# Patient Record
Sex: FEMALE | Race: WHITE | NOT HISPANIC OR LATINO | Employment: UNEMPLOYED | URBAN - NONMETROPOLITAN AREA
[De-identification: names, ages, dates, MRNs, and addresses within clinical notes are randomized per-mention and may not be internally consistent; named-entity substitution may affect disease eponyms.]

---

## 2017-01-04 ENCOUNTER — HOSPITAL ENCOUNTER (EMERGENCY)
Facility: HOSPITAL | Age: 52
Discharge: HOME | End: 2017-01-04
Attending: EMERGENCY MEDICINE | Admitting: EMERGENCY MEDICINE

## 2017-01-04 ENCOUNTER — APPOINTMENT (OUTPATIENT)
Dept: CT IMAGING | Facility: HOSPITAL | Age: 52
End: 2017-01-04

## 2017-01-04 ENCOUNTER — APPOINTMENT (OUTPATIENT)
Dept: GENERAL RADIOLOGY | Facility: HOSPITAL | Age: 52
End: 2017-01-04

## 2017-01-04 VITALS
HEIGHT: 63 IN | DIASTOLIC BLOOD PRESSURE: 67 MMHG | OXYGEN SATURATION: 94 % | WEIGHT: 112 LBS | RESPIRATION RATE: 18 BRPM | HEART RATE: 81 BPM | BODY MASS INDEX: 19.84 KG/M2 | SYSTOLIC BLOOD PRESSURE: 131 MMHG | TEMPERATURE: 97 F

## 2017-01-04 DIAGNOSIS — S32.030A COMPRESSION FRACTURE OF L3 LUMBAR VERTEBRA, CLOSED, INITIAL ENCOUNTER (HCC): Primary | ICD-10-CM

## 2017-01-04 PROCEDURE — 72110 X-RAY EXAM L-2 SPINE 4/>VWS: CPT | Performed by: RADIOLOGY

## 2017-01-04 PROCEDURE — 72110 X-RAY EXAM L-2 SPINE 4/>VWS: CPT

## 2017-01-04 PROCEDURE — 99284 EMERGENCY DEPT VISIT MOD MDM: CPT

## 2017-01-04 PROCEDURE — 25010000002 ORPHENADRINE CITRATE PER 60 MG: Performed by: PHYSICIAN ASSISTANT

## 2017-01-04 PROCEDURE — 72131 CT LUMBAR SPINE W/O DYE: CPT

## 2017-01-04 PROCEDURE — 96372 THER/PROPH/DIAG INJ SC/IM: CPT

## 2017-01-04 PROCEDURE — 72131 CT LUMBAR SPINE W/O DYE: CPT | Performed by: RADIOLOGY

## 2017-01-04 RX ORDER — HYDROCODONE BITARTRATE AND ACETAMINOPHEN 5; 325 MG/1; MG/1
1 TABLET ORAL EVERY 6 HOURS PRN
Qty: 10 TABLET | Refills: 0 | Status: ON HOLD | OUTPATIENT
Start: 2017-01-04 | End: 2017-03-01

## 2017-01-04 RX ORDER — ORPHENADRINE CITRATE 30 MG/ML
60 INJECTION INTRAMUSCULAR; INTRAVENOUS ONCE
Status: COMPLETED | OUTPATIENT
Start: 2017-01-04 | End: 2017-01-04

## 2017-01-04 RX ORDER — HYDROCODONE BITARTRATE AND ACETAMINOPHEN 5; 325 MG/1; MG/1
1 TABLET ORAL ONCE
Status: COMPLETED | OUTPATIENT
Start: 2017-01-04 | End: 2017-01-04

## 2017-01-04 RX ADMIN — IBUPROFEN 600 MG: 400 TABLET ORAL at 21:41

## 2017-01-04 RX ADMIN — HYDROCODONE BITARTRATE AND ACETAMINOPHEN 1 TABLET: 5; 325 TABLET ORAL at 18:29

## 2017-01-04 RX ADMIN — ORPHENADRINE CITRATE 60 MG: 30 INJECTION INTRAMUSCULAR; INTRAVENOUS at 18:29

## 2017-01-05 NOTE — ED NOTES
Medication given per orders, patient tolerated well. Will continue to monitor.     Angela Vergara RN  01/04/17 5824

## 2017-01-05 NOTE — ED PROVIDER NOTES
Subjective   HPI Comments: 51-year-old female who states she slipped while getting out of the shower about 2 hours ago and is now having lower back pain.  She denies any other injuries.  She denies any loss of consciousness.  She reports that she has injured her back in the past.  She states she has not taken any medications for her pain prior to arrival.    Patient is a 51 y.o. female presenting with back pain.   History provided by:  Patient  Back Pain   Location:  Lumbar spine  Quality:  Aching  Radiates to:  L thigh and R thigh  Pain severity:  Moderate  Pain is:  Same all the time  Onset quality:  Sudden  Duration:  2 hours  Timing:  Constant  Progression:  Worsening  Chronicity:  New  Context: falling and recent injury    Relieved by:  Nothing  Worsened by:  Movement  Ineffective treatments:  None tried  Associated symptoms: leg pain    Associated symptoms: no abdominal pain, no abdominal swelling, no bladder incontinence, no bowel incontinence, no chest pain, no dysuria, no fever, no headaches, no numbness, no paresthesias, no pelvic pain, no perianal numbness, no tingling, no weakness and no weight loss        Review of Systems   Constitutional: Negative for fever and weight loss.   HENT: Negative.    Eyes: Negative.    Respiratory: Negative.    Cardiovascular: Negative for chest pain.   Gastrointestinal: Negative for abdominal pain and bowel incontinence.   Genitourinary: Negative for bladder incontinence, dysuria and pelvic pain.   Musculoskeletal: Positive for back pain.   Skin: Negative.    Neurological: Negative for tingling, weakness, numbness, headaches and paresthesias.   Psychiatric/Behavioral: Negative.    All other systems reviewed and are negative.      Past Medical History   Diagnosis Date   • Anxiety    • 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   • Chronic respiratory failure with hypoxia      at nighttime   • Compression fracture of lumbar vertebra    • Compression fracture of  thoracic vertebra    • COPD (chronic obstructive pulmonary disease)    • Coronary artery disease    • Diabetes mellitus type 2 in nonobese    • Essential hypertension    • Gastroparesis    • Hepatitis-C    • MRSA infection      back of neck around    • NSTEMI (non-ST elevated myocardial infarction) 2016   • PE (pulmonary embolism)    • RA (rheumatoid arthritis)      with chronic steroid use       Allergies   Allergen Reactions   • Penicillins    • Toradol [Ketorolac Tromethamine]    • Tramadol        Past Surgical History   Procedure Laterality Date   • Cholecystectomy     • Foreign body removal       insulin needle removal x 3 from the abdomen   •  section       x 2   • Hysterectomy     • Arm debridement Left 2016     Procedure: HAND IRRIGATION DEBRIDEMENT AND REPAIR ;  Surgeon: Shane Fuller MD;  Location: [facility] OR;  Service:    • Endoscopy N/A 2016     Procedure: ESOPHAGOGASTRODUODENOSCOPY;  Surgeon: Jeremy Clement III, MD;  Location: [facility] OR;  Service:    • Amputation digit Right 2016     Third digit to the PIP. Surgeon: Dr. Bains   • Amputation digit Left 2016     Procedure: PARTIAL AMPUTATION LEFT THIRD FINGER;  Surgeon: Shane Fuller MD;  Location: [facility] OR;  Service:    • Amputation revision Left 2016     Procedure: AMPUTATION REVISION DIGIT STUMP;  Surgeon: Shane Fuller MD;  Location: [facility] OR;  Service:        Family History   Problem Relation Age of Onset   • Colon cancer Mother    • Cancer Father    • Diabetes Maternal Grandmother    • Heart disease Neg Hx    • Stroke Neg Hx        Social History     Social History   • Marital status:      Spouse name: N/A   • Number of children: N/A   • Years of education: N/A     Social History Main Topics   • Smoking status: Current Every Day Smoker     Packs/day: 4.50     Years: 35.00     Types: Cigarettes   • Smokeless tobacco: None      Comment: She cut down to  PPD  for 2.5 months ago   • Alcohol use No   • Drug use: No   • Sexual activity: Defer     Other Topics Concern   • None     Social History Narrative           Objective   Physical Exam   Constitutional: She is oriented to person, place, and time. She appears well-developed and well-nourished. No distress.   HENT:   Head: Normocephalic and atraumatic.   Right Ear: External ear normal.   Left Ear: External ear normal.   Nose: Nose normal.   Mouth/Throat: Oropharynx is clear and moist.   Eyes: Conjunctivae and EOM are normal. Pupils are equal, round, and reactive to light.   Neck: Normal range of motion. Neck supple.   Cardiovascular: Regular rhythm, normal heart sounds and intact distal pulses.  Tachycardia present.    Pulmonary/Chest: Effort normal and breath sounds normal. No respiratory distress. She has no wheezes. She exhibits no tenderness.   Abdominal: Soft. Bowel sounds are normal. There is no tenderness.   Musculoskeletal: She exhibits tenderness. She exhibits no deformity.   Pain with palpation over the midline lumbar spine.  Is able to ambulate.  Pain with flexion of the lower back.  No deformities noted.   Neurological: She is alert and oriented to person, place, and time. She displays normal reflexes. She exhibits normal muscle tone. Coordination normal.   Skin: Skin is warm and dry.   Psychiatric: She has a normal mood and affect. Her behavior is normal. Judgment and thought content normal.   Nursing note and vitals reviewed.      Procedures         ED Course  ED Course   Comment By Time   X-ray shows changes to L2 and L4 compared to CT of lumbar spine from March 2016.  Will get CT scan to further evaluate. COREEN Akers 01/04 1958   New L3 superior endplate fracture, will place in lumbar back brace and have pt follow up with Dr. Souza in the office. COREEN Akers 01/04 2207                  MDM  Number of Diagnoses or Management Options  Compression fracture of L3 lumbar vertebra, closed, initial  encounter:      Amount and/or Complexity of Data Reviewed  Tests in the radiology section of CPT®: ordered and reviewed  Independent visualization of images, tracings, or specimens: yes    Patient Progress  Patient progress: stable      Final diagnoses:   Compression fracture of L3 lumbar vertebra, closed, initial encounter            COREEN Akers  01/04/17 5739

## 2017-01-15 ENCOUNTER — HOSPITAL ENCOUNTER (EMERGENCY)
Facility: HOSPITAL | Age: 52
Discharge: HOME | End: 2017-01-15
Attending: EMERGENCY MEDICINE | Admitting: EMERGENCY MEDICINE

## 2017-01-15 VITALS
TEMPERATURE: 97.5 F | HEART RATE: 105 BPM | RESPIRATION RATE: 18 BRPM | OXYGEN SATURATION: 94 % | HEIGHT: 63 IN | WEIGHT: 115 LBS | DIASTOLIC BLOOD PRESSURE: 80 MMHG | SYSTOLIC BLOOD PRESSURE: 132 MMHG | BODY MASS INDEX: 20.38 KG/M2

## 2017-01-15 DIAGNOSIS — M54.50 ACUTE LEFT-SIDED LOW BACK PAIN WITHOUT SCIATICA: ICD-10-CM

## 2017-01-15 DIAGNOSIS — R10.84 GENERALIZED ABDOMINAL PAIN: Primary | ICD-10-CM

## 2017-01-15 LAB
ALBUMIN SERPL-MCNC: 3.8 G/DL (ref 3.5–5)
ALBUMIN/GLOB SERPL: 1.1 G/DL (ref 1.5–2.5)
ALP SERPL-CCNC: 93 U/L (ref 46–116)
ALT SERPL W P-5'-P-CCNC: 13 U/L (ref 10–36)
ANION GAP SERPL CALCULATED.3IONS-SCNC: 7.6 MMOL/L (ref 3.6–11.2)
ANISOCYTOSIS BLD QL: NORMAL
AST SERPL-CCNC: 18 U/L (ref 10–30)
BASOPHILS # BLD AUTO: 0.02 10*3/MM3 (ref 0–0.3)
BASOPHILS NFR BLD AUTO: 0.3 % (ref 0–2)
BILIRUB SERPL-MCNC: 0.3 MG/DL (ref 0.2–1.8)
BUN BLD-MCNC: 18 MG/DL (ref 7–21)
BUN/CREAT SERPL: 23.4 (ref 7–25)
CALCIUM SPEC-SCNC: 9.5 MG/DL (ref 7.7–10)
CHLORIDE SERPL-SCNC: 111 MMOL/L (ref 99–112)
CO2 SERPL-SCNC: 25.4 MMOL/L (ref 24.3–31.9)
CREAT BLD-MCNC: 0.77 MG/DL (ref 0.43–1.29)
DEPRECATED RDW RBC AUTO: 51.4 FL (ref 37–54)
EOSINOPHIL # BLD AUTO: 0.02 10*3/MM3 (ref 0–0.7)
EOSINOPHIL NFR BLD AUTO: 0.3 % (ref 0–5)
ERYTHROCYTE [DISTWIDTH] IN BLOOD BY AUTOMATED COUNT: 18.9 % (ref 11.5–14.5)
GFR SERPL CREATININE-BSD FRML MDRD: 79 ML/MIN/1.73
GLOBULIN UR ELPH-MCNC: 3.4 GM/DL
GLUCOSE BLD-MCNC: 196 MG/DL (ref 70–110)
HCT VFR BLD AUTO: 33.9 % (ref 37–47)
HGB BLD-MCNC: 9.8 G/DL (ref 12–16)
HYPOCHROMIA BLD QL: NORMAL
IMM GRANULOCYTES # BLD: 0.08 10*3/MM3 (ref 0–0.03)
IMM GRANULOCYTES NFR BLD: 1.3 % (ref 0–0.5)
LIPASE SERPL-CCNC: 45 U/L (ref 13–60)
LYMPHOCYTES # BLD AUTO: 1.09 10*3/MM3 (ref 1–3)
LYMPHOCYTES NFR BLD AUTO: 17.6 % (ref 21–51)
MCH RBC QN AUTO: 22.4 PG (ref 27–33)
MCHC RBC AUTO-ENTMCNC: 28.9 G/DL (ref 33–37)
MCV RBC AUTO: 77.4 FL (ref 80–94)
MONOCYTES # BLD AUTO: 0.5 10*3/MM3 (ref 0.1–0.9)
MONOCYTES NFR BLD AUTO: 8.1 % (ref 0–10)
NEUTROPHILS # BLD AUTO: 4.48 10*3/MM3 (ref 1.4–6.5)
NEUTROPHILS NFR BLD AUTO: 72.4 % (ref 30–70)
OSMOLALITY SERPL CALC.SUM OF ELEC: 294.2 MOSM/KG (ref 273–305)
PLATELET # BLD AUTO: 438 10*3/MM3 (ref 130–400)
PMV BLD AUTO: 9.1 FL (ref 6–10)
POIKILOCYTOSIS BLD QL SMEAR: NORMAL
POTASSIUM BLD-SCNC: 3.6 MMOL/L (ref 3.5–5.3)
PROT SERPL-MCNC: 7.2 G/DL (ref 6–8)
RBC # BLD AUTO: 4.38 10*6/MM3 (ref 4.2–5.4)
SMALL PLATELETS BLD QL SMEAR: NORMAL
SODIUM BLD-SCNC: 144 MMOL/L (ref 135–153)
WBC NRBC COR # BLD: 6.19 10*3/MM3 (ref 4.5–12.5)

## 2017-01-15 PROCEDURE — 85025 COMPLETE CBC W/AUTO DIFF WBC: CPT | Performed by: EMERGENCY MEDICINE

## 2017-01-15 PROCEDURE — 25010000002 HYDROMORPHONE PER 4 MG: Performed by: EMERGENCY MEDICINE

## 2017-01-15 PROCEDURE — 93005 ELECTROCARDIOGRAM TRACING: CPT | Performed by: EMERGENCY MEDICINE

## 2017-01-15 PROCEDURE — 93010 ELECTROCARDIOGRAM REPORT: CPT | Performed by: INTERNAL MEDICINE

## 2017-01-15 PROCEDURE — 80053 COMPREHEN METABOLIC PANEL: CPT | Performed by: EMERGENCY MEDICINE

## 2017-01-15 PROCEDURE — 83690 ASSAY OF LIPASE: CPT | Performed by: EMERGENCY MEDICINE

## 2017-01-15 PROCEDURE — 96374 THER/PROPH/DIAG INJ IV PUSH: CPT

## 2017-01-15 PROCEDURE — 85007 BL SMEAR W/DIFF WBC COUNT: CPT | Performed by: EMERGENCY MEDICINE

## 2017-01-15 PROCEDURE — 25010000002 ONDANSETRON PER 1 MG: Performed by: EMERGENCY MEDICINE

## 2017-01-15 PROCEDURE — 96375 TX/PRO/DX INJ NEW DRUG ADDON: CPT

## 2017-01-15 PROCEDURE — 36415 COLL VENOUS BLD VENIPUNCTURE: CPT

## 2017-01-15 PROCEDURE — 96361 HYDRATE IV INFUSION ADD-ON: CPT

## 2017-01-15 PROCEDURE — 99284 EMERGENCY DEPT VISIT MOD MDM: CPT

## 2017-01-15 RX ORDER — ONDANSETRON 2 MG/ML
4 INJECTION INTRAMUSCULAR; INTRAVENOUS ONCE
Status: COMPLETED | OUTPATIENT
Start: 2017-01-15 | End: 2017-01-15

## 2017-01-15 RX ORDER — HYDROMORPHONE HYDROCHLORIDE 1 MG/ML
0.5 INJECTION, SOLUTION INTRAMUSCULAR; INTRAVENOUS; SUBCUTANEOUS ONCE
Status: COMPLETED | OUTPATIENT
Start: 2017-01-15 | End: 2017-01-15

## 2017-01-15 RX ORDER — CYCLOBENZAPRINE HCL 10 MG
10 TABLET ORAL 3 TIMES DAILY PRN
Qty: 21 TABLET | Refills: 0 | Status: SHIPPED | OUTPATIENT
Start: 2017-01-15 | End: 2017-01-15

## 2017-01-15 RX ADMIN — ONDANSETRON 4 MG: 2 INJECTION, SOLUTION INTRAMUSCULAR; INTRAVENOUS at 19:17

## 2017-01-15 RX ADMIN — HYDROMORPHONE HYDROCHLORIDE 0.5 MG: 1 INJECTION, SOLUTION INTRAMUSCULAR; INTRAVENOUS; SUBCUTANEOUS at 19:18

## 2017-01-15 RX ADMIN — SODIUM CHLORIDE 1000 ML: 9 INJECTION, SOLUTION INTRAVENOUS at 19:17

## 2017-01-16 NOTE — ED PROVIDER NOTES
Subjective   Patient is a 51 y.o. female presenting with abdominal pain and back pain.   History provided by:  Patient  Abdominal Pain   Pain location:  Epigastric  Pain quality: cramping    Pain radiates to:  Does not radiate  Pain severity:  Moderate  Onset quality:  Gradual  Duration:  4 days  Timing:  Intermittent  Progression:  Waxing and waning  Context: eating    Relieved by:  Nothing  Worsened by:  Nothing  Associated symptoms: anorexia, nausea and vomiting    Associated symptoms: no chest pain, no dysuria and no fever    Back Pain   Associated symptoms: abdominal pain    Associated symptoms: no chest pain, no dysuria and no fever        Review of Systems   Constitutional: Negative.  Negative for fever.   HENT: Negative.    Respiratory: Negative.    Cardiovascular: Negative.  Negative for chest pain.   Gastrointestinal: Positive for abdominal pain, anorexia, nausea and vomiting.   Endocrine: Negative.    Genitourinary: Negative.  Negative for dysuria.   Musculoskeletal: Positive for back pain.   Skin: Negative.    Neurological: Negative.    Psychiatric/Behavioral: Negative.    All other systems reviewed and are negative.      Past Medical History   Diagnosis Date   • Anxiety    • 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   • Chronic respiratory failure with hypoxia      at nighttime   • Compression fracture of lumbar vertebra    • Compression fracture of thoracic vertebra    • COPD (chronic obstructive pulmonary disease)    • Coronary artery disease    • Diabetes mellitus type 2 in nonobese    • Essential hypertension    • Gastroparesis    • Hepatitis-C    • MRSA infection      back of neck around 2012   • NSTEMI (non-ST elevated myocardial infarction) 07/2016   • PE (pulmonary embolism)    • RA (rheumatoid arthritis)      with chronic steroid use       Allergies   Allergen Reactions   • Penicillins    • Toradol [Ketorolac Tromethamine]    • Tramadol        Past Surgical History   Procedure  Laterality Date   • Cholecystectomy     • Foreign body removal       insulin needle removal x 3 from the abdomen   •  section       x 2   • Hysterectomy     • Arm debridement Left 2016     Procedure: HAND IRRIGATION DEBRIDEMENT AND REPAIR ;  Surgeon: Shane Fuller MD;  Location: [facility] OR;  Service:    • Endoscopy N/A 2016     Procedure: ESOPHAGOGASTRODUODENOSCOPY;  Surgeon: Jeremy Clement III, MD;  Location: [facility] OR;  Service:    • Amputation digit Right 2016     Third digit to the PIP. Surgeon: Dr. Bains   • Amputation digit Left 2016     Procedure: PARTIAL AMPUTATION LEFT THIRD FINGER;  Surgeon: Shane Fuller MD;  Location: [facility] OR;  Service:    • Amputation revision Left 2016     Procedure: AMPUTATION REVISION DIGIT STUMP;  Surgeon: Shane Fuller MD;  Location: [facility] OR;  Service:        Family History   Problem Relation Age of Onset   • Colon cancer Mother    • Cancer Father    • Diabetes Maternal Grandmother    • Heart disease Neg Hx    • Stroke Neg Hx        Social History     Social History   • Marital status:      Spouse name: N/A   • Number of children: N/A   • Years of education: N/A     Social History Main Topics   • Smoking status: Current Every Day Smoker     Packs/day: 4.50     Years: 35.00     Types: Cigarettes   • Smokeless tobacco: None      Comment: She cut down to 1/4 PPD for 2.5 months ago   • Alcohol use No   • Drug use: No   • Sexual activity: Defer     Other Topics Concern   • None     Social History Narrative           Objective   Physical Exam   Constitutional: She is oriented to person, place, and time. She appears well-developed and well-nourished. No distress.   HENT:   Head: Normocephalic and atraumatic.   Right Ear: External ear normal.   Left Ear: External ear normal.   Nose: Nose normal.   Eyes: Conjunctivae and EOM are normal. Pupils are equal, round, and reactive to light.   Neck: Normal range of  motion. Neck supple. No JVD present. No tracheal deviation present.   Cardiovascular: Normal rate, regular rhythm and normal heart sounds.    No murmur heard.  Pulmonary/Chest: Effort normal and breath sounds normal. No respiratory distress. She has no wheezes.   Abdominal: Soft. Bowel sounds are normal. There is tenderness.   Diffusely mildly tender, no guarding or rebound   Musculoskeletal: Normal range of motion. She exhibits no edema or deformity.   Neurological: She is alert and oriented to person, place, and time. No cranial nerve deficit.   Skin: Skin is warm and dry. No rash noted. She is not diaphoretic. No erythema. No pallor.   Psychiatric: She has a normal mood and affect. Her behavior is normal. Thought content normal.   Nursing note and vitals reviewed.      Procedures         ED Course  ED Course                  MDM  Number of Diagnoses or Management Options  Acute left-sided low back pain without sciatica:   Generalized abdominal pain:      Amount and/or Complexity of Data Reviewed  Clinical lab tests: ordered and reviewed  Tests in the radiology section of CPT®: ordered and reviewed    Risk of Complications, Morbidity, and/or Mortality  Presenting problems: moderate  Diagnostic procedures: moderate  Management options: moderate        Final diagnoses:   Generalized abdominal pain   Acute left-sided low back pain without sciatica            Zachariah Ingram MD  01/17/17 2460

## 2017-01-21 ENCOUNTER — HOSPITAL ENCOUNTER (EMERGENCY)
Facility: HOSPITAL | Age: 52
Discharge: HOME | End: 2017-01-21
Attending: EMERGENCY MEDICINE | Admitting: EMERGENCY MEDICINE

## 2017-01-21 VITALS
HEART RATE: 88 BPM | WEIGHT: 114 LBS | SYSTOLIC BLOOD PRESSURE: 135 MMHG | BODY MASS INDEX: 20.2 KG/M2 | TEMPERATURE: 98 F | RESPIRATION RATE: 20 BRPM | HEIGHT: 63 IN | OXYGEN SATURATION: 99 % | DIASTOLIC BLOOD PRESSURE: 56 MMHG

## 2017-01-21 DIAGNOSIS — N39.0 ACUTE UTI: Primary | ICD-10-CM

## 2017-01-21 LAB
ALBUMIN SERPL-MCNC: 3.9 G/DL (ref 3.5–5)
ALBUMIN/GLOB SERPL: 1.1 G/DL (ref 1.5–2.5)
ALP SERPL-CCNC: 98 U/L (ref 46–116)
ALT SERPL W P-5'-P-CCNC: 27 U/L (ref 10–36)
AMPHET+METHAMPHET UR QL: NEGATIVE
AMYLASE SERPL-CCNC: 39 U/L (ref 28–100)
ANION GAP SERPL CALCULATED.3IONS-SCNC: 8.7 MMOL/L (ref 3.6–11.2)
AST SERPL-CCNC: 34 U/L (ref 10–30)
BACTERIA UR QL AUTO: ABNORMAL /HPF
BARBITURATES UR QL SCN: NEGATIVE
BASOPHILS # BLD AUTO: 0.01 10*3/MM3 (ref 0–0.3)
BASOPHILS NFR BLD AUTO: 0.1 % (ref 0–2)
BENZODIAZ UR QL SCN: NEGATIVE
BILIRUB SERPL-MCNC: 0.4 MG/DL (ref 0.2–1.8)
BILIRUB UR QL STRIP: ABNORMAL
BUN BLD-MCNC: 13 MG/DL (ref 7–21)
BUN/CREAT SERPL: 22 (ref 7–25)
CALCIUM SPEC-SCNC: 9.2 MG/DL (ref 7.7–10)
CANNABINOIDS SERPL QL: NEGATIVE
CHLORIDE SERPL-SCNC: 112 MMOL/L (ref 99–112)
CLARITY UR: ABNORMAL
CO2 SERPL-SCNC: 24.3 MMOL/L (ref 24.3–31.9)
COCAINE UR QL: NEGATIVE
COLOR UR: ABNORMAL
CREAT BLD-MCNC: 0.59 MG/DL (ref 0.43–1.29)
DEPRECATED RDW RBC AUTO: 52.7 FL (ref 37–54)
EOSINOPHIL # BLD AUTO: 0.04 10*3/MM3 (ref 0–0.7)
EOSINOPHIL NFR BLD AUTO: 0.4 % (ref 0–5)
ERYTHROCYTE [DISTWIDTH] IN BLOOD BY AUTOMATED COUNT: 18.8 % (ref 11.5–14.5)
GFR SERPL CREATININE-BSD FRML MDRD: 107 ML/MIN/1.73
GLOBULIN UR ELPH-MCNC: 3.5 GM/DL
GLUCOSE BLD-MCNC: 298 MG/DL (ref 70–110)
GLUCOSE UR STRIP-MCNC: ABNORMAL MG/DL
HCT VFR BLD AUTO: 39.3 % (ref 37–47)
HGB BLD-MCNC: 11.5 G/DL (ref 12–16)
HGB UR QL STRIP.AUTO: ABNORMAL
HYALINE CASTS UR QL AUTO: ABNORMAL /LPF
IMM GRANULOCYTES # BLD: 0.07 10*3/MM3 (ref 0–0.03)
IMM GRANULOCYTES NFR BLD: 0.8 % (ref 0–0.5)
KETONES UR QL STRIP: ABNORMAL
LEUKOCYTE ESTERASE UR QL STRIP.AUTO: ABNORMAL
LIPASE SERPL-CCNC: 56 U/L (ref 13–60)
LYMPHOCYTES # BLD AUTO: 0.83 10*3/MM3 (ref 1–3)
LYMPHOCYTES NFR BLD AUTO: 9 % (ref 21–51)
MCH RBC QN AUTO: 22.5 PG (ref 27–33)
MCHC RBC AUTO-ENTMCNC: 29.3 G/DL (ref 33–37)
MCV RBC AUTO: 76.9 FL (ref 80–94)
METHADONE UR QL SCN: NEGATIVE
MONOCYTES # BLD AUTO: 0.38 10*3/MM3 (ref 0.1–0.9)
MONOCYTES NFR BLD AUTO: 4.1 % (ref 0–10)
NEUTROPHILS # BLD AUTO: 7.92 10*3/MM3 (ref 1.4–6.5)
NEUTROPHILS NFR BLD AUTO: 85.6 % (ref 30–70)
NITRITE UR QL STRIP: POSITIVE
OPIATES UR QL: NEGATIVE
OSMOLALITY SERPL CALC.SUM OF ELEC: 299.9 MOSM/KG (ref 273–305)
OXYCODONE UR QL SCN: NEGATIVE
PCP UR QL SCN: NEGATIVE
PH UR STRIP.AUTO: 6 [PH] (ref 5–8)
PLATELET # BLD AUTO: 389 10*3/MM3 (ref 130–400)
PMV BLD AUTO: 9.6 FL (ref 6–10)
POTASSIUM BLD-SCNC: 3.8 MMOL/L (ref 3.5–5.3)
PROPOXYPH UR QL: NEGATIVE
PROT SERPL-MCNC: 7.4 G/DL (ref 6–8)
PROT UR QL STRIP: ABNORMAL
RBC # BLD AUTO: 5.11 10*6/MM3 (ref 4.2–5.4)
RBC # UR: ABNORMAL /HPF
REF LAB TEST METHOD: ABNORMAL
SODIUM BLD-SCNC: 145 MMOL/L (ref 135–153)
SP GR UR STRIP: >1.03 (ref 1–1.03)
SQUAMOUS #/AREA URNS HPF: ABNORMAL /HPF
UROBILINOGEN UR QL STRIP: ABNORMAL
WBC NRBC COR # BLD: 9.25 10*3/MM3 (ref 4.5–12.5)
WBC UR QL AUTO: ABNORMAL /HPF

## 2017-01-21 PROCEDURE — 96374 THER/PROPH/DIAG INJ IV PUSH: CPT

## 2017-01-21 PROCEDURE — 87186 SC STD MICRODIL/AGAR DIL: CPT | Performed by: EMERGENCY MEDICINE

## 2017-01-21 PROCEDURE — 85025 COMPLETE CBC W/AUTO DIFF WBC: CPT | Performed by: EMERGENCY MEDICINE

## 2017-01-21 PROCEDURE — 80307 DRUG TEST PRSMV CHEM ANLYZR: CPT | Performed by: EMERGENCY MEDICINE

## 2017-01-21 PROCEDURE — 36415 COLL VENOUS BLD VENIPUNCTURE: CPT

## 2017-01-21 PROCEDURE — 96361 HYDRATE IV INFUSION ADD-ON: CPT

## 2017-01-21 PROCEDURE — 87086 URINE CULTURE/COLONY COUNT: CPT | Performed by: EMERGENCY MEDICINE

## 2017-01-21 PROCEDURE — 81001 URINALYSIS AUTO W/SCOPE: CPT | Performed by: EMERGENCY MEDICINE

## 2017-01-21 PROCEDURE — 87088 URINE BACTERIA CULTURE: CPT | Performed by: EMERGENCY MEDICINE

## 2017-01-21 PROCEDURE — 99284 EMERGENCY DEPT VISIT MOD MDM: CPT

## 2017-01-21 PROCEDURE — 82150 ASSAY OF AMYLASE: CPT | Performed by: EMERGENCY MEDICINE

## 2017-01-21 PROCEDURE — 83690 ASSAY OF LIPASE: CPT | Performed by: EMERGENCY MEDICINE

## 2017-01-21 PROCEDURE — 80053 COMPREHEN METABOLIC PANEL: CPT | Performed by: EMERGENCY MEDICINE

## 2017-01-21 PROCEDURE — 25010000002 ONDANSETRON PER 1 MG: Performed by: EMERGENCY MEDICINE

## 2017-01-21 PROCEDURE — 87077 CULTURE AEROBIC IDENTIFY: CPT | Performed by: EMERGENCY MEDICINE

## 2017-01-21 RX ORDER — NITROFURANTOIN 25; 75 MG/1; MG/1
100 CAPSULE ORAL 2 TIMES DAILY
Qty: 14 CAPSULE | Refills: 0 | Status: SHIPPED | OUTPATIENT
Start: 2017-01-21 | End: 2017-01-28

## 2017-01-21 RX ORDER — ONDANSETRON 4 MG/1
4 TABLET, FILM COATED ORAL EVERY 6 HOURS PRN
Qty: 12 TABLET | Refills: 0 | Status: ON HOLD | OUTPATIENT
Start: 2017-01-21 | End: 2017-03-01

## 2017-01-21 RX ORDER — MAGNESIUM HYDROXIDE/ALUMINUM HYDROXICE/SIMETHICONE 120; 1200; 1200 MG/30ML; MG/30ML; MG/30ML
10 SUSPENSION ORAL ONCE
Status: DISCONTINUED | OUTPATIENT
Start: 2017-01-21 | End: 2017-01-21

## 2017-01-21 RX ORDER — ACETAMINOPHEN 325 MG/1
1000 TABLET ORAL ONCE
Status: COMPLETED | OUTPATIENT
Start: 2017-01-21 | End: 2017-01-21

## 2017-01-21 RX ORDER — ONDANSETRON 2 MG/ML
4 INJECTION INTRAMUSCULAR; INTRAVENOUS ONCE
Status: COMPLETED | OUTPATIENT
Start: 2017-01-21 | End: 2017-01-21

## 2017-01-21 RX ADMIN — SODIUM CHLORIDE 1000 ML: 9 INJECTION, SOLUTION INTRAVENOUS at 12:32

## 2017-01-21 RX ADMIN — ONDANSETRON 4 MG: 2 INJECTION, SOLUTION INTRAMUSCULAR; INTRAVENOUS at 12:49

## 2017-01-21 RX ADMIN — ACETAMINOPHEN 975 MG: 325 TABLET ORAL at 13:24

## 2017-01-21 NOTE — ED PROVIDER NOTES
Subjective   Patient is a 51 y.o. female presenting with abdominal pain.   History provided by:  Patient  Abdominal Pain   Pain location:  Epigastric  Pain quality: aching, cramping and sharp    Pain radiates to:  Does not radiate  Timing:  Constant  Progression:  Worsening  Context: diet changes, medication withdrawal and retching    Relieved by:  Nothing  Worsened by:  Nothing  Ineffective treatments:  None tried  Associated symptoms: dysuria, nausea and vomiting    Associated symptoms: no chest pain and no fever        Review of Systems   Constitutional: Negative.  Negative for fever.   HENT: Negative.    Respiratory: Negative.    Cardiovascular: Negative.  Negative for chest pain.   Gastrointestinal: Positive for abdominal pain, nausea and vomiting.   Endocrine: Negative.    Genitourinary: Positive for dysuria.   Skin: Negative.    Neurological: Negative.    Psychiatric/Behavioral: Negative.    All other systems reviewed and are negative.      Past Medical History   Diagnosis Date   • Anxiety    • 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   • Chronic respiratory failure with hypoxia      at nighttime   • Compression fracture of lumbar vertebra    • Compression fracture of thoracic vertebra    • COPD (chronic obstructive pulmonary disease)    • Coronary artery disease    • Diabetes mellitus type 2 in nonobese    • Essential hypertension    • Gastroparesis    • Hepatitis-C    • MRSA infection      back of neck around    • NSTEMI (non-ST elevated myocardial infarction) 2016   • PE (pulmonary embolism)    • RA (rheumatoid arthritis)      with chronic steroid use       Allergies   Allergen Reactions   • Penicillins    • Toradol [Ketorolac Tromethamine]    • Tramadol        Past Surgical History   Procedure Laterality Date   • Cholecystectomy     • Foreign body removal       insulin needle removal x 3 from the abdomen   •  section       x 2   • Hysterectomy     • Arm debridement Left  12/20/2016     Procedure: HAND IRRIGATION DEBRIDEMENT AND REPAIR ;  Surgeon: Shane Fuller MD;  Location:  [facility] OR;  Service:    • Endoscopy N/A 12/20/2016     Procedure: ESOPHAGOGASTRODUODENOSCOPY;  Surgeon: Jeremy Clement III, MD;  Location:  [facility] OR;  Service:    • Amputation digit Right 12/22/2016     Third digit to the PIP. Surgeon: Dr. Bains   • Amputation digit Left 12/22/2016     Procedure: PARTIAL AMPUTATION LEFT THIRD FINGER;  Surgeon: Shane Fulelr MD;  Location:  [facility] OR;  Service:    • Amputation revision Left 12/22/2016     Procedure: AMPUTATION REVISION DIGIT STUMP;  Surgeon: Shane Fuller MD;  Location:  [facility] OR;  Service:        Family History   Problem Relation Age of Onset   • Colon cancer Mother    • Cancer Father    • Diabetes Maternal Grandmother    • Heart disease Neg Hx    • Stroke Neg Hx        Social History     Social History   • Marital status:      Spouse name: N/A   • Number of children: N/A   • Years of education: N/A     Social History Main Topics   • Smoking status: Current Every Day Smoker     Packs/day: 4.50     Years: 35.00     Types: Cigarettes   • Smokeless tobacco: Not on file      Comment: She cut down to 1/4 PPD for 2.5 months ago   • Alcohol use No   • Drug use: No   • Sexual activity: Defer     Other Topics Concern   • Not on file     Social History Narrative           Objective   Physical Exam   Constitutional: She is oriented to person, place, and time. She appears well-developed and well-nourished. No distress.   HENT:   Head: Normocephalic and atraumatic.   Right Ear: External ear normal.   Left Ear: External ear normal.   Nose: Nose normal.   Eyes: Conjunctivae and EOM are normal. Pupils are equal, round, and reactive to light.   Neck: Normal range of motion. Neck supple. No JVD present. No tracheal deviation present.   Cardiovascular: Normal rate, regular rhythm and normal heart sounds.    No murmur  heard.  Pulmonary/Chest: Effort normal and breath sounds normal. No respiratory distress. She has no wheezes.   Abdominal: Soft. Bowel sounds are normal. There is tenderness.   Musculoskeletal: Normal range of motion. She exhibits no edema or deformity.   Neurological: She is alert and oriented to person, place, and time. No cranial nerve deficit.   Skin: Skin is warm and dry. No rash noted. She is not diaphoretic. No erythema. No pallor.   Psychiatric: She has a normal mood and affect. Her behavior is normal. Thought content normal.   Nursing note and vitals reviewed.      Procedures         ED Course  ED Course                  MDM    Final diagnoses:   Acute UTI            Zachariah Ingram MD  01/21/17 3121

## 2017-01-23 LAB — BACTERIA SPEC AEROBE CULT: ABNORMAL

## 2017-02-06 ENCOUNTER — OFFICE VISIT (OUTPATIENT)
Dept: ORTHOPEDIC SURGERY | Facility: CLINIC | Age: 52
End: 2017-02-06

## 2017-02-06 VITALS
HEART RATE: 128 BPM | DIASTOLIC BLOOD PRESSURE: 88 MMHG | HEIGHT: 63 IN | BODY MASS INDEX: 20.2 KG/M2 | WEIGHT: 114 LBS | SYSTOLIC BLOOD PRESSURE: 155 MMHG

## 2017-02-06 DIAGNOSIS — I73.1 BUERGER'S DISEASE (HCC): Primary | ICD-10-CM

## 2017-02-06 PROBLEM — E11.9 DIABETES MELLITUS TYPE 2 IN NONOBESE (HCC): Status: ACTIVE | Noted: 2017-02-06

## 2017-02-06 PROBLEM — J44.9 COPD (CHRONIC OBSTRUCTIVE PULMONARY DISEASE) (HCC): Status: ACTIVE | Noted: 2017-02-06

## 2017-02-06 PROBLEM — R11.0 CHRONIC NAUSEA: Status: ACTIVE | Noted: 2017-02-06

## 2017-02-06 PROBLEM — J43.9 CHRONIC OBSTRUCTIVE PULMONARY EMPHYSEMA (HCC): Status: ACTIVE | Noted: 2017-02-06

## 2017-02-06 PROBLEM — M06.9 RA (RHEUMATOID ARTHRITIS) (HCC): Status: ACTIVE | Noted: 2017-02-06

## 2017-02-06 PROBLEM — F41.9 ANXIETY: Status: ACTIVE | Noted: 2017-02-06

## 2017-02-06 PROBLEM — I25.10 CORONARY ARTERY DISEASE: Status: ACTIVE | Noted: 2017-02-06

## 2017-02-06 PROBLEM — E11.9 DIABETES (HCC): Status: ACTIVE | Noted: 2017-02-06

## 2017-02-06 PROBLEM — B19.20 HEPATITIS-C: Status: ACTIVE | Noted: 2017-02-06

## 2017-02-06 PROBLEM — I10 ESSENTIAL HYPERTENSION: Status: ACTIVE | Noted: 2017-02-06

## 2017-02-06 PROBLEM — K85.90 PANCREATITIS: Status: ACTIVE | Noted: 2017-02-06

## 2017-02-06 PROBLEM — K74.60 CIRRHOSIS (HCC): Status: ACTIVE | Noted: 2017-02-06

## 2017-02-06 PROCEDURE — 99024 POSTOP FOLLOW-UP VISIT: CPT | Performed by: ORTHOPAEDIC SURGERY

## 2017-02-28 ENCOUNTER — APPOINTMENT (OUTPATIENT)
Dept: GENERAL RADIOLOGY | Facility: HOSPITAL | Age: 52
End: 2017-02-28

## 2017-02-28 ENCOUNTER — APPOINTMENT (OUTPATIENT)
Dept: CT IMAGING | Facility: HOSPITAL | Age: 52
End: 2017-02-28

## 2017-02-28 ENCOUNTER — HOSPITAL ENCOUNTER (INPATIENT)
Facility: HOSPITAL | Age: 52
LOS: 20 days | Discharge: NURSING, REHAB OR LONG-TERM CARE | End: 2017-03-20
Attending: EMERGENCY MEDICINE | Admitting: INTERNAL MEDICINE

## 2017-02-28 DIAGNOSIS — J18.9 PNEUMONIA OF BOTH LOWER LOBES DUE TO INFECTIOUS ORGANISM: Primary | ICD-10-CM

## 2017-02-28 LAB
ALBUMIN SERPL-MCNC: 3.6 G/DL (ref 3.5–5)
ALBUMIN/GLOB SERPL: 1.2 G/DL (ref 1.5–2.5)
ALP SERPL-CCNC: 59 U/L (ref 35–104)
ALT SERPL W P-5'-P-CCNC: 41 U/L (ref 10–36)
AMPHET+METHAMPHET UR QL: NEGATIVE
ANION GAP SERPL CALCULATED.3IONS-SCNC: 5.7 MMOL/L (ref 3.6–11.2)
ANISOCYTOSIS BLD QL: ABNORMAL
AST SERPL-CCNC: 29 U/L (ref 10–30)
BACTERIA UR QL AUTO: ABNORMAL /HPF
BARBITURATES UR QL SCN: NEGATIVE
BENZODIAZ UR QL SCN: NEGATIVE
BILIRUB SERPL-MCNC: 0.2 MG/DL (ref 0.2–1.8)
BILIRUB UR QL STRIP: NEGATIVE
BNP SERPL-MCNC: 193 PG/ML (ref 0–100)
BUN BLD-MCNC: 18 MG/DL (ref 7–21)
BUN/CREAT SERPL: 31.6 (ref 7–25)
CALCIUM SPEC-SCNC: 8.8 MG/DL (ref 7.7–10)
CANNABINOIDS SERPL QL: NEGATIVE
CHLORIDE SERPL-SCNC: 109 MMOL/L (ref 99–112)
CK MB SERPL-CCNC: 0.31 NG/ML (ref 0–5)
CLARITY UR: ABNORMAL
CO2 SERPL-SCNC: 24.3 MMOL/L (ref 24.3–31.9)
COCAINE UR QL: NEGATIVE
COLOR UR: ABNORMAL
CREAT BLD-MCNC: 0.57 MG/DL (ref 0.43–1.29)
CRP SERPL-MCNC: 25.5 MG/DL (ref 0–0.99)
D DIMER PPP FEU-MCNC: 2.05 MG/L (FEU) (ref 0–0.5)
D-LACTATE SERPL-SCNC: 1.8 MMOL/L (ref 0.5–2)
DEPRECATED RDW RBC AUTO: 49.2 FL (ref 37–54)
ERYTHROCYTE [DISTWIDTH] IN BLOOD BY AUTOMATED COUNT: 19.4 % (ref 11.5–14.5)
ERYTHROCYTE [SEDIMENTATION RATE] IN BLOOD: 85 MM/HR (ref 0–30)
GFR SERPL CREATININE-BSD FRML MDRD: 112 ML/MIN/1.73
GLOBULIN UR ELPH-MCNC: 3 GM/DL
GLUCOSE BLD-MCNC: 117 MG/DL (ref 70–110)
GLUCOSE UR STRIP-MCNC: NEGATIVE MG/DL
HCT VFR BLD AUTO: 33.6 % (ref 37–47)
HGB BLD-MCNC: 9.9 G/DL (ref 12–16)
HGB UR QL STRIP.AUTO: ABNORMAL
HYALINE CASTS UR QL AUTO: ABNORMAL /LPF
HYPOCHROMIA BLD QL: ABNORMAL
KETONES UR QL STRIP: NEGATIVE
LEUKOCYTE ESTERASE UR QL STRIP.AUTO: ABNORMAL
LYMPHOCYTES # BLD MANUAL: 1.08 10*3/MM3 (ref 1–3)
LYMPHOCYTES NFR BLD MANUAL: 13 % (ref 21–51)
LYMPHOCYTES NFR BLD MANUAL: 4 % (ref 0–10)
MCH RBC QN AUTO: 21.3 PG (ref 27–33)
MCHC RBC AUTO-ENTMCNC: 29.5 G/DL (ref 33–37)
MCV RBC AUTO: 72.4 FL (ref 80–94)
METHADONE UR QL SCN: NEGATIVE
MICROCYTES BLD QL: ABNORMAL
MONOCYTES # BLD AUTO: 0.33 10*3/MM3 (ref 0.1–0.9)
MYOGLOBIN SERPL-MCNC: 107 NG/ML (ref 0–109)
NEUTROPHILS # BLD AUTO: 6.88 10*3/MM3 (ref 1.4–6.5)
NEUTROPHILS NFR BLD MANUAL: 83 % (ref 30–70)
NITRITE UR QL STRIP: NEGATIVE
OPIATES UR QL: POSITIVE
OSMOLALITY SERPL CALC.SUM OF ELEC: 280.5 MOSM/KG (ref 273–305)
OXYCODONE UR QL SCN: NEGATIVE
PCP UR QL SCN: NEGATIVE
PH UR STRIP.AUTO: 6 [PH] (ref 5–8)
PLAT MORPH BLD: NORMAL
PLATELET # BLD AUTO: 222 10*3/MM3 (ref 130–400)
PMV BLD AUTO: 9.8 FL (ref 6–10)
POTASSIUM BLD-SCNC: 3.8 MMOL/L (ref 3.5–5.3)
PROPOXYPH UR QL: NEGATIVE
PROT SERPL-MCNC: 6.6 G/DL (ref 6–8)
PROT UR QL STRIP: ABNORMAL
RBC # BLD AUTO: 4.64 10*6/MM3 (ref 4.2–5.4)
RBC # UR: ABNORMAL /HPF
REF LAB TEST METHOD: ABNORMAL
SODIUM BLD-SCNC: 139 MMOL/L (ref 135–153)
SP GR UR STRIP: 1.03 (ref 1–1.03)
SQUAMOUS #/AREA URNS HPF: ABNORMAL /HPF
TROPONIN I SERPL-MCNC: 0.08 NG/ML
TROPONIN I SERPL-MCNC: 0.09 NG/ML
UROBILINOGEN UR QL STRIP: ABNORMAL
WBC NRBC COR # BLD: 8.29 10*3/MM3 (ref 4.5–12.5)
WBC UR QL AUTO: ABNORMAL /HPF

## 2017-02-28 PROCEDURE — 0 IOPAMIDOL PER 1 ML: Performed by: EMERGENCY MEDICINE

## 2017-02-28 PROCEDURE — 25010000002 MORPHINE PER 10 MG: Performed by: EMERGENCY MEDICINE

## 2017-02-28 PROCEDURE — 80307 DRUG TEST PRSMV CHEM ANLYZR: CPT | Performed by: EMERGENCY MEDICINE

## 2017-02-28 PROCEDURE — 86140 C-REACTIVE PROTEIN: CPT | Performed by: EMERGENCY MEDICINE

## 2017-02-28 PROCEDURE — 94799 UNLISTED PULMONARY SVC/PX: CPT

## 2017-02-28 PROCEDURE — 71010 XR CHEST 1 VW: CPT | Performed by: RADIOLOGY

## 2017-02-28 PROCEDURE — 81001 URINALYSIS AUTO W/SCOPE: CPT | Performed by: EMERGENCY MEDICINE

## 2017-02-28 PROCEDURE — 85652 RBC SED RATE AUTOMATED: CPT | Performed by: EMERGENCY MEDICINE

## 2017-02-28 PROCEDURE — 87088 URINE BACTERIA CULTURE: CPT | Performed by: EMERGENCY MEDICINE

## 2017-02-28 PROCEDURE — 99285 EMERGENCY DEPT VISIT HI MDM: CPT

## 2017-02-28 PROCEDURE — 83605 ASSAY OF LACTIC ACID: CPT | Performed by: EMERGENCY MEDICINE

## 2017-02-28 PROCEDURE — 83880 ASSAY OF NATRIURETIC PEPTIDE: CPT | Performed by: EMERGENCY MEDICINE

## 2017-02-28 PROCEDURE — 25010000002 VANCOMYCIN PER 500 MG: Performed by: EMERGENCY MEDICINE

## 2017-02-28 PROCEDURE — 93010 ELECTROCARDIOGRAM REPORT: CPT | Performed by: INTERNAL MEDICINE

## 2017-02-28 PROCEDURE — 87086 URINE CULTURE/COLONY COUNT: CPT | Performed by: EMERGENCY MEDICINE

## 2017-02-28 PROCEDURE — 85379 FIBRIN DEGRADATION QUANT: CPT | Performed by: EMERGENCY MEDICINE

## 2017-02-28 PROCEDURE — 93005 ELECTROCARDIOGRAM TRACING: CPT | Performed by: EMERGENCY MEDICINE

## 2017-02-28 PROCEDURE — 71010 HC CHEST PA OR AP: CPT

## 2017-02-28 PROCEDURE — 85025 COMPLETE CBC W/AUTO DIFF WBC: CPT | Performed by: EMERGENCY MEDICINE

## 2017-02-28 PROCEDURE — 36415 COLL VENOUS BLD VENIPUNCTURE: CPT

## 2017-02-28 PROCEDURE — 25010000002 ONDANSETRON PER 1 MG: Performed by: EMERGENCY MEDICINE

## 2017-02-28 PROCEDURE — 87040 BLOOD CULTURE FOR BACTERIA: CPT | Performed by: EMERGENCY MEDICINE

## 2017-02-28 PROCEDURE — 94640 AIRWAY INHALATION TREATMENT: CPT

## 2017-02-28 PROCEDURE — 71275 CT ANGIOGRAPHY CHEST: CPT

## 2017-02-28 PROCEDURE — 87449 NOS EACH ORGANISM AG IA: CPT | Performed by: INTERNAL MEDICINE

## 2017-02-28 PROCEDURE — 25010000002 CEFTRIAXONE: Performed by: EMERGENCY MEDICINE

## 2017-02-28 PROCEDURE — 80053 COMPREHEN METABOLIC PANEL: CPT | Performed by: EMERGENCY MEDICINE

## 2017-02-28 PROCEDURE — 84484 ASSAY OF TROPONIN QUANT: CPT | Performed by: EMERGENCY MEDICINE

## 2017-02-28 PROCEDURE — 71275 CT ANGIOGRAPHY CHEST: CPT | Performed by: RADIOLOGY

## 2017-02-28 PROCEDURE — 87077 CULTURE AEROBIC IDENTIFY: CPT | Performed by: EMERGENCY MEDICINE

## 2017-02-28 PROCEDURE — 85007 BL SMEAR W/DIFF WBC COUNT: CPT | Performed by: EMERGENCY MEDICINE

## 2017-02-28 PROCEDURE — 83874 ASSAY OF MYOGLOBIN: CPT | Performed by: EMERGENCY MEDICINE

## 2017-02-28 PROCEDURE — 83036 HEMOGLOBIN GLYCOSYLATED A1C: CPT | Performed by: PHYSICIAN ASSISTANT

## 2017-02-28 PROCEDURE — 25010000002 METHYLPREDNISOLONE PER 125 MG: Performed by: EMERGENCY MEDICINE

## 2017-02-28 PROCEDURE — 82553 CREATINE MB FRACTION: CPT | Performed by: EMERGENCY MEDICINE

## 2017-02-28 PROCEDURE — 87186 SC STD MICRODIL/AGAR DIL: CPT | Performed by: EMERGENCY MEDICINE

## 2017-02-28 RX ORDER — ONDANSETRON 2 MG/ML
4 INJECTION INTRAMUSCULAR; INTRAVENOUS ONCE
Status: COMPLETED | OUTPATIENT
Start: 2017-02-28 | End: 2017-02-28

## 2017-02-28 RX ORDER — METHYLPREDNISOLONE SODIUM SUCCINATE 125 MG/2ML
125 INJECTION, POWDER, LYOPHILIZED, FOR SOLUTION INTRAMUSCULAR; INTRAVENOUS ONCE
Status: COMPLETED | OUTPATIENT
Start: 2017-02-28 | End: 2017-02-28

## 2017-02-28 RX ORDER — OXYCODONE HYDROCHLORIDE AND ACETAMINOPHEN 5; 325 MG/1; MG/1
1 TABLET ORAL ONCE
Status: DISCONTINUED | OUTPATIENT
Start: 2017-02-28 | End: 2017-02-28

## 2017-02-28 RX ORDER — SODIUM CHLORIDE 0.9 % (FLUSH) 0.9 %
10 SYRINGE (ML) INJECTION AS NEEDED
Status: DISCONTINUED | OUTPATIENT
Start: 2017-02-28 | End: 2017-03-20 | Stop reason: HOSPADM

## 2017-02-28 RX ORDER — IPRATROPIUM BROMIDE AND ALBUTEROL SULFATE 2.5; .5 MG/3ML; MG/3ML
3 SOLUTION RESPIRATORY (INHALATION) ONCE
Status: COMPLETED | OUTPATIENT
Start: 2017-02-28 | End: 2017-02-28

## 2017-02-28 RX ADMIN — CEFTRIAXONE 1 G: 1 INJECTION, POWDER, FOR SOLUTION INTRAMUSCULAR; INTRAVENOUS at 22:54

## 2017-02-28 RX ADMIN — NITROGLYCERIN 1 INCH: 20 OINTMENT TOPICAL at 20:40

## 2017-02-28 RX ADMIN — VANCOMYCIN HYDROCHLORIDE 1250 MG: 5 INJECTION, POWDER, LYOPHILIZED, FOR SOLUTION INTRAVENOUS at 23:30

## 2017-02-28 RX ADMIN — METHYLPREDNISOLONE SODIUM SUCCINATE 125 MG: 125 INJECTION, POWDER, FOR SOLUTION INTRAMUSCULAR; INTRAVENOUS at 19:36

## 2017-02-28 RX ADMIN — IOPAMIDOL 100 ML: 755 INJECTION, SOLUTION INTRAVENOUS at 21:34

## 2017-02-28 RX ADMIN — ONDANSETRON 4 MG: 2 INJECTION INTRAMUSCULAR; INTRAVENOUS at 20:44

## 2017-02-28 RX ADMIN — IPRATROPIUM BROMIDE AND ALBUTEROL SULFATE 3 ML: .5; 3 SOLUTION RESPIRATORY (INHALATION) at 23:15

## 2017-02-28 RX ADMIN — SODIUM CHLORIDE 1000 ML: 9 INJECTION, SOLUTION INTRAVENOUS at 19:35

## 2017-02-28 RX ADMIN — IPRATROPIUM BROMIDE AND ALBUTEROL SULFATE 3 ML: .5; 3 SOLUTION RESPIRATORY (INHALATION) at 19:50

## 2017-02-28 RX ADMIN — IPRATROPIUM BROMIDE AND ALBUTEROL SULFATE 3 ML: .5; 3 SOLUTION RESPIRATORY (INHALATION) at 21:39

## 2017-02-28 RX ADMIN — MORPHINE SULFATE 4 MG: 4 INJECTION, SOLUTION INTRAMUSCULAR; INTRAVENOUS at 20:41

## 2017-03-01 LAB
CK MB SERPL-CCNC: 0.43 NG/ML (ref 0–5)
CK MB SERPL-RTO: 0.1 % (ref 0–3)
CK SERPL-CCNC: 302 U/L (ref 24–173)
CRP SERPL-MCNC: 23.36 MG/DL (ref 0–0.99)
FLUAV AG NPH QL: NEGATIVE
FLUBV AG NPH QL IA: NEGATIVE
GLUCOSE BLDC GLUCOMTR-MCNC: 340 MG/DL (ref 70–130)
GLUCOSE BLDC GLUCOMTR-MCNC: 402 MG/DL (ref 70–130)
GLUCOSE BLDC GLUCOMTR-MCNC: 491 MG/DL (ref 70–130)
GLUCOSE BLDC GLUCOMTR-MCNC: 515 MG/DL (ref 70–130)
HBA1C MFR BLD: 9.1 % (ref 4.5–5.7)
L PNEUMO1 AG UR QL IA: NEGATIVE
M PNEUMO IGM SER QL: NEGATIVE
MYOGLOBIN SERPL-MCNC: 71 NG/ML (ref 0–109)
TROPONIN I SERPL-MCNC: 0.03 NG/ML

## 2017-03-01 PROCEDURE — 82550 ASSAY OF CK (CPK): CPT | Performed by: INTERNAL MEDICINE

## 2017-03-01 PROCEDURE — G8979 MOBILITY GOAL STATUS: HCPCS

## 2017-03-01 PROCEDURE — G8998 SWALLOW D/C STATUS: HCPCS

## 2017-03-01 PROCEDURE — 94799 UNLISTED PULMONARY SVC/PX: CPT

## 2017-03-01 PROCEDURE — 97161 PT EVAL LOW COMPLEX 20 MIN: CPT

## 2017-03-01 PROCEDURE — 63710000001 INSULIN ASPART PER 5 UNITS: Performed by: INTERNAL MEDICINE

## 2017-03-01 PROCEDURE — G8988 SELF CARE GOAL STATUS: HCPCS | Performed by: OCCUPATIONAL THERAPIST

## 2017-03-01 PROCEDURE — 63710000001 INSULIN DETEMIR PER 5 UNITS: Performed by: PHYSICIAN ASSISTANT

## 2017-03-01 PROCEDURE — 97167 OT EVAL HIGH COMPLEX 60 MIN: CPT | Performed by: OCCUPATIONAL THERAPIST

## 2017-03-01 PROCEDURE — 63710000001 INSULIN ASPART PER 5 UNITS: Performed by: PHYSICIAN ASSISTANT

## 2017-03-01 PROCEDURE — 86140 C-REACTIVE PROTEIN: CPT | Performed by: INTERNAL MEDICINE

## 2017-03-01 PROCEDURE — G8987 SELF CARE CURRENT STATUS: HCPCS | Performed by: OCCUPATIONAL THERAPIST

## 2017-03-01 PROCEDURE — 82553 CREATINE MB FRACTION: CPT | Performed by: INTERNAL MEDICINE

## 2017-03-01 PROCEDURE — 92610 EVALUATE SWALLOWING FUNCTION: CPT

## 2017-03-01 PROCEDURE — 25010000002 HEPARIN (PORCINE) PER 1000 UNITS: Performed by: INTERNAL MEDICINE

## 2017-03-01 PROCEDURE — 82962 GLUCOSE BLOOD TEST: CPT

## 2017-03-01 PROCEDURE — 84484 ASSAY OF TROPONIN QUANT: CPT | Performed by: INTERNAL MEDICINE

## 2017-03-01 PROCEDURE — 83874 ASSAY OF MYOGLOBIN: CPT | Performed by: INTERNAL MEDICINE

## 2017-03-01 PROCEDURE — G8996 SWALLOW CURRENT STATUS: HCPCS

## 2017-03-01 PROCEDURE — G8978 MOBILITY CURRENT STATUS: HCPCS

## 2017-03-01 PROCEDURE — 87804 INFLUENZA ASSAY W/OPTIC: CPT | Performed by: INTERNAL MEDICINE

## 2017-03-01 PROCEDURE — 25010000002 METHYLPREDNISOLONE PER 40 MG: Performed by: INTERNAL MEDICINE

## 2017-03-01 PROCEDURE — 25010000002 CEFTRIAXONE: Performed by: INTERNAL MEDICINE

## 2017-03-01 PROCEDURE — 99223 1ST HOSP IP/OBS HIGH 75: CPT | Performed by: INTERNAL MEDICINE

## 2017-03-01 PROCEDURE — G8997 SWALLOW GOAL STATUS: HCPCS

## 2017-03-01 RX ORDER — HYDROXYZINE PAMOATE 25 MG/1
25 CAPSULE ORAL 2 TIMES DAILY PRN
COMMUNITY
End: 2017-03-20 | Stop reason: HOSPADM

## 2017-03-01 RX ORDER — TIZANIDINE 4 MG/1
4 TABLET ORAL EVERY 8 HOURS PRN
COMMUNITY
End: 2017-03-20 | Stop reason: HOSPADM

## 2017-03-01 RX ORDER — ASPIRIN 81 MG/1
81 TABLET ORAL DAILY
Status: DISCONTINUED | OUTPATIENT
Start: 2017-03-01 | End: 2017-03-07

## 2017-03-01 RX ORDER — SODIUM CHLORIDE 0.9 % (FLUSH) 0.9 %
1-10 SYRINGE (ML) INJECTION AS NEEDED
Status: DISCONTINUED | OUTPATIENT
Start: 2017-03-01 | End: 2017-03-20 | Stop reason: HOSPADM

## 2017-03-01 RX ORDER — TIZANIDINE 4 MG/1
4 TABLET ORAL EVERY 8 HOURS PRN
Status: CANCELLED | OUTPATIENT
Start: 2017-03-01

## 2017-03-01 RX ORDER — HEPARIN SODIUM 5000 [USP'U]/ML
5000 INJECTION, SOLUTION INTRAVENOUS; SUBCUTANEOUS EVERY 12 HOURS SCHEDULED
Status: DISCONTINUED | OUTPATIENT
Start: 2017-03-01 | End: 2017-03-20 | Stop reason: HOSPADM

## 2017-03-01 RX ORDER — PROMETHAZINE HYDROCHLORIDE 12.5 MG/1
12.5 TABLET ORAL EVERY 6 HOURS PRN
Status: DISCONTINUED | OUTPATIENT
Start: 2017-03-01 | End: 2017-03-20

## 2017-03-01 RX ORDER — IPRATROPIUM BROMIDE AND ALBUTEROL SULFATE 2.5; .5 MG/3ML; MG/3ML
3 SOLUTION RESPIRATORY (INHALATION)
Status: DISCONTINUED | OUTPATIENT
Start: 2017-03-01 | End: 2017-03-03

## 2017-03-01 RX ORDER — LISINOPRIL 5 MG/1
5 TABLET ORAL DAILY
COMMUNITY
End: 2017-03-20 | Stop reason: HOSPADM

## 2017-03-01 RX ORDER — NICOTINE POLACRILEX 4 MG
15 LOZENGE BUCCAL
Status: DISCONTINUED | OUTPATIENT
Start: 2017-03-01 | End: 2017-03-20 | Stop reason: HOSPADM

## 2017-03-01 RX ORDER — PREDNISONE 20 MG/1
20 TABLET ORAL DAILY
Status: CANCELLED | OUTPATIENT
Start: 2017-03-01

## 2017-03-01 RX ORDER — GABAPENTIN 300 MG/1
600 CAPSULE ORAL 3 TIMES DAILY
Status: CANCELLED | OUTPATIENT
Start: 2017-03-01

## 2017-03-01 RX ORDER — HYDROXYZINE HYDROCHLORIDE 25 MG/1
25 TABLET, FILM COATED ORAL 2 TIMES DAILY PRN
Status: DISCONTINUED | OUTPATIENT
Start: 2017-03-01 | End: 2017-03-05

## 2017-03-01 RX ORDER — ALBUTEROL SULFATE 2.5 MG/3ML
2.5 SOLUTION RESPIRATORY (INHALATION)
Status: CANCELLED | OUTPATIENT
Start: 2017-03-01

## 2017-03-01 RX ORDER — CLOPIDOGREL BISULFATE 75 MG/1
75 TABLET ORAL DAILY
Status: DISCONTINUED | OUTPATIENT
Start: 2017-03-01 | End: 2017-03-20 | Stop reason: HOSPADM

## 2017-03-01 RX ORDER — FUROSEMIDE 20 MG/1
20 TABLET ORAL DAILY
Status: CANCELLED | OUTPATIENT
Start: 2017-03-01

## 2017-03-01 RX ORDER — ALBUTEROL SULFATE 90 UG/1
1 AEROSOL, METERED RESPIRATORY (INHALATION)
COMMUNITY
End: 2017-03-20 | Stop reason: HOSPADM

## 2017-03-01 RX ORDER — DEXTROSE MONOHYDRATE 25 G/50ML
25 INJECTION, SOLUTION INTRAVENOUS
Status: DISCONTINUED | OUTPATIENT
Start: 2017-03-01 | End: 2017-03-20 | Stop reason: HOSPADM

## 2017-03-01 RX ORDER — GABAPENTIN 600 MG/1
600 TABLET ORAL 3 TIMES DAILY
COMMUNITY
End: 2017-03-20 | Stop reason: HOSPADM

## 2017-03-01 RX ORDER — PANTOPRAZOLE SODIUM 40 MG/10ML
40 INJECTION, POWDER, LYOPHILIZED, FOR SOLUTION INTRAVENOUS
Status: DISCONTINUED | OUTPATIENT
Start: 2017-03-02 | End: 2017-03-20 | Stop reason: HOSPADM

## 2017-03-01 RX ORDER — LISINOPRIL 2.5 MG/1
5 TABLET ORAL DAILY
Status: CANCELLED | OUTPATIENT
Start: 2017-03-01

## 2017-03-01 RX ORDER — GABAPENTIN 400 MG/1
800 CAPSULE ORAL EVERY 8 HOURS SCHEDULED
Status: DISCONTINUED | OUTPATIENT
Start: 2017-03-01 | End: 2017-03-03

## 2017-03-01 RX ORDER — METHYLPREDNISOLONE SODIUM SUCCINATE 40 MG/ML
40 INJECTION, POWDER, LYOPHILIZED, FOR SOLUTION INTRAMUSCULAR; INTRAVENOUS EVERY 12 HOURS
Status: DISCONTINUED | OUTPATIENT
Start: 2017-03-01 | End: 2017-03-02

## 2017-03-01 RX ORDER — SUCRALFATE 1 G/1
1 TABLET ORAL 4 TIMES DAILY
COMMUNITY
End: 2017-03-20 | Stop reason: HOSPADM

## 2017-03-01 RX ORDER — SUCRALFATE 1 G/1
1 TABLET ORAL
Status: DISCONTINUED | OUTPATIENT
Start: 2017-03-01 | End: 2017-03-14

## 2017-03-01 RX ORDER — HYDROCODONE BITARTRATE AND ACETAMINOPHEN 5; 325 MG/1; MG/1
1 TABLET ORAL EVERY 8 HOURS PRN
Status: DISCONTINUED | OUTPATIENT
Start: 2017-03-01 | End: 2017-03-05

## 2017-03-01 RX ORDER — PROMETHAZINE HYDROCHLORIDE 25 MG/1
25 TABLET ORAL EVERY 6 HOURS PRN
COMMUNITY
End: 2017-03-20 | Stop reason: HOSPADM

## 2017-03-01 RX ADMIN — GABAPENTIN 800 MG: 400 CAPSULE ORAL at 13:16

## 2017-03-01 RX ADMIN — HEPARIN SODIUM 5000 UNITS: 5000 INJECTION, SOLUTION INTRAVENOUS; SUBCUTANEOUS at 20:48

## 2017-03-01 RX ADMIN — SUCRALFATE 1 G: 1 TABLET ORAL at 16:34

## 2017-03-01 RX ADMIN — METHYLPREDNISOLONE SODIUM SUCCINATE 40 MG: 40 INJECTION, POWDER, FOR SOLUTION INTRAMUSCULAR; INTRAVENOUS at 07:49

## 2017-03-01 RX ADMIN — HYDROXYZINE 25 MG: 25 TABLET, FILM COATED ORAL at 22:29

## 2017-03-01 RX ADMIN — HYDROCODONE BITARTRATE AND ACETAMINOPHEN 1 TABLET: 5; 325 TABLET ORAL at 16:33

## 2017-03-01 RX ADMIN — DOXYCYCLINE 100 MG: 100 INJECTION, POWDER, LYOPHILIZED, FOR SOLUTION INTRAVENOUS at 07:49

## 2017-03-01 RX ADMIN — INSULIN ASPART 9 UNITS: 100 INJECTION, SOLUTION INTRAVENOUS; SUBCUTANEOUS at 11:54

## 2017-03-01 RX ADMIN — CEFTRIAXONE 1 G: 1 INJECTION, POWDER, FOR SOLUTION INTRAMUSCULAR; INTRAVENOUS at 22:08

## 2017-03-01 RX ADMIN — HEPARIN SODIUM 5000 UNITS: 5000 INJECTION, SOLUTION INTRAVENOUS; SUBCUTANEOUS at 01:20

## 2017-03-01 RX ADMIN — CLOPIDOGREL 75 MG: 75 TABLET, FILM COATED ORAL at 07:49

## 2017-03-01 RX ADMIN — ASPIRIN 81 MG: 81 TABLET ORAL at 16:34

## 2017-03-01 RX ADMIN — INSULIN ASPART 9 UNITS: 100 INJECTION, SOLUTION INTRAVENOUS; SUBCUTANEOUS at 16:34

## 2017-03-01 RX ADMIN — HYDROCODONE BITARTRATE AND ACETAMINOPHEN 1 TABLET: 5; 325 TABLET ORAL at 10:31

## 2017-03-01 RX ADMIN — HEPARIN SODIUM 5000 UNITS: 5000 INJECTION, SOLUTION INTRAVENOUS; SUBCUTANEOUS at 07:49

## 2017-03-01 RX ADMIN — INSULIN ASPART 10 UNITS: 100 INJECTION, SOLUTION INTRAVENOUS; SUBCUTANEOUS at 22:09

## 2017-03-01 RX ADMIN — GABAPENTIN 800 MG: 400 CAPSULE ORAL at 04:07

## 2017-03-01 RX ADMIN — IPRATROPIUM BROMIDE AND ALBUTEROL SULFATE 3 ML: .5; 3 SOLUTION RESPIRATORY (INHALATION) at 18:46

## 2017-03-01 RX ADMIN — INSULIN ASPART 7 UNITS: 100 INJECTION, SOLUTION INTRAVENOUS; SUBCUTANEOUS at 07:49

## 2017-03-01 RX ADMIN — IPRATROPIUM BROMIDE AND ALBUTEROL SULFATE 3 ML: .5; 3 SOLUTION RESPIRATORY (INHALATION) at 13:22

## 2017-03-01 RX ADMIN — INSULIN DETEMIR 45 UNITS: 100 INJECTION, SOLUTION SUBCUTANEOUS at 22:09

## 2017-03-01 RX ADMIN — GABAPENTIN 800 MG: 400 CAPSULE ORAL at 20:48

## 2017-03-01 RX ADMIN — HYDROCODONE BITARTRATE AND ACETAMINOPHEN 1 TABLET: 5; 325 TABLET ORAL at 02:55

## 2017-03-01 RX ADMIN — HYDROCODONE BITARTRATE AND ACETAMINOPHEN 1 TABLET: 5; 325 TABLET ORAL at 22:08

## 2017-03-01 RX ADMIN — IPRATROPIUM BROMIDE AND ALBUTEROL SULFATE 3 ML: .5; 3 SOLUTION RESPIRATORY (INHALATION) at 07:13

## 2017-03-01 RX ADMIN — METHYLPREDNISOLONE SODIUM SUCCINATE 40 MG: 40 INJECTION, POWDER, FOR SOLUTION INTRAMUSCULAR; INTRAVENOUS at 20:48

## 2017-03-01 RX ADMIN — SUCRALFATE 1 G: 1 TABLET ORAL at 20:48

## 2017-03-01 NOTE — PLAN OF CARE
Problem: Inpatient Occupational Therapy  Goal: Strength Goal LTG- OT    03/01/17 1304   Strength OT LTG   Strength Goal OT LTG, Date Established 03/01/17   Strength Goal OT LTG, Time to Achieve by discharge   Strength Goal OT LTG, Measure to Achieve Increase BUE MMT score to 4/5       Goal: Endurance Goal LTG- OT    03/01/17 1304   Endurance OT LTG   Endurance Goal OT LTG, Date Established 03/01/17   Endurance Goal OT LTG, Time to Achieve by discharge   Endurance Goal OT LTG, Activity Level to increase;endurance 2 fair+

## 2017-03-01 NOTE — PAYOR COMM NOTE
"  Kirstie Stroud  PHONE: [phone]  FAX: [phone]  REFERENCE # [id number]      Yessica Yan (51 y.o. Female)     Date of Birth Social Security Number Address Home Phone MRN    1965  [address] [address] [phone] [id number]    Adventism Marital Status          Nondenominational        Admission Date Admission Type Admitting Provider Attending Provider Department, Room/Bed    2/28/17 Emergency Svaanah Gutierrez MD Perkins, Jimmye S, MD [address], [address]    Discharge Date Discharge Disposition Discharge Destination                      Attending Provider: Savanah Gutierrez MD     Allergies:  Penicillins, Toradol [Ketorolac Tromethamine], Tramadol    Isolation:  None   Infection:  None   Code Status:  FULL    Ht:  63\" (160 cm)   Wt:  120 lb 6.4 oz (54.6 kg)    Admission Cmt:  None   Principal Problem:  None                Active Insurance as of 2/28/2017     Primary Coverage     Payor Plan Insurance Group Employer/Plan Group    MEDICARE MEDICARE A & B      Payor Plan Address Payor Plan Phone Number Effective From Effective To    [address] [phone] 2/1/2005     [place] [address]       Subscriber Name Subscriber Birth Date Member ID       YESSICA YAN 1965 [id number]           Secondary Coverage     Payor Plan Insurance Group Employer/Plan Group    [employer] [place] [employer]LL[employer] [place]ICAID      Payor Plan Address Payor Plan Phone Number Effective From Effective To    [address] [address] [phone] 2/28/2017     [place] [address]       Subscriber Name Subscriber Birth Date Member ID       YESSICA YAN 1965 [id number]                 Emergency Contacts      (Rel.) Home Phone Work Phone Mobile Phone    Scot Vaughn (Son) [phone] -- --            "

## 2017-03-01 NOTE — CONSULTS
"Diabetes Education  Assessment/Teaching    Patient Name:  Pepito Yan  YOB: 1965  MRN: [id number]  Admit Date:  2/28/2017      Assessment Date:  3/1/2017       Most Recent Value    General Information      Height  5' 3\" (1.6 m)    Height Method  Stated    Weight  120 lb 6.4 oz (54.6 kg)    Weight Method  Bed scale    Pregnancy Assessment     Diabetes History     What type of diabetes do you have?  Type 2    Length of Diabetes Diagnosis  10 + years    Education Preferences     Nutrition Information     Assessment Topics     Healthy Eating - Assessment  Competent    Being Active - Assessment  Competent    Taking Medication - Assessment  Competent    Problem Solving - Assessment  Competent    Reducing Risk - Assessment  Competent    Healthy Coping - Assessment  Competent    DM Goals                Most Recent Value    DM Education Needs     Meter  Has own    Frequency of Testing  3 times a day    Medication  Oral, Insulin, Actions, Side effects    Problem Solving  Hypoglycemia, Hyperglycemia, Sick days, Signs, Symptoms, Other (comment), Treatment    Physical Activity  Other (comment)    Healthy Coping  Appropriate    Discharge Plan  Home    Motivation  Moderate    Teaching Method  Explanation, Discussion, Handouts, Teach back    Patient Response  Verbalized understanding            Other Comments:          Electronically signed by:  Twyla Santos RN  03/01/17 1:56 PM  "

## 2017-03-01 NOTE — PROGRESS NOTES
Acute Care - Speech Language Pathology   Swallow Initial Evaluation  Daniel   CLINICAL DYSPHAGIA EVALUATION     Patient Name: Pepito Yan  : 1965  MRN: [id number]  Today's Date: 3/1/2017             Admit Date: 2017     Pepito Yan  is seen at bedside this am on 3S  to evaluate safety/efficacy of swallowing fnx, determine safest/least restrictive diet tolerance. She is admitted w/ current d/o bilateral PNA.    Social History     Social History   • Marital status:      Spouse name: N/A   • Number of children: N/A   • Years of education: N/A     Occupational History   • Not on file.     Social History Main Topics   • Smoking status: Current Every Day Smoker     Packs/day: 4.50     Years: 35.00     Types: Cigarettes   • Smokeless tobacco: Not on file      Comment: She cut down to 1/4 PPD for 2.5 months ago   • Alcohol use No   • Drug use: No   • Sexual activity: Defer     Other Topics Concern   • Not on file     Social History Narrative     Diet Orders (active)     Start     Ordered    17 0050  Diet Regular  Diet Effective Now      17 0049        Observed on O2 via NC.    Ms. Yan is positioned upright and centered in bed to accept multiple po presentations of ice chips solid cracker, puree and thin liquids via spoon, cup and straw.  She is able to self feed.     Facial/oral structures are symmetrical upon observation. Lingual protrusion reveals no deviation. Oral mucosa are moist, pink, and clean. Secretions are clear, thin, and well controlled. OROM/SALO is wfl to imitate oral postures. Gag is not assessed. Volitional cough is intact w/ adequate  intensity, clear in quality, non-productive. Voice is adequate in intensity, clear in quality w/ intelligible speech.    Upon po presentations, adequate bolus anticipation and acceptance w/ good labial seal for bolus clearance via spoon bowl, cup rim stability and suction via straw. Bolus formation, manipulation and control  are wfl w/ rotary mastication pattern. A-p transit is timely w/o oral residue. No overt s/s aspiration before the swallow.     Pharyngeal swallow is timely w/ adequate hyolaryngeal elevation per palpation. No overt s/s aspiration evidenced across this evaluation. No silent aspiration suspected as pt is w/o changes in vocal quality, respirations or secretions post po presentations. Pt denies odynophagia.    Visit Dx:     ICD-10-CM ICD-9-CM   1. Pneumonia of both lower lobes due to infectious organism J18.9 483.8     Patient Active Problem List   Diagnosis   • Chest pain   • Chest pain of uncertain etiology   • Intractable nausea and vomiting   • Intractable cyclical vomiting with nausea   • Chronic obstructive pulmonary emphysema   • Hepatitis-C   • Diabetes mellitus type 2 in nonobese   • Arthritis or polyarthritis, rheumatoid   • Anxiety   • Essential hypertension   • Coronary artery disease   • Buerger's disease   • Cirrhosis   • Diabetes   • Chronic nausea   • Pancreatitis   • Bilateral pneumonia     Past Medical History   Diagnosis Date   • Anxiety    • 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   • Chronic respiratory failure with hypoxia      at nighttime   • Compression fracture of lumbar vertebra    • Compression fracture of thoracic vertebra    • COPD (chronic obstructive pulmonary disease)    • Coronary artery disease    • Diabetes mellitus type 2 in nonobese    • Erosive esophagitis 12/2016   • Essential hypertension    • Gastroparesis    • GERD (gastroesophageal reflux disease)    • Hepatitis-C    • Medically noncompliant    • MRSA infection      back of neck around 2012   • NSTEMI (non-ST elevated myocardial infarction) 07/2016   • PE (pulmonary embolism)    • Protein calorie malnutrition    • RA (rheumatoid arthritis)      with chronic steroid use     Past Surgical History   Procedure Laterality Date   • Cholecystectomy     • Foreign body removal       insulin needle removal x 3 from the  abdomen   •  section       x 2   • Hysterectomy     • Arm debridement Left 2016     Procedure: HAND IRRIGATION DEBRIDEMENT AND REPAIR ;  Surgeon: Shane Fuller MD;  Location:  [facility];  Service:    • Endoscopy N/A 2016     Procedure: ESOPHAGOGASTRODUODENOSCOPY;  Surgeon: Jeremy Clement III, MD;  Location:  [facility];  Service:    • Amputation digit Right 2016     Third digit to the PIP. Surgeon: Dr. Bains   • Amputation digit Left 2016     Procedure: PARTIAL AMPUTATION LEFT THIRD FINGER;  Surgeon: Shane Fuller MD;  Location:  [facility];  Service:    • Amputation revision Left 2016     Procedure: AMPUTATION REVISION DIGIT STUMP;  Surgeon: Shane Fuller MD;  Location:  [facility];  Service:      EDUCATION  The patient has been educated in the following areas:   Dysphagia (Swallowing Impairment).    Impression: Ms. Yan presents w/ wfl oropharyngeal swallow w/o overt s/s aspiration across this evaluation. She denies odynophagia. Denies any h/o dysphagia. She appears safe to continue regular consistency diet, thin liquids.     SLP Recommendation and Plan    1. Continue regular consistency diet, thin liquids.    2. Meds whole in puree/thins.   3. Upright and centered for all po intake  4. AALIYAH precautions.  5. Oral care protocol.  No further formal SLP f/u warranted at this time.    D/w pt results and recommendations w/ verbal agreement.    Thank you for allowing me to participate in the care of your patient-  Melina Hodges M.S. ,CCC/SLP                                           Time Calculation:         Time Calculation- SLP       17          Time Calculation- SLP    SLP Start Time 905  -ASIF      SLP Stop Time 932  -ASIF      SLP Time Calculation (min) 27 min  -ASIF      SLP Non-Billable Time (min) 15 min  -        User Key  (r) = Recorded By, (t) = Taken By, (c) = Cosigned By    Initials Name Provider Type    ASIF Hodges MS  CCC-SLP Speech Therapist          Therapy Charges for Today     Code Description Service Date Service Provider Modifiers Qty    77456325757 HC ST SWALLOWING CURRENT STATUS 3/1/2017 Melina Hodges MS CCC-SLP GN, CH 1    21050091139 HC ST SWALLOWING PROJECTED 3/1/2017 Melina Hodges MS CCC-SLP GN, CH 1    96449600547 HC ST SWALLOWING DISCHARGE 3/1/2017 Melina Hodges MS CCC-SLP TERA,  1    88492789760 HC ST CARE PLAN 15 MIN 3/1/2017 Melina Hodges MS CHERYL-SLP GN 1    10302775925 HC ST EVAL ORAL PHARYNG SWALLOW 2 3/1/2017 Melina Hodges MS CCC-SLP GN 1        SLP G-Codes  SLP NOMS Used?: Yes  Functional Limitations: Swallowing  Swallow Current Status (): 0 percent impaired, limited or restricted  Swallow Goal Status (): 0 percent impaired, limited or restricted  Swallow Discharge Status (): 0 percent impaired, limited or restricted    Melina Hodges MS CCC-SLP  3/1/2017

## 2017-03-01 NOTE — CONSULTS
"Diabetes Education  Assessment/Teaching    Patient Name:  Pepito Yan  YOB: 1965  MRN: [id number]  Admit Date:  2/28/2017      Assessment Date:  3/1/2017       Most Recent Value    General Information      Height  5' 3\" (1.6 m)    Height Method  Stated    Weight  120 lb 6.4 oz (54.6 kg)    Weight Method  Bed scale    Pregnancy Assessment     Diabetes History     What type of diabetes do you have?  Type 2    Length of Diabetes Diagnosis  10 + years    Education Preferences     Nutrition Information     Assessment Topics     Healthy Eating - Assessment  Competent    Being Active - Assessment  Competent    Taking Medication - Assessment  Competent    Problem Solving - Assessment  Competent    Reducing Risk - Assessment  Competent    Healthy Coping - Assessment  Competent    DM Goals                Most Recent Value    DM Education Needs     Meter  Has own    Frequency of Testing  3 times a day    Medication  Oral, Insulin, Actions, Side effects    Problem Solving  Hypoglycemia, Hyperglycemia, Sick days, Signs, Symptoms, Other (comment), Treatment    Physical Activity  Other (comment)    Healthy Coping  Appropriate    Discharge Plan  Home    Motivation  Moderate    Teaching Method  Explanation, Discussion, Handouts, Teach back    Patient Response  Verbalized understanding            Other Comments:  Menus provided at clients request        Electronically signed by:  Twyla Santos RN  03/01/17 2:46 PM  "

## 2017-03-01 NOTE — PROGRESS NOTES
Discharge Planning Assessment   Daniel     Patient Name: Pepito Yan  MRN: [id number]  Today's Date: 3/1/2017    Admit Date: 2/28/2017          Discharge Needs Assessment       03/01/17 1055    Living Environment    Quality Of Family Relationships supportive      03/01/17 1043    Living Environment    Lives With friend(s)   Pt lives with her friend Radhika.    Quality Of Family Relationships supportive   Pt reports strong support from her friend Radhika    Able to Return to Prior Living Arrangements yes    Discharge Needs Assessment    Concerns To Be Addressed other (see comments)   Pt requesting rolling walker at dc. Denies any other needs or concerns    Readmission Within The Last 30 Days no previous admission in last 30 days    Community Agency Name(S) Does not utilize home health services    Anticipated Changes Related to Illness none    Equipment Currently Used at Home oxygen   Pt has home o2 provided by unknown provider    Equipment Needed After Discharge walker, rolling    Discharge Disposition home or self-care    Discharge Planning Comments Pt lives with her friend, Radhika, and plans to return back home with Radhika at dc.            Discharge Plan       03/01/17 1048    Case Management/Social Work Plan    Plan Pt admitted with dx: Don Pna. She is receiving IV Abx/Steroids/O2/Nebs. She lives at home with her friend, Radhika, and plans to return back home with Radhika at dc. She feels she would benefit from Rolling Walker upon dc, SS can arrange if MD orders CM will cont. to follow and assist as needed.      Patient/Family In Agreement With Plan yes        Discharge Placement     No information found                Demographic Summary       03/01/17 1024    Referral Information    Admission Type inpatient    Arrived From home or self-care   Pt admitted on 2/28/17 with dx: Don Pna. Pt receiving IV ABX, O2 & nebs.    Referral Source admission list    Reason For Consult discharge planning    Record Reviewed  medical record    Primary Care Physician Information    Name Archana Hare-PCP            Functional Status       03/01/17 1026    Functional Status Current    Current Functional Level Comment Pt reports she hasnt been oob yet.    Functional Status Prior    Prior Functional Level Comment Pt reports she is indep with act. at home. She feels she would benefit from rolling walker at dc.    IADL    Medications independent    Meal Preparation other (see comments)   friend Radhika, assist pt with preparing meals    Housekeeping other (see comments)   friend, Radhika, assist pt with  needs.    Laundry other (see comments)   friend Radhika assist as needed.    Oral Care independent    IADL Comments Pt is indep with ADL's at home.     Activity Tolerance    Usual Activity Tolerance fair    Current Activity Tolerance other (see comments)   Pt reports she has not been up oob since admission    Cognitive/Perceptual/Developmental    Current Mental Status/Cognitive Functioning no deficits noted    Recent Changes in Mental Status/Cognitive Functioning no changes    Employment/Financial    Financial Concerns none   Pt has Medicare and [place] Medicaid. She denies any issues with med cost. Pt uses [facility] for Rx fills.            Psychosocial     None            Abuse/Neglect     None            Legal       03/01/17 1055    Legal    Legal Comments NO POA or AD. Pt is requesting info for AD. Consult placed for pastoral services to address pt questons and concern.            Substance Abuse     None            Patient Forms     None          Ronda Zimmerman RN

## 2017-03-01 NOTE — ED PROVIDER NOTES
Subjective   Patient is a 51 y.o. female presenting with shortness of breath.   Shortness of Breath   Severity:  Moderate  Onset quality:  Gradual  Timing:  Constant  Progression:  Worsening  Chronicity:  Chronic  Context: activity    Relieved by:  Nothing  Worsened by:  Activity, coughing, deep breathing, exertion, movement and smoke exposure  Ineffective treatments:  Rest, sitting up and oxygen  Associated symptoms: cough and wheezing    Associated symptoms: no abdominal pain, no chest pain, no diaphoresis, no ear pain, no fever, no headaches, no hemoptysis, no neck pain, no rash, no sore throat, no sputum production, no syncope and no vomiting    Cough:     Cough characteristics:  Non-productive    Sputum characteristics:  Nondescript    Severity:  Moderate    Onset quality:  Gradual    Timing:  Constant    Progression:  Worsening    Chronicity:  Chronic  Wheezing:     Severity:  Severe    Onset quality:  Gradual    Timing:  Constant    Progression:  Worsening    Chronicity:  Chronic      Review of Systems   Constitutional: Negative for activity change, appetite change, chills, diaphoresis, fatigue and fever.   HENT: Negative for congestion, ear pain and sore throat.    Eyes: Negative for redness.   Respiratory: Positive for cough, chest tightness, shortness of breath and wheezing. Negative for hemoptysis and sputum production.    Cardiovascular: Negative for chest pain, palpitations, leg swelling and syncope.   Gastrointestinal: Negative for abdominal pain, diarrhea, nausea and vomiting.   Genitourinary: Negative for dysuria and urgency.   Musculoskeletal: Negative for arthralgias, back pain, myalgias and neck pain.   Skin: Negative for pallor, rash and wound.   Neurological: Negative for dizziness, speech difficulty, weakness and headaches.   Psychiatric/Behavioral: Negative for agitation, behavioral problems, confusion and decreased concentration.       Past Medical History   Diagnosis Date   • Anxiety    •  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   • Chronic respiratory failure with hypoxia      at nighttime   • Compression fracture of lumbar vertebra    • Compression fracture of thoracic vertebra    • COPD (chronic obstructive pulmonary disease)    • Coronary artery disease    • Diabetes mellitus type 2 in nonobese    • Erosive esophagitis 2016   • Essential hypertension    • Gastroparesis    • GERD (gastroesophageal reflux disease)    • Hepatitis-C    • Medically noncompliant    • MRSA infection      back of neck around    • NSTEMI (non-ST elevated myocardial infarction) 2016   • PE (pulmonary embolism)    • Protein calorie malnutrition    • RA (rheumatoid arthritis)      with chronic steroid use       Allergies   Allergen Reactions   • Penicillins    • Toradol [Ketorolac Tromethamine]    • Tramadol        Past Surgical History   Procedure Laterality Date   • Cholecystectomy     • Foreign body removal       insulin needle removal x 3 from the abdomen   •  section       x 2   • Hysterectomy     • Arm debridement Left 2016     Procedure: HAND IRRIGATION DEBRIDEMENT AND REPAIR ;  Surgeon: Shane Fuller MD;  Location: [facility];  Service:    • Endoscopy N/A 2016     Procedure: ESOPHAGOGASTRODUODENOSCOPY;  Surgeon: Jeremy Clement III, MD;  Location: [facility];  Service:    • Amputation digit Right 2016     Third digit to the PIP. Surgeon: Dr. Bains   • Amputation digit Left 2016     Procedure: PARTIAL AMPUTATION LEFT THIRD FINGER;  Surgeon: Shane Fuller MD;  Location: [facility];  Service:    • Amputation revision Left 2016     Procedure: AMPUTATION REVISION DIGIT STUMP;  Surgeon: Shane Fuller MD;  Location: [facility];  Service:        Family History   Problem Relation Age of Onset   • Colon cancer Mother    • Cancer Father    • Diabetes Maternal Grandmother    • Heart disease Neg Hx    • Stroke Neg Hx        Social History      Social History   • Marital status:      Spouse name: N/A   • Number of children: N/A   • Years of education: N/A     Social History Main Topics   • Smoking status: Current Every Day Smoker     Packs/day: 4.50     Years: 35.00     Types: Cigarettes   • Smokeless tobacco: None      Comment: She cut down to 1/4 PPD for 2.5 months ago   • Alcohol use No   • Drug use: No   • Sexual activity: Defer     Other Topics Concern   • None     Social History Narrative           Objective   Physical Exam   Constitutional: She is oriented to person, place, and time. She appears well-developed and well-nourished.  Non-toxic appearance. No distress.   HENT:   Head: Normocephalic and atraumatic.   Right Ear: External ear normal.   Left Ear: External ear normal.   Nose: Nose normal.   Mouth/Throat: Oropharynx is clear and moist and mucous membranes are normal. No oropharyngeal exudate. No tonsillar exudate.   Eyes: Conjunctivae, EOM and lids are normal. Pupils are equal, round, and reactive to light.   Neck: Normal range of motion and full passive range of motion without pain. Neck supple. No thyromegaly present.   Cardiovascular: Normal rate, regular rhythm, S1 normal, S2 normal, normal heart sounds, intact distal pulses and normal pulses.    Pulmonary/Chest: No tachypnea. She is in respiratory distress. She has decreased breath sounds. She has wheezes. She has rales. She exhibits tenderness.   Abdominal: Soft. Normal appearance and bowel sounds are normal. She exhibits no distension. There is no tenderness. There is no rebound and no guarding.   Musculoskeletal: Normal range of motion. She exhibits no edema, tenderness or deformity.   Lymphadenopathy:     She has no cervical adenopathy.   Neurological: She is alert and oriented to person, place, and time. She has normal strength. No cranial nerve deficit or sensory deficit. GCS eye subscore is 4. GCS verbal subscore is 5. GCS motor subscore is 6.   Skin: Skin is warm, dry  and intact. No rash noted. She is not diaphoretic. No erythema. No pallor.   Psychiatric: She has a normal mood and affect. Her speech is normal and behavior is normal. Judgment and thought content normal. Cognition and memory are normal.   Nursing note and vitals reviewed.      Procedures         ED Course  ED Course   Value Comment By Time   XR Chest 1 View Bilateral Lower Lobe Pneumonia.  Davide Perez MD 03/01 0137   ECG 12 Lead Sinus Tachycardia.  No Acute ST Elevation. Davide Perez MD 03/01 0137    Admitted for further evaluation and treatment.  Davide Perez MD 03/01 0138                  MDM  Number of Diagnoses or Management Options  Pneumonia of both lower lobes due to infectious organism: new and requires workup     Amount and/or Complexity of Data Reviewed  Clinical lab tests: ordered and reviewed  Tests in the radiology section of CPT®: ordered and reviewed  Tests in the medicine section of CPT®: ordered and reviewed  Decide to obtain previous medical records or to obtain history from someone other than the patient: yes  Discuss the patient with other providers: yes  Independent visualization of images, tracings, or specimens: yes    Risk of Complications, Morbidity, and/or Mortality  Presenting problems: moderate  Diagnostic procedures: moderate  Management options: moderate    Patient Progress  Patient progress: stable      Final diagnoses:   Pneumonia of both lower lobes due to infectious organism            Davide Perez MD  03/01/17 0138

## 2017-03-01 NOTE — ED NOTES
Patient states that she has been sick for two days. States that she has been running a fever for two days. States that she has COPD and smoke two to three packs a day. States that she has been coughing so much, that her shoulder blades and chest hurt. Call light within reach. Safety precautions in place.      Bella Willard RN  02/28/17 1926

## 2017-03-01 NOTE — PROGRESS NOTES
Brief Note:    The patient was seen by Dr. Gutierrez earlier this morning for trouble breathing and cough. The patient is seen this evening with her nurse GUERRERO Tobias. She complains of diffuse chest pain with cough. She reports generalized weakness but was able to ambulate with PT. No vomiting today.     On exam, the patient has severe bilateral expiratory wheezing in all lung fields. No accessory muscles of respiration. Her left 3rd digit is partially amputated and with a dressing noted. Her legs are warm with palpable pulses.    I have resumed her home dose of insulin and increased her SSI. Discontinue doxycycline as atypical organisms were negative. Continue rocephin for now - consider escalating treatment based on her clinical and laboratory studies in the next 24 hours. Continue steroids and scheduled inhalents. Add kpad. Continue with low dose Norco for now.

## 2017-03-01 NOTE — PLAN OF CARE
Problem: Patient Care Overview (Adult)  Goal: Plan of Care Review  Outcome: Ongoing (interventions implemented as appropriate)    03/01/17 0052   Coping/Psychosocial Response Interventions   Plan Of Care Reviewed With patient   Patient Care Overview   Progress no change       Goal: Adult Individualization and Mutuality  Outcome: Ongoing (interventions implemented as appropriate)    Problem: Pneumonia (Adult)  Goal: Signs and Symptoms of Listed Potential Problems Will be Absent or Manageable (Pneumonia)  Outcome: Ongoing (interventions implemented as appropriate)

## 2017-03-01 NOTE — ED NOTES
"Pt reports that she has been congested, SOB and coughing unable to cough up any sputum.  Pt reports \"I've got pneumonia\".  Pt reports that she has not been diagnosed with pneumonia but that she thinks that she has pneumonia.       Janette Cowart RN  02/28/17 1903    "

## 2017-03-01 NOTE — ED NOTES
Patient lying in bed resting. Skin warm and try to touch. Denies any needs at this time. Continues to be Sinus Tach at this time. Continues to have nonproductive cough. Resps even and unlabored. Updated on POC. Denies any needs at this time.      Bella Willard RN  02/28/17 [id number]

## 2017-03-01 NOTE — PROGRESS NOTES
Acute Care - Occupational Therapy Initial Evaluation   Daniel     Patient Name: Pepito Yan  : 1965  MRN: [id number]  Today's Date: 3/1/2017  Onset of Illness/Injury or Date of Surgery Date: 17  Date of Referral to OT: 17  Referring Physician: Dr. Gutierrez    Admit Date: 2017       ICD-10-CM ICD-9-CM   1. Pneumonia of both lower lobes due to infectious organism J18.9 483.8     Patient Active Problem List   Diagnosis   • Chest pain   • Chest pain of uncertain etiology   • Intractable nausea and vomiting   • Intractable cyclical vomiting with nausea   • Chronic obstructive pulmonary emphysema   • Hepatitis-C   • Diabetes mellitus type 2 in nonobese   • Arthritis or polyarthritis, rheumatoid   • Anxiety   • Essential hypertension   • Coronary artery disease   • Buerger's disease   • Cirrhosis   • Diabetes   • Chronic nausea   • Pancreatitis   • Bilateral pneumonia     Past Medical History   Diagnosis Date   • Anxiety    • 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   • Chronic respiratory failure with hypoxia      at nighttime   • Compression fracture of lumbar vertebra    • Compression fracture of thoracic vertebra    • COPD (chronic obstructive pulmonary disease)    • Coronary artery disease    • Diabetes mellitus type 2 in nonobese    • Erosive esophagitis 2016   • Essential hypertension    • Gastroparesis    • GERD (gastroesophageal reflux disease)    • Hepatitis-C    • Medically noncompliant    • MRSA infection      back of neck around    • NSTEMI (non-ST elevated myocardial infarction) 2016   • PE (pulmonary embolism)    • Protein calorie malnutrition    • RA (rheumatoid arthritis)      with chronic steroid use     Past Surgical History   Procedure Laterality Date   • Cholecystectomy     • Foreign body removal       insulin needle removal x 3 from the abdomen   •  section       x 2   • Hysterectomy     • Arm debridement Left 2016     Procedure:  HAND IRRIGATION DEBRIDEMENT AND REPAIR ;  Surgeon: Shnae Fuller MD;  Location:  [facility];  Service:    • Endoscopy N/A 12/20/2016     Procedure: ESOPHAGOGASTRODUODENOSCOPY;  Surgeon: Jeremy Clement III, MD;  Location:  [facility] OR;  Service:    • Amputation digit Right 12/22/2016     Third digit to the PIP. Surgeon: Dr. Bains   • Amputation digit Left 12/22/2016     Procedure: PARTIAL AMPUTATION LEFT THIRD FINGER;  Surgeon: Shane Fuller MD;  Location:  [facility] OR;  Service:    • Amputation revision Left 12/22/2016     Procedure: AMPUTATION REVISION DIGIT STUMP;  Surgeon: Shane Fuller MD;  Location:  [facility];  Service:           OT ASSESSMENT FLOWSHEET (last 72 hours)      OT Evaluation       03/01/17 1252 03/01/17 1043 03/01/17 1026 03/01/17 0031 03/01/17 0023    Rehab Evaluation    Document Type evaluation  -BF        Subjective Information agree to therapy;no complaints  -BF        Patient Effort, Rehab Treatment good  -BF        General Information    Patient Profile Review yes  -BF        Onset of Illness/Injury or Date of Surgery Date 02/28/17  -BF        Referring Physician Dr. Gutierrez  -BF        General Observations Pt seen while supine in bed, under no apparent distress; O2 intact  -BF        Pertinent History Of Current Problem Pneumonia  -BF        Precautions/Limitations fall precautions;oxygen therapy device and L/min  -BF        Prior Level of Function independent:;ADL's  -BF        Equipment Currently Used at Home oxygen  -BF oxygen   Pt has home o2 provided by unknown provider  -MW   none  -RJ    Plans/Goals Discussed With patient;agreed upon  -BF        Barriers to Rehab physical barrier  -BF        Living Environment    Lives With friend(s)  -BF friend(s)   Pt lives with her friend Radhika.  -MW  sibling(s)  -RJ     Living Arrangements house  -BF   house  -RJ     Home Accessibility    no concerns  -RJ     Stair Railings at Home    outside, present at both  sides;inside, present at both sides  -RJ     Type of Financial/Environmental Concern    none  -RJ     Transportation Available    none  -RJ     Clinical Impression    Date of Referral to OT 02/28/17  -BF        OT Diagnosis Debility  -BF        Impairments Found (describe specific impairments) aerobic capacity/endurance;motor function;muscle performance;other (see comments)   Impaired ADLs, decreased strength  -BF        Patient/Family Goals Statement To return to OF  -BF        Criteria for Skilled Therapeutic Interventions Met yes  -BF        Rehab Potential good, to achieve stated therapy goals  -BF        Therapy Frequency 3-5 times/wk  -BF        Predicted Duration of Therapy Intervention (days/wks) 5-7 days  -BF        Anticipated Equipment Needs At Discharge bedside commode  -BF        Anticipated Discharge Disposition home with assist  -BF        Functional Level Prior    Ambulation     0-->independent  -RJ    Transferring     0-->independent  -RJ    Toileting     0-->independent  -RJ    Bathing     0-->independent  -RJ    Dressing     0-->independent  -RJ    Eating     0-->independent  -RJ    Communication     2-->difficulty speaking (not related to language barrier)  -RJ    Swallowing     0-->swallows foods/liquids without difficulty  -RJ    Prior Functional Level Comment   Pt reports she is indep with act. at home. She feels she would benefit from rolling walker at dc.  -MW      Vision Assessment/Intervention    Visual Impairment WFL  -BF        Cognitive Assessment/Intervention    Current Cognitive/Communication Assessment functional  -BF        Orientation Status oriented x 4  -BF        Follows Commands/Answers Questions 100% of the time;able to follow single-step instructions  -BF        ROM (Range of Motion)    General ROM no range of motion deficits identified   BUE  -BF        General ROM Detail BUE AROM WFL  -BF        MMT (Manual Muscle Testing)    General MMT Assessment upper extremity  strength deficits identified  -BF        General MMT Assessment Detail BUE MMT score grossly 3/5  -BF        ADL Assessment/Intervention    Additional Documentation Self-Feeding Assessment/Training (Group)  -BF        Lower Body Bathing Assessment/Training    LB Bathing Assess/Train, Comment TB bathing: Min Assist  -BF        Upper Body Dressing Assessment/Training    UB Dressing Assess/Train, Comment Set-up/supervision  -BF        Lower Body Dressing Assessment/Training    LB Dressing Assess/Train, Comment Min Assist  -BF        Toileting Assessment/Training    Toileting Assess/Train, Comment Min Assist  -BF        Grooming Assessment/Training    Grooming Assess/Train, Comment Set-up/supervision  -BF        Self-Feeding Assessment/Training    Self-Feeding Assess/Train, Comment Set-up  -BF        Motor Skills/Interventions    Additional Documentation Fine Motor Coordination Training (Group);Functional Endurance (Group)  -BF        Fine Motor Coordination Training    Detail (Fine Motor Coordination Training) BUE FMC decreased  -BF        Functional Endurance    Detail (Functional Endurance) Poor+  -BF        General Therapy Interventions    Planned Therapy Interventions activity intolerance;ADL retraining;fine motor coordination training;home exercise program;motor coordination training;strengthening;transfer training  -BF        Positioning and Restraints    Pre-Treatment Position in bed  -BF        Post Treatment Position bed  -BF        In Bed supine;call light within reach;encouraged to call for assist  -BF          User Key  (r) = Recorded By, (t) = Taken By, (c) = Cosigned By    Initials Name Effective Dates    MW Ronda Zimmerman RN 06/16/16 -     RJ Yue Cowart RN 06/16/16 -     BF Karoline Dominguez, OT 03/14/16 -                  OT Recommendation and Plan  Anticipated Equipment Needs At Discharge: bedside commode  Anticipated Discharge Disposition: home with assist  Planned Therapy  Interventions: activity intolerance, ADL retraining, fine motor coordination training, home exercise program, motor coordination training, strengthening, transfer training  Therapy Frequency: 3-5 times/wk             OT Goals       03/01/17 1304          Strength OT LTG    Strength Goal OT LTG, Date Established 03/01/17  -BF      Strength Goal OT LTG, Time to Achieve by discharge  -BF      Strength Goal OT LTG, Measure to Achieve Increase BUE MMT score to 4/5  -BF      Endurance OT LTG    Endurance Goal OT LTG, Date Established 03/01/17  -BF      Endurance Goal OT LTG, Time to Achieve by discharge  -BF      Endurance Goal OT LTG, Activity Level to increase;endurance 2 fair+  -BF        User Key  (r) = Recorded By, (t) = Taken By, (c) = Cosigned By    Initials Name Provider Type    BF Karoline Dominguez OT Occupational Therapist                Outcome Measures       03/01/17 1306          How much help from another is currently needed...    Putting on and taking off regular lower body clothing? 3  -BF      Bathing (including washing, rinsing, and drying) 3  -BF      Toileting (which includes using toilet bed pan or urinal) 3  -BF      Putting on and taking off regular upper body clothing 4  -BF      Taking care of personal grooming (such as brushing teeth) 4  -BF      Eating meals 4  -BF      Score 21  -BF      Functional Assessment    Outcome Measure Options AM-PAC 6 Clicks Daily Activity (OT)  -BF        User Key  (r) = Recorded By, (t) = Taken By, (c) = Cosigned By    Initials Name Provider Type    BF Karoline Dominguez OT Occupational Therapist          Time Calculation:   OT Start Time:  (30 min eval)  OT Non-Billable Time (min): 30 min    Therapy Charges for Today     Code Description Service Date Service Provider Modifiers Qty    86838822565 HC OT SELFCARE CURRENT 3/1/2017 Karoline Dominguez OT GO, CJ 1    66930144308 HC OT SELFCARE PROJECTED 3/1/2017 Karoline Dominguez OT GO, CI 1     [id number]  OT EVAL HIGH COMPLEXITY 2 3/1/2017 Karoline Dominguez OT GO 1          OT G-codes  OT Professional Judgement Used?: Yes  OT Functional Scales Options: AM-PAC 6 Clicks Daily Activity (OT)  Functional Assessment Tool Used: FIM  Functional Limitation: Self care  Self Care Current Status (): At least 20 percent but less than 40 percent impaired, limited or restricted  Self Care Goal Status (): At least 1 percent but less than 20 percent impaired, limited or restricted    Karoline Dominguez OT  3/1/2017

## 2017-03-01 NOTE — H&P
[facility] HOSPITALIST HISTORY AND PHYSICAL    Patient Identification:  Name:  Pepito Yan  Age:  51 y.o.  Sex:  female  :  1965  MRN:  [id number]   Visit Number:  [id number]  Primary Care Physician:  ROSA Meza     Chief complaint:  Trouble breathing    History of presenting illness:  51 y.o. female who presented to [facility] with trouble breathing and a productive cough of a few days duration.  She tried albuterol but this did not help.  She does not have a baseline cough.  She has a subjective fever with chills and fatigue.  She has substernal chest pain that is stabbing, is intermittent, and the same intensity all the time (9/10).  The headache occurred prior to the cough and is bilateral and frontal with no neck pain.  She also has a clear, runny nose and a sore throat.  ---------------------------------------------------------------------------------------------------------------------   Review of Systems   Constitutional: Positive for chills, fatigue and fever.   HENT: Positive for postnasal drip and sore throat. Negative for ear discharge, rhinorrhea and sneezing.    Eyes: Negative for discharge and redness.   Respiratory: Positive for cough, shortness of breath and wheezing.    Cardiovascular: Positive for chest pain. Negative for palpitations and leg swelling.   Gastrointestinal: Negative for diarrhea, nausea and vomiting.   Genitourinary: Negative for decreased urine volume, dysuria and hematuria.   Musculoskeletal: Negative for arthralgias and joint swelling.   Skin: Negative for pallor, rash and wound.   Neurological: Positive for headaches. Negative for seizures and speech difficulty.   Hematological: Does not bruise/bleed easily.   Psychiatric/Behavioral: Negative for confusion, self-injury and suicidal ideas. The patient is not nervous/anxious.      ---------------------------------------------------------------------------------------------------------------------   Past Medical History   Diagnosis Date   • Anxiety    • 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   • Chronic respiratory failure with hypoxia      at nighttime   • Compression fracture of lumbar vertebra    • Compression fracture of thoracic vertebra    • COPD (chronic obstructive pulmonary disease)    • Coronary artery disease    • Diabetes mellitus type 2 in nonobese    • Erosive esophagitis 2016   • Essential hypertension    • Gastroparesis    • GERD (gastroesophageal reflux disease)    • Hepatitis-C    • Medically noncompliant    • MRSA infection      back of neck around    • NSTEMI (non-ST elevated myocardial infarction) 2016   • PE (pulmonary embolism)    • Protein calorie malnutrition    • RA (rheumatoid arthritis)      with chronic steroid use     Past Surgical History   Procedure Laterality Date   • Cholecystectomy     • Foreign body removal       insulin needle removal x 3 from the abdomen   •  section       x 2   • Hysterectomy     • Arm debridement Left 2016     Procedure: HAND IRRIGATION DEBRIDEMENT AND REPAIR ;  Surgeon: Shane Fuller MD;  Location: [facility];  Service:    • Endoscopy N/A 2016     Procedure: ESOPHAGOGASTRODUODENOSCOPY;  Surgeon: Jeremy Clement III, MD;  Location: [facility] OR;  Service:    • Amputation digit Right 2016     Third digit to the PIP. Surgeon: Dr. Bains   • Amputation digit Left 2016     Procedure: PARTIAL AMPUTATION LEFT THIRD FINGER;  Surgeon: Shane Fuller MD;  Location: [facility] OR;  Service:    • Amputation revision Left 2016     Procedure: AMPUTATION REVISION DIGIT STUMP;  Surgeon: Shane Fuller MD;  Location: [facility] OR;  Service:      Family History   Problem Relation Age of Onset   • Colon cancer Mother    • Cancer Father    • Diabetes Maternal Grandmother     • Heart disease Neg Hx    • Stroke Neg Hx      Social History   • Marital status:      Social History Main Topics   • Smoking status: Current Every Day Smoker     Packs/day: 4.50     Years: 35.00     Types: Cigarettes   • Smokeless tobacco: None      Comment: She cut down to 1/4 PPD for 2.5 months ago   • Alcohol use No   • Drug use: No   • Sexual activity: Defer   ---------------------------------------------------------------------------------------------------------------------   Allergies:  Penicillins; Toradol [ketorolac tromethamine]; and Tramadol  ---------------------------------------------------------------------------------------------------------------------   Prior to Admission Medications     Prescriptions Last Dose Informant Patient Reported? Taking?    albuterol (PROVENTIL HFA;VENTOLIN HFA) 108 (90 BASE) MCG/ACT inhaler  Self No No    Inhale 2 puffs every 6 (six) hours as needed for wheezing.    aspirin 81 MG EC tablet   No No    Take 1 tablet by mouth Daily.    atorvastatin (LIPITOR) 40 MG tablet  Pharmacy Yes No    Take 40 mg by mouth daily.    carvedilol (COREG) 12.5 MG tablet   No No    Take 0.5 tablets by mouth Every 12 (Twelve) Hours. Do not take if BP <110/60 and/or HR <55    clopidogrel (PLAVIX) 75 MG tablet  Pharmacy Yes No    Take 75 mg by mouth daily.    furosemide (LASIX) 20 MG tablet  Self No No    Take 1 tablet by mouth daily. Do not take if BP <110/60    gabapentin (NEURONTIN) 800 MG tablet  Pharmacy Yes No    Take 800 mg by mouth 3 (three) times a day.    HYDROcodone-acetaminophen (NORCO) 5-325 MG per tablet   No No    Take 1 tablet by mouth Every 6 (Six) Hours As Needed for moderate pain (4-6).    insulin aspart (novoLOG) 100 UNIT/ML injection   No No    Inject 0-7 Units under the skin 4 (Four) Times a Day Before Meals & at Bedtime.    insulin glargine (LANTUS) 100 UNIT/ML injection  Pharmacy Yes No    Inject 45 Units under the skin every night.    lisinopril  (PRINIVIL,ZESTRIL) 5 MG tablet  Self No No    Take 2 tablets by mouth daily. Do not take if BP <110/60    metFORMIN (GLUCOPHAGE) 1000 MG tablet  Pharmacy Yes No    Take 1,000 mg by mouth 2 (two) times a day with meals.    nitroglycerin (NITROSTAT) 0.4 MG SL tablet  Pharmacy Yes No    Place 0.4 mg under the tongue every 5 (five) minutes as needed for chest pain. Take no more than 3 doses in 15 minutes.    ondansetron (ZOFRAN) 4 MG tablet   No No    Take 1 tablet by mouth Every 6 (Six) Hours As Needed for nausea or vomiting.    pantoprazole (PROTONIX) 40 MG EC tablet  Pharmacy Yes No    Take 40 mg by mouth 2 (two) times a day.    predniSONE (DELTASONE) 20 MG tablet  Self No No    Take 1 tablet by mouth Daily.    ranolazine (RANEXA) 500 MG 12 hr tablet  Self No No    Take 1 tablet by mouth every 12 (twelve) hours.        Hospital Scheduled Meds:  gabapentin 800 mg Oral Q8H   heparin (porcine) 5,000 Units Subcutaneous Q12H   ---------------------------------------------------------------------------------------------------------------------   Vital Signs:  Temp:  [98.2 °F (36.8 °C)-99.8 °F (37.7 °C)] 98.4 °F (36.9 °C)  Heart Rate:  [] 87  Resp:  [20-24] 24  BP: ()/(59-94) 95/67  Last 3 weights    02/28/17  1847 03/01/17  0027 03/01/17  0303   Weight: 118 lb (53.5 kg) 120 lb 14.4 oz (54.8 kg) 120 lb 6.4 oz (54.6 kg)     Body mass index is 21.33 kg/(m^2).  ---------------------------------------------------------------------------------------------------------------------   Physical Exam:  Constitutional:  Well-developed and well-nourished.  Mild to moderate respiratory distress.  Thin in appearance.     HENT:  Head: Normocephalic and atraumatic.  Mouth:  Moist mucous membranes.    Eyes:  Conjunctivae and EOM are normal.  Pupils are equal, round, and reactive to light.  No scleral icterus.  Neck:  Neck supple.  No JVD present.    Cardiovascular:  Normal rate, regular rhythm and normal heart sounds with no  murmur.  Pulmonary/Chest:  Mild to moderate respiratory distress, minimal expiratory wheezes, bilateral crackles, with increased breath sounds on the left and fair to good air movement.  Abdominal:  Soft.  Bowel sounds are normal.  No distension and no tenderness.   Musculoskeletal:  No edema, no tenderness, and no deformity.  No red or swollen joints anywhere.  The left third finger is partially amputated by surgical methods and the left second, right second, right fourth, right fifth are well healed from autoamputation.    Neurological:  Alert and oriented to person, place, and time.  No cranial nerve deficit.  No tongue deviation.  No facial droop.  No slurred speech.   Skin:  Skin is warm and dry.  No rash noted.  No pallor.   Psychiatric:  Normal mood and affect.  Behavior is normal.  Judgment and thought content normal.   Peripheral vascular:  No edema and strong pulses on all 4 extremities.  Genitourinary:  No aguilera catheter in place.  ---------------------------------------------------------------------------------------------------------------------  EKG:  Sinus tachycardia, RBBB.    Telemetry:  NS 70-80's.  I have personally looked at both the EKG and the telemetry strips.  ---------------------------------------------------------------------------------------------------------------------   Results from last 7 days  Lab Units 02/28/17  1939   CRP mg/dL 25.50*   LACTATE mmol/L 1.8   WBC 10*3/mm3 8.29   HEMOGLOBIN g/dL 9.9*   HEMATOCRIT % 33.6*   MCV fL 72.4*   MCHC g/dL 29.5*   PLATELETS 10*3/mm3 222     Results from last 7 days  Lab Units 02/28/17  1939   SODIUM mmol/L 139   POTASSIUM mmol/L 3.8   CHLORIDE mmol/L 109   TOTAL CO2 mmol/L 24.3   BUN mg/dL 18   CREATININE mg/dL 0.57   EGFR IF NONAFRICN AM mL/min/1.73 112   CALCIUM mg/dL 8.8   GLUCOSE mg/dL 117*   ALBUMIN g/dL 3.60   BILIRUBIN mg/dL 0.2   ALK PHOS U/L 59   AST (SGOT) U/L 29   ALT (SGPT) U/L 41*   Estimated Creatinine Clearance: 96.6 mL/min (by  C-G formula based on Cr of 0.57).    Results from last 7 days  Lab Units 02/28/17 2114 02/28/17 1939   TROPONIN I ng/mL 0.076* 0.085*   MYOGLOBIN ng/mL  --  107.0       Lab Results   Component Value Date    HGBA1C 7.90 (H) 12/18/2016     Lab Results   Component Value Date    TSH 5.899 (H) 09/12/2016    FREET4 1.17 09/12/2016       ---------------------------------------------------------------------------------------------------------------------  Imaging Results (last 7 days)     Procedure Component Value Units Date/Time    XR Chest 1 View [[id number]]:  Bilateral infiltrates seen on my exam of the CXR.      Updated:  02/28/17 1945      CT Chest Pulmonary Embolism With Contrast [[id number]] Resulted:  03/01/17 0137       Updated:  03/01/17 0137      I have personally reviewed the radiology images and read the final radiology report.  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  -Insulin-dependent diabetes not as type II with hyperglycemia.  -Mild transaminitis likely secondary to her chronic hepatitis C.  -Generalized anxiety disorder.  -Ischemic cardiomyopathy with ejection fraction of approximately 45%.  -Tobacco smoking addiction  -Medical noncompliance  -Malnutrition  -Hiatal hernia with erosive esophagitis  -6 x 8 mm medial lung base, stable for years based on CT scan of the chest    Will order a sputum culture and will start her on rocephin and doxycycline and will order legionella, mycoplasma, and influenza testing.  Will give solumedrol and breathing treatments as well.  Will monitor on the med-surg floor with  telemetry.  Will repeat labs in the morning.  Await pharmacy to do the admission med rec by calling [facility]'s pharmacy in [place] when it opens.    Savanah Gutierrez MD  03/01/17  5:19 AM

## 2017-03-02 ENCOUNTER — APPOINTMENT (OUTPATIENT)
Dept: GENERAL RADIOLOGY | Facility: HOSPITAL | Age: 52
End: 2017-03-02

## 2017-03-02 LAB
A-A DO2: 137.8 MMHG (ref 0–300)
ALBUMIN SERPL-MCNC: 3 G/DL (ref 3.5–5)
ALBUMIN/GLOB SERPL: 1.1 G/DL (ref 1.5–2.5)
ALP SERPL-CCNC: 51 U/L (ref 35–104)
ALT SERPL W P-5'-P-CCNC: 31 U/L (ref 10–36)
ANION GAP SERPL CALCULATED.3IONS-SCNC: 7.1 MMOL/L (ref 3.6–11.2)
ANISOCYTOSIS BLD QL: NORMAL
ARTERIAL PATENCY WRIST A: ABNORMAL
AST SERPL-CCNC: 28 U/L (ref 10–30)
ATMOSPHERIC PRESS: 731 MMHG
BASE EXCESS BLDA CALC-SCNC: -3.3 MMOL/L
BASOPHILS # BLD AUTO: 0 10*3/MM3 (ref 0–0.3)
BASOPHILS NFR BLD AUTO: 0 % (ref 0–2)
BDY SITE: ABNORMAL
BILIRUB SERPL-MCNC: 0.1 MG/DL (ref 0.2–1.8)
BODY TEMPERATURE: 98.6 C
BUN BLD-MCNC: 20 MG/DL (ref 7–21)
BUN/CREAT SERPL: 30.8 (ref 7–25)
CALCIUM SPEC-SCNC: 8.4 MG/DL (ref 7.7–10)
CHLORIDE SERPL-SCNC: 109 MMOL/L (ref 99–112)
CK MB SERPL-CCNC: 0.25 NG/ML (ref 0–5)
CK MB SERPL-RTO: 0.2 % (ref 0–3)
CK SERPL-CCNC: 132 U/L (ref 24–173)
CO2 SERPL-SCNC: 17.9 MMOL/L (ref 24.3–31.9)
COHGB MFR BLD: 1.2 % (ref 0–5)
CREAT BLD-MCNC: 0.65 MG/DL (ref 0.43–1.29)
CRP SERPL-MCNC: 12.58 MG/DL (ref 0–0.99)
D-LACTATE SERPL-SCNC: 1.6 MMOL/L (ref 0.5–2)
D-LACTATE SERPL-SCNC: 2.5 MMOL/L (ref 0.5–2)
DEPRECATED RDW RBC AUTO: 51.4 FL (ref 37–54)
EOSINOPHIL # BLD AUTO: 0 10*3/MM3 (ref 0–0.7)
EOSINOPHIL NFR BLD AUTO: 0 % (ref 0–5)
ERYTHROCYTE [DISTWIDTH] IN BLOOD BY AUTOMATED COUNT: 19.5 % (ref 11.5–14.5)
GFR SERPL CREATININE-BSD FRML MDRD: 96 ML/MIN/1.73
GLOBULIN UR ELPH-MCNC: 2.8 GM/DL
GLUCOSE BLD-MCNC: 330 MG/DL (ref 70–110)
GLUCOSE BLDC GLUCOMTR-MCNC: 118 MG/DL (ref 70–130)
GLUCOSE BLDC GLUCOMTR-MCNC: 146 MG/DL (ref 70–130)
GLUCOSE BLDC GLUCOMTR-MCNC: 157 MG/DL (ref 70–130)
GLUCOSE BLDC GLUCOMTR-MCNC: 298 MG/DL (ref 70–130)
GLUCOSE BLDC GLUCOMTR-MCNC: 95 MG/DL (ref 70–130)
HCO3 BLDA-SCNC: 19.8 MMOL/L (ref 22–26)
HCT VFR BLD AUTO: 29.7 % (ref 37–47)
HCT VFR BLD AUTO: 33.3 % (ref 37–47)
HCT VFR BLD CALC: 33 % (ref 37–47)
HGB BLD-MCNC: 8.3 G/DL (ref 12–16)
HGB BLD-MCNC: 9.5 G/DL (ref 12–16)
HGB BLDA-MCNC: 11.1 G/DL (ref 12–16)
HOROWITZ INDEX BLD+IHG-RTO: 32 %
HYPOCHROMIA BLD QL: NORMAL
IMM GRANULOCYTES # BLD: 0.02 10*3/MM3 (ref 0–0.03)
IMM GRANULOCYTES NFR BLD: 0.3 % (ref 0–0.5)
LYMPHOCYTES # BLD AUTO: 0.17 10*3/MM3 (ref 1–3)
LYMPHOCYTES NFR BLD AUTO: 2.9 % (ref 21–51)
MAGNESIUM SERPL-MCNC: 2.1 MG/DL (ref 1.7–2.6)
MCH RBC QN AUTO: 21.1 PG (ref 27–33)
MCHC RBC AUTO-ENTMCNC: 27.9 G/DL (ref 33–37)
MCV RBC AUTO: 75.6 FL (ref 80–94)
METHGB BLD QL: 0.3 % (ref 0–3)
MICROCYTES BLD QL: NORMAL
MODALITY: ABNORMAL
MONOCYTES # BLD AUTO: 0.26 10*3/MM3 (ref 0.1–0.9)
MONOCYTES NFR BLD AUTO: 4.4 % (ref 0–10)
MYOGLOBIN SERPL-MCNC: 38 NG/ML (ref 0–109)
NEUTROPHILS # BLD AUTO: 5.45 10*3/MM3 (ref 1.4–6.5)
NEUTROPHILS NFR BLD AUTO: 92.4 % (ref 30–70)
OSMOLALITY SERPL CALC.SUM OF ELEC: 283.7 MOSM/KG (ref 273–305)
OXYHGB MFR BLDV: 83.6 % (ref 85–100)
PCO2 BLDA: 29.1 MM HG (ref 35–45)
PH BLDA: 7.45 PH UNITS (ref 7.35–7.45)
PLAT MORPH BLD: NORMAL
PLATELET # BLD AUTO: 168 10*3/MM3 (ref 130–400)
PMV BLD AUTO: 10.6 FL (ref 6–10)
PO2 BLDA: 47 MM HG (ref 80–100)
POTASSIUM BLD-SCNC: 4.2 MMOL/L (ref 3.5–5.3)
PROT SERPL-MCNC: 5.8 G/DL (ref 6–8)
RBC # BLD AUTO: 3.93 10*6/MM3 (ref 4.2–5.4)
SAO2 % BLDCOA: 84.9 % (ref 90–100)
SODIUM BLD-SCNC: 134 MMOL/L (ref 135–153)
TROPONIN I SERPL-MCNC: 0.02 NG/ML
WBC NRBC COR # BLD: 5.9 10*3/MM3 (ref 4.5–12.5)

## 2017-03-02 PROCEDURE — 25010000002 FUROSEMIDE PER 20 MG: Performed by: INTERNAL MEDICINE

## 2017-03-02 PROCEDURE — 83874 ASSAY OF MYOGLOBIN: CPT | Performed by: PHYSICIAN ASSISTANT

## 2017-03-02 PROCEDURE — 25010000002 LEVOFLOXACIN PER 250 MG: Performed by: INTERNAL MEDICINE

## 2017-03-02 PROCEDURE — 83050 HGB METHEMOGLOBIN QUAN: CPT | Performed by: INTERNAL MEDICINE

## 2017-03-02 PROCEDURE — 94799 UNLISTED PULMONARY SVC/PX: CPT

## 2017-03-02 PROCEDURE — 99223 1ST HOSP IP/OBS HIGH 75: CPT | Performed by: INTERNAL MEDICINE

## 2017-03-02 PROCEDURE — 71010 HC CHEST AP: CPT

## 2017-03-02 PROCEDURE — 85014 HEMATOCRIT: CPT | Performed by: INTERNAL MEDICINE

## 2017-03-02 PROCEDURE — 25010000002 FUROSEMIDE PER 20 MG

## 2017-03-02 PROCEDURE — 25010000002 METHYLPREDNISOLONE PER 40 MG: Performed by: INTERNAL MEDICINE

## 2017-03-02 PROCEDURE — 94660 CPAP INITIATION&MGMT: CPT

## 2017-03-02 PROCEDURE — 71010 XR CHEST AP: CPT | Performed by: RADIOLOGY

## 2017-03-02 PROCEDURE — 85018 HEMOGLOBIN: CPT | Performed by: INTERNAL MEDICINE

## 2017-03-02 PROCEDURE — 25010000002 VANCOMYCIN PER 500 MG

## 2017-03-02 PROCEDURE — 82805 BLOOD GASES W/O2 SATURATION: CPT | Performed by: INTERNAL MEDICINE

## 2017-03-02 PROCEDURE — 82550 ASSAY OF CK (CPK): CPT | Performed by: PHYSICIAN ASSISTANT

## 2017-03-02 PROCEDURE — 82962 GLUCOSE BLOOD TEST: CPT

## 2017-03-02 PROCEDURE — 85007 BL SMEAR W/DIFF WBC COUNT: CPT | Performed by: PHYSICIAN ASSISTANT

## 2017-03-02 PROCEDURE — 99233 SBSQ HOSP IP/OBS HIGH 50: CPT | Performed by: INTERNAL MEDICINE

## 2017-03-02 PROCEDURE — 82553 CREATINE MB FRACTION: CPT | Performed by: PHYSICIAN ASSISTANT

## 2017-03-02 PROCEDURE — 80053 COMPREHEN METABOLIC PANEL: CPT | Performed by: INTERNAL MEDICINE

## 2017-03-02 PROCEDURE — 82375 ASSAY CARBOXYHB QUANT: CPT | Performed by: INTERNAL MEDICINE

## 2017-03-02 PROCEDURE — 83605 ASSAY OF LACTIC ACID: CPT | Performed by: INTERNAL MEDICINE

## 2017-03-02 PROCEDURE — 63710000001 INSULIN DETEMIR PER 5 UNITS: Performed by: PHYSICIAN ASSISTANT

## 2017-03-02 PROCEDURE — 36600 WITHDRAWAL OF ARTERIAL BLOOD: CPT | Performed by: INTERNAL MEDICINE

## 2017-03-02 PROCEDURE — 86140 C-REACTIVE PROTEIN: CPT | Performed by: INTERNAL MEDICINE

## 2017-03-02 PROCEDURE — 25010000002 HEPARIN (PORCINE) PER 1000 UNITS: Performed by: INTERNAL MEDICINE

## 2017-03-02 PROCEDURE — 93005 ELECTROCARDIOGRAM TRACING: CPT | Performed by: PHYSICIAN ASSISTANT

## 2017-03-02 PROCEDURE — 85025 COMPLETE CBC W/AUTO DIFF WBC: CPT | Performed by: PHYSICIAN ASSISTANT

## 2017-03-02 PROCEDURE — 84484 ASSAY OF TROPONIN QUANT: CPT | Performed by: PHYSICIAN ASSISTANT

## 2017-03-02 PROCEDURE — 63710000001 INSULIN ASPART PER 5 UNITS: Performed by: INTERNAL MEDICINE

## 2017-03-02 PROCEDURE — 83735 ASSAY OF MAGNESIUM: CPT | Performed by: INTERNAL MEDICINE

## 2017-03-02 PROCEDURE — 93010 ELECTROCARDIOGRAM REPORT: CPT | Performed by: INTERNAL MEDICINE

## 2017-03-02 RX ORDER — FUROSEMIDE 10 MG/ML
40 INJECTION INTRAMUSCULAR; INTRAVENOUS ONCE
Status: COMPLETED | OUTPATIENT
Start: 2017-03-02 | End: 2017-03-02

## 2017-03-02 RX ORDER — FUROSEMIDE 10 MG/ML
20 INJECTION INTRAMUSCULAR; INTRAVENOUS ONCE
Status: COMPLETED | OUTPATIENT
Start: 2017-03-02 | End: 2017-03-02

## 2017-03-02 RX ORDER — METHYLPREDNISOLONE SODIUM SUCCINATE 40 MG/ML
40 INJECTION, POWDER, LYOPHILIZED, FOR SOLUTION INTRAMUSCULAR; INTRAVENOUS EVERY 12 HOURS
Status: DISCONTINUED | OUTPATIENT
Start: 2017-03-02 | End: 2017-03-05

## 2017-03-02 RX ORDER — ACETAMINOPHEN 325 MG/1
650 TABLET ORAL EVERY 6 HOURS PRN
Status: DISCONTINUED | OUTPATIENT
Start: 2017-03-02 | End: 2017-03-11 | Stop reason: CLARIF

## 2017-03-02 RX ORDER — POTASSIUM CHLORIDE 1.5 G/1.77G
40 POWDER, FOR SOLUTION ORAL ONCE
Status: DISCONTINUED | OUTPATIENT
Start: 2017-03-02 | End: 2017-03-02 | Stop reason: DRUGHIGH

## 2017-03-02 RX ORDER — FUROSEMIDE 10 MG/ML
INJECTION INTRAMUSCULAR; INTRAVENOUS
Status: COMPLETED
Start: 2017-03-02 | End: 2017-03-02

## 2017-03-02 RX ORDER — GUAIFENESIN 600 MG/1
600 TABLET, EXTENDED RELEASE ORAL 2 TIMES DAILY
Status: DISCONTINUED | OUTPATIENT
Start: 2017-03-02 | End: 2017-03-15 | Stop reason: ALTCHOICE

## 2017-03-02 RX ORDER — LEVOFLOXACIN 5 MG/ML
750 INJECTION, SOLUTION INTRAVENOUS EVERY 24 HOURS
Status: DISCONTINUED | OUTPATIENT
Start: 2017-03-02 | End: 2017-03-03

## 2017-03-02 RX ORDER — POTASSIUM CHLORIDE 20 MEQ/1
40 TABLET, EXTENDED RELEASE ORAL ONCE
Status: DISCONTINUED | OUTPATIENT
Start: 2017-03-02 | End: 2017-03-20

## 2017-03-02 RX ORDER — POTASSIUM CHLORIDE 1.5 G/1.77G
40 POWDER, FOR SOLUTION ORAL ONCE
Status: COMPLETED | OUTPATIENT
Start: 2017-03-02 | End: 2017-03-02

## 2017-03-02 RX ORDER — FUROSEMIDE 10 MG/ML
20 INJECTION INTRAMUSCULAR; INTRAVENOUS 2 TIMES DAILY
Status: DISCONTINUED | OUTPATIENT
Start: 2017-03-02 | End: 2017-03-02

## 2017-03-02 RX ORDER — METHYLPREDNISOLONE SODIUM SUCCINATE 125 MG/2ML
60 INJECTION, POWDER, LYOPHILIZED, FOR SOLUTION INTRAMUSCULAR; INTRAVENOUS EVERY 12 HOURS
Status: DISCONTINUED | OUTPATIENT
Start: 2017-03-02 | End: 2017-03-02

## 2017-03-02 RX ADMIN — POTASSIUM CHLORIDE 40 MEQ: 1.5 POWDER, FOR SOLUTION ORAL at 17:51

## 2017-03-02 RX ADMIN — INSULIN DETEMIR 45 UNITS: 100 INJECTION, SOLUTION SUBCUTANEOUS at 22:13

## 2017-03-02 RX ADMIN — HYDROXYZINE 25 MG: 25 TABLET, FILM COATED ORAL at 09:38

## 2017-03-02 RX ADMIN — FUROSEMIDE 20 MG: 10 INJECTION INTRAMUSCULAR; INTRAVENOUS at 09:38

## 2017-03-02 RX ADMIN — GABAPENTIN 800 MG: 400 CAPSULE ORAL at 05:10

## 2017-03-02 RX ADMIN — LEVOFLOXACIN 750 MG: 5 INJECTION, SOLUTION INTRAVENOUS at 14:29

## 2017-03-02 RX ADMIN — GUAIFENESIN 600 MG: 600 TABLET, EXTENDED RELEASE ORAL at 17:50

## 2017-03-02 RX ADMIN — ASPIRIN 81 MG: 81 TABLET ORAL at 09:06

## 2017-03-02 RX ADMIN — METHYLPREDNISOLONE SODIUM SUCCINATE 40 MG: 40 INJECTION, POWDER, FOR SOLUTION INTRAMUSCULAR; INTRAVENOUS at 20:39

## 2017-03-02 RX ADMIN — HYDROCODONE BITARTRATE AND ACETAMINOPHEN 1 TABLET: 5; 325 TABLET ORAL at 05:08

## 2017-03-02 RX ADMIN — FUROSEMIDE 20 MG: 10 INJECTION, SOLUTION INTRAMUSCULAR; INTRAVENOUS at 09:38

## 2017-03-02 RX ADMIN — SUCRALFATE 1 G: 1 TABLET ORAL at 21:39

## 2017-03-02 RX ADMIN — IPRATROPIUM BROMIDE AND ALBUTEROL SULFATE 3 ML: .5; 3 SOLUTION RESPIRATORY (INHALATION) at 00:08

## 2017-03-02 RX ADMIN — HEPARIN SODIUM 5000 UNITS: 5000 INJECTION, SOLUTION INTRAVENOUS; SUBCUTANEOUS at 20:39

## 2017-03-02 RX ADMIN — HEPARIN SODIUM 5000 UNITS: 5000 INJECTION, SOLUTION INTRAVENOUS; SUBCUTANEOUS at 09:06

## 2017-03-02 RX ADMIN — FUROSEMIDE 40 MG: 10 INJECTION, SOLUTION INTRAMUSCULAR; INTRAVENOUS at 20:39

## 2017-03-02 RX ADMIN — AZTREONAM 2 G: 2 INJECTION, POWDER, FOR SOLUTION INTRAMUSCULAR; INTRAVENOUS at 21:40

## 2017-03-02 RX ADMIN — SUCRALFATE 1 G: 1 TABLET ORAL at 05:08

## 2017-03-02 RX ADMIN — ACETAMINOPHEN 650 MG: 325 TABLET ORAL at 20:38

## 2017-03-02 RX ADMIN — PANTOPRAZOLE SODIUM 40 MG: 40 INJECTION, POWDER, FOR SOLUTION INTRAVENOUS at 05:08

## 2017-03-02 RX ADMIN — IPRATROPIUM BROMIDE AND ALBUTEROL SULFATE 3 ML: .5; 3 SOLUTION RESPIRATORY (INHALATION) at 18:43

## 2017-03-02 RX ADMIN — HYDROCODONE BITARTRATE AND ACETAMINOPHEN 1 TABLET: 5; 325 TABLET ORAL at 21:39

## 2017-03-02 RX ADMIN — FUROSEMIDE 40 MG: 10 INJECTION, SOLUTION INTRAMUSCULAR; INTRAVENOUS at 12:11

## 2017-03-02 RX ADMIN — CLOPIDOGREL 75 MG: 75 TABLET, FILM COATED ORAL at 09:06

## 2017-03-02 RX ADMIN — GABAPENTIN 800 MG: 400 CAPSULE ORAL at 14:29

## 2017-03-02 RX ADMIN — HYDROCODONE BITARTRATE AND ACETAMINOPHEN 1 TABLET: 5; 325 TABLET ORAL at 13:11

## 2017-03-02 RX ADMIN — AZTREONAM 2 G: 2 INJECTION, POWDER, FOR SOLUTION INTRAMUSCULAR; INTRAVENOUS at 11:59

## 2017-03-02 RX ADMIN — METOPROLOL TARTRATE 25 MG: 25 TABLET, FILM COATED ORAL at 09:38

## 2017-03-02 RX ADMIN — ACETAMINOPHEN 650 MG: 325 TABLET ORAL at 10:27

## 2017-03-02 RX ADMIN — SUCRALFATE 1 G: 1 TABLET ORAL at 17:51

## 2017-03-02 RX ADMIN — IPRATROPIUM BROMIDE AND ALBUTEROL SULFATE 3 ML: .5; 3 SOLUTION RESPIRATORY (INHALATION) at 11:57

## 2017-03-02 RX ADMIN — IPRATROPIUM BROMIDE AND ALBUTEROL SULFATE 3 ML: .5; 3 SOLUTION RESPIRATORY (INHALATION) at 07:14

## 2017-03-02 RX ADMIN — METOPROLOL TARTRATE 25 MG: 25 TABLET, FILM COATED ORAL at 21:39

## 2017-03-02 RX ADMIN — GUAIFENESIN 600 MG: 600 TABLET, EXTENDED RELEASE ORAL at 11:59

## 2017-03-02 RX ADMIN — VANCOMYCIN HYDROCHLORIDE 1000 MG: 5 INJECTION, POWDER, LYOPHILIZED, FOR SOLUTION INTRAVENOUS at 11:59

## 2017-03-02 RX ADMIN — INSULIN ASPART 3 UNITS: 100 INJECTION, SOLUTION INTRAVENOUS; SUBCUTANEOUS at 09:06

## 2017-03-02 RX ADMIN — SUCRALFATE 1 G: 1 TABLET ORAL at 11:59

## 2017-03-02 RX ADMIN — METHYLPREDNISOLONE SODIUM SUCCINATE 40 MG: 40 INJECTION, POWDER, FOR SOLUTION INTRAMUSCULAR; INTRAVENOUS at 09:06

## 2017-03-02 NOTE — PLAN OF CARE
Problem: Inpatient Physical Therapy  Goal: Transfer Training Goal 1 LTG- PT  Outcome: Ongoing (interventions implemented as appropriate)    03/01/17 1422   Transfer Training PT LTG   Transfer Training PT LTG, Date Established 03/01/17   Transfer Training PT LTG, Time to Achieve by discharge   Transfer Training PT LTG, Activity Type all transfers   Transfer Training PT LTG, [place] Level conditional independence       Goal: Gait Training Goal LTG- PT  Outcome: Ongoing (interventions implemented as appropriate)    03/01/17 1422   Gait Training PT LTG   Gait Training Goal PT LTG, Date Established 03/01/17   Gait Training Goal PT LTG, Time to Achieve by discharge   Gait Training Goal PT LTG, [place] Level conditional independence   Gait Training Goal PT LTG, Distance to Achieve 300

## 2017-03-02 NOTE — PROGRESS NOTES
Acute Care - Physical Therapy Initial Evaluation   Daniel     Patient Name: Pepito Yan  : 1965  MRN: [id number]  Today's Date: 3/2/2017   Onset of Illness/Injury or Date of Surgery Date: 17  Date of Referral to PT: 17  Referring Physician: Brenda      Admit Date: 2017     Visit Dx:    ICD-10-CM ICD-9-CM   1. Pneumonia of both lower lobes due to infectious organism J18.9 483.8     Patient Active Problem List   Diagnosis   • Chest pain   • Chest pain of uncertain etiology   • Intractable nausea and vomiting   • Intractable cyclical vomiting with nausea   • Chronic obstructive pulmonary emphysema   • Hepatitis-C   • Diabetes mellitus type 2 in nonobese   • Arthritis or polyarthritis, rheumatoid   • Anxiety   • Essential hypertension   • Coronary artery disease   • Buerger's disease   • Cirrhosis   • Diabetes   • Chronic nausea   • Pancreatitis   • Bilateral pneumonia     Past Medical History   Diagnosis Date   • Anxiety    • 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   • Chronic respiratory failure with hypoxia      at nighttime   • Compression fracture of lumbar vertebra    • Compression fracture of thoracic vertebra    • COPD (chronic obstructive pulmonary disease)    • Coronary artery disease    • Diabetes mellitus type 2 in nonobese    • Erosive esophagitis 2016   • Essential hypertension    • Gastroparesis    • GERD (gastroesophageal reflux disease)    • Hepatitis-C    • Medically noncompliant    • MRSA infection      back of neck around    • NSTEMI (non-ST elevated myocardial infarction) 2016   • PE (pulmonary embolism)    • Protein calorie malnutrition    • RA (rheumatoid arthritis)      with chronic steroid use     Past Surgical History   Procedure Laterality Date   • Cholecystectomy     • Foreign body removal       insulin needle removal x 3 from the abdomen   •  section       x 2   • Hysterectomy     • Arm debridement Left 2016      Procedure: HAND IRRIGATION DEBRIDEMENT AND REPAIR ;  Surgeon: Shane Fuller MD;  Location:  [facility] OR;  Service:    • Endoscopy N/A 12/20/2016     Procedure: ESOPHAGOGASTRODUODENOSCOPY;  Surgeon: Jeremy Clement III, MD;  Location:  [facility] OR;  Service:    • Amputation digit Right 12/22/2016     Third digit to the PIP. Surgeon: Dr. Bains   • Amputation digit Left 12/22/2016     Procedure: PARTIAL AMPUTATION LEFT THIRD FINGER;  Surgeon: Shane Fuller MD;  Location:  [facility] OR;  Service:    • Amputation revision Left 12/22/2016     Procedure: AMPUTATION REVISION DIGIT STUMP;  Surgeon: Shane Fuller MD;  Location: [facility] OR;  Service:           PT ASSESSMENT (last 72 hours)      PT Evaluation       03/01/17 2000 03/01/17 1422    Rehab Evaluation    Document Type  therapy note (daily note)  -TB    Subjective Information  agree to therapy;no complaints  -TB    Patient Effort, Rehab Treatment  good  -TB    Symptoms Noted During/After Treatment  shortness of breath;fatigue  -TB    General Information    Patient Profile Review  yes  -TB    Onset of Illness/Injury or Date of Surgery Date  02/28/17  -TB    Referring Physician  Gutierrez  -TB    Precautions/Limitations  fall precautions;oxygen therapy device and L/min  -TB    Prior Level of Function  independent:  -TB    Equipment Currently Used at Home  oxygen  -TB    Plans/Goals Discussed With  patient;agreed upon  -TB    Risks Reviewed  patient:;dizziness;increased discomfort;change in vital signs  -TB    Benefits Reviewed  patient:;improve function;increase independence;increase strength;increase balance  -TB    Barriers to Rehab  medically complex;physical barrier  -TB    Living Environment    Lives With  friend(s)  -TB    Living Arrangements  house  -TB    Home Accessibility  no concerns  -TB    Clinical Impression    Date of Referral to PT  03/01/17  -TB    Functional Level At Time Of Evaluation  CGA  -TB    Patient/Family Goals Statement   return to PLOF  -TB    Criteria for Skilled Therapeutic Interventions Met  yes;treatment indicated  -TB    Rehab Potential  good, to achieve stated therapy goals  -TB    Predicted Duration of Therapy Intervention (days/wks)  3-5 days  -TB    Pain Assessment    Pain Assessment  --   heartburn  -TB    Cognitive Assessment/Intervention    Current Cognitive/Communication Assessment  functional  -TB    Orientation Status  oriented x 4  -TB    Follows Commands/Answers Questions  100% of the time;able to follow single-step instructions  -TB    Personal Safety  decreased awareness, need for safety  -TB    ROM (Range of Motion)    General ROM Detail  Bilateral LE AROM WFL  -TB    MMT (Manual Muscle Testing)    General MMT Assessment Detail  Bilateral LE strength grossly 4-/5  -TB    Muscle Tone Assessment    Muscle Tone Assessment Bilateral Upper Extremities;Bilateral Lower Extremities  -ALYCE     Bilateral Upper Extremities Muscle Tone Assessment mildly decreased tone  -ALYCE     Bilateral Lower Extremities Muscle Tone Assessment mildly decreased tone  -ALYCE     Bed Mobility, Assessment/Treatment    Bed Mob, Supine to Sit, [place]  conditional independence  -TB    Transfer Assessment/Treatment    Transfers, Sit-Stand [place]  contact guard assist;supervision required  -TB    Transfers, Stand-Sit [place]  contact guard assist;supervision required  -TB    Gait Assessment/Treatment    Gait, [place] Level  contact guard assist  -TB    Gait, Assistive Device  --   none  -TB    Gait, Distance (Feet)  250  -TB    Balance Skills Training    Sitting-Level of Assistance  Independent  -TB    Standing-Level of Assistance  Close supervision  -TB    Gait Balance-Level of Assistance  Contact guard  -TB    Positioning and Restraints    Pre-Treatment Position  in bed  -TB    Post Treatment Position  bed  -TB    In Bed  sitting EOB;call light within reach;encouraged to call for assist   GUERRERO Tobias aware  -TB      03/01/17 4495  03/01/17 1043    Rehab Evaluation    Document Type evaluation  -BF     Subjective Information agree to therapy;no complaints  -BF     Patient Effort, Rehab Treatment good  -BF     General Information    Patient Profile Review yes  -BF     Onset of Illness/Injury or Date of Surgery Date 02/28/17  -BF     Referring Physician Dr. Gutierrez  -BF     General Observations Pt seen while supine in bed, under no apparent distress; O2 intact  -BF     Pertinent History Of Current Problem Pneumonia  -BF     Precautions/Limitations fall precautions;oxygen therapy device and L/min  -BF     Prior Level of Function independent:;ADL's  -BF     Equipment Currently Used at Home oxygen  -BF oxygen   Pt has home o2 provided by unknown provider  -MW    Plans/Goals Discussed With patient;agreed upon  -BF     Barriers to Rehab physical barrier  -BF     Living Environment    Lives With friend(s)  -BF friend(s)   Pt lives with her friend Radhika.  -MW    Living Arrangements house  -BF     Vision Assessment/Intervention    Visual Impairment WFL  -BF     Cognitive Assessment/Intervention    Current Cognitive/Communication Assessment functional  -BF     Orientation Status oriented x 4  -BF     Follows Commands/Answers Questions 100% of the time;able to follow single-step instructions  -BF     ROM (Range of Motion)    General ROM no range of motion deficits identified   BUE  -BF     General ROM Detail BUE AROM WFL  -BF     MMT (Manual Muscle Testing)    General MMT Assessment upper extremity strength deficits identified  -BF     General MMT Assessment Detail BUE MMT score grossly 3/5  -BF     Motor Skills/Interventions    Additional Documentation Fine Motor Coordination Training (Group);Functional Endurance (Group)  -BF     Fine Motor Coordination Training    Detail (Fine Motor Coordination Training) BUE FMC decreased  -BF     Functional Endurance    Detail (Functional Endurance) Poor+  -BF     Positioning and Restraints    Pre-Treatment Position  in bed  -BF     Post Treatment Position bed  -BF     In Bed supine;call light within reach;encouraged to call for assist  -BF       03/01/17 0031 03/01/17 0023    General Information    Equipment Currently Used at Home  none  -RJ    Living Environment    Lives With sibling(s)  -RJ     Living Arrangements house  -RJ     Home Accessibility no concerns  -RJ     Stair Railings at Home outside, present at both sides;inside, present at both sides  -RJ     Type of Financial/Environmental Concern none  -RJ     Transportation Available none  -RJ       User Key  (r) = Recorded By, (t) = Taken By, (c) = Cosigned By    Initials Name Provider Type     Ronda Zimmerman, RN Registered Nurse    ALYCE Mario, RN Registered Nurse    RJ Yue Cowart, RN Registered Nurse    BF Karloine Dominguez, OT Occupational Therapist    TB Amanda Nelson, PT Therapist          Physical Therapy Education     Title: PT OT SLP Therapies (Done)     Topic: Physical Therapy (Done)     Point: Mobility training (Done)    Learning Progress Summary    Learner Readiness Method Response Comment Documented by Status   Patient Acceptance ESEGUNDO,NR   03/01/17 1422 Done               Point: Home exercise program (Done)    Learning Progress Summary    Learner Readiness Method Response Comment Documented by Status   Patient Acceptance ED BRENNAN,NR   03/01/17 1422 Done               Point: Body mechanics (Done)    Learning Progress Summary    Learner Readiness Method Response Comment Documented by Status   Patient Acceptance ED BRENNAN,NR   03/01/17 1422 Done               Point: Precautions (Done)    Learning Progress Summary    Learner Readiness Method Response Comment Documented by Status   Patient Acceptance ESEGUNDO,NR   03/01/17 1422 Done                      User Key     Initials Effective Dates Name Provider Type Discipline     12/30/16 -  Amanda Nelson, PT Therapist PT                PT Recommendation and Plan  Anticipated Discharge  Disposition: home with assist  Planned Therapy Interventions: balance training, gait training, strengthening, transfer training  PT Frequency: 3-5 times/wk, per priority policy (Mon-Fri)             IP PT Goals       03/01/17 1422          Transfer Training PT LTG    Transfer Training PT LTG, Date Established 03/01/17  -TB      Transfer Training PT LTG, Time to Achieve by discharge  -TB      Transfer Training PT LTG, Activity Type all transfers  -TB      Transfer Training PT LTG, [place] Level conditional independence  -TB      Gait Training PT LTG    Gait Training Goal PT LTG, Date Established 03/01/17  -TB      Gait Training Goal PT LTG, Time to Achieve by discharge  -TB      Gait Training Goal PT LTG, [place] Level conditional independence  -TB      Gait Training Goal PT LTG, Distance to Achieve 300  -TB        User Key  (r) = Recorded By, (t) = Taken By, (c) = Cosigned By    Initials Name Provider Type    MARNIE Nelson, PT Therapist                Outcome Measures       03/01/17 1422 03/01/17 1306       How much help from another person do you currently need...    Turning from your back to your side while in flat bed without using bedrails? 4  -TB      Moving from lying on back to sitting on the side of a flat bed without bedrails? 4  -TB      Moving to and from a bed to a chair (including a wheelchair)? 3  -TB      Standing up from a chair using your arms (e.g., wheelchair, bedside chair)? 3  -TB      Climbing 3-5 steps with a railing? 3  -TB      To walk in hospital room? 3  -TB      AM-PAC 6 Clicks Score 20  -TB      How much help from another is currently needed...    Putting on and taking off regular lower body clothing?  3  -BF     Bathing (including washing, rinsing, and drying)  3  -BF     Toileting (which includes using toilet bed pan or urinal)  3  -BF     Putting on and taking off regular upper body clothing  4  -BF     Taking care of personal grooming (such as brushing teeth)  4  -BF      Eating meals  4  -BF     Score  21  -BF     Functional Assessment    Outcome Measure Options AM-PAC 6 Clicks Basic Mobility (PT)  -TB AM-PAC 6 Clicks Daily Activity (OT)  -BF       User Key  (r) = Recorded By, (t) = Taken By, (c) = Cosigned By    Initials Name Provider Type    BF Karoline Dominguez, OT Occupational Therapist    TB Amanda Nelson, PT Therapist           Time Calculation:       Therapy Charges for Today     Code Description Service Date Service Provider Modifiers Qty    38754522187 HC PT MOBILITY CURRENT 3/1/2017 Amanda Nelson, PT GP, CJ 1    55162462550 HC PT MOBILITY PROJECTED 3/1/2017 Amanda Nelson, PT GP, CI 1    71130158933 HC PT EVAL LOW COMPLEXITY 1 3/1/2017 Amanda Nelson, PT GP 1    63203083413 HC PT THER SUPP EA 15 MIN 3/1/2017 Amanda Nelson, PT GP 1          PT G-Codes  Outcome Measure Options: AM-PAC 6 Clicks Basic Mobility (PT)  Score: 20  Functional Limitation: Mobility: Walking and moving around  Mobility: Walking and Moving Around Current Status (): At least 20 percent but less than 40 percent impaired, limited or restricted  Mobility: Walking and Moving Around Goal Status (): At least 1 percent but less than 20 percent impaired, limited or restricted      Amanda Nelson, PT  3/2/2017

## 2017-03-02 NOTE — PROGRESS NOTES
HOSPITALIST PROGRESS NOTE    Patient Identification:  Name:  Pepito Yan  Age:  51 y.o.  Sex:  female  :  1965  MRN:  [id number]  Visit Number:  [id number]  Primary Care Provider:  ROSA Meza    Length of stay:  2     HPI: The patient is a 51 year old female admitted with shortness of air     Subjective:  Today the patient experiences significant dyspnea, tachypnea and tachycardia.  Nursing staff reported that the patient's oxygen saturation was dropping into the upper 70s while the patient was asleep.  A stat ABG revealed that the patient is hypoxic.  Her oxygen has been increased from 3 L to 4 L per nasal cannula.  The patient's heart rate has been 120-140 since approximately 7 AM.  Upon my evaluation, the patient complains of significant dyspnea.  She denies chest pain.  She reports nonproductive cough.  She also reports extreme anxiety.    Present during exam: April, RN    Current Hospital Meds:    aspirin 81 mg Oral Daily   ceftriaxone 1 g Intravenous Q24H   clopidogrel 75 mg Oral Daily   gabapentin 800 mg Oral Q8H   guaiFENesin 600 mg Oral BID   heparin (porcine) 5,000 Units Subcutaneous Q12H   insulin aspart 0-14 Units Subcutaneous 4x Daily AC & at Bedtime   insulin detemir 45 Units Subcutaneous Nightly   ipratropium-albuterol 3 mL Nebulization Q6H - RT   methylPREDNISolone sodium succinate 60 mg Intravenous Q12H   metoprolol tartrate 25 mg Oral Q12H   pantoprazole 40 mg Intravenous Q AM   sucralfate 1 g Oral 4x Daily AC & at Bedtime     Vital Signs  Temp:  [97.4 °F (36.3 °C)-98.5 °F (36.9 °C)] 98.3 °F (36.8 °C)  Heart Rate:  [] 111  Resp:  [20-28] 22  BP: (100-148)/(57-80) 148/80  Last 3 weights    17  0027 17  0303 17  0316   Weight: 120 lb 14.4 oz (54.8 kg) 120 lb 6.4 oz (54.6 kg) 126 lb 6.4 oz (57.3 kg)       Intake/Output Summary (Last 24 hours) at 17 1035  Last data filed at 17 0511   Gross per 24 hour   Intake   1640 ml   Output   2300 ml    Net   -660 ml     Physical exam:  Physical Exam   Constitutional: She is oriented to person, place, and time. She appears well-developed and well-nourished. She appears ill. She appears distressed. Nasal cannula in place.   HENT:   Head: Normocephalic and atraumatic.   Nose: Nose normal.   Mouth/Throat: Oropharynx is clear and moist and mucous membranes are normal.   Eyes: Conjunctivae and EOM are normal. Pupils are equal, round, and reactive to light. No scleral icterus.   Neck: Trachea normal. Neck supple. No JVD present. Carotid bruit is not present. No thyromegaly present.   Cardiovascular: Regular rhythm, normal heart sounds and normal pulses.  Tachycardia present.  Exam reveals no gallop and no friction rub.    No murmur heard.  No lower extremity edema   Pulmonary/Chest: Accessory muscle usage present. Tachypnea noted. She is in respiratory distress. She has wheezes. She has rales.   Diffuse rhonchi/rales   Abdominal: Soft. Bowel sounds are normal. She exhibits no distension. There is no tenderness. There is no guarding.   Musculoskeletal: Normal range of motion.   Neurological: She is alert and oriented to person, place, and time. She has normal strength. No cranial nerve deficit.   Skin: Skin is warm, dry and intact. No rash noted. No erythema.   Psychiatric: Her speech is normal. Her mood appears anxious.     Telemetry: Sinus tachycardia 121-141 bpm    Results Review:    Results from last 7 days  Lab Units 03/02/17  0120 02/28/17 1939   WBC 10*3/mm3 5.90 8.29   HEMOGLOBIN g/dL 8.3* 9.9*   HEMATOCRIT % 29.7* 33.6*   PLATELETS 10*3/mm3 168 222       Results from last 7 days  Lab Units 03/02/17  0120 02/28/17  1939   SODIUM mmol/L 134* 139   POTASSIUM mmol/L 4.2 3.8   CHLORIDE mmol/L 109 109   TOTAL CO2 mmol/L 17.9* 24.3   BUN mg/dL 20 18   CREATININE mg/dL 0.65 0.57   CALCIUM mg/dL 8.4 8.8   GLUCOSE mg/dL 330* 117*       Results from last 7 days  Lab Units 03/02/17  0120 02/28/17 1939   BILIRUBIN mg/dL  0.1* 0.2   ALK PHOS U/L 51 59   AST (SGOT) U/L 28 29   ALT (SGPT) U/L 31 41*       Results from last 7 days  Lab Units 03/02/17  0120   MAGNESIUM mg/dL 2.1       Results from last 7 days  Lab Units 03/02/17  0911 03/01/17  0544 02/28/17  2114 02/28/17  1939   CK TOTAL U/L 132 302*  --   --    TROPONIN I ng/mL 0.018 0.029 0.076* 0.085*   CK MB INDEX % 0.2 0.1  --   --    MYOGLOBIN ng/mL 38.0 71.0  --  107.0       Results from last 7 days  Lab Units 02/28/17  1939   BNP pg/mL 193.0*     ABG:    Results from last 7 days  Lab Units 03/02/17  0902   PH, ARTERIAL pH units 7.450   PO2 ART mm Hg 47.0*   PCO2, ARTERIAL mm Hg 29.1*   HCO3 ART mmol/L 19.8*           EKG:          Assessment/Plan     -Severe Sepsis secondary to a bilateral community acquired pneumonia with worsening respiratory distress; concern for ARDS  -Acute hypoxic respiratory failure  -Sinus tachycardia  -Possible GNR UTI  -Decreased CO2 level due to hyperventilation  -Insulin dependent diabetes mellitus type II with hyperglycemia but improving  -Pre-renal azotemia  -Mild protein malnutrition  -Chronic iron deficiency anemia    The patient has received an IV dose of Lasix.  She has also received metoprolol tartrate and a dose of as needed Atarax.  She will be transferred to the critical care unit when a bed is available.  I have escalated her IV antibiotics to vancomycin and Azactam and plan to add levofloxacin for other atypical coverage.  I will order a BNP and a stat H/H. I have increased her dose of solumedrol. Will add Mucinex. Continue scheduled inhalents. Pulmonary has been consulted and their input is appreciated. Will continue with current insulin regimen. A lactic acid level has been requested. Will repeat her CBC, CRP and CMP level in the am.    The patient is high risk due to the following diagnoses/reasons:  Sepsis, pneumonia, respiratory failure, history of non-compliance    I discussed the patients findings and my recommendations with  patient, nursing staff and consulting provider (Reyna Arellano, Pulmonary NP)    Disposition  Transfer to CCU today.      Sherrill Weiner DO  03/02/17  10:06 AM

## 2017-03-02 NOTE — SIGNIFICANT NOTE
Physical therapy deferred today secondary to medical issues per April, GUERRERO. Will follow-up w/ pt tomorrow 3/3/17. Thank you.

## 2017-03-02 NOTE — PLAN OF CARE
Problem: Patient Care Overview (Adult)  Goal: Plan of Care Review  Outcome: Ongoing (interventions implemented as appropriate)  Goal: Adult Individualization and Mutuality  Outcome: Ongoing (interventions implemented as appropriate)    Problem: Pneumonia (Adult)  Goal: Signs and Symptoms of Listed Potential Problems Will be Absent or Manageable (Pneumonia)  Outcome: Ongoing (interventions implemented as appropriate)    Problem: Diabetes, Type 2 (Adult)  Goal: Signs and Symptoms of Listed Potential Problems Will be Absent or Manageable (Diabetes, Type 2)  Outcome: Ongoing (interventions implemented as appropriate)

## 2017-03-02 NOTE — CONSULTS
Referring Provider: Dr. Weiner  Reason for Consultation: Bilateral pneumonia      Chief complaint Shortness of breath      History of present illness:  Ms. Yan is a 51 year old female with a history buerger's disease, chronic hypoxic respiratory failure, COPD, hepatitis C, hypertension and Diabetes.  She presented to the [facility] Emergency department with a complaint of shortness of breath and cough that started a couple of days prior.  She states that she also has has a fever and chills.  After work up in the ED, she was admitted to the telemetry floor with a diagnosis of bilateral pneumonia.  She was placed on ceftriaxone, doxycycline, IV steroids, nebulizer treatments and supplemental oxygen via nasal cannula.    This morning the patient has episodes of hypoxia and increased shortness of breath.  She was given a neb treatment and an ABG was obtained.  Pulmonology was consulted at this time due to deterioration of condition.    A chest xray that was done this morning is much worse that the original x ray done on admission.  Upon assessment the patient is in severe respiratory distress and struggling to breath.  Patient was placed on BIPAP and will be moved to the critical care unit as soon as there is a bed available.    Review Of Systems:  Review of Systems   Review of Systems - History obtained from chart review and the patient  General ROS: negative for - chills, fatigue or fever  Psychological ROS: negative for - anxiety or depression  ENT ROS: negative for - headaches, visual changes or vocal changes  Respiratory ROS: positive for - cough, shortness of breath and wheezing  Cardiovascular ROS: no chest pain or dyspnea on exertion  Gastrointestinal ROS: no abdominal pain, change in bowel habits, or black or bloody stools  Musculoskeletal ROS: negative for - joint pain, joint stiffness or joint swelling  Neurological ROS: no TIA or stroke symptoms      History  Past Medical History   Diagnosis Date    • Anxiety    • 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   • Chronic respiratory failure with hypoxia      at nighttime   • Compression fracture of lumbar vertebra    • Compression fracture of thoracic vertebra    • COPD (chronic obstructive pulmonary disease)    • Coronary artery disease    • Diabetes mellitus type 2 in nonobese    • Erosive esophagitis 2016   • Essential hypertension    • Gastroparesis    • GERD (gastroesophageal reflux disease)    • Hepatitis-C    • Medically noncompliant    • MRSA infection      back of neck around    • NSTEMI (non-ST elevated myocardial infarction) 2016   • PE (pulmonary embolism)    • Protein calorie malnutrition    • RA (rheumatoid arthritis)      with chronic steroid use   , Past Surgical History   Procedure Laterality Date   • Cholecystectomy     • Foreign body removal       insulin needle removal x 3 from the abdomen   •  section       x 2   • Hysterectomy     • Arm debridement Left 2016     Procedure: HAND IRRIGATION DEBRIDEMENT AND REPAIR ;  Surgeon: Shane Fuller MD;  Location: [facility] OR;  Service:    • Endoscopy N/A 2016     Procedure: ESOPHAGOGASTRODUODENOSCOPY;  Surgeon: Jeremy Clement III, MD;  Location: [facility] OR;  Service:    • Amputation digit Right 2016     Third digit to the PIP. Surgeon: Dr. Bains   • Amputation digit Left 2016     Procedure: PARTIAL AMPUTATION LEFT THIRD FINGER;  Surgeon: Shane Fuller MD;  Location: [facility] OR;  Service:    • Amputation revision Left 2016     Procedure: AMPUTATION REVISION DIGIT STUMP;  Surgeon: Shane Fuller MD;  Location: [facility] OR;  Service:    , Family History   Problem Relation Age of Onset   • Colon cancer Mother    • Cancer Father    • Diabetes Maternal Grandmother    • Heart disease Neg Hx    • Stroke Neg Hx    , Social History   Substance Use Topics   • Smoking status: Current Every Day Smoker     Packs/day: 4.50      Years: 35.00     Types: Cigarettes   • Smokeless tobacco: None      Comment: She cut down to 1/4 PPD for 2.5 months ago   • Alcohol use No   , Prescriptions Prior to Admission   Medication Sig Dispense Refill Last Dose   • albuterol (PROVENTIL HFA;VENTOLIN HFA) 108 (90 BASE) MCG/ACT inhaler Inhale 1 puff Every 4 (Four) Hours.   2/28/2017 at Unknown time   • aspirin 81 MG EC tablet Take 1 tablet by mouth Daily. 30 tablet 0 2/28/2017 at Unknown time   • furosemide (LASIX) 20 MG tablet Take 1 tablet by mouth daily. Do not take if BP <110/60 (Patient taking differently: Take 20 mg by mouth Daily.) 30 tablet 0 Past Week at Unknown time   • gabapentin (NEURONTIN) 600 MG tablet Take 600 mg by mouth 3 (Three) Times a Day.   Past Week at Unknown time   • hydrOXYzine (VISTARIL) 25 MG capsule Take 25 mg by mouth 2 (Two) Times a Day As Needed for anxiety. 2 to 3 times daily as needed    Past Week at Unknown time   • insulin aspart (novoLOG) 100 UNIT/ML injection Inject 0-7 Units under the skin 4 (Four) Times a Day Before Meals & at Bedtime. 10 mL 0 Past Week at Unknown time   • insulin glargine (LANTUS) 100 UNIT/ML injection Inject 45 Units under the skin every night.   Past Week at Unknown time   • lisinopril (PRINIVIL,ZESTRIL) 5 MG tablet Take 5 mg by mouth Daily.   Past Week at Unknown time   • metFORMIN (GLUCOPHAGE) 500 MG tablet Take 500 mg by mouth 3 (Three) Times a Day.   Past Week at Unknown time   • predniSONE (DELTASONE) 20 MG tablet Take 1 tablet by mouth Daily. 14 tablet 0 Past Week at Unknown time   • promethazine (PHENERGAN) 25 MG tablet Take 25 mg by mouth Every 6 (Six) Hours As Needed for nausea or vomiting. Every 4 to 6 hours as needed   Past Week at Unknown time   • sucralfate (CARAFATE) 1 G tablet Take 1 g by mouth 4 (Four) Times a Day.   Past Week at Unknown time   • tiZANidine (ZANAFLEX) 4 MG tablet Take 4 mg by mouth Every 8 (Eight) Hours As Needed for muscle spasms. Every 6 to 8 hours as needed   Past  Week at Unknown time   , Scheduled Meds:    aspirin 81 mg Oral Daily   aztreonam 2 g Intravenous Q8H   clopidogrel 75 mg Oral Daily   furosemide 40 mg Intravenous Once   gabapentin 800 mg Oral Q8H   guaiFENesin 600 mg Oral BID   heparin (porcine) 5,000 Units Subcutaneous Q12H   insulin aspart 0-14 Units Subcutaneous 4x Daily AC & at Bedtime   insulin detemir 45 Units Subcutaneous Nightly   ipratropium-albuterol 3 mL Nebulization Q6H - RT   levoFLOXacin 750 mg Intravenous Q24H   methylPREDNISolone sodium succinate 60 mg Intravenous Q12H   metoprolol tartrate 25 mg Oral Q12H   pantoprazole 40 mg Intravenous Q AM   potassium chloride 40 mEq Oral Once   sucralfate 1 g Oral 4x Daily AC & at Bedtime   vancomycin 1,000 mg Intravenous Q12H   , Continuous Infusions:    Pharmacy to dose vancomycin     and Allergies:  Penicillins; Toradol [ketorolac tromethamine]; and Tramadol    Objective     Vital Signs   Temp:  [97.4 °F (36.3 °C)-102 °F (38.9 °C)] 101.4 °F (38.6 °C)  Heart Rate:  [] 111  Resp:  [20-28] 22  BP: (100-148)/(57-80) 148/80    Physical Exam:               GENERAL APPEARANCE: Well developed, well nourished, alert and cooperative, and appears to be in severe respiratory distress.    HEAD: normocephalic.    EYES: PERRL, EOMI. Fundi normal, vision is grossly intact.    THROAT: Oral cavity and pharynx normal. No inflammation, swelling, exudate, or lesions.     NECK: Neck supple.     CARDIAC: Normal S1 and S2. No S3, S4 or murmurs. Rhythm is regular.   LUNGS: Bilateral wheezing.    ABDOMEN: Positive bowel sounds. Soft, nondistended, nontender.     EXTREMITIES: No significant deformity or joint abnormality. No edema. Peripheral pulses intact. No varicosities.    NEUROLOGICAL: Strength and sensation symmetric and intact throughout.     PSYCHIATRIC: The mental examination revealed the patient was oriented to person, place, and time.                Results Review:    LABS:    Lab Results   Component Value Date     GLUCOSE 330 (H) 03/02/2017    BUN 20 03/02/2017    CREATININE 0.65 03/02/2017    EGFRIFNONA 96 03/02/2017    EGFRIFAFRI  09/15/2016      Comment:      <15 Indicative of kidney failure.    BCR 30.8 (H) 03/02/2017    CO2 17.9 (L) 03/02/2017    CALCIUM 8.4 03/02/2017    ALBUMIN 3.00 (L) 03/02/2017    LABIL2 1.1 (L) 03/02/2017    AST 28 03/02/2017    ALT 31 03/02/2017    WBC 5.90 03/02/2017    HGB 9.5 (L) 03/02/2017    HCT 33.3 (L) 03/02/2017    MCV 75.6 (L) 03/02/2017     03/02/2017     (L) 03/02/2017    K 4.2 03/02/2017     03/02/2017    ANIONGAP 7.1 03/02/2017       Lab Results   Component Value Date    INR 1.01 12/17/2016    INR 0.94 11/20/2016    INR <0.90 02/06/2016    PROTIME 11.4 12/17/2016    PROTIME 10.6 11/20/2016    PROTIME 10.0 02/06/2016                           I reviewed the patient's new clinical results.  I reviewed the patient's new imaging results and agree with the interpretation.      Assessment/Plan     Bilateral pneumonia:  Patient's chest xray is much worse than 2 days ago when she was admitted.  Likely could be ARDS.  Ordered lasix 40 mg IV.  Antibiotics were changed per Dr. Weiner.  Will continue IV antibiotics, IV steroids, diuretics, scheduled inhalants and PRN neb treatments.    ABG was done on 4 liters nasal cannula.  Results for YESSICA TERRY (MRN [id number]) as of 3/2/2017 11:40   Ref. Range 3/2/2017 09:02   pH, Arterial Latest Ref Range: 7.350 - 7.450 pH units 7.450   pCO2, Arterial Latest Ref Range: 35.0 - 45.0 mm Hg 29.1 (C)   pO2, Arterial Latest Ref Range: 80.0 - 100.0 mm Hg 47.0 (C)   HCO3, Arterial Latest Ref Range: 22.0 - 26.0 mmol/L 19.8 (C)   Base Excess, Arterial Latest Units: mmol/L -3.3   O2 Saturation, Arterial Latest Ref Range: 90.0 - 100.0 % 84.9 (C)   Hemoglobin, Blood Gas Latest Ref Range: 12 - 16 g/dL 11.1 (L)   Hematocrit, Blood Gas Latest Ref Range: 37.0 - 47.0 % 33.0 (L)   Barometric Pressure for Blood Gas Latest Units: mmHg 731   A-a  Gradiant Latest Ref Range: 0.0 - 300.0 mmHg 137.8     Patient is in severe respiratory distress.  Will start her on BIPAP with settings of 16/6, rate of 20 and FIO2 35%.      We will give 40 mg of IV Lasix one-time dose.  We will give potassium chloride.        Discussed the possibility of having to go on a ventilator if the BIPAP therapy fails.  Patient states the she wants to do whatever needs to be done so that she won't die.    Patient is full code.    Patient will be moving to the critical care unit as soon as a bed is available.    If patient's respiratory status worsens consider getting an ABG and consider intubation earlier than later.    Continue DVT prophylaxis.    Will get labs in the morning.    BLOOD CULTURE   Date Value Ref Range Status   02/28/2017 No growth at 24 hours  Preliminary                    URINE CULTURE   Date Value Ref Range Status   02/28/2017 >100,000 CFU/mL Gram Negative Bacilli (A)  Preliminary        Microbiology Results (last 10 days)     Procedure Component Value - Date/Time    Influenza Antigen [[id number]]  (Normal) Collected:  03/01/17 1316    Lab Status:  Final result Specimen:  Swab from Nasopharynx Updated:  03/01/17 1408     Influenza A Ag, EIA Negative      Influenza B Ag, EIA Negative     Urine Culture [[id number]]  (Abnormal) Collected:  02/28/17 2124    Lab Status:  Preliminary result Specimen:  Urine from Urine, Clean Catch Updated:  03/02/17 0803     Urine Culture >100,000 CFU/mL Gram Negative Bacilli (A)     Blood Culture [[id number]]  (Normal) Collected:  02/28/17 2026    Lab Status:  Preliminary result Specimen:  Blood from Arm, Left Updated:  03/01/17 2101     Blood Culture No growth at 24 hours     Blood Culture [[id number]]  (Normal) Collected:  02/28/17 1939    Lab Status:  Preliminary result Specimen:  Blood from Arm, Left Updated:  03/01/17 2001     Blood Culture No growth at 24 hours           Active Problems:    Bilateral pneumonia          I discussed the patients  findings and my recommendations with patient, nursing staff and consulting provider    ROSA Seymour  03/02/17  11:37 AM      Attestation: Scribed for Dr. Bradley by ROSA Hernandez        I, Gerard Bradley M.D. attest that the above note accurately reflects the work and decisions made  by me.  Patient was seen and evaluated by Dr. Bradley, including history of present illness, physical exam, assessment, and treatment plan.  The above note was reviewed and edited by Dr. Bradley. The patient was  discussed with Miss Arellano  and I agree with her history physical assessment and plan.

## 2017-03-02 NOTE — PLAN OF CARE
Problem: Patient Care Overview (Adult)  Goal: Plan of Care Review  Outcome: Ongoing (interventions implemented as appropriate)    03/01/17 0841 03/02/17 0900   Coping/Psychosocial Response Interventions   Plan Of Care Reviewed With --  patient   Patient Care Overview   Progress no change --        Goal: Discharge Needs Assessment  Outcome: Ongoing (interventions implemented as appropriate)    03/01/17 0031 03/01/17 1043 03/01/17 1422   Discharge Needs Assessment   Concerns To Be Addressed --  other (see comments)  (Pt requesting rolling walker at [place]. Denies any other needs or concerns) --    Readmission Within The Last 30 Days --  no previous admission in last 30 days --    Community Agency Name(S) --  Does not utilize home health services --    Equipment Needed After Discharge --  walker, rolling --    Discharge Disposition --  home or self-care --    Discharge Planning Comments --  Pt lives with her friend, Radhika, and plans to return back home with Radhika at [place]. --    Current Health   Anticipated Changes Related to Illness --  none --    Living Environment   Transportation Available none --  --    Self-Care   Equipment Currently Used at Home --  --  oxygen         Problem: Pneumonia (Adult)  Goal: Signs and Symptoms of Listed Potential Problems Will be Absent or Manageable (Pneumonia)  Outcome: Ongoing (interventions implemented as appropriate)    03/01/17 0841   Pneumonia   Problems Assessed (Pneumonia) all   Problems Present (Pneumonia) none         Problem: Diabetes, Type 2 (Adult)  Goal: Signs and Symptoms of Listed Potential Problems Will be Absent or Manageable (Diabetes, Type 2)  Outcome: Ongoing (interventions implemented as appropriate)

## 2017-03-02 NOTE — PAYOR COMM NOTE
"CONTACT:  EMMANUELLE BABB RN, BSN  UTILIZATION MANAGEMENT DEPT.  [facility]  [address] [facility], [address]  PHONE:  [phone]  FAX: [phone]    REQUEST FOR INPATIENT AUTHORIZATION. MEDICARE AB PRIMARY    Yessica Yan (51 y.o. Female)     Date of Birth Social Security Number Address Home Phone MRN    1965  [id number] [id number] [phone] [id number]    Jainism Marital Status          Rastafari        Admission Date Admission Type Admitting Provider Attending Provider Department, Room/Bed    2/28/17 Emergency Savanah Gutierrez MD Perkins, Jimmye S, MD [facility] [place], 3315/2S    Discharge Date Discharge Disposition Discharge Destination                      Attending Provider: Savanah Gutierrez MD     Allergies:  Penicillins, Toradol [Ketorolac Tromethamine], Tramadol    Isolation:  None   Infection:  None   Code Status:  FULL    Ht:  63\" (160 cm)   Wt:  126 lb 6.4 oz (57.3 kg)    Admission Cmt:  None   Principal Problem:  None                Active Insurance as of 2/28/2017     Primary Coverage     Payor Plan Insurance Group Employer/Plan Group    MEDICARE MEDICARE A & B      Payor Plan Address Payor Plan Phone Number Effective From Effective To    [address] [phone] 2/1/2005     [place] [address]       Subscriber Name Subscriber Birth Date Member ID       YESSICA YAN 1965 [id number]           Secondary Coverage     Payor Plan Insurance Group Employer/Plan Group    [employer] [place] WELL[place]ICAID      Payor Plan Address Payor Plan Phone Number Effective From Effective To    [address] [address] [phone] 2/28/2017     [place] [address]       Subscriber Name Subscriber Birth Date Member ID       YESSICA YAN 1965 [id number]                 Emergency Contacts      (Rel.) Home Phone Work Phone [place] Phone    Scot Vaughn (Son) [phone] -- --               History & Physical      Savanah PARRISH" MD Brenda at 3/1/2017  5:19 AM              [facility] HOSPITALIST HISTORY AND PHYSICAL    Patient Identification:  Name:  Pepito Yan  Age:  51 y.o.  Sex:  female  :  1965  MRN:  [id number]   Visit Number:  [id number]  Primary Care Physician:  ROSA Meza     Chief complaint:  Trouble breathing    History of presenting illness:  51 y.o. female who presented to [facility] with trouble breathing and a productive cough of a few days duration.  She tried albuterol but this did not help.  She does not have a baseline cough.  She has a subjective fever with chills and fatigue.  She has substernal chest pain that is stabbing, is intermittent, and the same intensity all the time (9/10).  The headache occurred prior to the cough and is bilateral and frontal with no neck pain.  She also has a clear, runny nose and a sore throat.  ---------------------------------------------------------------------------------------------------------------------   Review of Systems   Constitutional: Positive for chills, fatigue and fever.   HENT: Positive for postnasal drip and sore throat. Negative for ear discharge, rhinorrhea and sneezing.    Eyes: Negative for discharge and redness.   Respiratory: Positive for cough, shortness of breath and wheezing.    Cardiovascular: Positive for chest pain. Negative for palpitations and leg swelling.   Gastrointestinal: Negative for diarrhea, nausea and vomiting.   Genitourinary: Negative for decreased urine volume, dysuria and hematuria.   Musculoskeletal: Negative for arthralgias and joint swelling.   Skin: Negative for pallor, rash and wound.   Neurological: Positive for headaches. Negative for seizures and speech difficulty.   Hematological: Does not bruise/bleed easily.   Psychiatric/Behavioral: Negative for confusion, self-injury and suicidal ideas. The patient is not nervous/anxious.      ---------------------------------------------------------------------------------------------------------------------   Past Medical History   Diagnosis Date   • Anxiety    • 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   • Chronic respiratory failure with hypoxia      at nighttime   • Compression fracture of lumbar vertebra    • Compression fracture of thoracic vertebra    • COPD (chronic obstructive pulmonary disease)    • Coronary artery disease    • Diabetes mellitus type 2 in nonobese    • Erosive esophagitis 2016   • Essential hypertension    • Gastroparesis    • GERD (gastroesophageal reflux disease)    • Hepatitis-C    • Medically noncompliant    • MRSA infection      back of neck around    • NSTEMI (non-ST elevated myocardial infarction) 2016   • PE (pulmonary embolism)    • Protein calorie malnutrition    • RA (rheumatoid arthritis)      with chronic steroid use     Past Surgical History   Procedure Laterality Date   • Cholecystectomy     • Foreign body removal       insulin needle removal x 3 from the abdomen   •  section       x 2   • Hysterectomy     • Arm debridement Left 2016     Procedure: HAND IRRIGATION DEBRIDEMENT AND REPAIR ;  Surgeon: Shane Fuller MD;  Location: [facility];  Service:    • Endoscopy N/A 2016     Procedure: ESOPHAGOGASTRODUODENOSCOPY;  Surgeon: Jeremy Clement III, MD;  Location: [facility] OR;  Service:    • Amputation digit Right 2016     Third digit to the PIP. Surgeon: Dr. Bains   • Amputation digit Left 2016     Procedure: PARTIAL AMPUTATION LEFT THIRD FINGER;  Surgeon: Shane Fuller MD;  Location: [facility] OR;  Service:    • Amputation revision Left 2016     Procedure: AMPUTATION REVISION DIGIT STUMP;  Surgeon: Shane Fuller MD;  Location: [facility] OR;  Service:      Family History   Problem Relation Age of Onset   • Colon cancer Mother    • Cancer Father    • Diabetes Maternal Grandmother     • Heart disease Neg Hx    • Stroke Neg Hx      Social History   • Marital status:      Social History Main Topics   • Smoking status: Current Every Day Smoker     Packs/day: 4.50     Years: 35.00     Types: Cigarettes   • Smokeless tobacco: None      Comment: She cut down to 1/4 PPD for 2.5 months ago   • Alcohol use No   • Drug use: No   • Sexual activity: Defer   ---------------------------------------------------------------------------------------------------------------------   Allergies:  Penicillins; Toradol [ketorolac tromethamine]; and Tramadol  ---------------------------------------------------------------------------------------------------------------------   Prior to Admission Medications     Prescriptions Last Dose Informant Patient Reported? Taking?    albuterol (PROVENTIL HFA;VENTOLIN HFA) 108 (90 BASE) MCG/ACT inhaler  Self No No    Inhale 2 puffs every 6 (six) hours as needed for wheezing.    aspirin 81 MG EC tablet   No No    Take 1 tablet by mouth Daily.    atorvastatin (LIPITOR) 40 MG tablet  Pharmacy Yes No    Take 40 mg by mouth daily.    carvedilol (COREG) 12.5 MG tablet   No No    Take 0.5 tablets by mouth Every 12 (Twelve) Hours. Do not take if BP <110/60 and/or HR <55    clopidogrel (PLAVIX) 75 MG tablet  Pharmacy Yes No    Take 75 mg by mouth daily.    furosemide (LASIX) 20 MG tablet  Self No No    Take 1 tablet by mouth daily. Do not take if BP <110/60    gabapentin (NEURONTIN) 800 MG tablet  Pharmacy Yes No    Take 800 mg by mouth 3 (three) times a day.    HYDROcodone-acetaminophen (NORCO) 5-325 MG per tablet   No No    Take 1 tablet by mouth Every 6 (Six) Hours As Needed for moderate pain (4-6).    insulin aspart (novoLOG) 100 UNIT/ML injection   No No    Inject 0-7 Units under the skin 4 (Four) Times a Day Before Meals & at Bedtime.    insulin glargine (LANTUS) 100 UNIT/ML injection  Pharmacy Yes No    Inject 45 Units under the skin every night.    lisinopril  (PRINIVIL,ZESTRIL) 5 MG tablet  Self No No    Take 2 tablets by mouth daily. Do not take if BP <110/60    metFORMIN (GLUCOPHAGE) 1000 MG tablet  Pharmacy Yes No    Take 1,000 mg by mouth 2 (two) times a day with meals.    nitroglycerin (NITROSTAT) 0.4 MG SL tablet  Pharmacy Yes No    Place 0.4 mg under the tongue every 5 (five) minutes as needed for chest pain. Take no more than 3 doses in 15 minutes.    ondansetron (ZOFRAN) 4 MG tablet   No No    Take 1 tablet by mouth Every 6 (Six) Hours As Needed for nausea or vomiting.    pantoprazole (PROTONIX) 40 MG EC tablet  Pharmacy Yes No    Take 40 mg by mouth 2 (two) times a day.    predniSONE (DELTASONE) 20 MG tablet  Self No No    Take 1 tablet by mouth Daily.    ranolazine (RANEXA) 500 MG 12 hr tablet  Self No No    Take 1 tablet by mouth every 12 (twelve) hours.        Hospital Scheduled Meds:  gabapentin 800 mg Oral Q8H   heparin (porcine) 5,000 Units Subcutaneous Q12H   ---------------------------------------------------------------------------------------------------------------------   Vital Signs:  Temp:  [98.2 °F (36.8 °C)-99.8 °F (37.7 °C)] 98.4 °F (36.9 °C)  Heart Rate:  [] 87  Resp:  [20-24] 24  BP: ()/(59-94) 95/67  Last 3 weights    02/28/17  1847 03/01/17  0027 03/01/17  0303   Weight: 118 lb (53.5 kg) 120 lb 14.4 oz (54.8 kg) 120 lb 6.4 oz (54.6 kg)     Body mass index is 21.33 kg/(m^2).  ---------------------------------------------------------------------------------------------------------------------   Physical Exam:  Constitutional:  Well-developed and well-nourished.  Mild to moderate respiratory distress.  Thin in appearance.     HENT:  Head: Normocephalic and atraumatic.  Mouth:  Moist mucous membranes.    Eyes:  Conjunctivae and EOM are normal.  Pupils are equal, round, and reactive to light.  No scleral icterus.  Neck:  Neck supple.  No JVD present.    Cardiovascular:  Normal rate, regular rhythm and normal heart sounds with no  murmur.  Pulmonary/Chest:  Mild to moderate respiratory distress, minimal expiratory wheezes, bilateral crackles, with increased breath sounds on the left and fair to good air movement.  Abdominal:  Soft.  Bowel sounds are normal.  No distension and no tenderness.   Musculoskeletal:  No edema, no tenderness, and no deformity.  No red or swollen joints anywhere.  The left third finger is partially amputated by surgical methods and the left second, right second, right fourth, right fifth are well healed from autoamputation.    Neurological:  Alert and oriented to person, place, and time.  No cranial nerve deficit.  No tongue deviation.  No facial droop.  No slurred speech.   Skin:  Skin is warm and dry.  No rash noted.  No pallor.   Psychiatric:  Normal mood and affect.  Behavior is normal.  Judgment and thought content normal.   Peripheral vascular:  No edema and strong pulses on all 4 extremities.  Genitourinary:  No aguilera catheter in place.  ---------------------------------------------------------------------------------------------------------------------  EKG:  Sinus tachycardia, RBBB.    Telemetry:  NS 70-80's.  I have personally looked at both the EKG and the telemetry strips.  ---------------------------------------------------------------------------------------------------------------------   Results from last 7 days  Lab Units 02/28/17  1939   CRP mg/dL 25.50*   LACTATE mmol/L 1.8   WBC 10*3/mm3 8.29   HEMOGLOBIN g/dL 9.9*   HEMATOCRIT % 33.6*   MCV fL 72.4*   MCHC g/dL 29.5*   PLATELETS 10*3/mm3 222     Results from last 7 days  Lab Units 02/28/17  1939   SODIUM mmol/L 139   POTASSIUM mmol/L 3.8   CHLORIDE mmol/L 109   TOTAL CO2 mmol/L 24.3   BUN mg/dL 18   CREATININE mg/dL 0.57   EGFR IF NONAFRICN AM mL/min/1.73 112   CALCIUM mg/dL 8.8   GLUCOSE mg/dL 117*   ALBUMIN g/dL 3.60   BILIRUBIN mg/dL 0.2   ALK PHOS U/L 59   AST (SGOT) U/L 29   ALT (SGPT) U/L 41*   Estimated Creatinine Clearance: 96.6 mL/min (by  C-G formula based on Cr of 0.57).    Results from last 7 days  Lab Units 02/28/17 2114 02/28/17 1939   TROPONIN I ng/mL 0.076* 0.085*   MYOGLOBIN ng/mL  --  107.0       Lab Results   Component Value Date    HGBA1C 7.90 (H) 12/18/2016     Lab Results   Component Value Date    TSH 5.899 (H) 09/12/2016    FREET4 1.17 09/12/2016       ---------------------------------------------------------------------------------------------------------------------  Imaging Results (last 7 days)     Procedure Component Value Units Date/Time    XR Chest 1 View [[id number]]:  Bilateral infiltrates seen on my exam of the CXR.      Updated:  02/28/17 1945      CT Chest Pulmonary Embolism With Contrast [[id number]] Resulted:  03/01/17 0137       Updated:  03/01/17 0137      I have personally reviewed the radiology images and read the final radiology report.  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  -Insulin-dependent diabetes not as type II with hyperglycemia.  -Mild transaminitis likely secondary to her chronic hepatitis C.  -Generalized anxiety disorder.  -Ischemic cardiomyopathy with ejection fraction of approximately 45%.  -Tobacco smoking addiction  -Medical noncompliance  -Malnutrition  -Hiatal hernia with erosive esophagitis  -6 x 8 mm medial lung base, stable for years based on CT scan of the chest    Will order a sputum culture and will start her on rocephin and doxycycline and will order legionella, mycoplasma, and influenza testing.  Will give solumedrol and breathing treatments as well.  Will monitor on the med-surg floor with  telemetry.  Will repeat labs in the morning.  Await pharmacy to do the admission med rec by calling [facility] pharmacy in [place] when it opens.    Savanah Gutierrez MD  03/01/17  5:19 AM       Electronically signed by Savanah Gutierrez MD at 3/1/2017  7:46 AM

## 2017-03-02 NOTE — CONSULTS
Pharmacy was consulted for vancomycin dosing for BL PNA.  Based on pt history and pt parameters will initiate vancomycin at 1g iv q12hrs to target trough levels of 15-20 mg/L.  Pharmacy will continue to follow and obtain trough level once steady state is achieved.  Thank you.

## 2017-03-03 ENCOUNTER — APPOINTMENT (OUTPATIENT)
Dept: GENERAL RADIOLOGY | Facility: HOSPITAL | Age: 52
End: 2017-03-03

## 2017-03-03 ENCOUNTER — APPOINTMENT (OUTPATIENT)
Dept: CARDIOLOGY | Facility: HOSPITAL | Age: 52
End: 2017-03-03
Attending: INTERNAL MEDICINE

## 2017-03-03 LAB
A-A DO2: >300 MMHG (ref 0–300)
A-A DO2: >300 MMHG (ref 0–300)
ALBUMIN SERPL-MCNC: 3 G/DL (ref 3.5–5)
ALBUMIN/GLOB SERPL: 1.2 G/DL (ref 1.5–2.5)
ALP SERPL-CCNC: 52 U/L (ref 35–104)
ALT SERPL W P-5'-P-CCNC: 36 U/L (ref 10–36)
ANION GAP SERPL CALCULATED.3IONS-SCNC: 7.2 MMOL/L (ref 3.6–11.2)
ANISOCYTOSIS BLD QL: NORMAL
ARTERIAL PATENCY WRIST A: ABNORMAL
ARTERIAL PATENCY WRIST A: ABNORMAL
AST SERPL-CCNC: 56 U/L (ref 10–30)
ATMOSPHERIC PRESS: 738 MMHG
ATMOSPHERIC PRESS: 738 MMHG
BACTERIA SPEC AEROBE CULT: ABNORMAL
BASE EXCESS BLDA CALC-SCNC: -4.1 MMOL/L
BASE EXCESS BLDA CALC-SCNC: -7.9 MMOL/L
BASOPHILS # BLD AUTO: 0.01 10*3/MM3 (ref 0–0.3)
BASOPHILS NFR BLD AUTO: 0.2 % (ref 0–2)
BDY SITE: ABNORMAL
BDY SITE: ABNORMAL
BILIRUB SERPL-MCNC: 0.2 MG/DL (ref 0.2–1.8)
BODY TEMPERATURE: 98.6 C
BODY TEMPERATURE: 98.6 C
BUN BLD-MCNC: 27 MG/DL (ref 7–21)
BUN/CREAT SERPL: 38 (ref 7–25)
CALCIUM SPEC-SCNC: 8.1 MG/DL (ref 7.7–10)
CHLORIDE SERPL-SCNC: 109 MMOL/L (ref 99–112)
CO2 SERPL-SCNC: 20.8 MMOL/L (ref 24.3–31.9)
COHGB MFR BLD: 1 % (ref 0–5)
COHGB MFR BLD: 1.3 % (ref 0–5)
CREAT BLD-MCNC: 0.71 MG/DL (ref 0.43–1.29)
CRP SERPL-MCNC: 16.73 MG/DL (ref 0–0.99)
DEPRECATED RDW RBC AUTO: 50 FL (ref 37–54)
EOSINOPHIL # BLD AUTO: 0 10*3/MM3 (ref 0–0.7)
EOSINOPHIL NFR BLD AUTO: 0 % (ref 0–5)
ERYTHROCYTE [DISTWIDTH] IN BLOOD BY AUTOMATED COUNT: 19.5 % (ref 11.5–14.5)
GFR SERPL CREATININE-BSD FRML MDRD: 87 ML/MIN/1.73
GLOBULIN UR ELPH-MCNC: 2.6 GM/DL
GLUCOSE BLD-MCNC: 114 MG/DL (ref 70–110)
GLUCOSE BLDC GLUCOMTR-MCNC: 101 MG/DL (ref 70–130)
GLUCOSE BLDC GLUCOMTR-MCNC: 143 MG/DL (ref 70–130)
GLUCOSE BLDC GLUCOMTR-MCNC: 51 MG/DL (ref 70–130)
GLUCOSE BLDC GLUCOMTR-MCNC: 79 MG/DL (ref 70–130)
GLUCOSE BLDC GLUCOMTR-MCNC: 95 MG/DL (ref 70–130)
HCO3 BLDA-SCNC: 20.9 MMOL/L (ref 22–26)
HCO3 BLDA-SCNC: 21.5 MMOL/L (ref 22–26)
HCT VFR BLD AUTO: 31.5 % (ref 37–47)
HCT VFR BLD CALC: 31 % (ref 37–47)
HCT VFR BLD CALC: 36 % (ref 37–47)
HGB BLD-MCNC: 8.8 G/DL (ref 12–16)
HGB BLDA-MCNC: 10.5 G/DL (ref 12–16)
HGB BLDA-MCNC: 12.1 G/DL (ref 12–16)
HOROWITZ INDEX BLD+IHG-RTO: 100 %
HOROWITZ INDEX BLD+IHG-RTO: 100 %
HYPOCHROMIA BLD QL: NORMAL
IMM GRANULOCYTES # BLD: 0.04 10*3/MM3 (ref 0–0.03)
IMM GRANULOCYTES NFR BLD: 0.9 % (ref 0–0.5)
LYMPHOCYTES # BLD AUTO: 0.33 10*3/MM3 (ref 1–3)
LYMPHOCYTES NFR BLD AUTO: 7.4 % (ref 21–51)
MCH RBC QN AUTO: 20.6 PG (ref 27–33)
MCHC RBC AUTO-ENTMCNC: 27.9 G/DL (ref 33–37)
MCV RBC AUTO: 73.6 FL (ref 80–94)
METHGB BLD QL: 0.4 % (ref 0–3)
METHGB BLD QL: 0.4 % (ref 0–3)
MICROCYTES BLD QL: NORMAL
MODALITY: ABNORMAL
MODALITY: ABNORMAL
MONOCYTES # BLD AUTO: 0.12 10*3/MM3 (ref 0.1–0.9)
MONOCYTES NFR BLD AUTO: 2.7 % (ref 0–10)
NEUTROPHILS # BLD AUTO: 3.96 10*3/MM3 (ref 1.4–6.5)
NEUTROPHILS NFR BLD AUTO: 88.8 % (ref 30–70)
OSMOLALITY SERPL CALC.SUM OF ELEC: 279.8 MOSM/KG (ref 273–305)
OXYHGB MFR BLDV: 91.2 % (ref 85–100)
OXYHGB MFR BLDV: 97 % (ref 85–100)
PCO2 BLDA: 41.7 MM HG (ref 35–45)
PCO2 BLDA: 57.7 MM HG (ref 35–45)
PEEP RESPIRATORY: 18 CM[H2O]
PEEP RESPIRATORY: 20 CM[H2O]
PH BLDA: 7.18 PH UNITS (ref 7.35–7.45)
PH BLDA: 7.33 PH UNITS (ref 7.35–7.45)
PLAT MORPH BLD: NORMAL
PLATELET # BLD AUTO: 168 10*3/MM3 (ref 130–400)
PMV BLD AUTO: 9.7 FL (ref 6–10)
PO2 BLDA: 115.2 MM HG (ref 80–100)
PO2 BLDA: 81.5 MM HG (ref 80–100)
POTASSIUM BLD-SCNC: 4.5 MMOL/L (ref 3.5–5.3)
PROT SERPL-MCNC: 5.6 G/DL (ref 6–8)
RBC # BLD AUTO: 4.28 10*6/MM3 (ref 4.2–5.4)
SAO2 % BLDCOA: 92.8 % (ref 90–100)
SAO2 % BLDCOA: 98.4 % (ref 90–100)
SET MECH RESP RATE: 25
SET MECH RESP RATE: 28
SODIUM BLD-SCNC: 137 MMOL/L (ref 135–153)
VANCOMYCIN TROUGH SERPL-MCNC: 23.6 MCG/ML (ref 5–15)
VENTILATOR MODE: AC
VENTILATOR MODE: AC
VT ON VENT VENT: 400 ML
VT ON VENT VENT: 400 ML
WBC NRBC COR # BLD: 4.46 10*3/MM3 (ref 4.5–12.5)

## 2017-03-03 PROCEDURE — 25010000002 HEPARIN (PORCINE) PER 1000 UNITS: Performed by: INTERNAL MEDICINE

## 2017-03-03 PROCEDURE — 94799 UNLISTED PULMONARY SVC/PX: CPT

## 2017-03-03 PROCEDURE — 31500 INSERT EMERGENCY AIRWAY: CPT | Performed by: INTERNAL MEDICINE

## 2017-03-03 PROCEDURE — 85025 COMPLETE CBC W/AUTO DIFF WBC: CPT | Performed by: INTERNAL MEDICINE

## 2017-03-03 PROCEDURE — 71010 XR CHEST 1 VW: CPT | Performed by: RADIOLOGY

## 2017-03-03 PROCEDURE — 02HV33Z INSERTION OF INFUSION DEVICE INTO SUPERIOR VENA CAVA, PERCUTANEOUS APPROACH: ICD-10-PCS | Performed by: INTERNAL MEDICINE

## 2017-03-03 PROCEDURE — 80053 COMPREHEN METABOLIC PANEL: CPT | Performed by: INTERNAL MEDICINE

## 2017-03-03 PROCEDURE — 25010000002 FUROSEMIDE PER 20 MG: Performed by: INTERNAL MEDICINE

## 2017-03-03 PROCEDURE — 0BH17EZ INSERTION OF ENDOTRACHEAL AIRWAY INTO TRACHEA, VIA NATURAL OR ARTIFICIAL OPENING: ICD-10-PCS | Performed by: INTERNAL MEDICINE

## 2017-03-03 PROCEDURE — 82375 ASSAY CARBOXYHB QUANT: CPT | Performed by: INTERNAL MEDICINE

## 2017-03-03 PROCEDURE — 85007 BL SMEAR W/DIFF WBC COUNT: CPT | Performed by: INTERNAL MEDICINE

## 2017-03-03 PROCEDURE — 25010000002 PROPOFOL 10 MG/ML EMULSION

## 2017-03-03 PROCEDURE — 36556 INSERT NON-TUNNEL CV CATH: CPT | Performed by: INTERNAL MEDICINE

## 2017-03-03 PROCEDURE — 86140 C-REACTIVE PROTEIN: CPT | Performed by: INTERNAL MEDICINE

## 2017-03-03 PROCEDURE — 25010000002 PROPOFOL 1000 MG/ML EMULSION: Performed by: INTERNAL MEDICINE

## 2017-03-03 PROCEDURE — 25010000002 MIDAZOLAM PER 1 MG: Performed by: INTERNAL MEDICINE

## 2017-03-03 PROCEDURE — 99292 CRITICAL CARE ADDL 30 MIN: CPT | Performed by: INTERNAL MEDICINE

## 2017-03-03 PROCEDURE — 82962 GLUCOSE BLOOD TEST: CPT

## 2017-03-03 PROCEDURE — 82805 BLOOD GASES W/O2 SATURATION: CPT | Performed by: INTERNAL MEDICINE

## 2017-03-03 PROCEDURE — 83050 HGB METHEMOGLOBIN QUAN: CPT | Performed by: INTERNAL MEDICINE

## 2017-03-03 PROCEDURE — P9046 ALBUMIN (HUMAN), 25%, 20 ML: HCPCS | Performed by: INTERNAL MEDICINE

## 2017-03-03 PROCEDURE — 76937 US GUIDE VASCULAR ACCESS: CPT | Performed by: INTERNAL MEDICINE

## 2017-03-03 PROCEDURE — 71010 HC CHEST PA OR AP: CPT

## 2017-03-03 PROCEDURE — 87040 BLOOD CULTURE FOR BACTERIA: CPT | Performed by: INTERNAL MEDICINE

## 2017-03-03 PROCEDURE — 99233 SBSQ HOSP IP/OBS HIGH 50: CPT | Performed by: INTERNAL MEDICINE

## 2017-03-03 PROCEDURE — 80202 ASSAY OF VANCOMYCIN: CPT | Performed by: INTERNAL MEDICINE

## 2017-03-03 PROCEDURE — 36600 WITHDRAWAL OF ARTERIAL BLOOD: CPT | Performed by: INTERNAL MEDICINE

## 2017-03-03 PROCEDURE — 25010000002 ALBUMIN HUMAN 25% PER 50 ML: Performed by: INTERNAL MEDICINE

## 2017-03-03 PROCEDURE — B548ZZA ULTRASONOGRAPHY OF SUPERIOR VENA CAVA, GUIDANCE: ICD-10-PCS | Performed by: INTERNAL MEDICINE

## 2017-03-03 PROCEDURE — 93306 TTE W/DOPPLER COMPLETE: CPT

## 2017-03-03 PROCEDURE — 5A1955Z RESPIRATORY VENTILATION, GREATER THAN 96 CONSECUTIVE HOURS: ICD-10-PCS | Performed by: INTERNAL MEDICINE

## 2017-03-03 PROCEDURE — 94002 VENT MGMT INPAT INIT DAY: CPT

## 2017-03-03 PROCEDURE — 25010000002 AZITHROMYCIN: Performed by: INTERNAL MEDICINE

## 2017-03-03 PROCEDURE — 99291 CRITICAL CARE FIRST HOUR: CPT | Performed by: INTERNAL MEDICINE

## 2017-03-03 PROCEDURE — 25010000002 MIDAZOLAM PER 1 MG

## 2017-03-03 PROCEDURE — 25010000002 METHYLPREDNISOLONE PER 40 MG: Performed by: INTERNAL MEDICINE

## 2017-03-03 PROCEDURE — 25010000002 VANCOMYCIN PER 500 MG

## 2017-03-03 RX ORDER — PROPOFOL 10 MG/ML
VIAL (ML) INTRAVENOUS
Status: COMPLETED
Start: 2017-03-03 | End: 2017-03-03

## 2017-03-03 RX ORDER — POTASSIUM CHLORIDE 1.5 G/1.77G
40 POWDER, FOR SOLUTION ORAL ONCE
Status: COMPLETED | OUTPATIENT
Start: 2017-03-03 | End: 2017-03-03

## 2017-03-03 RX ORDER — FUROSEMIDE 10 MG/ML
60 INJECTION INTRAMUSCULAR; INTRAVENOUS ONCE
Status: COMPLETED | OUTPATIENT
Start: 2017-03-03 | End: 2017-03-03

## 2017-03-03 RX ORDER — SODIUM CHLORIDE 9 MG/ML
INJECTION, SOLUTION INTRAVENOUS
Status: DISPENSED
Start: 2017-03-03 | End: 2017-03-03

## 2017-03-03 RX ORDER — MIDAZOLAM HYDROCHLORIDE 1 MG/ML
INJECTION INTRAMUSCULAR; INTRAVENOUS
Status: COMPLETED
Start: 2017-03-03 | End: 2017-03-03

## 2017-03-03 RX ORDER — ALBUMIN (HUMAN) 12.5 G/50ML
25 SOLUTION INTRAVENOUS
Status: COMPLETED | OUTPATIENT
Start: 2017-03-03 | End: 2017-03-03

## 2017-03-03 RX ORDER — FUROSEMIDE 10 MG/ML
40 INJECTION INTRAMUSCULAR; INTRAVENOUS ONCE
Status: COMPLETED | OUTPATIENT
Start: 2017-03-03 | End: 2017-03-03

## 2017-03-03 RX ORDER — MIDAZOLAM HYDROCHLORIDE 1 MG/ML
2 INJECTION INTRAMUSCULAR; INTRAVENOUS ONCE
Status: COMPLETED | OUTPATIENT
Start: 2017-03-03 | End: 2017-03-03

## 2017-03-03 RX ORDER — FUROSEMIDE 10 MG/ML
INJECTION INTRAMUSCULAR; INTRAVENOUS
Status: DISPENSED
Start: 2017-03-03 | End: 2017-03-03

## 2017-03-03 RX ADMIN — ACETAMINOPHEN 650 MG: 325 TABLET ORAL at 11:02

## 2017-03-03 RX ADMIN — VANCOMYCIN HYDROCHLORIDE 1000 MG: 5 INJECTION, POWDER, LYOPHILIZED, FOR SOLUTION INTRAVENOUS at 00:56

## 2017-03-03 RX ADMIN — MIDAZOLAM HYDROCHLORIDE 2 MG: 1 INJECTION, SOLUTION INTRAMUSCULAR; INTRAVENOUS at 07:41

## 2017-03-03 RX ADMIN — GABAPENTIN 800 MG: 400 CAPSULE ORAL at 00:57

## 2017-03-03 RX ADMIN — IPRATROPIUM BROMIDE AND ALBUTEROL SULFATE 3 ML: .5; 3 SOLUTION RESPIRATORY (INHALATION) at 00:19

## 2017-03-03 RX ADMIN — CLOPIDOGREL 75 MG: 75 TABLET, FILM COATED ORAL at 09:34

## 2017-03-03 RX ADMIN — GUAIFENESIN 600 MG: 600 TABLET, EXTENDED RELEASE ORAL at 09:34

## 2017-03-03 RX ADMIN — IPRATROPIUM BROMIDE AND ALBUTEROL SULFATE 3 ML: .5; 3 SOLUTION RESPIRATORY (INHALATION) at 06:12

## 2017-03-03 RX ADMIN — PROPOFOL 40 MCG/KG/MIN: 10 INJECTION, EMULSION INTRAVENOUS at 06:02

## 2017-03-03 RX ADMIN — NOREPINEPHRINE BITARTRATE 0.1 MCG/KG/MIN: 1 INJECTION INTRAVENOUS at 20:25

## 2017-03-03 RX ADMIN — METRONIDAZOLE 500 MG: 500 INJECTION, SOLUTION INTRAVENOUS at 18:29

## 2017-03-03 RX ADMIN — IPRATROPIUM BROMIDE 0.5 MG: 0.5 SOLUTION RESPIRATORY (INHALATION) at 18:33

## 2017-03-03 RX ADMIN — METHYLPREDNISOLONE SODIUM SUCCINATE 40 MG: 40 INJECTION, POWDER, FOR SOLUTION INTRAMUSCULAR; INTRAVENOUS at 09:36

## 2017-03-03 RX ADMIN — AZITHROMYCIN 500 MG: 500 INJECTION, POWDER, LYOPHILIZED, FOR SOLUTION INTRAVENOUS at 10:41

## 2017-03-03 RX ADMIN — FUROSEMIDE 40 MG: 10 INJECTION, SOLUTION INTRAMUSCULAR; INTRAVENOUS at 14:59

## 2017-03-03 RX ADMIN — Medication: at 20:24

## 2017-03-03 RX ADMIN — MIDAZOLAM HYDROCHLORIDE 2 MG: 1 INJECTION, SOLUTION INTRAMUSCULAR; INTRAVENOUS at 06:40

## 2017-03-03 RX ADMIN — SUCRALFATE 1 G: 1 TABLET ORAL at 21:06

## 2017-03-03 RX ADMIN — MIDAZOLAM HYDROCHLORIDE 2 MG: 1 INJECTION, SOLUTION INTRAMUSCULAR; INTRAVENOUS at 07:25

## 2017-03-03 RX ADMIN — HEPARIN SODIUM 5000 UNITS: 5000 INJECTION, SOLUTION INTRAVENOUS; SUBCUTANEOUS at 21:07

## 2017-03-03 RX ADMIN — METHYLPREDNISOLONE SODIUM SUCCINATE 40 MG: 40 INJECTION, POWDER, FOR SOLUTION INTRAMUSCULAR; INTRAVENOUS at 21:06

## 2017-03-03 RX ADMIN — GUAIFENESIN 600 MG: 600 TABLET, EXTENDED RELEASE ORAL at 18:29

## 2017-03-03 RX ADMIN — FUROSEMIDE 60 MG: 10 INJECTION, SOLUTION INTRAMUSCULAR; INTRAVENOUS at 09:45

## 2017-03-03 RX ADMIN — AZTREONAM 2 G: 2 INJECTION, POWDER, FOR SOLUTION INTRAMUSCULAR; INTRAVENOUS at 10:41

## 2017-03-03 RX ADMIN — DEXTROSE MONOHYDRATE 25 G: 25 INJECTION, SOLUTION INTRAVENOUS at 16:36

## 2017-03-03 RX ADMIN — PANTOPRAZOLE SODIUM 40 MG: 40 INJECTION, POWDER, FOR SOLUTION INTRAVENOUS at 06:31

## 2017-03-03 RX ADMIN — ALBUMIN HUMAN 25 G: 0.25 SOLUTION INTRAVENOUS at 10:33

## 2017-03-03 RX ADMIN — Medication: at 06:04

## 2017-03-03 RX ADMIN — IPRATROPIUM BROMIDE 0.5 MG: 0.5 SOLUTION RESPIRATORY (INHALATION) at 12:06

## 2017-03-03 RX ADMIN — AZTREONAM 2 G: 2 INJECTION, POWDER, FOR SOLUTION INTRAMUSCULAR; INTRAVENOUS at 18:29

## 2017-03-03 RX ADMIN — PROPOFOL 50 MCG/KG/MIN: 10 INJECTION, EMULSION INTRAVENOUS at 18:38

## 2017-03-03 RX ADMIN — NOREPINEPHRINE BITARTRATE 0.2 MCG/KG/MIN: 1 INJECTION INTRAVENOUS at 09:37

## 2017-03-03 RX ADMIN — VANCOMYCIN HYDROCHLORIDE 1000 MG: 5 INJECTION, POWDER, LYOPHILIZED, FOR SOLUTION INTRAVENOUS at 11:41

## 2017-03-03 RX ADMIN — METRONIDAZOLE 500 MG: 500 INJECTION, SOLUTION INTRAVENOUS at 10:19

## 2017-03-03 RX ADMIN — POTASSIUM CHLORIDE 40 MEQ: 1.5 POWDER, FOR SOLUTION ORAL at 15:00

## 2017-03-03 RX ADMIN — PROPOFOL 25 MCG/KG/MIN: 10 INJECTION, EMULSION INTRAVENOUS at 09:37

## 2017-03-03 RX ADMIN — POTASSIUM CHLORIDE 40 MEQ: 1.5 POWDER, FOR SOLUTION ORAL at 09:34

## 2017-03-03 RX ADMIN — SUCRALFATE 1 G: 1 TABLET ORAL at 16:40

## 2017-03-03 RX ADMIN — ALBUMIN HUMAN 25 G: 0.25 SOLUTION INTRAVENOUS at 09:33

## 2017-03-03 RX ADMIN — Medication: at 11:07

## 2017-03-03 RX ADMIN — ASPIRIN 81 MG: 81 TABLET ORAL at 09:34

## 2017-03-03 RX ADMIN — HEPARIN SODIUM 5000 UNITS: 5000 INJECTION, SOLUTION INTRAVENOUS; SUBCUTANEOUS at 09:34

## 2017-03-03 NOTE — PROGRESS NOTES
"Called \"emergency contact\" on pt's phone.  Radhika is listed as pt's emergency contact.  Radhika is her friend and roommate. Radhika has been made aware that she cannot make medical decisions for her friend.  Pt's son, Scot, is in long term in [place].  To get a hold of him, call [phone].  Ask for the .  (All medical emergencies go through the .)    "

## 2017-03-03 NOTE — PLAN OF CARE
Problem: Patient Care Overview (Adult)  Goal: Plan of Care Review  Outcome: Ongoing (interventions implemented as appropriate)    03/01/17 0841 03/02/17 1518   Coping/Psychosocial Response Interventions   Plan Of Care Reviewed With --  patient   Patient Care Overview   Progress no change --        Goal: Adult Individualization and Mutuality  Outcome: Ongoing (interventions implemented as appropriate)  Goal: Discharge Needs Assessment  Outcome: Ongoing (interventions implemented as appropriate)    03/01/17 0031 03/01/17 1043 03/01/17 1422   Discharge Needs Assessment   Concerns To Be Addressed --  other (see comments)  (Pt requesting rolling walker at [facility]. Denies any other needs or concerns) --    Readmission Within The Last 30 Days --  no previous admission in last 30 days --    Community Agency Name(S) --  Does not utilize home health services --    Equipment Needed After Discharge --  walker, rolling --    Discharge Disposition --  home or self-care --    Discharge Planning Comments --  Pt lives with her friend, Radhika, and plans to return back home with Radhika at [place]. --    Current Health   Anticipated Changes Related to Illness --  none --    Living Environment   Transportation Available none --  --    Self-Care   Equipment Currently Used at Home --  --  oxygen         Problem: Pneumonia (Adult)  Goal: Signs and Symptoms of Listed Potential Problems Will be Absent or Manageable (Pneumonia)  Outcome: Ongoing (interventions implemented as appropriate)    03/01/17 0841   Pneumonia   Problems Assessed (Pneumonia) all   Problems Present (Pneumonia) none         03/01/17 0841   Pneumonia   Problems Assessed (Pneumonia) all   Problems Present (Pneumonia) none         Problem: Diabetes, Type 2 (Adult)  Goal: Signs and Symptoms of Listed Potential Problems Will be Absent or Manageable (Diabetes, Type 2)  Outcome: Ongoing (interventions implemented as appropriate)    03/02/17 1109   Diabetes, Type 2   Problems Assessed  (Type 2 Diabetes) all   Problems Present (Type 2 Diabetes) none

## 2017-03-03 NOTE — NURSING NOTE
Dr Bradley at bedside, pt on vent no IV access and no sedation at this time, 0730 Vecuronium 10ml, Propofol 15ml given per Dr Bradley, prior to placement of right femoral line emergency at this time.  Plan to remove when central line is placed later today.

## 2017-03-03 NOTE — PLAN OF CARE
Problem: Patient Care Overview (Adult)  Goal: Plan of Care Review  Outcome: Ongoing (interventions implemented as appropriate)  Goal: Adult Individualization and Mutuality  Outcome: Ongoing (interventions implemented as appropriate)  Goal: Discharge Needs Assessment  Outcome: Ongoing (interventions implemented as appropriate)    Problem: Pneumonia (Adult)  Goal: Signs and Symptoms of Listed Potential Problems Will be Absent or Manageable (Pneumonia)  Outcome: Ongoing (interventions implemented as appropriate)    Problem: Diabetes, Type 2 (Adult)  Goal: Signs and Symptoms of Listed Potential Problems Will be Absent or Manageable (Diabetes, Type 2)  Outcome: Ongoing (interventions implemented as appropriate)

## 2017-03-03 NOTE — PROGRESS NOTES
Central Venous Catheter Insertion Procedure Note    Central venous cathter insertion using ultrasound     Indications:  Poor iv access, critically ill patient    Procedure Details   Informed consent was not obtained for the procedure it was done emergent as she didn't have any iv access, I was aware of risks including sedation.  Risks of lung perforation, hemorrhage, arrhythmia, and adverse drug reaction were discussed.     Maximum sterile technique was used including antiseptics, cap, gloves, gown, hand hygiene, mask and sheet.    Under sterile conditions the skin above the right femoral location, ultrasound was used through out  the procedure , the right femoral area was prepped with chlorhexidine and covered with a sterile drape. Local anesthesia was applied to the skin and subcutaneous tissues. With the help of USG the vein was identified and needle was guided in that - blood was aspirated. A guide wire was then passed easily through the catheter. There were no arrhythmias. Then central venous cathter was introduced over guide wire and the guide wire was taken out through the brown port. The line was sutured in place.  Findings:  There were no changes to vital signs. Catheter was flushed withNS. Patient did tolerate procedure well.    Recommendations:  CXR ordered to verify placement.

## 2017-03-03 NOTE — SIGNIFICANT NOTE
03/03/17 1606   PT Discharge Summary   Anticipated Discharge Disposition other (see comments)  (Pt was transferred to CCU on 3/2/16 & is now intubated and unable to participate w/ therapy. Please re-consult PT when appropriate for pt to participate w/ mobility training.)   Reason for Discharge Change in medical status   Outcomes Achieved Unable to tolerate or actively participate in therapy   Discharge Destination other (comment)  (Discharged from PT services. Ms. Yan continues to be a patient in CCU. )

## 2017-03-03 NOTE — PROGRESS NOTES
LOS: 3 days     Chief Complaint:  Pulmonology is following for bilateral pneumonia    Subjective     Interval History: Came in this morning around 5 am and the patient was in severe respiratory distress.  The patient has desaturations on and off all night with oxygen saturations as low as 60.  The patient was emergently intubated.  10 minutes later while on sedation the patient became awake and started pulling out tubes and lines.  IV access was pulled out and the patient almost self extubated.  An emergent femoral central line was done and the patient was given propofol and vecuronium IVP for sedation.    Emergently femoral and right IJ lines were done. Please go through procedure notes for details      History taken from: chart family Patient unable to give history due to emergent nature of case.    Review of Systems:   Review of Systems   Unable to perform ROS: Severe respiratory distress   and emergent nature of case.                  Objective     Vital Signs  Temp:  [98.3 °F (36.8 °C)-102 °F (38.9 °C)] 98.7 °F (37.1 °C)  Heart Rate:  [] 143  Resp:  [20-42] 28  BP: ()/(58-94) 169/94  FiO2 (%):  [100 %] 100 %  Body mass index is 22.39 kg/(m^2).    Intake/Output Summary (Last 24 hours) at 03/03/17 0926  Last data filed at 03/03/17 0604   Gross per 24 hour   Intake    350 ml   Output   1575 ml   Net  -1225 ml          Physical Exam:  GENERAL APPEARANCE: Patient was restless and in severe respiratory distress.    HEAD: normocephalic.    EYES: PERRL    NECK: Neck supple.     CARDIAC: Normal S1 and S2. No S3, S4 or murmurs. Rhythm is regular. There is no peripheral edema, cyanosis or pallor. Extremities are warm and well perfused. Capillary refill is less than 2 seconds. No carotid bruits.    LUNGS: Clear to auscultation and percussion without rales, rhonchi, wheezing or diminished breath sounds.    ABDOMEN: Positive bowel sounds. Soft, nondistended, nontender.     EXTREMITIES: No significant deformity  or joint abnormality. No edema. Peripheral pulses intact.     NEUROLOGICAL: unable to assess due to mental status and emergent intubation.    PSYCHIATRIC:  Unable to assess due to emergent nature of the case.                Results Review:                I reviewed the patient's new clinical results.  I reviewed the patient's new imaging results and agree with the interpretation.    Results from last 7 days  Lab Units 03/03/17  0040 03/02/17  1023 03/02/17  0120 02/28/17 1939   WBC 10*3/mm3 4.46*  --  5.90 8.29   HEMOGLOBIN g/dL 8.8* 9.5* 8.3* 9.9*   PLATELETS 10*3/mm3 168  --  168 222       Results from last 7 days  Lab Units 03/03/17  0039 03/02/17 0120 02/28/17 1939   SODIUM mmol/L 137 134* 139   POTASSIUM mmol/L 4.5 4.2 3.8   CHLORIDE mmol/L 109 109 109   TOTAL CO2 mmol/L 20.8* 17.9* 24.3   BUN mg/dL 27* 20 18   CREATININE mg/dL 0.71 0.65 0.57   CALCIUM mg/dL 8.1 8.4 8.8   GLUCOSE mg/dL 114* 330* 117*   MAGNESIUM mg/dL  --  2.1  --      Lab Results   Component Value Date    INR 1.01 12/17/2016    INR 0.94 11/20/2016    INR <0.90 02/06/2016    PROTIME 11.4 12/17/2016    PROTIME 10.6 11/20/2016    PROTIME 10.0 02/06/2016       Results from last 7 days  Lab Units 03/03/17  0039 03/02/17  0120 02/28/17 1939   ALK PHOS U/L 52 51 59   BILIRUBIN mg/dL 0.2 0.1* 0.2   ALT (SGPT) U/L 36 31 41*   AST (SGOT) U/L 56* 28 29       Results from last 7 days  Lab Units 03/03/17  0739   PH, ARTERIAL pH units 7.177*   PO2 ART mm Hg 81.5   PCO2, ARTERIAL mm Hg 57.7*   HCO3 ART mmol/L 20.9*     Imaging Results (last 24 hours)     Procedure Component Value Units Date/Time    XR Chest AP [[id number]] Collected:  03/02/17 0936     Updated:  03/02/17 0940    Narrative:       EXAMINATION: XR CHEST AP-      CLINICAL INDICATION:     SOB; J18.9-Pneumonia, unspecified organism     TECHNIQUE:  XR CHEST AP-      COMPARISON: 02/28/2017      FINDINGS:   Progression of bilateral airspace disease right greater than left lung.  Opacities are more  pronounced in the right upper lobe and at the  periphery of both lungs. Favor bacterial pneumonia, but peripheral  predominant opacities can also be seen with other atypical etiologies  including eosinophilic pneumonia.   Heart and mediastinal contours are unremarkable.   No pneumothorax.   No pleural effusion.   No acute osseous findings.            Impression:       1. Marked interval worsening of right lung pneumonia and now developing  left lung airspace disease probably representing diffuse pneumonia.  Considerations as described above.  2. Otherwise stable chest.     This report was finalized on 3/2/2017 9:38 AM by Dr. Wali Tracy MD.       XR Chest 1 View [[id number]] Resulted:  03/03/17 0924     Updated:  03/03/17 0707             Medication Review:   Scheduled Medications:    albumin human 25 g Intravenous Q1H   aspirin 81 mg Oral Daily   azithromycin 500 mg Intravenous Q24H   aztreonam 2 g Intravenous Q8H   clopidogrel 75 mg Oral Daily   furosemide 40 mg Intravenous Once   furosemide 60 mg Intravenous Once   guaiFENesin 600 mg Oral BID   heparin (porcine) 5,000 Units Subcutaneous Q12H   insulin aspart 0-14 Units Subcutaneous 4x Daily AC & at Bedtime   insulin detemir 45 Units Subcutaneous Nightly   ipratropium-albuterol 3 mL Nebulization Q6H - RT   methylPREDNISolone sodium succinate 40 mg Intravenous Q12H   metoprolol tartrate 25 mg Oral Q12H   metroNIDAZOLE 500 mg Intravenous Q8H   pantoprazole 40 mg Intravenous Q AM   potassium chloride 40 mEq Oral Once   potassium chloride 40 mEq Oral Once   potassium chloride 40 mEq Oral Once   sodium chloride      sucralfate 1 g Oral 4x Daily AC & at Bedtime   vancomycin 1,000 mg Intravenous Q12H     Continuous infusions:    fentaNYL (SUBLIMAZE) PCA 1500 mcg/30 mL syringe     norepinephrine 0.02-0.3 mcg/kg/min    Pharmacy to dose vancomycin     propofol 5-50 mcg/kg/min Last Rate: 40 mcg/kg/min (03/03/17 0602)       Assessment/Plan     CXR:      Neuro:  Patient was  started on propofol and fentanyl IV per protocol for sedation.  Was also given vecuronium for paralysis- as patient was destaurating and had lots of patient ventilator dysnchrony.      Respiratory:  Patient was emergently intubated for respiratory distress around 5.30 AM in morning - please go through the procedure note for detail.  Overnight patient had several desaturations into the 60s.  After intubation ABG was obtained.    Vent settings:   RR 25 FIO2 100% PEEP 20  Results for YESSICA TERRY (MRN [id number]) as of 3/3/2017 08:51   Ref. Range 3/3/2017 07:39   pH, Arterial Latest Ref Range: 7.350 - 7.450 pH units 7.177 (C)   pCO2, Arterial Latest Ref Range: 35.0 - 45.0 mm Hg 57.7 (C)   pO2, Arterial Latest Ref Range: 80.0 - 100.0 mm Hg 81.5   HCO3, Arterial Latest Ref Range: 22.0 - 26.0 mmol/L 20.9 (L)   Base Excess, Arterial Latest Units: mmol/L -7.9   O2 Saturation, Arterial Latest Ref Range: 90.0 - 100.0 % 92.8   Hemoglobin, Blood Gas Latest Ref Range: 12 - 16 g/dL 12.1   Hematocrit, Blood Gas Latest Ref Range: 37.0 - 47.0 % 36.0 (L)   Barometric Pressure for Blood Gas Latest Units: mmHg 738   A-a Gradiant Latest Ref Range: 0.0 - 300.0 mmHg >300.0 (H)   Carboxyhemoglobin Latest Ref Range: 0 - 5 % 1.3   Methemoglobin Latest Ref Range: 0 - 3 % 0.4   Oxyhemoglobin Latest Ref Range: 85 - 100 % 91.2   Site Unknown Arterial: right b...   Micheal's Test Unknown N/A   Modality Unknown Ventilator   FIO2 Latest Units: % 100   Ventilator Mode Unknown AC   Set Tidal Volume Unknown 400   Set Mech Resp Rate Unknown 25   PEEP Unknown 20     Will repeat ABG at 10 am.  CXR was done.  Will pull ET tube out 1.5 cm.      Pink frothy sputum has been suctioned from ET tube.     Will continue IV steroids, scheduled inhalants and PRN neb treatments.    ID:  Emergent femoral central line was placed.  See procedure note.  Will continue current IV antibiotics.    Cardiac:  Hemodynamically unstable.  Blood pressure dropped.   Turned sedation off and placed in trendelenburg position.  The patient may need to be started on vasopressors for BP.  Will continue to monitor.    Renal: BUN/Creatinine WNL.  Strict I&Os. Ordered albumin 50 gm IV, lasix 60 mg IV and potassium 40 mEq PO for now and lasix 40 mg IV and potassium 40 mEq PO for around 1 pm.    Endocrine: monitor glucose targeting 140-180.    Electrolytes:  Monitor levels, manage and replete per protocols.  Monitor for refeeding syndrome- patients nutritional status is poor.    GI:  Nutrition consult placed for tube feedings.    Hematology:   Monitor blood counts, transfuse at or below 7 g/dL.  Unless patient is actively bleeding then began transfusing at 8 g/dL.     Case was discussed with dr veliz, patients RT- overnight and day and patients overnight ccu nurse and day time nurse    Checked on patient several times during the day. Patient got hypotensive later in the day. Put her in trendelenberg position nd started vasopressors. Stopped patients sedation- called patients nurse and notified her.    Patients overall prognosis is extremley poor- she is in severe ARDS. If patient comes down to a peep of 10 consider transffering to tertiary care center. This was discussed with dr veliz   Tried to contact family many a times during the day- with no luck.      Patient Active Problem List   Diagnosis Code   • Chest pain R07.9   • Chest pain of uncertain etiology R07.89   • Intractable nausea and vomiting R11.2   • Intractable cyclical vomiting with nausea G43.A1   • Chronic obstructive pulmonary emphysema J43.9   • Hepatitis-C B19.20   • Diabetes mellitus type 2 in nonobese E11.9   • Arthritis or polyarthritis, rheumatoid M06.9   • Anxiety F41.9   • Essential hypertension I10   • Coronary artery disease I25.10   • Buerger's disease I73.1   • Cirrhosis K74.60   • Diabetes E11.9   • Chronic nausea R11.0   • Pancreatitis K85.90   • Bilateral pneumonia J18.9         Bedside rounds were completed  with RN and RRT, discussed case and plan in great detail.         ROSA Seymour  03/03/17  9:26 AM        Attestation: Scribed for Dr. Bradley, by ROSA Hernandez      I, Gerard Bradley M.D. attest that the above note accurately reflects the work and decisions made  by me.  Patient was seen and evaluated by Dr. Bradley, including history of present illness, physical exam, assessment, and treatment plan.  The above note was reviewed and edited by Dr. Bradley. The patient was  discussed with Miss Arellano  and I agree with her history physical assessment and plan. Critical care time 110 minutes

## 2017-03-03 NOTE — PROGRESS NOTES
Central Venous Catheter Insertion Procedure Note    Central venous cathter insertion using ultrasound     Indications:  Poor iv access, critically ill patient    Procedure Details   Informed consent was not obtained for the procedure as none of the family members were treated with I was aware of risks including- sedation.  Risks of lung perforation, hemorrhage, arrhythmia, and adverse drug reaction were discussed.     Maximum sterile technique was used including antiseptics, cap, gloves, gown, hand hygiene, mask and sheet.    Under sterile conditions the skin above the right IJ location, ultrasound was used through out  the procedure , the right  IJ area was prepped with chlor hexidine and covered with a sterile drape. Local anesthesia was applied to the skin and subcutaneous tissues. With the help of USG the vein was identified and needle was guided in that - blood was aspirated. A guide wire was then passed easily through the catheter. There were no arrhythmias. Then central venous cathter was introduced over guide wire and the guide wire was taken out through the brown port. The line was sutured in place.  Findings:  There were no changes to vital signs. Catheter was flushed withNS. Patient did tolerate procedure well.    Recommendations:  CXR ordered to verify placement.

## 2017-03-03 NOTE — PROGRESS NOTES
Continued Stay Note  JOYCE Sanchez     Patient Name: Pepito Yan  MRN: [id number]  Today's Date: 3/3/2017    Admit Date: 2/28/2017          Discharge Plan       03/03/17 0632    Case Management/Social Work Plan    Plan She lives at home with her friend Radhika and plans to return home at d/c. She has oxygen via unknown provider and  would like a rolling walker at d/c.  SS will arrange with MD order.     Patient/Family In Agreement With Plan yes    Additional Comments Pt was transferred to CCU with dyspnea, tachycardia, tachypnea, and fever.  Dr Bradley consulted and she was intubated and on the vent this am.  She continues on IV antibx, S/M and nebs. CM/SS will follow.               Discharge Codes     None            Kristi Buckley RN

## 2017-03-03 NOTE — PROGRESS NOTES
[facility] CCU Note    Patient Identification:  Name:  Pepito Yan  Age:  51 y.o.  Sex:  female  :  1965  MRN:  [id number]  Visit number:  [id number]  Primary Care Provider:  ROSA Meza    3    Ventilator Day:  Ventilator Settings:  Ventilator/Non-Invasive Ventilation Settings     Start     Ordered    17 1753  . BIPAP; IPAP: 10; EPAP: 16; Titrate for spO2: equal to or greater than, 90%  At Bedtime & As Needed-RT     Question Answer Comment   . BIPAP    IPAP 10    EPAP 16    Titrate for spO2 equal to or greater than    Titrate for spO2 90%        17 1755    17 175  . BIPAP  At Bedtime & As Needed-RT,   Status:  Canceled     Question:  .  Answer:  BIPAP    17 17517 112  NIPPV CPAP OR BIPAP  Until Discontinued,   Status:  Canceled     Comments:  16/6 35 % oxygen rate 20    17 1127          Drips: Propofol, Fentanyl  Antibiotics: Vancomycin, Azactam and   Lines: Right femoral line  King Catheter:Yes    Procedures:  Emergent Intubation 3/3/17  Right femoral line 3/3/17    HPI:  The patient is a 51 year old female admitted with shortness of air    Subjective     The patient decompensated this morning around 0500. She became more tachypnic and began to exhibit altered mental status. She was emergently intubated by Dr. Bradley.     History taken from: chart RN   Patient unable to give history due to: Intubated  Present during visit: GUERRERO Mcmahon; GUERRERO Escalante    Objective     Vital Signs  Temp:  [98.3 °F (36.8 °C)-102 °F (38.9 °C)] 98.7 °F (37.1 °C)  Heart Rate:  [] 138  Resp:  [20-42] 25  BP: ()/(58-94) 169/94  FiO2 (%):  [100 %] 100 %  Body mass index is 22.39 kg/(m^2).    Intake/Output Summary (Last 24 hours) at 17 0802  Last data filed at 17 0604   Gross per 24 hour   Intake    350 ml   Output   2250 ml   Net  -1900 ml       Physical Exam:    Physical Exam   Constitutional: She appears well-developed and  well-nourished. She appears ill. No distress. She is sedated and intubated.   HENT:   Head: Normocephalic and atraumatic.   Nose: Nose normal.   Mouth/Throat: Oropharynx is clear and moist and mucous membranes are normal.   ETT and OG tube in place   Eyes: Conjunctivae and EOM are normal. Pupils are equal, round, and reactive to light. No scleral icterus.   Neck: Trachea normal. Neck supple. No JVD present. Carotid bruit is not present. No thyromegaly present.   Cardiovascular: Regular rhythm, normal heart sounds and normal pulses.  Tachycardia present.  Exam reveals no gallop and no friction rub.    No murmur heard.  Pulmonary/Chest: Effort normal. She is intubated. No respiratory distress. She has no wheezes. She has no rales.   Decreased expiratory breath sounds bilaterally   Abdominal: Soft. Bowel sounds are normal. She exhibits no distension. There is no tenderness. There is no guarding.   Genitourinary:   Genitourinary Comments: King catheter in place draining a yellow urine   Musculoskeletal: Normal range of motion.   Neurological:   Sedated on mechanical ventilation   Skin: Skin is warm and dry. No rash noted. No erythema.     Results Review:     I reviewed the patient's new imaging results and agree with the interpretation.  I reviewed the patient's other test results and agree with the interpretation.    Telemetry: Sinus tachycardia, 141      Results from last 7 days  Lab Units 03/03/17  0040 03/02/17  1023 03/02/17  0120 02/28/17  1939   WBC 10*3/mm3 4.46*  --  5.90 8.29   HEMOGLOBIN g/dL 8.8* 9.5* 8.3* 9.9*   PLATELETS 10*3/mm3 168  --  168 222       Results from last 7 days  Lab Units 03/03/17  0039 03/02/17  0120 02/28/17  1939   SODIUM mmol/L 137 134* 139   POTASSIUM mmol/L 4.5 4.2 3.8   CHLORIDE mmol/L 109 109 109   TOTAL CO2 mmol/L 20.8* 17.9* 24.3   BUN mg/dL 27* 20 18   CREATININE mg/dL 0.71 0.65 0.57   CALCIUM mg/dL 8.1 8.4 8.8   GLUCOSE mg/dL 114* 330* 117*       Results from last 7 days  Lab  Units 03/03/17  0039 03/02/17  0120 02/28/17  1939   BILIRUBIN mg/dL 0.2 0.1* 0.2   ALK PHOS U/L 52 51 59   AST (SGOT) U/L 56* 28 29   ALT (SGPT) U/L 36 31 41*       Results from last 7 days  Lab Units 03/02/17  0120   MAGNESIUM mg/dL 2.1           Results from last 7 days  Lab Units 03/03/17  0039 03/02/17  0120 03/01/17  0544   CRP mg/dL 16.73* 12.58* 23.36*     ABG:      Cultures:  BLOOD CULTURE   Date Value Ref Range Status   02/28/2017 No growth at 2 days  Preliminary   02/28/2017 No growth at 2 days  Preliminary     URINE CULTURE   Date Value Ref Range Status   02/28/2017 >100,000 CFU/mL Gram Negative Bacilli (A)  Preliminary        Portable CXR:        Current Hospital Medications:    aspirin 81 mg Oral Daily   aztreonam 2 g Intravenous Q8H   clopidogrel 75 mg Oral Daily   furosemide 60 mg Intravenous Once   gabapentin 800 mg Oral Q8H   guaiFENesin 600 mg Oral BID   heparin (porcine) 5,000 Units Subcutaneous Q12H   insulin aspart 0-14 Units Subcutaneous 4x Daily AC & at Bedtime   insulin detemir 45 Units Subcutaneous Nightly   ipratropium-albuterol 3 mL Nebulization Q6H - RT   levoFLOXacin 750 mg Intravenous Q24H   methylPREDNISolone sodium succinate 40 mg Intravenous Q12H   metoprolol tartrate 25 mg Oral Q12H   pantoprazole 40 mg Intravenous Q AM   potassium chloride 40 mEq Oral Once   potassium chloride 40 mEq Oral Once   sodium chloride      sucralfate 1 g Oral 4x Daily AC & at Bedtime   vancomycin 1,000 mg Intravenous Q12H       fentaNYL (SUBLIMAZE) PCA 1500 mcg/30 mL syringe     Pharmacy to dose vancomycin     propofol 5-50 mcg/kg/min Last Rate: 40 mcg/kg/min (03/03/17 0602)       Assessment/Plan     -Severe sepsis secondary to a bilateral community acquired pneumonia causing severe organ failure  -Sepsis induced acute respiratory distress syndrome (ARDS)  -Severe acute respiratory distress syndrome (PaO2/FiO2 <100mmHg)  -Gram negative priyanka UTI, final cultures pending  -Pre-renal azotemia due to  aggressive diuresis  -Insulin dependent diabetes mellitus type II, currently controlled  -Tobacco abuse  -Chronic obstructive pulmonary disease  -Buerger's disease  -Acute on chronic microcytic anemia with history of iron deficiency    Change antibiotics from azactam, levofloxacin and vancomycin to azactam, vancomycin, flagyl (anaerobic coverage) and azithromycin (for its immunomodulating effects). Continue with aggressive IV diuresis with plans to maintain a negative fluid balance moving forward. Monitor electrolytes closely. Will begin levophed should hypotension persist. Continue with mechanical ventilation with low tidal volumes (to prevent barotrauma) and high PEEP for alveolar recruitment. Change inhalents to ipratropium only for now given tachycardia. Repeat CBC, CRP, CMP, ABG and chest xray in am. Currently awaiting repeat ABG. Plan to transfuse if hemoglobin drops below 7.0 or if she remains hemodynamically stable. An echocardiogram has been ordered to rule out cardiogenic causes of her pulmonary edema.     DVT/GI ppx: subcutaneous heparin/pantoprazole    Overall prognosis is very guarded at this point.    Patient is considered high risk due to: severe ARDS, severe sepsis, bilateral pneumonia    I discussed the patients findings and my recommendations with nursing staff and consulting provider (Dr. Bradley)    Sherrill Weiner DO  03/03/17  8:02 AM

## 2017-03-03 NOTE — PROGRESS NOTES
Discharge Planning Assessment  JOYCE Sanchez     Patient Name: Pepito Yan  MRN: [id number]  Today's Date: 3/3/2017    Admit Date: 2/28/2017       Discharge Plan       03/03/17 1126    Case Management/Social Work Plan    Plan SS received a call from the pt's daughter, Ivis Randhawa [phone] who states her brother, Scot is in group home and is unable to make decisons at this time.  Pt's daughter, April is agreeable to make the decisions and is considered the next of kin at this time.  SS provided the contact information to the pt's primary nurse.  SS will follow due to the pt being on the vent.     Patient/Family In Agreement With Plan yes     Merlene Parisi

## 2017-03-03 NOTE — PROCEDURES
Endotracheal Intubation Procedure Note    Indication for endotracheal intubation: Critically ill on vasopressors change in mental status    Sedation: Propofol -10 mL     Number of attempts: 1      Patient was emergently intubated as patient had altered mental status.  Patient was given propofol for sedation.  Mac 4 blade was used to look at the vocal cords and grade 2 view of  Vocal cords was achieved.  Size 8 tube was inserted through the vocal cords.  Tube condensation was present.  End tidal CO2 color change was noticed for 10 breaths.  Bilateral breath sounds were present.    Patient's vitals remained normal.  Patient's pulse ox remained from % patient was put on assist control mode of ventilation.

## 2017-03-03 NOTE — PROGRESS NOTES
Endotracheal Intubation Procedure Note    Indication for endotracheal intubation: Critically ill on vasopressors change in mental status    Sedation: Propofol -10 mL     Number of attempts: 1      Patient was emergently intubated as patient had altered mental status.  Patient was given propofol and vecuronium for sedation and paralysis.  Mac 4 blade was used to look at the vocal cords and grade 2 view of  Vocal cords was achieved.  Size 7.5  tube was inserted through the vocal cords.  Tube condensation was present.  End tidal CO2 color change was noticed for 10 breaths.  Bilateral breath sounds were present.    Patient's vitals remained normal.  Patient's pulse ox remained from % patient was put on assist control mode of ventilation.

## 2017-03-03 NOTE — PLAN OF CARE
Problem: Mechanical Ventilation, Invasive (Adult)  Goal: Signs and Symptoms of Listed Potential Problems Will be Absent or Manageable (Mechanical Ventilation, Invasive)  Outcome: Ongoing (interventions implemented as appropriate)

## 2017-03-04 ENCOUNTER — APPOINTMENT (OUTPATIENT)
Dept: GENERAL RADIOLOGY | Facility: HOSPITAL | Age: 52
End: 2017-03-04

## 2017-03-04 LAB
A-A DO2: 241.8 MMHG (ref 0–300)
A-A DO2: 247.7 MMHG (ref 0–300)
A-A DO2: 281.2 MMHG (ref 0–300)
A-A DO2: >300 MMHG (ref 0–300)
ALBUMIN SERPL-MCNC: 3.8 G/DL (ref 3.5–5)
ALBUMIN/GLOB SERPL: 1.3 G/DL (ref 1.5–2.5)
ALP SERPL-CCNC: 54 U/L (ref 35–104)
ALT SERPL W P-5'-P-CCNC: 34 U/L (ref 10–36)
ANION GAP SERPL CALCULATED.3IONS-SCNC: 4.4 MMOL/L (ref 3.6–11.2)
ANISOCYTOSIS BLD QL: NORMAL
ARTERIAL PATENCY WRIST A: ABNORMAL
ARTERIAL PATENCY WRIST A: POSITIVE
AST SERPL-CCNC: 64 U/L (ref 10–30)
ATMOSPHERIC PRESS: 738 MMHG
ATMOSPHERIC PRESS: 741 MMHG
BASE EXCESS BLDA CALC-SCNC: -3.8 MMOL/L
BASE EXCESS BLDA CALC-SCNC: -5 MMOL/L
BASE EXCESS BLDA CALC-SCNC: -5.1 MMOL/L
BASE EXCESS BLDA CALC-SCNC: -5.5 MMOL/L
BASE EXCESS BLDA CALC-SCNC: -6.9 MMOL/L
BASE EXCESS BLDA CALC-SCNC: -9 MMOL/L
BASOPHILS # BLD AUTO: 0.03 10*3/MM3 (ref 0–0.3)
BASOPHILS NFR BLD AUTO: 0.4 % (ref 0–2)
BDY SITE: ABNORMAL
BILIRUB SERPL-MCNC: 0.2 MG/DL (ref 0.2–1.8)
BODY TEMPERATURE: 98.6 C
BUN BLD-MCNC: 31 MG/DL (ref 7–21)
BUN/CREAT SERPL: 32.3 (ref 7–25)
CALCIUM SPEC-SCNC: 8.5 MG/DL (ref 7.7–10)
CHLORIDE SERPL-SCNC: 116 MMOL/L (ref 99–112)
CO2 SERPL-SCNC: 23.6 MMOL/L (ref 24.3–31.9)
COHGB MFR BLD: 0.5 % (ref 0–5)
COHGB MFR BLD: 0.5 % (ref 0–5)
COHGB MFR BLD: 0.7 % (ref 0–5)
COHGB MFR BLD: 0.8 % (ref 0–5)
COHGB MFR BLD: 0.8 % (ref 0–5)
COHGB MFR BLD: 1.2 % (ref 0–5)
CREAT BLD-MCNC: 0.96 MG/DL (ref 0.43–1.29)
CRP SERPL-MCNC: 24.96 MG/DL (ref 0–0.99)
DEPRECATED RDW RBC AUTO: 52.6 FL (ref 37–54)
EOSINOPHIL # BLD AUTO: 0 10*3/MM3 (ref 0–0.7)
EOSINOPHIL NFR BLD AUTO: 0 % (ref 0–5)
ERYTHROCYTE [DISTWIDTH] IN BLOOD BY AUTOMATED COUNT: 19.3 % (ref 11.5–14.5)
GAS FLOW AIRWAY: >50 LPM
GFR SERPL CREATININE-BSD FRML MDRD: 61 ML/MIN/1.73
GLOBULIN UR ELPH-MCNC: 3 GM/DL
GLUCOSE BLD-MCNC: 87 MG/DL (ref 70–110)
GLUCOSE BLDC GLUCOMTR-MCNC: 133 MG/DL (ref 70–130)
GLUCOSE BLDC GLUCOMTR-MCNC: 137 MG/DL (ref 70–130)
GLUCOSE BLDC GLUCOMTR-MCNC: 141 MG/DL (ref 70–130)
GLUCOSE BLDC GLUCOMTR-MCNC: 228 MG/DL (ref 70–130)
HCO3 BLDA-SCNC: 19.5 MMOL/L (ref 22–26)
HCO3 BLDA-SCNC: 19.7 MMOL/L (ref 22–26)
HCO3 BLDA-SCNC: 21.5 MMOL/L (ref 22–26)
HCO3 BLDA-SCNC: 21.7 MMOL/L (ref 22–26)
HCO3 BLDA-SCNC: 22.1 MMOL/L (ref 22–26)
HCO3 BLDA-SCNC: 22.2 MMOL/L (ref 22–26)
HCT VFR BLD AUTO: 32.2 % (ref 37–47)
HCT VFR BLD CALC: 24 % (ref 37–47)
HCT VFR BLD CALC: 26 % (ref 37–47)
HCT VFR BLD CALC: 26 % (ref 37–47)
HCT VFR BLD CALC: 28 % (ref 37–47)
HCT VFR BLD CALC: 28 % (ref 37–47)
HCT VFR BLD CALC: 30 % (ref 37–47)
HGB BLD-MCNC: 9 G/DL (ref 12–16)
HGB BLDA-MCNC: 10.3 G/DL (ref 12–16)
HGB BLDA-MCNC: 8.2 G/DL (ref 12–16)
HGB BLDA-MCNC: 8.7 G/DL (ref 12–16)
HGB BLDA-MCNC: 9 G/DL (ref 12–16)
HGB BLDA-MCNC: 9.5 G/DL (ref 12–16)
HGB BLDA-MCNC: 9.5 G/DL (ref 12–16)
HOROWITZ INDEX BLD+IHG-RTO: 100 %
HOROWITZ INDEX BLD+IHG-RTO: 100 %
HOROWITZ INDEX BLD+IHG-RTO: 60 %
HOROWITZ INDEX BLD+IHG-RTO: 60 %
HOROWITZ INDEX BLD+IHG-RTO: 80 %
HOROWITZ INDEX BLD+IHG-RTO: 80 %
HYPOCHROMIA BLD QL: NORMAL
IMM GRANULOCYTES # BLD: 0.04 10*3/MM3 (ref 0–0.03)
IMM GRANULOCYTES NFR BLD: 0.6 % (ref 0–0.5)
LYMPHOCYTES # BLD AUTO: 0.49 10*3/MM3 (ref 1–3)
LYMPHOCYTES NFR BLD AUTO: 7 % (ref 21–51)
MAGNESIUM SERPL-MCNC: 2.3 MG/DL (ref 1.7–2.6)
MCH RBC QN AUTO: 20.9 PG (ref 27–33)
MCHC RBC AUTO-ENTMCNC: 28 G/DL (ref 33–37)
MCV RBC AUTO: 74.7 FL (ref 80–94)
METHGB BLD QL: 0.2 % (ref 0–3)
METHGB BLD QL: 0.3 % (ref 0–3)
METHGB BLD QL: 0.4 % (ref 0–3)
METHGB BLD QL: 0.4 % (ref 0–3)
METHGB BLD QL: 0.5 % (ref 0–3)
METHGB BLD QL: 0.6 % (ref 0–3)
MICROCYTES BLD QL: NORMAL
MODALITY: ABNORMAL
MONOCYTES # BLD AUTO: 0.18 10*3/MM3 (ref 0.1–0.9)
MONOCYTES NFR BLD AUTO: 2.6 % (ref 0–10)
NEUTROPHILS # BLD AUTO: 6.25 10*3/MM3 (ref 1.4–6.5)
NEUTROPHILS NFR BLD AUTO: 89.4 % (ref 30–70)
NRBC BLD MANUAL-RTO: 0 /100 WBC (ref 0–0)
OSMOLALITY SERPL CALC.SUM OF ELEC: 292.7 MOSM/KG (ref 273–305)
OVALOCYTES BLD QL SMEAR: NORMAL
OXYHGB MFR BLDV: 93.3 % (ref 85–100)
OXYHGB MFR BLDV: 97.5 % (ref 85–100)
OXYHGB MFR BLDV: 97.8 % (ref 85–100)
OXYHGB MFR BLDV: 97.9 % (ref 85–100)
OXYHGB MFR BLDV: 98 % (ref 85–100)
OXYHGB MFR BLDV: 98.8 % (ref 85–100)
PCO2 BLDA: 43.6 MM HG (ref 35–45)
PCO2 BLDA: 44.4 MM HG (ref 35–45)
PCO2 BLDA: 49.5 MM HG (ref 35–45)
PCO2 BLDA: 49.9 MM HG (ref 35–45)
PCO2 BLDA: 51.6 MM HG (ref 35–45)
PCO2 BLDA: 55.2 MM HG (ref 35–45)
PEEP RESPIRATORY: 15 CM[H2O]
PEEP RESPIRATORY: 18 CM[H2O]
PH BLDA: 7.17 PH UNITS (ref 7.35–7.45)
PH BLDA: 7.25 PH UNITS (ref 7.35–7.45)
PH BLDA: 7.25 PH UNITS (ref 7.35–7.45)
PH BLDA: 7.26 PH UNITS (ref 7.35–7.45)
PH BLDA: 7.26 PH UNITS (ref 7.35–7.45)
PH BLDA: 7.32 PH UNITS (ref 7.35–7.45)
PHOSPHATE SERPL-MCNC: 4.5 MG/DL (ref 2.7–4.5)
PLAT MORPH BLD: NORMAL
PLATELET # BLD AUTO: 196 10*3/MM3 (ref 130–400)
PMV BLD AUTO: 9.7 FL (ref 6–10)
PO2 BLDA: 126.6 MM HG (ref 80–100)
PO2 BLDA: 133.6 MM HG (ref 80–100)
PO2 BLDA: 151.4 MM HG (ref 80–100)
PO2 BLDA: 255.5 MM HG (ref 80–100)
PO2 BLDA: 275 MM HG (ref 80–100)
PO2 BLDA: 78.4 MM HG (ref 80–100)
POTASSIUM BLD-SCNC: 5.1 MMOL/L (ref 3.5–5.3)
PROT SERPL-MCNC: 6.8 G/DL (ref 6–8)
RBC # BLD AUTO: 4.31 10*6/MM3 (ref 4.2–5.4)
SAO2 % BLDCOA: 94.3 % (ref 90–100)
SAO2 % BLDCOA: 98.5 % (ref 90–100)
SAO2 % BLDCOA: 98.6 % (ref 90–100)
SAO2 % BLDCOA: 99.3 % (ref 90–100)
SAO2 % BLDCOA: 99.7 % (ref 90–100)
SAO2 % BLDCOA: 99.7 % (ref 90–100)
SET MECH RESP RATE: 28
SET MECH RESP RATE: 30
SODIUM BLD-SCNC: 144 MMOL/L (ref 135–153)
TOTAL RATE: 28 BREATHS/MINUTE
VENTILATOR MODE: ABNORMAL
VENTILATOR MODE: AC
VT ON VENT VENT: 400 ML
VT ON VENT VENT: 450 ML
WBC NRBC COR # BLD: 6.99 10*3/MM3 (ref 4.5–12.5)

## 2017-03-04 PROCEDURE — 82375 ASSAY CARBOXYHB QUANT: CPT | Performed by: INTERNAL MEDICINE

## 2017-03-04 PROCEDURE — 86140 C-REACTIVE PROTEIN: CPT | Performed by: INTERNAL MEDICINE

## 2017-03-04 PROCEDURE — 36600 WITHDRAWAL OF ARTERIAL BLOOD: CPT | Performed by: INTERNAL MEDICINE

## 2017-03-04 PROCEDURE — 25010000002 VANCOMYCIN PER 500 MG: Performed by: INTERNAL MEDICINE

## 2017-03-04 PROCEDURE — 94799 UNLISTED PULMONARY SVC/PX: CPT

## 2017-03-04 PROCEDURE — 80053 COMPREHEN METABOLIC PANEL: CPT | Performed by: INTERNAL MEDICINE

## 2017-03-04 PROCEDURE — 85025 COMPLETE CBC W/AUTO DIFF WBC: CPT | Performed by: INTERNAL MEDICINE

## 2017-03-04 PROCEDURE — 82375 ASSAY CARBOXYHB QUANT: CPT | Performed by: NURSE PRACTITIONER

## 2017-03-04 PROCEDURE — 83050 HGB METHEMOGLOBIN QUAN: CPT | Performed by: NURSE PRACTITIONER

## 2017-03-04 PROCEDURE — 25010000002 FUROSEMIDE PER 20 MG: Performed by: INTERNAL MEDICINE

## 2017-03-04 PROCEDURE — 25010000002 METHYLPREDNISOLONE PER 40 MG: Performed by: INTERNAL MEDICINE

## 2017-03-04 PROCEDURE — 99233 SBSQ HOSP IP/OBS HIGH 50: CPT | Performed by: INTERNAL MEDICINE

## 2017-03-04 PROCEDURE — 25010000002 PROPOFOL 1000 MG/ML EMULSION: Performed by: INTERNAL MEDICINE

## 2017-03-04 PROCEDURE — 36600 WITHDRAWAL OF ARTERIAL BLOOD: CPT | Performed by: NURSE PRACTITIONER

## 2017-03-04 PROCEDURE — 82805 BLOOD GASES W/O2 SATURATION: CPT | Performed by: INTERNAL MEDICINE

## 2017-03-04 PROCEDURE — 71010 HC CHEST PA OR AP: CPT

## 2017-03-04 PROCEDURE — 83735 ASSAY OF MAGNESIUM: CPT | Performed by: NURSE PRACTITIONER

## 2017-03-04 PROCEDURE — 85007 BL SMEAR W/DIFF WBC COUNT: CPT | Performed by: INTERNAL MEDICINE

## 2017-03-04 PROCEDURE — 63710000001 INSULIN DETEMIR PER 5 UNITS: Performed by: INTERNAL MEDICINE

## 2017-03-04 PROCEDURE — 63710000001 INSULIN ASPART PER 5 UNITS: Performed by: INTERNAL MEDICINE

## 2017-03-04 PROCEDURE — 87040 BLOOD CULTURE FOR BACTERIA: CPT | Performed by: INTERNAL MEDICINE

## 2017-03-04 PROCEDURE — 83050 HGB METHEMOGLOBIN QUAN: CPT | Performed by: INTERNAL MEDICINE

## 2017-03-04 PROCEDURE — 25010000002 HEPARIN (PORCINE) PER 1000 UNITS: Performed by: INTERNAL MEDICINE

## 2017-03-04 PROCEDURE — 82805 BLOOD GASES W/O2 SATURATION: CPT | Performed by: NURSE PRACTITIONER

## 2017-03-04 PROCEDURE — 25010000002 AZITHROMYCIN: Performed by: INTERNAL MEDICINE

## 2017-03-04 PROCEDURE — 84100 ASSAY OF PHOSPHORUS: CPT | Performed by: NURSE PRACTITIONER

## 2017-03-04 PROCEDURE — 82962 GLUCOSE BLOOD TEST: CPT

## 2017-03-04 PROCEDURE — 71010 XR CHEST 1 VW: CPT | Performed by: RADIOLOGY

## 2017-03-04 RX ORDER — VECURONIUM BROMIDE 1 MG/ML
5 INJECTION, POWDER, LYOPHILIZED, FOR SOLUTION INTRAVENOUS ONCE
Status: DISCONTINUED | OUTPATIENT
Start: 2017-03-04 | End: 2017-03-04

## 2017-03-04 RX ORDER — FUROSEMIDE 10 MG/ML
40 INJECTION INTRAMUSCULAR; INTRAVENOUS DAILY
Status: DISCONTINUED | OUTPATIENT
Start: 2017-03-04 | End: 2017-03-05

## 2017-03-04 RX ORDER — VECURONIUM BROMIDE 1 MG/ML
5 INJECTION, POWDER, LYOPHILIZED, FOR SOLUTION INTRAVENOUS ONCE
Status: COMPLETED | OUTPATIENT
Start: 2017-03-04 | End: 2017-03-04

## 2017-03-04 RX ADMIN — VECURONIUM BROMIDE 5 MG: 1 INJECTION, POWDER, LYOPHILIZED, FOR SOLUTION INTRAVENOUS at 21:16

## 2017-03-04 RX ADMIN — IPRATROPIUM BROMIDE 0.5 MG: 0.5 SOLUTION RESPIRATORY (INHALATION) at 18:43

## 2017-03-04 RX ADMIN — VANCOMYCIN HYDROCHLORIDE 750 MG: 5 INJECTION, POWDER, LYOPHILIZED, FOR SOLUTION INTRAVENOUS at 09:42

## 2017-03-04 RX ADMIN — SUCRALFATE 1 G: 1 TABLET ORAL at 17:20

## 2017-03-04 RX ADMIN — SUCRALFATE 1 G: 1 TABLET ORAL at 21:15

## 2017-03-04 RX ADMIN — GUAIFENESIN 600 MG: 600 TABLET, EXTENDED RELEASE ORAL at 17:20

## 2017-03-04 RX ADMIN — PROPOFOL 50 MCG/KG/MIN: 10 INJECTION, EMULSION INTRAVENOUS at 12:49

## 2017-03-04 RX ADMIN — INSULIN ASPART 5 UNITS: 100 INJECTION, SOLUTION INTRAVENOUS; SUBCUTANEOUS at 11:02

## 2017-03-04 RX ADMIN — SUCRALFATE 1 G: 1 TABLET ORAL at 11:20

## 2017-03-04 RX ADMIN — Medication: at 03:53

## 2017-03-04 RX ADMIN — METHYLPREDNISOLONE SODIUM SUCCINATE 40 MG: 40 INJECTION, POWDER, FOR SOLUTION INTRAMUSCULAR; INTRAVENOUS at 21:16

## 2017-03-04 RX ADMIN — IPRATROPIUM BROMIDE 0.5 MG: 0.5 SOLUTION RESPIRATORY (INHALATION) at 00:18

## 2017-03-04 RX ADMIN — GUAIFENESIN 600 MG: 600 TABLET, EXTENDED RELEASE ORAL at 08:45

## 2017-03-04 RX ADMIN — PROPOFOL 50 MCG/KG/MIN: 10 INJECTION, EMULSION INTRAVENOUS at 05:26

## 2017-03-04 RX ADMIN — HEPARIN SODIUM 5000 UNITS: 5000 INJECTION, SOLUTION INTRAVENOUS; SUBCUTANEOUS at 08:45

## 2017-03-04 RX ADMIN — METRONIDAZOLE 500 MG: 500 INJECTION, SOLUTION INTRAVENOUS at 02:26

## 2017-03-04 RX ADMIN — IPRATROPIUM BROMIDE 0.5 MG: 0.5 SOLUTION RESPIRATORY (INHALATION) at 06:20

## 2017-03-04 RX ADMIN — PANTOPRAZOLE SODIUM 40 MG: 40 INJECTION, POWDER, FOR SOLUTION INTRAVENOUS at 05:05

## 2017-03-04 RX ADMIN — Medication: at 18:30

## 2017-03-04 RX ADMIN — AZITHROMYCIN 500 MG: 500 INJECTION, POWDER, LYOPHILIZED, FOR SOLUTION INTRAVENOUS at 09:42

## 2017-03-04 RX ADMIN — SUCRALFATE 1 G: 1 TABLET ORAL at 08:45

## 2017-03-04 RX ADMIN — AZTREONAM 2 G: 2 INJECTION, POWDER, FOR SOLUTION INTRAMUSCULAR; INTRAVENOUS at 18:33

## 2017-03-04 RX ADMIN — PROPOFOL 50 MCG/KG/MIN: 10 INJECTION, EMULSION INTRAVENOUS at 01:20

## 2017-03-04 RX ADMIN — AZTREONAM 2 G: 2 INJECTION, POWDER, FOR SOLUTION INTRAMUSCULAR; INTRAVENOUS at 02:25

## 2017-03-04 RX ADMIN — FUROSEMIDE 40 MG: 10 INJECTION, SOLUTION INTRAMUSCULAR; INTRAVENOUS at 09:42

## 2017-03-04 RX ADMIN — VECURONIUM BROMIDE 5 MG: 1 INJECTION, POWDER, LYOPHILIZED, FOR SOLUTION INTRAVENOUS at 14:48

## 2017-03-04 RX ADMIN — METRONIDAZOLE 500 MG: 500 INJECTION, SOLUTION INTRAVENOUS at 10:53

## 2017-03-04 RX ADMIN — ASPIRIN 81 MG: 81 TABLET ORAL at 08:45

## 2017-03-04 RX ADMIN — PROPOFOL 50 MCG/KG/MIN: 10 INJECTION, EMULSION INTRAVENOUS at 22:31

## 2017-03-04 RX ADMIN — VANCOMYCIN HYDROCHLORIDE 750 MG: 5 INJECTION, POWDER, LYOPHILIZED, FOR SOLUTION INTRAVENOUS at 21:50

## 2017-03-04 RX ADMIN — METRONIDAZOLE 500 MG: 500 INJECTION, SOLUTION INTRAVENOUS at 18:34

## 2017-03-04 RX ADMIN — ACETAMINOPHEN 650 MG: 325 TABLET ORAL at 05:07

## 2017-03-04 RX ADMIN — PROPOFOL 50 MCG/KG/MIN: 10 INJECTION, EMULSION INTRAVENOUS at 18:15

## 2017-03-04 RX ADMIN — METHYLPREDNISOLONE SODIUM SUCCINATE 40 MG: 40 INJECTION, POWDER, FOR SOLUTION INTRAMUSCULAR; INTRAVENOUS at 08:45

## 2017-03-04 RX ADMIN — HEPARIN SODIUM 5000 UNITS: 5000 INJECTION, SOLUTION INTRAVENOUS; SUBCUTANEOUS at 21:16

## 2017-03-04 RX ADMIN — IPRATROPIUM BROMIDE 0.5 MG: 0.5 SOLUTION RESPIRATORY (INHALATION) at 12:05

## 2017-03-04 RX ADMIN — AZTREONAM 2 G: 2 INJECTION, POWDER, FOR SOLUTION INTRAMUSCULAR; INTRAVENOUS at 11:00

## 2017-03-04 RX ADMIN — INSULIN DETEMIR 20 UNITS: 100 INJECTION, SOLUTION SUBCUTANEOUS at 21:17

## 2017-03-04 RX ADMIN — CLOPIDOGREL 75 MG: 75 TABLET, FILM COATED ORAL at 08:45

## 2017-03-04 RX ADMIN — Medication: at 11:10

## 2017-03-04 NOTE — PLAN OF CARE
Problem: Patient Care Overview (Adult)  Goal: Plan of Care Review  Outcome: Ongoing (interventions implemented as appropriate)    03/04/17 0442   Coping/Psychosocial Response Interventions   Plan Of Care Reviewed With patient;daughter   Patient Care Overview   Progress improving   Outcome Evaluation   Outcome Summary/Follow up Plan remains intubated and sedated on propofol and fentanyl, off levophed gtt at this time, vss urine output at least 40ml/hr       Goal: Adult Individualization and Mutuality  Outcome: Ongoing (interventions implemented as appropriate)  Goal: Discharge Needs Assessment  Outcome: Ongoing (interventions implemented as appropriate)    Problem: Pneumonia (Adult)  Goal: Signs and Symptoms of Listed Potential Problems Will be Absent or Manageable (Pneumonia)  Outcome: Ongoing (interventions implemented as appropriate)    Problem: Diabetes, Type 2 (Adult)  Goal: Signs and Symptoms of Listed Potential Problems Will be Absent or Manageable (Diabetes, Type 2)  Outcome: Ongoing (interventions implemented as appropriate)    Problem: Mechanical Ventilation, Invasive (Adult)  Goal: Signs and Symptoms of Listed Potential Problems Will be Absent or Manageable (Mechanical Ventilation, Invasive)  Outcome: Ongoing (interventions implemented as appropriate)

## 2017-03-04 NOTE — PROGRESS NOTES
Kinetics :  Vancomycin  Day 3    The pre-dose vancomycin level was reported as 23.6 mcg/ml which is above the goal range for this therapy.  Will reduce the dose to minimize any further accumulation of the drug.  Will watch uptrending scr with you.

## 2017-03-04 NOTE — PROGRESS NOTES
[facility]ist CCU Note    Patient Identification:  Name:  Pepito Yan  Age:  51 y.o.  Sex:  female  :  1965  MRN:  [id number]  Visit number:  [id number]  Primary Care Provider:  ROSA Meza    4    Ventilator Day:  Ventilator Settings:  Ventilator/Non-Invasive Ventilation Settings     Start     Ordered    17 1753  . BIPAP; IPAP: 10; EPAP: 16; Titrate for spO2: equal to or greater than, 90%  At Bedtime & As Needed-RT     Question Answer Comment   . BIPAP    IPAP 10    EPAP 16    Titrate for spO2 equal to or greater than    Titrate for spO2 90%        17 1755    17 175  . BIPAP  At Bedtime & As Needed-RT,   Status:  Canceled     Question:  .  Answer:  BIPAP    17 17517 112  NIPPV CPAP OR BIPAP  Until Discontinued,   Status:  Canceled     Comments:  16/ 35 % oxygen rate 20    17 1127          Drips: Propofol, Fentanyl  Antibiotics: Vancomycin, Azactam and Azithromycin  Lines: Right IJ line  King Catheter:Yes    Procedures:  Emergent Intubation 3/3/17  Right femoral line 3/3/17  Right internal jugular line 3/3/17    HPI:  The patient is a 51 year old female admitted with shortness of air    Subjective     No acute events overnight other than an isolated fever that resolved with tylenol. This morning, she is resting comfortably. Tube feedings were started yesterday evening.     History taken from: Salome fernandez RN   Patient unable to give history due to: intubated  Present during visit: RRT    Objective     Vital Signs  Temp:  [98.7 °F (37.1 °C)-103 °F (39.4 °C)] 98.7 °F (37.1 °C)  Heart Rate:  [] 101  Resp:  [25-40] 28  BP: (102-133)/(58-75) 102/62  FiO2 (%):  [100 %] 100 %  Body mass index is 21.79 kg/(m^2).    Intake/Output Summary (Last 24 hours) at 17 0828  Last data filed at 17 0500   Gross per 24 hour   Intake 1168.81 ml   Output   2700 ml   Net -1531.19 ml     Physical Exam:    Physical Exam    Constitutional: She appears well-developed and well-nourished. She appears ill. No distress. She is sedated and intubated.   HENT:   Head: Normocephalic and atraumatic.   Nose: Nose normal.   Mouth/Throat: Oropharynx is clear and moist and mucous membranes are normal.   ETT and OG tube in place   Eyes: Conjunctivae and EOM are normal. Pupils are equal, round, and reactive to light. No scleral icterus.   Neck: Trachea normal. Neck supple. No JVD present. Carotid bruit is not present. No thyromegaly present.   Cardiovascular: Regular rhythm, normal heart sounds and normal pulses.  Tachycardia present.  Exam reveals no gallop and no friction rub.    No murmur heard.  Pulmonary/Chest: Effort normal. She is intubated. No respiratory distress. She has no wheezes. She has no rales.   Decreased expiratory breath sounds bilaterally   Abdominal: Soft. Bowel sounds are normal. She exhibits no distension. There is no tenderness. There is no guarding.   Genitourinary:   Genitourinary Comments: King catheter in place draining a yellow urine   Musculoskeletal: Normal range of motion.   Neurological:   Sedated on mechanical ventilation   Skin: Skin is warm and dry. No rash noted. No erythema.   A small scab is noted on her 3rd left digit and her right forearm     Results Review:     I reviewed the patient's new imaging results and agree with the interpretation.  I reviewed the patient's other test results and agree with the interpretation.    Telemetry: Sinus tachycardia, 100      Results from last 7 days  Lab Units 03/04/17  0122 03/03/17  0040 03/02/17  1023 03/02/17  0120 02/28/17 1939   WBC 10*3/mm3 6.99 4.46*  --  5.90 8.29   HEMOGLOBIN g/dL 9.0* 8.8* 9.5* 8.3* 9.9*   PLATELETS 10*3/mm3 196 168  --  168 222       Results from last 7 days  Lab Units 03/04/17  0123 03/03/17  0039 03/02/17  0120 02/28/17 1939   SODIUM mmol/L 144 137 134* 139   POTASSIUM mmol/L 5.1 4.5 4.2 3.8   CHLORIDE mmol/L 116* 109 109 109   TOTAL CO2  mmol/L 23.6* 20.8* 17.9* 24.3   BUN mg/dL 31* 27* 20 18   CREATININE mg/dL 0.96 0.71 0.65 0.57   CALCIUM mg/dL 8.5 8.1 8.4 8.8   GLUCOSE mg/dL 87 114* 330* 117*       Results from last 7 days  Lab Units 03/04/17  0123 03/03/17  0039 03/02/17  0120 02/28/17  1939   BILIRUBIN mg/dL 0.2 0.2 0.1* 0.2   ALK PHOS U/L 54 52 51 59   AST (SGOT) U/L 64* 56* 28 29   ALT (SGPT) U/L 34 36 31 41*       Results from last 7 days  Lab Units 03/04/17  0123 03/02/17  0120   MAGNESIUM mg/dL 2.3 2.1       Results from last 7 days  Lab Units 03/04/17  0123 03/03/17  0039 03/02/17  0120   CRP mg/dL 24.96* 16.73* 12.58*     ABG:      Cultures:  BLOOD CULTURE   Date Value Ref Range Status   02/28/2017 No growth at 2 days  Preliminary   02/28/2017 No growth at 2 days  Preliminary     BLOOD CULTURE   Date Value Ref Range Status   03/03/2017 No growth at less than 24 hours  Preliminary   03/03/2017 No growth at less than 24 hours  Preliminary   02/28/2017 No growth at 3 days  Preliminary   02/28/2017 No growth at 3 days  Preliminary     URINE CULTURE   Date Value Ref Range Status   02/28/2017 >100,000 CFU/mL Escherichia coli (A)  Final         Portable CXR:        Current Hospital Medications:    aspirin 81 mg Oral Daily   azithromycin 500 mg Intravenous Q24H   aztreonam 2 g Intravenous Q8H   clopidogrel 75 mg Oral Daily   furosemide 40 mg Intravenous Daily   guaiFENesin 600 mg Oral BID   heparin (porcine) 5,000 Units Subcutaneous Q12H   insulin aspart 0-14 Units Subcutaneous 4x Daily AC & at Bedtime   insulin detemir 25 Units Subcutaneous Nightly   ipratropium 0.5 mg Nebulization Q6H - RT   methylPREDNISolone sodium succinate 40 mg Intravenous Q12H   metoprolol tartrate 25 mg Oral Q12H   metroNIDAZOLE 500 mg Intravenous Q8H   pantoprazole 40 mg Intravenous Q AM   potassium chloride 40 mEq Oral Once   sucralfate 1 g Oral 4x Daily AC & at Bedtime   vancomycin 750 mg Intravenous Q12H       fentaNYL (SUBLIMAZE) PCA 1500 mcg/30 mL syringe      norepinephrine 0.02-0.3 mcg/kg/min Last Rate: Stopped (03/04/17 0355)   Pharmacy to dose vancomycin     propofol 5-50 mcg/kg/min Last Rate: 50 mcg/kg/min (03/04/17 0894)       Assessment/Plan     -Severe sepsis secondary to a bilateral community acquired pneumonia causing severe organ failure  -Sepsis induced acute respiratory distress syndrome (ARDS)  -Severe acute respiratory distress syndrome (PaO2/FiO2 <100mmHg)  -E.coli UTI  -Pre-renal azotemia due to aggressive diuresis  -Insulin dependent diabetes mellitus type II, currently controlled with an isolated episode of hypoglycemia  -Tobacco abuse  -Chronic obstructive pulmonary disease  -Buerger's disease  -Acute on chronic microcytic anemia with history of iron deficiency    A repeat ABG has been ordered and is pending. Continue with current antibiotic regimen. Plan for another dose of IV lasix today. She remains with a negative fluid balance at this time. Monitor electrolytes closely. Will continue with Levophed as needed. Continue with mechanical ventilation with low tidal volumes (to prevent barotrauma) and high PEEP for alveolar recruitment. Repeat CBC, CRP, CMP, ABG and chest xray in am. Will check an am Mg and Phosphorus in the am as the patient is high risk for re-feeding syndrome. Will adjust her insulin regimen to prevent further episodes of hypoglycemia. Plan to transfuse if hemoglobin drops below 7.0 or if she becomes hemodynamically unstable. An echocardiogram has been performed and is pending.    DVT/GI ppx: subcutaneous heparin/pantoprazole    Overall prognosis is very guarded at this point.    Patient is considered high risk due to: severe ARDS, severe sepsis, bilateral pneumonia    I discussed the patients findings and my recommendations with nursing staff.    Sherrill Weiner DO  03/04/17  8:28 AM

## 2017-03-04 NOTE — PROGRESS NOTES
Nutrition Services    Patient Name:  Pepito Yan  YOB: 1965  MRN: [id number]  Admit Date:  2/28/2017        Will change TF to Vial HP @ 20 ml/hr at this time.  Patient having high residual volume. Noted per Dr. Weiner note at risk for refeeding.  Will follow labs and monitor tolerance to TF.      Electronically signed by:  Mirian Ramirez RD  03/04/17 11:33 AM

## 2017-03-04 NOTE — PLAN OF CARE
Problem: Pneumonia (Adult)  Goal: Signs and Symptoms of Listed Potential Problems Will be Absent or Manageable (Pneumonia)  Outcome: Ongoing (interventions implemented as appropriate)    Problem: Diabetes, Type 2 (Adult)  Goal: Signs and Symptoms of Listed Potential Problems Will be Absent or Manageable (Diabetes, Type 2)  Outcome: Ongoing (interventions implemented as appropriate)    Problem: Mechanical Ventilation, Invasive (Adult)  Goal: Signs and Symptoms of Listed Potential Problems Will be Absent or Manageable (Mechanical Ventilation, Invasive)  Outcome: Ongoing (interventions implemented as appropriate)

## 2017-03-05 ENCOUNTER — APPOINTMENT (OUTPATIENT)
Dept: GENERAL RADIOLOGY | Facility: HOSPITAL | Age: 52
End: 2017-03-05

## 2017-03-05 LAB
A-A DO2: 196.7 MMHG (ref 0–300)
A-A DO2: 261.2 MMHG (ref 0–300)
A-A DO2: >300 MMHG (ref 0–300)
ALBUMIN SERPL-MCNC: 3.1 G/DL (ref 3.5–5)
ALBUMIN/GLOB SERPL: 1.2 G/DL (ref 1.5–2.5)
ALP SERPL-CCNC: 49 U/L (ref 35–104)
ALT SERPL W P-5'-P-CCNC: 27 U/L (ref 10–36)
ANION GAP SERPL CALCULATED.3IONS-SCNC: 3 MMOL/L (ref 3.6–11.2)
ANISOCYTOSIS BLD QL: NORMAL
ARTERIAL PATENCY WRIST A: ABNORMAL
AST SERPL-CCNC: 45 U/L (ref 10–30)
ATMOSPHERIC PRESS: 738 MMHG
ATMOSPHERIC PRESS: 738 MMHG
ATMOSPHERIC PRESS: 741 MMHG
BACTERIA SPEC AEROBE CULT: NORMAL
BACTERIA SPEC AEROBE CULT: NORMAL
BASE EXCESS BLDA CALC-SCNC: -2.5 MMOL/L
BASE EXCESS BLDA CALC-SCNC: -3 MMOL/L
BASE EXCESS BLDA CALC-SCNC: -3.4 MMOL/L
BASOPHILS # BLD AUTO: 0.01 10*3/MM3 (ref 0–0.3)
BASOPHILS NFR BLD AUTO: 0.3 % (ref 0–2)
BDY SITE: ABNORMAL
BH CV ECHO MEAS - BSA(HAYCOCK): 1.6 M^2
BH CV ECHO MEAS - BSA: 1.6 M^2
BH CV ECHO MEAS - BZI_BMI: 22.1 KILOGRAMS/M^2
BH CV ECHO MEAS - BZI_METRIC_HEIGHT: 160 CM
BH CV ECHO MEAS - BZI_METRIC_WEIGHT: 56.7 KG
BH CV ECHO MEAS - EDV(CUBED): 78.7 ML
BH CV ECHO MEAS - EDV(TEICH): 82.4 ML
BH CV ECHO MEAS - EF(CUBED): 36.7 %
BH CV ECHO MEAS - EF(TEICH): 30.4 %
BH CV ECHO MEAS - ESV(CUBED): 49.8 ML
BH CV ECHO MEAS - ESV(TEICH): 57.4 ML
BH CV ECHO MEAS - FS: 14.1 %
BH CV ECHO MEAS - IVS/LVPW: 0.94
BH CV ECHO MEAS - IVSD: 1.5 CM
BH CV ECHO MEAS - LV MASS(C)D: 260.1 GRAMS
BH CV ECHO MEAS - LV MASS(C)DI: 164.3 GRAMS/M^2
BH CV ECHO MEAS - LVIDD: 4.3 CM
BH CV ECHO MEAS - LVIDS: 3.7 CM
BH CV ECHO MEAS - LVPWD: 1.6 CM
BH CV ECHO MEAS - MV A MAX VEL: 100.2 CM/SEC
BH CV ECHO MEAS - MV E MAX VEL: 93.3 CM/SEC
BH CV ECHO MEAS - MV E/A: 0.93
BH CV ECHO MEAS - SI(CUBED): 18.2 ML/M^2
BH CV ECHO MEAS - SI(TEICH): 15.8 ML/M^2
BH CV ECHO MEAS - SV(CUBED): 28.8 ML
BH CV ECHO MEAS - SV(TEICH): 25 ML
BILIRUB SERPL-MCNC: 0.2 MG/DL (ref 0.2–1.8)
BODY TEMPERATURE: 98.6 C
BUN BLD-MCNC: 47 MG/DL (ref 7–21)
BUN/CREAT SERPL: 59.5 (ref 7–25)
CALCIUM SPEC-SCNC: 7.9 MG/DL (ref 7.7–10)
CHLORIDE SERPL-SCNC: 119 MMOL/L (ref 99–112)
CO2 SERPL-SCNC: 24 MMOL/L (ref 24.3–31.9)
COHGB MFR BLD: 0.2 % (ref 0–5)
COHGB MFR BLD: 1 % (ref 0–5)
COHGB MFR BLD: 1.2 % (ref 0–5)
CREAT BLD-MCNC: 0.79 MG/DL (ref 0.43–1.29)
CRP SERPL-MCNC: 12.48 MG/DL (ref 0–0.99)
DEPRECATED RDW RBC AUTO: 51.1 FL (ref 37–54)
EOSINOPHIL # BLD AUTO: 0 10*3/MM3 (ref 0–0.7)
EOSINOPHIL NFR BLD AUTO: 0 % (ref 0–5)
ERYTHROCYTE [DISTWIDTH] IN BLOOD BY AUTOMATED COUNT: 19.4 % (ref 11.5–14.5)
GFR SERPL CREATININE-BSD FRML MDRD: 77 ML/MIN/1.73
GLOBULIN UR ELPH-MCNC: 2.5 GM/DL
GLUCOSE BLD-MCNC: 134 MG/DL (ref 70–110)
GLUCOSE BLDC GLUCOMTR-MCNC: 112 MG/DL (ref 70–130)
GLUCOSE BLDC GLUCOMTR-MCNC: 121 MG/DL (ref 70–130)
GLUCOSE BLDC GLUCOMTR-MCNC: 89 MG/DL (ref 70–130)
GLUCOSE BLDC GLUCOMTR-MCNC: 95 MG/DL (ref 70–130)
HCO3 BLDA-SCNC: 22.1 MMOL/L (ref 22–26)
HCO3 BLDA-SCNC: 22.7 MMOL/L (ref 22–26)
HCO3 BLDA-SCNC: 22.9 MMOL/L (ref 22–26)
HCT VFR BLD AUTO: 25.2 % (ref 37–47)
HCT VFR BLD AUTO: 25.6 % (ref 37–47)
HCT VFR BLD CALC: 24 % (ref 37–47)
HCT VFR BLD CALC: 26 % (ref 37–47)
HCT VFR BLD CALC: 28 % (ref 37–47)
HGB BLD-MCNC: 7.1 G/DL (ref 12–16)
HGB BLD-MCNC: 7.2 G/DL (ref 12–16)
HGB BLDA-MCNC: 8.3 G/DL (ref 12–16)
HGB BLDA-MCNC: 8.8 G/DL (ref 12–16)
HGB BLDA-MCNC: 9.4 G/DL (ref 12–16)
HOROWITZ INDEX BLD+IHG-RTO: 60 %
HYPOCHROMIA BLD QL: NORMAL
IMM GRANULOCYTES # BLD: 0.04 10*3/MM3 (ref 0–0.03)
IMM GRANULOCYTES NFR BLD: 1.1 % (ref 0–0.5)
LYMPHOCYTES # BLD AUTO: 0.36 10*3/MM3 (ref 1–3)
LYMPHOCYTES NFR BLD AUTO: 9.9 % (ref 21–51)
MAGNESIUM SERPL-MCNC: 2.6 MG/DL (ref 1.7–2.6)
MCH RBC QN AUTO: 21.1 PG (ref 27–33)
MCHC RBC AUTO-ENTMCNC: 27.7 G/DL (ref 33–37)
MCV RBC AUTO: 76 FL (ref 80–94)
METHGB BLD QL: 0.3 % (ref 0–3)
METHGB BLD QL: 0.4 % (ref 0–3)
METHGB BLD QL: 0.4 % (ref 0–3)
MICROCYTES BLD QL: NORMAL
MODALITY: ABNORMAL
MONOCYTES # BLD AUTO: 0.13 10*3/MM3 (ref 0.1–0.9)
MONOCYTES NFR BLD AUTO: 3.6 % (ref 0–10)
NEUTROPHILS # BLD AUTO: 3.11 10*3/MM3 (ref 1.4–6.5)
NEUTROPHILS NFR BLD AUTO: 85.1 % (ref 30–70)
OSMOLALITY SERPL CALC.SUM OF ELEC: 304.8 MOSM/KG (ref 273–305)
OXYHGB MFR BLDV: 90.6 % (ref 85–100)
OXYHGB MFR BLDV: 96.6 % (ref 85–100)
OXYHGB MFR BLDV: 98.6 % (ref 85–100)
PCO2 BLDA: 37 MM HG (ref 35–45)
PCO2 BLDA: 44.9 MM HG (ref 35–45)
PCO2 BLDA: 45.4 MM HG (ref 35–45)
PEEP RESPIRATORY: 12 CM[H2O]
PEEP RESPIRATORY: 12 CM[H2O]
PEEP RESPIRATORY: 15 CM[H2O]
PH BLDA: 7.32 PH UNITS (ref 7.35–7.45)
PH BLDA: 7.33 PH UNITS (ref 7.35–7.45)
PH BLDA: 7.39 PH UNITS (ref 7.35–7.45)
PHOSPHATE SERPL-MCNC: 3 MG/DL (ref 2.7–4.5)
PLAT MORPH BLD: NORMAL
PLATELET # BLD AUTO: 164 10*3/MM3 (ref 130–400)
PMV BLD AUTO: 11.3 FL (ref 6–10)
PO2 BLDA: 103.5 MM HG (ref 80–100)
PO2 BLDA: 170.3 MM HG (ref 80–100)
PO2 BLDA: 64.3 MM HG (ref 80–100)
POTASSIUM BLD-SCNC: 4.2 MMOL/L (ref 3.5–5.3)
PROT SERPL-MCNC: 5.6 G/DL (ref 6–8)
RBC # BLD AUTO: 3.37 10*6/MM3 (ref 4.2–5.4)
SAO2 % BLDCOA: 92 % (ref 90–100)
SAO2 % BLDCOA: 98 % (ref 90–100)
SAO2 % BLDCOA: 99.2 % (ref 90–100)
SET MECH RESP RATE: 30
SODIUM BLD-SCNC: 146 MMOL/L (ref 135–153)
TSH SERPL DL<=0.05 MIU/L-ACNC: 0.21 MIU/ML (ref 0.55–4.78)
VENTILATOR MODE: AC
VT ON VENT VENT: 450 ML
WBC NRBC COR # BLD: 3.65 10*3/MM3 (ref 4.5–12.5)

## 2017-03-05 PROCEDURE — 25010000002 HEPARIN (PORCINE) PER 1000 UNITS: Performed by: INTERNAL MEDICINE

## 2017-03-05 PROCEDURE — 25010000002 PROPOFOL 1000 MG/ML EMULSION: Performed by: INTERNAL MEDICINE

## 2017-03-05 PROCEDURE — 94799 UNLISTED PULMONARY SVC/PX: CPT

## 2017-03-05 PROCEDURE — 25010000002 FUROSEMIDE PER 20 MG: Performed by: INTERNAL MEDICINE

## 2017-03-05 PROCEDURE — 74000 XR ABDOMEN KUB: CPT | Performed by: RADIOLOGY

## 2017-03-05 PROCEDURE — 82375 ASSAY CARBOXYHB QUANT: CPT | Performed by: INTERNAL MEDICINE

## 2017-03-05 PROCEDURE — 83735 ASSAY OF MAGNESIUM: CPT | Performed by: INTERNAL MEDICINE

## 2017-03-05 PROCEDURE — 94003 VENT MGMT INPAT SUBQ DAY: CPT

## 2017-03-05 PROCEDURE — 99291 CRITICAL CARE FIRST HOUR: CPT | Performed by: INTERNAL MEDICINE

## 2017-03-05 PROCEDURE — 86140 C-REACTIVE PROTEIN: CPT | Performed by: INTERNAL MEDICINE

## 2017-03-05 PROCEDURE — 71010 HC CHEST PA OR AP: CPT

## 2017-03-05 PROCEDURE — 85018 HEMOGLOBIN: CPT | Performed by: INTERNAL MEDICINE

## 2017-03-05 PROCEDURE — 25010000002 VANCOMYCIN PER 500 MG: Performed by: INTERNAL MEDICINE

## 2017-03-05 PROCEDURE — 85025 COMPLETE CBC W/AUTO DIFF WBC: CPT | Performed by: INTERNAL MEDICINE

## 2017-03-05 PROCEDURE — 25010000002 AZITHROMYCIN: Performed by: INTERNAL MEDICINE

## 2017-03-05 PROCEDURE — 25010000002 METHYLPREDNISOLONE PER 40 MG: Performed by: INTERNAL MEDICINE

## 2017-03-05 PROCEDURE — 84443 ASSAY THYROID STIM HORMONE: CPT | Performed by: INTERNAL MEDICINE

## 2017-03-05 PROCEDURE — 82805 BLOOD GASES W/O2 SATURATION: CPT | Performed by: INTERNAL MEDICINE

## 2017-03-05 PROCEDURE — 85014 HEMATOCRIT: CPT | Performed by: INTERNAL MEDICINE

## 2017-03-05 PROCEDURE — 82962 GLUCOSE BLOOD TEST: CPT

## 2017-03-05 PROCEDURE — 71010 XR CHEST 1 VW: CPT | Performed by: RADIOLOGY

## 2017-03-05 PROCEDURE — 36600 WITHDRAWAL OF ARTERIAL BLOOD: CPT | Performed by: INTERNAL MEDICINE

## 2017-03-05 PROCEDURE — 83050 HGB METHEMOGLOBIN QUAN: CPT | Performed by: INTERNAL MEDICINE

## 2017-03-05 PROCEDURE — 84100 ASSAY OF PHOSPHORUS: CPT | Performed by: INTERNAL MEDICINE

## 2017-03-05 PROCEDURE — 80053 COMPREHEN METABOLIC PANEL: CPT | Performed by: INTERNAL MEDICINE

## 2017-03-05 PROCEDURE — 74000 HC ABDOMEN KUB: CPT

## 2017-03-05 PROCEDURE — 25010000002 MIDAZOLAM PER 1 MG: Performed by: INTERNAL MEDICINE

## 2017-03-05 PROCEDURE — 85007 BL SMEAR W/DIFF WBC COUNT: CPT | Performed by: INTERNAL MEDICINE

## 2017-03-05 RX ORDER — MIDAZOLAM HYDROCHLORIDE 1 MG/ML
2 INJECTION INTRAMUSCULAR; INTRAVENOUS EVERY 4 HOURS PRN
Status: DISCONTINUED | OUTPATIENT
Start: 2017-03-05 | End: 2017-03-20

## 2017-03-05 RX ORDER — METHYLPREDNISOLONE SODIUM SUCCINATE 40 MG/ML
20 INJECTION, POWDER, LYOPHILIZED, FOR SOLUTION INTRAMUSCULAR; INTRAVENOUS EVERY 12 HOURS
Status: DISCONTINUED | OUTPATIENT
Start: 2017-03-05 | End: 2017-03-09

## 2017-03-05 RX ORDER — FUROSEMIDE 10 MG/ML
40 INJECTION INTRAMUSCULAR; INTRAVENOUS DAILY
Status: DISCONTINUED | OUTPATIENT
Start: 2017-03-05 | End: 2017-03-07

## 2017-03-05 RX ORDER — VECURONIUM BROMIDE 1 MG/ML
5 INJECTION, POWDER, LYOPHILIZED, FOR SOLUTION INTRAVENOUS EVERY 6 HOURS
Status: COMPLETED | OUTPATIENT
Start: 2017-03-05 | End: 2017-03-06

## 2017-03-05 RX ORDER — FUROSEMIDE 10 MG/ML
60 INJECTION INTRAMUSCULAR; INTRAVENOUS DAILY
Status: DISCONTINUED | OUTPATIENT
Start: 2017-03-05 | End: 2017-03-05

## 2017-03-05 RX ADMIN — PROPOFOL 50 MCG/KG/MIN: 10 INJECTION, EMULSION INTRAVENOUS at 10:39

## 2017-03-05 RX ADMIN — SUCRALFATE 1 G: 1 TABLET ORAL at 17:01

## 2017-03-05 RX ADMIN — Medication: at 05:34

## 2017-03-05 RX ADMIN — CLOPIDOGREL 75 MG: 75 TABLET, FILM COATED ORAL at 08:27

## 2017-03-05 RX ADMIN — VANCOMYCIN HYDROCHLORIDE 750 MG: 5 INJECTION, POWDER, LYOPHILIZED, FOR SOLUTION INTRAVENOUS at 08:27

## 2017-03-05 RX ADMIN — METRONIDAZOLE 500 MG: 500 INJECTION, SOLUTION INTRAVENOUS at 10:40

## 2017-03-05 RX ADMIN — VECURONIUM BROMIDE 5 MG: 1 INJECTION, POWDER, LYOPHILIZED, FOR SOLUTION INTRAVENOUS at 14:37

## 2017-03-05 RX ADMIN — FUROSEMIDE 40 MG: 10 INJECTION, SOLUTION INTRAMUSCULAR; INTRAVENOUS at 08:28

## 2017-03-05 RX ADMIN — PROPOFOL 50 MCG/KG/MIN: 10 INJECTION, EMULSION INTRAVENOUS at 17:02

## 2017-03-05 RX ADMIN — GUAIFENESIN 600 MG: 600 TABLET, EXTENDED RELEASE ORAL at 08:27

## 2017-03-05 RX ADMIN — METOPROLOL TARTRATE 25 MG: 25 TABLET, FILM COATED ORAL at 20:12

## 2017-03-05 RX ADMIN — ASPIRIN 81 MG: 81 TABLET ORAL at 08:27

## 2017-03-05 RX ADMIN — METRONIDAZOLE 500 MG: 500 INJECTION, SOLUTION INTRAVENOUS at 18:04

## 2017-03-05 RX ADMIN — METHYLPREDNISOLONE SODIUM SUCCINATE 20 MG: 40 INJECTION, POWDER, FOR SOLUTION INTRAMUSCULAR; INTRAVENOUS at 21:54

## 2017-03-05 RX ADMIN — Medication: at 10:38

## 2017-03-05 RX ADMIN — PROPOFOL 50 MCG/KG/MIN: 10 INJECTION, EMULSION INTRAVENOUS at 05:18

## 2017-03-05 RX ADMIN — SUCRALFATE 1 G: 1 TABLET ORAL at 08:27

## 2017-03-05 RX ADMIN — AZTREONAM 2 G: 2 INJECTION, POWDER, FOR SOLUTION INTRAMUSCULAR; INTRAVENOUS at 10:39

## 2017-03-05 RX ADMIN — IPRATROPIUM BROMIDE 0.5 MG: 0.5 SOLUTION RESPIRATORY (INHALATION) at 00:43

## 2017-03-05 RX ADMIN — SUCRALFATE 1 G: 1 TABLET ORAL at 20:12

## 2017-03-05 RX ADMIN — METHYLPREDNISOLONE SODIUM SUCCINATE 40 MG: 40 INJECTION, POWDER, FOR SOLUTION INTRAMUSCULAR; INTRAVENOUS at 08:28

## 2017-03-05 RX ADMIN — MIDAZOLAM HYDROCHLORIDE 2 MG: 1 INJECTION, SOLUTION INTRAMUSCULAR; INTRAVENOUS at 10:40

## 2017-03-05 RX ADMIN — VECURONIUM BROMIDE 5 MG: 1 INJECTION, POWDER, LYOPHILIZED, FOR SOLUTION INTRAVENOUS at 08:00

## 2017-03-05 RX ADMIN — VECURONIUM BROMIDE 5 MG: 1 INJECTION, POWDER, LYOPHILIZED, FOR SOLUTION INTRAVENOUS at 04:02

## 2017-03-05 RX ADMIN — AZITHROMYCIN 500 MG: 500 INJECTION, POWDER, LYOPHILIZED, FOR SOLUTION INTRAVENOUS at 10:39

## 2017-03-05 RX ADMIN — Medication: at 20:52

## 2017-03-05 RX ADMIN — AZTREONAM 2 G: 2 INJECTION, POWDER, FOR SOLUTION INTRAMUSCULAR; INTRAVENOUS at 04:05

## 2017-03-05 RX ADMIN — IPRATROPIUM BROMIDE 0.5 MG: 0.5 SOLUTION RESPIRATORY (INHALATION) at 12:09

## 2017-03-05 RX ADMIN — HEPARIN SODIUM 5000 UNITS: 5000 INJECTION, SOLUTION INTRAVENOUS; SUBCUTANEOUS at 20:12

## 2017-03-05 RX ADMIN — SUCRALFATE 1 G: 1 TABLET ORAL at 10:39

## 2017-03-05 RX ADMIN — IPRATROPIUM BROMIDE 0.5 MG: 0.5 SOLUTION RESPIRATORY (INHALATION) at 18:30

## 2017-03-05 RX ADMIN — GUAIFENESIN 600 MG: 600 TABLET, EXTENDED RELEASE ORAL at 17:01

## 2017-03-05 RX ADMIN — PANTOPRAZOLE SODIUM 40 MG: 40 INJECTION, POWDER, FOR SOLUTION INTRAVENOUS at 06:20

## 2017-03-05 RX ADMIN — METOPROLOL TARTRATE 25 MG: 25 TABLET, FILM COATED ORAL at 08:27

## 2017-03-05 RX ADMIN — HEPARIN SODIUM 5000 UNITS: 5000 INJECTION, SOLUTION INTRAVENOUS; SUBCUTANEOUS at 08:27

## 2017-03-05 RX ADMIN — IPRATROPIUM BROMIDE 0.5 MG: 0.5 SOLUTION RESPIRATORY (INHALATION) at 06:19

## 2017-03-05 RX ADMIN — VANCOMYCIN HYDROCHLORIDE 750 MG: 5 INJECTION, POWDER, LYOPHILIZED, FOR SOLUTION INTRAVENOUS at 20:13

## 2017-03-05 RX ADMIN — METRONIDAZOLE 500 MG: 500 INJECTION, SOLUTION INTRAVENOUS at 04:05

## 2017-03-05 RX ADMIN — AZTREONAM 2 G: 2 INJECTION, POWDER, FOR SOLUTION INTRAMUSCULAR; INTRAVENOUS at 18:04

## 2017-03-05 RX ADMIN — Medication: at 00:16

## 2017-03-05 RX ADMIN — MIDAZOLAM HYDROCHLORIDE 2 MG: 1 INJECTION, SOLUTION INTRAMUSCULAR; INTRAVENOUS at 16:59

## 2017-03-05 RX ADMIN — VECURONIUM BROMIDE 5 MG: 1 INJECTION, POWDER, LYOPHILIZED, FOR SOLUTION INTRAVENOUS at 20:12

## 2017-03-05 RX ADMIN — Medication: at 15:36

## 2017-03-05 RX ADMIN — PROPOFOL 50 MCG/KG/MIN: 10 INJECTION, EMULSION INTRAVENOUS at 23:02

## 2017-03-05 NOTE — PLAN OF CARE
Problem: Patient Care Overview (Adult)  Goal: Plan of Care Review  Outcome: Ongoing (interventions implemented as appropriate)  Goal: Adult Individualization and Mutuality  Outcome: Ongoing (interventions implemented as appropriate)  Goal: Discharge Needs Assessment  Outcome: Ongoing (interventions implemented as appropriate)    Problem: Pneumonia (Adult)  Goal: Signs and Symptoms of Listed Potential Problems Will be Absent or Manageable (Pneumonia)  Outcome: Ongoing (interventions implemented as appropriate)    Problem: Diabetes, Type 2 (Adult)  Goal: Signs and Symptoms of Listed Potential Problems Will be Absent or Manageable (Diabetes, Type 2)  Outcome: Ongoing (interventions implemented as appropriate)    Problem: SAFETY - NON-VIOLENT RESTRAINT  Goal: Remains free of injury from restraints (Non-Violent Restraint)  Outcome: Ongoing (interventions implemented as appropriate)  Goal: Free from restraint(s) (Non-Violent Restraint)  Outcome: Ongoing (interventions implemented as appropriate)

## 2017-03-05 NOTE — PLAN OF CARE
Problem: Patient Care Overview (Adult)  Goal: Plan of Care Review  Outcome: Ongoing (interventions implemented as appropriate)  Goal: Adult Individualization and Mutuality  Outcome: Ongoing (interventions implemented as appropriate)  Goal: Discharge Needs Assessment  Outcome: Ongoing (interventions implemented as appropriate)    Problem: Pneumonia (Adult)  Goal: Signs and Symptoms of Listed Potential Problems Will be Absent or Manageable (Pneumonia)  Outcome: Ongoing (interventions implemented as appropriate)    Problem: Diabetes, Type 2 (Adult)  Goal: Signs and Symptoms of Listed Potential Problems Will be Absent or Manageable (Diabetes, Type 2)  Outcome: Ongoing (interventions implemented as appropriate)    Problem: Mechanical Ventilation, Invasive (Adult)  Goal: Signs and Symptoms of Listed Potential Problems Will be Absent or Manageable (Mechanical Ventilation, Invasive)  Outcome: Ongoing (interventions implemented as appropriate)    Problem: SAFETY - NON-VIOLENT RESTRAINT  Goal: Remains free of injury from restraints (Non-Violent Restraint)  Outcome: Ongoing (interventions implemented as appropriate)  Goal: Free from restraint(s) (Non-Violent Restraint)  Outcome: Ongoing (interventions implemented as appropriate)

## 2017-03-05 NOTE — PROGRESS NOTES
[facility] CCU Note    Patient Identification:  Name:  Pepito Yan  Age:  51 y.o.  Sex:  female  :  1965  MRN:  [id number]  Visit number:  [id number]  Primary Care Provider:  ROSA Meza    5    Ventilator Day:  Ventilator Settings:  Ventilator/Non-Invasive Ventilation Settings     Start     Ordered    17 1753  . BIPAP; IPAP: 10; EPAP: 16; Titrate for spO2: equal to or greater than, 90%  At Bedtime & As Needed-RT     Question Answer Comment   . BIPAP    IPAP 10    EPAP 16    Titrate for spO2 equal to or greater than    Titrate for spO2 90%        17 1755    17 175  . BIPAP  At Bedtime & As Needed-RT,   Status:  Canceled     Question:  .  Answer:  BIPAP    17 17517 112  NIPPV CPAP OR BIPAP  Until Discontinued,   Status:  Canceled     Comments:  16/ 35 % oxygen rate 20    17 1127          Drips: Propofol, Fentanyl  Antibiotics: Vancomycin, Azactam and Azithromycin  Lines: Right IJ line  King Catheter:Yes    Procedures:  Emergent Intubation 3/3/17  Right femoral line 3/3/17  Right internal jugular line 3/3/17    HPI:  The patient is a 51 year old female admitted with shortness of air    Subjective     No acute events overnight per nursing staff. No bowel movements noted. The patient became agitated early this morning despite being sedated on the maximum dose of propofol and fentanyl. She is currently calm and resting comfortably.    History taken from: Dinah fernandez RN   Patient unable to give history due to: intubated  Present during visit: GUERRERO Nix    Objective     Vital Signs  Temp:  [99.3 °F (37.4 °C)-100.3 °F (37.9 °C)] 100.2 °F (37.9 °C)  Heart Rate:  [] 90  Resp:  [27-34] 30  BP: ()/(58-68) 110/66  FiO2 (%):  [60 %-100 %] 60 %  Body mass index is 21.79 kg/(m^2).    Intake/Output Summary (Last 24 hours) at 17 0840  Last data filed at 17 0827   Gross per 24 hour   Intake 2077.56 ml   Output   1850  ml   Net 227.56 ml       Physical Exam:    Physical Exam   Constitutional: She appears well-developed and well-nourished. She appears ill. No distress. She is sedated and intubated.   HENT:   Head: Normocephalic and atraumatic.   Nose: Nose normal.   Mouth/Throat: Oropharynx is clear and moist and mucous membranes are normal.   ETT and OG tube in place   Eyes: Conjunctivae and EOM are normal. Pupils are equal, round, and reactive to light. No scleral icterus.   Neck: Trachea normal. Neck supple. No JVD present. Carotid bruit is not present. No thyromegaly present.   Cardiovascular: Regular rhythm, normal heart sounds and normal pulses.  Tachycardia present.  Exam reveals no gallop and no friction rub.    No murmur heard.  No significant lower extremity edema   Pulmonary/Chest: Effort normal. She is intubated. No respiratory distress. She has decreased breath sounds in the right lower field and the left lower field. She has no wheezes. She has no rales.   Abdominal: Soft. She exhibits distension. Bowel sounds are decreased. There is no tenderness. There is no guarding.   Genitourinary:   Genitourinary Comments: King catheter in place draining a yellow urine   Musculoskeletal: Normal range of motion.   Neurological:   Sedated on mechanical ventilation   Skin: Skin is warm and dry. No rash noted. No erythema.   A small scab is noted on her 3rd left digit and her right forearm     Results Review:     I reviewed the patient's new imaging results and agree with the interpretation.  I reviewed the patient's other test results and agree with the interpretation.    Telemetry: Sinus tachycardia, 113      Results from last 7 days  Lab Units 03/05/17  0111 03/04/17  0122 03/03/17  0040 03/02/17  1023 03/02/17  0120 02/28/17  1939   WBC 10*3/mm3 3.65* 6.99 4.46*  --  5.90 8.29   HEMOGLOBIN g/dL 7.1* 9.0* 8.8* 9.5* 8.3* 9.9*   PLATELETS 10*3/mm3 164 196 168  --  168 222       Results from last 7 days  Lab Units 03/05/17  0111  03/04/17  0123 03/03/17  0039 03/02/17  0120 02/28/17  1939   SODIUM mmol/L 146 144 137 134* 139   POTASSIUM mmol/L 4.2 5.1 4.5 4.2 3.8   CHLORIDE mmol/L 119* 116* 109 109 109   TOTAL CO2 mmol/L 24.0* 23.6* 20.8* 17.9* 24.3   BUN mg/dL 47* 31* 27* 20 18   CREATININE mg/dL 0.79 0.96 0.71 0.65 0.57   CALCIUM mg/dL 7.9 8.5 8.1 8.4 8.8   GLUCOSE mg/dL 134* 87 114* 330* 117*       Results from last 7 days  Lab Units 03/05/17  0111 03/04/17  0123 03/03/17  0039 03/02/17  0120 02/28/17 1939   BILIRUBIN mg/dL 0.2 0.2 0.2 0.1* 0.2   ALK PHOS U/L 49 54 52 51 59   AST (SGOT) U/L 45* 64* 56* 28 29   ALT (SGPT) U/L 27 34 36 31 41*       Results from last 7 days  Lab Units 03/05/17  0111 03/04/17  0123 03/02/17  0120   MAGNESIUM mg/dL 2.6 2.3 2.1       Results from last 7 days  Lab Units 03/05/17  0111 03/04/17  0123 03/03/17  0039   CRP mg/dL 12.48* 24.96* 16.73*     ABG:      Cultures:  BLOOD CULTURE   Date Value Ref Range Status   02/28/2017 No growth at 2 days  Preliminary   02/28/2017 No growth at 2 days  Preliminary     BLOOD CULTURE   Date Value Ref Range Status   03/03/2017 No growth at less than 24 hours  Preliminary   03/03/2017 No growth at less than 24 hours  Preliminary   02/28/2017 No growth at 3 days  Preliminary   02/28/2017 No growth at 3 days  Preliminary     URINE CULTURE   Date Value Ref Range Status   02/28/2017 >100,000 CFU/mL Escherichia coli (A)  Final         Portable CXR:        Current Hospital Medications:    aspirin 81 mg Oral Daily   azithromycin 500 mg Intravenous Q24H   aztreonam 2 g Intravenous Q8H   clopidogrel 75 mg Oral Daily   furosemide 40 mg Intravenous Daily   guaiFENesin 600 mg Oral BID   heparin (porcine) 5,000 Units Subcutaneous Q12H   insulin aspart 0-14 Units Subcutaneous 4x Daily AC & at Bedtime   insulin detemir 20 Units Subcutaneous Nightly   ipratropium 0.5 mg Nebulization Q6H - RT   methylPREDNISolone sodium succinate 20 mg Intravenous Q12H   metoprolol tartrate 25 mg Oral Q12H    metroNIDAZOLE 500 mg Intravenous Q8H   pantoprazole 40 mg Intravenous Q AM   potassium chloride 40 mEq Oral Once   sucralfate 1 g Oral 4x Daily AC & at Bedtime   vancomycin 750 mg Intravenous Q12H   vecuronium 5 mg Intravenous Q6H       fentaNYL (SUBLIMAZE) PCA 1500 mcg/30 mL syringe     norepinephrine 0.02-0.3 mcg/kg/min Last Rate: Stopped (03/04/17 0355)   Pharmacy to dose vancomycin     propofol 5-50 mcg/kg/min Last Rate: 50 mcg/kg/min (03/05/17 0518)       Assessment/Plan     -Severe sepsis secondary to a bilateral community acquired pneumonia causing severe organ failure  -Sepsis induced acute respiratory distress syndrome (ARDS)  -Acute hypercapnic respiratory failure  -Severe acute respiratory distress syndrome; improving  -E.coli UTI resistant to fluoroquinolones  -Drop in hemoglobin/hematocrit; no signs/symptoms of active bleeding - may be due to acute illness  -Mild leukopenia  -Pre-renal azotemia due to aggressive diuresis  -Mild hyperchloridemia, probably due to diuresis  -Constipation  -Insulin dependent diabetes mellitus type II, currently controlled with an isolated episode of hypoglycemia - improved  -Tobacco abuse  -Chronic obstructive pulmonary disease  -Buerger's disease  -Acute on chronic microcytic anemia with history of iron deficiency    Overall, chest x-ray and ABG are showing improvement. I have decreased her PEEP with a repeat ABG pending at this time. Her FiO2 has been decreased and she remains with excellent oxygen saturation. Goal pH is 7.30. The patient was having auto-PEEP yesterday and was briefly disconnected from the ventilator. Her Peak pressures showed improvement thereafter. CXR has been ordered. Her CRP is improved. Continue with Vancomycin, Azactam and Azithromycin for now. She will receive a dose of Lasix today to keep a negative fluid balance. I will repeat her chest xray in the morning. Plan to continue her paralytic for today (based on discussion with pulmonary/critical  care yesterday). Pulmonary/Critical care coverage should resume tomorrow.     A repeat hemoglobin/hematocrit has been ordered for 1pm. Will plan to transfuse if hemoglobin </= 7.0. Will continue with Levophed as needed. Continue tube feedings. Her magnesium and phosphorus are normal. Continue with current insulin regimen. An echocardiogram has been performed on 3/3; awaiting results.    DVT/GI ppx: subcutaneous heparin/intravenous pantoprazole    Overall prognosis remains guarded    Patient is considered high risk due to: ARDS, severe sepsis, respiratory failure, bilateral pneumonia    I discussed the patients findings and my recommendations with nursing staff.    Total critical care time spent: 35 minutes.    Sherrill Weiner DO  03/05/17  8:40 AM

## 2017-03-05 NOTE — PLAN OF CARE
Problem: Patient Care Overview (Adult)  Goal: Plan of Care Review  Outcome: Ongoing (interventions implemented as appropriate)    03/05/17 0600   Coping/Psychosocial Response Interventions   Plan Of Care Reviewed With patient;daughter   Patient Care Overview   Progress no change   Outcome Evaluation   Outcome Summary/Follow up Plan remains intubated and sedated on both fentanyl and propofol gtt along with vec ivp every 6 hours, vss urine output adequate, continues to have soft bilat wrist restraints on, see chart for lab results       Goal: Adult Individualization and Mutuality  Outcome: Ongoing (interventions implemented as appropriate)  Goal: Discharge Needs Assessment  Outcome: Ongoing (interventions implemented as appropriate)    Problem: Pneumonia (Adult)  Goal: Signs and Symptoms of Listed Potential Problems Will be Absent or Manageable (Pneumonia)  Outcome: Ongoing (interventions implemented as appropriate)    Problem: Diabetes, Type 2 (Adult)  Goal: Signs and Symptoms of Listed Potential Problems Will be Absent or Manageable (Diabetes, Type 2)  Outcome: Ongoing (interventions implemented as appropriate)    Problem: Mechanical Ventilation, Invasive (Adult)  Goal: Signs and Symptoms of Listed Potential Problems Will be Absent or Manageable (Mechanical Ventilation, Invasive)  Outcome: Ongoing (interventions implemented as appropriate)    Problem: SAFETY - NON-VIOLENT RESTRAINT  Goal: Remains free of injury from restraints (Non-Violent Restraint)  Outcome: Ongoing (interventions implemented as appropriate)  Goal: Free from restraint(s) (Non-Violent Restraint)  Outcome: Ongoing (interventions implemented as appropriate)

## 2017-03-06 ENCOUNTER — APPOINTMENT (OUTPATIENT)
Dept: GENERAL RADIOLOGY | Facility: HOSPITAL | Age: 52
End: 2017-03-06

## 2017-03-06 LAB
A-A DO2: 298.5 MMHG (ref 0–300)
ABO GROUP BLD: NORMAL
ALBUMIN SERPL-MCNC: 2.8 G/DL (ref 3.5–5)
ALBUMIN/GLOB SERPL: 1 G/DL (ref 1.5–2.5)
ALP SERPL-CCNC: 70 U/L (ref 35–104)
ALT SERPL W P-5'-P-CCNC: 30 U/L (ref 10–36)
ANION GAP SERPL CALCULATED.3IONS-SCNC: 1.7 MMOL/L (ref 3.6–11.2)
ANISOCYTOSIS BLD QL: NORMAL
ARTERIAL PATENCY WRIST A: ABNORMAL
AST SERPL-CCNC: 67 U/L (ref 10–30)
ATMOSPHERIC PRESS: 738 MMHG
BASE EXCESS BLDA CALC-SCNC: -3.5 MMOL/L
BASOPHILS # BLD AUTO: 0 10*3/MM3 (ref 0–0.3)
BASOPHILS NFR BLD AUTO: 0 % (ref 0–2)
BDY SITE: ABNORMAL
BILIRUB SERPL-MCNC: 0.3 MG/DL (ref 0.2–1.8)
BLD GP AB SCN SERPL QL: NEGATIVE
BODY TEMPERATURE: 98.6 C
BUN BLD-MCNC: 46 MG/DL (ref 7–21)
BUN/CREAT SERPL: 74.2 (ref 7–25)
CALCIUM SPEC-SCNC: 8 MG/DL (ref 7.7–10)
CHLORIDE SERPL-SCNC: 122 MMOL/L (ref 99–112)
CO2 SERPL-SCNC: 24.3 MMOL/L (ref 24.3–31.9)
COHGB MFR BLD: 1.4 % (ref 0–5)
CREAT BLD-MCNC: 0.62 MG/DL (ref 0.43–1.29)
CRP SERPL-MCNC: 6.48 MG/DL (ref 0–0.99)
DACRYOCYTES BLD QL SMEAR: NORMAL
DEPRECATED RDW RBC AUTO: 50.8 FL (ref 37–54)
EOSINOPHIL # BLD AUTO: 0 10*3/MM3 (ref 0–0.7)
EOSINOPHIL NFR BLD AUTO: 0 % (ref 0–5)
ERYTHROCYTE [DISTWIDTH] IN BLOOD BY AUTOMATED COUNT: 19 % (ref 11.5–14.5)
GFR SERPL CREATININE-BSD FRML MDRD: 101 ML/MIN/1.73
GLOBULIN UR ELPH-MCNC: 2.8 GM/DL
GLUCOSE BLD-MCNC: 99 MG/DL (ref 70–110)
GLUCOSE BLDC GLUCOMTR-MCNC: 103 MG/DL (ref 70–130)
GLUCOSE BLDC GLUCOMTR-MCNC: 113 MG/DL (ref 70–130)
GLUCOSE BLDC GLUCOMTR-MCNC: 119 MG/DL (ref 70–130)
GLUCOSE BLDC GLUCOMTR-MCNC: 139 MG/DL (ref 70–130)
HAV IGM SERPL QL IA: ABNORMAL
HBV CORE IGM SERPL QL IA: ABNORMAL
HBV SURFACE AG SERPL QL IA: ABNORMAL
HCO3 BLDA-SCNC: 21.7 MMOL/L (ref 22–26)
HCT VFR BLD AUTO: 24.5 % (ref 37–47)
HCT VFR BLD AUTO: 29.3 % (ref 37–47)
HCT VFR BLD AUTO: 29.7 % (ref 37–47)
HCT VFR BLD CALC: 45 % (ref 37–47)
HCV AB SER DONR QL: REACTIVE
HGB BLD-MCNC: 7 G/DL (ref 12–16)
HGB BLD-MCNC: 8.4 G/DL (ref 12–16)
HGB BLD-MCNC: 8.4 G/DL (ref 12–16)
HGB BLDA-MCNC: 15.4 G/DL (ref 12–16)
HIV1+2 AB SER QL: NORMAL
HOROWITZ INDEX BLD+IHG-RTO: 60 %
HYPOCHROMIA BLD QL: NORMAL
IMM GRANULOCYTES # BLD: 0.04 10*3/MM3 (ref 0–0.03)
IMM GRANULOCYTES NFR BLD: 1.1 % (ref 0–0.5)
LYMPHOCYTES # BLD AUTO: 0.34 10*3/MM3 (ref 1–3)
LYMPHOCYTES NFR BLD AUTO: 9.7 % (ref 21–51)
MCH RBC QN AUTO: 20.9 PG (ref 27–33)
MCHC RBC AUTO-ENTMCNC: 28.6 G/DL (ref 33–37)
MCV RBC AUTO: 73.1 FL (ref 80–94)
METHGB BLD QL: 0.2 % (ref 0–3)
MICROCYTES BLD QL: NORMAL
MODALITY: ABNORMAL
MONOCYTES # BLD AUTO: 0.11 10*3/MM3 (ref 0.1–0.9)
MONOCYTES NFR BLD AUTO: 3.1 % (ref 0–10)
NEUTROPHILS # BLD AUTO: 3.02 10*3/MM3 (ref 1.4–6.5)
NEUTROPHILS NFR BLD AUTO: 86.1 % (ref 30–70)
OSMOLALITY SERPL CALC.SUM OF ELEC: 306.2 MOSM/KG (ref 273–305)
OXYHGB MFR BLDV: 92.4 % (ref 85–100)
PCO2 BLDA: 39.8 MM HG (ref 35–45)
PEEP RESPIRATORY: 12 CM[H2O]
PH BLDA: 7.36 PH UNITS (ref 7.35–7.45)
PLAT MORPH BLD: NORMAL
PLATELET # BLD AUTO: 146 10*3/MM3 (ref 130–400)
PMV BLD AUTO: 10.5 FL (ref 6–10)
PO2 BLDA: 72.3 MM HG (ref 80–100)
POTASSIUM BLD-SCNC: 3.6 MMOL/L (ref 3.5–5.3)
PROT SERPL-MCNC: 5.6 G/DL (ref 6–8)
RBC # BLD AUTO: 3.35 10*6/MM3 (ref 4.2–5.4)
RH BLD: POSITIVE
SAO2 % BLDCOA: 93.9 % (ref 90–100)
SET MECH RESP RATE: 30
SODIUM BLD-SCNC: 148 MMOL/L (ref 135–153)
STOMATOCYTES BLD QL SMEAR: NORMAL
T4 FREE SERPL-MCNC: 0.62 NG/DL (ref 0.89–1.76)
TARGETS BLD QL SMEAR: NORMAL
VENTILATOR MODE: AC
VT ON VENT VENT: 450 ML
WBC NRBC COR # BLD: 3.51 10*3/MM3 (ref 4.5–12.5)

## 2017-03-06 PROCEDURE — 82962 GLUCOSE BLOOD TEST: CPT

## 2017-03-06 PROCEDURE — 86920 COMPATIBILITY TEST SPIN: CPT

## 2017-03-06 PROCEDURE — 25010000002 AZITHROMYCIN: Performed by: INTERNAL MEDICINE

## 2017-03-06 PROCEDURE — 85007 BL SMEAR W/DIFF WBC COUNT: CPT | Performed by: INTERNAL MEDICINE

## 2017-03-06 PROCEDURE — 85014 HEMATOCRIT: CPT | Performed by: INTERNAL MEDICINE

## 2017-03-06 PROCEDURE — 86140 C-REACTIVE PROTEIN: CPT | Performed by: INTERNAL MEDICINE

## 2017-03-06 PROCEDURE — 25010000002 MIDAZOLAM PER 1 MG: Performed by: INTERNAL MEDICINE

## 2017-03-06 PROCEDURE — 87040 BLOOD CULTURE FOR BACTERIA: CPT | Performed by: INTERNAL MEDICINE

## 2017-03-06 PROCEDURE — 99233 SBSQ HOSP IP/OBS HIGH 50: CPT | Performed by: INTERNAL MEDICINE

## 2017-03-06 PROCEDURE — 71010 HC CHEST PA OR AP: CPT

## 2017-03-06 PROCEDURE — 86900 BLOOD TYPING SEROLOGIC ABO: CPT | Performed by: INTERNAL MEDICINE

## 2017-03-06 PROCEDURE — 25010000002 METHYLPREDNISOLONE PER 40 MG: Performed by: INTERNAL MEDICINE

## 2017-03-06 PROCEDURE — 80053 COMPREHEN METABOLIC PANEL: CPT | Performed by: INTERNAL MEDICINE

## 2017-03-06 PROCEDURE — 80074 ACUTE HEPATITIS PANEL: CPT | Performed by: INTERNAL MEDICINE

## 2017-03-06 PROCEDURE — 94799 UNLISTED PULMONARY SVC/PX: CPT

## 2017-03-06 PROCEDURE — 94002 VENT MGMT INPAT INIT DAY: CPT

## 2017-03-06 PROCEDURE — 25010000002 PROPOFOL 1000 MG/ML EMULSION: Performed by: INTERNAL MEDICINE

## 2017-03-06 PROCEDURE — 82375 ASSAY CARBOXYHB QUANT: CPT | Performed by: INTERNAL MEDICINE

## 2017-03-06 PROCEDURE — 82805 BLOOD GASES W/O2 SATURATION: CPT | Performed by: INTERNAL MEDICINE

## 2017-03-06 PROCEDURE — 83050 HGB METHEMOGLOBIN QUAN: CPT | Performed by: INTERNAL MEDICINE

## 2017-03-06 PROCEDURE — 25010000002 HEPARIN (PORCINE) PER 1000 UNITS: Performed by: INTERNAL MEDICINE

## 2017-03-06 PROCEDURE — 25010000002 FUROSEMIDE PER 20 MG: Performed by: INTERNAL MEDICINE

## 2017-03-06 PROCEDURE — 84439 ASSAY OF FREE THYROXINE: CPT | Performed by: INTERNAL MEDICINE

## 2017-03-06 PROCEDURE — 85018 HEMOGLOBIN: CPT | Performed by: INTERNAL MEDICINE

## 2017-03-06 PROCEDURE — G0432 EIA HIV-1/HIV-2 SCREEN: HCPCS | Performed by: INTERNAL MEDICINE

## 2017-03-06 PROCEDURE — 63710000001 INSULIN ASPART PER 5 UNITS: Performed by: INTERNAL MEDICINE

## 2017-03-06 PROCEDURE — 71010 XR CHEST 1 VW: CPT | Performed by: RADIOLOGY

## 2017-03-06 PROCEDURE — 25010000002 VANCOMYCIN PER 500 MG: Performed by: INTERNAL MEDICINE

## 2017-03-06 PROCEDURE — 86901 BLOOD TYPING SEROLOGIC RH(D): CPT | Performed by: INTERNAL MEDICINE

## 2017-03-06 PROCEDURE — 36600 WITHDRAWAL OF ARTERIAL BLOOD: CPT | Performed by: INTERNAL MEDICINE

## 2017-03-06 PROCEDURE — 86850 RBC ANTIBODY SCREEN: CPT | Performed by: INTERNAL MEDICINE

## 2017-03-06 PROCEDURE — 85025 COMPLETE CBC W/AUTO DIFF WBC: CPT | Performed by: INTERNAL MEDICINE

## 2017-03-06 RX ORDER — MIDAZOLAM HYDROCHLORIDE 1 MG/ML
INJECTION INTRAMUSCULAR; INTRAVENOUS
Status: DISPENSED
Start: 2017-03-06 | End: 2017-03-06

## 2017-03-06 RX ORDER — POTASSIUM CHLORIDE 750 MG/1
40 CAPSULE, EXTENDED RELEASE ORAL AS NEEDED
Status: DISCONTINUED | OUTPATIENT
Start: 2017-03-06 | End: 2017-03-20 | Stop reason: HOSPADM

## 2017-03-06 RX ORDER — POTASSIUM CHLORIDE 7.45 MG/ML
10 INJECTION INTRAVENOUS
Status: DISCONTINUED | OUTPATIENT
Start: 2017-03-06 | End: 2017-03-20 | Stop reason: HOSPADM

## 2017-03-06 RX ORDER — POTASSIUM CHLORIDE 20 MEQ/1
40 TABLET, EXTENDED RELEASE ORAL EVERY 4 HOURS
Status: COMPLETED | OUTPATIENT
Start: 2017-03-06 | End: 2017-03-06

## 2017-03-06 RX ORDER — ACETAMINOPHEN 325 MG/1
650 TABLET ORAL ONCE
Status: DISCONTINUED | OUTPATIENT
Start: 2017-03-06 | End: 2017-03-20 | Stop reason: HOSPADM

## 2017-03-06 RX ORDER — POTASSIUM CHLORIDE 1.5 G/1.77G
40 POWDER, FOR SOLUTION ORAL AS NEEDED
Status: DISCONTINUED | OUTPATIENT
Start: 2017-03-06 | End: 2017-03-20 | Stop reason: HOSPADM

## 2017-03-06 RX ORDER — MIDAZOLAM HYDROCHLORIDE 1 MG/ML
2 INJECTION INTRAMUSCULAR; INTRAVENOUS ONCE
Status: COMPLETED | OUTPATIENT
Start: 2017-03-06 | End: 2017-03-06

## 2017-03-06 RX ADMIN — AZTREONAM 2 G: 2 INJECTION, POWDER, FOR SOLUTION INTRAMUSCULAR; INTRAVENOUS at 10:45

## 2017-03-06 RX ADMIN — IPRATROPIUM BROMIDE 0.5 MG: 0.5 SOLUTION RESPIRATORY (INHALATION) at 06:20

## 2017-03-06 RX ADMIN — GUAIFENESIN 600 MG: 600 TABLET, EXTENDED RELEASE ORAL at 18:14

## 2017-03-06 RX ADMIN — MIDAZOLAM HYDROCHLORIDE 2 MG: 1 INJECTION, SOLUTION INTRAMUSCULAR; INTRAVENOUS at 00:42

## 2017-03-06 RX ADMIN — AZITHROMYCIN 500 MG: 500 INJECTION, POWDER, LYOPHILIZED, FOR SOLUTION INTRAVENOUS at 09:16

## 2017-03-06 RX ADMIN — PROPOFOL 60 MCG/KG/MIN: 10 INJECTION, EMULSION INTRAVENOUS at 14:05

## 2017-03-06 RX ADMIN — METOPROLOL TARTRATE 25 MG: 25 TABLET, FILM COATED ORAL at 08:30

## 2017-03-06 RX ADMIN — MIDAZOLAM HYDROCHLORIDE 2 MG: 1 INJECTION, SOLUTION INTRAMUSCULAR; INTRAVENOUS at 10:27

## 2017-03-06 RX ADMIN — POTASSIUM CHLORIDE 40 MEQ: 1500 TABLET, EXTENDED RELEASE ORAL at 14:04

## 2017-03-06 RX ADMIN — MIDAZOLAM HYDROCHLORIDE 2 MG: 1 INJECTION, SOLUTION INTRAMUSCULAR; INTRAVENOUS at 03:51

## 2017-03-06 RX ADMIN — SUCRALFATE 1 G: 1 TABLET ORAL at 20:38

## 2017-03-06 RX ADMIN — Medication: at 06:57

## 2017-03-06 RX ADMIN — INSULIN ASPART 5 UNITS: 100 INJECTION, SOLUTION INTRAVENOUS; SUBCUTANEOUS at 10:56

## 2017-03-06 RX ADMIN — METRONIDAZOLE 500 MG: 500 INJECTION, SOLUTION INTRAVENOUS at 10:45

## 2017-03-06 RX ADMIN — VANCOMYCIN HYDROCHLORIDE 750 MG: 5 INJECTION, POWDER, LYOPHILIZED, FOR SOLUTION INTRAVENOUS at 20:40

## 2017-03-06 RX ADMIN — METHYLPREDNISOLONE SODIUM SUCCINATE 20 MG: 40 INJECTION, POWDER, FOR SOLUTION INTRAMUSCULAR; INTRAVENOUS at 20:40

## 2017-03-06 RX ADMIN — VANCOMYCIN HYDROCHLORIDE 750 MG: 5 INJECTION, POWDER, LYOPHILIZED, FOR SOLUTION INTRAVENOUS at 08:30

## 2017-03-06 RX ADMIN — Medication: at 02:01

## 2017-03-06 RX ADMIN — Medication: at 17:27

## 2017-03-06 RX ADMIN — PROPOFOL 60 MCG/KG/MIN: 10 INJECTION, EMULSION INTRAVENOUS at 09:59

## 2017-03-06 RX ADMIN — PANTOPRAZOLE SODIUM 40 MG: 40 INJECTION, POWDER, FOR SOLUTION INTRAVENOUS at 05:33

## 2017-03-06 RX ADMIN — METRONIDAZOLE 500 MG: 500 INJECTION, SOLUTION INTRAVENOUS at 18:12

## 2017-03-06 RX ADMIN — VECURONIUM BROMIDE 5 MG: 1 INJECTION, POWDER, LYOPHILIZED, FOR SOLUTION INTRAVENOUS at 02:42

## 2017-03-06 RX ADMIN — GUAIFENESIN 600 MG: 600 TABLET, EXTENDED RELEASE ORAL at 08:30

## 2017-03-06 RX ADMIN — DEXMEDETOMIDINE HYDROCHLORIDE 0.2 MCG/KG/HR: 100 INJECTION, SOLUTION INTRAVENOUS at 09:26

## 2017-03-06 RX ADMIN — IPRATROPIUM BROMIDE 0.5 MG: 0.5 SOLUTION RESPIRATORY (INHALATION) at 18:08

## 2017-03-06 RX ADMIN — AZTREONAM 2 G: 2 INJECTION, POWDER, FOR SOLUTION INTRAMUSCULAR; INTRAVENOUS at 02:41

## 2017-03-06 RX ADMIN — AZTREONAM 2 G: 2 INJECTION, POWDER, FOR SOLUTION INTRAMUSCULAR; INTRAVENOUS at 18:12

## 2017-03-06 RX ADMIN — PROPOFOL 50 MCG/KG/MIN: 10 INJECTION, EMULSION INTRAVENOUS at 21:24

## 2017-03-06 RX ADMIN — SUCRALFATE 1 G: 1 TABLET ORAL at 10:46

## 2017-03-06 RX ADMIN — SUCRALFATE 1 G: 1 TABLET ORAL at 18:14

## 2017-03-06 RX ADMIN — IPRATROPIUM BROMIDE 0.5 MG: 0.5 SOLUTION RESPIRATORY (INHALATION) at 12:47

## 2017-03-06 RX ADMIN — Medication: at 22:53

## 2017-03-06 RX ADMIN — FUROSEMIDE 40 MG: 10 INJECTION, SOLUTION INTRAMUSCULAR; INTRAVENOUS at 08:31

## 2017-03-06 RX ADMIN — SUCRALFATE 1 G: 1 TABLET ORAL at 08:30

## 2017-03-06 RX ADMIN — ACETAMINOPHEN 650 MG: 325 TABLET ORAL at 18:21

## 2017-03-06 RX ADMIN — IPRATROPIUM BROMIDE 0.5 MG: 0.5 SOLUTION RESPIRATORY (INHALATION) at 00:33

## 2017-03-06 RX ADMIN — MIDAZOLAM HYDROCHLORIDE 2 MG: 1 INJECTION, SOLUTION INTRAMUSCULAR; INTRAVENOUS at 08:47

## 2017-03-06 RX ADMIN — POTASSIUM CHLORIDE 40 MEQ: 1500 TABLET, EXTENDED RELEASE ORAL at 10:45

## 2017-03-06 RX ADMIN — DEXMEDETOMIDINE HYDROCHLORIDE 0.6 MCG/KG/HR: 100 INJECTION, SOLUTION INTRAVENOUS at 20:39

## 2017-03-06 RX ADMIN — HEPARIN SODIUM 5000 UNITS: 5000 INJECTION, SOLUTION INTRAVENOUS; SUBCUTANEOUS at 08:30

## 2017-03-06 RX ADMIN — METHYLPREDNISOLONE SODIUM SUCCINATE 20 MG: 40 INJECTION, POWDER, FOR SOLUTION INTRAMUSCULAR; INTRAVENOUS at 08:31

## 2017-03-06 RX ADMIN — PROPOFOL 50 MCG/KG/MIN: 10 INJECTION, EMULSION INTRAVENOUS at 03:44

## 2017-03-06 RX ADMIN — HEPARIN SODIUM 5000 UNITS: 5000 INJECTION, SOLUTION INTRAVENOUS; SUBCUTANEOUS at 20:38

## 2017-03-06 RX ADMIN — Medication: at 12:30

## 2017-03-06 RX ADMIN — ASPIRIN 81 MG: 81 TABLET ORAL at 08:30

## 2017-03-06 RX ADMIN — METRONIDAZOLE 500 MG: 500 INJECTION, SOLUTION INTRAVENOUS at 02:41

## 2017-03-06 RX ADMIN — MIDAZOLAM HYDROCHLORIDE 2 MG: 1 INJECTION, SOLUTION INTRAMUSCULAR; INTRAVENOUS at 21:39

## 2017-03-06 RX ADMIN — CLOPIDOGREL 75 MG: 75 TABLET, FILM COATED ORAL at 08:30

## 2017-03-06 NOTE — PROGRESS NOTES
Discharge Planning Assessment   Daniel     Patient Name: Pepito Yan  MRN: [id number]  Today's Date: 3/6/2017    Admit Date: 2/28/2017          Discharge Plan       03/06/17 1459    Case Management/Social Work Plan    Plan SS received a call from Jacquie Rizzo with [facility], who states the pt is too acute at this time and would not be able to be accepted at this time. Jacquie Rizzo states she will follow the pt and will possibly accept the pt at a later time.  SS will continue to follow.     Patient/Family In Agreement With Plan yes        Expected Discharge Date and Time     Expected Discharge Date Expected Discharge Time    Mar 5, 2017             Merlene Parisi

## 2017-03-06 NOTE — PROGRESS NOTES
LOS: 6 days     Chief Complaint:  Pulmonology is following for bilateral pneumonia    Subjective     Interval History:   The patient was emergently intubated 3/3/17.  10 minutes later while on sedation the patient became awake and started pulling out tubes and lines.  IV access was pulled out and the patient almost self extubated.  An emergent femoral central line was done and the patient was given propofol and vecuronium IVP for sedation.    Emergently femoral and right IJ lines were done. Please go through procedure notes for details  No events overnight still agitated     History taken from: chart family Patient unable to give history due to emergent nature of case.    Review of Systems:   Review of Systems   Unable to perform ROS: Severe respiratory distress   and emergent nature of case.                  Objective     Vital Signs  Temp:  [99.3 °F (37.4 °C)-99.8 °F (37.7 °C)] 99.6 °F (37.6 °C)  Heart Rate:  [] 109  Resp:  [30] 30  BP: ()/(56-85) 146/78  FiO2 (%):  [60 %] 60 %  Body mass index is 21.79 kg/(m^2).    Intake/Output Summary (Last 24 hours) at 03/06/17 0836  Last data filed at 03/06/17 0830   Gross per 24 hour   Intake 1899.56 ml   Output   2475 ml   Net -575.44 ml     I/O this shift:  In: 250 [IV Piggyback:250]  Out: -     Physical Exam:  GENERAL APPEARANCE: Patient was restless and in severe respiratory distress.    HEAD: normocephalic.    EYES: PERRL    NECK: Neck supple.     CARDIAC: Normal S1 and S2. No S3, S4 or murmurs. Rhythm is regular. There is no peripheral edema, cyanosis or pallor. Extremities are warm and well perfused. Capillary refill is less than 2 seconds. No carotid bruits.    LUNGS: Clear to auscultation and percussion without rales, rhonchi, wheezing or diminished breath sounds.    ABDOMEN: Positive bowel sounds. Soft, nondistended, nontender.     EXTREMITIES: No significant deformity or joint abnormality. No edema. Peripheral pulses intact.     NEUROLOGICAL: unable  to assess due to mental status and emergent intubation.    PSYCHIATRIC:  Unable to assess due to emergent nature of the case.                Results Review:                I reviewed the patient's new clinical results.  I reviewed the patient's new imaging results and agree with the interpretation.    Results from last 7 days  Lab Units 03/06/17  0049 03/05/17  1235 03/05/17  0111 03/04/17  0122   WBC 10*3/mm3 3.51*  --  3.65* 6.99   HEMOGLOBIN g/dL 7.0* 7.2* 7.1* 9.0*   PLATELETS 10*3/mm3 146  --  164 196       Results from last 7 days  Lab Units 03/06/17  0049 03/05/17  0111 03/04/17  0123  03/02/17  0120   SODIUM mmol/L 148 146 144  < > 134*   POTASSIUM mmol/L 3.6 4.2 5.1  < > 4.2   CHLORIDE mmol/L 122* 119* 116*  < > 109   TOTAL CO2 mmol/L 24.3 24.0* 23.6*  < > 17.9*   BUN mg/dL 46* 47* 31*  < > 20   CREATININE mg/dL 0.62 0.79 0.96  < > 0.65   CALCIUM mg/dL 8.0 7.9 8.5  < > 8.4   GLUCOSE mg/dL 99 134* 87  < > 330*   MAGNESIUM mg/dL  --  2.6 2.3  --  2.1   < > = values in this interval not displayed.  Lab Results   Component Value Date    INR 1.01 12/17/2016    INR 0.94 11/20/2016    INR <0.90 02/06/2016    PROTIME 11.4 12/17/2016    PROTIME 10.6 11/20/2016    PROTIME 10.0 02/06/2016       Results from last 7 days  Lab Units 03/06/17  0049 03/05/17  0111 03/04/17  0123   ALK PHOS U/L 70 49 54   BILIRUBIN mg/dL 0.3 0.2 0.2   ALT (SGPT) U/L 30 27 34   AST (SGOT) U/L 67* 45* 64*       Results from last 7 days  Lab Units 03/06/17  0405   PH, ARTERIAL pH units 7.355   PO2 ART mm Hg 72.3*   PCO2, ARTERIAL mm Hg 39.8   HCO3 ART mmol/L 21.7*     Imaging Results (last 24 hours)     Procedure Component Value Units Date/Time    XR Chest AP [[id number]] Collected:  03/02/17 0936     Updated:  03/02/17 0940    Narrative:       EXAMINATION: XR CHEST AP-      CLINICAL INDICATION:     SOB; J18.9-Pneumonia, unspecified organism     TECHNIQUE:  XR CHEST AP-      COMPARISON: 02/28/2017      FINDINGS:   Progression of bilateral  airspace disease right greater than left lung.  Opacities are more pronounced in the right upper lobe and at the  periphery of both lungs. Favor bacterial pneumonia, but peripheral  predominant opacities can also be seen with other atypical etiologies  including eosinophilic pneumonia.   Heart and mediastinal contours are unremarkable.   No pneumothorax.   No pleural effusion.   No acute osseous findings.            Impression:       1. Marked interval worsening of right lung pneumonia and now developing  left lung airspace disease probably representing diffuse pneumonia.  Considerations as described above.  2. Otherwise stable chest.     This report was finalized on 3/2/2017 9:38 AM by Dr. Wali Tracy MD.       XR Chest 1 View [[id number]] Resulted:  03/03/17 0924     Updated:  03/03/17 0707             Medication Review:   Scheduled Medications:    acetaminophen 650 mg Oral Once   aspirin 81 mg Oral Daily   azithromycin 500 mg Intravenous Q24H   aztreonam 2 g Intravenous Q8H   clopidogrel 75 mg Oral Daily   furosemide 40 mg Intravenous Daily   guaiFENesin 600 mg Oral BID   heparin (porcine) 5,000 Units Subcutaneous Q12H   insulin aspart 0-14 Units Subcutaneous 4x Daily AC & at Bedtime   insulin detemir 20 Units Subcutaneous Nightly   ipratropium 0.5 mg Nebulization Q6H - RT   methylPREDNISolone sodium succinate 20 mg Intravenous Q12H   metoprolol tartrate 25 mg Oral Q12H   metroNIDAZOLE 500 mg Intravenous Q8H   pantoprazole 40 mg Intravenous Q AM   potassium chloride 40 mEq Oral Once   potassium chloride 40 mEq Oral Q4H   sucralfate 1 g Oral 4x Daily AC & at Bedtime   vancomycin 750 mg Intravenous Q12H     Continuous infusions:    dexmedetomidine 0.2-1.5 mcg/kg/hr    fentaNYL (SUBLIMAZE) PCA 1500 mcg/30 mL syringe     norepinephrine 0.02-0.3 mcg/kg/min Last Rate: Stopped (03/04/17 6675)   Pharmacy to dose vancomycin     propofol 5-50 mcg/kg/min Last Rate: 50 mcg/kg/min (03/06/17 7543)       Assessment/Plan      CXR:              Neuro:  Patient was started on propofol and fentanyl IV per protocol for sedation add precedex due to limited effectiveness of former.      Respiratory:      Vent settings:   RR16  FIO2 60% PEEP 12  Results for YESSICA TERRY (MRN [id number]) as of 3/6/2017 08:38   Ref. Range 3/6/2017 04:05   pH, Arterial Latest Ref Range: 7.350 - 7.450 pH units 7.355   pCO2, Arterial Latest Ref Range: 35.0 - 45.0 mm Hg 39.8   pO2, Arterial Latest Ref Range: 80.0 - 100.0 mm Hg 72.3 (L)   HCO3, Arterial Latest Ref Range: 22.0 - 26.0 mmol/L 21.7 (L)   Base Excess, Arterial Latest Units: mmol/L -3.5   O2 Saturation, Arterial Latest Ref Range: 90.0 - 100.0 % 93.9   Hemoglobin, Blood Gas Latest Ref Range: 12 - 16 g/dL 15.4   Hematocrit, Blood Gas Latest Ref Range: 37.0 - 47.0 % 45.0   Barometric Pressure for Blood Gas Latest Units: mmHg 738   A-a Gradiant Latest Ref Range: 0.0 - 300.0 mmHg 298.5       Check for HIV and assess LDH level     Pink frothy sputum has been suctioned from ET tube.     Will continue IV steroids, scheduled inhalants and PRN neb treatments.    Add precedex for sedation    ID:  Emergent femoral central line was placed.  See procedure note.  Will continue current IV antibiotics. Vanc/Azithro/metro    Cardiac:  Stable.    Renal: BUN/Creatinine WNL.  Strict I&Os. Ordered albumin 50 gm IV, lasix 60 mg IV and potassium 40 mEq PO for now and lasix 40 mg IV and potassium 40 mEq PO for around 1 pm.    Endocrine: monitor glucose targeting 140-180.    Electrolytes:  Monitor levels, manage and replete per protocols.  Monitor for refeeding syndrome- patients nutritional status is poor.    GI:  Nutrition consult placed for tube feedings.    Hematology:   Monitor blood counts, transfuse at or below 7 g/dL.  Unless patient is actively bleeding then began transfusing at 8 g/dL.     Case was discussed with PMD and  ccu nurse     Patients overall prognosis is extremley poor- she is in severe ARDS. If  patient comes down to a peep of 10 consider transffering to tertiary care center. This was discussed with dr veliz   Tried to contact family many a times during the day- with no luck.      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

## 2017-03-06 NOTE — PROGRESS NOTES
Acute Care - Occupational Therapy Response to referral   Daniel     Patient Name: Pepito Yan  : 1965  MRN: [id number]  Today's Date: 3/6/2017  Onset of Illness/Injury or Date of Surgery Date: 17  Date of Referral to OT: 17  Referring Physician: Brenda    Admit Date: 2017       ICD-10-CM ICD-9-CM   1. Pneumonia of both lower lobes due to infectious organism J18.9 483.8     Patient Active Problem List   Diagnosis   • Chest pain   • Chest pain of uncertain etiology   • Intractable nausea and vomiting   • Intractable cyclical vomiting with nausea   • Chronic obstructive pulmonary emphysema   • Hepatitis-C   • Diabetes mellitus type 2 in nonobese   • Arthritis or polyarthritis, rheumatoid   • Anxiety   • Essential hypertension   • Coronary artery disease   • Buerger's disease   • Cirrhosis   • Diabetes   • Chronic nausea   • Pancreatitis   • Bilateral pneumonia     Past Medical History   Diagnosis Date   • Anxiety    • 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   • Chronic respiratory failure with hypoxia      at nighttime   • Compression fracture of lumbar vertebra    • Compression fracture of thoracic vertebra    • COPD (chronic obstructive pulmonary disease)    • Coronary artery disease    • Diabetes mellitus type 2 in nonobese    • Erosive esophagitis 2016   • Essential hypertension    • Gastroparesis    • GERD (gastroesophageal reflux disease)    • Hepatitis-C    • Medically noncompliant    • MRSA infection      back of neck around    • NSTEMI (non-ST elevated myocardial infarction) 2016   • PE (pulmonary embolism)    • Protein calorie malnutrition    • RA (rheumatoid arthritis)      with chronic steroid use     Past Surgical History   Procedure Laterality Date   • Cholecystectomy     • Foreign body removal       insulin needle removal x 3 from the abdomen   •  section       x 2   • Hysterectomy     • Arm debridement Left 2016     Procedure: HAND  IRRIGATION DEBRIDEMENT AND REPAIR ;  Surgeon: Shane Fuller MD;  Location:  [facility];  Service:    • Endoscopy N/A 12/20/2016     Procedure: ESOPHAGOGASTRODUODENOSCOPY;  Surgeon: Jeremy Clement III, MD;  Location:  [facility];  Service:    • Amputation digit Right 12/22/2016     Third digit to the PIP. Surgeon: Dr. Bains   • Amputation digit Left 12/22/2016     Procedure: PARTIAL AMPUTATION LEFT THIRD FINGER;  Surgeon: Shane Fuller MD;  Location:  [facility];  Service:    • Amputation revision Left 12/22/2016     Procedure: AMPUTATION REVISION DIGIT STUMP;  Surgeon: Shane Fuller MD;  Location:  [facility];  Service:           OT ASSESSMENT FLOWSHEET (last 72 hours)      OT Evaluation       03/06/17 0200 03/05/17 2000 03/04/17 1400 03/04/17 0800 03/04/17 0200    Muscle Tone Assessment    Muscle Tone Assessment Bilateral Upper Extremities;Bilateral Lower Extremities  -KT Bilateral Upper Extremities;Bilateral Lower Extremities  -KT Bilateral Upper Extremities;Bilateral Lower Extremities  -VM Bilateral Upper Extremities;Bilateral Lower Extremities  -VM Bilateral Upper Extremities;Bilateral Lower Extremities  -KT    Bilateral Upper Extremities Muscle Tone Assessment mildly decreased tone  -KT mildly decreased tone  -KT moderately decreased tone  -VM moderately decreased tone  -VM moderately decreased tone  -KT    Bilateral Lower Extremities Muscle Tone Assessment mildly decreased tone  -KT mildly decreased tone  -KT moderately decreased tone  -VM moderately decreased tone  -VM moderately decreased tone  -KT      03/03/17 2000 03/03/17 1404 03/03/17 1400          Rehab Evaluation    Evaluation Not Performed  unable to evaluate, medical status change  -KR       Symptoms Noted Comment  Pt. has been transferred to ICU due to change in medical status. Pt. unable to participate at this time. Please re-consult as pt. may be able to participate.   -KR       Muscle Tone Assessment    Muscle Tone  Assessment Bilateral Upper Extremities;Bilateral Lower Extremities  -KT  Bilateral Upper Extremities;Bilateral Lower Extremities  -VM      Bilateral Upper Extremities Muscle Tone Assessment moderately decreased tone  -KT  moderately decreased tone  -VM      Bilateral Lower Extremities Muscle Tone Assessment moderately decreased tone  -KT  moderately decreased tone  -VM        User Key  (r) = Recorded By, (t) = Taken By, (c) = Cosigned By    Initials Name Effective Dates    KT Elizabeth Prince, RN 06/16/16 -     VM Salome Geller, GUERRERO 06/16/16 -     KR Wali López, OT 03/14/16 -                  OT Recommendation and Plan  Anticipated Equipment Needs At Discharge: bedside commode  Anticipated Discharge Disposition: home with assist  Planned Therapy Interventions: activity intolerance, ADL retraining, fine motor coordination training, home exercise program, motor coordination training, strengthening, transfer training  Therapy Frequency: 3-5 times/wk             OT Goals       03/01/17 1304          Strength OT LTG    Strength Goal OT LTG, Date Established 03/01/17  -BF      Strength Goal OT LTG, Time to Achieve by discharge  -BF      Strength Goal OT LTG, Measure to Achieve Increase BUE MMT score to 4/5  -BF      Endurance OT LTG    Endurance Goal OT LTG, Date Established 03/01/17  -BF      Endurance Goal OT LTG, Time to Achieve by discharge  -BF      Endurance Goal OT LTG, Activity Level to increase;endurance 2 fair+  -BF        User Key  (r) = Recorded By, (t) = Taken By, (c) = Cosigned By    Initials Name Provider Type    BF Karoline Dominguez, OT Occupational Therapist              Time Calculation:   OT Start Time:  (30 min eval)  OT Non-Billable Time (min): 30 min        OT G-codes  OT Professional Judgement Used?: Yes  OT Functional Scales Options: AM-PAC 6 Clicks Daily Activity (OT)  Functional Assessment Tool Used: FIM  Functional Limitation: Self care  Self Care Current Status (): At least 20  percent but less than 40 percent impaired, limited or restricted  Self Care Goal Status (): At least 1 percent but less than 20 percent impaired, limited or restricted    Wali López OT  3/6/2017

## 2017-03-06 NOTE — DISCHARGE PLACEMENT REQUEST
"Yessica Yan (51 y.o. Female)     Date of Birth Social Security Number Address Home Phone MRN    1965  [address] [address] [phone] [id number]    Nondenominational Marital Status          Lutheran        Admission Date Admission Type Admitting Provider Attending Provider Department, Room/Bed    2/28/17 Emergency Savanah Gutierrez MD Grace, Aimee Russell, DO [facility] CRITICAL CARE, CC01/1C    Discharge Date Discharge Disposition Discharge Destination                      Attending Provider: Sherrill Weiner DO     Allergies:  Penicillins, Toradol [Ketorolac Tromethamine], Tramadol    Isolation:  None   Infection:  None   Code Status:  FULL    Ht:  63\" (160 cm)   Wt:  123 lb (55.8 kg)    Admission Cmt:  None   Principal Problem:  Bilateral pneumonia [J18.9]                 Active Insurance as of 2/28/2017     Primary Coverage     Payor Plan Insurance Group Employer/Plan Group    MEDICARE MEDICARE A & B      Payor Plan Address Payor Plan Phone Number Effective From Effective To    [address] [phone] 2/1/2005     [place] [address]       Subscriber Name Subscriber Birth Date Member ID       YESSICA YAN 1965 [id number]           Secondary Coverage     Payor Plan Insurance Group Employer/Plan Group    [place] WELL[place]ARE [place]ICAID      Payor Plan Address Payor Plan Phone Number Effective From Effective To    [address] [address] [phone] 2/28/2017     [place] [address]       Subscriber Name Subscriber Birth Date Member ID       YESSICA YAN 1965 [id number]                 Emergency Contacts      (Rel.) Home Phone Work Phone Mobile Phone    cSot Vaughn (Son) [phone] -- --    [employer]ol[employer] parks (Friend) -- -- [phone]            Emergency Contact Information     Name Relation Home Work Mobile    Scot Vaughn Son [phone]      NoshauntkaseyNeeta Friend   [phone]          Insurance Information       "          MEDICARE/MEDICARE A & B Phone: [phone]    Subscriber: Pepito Yan Subscriber#: [id number]    Group#:  Precert#:         [facility]/WELLCARE MEDICAID Phone: [phone]    Subscriber: Pepito Yan Subscriber#: [id number]    Group#:  Precert#:              History & Physical      Savanah Gutierrez MD at 3/1/2017  5:19 AM              [facility] HOSPITALIST HISTORY AND PHYSICAL    Patient Identification:  Name:  Pepito Yan  Age:  51 y.o.  Sex:  female  :  1965  MRN:  [id number]   Visit Number:  [id number]  Primary Care Physician:  ROSA Meza     Chief complaint:  Trouble breathing    History of presenting illness:  51 y.o. female who presented to [facility] with trouble breathing and a productive cough of a few days duration.  She tried albuterol but this did not help.  She does not have a baseline cough.  She has a subjective fever with chills and fatigue.  She has substernal chest pain that is stabbing, is intermittent, and the same intensity all the time (9/10).  The headache occurred prior to the cough and is bilateral and frontal with no neck pain.  She also has a clear, runny nose and a sore throat.  ---------------------------------------------------------------------------------------------------------------------   Review of Systems   Constitutional: Positive for chills, fatigue and fever.   HENT: Positive for postnasal drip and sore throat. Negative for ear discharge, rhinorrhea and sneezing.    Eyes: Negative for discharge and redness.   Respiratory: Positive for cough, shortness of breath and wheezing.    Cardiovascular: Positive for chest pain. Negative for palpitations and leg swelling.   Gastrointestinal: Negative for diarrhea, nausea and vomiting.   Genitourinary: Negative for decreased urine volume, dysuria and hematuria.   Musculoskeletal: Negative for arthralgias and joint swelling.   Skin: Negative for pallor, rash  and wound.   Neurological: Positive for headaches. Negative for seizures and speech difficulty.   Hematological: Does not bruise/bleed easily.   Psychiatric/Behavioral: Negative for confusion, self-injury and suicidal ideas. The patient is not nervous/anxious.     ---------------------------------------------------------------------------------------------------------------------   Past Medical History   Diagnosis Date   • Anxiety    • 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   • Chronic respiratory failure with hypoxia      at nighttime   • Compression fracture of lumbar vertebra    • Compression fracture of thoracic vertebra    • COPD (chronic obstructive pulmonary disease)    • Coronary artery disease    • Diabetes mellitus type 2 in nonobese    • Erosive esophagitis 2016   • Essential hypertension    • Gastroparesis    • GERD (gastroesophageal reflux disease)    • Hepatitis-C    • Medically noncompliant    • MRSA infection      back of neck around    • NSTEMI (non-ST elevated myocardial infarction) 2016   • PE (pulmonary embolism)    • Protein calorie malnutrition    • RA (rheumatoid arthritis)      with chronic steroid use     Past Surgical History   Procedure Laterality Date   • Cholecystectomy     • Foreign body removal       insulin needle removal x 3 from the abdomen   •  section       x 2   • Hysterectomy     • Arm debridement Left 2016     Procedure: HAND IRRIGATION DEBRIDEMENT AND REPAIR ;  Surgeon: Shane Fuller MD;  Location: [facility] OR;  Service:    • Endoscopy N/A 2016     Procedure: ESOPHAGOGASTRODUODENOSCOPY;  Surgeon: Jeremy Clement III, MD;  Location: [facility] OR;  Service:    • Amputation digit Right 2016     Third digit to the PIP. Surgeon: Dr. Bains   • Amputation digit Left 2016     Procedure: PARTIAL AMPUTATION LEFT THIRD FINGER;  Surgeon: Shane Fuller MD;  Location: [facility] OR;  Service:    • Amputation revision  Left 12/22/2016     Procedure: AMPUTATION REVISION DIGIT STUMP;  Surgeon: Shane Fuller MD;  Location: [facility];  Service:      Family History   Problem Relation Age of Onset   • Colon cancer Mother    • Cancer Father    • Diabetes Maternal Grandmother    • Heart disease Neg Hx    • Stroke Neg Hx      Social History   • Marital status:      Social History Main Topics   • Smoking status: Current Every Day Smoker     Packs/day: 4.50     Years: 35.00     Types: Cigarettes   • Smokeless tobacco: None      Comment: She cut down to 1/4 PPD for 2.5 months ago   • Alcohol use No   • Drug use: No   • Sexual activity: Defer   ---------------------------------------------------------------------------------------------------------------------   Allergies:  Penicillins; Toradol [ketorolac tromethamine]; and Tramadol  ---------------------------------------------------------------------------------------------------------------------   Prior to Admission Medications     Prescriptions Last Dose Informant Patient Reported? Taking?    albuterol (PROVENTIL HFA;VENTOLIN HFA) 108 (90 BASE) MCG/ACT inhaler  Self No No    Inhale 2 puffs every 6 (six) hours as needed for wheezing.    aspirin 81 MG EC tablet   No No    Take 1 tablet by mouth Daily.    atorvastatin (LIPITOR) 40 MG tablet  Pharmacy Yes No    Take 40 mg by mouth daily.    carvedilol (COREG) 12.5 MG tablet   No No    Take 0.5 tablets by mouth Every 12 (Twelve) Hours. Do not take if BP <110/60 and/or HR <55    clopidogrel (PLAVIX) 75 MG tablet  Pharmacy Yes No    Take 75 mg by mouth daily.    furosemide (LASIX) 20 MG tablet  Self No No    Take 1 tablet by mouth daily. Do not take if BP <110/60    gabapentin (NEURONTIN) 800 MG tablet  Pharmacy Yes No    Take 800 mg by mouth 3 (three) times a day.    HYDROcodone-acetaminophen (NORCO) 5-325 MG per tablet   No No    Take 1 tablet by mouth Every 6 (Six) Hours As Needed for moderate pain (4-6).    insulin aspart  (novoLOG) 100 UNIT/ML injection   No No    Inject 0-7 Units under the skin 4 (Four) Times a Day Before Meals & at Bedtime.    insulin glargine (LANTUS) 100 UNIT/ML injection  Pharmacy Yes No    Inject 45 Units under the skin every night.    lisinopril (PRINIVIL,ZESTRIL) 5 MG tablet  Self No No    Take 2 tablets by mouth daily. Do not take if BP <110/60    metFORMIN (GLUCOPHAGE) 1000 MG tablet  Pharmacy Yes No    Take 1,000 mg by mouth 2 (two) times a day with meals.    nitroglycerin (NITROSTAT) 0.4 MG SL tablet  Pharmacy Yes No    Place 0.4 mg under the tongue every 5 (five) minutes as needed for chest pain. Take no more than 3 doses in 15 minutes.    ondansetron (ZOFRAN) 4 MG tablet   No No    Take 1 tablet by mouth Every 6 (Six) Hours As Needed for nausea or vomiting.    pantoprazole (PROTONIX) 40 MG EC tablet  Pharmacy Yes No    Take 40 mg by mouth 2 (two) times a day.    predniSONE (DELTASONE) 20 MG tablet  Self No No    Take 1 tablet by mouth Daily.    ranolazine (RANEXA) 500 MG 12 hr tablet  Self No No    Take 1 tablet by mouth every 12 (twelve) hours.        Hospital Scheduled Meds:  gabapentin 800 mg Oral Q8H   heparin (porcine) 5,000 Units Subcutaneous Q12H   ---------------------------------------------------------------------------------------------------------------------   Vital Signs:  Temp:  [98.2 °F (36.8 °C)-99.8 °F (37.7 °C)] 98.4 °F (36.9 °C)  Heart Rate:  [] 87  Resp:  [20-24] 24  BP: ()/(59-94) 95/67  Last 3 weights    02/28/17  1847 03/01/17  0027 03/01/17  0303   Weight: 118 lb (53.5 kg) 120 lb 14.4 oz (54.8 kg) 120 lb 6.4 oz (54.6 kg)     Body mass index is 21.33 kg/(m^2).  ---------------------------------------------------------------------------------------------------------------------   Physical Exam:  Constitutional:  Well-developed and well-nourished.  Mild to moderate respiratory distress.  Thin in appearance.     HENT:  Head: Normocephalic and atraumatic.  Mouth:  Moist  mucous membranes.    Eyes:  Conjunctivae and EOM are normal.  Pupils are equal, round, and reactive to light.  No scleral icterus.  Neck:  Neck supple.  No JVD present.    Cardiovascular:  Normal rate, regular rhythm and normal heart sounds with no murmur.  Pulmonary/Chest:  Mild to moderate respiratory distress, minimal expiratory wheezes, bilateral crackles, with increased breath sounds on the left and fair to good air movement.  Abdominal:  Soft.  Bowel sounds are normal.  No distension and no tenderness.   Musculoskeletal:  No edema, no tenderness, and no deformity.  No red or swollen joints anywhere.  The left third finger is partially amputated by surgical methods and the left second, right second, right fourth, right fifth are well healed from autoamputation.    Neurological:  Alert and oriented to person, place, and time.  No cranial nerve deficit.  No tongue deviation.  No facial droop.  No slurred speech.   Skin:  Skin is warm and dry.  No rash noted.  No pallor.   Psychiatric:  Normal mood and affect.  Behavior is normal.  Judgment and thought content normal.   Peripheral vascular:  No edema and strong pulses on all 4 extremities.  Genitourinary:  No aguilera catheter in place.  ---------------------------------------------------------------------------------------------------------------------  EKG:  Sinus tachycardia, RBBB.    Telemetry:  NS 70-80's.  I have personally looked at both the EKG and the telemetry strips.  ---------------------------------------------------------------------------------------------------------------------   Results from last 7 days  Lab Units 02/28/17 1939   CRP mg/dL 25.50*   LACTATE mmol/L 1.8   WBC 10*3/mm3 8.29   HEMOGLOBIN g/dL 9.9*   HEMATOCRIT % 33.6*   MCV fL 72.4*   MCHC g/dL 29.5*   PLATELETS 10*3/mm3 222     Results from last 7 days  Lab Units 02/28/17 1939   SODIUM mmol/L 139   POTASSIUM mmol/L 3.8   CHLORIDE mmol/L 109   TOTAL CO2 mmol/L 24.3   BUN mg/dL 18    CREATININE mg/dL 0.57   EGFR IF NONAFRICN AM mL/min/1.73 112   CALCIUM mg/dL 8.8   GLUCOSE mg/dL 117*   ALBUMIN g/dL 3.60   BILIRUBIN mg/dL 0.2   ALK PHOS U/L 59   AST (SGOT) U/L 29   ALT (SGPT) U/L 41*   Estimated Creatinine Clearance: 96.6 mL/min (by C-G formula based on Cr of 0.57).    Results from last 7 days  Lab Units 02/28/17 2114 02/28/17 1939   TROPONIN I ng/mL 0.076* 0.085*   MYOGLOBIN ng/mL  --  107.0       Lab Results   Component Value Date    HGBA1C 7.90 (H) 12/18/2016     Lab Results   Component Value Date    TSH 5.899 (H) 09/12/2016    FREET4 1.17 09/12/2016       ---------------------------------------------------------------------------------------------------------------------  Imaging Results (last 7 days)     Procedure Component Value Units Date/Time    XR Chest 1 View [[id number]]:  Bilateral infiltrates seen on my exam of the CXR.      Updated:  02/28/17 1945      CT Chest Pulmonary Embolism With Contrast [[id number]] Resulted:  03/01/17 0137       Updated:  03/01/17 0137      I have personally reviewed the radiology images and read the final radiology report.  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  -Insulin-dependent diabetes not as type II with hyperglycemia.  -Mild transaminitis likely secondary to her chronic hepatitis C.  -Generalized anxiety disorder.  -Ischemic cardiomyopathy with ejection fraction of approximately 45%.  -Tobacco smoking addiction  -Medical noncompliance  -Malnutrition  -Hiatal hernia with erosive esophagitis  -6 x 8 mm medial lung base, stable for years based on CT scan of  the chest    Will order a sputum culture and will start her on rocephin and doxycycline and will order legionella, mycoplasma, and influenza testing.  Will give solumedrol and breathing treatments as well.  Will monitor on the med-surg floor with telemetry.  Will repeat labs in the morning.  Await pharmacy to do the admission med rec by calling [facility] pharmacy in [place] when it opens.    Savanah Gutierrez MD  03/01/17  5:19 AM       Electronically signed by Savanah Gutierrez MD at 3/1/2017  7:46 AM        Vital Signs (last 24 hours)       03/05 0700  -  03/06 0659 03/06 0700  -  03/06 1108   Most Recent    Temp (°F) 99.3 -  100       99.6 (37.6)    Heart Rate 73 -  (!)132      112     112    Resp (!)30 -  (!)32       (!) 30    BP 95/56 -  161/85       146/78    SpO2 (%) 91 -  100      99     99          Intake & Output (last day)       03/05 0701 - 03/06 0700 03/06 0701 - 03/07 0700    I.V. (mL/kg) 412.6 (7.4)     NG/     IV Piggyback 900 350    Total Intake(mL/kg) 1899.6 (34) 350 (6.3)    Urine (mL/kg/hr) 2475 (1.8)     Total Output 2475      Net -575.4 +350              Hospital Medications (active)       Dose Frequency Start End    acetaminophen (TYLENOL) tablet 650 mg 650 mg Every 6 Hours PRN 3/2/2017     Sig - Route: Take 2 tablets by mouth Every 6 (Six) Hours As Needed for fever. - Oral    acetaminophen (TYLENOL) tablet 650 mg 650 mg Once 3/6/2017     Sig - Route: Take 2 tablets by mouth 1 (One) Time. - Oral    aspirin EC tablet 81 mg 81 mg Daily 3/1/2017     Sig - Route: Take 1 tablet by mouth Daily. - Oral    Cosign for Ordering: Accepted by Sherrill Weiner DO on 3/1/2017  3:38 PM    AZITHROMYCIN 500 MG/250 ML 0.9% NS IVPB (MBP) 500 mg Every 24 Hours 3/3/2017     Sig - Route: Infuse 500 mg into a venous catheter Daily. - Intravenous    aztreonam (AZACTAM) 2 g/100 mL 0.9% NS (mbp) 2 g Every 8 Hours 3/2/2017     Sig - Route: Infuse 100 mL into a venous catheter Every 8 (Eight) Hours. -  Intravenous    clopidogrel (PLAVIX) tablet 75 mg 75 mg Daily 3/1/2017     Sig - Route: Take 1 tablet by mouth Daily. - Oral    dexmedetomidine HCl (PRECEDEX) 4 mcg/mL in sodium chloride 0.9 % 100 mL infusion 0.2-1.5 mcg/kg/hr × 55.8 kg Continuous 3/6/2017     Sig - Route: Infuse 11.16-83.7 mcg/hr into a venous catheter Continuous. - Intravenous    dextrose (D50W) solution 25 g 25 g Every 15 Minutes PRN 3/1/2017     Sig - Route: Infuse 50 mL into a venous catheter Every 15 (Fifteen) Minutes As Needed for low blood sugar (Blood Sugar Less Than 70, Patient Has IV Access - Unresponsive, NPO or Unable To Safely Swallow). - Intravenous    Cosign for Ordering: Accepted by Savanah Gutierrez MD on 3/1/2017  7:01 PM    dextrose (GLUTOSE) oral gel 15 g 15 g Every 15 Minutes PRN 3/1/2017     Sig - Route: Take 15 g by mouth Every 15 (Fifteen) Minutes As Needed for low blood sugar (Blood Sugar Less Than 70, Patient Alert, Is Not NPO & Can Safely Swallow). - Oral    Cosign for Ordering: Accepted by Savanah Gutierrez MD on 3/1/2017  7:01 PM    FENTANYL PCA 1500 MCG/30 ML (BHCOR) PCA  Continuous 3/3/2017 3/13/2017    Sig - Route: Infuse  into a venous catheter Continuous. - Intravenous    furosemide (LASIX) injection 40 mg 40 mg Daily 3/5/2017     Sig - Route: Infuse 4 mL into a venous catheter Daily. - Intravenous    glucagon (GLUCAGEN) injection 1 mg 1 mg Every 15 Minutes PRN 3/1/2017     Sig - Route: Inject 1 mg under the skin Every 15 (Fifteen) Minutes As Needed (Blood Glucose Less Than 70 - Patient Without IV Access - Unresponsive, NPO or Unable To Safely Swallow). - Subcutaneous    Cosign for Ordering: Accepted by Savanah Gutierrez MD on 3/1/2017  7:01 PM    guaiFENesin (MUCINEX) 12 hr tablet 600 mg 600 mg 2 Times Daily 3/2/2017     Sig - Route: Take 1 tablet by mouth 2 (Two) Times a Day. - Oral    heparin (porcine) 5000 UNIT/ML injection 5,000 Units 5,000 Units Every 12 Hours Scheduled 3/1/2017     Sig - Route: Inject 1 mL  "under the skin Every 12 (Twelve) Hours. - Subcutaneous    insulin aspart (novoLOG) injection 0-14 Units 0-14 Units 4 Times Daily Before Meals & Nightly 3/1/2017     Sig - Route: Inject 0-14 Units under the skin 4 (Four) Times a Day Before Meals & at Bedtime. - Subcutaneous    insulin detemir (LEVEMIR) injection 20 Units 20 Units Nightly 3/4/2017     Sig - Route: Inject 20 Units under the skin Every Night. - Subcutaneous    ipratropium (ATROVENT) nebulizer solution 0.5 mg 0.5 mg Every 6 Hours PRN 3/2/2017     Sig - Route: Take 2.5 mL by nebulization Every 6 (Six) Hours As Needed for shortness of air. - Nebulization    Cosign for Ordering: Accepted by Sherrill Weiner DO on 3/2/2017  1:40 PM    ipratropium (ATROVENT) nebulizer solution 0.5 mg 0.5 mg Every 6 Hours - RT 3/3/2017     Sig - Route: Take 2.5 mL by nebulization Every 6 (Six) Hours. - Nebulization    magnesium sulfate 6 g in dextrose (D5W) 5 % 250 mL infusion 6 g As Needed 3/6/2017     Sig - Route: Infuse 6 g into a venous catheter As Needed (Mg 1.1-1.5 mg/dL). - Intravenous    Linked Group 1:  \"Or\" Linked Group Details        magnesium sulfate 8 g in dextrose (D5W) 5 % 250 mL infusion 8 g As Needed 3/6/2017     Sig - Route: Infuse 8 g into a venous catheter As Needed (Mg less than or equal to 1 mg/dL). - Intravenous    Linked Group 1:  \"Or\" Linked Group Details        magnesium sulfate in D5W 1g/100mL (PREMIX) IVPB 1 g 1 g As Needed 3/6/2017     Sig - Route: Infuse 100 mL into a venous catheter As Needed (Mg less than or equal 1 mg/dL). - Intravenous    Linked Group 1:  \"Or\" Linked Group Details        magnesium sulfate in D5W 1g/100mL (PREMIX) IVPB 1 g 1 g As Needed 3/6/2017     Sig - Route: Infuse 100 mL into a venous catheter As Needed (magnesium sulfate 1 g in D5W 100 mL IVPB - Mg 1.6 - 1.9 mg/dL). - Intravenous    Linked Group 1:  \"Or\" Linked Group Details        methylPREDNISolone sodium succinate (SOLU-Medrol) injection 20 mg 20 mg Every 12 Hours " 3/5/2017     Sig - Route: Infuse 0.5 mL into a venous catheter Every 12 (Twelve) Hours. - Intravenous    metoprolol tartrate (LOPRESSOR) tablet 25 mg 25 mg Every 12 Hours Scheduled 3/2/2017     Sig - Route: Take 1 tablet by mouth Every 12 (Twelve) Hours. - Oral    metroNIDAZOLE (FLAGYL) IVPB 500 mg 500 mg Every 8 Hours 3/3/2017     Sig - Route: Infuse 100 mL into a venous catheter Every 8 (Eight) Hours. - Intravenous    midazolam (VERSED) 2 MG/2ML injection  - ADS Override Pull   3/6/2017 3/6/2017    Notes to Pharmacy: Created by cabinet override    midazolam (VERSED) injection 2 mg 2 mg Every 4 Hours PRN 3/5/2017     Sig - Route: Infuse 2 mL into a venous catheter Every 4 (Four) Hours As Needed for sedation. - Intravenous    midazolam (VERSED) injection 2 mg 2 mg Once 3/6/2017 3/6/2017    Sig - Route: Infuse 2 mL into a venous catheter 1 (One) Time. - Intravenous    norepinephrine (LEVOPHED) 32 mcg/mL (8 mg/250 mL) infusion 0.02-0.3 mcg/kg/min × 57.3 kg Titrated 3/3/2017     Sig - Route: Infuse 1.146-17.19 mcg/min into a venous catheter Dose Adjusted By Provider As Needed. - Intravenous    pantoprazole (PROTONIX) injection 40 mg 40 mg Every Early Morning 3/2/2017     Sig - Route: Infuse 10 mL into a venous catheter Every Morning. - Intravenous    Cosign for Ordering: Accepted by Sherrill Weiner DO on 3/1/2017  3:38 PM    Pharmacy to dose vancomycin  Continuous PRN 3/2/2017     Sig - Route: Continuous As Needed for consult. - Does not apply    potassium chloride (K-DUR,KLOR-CON) CR tablet 40 mEq 40 mEq Once 3/2/2017     Sig - Route: Take 2 tablets by mouth 1 (One) Time. - Oral    potassium chloride (K-DUR,KLOR-CON) CR tablet 40 mEq 40 mEq Every 4 Hours 3/6/2017 3/6/2017    Sig - Route: Take 2 tablets by mouth Every 4 (Four) Hours. - Oral    potassium chloride (KLOR-CON) packet 40 mEq 40 mEq As Needed 3/6/2017     Sig - Route: Take 40 mEq by mouth As Needed (potassium replacement, see admin instructions). - Oral  "   Linked Group 2:  \"Or\" Linked Group Details        potassium chloride (MICRO-K) CR capsule 40 mEq 40 mEq As Needed 3/6/2017     Sig - Route: Take 4 capsules by mouth As Needed (potassium replacement.  see admin instructions). - Oral    Linked Group 2:  \"Or\" Linked Group Details        potassium chloride 10 mEq in 100 mL IVPB 10 mEq Every 1 Hour PRN 3/6/2017     Sig - Route: Infuse 100 mL into a venous catheter Every 1 (One) Hour As Needed (potassium protocol PERIPHERAL - see admin instructions). - Intravenous    Linked Group 2:  \"Or\" Linked Group Details        promethazine (PHENERGAN) tablet 12.5 mg 12.5 mg Every 6 Hours PRN 3/1/2017     Sig - Route: Take 1 tablet by mouth Every 6 (Six) Hours As Needed for nausea or vomiting. - Oral    Cosign for Ordering: Accepted by Sherrill Weiner DO on 3/1/2017  3:38 PM    propofol (DIPRIVAN) infusion 10 mg/mL 100 mL 5-50 mcg/kg/min × 57.3 kg Titrated 3/3/2017     Sig - Route: Infuse 286.5-2,865 mcg/min into a venous catheter Dose Adjusted By Provider As Needed. - Intravenous    sodium chloride 0.9 % flush 1-10 mL 1-10 mL As Needed 3/1/2017     Sig - Route: Infuse 1-10 mL into a venous catheter As Needed for line care. - Intravenous    sodium chloride 0.9 % flush 10 mL 10 mL As Needed 2/28/2017     Sig - Route: Infuse 10 mL into a venous catheter As Needed for line care. - Intravenous    Linked Group 3:  \"And\" Linked Group Details        sucralfate (CARAFATE) tablet 1 g 1 g 4 Times Daily Before Meals & Nightly 3/1/2017     Sig - Route: Take 1 tablet by mouth 4 (Four) Times a Day Before Meals & at Bedtime. - Oral    Cosign for Ordering: Accepted by Sherrill Weiner DO on 3/1/2017  3:38 PM    vancomycin (VANCOCIN) 750 mg in sodium chloride 0.9 % 250 mL IVPB 750 mg Every 12 Hours 3/4/2017     Sig - Route: Infuse 750 mg into a venous catheter Every 12 (Twelve) Hours. - Intravenous    vecuronium (NORCURON) injection 5 mg 5 mg Every 6 Hours 3/5/2017 3/6/2017    Sig - " Route: Infuse 5 mL into a venous catheter Every 6 (Six) Hours. - Intravenous            Lab Results (last 24 hours)     Procedure Component Value Units Date/Time    Hemoglobin & Hematocrit, Blood [[id number]]  (Abnormal) Collected:  03/05/17 1235    Specimen:  Blood Updated:  03/05/17 1243     Hemoglobin 7.2 (L) g/dL      Hematocrit 25.2 (L) %     Blood Culture [[id number]]  (Normal) Collected:  03/03/17 1139    Specimen:  Blood from Arm, Left Updated:  03/05/17 1301     Blood Culture No growth at 2 days     TSH [[id number]]  (Abnormal) Collected:  03/05/17 1234    Specimen:  Blood Updated:  03/05/17 1310     TSH 0.209 (L) mIU/mL     Blood Culture [[id number]]  (Normal) Collected:  03/03/17 1300    Specimen:  Blood from Hand, Left Updated:  03/05/17 1401     Blood Culture No growth at 2 days     Blood Gas, Arterial [[id number]]  (Abnormal) Collected:  03/05/17 1738    Specimen:  Arterial Blood Updated:  03/05/17 1741     Site Arterial: right brachial      Micheal's Test N/A      pH, Arterial 7.394 pH units      pCO2, Arterial 37.0 mm Hg      pO2, Arterial 64.3 (L) mm Hg      HCO3, Arterial 22.1 mmol/L      Base Excess, Arterial -2.5 mmol/L      O2 Saturation, Arterial 92.0 %      Hemoglobin, Blood Gas 8.3 (L) g/dL      Hematocrit, Blood Gas 24.0 (L) %      Oxyhemoglobin 90.6 %      Methemoglobin 0.3 %      Carboxyhemoglobin 1.2 %      A-a Gradiant >300.0 (H) mmHg      Temperature 98.6 C      Barometric Pressure for Blood Gas 738 mmHg      Modality Adult Vent      FIO2 60 %      Ventilator Mode AC       Set Tidal Volume 450      Set Mech Resp Rate 30      PEEP 12     POC Glucose Fingerstick [[id number]]  (Normal) Collected:  03/05/17 1800    Specimen:  Blood Updated:  03/05/17 1803     Glucose 95 mg/dL     Narrative:       Meter: [id number] : [id number] JOSHUA OSUNA    Blood Culture [[id number]]  (Normal) Collected:  02/28/17 1939    Specimen:  Blood from Arm, Left Updated:  03/05/17 2001     Blood Culture No growth at 5 days      POC Glucose Fingerstick [[id number]]  (Normal) Collected:  03/05/17 2024    Specimen:  Blood Updated:  03/05/17 2027     Glucose 89 mg/dL     Narrative:       Meter: [id number] : [id number] CASEY VEGAS    Blood Culture [[id number]]  (Normal) Collected:  02/28/17 2026    Specimen:  Blood from Arm, Left Updated:  03/05/17 2101     Blood Culture No growth at 5 days     POC Glucose Fingerstick [[id number]]  (Normal) Collected:  03/06/17 0050    Specimen:  Blood Updated:  03/06/17 0053     Glucose 103 mg/dL     Narrative:       Meter: [id number] : [id number] CASEY VEGAS    Blood Culture [[id number]]  (Normal) Collected:  03/04/17 0123    Specimen:  Blood from Blood, Venous Line Updated:  03/06/17 0205     Blood Culture No growth at 2 days     Blood Culture [[id number]]  (Normal) Collected:  03/04/17 0047    Specimen:  Blood from Blood, Venous Line Updated:  03/06/17 0205     Blood Culture No growth at 2 days     Comprehensive Metabolic Panel [[id number]]  (Abnormal) Collected:  03/06/17 0049    Specimen:  Blood Updated:  03/06/17 0212     Glucose 99 mg/dL      BUN 46 (H) mg/dL      Creatinine 0.62 mg/dL      Sodium 148 mmol/L      Potassium 3.6 mmol/L      Chloride 122 (H) mmol/L      CO2 24.3 mmol/L      Calcium 8.0 mg/dL      Total Protein 5.6 (L) g/dL      Albumin 2.80 (L) g/dL      ALT (SGPT) 30 U/L      AST (SGOT) 67 (H) U/L      Alkaline Phosphatase 70 U/L       Note New Reference Ranges        Total Bilirubin 0.3 mg/dL      eGFR Non African Amer 101 mL/min/1.73      Globulin 2.8 gm/dL      A/G Ratio 1.0 (L) g/dL      BUN/Creatinine Ratio 74.2 (H)      Anion Gap 1.7 (L) mmol/L     Osmolality, Calculated [[id number]]  (Abnormal) Collected:  03/06/17 0049    Specimen:  Blood Updated:  03/06/17 0212     Osmolality Calc 306.2 (H) mOsm/kg     C-reactive Protein [[id number]]  (Abnormal) Collected:  03/06/17 0049    Specimen:  Blood Updated:  03/06/17 0214     C-Reactive Protein 6.48 (H) mg/dL     T4, Free [[id number]]   (Abnormal) Collected:  03/06/17 0049    Specimen:  Blood Updated:  03/06/17 0219     Free T4 0.62 (L) ng/dL     CBC & Differential [[id number]] Collected:  03/06/17 0049    Specimen:  Blood Updated:  03/06/17 0229    Narrative:       The following orders were created for panel order CBC & Differential.  Procedure                               Abnormality         Status                     ---------                               -----------         ------                     Scan Slide[[id number]]                                        Final result               CBC Auto Differential[[id number]]         Abnormal            Final result                 Please view results for these tests on the individual orders.    CBC Auto Differential [[id number]]  (Abnormal) Collected:  03/06/17 0049    Specimen:  Blood Updated:  03/06/17 0229     WBC 3.51 (L) 10*3/mm3      RBC 3.35 (L) 10*6/mm3      Hemoglobin 7.0 (L) g/dL      Hematocrit 24.5 (L) %      MCV 73.1 (L) fL      MCH 20.9 (L) pg      MCHC 28.6 (L) g/dL      RDW 19.0 (H) %      RDW-SD 50.8 fl      MPV 10.5 (H) fL      Platelets 146 10*3/mm3      Neutrophil % 86.1 (H) %      Lymphocyte % 9.7 (L) %      Monocyte % 3.1 %      Eosinophil % 0.0 %      Basophil % 0.0 %      Immature Grans % 1.1 (H) %      Neutrophils, Absolute 3.02 10*3/mm3      Lymphocytes, Absolute 0.34 (L) 10*3/mm3      Monocytes, Absolute 0.11 10*3/mm3      Eosinophils, Absolute 0.00 10*3/mm3      Basophils, Absolute 0.00 10*3/mm3      Immature Grans, Absolute 0.04 (H) 10*3/mm3     Scan Slide [[id number]] Collected:  03/06/17 0049    Specimen:  Blood Updated:  03/06/17 0229     Anisocytosis Mod/2+      Dacrocytes Slight/1+      Hypochromia Mod/2+      Microcytes Mod/2+      Stomatocytes Slight/1+      Target Cells Slight/1+      Platelet Morphology Normal     Blood Gas, Arterial [[id number]]  (Abnormal) Collected:  03/06/17 0405    Specimen:  Arterial Blood Updated:  03/06/17 0425     Site Arterial: left brachial       Micheal's Test N/A      pH, Arterial 7.355 pH units      pCO2, Arterial 39.8 mm Hg      pO2, Arterial 72.3 (L) mm Hg      HCO3, Arterial 21.7 (L) mmol/L      Base Excess, Arterial -3.5 mmol/L      O2 Saturation, Arterial 93.9 %      Hemoglobin, Blood Gas 15.4 g/dL      Hematocrit, Blood Gas 45.0 %      Oxyhemoglobin 92.4 %      Methemoglobin 0.2 %      Carboxyhemoglobin 1.4 %      A-a Gradiant 298.5 mmHg      Temperature 98.6 C      Barometric Pressure for Blood Gas 738 mmHg      Modality Ventilator      FIO2 60 %      Ventilator Mode AC      Set Tidal Volume 450      Set Mech Resp Rate 30      PEEP 12     POC Glucose Fingerstick [[id number]]  (Abnormal) Collected:  03/06/17 0846    Specimen:  Blood Updated:  03/06/17 0857     Glucose 139 (H) mg/dL     Narrative:       Meter: [id number] : [id number] mckeon melania    Hemoglobin & Hematocrit, Blood [[id number]]  (Abnormal) Collected:  03/06/17 0835    Specimen:  Blood Updated:  03/06/17 0947     Hemoglobin 8.4 (L) g/dL       Verified by Repeat Analysis         Hematocrit 29.3 (L) %     Hepatitis Panel, Acute [[id number]] Collected:  03/06/17 0949    Specimen:  Blood Updated:  03/06/17 0949    HIV-1 & HIV-2 Antibodies [[id number]] Collected:  03/06/17 0949    Specimen:  Blood Updated:  03/06/17 0949    Narrative:       The following orders were created for panel order HIV-1 & HIV-2 Antibodies.  Procedure                               Abnormality         Status                     ---------                               -----------         ------                     HIV-1 / O / 2 Ag / Antibo...[[id number]]                      In process                   Please view results for these tests on the individual orders.    HIV-1 / O / 2 Ag / Antibody 4th Generation [[id number]] Collected:  03/06/17 0949    Specimen:  Blood Updated:  03/06/17 0949    Hemoglobin & Hematocrit, Blood [[id number]]  (Abnormal) Collected:  03/06/17 1018    Specimen:  Blood Updated:  03/06/17 1031      Hemoglobin 8.4 (L) g/dL      Hematocrit 29.7 (L) %         Imaging Results (last 24 hours)     Procedure Component Value Units Date/Time    XR Chest 1 View [[id number]] Collected:  03/05/17 1052     Updated:  03/05/17 1153    Narrative:       XR CHEST 1 VW-     REASON FOR EXAM:  Follow up pneumonia/ARDS; J18.9-Pneumonia, unspecified  organism.     COMPARISON: The chest is compared with an earlier chest done 03/04/2017.     FINDINGS:  The endotracheal tube tip overlies the tracheal air column at  the level of the aortic knob. The central line and nasogastric tubes are  in appropriate position. The cardiac silhouette is stable. The lungs  show persistent but improving interstitial and alveolar infiltrates in  both lungs. No pleural effusions have developed.       Impression:       Continued improvement in the aeration of the lungs.        This report was finalized on 3/5/2017 11:51 AM by Dr. Scott Britton II, MD.       XR Chest 1 View [[id number]] Collected:  03/06/17 0801     Updated:  03/06/17 0803    Narrative:       EXAMINATION: XR CHEST 1 VW-      CLINICAL INDICATION:     follow up pneumonia/ards; J18.9-Pneumonia,  unspecified organism     TECHNIQUE: Single AP view of chest.      COMPARISON: 3/5/2017      FINDINGS:   There is bibasilar atelectasis.   Stable bilateral airspace disease.  Support tube and lines are in unchanged position.   Heart size is stable.  No pneumothorax.   Tiny bilateral pleural effusions persist.        Impression:       Stable appearance of the chest.     This report was finalized on 3/6/2017 8:01 AM by Dr. Bret Morris MD.             Operative/Procedure Notes (last 24 hours) (Notes from 3/5/2017 11:07 AM through 3/6/2017 11:07 AM)     No notes of this type exist for this encounter.           Physician Progress Notes (last 24 hours) (Notes from 3/5/2017 11:07 AM through 3/6/2017 11:07 AM)      Manuel Elizalde DO at 3/6/2017  8:24 AM  Version 4 of 4         Subjective     History:    Pepito Yan is a 51 y.o. female admitted on 2/28/2017 secondary to Bilateral pneumonia     Procedures:   /3/17: Emergent intubation  3/3/17: Right femoral central line placement  3/3/17: Right IJ central line placement    Patient seen and examined with GUERRERO Khan. Sedated on vent support but opens her eyes. Does not follow commands. RN reports pt has been hemodynamically stable overnight. RN also notes that if sedation is off pt becomes agitated and attempts to pull at her lines. Currently tolerating TF's a a lower rate. No acute events overnight per RN.     History taken from: chart, and RN.      Objective     Vital Signs  Temp:  [99.3 °F (37.4 °C)-99.8 °F (37.7 °C)] 99.6 °F (37.6 °C)  Heart Rate:  [] 109  Resp:  [30] 30  BP: ()/(56-85) 146/78  FiO2 (%):  [60 %] 60 %    Intake/Output Summary (Last 24 hours) at 03/06/17 0824  Last data filed at 03/06/17 0500   Gross per 24 hour   Intake 1899.56 ml   Output   2475 ml   Net -575.44 ml         Physical Exam:  General:    Sedated on vent support, opens her eyes but does not follow commands    Heart:      Normal S1 and S2. Tachycardic. No significant murmur, rubs or gallops appreciated.   Lungs:     Respirations regular, even and unlabored. Decreased bibasilar breath sounds.    Abdomen:   Soft and nontender. No guarding, rebound tenderness or  organomegaly noted. Bowel sounds present x 4.   Extremities:  No clubbing, cyanosis or edema noted.      Results Review:      Results from last 7 days  Lab Units 03/06/17  0049 03/05/17  1235 03/05/17  0111 03/04/17  0122 03/03/17  0040 03/02/17  1023 03/02/17  0120 02/28/17  1939   WBC 10*3/mm3 3.51*  --  3.65* 6.99 4.46*  --  5.90 8.29   HEMOGLOBIN g/dL 7.0* 7.2* 7.1* 9.0* 8.8* 9.5* 8.3* 9.9*   PLATELETS 10*3/mm3 146  --  164 196 168  --  168 222       Results from last 7 days  Lab Units 03/06/17  0049 03/05/17  0111 03/04/17  0123 03/03/17  0039 03/02/17  0120 02/28/17  1939   SODIUM mmol/L 148 146 144 137 134*  139   POTASSIUM mmol/L 3.6 4.2 5.1 4.5 4.2 3.8   CHLORIDE mmol/L 122* 119* 116* 109 109 109   TOTAL CO2 mmol/L 24.3 24.0* 23.6* 20.8* 17.9* 24.3   BUN mg/dL 46* 47* 31* 27* 20 18   CREATININE mg/dL 0.62 0.79 0.96 0.71 0.65 0.57   CALCIUM mg/dL 8.0 7.9 8.5 8.1 8.4 8.8   GLUCOSE mg/dL 99 134* 87 114* 330* 117*       Results from last 7 days  Lab Units 03/06/17  0049 03/05/17  0111 03/04/17  0123 03/03/17  0039 03/02/17  0120 02/28/17  1939   BILIRUBIN mg/dL 0.3 0.2 0.2 0.2 0.1* 0.2   ALK PHOS U/L 70 49 54 52 51 59   AST (SGOT) U/L 67* 45* 64* 56* 28 29   ALT (SGPT) U/L 30 27 34 36 31 41*       Results from last 7 days  Lab Units 03/05/17  0111 03/04/17  0123 03/02/17  0120   MAGNESIUM mg/dL 2.6 2.3 2.1           Results from last 7 days  Lab Units 03/02/17  0911 03/01/17  0544 02/28/17  2114 02/28/17  1939   CK TOTAL U/L 132 302*  --   --    TROPONIN I ng/mL 0.018 0.029 0.076* 0.085*   CK MB INDEX % 0.2 0.1  --   --    MYOGLOBIN ng/mL 38.0 71.0  --  107.0       Imaging Results (last 24 hours)     Procedure Component Value Units Date/Time    XR Abdomen KUB [[id number]] Collected:  03/05/17 1051     Updated:  03/05/17 1054    Narrative:       XR ABDOMEN KUB-     REASON FOR EXAM:  Abdominal distention; J18.9-Pneumonia, unspecified  organism.     FINDINGS:  A nasogastric tube is demonstrated in the upper abdomen. The  tip of the tube overlies the distal stomach. There is evidence of a  catheter in the urinary bladder. The bowel gas pattern shows no evidence  of obstruction. There are fairly heavy atherosclerotic calcifications in  the aorta and its branches.       Impression:       NG tube tip overlies the antrum of the stomach. There is no  evidence of bowel obstruction.        This report was finalized on 3/5/2017 10:52 AM by Dr. Scott Britton II, MD.       XR Chest 1 View [[id number]] Collected:  03/05/17 1052     Updated:  03/05/17 1153    Narrative:       XR CHEST 1 VW-     REASON FOR EXAM:  Follow up pneumonia/ARDS;  J18.9-Pneumonia, unspecified  organism.     COMPARISON: The chest is compared with an earlier chest done 03/04/2017.     FINDINGS:  The endotracheal tube tip overlies the tracheal air column at  the level of the aortic knob. The central line and nasogastric tubes are  in appropriate position. The cardiac silhouette is stable. The lungs  show persistent but improving interstitial and alveolar infiltrates in  both lungs. No pleural effusions have developed.       Impression:       Continued improvement in the aeration of the lungs.        This report was finalized on 3/5/2017 11:51 AM by Dr. Scott Britton II, MD.       XR Chest 1 View [[id number]] Collected:  03/06/17 0801     Updated:  03/06/17 0803    Narrative:       EXAMINATION: XR CHEST 1 VW-      CLINICAL INDICATION:     follow up pneumonia/ards; J18.9-Pneumonia,  unspecified organism     TECHNIQUE: Single AP view of chest.      COMPARISON: 3/5/2017      FINDINGS:   There is bibasilar atelectasis.   Stable bilateral airspace disease.  Support tube and lines are in unchanged position.   Heart size is stable.  No pneumothorax.   Tiny bilateral pleural effusions persist.        Impression:       Stable appearance of the chest.     This report was finalized on 3/6/2017 8:01 AM by Dr. Bret Morris MD.               Medications:    acetaminophen 650 mg Oral Once   aspirin 81 mg Oral Daily   azithromycin 500 mg Intravenous Q24H   aztreonam 2 g Intravenous Q8H   clopidogrel 75 mg Oral Daily   furosemide 40 mg Intravenous Daily   guaiFENesin 600 mg Oral BID   heparin (porcine) 5,000 Units Subcutaneous Q12H   insulin aspart 0-14 Units Subcutaneous 4x Daily AC & at Bedtime   insulin detemir 20 Units Subcutaneous Nightly   ipratropium 0.5 mg Nebulization Q6H - RT   methylPREDNISolone sodium succinate 20 mg Intravenous Q12H   metoprolol tartrate 25 mg Oral Q12H   metroNIDAZOLE 500 mg Intravenous Q8H   pantoprazole 40 mg Intravenous Q AM   potassium chloride 40 mEq Oral Once    sucralfate 1 g Oral 4x Daily AC & at Bedtime   vancomycin 750 mg Intravenous Q12H       fentaNYL (SUBLIMAZE) PCA 1500 mcg/30 mL syringe     norepinephrine 0.02-0.3 mcg/kg/min Last Rate: Stopped (03/04/17 7371)   Pharmacy to dose vancomycin     propofol 5-50 mcg/kg/min Last Rate: 50 mcg/kg/min (03/06/17 1236)           Assessment&#47;Plan   Severe sepsis: Likely 2/2 B/L community acquired pneumonia and UTI. Currently on Azithromax, Aztreonam, Vanc and Flagyl. Hypotension improved. Repeat CXR appears improved. WBC is low with improving CRP. Cont broad spectrum IV abx and repeat labs in the AM.     ARDS: Likely 2/2 sepsis and pneumonia. Repeat CXR appears improved. Currently on IV Lasix and broad spectrum IV abx. PEEP decreased yesterday. Discussed with pulmonology and will defer vent management to pulm. Repeat ABG and CXR in the AM. Pulm input appreciated.     Acute exacerbation of COPD: Cont steroids, bronchodilators and vent support. Pulm input appreciated.     Acute on chronic microcytic anemia with hx of iron deficiency: Hgb 7.0 this AM. Will order to transfuse 1 unit of PRBC's. Cont to monitor closely.     DM II, insulin-dependent: No further reported episodes of hypoglycemia. Cont current regimen and cont TF's.   liver enzymes: Check hepatitis panel and HIV. Repeat CMP in the AM.     PPX  GI: PPI  DVT: SQ heparin    Discussed with pulm/CC.     Pt is at high risk 2/2 severe sepsis, bilateral pneumonia, ARDS, UTI, COPD, anemia and hx of noncompliance.           Manuel Elizalde DO  03/06/17  8:24 AM     Electronically signed by Manuel Elizalde DO at 3/6/2017  9:45 AM      Manuel Elizalde DO at 3/6/2017  8:24 AM  Version 3 of 4         Subjective     History:   Pepito Yan is a 51 y.o. female admitted on 2/28/2017 secondary to Bilateral pneumonia     Procedures:   /3/17: Emergent intubation  3/3/17: Right femoral central line placement  3/3/17: Right IJ central line placement    Patient  seen and examined with GUERRERO Khan. Sedated on vent support but opens her eyes. Does not follow commands. RN reports pt has been hemodynamically stable overnight. RN also notes that if sedation is off pt becomes agitated and attempts to pull at her lines. Currently tolerating TF's a a lower rate. No acute events overnight per RN.     History taken from: chart, and RN.      Objective     Vital Signs  Temp:  [99.3 °F (37.4 °C)-99.8 °F (37.7 °C)] 99.6 °F (37.6 °C)  Heart Rate:  [] 109  Resp:  [30] 30  BP: ()/(56-85) 146/78  FiO2 (%):  [60 %] 60 %    Intake/Output Summary (Last 24 hours) at 03/06/17 0824  Last data filed at 03/06/17 0500   Gross per 24 hour   Intake 1899.56 ml   Output   2475 ml   Net -575.44 ml         Physical Exam:  General:    Sedated on vent support, opens her eyes but does not follow commands    Heart:      Normal S1 and S2. Tachycardic. No significant murmur, rubs or gallops appreciated.   Lungs:     Respirations regular, even and unlabored. Decreased bibasilar breath sounds.    Abdomen:   Soft and nontender. No guarding, rebound tenderness or  organomegaly noted. Bowel sounds present x 4.   Extremities:  No clubbing, cyanosis or edema noted. Moves UE and LE equally B/L.     Results Review:      Results from last 7 days  Lab Units 03/06/17  0049 03/05/17  1235 03/05/17  0111 03/04/17  0122 03/03/17  0040 03/02/17  1023 03/02/17  0120 02/28/17  1939   WBC 10*3/mm3 3.51*  --  3.65* 6.99 4.46*  --  5.90 8.29   HEMOGLOBIN g/dL 7.0* 7.2* 7.1* 9.0* 8.8* 9.5* 8.3* 9.9*   PLATELETS 10*3/mm3 146  --  164 196 168  --  168 222       Results from last 7 days  Lab Units 03/06/17  0049 03/05/17  0111 03/04/17  0123 03/03/17  0039 03/02/17  0120 02/28/17  1939   SODIUM mmol/L 148 146 144 137 134* 139   POTASSIUM mmol/L 3.6 4.2 5.1 4.5 4.2 3.8   CHLORIDE mmol/L 122* 119* 116* 109 109 109   TOTAL CO2 mmol/L 24.3 24.0* 23.6* 20.8* 17.9* 24.3   BUN mg/dL 46* 47* 31* 27* 20 18   CREATININE mg/dL 0.62 0.79  0.96 0.71 0.65 0.57   CALCIUM mg/dL 8.0 7.9 8.5 8.1 8.4 8.8   GLUCOSE mg/dL 99 134* 87 114* 330* 117*       Results from last 7 days  Lab Units 03/06/17  0049 03/05/17  0111 03/04/17  0123 03/03/17  0039 03/02/17  0120 02/28/17  1939   BILIRUBIN mg/dL 0.3 0.2 0.2 0.2 0.1* 0.2   ALK PHOS U/L 70 49 54 52 51 59   AST (SGOT) U/L 67* 45* 64* 56* 28 29   ALT (SGPT) U/L 30 27 34 36 31 41*       Results from last 7 days  Lab Units 03/05/17  0111 03/04/17  0123 03/02/17  0120   MAGNESIUM mg/dL 2.6 2.3 2.1           Results from last 7 days  Lab Units 03/02/17  0911 03/01/17  0544 02/28/17  2114 02/28/17  1939   CK TOTAL U/L 132 302*  --   --    TROPONIN I ng/mL 0.018 0.029 0.076* 0.085*   CK MB INDEX % 0.2 0.1  --   --    MYOGLOBIN ng/mL 38.0 71.0  --  107.0       Imaging Results (last 24 hours)     Procedure Component Value Units Date/Time    XR Abdomen KUB [[id number]] Collected:  03/05/17 1051     Updated:  03/05/17 1054    Narrative:       XR ABDOMEN KUB-     REASON FOR EXAM:  Abdominal distention; J18.9-Pneumonia, unspecified  organism.     FINDINGS:  A nasogastric tube is demonstrated in the upper abdomen. The  tip of the tube overlies the distal stomach. There is evidence of a  catheter in the urinary bladder. The bowel gas pattern shows no evidence  of obstruction. There are fairly heavy atherosclerotic calcifications in  the aorta and its branches.       Impression:       NG tube tip overlies the antrum of the stomach. There is no  evidence of bowel obstruction.        This report was finalized on 3/5/2017 10:52 AM by Dr. Scott Britton II, MD.       XR Chest 1 View [[id number]] Collected:  03/05/17 1052     Updated:  03/05/17 1153    Narrative:       XR CHEST 1 VW-     REASON FOR EXAM:  Follow up pneumonia/ARDS; J18.9-Pneumonia, unspecified  organism.     COMPARISON: The chest is compared with an earlier chest done 03/04/2017.     FINDINGS:  The endotracheal tube tip overlies the tracheal air column at  the level of  the aortic knob. The central line and nasogastric tubes are  in appropriate position. The cardiac silhouette is stable. The lungs  show persistent but improving interstitial and alveolar infiltrates in  both lungs. No pleural effusions have developed.       Impression:       Continued improvement in the aeration of the lungs.        This report was finalized on 3/5/2017 11:51 AM by Dr. Scott Britton II, MD.       XR Chest 1 View [[id number]] Collected:  03/06/17 0801     Updated:  03/06/17 0803    Narrative:       EXAMINATION: XR CHEST 1 VW-      CLINICAL INDICATION:     follow up pneumonia/ards; J18.9-Pneumonia,  unspecified organism     TECHNIQUE: Single AP view of chest.      COMPARISON: 3/5/2017      FINDINGS:   There is bibasilar atelectasis.   Stable bilateral airspace disease.  Support tube and lines are in unchanged position.   Heart size is stable.  No pneumothorax.   Tiny bilateral pleural effusions persist.        Impression:       Stable appearance of the chest.     This report was finalized on 3/6/2017 8:01 AM by Dr. Bret Morris MD.               Medications:    acetaminophen 650 mg Oral Once   aspirin 81 mg Oral Daily   azithromycin 500 mg Intravenous Q24H   aztreonam 2 g Intravenous Q8H   clopidogrel 75 mg Oral Daily   furosemide 40 mg Intravenous Daily   guaiFENesin 600 mg Oral BID   heparin (porcine) 5,000 Units Subcutaneous Q12H   insulin aspart 0-14 Units Subcutaneous 4x Daily AC & at Bedtime   insulin detemir 20 Units Subcutaneous Nightly   ipratropium 0.5 mg Nebulization Q6H - RT   methylPREDNISolone sodium succinate 20 mg Intravenous Q12H   metoprolol tartrate 25 mg Oral Q12H   metroNIDAZOLE 500 mg Intravenous Q8H   pantoprazole 40 mg Intravenous Q AM   potassium chloride 40 mEq Oral Once   sucralfate 1 g Oral 4x Daily AC & at Bedtime   vancomycin 750 mg Intravenous Q12H       fentaNYL (SUBLIMAZE) PCA 1500 mcg/30 mL syringe     norepinephrine 0.02-0.3 mcg/kg/min Last Rate: Stopped (03/04/17  2390)   Pharmacy to dose vancomycin     propofol 5-50 mcg/kg/min Last Rate: 50 mcg/kg/min (03/06/17 2128)           Assessment&#47;Plan   Severe sepsis: Likely 2/2 B/L community acquired pneumonia and UTI. Currently on Azithromax, Aztreonam, Vanc and Flagyl. Hypotension improved. Repeat CXR appears improved. WBC is low with improving CRP. Cont broad spectrum IV abx and repeat labs in the AM.     ARDS: Likely 2/2 sepsis and pneumonia. Repeat CXR appears improved. Currently on IV Lasix and broad spectrum IV abx. PEEP decreased yesterday. Discussed with pulmonology and will defer vent management to pulm. Repeat ABG and CXR in the AM. Pulm input appreciated.     Acute exacerbation of COPD: Cont steroids, bronchodilators and vent support. Pulm input appreciated.     Acute on chronic microcytic anemia with hx of iron deficiency: Hgb 7.0 this AM. Will order to transfuse 1 unit of PRBC's. Cont to monitor closely.     DM II, insulin-dependent: No further reported episodes of hypoglycemia. Cont current regimen and cont TF's.   liver enzymes: Check hepatitis panel and HIV. Repeat CMP in the AM.     PPX  GI: PPI  DVT: SQ heparin    Discussed with pulm/CC.     Pt is at high risk 2/2 severe sepsis, bilateral pneumonia, ARDS, UTI, COPD, anemia and hx of noncompliance.           Manuel Elizalde DO  03/06/17  8:24 AM     Electronically signed by Manuel Elizalde DO at 3/6/2017  9:25 AM      Manuel Elizalde DO at 3/6/2017  8:24 AM  Version 2 of 4         Subjective     History:   Pepito Yan is a 51 y.o. female admitted on 2/28/2017 secondary to Bilateral pneumonia     Procedures:   /3/17: Emergent intubation  3/3/17: Right femoral central line placement  3/3/17: Right IJ central line placement    Patient seen and examined with GUERRERO Khan. Sedated on vent support but opens her eyes. Does not follow commands. RN reports pt has been hemodynamically stable overnight. RN also notes that if sedation is off pt  becomes agitated and attempts to pull at her lines. Currently tolerating TF's a a lower rate. No acute events overnight per RN.     History taken from: patient, chart, and RN.      Objective     Vital Signs  Temp:  [99.3 °F (37.4 °C)-99.8 °F (37.7 °C)] 99.6 °F (37.6 °C)  Heart Rate:  [] 109  Resp:  [30] 30  BP: ()/(56-85) 146/78  FiO2 (%):  [60 %] 60 %    Intake/Output Summary (Last 24 hours) at 03/06/17 0824  Last data filed at 03/06/17 0500   Gross per 24 hour   Intake 1899.56 ml   Output   2475 ml   Net -575.44 ml         Physical Exam:  General:    Sedated on vent support, opens her eyes but does not follow commands    Heart:      Normal S1 and S2. Tachycardic. No significant murmur, rubs or gallops appreciated.   Lungs:     Respirations regular, even and unlabored. Decreased bibasilar breath sounds.    Abdomen:   Soft and nontender. No guarding, rebound tenderness or  organomegaly noted. Bowel sounds present x 4.   Extremities:  No clubbing, cyanosis or edema noted. Moves UE and LE equally B/L.     Results Review:      Results from last 7 days  Lab Units 03/06/17  0049 03/05/17  1235 03/05/17  0111 03/04/17  0122 03/03/17  0040 03/02/17  1023 03/02/17  0120 02/28/17 1939   WBC 10*3/mm3 3.51*  --  3.65* 6.99 4.46*  --  5.90 8.29   HEMOGLOBIN g/dL 7.0* 7.2* 7.1* 9.0* 8.8* 9.5* 8.3* 9.9*   PLATELETS 10*3/mm3 146  --  164 196 168  --  168 222       Results from last 7 days  Lab Units 03/06/17  0049 03/05/17  0111 03/04/17  0123 03/03/17  0039 03/02/17  0120 02/28/17  1939   SODIUM mmol/L 148 146 144 137 134* 139   POTASSIUM mmol/L 3.6 4.2 5.1 4.5 4.2 3.8   CHLORIDE mmol/L 122* 119* 116* 109 109 109   TOTAL CO2 mmol/L 24.3 24.0* 23.6* 20.8* 17.9* 24.3   BUN mg/dL 46* 47* 31* 27* 20 18   CREATININE mg/dL 0.62 0.79 0.96 0.71 0.65 0.57   CALCIUM mg/dL 8.0 7.9 8.5 8.1 8.4 8.8   GLUCOSE mg/dL 99 134* 87 114* 330* 117*       Results from last 7 days  Lab Units 03/06/17  0049 03/05/17  0111 03/04/17  0123  03/03/17  0039 03/02/17  0120 02/28/17  1939   BILIRUBIN mg/dL 0.3 0.2 0.2 0.2 0.1* 0.2   ALK PHOS U/L 70 49 54 52 51 59   AST (SGOT) U/L 67* 45* 64* 56* 28 29   ALT (SGPT) U/L 30 27 34 36 31 41*       Results from last 7 days  Lab Units 03/05/17  0111 03/04/17  0123 03/02/17  0120   MAGNESIUM mg/dL 2.6 2.3 2.1           Results from last 7 days  Lab Units 03/02/17  0911 03/01/17  0544 02/28/17  2114 02/28/17  1939   CK TOTAL U/L 132 302*  --   --    TROPONIN I ng/mL 0.018 0.029 0.076* 0.085*   CK MB INDEX % 0.2 0.1  --   --    MYOGLOBIN ng/mL 38.0 71.0  --  107.0       Imaging Results (last 24 hours)     Procedure Component Value Units Date/Time    XR Abdomen KUB [[id number]] Collected:  03/05/17 1051     Updated:  03/05/17 1054    Narrative:       XR ABDOMEN KUB-     REASON FOR EXAM:  Abdominal distention; J18.9-Pneumonia, unspecified  organism.     FINDINGS:  A nasogastric tube is demonstrated in the upper abdomen. The  tip of the tube overlies the distal stomach. There is evidence of a  catheter in the urinary bladder. The bowel gas pattern shows no evidence  of obstruction. There are fairly heavy atherosclerotic calcifications in  the aorta and its branches.       Impression:       NG tube tip overlies the antrum of the stomach. There is no  evidence of bowel obstruction.        This report was finalized on 3/5/2017 10:52 AM by Dr. Scott Britton II, MD.       XR Chest 1 View [[id number]] Collected:  03/05/17 1052     Updated:  03/05/17 1153    Narrative:       XR CHEST 1 VW-     REASON FOR EXAM:  Follow up pneumonia/ARDS; J18.9-Pneumonia, unspecified  organism.     COMPARISON: The chest is compared with an earlier chest done 03/04/2017.     FINDINGS:  The endotracheal tube tip overlies the tracheal air column at  the level of the aortic knob. The central line and nasogastric tubes are  in appropriate position. The cardiac silhouette is stable. The lungs  show persistent but improving interstitial and alveolar  infiltrates in  both lungs. No pleural effusions have developed.       Impression:       Continued improvement in the aeration of the lungs.        This report was finalized on 3/5/2017 11:51 AM by Dr. Scott Britton II, MD.       XR Chest 1 View [[id number]] Collected:  03/06/17 0801     Updated:  03/06/17 0803    Narrative:       EXAMINATION: XR CHEST 1 VW-      CLINICAL INDICATION:     follow up pneumonia/ards; J18.9-Pneumonia,  unspecified organism     TECHNIQUE: Single AP view of chest.      COMPARISON: 3/5/2017      FINDINGS:   There is bibasilar atelectasis.   Stable bilateral airspace disease.  Support tube and lines are in unchanged position.   Heart size is stable.  No pneumothorax.   Tiny bilateral pleural effusions persist.        Impression:       Stable appearance of the chest.     This report was finalized on 3/6/2017 8:01 AM by Dr. Bret Morris MD.               Medications:    acetaminophen 650 mg Oral Once   aspirin 81 mg Oral Daily   azithromycin 500 mg Intravenous Q24H   aztreonam 2 g Intravenous Q8H   clopidogrel 75 mg Oral Daily   furosemide 40 mg Intravenous Daily   guaiFENesin 600 mg Oral BID   heparin (porcine) 5,000 Units Subcutaneous Q12H   insulin aspart 0-14 Units Subcutaneous 4x Daily AC & at Bedtime   insulin detemir 20 Units Subcutaneous Nightly   ipratropium 0.5 mg Nebulization Q6H - RT   methylPREDNISolone sodium succinate 20 mg Intravenous Q12H   metoprolol tartrate 25 mg Oral Q12H   metroNIDAZOLE 500 mg Intravenous Q8H   pantoprazole 40 mg Intravenous Q AM   potassium chloride 40 mEq Oral Once   sucralfate 1 g Oral 4x Daily AC & at Bedtime   vancomycin 750 mg Intravenous Q12H       fentaNYL (SUBLIMAZE) PCA 1500 mcg/30 mL syringe     norepinephrine 0.02-0.3 mcg/kg/min Last Rate: Stopped (03/04/17 1012)   Pharmacy to dose vancomycin     propofol 5-50 mcg/kg/min Last Rate: 50 mcg/kg/min (03/06/17 9074)           Assessment&#47;Plan   Severe sepsis: Likely 2/2 B/L community acquired  pneumonia and UTI. Currently on Azithromax, Aztreonam, Vanc and Flagyl. Hypotension improved. Repeat CXR appears improved. WBC is low with improving CRP. Cont broad spectrum IV abx and repeat labs in the AM.     ARDS: Likely 2/2 sepsis and pneumonia. Repeat CXR appears improved. Currently on IV Lasix and broad spectrum IV abx. PEEP decreased yesterday. Discussed with pulmonology and will defer vent management to pulm. Repeat ABG and CXR in the AM. Pulm input appreciated.     Acute exacerbation of COPD: Cont steroids, bronchodilators and vent support. Pulm input appreciated.     Acute on chronic microcytic anemia with hx of iron deficiency: Hgb 7.0 this AM. Will order to transfuse 1 unit of PRBC's. Cont to monitor closely.     DM II, insulin-dependent: No further reported episodes of hypoglycemia. Cont current regimen and cont TF's.   liver enzymes: Check hepatitis panel and HIV. Repeat CMP in the AM.     PPX  GI: PPI  DVT: SQ heparin    Discussed with pulm/CC.     Pt is at high risk 2/2 severe sepsis, bilateral pneumonia, ARDS, UTI, COPD, anemia and hx of noncompliance.           Manuel Elizalde DO  03/06/17  8:24 AM     Electronically signed by Manuel Elizalde DO at 3/6/2017  8:52 AM      Manuel Elizalde DO at 3/6/2017  8:24 AM  Version 1 of 4         Subjective     History:   Pepito Yan is a 51 y.o. female admitted on 2/28/2017 secondary to Bilateral pneumonia     Procedures:   /3/17: Emergent intubation  3/3/17: Right femoral central line placement  3/3/17: Right IJ central line placement    Patient seen and examined with GUERRERO Khan. Sedated on vent support but opens her eyes. Does not follow commands. RN reports pt has been hemodynamically stable overnight. RN also notes that if sedation is off pt becomes agitated and attempts to pull at her lines. Currently tolerating TF's a a lower rate. No acute events overnight per RN.     History taken from: patient, chart, and RN.      Objective      Vital Signs  Temp:  [99.3 °F (37.4 °C)-99.8 °F (37.7 °C)] 99.6 °F (37.6 °C)  Heart Rate:  [] 109  Resp:  [30] 30  BP: ()/(56-85) 146/78  FiO2 (%):  [60 %] 60 %    Intake/Output Summary (Last 24 hours) at 03/06/17 0824  Last data filed at 03/06/17 0500   Gross per 24 hour   Intake 1899.56 ml   Output   2475 ml   Net -575.44 ml         Physical Exam:  General:    Sedated on vent support, opens her eyes but does not follow commands    Heart:      Normal S1 and S2. Tachycardic. No significant murmur, rubs or gallops appreciated.   Lungs:     Respirations regular, even and unlabored. Decreased bibasilar breath sounds.    Abdomen:   Soft and nontender. No guarding, rebound tenderness or  organomegaly noted. Bowel sounds present x 4.   Extremities:  No clubbing, cyanosis or edema noted. Moves UE and LE equally B/L.     Results Review:      Results from last 7 days  Lab Units 03/06/17  0049 03/05/17  1235 03/05/17  0111 03/04/17  0122 03/03/17  0040 03/02/17  1023 03/02/17  0120 02/28/17 1939   WBC 10*3/mm3 3.51*  --  3.65* 6.99 4.46*  --  5.90 8.29   HEMOGLOBIN g/dL 7.0* 7.2* 7.1* 9.0* 8.8* 9.5* 8.3* 9.9*   PLATELETS 10*3/mm3 146  --  164 196 168  --  168 222       Results from last 7 days  Lab Units 03/06/17  0049 03/05/17  0111 03/04/17  0123 03/03/17  0039 03/02/17  0120 02/28/17 1939   SODIUM mmol/L 148 146 144 137 134* 139   POTASSIUM mmol/L 3.6 4.2 5.1 4.5 4.2 3.8   CHLORIDE mmol/L 122* 119* 116* 109 109 109   TOTAL CO2 mmol/L 24.3 24.0* 23.6* 20.8* 17.9* 24.3   BUN mg/dL 46* 47* 31* 27* 20 18   CREATININE mg/dL 0.62 0.79 0.96 0.71 0.65 0.57   CALCIUM mg/dL 8.0 7.9 8.5 8.1 8.4 8.8   GLUCOSE mg/dL 99 134* 87 114* 330* 117*       Results from last 7 days  Lab Units 03/06/17  0049 03/05/17  0111 03/04/17  0123 03/03/17  0039 03/02/17  0120 02/28/17  1939   BILIRUBIN mg/dL 0.3 0.2 0.2 0.2 0.1* 0.2   ALK PHOS U/L 70 49 54 52 51 59   AST (SGOT) U/L 67* 45* 64* 56* 28 29   ALT (SGPT) U/L 30 27 34 36 31 41*        Results from last 7 days  Lab Units 03/05/17  0111 03/04/17  0123 03/02/17  0120   MAGNESIUM mg/dL 2.6 2.3 2.1           Results from last 7 days  Lab Units 03/02/17  0911 03/01/17  0544 02/28/17  2114 02/28/17  1939   CK TOTAL U/L 132 302*  --   --    TROPONIN I ng/mL 0.018 0.029 0.076* 0.085*   CK MB INDEX % 0.2 0.1  --   --    MYOGLOBIN ng/mL 38.0 71.0  --  107.0       Imaging Results (last 24 hours)     Procedure Component Value Units Date/Time    XR Abdomen KUB [[id number]] Collected:  03/05/17 1051     Updated:  03/05/17 1054    Narrative:       XR ABDOMEN KUB-     REASON FOR EXAM:  Abdominal distention; J18.9-Pneumonia, unspecified  organism.     FINDINGS:  A nasogastric tube is demonstrated in the upper abdomen. The  tip of the tube overlies the distal stomach. There is evidence of a  catheter in the urinary bladder. The bowel gas pattern shows no evidence  of obstruction. There are fairly heavy atherosclerotic calcifications in  the aorta and its branches.       Impression:       NG tube tip overlies the antrum of the stomach. There is no  evidence of bowel obstruction.        This report was finalized on 3/5/2017 10:52 AM by Dr. Scott Britton II, MD.       XR Chest 1 View [[id number]] Collected:  03/05/17 1052     Updated:  03/05/17 1153    Narrative:       XR CHEST 1 VW-     REASON FOR EXAM:  Follow up pneumonia/ARDS; J18.9-Pneumonia, unspecified  organism.     COMPARISON: The chest is compared with an earlier chest done 03/04/2017.     FINDINGS:  The endotracheal tube tip overlies the tracheal air column at  the level of the aortic knob. The central line and nasogastric tubes are  in appropriate position. The cardiac silhouette is stable. The lungs  show persistent but improving interstitial and alveolar infiltrates in  both lungs. No pleural effusions have developed.       Impression:       Continued improvement in the aeration of the lungs.        This report was finalized on 3/5/2017 11:51 AM by  Dr. Scott Britton II, MD.       XR Chest 1 View [[id number]] Collected:  03/06/17 0801     Updated:  03/06/17 0803    Narrative:       EXAMINATION: XR CHEST 1 VW-      CLINICAL INDICATION:     follow up pneumonia/ards; J18.9-Pneumonia,  unspecified organism     TECHNIQUE: Single AP view of chest.      COMPARISON: 3/5/2017      FINDINGS:   There is bibasilar atelectasis.   Stable bilateral airspace disease.  Support tube and lines are in unchanged position.   Heart size is stable.  No pneumothorax.   Tiny bilateral pleural effusions persist.        Impression:       Stable appearance of the chest.     This report was finalized on 3/6/2017 8:01 AM by Dr. Bret Morris MD.               Medications:    acetaminophen 650 mg Oral Once   aspirin 81 mg Oral Daily   azithromycin 500 mg Intravenous Q24H   aztreonam 2 g Intravenous Q8H   clopidogrel 75 mg Oral Daily   furosemide 40 mg Intravenous Daily   guaiFENesin 600 mg Oral BID   heparin (porcine) 5,000 Units Subcutaneous Q12H   insulin aspart 0-14 Units Subcutaneous 4x Daily AC & at Bedtime   insulin detemir 20 Units Subcutaneous Nightly   ipratropium 0.5 mg Nebulization Q6H - RT   methylPREDNISolone sodium succinate 20 mg Intravenous Q12H   metoprolol tartrate 25 mg Oral Q12H   metroNIDAZOLE 500 mg Intravenous Q8H   pantoprazole 40 mg Intravenous Q AM   potassium chloride 40 mEq Oral Once   sucralfate 1 g Oral 4x Daily AC & at Bedtime   vancomycin 750 mg Intravenous Q12H       fentaNYL (SUBLIMAZE) PCA 1500 mcg/30 mL syringe     norepinephrine 0.02-0.3 mcg/kg/min Last Rate: Stopped (03/04/17 8468)   Pharmacy to dose vancomycin     propofol 5-50 mcg/kg/min Last Rate: 50 mcg/kg/min (03/06/17 5910)           Assessment&#47;Plan   Severe sepsis: Likely 2/2 B/L community acquired pneumonia and UTI. Currently on Azithromax, Aztreonam, Vanc and Flagyl. Hypotension improved. Repeat CXR appears improved. WBC is low with improving CRP. Cont broad spectrum IV abx and repeat labs in  the AM.     ARDS: Likely 2/2 sepsis and pneumonia. Repeat CXR appears improved. Currently on IV Lasix and broad spectrum IV abx. PEEP decreased yesterday. Discussed with pulmonology and will defer vent management to pulm. Repeat ABG and CXR in the AM. Pulm input appreciated.     Acute exacerbation of COPD: Cont steroids, bronchodilators and vent support. Pulm input appreciated.     Acute on chronic microcytic anemia with hx of iron deficiency: Hgb 7.0 this AM. Will order to transfuse 1 unit of PRBC's. Cont to monitor closely.     DM II, insulin-dependent: No further reported episodes of hypoglycemia. Cont current regimen and cont TF's.     PPX  GI: PPI  DVT: SQ heparin    Discussed with pulm/CC.     Pt is at high risk 2/2 severe sepsis, bilateral pneumonia, ARDS, UTI, COPD, anemia and hx of noncompliance.           Manuel Elizalde DO  03/06/17  8:24 AM     Electronically signed by Manuel Elizalde DO at 3/6/2017  8:50 AM      Benigno Cabral MD at 3/6/2017  8:36 AM  Version 1 of 1          LOS: 6 days     Chief Complaint:  Pulmonology is following for bilateral pneumonia    Subjective     Interval History:   The patient was emergently intubated 3/3/17.  10 minutes later while on sedation the patient became awake and started pulling out tubes and lines.  IV access was pulled out and the patient almost self extubated.  An emergent femoral central line was done and the patient was given propofol and vecuronium IVP for sedation.    Emergently femoral and right IJ lines were done. Please go through procedure notes for details  No events overnight still agitated     History taken from: chart family Patient unable to give history due to emergent nature of case.    Review of Systems:   Review of Systems   Unable to perform ROS: Severe respiratory distress   and emergent nature of case.                  Objective     Vital Signs  Temp:  [99.3 °F (37.4 °C)-99.8 °F (37.7 °C)] 99.6 °F (37.6 °C)  Heart Rate:   [] 109  Resp:  [30] 30  BP: ()/(56-85) 146/78  FiO2 (%):  [60 %] 60 %  Body mass index is 21.79 kg/(m^2).    Intake/Output Summary (Last 24 hours) at 03/06/17 0836  Last data filed at 03/06/17 0830   Gross per 24 hour   Intake 1899.56 ml   Output   2475 ml   Net -575.44 ml     I/O this shift:  In: 250 [IV Piggyback:250]  Out: -     Physical Exam:  GENERAL APPEARANCE: Patient was restless and in severe respiratory distress.    HEAD: normocephalic.    EYES: PERRL    NECK: Neck supple.     CARDIAC: Normal S1 and S2. No S3, S4 or murmurs. Rhythm is regular. There is no peripheral edema, cyanosis or pallor. Extremities are warm and well perfused. Capillary refill is less than 2 seconds. No carotid bruits.    LUNGS: Clear to auscultation and percussion without rales, rhonchi, wheezing or diminished breath sounds.    ABDOMEN: Positive bowel sounds. Soft, nondistended, nontender.     EXTREMITIES: No significant deformity or joint abnormality. No edema. Peripheral pulses intact.     NEUROLOGICAL: unable to assess due to mental status and emergent intubation.    PSYCHIATRIC:  Unable to assess due to emergent nature of the case.                Results Review:                I reviewed the patient's new clinical results.  I reviewed the patient's new imaging results and agree with the interpretation.    Results from last 7 days  Lab Units 03/06/17  0049 03/05/17  1235 03/05/17  0111 03/04/17  0122   WBC 10*3/mm3 3.51*  --  3.65* 6.99   HEMOGLOBIN g/dL 7.0* 7.2* 7.1* 9.0*   PLATELETS 10*3/mm3 146  --  164 196       Results from last 7 days  Lab Units 03/06/17  0049 03/05/17  0111 03/04/17  0123  03/02/17  0120   SODIUM mmol/L 148 146 144  < > 134*   POTASSIUM mmol/L 3.6 4.2 5.1  < > 4.2   CHLORIDE mmol/L 122* 119* 116*  < > 109   TOTAL CO2 mmol/L 24.3 24.0* 23.6*  < > 17.9*   BUN mg/dL 46* 47* 31*  < > 20   CREATININE mg/dL 0.62 0.79 0.96  < > 0.65   CALCIUM mg/dL 8.0 7.9 8.5  < > 8.4   GLUCOSE mg/dL 99 134* 87  < >  330*   MAGNESIUM mg/dL  --  2.6 2.3  --  2.1   < > = values in this interval not displayed.  Lab Results   Component Value Date    INR 1.01 12/17/2016    INR 0.94 11/20/2016    INR <0.90 02/06/2016    PROTIME 11.4 12/17/2016    PROTIME 10.6 11/20/2016    PROTIME 10.0 02/06/2016       Results from last 7 days  Lab Units 03/06/17  0049 03/05/17  0111 03/04/17  0123   ALK PHOS U/L 70 49 54   BILIRUBIN mg/dL 0.3 0.2 0.2   ALT (SGPT) U/L 30 27 34   AST (SGOT) U/L 67* 45* 64*       Results from last 7 days  Lab Units 03/06/17  0405   PH, ARTERIAL pH units 7.355   PO2 ART mm Hg 72.3*   PCO2, ARTERIAL mm Hg 39.8   HCO3 ART mmol/L 21.7*     Imaging Results (last 24 hours)     Procedure Component Value Units Date/Time    XR Chest AP [[id number]] Collected:  03/02/17 0936     Updated:  03/02/17 0940    Narrative:       EXAMINATION: XR CHEST AP-      CLINICAL INDICATION:     SOB; J18.9-Pneumonia, unspecified organism     TECHNIQUE:  XR CHEST AP-      COMPARISON: 02/28/2017      FINDINGS:   Progression of bilateral airspace disease right greater than left lung.  Opacities are more pronounced in the right upper lobe and at the  periphery of both lungs. Favor bacterial pneumonia, but peripheral  predominant opacities can also be seen with other atypical etiologies  including eosinophilic pneumonia.   Heart and mediastinal contours are unremarkable.   No pneumothorax.   No pleural effusion.   No acute osseous findings.            Impression:       1. Marked interval worsening of right lung pneumonia and now developing  left lung airspace disease probably representing diffuse pneumonia.  Considerations as described above.  2. Otherwise stable chest.     This report was finalized on 3/2/2017 9:38 AM by Dr. Wali Tracy MD.       XR Chest 1 View [[id number]] Resulted:  03/03/17 0924     Updated:  03/03/17 0707             Medication Review:   Scheduled Medications:    acetaminophen 650 mg Oral Once   aspirin 81 mg Oral Daily    azithromycin 500 mg Intravenous Q24H   aztreonam 2 g Intravenous Q8H   clopidogrel 75 mg Oral Daily   furosemide 40 mg Intravenous Daily   guaiFENesin 600 mg Oral BID   heparin (porcine) 5,000 Units Subcutaneous Q12H   insulin aspart 0-14 Units Subcutaneous 4x Daily AC & at Bedtime   insulin detemir 20 Units Subcutaneous Nightly   ipratropium 0.5 mg Nebulization Q6H - RT   methylPREDNISolone sodium succinate 20 mg Intravenous Q12H   metoprolol tartrate 25 mg Oral Q12H   metroNIDAZOLE 500 mg Intravenous Q8H   pantoprazole 40 mg Intravenous Q AM   potassium chloride 40 mEq Oral Once   potassium chloride 40 mEq Oral Q4H   sucralfate 1 g Oral 4x Daily AC & at Bedtime   vancomycin 750 mg Intravenous Q12H     Continuous infusions:    dexmedetomidine 0.2-1.5 mcg/kg/hr    fentaNYL (SUBLIMAZE) PCA 1500 mcg/30 mL syringe     norepinephrine 0.02-0.3 mcg/kg/min Last Rate: Stopped (03/04/17 3595)   Pharmacy to dose vancomycin     propofol 5-50 mcg/kg/min Last Rate: 50 mcg/kg/min (03/06/17 8235)       Assessment&#47;Plan     CXR:              Neuro:  Patient was started on propofol and fentanyl IV per protocol for sedation add precedex due to limited effectiveness of former.      Respiratory:      Vent settings:   RR16  FIO2 60% PEEP 12  Results for YESSICA TERRY (MRN [id number]) as of 3/6/2017 08:38   Ref. Range 3/6/2017 04:05   pH, Arterial Latest Ref Range: 7.350 - 7.450 pH units 7.355   pCO2, Arterial Latest Ref Range: 35.0 - 45.0 mm Hg 39.8   pO2, Arterial Latest Ref Range: 80.0 - 100.0 mm Hg 72.3 (L)   HCO3, Arterial Latest Ref Range: 22.0 - 26.0 mmol/L 21.7 (L)   Base Excess, Arterial Latest Units: mmol/L -3.5   O2 Saturation, Arterial Latest Ref Range: 90.0 - 100.0 % 93.9   Hemoglobin, Blood Gas Latest Ref Range: 12 - 16 g/dL 15.4   Hematocrit, Blood Gas Latest Ref Range: 37.0 - 47.0 % 45.0   Barometric Pressure for Blood Gas Latest Units: mmHg 738   A-a Gradiant Latest Ref Range: 0.0 - 300.0 mmHg 298.5      for HIV and assess LDH level     Pink frothy sputum has been suctioned from ET tube.     Will continue IV steroids, scheduled inhalants and PRN neb treatments.  precedex for sedation    ID:  Emergent femoral central line was placed.  See procedure note.  Will continue current IV antibiotics. Vanc/Azithro/metro    Cardiac:  Stable.    Renal: BUN/Creatinine WNL.  Strict I&Os. Ordered albumin 50 gm IV, lasix 60 mg IV and potassium 40 mEq PO for now and lasix 40 mg IV and potassium 40 mEq PO for around 1 pm.    Endocrine: monitor glucose targeting 140-180.    Electrolytes:  Monitor levels, manage and replete per protocols.  Monitor for refeeding syndrome- patients nutritional status is poor.    GI:  Nutrition consult placed for tube feedings.    Hematology:   Monitor blood counts, transfuse at or below 7 g/dL.  Unless patient is actively bleeding then began transfusing at 8 g/dL.     Case was discussed with PMD and  ccu nurse     Patients overall prognosis is extremley poor- she is in severe ARDS. If patient comes down to a peep of 10 consider transffering to tertiary care center. This was discussed with dr veliz   Tried to contact family many a times during the day- with no luck.      Patient Active Problem List   Diagnosis Code   • Chest pain R07.9   • Chest pain of uncertain etiology R07.89   • Intractable nausea and vomiting R11.2   • Intractable cyclical vomiting with nausea G43.A1   • Chronic obstructive pulmonary emphysema J43.9   • Hepatitis-C B19.20   • Diabetes mellitus type 2 in nonobese E11.9   • Arthritis or polyarthritis, rheumatoid M06.9   • Anxiety F41.9   • Essential hypertension I10   • Coronary artery disease I25.10   • Buerger's disease I73.1   • Cirrhosis K74.60   • Diabetes E11.9   • Chronic nausea R11.0   • Pancreatitis K85.90   • Bilateral pneumonia J18.9                  Benigno Cabral MD  03/06/17  8:36 AM         Electronically signed by Benigno Cabral MD at 3/6/2017  9:00 AM         Consult Notes (last 24 hours) (Notes from 3/5/2017 11:08 AM through 3/6/2017 11:08 AM)     No notes of this type exist for this encounter.

## 2017-03-06 NOTE — PROGRESS NOTES
Discharge Planning Assessment   Daniel     Patient Name: Pepito Yan  MRN: [id number]  Today's Date: 3/6/2017    Admit Date: 2/28/2017       Discharge Plan       03/06/17 1109    Case Management/Social Work Plan    Plan SS received a consult for [facility].  SS contacted [facility] and spoke with Jacquie Rizzo and she states she will evaluate the pt for possible transfer.  SS faxed a referral to [phone].  SS will continue to follow.     Patient/Family In Agreement With Plan yes          Expected Discharge Date and Time     Expected Discharge Date Expected Discharge Time    Mar 5, 2017                Merlene Parisi

## 2017-03-06 NOTE — PLAN OF CARE
Problem: Patient Care Overview (Adult)  Goal: Plan of Care Review  Outcome: Ongoing (interventions implemented as appropriate)    03/06/17 0014   Coping/Psychosocial Response Interventions   Plan Of Care Reviewed With patient;daughter   Patient Care Overview   Progress no change   Outcome Evaluation   Outcome Summary/Follow up Plan remains intubated and sedated on both fentanyl and propofol gtts along with vec ivp every 6 hrs, vss , continues to have soft bilat wrist restraints in place, labs pending, will continue to monitor       Goal: Adult Individualization and Mutuality  Outcome: Ongoing (interventions implemented as appropriate)  Goal: Discharge Needs Assessment  Outcome: Ongoing (interventions implemented as appropriate)    Problem: Pneumonia (Adult)  Goal: Signs and Symptoms of Listed Potential Problems Will be Absent or Manageable (Pneumonia)  Outcome: Ongoing (interventions implemented as appropriate)    Problem: Diabetes, Type 2 (Adult)  Goal: Signs and Symptoms of Listed Potential Problems Will be Absent or Manageable (Diabetes, Type 2)  Outcome: Ongoing (interventions implemented as appropriate)    Problem: Mechanical Ventilation, Invasive (Adult)  Goal: Signs and Symptoms of Listed Potential Problems Will be Absent or Manageable (Mechanical Ventilation, Invasive)  Outcome: Ongoing (interventions implemented as appropriate)    Problem: SAFETY - NON-VIOLENT RESTRAINT  Goal: Remains free of injury from restraints (Non-Violent Restraint)  Outcome: Ongoing (interventions implemented as appropriate)  Goal: Free from restraint(s) (Non-Violent Restraint)  Outcome: Ongoing (interventions implemented as appropriate)

## 2017-03-06 NOTE — PROGRESS NOTES
Subjective     History:   Pepito Yan is a 51 y.o. female admitted on 2/28/2017 secondary to Bilateral pneumonia     Procedures:   3/3/17: Emergent intubation  3/3/17: Right femoral central line placement  3/3/17: Right IJ central line placement    Patient seen and examined with GUERRERO Khan. Sedated on vent support but opens her eyes. Does not follow commands. RN reports pt has been hemodynamically stable overnight. RN also notes that if sedation is off pt becomes agitated and attempts to pull at her lines. Currently tolerating TF's a a lower rate. No acute events overnight per RN.     History taken from: chart, and RN.      Objective     Vital Signs  Temp:  [99.3 °F (37.4 °C)-99.8 °F (37.7 °C)] 99.6 °F (37.6 °C)  Heart Rate:  [] 109  Resp:  [30] 30  BP: ()/(56-85) 146/78  FiO2 (%):  [60 %] 60 %    Intake/Output Summary (Last 24 hours) at 03/06/17 0824  Last data filed at 03/06/17 0500   Gross per 24 hour   Intake 1899.56 ml   Output   2475 ml   Net -575.44 ml         Physical Exam:  General:    Sedated on vent support, opens her eyes but does not follow commands    Heart:      Normal S1 and S2. Tachycardic. No significant murmur, rubs or gallops appreciated.   Lungs:     Respirations regular, even and unlabored. Decreased bibasilar breath sounds.    Abdomen:   Soft and nontender. No guarding, rebound tenderness or  organomegaly noted. Bowel sounds present x 4.   Extremities:  No clubbing, cyanosis or edema noted.      Results Review:      Results from last 7 days  Lab Units 03/06/17  0049 03/05/17  1235 03/05/17  0111 03/04/17  0122 03/03/17  0040 03/02/17  1023 03/02/17  0120 02/28/17  1939   WBC 10*3/mm3 3.51*  --  3.65* 6.99 4.46*  --  5.90 8.29   HEMOGLOBIN g/dL 7.0* 7.2* 7.1* 9.0* 8.8* 9.5* 8.3* 9.9*   PLATELETS 10*3/mm3 146  --  164 196 168  --  168 222       Results from last 7 days  Lab Units 03/06/17  0049 03/05/17  0111 03/04/17  0123 03/03/17  0039 03/02/17  0120 02/28/17  1939   SODIUM  mmol/L 148 146 144 137 134* 139   POTASSIUM mmol/L 3.6 4.2 5.1 4.5 4.2 3.8   CHLORIDE mmol/L 122* 119* 116* 109 109 109   TOTAL CO2 mmol/L 24.3 24.0* 23.6* 20.8* 17.9* 24.3   BUN mg/dL 46* 47* 31* 27* 20 18   CREATININE mg/dL 0.62 0.79 0.96 0.71 0.65 0.57   CALCIUM mg/dL 8.0 7.9 8.5 8.1 8.4 8.8   GLUCOSE mg/dL 99 134* 87 114* 330* 117*       Results from last 7 days  Lab Units 03/06/17  0049 03/05/17  0111 03/04/17  0123 03/03/17  0039 03/02/17  0120 02/28/17  1939   BILIRUBIN mg/dL 0.3 0.2 0.2 0.2 0.1* 0.2   ALK PHOS U/L 70 49 54 52 51 59   AST (SGOT) U/L 67* 45* 64* 56* 28 29   ALT (SGPT) U/L 30 27 34 36 31 41*       Results from last 7 days  Lab Units 03/05/17  0111 03/04/17  0123 03/02/17  0120   MAGNESIUM mg/dL 2.6 2.3 2.1           Results from last 7 days  Lab Units 03/02/17  0911 03/01/17  0544 02/28/17  2114 02/28/17  1939   CK TOTAL U/L 132 302*  --   --    TROPONIN I ng/mL 0.018 0.029 0.076* 0.085*   CK MB INDEX % 0.2 0.1  --   --    MYOGLOBIN ng/mL 38.0 71.0  --  107.0       Imaging Results (last 24 hours)     Procedure Component Value Units Date/Time    XR Abdomen KUB [[id number]] Collected:  03/05/17 1051     Updated:  03/05/17 1054    Narrative:       XR ABDOMEN KUB-     REASON FOR EXAM:  Abdominal distention; J18.9-Pneumonia, unspecified  organism.     FINDINGS:  A nasogastric tube is demonstrated in the upper abdomen. The  tip of the tube overlies the distal stomach. There is evidence of a  catheter in the urinary bladder. The bowel gas pattern shows no evidence  of obstruction. There are fairly heavy atherosclerotic calcifications in  the aorta and its branches.       Impression:       NG tube tip overlies the antrum of the stomach. There is no  evidence of bowel obstruction.        This report was finalized on 3/5/2017 10:52 AM by Dr. Scott Britton II, MD.       XR Chest 1 View [[id number]] Collected:  03/05/17 1052     Updated:  03/05/17 1153    Narrative:       XR CHEST 1 VW-     REASON FOR EXAM:   Follow up pneumonia/ARDS; J18.9-Pneumonia, unspecified  organism.     COMPARISON: The chest is compared with an earlier chest done 03/04/2017.     FINDINGS:  The endotracheal tube tip overlies the tracheal air column at  the level of the aortic knob. The central line and nasogastric tubes are  in appropriate position. The cardiac silhouette is stable. The lungs  show persistent but improving interstitial and alveolar infiltrates in  both lungs. No pleural effusions have developed.       Impression:       Continued improvement in the aeration of the lungs.        This report was finalized on 3/5/2017 11:51 AM by Dr. Scott Britton II, MD.       XR Chest 1 View [[id number]] Collected:  03/06/17 0801     Updated:  03/06/17 0803    Narrative:       EXAMINATION: XR CHEST 1 VW-      CLINICAL INDICATION:     follow up pneumonia/ards; J18.9-Pneumonia,  unspecified organism     TECHNIQUE: Single AP view of chest.      COMPARISON: 3/5/2017      FINDINGS:   There is bibasilar atelectasis.   Stable bilateral airspace disease.  Support tube and lines are in unchanged position.   Heart size is stable.  No pneumothorax.   Tiny bilateral pleural effusions persist.        Impression:       Stable appearance of the chest.     This report was finalized on 3/6/2017 8:01 AM by Dr. Bret Morris MD.               Medications:    acetaminophen 650 mg Oral Once   aspirin 81 mg Oral Daily   azithromycin 500 mg Intravenous Q24H   aztreonam 2 g Intravenous Q8H   clopidogrel 75 mg Oral Daily   furosemide 40 mg Intravenous Daily   guaiFENesin 600 mg Oral BID   heparin (porcine) 5,000 Units Subcutaneous Q12H   insulin aspart 0-14 Units Subcutaneous 4x Daily AC & at Bedtime   insulin detemir 20 Units Subcutaneous Nightly   ipratropium 0.5 mg Nebulization Q6H - RT   methylPREDNISolone sodium succinate 20 mg Intravenous Q12H   metoprolol tartrate 25 mg Oral Q12H   metroNIDAZOLE 500 mg Intravenous Q8H   pantoprazole 40 mg Intravenous Q AM   potassium  chloride 40 mEq Oral Once   sucralfate 1 g Oral 4x Daily AC & at Bedtime   vancomycin 750 mg Intravenous Q12H       fentaNYL (SUBLIMAZE) PCA 1500 mcg/30 mL syringe     norepinephrine 0.02-0.3 mcg/kg/min Last Rate: Stopped (03/04/17 6458)   Pharmacy to dose vancomycin     propofol 5-50 mcg/kg/min Last Rate: 50 mcg/kg/min (03/06/17 3814)           Assessment/Plan   Severe sepsis: Likely 2/2 B/L community acquired pneumonia and UTI. Currently on Azithromax, Aztreonam, Vanc and Flagyl. Hypotension improved. Repeat CXR appears improved. WBC is low with improving CRP. Cont broad spectrum IV abx and repeat labs in the AM.     ARDS: Likely 2/2 sepsis and pneumonia. Repeat CXR appears improved. Currently on IV Lasix and broad spectrum IV abx. PEEP decreased yesterday. Discussed with pulmonology and will defer vent management to pulm. Repeat ABG and CXR in the AM. Pulm input appreciated.     Acute exacerbation of COPD: Cont steroids, bronchodilators and vent support. Pulm input appreciated.     Acute on chronic microcytic anemia with hx of iron deficiency: Hgb 7.0 this AM. Will order to transfuse 1 unit of PRBC's. Cont to monitor closely.     DM II, insulin-dependent: No further reported episodes of hypoglycemia. Cont current regimen and cont TF's.     Elevated liver enzymes: Check hepatitis panel and HIV. Repeat CMP in the AM.     PPX  GI: PPI  DVT: SQ heparin    Discussed with pulm/CC.     Pt is at high risk 2/2 severe sepsis, bilateral pneumonia, ARDS, UTI, COPD, anemia and hx of noncompliance.           Manuel Elizalde DO  03/06/17  8:24 AM

## 2017-03-07 ENCOUNTER — APPOINTMENT (OUTPATIENT)
Dept: GENERAL RADIOLOGY | Facility: HOSPITAL | Age: 52
End: 2017-03-07

## 2017-03-07 PROBLEM — R09.02 HYPOXIA: Status: ACTIVE | Noted: 2017-03-07

## 2017-03-07 PROBLEM — J80 ARDS (ADULT RESPIRATORY DISTRESS SYNDROME) (HCC): Status: ACTIVE | Noted: 2017-03-07

## 2017-03-07 LAB
A-A DO2: 250.3 MMHG (ref 0–300)
ALBUMIN SERPL-MCNC: 2.9 G/DL (ref 3.5–5)
ALBUMIN/GLOB SERPL: 0.9 G/DL (ref 1.5–2.5)
ALP SERPL-CCNC: 84 U/L (ref 35–104)
ALT SERPL W P-5'-P-CCNC: 30 U/L (ref 10–36)
ANION GAP SERPL CALCULATED.3IONS-SCNC: 0.1 MMOL/L (ref 3.6–11.2)
ANION GAP SERPL CALCULATED.3IONS-SCNC: 4 MMOL/L (ref 3.6–11.2)
ANISOCYTOSIS BLD QL: ABNORMAL
ANISOCYTOSIS BLD QL: NORMAL
ARTERIAL PATENCY WRIST A: POSITIVE
AST SERPL-CCNC: 35 U/L (ref 10–30)
ATMOSPHERIC PRESS: 735 MMHG
BASE EXCESS BLDA CALC-SCNC: -6.6 MMOL/L
BASOPHILS # BLD AUTO: 0.01 10*3/MM3 (ref 0–0.3)
BASOPHILS # BLD MANUAL: 0.1 10*3/MM3 (ref 0–0.3)
BASOPHILS NFR BLD AUTO: 0.2 % (ref 0–2)
BASOPHILS NFR BLD AUTO: 2 % (ref 0–2)
BDY SITE: ABNORMAL
BILIRUB SERPL-MCNC: 0.3 MG/DL (ref 0.2–1.8)
BODY TEMPERATURE: 98.6 C
BUN BLD-MCNC: 33 MG/DL (ref 7–21)
BUN BLD-MCNC: 34 MG/DL (ref 7–21)
BUN/CREAT SERPL: 58.6 (ref 7–25)
BUN/CREAT SERPL: 67.3 (ref 7–25)
CALCIUM SPEC-SCNC: 8.1 MG/DL (ref 7.7–10)
CALCIUM SPEC-SCNC: 8.2 MG/DL (ref 7.7–10)
CHLORIDE SERPL-SCNC: 123 MMOL/L (ref 99–112)
CHLORIDE SERPL-SCNC: 127 MMOL/L (ref 99–112)
CHLORIDE UR-SCNC: 77 MMOL/L
CO2 SERPL-SCNC: 23.9 MMOL/L (ref 24.3–31.9)
CO2 SERPL-SCNC: 24 MMOL/L (ref 24.3–31.9)
COHGB MFR BLD: 1.3 % (ref 0–5)
CREAT BLD-MCNC: 0.49 MG/DL (ref 0.43–1.29)
CREAT BLD-MCNC: 0.58 MG/DL (ref 0.43–1.29)
CRP SERPL-MCNC: 11.6 MG/DL (ref 0–0.99)
DEPRECATED RDW RBC AUTO: 50.7 FL (ref 37–54)
DEPRECATED RDW RBC AUTO: 51.3 FL (ref 37–54)
EOSINOPHIL # BLD AUTO: 0.01 10*3/MM3 (ref 0–0.7)
EOSINOPHIL # BLD MANUAL: 0.05 10*3/MM3 (ref 0–0.7)
EOSINOPHIL NFR BLD AUTO: 0.2 % (ref 0–5)
EOSINOPHIL NFR BLD MANUAL: 1 % (ref 0–5)
ERYTHROCYTE [DISTWIDTH] IN BLOOD BY AUTOMATED COUNT: 19 % (ref 11.5–14.5)
ERYTHROCYTE [DISTWIDTH] IN BLOOD BY AUTOMATED COUNT: 19 % (ref 11.5–14.5)
GFR SERPL CREATININE-BSD FRML MDRD: 110 ML/MIN/1.73
GFR SERPL CREATININE-BSD FRML MDRD: 133 ML/MIN/1.73
GLOBULIN UR ELPH-MCNC: 3.1 GM/DL
GLUCOSE BLD-MCNC: 180 MG/DL (ref 70–110)
GLUCOSE BLD-MCNC: 222 MG/DL (ref 70–110)
GLUCOSE BLDC GLUCOMTR-MCNC: 145 MG/DL (ref 70–130)
GLUCOSE BLDC GLUCOMTR-MCNC: 182 MG/DL (ref 70–130)
GLUCOSE BLDC GLUCOMTR-MCNC: 219 MG/DL (ref 70–130)
GLUCOSE BLDC GLUCOMTR-MCNC: 241 MG/DL (ref 70–130)
GLUCOSE BLDC GLUCOMTR-MCNC: 299 MG/DL (ref 70–130)
HCO3 BLDA-SCNC: 19 MMOL/L (ref 22–26)
HCT VFR BLD AUTO: 23.9 % (ref 37–47)
HCT VFR BLD AUTO: 33.9 % (ref 37–47)
HCT VFR BLD CALC: 28 % (ref 37–47)
HGB BLD-MCNC: 6.7 G/DL (ref 12–16)
HGB BLD-MCNC: 9.4 G/DL (ref 12–16)
HGB BLDA-MCNC: 9.4 G/DL (ref 12–16)
HOROWITZ INDEX BLD+IHG-RTO: 60 %
HYPOCHROMIA BLD QL: ABNORMAL
HYPOCHROMIA BLD QL: NORMAL
IMM GRANULOCYTES # BLD: 0.06 10*3/MM3 (ref 0–0.03)
IMM GRANULOCYTES NFR BLD: 1.2 % (ref 0–0.5)
LYMPHOCYTES # BLD AUTO: 0.27 10*3/MM3 (ref 1–3)
LYMPHOCYTES # BLD MANUAL: 0.77 10*3/MM3 (ref 1–3)
LYMPHOCYTES NFR BLD AUTO: 5.6 % (ref 21–51)
LYMPHOCYTES NFR BLD MANUAL: 15 % (ref 21–51)
LYMPHOCYTES NFR BLD MANUAL: 7 % (ref 0–10)
MAGNESIUM SERPL-MCNC: 2.2 MG/DL (ref 1.7–2.6)
MCH RBC QN AUTO: 20.6 PG (ref 27–33)
MCH RBC QN AUTO: 20.9 PG (ref 27–33)
MCHC RBC AUTO-ENTMCNC: 27.7 G/DL (ref 33–37)
MCHC RBC AUTO-ENTMCNC: 28 G/DL (ref 33–37)
MCV RBC AUTO: 74.3 FL (ref 80–94)
MCV RBC AUTO: 74.5 FL (ref 80–94)
METHGB BLD QL: 0.3 % (ref 0–3)
MICROCYTES BLD QL: ABNORMAL
MICROCYTES BLD QL: NORMAL
MODALITY: ABNORMAL
MONOCYTES # BLD AUTO: 0.24 10*3/MM3 (ref 0.1–0.9)
MONOCYTES # BLD AUTO: 0.36 10*3/MM3 (ref 0.1–0.9)
MONOCYTES NFR BLD AUTO: 4.9 % (ref 0–10)
NEUTROPHILS # BLD AUTO: 3.85 10*3/MM3 (ref 1.4–6.5)
NEUTROPHILS # BLD AUTO: 4.27 10*3/MM3 (ref 1.4–6.5)
NEUTROPHILS NFR BLD AUTO: 87.9 % (ref 30–70)
NEUTROPHILS NFR BLD MANUAL: 70 % (ref 30–70)
NEUTS BAND NFR BLD MANUAL: 5 % (ref 4–12)
NRBC BLD MANUAL-RTO: 0 /100 WBC (ref 0–0)
OSMOLALITY SERPL CALC.SUM OF ELEC: 312 MOSM/KG (ref 273–305)
OSMOLALITY SERPL CALC.SUM OF ELEC: 314 MOSM/KG (ref 273–305)
OXYHGB MFR BLDV: 96.9 % (ref 85–100)
PCO2 BLDA: 38 MM HG (ref 35–45)
PEEP RESPIRATORY: 12 CM[H2O]
PH BLDA: 7.32 PH UNITS (ref 7.35–7.45)
PHOSPHATE SERPL-MCNC: 2.4 MG/DL (ref 2.7–4.5)
PLATELET # BLD AUTO: 100 10*3/MM3 (ref 130–400)
PLATELET # BLD AUTO: 126 10*3/MM3 (ref 130–400)
PMV BLD AUTO: 10.4 FL (ref 6–10)
PMV BLD AUTO: ABNORMAL FL (ref 6–10)
PO2 BLDA: 120.7 MM HG (ref 80–100)
POTASSIUM BLD-SCNC: 3.5 MMOL/L (ref 3.5–5.3)
POTASSIUM BLD-SCNC: 4.3 MMOL/L (ref 3.5–5.3)
POTASSIUM UR-SCNC: 73.1 MMOL/L
PROT SERPL-MCNC: 6 G/DL (ref 6–8)
RBC # BLD AUTO: 3.21 10*6/MM3 (ref 4.2–5.4)
RBC # BLD AUTO: 4.56 10*6/MM3 (ref 4.2–5.4)
SAO2 % BLDCOA: 98.5 % (ref 90–100)
SCAN SLIDE: NORMAL
SET MECH RESP RATE: 16
SMALL PLATELETS BLD QL SMEAR: ABNORMAL
SMALL PLATELETS BLD QL SMEAR: NORMAL
SODIUM BLD-SCNC: 151 MMOL/L (ref 135–153)
SODIUM BLD-SCNC: 151 MMOL/L (ref 135–153)
SODIUM UR-SCNC: 50 MMOL/L
VANCOMYCIN TROUGH SERPL-MCNC: 14.7 MCG/ML (ref 5–15)
VENTILATOR MODE: ABNORMAL
VT ON VENT VENT: 450 ML
WBC NRBC COR # BLD: 4.86 10*3/MM3 (ref 4.5–12.5)
WBC NRBC COR # BLD: 5.13 10*3/MM3 (ref 4.5–12.5)

## 2017-03-07 PROCEDURE — 94003 VENT MGMT INPAT SUBQ DAY: CPT

## 2017-03-07 PROCEDURE — 36600 WITHDRAWAL OF ARTERIAL BLOOD: CPT | Performed by: INTERNAL MEDICINE

## 2017-03-07 PROCEDURE — 94799 UNLISTED PULMONARY SVC/PX: CPT

## 2017-03-07 PROCEDURE — 25010000002 AZITHROMYCIN: Performed by: INTERNAL MEDICINE

## 2017-03-07 PROCEDURE — 82805 BLOOD GASES W/O2 SATURATION: CPT | Performed by: INTERNAL MEDICINE

## 2017-03-07 PROCEDURE — 85025 COMPLETE CBC W/AUTO DIFF WBC: CPT | Performed by: INTERNAL MEDICINE

## 2017-03-07 PROCEDURE — 25010000002 HEPARIN (PORCINE) PER 1000 UNITS: Performed by: INTERNAL MEDICINE

## 2017-03-07 PROCEDURE — 84300 ASSAY OF URINE SODIUM: CPT | Performed by: INTERNAL MEDICINE

## 2017-03-07 PROCEDURE — 83735 ASSAY OF MAGNESIUM: CPT | Performed by: INTERNAL MEDICINE

## 2017-03-07 PROCEDURE — 25010000002 MEROPENEM: Performed by: INTERNAL MEDICINE

## 2017-03-07 PROCEDURE — 25010000002 PROPOFOL 1000 MG/ML EMULSION: Performed by: INTERNAL MEDICINE

## 2017-03-07 PROCEDURE — 85007 BL SMEAR W/DIFF WBC COUNT: CPT | Performed by: INTERNAL MEDICINE

## 2017-03-07 PROCEDURE — 84133 ASSAY OF URINE POTASSIUM: CPT | Performed by: INTERNAL MEDICINE

## 2017-03-07 PROCEDURE — 63710000001 INSULIN ASPART PER 5 UNITS: Performed by: INTERNAL MEDICINE

## 2017-03-07 PROCEDURE — 71010 HC CHEST AP: CPT

## 2017-03-07 PROCEDURE — 82375 ASSAY CARBOXYHB QUANT: CPT | Performed by: INTERNAL MEDICINE

## 2017-03-07 PROCEDURE — 82962 GLUCOSE BLOOD TEST: CPT

## 2017-03-07 PROCEDURE — 99233 SBSQ HOSP IP/OBS HIGH 50: CPT | Performed by: INTERNAL MEDICINE

## 2017-03-07 PROCEDURE — 86140 C-REACTIVE PROTEIN: CPT | Performed by: INTERNAL MEDICINE

## 2017-03-07 PROCEDURE — 83050 HGB METHEMOGLOBIN QUAN: CPT | Performed by: INTERNAL MEDICINE

## 2017-03-07 PROCEDURE — 25010000002 MIDAZOLAM PER 1 MG: Performed by: INTERNAL MEDICINE

## 2017-03-07 PROCEDURE — 80202 ASSAY OF VANCOMYCIN: CPT | Performed by: INTERNAL MEDICINE

## 2017-03-07 PROCEDURE — 82436 ASSAY OF URINE CHLORIDE: CPT | Performed by: INTERNAL MEDICINE

## 2017-03-07 PROCEDURE — 84100 ASSAY OF PHOSPHORUS: CPT | Performed by: INTERNAL MEDICINE

## 2017-03-07 PROCEDURE — 25010000002 METHYLPREDNISOLONE PER 40 MG: Performed by: INTERNAL MEDICINE

## 2017-03-07 PROCEDURE — 25010000002 VANCOMYCIN PER 500 MG: Performed by: INTERNAL MEDICINE

## 2017-03-07 PROCEDURE — 80053 COMPREHEN METABOLIC PANEL: CPT | Performed by: INTERNAL MEDICINE

## 2017-03-07 PROCEDURE — 71010 XR CHEST AP: CPT | Performed by: RADIOLOGY

## 2017-03-07 RX ORDER — POTASSIUM CHLORIDE 1.5 G/1.77G
40 POWDER, FOR SOLUTION ORAL EVERY 4 HOURS
Status: COMPLETED | OUTPATIENT
Start: 2017-03-07 | End: 2017-03-07

## 2017-03-07 RX ORDER — ASPIRIN 81 MG/1
81 TABLET, CHEWABLE ORAL DAILY
Status: DISCONTINUED | OUTPATIENT
Start: 2017-03-07 | End: 2017-03-20 | Stop reason: HOSPADM

## 2017-03-07 RX ORDER — ASPIRIN 81 MG/1
TABLET, CHEWABLE ORAL
Status: COMPLETED
Start: 2017-03-07 | End: 2017-03-07

## 2017-03-07 RX ADMIN — DEXMEDETOMIDINE HYDROCHLORIDE 1.07 MCG/KG/HR: 100 INJECTION, SOLUTION INTRAVENOUS at 05:39

## 2017-03-07 RX ADMIN — MEROPENEM 1 G: 1 INJECTION, POWDER, FOR SOLUTION INTRAVENOUS at 11:43

## 2017-03-07 RX ADMIN — IPRATROPIUM BROMIDE 0.5 MG: 0.5 SOLUTION RESPIRATORY (INHALATION) at 18:24

## 2017-03-07 RX ADMIN — DEXMEDETOMIDINE HYDROCHLORIDE 1.07 MCG/KG/HR: 100 INJECTION, SOLUTION INTRAVENOUS at 22:39

## 2017-03-07 RX ADMIN — ASPIRIN 81 MG: 81 TABLET, CHEWABLE ORAL at 08:45

## 2017-03-07 RX ADMIN — INSULIN ASPART 5 UNITS: 100 INJECTION, SOLUTION INTRAVENOUS; SUBCUTANEOUS at 06:27

## 2017-03-07 RX ADMIN — Medication: at 03:56

## 2017-03-07 RX ADMIN — GUAIFENESIN 600 MG: 600 TABLET, EXTENDED RELEASE ORAL at 17:10

## 2017-03-07 RX ADMIN — METHYLPREDNISOLONE SODIUM SUCCINATE 20 MG: 40 INJECTION, POWDER, FOR SOLUTION INTRAMUSCULAR; INTRAVENOUS at 20:03

## 2017-03-07 RX ADMIN — METRONIDAZOLE 500 MG: 500 INJECTION, SOLUTION INTRAVENOUS at 10:31

## 2017-03-07 RX ADMIN — SUCRALFATE 1 G: 1 TABLET ORAL at 10:42

## 2017-03-07 RX ADMIN — HEPARIN SODIUM 5000 UNITS: 5000 INJECTION, SOLUTION INTRAVENOUS; SUBCUTANEOUS at 08:50

## 2017-03-07 RX ADMIN — Medication: at 19:08

## 2017-03-07 RX ADMIN — POTASSIUM CHLORIDE 40 MEQ: 1.5 POWDER, FOR SOLUTION ORAL at 08:49

## 2017-03-07 RX ADMIN — HEPARIN SODIUM 5000 UNITS: 5000 INJECTION, SOLUTION INTRAVENOUS; SUBCUTANEOUS at 20:04

## 2017-03-07 RX ADMIN — DEXMEDETOMIDINE HYDROCHLORIDE 1.07 MCG/KG/HR: 100 INJECTION, SOLUTION INTRAVENOUS at 12:42

## 2017-03-07 RX ADMIN — CLOPIDOGREL 75 MG: 75 TABLET, FILM COATED ORAL at 08:50

## 2017-03-07 RX ADMIN — Medication: at 08:47

## 2017-03-07 RX ADMIN — METOPROLOL TARTRATE 25 MG: 25 TABLET, FILM COATED ORAL at 08:50

## 2017-03-07 RX ADMIN — INSULIN ASPART 3 UNITS: 100 INJECTION, SOLUTION INTRAVENOUS; SUBCUTANEOUS at 17:45

## 2017-03-07 RX ADMIN — METHYLPREDNISOLONE SODIUM SUCCINATE 20 MG: 40 INJECTION, POWDER, FOR SOLUTION INTRAMUSCULAR; INTRAVENOUS at 08:49

## 2017-03-07 RX ADMIN — VANCOMYCIN HYDROCHLORIDE 750 MG: 5 INJECTION, POWDER, LYOPHILIZED, FOR SOLUTION INTRAVENOUS at 10:24

## 2017-03-07 RX ADMIN — Medication: at 13:39

## 2017-03-07 RX ADMIN — AZTREONAM 2 G: 2 INJECTION, POWDER, FOR SOLUTION INTRAMUSCULAR; INTRAVENOUS at 02:37

## 2017-03-07 RX ADMIN — PANTOPRAZOLE SODIUM 40 MG: 40 INJECTION, POWDER, FOR SOLUTION INTRAVENOUS at 05:14

## 2017-03-07 RX ADMIN — VANCOMYCIN HYDROCHLORIDE 750 MG: 5 INJECTION, POWDER, LYOPHILIZED, FOR SOLUTION INTRAVENOUS at 20:05

## 2017-03-07 RX ADMIN — IPRATROPIUM BROMIDE 0.5 MG: 0.5 SOLUTION RESPIRATORY (INHALATION) at 06:03

## 2017-03-07 RX ADMIN — PROPOFOL 50 MCG/KG/MIN: 10 INJECTION, EMULSION INTRAVENOUS at 17:09

## 2017-03-07 RX ADMIN — METOPROLOL TARTRATE 25 MG: 25 TABLET, FILM COATED ORAL at 20:04

## 2017-03-07 RX ADMIN — SUCRALFATE 1 G: 1 TABLET ORAL at 06:25

## 2017-03-07 RX ADMIN — INSULIN ASPART 8 UNITS: 100 INJECTION, SOLUTION INTRAVENOUS; SUBCUTANEOUS at 10:42

## 2017-03-07 RX ADMIN — PROPOFOL 50 MCG/KG/MIN: 10 INJECTION, EMULSION INTRAVENOUS at 12:41

## 2017-03-07 RX ADMIN — POTASSIUM CHLORIDE 40 MEQ: 1.5 POWDER, FOR SOLUTION ORAL at 05:12

## 2017-03-07 RX ADMIN — MEROPENEM 1 G: 1 INJECTION, POWDER, FOR SOLUTION INTRAVENOUS at 20:04

## 2017-03-07 RX ADMIN — DEXMEDETOMIDINE HYDROCHLORIDE 1.07 MCG/KG/HR: 100 INJECTION, SOLUTION INTRAVENOUS at 20:05

## 2017-03-07 RX ADMIN — METRONIDAZOLE 500 MG: 500 INJECTION, SOLUTION INTRAVENOUS at 20:05

## 2017-03-07 RX ADMIN — PROPOFOL 50 MCG/KG/MIN: 10 INJECTION, EMULSION INTRAVENOUS at 00:41

## 2017-03-07 RX ADMIN — PROPOFOL 50 MCG/KG/MIN: 10 INJECTION, EMULSION INTRAVENOUS at 22:39

## 2017-03-07 RX ADMIN — IPRATROPIUM BROMIDE 0.5 MG: 0.5 SOLUTION RESPIRATORY (INHALATION) at 12:54

## 2017-03-07 RX ADMIN — IPRATROPIUM BROMIDE 0.5 MG: 0.5 SOLUTION RESPIRATORY (INHALATION) at 00:04

## 2017-03-07 RX ADMIN — MIDAZOLAM HYDROCHLORIDE 2 MG: 1 INJECTION, SOLUTION INTRAMUSCULAR; INTRAVENOUS at 04:34

## 2017-03-07 RX ADMIN — SUCRALFATE 1 G: 1 TABLET ORAL at 17:09

## 2017-03-07 RX ADMIN — PROPOFOL 50 MCG/KG/MIN: 10 INJECTION, EMULSION INTRAVENOUS at 03:56

## 2017-03-07 RX ADMIN — GUAIFENESIN 600 MG: 600 TABLET, EXTENDED RELEASE ORAL at 08:50

## 2017-03-07 RX ADMIN — METRONIDAZOLE 500 MG: 500 INJECTION, SOLUTION INTRAVENOUS at 02:38

## 2017-03-07 RX ADMIN — SUCRALFATE 1 G: 1 TABLET ORAL at 20:06

## 2017-03-07 RX ADMIN — AZITHROMYCIN 500 MG: 500 INJECTION, POWDER, LYOPHILIZED, FOR SOLUTION INTRAVENOUS at 09:02

## 2017-03-07 NOTE — PROGRESS NOTES
LOS: 7 days     Chief Complaint:  Pulmonology is following for bilateral pneumonia    Subjective     Interval History:   The patient was emergently intubated 3/3/17.    No events overnight still agitated and continues to require hi doses of sedatives- precedex was marginally effective    History taken from: chart family Patient unable to give history due to emergent nature of case.    Review of Systems:   Review of Systems   Unable to perform ROS: Severe respiratory distress   and emergent nature of case.                  Objective     Vital Signs  Temp:  [99.1 °F (37.3 °C)-101.1 °F (38.4 °C)] 99.1 °F (37.3 °C)  Heart Rate:  [] 78  Resp:  [15-36] 20  BP: ()/(60-73) 97/63  FiO2 (%):  [60 %] 60 %  Body mass index is 21.79 kg/(m^2).    Intake/Output Summary (Last 24 hours) at 03/07/17 0857  Last data filed at 03/07/17 0749   Gross per 24 hour   Intake 1915.84 ml   Output   3000 ml   Net -1084.16 ml     I/O this shift:  In: -   Out: 100 [Urine:100]    Physical Exam:  GENERAL APPEARANCE: Patient was restless and in severe respiratory distress.    HEAD: normocephalic.    EYES: PERRL    NECK: Neck supple.     CARDIAC: Normal S1 and S2. No S3, S4 or murmurs. Rhythm is regular. There is no peripheral edema, cyanosis or pallor. Extremities are warm and well perfused. Capillary refill is less than 2 seconds. No carotid bruits.    LUNGS: Clear to auscultation and percussion without rales, rhonchi, wheezing or diminished breath sounds.    ABDOMEN: Positive bowel sounds. Soft, nondistended, nontender.     EXTREMITIES: No significant deformity or joint abnormality. No edema. Peripheral pulses intact.     NEUROLOGICAL: unable to assess due to mental status and emergent intubation.    PSYCHIATRIC:  Unable to assess due to emergent nature of the case.                Results Review:                I reviewed the patient's new clinical results.  I reviewed the patient's new imaging results and agree with the  interpretation.    Results from last 7 days  Lab Units 03/07/17  0230 03/07/17  0036 03/06/17  1018  03/06/17  0049   WBC 10*3/mm3 5.13 4.86  --   --  3.51*   HEMOGLOBIN g/dL 9.4* 6.7* 8.4*  < > 7.0*   PLATELETS 10*3/mm3 100* 126*  --   --  146   < > = values in this interval not displayed.    Results from last 7 days  Lab Units 03/07/17  0036 03/06/17  0049 03/05/17  0111 03/04/17  0123   SODIUM mmol/L 151 148 146 144   POTASSIUM mmol/L 3.5 3.6 4.2 5.1   CHLORIDE mmol/L 123* 122* 119* 116*   TOTAL CO2 mmol/L 24.0* 24.3 24.0* 23.6*   BUN mg/dL 34* 46* 47* 31*   CREATININE mg/dL 0.58 0.62 0.79 0.96   CALCIUM mg/dL 8.1 8.0 7.9 8.5   GLUCOSE mg/dL 180* 99 134* 87   MAGNESIUM mg/dL 2.2  --  2.6 2.3     Lab Results   Component Value Date    INR 1.01 12/17/2016    INR 0.94 11/20/2016    INR <0.90 02/06/2016    PROTIME 11.4 12/17/2016    PROTIME 10.6 11/20/2016    PROTIME 10.0 02/06/2016       Results from last 7 days  Lab Units 03/07/17  0036 03/06/17  0049 03/05/17  0111   ALK PHOS U/L 84 70 49   BILIRUBIN mg/dL 0.3 0.3 0.2   ALT (SGPT) U/L 30 30 27   AST (SGOT) U/L 35* 67* 45*       Results from last 7 days  Lab Units 03/07/17  0513   PH, ARTERIAL pH units 7.316*   PO2 ART mm Hg 120.7*   PCO2, ARTERIAL mm Hg 38.0   HCO3 ART mmol/L 19.0*   HIV-1 / O / 2 Ag / Antibody 4th Generation   Order: [id number] - Part of Panel Order [id number]   Status:  Final result   Visible to patient:  No (Not Released)      Ref Range & Units 1d ago     HIV-1/ HIV-2 Non-Reactive Non-Reactive   Resulting Agency   [facility]   Narrative     A non-reactive test result does not preclude the possibility of exposure to HIV or infection with HIV. An antibody response to recent exposure may take several months to reach detectable levels.                      Imaging Results (last 24 hours)     Procedure Component Value Units Date/Time    XR Chest AP [[id number]] Collected:  03/02/17 0936     Updated:  03/02/17 0940    Narrative:       EXAMINATION: XR CHEST  AP-      CLINICAL INDICATION:     SOB; J18.9-Pneumonia, unspecified organism     TECHNIQUE:  XR CHEST AP-      COMPARISON: 02/28/2017      FINDINGS:   Progression of bilateral airspace disease right greater than left lung.  Opacities are more pronounced in the right upper lobe and at the  periphery of both lungs. Favor bacterial pneumonia, but peripheral  predominant opacities can also be seen with other atypical etiologies  including eosinophilic pneumonia.   Heart and mediastinal contours are unremarkable.   No pneumothorax.   No pleural effusion.   No acute osseous findings.            Impression:       1. Marked interval worsening of right lung pneumonia and now developing  left lung airspace disease probably representing diffuse pneumonia.  Considerations as described above.  2. Otherwise stable chest.     This report was finalized on 3/2/2017 9:38 AM by Dr. Wali Tracy MD.       XR Chest 1 View [[id number]] Resulted:  03/03/17 0924     Updated:  03/03/17 0707             Medication Review:   Scheduled Medications:    acetaminophen 650 mg Oral Once   aspirin 81 mg Oral Daily   azithromycin 500 mg Intravenous Q24H   aztreonam 2 g Intravenous Q8H   clopidogrel 75 mg Oral Daily   guaiFENesin 600 mg Oral BID   heparin (porcine) 5,000 Units Subcutaneous Q12H   insulin aspart 0-14 Units Subcutaneous 4x Daily AC & at Bedtime   insulin detemir 20 Units Subcutaneous Nightly   ipratropium 0.5 mg Nebulization Q6H - RT   methylPREDNISolone sodium succinate 20 mg Intravenous Q12H   metoprolol tartrate 25 mg Oral Q12H   metroNIDAZOLE 500 mg Intravenous Q8H   pantoprazole 40 mg Intravenous Q AM   potassium chloride 40 mEq Oral Once   sucralfate 1 g Oral 4x Daily AC & at Bedtime   vancomycin 750 mg Intravenous Q12H     Continuous infusions:    dexmedetomidine 0.2-1.5 mcg/kg/hr Last Rate: 1.075 mcg/kg/hr (03/07/17 0539)   fentaNYL (SUBLIMAZE) PCA 1500 mcg/30 mL syringe     norepinephrine 0.02-0.3 mcg/kg/min Last Rate: Stopped  (03/04/17 5368)   Pharmacy to dose vancomycin     propofol 5-50 mcg/kg/min Last Rate: 50 mcg/kg/min (03/07/17 4658)       Assessment/Plan     CXR:              Neuro:  Patient was started on propofol and fentanyl IV per protocol for sedation add precedex due to limited effectiveness of former.      Respiratory:      Vent settings:   RR16  FIO2 60% PEEP 12  Results for YESSICA TERRY (MRN [id number]) as of 3/6/2017 08:38   Ref. Range 3/6/2017 04:05   pH, Arterial Latest Ref Range: 7.350 - 7.450 pH units 7.355   pCO2, Arterial Latest Ref Range: 35.0 - 45.0 mm Hg 39.8   pO2, Arterial Latest Ref Range: 80.0 - 100.0 mm Hg 72.3 (L)   HCO3, Arterial Latest Ref Range: 22.0 - 26.0 mmol/L 21.7 (L)   Base Excess, Arterial Latest Units: mmol/L -3.5   O2 Saturation, Arterial Latest Ref Range: 90.0 - 100.0 % 93.9   Hemoglobin, Blood Gas Latest Ref Range: 12 - 16 g/dL 15.4   Hematocrit, Blood Gas Latest Ref Range: 37.0 - 47.0 % 45.0   Barometric Pressure for Blood Gas Latest Units: mmHg 738   A-a Gradiant Latest Ref Range: 0.0 - 300.0 mmHg 298.5       HIV negative    Pink frothy sputum has been suctioned from ET tube.     Will continue IV steroids, scheduled inhalants and PRN neb treatments.    Added precedex for sedation    ID: Continue current IV antibiotics. Vanc/Azithro/metro    Cardiac:  Stable.    Renal: BUN/Creatinine WNL.- may need diuresis for now she is hypernatremic- FWF added by nephrology who have been consulted.    Endocrine: monitor glucose targeting 140-180.    Electrolytes:  Monitor levels, manage and replete per protocols.  Monitor for refeeding syndrome- patients nutritional status is poor.    GI:  Nutrition consult placed for tube feedings.    Hematology:   Monitor blood counts, transfuse at or below 7 g/dL.  Unless patient is actively bleeding then began transfusing at 8 g/dL.     Case was discussed with PMD and  ccu nurse     Patients  is in ARDS with P/F 200  Increased from 120 yesterday ie O2  exchange is improving- continue current supportive care.    If she continues to require intermittent versed dosing then I will stop precedex and add versed drip to propofol.      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

## 2017-03-07 NOTE — CONSULTS
Nephrology Consult Note    Referring Provider: dr mcmanus  Reason for Consultation: hypernatremia    Subjective       History of present illness:  Pepito Yan is a 51 y.o. female who presented to [facility] with trouble breathing and a productive cough of a few days duration. She tried albuterol but this did not help. Pt is intubated and i got most of the history from the chart and and dr mcmanus, pt is on TF and is not on free water and now sodium is going up,  According to records She does not have a baseline cough. She has a subjective fever with chills and fatigue. She has substernal chest pain that is stabbing, is intermittent, and the same intensity all the time (9/10). The headache occurred prior to the cough and is bilateral and frontal with no neck pain. She also has a clear, runny nose and a sore throat  no Chronic NSAIDS use. Patient denies hematuria, dysuria, difficulty passing urine. No prior history of renal stones . No family history of renal disease    Acute Kidney Injury Risk Factors :    Medications : reviwed  Contrast : no  NSAIDS : no  Volume depletion : yes  Hemodynamic Instability : yes    History  Past Medical History   Diagnosis Date   • Anxiety    • 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   • Chronic respiratory failure with hypoxia      at nighttime   • Compression fracture of lumbar vertebra    • Compression fracture of thoracic vertebra    • COPD (chronic obstructive pulmonary disease)    • Coronary artery disease    • Diabetes mellitus type 2 in nonobese    • Erosive esophagitis 12/2016   • Essential hypertension    • Gastroparesis    • GERD (gastroesophageal reflux disease)    • Hepatitis-C    • Medically noncompliant    • MRSA infection      back of neck around 2012   • NSTEMI (non-ST elevated myocardial infarction) 07/2016   • PE (pulmonary embolism)    • Protein calorie malnutrition    • RA (rheumatoid arthritis)      with chronic steroid use   , Past  Surgical History   Procedure Laterality Date   • Cholecystectomy     • Foreign body removal       insulin needle removal x 3 from the abdomen   •  section       x 2   • Hysterectomy     • Arm debridement Left 2016     Procedure: HAND IRRIGATION DEBRIDEMENT AND REPAIR ;  Surgeon: Shane Fuller MD;  Location:  [facility] OR;  Service:    • Endoscopy N/A 2016     Procedure: ESOPHAGOGASTRODUODENOSCOPY;  Surgeon: Jeremy Clement III, MD;  Location:  [facility] OR;  Service:    • Amputation digit Right 2016     Third digit to the PIP. Surgeon: Dr. Bains   • Amputation digit Left 2016     Procedure: PARTIAL AMPUTATION LEFT THIRD FINGER;  Surgeon: Shane Fuller MD;  Location:  [facility] OR;  Service:    • Amputation revision Left 2016     Procedure: AMPUTATION REVISION DIGIT STUMP;  Surgeon: Shane Fuller MD;  Location: [facility];  Service:    , Family History   Problem Relation Age of Onset   • Colon cancer Mother    • Cancer Father    • Diabetes Maternal Grandmother    • Heart disease Neg Hx    • Stroke Neg Hx    , Social History   Substance Use Topics   • Smoking status: Current Every Day Smoker     Packs/day: 4.50     Years: 35.00     Types: Cigarettes   • Smokeless tobacco: None      Comment: She cut down to  PPD for 2.5 months ago   • Alcohol use No   , Prescriptions Prior to Admission   Medication Sig Dispense Refill Last Dose   • albuterol (PROVENTIL HFA;VENTOLIN HFA) 108 (90 BASE) MCG/ACT inhaler Inhale 1 puff Every 4 (Four) Hours.   2017 at Unknown time   • aspirin 81 MG EC tablet Take 1 tablet by mouth Daily. 30 tablet 0 2017 at Unknown time   • furosemide (LASIX) 20 MG tablet Take 1 tablet by mouth daily. Do not take if BP <110/60 (Patient taking differently: Take 20 mg by mouth Daily.) 30 tablet 0 Past Week at Unknown time   • gabapentin (NEURONTIN) 600 MG tablet Take 600 mg by mouth 3 (Three) Times a Day.   Past Week at Unknown time   •  hydrOXYzine (VISTARIL) 25 MG capsule Take 25 mg by mouth 2 (Two) Times a Day As Needed for anxiety. 2 to 3 times daily as needed    Past Week at Unknown time   • insulin aspart (novoLOG) 100 UNIT/ML injection Inject 0-7 Units under the skin 4 (Four) Times a Day Before Meals & at Bedtime. 10 mL 0 Past Week at Unknown time   • insulin glargine (LANTUS) 100 UNIT/ML injection Inject 45 Units under the skin every night.   Past Week at Unknown time   • lisinopril (PRINIVIL,ZESTRIL) 5 MG tablet Take 5 mg by mouth Daily.   Past Week at Unknown time   • metFORMIN (GLUCOPHAGE) 500 MG tablet Take 500 mg by mouth 3 (Three) Times a Day.   Past Week at Unknown time   • predniSONE (DELTASONE) 20 MG tablet Take 1 tablet by mouth Daily. 14 tablet 0 Past Week at Unknown time   • promethazine (PHENERGAN) 25 MG tablet Take 25 mg by mouth Every 6 (Six) Hours As Needed for nausea or vomiting. Every 4 to 6 hours as needed   Past Week at Unknown time   • sucralfate (CARAFATE) 1 G tablet Take 1 g by mouth 4 (Four) Times a Day.   Past Week at Unknown time   • tiZANidine (ZANAFLEX) 4 MG tablet Take 4 mg by mouth Every 8 (Eight) Hours As Needed for muscle spasms. Every 6 to 8 hours as needed   Past Week at Unknown time   , Scheduled Meds:    acetaminophen 650 mg Oral Once   aspirin 81 mg Oral Daily   clopidogrel 75 mg Oral Daily   guaiFENesin 600 mg Oral BID   heparin (porcine) 5,000 Units Subcutaneous Q12H   insulin aspart 0-14 Units Subcutaneous 4x Daily AC & at Bedtime   insulin detemir 20 Units Subcutaneous Nightly   ipratropium 0.5 mg Nebulization Q6H - RT   meropenem 1 g Intravenous Q8H   methylPREDNISolone sodium succinate 20 mg Intravenous Q12H   metoprolol tartrate 25 mg Oral Q12H   metroNIDAZOLE 500 mg Intravenous Q8H   pantoprazole 40 mg Intravenous Q AM   potassium chloride 40 mEq Oral Once   sucralfate 1 g Oral 4x Daily AC & at Bedtime   vancomycin 750 mg Intravenous Q12H   , Continuous Infusions:    dexmedetomidine 0.2-1.5  mcg/kg/hr Last Rate: 1.075 mcg/kg/hr (03/07/17 1242)   fentaNYL (SUBLIMAZE) PCA 1500 mcg/30 mL syringe     norepinephrine 0.02-0.3 mcg/kg/min Last Rate: Stopped (03/04/17 0355)   Pharmacy to dose vancomycin     propofol 5-50 mcg/kg/min Last Rate: 50 mcg/kg/min (03/07/17 1241)   , PRN Meds:  •  acetaminophen  •  dextrose  •  dextrose  •  glucagon (human recombinant)  •  ipratropium  •  magnesium sulfate in D5W 1g/100mL (PREMIX) **OR** magnesium sulfate bolus in 250 mL **OR** magnesium sulfate bolus in 250 mL **OR** magnesium sulfate in D5W 1g/100mL (PREMIX)  •  midazolam  •  Pharmacy to dose vancomycin  •  potassium chloride **OR** potassium chloride **OR** potassium chloride  •  promethazine  •  sodium chloride  •  Insert peripheral IV **AND** sodium chloride and Allergies:  Penicillins; Toradol [ketorolac tromethamine]; and Tramadol    Review of Systems  More than 10 point review if systems was done. Pertinent items are noted in HPI, all other systems reviewed and negative    Objective     Vital Signs  Temp:  [98.6 °F (37 °C)-101.1 °F (38.4 °C)] 98.6 °F (37 °C)  Heart Rate:  [] 78  Resp:  [15-36] 26  BP: ()/(59-73) 93/59  FiO2 (%):  [55 %-60 %] 55 %    I/O this shift:  In: 700 [I.V.:250; IV Piggyback:450]  Out: 400 [Urine:400]  I/O last 3 completed shifts:  In: 3068.8 [I.V.:623.8; Other:260; NG/GT:835; IV Piggyback:1350]  Out: 3875 [Urine:3875]    Physical Examination:    General Appearance : sedated  Head :NAD  Eyes : conjunctivae and sclerae normal, no icterus,   Throat : oral mucosa moist intubated   Neck: no adenopathy, suppple, no carotid bruit and no JVD  Lungs : clear to auscultation, respirations regular and unlabored  Heart : regular rhythm & normal rate, normal S1, S2, no murmur, no yessy, no rub Abdomen :  normal bowel sounds, no masses and soft   Rectal : Deferred  Extremities : sedated  2 plus  edema  Pulses :  palpable and equal bilaterally  Skin : no bleeding, bruising or  rash  Neurologic : sedated    Laboratory Data :      WBC WBC   Date Value Ref Range Status   03/07/2017 5.13 4.50 - 12.50 10*3/mm3 Final   03/07/2017 4.86 4.50 - 12.50 10*3/mm3 Final   03/06/2017 3.51 (L) 4.50 - 12.50 10*3/mm3 Final   03/05/2017 3.65 (L) 4.50 - 12.50 10*3/mm3 Final      HGB HEMOGLOBIN   Date Value Ref Range Status   03/07/2017 9.4 (L) 12.0 - 16.0 g/dL Final   03/07/2017 6.7 (C) 12.0 - 16.0 g/dL Final   03/06/2017 8.4 (L) 12.0 - 16.0 g/dL Final   03/06/2017 8.4 (L) 12.0 - 16.0 g/dL Final     Comment:     Verified by Repeat Analysis    03/06/2017 7.0 (L) 12.0 - 16.0 g/dL Final   03/05/2017 7.2 (L) 12.0 - 16.0 g/dL Final   03/05/2017 7.1 (L) 12.0 - 16.0 g/dL Final      HCT HEMATOCRIT   Date Value Ref Range Status   03/07/2017 33.9 (L) 37.0 - 47.0 % Final   03/07/2017 23.9 (L) 37.0 - 47.0 % Final   03/06/2017 29.7 (L) 37.0 - 47.0 % Final   03/06/2017 29.3 (L) 37.0 - 47.0 % Final   03/06/2017 24.5 (L) 37.0 - 47.0 % Final   03/05/2017 25.2 (L) 37.0 - 47.0 % Final   03/05/2017 25.6 (L) 37.0 - 47.0 % Final      Platlets No results found for: LABPLAT   MCV MCV   Date Value Ref Range Status   03/07/2017 74.3 (L) 80.0 - 94.0 fL Final   03/07/2017 74.5 (L) 80.0 - 94.0 fL Final   03/06/2017 73.1 (L) 80.0 - 94.0 fL Final   03/05/2017 76.0 (L) 80.0 - 94.0 fL Final          Sodium SODIUM   Date Value Ref Range Status   03/07/2017 151 135 - 153 mmol/L Final   03/06/2017 148 135 - 153 mmol/L Final   03/05/2017 146 135 - 153 mmol/L Final      Potassium POTASSIUM   Date Value Ref Range Status   03/07/2017 3.5 3.5 - 5.3 mmol/L Final   03/06/2017 3.6 3.5 - 5.3 mmol/L Final   03/05/2017 4.2 3.5 - 5.3 mmol/L Final      Chloride CHLORIDE   Date Value Ref Range Status   03/07/2017 123 (H) 99 - 112 mmol/L Final   03/06/2017 122 (H) 99 - 112 mmol/L Final   03/05/2017 119 (H) 99 - 112 mmol/L Final      CO2 CO2   Date Value Ref Range Status   03/07/2017 24.0 (L) 24.3 - 31.9 mmol/L Final   03/06/2017 24.3 24.3 - 31.9 mmol/L Final    03/05/2017 24.0 (L) 24.3 - 31.9 mmol/L Final      BUN BUN   Date Value Ref Range Status   03/07/2017 34 (H) 7 - 21 mg/dL Final   03/06/2017 46 (H) 7 - 21 mg/dL Final   03/05/2017 47 (H) 7 - 21 mg/dL Final      Creatinine CREATININE   Date Value Ref Range Status   03/07/2017 0.58 0.43 - 1.29 mg/dL Final   03/06/2017 0.62 0.43 - 1.29 mg/dL Final   03/05/2017 0.79 0.43 - 1.29 mg/dL Final      Calcium CALCIUM   Date Value Ref Range Status   03/07/2017 8.1 7.7 - 10.0 mg/dL Final   03/06/2017 8.0 7.7 - 10.0 mg/dL Final   03/05/2017 7.9 7.7 - 10.0 mg/dL Final      PO4 No results found for: CAPO4   Albumin ALBUMIN   Date Value Ref Range Status   03/07/2017 2.90 (L) 3.50 - 5.00 g/dL Final   03/06/2017 2.80 (L) 3.50 - 5.00 g/dL Final   03/05/2017 3.10 (L) 3.50 - 5.00 g/dL Final      Magnesium MAGNESIUM   Date Value Ref Range Status   03/07/2017 2.2 1.7 - 2.6 mg/dL Final   03/05/2017 2.6 1.7 - 2.6 mg/dL Final      Uric Acid No results found for: URICACID     Radiology results :     Imaging Results (last 72 hours)     Procedure Component Value Units Date/Time    XR Abdomen KUB [[id number]] Collected:  03/05/17 1051     Updated:  03/05/17 1054    Narrative:       XR ABDOMEN KUB-     REASON FOR EXAM:  Abdominal distention; J18.9-Pneumonia, unspecified  organism.     FINDINGS:  A nasogastric tube is demonstrated in the upper abdomen. The  tip of the tube overlies the distal stomach. There is evidence of a  catheter in the urinary bladder. The bowel gas pattern shows no evidence  of obstruction. There are fairly heavy atherosclerotic calcifications in  the aorta and its branches.       Impression:       NG tube tip overlies the antrum of the stomach. There is no  evidence of bowel obstruction.        This report was finalized on 3/5/2017 10:52 AM by Dr. Scott Britton II, MD.       XR Chest 1 View [[id number]] Collected:  03/05/17 1052     Updated:  03/05/17 1153    Narrative:       XR CHEST 1 VW-     REASON FOR EXAM:  Follow up  pneumonia/ARDS; J18.9-Pneumonia, unspecified  organism.     COMPARISON: The chest is compared with an earlier chest done 03/04/2017.     FINDINGS:  The endotracheal tube tip overlies the tracheal air column at  the level of the aortic knob. The central line and nasogastric tubes are  in appropriate position. The cardiac silhouette is stable. The lungs  show persistent but improving interstitial and alveolar infiltrates in  both lungs. No pleural effusions have developed.       Impression:       Continued improvement in the aeration of the lungs.        This report was finalized on 3/5/2017 11:51 AM by Dr. Scott Britton II, MD.       XR Chest 1 View [[id number]] Collected:  03/06/17 0801     Updated:  03/06/17 0803    Narrative:       EXAMINATION: XR CHEST 1 VW-      CLINICAL INDICATION:     follow up pneumonia/ards; J18.9-Pneumonia,  unspecified organism     TECHNIQUE: Single AP view of chest.      COMPARISON: 3/5/2017      FINDINGS:   There is bibasilar atelectasis.   Stable bilateral airspace disease.  Support tube and lines are in unchanged position.   Heart size is stable.  No pneumothorax.   Tiny bilateral pleural effusions persist.        Impression:       Stable appearance of the chest.     This report was finalized on 3/6/2017 8:01 AM by Dr. Bret Morris MD.       XR Chest 1 View [[id number]] Collected:  03/06/17 1451     Updated:  03/06/17 1512    Narrative:       EXAMINATION: XR CHEST 1 VW-      CLINICAL INDICATION:     OG placement; J18.9-Pneumonia, unspecified  organism     TECHNIQUE: Single AP view of chest.      COMPARISON: 2/6/2017      FINDINGS:   There is bibasilar atelectasis.   Lungs are otherwise aerated.  Support tube and lines are in unchanged position.   Heart size is stable.  No pneumothorax.   Tiny bilateral pleural effusions persist.        Impression:       Stable appearance of the chest.     This report was finalized on 3/6/2017 2:51 PM by Dr. Bret Morris MD.       XR Chest AP [[id number]]  Collected:  03/07/17 0746     Updated:  03/07/17 0748    Narrative:       EXAMINATION: XR CHEST AP-      CLINICAL INDICATION:     ARDS; J18.9-Pneumonia, unspecified organism     TECHNIQUE: Single AP view of chest.      COMPARISON: 3/6/2017      FINDINGS:   There is bibasilar atelectasis.   Bilateral lung airspace disease appears slightly worse.  Support tube and lines are in unchanged position.   Heart size is stable.  No pneumothorax.   Tiny bilateral pleural effusions persist.        Impression:       Slightly worsened bilateral airspace disease.     This report was finalized on 3/7/2017 7:46 AM by Dr. Bret Morris MD.               Medications:        acetaminophen 650 mg Oral Once   aspirin 81 mg Oral Daily   clopidogrel 75 mg Oral Daily   guaiFENesin 600 mg Oral BID   heparin (porcine) 5,000 Units Subcutaneous Q12H   insulin aspart 0-14 Units Subcutaneous 4x Daily AC & at Bedtime   insulin detemir 20 Units Subcutaneous Nightly   ipratropium 0.5 mg Nebulization Q6H - RT   meropenem 1 g Intravenous Q8H   methylPREDNISolone sodium succinate 20 mg Intravenous Q12H   metoprolol tartrate 25 mg Oral Q12H   metroNIDAZOLE 500 mg Intravenous Q8H   pantoprazole 40 mg Intravenous Q AM   potassium chloride 40 mEq Oral Once   sucralfate 1 g Oral 4x Daily AC & at Bedtime   vancomycin 750 mg Intravenous Q12H       dexmedetomidine 0.2-1.5 mcg/kg/hr Last Rate: 1.075 mcg/kg/hr (03/07/17 1242)   fentaNYL (SUBLIMAZE) PCA 1500 mcg/30 mL syringe     norepinephrine 0.02-0.3 mcg/kg/min Last Rate: Stopped (03/04/17 0355)   Pharmacy to dose vancomycin     propofol 5-50 mcg/kg/min Last Rate: 50 mcg/kg/min (03/07/17 1241)       Assessment/Plan     Principal Problem:    Bilateral pneumonia  Active Problems:    Chronic obstructive pulmonary emphysema    ARDS (adult respiratory distress syndrome)    Hypoxia      1. Acute hypernatremia: pt is on TF but pt is not getting free water will add free water 200 cc every 6 hours as EF if 45 , if  sodium not better then plan to increase free water , check urine lytes, will repeat sodium in am     2. ARDS: VDRF     3. B/l pneumonia: on Antibiotics    4. Anemia: better     Prognosis is guarded    Thanks Dr mcmanus for the consult. We will follow with you.  I discussed the patients findings and my recommendations with family, nursing staff and primary care team    Philippe Ramírez MD  03/07/17  1:15 PM

## 2017-03-07 NOTE — CONSULTS
INFECTIOUS DISEASE CONSULTATION REPORT      Referring Provider: Dr. Elizalde  Reason for Consultation: Severe sepsis, pneumonia, recurrent fever      Principal problem: Bilateral pneumonia    Subjective .     History of present illness:    As you well know Dr. Elizalde, Ms. Pepito Yan is a 51 y.o. years old female with past medical history significant for Buerger's disease, COPD, CAD, diabetes, hypertension, hepatitis C, rheumatoid arthritis among other comorbidities.  She was admitted on 2/28/2017 for bilateral pneumonia.  The patient is intubated and sedated on ventilator at 55% FiO2.  She continues to have recurrent fever on broad-spectrum antibiotic coverage with Azactam, azithromycin, Flagyl, and vancomycin.  Currently has a normal white count.  Mycoplasma IgM and Legionella urinary antigens are negative.  Urine culture from 2/28/2017 finalized as greater than 100 K colonies of Escherichia coli resistant to ciprofloxacin and Levaquin.  Lactic acid was initially elevated at 2.5 but is now normal.  Chest x-ray from today 3/7/2017 showing slight worsening of bilateral airspace disease.    Infectious Disease consultation was requested for antimicrobial management.     History taken from: chart RN    Case was discussed with nursing staff    Review of Systems-unable to obtain as the patient is intubated, sedated, and on the ventilator    Past Medical History    Past Medical History   Diagnosis Date   • Anxiety    • 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   • Chronic respiratory failure with hypoxia      at nighttime   • Compression fracture of lumbar vertebra    • Compression fracture of thoracic vertebra    • COPD (chronic obstructive pulmonary disease)    • Coronary artery disease    • Diabetes mellitus type 2 in nonobese    • Erosive esophagitis 12/2016   • Essential hypertension    • Gastroparesis    • GERD (gastroesophageal reflux disease)    • Hepatitis-C    • Medically noncompliant    •  "MRSA infection      back of neck around    • NSTEMI (non-ST elevated myocardial infarction) 2016   • PE (pulmonary embolism)    • Protein calorie malnutrition    • RA (rheumatoid arthritis)      with chronic steroid use       Past Surgical History    Past Surgical History   Procedure Laterality Date   • Cholecystectomy     • Foreign body removal       insulin needle removal x 3 from the abdomen   •  section       x 2   • Hysterectomy     • Arm debridement Left 2016     Procedure: HAND IRRIGATION DEBRIDEMENT AND REPAIR ;  Surgeon: Shane Fuller MD;  Location: [facility] OR;  Service:    • Endoscopy N/A 2016     Procedure: ESOPHAGOGASTRODUODENOSCOPY;  Surgeon: Jeremy Clement III, MD;  Location: [facility] OR;  Service:    • Amputation digit Right 2016     Third digit to the PIP. Surgeon: Dr. Bains   • Amputation digit Left 2016     Procedure: PARTIAL AMPUTATION LEFT THIRD FINGER;  Surgeon: Shane Fuller MD;  Location: [facility] OR;  Service:    • Amputation revision Left 2016     Procedure: AMPUTATION REVISION DIGIT STUMP;  Surgeon: Shane Fuller MD;  Location: [facility] OR;  Service:        Family History    Family History   Problem Relation Age of Onset   • Colon cancer Mother    • Cancer Father    • Diabetes Maternal Grandmother    • Heart disease Neg Hx    • Stroke Neg Hx        Social History    Social History   Substance Use Topics   • Smoking status: Current Every Day Smoker     Packs/day: 4.50     Years: 35.00     Types: Cigarettes   • Smokeless tobacco: None      Comment: She cut down to 1/4 PPD for 2.5 months ago   • Alcohol use No       Allergies    Penicillins; Toradol [ketorolac tromethamine]; and Tramadol    Objective     Visit Vitals   • BP 92/59   • Pulse 80   • Temp 99.1 °F (37.3 °C)   • Resp 23   • Ht 63\" (160 cm)   • Wt 123 lb (55.8 kg)   • SpO2 100%   • BMI 21.79 kg/m2       Temp:  [99.1 °F (37.3 °C)-101.1 °F (38.4 °C)] 99.1 °F " (37.3 °C)        Intake/Output Summary (Last 24 hours) at 03/07/17 1144  Last data filed at 03/07/17 1143   Gross per 24 hour   Intake 2515.84 ml   Output   3200 ml   Net -684.16 ml         Physical Exam:     General Appearance:    intubated, sedated on ventilator, and respiratory distress    Head:    Normocephalic, without obvious abnormality, atraumatic   Eyes:            Lids and lashes normal, conjunctivae and sclerae normal, no   icterus, no pallor, corneas clear, PERRLA   Ears:    Ears appear intact with no abnormalities noted   Throat:   No oral lesions, no thrush, oral mucosa moist   Neck:   No adenopathy, supple, trachea midline, no thyromegaly, no   carotid bruit, no JVD   Back:     No tenderness to percussion or palpation, range of motion   normal   Lungs:     decreased breath sounds. No wheezing, no ronchi and no crackles.    Heart:    Regular rhythm and normal rate, normal S1 and S2, no            murmur, no gallop, no rub, no click   Chest Wall:    No abnormalities observed   Abdomen:     Normal bowel sounds, no masses, no organomegaly, soft        non-tender, non-distended, no guarding, no rebound                tenderness   Rectal:     Deferred   Extremities:   No edema, no cyanosis, no             redness   Pulses:   Pulses palpable and equal bilaterally   Skin:   No bleeding, bruising or rash   Lymph nodes:   No palpable adenopathy   Neurologic:  sedated        Results:      Results from last 7 days  Lab Units 03/07/17  0230 03/07/17  0036 03/06/17  0049 03/05/17  0111 03/04/17  0122 03/03/17  0040 03/02/17  0120   WBC 10*3/mm3 5.13 4.86 3.51* 3.65* 6.99 4.46* 5.90     Lab Results   Component Value Date    NEUTROABS 3.85 03/07/2017         Results from last 7 days  Lab Units 03/07/17  0036   CREATININE mg/dL 0.58         Results from last 7 days  Lab Units 03/07/17  0036 03/06/17  0049 03/05/17  0111   CRP mg/dL 11.60* 6.48* 12.48*       Imaging Results (last 24 hours)     Procedure Component Value  Units Date/Time    XR Chest 1 View [[id number]] Collected:  03/06/17 1451     Updated:  03/06/17 1512    Narrative:       EXAMINATION: XR CHEST 1 VW-      CLINICAL INDICATION:     OG placement; J18.9-Pneumonia, unspecified  organism     TECHNIQUE: Single AP view of chest.      COMPARISON: 2/6/2017      FINDINGS:   There is bibasilar atelectasis.   Lungs are otherwise aerated.  Support tube and lines are in unchanged position.   Heart size is stable.  No pneumothorax.   Tiny bilateral pleural effusions persist.        Impression:       Stable appearance of the chest.     This report was finalized on 3/6/2017 2:51 PM by Dr. Bret Morris MD.       XR Chest AP [[id number]] Collected:  03/07/17 0746     Updated:  03/07/17 0748    Narrative:       EXAMINATION: XR CHEST AP-      CLINICAL INDICATION:     ARDS; J18.9-Pneumonia, unspecified organism     TECHNIQUE: Single AP view of chest.      COMPARISON: 3/6/2017      FINDINGS:   There is bibasilar atelectasis.   Bilateral lung airspace disease appears slightly worse.  Support tube and lines are in unchanged position.   Heart size is stable.  No pneumothorax.   Tiny bilateral pleural effusions persist.        Impression:       Slightly worsened bilateral airspace disease.     This report was finalized on 3/7/2017 7:46 AM by Dr. Bret Morris MD.               Cultures:    BLOOD CULTURE   Date Value Ref Range Status   03/06/2017 No growth at less than 24 hours  Preliminary   03/06/2017 No growth at less than 24 hours  Preliminary   03/04/2017 No growth at 3 days  Preliminary   03/04/2017 No growth at 3 days  Preliminary   03/03/2017 No growth at 3 days  Preliminary   03/03/2017 No growth at 3 days  Preliminary   02/28/2017 No growth at 5 days  Final   02/28/2017 No growth at 5 days  Final     URINE CULTURE   Date Value Ref Range Status   02/28/2017 >100,000 CFU/mL Escherichia coli (A)  Final       Results Review:    I have personally reviewed laboratory data, culture results,  radiology studies and antimicrobial therapy.    Hospital Medications (active)       Dose Frequency Start End    acetaminophen (TYLENOL) tablet 650 mg 650 mg Every 6 Hours PRN 3/2/2017     Sig - Route: Take 2 tablets by mouth Every 6 (Six) Hours As Needed for fever. - Oral    acetaminophen (TYLENOL) tablet 650 mg 650 mg Once 3/6/2017     Sig - Route: Take 2 tablets by mouth 1 (One) Time. - Oral    aspirin chewable tablet 81 mg 81 mg Daily 3/7/2017     Sig - Route: Chew 1 tablet Daily. - Oral    Notes to Pharmacy: Patient intubated and RN requested changed from enteric coated tablet.    clopidogrel (PLAVIX) tablet 75 mg 75 mg Daily 3/1/2017     Sig - Route: Take 1 tablet by mouth Daily. - Oral    dexmedetomidine HCl (PRECEDEX) 4 mcg/mL in sodium chloride 0.9 % 100 mL infusion 0.2-1.5 mcg/kg/hr × 55.8 kg Continuous 3/6/2017     Sig - Route: Infuse 11.16-83.7 mcg/hr into a venous catheter Continuous. - Intravenous    dextrose (D50W) solution 25 g 25 g Every 15 Minutes PRN 3/1/2017     Sig - Route: Infuse 50 mL into a venous catheter Every 15 (Fifteen) Minutes As Needed for low blood sugar (Blood Sugar Less Than 70, Patient Has IV Access - Unresponsive, NPO or Unable To Safely Swallow). - Intravenous    Cosign for Ordering: Accepted by Savanah Gutierrez MD on 3/1/2017  7:01 PM    dextrose (GLUTOSE) oral gel 15 g 15 g Every 15 Minutes PRN 3/1/2017     Sig - Route: Take 15 g by mouth Every 15 (Fifteen) Minutes As Needed for low blood sugar (Blood Sugar Less Than 70, Patient Alert, Is Not NPO & Can Safely Swallow). - Oral    Cosign for Ordering: Accepted by Savanah Gutierrez MD on 3/1/2017  7:01 PM    FENTANYL PCA 1500 MCG/30 ML (BHCOR) PCA  Continuous 3/3/2017 3/13/2017    Sig - Route: Infuse  into a venous catheter Continuous. - Intravenous    glucagon (GLUCAGEN) injection 1 mg 1 mg Every 15 Minutes PRN 3/1/2017     Sig - Route: Inject 1 mg under the skin Every 15 (Fifteen) Minutes As Needed (Blood Glucose Less Than 70 -  "Patient Without IV Access - Unresponsive, NPO or Unable To Safely Swallow). - Subcutaneous    Cosign for Ordering: Accepted by Savanah Gutierrez MD on 3/1/2017  7:01 PM    guaiFENesin (MUCINEX) 12 hr tablet 600 mg 600 mg 2 Times Daily 3/2/2017     Sig - Route: Take 1 tablet by mouth 2 (Two) Times a Day. - Oral    heparin (porcine) 5000 UNIT/ML injection 5,000 Units 5,000 Units Every 12 Hours Scheduled 3/1/2017     Sig - Route: Inject 1 mL under the skin Every 12 (Twelve) Hours. - Subcutaneous    insulin aspart (novoLOG) injection 0-14 Units 0-14 Units 4 Times Daily Before Meals & Nightly 3/1/2017     Sig - Route: Inject 0-14 Units under the skin 4 (Four) Times a Day Before Meals & at Bedtime. - Subcutaneous    insulin detemir (LEVEMIR) injection 20 Units 20 Units Nightly 3/4/2017     Sig - Route: Inject 20 Units under the skin Every Night. - Subcutaneous    ipratropium (ATROVENT) nebulizer solution 0.5 mg 0.5 mg Every 6 Hours PRN 3/2/2017     Sig - Route: Take 2.5 mL by nebulization Every 6 (Six) Hours As Needed for shortness of air. - Nebulization    Cosign for Ordering: Accepted by Sherrill Weiner DO on 3/2/2017  1:40 PM    ipratropium (ATROVENT) nebulizer solution 0.5 mg 0.5 mg Every 6 Hours - RT 3/3/2017     Sig - Route: Take 2.5 mL by nebulization Every 6 (Six) Hours. - Nebulization    magnesium sulfate 6 g in dextrose (D5W) 5 % 250 mL infusion 6 g As Needed 3/6/2017     Sig - Route: Infuse 6 g into a venous catheter As Needed (Mg 1.1-1.5 mg/dL). - Intravenous    Linked Group 1:  \"Or\" Linked Group Details        magnesium sulfate 8 g in dextrose (D5W) 5 % 250 mL infusion 8 g As Needed 3/6/2017     Sig - Route: Infuse 8 g into a venous catheter As Needed (Mg less than or equal to 1 mg/dL). - Intravenous    Linked Group 1:  \"Or\" Linked Group Details        magnesium sulfate in D5W 1g/100mL (PREMIX) IVPB 1 g 1 g As Needed 3/6/2017     Sig - Route: Infuse 100 mL into a venous catheter As Needed (Mg less than " "or equal 1 mg/dL). - Intravenous    Linked Group 1:  \"Or\" Linked Group Details        magnesium sulfate in D5W 1g/100mL (PREMIX) IVPB 1 g 1 g As Needed 3/6/2017     Sig - Route: Infuse 100 mL into a venous catheter As Needed (magnesium sulfate 1 g in D5W 100 mL IVPB - Mg 1.6 - 1.9 mg/dL). - Intravenous    Linked Group 1:  \"Or\" Linked Group Details        meropenem (MERREM) 1 g/100 mL 0.9% NS VTB (mbp) 1 g Every 8 Hours 3/7/2017     Sig - Route: Infuse 100 mL into a venous catheter Every 8 (Eight) Hours. - Intravenous    methylPREDNISolone sodium succinate (SOLU-Medrol) injection 20 mg 20 mg Every 12 Hours 3/5/2017     Sig - Route: Infuse 0.5 mL into a venous catheter Every 12 (Twelve) Hours. - Intravenous    metoprolol tartrate (LOPRESSOR) tablet 25 mg 25 mg Every 12 Hours Scheduled 3/2/2017     Sig - Route: Take 1 tablet by mouth Every 12 (Twelve) Hours. - Oral    metroNIDAZOLE (FLAGYL) IVPB 500 mg 500 mg Every 8 Hours 3/3/2017     Sig - Route: Infuse 100 mL into a venous catheter Every 8 (Eight) Hours. - Intravenous    midazolam (VERSED) 2 MG/2ML injection  - ADS Override Pull   3/6/2017 3/6/2017    Notes to Pharmacy: Created by cabinet override    midazolam (VERSED) injection 2 mg 2 mg Every 4 Hours PRN 3/5/2017     Sig - Route: Infuse 2 mL into a venous catheter Every 4 (Four) Hours As Needed for sedation. - Intravenous    norepinephrine (LEVOPHED) 32 mcg/mL (8 mg/250 mL) infusion 0.02-0.3 mcg/kg/min × 57.3 kg Titrated 3/3/2017     Sig - Route: Infuse 1.146-17.19 mcg/min into a venous catheter Dose Adjusted By Provider As Needed. - Intravenous    pantoprazole (PROTONIX) injection 40 mg 40 mg Every Early Morning 3/2/2017     Sig - Route: Infuse 10 mL into a venous catheter Every Morning. - Intravenous    Cosign for Ordering: Accepted by Sherrill Weiner DO on 3/1/2017  3:38 PM    Pharmacy to dose vancomycin  Continuous PRN 3/2/2017     Sig - Route: Continuous As Needed for consult. - Does not apply    " "potassium chloride (K-DUR,KLOR-CON) CR tablet 40 mEq 40 mEq Once 3/2/2017     Sig - Route: Take 2 tablets by mouth 1 (One) Time. - Oral    potassium chloride (K-DUR,KLOR-CON) CR tablet 40 mEq 40 mEq Every 4 Hours 3/6/2017 3/6/2017    Sig - Route: Take 2 tablets by mouth Every 4 (Four) Hours. - Oral    potassium chloride (KLOR-CON) packet 40 mEq 40 mEq As Needed 3/6/2017     Sig - Route: Take 40 mEq by mouth As Needed (potassium replacement, see admin instructions). - Oral    Linked Group 2:  \"Or\" Linked Group Details        potassium chloride (KLOR-CON) packet 40 mEq 40 mEq Every 4 Hours 3/7/2017 3/7/2017    Sig - Route: Take 40 mEq by mouth Every 4 (Four) Hours. - Oral    potassium chloride (MICRO-K) CR capsule 40 mEq 40 mEq As Needed 3/6/2017     Sig - Route: Take 4 capsules by mouth As Needed (potassium replacement.  see admin instructions). - Oral    Linked Group 2:  \"Or\" Linked Group Details        potassium chloride 10 mEq in 100 mL IVPB 10 mEq Every 1 Hour PRN 3/6/2017     Sig - Route: Infuse 100 mL into a venous catheter Every 1 (One) Hour As Needed (potassium protocol PERIPHERAL - see admin instructions). - Intravenous    Linked Group 2:  \"Or\" Linked Group Details        promethazine (PHENERGAN) tablet 12.5 mg 12.5 mg Every 6 Hours PRN 3/1/2017     Sig - Route: Take 1 tablet by mouth Every 6 (Six) Hours As Needed for Nausea or Vomiting. - Oral    Cosign for Ordering: Accepted by Sherrill Weiner DO on 3/1/2017  3:38 PM    propofol (DIPRIVAN) infusion 10 mg/mL 100 mL 5-50 mcg/kg/min × 57.3 kg Titrated 3/3/2017     Sig - Route: Infuse 286.5-2,865 mcg/min into a venous catheter Dose Adjusted By Provider As Needed. - Intravenous    sodium chloride 0.9 % flush 1-10 mL 1-10 mL As Needed 3/1/2017     Sig - Route: Infuse 1-10 mL into a venous catheter As Needed for line care. - Intravenous    sodium chloride 0.9 % flush 10 mL 10 mL As Needed 2/28/2017     Sig - Route: Infuse 10 mL into a venous catheter As " "Needed for line care. - Intravenous    Linked Group 3:  \"And\" Linked Group Details        sucralfate (CARAFATE) tablet 1 g 1 g 4 Times Daily Before Meals & Nightly 3/1/2017     Sig - Route: Take 1 tablet by mouth 4 (Four) Times a Day Before Meals & at Bedtime. - Oral    Cosign for Ordering: Accepted by Sherrill Weiner DO on 3/1/2017  3:38 PM    vancomycin (VANCOCIN) 750 mg in sodium chloride 0.9 % 250 mL IVPB 750 mg Every 12 Hours 3/4/2017     Sig - Route: Infuse 750 mg into a venous catheter Every 12 (Twelve) Hours. - Intravenous    aspirin EC tablet 81 mg (Discontinued) 81 mg Daily 3/1/2017 3/7/2017    Sig - Route: Take 1 tablet by mouth Daily. - Oral    Reason for Discontinue: Error    Cosign for Ordering: Accepted by Sherrill Weiner DO on 3/1/2017  3:38 PM    AZITHROMYCIN 500 MG/250 ML 0.9% NS IVPB (MBP) (Discontinued) 500 mg Every 24 Hours 3/3/2017 3/7/2017    Sig - Route: Infuse 500 mg into a venous catheter Daily. - Intravenous    aztreonam (AZACTAM) 2 g/100 mL 0.9% NS (mbp) (Discontinued) 2 g Every 8 Hours 3/2/2017 3/7/2017    Sig - Route: Infuse 100 mL into a venous catheter Every 8 (Eight) Hours. - Intravenous    furosemide (LASIX) injection 40 mg (Discontinued) 40 mg Daily 3/5/2017 3/7/2017    Sig - Route: Infuse 4 mL into a venous catheter Daily. - Intravenous            PROBLEM LIST:    Patient Active Problem List   Diagnosis   • Chest pain   • Chest pain of uncertain etiology   • Intractable nausea and vomiting   • Intractable cyclical vomiting with nausea   • Chronic obstructive pulmonary emphysema   • Hepatitis-C   • Diabetes mellitus type 2 in nonobese   • Arthritis or polyarthritis, rheumatoid   • Anxiety   • Essential hypertension   • Coronary artery disease   • Buerger's disease   • Cirrhosis   • Diabetes   • Chronic nausea   • Pancreatitis   • Bilateral pneumonia   • ARDS (adult respiratory distress syndrome)   • Hypoxia       Assessment/Plan     ASSESSMENT:    1.  Severe sepsis with " acute respiratory failure  2.  Bilateral pneumonia    PLAN:    The patient shows strong evidence suggestive of worsening by the chest x-ray and lab after initial period of improvement which is very concerning for possible superimposed nosocomial infection and antibiotic regimen escalated to vancomycin and meropenem 1000mg IV every 8 hours.  Azithromycin and Azactam were discontinued especially in setting of negative mycoplasma IgM and Legionella urinary antigen.  We will continue to monitor very closely as patient carries a very guarded prognosis.      Patients findings and recommendations were discussed with nursing staff    Code Status: Full Code    Alycia Finn PA-C  03/07/17  11:44 AM

## 2017-03-07 NOTE — PLAN OF CARE
Problem: Mechanical Ventilation, Invasive (Adult)  Goal: Signs and Symptoms of Listed Potential Problems Will be Absent or Manageable (Mechanical Ventilation, Invasive)    03/07/17 7701   Mechanical Ventilation, Invasive   Problems Assessed (Mechanical Ventilation, Invasive) artificial airway induced skin/tissue breakdown;inability to wean;mechanical dysfunction;situational response   Problems Present (Mechanical Ventilation, Invasive) none

## 2017-03-07 NOTE — PLAN OF CARE
Problem: Pneumonia (Adult)  Goal: Signs and Symptoms of Listed Potential Problems Will be Absent or Manageable (Pneumonia)  Outcome: Ongoing (interventions implemented as appropriate)    Problem: Diabetes, Type 2 (Adult)  Goal: Signs and Symptoms of Listed Potential Problems Will be Absent or Manageable (Diabetes, Type 2)  Outcome: Ongoing (interventions implemented as appropriate)    Problem: Mechanical Ventilation, Invasive (Adult)  Goal: Signs and Symptoms of Listed Potential Problems Will be Absent or Manageable (Mechanical Ventilation, Invasive)  Outcome: Ongoing (interventions implemented as appropriate)    Problem: SAFETY - NON-VIOLENT RESTRAINT  Goal: Remains free of injury from restraints (Non-Violent Restraint)  Outcome: Ongoing (interventions implemented as appropriate)  Goal: Free from restraint(s) (Non-Violent Restraint)  Outcome: Ongoing (interventions implemented as appropriate)    Problem: Pressure Ulcer Risk (Brendan Scale) (Adult,Obstetrics,Pediatric)  Goal: Identify Related Risk Factors and Signs and Symptoms  Outcome: Ongoing (interventions implemented as appropriate)  Goal: Skin Integrity  Outcome: Ongoing (interventions implemented as appropriate)

## 2017-03-07 NOTE — PROGRESS NOTES
Antibiotic length of therapy :    merrem  Day 1      Flagyl  Day 5- may consider d/c with addition of merrem.    Vancomycin  Day 6- vancomycin trough at goal range of 14.7 mcg/ml.

## 2017-03-07 NOTE — NURSING NOTE
Pt presents with old scarring to buttocks  Dry and intact  Scab  Intact brown to elbow  Scattered intact purple areas to face and lips.  No treatment at this time

## 2017-03-07 NOTE — PROGRESS NOTES
Subjective     History:   Pepito Yan is a 51 y.o. female admitted on 2/28/2017 secondary to Bilateral pneumonia     Procedures:   3/3/17: Emergent intubation  3/3/17: Right femoral central line placement  3/3/17: Right IJ central line placement    Patient seen and examined with GUERRERO Resendiz. Sedated on vent support. Pt was febrile overnight and repeat cultures were obtained. Hemodynamically stable overnight. Pt diuresed well again over the last 24 hours. No episodes of diarrhea per RN.     History taken from: chart, and RN.      Objective     Vital Signs  Temp:  [99.1 °F (37.3 °C)-101.1 °F (38.4 °C)] 99.2 °F (37.3 °C)  Heart Rate:  [] 74  Resp:  [15-36] 29  BP: ()/(60-73) 122/66  FiO2 (%):  [60 %] 60 %    Intake/Output Summary (Last 24 hours) at 03/07/17 0808  Last data filed at 03/07/17 0749   Gross per 24 hour   Intake 2165.84 ml   Output   3000 ml   Net -834.16 ml         Physical Exam:  General:    Sedated on vent support   Heart:      Normal S1 and S2. Regular rate and rhythm. No significant murmur, rubs or gallops appreciated.   Lungs:     Respirations regular, even and unlabored. Decreased bibasilar breath sounds.    Abdomen:   Soft and nontender. No guarding, rebound tenderness or  organomegaly noted. Bowel sounds present x 4.   Extremities:  No clubbing, cyanosis or edema noted.      Results Review:      Results from last 7 days  Lab Units 03/07/17  0230 03/07/17  0036 03/06/17  1018 03/06/17  0835 03/06/17  0049 03/05/17  1235 03/05/17  0111 03/04/17  0122 03/03/17  0040  03/02/17  0120   WBC 10*3/mm3 5.13 4.86  --   --  3.51*  --  3.65* 6.99 4.46*  --  5.90   HEMOGLOBIN g/dL 9.4* 6.7* 8.4* 8.4* 7.0* 7.2* 7.1* 9.0* 8.8*  < > 8.3*   PLATELETS 10*3/mm3 100* 126*  --   --  146  --  164 196 168  --  168   < > = values in this interval not displayed.    Results from last 7 days  Lab Units 03/07/17  0036 03/06/17  0049 03/05/17  0111 03/04/17  0123 03/03/17  0039 03/02/17  0120 02/28/17  1939    SODIUM mmol/L 151 148 146 144 137 134* 139   POTASSIUM mmol/L 3.5 3.6 4.2 5.1 4.5 4.2 3.8   CHLORIDE mmol/L 123* 122* 119* 116* 109 109 109   TOTAL CO2 mmol/L 24.0* 24.3 24.0* 23.6* 20.8* 17.9* 24.3   BUN mg/dL 34* 46* 47* 31* 27* 20 18   CREATININE mg/dL 0.58 0.62 0.79 0.96 0.71 0.65 0.57   CALCIUM mg/dL 8.1 8.0 7.9 8.5 8.1 8.4 8.8   GLUCOSE mg/dL 180* 99 134* 87 114* 330* 117*       Results from last 7 days  Lab Units 03/07/17  0036 03/06/17  0049 03/05/17  0111 03/04/17  0123 03/03/17  0039 03/02/17  0120 02/28/17  1939   BILIRUBIN mg/dL 0.3 0.3 0.2 0.2 0.2 0.1* 0.2   ALK PHOS U/L 84 70 49 54 52 51 59   AST (SGOT) U/L 35* 67* 45* 64* 56* 28 29   ALT (SGPT) U/L 30 30 27 34 36 31 41*       Results from last 7 days  Lab Units 03/07/17  0036 03/05/17  0111 03/04/17  0123 03/02/17  0120   MAGNESIUM mg/dL 2.2 2.6 2.3 2.1           Results from last 7 days  Lab Units 03/02/17  0911 03/01/17  0544 02/28/17 2114 02/28/17  1939   CK TOTAL U/L 132 302*  --   --    TROPONIN I ng/mL 0.018 0.029 0.076* 0.085*   CK MB INDEX % 0.2 0.1  --   --    MYOGLOBIN ng/mL 38.0 71.0  --  107.0       Imaging Results (last 24 hours)     Procedure Component Value Units Date/Time    XR Abdomen KUB [[id number]] Collected:  03/05/17 1051     Updated:  03/05/17 1054    Narrative:       XR ABDOMEN KUB-     REASON FOR EXAM:  Abdominal distention; J18.9-Pneumonia, unspecified  organism.     FINDINGS:  A nasogastric tube is demonstrated in the upper abdomen. The  tip of the tube overlies the distal stomach. There is evidence of a  catheter in the urinary bladder. The bowel gas pattern shows no evidence  of obstruction. There are fairly heavy atherosclerotic calcifications in  the aorta and its branches.       Impression:       NG tube tip overlies the antrum of the stomach. There is no  evidence of bowel obstruction.        This report was finalized on 3/5/2017 10:52 AM by Dr. Scott Britton II, MD.       XR Chest 1 View [[id number]] Collected:   03/05/17 1052     Updated:  03/05/17 1153    Narrative:       XR CHEST 1 VW-     REASON FOR EXAM:  Follow up pneumonia/ARDS; J18.9-Pneumonia, unspecified  organism.     COMPARISON: The chest is compared with an earlier chest done 03/04/2017.     FINDINGS:  The endotracheal tube tip overlies the tracheal air column at  the level of the aortic knob. The central line and nasogastric tubes are  in appropriate position. The cardiac silhouette is stable. The lungs  show persistent but improving interstitial and alveolar infiltrates in  both lungs. No pleural effusions have developed.       Impression:       Continued improvement in the aeration of the lungs.        This report was finalized on 3/5/2017 11:51 AM by Dr. Scott Britton II, MD.       XR Chest 1 View [[id number]] Collected:  03/06/17 0801     Updated:  03/06/17 0803    Narrative:       EXAMINATION: XR CHEST 1 VW-      CLINICAL INDICATION:     follow up pneumonia/ards; J18.9-Pneumonia,  unspecified organism     TECHNIQUE: Single AP view of chest.      COMPARISON: 3/5/2017      FINDINGS:   There is bibasilar atelectasis.   Stable bilateral airspace disease.  Support tube and lines are in unchanged position.   Heart size is stable.  No pneumothorax.   Tiny bilateral pleural effusions persist.        Impression:       Stable appearance of the chest.     This report was finalized on 3/6/2017 8:01 AM by Dr. Bret Morris MD.               Medications:    acetaminophen 650 mg Oral Once   aspirin 81 mg Oral Daily   azithromycin 500 mg Intravenous Q24H   aztreonam 2 g Intravenous Q8H   clopidogrel 75 mg Oral Daily   furosemide 40 mg Intravenous Daily   guaiFENesin 600 mg Oral BID   heparin (porcine) 5,000 Units Subcutaneous Q12H   insulin aspart 0-14 Units Subcutaneous 4x Daily AC & at Bedtime   insulin detemir 20 Units Subcutaneous Nightly   ipratropium 0.5 mg Nebulization Q6H - RT   methylPREDNISolone sodium succinate 20 mg Intravenous Q12H   metoprolol tartrate 25 mg  Oral Q12H   metroNIDAZOLE 500 mg Intravenous Q8H   pantoprazole 40 mg Intravenous Q AM   potassium chloride 40 mEq Oral Once   potassium chloride 40 mEq Oral Q4H   sucralfate 1 g Oral 4x Daily AC & at Bedtime   vancomycin 750 mg Intravenous Q12H       dexmedetomidine 0.2-1.5 mcg/kg/hr Last Rate: 1.075 mcg/kg/hr (03/07/17 4106)   fentaNYL (SUBLIMAZE) PCA 1500 mcg/30 mL syringe     norepinephrine 0.02-0.3 mcg/kg/min Last Rate: Stopped (03/04/17 9455)   Pharmacy to dose vancomycin     propofol 5-50 mcg/kg/min Last Rate: 50 mcg/kg/min (03/07/17 9155)           Assessment/Plan   Severe sepsis: Likely 2/2 B/L community acquired pneumonia and UTI. Currently on Azithromax, Aztreonam, Vanc and Flagyl. Hypotension is much improved. Febrile again overnight and cultures were repeated. Cont broad spectrum IV abx and repeat labs in the AM. Will consult ID for further input regarding antibiotic management as pt has been febrile on broad spectrum IV abx.     ARDS: Likely 2/2 sepsis and pneumonia. Repeat CXR appears worse today. Currently on IV Lasix and broad spectrum IV abx. Will D/C Lasix today in the setting of hypernatremia. PO2 appears improved today on ABG. Cont vent management per pulm. Repeat ABG and CXR in the AM. Pulm input appreciated.     Acute exacerbation of COPD: Cont steroids, bronchodilators and vent support. Pulm input appreciated.     Acute on chronic microcytic anemia with hx of iron deficiency: Repeat H&H improved and pt did not require a transfusion yesterday. Cont to monitor closely.     Hypernatremia: Stop scheduled Lasix. Consult nephrology for further input. Repeat labs this afternoon and in the AM.     DM II, insulin-dependent: No further reported episodes of hypoglycemia. Cont current regimen and cont TF's.     Elevated liver enzymes: Viral hepatitis panel reveals Hep C Ab reactivity. HIV is non-reactive. Repeat CMP in the AM.     Thrombocytopenia: Possibly 2/2 acute illness. Plt count 100 today. If  plt's cont to drop will stop SQ heparin. Repeat CBC in the AM.     PPX  GI: PPI  DVT: SQ heparin      Pt is at high risk 2/2 severe sepsis, bilateral pneumonia, ARDS, UTI, COPD, anemia and hx of noncompliance.           Manuel Elizalde,   03/07/17  8:08 AM

## 2017-03-07 NOTE — PLAN OF CARE
Problem: Mechanical Ventilation, Invasive (Adult)  Goal: Signs and Symptoms of Listed Potential Problems Will be Absent or Manageable (Mechanical Ventilation, Invasive)  Outcome: Ongoing (interventions implemented as appropriate)    03/07/17 0214   Mechanical Ventilation, Invasive   Problems Assessed (Mechanical Ventilation, Invasive) all

## 2017-03-07 NOTE — PLAN OF CARE
Problem: Patient Care Overview (Adult)  Goal: Adult Individualization and Mutuality  Outcome: Ongoing (interventions implemented as appropriate)  Goal: Discharge Needs Assessment  Outcome: Ongoing (interventions implemented as appropriate)    Problem: Pneumonia (Adult)  Goal: Signs and Symptoms of Listed Potential Problems Will be Absent or Manageable (Pneumonia)  Outcome: Ongoing (interventions implemented as appropriate)    Problem: Diabetes, Type 2 (Adult)  Goal: Signs and Symptoms of Listed Potential Problems Will be Absent or Manageable (Diabetes, Type 2)  Outcome: Ongoing (interventions implemented as appropriate)    Problem: Mechanical Ventilation, Invasive (Adult)  Goal: Signs and Symptoms of Listed Potential Problems Will be Absent or Manageable (Mechanical Ventilation, Invasive)  Outcome: Ongoing (interventions implemented as appropriate)    Problem: SAFETY - NON-VIOLENT RESTRAINT  Goal: Remains free of injury from restraints (Non-Violent Restraint)  Outcome: Ongoing (interventions implemented as appropriate)  Goal: Free from restraint(s) (Non-Violent Restraint)  Outcome: Ongoing (interventions implemented as appropriate)    Problem: Pressure Ulcer Risk (Brendan Scale) (Adult,Obstetrics,Pediatric)  Goal: Identify Related Risk Factors and Signs and Symptoms  Outcome: Ongoing (interventions implemented as appropriate)  Goal: Skin Integrity  Outcome: Ongoing (interventions implemented as appropriate)

## 2017-03-08 ENCOUNTER — APPOINTMENT (OUTPATIENT)
Dept: GENERAL RADIOLOGY | Facility: HOSPITAL | Age: 52
End: 2017-03-08

## 2017-03-08 LAB
A-A DO2: 248.5 MMHG (ref 0–300)
A-A DO2: 250.4 MMHG (ref 0–300)
ALBUMIN SERPL-MCNC: 2.8 G/DL (ref 3.5–5)
ALBUMIN SERPL-MCNC: 2.8 G/DL (ref 3.5–5)
ALBUMIN/GLOB SERPL: 0.8 G/DL (ref 1.5–2.5)
ALBUMIN/GLOB SERPL: 0.8 G/DL (ref 1.5–2.5)
ALP SERPL-CCNC: 102 U/L (ref 35–104)
ALP SERPL-CCNC: 106 U/L (ref 35–104)
ALT SERPL W P-5'-P-CCNC: 19 U/L (ref 10–36)
ALT SERPL W P-5'-P-CCNC: 20 U/L (ref 10–36)
ANION GAP SERPL CALCULATED.3IONS-SCNC: 1.8 MMOL/L (ref 3.6–11.2)
ANION GAP SERPL CALCULATED.3IONS-SCNC: 2.6 MMOL/L (ref 3.6–11.2)
ANISOCYTOSIS BLD QL: ABNORMAL
ANISOCYTOSIS BLD QL: NORMAL
ARTERIAL PATENCY WRIST A: ABNORMAL
ARTERIAL PATENCY WRIST A: ABNORMAL
AST SERPL-CCNC: 26 U/L (ref 10–30)
AST SERPL-CCNC: 26 U/L (ref 10–30)
ATMOSPHERIC PRESS: 731 MMHG
ATMOSPHERIC PRESS: 734 MMHG
BACTERIA SPEC AEROBE CULT: NORMAL
BACTERIA SPEC AEROBE CULT: NORMAL
BASE EXCESS BLDA CALC-SCNC: -8.6 MMOL/L
BASE EXCESS BLDA CALC-SCNC: -9.5 MMOL/L
BASOPHILS # BLD AUTO: 0.01 10*3/MM3 (ref 0–0.3)
BASOPHILS NFR BLD AUTO: 0.1 % (ref 0–2)
BDY SITE: ABNORMAL
BDY SITE: ABNORMAL
BILIRUB SERPL-MCNC: 0.2 MG/DL (ref 0.2–1.8)
BILIRUB SERPL-MCNC: 0.3 MG/DL (ref 0.2–1.8)
BODY TEMPERATURE: 98.6 C
BODY TEMPERATURE: 98.6 C
BUN BLD-MCNC: 29 MG/DL (ref 7–21)
BUN BLD-MCNC: 31 MG/DL (ref 7–21)
BUN/CREAT SERPL: 63 (ref 7–25)
BUN/CREAT SERPL: 75.6 (ref 7–25)
CALCIUM SPEC-SCNC: 8.4 MG/DL (ref 7.7–10)
CALCIUM SPEC-SCNC: 8.4 MG/DL (ref 7.7–10)
CHLORIDE SERPL-SCNC: 124 MMOL/L (ref 99–112)
CHLORIDE SERPL-SCNC: 126 MMOL/L (ref 99–112)
CO2 SERPL-SCNC: 21.2 MMOL/L (ref 24.3–31.9)
CO2 SERPL-SCNC: 22.4 MMOL/L (ref 24.3–31.9)
COHGB MFR BLD: 1 % (ref 0–5)
COHGB MFR BLD: 1.5 % (ref 0–5)
CREAT BLD-MCNC: 0.41 MG/DL (ref 0.43–1.29)
CREAT BLD-MCNC: 0.46 MG/DL (ref 0.43–1.29)
CRP SERPL-MCNC: 13.53 MG/DL (ref 0–0.99)
DEPRECATED RDW RBC AUTO: 51.1 FL (ref 37–54)
DEPRECATED RDW RBC AUTO: 51.3 FL (ref 37–54)
EOSINOPHIL # BLD AUTO: 0.06 10*3/MM3 (ref 0–0.7)
EOSINOPHIL NFR BLD AUTO: 0.8 % (ref 0–5)
ERYTHROCYTE [DISTWIDTH] IN BLOOD BY AUTOMATED COUNT: 19.5 % (ref 11.5–14.5)
ERYTHROCYTE [DISTWIDTH] IN BLOOD BY AUTOMATED COUNT: 19.6 % (ref 11.5–14.5)
GFR SERPL CREATININE-BSD FRML MDRD: 143 ML/MIN/1.73
GFR SERPL CREATININE-BSD FRML MDRD: >150 ML/MIN/1.73
GLOBULIN UR ELPH-MCNC: 3.5 GM/DL
GLOBULIN UR ELPH-MCNC: 3.7 GM/DL
GLUCOSE BLD-MCNC: 164 MG/DL (ref 70–110)
GLUCOSE BLD-MCNC: 180 MG/DL (ref 70–110)
GLUCOSE BLDC GLUCOMTR-MCNC: 116 MG/DL (ref 70–130)
GLUCOSE BLDC GLUCOMTR-MCNC: 147 MG/DL (ref 70–130)
GLUCOSE BLDC GLUCOMTR-MCNC: 162 MG/DL (ref 70–130)
GLUCOSE BLDC GLUCOMTR-MCNC: 178 MG/DL (ref 70–130)
HCO3 BLDA-SCNC: 16 MMOL/L (ref 22–26)
HCO3 BLDA-SCNC: 17 MMOL/L (ref 22–26)
HCT VFR BLD AUTO: 26.5 % (ref 37–47)
HCT VFR BLD AUTO: 27.2 % (ref 37–47)
HCT VFR BLD CALC: 26 % (ref 37–47)
HCT VFR BLD CALC: 29 % (ref 37–47)
HGB BLD-MCNC: 7.3 G/DL (ref 12–16)
HGB BLD-MCNC: 7.4 G/DL (ref 12–16)
HGB BLDA-MCNC: 8.9 G/DL (ref 12–16)
HGB BLDA-MCNC: 9.9 G/DL (ref 12–16)
HOROWITZ INDEX BLD+IHG-RTO: 55 %
HOROWITZ INDEX BLD+IHG-RTO: 55 %
HYPOCHROMIA BLD QL: ABNORMAL
HYPOCHROMIA BLD QL: NORMAL
IMM GRANULOCYTES # BLD: 0.13 10*3/MM3 (ref 0–0.03)
IMM GRANULOCYTES NFR BLD: 1.7 % (ref 0–0.5)
LARGE PLATELETS: ABNORMAL
LARGE PLATELETS: NORMAL
LYMPHOCYTES # BLD AUTO: 0.54 10*3/MM3 (ref 1–3)
LYMPHOCYTES # BLD MANUAL: 0.84 10*3/MM3 (ref 1–3)
LYMPHOCYTES NFR BLD AUTO: 6.9 % (ref 21–51)
LYMPHOCYTES NFR BLD MANUAL: 11 % (ref 21–51)
MAGNESIUM SERPL-MCNC: 2.1 MG/DL (ref 1.7–2.6)
MAGNESIUM SERPL-MCNC: 2.2 MG/DL (ref 1.7–2.6)
MCH RBC QN AUTO: 20.6 PG (ref 27–33)
MCH RBC QN AUTO: 20.9 PG (ref 27–33)
MCHC RBC AUTO-ENTMCNC: 27.2 G/DL (ref 33–37)
MCHC RBC AUTO-ENTMCNC: 27.5 G/DL (ref 33–37)
MCV RBC AUTO: 75.7 FL (ref 80–94)
MCV RBC AUTO: 75.8 FL (ref 80–94)
METHGB BLD QL: 0.3 % (ref 0–3)
METHGB BLD QL: 0.3 % (ref 0–3)
MODALITY: ABNORMAL
MODALITY: ABNORMAL
MONOCYTES # BLD AUTO: 0.23 10*3/MM3 (ref 0.1–0.9)
MONOCYTES NFR BLD AUTO: 2.9 % (ref 0–10)
NEUTROPHILS # BLD AUTO: 6.81 10*3/MM3 (ref 1.4–6.5)
NEUTROPHILS # BLD AUTO: 6.89 10*3/MM3 (ref 1.4–6.5)
NEUTROPHILS NFR BLD AUTO: 87.6 % (ref 30–70)
NEUTROPHILS NFR BLD MANUAL: 84 % (ref 30–70)
NEUTS BAND NFR BLD MANUAL: 5 % (ref 4–12)
OSMOLALITY SERPL CALC.SUM OF ELEC: 305.6 MOSM/KG (ref 273–305)
OSMOLALITY SERPL CALC.SUM OF ELEC: 307.2 MOSM/KG (ref 273–305)
OXYHGB MFR BLDV: 94.3 % (ref 85–100)
OXYHGB MFR BLDV: 95 % (ref 85–100)
PCO2 BLDA: 33.2 MM HG (ref 35–45)
PCO2 BLDA: 35 MM HG (ref 35–45)
PEEP RESPIRATORY: 12 CM[H2O]
PEEP RESPIRATORY: 12 CM[H2O]
PH BLDA: 7.3 PH UNITS (ref 7.35–7.45)
PH BLDA: 7.3 PH UNITS (ref 7.35–7.45)
PHOSPHATE SERPL-MCNC: 2.4 MG/DL (ref 2.7–4.5)
PLATELET # BLD AUTO: 154 10*3/MM3 (ref 130–400)
PLATELET # BLD AUTO: 166 10*3/MM3 (ref 130–400)
PMV BLD AUTO: 11 FL (ref 6–10)
PO2 BLDA: 86.9 MM HG (ref 80–100)
PO2 BLDA: 92.4 MM HG (ref 80–100)
POTASSIUM BLD-SCNC: 4 MMOL/L (ref 3.5–5.3)
POTASSIUM BLD-SCNC: 4.3 MMOL/L (ref 3.5–5.3)
PROT SERPL-MCNC: 6.3 G/DL (ref 6–8)
PROT SERPL-MCNC: 6.5 G/DL (ref 6–8)
RBC # BLD AUTO: 3.5 10*6/MM3 (ref 4.2–5.4)
RBC # BLD AUTO: 3.59 10*6/MM3 (ref 4.2–5.4)
SAO2 % BLDCOA: 95.5 % (ref 90–100)
SAO2 % BLDCOA: 96.7 % (ref 90–100)
SCAN SLIDE: NORMAL
SET MECH RESP RATE: 16
SET MECH RESP RATE: 16
SODIUM BLD-SCNC: 149 MMOL/L (ref 135–153)
SODIUM BLD-SCNC: 149 MMOL/L (ref 135–153)
VENTILATOR MODE: AC
VENTILATOR MODE: AC
VT ON VENT VENT: 450 ML
VT ON VENT VENT: 450 ML
WBC NRBC COR # BLD: 7.65 10*3/MM3 (ref 4.5–12.5)
WBC NRBC COR # BLD: 7.86 10*3/MM3 (ref 4.5–12.5)

## 2017-03-08 PROCEDURE — 25010000002 MIDAZOLAM PER 1 MG: Performed by: INTERNAL MEDICINE

## 2017-03-08 PROCEDURE — 83735 ASSAY OF MAGNESIUM: CPT | Performed by: INTERNAL MEDICINE

## 2017-03-08 PROCEDURE — 82962 GLUCOSE BLOOD TEST: CPT

## 2017-03-08 PROCEDURE — 85025 COMPLETE CBC W/AUTO DIFF WBC: CPT | Performed by: INTERNAL MEDICINE

## 2017-03-08 PROCEDURE — 36600 WITHDRAWAL OF ARTERIAL BLOOD: CPT | Performed by: INTERNAL MEDICINE

## 2017-03-08 PROCEDURE — 71010 HC CHEST AP: CPT

## 2017-03-08 PROCEDURE — 82805 BLOOD GASES W/O2 SATURATION: CPT | Performed by: INTERNAL MEDICINE

## 2017-03-08 PROCEDURE — 94003 VENT MGMT INPAT SUBQ DAY: CPT

## 2017-03-08 PROCEDURE — 82375 ASSAY CARBOXYHB QUANT: CPT | Performed by: INTERNAL MEDICINE

## 2017-03-08 PROCEDURE — 86140 C-REACTIVE PROTEIN: CPT | Performed by: INTERNAL MEDICINE

## 2017-03-08 PROCEDURE — 25010000002 MIDAZOLAM 0.5 MG/ML 100 ML NS: Performed by: INTERNAL MEDICINE

## 2017-03-08 PROCEDURE — 85007 BL SMEAR W/DIFF WBC COUNT: CPT | Performed by: INTERNAL MEDICINE

## 2017-03-08 PROCEDURE — 25010000002 VANCOMYCIN PER 500 MG: Performed by: INTERNAL MEDICINE

## 2017-03-08 PROCEDURE — 25010000002 HEPARIN (PORCINE) PER 1000 UNITS: Performed by: INTERNAL MEDICINE

## 2017-03-08 PROCEDURE — 80053 COMPREHEN METABOLIC PANEL: CPT | Performed by: INTERNAL MEDICINE

## 2017-03-08 PROCEDURE — 25010000002 PROPOFOL 1000 MG/ML EMULSION: Performed by: INTERNAL MEDICINE

## 2017-03-08 PROCEDURE — 83050 HGB METHEMOGLOBIN QUAN: CPT | Performed by: INTERNAL MEDICINE

## 2017-03-08 PROCEDURE — 63710000001 INSULIN ASPART PER 5 UNITS: Performed by: INTERNAL MEDICINE

## 2017-03-08 PROCEDURE — 71010 XR CHEST AP: CPT | Performed by: RADIOLOGY

## 2017-03-08 PROCEDURE — 94799 UNLISTED PULMONARY SVC/PX: CPT

## 2017-03-08 PROCEDURE — 84100 ASSAY OF PHOSPHORUS: CPT | Performed by: INTERNAL MEDICINE

## 2017-03-08 PROCEDURE — 99233 SBSQ HOSP IP/OBS HIGH 50: CPT | Performed by: INTERNAL MEDICINE

## 2017-03-08 PROCEDURE — 25010000002 MEROPENEM: Performed by: INTERNAL MEDICINE

## 2017-03-08 PROCEDURE — 25010000002 METHYLPREDNISOLONE PER 40 MG: Performed by: INTERNAL MEDICINE

## 2017-03-08 RX ORDER — MIDAZOLAM IN 0.9 % SOD.CHLORID 1 MG/ML
1-10 PLASTIC BAG, INJECTION (ML) INTRAVENOUS
Status: DISCONTINUED | OUTPATIENT
Start: 2017-03-08 | End: 2017-03-18

## 2017-03-08 RX ORDER — POLYETHYLENE GLYCOL 3350 17 G/17G
17 POWDER, FOR SOLUTION ORAL DAILY
Status: DISCONTINUED | OUTPATIENT
Start: 2017-03-08 | End: 2017-03-12

## 2017-03-08 RX ADMIN — PROPOFOL 50 MCG/KG/MIN: 10 INJECTION, EMULSION INTRAVENOUS at 22:33

## 2017-03-08 RX ADMIN — Medication: at 00:25

## 2017-03-08 RX ADMIN — SUCRALFATE 1 G: 1 TABLET ORAL at 11:56

## 2017-03-08 RX ADMIN — IPRATROPIUM BROMIDE 0.5 MG: 0.5 SOLUTION RESPIRATORY (INHALATION) at 00:26

## 2017-03-08 RX ADMIN — SUCRALFATE 1 G: 1 TABLET ORAL at 06:26

## 2017-03-08 RX ADMIN — POLYETHYLENE GLYCOL (3350) 17 G: 17 POWDER, FOR SOLUTION ORAL at 10:03

## 2017-03-08 RX ADMIN — PANTOPRAZOLE SODIUM 40 MG: 40 INJECTION, POWDER, FOR SOLUTION INTRAVENOUS at 06:26

## 2017-03-08 RX ADMIN — METHYLPREDNISOLONE SODIUM SUCCINATE 20 MG: 40 INJECTION, POWDER, FOR SOLUTION INTRAMUSCULAR; INTRAVENOUS at 08:46

## 2017-03-08 RX ADMIN — VANCOMYCIN HYDROCHLORIDE 750 MG: 5 INJECTION, POWDER, LYOPHILIZED, FOR SOLUTION INTRAVENOUS at 08:46

## 2017-03-08 RX ADMIN — METRONIDAZOLE 500 MG: 500 INJECTION, SOLUTION INTRAVENOUS at 03:01

## 2017-03-08 RX ADMIN — Medication 1 MG/HR: at 09:57

## 2017-03-08 RX ADMIN — ASPIRIN 81 MG: 81 TABLET, CHEWABLE ORAL at 08:46

## 2017-03-08 RX ADMIN — CLOPIDOGREL 75 MG: 75 TABLET, FILM COATED ORAL at 08:46

## 2017-03-08 RX ADMIN — IPRATROPIUM BROMIDE 0.5 MG: 0.5 SOLUTION RESPIRATORY (INHALATION) at 06:15

## 2017-03-08 RX ADMIN — MEROPENEM 1 G: 1 INJECTION, POWDER, FOR SOLUTION INTRAVENOUS at 10:05

## 2017-03-08 RX ADMIN — IPRATROPIUM BROMIDE 0.5 MG: 0.5 SOLUTION RESPIRATORY (INHALATION) at 18:20

## 2017-03-08 RX ADMIN — Medication: at 04:59

## 2017-03-08 RX ADMIN — PROPOFOL 50 MCG/KG/MIN: 10 INJECTION, EMULSION INTRAVENOUS at 03:55

## 2017-03-08 RX ADMIN — Medication: at 22:33

## 2017-03-08 RX ADMIN — HEPARIN SODIUM 5000 UNITS: 5000 INJECTION, SOLUTION INTRAVENOUS; SUBCUTANEOUS at 20:23

## 2017-03-08 RX ADMIN — SUCRALFATE 1 G: 1 TABLET ORAL at 20:22

## 2017-03-08 RX ADMIN — DEXMEDETOMIDINE HYDROCHLORIDE 1.29 MCG/KG/HR: 100 INJECTION, SOLUTION INTRAVENOUS at 07:13

## 2017-03-08 RX ADMIN — MEROPENEM 1 G: 1 INJECTION, POWDER, FOR SOLUTION INTRAVENOUS at 03:01

## 2017-03-08 RX ADMIN — Medication: at 09:56

## 2017-03-08 RX ADMIN — GUAIFENESIN 600 MG: 600 TABLET, EXTENDED RELEASE ORAL at 08:47

## 2017-03-08 RX ADMIN — IPRATROPIUM BROMIDE 0.5 MG: 0.5 SOLUTION RESPIRATORY (INHALATION) at 12:09

## 2017-03-08 RX ADMIN — INSULIN ASPART 3 UNITS: 100 INJECTION, SOLUTION INTRAVENOUS; SUBCUTANEOUS at 12:01

## 2017-03-08 RX ADMIN — GUAIFENESIN 600 MG: 600 TABLET, EXTENDED RELEASE ORAL at 17:20

## 2017-03-08 RX ADMIN — PROPOFOL 40 MCG/KG/MIN: 10 INJECTION, EMULSION INTRAVENOUS at 14:32

## 2017-03-08 RX ADMIN — METOPROLOL TARTRATE 25 MG: 25 TABLET, FILM COATED ORAL at 08:46

## 2017-03-08 RX ADMIN — METHYLPREDNISOLONE SODIUM SUCCINATE 20 MG: 40 INJECTION, POWDER, FOR SOLUTION INTRAMUSCULAR; INTRAVENOUS at 20:22

## 2017-03-08 RX ADMIN — VANCOMYCIN HYDROCHLORIDE 750 MG: 5 INJECTION, POWDER, LYOPHILIZED, FOR SOLUTION INTRAVENOUS at 20:23

## 2017-03-08 RX ADMIN — MIDAZOLAM HYDROCHLORIDE 2 MG: 1 INJECTION, SOLUTION INTRAMUSCULAR; INTRAVENOUS at 02:02

## 2017-03-08 RX ADMIN — PROPOFOL 50 MCG/KG/MIN: 10 INJECTION, EMULSION INTRAVENOUS at 10:03

## 2017-03-08 RX ADMIN — MEROPENEM 1 G: 1 INJECTION, POWDER, FOR SOLUTION INTRAVENOUS at 20:23

## 2017-03-08 RX ADMIN — INSULIN ASPART 3 UNITS: 100 INJECTION, SOLUTION INTRAVENOUS; SUBCUTANEOUS at 17:38

## 2017-03-08 RX ADMIN — HEPARIN SODIUM 5000 UNITS: 5000 INJECTION, SOLUTION INTRAVENOUS; SUBCUTANEOUS at 08:47

## 2017-03-08 RX ADMIN — METOPROLOL TARTRATE 25 MG: 25 TABLET, FILM COATED ORAL at 21:34

## 2017-03-08 RX ADMIN — SUCRALFATE 1 G: 1 TABLET ORAL at 17:20

## 2017-03-08 RX ADMIN — MIDAZOLAM HYDROCHLORIDE 2 MG: 1 INJECTION, SOLUTION INTRAMUSCULAR; INTRAVENOUS at 07:13

## 2017-03-08 RX ADMIN — Medication: at 15:35

## 2017-03-08 NOTE — PROGRESS NOTES
LOS: 8 days     Chief Complaint:  Pulmonology is following for bilateral pneumonia    Subjective     Interval History:   The patient was emergently intubated 3/3/17.    No events overnight still agitated and continues to require hi doses of sedatives- precedex was marginally effective  To be switched to versed drip in addition to propofol- precedex to be stopped    History taken from: chart family Patient unable to give history due to emergent nature of case.    Review of Systems:   Review of Systems   Unable to perform ROS: Severe respiratory distress   and emergent nature of case.                  Objective     Vital Signs  Temp:  [98.3 °F (36.8 °C)-99.1 °F (37.3 °C)] 98.6 °F (37 °C)  Heart Rate:  [] 91  Resp:  [14-29] 29  BP: ()/(57-71) 118/71  FiO2 (%):  [55 %] 55 %  Body mass index is 21.79 kg/(m^2).    Intake/Output Summary (Last 24 hours) at 03/08/17 0934  Last data filed at 03/08/17 0846   Gross per 24 hour   Intake 3097.45 ml   Output   1695 ml   Net 1402.45 ml     I/O this shift:  In: 500 [IV Piggyback:500]  Out: -     Physical Exam:  GENERAL APPEARANCE: Patient was restless and in severe respiratory distress.    HEAD: normocephalic.    EYES: PERRL    NECK: Neck supple.     CARDIAC: Normal S1 and S2. No S3, S4 or murmurs. Rhythm is regular. There is no peripheral edema, cyanosis or pallor. Extremities are warm and well perfused. Capillary refill is less than 2 seconds. No carotid bruits.    LUNGS: coarse bilaterally no wheezing    ABDOMEN: Positive bowel sounds. Soft, nondistended, nontender.     EXTREMITIES: No significant deformity or joint abnormality. No edema. Peripheral pulses intact.     NEUROLOGICAL: unable to assess due to mental status and emergent intubation.    PSYCHIATRIC:  Unable to assess due to emergent nature of the case.                Results Review:                I reviewed the patient's new clinical results.  I reviewed the patient's new imaging results and agree with the  interpretation.    Results from last 7 days  Lab Units 03/08/17  0747 03/08/17  0533 03/07/17  0230   WBC 10*3/mm3 7.65 7.86 5.13   HEMOGLOBIN g/dL 7.3* 7.4* 9.4*   PLATELETS 10*3/mm3 166 154 100*       Results from last 7 days  Lab Units 03/08/17  0533 03/08/17  0356 03/07/17  1426 03/07/17  0036   SODIUM mmol/L 149 149 151 151   POTASSIUM mmol/L 4.0 4.3 4.3 3.5   CHLORIDE mmol/L 126* 124* 127* 123*   TOTAL CO2 mmol/L 21.2* 22.4* 23.9* 24.0*   BUN mg/dL 29* 31* 33* 34*   CREATININE mg/dL 0.46 0.41* 0.49 0.58   CALCIUM mg/dL 8.4 8.4 8.2 8.1   GLUCOSE mg/dL 164* 180* 222* 180*   MAGNESIUM mg/dL 2.2 2.1  --  2.2     Lab Results   Component Value Date    INR 1.01 12/17/2016    INR 0.94 11/20/2016    INR <0.90 02/06/2016    PROTIME 11.4 12/17/2016    PROTIME 10.6 11/20/2016    PROTIME 10.0 02/06/2016       Results from last 7 days  Lab Units 03/08/17  0533 03/08/17  0356 03/07/17  0036   ALK PHOS U/L 106* 102 84   BILIRUBIN mg/dL 0.3 0.2 0.3   ALT (SGPT) U/L 20 19 30   AST (SGOT) U/L 26 26 35*       Results from last 7 days  Lab Units 03/08/17  0415   PH, ARTERIAL pH units 7.303*   PO2 ART mm Hg 86.9   PCO2, ARTERIAL mm Hg 35.0   HCO3 ART mmol/L 17.0*   HIV-1 / O / 2 Ag / Antibody 4th Generation   Order: [id number] - Part of Panel Order [id number]   Status:  Final result   Visible to patient:  No (Not Released)      Ref Range & Units 1d ago     HIV-1/ HIV-2 Non-Reactive Non-Reactive   Resulting Agency  [facility]   Narrative     A non-reactive test result does not preclude the possibility of exposure to HIV or infection with HIV. An antibody response to recent exposure may take several months to reach detectable levels.                      Imaging Results (last 24 hours)     Procedure Component Value Units Date/Time    XR Chest AP [[id number]] Collected:  03/02/17 0936     Updated:  03/02/17 0940    Narrative:       EXAMINATION: XR CHEST AP-      CLINICAL INDICATION:     SOB; J18.9-Pneumonia, unspecified organism      TECHNIQUE:  XR CHEST AP-      COMPARISON: 02/28/2017      FINDINGS:   Progression of bilateral airspace disease right greater than left lung.  Opacities are more pronounced in the right upper lobe and at the  periphery of both lungs. Favor bacterial pneumonia, but peripheral  predominant opacities can also be seen with other atypical etiologies  including eosinophilic pneumonia.   Heart and mediastinal contours are unremarkable.   No pneumothorax.   No pleural effusion.   No acute osseous findings.            Impression:       1. Marked interval worsening of right lung pneumonia and now developing  left lung airspace disease probably representing diffuse pneumonia.  Considerations as described above.  2. Otherwise stable chest.     This report was finalized on 3/2/2017 9:38 AM by Dr. Wali Tracy MD.       XR Chest 1 View [[id number]] Resulted:  03/03/17 0924     Updated:  03/03/17 0707             Medication Review:   Scheduled Medications:    acetaminophen 650 mg Oral Once   aspirin 81 mg Oral Daily   clopidogrel 75 mg Oral Daily   guaiFENesin 600 mg Oral BID   heparin (porcine) 5,000 Units Subcutaneous Q12H   insulin aspart 0-14 Units Subcutaneous 4x Daily AC & at Bedtime   insulin detemir 20 Units Subcutaneous Nightly   ipratropium 0.5 mg Nebulization Q6H - RT   meropenem 1 g Intravenous Q8H   methylPREDNISolone sodium succinate 20 mg Intravenous Q12H   metoprolol tartrate 25 mg Oral Q12H   pantoprazole 40 mg Intravenous Q AM   polyethylene glycol 17 g Oral Daily   potassium chloride 40 mEq Oral Once   sucralfate 1 g Oral 4x Daily AC & at Bedtime   vancomycin 750 mg Intravenous Q12H     Continuous infusions:    dexmedetomidine 0.2-1.5 mcg/kg/hr Last Rate: 1.29 mcg/kg/hr (03/08/17 0713)   fentaNYL (SUBLIMAZE) PCA 1500 mcg/30 mL syringe     norepinephrine 0.02-0.3 mcg/kg/min Last Rate: Stopped (03/04/17 0355)   Pharmacy to dose vancomycin     propofol 5-50 mcg/kg/min Last Rate: 50 mcg/kg/min (03/08/17 0355)        Assessment/Plan     CXR:              Neuro:  Patient was started on propofol and fentanyl IV per protocol for sedation add precedex due to limited effectiveness of former.      Respiratory:      Vent settings:   RR16  FIO2 55% PEEP 12  Results for YESSICA TERRY (MRN [id number]) as of 3/8/2017 09:37   Ref. Range 3/8/2017 04:15   pH, Arterial Latest Ref Range: 7.350 - 7.450 pH units 7.303 (L)   pCO2, Arterial Latest Ref Range: 35.0 - 45.0 mm Hg 35.0   pO2, Arterial Latest Ref Range: 80.0 - 100.0 mm Hg 86.9   HCO3, Arterial Latest Ref Range: 22.0 - 26.0 mmol/L 17.0 (C)   Base Excess, Arterial Latest Units: mmol/L -8.6   O2 Saturation, Arterial Latest Ref Range: 90.0 - 100.0 % 95.5   Hemoglobin, Blood Gas Latest Ref Range: 12 - 16 g/dL 9.9 (L)   Hematocrit, Blood Gas Latest Ref Range: 37.0 - 47.0 % 29.0 (L)   Barometric Pressure for Blood Gas Latest Units: mmHg 731   A-a Gradiant Latest Ref Range: 0.0 - 300.0 mmHg 250.4   P/F 158 from 200      HIV negative    Pink frothy sputum has been suctioned from ET tube.     Will continue IV steroids, scheduled inhalants and PRN neb treatments.    Added precedex for sedation    ID: Continue current IV antibiotics. Vanc/Azithro/metro    Cardiac:  Stable.    Renal: BUN/Creatinine WNL.- may need diuresis for now she is hypernatremic- FWF added by nephrology who have been consulted.    Endocrine: monitor glucose targeting 140-180.    Electrolytes:  Monitor levels, manage and replete per protocols.  Monitor for refeeding syndrome- patients nutritional status is poor.    GI:  Nutrition consult placed for tube feedings.    Hematology:   Monitor blood counts, transfuse at or below 7 g/dL.  Unless patient is actively bleeding then began transfusing at 8 g/dL.     Case was discussed with PMD and  ccu nurse     Patients  is in ARDS with P/F 120==>200==>158  continue current supportive care. 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

## 2017-03-08 NOTE — PROGRESS NOTES
"  I have personally seen and examined the patient today and discussed overnight interval progress and pertinent issues with nursing staff.    ROS:    Unable to obtain due to the patients mental status, on the ventilator and sedated       History taken from: chart RN      Vital Signs    Visit Vitals   • /69 (BP Location: Right arm, Patient Position: Lying)   • Pulse 100   • Temp 99.6 °F (37.6 °C) (Core)   • Resp 27   • Ht 63\" (160 cm)   • Wt 123 lb (55.8 kg)   • SpO2 95%   • BMI 21.79 kg/m2       Temp:  [98.3 °F (36.8 °C)-99.6 °F (37.6 °C)] 99.6 °F (37.6 °C)      Intake/Output Summary (Last 24 hours) at 03/08/17 1116  Last data filed at 03/08/17 1005   Gross per 24 hour   Intake 2847.45 ml   Output   1495 ml   Net 1352.45 ml     Intake & Output (last 3 days)       03/05 0701 - 03/06 0700 03/06 0701 - 03/07 0700 03/07 0701 - 03/08 0700 03/08 0701 - 03/09 0700    I.V. (mL/kg) 412.6 (7.4) 435.8 (7.8) 982.5 (17.6)     Other  260 440     NG/ 470 225     IV Piggyback 900 1000 1200 600    Total Intake(mL/kg) 1899.6 (34) 2165.8 (38.8) 2847.5 (51) 600 (10.8)    Urine (mL/kg/hr) 2475 (1.8) 2900 (2.2) 1795 (1.3)     Total Output 2475 2900 1795      Net -575.4 -734.2 +1052.5 +600                  Physical Exam:      General Appearance:  intubated, sedated on ventilator, and respiratory distress    Head:  Normocephalic, without obvious abnormality, atraumatic   Eyes:      Lids and lashes normal, conjunctivae and sclerae normal, no icterus, no pallor, corneas clear, PERRLA   Ears:  Ears appear intact with no abnormalities noted   Throat: No oral lesions, no thrush, oral mucosa moist   Neck: No adenopathy, supple, trachea midline, no thyromegaly, no carotid bruit, no JVD   Back:  No tenderness to percussion or palpation, range of motion normal   Lungs:  decreased breath sounds. No wheezing, no ronchi and no crackles.   Heart:  Regular rhythm and normal rate, normal S1 and S2, no murmur, no gallop, no rub, no click "   Chest Wall:  No abnormalities observed   Abdomen:  Normal bowel sounds, no masses, no organomegaly, soft non-tender, non-distended, no guarding, no rebound tenderness   Rectal:  Deferred   Extremities: No edema, no cyanosis, no redness   Pulses: Pulses palpable and equal bilaterally   Skin: No bleeding, bruising or rash   Lymph nodes: No palpable adenopathy   Neurologic: sedated        Results:      Results from last 7 days  Lab Units 03/08/17  0747 03/08/17  0533 03/07/17  0230 03/07/17  0036 03/06/17  0049 03/05/17  0111 03/04/17  0122   WBC 10*3/mm3 7.65 7.86 5.13 4.86 3.51* 3.65* 6.99     Lab Results   Component Value Date    NEUTROABS 6.81 (H) 03/08/2017         Results from last 7 days  Lab Units 03/08/17  0533   CREATININE mg/dL 0.46         Results from last 7 days  Lab Units 03/08/17  0533 03/07/17  0036 03/06/17  0049   CRP mg/dL 13.53* 11.60* 6.48*       Imaging Results (last 24 hours)     Procedure Component Value Units Date/Time    XR Chest AP [[id number]] Collected:  03/08/17 0831     Updated:  03/08/17 0834    Narrative:       EXAMINATION: XR CHEST AP-      CLINICAL INDICATION:     ARDS; J18.9-Pneumonia, unspecified organism     TECHNIQUE: Single AP view of chest.      COMPARISON: 3/7/2017      FINDINGS:   There is bibasilar atelectasis.   Grossly stable bilateral airspace disease.  Support tube and lines are in unchanged position.   Heart size is stable.  No pneumothorax.              Impression:       Grossly stable bilateral airspace disease.     This report was finalized on 3/8/2017 8:31 AM by Dr. Bret Morris MD.               Results Review:    I have personally reviewed laboratory data, culture results, radiology studies and antimicrobial therapy.    Hospital Medications (active)       Dose Frequency Start End    acetaminophen (TYLENOL) tablet 650 mg 650 mg Every 6 Hours PRN 3/2/2017     Sig - Route: Take 2 tablets by mouth Every 6 (Six) Hours As Needed for fever. - Oral    acetaminophen  (TYLENOL) tablet 650 mg 650 mg Once 3/6/2017     Sig - Route: Take 2 tablets by mouth 1 (One) Time. - Oral    aspirin chewable tablet 81 mg 81 mg Daily 3/7/2017     Sig - Route: Chew 1 tablet Daily. - Oral    Notes to Pharmacy: Patient intubated and RN requested changed from enteric coated tablet.    clopidogrel (PLAVIX) tablet 75 mg 75 mg Daily 3/1/2017     Sig - Route: Take 1 tablet by mouth Daily. - Oral    dextrose (D50W) solution 25 g 25 g Every 15 Minutes PRN 3/1/2017     Sig - Route: Infuse 50 mL into a venous catheter Every 15 (Fifteen) Minutes As Needed for low blood sugar (Blood Sugar Less Than 70, Patient Has IV Access - Unresponsive, NPO or Unable To Safely Swallow). - Intravenous    Cosign for Ordering: Accepted by Savanah Gutierrez MD on 3/1/2017  7:01 PM    dextrose (GLUTOSE) oral gel 15 g 15 g Every 15 Minutes PRN 3/1/2017     Sig - Route: Take 15 g by mouth Every 15 (Fifteen) Minutes As Needed for low blood sugar (Blood Sugar Less Than 70, Patient Alert, Is Not NPO & Can Safely Swallow). - Oral    Cosign for Ordering: Accepted by Savanah Gutierrez MD on 3/1/2017  7:01 PM    FENTANYL PCA 1500 MCG/30 ML (BHCOR) PCA  Continuous 3/3/2017 3/13/2017    Sig - Route: Infuse  into a venous catheter Continuous. - Intravenous    glucagon (GLUCAGEN) injection 1 mg 1 mg Every 15 Minutes PRN 3/1/2017     Sig - Route: Inject 1 mg under the skin Every 15 (Fifteen) Minutes As Needed (Blood Glucose Less Than 70 - Patient Without IV Access - Unresponsive, NPO or Unable To Safely Swallow). - Subcutaneous    Cosign for Ordering: Accepted by Savanah Gutierrez MD on 3/1/2017  7:01 PM    guaiFENesin (MUCINEX) 12 hr tablet 600 mg 600 mg 2 Times Daily 3/2/2017     Sig - Route: Take 1 tablet by mouth 2 (Two) Times a Day. - Oral    heparin (porcine) 5000 UNIT/ML injection 5,000 Units 5,000 Units Every 12 Hours Scheduled 3/1/2017     Sig - Route: Inject 1 mL under the skin Every 12 (Twelve) Hours. - Subcutaneous    insulin  "aspart (novoLOG) injection 0-14 Units 0-14 Units 4 Times Daily Before Meals & Nightly 3/1/2017     Sig - Route: Inject 0-14 Units under the skin 4 (Four) Times a Day Before Meals & at Bedtime. - Subcutaneous    insulin detemir (LEVEMIR) injection 20 Units 20 Units Nightly 3/4/2017     Sig - Route: Inject 20 Units under the skin Every Night. - Subcutaneous    ipratropium (ATROVENT) nebulizer solution 0.5 mg 0.5 mg Every 6 Hours PRN 3/2/2017     Sig - Route: Take 2.5 mL by nebulization Every 6 (Six) Hours As Needed for shortness of air. - Nebulization    Cosign for Ordering: Accepted by Sherrill Weiner DO on 3/2/2017  1:40 PM    ipratropium (ATROVENT) nebulizer solution 0.5 mg 0.5 mg Every 6 Hours - RT 3/3/2017     Sig - Route: Take 2.5 mL by nebulization Every 6 (Six) Hours. - Nebulization    magnesium sulfate 6 g in dextrose (D5W) 5 % 250 mL infusion 6 g As Needed 3/6/2017     Sig - Route: Infuse 6 g into a venous catheter As Needed (Mg 1.1-1.5 mg/dL). - Intravenous    Linked Group 1:  \"Or\" Linked Group Details        magnesium sulfate 8 g in dextrose (D5W) 5 % 250 mL infusion 8 g As Needed 3/6/2017     Sig - Route: Infuse 8 g into a venous catheter As Needed (Mg less than or equal to 1 mg/dL). - Intravenous    Linked Group 1:  \"Or\" Linked Group Details        magnesium sulfate in D5W 1g/100mL (PREMIX) IVPB 1 g 1 g As Needed 3/6/2017     Sig - Route: Infuse 100 mL into a venous catheter As Needed (Mg less than or equal 1 mg/dL). - Intravenous    Linked Group 1:  \"Or\" Linked Group Details        magnesium sulfate in D5W 1g/100mL (PREMIX) IVPB 1 g 1 g As Needed 3/6/2017     Sig - Route: Infuse 100 mL into a venous catheter As Needed (magnesium sulfate 1 g in D5W 100 mL IVPB - Mg 1.6 - 1.9 mg/dL). - Intravenous    Linked Group 1:  \"Or\" Linked Group Details        meropenem (MERREM) 1 g/100 mL 0.9% NS VTB (mbp) 1 g Every 8 Hours 3/7/2017     Sig - Route: Infuse 100 mL into a venous catheter Every 8 (Eight) Hours. - " "Intravenous    methylPREDNISolone sodium succinate (SOLU-Medrol) injection 20 mg 20 mg Every 12 Hours 3/5/2017     Sig - Route: Infuse 0.5 mL into a venous catheter Every 12 (Twelve) Hours. - Intravenous    metoprolol tartrate (LOPRESSOR) tablet 25 mg 25 mg Every 12 Hours Scheduled 3/2/2017     Sig - Route: Take 1 tablet by mouth Every 12 (Twelve) Hours. - Oral    midazolam (VERSED) injection 2 mg 2 mg Every 4 Hours PRN 3/5/2017     Sig - Route: Infuse 2 mL into a venous catheter Every 4 (Four) Hours As Needed for sedation. - Intravenous    midazolam 0.5 mg/mL 100 mL NS (VERSED) infusion 1-10 mg/hr Titrated 3/8/2017 3/18/2017    Sig - Route: Infuse 1-10 mg/hr into a venous catheter Dose Adjusted By Provider As Needed. - Intravenous    norepinephrine (LEVOPHED) 32 mcg/mL (8 mg/250 mL) infusion 0.02-0.3 mcg/kg/min × 57.3 kg Titrated 3/3/2017     Sig - Route: Infuse 1.146-17.19 mcg/min into a venous catheter Dose Adjusted By Provider As Needed. - Intravenous    pantoprazole (PROTONIX) injection 40 mg 40 mg Every Early Morning 3/2/2017     Sig - Route: Infuse 10 mL into a venous catheter Every Morning. - Intravenous    Cosign for Ordering: Accepted by Sherrill Weiner DO on 3/1/2017  3:38 PM    Pharmacy to dose vancomycin  Continuous PRN 3/2/2017     Sig - Route: Continuous As Needed for consult. - Does not apply    polyethylene glycol (MIRALAX) powder 17 g 17 g Daily 3/8/2017     Sig - Route: Take 17 g by mouth Daily. - Oral    potassium chloride (K-DUR,KLOR-CON) CR tablet 40 mEq 40 mEq Once 3/2/2017     Sig - Route: Take 2 tablets by mouth 1 (One) Time. - Oral    potassium chloride (KLOR-CON) packet 40 mEq 40 mEq As Needed 3/6/2017     Sig - Route: Take 40 mEq by mouth As Needed (potassium replacement, see admin instructions). - Oral    Linked Group 2:  \"Or\" Linked Group Details        potassium chloride (MICRO-K) CR capsule 40 mEq 40 mEq As Needed 3/6/2017     Sig - Route: Take 4 capsules by mouth As Needed " "(potassium replacement.  see admin instructions). - Oral    Linked Group 2:  \"Or\" Linked Group Details        potassium chloride 10 mEq in 100 mL IVPB 10 mEq Every 1 Hour PRN 3/6/2017     Sig - Route: Infuse 100 mL into a venous catheter Every 1 (One) Hour As Needed (potassium protocol PERIPHERAL - see admin instructions). - Intravenous    Linked Group 2:  \"Or\" Linked Group Details        promethazine (PHENERGAN) tablet 12.5 mg 12.5 mg Every 6 Hours PRN 3/1/2017     Sig - Route: Take 1 tablet by mouth Every 6 (Six) Hours As Needed for Nausea or Vomiting. - Oral    Cosign for Ordering: Accepted by Sherrill Weiner DO on 3/1/2017  3:38 PM    propofol (DIPRIVAN) infusion 10 mg/mL 100 mL 5-50 mcg/kg/min × 57.3 kg Titrated 3/3/2017     Sig - Route: Infuse 286.5-2,865 mcg/min into a venous catheter Dose Adjusted By Provider As Needed. - Intravenous    sodium chloride 0.9 % flush 1-10 mL 1-10 mL As Needed 3/1/2017     Sig - Route: Infuse 1-10 mL into a venous catheter As Needed for line care. - Intravenous    sodium chloride 0.9 % flush 10 mL 10 mL As Needed 2/28/2017     Sig - Route: Infuse 10 mL into a venous catheter As Needed for line care. - Intravenous    Linked Group 3:  \"And\" Linked Group Details        sucralfate (CARAFATE) tablet 1 g 1 g 4 Times Daily Before Meals & Nightly 3/1/2017     Sig - Route: Take 1 tablet by mouth 4 (Four) Times a Day Before Meals & at Bedtime. - Oral    Cosign for Ordering: Accepted by Sherrill Weiner DO on 3/1/2017  3:38 PM    vancomycin (VANCOCIN) 750 mg in sodium chloride 0.9 % 250 mL IVPB 750 mg Every 12 Hours 3/4/2017     Sig - Route: Infuse 750 mg into a venous catheter Every 12 (Twelve) Hours. - Intravenous    dexmedetomidine HCl (PRECEDEX) 4 mcg/mL in sodium chloride 0.9 % 100 mL infusion (Discontinued) 0.2-1.5 mcg/kg/hr × 55.8 kg Continuous 3/6/2017 3/7/2017    Sig - Route: Infuse 11.16-83.7 mcg/hr into a venous catheter Continuous. - Intravenous    Reason for " Discontinue: Formulary change    dexmedetomidine HCl (PRECEDEX) 4 mcg/mL in sodium chloride 0.9 % 100 mL infusion (Discontinued) 0.2-1.5 mcg/kg/hr × 55.8 kg Titrated 3/7/2017 3/8/2017    Sig - Route: Infuse 11.16-83.7 mcg/hr into a venous catheter Dose Adjusted By Provider As Needed. - Intravenous    metroNIDAZOLE (FLAGYL) IVPB 500 mg (Discontinued) 500 mg Every 8 Hours 3/3/2017 3/8/2017    Sig - Route: Infuse 100 mL into a venous catheter Every 8 (Eight) Hours. - Intravenous            Cultures:    BLOOD CULTURE   Date Value Ref Range Status   03/06/2017 No growth at 24 hours  Preliminary   03/06/2017 No growth at 24 hours  Preliminary   03/04/2017 No growth at 4 days  Preliminary   03/04/2017 No growth at 4 days  Preliminary   03/03/2017 No growth at 4 days  Preliminary   03/03/2017 No growth at 4 days  Preliminary       PROBLEM LIST:    Patient Active Problem List   Diagnosis   • Chest pain   • Chest pain of uncertain etiology   • Intractable nausea and vomiting   • Intractable cyclical vomiting with nausea   • Chronic obstructive pulmonary emphysema   • Hepatitis-C   • Diabetes mellitus type 2 in nonobese   • Arthritis or polyarthritis, rheumatoid   • Anxiety   • Essential hypertension   • Coronary artery disease   • Buerger's disease   • Cirrhosis   • Diabetes   • Chronic nausea   • Pancreatitis   • Bilateral pneumonia   • ARDS (adult respiratory distress syndrome)   • Hypoxia       Assessment/Plan     ASSESSMENT:    1. Severe sepsis with acute respiratory failure  2. Bilateral pneumonia     PLAN:    Low-grade fever overnight up to 100 with Buergers disease and mottling of the lower extremities. Not on pressors at this time. Possibly diagnosed with ARDS with severe bilateral scattered rhonchi and fairly advanced vent-dependent respiratory failure. Unofrtunate the patient carries a very guarded prognosis due to underlying comorbidities and  for now has normal white count with worsening CRP level. HIV is  nonreactive.    Continue to be concerned about worsening CRP in this very complex case with worsening chest xray on broad spectrum antibiotic coverage with Vancomycin and Meropenem. For now Flagyl was discontinued as the patient does not require dual anaerobic coverage. Would consider bronchosocpy for deep tissue cultures. Lactic acid and CRP ordered for the morning.      The patient shows strong evidence suggestive of worsening by the chest x-ray and lab after initial period of improvement which is very concerning for possible superimposed nosocomial infection and antibiotic regimen escalated to vancomycin and meropenem 1000mg IV every 8 hours. Azithromycin and Azactam were discontinued especially in setting of negative mycoplasma IgM and Legionella urinary antigen. We will continue to monitor very closely as patient carries a very guarded prognosis.      Patients findings and recommendations were discussed with nursing staff    Code Status: Full Code     Written by Alycia Finn PA-C, acting as scribe for Dr. Hitchcock.    Alycia Finn PA-C  03/08/17  11:16 AM    Physician Attestation:    I have personally seen and examined the patient. I reviewed the patient's data including history of present illness, review of systems, physical examination, assessment and treatment plan and agree with findings above. The assessment and plan are my own.  I have reviewed and edited the note above after scribing the findings to Alycia Finn PA-C.    Evan Hitchcock MD  Infectious Diseases  03/08/17  1:50 PM

## 2017-03-08 NOTE — PLAN OF CARE
Problem: Patient Care Overview (Adult)  Goal: Plan of Care Review  Outcome: Ongoing (interventions implemented as appropriate)  Goal: Adult Individualization and Mutuality  Outcome: Ongoing (interventions implemented as appropriate)  Goal: Discharge Needs Assessment  Outcome: Ongoing (interventions implemented as appropriate)    Problem: Pneumonia (Adult)  Goal: Signs and Symptoms of Listed Potential Problems Will be Absent or Manageable (Pneumonia)  Outcome: Ongoing (interventions implemented as appropriate)    Problem: Diabetes, Type 2 (Adult)  Goal: Signs and Symptoms of Listed Potential Problems Will be Absent or Manageable (Diabetes, Type 2)  Outcome: Ongoing (interventions implemented as appropriate)    Problem: Mechanical Ventilation, Invasive (Adult)  Goal: Signs and Symptoms of Listed Potential Problems Will be Absent or Manageable (Mechanical Ventilation, Invasive)  Outcome: Ongoing (interventions implemented as appropriate)    Problem: SAFETY - NON-VIOLENT RESTRAINT  Goal: Remains free of injury from restraints (Non-Violent Restraint)  Outcome: Ongoing (interventions implemented as appropriate)  Patient remains free from injury while in restraints. Pt still keep pulling at lines/tubes.   Goal: Free from restraint(s) (Non-Violent Restraint)  Outcome: Ongoing (interventions implemented as appropriate)    Problem: Pressure Ulcer Risk (Brendan Scale) (Adult,Obstetrics,Pediatric)  Goal: Identify Related Risk Factors and Signs and Symptoms  Outcome: Ongoing (interventions implemented as appropriate)  Goal: Skin Integrity  Outcome: Ongoing (interventions implemented as appropriate)

## 2017-03-08 NOTE — PROGRESS NOTES
Subjective     History:   Pepito Yan is a 51 y.o. female admitted on 2/28/2017 secondary to Bilateral pneumonia     Procedures:   3/3/17: Emergent intubation  3/3/17: Right femoral central line placement  3/3/17: Right IJ central line placement    Patient seen and examined with GUERRERO Noriega. Sedated on vent support. RN reports pt was febrile again overnight. Continues to require PRN Versed to maintain adequate sedation. Remained hemodynamically stable overnight. No BM in a few days per RN. TF's held this AM 2/2 residuals. No acute events overnight per RN.     History taken from: chart, and RN.      Objective     Vital Signs  Temp:  [98.3 °F (36.8 °C)-99.1 °F (37.3 °C)] 98.6 °F (37 °C)  Heart Rate:  [] 91  Resp:  [14-29] 29  BP: ()/(57-71) 118/71  FiO2 (%):  [55 %] 55 %    Intake/Output Summary (Last 24 hours) at 03/08/17 0851  Last data filed at 03/08/17 0846   Gross per 24 hour   Intake 3347.45 ml   Output   1695 ml   Net 1652.45 ml         Physical Exam:  General:    Sedated on vent support   Heart:      Normal S1 and S2. Regular rate and rhythm. No significant murmur, rubs or gallops appreciated.   Lungs:     Respirations regular, even and unlabored. Coarse breath sounds bilaterally.    Abdomen:   Soft and nontender. No guarding, rebound tenderness or  organomegaly noted. Bowel sounds present x 4.   Extremities:  No clubbing, cyanosis or edema noted.      Results Review:      Results from last 7 days  Lab Units 03/08/17  0533 03/07/17  0230 03/07/17  0036 03/06/17  1018 03/06/17  0835 03/06/17  0049 03/05/17  1235 03/05/17  0111 03/04/17  0122 03/03/17  0040   WBC 10*3/mm3 7.86 5.13 4.86  --   --  3.51*  --  3.65* 6.99 4.46*   HEMOGLOBIN g/dL 7.4* 9.4* 6.7* 8.4* 8.4* 7.0* 7.2* 7.1* 9.0* 8.8*   PLATELETS 10*3/mm3 154 100* 126*  --   --  146  --  164 196 168       Results from last 7 days  Lab Units 03/08/17  0533 03/08/17  0356 03/07/17  1426 03/07/17  0036 03/06/17  0049 03/05/17  0111  03/04/17  0123   SODIUM mmol/L 149 149 151 151 148 146 144   POTASSIUM mmol/L 4.0 4.3 4.3 3.5 3.6 4.2 5.1   CHLORIDE mmol/L 126* 124* 127* 123* 122* 119* 116*   TOTAL CO2 mmol/L 21.2* 22.4* 23.9* 24.0* 24.3 24.0* 23.6*   BUN mg/dL 29* 31* 33* 34* 46* 47* 31*   CREATININE mg/dL 0.46 0.41* 0.49 0.58 0.62 0.79 0.96   CALCIUM mg/dL 8.4 8.4 8.2 8.1 8.0 7.9 8.5   GLUCOSE mg/dL 164* 180* 222* 180* 99 134* 87       Results from last 7 days  Lab Units 03/08/17  0533 03/08/17  0356 03/07/17  0036 03/06/17  0049 03/05/17  0111 03/04/17  0123 03/03/17  0039   BILIRUBIN mg/dL 0.3 0.2 0.3 0.3 0.2 0.2 0.2   ALK PHOS U/L 106* 102 84 70 49 54 52   AST (SGOT) U/L 26 26 35* 67* 45* 64* 56*   ALT (SGPT) U/L 20 19 30 30 27 34 36       Results from last 7 days  Lab Units 03/08/17  0533 03/08/17  0356 03/07/17  0036 03/05/17  0111 03/04/17  0123 03/02/17  0120   MAGNESIUM mg/dL 2.2 2.1 2.2 2.6 2.3 2.1           Results from last 7 days  Lab Units 03/02/17  0911   CK TOTAL U/L 132   TROPONIN I ng/mL 0.018   CK MB INDEX % 0.2   MYOGLOBIN ng/mL 38.0       Imaging Results (last 24 hours)     Procedure Component Value Units Date/Time    XR Abdomen KUB [[id number]] Collected:  03/05/17 1051     Updated:  03/05/17 1054    Narrative:       XR ABDOMEN KUB-     REASON FOR EXAM:  Abdominal distention; J18.9-Pneumonia, unspecified  organism.     FINDINGS:  A nasogastric tube is demonstrated in the upper abdomen. The  tip of the tube overlies the distal stomach. There is evidence of a  catheter in the urinary bladder. The bowel gas pattern shows no evidence  of obstruction. There are fairly heavy atherosclerotic calcifications in  the aorta and its branches.       Impression:       NG tube tip overlies the antrum of the stomach. There is no  evidence of bowel obstruction.        This report was finalized on 3/5/2017 10:52 AM by Dr. Scott Britton II, MD.       XR Chest 1 View [[id number]] Collected:  03/05/17 1052     Updated:  03/05/17 1153     Narrative:       XR CHEST 1 VW-     REASON FOR EXAM:  Follow up pneumonia/ARDS; J18.9-Pneumonia, unspecified  organism.     COMPARISON: The chest is compared with an earlier chest done 03/04/2017.     FINDINGS:  The endotracheal tube tip overlies the tracheal air column at  the level of the aortic knob. The central line and nasogastric tubes are  in appropriate position. The cardiac silhouette is stable. The lungs  show persistent but improving interstitial and alveolar infiltrates in  both lungs. No pleural effusions have developed.       Impression:       Continued improvement in the aeration of the lungs.        This report was finalized on 3/5/2017 11:51 AM by Dr. Scott Britton II, MD.       XR Chest 1 View [[id number]] Collected:  03/06/17 0801     Updated:  03/06/17 0803    Narrative:       EXAMINATION: XR CHEST 1 VW-      CLINICAL INDICATION:     follow up pneumonia/ards; J18.9-Pneumonia,  unspecified organism     TECHNIQUE: Single AP view of chest.      COMPARISON: 3/5/2017      FINDINGS:   There is bibasilar atelectasis.   Stable bilateral airspace disease.  Support tube and lines are in unchanged position.   Heart size is stable.  No pneumothorax.   Tiny bilateral pleural effusions persist.        Impression:       Stable appearance of the chest.     This report was finalized on 3/6/2017 8:01 AM by Dr. Bret Morris MD.               Medications:    acetaminophen 650 mg Oral Once   aspirin 81 mg Oral Daily   clopidogrel 75 mg Oral Daily   guaiFENesin 600 mg Oral BID   heparin (porcine) 5,000 Units Subcutaneous Q12H   insulin aspart 0-14 Units Subcutaneous 4x Daily AC & at Bedtime   insulin detemir 20 Units Subcutaneous Nightly   ipratropium 0.5 mg Nebulization Q6H - RT   meropenem 1 g Intravenous Q8H   methylPREDNISolone sodium succinate 20 mg Intravenous Q12H   metoprolol tartrate 25 mg Oral Q12H   pantoprazole 40 mg Intravenous Q AM   polyethylene glycol 17 g Oral Daily   potassium chloride 40 mEq Oral  Once   sucralfate 1 g Oral 4x Daily AC & at Bedtime   vancomycin 750 mg Intravenous Q12H       dexmedetomidine 0.2-1.5 mcg/kg/hr Last Rate: 1.29 mcg/kg/hr (03/08/17 0713)   fentaNYL (SUBLIMAZE) PCA 1500 mcg/30 mL syringe     norepinephrine 0.02-0.3 mcg/kg/min Last Rate: Stopped (03/04/17 0355)   Pharmacy to dose vancomycin     propofol 5-50 mcg/kg/min Last Rate: 50 mcg/kg/min (03/08/17 0355)           Assessment/Plan   Severe sepsis: Likely 2/2 B/L community acquired pneumonia and UTI with concerns for superimposed nosocomial pneumonia. ID has broadened pt's antibiotic regimen to Vanc and Merrem. Cultures remain negative. Leukopenia has improved but CRP continues to worsen. Cont to follow cultures and repeat labs in the AM. ID input appreciated.     ARDS: Likely 2/2 sepsis and pneumonia. Repeat CXR appears stable today. Currently on broad spectrum IV abx. FiO2 requirements are improving. Cont vent management per pulm. Repeat ABG and CXR in the AM. Pulm input appreciated.     Acute exacerbation of COPD: Cont steroids, bronchodilators and vent support. Pulm input appreciated.     Acute on chronic microcytic anemia with hx of iron deficiency: Stable today. Cont to monitor closely.     Hypernatremia: Nephrology consulted and has added free water flushes to TF's. Na+ improved today. Repeat labs in the AM. Nephrology input appreciated.     DM II, insulin-dependent: Currently stable. Cont current regimen and cont TF's.     Elevated liver enzymes: Viral hepatitis panel reveals Hep C Ab reactivity. HIV is non-reactive. Repeat CMP in the AM.     Thrombocytopenia: Possibly 2/2 acute illness. Improved today. Repeat CBC in the AM.     PPX  GI: PPI  DVT: SQ heparin      Pt is at high risk 2/2 severe sepsis, bilateral pneumonia, ARDS, UTI, COPD, anemia and hx of noncompliance.           Manuel Elizalde DO  03/08/17  8:51 AM

## 2017-03-08 NOTE — PROGRESS NOTES
Discharge Planning Assessment   Daniel     Patient Name: Pepito Yan  MRN: [id number]  Today's Date: 3/8/2017    Admit Date: 2/28/2017       Discharge Plan       03/08/17 1517    Case Management/Social Work Plan    Plan Pt lives at home with a friend Neeta. Pt is on the vent at this time.  has spoke with [facility] for possible admission. [facility] states pt is to acute at this time. [facility] to follow pt.  will follow and assist as needed.     Patient/Family In Agreement With Plan yes          Jacquie Quijano

## 2017-03-09 ENCOUNTER — APPOINTMENT (OUTPATIENT)
Dept: GENERAL RADIOLOGY | Facility: HOSPITAL | Age: 52
End: 2017-03-09

## 2017-03-09 LAB
A-A DO2: 225.7 MMHG (ref 0–300)
ALBUMIN SERPL-MCNC: 2.7 G/DL (ref 3.5–5)
ALBUMIN/GLOB SERPL: 0.8 G/DL (ref 1.5–2.5)
ALP SERPL-CCNC: 139 U/L (ref 35–104)
ALT SERPL W P-5'-P-CCNC: 25 U/L (ref 10–36)
ANION GAP SERPL CALCULATED.3IONS-SCNC: 6.9 MMOL/L (ref 3.6–11.2)
ANISOCYTOSIS BLD QL: NORMAL
ARTERIAL PATENCY WRIST A: ABNORMAL
AST SERPL-CCNC: 37 U/L (ref 10–30)
ATMOSPHERIC PRESS: 734 MMHG
BACTERIA SPEC AEROBE CULT: NORMAL
BACTERIA SPEC AEROBE CULT: NORMAL
BASE EXCESS BLDA CALC-SCNC: -7.3 MMOL/L
BASOPHILS # BLD AUTO: 0.01 10*3/MM3 (ref 0–0.3)
BASOPHILS NFR BLD AUTO: 0.1 % (ref 0–2)
BDY SITE: ABNORMAL
BILIRUB SERPL-MCNC: 0.4 MG/DL (ref 0.2–1.8)
BODY TEMPERATURE: 98.6 C
BUN BLD-MCNC: 31 MG/DL (ref 7–21)
BUN/CREAT SERPL: 64.6 (ref 7–25)
CALCIUM SPEC-SCNC: 8.5 MG/DL (ref 7.7–10)
CHLORIDE SERPL-SCNC: 123 MMOL/L (ref 99–112)
CO2 SERPL-SCNC: 19.1 MMOL/L (ref 24.3–31.9)
COHGB MFR BLD: 1.8 % (ref 0–5)
CREAT BLD-MCNC: 0.48 MG/DL (ref 0.43–1.29)
CRP SERPL-MCNC: 16.07 MG/DL (ref 0–0.99)
D-LACTATE SERPL-SCNC: 0.6 MMOL/L (ref 0.5–2)
DEPRECATED RDW RBC AUTO: 52.8 FL (ref 37–54)
EOSINOPHIL # BLD AUTO: 0.01 10*3/MM3 (ref 0–0.7)
EOSINOPHIL NFR BLD AUTO: 0.1 % (ref 0–5)
ERYTHROCYTE [DISTWIDTH] IN BLOOD BY AUTOMATED COUNT: 19.4 % (ref 11.5–14.5)
GFR SERPL CREATININE-BSD FRML MDRD: 136 ML/MIN/1.73
GLOBULIN UR ELPH-MCNC: 3.3 GM/DL
GLUCOSE BLD-MCNC: 181 MG/DL (ref 70–110)
GLUCOSE BLDC GLUCOMTR-MCNC: 187 MG/DL (ref 70–130)
GLUCOSE BLDC GLUCOMTR-MCNC: 215 MG/DL (ref 70–130)
GLUCOSE BLDC GLUCOMTR-MCNC: 215 MG/DL (ref 70–130)
GLUCOSE BLDC GLUCOMTR-MCNC: 281 MG/DL (ref 70–130)
HCO3 BLDA-SCNC: 18.6 MMOL/L (ref 22–26)
HCT VFR BLD AUTO: 23.1 % (ref 37–47)
HCT VFR BLD AUTO: 26 % (ref 37–47)
HCT VFR BLD AUTO: 28.9 % (ref 37–47)
HCT VFR BLD CALC: 23 % (ref 37–47)
HGB BLD-MCNC: 6.3 G/DL (ref 12–16)
HGB BLD-MCNC: 7 G/DL (ref 12–16)
HGB BLD-MCNC: 8.4 G/DL (ref 12–16)
HGB BLDA-MCNC: 7.8 G/DL (ref 12–16)
HOROWITZ INDEX BLD+IHG-RTO: 55 %
HYPOCHROMIA BLD QL: NORMAL
IMM GRANULOCYTES # BLD: 0.13 10*3/MM3 (ref 0–0.03)
IMM GRANULOCYTES NFR BLD: 1.4 % (ref 0–0.5)
LYMPHOCYTES # BLD AUTO: 0.26 10*3/MM3 (ref 1–3)
LYMPHOCYTES NFR BLD AUTO: 2.8 % (ref 21–51)
MCH RBC QN AUTO: 20.3 PG (ref 27–33)
MCHC RBC AUTO-ENTMCNC: 26.9 G/DL (ref 33–37)
MCV RBC AUTO: 75.4 FL (ref 80–94)
METHGB BLD QL: 0.4 % (ref 0–3)
MICROCYTES BLD QL: NORMAL
MODALITY: ABNORMAL
MONOCYTES # BLD AUTO: 0.55 10*3/MM3 (ref 0.1–0.9)
MONOCYTES NFR BLD AUTO: 6 % (ref 0–10)
NEUTROPHILS # BLD AUTO: 8.19 10*3/MM3 (ref 1.4–6.5)
NEUTROPHILS NFR BLD AUTO: 89.6 % (ref 30–70)
NRBC BLD MANUAL-RTO: 0 /100 WBC (ref 0–0)
OSMOLALITY SERPL CALC.SUM OF ELEC: 307.3 MOSM/KG (ref 273–305)
OXYHGB MFR BLDV: 95.8 % (ref 85–100)
PCO2 BLDA: 38.8 MM HG (ref 35–45)
PEEP RESPIRATORY: 12 CM[H2O]
PH BLDA: 7.3 PH UNITS (ref 7.35–7.45)
PLAT MORPH BLD: NORMAL
PLATELET # BLD AUTO: 145 10*3/MM3 (ref 130–400)
PMV BLD AUTO: ABNORMAL FL (ref 6–10)
PO2 BLDA: 109 MM HG (ref 80–100)
POTASSIUM BLD-SCNC: 3.5 MMOL/L (ref 3.5–5.3)
PROT SERPL-MCNC: 6 G/DL (ref 6–8)
RBC # BLD AUTO: 3.45 10*6/MM3 (ref 4.2–5.4)
SAO2 % BLDCOA: 98 % (ref 90–100)
SET MECH RESP RATE: 16
SODIUM BLD-SCNC: 149 MMOL/L (ref 135–153)
VENTILATOR MODE: ABNORMAL
VT ON VENT VENT: 450 ML
WBC NRBC COR # BLD: 9.15 10*3/MM3 (ref 4.5–12.5)

## 2017-03-09 PROCEDURE — 94799 UNLISTED PULMONARY SVC/PX: CPT

## 2017-03-09 PROCEDURE — 71010 XR CHEST AP: CPT | Performed by: RADIOLOGY

## 2017-03-09 PROCEDURE — 86900 BLOOD TYPING SEROLOGIC ABO: CPT

## 2017-03-09 PROCEDURE — 86140 C-REACTIVE PROTEIN: CPT | Performed by: INTERNAL MEDICINE

## 2017-03-09 PROCEDURE — 85025 COMPLETE CBC W/AUTO DIFF WBC: CPT | Performed by: INTERNAL MEDICINE

## 2017-03-09 PROCEDURE — 99233 SBSQ HOSP IP/OBS HIGH 50: CPT | Performed by: INTERNAL MEDICINE

## 2017-03-09 PROCEDURE — 25010000002 MIDAZOLAM 0.5 MG/ML 100 ML NS: Performed by: INTERNAL MEDICINE

## 2017-03-09 PROCEDURE — 36600 WITHDRAWAL OF ARTERIAL BLOOD: CPT | Performed by: INTERNAL MEDICINE

## 2017-03-09 PROCEDURE — 85014 HEMATOCRIT: CPT | Performed by: INTERNAL MEDICINE

## 2017-03-09 PROCEDURE — 25010000002 VANCOMYCIN PER 500 MG: Performed by: INTERNAL MEDICINE

## 2017-03-09 PROCEDURE — 25010000002 METHYLPREDNISOLONE PER 40 MG: Performed by: INTERNAL MEDICINE

## 2017-03-09 PROCEDURE — 85018 HEMOGLOBIN: CPT | Performed by: INTERNAL MEDICINE

## 2017-03-09 PROCEDURE — 36430 TRANSFUSION BLD/BLD COMPNT: CPT

## 2017-03-09 PROCEDURE — 94003 VENT MGMT INPAT SUBQ DAY: CPT

## 2017-03-09 PROCEDURE — 63710000001 INSULIN ASPART PER 5 UNITS: Performed by: INTERNAL MEDICINE

## 2017-03-09 PROCEDURE — 63710000001 INSULIN DETEMIR PER 5 UNITS: Performed by: INTERNAL MEDICINE

## 2017-03-09 PROCEDURE — 82375 ASSAY CARBOXYHB QUANT: CPT | Performed by: INTERNAL MEDICINE

## 2017-03-09 PROCEDURE — 80053 COMPREHEN METABOLIC PANEL: CPT | Performed by: INTERNAL MEDICINE

## 2017-03-09 PROCEDURE — 25010000002 MEROPENEM: Performed by: INTERNAL MEDICINE

## 2017-03-09 PROCEDURE — 71010 HC CHEST AP: CPT

## 2017-03-09 PROCEDURE — 83050 HGB METHEMOGLOBIN QUAN: CPT | Performed by: INTERNAL MEDICINE

## 2017-03-09 PROCEDURE — 25010000002 PROPOFOL 1000 MG/ML EMULSION: Performed by: INTERNAL MEDICINE

## 2017-03-09 PROCEDURE — 82805 BLOOD GASES W/O2 SATURATION: CPT | Performed by: INTERNAL MEDICINE

## 2017-03-09 PROCEDURE — P9016 RBC LEUKOCYTES REDUCED: HCPCS

## 2017-03-09 PROCEDURE — 83605 ASSAY OF LACTIC ACID: CPT | Performed by: INTERNAL MEDICINE

## 2017-03-09 PROCEDURE — 85007 BL SMEAR W/DIFF WBC COUNT: CPT | Performed by: INTERNAL MEDICINE

## 2017-03-09 PROCEDURE — 82962 GLUCOSE BLOOD TEST: CPT

## 2017-03-09 PROCEDURE — 25010000002 HEPARIN (PORCINE) PER 1000 UNITS: Performed by: INTERNAL MEDICINE

## 2017-03-09 RX ORDER — METHYLPREDNISOLONE SODIUM SUCCINATE 40 MG/ML
10 INJECTION, POWDER, LYOPHILIZED, FOR SOLUTION INTRAMUSCULAR; INTRAVENOUS EVERY 12 HOURS
Status: DISCONTINUED | OUTPATIENT
Start: 2017-03-09 | End: 2017-03-13

## 2017-03-09 RX ORDER — SODIUM BICARBONATE 650 MG/1
650 TABLET ORAL 3 TIMES DAILY
Status: DISCONTINUED | OUTPATIENT
Start: 2017-03-09 | End: 2017-03-13

## 2017-03-09 RX ORDER — POTASSIUM CHLORIDE 1.5 G/1.77G
40 POWDER, FOR SOLUTION ORAL EVERY 4 HOURS
Status: COMPLETED | OUTPATIENT
Start: 2017-03-09 | End: 2017-03-09

## 2017-03-09 RX ORDER — ACETAMINOPHEN 325 MG/1
650 TABLET ORAL ONCE
Status: COMPLETED | OUTPATIENT
Start: 2017-03-09 | End: 2017-03-09

## 2017-03-09 RX ORDER — DEXTROSE MONOHYDRATE 50 MG/ML
125 INJECTION, SOLUTION INTRAVENOUS CONTINUOUS
Status: DISCONTINUED | OUTPATIENT
Start: 2017-03-09 | End: 2017-03-13

## 2017-03-09 RX ADMIN — HEPARIN SODIUM 5000 UNITS: 5000 INJECTION, SOLUTION INTRAVENOUS; SUBCUTANEOUS at 22:42

## 2017-03-09 RX ADMIN — PROPOFOL 50 MCG/KG/MIN: 10 INJECTION, EMULSION INTRAVENOUS at 05:43

## 2017-03-09 RX ADMIN — DEXTROSE MONOHYDRATE 125 ML/HR: 50 INJECTION, SOLUTION INTRAVENOUS at 22:44

## 2017-03-09 RX ADMIN — SUCRALFATE 1 G: 1 TABLET ORAL at 11:39

## 2017-03-09 RX ADMIN — INSULIN ASPART 8 UNITS: 100 INJECTION, SOLUTION INTRAVENOUS; SUBCUTANEOUS at 19:14

## 2017-03-09 RX ADMIN — MEROPENEM 1 G: 1 INJECTION, POWDER, FOR SOLUTION INTRAVENOUS at 04:03

## 2017-03-09 RX ADMIN — MEROPENEM 1 G: 1 INJECTION, POWDER, FOR SOLUTION INTRAVENOUS at 19:14

## 2017-03-09 RX ADMIN — METOPROLOL TARTRATE 25 MG: 25 TABLET, FILM COATED ORAL at 09:36

## 2017-03-09 RX ADMIN — ASPIRIN 81 MG: 81 TABLET, CHEWABLE ORAL at 09:36

## 2017-03-09 RX ADMIN — IPRATROPIUM BROMIDE 0.5 MG: 0.5 SOLUTION RESPIRATORY (INHALATION) at 18:25

## 2017-03-09 RX ADMIN — SODIUM BICARBONATE TAB 650 MG 650 MG: 650 TAB at 11:39

## 2017-03-09 RX ADMIN — VANCOMYCIN HYDROCHLORIDE 750 MG: 5 INJECTION, POWDER, LYOPHILIZED, FOR SOLUTION INTRAVENOUS at 09:48

## 2017-03-09 RX ADMIN — HEPARIN SODIUM 5000 UNITS: 5000 INJECTION, SOLUTION INTRAVENOUS; SUBCUTANEOUS at 09:49

## 2017-03-09 RX ADMIN — MEROPENEM 1 G: 1 INJECTION, POWDER, FOR SOLUTION INTRAVENOUS at 11:39

## 2017-03-09 RX ADMIN — ACETAMINOPHEN 650 MG: 325 TABLET ORAL at 13:45

## 2017-03-09 RX ADMIN — IPRATROPIUM BROMIDE 0.5 MG: 0.5 SOLUTION RESPIRATORY (INHALATION) at 00:24

## 2017-03-09 RX ADMIN — CLOPIDOGREL 75 MG: 75 TABLET, FILM COATED ORAL at 09:36

## 2017-03-09 RX ADMIN — POTASSIUM CHLORIDE 40 MEQ: 1.5 POWDER, FOR SOLUTION ORAL at 05:44

## 2017-03-09 RX ADMIN — INSULIN ASPART 3 UNITS: 100 INJECTION, SOLUTION INTRAVENOUS; SUBCUTANEOUS at 11:44

## 2017-03-09 RX ADMIN — SUCRALFATE 1 G: 1 TABLET ORAL at 22:42

## 2017-03-09 RX ADMIN — METOPROLOL TARTRATE 25 MG: 25 TABLET, FILM COATED ORAL at 22:42

## 2017-03-09 RX ADMIN — PROPOFOL 50 MCG/KG/MIN: 10 INJECTION, EMULSION INTRAVENOUS at 18:52

## 2017-03-09 RX ADMIN — METHYLPREDNISOLONE SODIUM SUCCINATE 10 MG: 40 INJECTION, POWDER, FOR SOLUTION INTRAMUSCULAR; INTRAVENOUS at 22:43

## 2017-03-09 RX ADMIN — GUAIFENESIN 600 MG: 600 TABLET, EXTENDED RELEASE ORAL at 09:48

## 2017-03-09 RX ADMIN — INSULIN DETEMIR 20 UNITS: 100 INJECTION, SOLUTION SUBCUTANEOUS at 22:43

## 2017-03-09 RX ADMIN — Medication 3 MG/HR: at 02:09

## 2017-03-09 RX ADMIN — Medication: at 13:38

## 2017-03-09 RX ADMIN — POTASSIUM CHLORIDE 40 MEQ: 1.5 POWDER, FOR SOLUTION ORAL at 09:54

## 2017-03-09 RX ADMIN — INSULIN ASPART 5 UNITS: 100 INJECTION, SOLUTION INTRAVENOUS; SUBCUTANEOUS at 22:50

## 2017-03-09 RX ADMIN — Medication: at 06:21

## 2017-03-09 RX ADMIN — IPRATROPIUM BROMIDE 0.5 MG: 0.5 SOLUTION RESPIRATORY (INHALATION) at 06:15

## 2017-03-09 RX ADMIN — PROPOFOL 50 MCG/KG/MIN: 10 INJECTION, EMULSION INTRAVENOUS at 11:34

## 2017-03-09 RX ADMIN — PANTOPRAZOLE SODIUM 40 MG: 40 INJECTION, POWDER, FOR SOLUTION INTRAVENOUS at 05:44

## 2017-03-09 RX ADMIN — POLYETHYLENE GLYCOL (3350) 17 G: 17 POWDER, FOR SOLUTION ORAL at 09:55

## 2017-03-09 RX ADMIN — SODIUM BICARBONATE TAB 650 MG 650 MG: 650 TAB at 22:49

## 2017-03-09 RX ADMIN — IPRATROPIUM BROMIDE 0.5 MG: 0.5 SOLUTION RESPIRATORY (INHALATION) at 12:08

## 2017-03-09 RX ADMIN — INSULIN ASPART 5 UNITS: 100 INJECTION, SOLUTION INTRAVENOUS; SUBCUTANEOUS at 05:49

## 2017-03-09 RX ADMIN — VANCOMYCIN HYDROCHLORIDE 750 MG: 5 INJECTION, POWDER, LYOPHILIZED, FOR SOLUTION INTRAVENOUS at 22:41

## 2017-03-09 RX ADMIN — Medication 3 MG/HR: at 15:39

## 2017-03-09 RX ADMIN — METHYLPREDNISOLONE SODIUM SUCCINATE 20 MG: 40 INJECTION, POWDER, FOR SOLUTION INTRAMUSCULAR; INTRAVENOUS at 09:50

## 2017-03-09 RX ADMIN — Medication: at 21:45

## 2017-03-09 RX ADMIN — SUCRALFATE 1 G: 1 TABLET ORAL at 05:44

## 2017-03-09 NOTE — PROGRESS NOTES
Subjective     History:   Pepito Yan is a 51 y.o. female admitted on 2/28/2017 secondary to Bilateral pneumonia     Procedures:   3/3/17: Emergent intubation  3/3/17: Right femoral central line placement  3/3/17: Right IJ central line placement    Patient seen and examined with GUERRERO Khan. Sedated on vent support. Pt became hypothermic overnight. BP has fluctuated but pt has not required vasopressor support.  TF's held yesterday for residuals. (+) BM. No acute events overnight per RN.     History taken from: chart, and RN.      Objective     Vital Signs  Temp:  [96.9 °F (36.1 °C)-100.1 °F (37.8 °C)] 96.9 °F (36.1 °C)  Heart Rate:  [] 85  Resp:  [15-32] 18  BP: ()/(44-88) 120/68  FiO2 (%):  [55 %] 55 %    Intake/Output Summary (Last 24 hours) at 03/09/17 0922  Last data filed at 03/09/17 0600   Gross per 24 hour   Intake 2582.38 ml   Output   1060 ml   Net 1522.38 ml         Physical Exam:  General:    Sedated on vent support   Heart:      Normal S1 and S2. Regular rate and rhythm. No significant murmur, rubs or gallops appreciated.   Lungs:     Respirations regular, even and unlabored. Coarse breath sounds bilaterally.    Abdomen:   Soft and nontender. No guarding, rebound tenderness or  organomegaly noted. Bowel sounds present x 4.   Extremities:  No clubbing, cyanosis or edema noted.      Results Review:      Results from last 7 days  Lab Units 03/09/17  0027 03/08/17  0747 03/08/17  0533 03/07/17  0230 03/07/17  0036 03/06/17  1018 03/06/17  0835 03/06/17  0049  03/05/17  0111   WBC 10*3/mm3 9.15 7.65 7.86 5.13 4.86  --   --  3.51*  --  3.65*   HEMOGLOBIN g/dL 7.0* 7.3* 7.4* 9.4* 6.7* 8.4* 8.4* 7.0*  < > 7.1*   PLATELETS 10*3/mm3 145 166 154 100* 126*  --   --  146  --  164   < > = values in this interval not displayed.    Results from last 7 days  Lab Units 03/09/17  0027 03/08/17  0533 03/08/17  0356 03/07/17  1426 03/07/17  0036 03/06/17  0049 03/05/17  0111   SODIUM mmol/L 149 149 149 151 151  148 146   POTASSIUM mmol/L 3.5 4.0 4.3 4.3 3.5 3.6 4.2   CHLORIDE mmol/L 123* 126* 124* 127* 123* 122* 119*   TOTAL CO2 mmol/L 19.1* 21.2* 22.4* 23.9* 24.0* 24.3 24.0*   BUN mg/dL 31* 29* 31* 33* 34* 46* 47*   CREATININE mg/dL 0.48 0.46 0.41* 0.49 0.58 0.62 0.79   CALCIUM mg/dL 8.5 8.4 8.4 8.2 8.1 8.0 7.9   GLUCOSE mg/dL 181* 164* 180* 222* 180* 99 134*       Results from last 7 days  Lab Units 03/09/17  0027 03/08/17  0533 03/08/17  0356 03/07/17  0036 03/06/17  0049 03/05/17  0111 03/04/17  0123   BILIRUBIN mg/dL 0.4 0.3 0.2 0.3 0.3 0.2 0.2   ALK PHOS U/L 139* 106* 102 84 70 49 54   AST (SGOT) U/L 37* 26 26 35* 67* 45* 64*   ALT (SGPT) U/L 25 20 19 30 30 27 34       Results from last 7 days  Lab Units 03/08/17  0533 03/08/17  0356 03/07/17  0036 03/05/17  0111 03/04/17  0123   MAGNESIUM mg/dL 2.2 2.1 2.2 2.6 2.3               Imaging Results (last 24 hours)     Procedure Component Value Units Date/Time    XR Abdomen KUB [[id number]] Collected:  03/05/17 1051     Updated:  03/05/17 1054    Narrative:       XR ABDOMEN KUB-     REASON FOR EXAM:  Abdominal distention; J18.9-Pneumonia, unspecified  organism.     FINDINGS:  A nasogastric tube is demonstrated in the upper abdomen. The  tip of the tube overlies the distal stomach. There is evidence of a  catheter in the urinary bladder. The bowel gas pattern shows no evidence  of obstruction. There are fairly heavy atherosclerotic calcifications in  the aorta and its branches.       Impression:       NG tube tip overlies the antrum of the stomach. There is no  evidence of bowel obstruction.        This report was finalized on 3/5/2017 10:52 AM by Dr. Scott Britton II, MD.       XR Chest 1 View [[id number]] Collected:  03/05/17 1052     Updated:  03/05/17 1153    Narrative:       XR CHEST 1 VW-     REASON FOR EXAM:  Follow up pneumonia/ARDS; J18.9-Pneumonia, unspecified  organism.     COMPARISON: The chest is compared with an earlier chest done 03/04/2017.     FINDINGS:  The  endotracheal tube tip overlies the tracheal air column at  the level of the aortic knob. The central line and nasogastric tubes are  in appropriate position. The cardiac silhouette is stable. The lungs  show persistent but improving interstitial and alveolar infiltrates in  both lungs. No pleural effusions have developed.       Impression:       Continued improvement in the aeration of the lungs.        This report was finalized on 3/5/2017 11:51 AM by Dr. Scott Britton II, MD.       XR Chest 1 View [[id number]] Collected:  03/06/17 0801     Updated:  03/06/17 0803    Narrative:       EXAMINATION: XR CHEST 1 VW-      CLINICAL INDICATION:     follow up pneumonia/ards; J18.9-Pneumonia,  unspecified organism     TECHNIQUE: Single AP view of chest.      COMPARISON: 3/5/2017      FINDINGS:   There is bibasilar atelectasis.   Stable bilateral airspace disease.  Support tube and lines are in unchanged position.   Heart size is stable.  No pneumothorax.   Tiny bilateral pleural effusions persist.        Impression:       Stable appearance of the chest.     This report was finalized on 3/6/2017 8:01 AM by Dr. Bret Morris MD.               Medications:    acetaminophen 650 mg Oral Once   aspirin 81 mg Oral Daily   clopidogrel 75 mg Oral Daily   guaiFENesin 600 mg Oral BID   heparin (porcine) 5,000 Units Subcutaneous Q12H   insulin aspart 0-14 Units Subcutaneous 4x Daily AC & at Bedtime   insulin detemir 20 Units Subcutaneous Nightly   ipratropium 0.5 mg Nebulization Q6H - RT   meropenem 1 g Intravenous Q8H   methylPREDNISolone sodium succinate 20 mg Intravenous Q12H   metoprolol tartrate 25 mg Oral Q12H   pantoprazole 40 mg Intravenous Q AM   polyethylene glycol 17 g Oral Daily   potassium chloride 40 mEq Oral Once   potassium chloride 40 mEq Oral Q4H   sucralfate 1 g Oral 4x Daily AC & at Bedtime   vancomycin 750 mg Intravenous Q12H       fentaNYL (SUBLIMAZE) PCA 1500 mcg/30 mL syringe     midazolam 0.5 mg/mL 100 mL NS  1-10 mg/hr Last Rate: 3 mg/hr (03/09/17 0209)   norepinephrine 0.02-0.3 mcg/kg/min Last Rate: Stopped (03/04/17 6127)   Pharmacy to dose vancomycin     propofol 5-50 mcg/kg/min Last Rate: 50 mcg/kg/min (03/09/17 4498)           Assessment/Plan   Severe sepsis: Likely 2/2 B/L community acquired pneumonia and UTI with concerns for superimposed nosocomial pneumonia. ID has broadened pt's antibiotic regimen to Vanc and Merrem. Cultures remain negative. Leukopenia has resolved but CRP continues to worsen. Cont to follow cultures and repeat labs in the AM. ID input appreciated.     ARDS: Likely 2/2 sepsis and pneumonia. Repeat CXR appears stable today. Currently on broad spectrum IV abx. FiO2 requirements are improving. Cont vent management per pulm. Repeat ABG and CXR in the AM. Pulm input appreciated.     Acute exacerbation of COPD: Cont steroids, bronchodilators and vent support. Decrease dose of steroids today. Pulm input appreciated.     Acute on chronic microcytic anemia with hx of iron deficiency: Hgb dropped to 7 and repeat labs reveal a Hgb of 6.3. Will order 1 unit of PRBC's today. Cont to monitor closely.     Hypernatremia: Nephrology consulted and has added free water flushes to TF's. Na+ stable today. Repeat labs in the AM. Nephrology input appreciated.     DM II, insulin-dependent: Currently stable. Cont current regimen and cont TF's.     Elevated liver enzymes: Stable. Viral hepatitis panel reveals Hep C Ab reactivity. HIV is non-reactive. Repeat CMP in the AM.     Thrombocytopenia: Possibly 2/2 acute illness. Now resolved. Repeat CBC in the AM.     PPX  GI: PPI  DVT: SQ heparin      Pt is at high risk 2/2 severe sepsis, bilateral pneumonia, ARDS, UTI, COPD, anemia and hx of noncompliance.           Manuel Elizalde,   03/09/17  9:22 AM

## 2017-03-09 NOTE — PROGRESS NOTES
Interval History:     Patient Complaints: Patient is on the ventilator with community-acquired pneumonia and possible superimposed nosocomial pneumonia.  She is tolerating feeds.  Her creatinine is normal and his sodium is better.  She is sedated.  Nurses report no acute issues.        Vital Signs  Temp:  [98.3 °F (36.8 °C)-100.1 °F (37.8 °C)] 98.6 °F (37 °C)  Heart Rate:  [] 121  Resp:  [14-29] 19  BP: ()/() 130/76  FiO2 (%):  [55 %] 55 %    Physical Exam:    General:             Intubated and sedated      HEENT:  no cyanosis                Neck:   no JVD        Lungs:     scattered crackles     Heart:   RRR,  no rub       Abdomen:     Normal bowel sounds, soft non-tender, non-distended, no guarding       Extremities:   no edema        Skin:   No petechiae, no rash            Results Review:     I reviewed the patient's new clinical results.    Lab Results (last 24 hours)     Procedure Component Value Units Date/Time    Blood Culture [[id number]]  (Normal) Collected:  03/06/17 2111    Specimen:  Blood from Hand, Right Updated:  03/07/17 2301     Blood Culture No growth at 24 hours     Blood Culture [[id number]]  (Normal) Collected:  03/06/17 2215    Specimen:  Blood from Hand, Left Updated:  03/07/17 2301     Blood Culture No growth at 24 hours     Blood Culture [[id number]]  (Normal) Collected:  03/04/17 0123    Specimen:  Blood from Blood, Venous Line Updated:  03/08/17 0207     Blood Culture No growth at 4 days     Blood Culture [[id number]]  (Normal) Collected:  03/04/17 0047    Specimen:  Blood from Blood, Venous Line Updated:  03/08/17 0207     Blood Culture No growth at 4 days     Blood Gas, Arterial [[id number]]  (Abnormal) Collected:  03/08/17 0415    Specimen:  Arterial Blood Updated:  03/08/17 0445     Site Arterial: right brachial      Micheal's Test N/A      pH, Arterial 7.303 (L) pH units      pCO2, Arterial 35.0 mm Hg      pO2, Arterial 86.9 mm Hg      HCO3, Arterial 17.0 (C) mmol/L       Base Excess, Arterial -8.6 mmol/L      O2 Saturation, Arterial 95.5 %      Hemoglobin, Blood Gas 9.9 (L) g/dL      Hematocrit, Blood Gas 29.0 (L) %      Oxyhemoglobin 94.3 %      Methemoglobin 0.3 %      Carboxyhemoglobin 1.0 %      A-a Gradiant 250.4 mmHg      Temperature 98.6 C      Barometric Pressure for Blood Gas 731 mmHg      Modality Adult Vent      FIO2 55 %      Ventilator Mode ac      Set Tidal Volume 450      Set Mech Resp Rate 16      PEEP 12     Comprehensive Metabolic Panel [[id number]]  (Abnormal) Collected:  03/08/17 0356    Specimen:  Blood Updated:  03/08/17 0447     Glucose 180 (H) mg/dL      BUN 31 (H) mg/dL      Creatinine 0.41 (L) mg/dL      Sodium 149 mmol/L      Potassium 4.3 mmol/L      Chloride 124 (H) mmol/L      CO2 22.4 (L) mmol/L      Calcium 8.4 mg/dL      Total Protein 6.3 g/dL      Albumin 2.80 (L) g/dL      ALT (SGPT) 19 U/L      AST (SGOT) 26 U/L      Alkaline Phosphatase 102 U/L       Note New Reference Ranges        Total Bilirubin 0.2 mg/dL      eGFR Non African Amer >150 mL/min/1.73      Globulin 3.5 gm/dL      A/G Ratio 0.8 (L) g/dL      BUN/Creatinine Ratio 75.6 (H)      Anion Gap 2.6 (L) mmol/L     Magnesium [[id number]]  (Normal) Collected:  03/08/17 0356    Specimen:  Blood Updated:  03/08/17 0447     Magnesium 2.1 mg/dL     Osmolality, Calculated [[id number]]  (Abnormal) Collected:  03/08/17 0356    Specimen:  Blood Updated:  03/08/17 0447     Osmolality Calc 307.2 (H) mOsm/kg     C-reactive Protein [[id number]]  (Abnormal) Collected:  03/08/17 0533    Specimen:  Blood Updated:  03/08/17 0625     C-Reactive Protein 13.53 (H) mg/dL     Comprehensive Metabolic Panel [[id number]]  (Abnormal) Collected:  03/08/17 0533    Specimen:  Blood Updated:  03/08/17 0629     Glucose 164 (H) mg/dL      BUN 29 (H) mg/dL      Creatinine 0.46 mg/dL      Sodium 149 mmol/L      Potassium 4.0 mmol/L      Chloride 126 (H) mmol/L      CO2 21.2 (L) mmol/L      Calcium 8.4 mg/dL      Total Protein 6.5 g/dL       Albumin 2.80 (L) g/dL      ALT (SGPT) 20 U/L      AST (SGOT) 26 U/L      Alkaline Phosphatase 106 (H) U/L       Note New Reference Ranges        Total Bilirubin 0.3 mg/dL      eGFR Non African Amer 143 mL/min/1.73      Globulin 3.7 gm/dL      A/G Ratio 0.8 (L) g/dL      BUN/Creatinine Ratio 63.0 (H)      Anion Gap 1.8 (L) mmol/L     Magnesium [[id number]]  (Normal) Collected:  03/08/17 0533    Specimen:  Blood Updated:  03/08/17 0629     Magnesium 2.2 mg/dL     Phosphorus [[id number]]  (Abnormal) Collected:  03/08/17 0533    Specimen:  Blood Updated:  03/08/17 0629     Phosphorus 2.4 (L) mg/dL     Osmolality, Calculated [[id number]]  (Abnormal) Collected:  03/08/17 0533    Specimen:  Blood Updated:  03/08/17 0629     Osmolality Calc 305.6 (H) mOsm/kg     POC Glucose Fingerstick [[id number]]  (Abnormal) Collected:  03/08/17 0623    Specimen:  Blood Updated:  03/08/17 0639     Glucose 147 (H) mg/dL     Narrative:       Meter: [id number] : [id number] PETER ROSE    CBC & Differential [[id number]] Collected:  03/08/17 0533    Specimen:  Blood Updated:  03/08/17 0708    Narrative:       The following orders were created for panel order CBC & Differential.  Procedure                               Abnormality         Status                     ---------                               -----------         ------                     Scan Slide[[id number]]                                        Final result               CBC Auto Differential[[id number]]         Abnormal            Final result                 Please view results for these tests on the individual orders.    CBC Auto Differential [[id number]]  (Abnormal) Collected:  03/08/17 0533    Specimen:  Blood Updated:  03/08/17 0708     WBC 7.86 10*3/mm3      RBC 3.59 (L) 10*6/mm3      Hemoglobin 7.4 (L) g/dL       Verified by Repeat Analysis         Hematocrit 27.2 (L) %      MCV 75.8 (L) fL      MCH 20.6 (L) pg      MCHC 27.2 (L) g/dL      RDW 19.6 (H) %      RDW-SD 51.3 fl       Platelets 154 10*3/mm3      Neutrophil % 87.6 (H) %      Lymphocyte % 6.9 (L) %      Monocyte % 2.9 %      Eosinophil % 0.8 %      Basophil % 0.1 %      Immature Grans % 1.7 (H) %      Neutrophils, Absolute 6.89 (H) 10*3/mm3      Lymphocytes, Absolute 0.54 (L) 10*3/mm3      Monocytes, Absolute 0.23 10*3/mm3      Eosinophils, Absolute 0.06 10*3/mm3      Basophils, Absolute 0.01 10*3/mm3      Immature Grans, Absolute 0.13 (H) 10*3/mm3     Scan Slide [[id number]] Collected:  03/08/17 0533    Specimen:  Blood Updated:  03/08/17 0708     Anisocytosis Mod/2+      Hypochromia Mod/2+      Large Platelets Slight/1+     CBC Auto Differential [[id number]]  (Abnormal) Collected:  03/08/17 0747    Specimen:  Blood Updated:  03/08/17 0905     WBC 7.65 10*3/mm3      RBC 3.50 (L) 10*6/mm3      Hemoglobin 7.3 (L) g/dL      Hematocrit 26.5 (L) %      MCV 75.7 (L) fL      MCH 20.9 (L) pg      MCHC 27.5 (L) g/dL      RDW 19.5 (H) %      RDW-SD 51.1 fl      MPV 11.0 (H) fL      Platelets 166 10*3/mm3     Scan Slide [[id number]] Collected:  03/08/17 0747    Specimen:  Blood Updated:  03/08/17 0905     Scan Slide --       See Manual Differential Results       Manual Differential [[id number]]  (Abnormal) Collected:  03/08/17 0747    Specimen:  Blood Updated:  03/08/17 0905     Neutrophil % 84.0 (H) %      Lymphocyte % 11.0 (L) %      Bands %  5.0 %      Neutrophils Absolute 6.81 (H) 10*3/mm3      Lymphocytes Absolute 0.84 (L) 10*3/mm3      Anisocytosis Mod/2+      Hypochromia Mod/2+      Large Platelets Slight/1+     CBC & Differential [[id number]] Collected:  03/08/17 0747    Specimen:  Blood Updated:  03/08/17 0906    Narrative:       The following orders were created for panel order CBC & Differential.  Procedure                               Abnormality         Status                     ---------                               -----------         ------                     Manual Differential[[id number]]           Abnormal            Final  result               Scan Slide[[id number]]                                        Final result               CBC Auto Differential[[id number]]         Abnormal            Final result                 Please view results for these tests on the individual orders.    Blood Gas, Arterial [[id number]]  (Abnormal) Collected:  03/08/17 1155    Specimen:  Arterial Blood Updated:  03/08/17 1202     Site Arterial: right radial      Micheal's Test N/A      pH, Arterial 7.300 (L) pH units      pCO2, Arterial 33.2 (L) mm Hg      pO2, Arterial 92.4 mm Hg      HCO3, Arterial 16.0 (C) mmol/L      Base Excess, Arterial -9.5 mmol/L      O2 Saturation, Arterial 96.7 %      Hemoglobin, Blood Gas 8.9 (L) g/dL      Hematocrit, Blood Gas 26.0 (L) %      Oxyhemoglobin 95.0 %      Methemoglobin 0.3 %      Carboxyhemoglobin 1.5 %      A-a Gradiant 248.5 mmHg      Temperature 98.6 C      Barometric Pressure for Blood Gas 734 mmHg      Modality Ventilator      FIO2 55 %      Ventilator Mode AC      Set Tidal Volume 450      Set Mech Resp Rate 16      PEEP 12     POC Glucose Fingerstick [[id number]]  (Abnormal) Collected:  03/08/17 1150    Specimen:  Blood Updated:  03/08/17 1204     Glucose 162 (H) mg/dL     Narrative:       Meter: [id number] : [id number] luz maria card    Blood Culture [[id number]]  (Normal) Collected:  03/03/17 1139    Specimen:  Blood from Arm, Left Updated:  03/08/17 1301     Blood Culture No growth at 5 days     Blood Culture [[id number]]  (Normal) Collected:  03/03/17 1300    Specimen:  Blood from Hand, Left Updated:  03/08/17 1401     Blood Culture No growth at 5 days     POC Glucose Fingerstick [[id number]]  (Abnormal) Collected:  03/08/17 1727    Specimen:  Blood Updated:  03/08/17 1739     Glucose 178 (H) mg/dL     Narrative:       Meter: [id number] : [id number] Protection Plusa    POC Glucose Fingerstick [[id number]]  (Normal) Collected:  03/08/17 2029    Specimen:  Blood Updated:  03/08/17 2041     Glucose 116 mg/dL      Narrative:       Meter: [id number] : [id number] Jimmy TAYLOR          Imaging Results (last 24 hours)     Procedure Component Value Units Date/Time    XR Chest AP [[id number]] Collected:  03/08/17 0831     Updated:  03/08/17 0834    Narrative:       EXAMINATION: XR CHEST AP-      CLINICAL INDICATION:     ARDS; J18.9-Pneumonia, unspecified organism     TECHNIQUE: Single AP view of chest.      COMPARISON: 3/7/2017      FINDINGS:   There is bibasilar atelectasis.   Grossly stable bilateral airspace disease.  Support tube and lines are in unchanged position.   Heart size is stable.  No pneumothorax.              Impression:       Grossly stable bilateral airspace disease.     This report was finalized on 3/8/2017 8:31 AM by Dr. Bret Morris MD.             Assessment and Plan:    1. Acute hypernatremia: Sodium is improving with free water per G-tube so will continue the same     2. ARDS: VDRF      3. B/l pneumonia: on Antibiotics     4. Anemia: better    Sandoval Macdonald MD  03/08/17  10:16 PM

## 2017-03-09 NOTE — PROGRESS NOTES
LOS: 9 days     Chief Complaint:  Pulmonology is following for bilateral pneumonia    Subjective     Interval History:   The patient was emergently intubated 3/3/17.    No events overnight still agitated and continues to require hi doses of sedatives- precedex was marginally effective  To be switched to versed drip in addition to propofol- precedex to be stopped- with good result      History taken from: chart family Patient unable to give history due to emergent nature of case.    Review of Systems:   Review of Systems   Unable to perform ROS: Severe respiratory distress   and emergent nature of case.                  Objective     Vital Signs  Temp:  [96.9 °F (36.1 °C)-100.1 °F (37.8 °C)] 96.9 °F (36.1 °C)  Heart Rate:  [] 85  Resp:  [15-32] 18  BP: ()/(44-88) 120/68  FiO2 (%):  [55 %] 55 %  Body mass index is 21.79 kg/(m^2).    Intake/Output Summary (Last 24 hours) at 03/09/17 0934  Last data filed at 03/09/17 0600   Gross per 24 hour   Intake 2582.38 ml   Output   1060 ml   Net 1522.38 ml          Physical Exam:  GENERAL APPEARANCE: Patient was restless and in severe respiratory distress.    HEAD: normocephalic.    EYES: PERRL    NECK: Neck supple.     CARDIAC: Normal S1 and S2. No S3 or murmurs. Rhythm is regular. There is no peripheral edema, cyanosis or pallor. Extremities are warm and well perfused. Capillary refill is less than 2 seconds. No carotid bruits.no mottling noted today    LUNGS: coarse bilaterally no wheezing    ABDOMEN: Positive bowel sounds. Soft, nondistended, nontender.     EXTREMITIES: No significant deformity or joint abnormality. No edema. Peripheral pulses intact.     NEUROLOGICAL: unable to assess due to mental status and emergent intubation.    PSYCHIATRIC:  Unable to assess due to emergent nature of the case.                Results Review:                I reviewed the patient's new clinical results.  I reviewed the patient's new imaging results and agree with the  interpretation.    Results from last 7 days  Lab Units 03/09/17  0847 03/09/17  0027 03/08/17  0747 03/08/17  0533   WBC 10*3/mm3  --  9.15 7.65 7.86   HEMOGLOBIN g/dL 6.3* 7.0* 7.3* 7.4*   PLATELETS 10*3/mm3  --  145 166 154       Results from last 7 days  Lab Units 03/09/17  0027 03/08/17  0533 03/08/17  0356  03/07/17  0036   SODIUM mmol/L 149 149 149  < > 151   POTASSIUM mmol/L 3.5 4.0 4.3  < > 3.5   CHLORIDE mmol/L 123* 126* 124*  < > 123*   TOTAL CO2 mmol/L 19.1* 21.2* 22.4*  < > 24.0*   BUN mg/dL 31* 29* 31*  < > 34*   CREATININE mg/dL 0.48 0.46 0.41*  < > 0.58   CALCIUM mg/dL 8.5 8.4 8.4  < > 8.1   GLUCOSE mg/dL 181* 164* 180*  < > 180*   MAGNESIUM mg/dL  --  2.2 2.1  --  2.2   < > = values in this interval not displayed.  Lab Results   Component Value Date    INR 1.01 12/17/2016    INR 0.94 11/20/2016    INR <0.90 02/06/2016    PROTIME 11.4 12/17/2016    PROTIME 10.6 11/20/2016    PROTIME 10.0 02/06/2016       Results from last 7 days  Lab Units 03/09/17  0027 03/08/17  0533 03/08/17  0356   ALK PHOS U/L 139* 106* 102   BILIRUBIN mg/dL 0.4 0.3 0.2   ALT (SGPT) U/L 25 20 19   AST (SGOT) U/L 37* 26 26       Results from last 7 days  Lab Units 03/09/17  0440   PH, ARTERIAL pH units 7.298*   PO2 ART mm Hg 109.0*   PCO2, ARTERIAL mm Hg 38.8   HCO3 ART mmol/L 18.6*   HIV-1 / O / 2 Ag / Antibody 4th Generation   Order: [id number] - Part of Panel Order [id number]   Status:  Final result   Visible to patient:  No (Not Released)      Ref Range & Units 1d ago     HIV-1/ HIV-2 Non-Reactive Non-Reactive   Resulting Agency   [facility]   Narrative     A non-reactive test result does not preclude the possibility of exposure to HIV or infection with HIV. An antibody response to recent exposure may take several months to reach detectable levels.                      Imaging Results (last 24 hours)     Procedure Component Value Units Date/Time    XR Chest AP [[id number]] Collected:  03/02/17 0936     Updated:  03/02/17 0940     Narrative:       EXAMINATION: XR CHEST AP-      CLINICAL INDICATION:     SOB; J18.9-Pneumonia, unspecified organism     TECHNIQUE:  XR CHEST AP-      COMPARISON: 02/28/2017      FINDINGS:   Progression of bilateral airspace disease right greater than left lung.  Opacities are more pronounced in the right upper lobe and at the  periphery of both lungs. Favor bacterial pneumonia, but peripheral  predominant opacities can also be seen with other atypical etiologies  including eosinophilic pneumonia.   Heart and mediastinal contours are unremarkable.   No pneumothorax.   No pleural effusion.   No acute osseous findings.            Impression:       1. Marked interval worsening of right lung pneumonia and now developing  left lung airspace disease probably representing diffuse pneumonia.  Considerations as described above.  2. Otherwise stable chest.     This report was finalized on 3/2/2017 9:38 AM by Dr. Wali Tracy MD.       XR Chest 1 View [[id number]] Resulted:  03/03/17 0924     Updated:  03/03/17 0707             Medication Review:   Scheduled Medications:    acetaminophen 650 mg Oral Once   acetaminophen 650 mg Oral Once   aspirin 81 mg Oral Daily   clopidogrel 75 mg Oral Daily   guaiFENesin 600 mg Oral BID   heparin (porcine) 5,000 Units Subcutaneous Q12H   insulin aspart 0-14 Units Subcutaneous 4x Daily AC & at Bedtime   insulin detemir 20 Units Subcutaneous Nightly   ipratropium 0.5 mg Nebulization Q6H - RT   meropenem 1 g Intravenous Q8H   methylPREDNISolone sodium succinate 10 mg Intravenous Q12H   metoprolol tartrate 25 mg Oral Q12H   pantoprazole 40 mg Intravenous Q AM   polyethylene glycol 17 g Oral Daily   potassium chloride 40 mEq Oral Once   potassium chloride 40 mEq Oral Q4H   sucralfate 1 g Oral 4x Daily AC & at Bedtime   vancomycin 750 mg Intravenous Q12H     Continuous infusions:    fentaNYL (SUBLIMAZE) PCA 1500 mcg/30 mL syringe     midazolam 0.5 mg/mL 100 mL NS 1-10 mg/hr Last Rate: 3 mg/hr  (03/09/17 0209)   norepinephrine 0.02-0.3 mcg/kg/min Last Rate: Stopped (03/04/17 3565)   Pharmacy to dose vancomycin     propofol 5-50 mcg/kg/min Last Rate: 50 mcg/kg/min (03/09/17 5451)       Assessment/Plan     CXR:                Respiratory:      Vent settings:   RR16  FIO2 55% PEEP 12 CXR looks improved    HIV negative    Pink frothy sputum has been suctioned from ET tube.     Will continue IV steroids(dose decreased), scheduled inhalants and PRN neb treatments.      ID: Continue current IV antibiotics. Vanc/Azithro: CRP 16.07, lactate 0.6    Cardiac:  Stable.    Renal: BUN/Creatinine WNL.- may need diuresis for now she is hypernatremic- FWF added by nephrology who have been consulted.    Endocrine: monitor glucose targeting 140-180.    Electrolytes:  Monitor levels, manage and replete per protocols.  Monitor for refeeding syndrome- patients nutritional status is poor.  Patient has a normal anion gap metabolic acidosis with hyperchloremia- no diarrhea and normal K+ suspect RTA- add Bicarb tabs.    GI:  Nutrition consult placed for tube feedings.    Hematology:   Monitor blood counts, transfuse at or below 7 g/dL.  Unless patient is actively bleeding then began transfusing at 8 g/dL.     Case was discussed with  ccu nurse     Patients  is in ARDS with P/F 120==>200==>158 ==>198 ie gas exchange is improving: continue current supportive care. Continue with FiO2 goal below 60% and pO2 of greater or equal to 60 mmHg    Continue current supportive care      Patient Active Problem List   Diagnosis Code   • Chest pain R07.9   • Chest pain of uncertain etiology R07.89   • Intractable nausea and vomiting R11.2   • Intractable cyclical vomiting with nausea G43.A1   • Chronic obstructive pulmonary emphysema J43.9   • Hepatitis-C B19.20   • Diabetes mellitus type 2 in nonobese E11.9   • Arthritis or polyarthritis, rheumatoid M06.9   • Anxiety F41.9   • Essential hypertension I10   • Coronary artery disease I25.10   •  Buerger's disease I73.1   • Cirrhosis K74.60   • Diabetes E11.9   • Chronic nausea R11.0   • Pancreatitis K85.90   • Bilateral pneumonia J18.9   • ARDS (adult respiratory distress syndrome) J80   • Hypoxia R09.02                  Benigno Cabral MD  03/09/17  9:34 AM

## 2017-03-09 NOTE — PROGRESS NOTES
"  I have personally seen and examined the patient today and discussed overnight interval progress and pertinent issues with nursing staff.    ROS:    Unable to obtain due to the patients mental status, on the ventilator and sedated       History taken from: chart RN      Vital Signs    Visit Vitals   • /68   • Pulse 95   • Temp 96.9 °F (36.1 °C) (Core)   • Resp 22   • Ht 63\" (160 cm)   • Wt 123 lb (55.8 kg)   • SpO2 100%   • BMI 21.79 kg/m2       Temp:  [96.9 °F (36.1 °C)-100.1 °F (37.8 °C)] 96.9 °F (36.1 °C)      Intake/Output Summary (Last 24 hours) at 03/09/17 1309  Last data filed at 03/09/17 1139   Gross per 24 hour   Intake 3082.38 ml   Output   1060 ml   Net 2022.38 ml     Intake & Output (last 3 days)       03/06 0701 - 03/07 0700 03/07 0701 - 03/08 0700 03/08 0701 - 03/09 0700 03/09 0701 - 03/10 0700    I.V. (mL/kg) 435.8 (7.8) 982.5 (17.6) 681.4 (12.2)     Other 260 440 960     NG/ 225 391     IV Piggyback 1000 1200 1050 600    Total Intake(mL/kg) 2165.8 (38.8) 2847.5 (51) 3082.4 (55.2) 600 (10.8)    Urine (mL/kg/hr) 2900 (2.2) 1795 (1.3) 1060 (0.8)     Total Output 2900 1795 1060      Net -734.2 +1052.5 +2022.4 +600                  Physical Exam:      General Appearance:  intubated, sedated on ventilator, and respiratory distress    Head:  Normocephalic, without obvious abnormality, atraumatic   Eyes:      Lids and lashes normal, conjunctivae and sclerae normal, no icterus, no pallor, corneas clear, PERRLA   Ears:  Ears appear intact with no abnormalities noted   Throat: No oral lesions, no thrush, oral mucosa moist   Neck: No adenopathy, supple, trachea midline, no thyromegaly, no carotid bruit, no JVD   Back:  No tenderness to percussion or palpation, range of motion normal   Lungs:  decreased breath sounds. No wheezing, no ronchi and no crackles.   Heart:  Regular rhythm and normal rate, normal S1 and S2, no murmur, no gallop, no rub, no click   Chest Wall:  No abnormalities observed "   Abdomen:  Normal bowel sounds, no masses, no organomegaly, soft non-tender, non-distended, no guarding, no rebound tenderness   Rectal:  Deferred   Extremities: No edema, no cyanosis, no redness   Pulses: Pulses palpable and equal bilaterally   Skin: No bleeding, bruising or rash   Lymph nodes: No palpable adenopathy   Neurologic: sedated        Results:      Results from last 7 days  Lab Units 03/09/17  0027 03/08/17  0747 03/08/17  0533 03/07/17  0230 03/07/17  0036 03/06/17  0049 03/05/17  0111   WBC 10*3/mm3 9.15 7.65 7.86 5.13 4.86 3.51* 3.65*     Lab Results   Component Value Date    NEUTROABS 8.19 (H) 03/09/2017         Results from last 7 days  Lab Units 03/09/17  0027   CREATININE mg/dL 0.48         Results from last 7 days  Lab Units 03/09/17  0027 03/08/17  0533 03/07/17  0036   CRP mg/dL 16.07* 13.53* 11.60*       Imaging Results (last 24 hours)     Procedure Component Value Units Date/Time    XR Chest AP [[id number]] Collected:  03/08/17 0831     Updated:  03/08/17 0834    Narrative:       EXAMINATION: XR CHEST AP-      CLINICAL INDICATION:     ARDS; J18.9-Pneumonia, unspecified organism     TECHNIQUE: Single AP view of chest.      COMPARISON: 3/7/2017      FINDINGS:   There is bibasilar atelectasis.   Grossly stable bilateral airspace disease.  Support tube and lines are in unchanged position.   Heart size is stable.  No pneumothorax.              Impression:       Grossly stable bilateral airspace disease.     This report was finalized on 3/8/2017 8:31 AM by Dr. Bret Morris MD.               Results Review:    I have personally reviewed laboratory data, culture results, radiology studies and antimicrobial therapy.    Hospital Medications (active)       Dose Frequency Start End    acetaminophen (TYLENOL) tablet 650 mg 650 mg Every 6 Hours PRN 3/2/2017     Sig - Route: Take 2 tablets by mouth Every 6 (Six) Hours As Needed for fever. - Oral    acetaminophen (TYLENOL) tablet 650 mg 650 mg Once 3/6/2017      Sig - Route: Take 2 tablets by mouth 1 (One) Time. - Oral    aspirin chewable tablet 81 mg 81 mg Daily 3/7/2017     Sig - Route: Chew 1 tablet Daily. - Oral    Notes to Pharmacy: Patient intubated and RN requested changed from enteric coated tablet.    clopidogrel (PLAVIX) tablet 75 mg 75 mg Daily 3/1/2017     Sig - Route: Take 1 tablet by mouth Daily. - Oral    dextrose (D50W) solution 25 g 25 g Every 15 Minutes PRN 3/1/2017     Sig - Route: Infuse 50 mL into a venous catheter Every 15 (Fifteen) Minutes As Needed for low blood sugar (Blood Sugar Less Than 70, Patient Has IV Access - Unresponsive, NPO or Unable To Safely Swallow). - Intravenous    Cosign for Ordering: Accepted by Savanah Gutierrez MD on 3/1/2017  7:01 PM    dextrose (GLUTOSE) oral gel 15 g 15 g Every 15 Minutes PRN 3/1/2017     Sig - Route: Take 15 g by mouth Every 15 (Fifteen) Minutes As Needed for low blood sugar (Blood Sugar Less Than 70, Patient Alert, Is Not NPO & Can Safely Swallow). - Oral    Cosign for Ordering: Accepted by Savanah Gutierrez MD on 3/1/2017  7:01 PM    FENTANYL PCA 1500 MCG/30 ML (BHCOR) PCA  Continuous 3/3/2017 3/13/2017    Sig - Route: Infuse  into a venous catheter Continuous. - Intravenous    glucagon (GLUCAGEN) injection 1 mg 1 mg Every 15 Minutes PRN 3/1/2017     Sig - Route: Inject 1 mg under the skin Every 15 (Fifteen) Minutes As Needed (Blood Glucose Less Than 70 - Patient Without IV Access - Unresponsive, NPO or Unable To Safely Swallow). - Subcutaneous    Cosign for Ordering: Accepted by Savanah Gutierrez MD on 3/1/2017  7:01 PM    guaiFENesin (MUCINEX) 12 hr tablet 600 mg 600 mg 2 Times Daily 3/2/2017     Sig - Route: Take 1 tablet by mouth 2 (Two) Times a Day. - Oral    heparin (porcine) 5000 UNIT/ML injection 5,000 Units 5,000 Units Every 12 Hours Scheduled 3/1/2017     Sig - Route: Inject 1 mL under the skin Every 12 (Twelve) Hours. - Subcutaneous    insulin aspart (novoLOG) injection 0-14 Units 0-14 Units 4  "Times Daily Before Meals & Nightly 3/1/2017     Sig - Route: Inject 0-14 Units under the skin 4 (Four) Times a Day Before Meals & at Bedtime. - Subcutaneous    insulin detemir (LEVEMIR) injection 20 Units 20 Units Nightly 3/4/2017     Sig - Route: Inject 20 Units under the skin Every Night. - Subcutaneous    ipratropium (ATROVENT) nebulizer solution 0.5 mg 0.5 mg Every 6 Hours PRN 3/2/2017     Sig - Route: Take 2.5 mL by nebulization Every 6 (Six) Hours As Needed for shortness of air. - Nebulization    Cosign for Ordering: Accepted by Sherrill Weiner DO on 3/2/2017  1:40 PM    ipratropium (ATROVENT) nebulizer solution 0.5 mg 0.5 mg Every 6 Hours - RT 3/3/2017     Sig - Route: Take 2.5 mL by nebulization Every 6 (Six) Hours. - Nebulization    magnesium sulfate 6 g in dextrose (D5W) 5 % 250 mL infusion 6 g As Needed 3/6/2017     Sig - Route: Infuse 6 g into a venous catheter As Needed (Mg 1.1-1.5 mg/dL). - Intravenous    Linked Group 1:  \"Or\" Linked Group Details        magnesium sulfate 8 g in dextrose (D5W) 5 % 250 mL infusion 8 g As Needed 3/6/2017     Sig - Route: Infuse 8 g into a venous catheter As Needed (Mg less than or equal to 1 mg/dL). - Intravenous    Linked Group 1:  \"Or\" Linked Group Details        magnesium sulfate in D5W 1g/100mL (PREMIX) IVPB 1 g 1 g As Needed 3/6/2017     Sig - Route: Infuse 100 mL into a venous catheter As Needed (Mg less than or equal 1 mg/dL). - Intravenous    Linked Group 1:  \"Or\" Linked Group Details        magnesium sulfate in D5W 1g/100mL (PREMIX) IVPB 1 g 1 g As Needed 3/6/2017     Sig - Route: Infuse 100 mL into a venous catheter As Needed (magnesium sulfate 1 g in D5W 100 mL IVPB - Mg 1.6 - 1.9 mg/dL). - Intravenous    Linked Group 1:  \"Or\" Linked Group Details        meropenem (MERREM) 1 g/100 mL 0.9% NS VTB (mbp) 1 g Every 8 Hours 3/7/2017     Sig - Route: Infuse 100 mL into a venous catheter Every 8 (Eight) Hours. - Intravenous    methylPREDNISolone sodium succinate " "(SOLU-Medrol) injection 20 mg 20 mg Every 12 Hours 3/5/2017     Sig - Route: Infuse 0.5 mL into a venous catheter Every 12 (Twelve) Hours. - Intravenous    metoprolol tartrate (LOPRESSOR) tablet 25 mg 25 mg Every 12 Hours Scheduled 3/2/2017     Sig - Route: Take 1 tablet by mouth Every 12 (Twelve) Hours. - Oral    midazolam (VERSED) injection 2 mg 2 mg Every 4 Hours PRN 3/5/2017     Sig - Route: Infuse 2 mL into a venous catheter Every 4 (Four) Hours As Needed for sedation. - Intravenous    midazolam 0.5 mg/mL 100 mL NS (VERSED) infusion 1-10 mg/hr Titrated 3/8/2017 3/18/2017    Sig - Route: Infuse 1-10 mg/hr into a venous catheter Dose Adjusted By Provider As Needed. - Intravenous    norepinephrine (LEVOPHED) 32 mcg/mL (8 mg/250 mL) infusion 0.02-0.3 mcg/kg/min × 57.3 kg Titrated 3/3/2017     Sig - Route: Infuse 1.146-17.19 mcg/min into a venous catheter Dose Adjusted By Provider As Needed. - Intravenous    pantoprazole (PROTONIX) injection 40 mg 40 mg Every Early Morning 3/2/2017     Sig - Route: Infuse 10 mL into a venous catheter Every Morning. - Intravenous    Cosign for Ordering: Accepted by Sherrill Weiner DO on 3/1/2017  3:38 PM    Pharmacy to dose vancomycin  Continuous PRN 3/2/2017     Sig - Route: Continuous As Needed for consult. - Does not apply    polyethylene glycol (MIRALAX) powder 17 g 17 g Daily 3/8/2017     Sig - Route: Take 17 g by mouth Daily. - Oral    potassium chloride (K-DUR,KLOR-CON) CR tablet 40 mEq 40 mEq Once 3/2/2017     Sig - Route: Take 2 tablets by mouth 1 (One) Time. - Oral    potassium chloride (KLOR-CON) packet 40 mEq 40 mEq As Needed 3/6/2017     Sig - Route: Take 40 mEq by mouth As Needed (potassium replacement, see admin instructions). - Oral    Linked Group 2:  \"Or\" Linked Group Details        potassium chloride (MICRO-K) CR capsule 40 mEq 40 mEq As Needed 3/6/2017     Sig - Route: Take 4 capsules by mouth As Needed (potassium replacement.  see admin instructions). - Oral " "   Linked Group 2:  \"Or\" Linked Group Details        potassium chloride 10 mEq in 100 mL IVPB 10 mEq Every 1 Hour PRN 3/6/2017     Sig - Route: Infuse 100 mL into a venous catheter Every 1 (One) Hour As Needed (potassium protocol PERIPHERAL - see admin instructions). - Intravenous    Linked Group 2:  \"Or\" Linked Group Details        promethazine (PHENERGAN) tablet 12.5 mg 12.5 mg Every 6 Hours PRN 3/1/2017     Sig - Route: Take 1 tablet by mouth Every 6 (Six) Hours As Needed for Nausea or Vomiting. - Oral    Cosign for Ordering: Accepted by Sherrill Weiner DO on 3/1/2017  3:38 PM    propofol (DIPRIVAN) infusion 10 mg/mL 100 mL 5-50 mcg/kg/min × 57.3 kg Titrated 3/3/2017     Sig - Route: Infuse 286.5-2,865 mcg/min into a venous catheter Dose Adjusted By Provider As Needed. - Intravenous    sodium chloride 0.9 % flush 1-10 mL 1-10 mL As Needed 3/1/2017     Sig - Route: Infuse 1-10 mL into a venous catheter As Needed for line care. - Intravenous    sodium chloride 0.9 % flush 10 mL 10 mL As Needed 2/28/2017     Sig - Route: Infuse 10 mL into a venous catheter As Needed for line care. - Intravenous    Linked Group 3:  \"And\" Linked Group Details        sucralfate (CARAFATE) tablet 1 g 1 g 4 Times Daily Before Meals & Nightly 3/1/2017     Sig - Route: Take 1 tablet by mouth 4 (Four) Times a Day Before Meals & at Bedtime. - Oral    Cosign for Ordering: Accepted by Sherrill Weiner DO on 3/1/2017  3:38 PM    vancomycin (VANCOCIN) 750 mg in sodium chloride 0.9 % 250 mL IVPB 750 mg Every 12 Hours 3/4/2017     Sig - Route: Infuse 750 mg into a venous catheter Every 12 (Twelve) Hours. - Intravenous    dexmedetomidine HCl (PRECEDEX) 4 mcg/mL in sodium chloride 0.9 % 100 mL infusion (Discontinued) 0.2-1.5 mcg/kg/hr × 55.8 kg Continuous 3/6/2017 3/7/2017    Sig - Route: Infuse 11.16-83.7 mcg/hr into a venous catheter Continuous. - Intravenous    Reason for Discontinue: Formulary change    dexmedetomidine HCl (PRECEDEX) 4 " mcg/mL in sodium chloride 0.9 % 100 mL infusion (Discontinued) 0.2-1.5 mcg/kg/hr × 55.8 kg Titrated 3/7/2017 3/8/2017    Sig - Route: Infuse 11.16-83.7 mcg/hr into a venous catheter Dose Adjusted By Provider As Needed. - Intravenous    metroNIDAZOLE (FLAGYL) IVPB 500 mg (Discontinued) 500 mg Every 8 Hours 3/3/2017 3/8/2017    Sig - Route: Infuse 100 mL into a venous catheter Every 8 (Eight) Hours. - Intravenous            Cultures:    BLOOD CULTURE   Date Value Ref Range Status   03/06/2017 No growth at 24 hours  Preliminary   03/06/2017 No growth at 24 hours  Preliminary   03/04/2017 No growth at 4 days  Preliminary   03/04/2017 No growth at 4 days  Preliminary   03/03/2017 No growth at 4 days  Preliminary   03/03/2017 No growth at 4 days  Preliminary       PROBLEM LIST:    Patient Active Problem List   Diagnosis   • Chest pain   • Chest pain of uncertain etiology   • Intractable nausea and vomiting   • Intractable cyclical vomiting with nausea   • Chronic obstructive pulmonary emphysema   • Hepatitis-C   • Diabetes mellitus type 2 in nonobese   • Arthritis or polyarthritis, rheumatoid   • Anxiety   • Essential hypertension   • Coronary artery disease   • Buerger's disease   • Cirrhosis   • Diabetes   • Chronic nausea   • Pancreatitis   • Bilateral pneumonia   • ARDS (adult respiratory distress syndrome)   • Hypoxia       Assessment/Plan     ASSESSMENT:    1. Severe sepsis with acute respiratory failure  2. Bilateral pneumonia     PLAN:    No fever reported overnight with normal white count and normal lactic acid.  Blood cultures continue to show no growth CRP level is elevated and we'll recheck in a.m.  We'll continue to follow closely.    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 Not on pressors at this time. Possibly diagnosed with ARDS with severe bilateral scattered rhonchi and fairly advanced vent-dependent respiratory failure. Unfortunately the patient carries a very guarded prognosis due to  underlying comorbidities and  for now has normal white count with worsening CRP level. HIV is nonreactive.    Continue to be concerned about worsening CRP in this very complex case with worsening chest xray on broad spectrum antibiotic coverage with Vancomycin and Meropenem. For now Flagyl was discontinued as the patient does not require dual anaerobic coverage. Would consider bronchosocpy for deep tissue cultures.      The patient shows strong evidence suggestive of worsening by the chest x-ray and lab after initial period of improvement which is very concerning for possible superimposed nosocomial infection and antibiotic regimen escalated to vancomycin and meropenem 1000mg IV every 8 hours. Azithromycin and Azactam were discontinued especially in setting of negative mycoplasma IgM and Legionella urinary antigen. We will continue to monitor very closely as patient carries a very guarded prognosis.      Patients findings and recommendations were discussed with nursing staff    Code Status: Full Code       Alycia Finn PA-C  03/09/17  1:09 PM

## 2017-03-10 ENCOUNTER — APPOINTMENT (OUTPATIENT)
Dept: GENERAL RADIOLOGY | Facility: HOSPITAL | Age: 52
End: 2017-03-10

## 2017-03-10 LAB
027 TOXIN: (no result)
A-A DO2: 140.7 MMHG (ref 0–300)
ABO + RH BLD: NORMAL
ABO + RH BLD: NORMAL
ALBUMIN SERPL-MCNC: 2.6 G/DL (ref 3.5–5)
ALBUMIN/GLOB SERPL: 0.8 G/DL (ref 1.5–2.5)
ALP SERPL-CCNC: 133 U/L (ref 35–104)
ALT SERPL W P-5'-P-CCNC: 26 U/L (ref 10–36)
ANION GAP SERPL CALCULATED.3IONS-SCNC: 0.3 MMOL/L (ref 3.6–11.2)
ANISOCYTOSIS BLD QL: NORMAL
ARTERIAL PATENCY WRIST A: ABNORMAL
AST SERPL-CCNC: 18 U/L (ref 10–30)
ATMOSPHERIC PRESS: 733 MMHG
BASE EXCESS BLDA CALC-SCNC: -4.7 MMOL/L
BASOPHILS # BLD AUTO: 0.01 10*3/MM3 (ref 0–0.3)
BASOPHILS NFR BLD AUTO: 0.2 % (ref 0–2)
BDY SITE: ABNORMAL
BH BB BLOOD EXPIRATION DATE: NORMAL
BH BB BLOOD EXPIRATION DATE: NORMAL
BH BB BLOOD TYPE BARCODE: 6200
BH BB BLOOD TYPE BARCODE: 6200
BH BB DISPENSE STATUS: NORMAL
BH BB DISPENSE STATUS: NORMAL
BH BB PRODUCT CODE: NORMAL
BH BB PRODUCT CODE: NORMAL
BH BB UNIT NUMBER: NORMAL
BH BB UNIT NUMBER: NORMAL
BILIRUB SERPL-MCNC: 0.2 MG/DL (ref 0.2–1.8)
BODY TEMPERATURE: 98.6 C
BUN BLD-MCNC: 27 MG/DL (ref 7–21)
BUN/CREAT SERPL: 52.9 (ref 7–25)
C DIFF TOX GENS STL QL NAA+PROBE: POSITIVE
CALCIUM SPEC-SCNC: 8.5 MG/DL (ref 7.7–10)
CHLORIDE SERPL-SCNC: 119 MMOL/L (ref 99–112)
CO2 SERPL-SCNC: 25.7 MMOL/L (ref 24.3–31.9)
COHGB MFR BLD: 1.1 % (ref 0–5)
CREAT BLD-MCNC: 0.51 MG/DL (ref 0.43–1.29)
CROSSMATCH INTERPRETATION: NORMAL
CROSSMATCH INTERPRETATION: NORMAL
CRP SERPL-MCNC: 7.61 MG/DL (ref 0–0.99)
DEPRECATED RDW RBC AUTO: 58.8 FL (ref 37–54)
EOSINOPHIL # BLD AUTO: 0 10*3/MM3 (ref 0–0.7)
EOSINOPHIL NFR BLD AUTO: 0 % (ref 0–5)
ERYTHROCYTE [DISTWIDTH] IN BLOOD BY AUTOMATED COUNT: 20.7 % (ref 11.5–14.5)
GFR SERPL CREATININE-BSD FRML MDRD: 127 ML/MIN/1.73
GLOBULIN UR ELPH-MCNC: 3.4 GM/DL
GLUCOSE BLD-MCNC: 288 MG/DL (ref 70–110)
GLUCOSE BLDC GLUCOMTR-MCNC: 112 MG/DL (ref 70–130)
GLUCOSE BLDC GLUCOMTR-MCNC: 114 MG/DL (ref 70–130)
GLUCOSE BLDC GLUCOMTR-MCNC: 176 MG/DL (ref 70–130)
GLUCOSE BLDC GLUCOMTR-MCNC: 264 MG/DL (ref 70–130)
GLUCOSE BLDC GLUCOMTR-MCNC: 320 MG/DL (ref 70–130)
HCO3 BLDA-SCNC: 21.4 MMOL/L (ref 22–26)
HCT VFR BLD AUTO: 25.3 % (ref 37–47)
HCT VFR BLD AUTO: 26.2 % (ref 37–47)
HCT VFR BLD AUTO: 29.4 % (ref 37–47)
HCT VFR BLD CALC: 26 % (ref 37–47)
HGB BLD-MCNC: 7.5 G/DL (ref 12–16)
HGB BLD-MCNC: 7.5 G/DL (ref 12–16)
HGB BLD-MCNC: 8.4 G/DL (ref 12–16)
HGB BLDA-MCNC: 8.9 G/DL (ref 12–16)
HOROWITZ INDEX BLD+IHG-RTO: 55 %
HYPOCHROMIA BLD QL: NORMAL
IMM GRANULOCYTES # BLD: 0.14 10*3/MM3 (ref 0–0.03)
IMM GRANULOCYTES NFR BLD: 2.6 % (ref 0–0.5)
LYMPHOCYTES # BLD AUTO: 0.31 10*3/MM3 (ref 1–3)
LYMPHOCYTES NFR BLD AUTO: 5.6 % (ref 21–51)
MAGNESIUM SERPL-MCNC: 2.3 MG/DL (ref 1.7–2.6)
MCH RBC QN AUTO: 22.2 PG (ref 27–33)
MCHC RBC AUTO-ENTMCNC: 28.6 G/DL (ref 33–37)
MCV RBC AUTO: 77.5 FL (ref 80–94)
METHGB BLD QL: 0.4 % (ref 0–3)
MODALITY: ABNORMAL
MONOCYTES # BLD AUTO: 0.22 10*3/MM3 (ref 0.1–0.9)
MONOCYTES NFR BLD AUTO: 4 % (ref 0–10)
NEUTROPHILS # BLD AUTO: 4.81 10*3/MM3 (ref 1.4–6.5)
NEUTROPHILS NFR BLD AUTO: 87.6 % (ref 30–70)
NRBC BLD MANUAL-RTO: 0 /100 WBC (ref 0–0)
OSMOLALITY SERPL CALC.SUM OF ELEC: 304.3 MOSM/KG (ref 273–305)
OXYHGB MFR BLDV: 98 % (ref 85–100)
PCO2 BLDA: 43.6 MM HG (ref 35–45)
PEEP RESPIRATORY: 12 CM[H2O]
PH BLDA: 7.31 PH UNITS (ref 7.35–7.45)
PLAT MORPH BLD: NORMAL
PLATELET # BLD AUTO: 128 10*3/MM3 (ref 130–400)
PO2 BLDA: 188.1 MM HG (ref 80–100)
POIKILOCYTOSIS BLD QL SMEAR: NORMAL
POTASSIUM BLD-SCNC: 4.4 MMOL/L (ref 3.5–5.3)
PROT SERPL-MCNC: 6 G/DL (ref 6–8)
RBC # BLD AUTO: 3.38 10*6/MM3 (ref 4.2–5.4)
SAO2 % BLDCOA: 99.5 % (ref 90–100)
SET MECH RESP RATE: 16
SODIUM BLD-SCNC: 145 MMOL/L (ref 135–153)
UNIT  ABO: NORMAL
UNIT  ABO: NORMAL
UNIT  RH: NORMAL
UNIT  RH: NORMAL
VENTILATOR MODE: ABNORMAL
VT ON VENT VENT: 450 ML
WBC NRBC COR # BLD: 5.49 10*3/MM3 (ref 4.5–12.5)

## 2017-03-10 PROCEDURE — 25010000002 METHYLPREDNISOLONE PER 40 MG: Performed by: INTERNAL MEDICINE

## 2017-03-10 PROCEDURE — 36600 WITHDRAWAL OF ARTERIAL BLOOD: CPT | Performed by: INTERNAL MEDICINE

## 2017-03-10 PROCEDURE — 94799 UNLISTED PULMONARY SVC/PX: CPT

## 2017-03-10 PROCEDURE — 63710000001 INSULIN ASPART PER 5 UNITS: Performed by: INTERNAL MEDICINE

## 2017-03-10 PROCEDURE — 99233 SBSQ HOSP IP/OBS HIGH 50: CPT | Performed by: INTERNAL MEDICINE

## 2017-03-10 PROCEDURE — 63710000001 INSULIN DETEMIR PER 5 UNITS: Performed by: INTERNAL MEDICINE

## 2017-03-10 PROCEDURE — 83050 HGB METHEMOGLOBIN QUAN: CPT | Performed by: INTERNAL MEDICINE

## 2017-03-10 PROCEDURE — 85007 BL SMEAR W/DIFF WBC COUNT: CPT | Performed by: INTERNAL MEDICINE

## 2017-03-10 PROCEDURE — 82962 GLUCOSE BLOOD TEST: CPT

## 2017-03-10 PROCEDURE — 87493 C DIFF AMPLIFIED PROBE: CPT | Performed by: INTERNAL MEDICINE

## 2017-03-10 PROCEDURE — 85014 HEMATOCRIT: CPT | Performed by: INTERNAL MEDICINE

## 2017-03-10 PROCEDURE — 25010000002 PROPOFOL 1000 MG/ML EMULSION: Performed by: INTERNAL MEDICINE

## 2017-03-10 PROCEDURE — 25010000002 VANCOMYCIN PER 500 MG: Performed by: INTERNAL MEDICINE

## 2017-03-10 PROCEDURE — 82805 BLOOD GASES W/O2 SATURATION: CPT | Performed by: INTERNAL MEDICINE

## 2017-03-10 PROCEDURE — 94003 VENT MGMT INPAT SUBQ DAY: CPT

## 2017-03-10 PROCEDURE — 82375 ASSAY CARBOXYHB QUANT: CPT | Performed by: INTERNAL MEDICINE

## 2017-03-10 PROCEDURE — 25010000002 HEPARIN (PORCINE) PER 1000 UNITS: Performed by: INTERNAL MEDICINE

## 2017-03-10 PROCEDURE — 85025 COMPLETE CBC W/AUTO DIFF WBC: CPT | Performed by: INTERNAL MEDICINE

## 2017-03-10 PROCEDURE — 85018 HEMOGLOBIN: CPT | Performed by: INTERNAL MEDICINE

## 2017-03-10 PROCEDURE — 80053 COMPREHEN METABOLIC PANEL: CPT | Performed by: INTERNAL MEDICINE

## 2017-03-10 PROCEDURE — 83735 ASSAY OF MAGNESIUM: CPT | Performed by: INTERNAL MEDICINE

## 2017-03-10 PROCEDURE — 25010000002 MEROPENEM: Performed by: INTERNAL MEDICINE

## 2017-03-10 PROCEDURE — 71010 HC CHEST AP: CPT

## 2017-03-10 PROCEDURE — 86140 C-REACTIVE PROTEIN: CPT | Performed by: INTERNAL MEDICINE

## 2017-03-10 PROCEDURE — 25010000002 MIDAZOLAM 0.5 MG/ML 100 ML NS: Performed by: INTERNAL MEDICINE

## 2017-03-10 PROCEDURE — 71010 XR CHEST AP: CPT | Performed by: RADIOLOGY

## 2017-03-10 RX ORDER — DEXTROSE MONOHYDRATE 50 MG/ML
100 INJECTION, SOLUTION INTRAVENOUS CONTINUOUS
Status: ACTIVE | OUTPATIENT
Start: 2017-03-10 | End: 2017-03-11

## 2017-03-10 RX ADMIN — METOPROLOL TARTRATE 25 MG: 25 TABLET, FILM COATED ORAL at 20:56

## 2017-03-10 RX ADMIN — Medication: at 20:26

## 2017-03-10 RX ADMIN — MEROPENEM 1 G: 1 INJECTION, POWDER, FOR SOLUTION INTRAVENOUS at 18:48

## 2017-03-10 RX ADMIN — DEXTROSE MONOHYDRATE 125 ML/HR: 50 INJECTION, SOLUTION INTRAVENOUS at 20:26

## 2017-03-10 RX ADMIN — INSULIN ASPART 3 UNITS: 100 INJECTION, SOLUTION INTRAVENOUS; SUBCUTANEOUS at 11:50

## 2017-03-10 RX ADMIN — METHYLPREDNISOLONE SODIUM SUCCINATE 10 MG: 40 INJECTION, POWDER, FOR SOLUTION INTRAMUSCULAR; INTRAVENOUS at 20:55

## 2017-03-10 RX ADMIN — PROPOFOL 50 MCG/KG/MIN: 10 INJECTION, EMULSION INTRAVENOUS at 11:52

## 2017-03-10 RX ADMIN — HEPARIN SODIUM 5000 UNITS: 5000 INJECTION, SOLUTION INTRAVENOUS; SUBCUTANEOUS at 20:55

## 2017-03-10 RX ADMIN — ASPIRIN 81 MG: 81 TABLET, CHEWABLE ORAL at 08:41

## 2017-03-10 RX ADMIN — GUAIFENESIN 600 MG: 600 TABLET, EXTENDED RELEASE ORAL at 17:22

## 2017-03-10 RX ADMIN — SODIUM BICARBONATE TAB 650 MG 650 MG: 650 TAB at 17:21

## 2017-03-10 RX ADMIN — MEROPENEM 1 G: 1 INJECTION, POWDER, FOR SOLUTION INTRAVENOUS at 12:59

## 2017-03-10 RX ADMIN — PROPOFOL 50 MCG/KG/MIN: 10 INJECTION, EMULSION INTRAVENOUS at 05:09

## 2017-03-10 RX ADMIN — IPRATROPIUM BROMIDE 0.5 MG: 0.5 SOLUTION RESPIRATORY (INHALATION) at 19:38

## 2017-03-10 RX ADMIN — IPRATROPIUM BROMIDE 0.5 MG: 0.5 SOLUTION RESPIRATORY (INHALATION) at 00:12

## 2017-03-10 RX ADMIN — PROPOFOL 50 MCG/KG/MIN: 10 INJECTION, EMULSION INTRAVENOUS at 00:41

## 2017-03-10 RX ADMIN — MEROPENEM 1 G: 1 INJECTION, POWDER, FOR SOLUTION INTRAVENOUS at 05:04

## 2017-03-10 RX ADMIN — Medication: at 12:50

## 2017-03-10 RX ADMIN — HEPARIN SODIUM 5000 UNITS: 5000 INJECTION, SOLUTION INTRAVENOUS; SUBCUTANEOUS at 08:43

## 2017-03-10 RX ADMIN — SODIUM BICARBONATE TAB 650 MG 650 MG: 650 TAB at 08:41

## 2017-03-10 RX ADMIN — SUCRALFATE 1 G: 1 TABLET ORAL at 12:58

## 2017-03-10 RX ADMIN — VANCOMYCIN HYDROCHLORIDE 750 MG: 5 INJECTION, POWDER, LYOPHILIZED, FOR SOLUTION INTRAVENOUS at 08:40

## 2017-03-10 RX ADMIN — SODIUM BICARBONATE TAB 650 MG 650 MG: 650 TAB at 20:56

## 2017-03-10 RX ADMIN — METOPROLOL TARTRATE 25 MG: 25 TABLET, FILM COATED ORAL at 08:41

## 2017-03-10 RX ADMIN — CLOPIDOGREL 75 MG: 75 TABLET, FILM COATED ORAL at 08:42

## 2017-03-10 RX ADMIN — SUCRALFATE 1 G: 1 TABLET ORAL at 17:20

## 2017-03-10 RX ADMIN — PROPOFOL 50 MCG/KG/MIN: 10 INJECTION, EMULSION INTRAVENOUS at 17:28

## 2017-03-10 RX ADMIN — METRONIDAZOLE 500 MG: 500 INJECTION, SOLUTION INTRAVENOUS at 20:56

## 2017-03-10 RX ADMIN — IPRATROPIUM BROMIDE 0.5 MG: 0.5 SOLUTION RESPIRATORY (INHALATION) at 06:19

## 2017-03-10 RX ADMIN — PROPOFOL 50 MCG/KG/MIN: 10 INJECTION, EMULSION INTRAVENOUS at 23:51

## 2017-03-10 RX ADMIN — Medication: at 05:09

## 2017-03-10 RX ADMIN — POLYETHYLENE GLYCOL (3350) 17 G: 17 POWDER, FOR SOLUTION ORAL at 08:43

## 2017-03-10 RX ADMIN — GUAIFENESIN 600 MG: 600 TABLET, EXTENDED RELEASE ORAL at 08:41

## 2017-03-10 RX ADMIN — METHYLPREDNISOLONE SODIUM SUCCINATE 10 MG: 40 INJECTION, POWDER, FOR SOLUTION INTRAMUSCULAR; INTRAVENOUS at 08:40

## 2017-03-10 RX ADMIN — PANTOPRAZOLE SODIUM 40 MG: 40 INJECTION, POWDER, FOR SOLUTION INTRAVENOUS at 05:10

## 2017-03-10 RX ADMIN — PROPOFOL 50 MCG/KG/MIN: 10 INJECTION, EMULSION INTRAVENOUS at 05:55

## 2017-03-10 RX ADMIN — Medication 3 MG/HR: at 08:21

## 2017-03-10 RX ADMIN — INSULIN ASPART 10 UNITS: 100 INJECTION, SOLUTION INTRAVENOUS; SUBCUTANEOUS at 06:55

## 2017-03-10 RX ADMIN — INSULIN DETEMIR 20 UNITS: 100 INJECTION, SOLUTION SUBCUTANEOUS at 20:56

## 2017-03-10 RX ADMIN — DEXTROSE MONOHYDRATE 100 ML/HR: 50 INJECTION, SOLUTION INTRAVENOUS at 20:58

## 2017-03-10 RX ADMIN — SUCRALFATE 1 G: 1 TABLET ORAL at 20:57

## 2017-03-10 RX ADMIN — VANCOMYCIN HYDROCHLORIDE 750 MG: 5 INJECTION, POWDER, LYOPHILIZED, FOR SOLUTION INTRAVENOUS at 20:55

## 2017-03-10 RX ADMIN — IPRATROPIUM BROMIDE 0.5 MG: 0.5 SOLUTION RESPIRATORY (INHALATION) at 12:31

## 2017-03-10 NOTE — PROGRESS NOTES
Interval History:     Patient Complaints: Patient remains on the ventilator.  She is tolerating feeds.  Despite water per G-tube her sodium is a little higher.  Blood pressure wise stable        Vital Signs  Temp:  [96.9 °F (36.1 °C)-99.7 °F (37.6 °C)] 98.5 °F (36.9 °C)  Heart Rate:  [] 94  Resp:  [14-32] 20  BP: ()/(52-88) 127/67  FiO2 (%):  [55 %] 55 %    Physical Exam:    General:             Intubated      HEENT:  no cyanosis                Neck:   no JVD        Lungs:     scattered crackles     Heart:    no rub       Abdomen:     Normal bowel sounds, soft non-tender, non-distended, no guarding       Extremities:   no edema        Skin:   No petechiae, no rash                Results Review:     I reviewed the patient's new clinical results.    Lab Results (last 24 hours)     Procedure Component Value Units Date/Time    Blood Culture [[id number]]  (Normal) Collected:  03/06/17 2111    Specimen:  Blood from Hand, Right Updated:  03/08/17 2301     Blood Culture No growth at 2 days     Blood Culture [[id number]]  (Normal) Collected:  03/06/17 2215    Specimen:  Blood from Hand, Left Updated:  03/08/17 2301     Blood Culture No growth at 2 days     Blood Culture [[id number]]  (Normal) Collected:  03/04/17 0123    Specimen:  Blood from Blood, Venous Line Updated:  03/09/17 0204     Blood Culture No growth at 5 days     Blood Culture [[id number]]  (Normal) Collected:  03/04/17 0047    Specimen:  Blood from Blood, Venous Line Updated:  03/09/17 0204     Blood Culture No growth at 5 days     Lactic Acid, Plasma [[id number]]  (Normal) Collected:  03/09/17 0027    Specimen:  Blood Updated:  03/09/17 0207     Lactate 0.6 mmol/L     Comprehensive Metabolic Panel [[id number]]  (Abnormal) Collected:  03/09/17 0027    Specimen:  Blood Updated:  03/09/17 0214     Glucose 181 (H) mg/dL      BUN 31 (H) mg/dL      Creatinine 0.48 mg/dL      Sodium 149 mmol/L      Potassium 3.5 mmol/L      Chloride 123 (H) mmol/L      CO2 19.1  (L) mmol/L      Calcium 8.5 mg/dL      Total Protein 6.0 g/dL      Albumin 2.70 (L) g/dL      ALT (SGPT) 25 U/L      AST (SGOT) 37 (H) U/L      Alkaline Phosphatase 139 (H) U/L       Note New Reference Ranges        Total Bilirubin 0.4 mg/dL      eGFR Non African Amer 136 mL/min/1.73      Globulin 3.3 gm/dL      A/G Ratio 0.8 (L) g/dL      BUN/Creatinine Ratio 64.6 (H)      Anion Gap 6.9 mmol/L     Osmolality, Calculated [[id number]]  (Abnormal) Collected:  03/09/17 0027    Specimen:  Blood Updated:  03/09/17 0214     Osmolality Calc 307.3 (H) mOsm/kg     C-reactive Protein [[id number]]  (Abnormal) Collected:  03/09/17 0027    Specimen:  Blood Updated:  03/09/17 0215     C-Reactive Protein 16.07 (H) mg/dL     CBC & Differential [[id number]] Collected:  03/09/17 0027    Specimen:  Blood Updated:  03/09/17 0249    Narrative:       The following orders were created for panel order CBC & Differential.  Procedure                               Abnormality         Status                     ---------                               -----------         ------                     Scan Slide[[id number]]                                        Final result               CBC Auto Differential[[id number]]         Abnormal            Final result                 Please view results for these tests on the individual orders.    CBC Auto Differential [[id number]]  (Abnormal) Collected:  03/09/17 0027    Specimen:  Blood Updated:  03/09/17 0249     WBC 9.15 10*3/mm3      RBC 3.45 (L) 10*6/mm3      Hemoglobin 7.0 (L) g/dL      Hematocrit 26.0 (L) %      MCV 75.4 (L) fL      MCH 20.3 (L) pg      MCHC 26.9 (L) g/dL      RDW 19.4 (H) %      RDW-SD 52.8 fl      MPV -- fL       Unable to calculate.         Platelets 145 10*3/mm3      Neutrophil % 89.6 (H) %      Lymphocyte % 2.8 (L) %      Monocyte % 6.0 %      Eosinophil % 0.1 %      Basophil % 0.1 %      Immature Grans % 1.4 (H) %      Neutrophils, Absolute 8.19 (H) 10*3/mm3      Lymphocytes, Absolute  0.26 (L) 10*3/mm3      Monocytes, Absolute 0.55 10*3/mm3      Eosinophils, Absolute 0.01 10*3/mm3      Basophils, Absolute 0.01 10*3/mm3      Immature Grans, Absolute 0.13 (H) 10*3/mm3      nRBC 0.0 /100 WBC     Scan Slide [[id number]] Collected:  03/09/17 0027    Specimen:  Blood Updated:  03/09/17 0249     Anisocytosis Mod/2+      Hypochromia Mod/2+      Microcytes Mod/2+      Platelet Morphology Normal     Blood Gas, Arterial [[id number]]  (Abnormal) Collected:  03/09/17 0440    Specimen:  Arterial Blood Updated:  03/09/17 0451     Site Arterial: right radial      Micheal's Test N/A      pH, Arterial 7.298 (C) pH units      pCO2, Arterial 38.8 mm Hg      pO2, Arterial 109.0 (H) mm Hg      HCO3, Arterial 18.6 (C) mmol/L      Base Excess, Arterial -7.3 mmol/L      O2 Saturation, Arterial 98.0 %      Hemoglobin, Blood Gas 7.8 (L) g/dL      Hematocrit, Blood Gas 23.0 (L) %      Oxyhemoglobin 95.8 %      Methemoglobin 0.4 %      Carboxyhemoglobin 1.8 %      A-a Gradiant 225.7 mmHg      Temperature 98.6 C      Barometric Pressure for Blood Gas 734 mmHg      Modality Ventilator      FIO2 55 %      Ventilator Mode AC/VC      Set Tidal Volume 450      Set Mech Resp Rate 16      PEEP 12     POC Glucose Fingerstick [[id number]]  (Abnormal) Collected:  03/09/17 0545    Specimen:  Blood Updated:  03/09/17 0550     Glucose 215 (H) mg/dL     Narrative:       Meter: [id number] : [id number] Jimmy TAYLOR    Hemoglobin & Hematocrit, Blood [[id number]]  (Abnormal) Collected:  03/09/17 0847    Specimen:  Blood Updated:  03/09/17 0924     Hemoglobin 6.3 (C) g/dL      Hematocrit 23.1 (L) %     POC Glucose Fingerstick [[id number]]  (Abnormal) Collected:  03/09/17 1142    Specimen:  Blood Updated:  03/09/17 1153     Glucose 187 (H) mg/dL     Narrative:       Treated Patient Meter: [id number] : [id number] Moi BOOTHE    POC Glucose Fingerstick [[id number]]  (Abnormal) Collected:  03/09/17 1910    Specimen:  Blood Updated:  03/09/17 1912      Glucose 281 (H) mg/dL     Narrative:       Meter: [id number] : [id number] Otis FOX    Hemoglobin & Hematocrit, Blood [[id number]]  (Abnormal) Collected:  03/09/17 2056    Specimen:  Blood Updated:  03/09/17 2122     Hemoglobin 8.4 (L) g/dL       Post Transfusion Specimen        Hematocrit 28.9 (L) %           Imaging Results (last 24 hours)     Procedure Component Value Units Date/Time    XR Chest AP [[id number]] Collected:  03/09/17 0921     Updated:  03/09/17 0924    Narrative:       EXAMINATION: XR CHEST AP-      CLINICAL INDICATION:     ARDS; J18.9-Pneumonia, unspecified organism     TECHNIQUE: Single AP view of chest.      COMPARISON: 3/8/2017      FINDINGS:   There is bibasilar atelectasis.   Bilateral airspace disease is grossly stable.  Support tube and lines are in unchanged position.   Heart size is stable.  No pneumothorax.   Tiny bilateral pleural effusions persist.        Impression:       Stable appearance of the chest.     This report was finalized on 3/9/2017 9:22 AM by Dr. Bret Morris MD.             Assessment and Plan:    1. Acute hypernatremia: Sodium has stalled despite free water per G-tube so will give 1 L of D5W IV      2. ARDS: VDRF       3. B/l pneumonia: on Antibiotics      4. Anemia: better    Sandoval Macdonald MD  03/09/17  9:46 PM

## 2017-03-10 NOTE — PLAN OF CARE
Problem: Patient Care Overview (Adult)  Goal: Plan of Care Review  Outcome: Ongoing (interventions implemented as appropriate)  Goal: Adult Individualization and Mutuality  Outcome: Ongoing (interventions implemented as appropriate)  Goal: Discharge Needs Assessment  Outcome: Ongoing (interventions implemented as appropriate)    Problem: Pneumonia (Adult)  Goal: Signs and Symptoms of Listed Potential Problems Will be Absent or Manageable (Pneumonia)  Outcome: Ongoing (interventions implemented as appropriate)    Problem: Diabetes, Type 2 (Adult)  Goal: Signs and Symptoms of Listed Potential Problems Will be Absent or Manageable (Diabetes, Type 2)  Outcome: Ongoing (interventions implemented as appropriate)    Problem: Mechanical Ventilation, Invasive (Adult)  Goal: Signs and Symptoms of Listed Potential Problems Will be Absent or Manageable (Mechanical Ventilation, Invasive)  Outcome: Ongoing (interventions implemented as appropriate)  Goal: Signs and Symptoms of Listed Potential Problems Will be Absent or Manageable (Mechanical Ventilation, Invasive)  Outcome: Ongoing (interventions implemented as appropriate)    Problem: SAFETY - NON-VIOLENT RESTRAINT  Goal: Remains free of injury from restraints (Non-Violent Restraint)  Outcome: Ongoing (interventions implemented as appropriate)  Goal: Free from restraint(s) (Non-Violent Restraint)  Outcome: Ongoing (interventions implemented as appropriate)    Problem: Pressure Ulcer Risk (Brendan Scale) (Adult,Obstetrics,Pediatric)  Goal: Identify Related Risk Factors and Signs and Symptoms  Outcome: Ongoing (interventions implemented as appropriate)  Goal: Skin Integrity  Outcome: Ongoing (interventions implemented as appropriate)

## 2017-03-10 NOTE — PROGRESS NOTES
Subjective     History:   Pepito Yan is a 51 y.o. female admitted on 2/28/2017 secondary to Bilateral pneumonia     Procedures:   3/3/17: Emergent intubation  3/3/17: Right femoral central line placement  3/3/17: Right IJ central line placement    Patient seen and examined with GUERRERO Resendiz. Sedated on vent support. Remained hemodynamically stable overnight. RN reports loose stools overnight. Tolerating TF's. No acute events overnight per RN.     History taken from: chart, and RN.      Objective     Vital Signs  Temp:  [97 °F (36.1 °C)-99.7 °F (37.6 °C)] 97.5 °F (36.4 °C)  Heart Rate:  [] 78  Resp:  [14-22] 16  BP: ()/(52-84) 117/71  FiO2 (%):  [45 %-55 %] 45 %    Intake/Output Summary (Last 24 hours) at 03/10/17 0851  Last data filed at 03/10/17 0821   Gross per 24 hour   Intake   2665 ml   Output   1225 ml   Net   1440 ml         Physical Exam:  General:    Sedated on vent support   Heart:      Normal S1 and S2. Regular rate and rhythm. No significant murmur, rubs or gallops appreciated.   Lungs:     Respirations regular, even and unlabored. Coarse breath sounds bilaterally but improved from yesterday .    Abdomen:   Soft and nontender. No guarding, rebound tenderness or  organomegaly noted. Bowel sounds present x 4.   Extremities:  No clubbing, cyanosis or edema noted.      Results Review:      Results from last 7 days  Lab Units 03/09/17  2056 03/09/17  0847 03/09/17  0027 03/08/17  0747 03/08/17  0533 03/07/17  0230 03/07/17  0036  03/06/17  0049  03/05/17  0111   WBC 10*3/mm3  --   --  9.15 7.65 7.86 5.13 4.86  --  3.51*  --  3.65*   HEMOGLOBIN g/dL 8.4* 6.3* 7.0* 7.3* 7.4* 9.4* 6.7*  < > 7.0*  < > 7.1*   PLATELETS 10*3/mm3  --   --  145 166 154 100* 126*  --  146  --  164   < > = values in this interval not displayed.    Results from last 7 days  Lab Units 03/09/17  0027 03/08/17  0533 03/08/17  0356 03/07/17  1426 03/07/17  0036 03/06/17  0049 03/05/17  0111   SODIUM mmol/L 149 149 149 151  151 148 146   POTASSIUM mmol/L 3.5 4.0 4.3 4.3 3.5 3.6 4.2   CHLORIDE mmol/L 123* 126* 124* 127* 123* 122* 119*   TOTAL CO2 mmol/L 19.1* 21.2* 22.4* 23.9* 24.0* 24.3 24.0*   BUN mg/dL 31* 29* 31* 33* 34* 46* 47*   CREATININE mg/dL 0.48 0.46 0.41* 0.49 0.58 0.62 0.79   CALCIUM mg/dL 8.5 8.4 8.4 8.2 8.1 8.0 7.9   GLUCOSE mg/dL 181* 164* 180* 222* 180* 99 134*       Results from last 7 days  Lab Units 03/09/17  0027 03/08/17  0533 03/08/17  0356 03/07/17  0036 03/06/17  0049 03/05/17  0111 03/04/17  0123   BILIRUBIN mg/dL 0.4 0.3 0.2 0.3 0.3 0.2 0.2   ALK PHOS U/L 139* 106* 102 84 70 49 54   AST (SGOT) U/L 37* 26 26 35* 67* 45* 64*   ALT (SGPT) U/L 25 20 19 30 30 27 34       Results from last 7 days  Lab Units 03/08/17  0533 03/08/17  0356 03/07/17  0036 03/05/17  0111 03/04/17  0123   MAGNESIUM mg/dL 2.2 2.1 2.2 2.6 2.3               Imaging Results (last 24 hours)     Procedure Component Value Units Date/Time    XR Abdomen KUB [[id number]] Collected:  03/05/17 1051     Updated:  03/05/17 1054    Narrative:       XR ABDOMEN KUB-     REASON FOR EXAM:  Abdominal distention; J18.9-Pneumonia, unspecified  organism.     FINDINGS:  A nasogastric tube is demonstrated in the upper abdomen. The  tip of the tube overlies the distal stomach. There is evidence of a  catheter in the urinary bladder. The bowel gas pattern shows no evidence  of obstruction. There are fairly heavy atherosclerotic calcifications in  the aorta and its branches.       Impression:       NG tube tip overlies the antrum of the stomach. There is no  evidence of bowel obstruction.        This report was finalized on 3/5/2017 10:52 AM by Dr. Scott Britton II, MD.       XR Chest 1 View [[id number]] Collected:  03/05/17 1052     Updated:  03/05/17 1153    Narrative:       XR CHEST 1 VW-     REASON FOR EXAM:  Follow up pneumonia/ARDS; J18.9-Pneumonia, unspecified  organism.     COMPARISON: The chest is compared with an earlier chest done 03/04/2017.     FINDINGS:   The endotracheal tube tip overlies the tracheal air column at  the level of the aortic knob. The central line and nasogastric tubes are  in appropriate position. The cardiac silhouette is stable. The lungs  show persistent but improving interstitial and alveolar infiltrates in  both lungs. No pleural effusions have developed.       Impression:       Continued improvement in the aeration of the lungs.        This report was finalized on 3/5/2017 11:51 AM by Dr. Scott Britton II, MD.       XR Chest 1 View [[id number]] Collected:  03/06/17 0801     Updated:  03/06/17 0803    Narrative:       EXAMINATION: XR CHEST 1 VW-      CLINICAL INDICATION:     follow up pneumonia/ards; J18.9-Pneumonia,  unspecified organism     TECHNIQUE: Single AP view of chest.      COMPARISON: 3/5/2017      FINDINGS:   There is bibasilar atelectasis.   Stable bilateral airspace disease.  Support tube and lines are in unchanged position.   Heart size is stable.  No pneumothorax.   Tiny bilateral pleural effusions persist.        Impression:       Stable appearance of the chest.     This report was finalized on 3/6/2017 8:01 AM by Dr. Bret Morris MD.               Medications:    acetaminophen 650 mg Oral Once   aspirin 81 mg Oral Daily   clopidogrel 75 mg Oral Daily   guaiFENesin 600 mg Oral BID   heparin (porcine) 5,000 Units Subcutaneous Q12H   insulin aspart 0-14 Units Subcutaneous 4x Daily AC & at Bedtime   insulin detemir 20 Units Subcutaneous Nightly   ipratropium 0.5 mg Nebulization Q6H - RT   meropenem 1 g Intravenous Q8H   methylPREDNISolone sodium succinate 10 mg Intravenous Q12H   metoprolol tartrate 25 mg Oral Q12H   pantoprazole 40 mg Intravenous Q AM   polyethylene glycol 17 g Oral Daily   potassium chloride 40 mEq Oral Once   sodium bicarbonate 650 mg Oral TID   sucralfate 1 g Oral 4x Daily AC & at Bedtime   vancomycin 750 mg Intravenous Q12H       dextrose 125 mL/hr Last Rate: 125 mL/hr (03/09/17 1103)   fentaNYL (SUBLIMAZE)  PCA 1500 mcg/30 mL syringe     midazolam 0.5 mg/mL 100 mL NS 1-10 mg/hr Last Rate: 3 mg/hr (03/10/17 2977)   norepinephrine 0.02-0.3 mcg/kg/min Last Rate: Stopped (03/04/17 5540)   Pharmacy to dose vancomycin     propofol 5-50 mcg/kg/min Last Rate: 50 mcg/kg/min (03/10/17 9972)           Assessment/Plan   Severe sepsis: Likely 2/2 B/L community acquired pneumonia and UTI with concerns for superimposed nosocomial pneumonia. ID has broadened pt's antibiotic regimen to Vanc and Merrem. Cultures remain negative. Leukopenia has resolved and CRP is improved today. Cont to follow cultures and repeat labs in the AM. ID input appreciated.     ARDS: Likely 2/2 sepsis and pneumonia. Repeat CXR appears stable today. Currently on broad spectrum IV abx. FiO2 requirements are improving. Cont vent management per pulm. Repeat ABG and CXR in the AM. Pulm input appreciated.     Acute exacerbation of COPD: Cont steroids, bronchodilators and vent support. Cont dose of steroids today. Pulm input appreciated.     Acute on chronic microcytic anemia with hx of iron deficiency: S/P 1 unit of PRBC's yesterday with improvement in H&H. Repeat labs pending this AM. Cont to monitor closely.     Hypernatremia: Nephrology consulted and added free water flushes to TF's. D5W added after Na+ failed to continue to improve. AM labs pending. Repeat labs in the AM. Nephrology input appreciated.     DM II, insulin-dependent: BG levels elevated today with addition of D5. Cont current regimen and cont TF's. Will make further insulin adjustments if BG levels remain elevated.      Elevated liver enzymes: Stable. Viral hepatitis panel reveals Hep C Ab reactivity. HIV is non-reactive. Repeat CMP in the AM.     Thrombocytopenia: Possibly 2/2 acute illness. Now resolved. Repeat CBC in the AM.     PPX  GI: PPI  DVT: SQ heparin      Pt is at high risk 2/2 severe sepsis, bilateral pneumonia, ARDS, UTI, COPD, anemia and hx of noncompliance.           Manuel BROWN  DO Fide  03/10/17  8:51 AM

## 2017-03-10 NOTE — PROGRESS NOTES
LOS: 10 days     Chief Complaint:  Pulmonology is following for bilateral pneumonia    Subjective     Interval History:   The patient was emergently intubated 3/3/17.    No events overnight still agitated and continues to require hi doses of sedatives- precedex was marginally effective  Switched to versed drip in addition to propofol- precedex has been  stopped- with good result      History taken from: chart family Patient unable to give history due to emergent nature of case.    Review of Systems:   Review of Systems   Unable to perform ROS: Severe respiratory distress   and emergent nature of case.                  Objective     Vital Signs  Temp:  [97 °F (36.1 °C)-99.7 °F (37.6 °C)] 97.9 °F (36.6 °C)  Heart Rate:  [] 78  Resp:  [14-22] 16  BP: ()/(52-84) 122/73  FiO2 (%):  [45 %-55 %] 45 %  Body mass index is 21.79 kg/(m^2).    Intake/Output Summary (Last 24 hours) at 03/10/17 0942  Last data filed at 03/10/17 0821   Gross per 24 hour   Intake   2665 ml   Output   1225 ml   Net   1440 ml     I/O this shift:  In: 100 [I.V.:100]  Out: -     Physical Exam:  GENERAL APPEARANCE: Patient was restless and in severe respiratory distress.    HEAD: normocephalic.    EYES: PERRL    NECK: Neck supple.     CARDIAC: Normal S1 and S2. No S3 or murmurs. Rhythm is regular. There is no peripheral edema, cyanosis or pallor. Extremities are warm and well perfused. Capillary refill is less than 2 seconds. No carotid bruits.no mottling noted today    LUNGS: coarse bilaterally no wheezing    ABDOMEN: Positive bowel sounds. Soft, nondistended, nontender.     EXTREMITIES: No significant deformity or joint abnormality. No edema. Peripheral pulses intact.     NEUROLOGICAL: unable to assess due to mental status and emergent intubation.    PSYCHIATRIC:  Unable to assess due to emergent nature of the case.                Results Review:                I reviewed the patient's new clinical results.  I reviewed the patient's new  imaging results and agree with the interpretation.    Results from last 7 days  Lab Units 03/10/17  0815 03/09/17 2056 03/09/17  0847 03/09/17  0027 03/08/17  0747   WBC 10*3/mm3 5.49  --   --  9.15 7.65   HEMOGLOBIN g/dL 7.5* 8.4* 6.3* 7.0* 7.3*   PLATELETS 10*3/mm3 128*  --   --  145 166       Results from last 7 days  Lab Units 03/10/17  0815 03/09/17  0027 03/08/17  0533 03/08/17  0356   SODIUM mmol/L 145 149 149 149   POTASSIUM mmol/L 4.4 3.5 4.0 4.3   CHLORIDE mmol/L 119* 123* 126* 124*   TOTAL CO2 mmol/L 25.7 19.1* 21.2* 22.4*   BUN mg/dL 27* 31* 29* 31*   CREATININE mg/dL 0.51 0.48 0.46 0.41*   CALCIUM mg/dL 8.5 8.5 8.4 8.4   GLUCOSE mg/dL 288* 181* 164* 180*   MAGNESIUM mg/dL 2.3  --  2.2 2.1     Lab Results   Component Value Date    INR 1.01 12/17/2016    INR 0.94 11/20/2016    INR <0.90 02/06/2016    PROTIME 11.4 12/17/2016    PROTIME 10.6 11/20/2016    PROTIME 10.0 02/06/2016       Results from last 7 days  Lab Units 03/10/17  0815 03/09/17  0027 03/08/17  0533   ALK PHOS U/L 133* 139* 106*   BILIRUBIN mg/dL 0.2 0.4 0.3   ALT (SGPT) U/L 26 25 20   AST (SGOT) U/L 18 37* 26       Results from last 7 days  Lab Units 03/10/17  0419   PH, ARTERIAL pH units 7.308*   PO2 ART mm Hg 188.1*   PCO2, ARTERIAL mm Hg 43.6   HCO3 ART mmol/L 21.4*   HIV-1 / O / 2 Ag / Antibody 4th Generation   Order: [id number] - Part of Panel Order [id number]   Status:  Final result   Visible to patient:  No (Not Released)      Ref Range & Units 1d ago     HIV-1/ HIV-2 Non-Reactive Non-Reactive   Resulting Agency   [facility]   Narrative     A non-reactive test result does not preclude the possibility of exposure to HIV or infection with HIV. An antibody response to recent exposure may take several months to reach detectable levels.                      Imaging Results (last 24 hours)     Procedure Component Value Units Date/Time    XR Chest AP [[id number]] Collected:  03/02/17 0936     Updated:  03/02/17 0940    Narrative:        EXAMINATION: XR CHEST AP-      CLINICAL INDICATION:     SOB; J18.9-Pneumonia, unspecified organism     TECHNIQUE:  XR CHEST AP-      COMPARISON: 02/28/2017      FINDINGS:   Progression of bilateral airspace disease right greater than left lung.  Opacities are more pronounced in the right upper lobe and at the  periphery of both lungs. Favor bacterial pneumonia, but peripheral  predominant opacities can also be seen with other atypical etiologies  including eosinophilic pneumonia.   Heart and mediastinal contours are unremarkable.   No pneumothorax.   No pleural effusion.   No acute osseous findings.            Impression:       1. Marked interval worsening of right lung pneumonia and now developing  left lung airspace disease probably representing diffuse pneumonia.  Considerations as described above.  2. Otherwise stable chest.     This report was finalized on 3/2/2017 9:38 AM by Dr. Wali Tracy MD.       XR Chest 1 View [[id number]] Resulted:  03/03/17 0924     Updated:  03/03/17 0707             Medication Review:   Scheduled Medications:    acetaminophen 650 mg Oral Once   aspirin 81 mg Oral Daily   clopidogrel 75 mg Oral Daily   guaiFENesin 600 mg Oral BID   heparin (porcine) 5,000 Units Subcutaneous Q12H   insulin aspart 0-14 Units Subcutaneous 4x Daily AC & at Bedtime   insulin detemir 20 Units Subcutaneous Nightly   ipratropium 0.5 mg Nebulization Q6H - RT   meropenem 1 g Intravenous Q8H   methylPREDNISolone sodium succinate 10 mg Intravenous Q12H   metoprolol tartrate 25 mg Oral Q12H   pantoprazole 40 mg Intravenous Q AM   polyethylene glycol 17 g Oral Daily   potassium chloride 40 mEq Oral Once   sodium bicarbonate 650 mg Oral TID   sucralfate 1 g Oral 4x Daily AC & at Bedtime   vancomycin 750 mg Intravenous Q12H     Continuous infusions:    dextrose 125 mL/hr Last Rate: 125 mL/hr (03/09/17 7408)   fentaNYL (SUBLIMAZE) PCA 1500 mcg/30 mL syringe     midazolam 0.5 mg/mL 100 mL NS 1-10 mg/hr Last Rate: 3  mg/hr (03/10/17 5921)   norepinephrine 0.02-0.3 mcg/kg/min Last Rate: Stopped (03/04/17 1605)   Pharmacy to dose vancomycin     propofol 5-50 mcg/kg/min Last Rate: 50 mcg/kg/min (03/10/17 7611)       Assessment/Plan     CXR:              Expiratory image  Unchanged compared to yesterday given this    Respiratory:      Vent settings:   RR16  FIO2 45 % PEEP 12 CXR looks improved    HIV negative    Pink frothy sputum has been suctioned from ET tube.     Will continue IV steroids(dose decreased), scheduled inhalants and PRN neb treatments.      ID: Continue current IV antibiotics. Vanc/Azithro: CRP 16.07, lactate 0.6    Cardiac:  Stable.    Renal: BUN/Creatinine WNL.- may need diuresis for now she is hypernatremic- FWF added by nephrology who have been consulted.    Endocrine: monitor glucose targeting 140-180.    Electrolytes:  Monitor levels, manage and replete per protocols.  Monitor for refeeding syndrome- patients nutritional status is poor.  Patient has a normal anion gap metabolic acidosis with hyperchloremia- no diarrhea and normal K+ suspect RTA- on  Bicarb tabs.  Acidemia has been compensated and is improved with improvement in acidosis which is now mild- HCO3 has normalized     GI:  Nutrition consult placed for tube feedings.    Hematology:   Monitor blood counts, transfuse at or below 7 g/dL.  Unless patient is actively bleeding then began transfusing at 8 g/dL.     Case was discussed with  ccu nurse     Patients  is in ARDS with P/F 120==>200==>158 ==>198==>342!  ie gas exchange is improving dramatically- her FiO2 has been decreased to 45%: continue current supportive care. Continue with FiO2 goal below 60% and pO2 of greater or equal to 60 mmHg  If unable to wean from respirator by Tuesday next week she will need a tracheostomy for prolonged weaning.    Continue current supportive care      Patient Active Problem List   Diagnosis Code   • Chest pain R07.9   • Chest pain of uncertain etiology R07.89    • Intractable nausea and vomiting R11.2   • Intractable cyclical vomiting with nausea G43.A1   • Chronic obstructive pulmonary emphysema J43.9   • Hepatitis-C B19.20   • Diabetes mellitus type 2 in nonobese E11.9   • Arthritis or polyarthritis, rheumatoid M06.9   • Anxiety F41.9   • Essential hypertension I10   • Coronary artery disease I25.10   • Buerger's disease I73.1   • Cirrhosis K74.60   • Diabetes E11.9   • Chronic nausea R11.0   • Pancreatitis K85.90   • Bilateral pneumonia J18.9   • ARDS (adult respiratory distress syndrome) J80   • Hypoxia R09.02                  Benigno Cabral MD  03/10/17  9:42 AM

## 2017-03-10 NOTE — PLAN OF CARE
Problem: Patient Care Overview (Adult)  Goal: Plan of Care Review  Outcome: Ongoing (interventions implemented as appropriate)    Problem: Pneumonia (Adult)  Goal: Signs and Symptoms of Listed Potential Problems Will be Absent or Manageable (Pneumonia)  Outcome: Ongoing (interventions implemented as appropriate)    Problem: Diabetes, Type 2 (Adult)  Goal: Signs and Symptoms of Listed Potential Problems Will be Absent or Manageable (Diabetes, Type 2)  Outcome: Ongoing (interventions implemented as appropriate)    Problem: Pressure Ulcer Risk (Brendan Scale) (Adult,Obstetrics,Pediatric)  Goal: Identify Related Risk Factors and Signs and Symptoms  Outcome: Ongoing (interventions implemented as appropriate)  Goal: Skin Integrity  Outcome: Ongoing (interventions implemented as appropriate)

## 2017-03-10 NOTE — PROGRESS NOTES
Discharge Planning Assessment   Daniel     Patient Name: Pepito Yan  MRN: [id number]  Today's Date: 3/10/2017    Admit Date: 2/28/2017          Discharge Plan       03/10/17 1101    Case Management/Social Work Plan    Plan Pt remains on the vent.  Pt remains too acute at this time for Continue Care.  Pt lives at home with her friend, Radhika.  Pt's daughter, April reports that her mother does not have a stable home and lives from house to house.  SS will continue to follow and will assist as needed.      Patient/Family In Agreement With Plan yes             Expected Discharge Date and Time     Expected Discharge Date Expected Discharge Time    Mar 9, 2017             Merlene Parisi

## 2017-03-10 NOTE — PROGRESS NOTES
"  I have personally seen and examined the patient today and discussed overnight interval progress and pertinent issues with nursing staff.    ROS:    Unable to obtain due to the patients mental status, on the ventilator and sedated       History taken from: chart RN      Vital Signs    Visit Vitals   • /67   • Pulse 72   • Temp 97.9 °F (36.6 °C)   • Resp 16   • Ht 63\" (160 cm)   • Wt 123 lb (55.8 kg)   • SpO2 97%   • BMI 21.79 kg/m2       Temp:  [97 °F (36.1 °C)-99.7 °F (37.6 °C)] 97.9 °F (36.6 °C)      Intake/Output Summary (Last 24 hours) at 03/10/17 1053  Last data filed at 03/10/17 1000   Gross per 24 hour   Intake   2165 ml   Output   1375 ml   Net    790 ml     Intake & Output (last 3 days)       03/07 0701 - 03/08 0700 03/08 0701 - 03/09 0700 03/09 0701 - 03/10 0700 03/10 0701 - 03/11 0700    I.V. (mL/kg) 982.5 (17.6) 681.4 (12.2) 950 (17) 100 (1.8)    Blood   363     Other 440 960 60     NG/ 391 142     IV Piggyback 1200 1050 1050     Total Intake(mL/kg) 2847.5 (51) 3082.4 (55.2) 2565 (46) 100 (1.8)    Urine (mL/kg/hr) 1795 (1.3) 1060 (0.8) 1325 (1) 200 (0.9)    Stool   0 (0)     Total Output 1795 1060 1325 200    Net +1052.5 +2022.4 +1240 -100            Unmeasured Stool Occurrence   6 x           Physical Exam:      General Appearance:  intubated, sedated on ventilator, and respiratory distress    Head:  Normocephalic, without obvious abnormality, atraumatic   Eyes:      Lids and lashes normal, conjunctivae and sclerae normal, no icterus, no pallor, corneas clear, PERRLA   Ears:  Ears appear intact with no abnormalities noted   Throat: No oral lesions, no thrush, oral mucosa moist   Neck: No adenopathy, supple, trachea midline, no thyromegaly, no carotid bruit, no JVD   Back:  No tenderness to percussion or palpation, range of motion normal   Lungs:  decreased breath sounds. No wheezing, no ronchi and no crackles.   Heart:  Regular rhythm and normal rate, normal S1 and S2, no murmur, no gallop, " no rub, no click   Chest Wall:  No abnormalities observed   Abdomen:  Normal bowel sounds, no masses, no organomegaly, soft non-tender, non-distended, no guarding, no rebound tenderness   Rectal:  Deferred   Extremities: No edema, no cyanosis, no redness   Pulses: Pulses palpable and equal bilaterally   Skin: No bleeding, bruising or rash   Lymph nodes: No palpable adenopathy   Neurologic: sedated        Results:      Results from last 7 days  Lab Units 03/10/17  0815 03/09/17  0027 03/08/17  0747 03/08/17  0533 03/07/17  0230 03/07/17  0036 03/06/17  0049   WBC 10*3/mm3 5.49 9.15 7.65 7.86 5.13 4.86 3.51*     Lab Results   Component Value Date    NEUTROABS 4.81 03/10/2017         Results from last 7 days  Lab Units 03/10/17  0815   CREATININE mg/dL 0.51         Results from last 7 days  Lab Units 03/10/17  0816 03/09/17  0027 03/08/17  0533   CRP mg/dL 7.61* 16.07* 13.53*       Imaging Results (last 24 hours)     Procedure Component Value Units Date/Time    XR Chest AP [[id number]] Collected:  03/08/17 0831     Updated:  03/08/17 0834    Narrative:       EXAMINATION: XR CHEST AP-      CLINICAL INDICATION:     ARDS; J18.9-Pneumonia, unspecified organism     TECHNIQUE: Single AP view of chest.      COMPARISON: 3/7/2017      FINDINGS:   There is bibasilar atelectasis.   Grossly stable bilateral airspace disease.  Support tube and lines are in unchanged position.   Heart size is stable.  No pneumothorax.              Impression:       Grossly stable bilateral airspace disease.     This report was finalized on 3/8/2017 8:31 AM by Dr. Bret Morris MD.               Results Review:    I have personally reviewed laboratory data, culture results, radiology studies and antimicrobial therapy.    Hospital Medications (active)       Dose Frequency Start End    acetaminophen (TYLENOL) tablet 650 mg 650 mg Every 6 Hours PRN 3/2/2017     Sig - Route: Take 2 tablets by mouth Every 6 (Six) Hours As Needed for fever. - Oral     acetaminophen (TYLENOL) tablet 650 mg 650 mg Once 3/6/2017     Sig - Route: Take 2 tablets by mouth 1 (One) Time. - Oral    aspirin chewable tablet 81 mg 81 mg Daily 3/7/2017     Sig - Route: Chew 1 tablet Daily. - Oral    Notes to Pharmacy: Patient intubated and RN requested changed from enteric coated tablet.    clopidogrel (PLAVIX) tablet 75 mg 75 mg Daily 3/1/2017     Sig - Route: Take 1 tablet by mouth Daily. - Oral    dextrose (D50W) solution 25 g 25 g Every 15 Minutes PRN 3/1/2017     Sig - Route: Infuse 50 mL into a venous catheter Every 15 (Fifteen) Minutes As Needed for low blood sugar (Blood Sugar Less Than 70, Patient Has IV Access - Unresponsive, NPO or Unable To Safely Swallow). - Intravenous    Cosign for Ordering: Accepted by Savanah Gutierrez MD on 3/1/2017  7:01 PM    dextrose (GLUTOSE) oral gel 15 g 15 g Every 15 Minutes PRN 3/1/2017     Sig - Route: Take 15 g by mouth Every 15 (Fifteen) Minutes As Needed for low blood sugar (Blood Sugar Less Than 70, Patient Alert, Is Not NPO & Can Safely Swallow). - Oral    Cosign for Ordering: Accepted by Savanah Gutierrez MD on 3/1/2017  7:01 PM    FENTANYL PCA 1500 MCG/30 ML (BHCOR) PCA  Continuous 3/3/2017 3/13/2017    Sig - Route: Infuse  into a venous catheter Continuous. - Intravenous    glucagon (GLUCAGEN) injection 1 mg 1 mg Every 15 Minutes PRN 3/1/2017     Sig - Route: Inject 1 mg under the skin Every 15 (Fifteen) Minutes As Needed (Blood Glucose Less Than 70 - Patient Without IV Access - Unresponsive, NPO or Unable To Safely Swallow). - Subcutaneous    Cosign for Ordering: Accepted by Savanah Gutierrez MD on 3/1/2017  7:01 PM    guaiFENesin (MUCINEX) 12 hr tablet 600 mg 600 mg 2 Times Daily 3/2/2017     Sig - Route: Take 1 tablet by mouth 2 (Two) Times a Day. - Oral    heparin (porcine) 5000 UNIT/ML injection 5,000 Units 5,000 Units Every 12 Hours Scheduled 3/1/2017     Sig - Route: Inject 1 mL under the skin Every 12 (Twelve) Hours. - Subcutaneous  "   insulin aspart (novoLOG) injection 0-14 Units 0-14 Units 4 Times Daily Before Meals & Nightly 3/1/2017     Sig - Route: Inject 0-14 Units under the skin 4 (Four) Times a Day Before Meals & at Bedtime. - Subcutaneous    insulin detemir (LEVEMIR) injection 20 Units 20 Units Nightly 3/4/2017     Sig - Route: Inject 20 Units under the skin Every Night. - Subcutaneous    ipratropium (ATROVENT) nebulizer solution 0.5 mg 0.5 mg Every 6 Hours PRN 3/2/2017     Sig - Route: Take 2.5 mL by nebulization Every 6 (Six) Hours As Needed for shortness of air. - Nebulization    Cosign for Ordering: Accepted by Sherrill Weiner DO on 3/2/2017  1:40 PM    ipratropium (ATROVENT) nebulizer solution 0.5 mg 0.5 mg Every 6 Hours - RT 3/3/2017     Sig - Route: Take 2.5 mL by nebulization Every 6 (Six) Hours. - Nebulization    magnesium sulfate 6 g in dextrose (D5W) 5 % 250 mL infusion 6 g As Needed 3/6/2017     Sig - Route: Infuse 6 g into a venous catheter As Needed (Mg 1.1-1.5 mg/dL). - Intravenous    Linked Group 1:  \"Or\" Linked Group Details        magnesium sulfate 8 g in dextrose (D5W) 5 % 250 mL infusion 8 g As Needed 3/6/2017     Sig - Route: Infuse 8 g into a venous catheter As Needed (Mg less than or equal to 1 mg/dL). - Intravenous    Linked Group 1:  \"Or\" Linked Group Details        magnesium sulfate in D5W 1g/100mL (PREMIX) IVPB 1 g 1 g As Needed 3/6/2017     Sig - Route: Infuse 100 mL into a venous catheter As Needed (Mg less than or equal 1 mg/dL). - Intravenous    Linked Group 1:  \"Or\" Linked Group Details        magnesium sulfate in D5W 1g/100mL (PREMIX) IVPB 1 g 1 g As Needed 3/6/2017     Sig - Route: Infuse 100 mL into a venous catheter As Needed (magnesium sulfate 1 g in D5W 100 mL IVPB - Mg 1.6 - 1.9 mg/dL). - Intravenous    Linked Group 1:  \"Or\" Linked Group Details        meropenem (MERREM) 1 g/100 mL 0.9% NS VTB (mbp) 1 g Every 8 Hours 3/7/2017     Sig - Route: Infuse 100 mL into a venous catheter Every 8 " "(Eight) Hours. - Intravenous    methylPREDNISolone sodium succinate (SOLU-Medrol) injection 20 mg 20 mg Every 12 Hours 3/5/2017     Sig - Route: Infuse 0.5 mL into a venous catheter Every 12 (Twelve) Hours. - Intravenous    metoprolol tartrate (LOPRESSOR) tablet 25 mg 25 mg Every 12 Hours Scheduled 3/2/2017     Sig - Route: Take 1 tablet by mouth Every 12 (Twelve) Hours. - Oral    midazolam (VERSED) injection 2 mg 2 mg Every 4 Hours PRN 3/5/2017     Sig - Route: Infuse 2 mL into a venous catheter Every 4 (Four) Hours As Needed for sedation. - Intravenous    midazolam 0.5 mg/mL 100 mL NS (VERSED) infusion 1-10 mg/hr Titrated 3/8/2017 3/18/2017    Sig - Route: Infuse 1-10 mg/hr into a venous catheter Dose Adjusted By Provider As Needed. - Intravenous    norepinephrine (LEVOPHED) 32 mcg/mL (8 mg/250 mL) infusion 0.02-0.3 mcg/kg/min × 57.3 kg Titrated 3/3/2017     Sig - Route: Infuse 1.146-17.19 mcg/min into a venous catheter Dose Adjusted By Provider As Needed. - Intravenous    pantoprazole (PROTONIX) injection 40 mg 40 mg Every Early Morning 3/2/2017     Sig - Route: Infuse 10 mL into a venous catheter Every Morning. - Intravenous    Cosign for Ordering: Accepted by Sherrill Weiner DO on 3/1/2017  3:38 PM    Pharmacy to dose vancomycin  Continuous PRN 3/2/2017     Sig - Route: Continuous As Needed for consult. - Does not apply    polyethylene glycol (MIRALAX) powder 17 g 17 g Daily 3/8/2017     Sig - Route: Take 17 g by mouth Daily. - Oral    potassium chloride (K-DUR,KLOR-CON) CR tablet 40 mEq 40 mEq Once 3/2/2017     Sig - Route: Take 2 tablets by mouth 1 (One) Time. - Oral    potassium chloride (KLOR-CON) packet 40 mEq 40 mEq As Needed 3/6/2017     Sig - Route: Take 40 mEq by mouth As Needed (potassium replacement, see admin instructions). - Oral    Linked Group 2:  \"Or\" Linked Group Details        potassium chloride (MICRO-K) CR capsule 40 mEq 40 mEq As Needed 3/6/2017     Sig - Route: Take 4 capsules by " "mouth As Needed (potassium replacement.  see admin instructions). - Oral    Linked Group 2:  \"Or\" Linked Group Details        potassium chloride 10 mEq in 100 mL IVPB 10 mEq Every 1 Hour PRN 3/6/2017     Sig - Route: Infuse 100 mL into a venous catheter Every 1 (One) Hour As Needed (potassium protocol PERIPHERAL - see admin instructions). - Intravenous    Linked Group 2:  \"Or\" Linked Group Details        promethazine (PHENERGAN) tablet 12.5 mg 12.5 mg Every 6 Hours PRN 3/1/2017     Sig - Route: Take 1 tablet by mouth Every 6 (Six) Hours As Needed for Nausea or Vomiting. - Oral    Cosign for Ordering: Accepted by Sherrill Weiner DO on 3/1/2017  3:38 PM    propofol (DIPRIVAN) infusion 10 mg/mL 100 mL 5-50 mcg/kg/min × 57.3 kg Titrated 3/3/2017     Sig - Route: Infuse 286.5-2,865 mcg/min into a venous catheter Dose Adjusted By Provider As Needed. - Intravenous    sodium chloride 0.9 % flush 1-10 mL 1-10 mL As Needed 3/1/2017     Sig - Route: Infuse 1-10 mL into a venous catheter As Needed for line care. - Intravenous    sodium chloride 0.9 % flush 10 mL 10 mL As Needed 2/28/2017     Sig - Route: Infuse 10 mL into a venous catheter As Needed for line care. - Intravenous    Linked Group 3:  \"And\" Linked Group Details        sucralfate (CARAFATE) tablet 1 g 1 g 4 Times Daily Before Meals & Nightly 3/1/2017     Sig - Route: Take 1 tablet by mouth 4 (Four) Times a Day Before Meals & at Bedtime. - Oral    Cosign for Ordering: Accepted by Sherrill Weiner DO on 3/1/2017  3:38 PM    vancomycin (VANCOCIN) 750 mg in sodium chloride 0.9 % 250 mL IVPB 750 mg Every 12 Hours 3/4/2017     Sig - Route: Infuse 750 mg into a venous catheter Every 12 (Twelve) Hours. - Intravenous    dexmedetomidine HCl (PRECEDEX) 4 mcg/mL in sodium chloride 0.9 % 100 mL infusion (Discontinued) 0.2-1.5 mcg/kg/hr × 55.8 kg Continuous 3/6/2017 3/7/2017    Sig - Route: Infuse 11.16-83.7 mcg/hr into a venous catheter Continuous. - Intravenous    " Reason for Discontinue: Formulary change    dexmedetomidine HCl (PRECEDEX) 4 mcg/mL in sodium chloride 0.9 % 100 mL infusion (Discontinued) 0.2-1.5 mcg/kg/hr × 55.8 kg Titrated 3/7/2017 3/8/2017    Sig - Route: Infuse 11.16-83.7 mcg/hr into a venous catheter Dose Adjusted By Provider As Needed. - Intravenous    metroNIDAZOLE (FLAGYL) IVPB 500 mg (Discontinued) 500 mg Every 8 Hours 3/3/2017 3/8/2017    Sig - Route: Infuse 100 mL into a venous catheter Every 8 (Eight) Hours. - Intravenous            Cultures:    BLOOD CULTURE   Date Value Ref Range Status   03/06/2017 No growth at 24 hours  Preliminary   03/06/2017 No growth at 24 hours  Preliminary   03/04/2017 No growth at 4 days  Preliminary   03/04/2017 No growth at 4 days  Preliminary   03/03/2017 No growth at 4 days  Preliminary   03/03/2017 No growth at 4 days  Preliminary       PROBLEM LIST:    Patient Active Problem List   Diagnosis   • Chest pain   • Chest pain of uncertain etiology   • Intractable nausea and vomiting   • Intractable cyclical vomiting with nausea   • Chronic obstructive pulmonary emphysema   • Hepatitis-C   • Diabetes mellitus type 2 in nonobese   • Arthritis or polyarthritis, rheumatoid   • Anxiety   • Essential hypertension   • Coronary artery disease   • Buerger's disease   • Cirrhosis   • Diabetes   • Chronic nausea   • Pancreatitis   • Bilateral pneumonia   • ARDS (adult respiratory distress syndrome)   • Hypoxia       Assessment/Plan     ASSESSMENT:    1. Severe sepsis with acute respiratory failure  2. Bilateral pneumonia     PLAN:    The patient continues to be on ventilator with PEEP of 12.  Worsening ARDS and not tolerating tube feeding with significant residual.  She has persistent diarrhea but no fever reported.  All cultures so far showing no growth since 3/1/2017.  Unfortunately the patient is not going to do well despite appropriate interventions due to multiple comorbidities and high mortality risk.  We will continue to  follow closely on broad-spectrum antibiotic coverage and consider [facility] referral.    This very complex case with worsening chest xray on broad spectrum antibiotic coverage with Vancomycin and Meropenem. For now Flagyl was discontinued as the patient does not require dual anaerobic coverage. Would consider bronchosocpy for deep tissue cultures.      The patient shows strong evidence suggestive of worsening by the chest x-ray and lab after initial period of improvement which is very concerning for possible superimposed nosocomial infection and antibiotic regimen escalated to vancomycin and meropenem 1000mg IV every 8 hours. Azithromycin and Azactam were discontinued especially in setting of negative mycoplasma IgM and Legionella urinary antigen. We will continue to monitor very closely as patient carries a very guarded prognosis.      Patients findings and recommendations were discussed with nursing staff    Code Status: Full Code     Written by Alycia Finn PA-C, acting as scribe for Dr. Hitchcock.        Alycia Finn PA-C  03/10/17  10:53 AM    Physician Attestation:    I have personally seen and examined the patient. I reviewed the patient's data including history of present illness, review of systems, physical examination, assessment and treatment plan and agree with findings above. The assessment and plan are my own.  I have reviewed and edited the note above after discussing the findings with Alycia Finn PA-C.    Evan Hitchcock MD  Infectious Diseases  03/10/17  12:56 PM

## 2017-03-10 NOTE — NURSING NOTE
Left elbow is red but intact and blanchable.  Right elbow is reddened with a small scab but is blanchable.  Silicone border dressings in place for protection.  Instructed RN to leave these off at this time.  Will continue to monitor.

## 2017-03-11 ENCOUNTER — APPOINTMENT (OUTPATIENT)
Dept: GENERAL RADIOLOGY | Facility: HOSPITAL | Age: 52
End: 2017-03-11

## 2017-03-11 LAB
A-A DO2: 174.3 MMHG (ref 0–300)
ALBUMIN SERPL-MCNC: 2.6 G/DL (ref 3.5–5)
ALBUMIN/GLOB SERPL: 0.7 G/DL (ref 1.5–2.5)
ALP SERPL-CCNC: 160 U/L (ref 35–104)
ALT SERPL W P-5'-P-CCNC: 45 U/L (ref 10–36)
ANION GAP SERPL CALCULATED.3IONS-SCNC: 1.6 MMOL/L (ref 3.6–11.2)
ANISOCYTOSIS BLD QL: ABNORMAL
ARTERIAL PATENCY WRIST A: POSITIVE
AST SERPL-CCNC: 46 U/L (ref 10–30)
ATMOSPHERIC PRESS: 727 MMHG
BACTERIA SPEC AEROBE CULT: NORMAL
BACTERIA SPEC AEROBE CULT: NORMAL
BACTERIA UR QL AUTO: ABNORMAL /HPF
BASE EXCESS BLDA CALC-SCNC: 0.4 MMOL/L
BDY SITE: ABNORMAL
BILIRUB SERPL-MCNC: 0.2 MG/DL (ref 0.2–1.8)
BILIRUB UR QL STRIP: NEGATIVE
BODY TEMPERATURE: 98.5 C
BUN BLD-MCNC: 14 MG/DL (ref 7–21)
BUN/CREAT SERPL: 33.3 (ref 7–25)
CALCIUM SPEC-SCNC: 8.6 MG/DL (ref 7.7–10)
CHLORIDE SERPL-SCNC: 116 MMOL/L (ref 99–112)
CLARITY UR: CLEAR
CO2 SERPL-SCNC: 26.4 MMOL/L (ref 24.3–31.9)
COHGB MFR BLD: 1.3 % (ref 0–5)
COLOR UR: YELLOW
CREAT BLD-MCNC: 0.42 MG/DL (ref 0.43–1.29)
CRP SERPL-MCNC: 5.03 MG/DL (ref 0–0.99)
DEPRECATED RDW RBC AUTO: 61 FL (ref 37–54)
EOSINOPHIL # BLD MANUAL: 0.06 10*3/MM3 (ref 0–0.7)
EOSINOPHIL NFR BLD MANUAL: 1 % (ref 0–5)
ERYTHROCYTE [DISTWIDTH] IN BLOOD BY AUTOMATED COUNT: 21.1 % (ref 11.5–14.5)
GFR SERPL CREATININE-BSD FRML MDRD: >150 ML/MIN/1.73
GLOBULIN UR ELPH-MCNC: 3.6 GM/DL
GLUCOSE BLD-MCNC: 204 MG/DL (ref 70–110)
GLUCOSE BLDC GLUCOMTR-MCNC: 124 MG/DL (ref 70–130)
GLUCOSE BLDC GLUCOMTR-MCNC: 157 MG/DL (ref 70–130)
GLUCOSE BLDC GLUCOMTR-MCNC: 157 MG/DL (ref 70–130)
GLUCOSE BLDC GLUCOMTR-MCNC: 173 MG/DL (ref 70–130)
GLUCOSE BLDC GLUCOMTR-MCNC: 202 MG/DL (ref 70–130)
GLUCOSE UR STRIP-MCNC: NEGATIVE MG/DL
HCO3 BLDA-SCNC: 25.8 MMOL/L (ref 22–26)
HCT VFR BLD AUTO: 31.3 % (ref 37–47)
HCT VFR BLD CALC: 29 % (ref 37–47)
HGB BLD-MCNC: 8.9 G/DL (ref 12–16)
HGB BLDA-MCNC: 10 G/DL (ref 12–16)
HGB UR QL STRIP.AUTO: NEGATIVE
HOROWITZ INDEX BLD+IHG-RTO: 45 %
HYPOCHROMIA BLD QL: ABNORMAL
KETONES UR QL STRIP: NEGATIVE
LEUKOCYTE ESTERASE UR QL STRIP.AUTO: ABNORMAL
LYMPHOCYTES # BLD MANUAL: 0.65 10*3/MM3 (ref 1–3)
LYMPHOCYTES NFR BLD MANUAL: 11 % (ref 21–51)
LYMPHOCYTES NFR BLD MANUAL: 3 % (ref 0–10)
MAGNESIUM SERPL-MCNC: 2 MG/DL (ref 1.7–2.6)
MCH RBC QN AUTO: 22.7 PG (ref 27–33)
MCHC RBC AUTO-ENTMCNC: 28.4 G/DL (ref 33–37)
MCV RBC AUTO: 79.8 FL (ref 80–94)
METHGB BLD QL: 0.3 % (ref 0–3)
MODALITY: ABNORMAL
MONOCYTES # BLD AUTO: 0.18 10*3/MM3 (ref 0.1–0.9)
MYELOCYTES NFR BLD MANUAL: 1 % (ref 0–0)
NEUTROPHILS # BLD AUTO: 4.93 10*3/MM3 (ref 1.4–6.5)
NEUTROPHILS NFR BLD MANUAL: 83 % (ref 30–70)
NEUTS BAND NFR BLD MANUAL: 1 % (ref 4–12)
NITRITE UR QL STRIP: NEGATIVE
OSMOLALITY SERPL CALC.SUM OF ELEC: 293.2 MOSM/KG (ref 273–305)
OXYHGB MFR BLDV: 94.6 % (ref 85–100)
PCO2 BLDA: 45.4 MM HG (ref 35–45)
PEEP RESPIRATORY: 12 CM[H2O]
PH BLDA: 7.37 PH UNITS (ref 7.35–7.45)
PH UR STRIP.AUTO: 6.5 [PH] (ref 5–8)
PHOSPHATE SERPL-MCNC: 1.6 MG/DL (ref 2.7–4.5)
PLAT MORPH BLD: NORMAL
PLATELET # BLD AUTO: 146 10*3/MM3 (ref 130–400)
PMV BLD AUTO: 11.4 FL (ref 6–10)
PO2 BLDA: 80.9 MM HG (ref 80–100)
POIKILOCYTOSIS BLD QL SMEAR: ABNORMAL
POTASSIUM BLD-SCNC: 3.7 MMOL/L (ref 3.5–5.3)
PROT SERPL-MCNC: 6.2 G/DL (ref 6–8)
PROT UR QL STRIP: ABNORMAL
RBC # BLD AUTO: 3.92 10*6/MM3 (ref 4.2–5.4)
RBC # UR: ABNORMAL /HPF
REF LAB TEST METHOD: ABNORMAL
SAO2 % BLDCOA: 96.1 % (ref 90–100)
SCAN SLIDE: NORMAL
SET MECH RESP RATE: 16
SODIUM BLD-SCNC: 144 MMOL/L (ref 135–153)
SP GR UR STRIP: 1.01 (ref 1–1.03)
SQUAMOUS #/AREA URNS HPF: ABNORMAL /HPF
UROBILINOGEN UR QL STRIP: ABNORMAL
VENTILATOR MODE: AC
VT ON VENT VENT: 450 ML
WBC NRBC COR # BLD: 5.87 10*3/MM3 (ref 4.5–12.5)
WBC UR QL AUTO: ABNORMAL /HPF
YEAST URNS QL MICRO: ABNORMAL /HPF

## 2017-03-11 PROCEDURE — 25010000002 HEPARIN (PORCINE) PER 1000 UNITS: Performed by: INTERNAL MEDICINE

## 2017-03-11 PROCEDURE — 87088 URINE BACTERIA CULTURE: CPT | Performed by: INTERNAL MEDICINE

## 2017-03-11 PROCEDURE — 71010 HC CHEST AP: CPT

## 2017-03-11 PROCEDURE — 25010000002 MIDAZOLAM 0.5 MG/ML 100 ML NS: Performed by: INTERNAL MEDICINE

## 2017-03-11 PROCEDURE — 87040 BLOOD CULTURE FOR BACTERIA: CPT | Performed by: INTERNAL MEDICINE

## 2017-03-11 PROCEDURE — 82805 BLOOD GASES W/O2 SATURATION: CPT | Performed by: INTERNAL MEDICINE

## 2017-03-11 PROCEDURE — 94799 UNLISTED PULMONARY SVC/PX: CPT

## 2017-03-11 PROCEDURE — 25010000002 MEROPENEM: Performed by: INTERNAL MEDICINE

## 2017-03-11 PROCEDURE — 80053 COMPREHEN METABOLIC PANEL: CPT | Performed by: INTERNAL MEDICINE

## 2017-03-11 PROCEDURE — 85007 BL SMEAR W/DIFF WBC COUNT: CPT | Performed by: INTERNAL MEDICINE

## 2017-03-11 PROCEDURE — 81001 URINALYSIS AUTO W/SCOPE: CPT | Performed by: INTERNAL MEDICINE

## 2017-03-11 PROCEDURE — 85025 COMPLETE CBC W/AUTO DIFF WBC: CPT | Performed by: INTERNAL MEDICINE

## 2017-03-11 PROCEDURE — 36600 WITHDRAWAL OF ARTERIAL BLOOD: CPT | Performed by: INTERNAL MEDICINE

## 2017-03-11 PROCEDURE — 82375 ASSAY CARBOXYHB QUANT: CPT | Performed by: INTERNAL MEDICINE

## 2017-03-11 PROCEDURE — 63710000001 INSULIN ASPART PER 5 UNITS: Performed by: INTERNAL MEDICINE

## 2017-03-11 PROCEDURE — 25010000002 PROPOFOL 1000 MG/ML EMULSION: Performed by: INTERNAL MEDICINE

## 2017-03-11 PROCEDURE — 94003 VENT MGMT INPAT SUBQ DAY: CPT

## 2017-03-11 PROCEDURE — 83050 HGB METHEMOGLOBIN QUAN: CPT | Performed by: INTERNAL MEDICINE

## 2017-03-11 PROCEDURE — 25010000002 METHYLPREDNISOLONE PER 40 MG: Performed by: INTERNAL MEDICINE

## 2017-03-11 PROCEDURE — 99233 SBSQ HOSP IP/OBS HIGH 50: CPT | Performed by: INTERNAL MEDICINE

## 2017-03-11 PROCEDURE — 71010 XR CHEST AP: CPT | Performed by: RADIOLOGY

## 2017-03-11 PROCEDURE — 87106 FUNGI IDENTIFICATION YEAST: CPT | Performed by: INTERNAL MEDICINE

## 2017-03-11 PROCEDURE — 82962 GLUCOSE BLOOD TEST: CPT

## 2017-03-11 PROCEDURE — 25010000002 CEFTRIAXONE: Performed by: INTERNAL MEDICINE

## 2017-03-11 PROCEDURE — 84100 ASSAY OF PHOSPHORUS: CPT | Performed by: INTERNAL MEDICINE

## 2017-03-11 PROCEDURE — 86140 C-REACTIVE PROTEIN: CPT | Performed by: INTERNAL MEDICINE

## 2017-03-11 PROCEDURE — 83735 ASSAY OF MAGNESIUM: CPT | Performed by: INTERNAL MEDICINE

## 2017-03-11 PROCEDURE — 87086 URINE CULTURE/COLONY COUNT: CPT | Performed by: INTERNAL MEDICINE

## 2017-03-11 PROCEDURE — 63710000001 INSULIN DETEMIR PER 5 UNITS: Performed by: INTERNAL MEDICINE

## 2017-03-11 RX ORDER — ACETAMINOPHEN 160 MG/5ML
650 SOLUTION ORAL EVERY 6 HOURS PRN
Status: DISCONTINUED | OUTPATIENT
Start: 2017-03-11 | End: 2017-03-20 | Stop reason: HOSPADM

## 2017-03-11 RX ADMIN — METRONIDAZOLE 500 MG: 500 INJECTION, SOLUTION INTRAVENOUS at 12:21

## 2017-03-11 RX ADMIN — SUCRALFATE 1 G: 1 TABLET ORAL at 21:48

## 2017-03-11 RX ADMIN — Medication: at 19:17

## 2017-03-11 RX ADMIN — Medication: at 11:39

## 2017-03-11 RX ADMIN — POLYETHYLENE GLYCOL (3350) 17 G: 17 POWDER, FOR SOLUTION ORAL at 08:23

## 2017-03-11 RX ADMIN — HEPARIN SODIUM 5000 UNITS: 5000 INJECTION, SOLUTION INTRAVENOUS; SUBCUTANEOUS at 21:48

## 2017-03-11 RX ADMIN — SUCRALFATE 1 G: 1 TABLET ORAL at 11:27

## 2017-03-11 RX ADMIN — GUAIFENESIN 600 MG: 600 TABLET, EXTENDED RELEASE ORAL at 08:23

## 2017-03-11 RX ADMIN — GUAIFENESIN 600 MG: 600 TABLET, EXTENDED RELEASE ORAL at 17:24

## 2017-03-11 RX ADMIN — METRONIDAZOLE 500 MG: 500 INJECTION, SOLUTION INTRAVENOUS at 05:09

## 2017-03-11 RX ADMIN — METOPROLOL TARTRATE 25 MG: 25 TABLET, FILM COATED ORAL at 21:53

## 2017-03-11 RX ADMIN — SODIUM BICARBONATE TAB 650 MG 650 MG: 650 TAB at 17:24

## 2017-03-11 RX ADMIN — Medication: at 04:00

## 2017-03-11 RX ADMIN — VANCOMYCIN 125 MG: KIT at 23:30

## 2017-03-11 RX ADMIN — ASPIRIN 81 MG: 81 TABLET, CHEWABLE ORAL at 08:23

## 2017-03-11 RX ADMIN — DEXTROSE MONOHYDRATE 125 ML/HR: 50 INJECTION, SOLUTION INTRAVENOUS at 09:56

## 2017-03-11 RX ADMIN — Medication 3 MG/HR: at 01:33

## 2017-03-11 RX ADMIN — IPRATROPIUM BROMIDE 0.5 MG: 0.5 SOLUTION RESPIRATORY (INHALATION) at 12:00

## 2017-03-11 RX ADMIN — INSULIN ASPART 3 UNITS: 100 INJECTION, SOLUTION INTRAVENOUS; SUBCUTANEOUS at 17:24

## 2017-03-11 RX ADMIN — IPRATROPIUM BROMIDE 0.5 MG: 0.5 SOLUTION RESPIRATORY (INHALATION) at 06:25

## 2017-03-11 RX ADMIN — METOPROLOL TARTRATE 25 MG: 25 TABLET, FILM COATED ORAL at 08:24

## 2017-03-11 RX ADMIN — SODIUM BICARBONATE TAB 650 MG 650 MG: 650 TAB at 21:48

## 2017-03-11 RX ADMIN — Medication 4 MG/HR: at 14:22

## 2017-03-11 RX ADMIN — ACETAMINOPHEN 650 MG: 325 TABLET ORAL at 11:51

## 2017-03-11 RX ADMIN — VANCOMYCIN 125 MG: KIT at 11:27

## 2017-03-11 RX ADMIN — INSULIN DETEMIR 20 UNITS: 100 INJECTION, SOLUTION SUBCUTANEOUS at 21:49

## 2017-03-11 RX ADMIN — PROPOFOL 50 MCG/KG/MIN: 10 INJECTION, EMULSION INTRAVENOUS at 14:22

## 2017-03-11 RX ADMIN — METHYLPREDNISOLONE SODIUM SUCCINATE 10 MG: 40 INJECTION, POWDER, FOR SOLUTION INTRAMUSCULAR; INTRAVENOUS at 08:24

## 2017-03-11 RX ADMIN — MEROPENEM 1 G: 1 INJECTION, POWDER, FOR SOLUTION INTRAVENOUS at 04:08

## 2017-03-11 RX ADMIN — PROPOFOL 50 MCG/KG/MIN: 10 INJECTION, EMULSION INTRAVENOUS at 04:08

## 2017-03-11 RX ADMIN — PROPOFOL 50 MCG/KG/MIN: 10 INJECTION, EMULSION INTRAVENOUS at 21:48

## 2017-03-11 RX ADMIN — INSULIN ASPART 3 UNITS: 100 INJECTION, SOLUTION INTRAVENOUS; SUBCUTANEOUS at 11:30

## 2017-03-11 RX ADMIN — PROPOFOL 50 MCG/KG/MIN: 10 INJECTION, EMULSION INTRAVENOUS at 09:56

## 2017-03-11 RX ADMIN — IPRATROPIUM BROMIDE 0.5 MG: 0.5 SOLUTION RESPIRATORY (INHALATION) at 00:35

## 2017-03-11 RX ADMIN — PANTOPRAZOLE SODIUM 40 MG: 40 INJECTION, POWDER, FOR SOLUTION INTRAVENOUS at 05:09

## 2017-03-11 RX ADMIN — METRONIDAZOLE 500 MG: 500 INJECTION, SOLUTION INTRAVENOUS at 21:49

## 2017-03-11 RX ADMIN — SUCRALFATE 1 G: 1 TABLET ORAL at 17:24

## 2017-03-11 RX ADMIN — SODIUM BICARBONATE TAB 650 MG 650 MG: 650 TAB at 08:24

## 2017-03-11 RX ADMIN — CLOPIDOGREL 75 MG: 75 TABLET, FILM COATED ORAL at 08:24

## 2017-03-11 RX ADMIN — CEFTRIAXONE 2 G: 2 INJECTION, POWDER, FOR SOLUTION INTRAMUSCULAR; INTRAVENOUS at 09:56

## 2017-03-11 RX ADMIN — INSULIN ASPART 3 UNITS: 100 INJECTION, SOLUTION INTRAVENOUS; SUBCUTANEOUS at 08:25

## 2017-03-11 RX ADMIN — SUCRALFATE 1 G: 1 TABLET ORAL at 08:24

## 2017-03-11 RX ADMIN — VANCOMYCIN 125 MG: KIT at 17:24

## 2017-03-11 RX ADMIN — HEPARIN SODIUM 5000 UNITS: 5000 INJECTION, SOLUTION INTRAVENOUS; SUBCUTANEOUS at 08:24

## 2017-03-11 RX ADMIN — IPRATROPIUM BROMIDE 0.5 MG: 0.5 SOLUTION RESPIRATORY (INHALATION) at 18:18

## 2017-03-11 RX ADMIN — METHYLPREDNISOLONE SODIUM SUCCINATE: 40 INJECTION, POWDER, FOR SOLUTION INTRAMUSCULAR; INTRAVENOUS at 21:51

## 2017-03-11 NOTE — PLAN OF CARE
Problem: Patient Care Overview (Adult)  Goal: Plan of Care Review  Outcome: Ongoing (interventions implemented as appropriate)    03/10/17 2000 03/11/17 0324   Coping/Psychosocial Response Interventions   Plan Of Care Reviewed With other (see comments)  (No one at bedside. ) --    Patient Care Overview   Progress --  no change   Outcome Evaluation   Outcome Summary/Follow up Plan --  Pt. remains intubated and sedated.        Goal: Adult Individualization and Mutuality  Outcome: Ongoing (interventions implemented as appropriate)  Goal: Discharge Needs Assessment  Outcome: Ongoing (interventions implemented as appropriate)    Problem: Pneumonia (Adult)  Goal: Signs and Symptoms of Listed Potential Problems Will be Absent or Manageable (Pneumonia)  Outcome: Ongoing (interventions implemented as appropriate)    Problem: Diabetes, Type 2 (Adult)  Goal: Signs and Symptoms of Listed Potential Problems Will be Absent or Manageable (Diabetes, Type 2)  Outcome: Ongoing (interventions implemented as appropriate)    Problem: Mechanical Ventilation, Invasive (Adult)  Goal: Signs and Symptoms of Listed Potential Problems Will be Absent or Manageable (Mechanical Ventilation, Invasive)  Outcome: Ongoing (interventions implemented as appropriate)  Goal: Signs and Symptoms of Listed Potential Problems Will be Absent or Manageable (Mechanical Ventilation, Invasive)  Outcome: Ongoing (interventions implemented as appropriate)    Problem: SAFETY - NON-VIOLENT RESTRAINT  Goal: Remains free of injury from restraints (Non-Violent Restraint)  Outcome: Ongoing (interventions implemented as appropriate)  Goal: Free from restraint(s) (Non-Violent Restraint)  Outcome: Ongoing (interventions implemented as appropriate)    Problem: Pressure Ulcer Risk (Brendan Scale) (Adult,Obstetrics,Pediatric)  Goal: Identify Related Risk Factors and Signs and Symptoms  Outcome: Ongoing (interventions implemented as appropriate)  Goal: Skin Integrity  Outcome:  Ongoing (interventions implemented as appropriate)

## 2017-03-11 NOTE — PROGRESS NOTES
"  I have personally seen and examined the patient today and discussed overnight interval progress and pertinent issues with nursing staff.    ROS:    Unable to obtain due to the patients mental status, on the ventilator and sedated       History taken from: chart RN      Vital Signs    Visit Vitals   • /63   • Pulse 106   • Temp (!) 100.9 °F (38.3 °C)   • Resp 17   • Ht 63\" (160 cm)   • Wt 123 lb (55.8 kg)   • SpO2 98%   • BMI 21.79 kg/m2       Temp:  [98.5 °F (36.9 °C)-101 °F (38.3 °C)] 100.9 °F (38.3 °C)      Intake/Output Summary (Last 24 hours) at 03/11/17 0840  Last data filed at 03/11/17 0524   Gross per 24 hour   Intake 3906.5 ml   Output   3125 ml   Net  781.5 ml     Intake & Output (last 3 days)       03/08 0701 - 03/09 0700 03/09 0701 - 03/10 0700 03/10 0701 - 03/11 0700 03/11 0701 - 03/12 0700    I.V. (mL/kg) 681.4 (12.2) 950 (17) 1518.5 (27.2)     Blood  363      Other 052 97 2445     NG/ 142 938     IV Piggyback 1050 1050 550     Total Intake(mL/kg) 3082.4 (55.2) 2565 (46) 4006.5 (71.8)     Urine (mL/kg/hr) 1060 (0.8) 1325 (1) 3325 (2.5)     Stool  0 (0) 0 (0)     Total Output 1060 1325 3325      Net +2022.4 +1240 +681.5              Unmeasured Stool Occurrence  6 x 1 x           Physical Exam:      General Appearance:  intubated, sedated on ventilator, and respiratory distress    Head:  Normocephalic, without obvious abnormality, atraumatic   Eyes:      Lids and lashes normal, conjunctivae and sclerae normal, no icterus, no pallor, corneas clear, PERRLA   Ears:  Ears appear intact with no abnormalities noted   Throat: No oral lesions, no thrush, oral mucosa moist   Neck: No adenopathy, supple, trachea midline, no thyromegaly, no carotid bruit, no JVD   Back:  No tenderness to percussion or palpation, range of motion normal   Lungs:  decreased breath sounds. No wheezing, no ronchi and no crackles.   Heart:  Regular rhythm and normal rate, normal S1 and S2, no murmur, no gallop, no rub, no " click   Chest Wall:  No abnormalities observed   Abdomen:  Normal bowel sounds, no masses, no organomegaly, soft non-tender, non-distended, no guarding, no rebound tenderness   Rectal:  Deferred   Extremities: No edema, no cyanosis, no redness   Pulses: Pulses palpable and equal bilaterally   Skin: No bleeding, bruising or rash   Lymph nodes: No palpable adenopathy   Neurologic: sedated        Results:      Results from last 7 days  Lab Units 03/11/17  0520 03/10/17  0815 03/09/17  0027 03/08/17  0747 03/08/17  0533 03/07/17  0230 03/07/17  0036   WBC 10*3/mm3 5.87 5.49 9.15 7.65 7.86 5.13 4.86     Lab Results   Component Value Date    NEUTROABS 4.81 03/10/2017         Results from last 7 days  Lab Units 03/11/17  0520   CREATININE mg/dL 0.42*         Results from last 7 days  Lab Units 03/11/17  0520 03/10/17  0816 03/09/17  0027   CRP mg/dL 5.03* 7.61* 16.07*       Imaging Results (last 24 hours)     Procedure Component Value Units Date/Time    XR Chest AP [[id number]] Collected:  03/08/17 0831     Updated:  03/08/17 0834    Narrative:       EXAMINATION: XR CHEST AP-      CLINICAL INDICATION:     ARDS; J18.9-Pneumonia, unspecified organism     TECHNIQUE: Single AP view of chest.      COMPARISON: 3/7/2017      FINDINGS:   There is bibasilar atelectasis.   Grossly stable bilateral airspace disease.  Support tube and lines are in unchanged position.   Heart size is stable.  No pneumothorax.              Impression:       Grossly stable bilateral airspace disease.     This report was finalized on 3/8/2017 8:31 AM by Dr. Bret Morris MD.               Results Review:    I have personally reviewed laboratory data, culture results, radiology studies and antimicrobial therapy.    Hospital Medications (active)       Dose Frequency Start End    acetaminophen (TYLENOL) tablet 650 mg 650 mg Every 6 Hours PRN 3/2/2017     Sig - Route: Take 2 tablets by mouth Every 6 (Six) Hours As Needed for fever. - Oral    acetaminophen  (TYLENOL) tablet 650 mg 650 mg Once 3/6/2017     Sig - Route: Take 2 tablets by mouth 1 (One) Time. - Oral    aspirin chewable tablet 81 mg 81 mg Daily 3/7/2017     Sig - Route: Chew 1 tablet Daily. - Oral    Notes to Pharmacy: Patient intubated and RN requested changed from enteric coated tablet.    clopidogrel (PLAVIX) tablet 75 mg 75 mg Daily 3/1/2017     Sig - Route: Take 1 tablet by mouth Daily. - Oral    dextrose (D50W) solution 25 g 25 g Every 15 Minutes PRN 3/1/2017     Sig - Route: Infuse 50 mL into a venous catheter Every 15 (Fifteen) Minutes As Needed for low blood sugar (Blood Sugar Less Than 70, Patient Has IV Access - Unresponsive, NPO or Unable To Safely Swallow). - Intravenous    Cosign for Ordering: Accepted by Savanah Gutierrez MD on 3/1/2017  7:01 PM    dextrose (GLUTOSE) oral gel 15 g 15 g Every 15 Minutes PRN 3/1/2017     Sig - Route: Take 15 g by mouth Every 15 (Fifteen) Minutes As Needed for low blood sugar (Blood Sugar Less Than 70, Patient Alert, Is Not NPO & Can Safely Swallow). - Oral    Cosign for Ordering: Accepted by Savanah Gutierrez MD on 3/1/2017  7:01 PM    FENTANYL PCA 1500 MCG/30 ML (BHCOR) PCA  Continuous 3/3/2017 3/13/2017    Sig - Route: Infuse  into a venous catheter Continuous. - Intravenous    glucagon (GLUCAGEN) injection 1 mg 1 mg Every 15 Minutes PRN 3/1/2017     Sig - Route: Inject 1 mg under the skin Every 15 (Fifteen) Minutes As Needed (Blood Glucose Less Than 70 - Patient Without IV Access - Unresponsive, NPO or Unable To Safely Swallow). - Subcutaneous    Cosign for Ordering: Accepted by Savanah Gutierrez MD on 3/1/2017  7:01 PM    guaiFENesin (MUCINEX) 12 hr tablet 600 mg 600 mg 2 Times Daily 3/2/2017     Sig - Route: Take 1 tablet by mouth 2 (Two) Times a Day. - Oral    heparin (porcine) 5000 UNIT/ML injection 5,000 Units 5,000 Units Every 12 Hours Scheduled 3/1/2017     Sig - Route: Inject 1 mL under the skin Every 12 (Twelve) Hours. - Subcutaneous    insulin  "aspart (novoLOG) injection 0-14 Units 0-14 Units 4 Times Daily Before Meals & Nightly 3/1/2017     Sig - Route: Inject 0-14 Units under the skin 4 (Four) Times a Day Before Meals & at Bedtime. - Subcutaneous    insulin detemir (LEVEMIR) injection 20 Units 20 Units Nightly 3/4/2017     Sig - Route: Inject 20 Units under the skin Every Night. - Subcutaneous    ipratropium (ATROVENT) nebulizer solution 0.5 mg 0.5 mg Every 6 Hours PRN 3/2/2017     Sig - Route: Take 2.5 mL by nebulization Every 6 (Six) Hours As Needed for shortness of air. - Nebulization    Cosign for Ordering: Accepted by Sherrill Weiner DO on 3/2/2017  1:40 PM    ipratropium (ATROVENT) nebulizer solution 0.5 mg 0.5 mg Every 6 Hours - RT 3/3/2017     Sig - Route: Take 2.5 mL by nebulization Every 6 (Six) Hours. - Nebulization    magnesium sulfate 6 g in dextrose (D5W) 5 % 250 mL infusion 6 g As Needed 3/6/2017     Sig - Route: Infuse 6 g into a venous catheter As Needed (Mg 1.1-1.5 mg/dL). - Intravenous    Linked Group 1:  \"Or\" Linked Group Details        magnesium sulfate 8 g in dextrose (D5W) 5 % 250 mL infusion 8 g As Needed 3/6/2017     Sig - Route: Infuse 8 g into a venous catheter As Needed (Mg less than or equal to 1 mg/dL). - Intravenous    Linked Group 1:  \"Or\" Linked Group Details        magnesium sulfate in D5W 1g/100mL (PREMIX) IVPB 1 g 1 g As Needed 3/6/2017     Sig - Route: Infuse 100 mL into a venous catheter As Needed (Mg less than or equal 1 mg/dL). - Intravenous    Linked Group 1:  \"Or\" Linked Group Details        magnesium sulfate in D5W 1g/100mL (PREMIX) IVPB 1 g 1 g As Needed 3/6/2017     Sig - Route: Infuse 100 mL into a venous catheter As Needed (magnesium sulfate 1 g in D5W 100 mL IVPB - Mg 1.6 - 1.9 mg/dL). - Intravenous    Linked Group 1:  \"Or\" Linked Group Details        meropenem (MERREM) 1 g/100 mL 0.9% NS VTB (mbp) 1 g Every 8 Hours 3/7/2017     Sig - Route: Infuse 100 mL into a venous catheter Every 8 (Eight) Hours. - " "Intravenous    methylPREDNISolone sodium succinate (SOLU-Medrol) injection 20 mg 20 mg Every 12 Hours 3/5/2017     Sig - Route: Infuse 0.5 mL into a venous catheter Every 12 (Twelve) Hours. - Intravenous    metoprolol tartrate (LOPRESSOR) tablet 25 mg 25 mg Every 12 Hours Scheduled 3/2/2017     Sig - Route: Take 1 tablet by mouth Every 12 (Twelve) Hours. - Oral    midazolam (VERSED) injection 2 mg 2 mg Every 4 Hours PRN 3/5/2017     Sig - Route: Infuse 2 mL into a venous catheter Every 4 (Four) Hours As Needed for sedation. - Intravenous    midazolam 0.5 mg/mL 100 mL NS (VERSED) infusion 1-10 mg/hr Titrated 3/8/2017 3/18/2017    Sig - Route: Infuse 1-10 mg/hr into a venous catheter Dose Adjusted By Provider As Needed. - Intravenous    norepinephrine (LEVOPHED) 32 mcg/mL (8 mg/250 mL) infusion 0.02-0.3 mcg/kg/min × 57.3 kg Titrated 3/3/2017     Sig - Route: Infuse 1.146-17.19 mcg/min into a venous catheter Dose Adjusted By Provider As Needed. - Intravenous    pantoprazole (PROTONIX) injection 40 mg 40 mg Every Early Morning 3/2/2017     Sig - Route: Infuse 10 mL into a venous catheter Every Morning. - Intravenous    Cosign for Ordering: Accepted by Sherrill Weiner DO on 3/1/2017  3:38 PM    Pharmacy to dose vancomycin  Continuous PRN 3/2/2017     Sig - Route: Continuous As Needed for consult. - Does not apply    polyethylene glycol (MIRALAX) powder 17 g 17 g Daily 3/8/2017     Sig - Route: Take 17 g by mouth Daily. - Oral    potassium chloride (K-DUR,KLOR-CON) CR tablet 40 mEq 40 mEq Once 3/2/2017     Sig - Route: Take 2 tablets by mouth 1 (One) Time. - Oral    potassium chloride (KLOR-CON) packet 40 mEq 40 mEq As Needed 3/6/2017     Sig - Route: Take 40 mEq by mouth As Needed (potassium replacement, see admin instructions). - Oral    Linked Group 2:  \"Or\" Linked Group Details        potassium chloride (MICRO-K) CR capsule 40 mEq 40 mEq As Needed 3/6/2017     Sig - Route: Take 4 capsules by mouth As Needed " "(potassium replacement.  see admin instructions). - Oral    Linked Group 2:  \"Or\" Linked Group Details        potassium chloride 10 mEq in 100 mL IVPB 10 mEq Every 1 Hour PRN 3/6/2017     Sig - Route: Infuse 100 mL into a venous catheter Every 1 (One) Hour As Needed (potassium protocol PERIPHERAL - see admin instructions). - Intravenous    Linked Group 2:  \"Or\" Linked Group Details        promethazine (PHENERGAN) tablet 12.5 mg 12.5 mg Every 6 Hours PRN 3/1/2017     Sig - Route: Take 1 tablet by mouth Every 6 (Six) Hours As Needed for Nausea or Vomiting. - Oral    Cosign for Ordering: Accepted by Sherrill Weiner DO on 3/1/2017  3:38 PM    propofol (DIPRIVAN) infusion 10 mg/mL 100 mL 5-50 mcg/kg/min × 57.3 kg Titrated 3/3/2017     Sig - Route: Infuse 286.5-2,865 mcg/min into a venous catheter Dose Adjusted By Provider As Needed. - Intravenous    sodium chloride 0.9 % flush 1-10 mL 1-10 mL As Needed 3/1/2017     Sig - Route: Infuse 1-10 mL into a venous catheter As Needed for line care. - Intravenous    sodium chloride 0.9 % flush 10 mL 10 mL As Needed 2/28/2017     Sig - Route: Infuse 10 mL into a venous catheter As Needed for line care. - Intravenous    Linked Group 3:  \"And\" Linked Group Details        sucralfate (CARAFATE) tablet 1 g 1 g 4 Times Daily Before Meals & Nightly 3/1/2017     Sig - Route: Take 1 tablet by mouth 4 (Four) Times a Day Before Meals & at Bedtime. - Oral    Cosign for Ordering: Accepted by Sherrill Weiner DO on 3/1/2017  3:38 PM    vancomycin (VANCOCIN) 750 mg in sodium chloride 0.9 % 250 mL IVPB 750 mg Every 12 Hours 3/4/2017     Sig - Route: Infuse 750 mg into a venous catheter Every 12 (Twelve) Hours. - Intravenous    dexmedetomidine HCl (PRECEDEX) 4 mcg/mL in sodium chloride 0.9 % 100 mL infusion (Discontinued) 0.2-1.5 mcg/kg/hr × 55.8 kg Continuous 3/6/2017 3/7/2017    Sig - Route: Infuse 11.16-83.7 mcg/hr into a venous catheter Continuous. - Intravenous    Reason for " Discontinue: Formulary change    dexmedetomidine HCl (PRECEDEX) 4 mcg/mL in sodium chloride 0.9 % 100 mL infusion (Discontinued) 0.2-1.5 mcg/kg/hr × 55.8 kg Titrated 3/7/2017 3/8/2017    Sig - Route: Infuse 11.16-83.7 mcg/hr into a venous catheter Dose Adjusted By Provider As Needed. - Intravenous    metroNIDAZOLE (FLAGYL) IVPB 500 mg (Discontinued) 500 mg Every 8 Hours 3/3/2017 3/8/2017    Sig - Route: Infuse 100 mL into a venous catheter Every 8 (Eight) Hours. - Intravenous            Cultures:    BLOOD CULTURE   Date Value Ref Range Status   03/06/2017 No growth at 24 hours  Preliminary   03/06/2017 No growth at 24 hours  Preliminary   03/04/2017 No growth at 4 days  Preliminary   03/04/2017 No growth at 4 days  Preliminary   03/03/2017 No growth at 4 days  Preliminary   03/03/2017 No growth at 4 days  Preliminary       PROBLEM LIST:    Patient Active Problem List   Diagnosis   • Chest pain   • Chest pain of uncertain etiology   • Intractable nausea and vomiting   • Intractable cyclical vomiting with nausea   • Chronic obstructive pulmonary emphysema   • Hepatitis-C   • Diabetes mellitus type 2 in nonobese   • Arthritis or polyarthritis, rheumatoid   • Anxiety   • Essential hypertension   • Coronary artery disease   • Buerger's disease   • Cirrhosis   • Diabetes   • Chronic nausea   • Pancreatitis   • Bilateral pneumonia   • ARDS (adult respiratory distress syndrome)   • Hypoxia       Assessment/Plan     ASSESSMENT:    1. Severe sepsis with acute respiratory failure  2. Bilateral pneumonia     PLAN:    The patient was found to have positive C. difficile colitis overnight with one loose bowel movement.  Continues to have a PEEP of 12 with 45% FiO2.  Persistent fever up to 101°F.  ABG is improving.    This is very complicated at this time as the patient developed C. difficile colitis while requiring non-C. difficile antibiotics and this carries a very guarded prognosis regardless of type of intervention.  Due to  her concurrent C. difficile colitis infection IV vancomycin and meropenem therapy were discontinued and coverage was switched to Rocephin 2 g IV every 24 hours, Flagyl 500 mg IV every 8 hours, and oral vancomycin 125 mg by mouth every 6 hours.  Hope to discontinue Rocephin very soon as this could definitely worsen C. difficile colitis.  This is a very, complicated case and we will continue to follow very closely with you.    Unfortunately the patient is not going to do well despite appropriate interventions due to multiple comorbidities and high mortality risk.        Patients findings and recommendations were discussed with nursing staff    Code Status: Full Code     Written by Alycia Finn PA-C, acting as scribe for Dr. Hitchcock.    Alycia Finn PA-C  03/11/17  8:40 AM    Physician Attestation:    I have personally seen and examined the patient. I reviewed the patient's data including history of present illness, review of systems, physical examination, assessment and treatment plan and agree with findings above. The assessment and plan are my own.  I have reviewed and edited the note above after discussing the findings with Alycia Finn PA-C.    Evan Hitchcock MD  Infectious Diseases  03/11/17  8:40 AM

## 2017-03-11 NOTE — PROGRESS NOTES
LOS: 11 days     Chief Complaint:  Pulmonology is following for bilateral pneumonia/ARDS    Subjective     Interval History:   The patient was emergently intubated 3/3/17.    No events overnight still agitated and continues to require hi doses of sedatives- precedex was marginally effective  Switched to versed drip in addition to propofol- precedex has been  stopped- with good result  Stable overnight- no acute events    History taken from: chart family Patient unable to give history due to emergent nature of case.    Review of Systems:   Review of Systems   Unable to perform ROS: Severe respiratory distress   and emergent nature of case.                  Objective     Vital Signs  Temp:  [98.5 °F (36.9 °C)-101 °F (38.3 °C)] 100.9 °F (38.3 °C)  Heart Rate:  [] 106  Resp:  [16-29] 17  BP: (106-158)/(51-84) 125/63  FiO2 (%):  [45 %] 45 %  Body mass index is 21.79 kg/(m^2).    Intake/Output Summary (Last 24 hours) at 03/11/17 0923  Last data filed at 03/11/17 0524   Gross per 24 hour   Intake 3906.5 ml   Output   3125 ml   Net  781.5 ml          Physical Exam:  GENERAL APPEARANCE: Patient was restless and in severe respiratory distress.    HEAD: normocephalic.    EYES: PERRL    NECK: Neck supple.     CARDIAC: Normal S1 and S2. No S3 or murmurs. Rhythm is regular. There is no peripheral edema, cyanosis or pallor. Extremities are warm and well perfused. Capillary refill is less than 2 seconds. No carotid bruits.no mottling noted today    LUNGS: coarse bilaterally no wheezing    ABDOMEN: Positive bowel sounds. Soft, nondistended, nontender.     EXTREMITIES: No significant deformity or joint abnormality. No edema. Peripheral pulses intact.     NEUROLOGICAL: intubated and sedated    PSYCHIATRIC:  Unable to assess               Results Review:                I reviewed the patient's new clinical results.  I reviewed the patient's new imaging results and agree with the interpretation.    Results from last 7 days  Lab  Units 03/11/17  0520 03/10/17  1843 03/10/17  0936 03/10/17  0815  03/09/17  0027   WBC 10*3/mm3 5.87  --   --  5.49  --  9.15   HEMOGLOBIN g/dL 8.9* 8.4* 7.5* 7.5*  < > 7.0*   PLATELETS 10*3/mm3 146  --   --  128*  --  145   < > = values in this interval not displayed.    Results from last 7 days  Lab Units 03/11/17  0520 03/10/17  0815 03/09/17  0027 03/08/17  0533   SODIUM mmol/L 144 145 149 149   POTASSIUM mmol/L 3.7 4.4 3.5 4.0   CHLORIDE mmol/L 116* 119* 123* 126*   TOTAL CO2 mmol/L 26.4 25.7 19.1* 21.2*   BUN mg/dL 14 27* 31* 29*   CREATININE mg/dL 0.42* 0.51 0.48 0.46   CALCIUM mg/dL 8.6 8.5 8.5 8.4   GLUCOSE mg/dL 204* 288* 181* 164*   MAGNESIUM mg/dL 2.0 2.3  --  2.2     Lab Results   Component Value Date    INR 1.01 12/17/2016    INR 0.94 11/20/2016    INR <0.90 02/06/2016    PROTIME 11.4 12/17/2016    PROTIME 10.6 11/20/2016    PROTIME 10.0 02/06/2016       Results from last 7 days  Lab Units 03/11/17  0520 03/10/17  0815 03/09/17  0027   ALK PHOS U/L 160* 133* 139*   BILIRUBIN mg/dL 0.2 0.2 0.4   ALT (SGPT) U/L 45* 26 25   AST (SGOT) U/L 46* 18 37*       Results from last 7 days  Lab Units 03/11/17  0454   PH, ARTERIAL pH units 7.373   PO2 ART mm Hg 80.9   PCO2, ARTERIAL mm Hg 45.4*   HCO3 ART mmol/L 25.8   HIV-1 / O / 2 Ag / Antibody 4th Generation   Order: [id number] - Part of Panel Order [id number]   Status:  Final result   Visible to patient:  No (Not Released)      Ref Range & Units 1d ago     HIV-1/ HIV-2 Non-Reactive Non-Reactive   Resulting Agency   [facility]   Narrative     A non-reactive test result does not preclude the possibility of exposure to HIV or infection with HIV. An antibody response to recent exposure may take several months to reach detectable levels.                      Imaging Results (last 24 hours)     Procedure Component Value Units Date/Time    XR Chest AP [[id number]] Collected:  03/02/17 0936     Updated:  03/02/17 0940    Narrative:       EXAMINATION: XR CHEST AP-       CLINICAL INDICATION:     SOB; J18.9-Pneumonia, unspecified organism     TECHNIQUE:  XR CHEST AP-      COMPARISON: 02/28/2017      FINDINGS:   Progression of bilateral airspace disease right greater than left lung.  Opacities are more pronounced in the right upper lobe and at the  periphery of both lungs. Favor bacterial pneumonia, but peripheral  predominant opacities can also be seen with other atypical etiologies  including eosinophilic pneumonia.   Heart and mediastinal contours are unremarkable.   No pneumothorax.   No pleural effusion.   No acute osseous findings.            Impression:       1. Marked interval worsening of right lung pneumonia and now developing  left lung airspace disease probably representing diffuse pneumonia.  Considerations as described above.  2. Otherwise stable chest.     This report was finalized on 3/2/2017 9:38 AM by Dr. Wali Tracy MD.       XR Chest 1 View [[id number]] Resulted:  03/03/17 0924     Updated:  03/03/17 0707             Medication Review:   Scheduled Medications:    acetaminophen 650 mg Oral Once   aspirin 81 mg Oral Daily   ceftriaxone 2 g Intravenous Q24H   clopidogrel 75 mg Oral Daily   guaiFENesin 600 mg Oral BID   heparin (porcine) 5,000 Units Subcutaneous Q12H   insulin aspart 0-14 Units Subcutaneous 4x Daily AC & at Bedtime   insulin detemir 20 Units Subcutaneous Nightly   ipratropium 0.5 mg Nebulization Q6H - RT   methylPREDNISolone sodium succinate 10 mg Intravenous Q12H   metoprolol tartrate 25 mg Oral Q12H   metroNIDAZOLE 500 mg Intravenous Q8H   pantoprazole 40 mg Intravenous Q AM   polyethylene glycol 17 g Oral Daily   potassium chloride 40 mEq Oral Once   sodium bicarbonate 650 mg Oral TID   sucralfate 1 g Oral 4x Daily AC & at Bedtime   vancomycin 125 mg Oral Q6H     Continuous infusions:    dextrose 125 mL/hr Last Rate: 125 mL/hr (03/10/17 2026)   fentaNYL (SUBLIMAZE) PCA 1500 mcg/30 mL syringe     midazolam 0.5 mg/mL 100 mL NS 1-10 mg/hr Last Rate:  4 mg/hr (03/11/17 4393)   norepinephrine 0.02-0.3 mcg/kg/min Last Rate: Stopped (03/04/17 2668)   Pharmacy to dose vancomycin     propofol 5-50 mcg/kg/min Last Rate: 50 mcg/kg/min (03/11/17 8566)       Assessment/Plan     CXR:              Expiratory image  Unchanged compared to yesterday given this    Respiratory:      Vent settings:   RR16  FIO2 45 % PEEP 12 CXR looks improved    HIV negative    Pink frothy sputum has been suctioned from ET tube.     Will continue IV steroids(dose decreased), scheduled inhalants and PRN neb treatments.      ID: Continue current IV antibiotics. Vanc/Azithro    Cardiac:  Stable.    Renal: BUN/Creatinine WNL.- may need diuresis for now she is hypernatremic- FWF added by nephrology who have been consulted.    Endocrine: monitor glucose targeting 140-180.    Electrolytes:  Monitor levels, manage and replete per protocols.  Monitor for refeeding syndrome- patients nutritional status is poor.  Patient has a normal anion gap metabolic acidosis with hyperchloremia- no diarrhea and normal K+ suspect RTA- on  Bicarb tabs.  Acidemia has been compensated and is improved with improvement in acidosis which is now mild- HCO3 has normalized     GI:  Nutrition consult placed for tube feedings.    Hematology:   Monitor blood counts, transfuse at or below 7 g/dL.  Unless patient is actively bleeding then began transfusing at 8 g/dL.     Case was discussed with  ccu nurse     Patients  is in ARDS with P/F 120==>200==>158 ==>198==>342!==>179   her FiO2 remains at  45%: continue current supportive care. Continue with FiO2 goal below 60% and pO2 of greater or equal to 60 mmHg  If unable to wean from respirator by Tuesday next week she will need a tracheostomy for prolonged weaning.    Continue current supportive care overall seems to be improving steadily although I still think she will need a tracheostomy and prolonged weaning- an LTACH setting would be appropriate will engage SW in this  regard.      Patient Active Problem List   Diagnosis Code   • Chest pain R07.9   • Chest pain of uncertain etiology R07.89   • Intractable nausea and vomiting R11.2   • Intractable cyclical vomiting with nausea G43.A1   • Chronic obstructive pulmonary emphysema J43.9   • Hepatitis-C B19.20   • Diabetes mellitus type 2 in nonobese E11.9   • Arthritis or polyarthritis, rheumatoid M06.9   • Anxiety F41.9   • Essential hypertension I10   • Coronary artery disease I25.10   • Buerger's disease I73.1   • Cirrhosis K74.60   • Diabetes E11.9   • Chronic nausea R11.0   • Pancreatitis K85.90   • Bilateral pneumonia J18.9   • ARDS (adult respiratory distress syndrome) J80   • Hypoxia R09.02                  Benigno Cabral MD  03/11/17  9:23 AM

## 2017-03-11 NOTE — PROGRESS NOTES
Subjective     History:   Pepito Yan is a 51 y.o. female admitted on 2/28/2017 secondary to Bilateral pneumonia     Procedures:   3/3/17: Emergent intubation  3/3/17: Right femoral central line placement  3/3/17: Right IJ central line placement    Transfusions:  3/9/17: 1 unit of PRBC's     Patient seen and examined with GUERRERO Resendiz. Sedated on vent support. Appears more labored today. Diagnosed with C diff colitis last evening after stool studies were obtained. Febrile overnight. Remains hemodynamically stable. Tolerating TF's.     History taken from: chart, and RN.      Objective     Vital Signs  Temp:  [98.5 °F (36.9 °C)-101 °F (38.3 °C)] 100.9 °F (38.3 °C)  Heart Rate:  [] 106  Resp:  [16-29] 17  BP: (106-158)/(51-84) 125/63  FiO2 (%):  [45 %] 45 %    Intake/Output Summary (Last 24 hours) at 03/11/17 0909  Last data filed at 03/11/17 0524   Gross per 24 hour   Intake 3906.5 ml   Output   3125 ml   Net  781.5 ml         Physical Exam:  General:    Sedated on vent support   Heart:      Normal S1 and S2. Tachycardic. No significant murmur, rubs or gallops appreciated.   Lungs:    Appears more labored today. Decreased bibasilar breath sounds. No wheezes or rhonchi.    Abdomen:   Soft and nontender. No guarding, rebound tenderness or  organomegaly noted. Bowel sounds present x 4.   Extremities:  No clubbing, cyanosis or edema noted.      Results Review:      Results from last 7 days  Lab Units 03/11/17  0520 03/10/17  1843 03/10/17  0936 03/10/17  0815 03/09/17  2056 03/09/17  0847 03/09/17  0027 03/08/17  0747 03/08/17  0533 03/07/17  0230 03/07/17  0036   WBC 10*3/mm3 5.87  --   --  5.49  --   --  9.15 7.65 7.86 5.13 4.86   HEMOGLOBIN g/dL 8.9* 8.4* 7.5* 7.5* 8.4* 6.3* 7.0* 7.3* 7.4* 9.4* 6.7*   PLATELETS 10*3/mm3 146  --   --  128*  --   --  145 166 154 100* 126*       Results from last 7 days  Lab Units 03/11/17  0520 03/10/17  0815 03/09/17  0027 03/08/17  0533 03/08/17  0356 03/07/17  1426  03/07/17  0036   SODIUM mmol/L 144 145 149 149 149 151 151   POTASSIUM mmol/L 3.7 4.4 3.5 4.0 4.3 4.3 3.5   CHLORIDE mmol/L 116* 119* 123* 126* 124* 127* 123*   TOTAL CO2 mmol/L 26.4 25.7 19.1* 21.2* 22.4* 23.9* 24.0*   BUN mg/dL 14 27* 31* 29* 31* 33* 34*   CREATININE mg/dL 0.42* 0.51 0.48 0.46 0.41* 0.49 0.58   CALCIUM mg/dL 8.6 8.5 8.5 8.4 8.4 8.2 8.1   GLUCOSE mg/dL 204* 288* 181* 164* 180* 222* 180*       Results from last 7 days  Lab Units 03/11/17  0520 03/10/17  0815 03/09/17  0027 03/08/17  0533 03/08/17  0356 03/07/17  0036 03/06/17  0049   BILIRUBIN mg/dL 0.2 0.2 0.4 0.3 0.2 0.3 0.3   ALK PHOS U/L 160* 133* 139* 106* 102 84 70   AST (SGOT) U/L 46* 18 37* 26 26 35* 67*   ALT (SGPT) U/L 45* 26 25 20 19 30 30       Results from last 7 days  Lab Units 03/11/17  0520 03/10/17  0815 03/08/17  0533 03/08/17  0356 03/07/17  0036 03/05/17  0111   MAGNESIUM mg/dL 2.0 2.3 2.2 2.1 2.2 2.6               Imaging Results (last 24 hours)     Procedure Component Value Units Date/Time    XR Abdomen KUB [[id number]] Collected:  03/05/17 1051     Updated:  03/05/17 1054    Narrative:       XR ABDOMEN KUB-     REASON FOR EXAM:  Abdominal distention; J18.9-Pneumonia, unspecified  organism.     FINDINGS:  A nasogastric tube is demonstrated in the upper abdomen. The  tip of the tube overlies the distal stomach. There is evidence of a  catheter in the urinary bladder. The bowel gas pattern shows no evidence  of obstruction. There are fairly heavy atherosclerotic calcifications in  the aorta and its branches.       Impression:       NG tube tip overlies the antrum of the stomach. There is no  evidence of bowel obstruction.        This report was finalized on 3/5/2017 10:52 AM by Dr. Scott Britton II, MD.       XR Chest 1 View [[id number]] Collected:  03/05/17 1052     Updated:  03/05/17 1153    Narrative:       XR CHEST 1 VW-     REASON FOR EXAM:  Follow up pneumonia/ARDS; J18.9-Pneumonia, unspecified  organism.     COMPARISON: The  chest is compared with an earlier chest done 03/04/2017.     FINDINGS:  The endotracheal tube tip overlies the tracheal air column at  the level of the aortic knob. The central line and nasogastric tubes are  in appropriate position. The cardiac silhouette is stable. The lungs  show persistent but improving interstitial and alveolar infiltrates in  both lungs. No pleural effusions have developed.       Impression:       Continued improvement in the aeration of the lungs.        This report was finalized on 3/5/2017 11:51 AM by Dr. Scott Britton II, MD.       XR Chest 1 View [[id number]] Collected:  03/06/17 0801     Updated:  03/06/17 0803    Narrative:       EXAMINATION: XR CHEST 1 VW-      CLINICAL INDICATION:     follow up pneumonia/ards; J18.9-Pneumonia,  unspecified organism     TECHNIQUE: Single AP view of chest.      COMPARISON: 3/5/2017      FINDINGS:   There is bibasilar atelectasis.   Stable bilateral airspace disease.  Support tube and lines are in unchanged position.   Heart size is stable.  No pneumothorax.   Tiny bilateral pleural effusions persist.        Impression:       Stable appearance of the chest.     This report was finalized on 3/6/2017 8:01 AM by Dr. Bret Morris MD.               Medications:    acetaminophen 650 mg Oral Once   aspirin 81 mg Oral Daily   ceftriaxone 2 g Intravenous Q24H   clopidogrel 75 mg Oral Daily   guaiFENesin 600 mg Oral BID   heparin (porcine) 5,000 Units Subcutaneous Q12H   insulin aspart 0-14 Units Subcutaneous 4x Daily AC & at Bedtime   insulin detemir 20 Units Subcutaneous Nightly   ipratropium 0.5 mg Nebulization Q6H - RT   methylPREDNISolone sodium succinate 10 mg Intravenous Q12H   metoprolol tartrate 25 mg Oral Q12H   metroNIDAZOLE 500 mg Intravenous Q8H   pantoprazole 40 mg Intravenous Q AM   polyethylene glycol 17 g Oral Daily   potassium chloride 40 mEq Oral Once   sodium bicarbonate 650 mg Oral TID   sucralfate 1 g Oral 4x Daily AC & at Bedtime    vancomycin 125 mg Oral Q6H       dextrose 125 mL/hr Last Rate: 125 mL/hr (03/10/17 2026)   fentaNYL (SUBLIMAZE) PCA 1500 mcg/30 mL syringe     midazolam 0.5 mg/mL 100 mL NS 1-10 mg/hr Last Rate: 4 mg/hr (03/11/17 0437)   norepinephrine 0.02-0.3 mcg/kg/min Last Rate: Stopped (03/04/17 0355)   Pharmacy to dose vancomycin     propofol 5-50 mcg/kg/min Last Rate: 50 mcg/kg/min (03/11/17 1010)           Assessment/Plan   Severe sepsis: Likely 2/2 B/L community acquired pneumonia and UTI with concerns for superimposed nosocomial pneumonia and now C diff colitis. ID initially broadened antibiotic coverage to to Vanc and Merrem. In light of (+) C diff, abx regimen has been changed to PO Vanc, IV Rocephin and IV Flagyl. WBC is stable with improving CRP. Repeat labs in the AM. ID input appreciated.     ARDS: Likely 2/2 sepsis and pneumonia. Repeat CXR appears stable today. Currently on broad spectrum IV abx as outlined above. FiO2 requirements are improving but pt continues to require increased PEEP. Cont vent management per pulm. Repeat ABG and CXR in the AM. Pulm input appreciated.     Acute exacerbation of COPD: Cont steroids, bronchodilators and vent support. Cont dose of steroids today. Pulm input appreciated.     Acute on chronic microcytic anemia with hx of iron deficiency: S/P 1 unit of PRBC's on 3/9 with improvement in H&H. Stable this AM. Cont to monitor closely.     Hypernatremia: Nephrology consulted and added free water flushes to TF's. D5W added after Na+ failed to continue to improve. Frequency of flushes increased yesterday. Improved this AM. Repeat labs in the AM. Nephrology input appreciated.     C diff colitis: Cont PO Vanc, IV Flagyl and isolation.     DM II, insulin-dependent: BG levels improved today. Cont current regimen and cont TF's. Will make further insulin adjustments if BG levels remain elevated.      Elevated liver enzymes: Stable. Viral hepatitis panel reveals Hep C Ab reactivity. HIV is  non-reactive. Repeat CMP in the AM.     Thrombocytopenia: Possibly 2/2 acute illness. Now resolved. Repeat CBC in the AM.     PPX  GI: PPI  DVT: SQ heparin      Pt is at high risk 2/2 severe sepsis, bilateral pneumonia, ARDS, UTI, COPD, anemia and hx of noncompliance.           Manuel Elizalde,   03/11/17  9:09 AM

## 2017-03-11 NOTE — PROGRESS NOTES
Interval History:     Patient Complaints: Intubated on ventilator tolerating feeds with slight improvement in sodium after intervention yesterday.  Discussed with primary nurse Astrid        Vital Signs  Temp:  [97.5 °F (36.4 °C)-99.1 °F (37.3 °C)] 98.5 °F (36.9 °C)  Heart Rate:  [] 109  Resp:  [15-29] 28  BP: (107-138)/(55-84) 112/71  FiO2 (%):  [45 %-55 %] 45 %    Physical Exam:    General:             Intubated      HEENT:  no cyanosis                Neck:   mild JVD        Lungs:     added crackles     Heart:   no rub       Abdomen:     Normal bowel sounds, soft non-tender, non-distended, no guarding       Extremities:   trace edema        Skin:   No petechiae, no rash            Results Review:     I reviewed the patient's new clinical results.    Lab Results (last 24 hours)     Procedure Component Value Units Date/Time    Hemoglobin & Hematocrit, Blood [[id number]]  (Abnormal) Collected:  03/09/17 2056    Specimen:  Blood Updated:  03/09/17 2122     Hemoglobin 8.4 (L) g/dL       Post Transfusion Specimen        Hematocrit 28.9 (L) %     POC Glucose Fingerstick [[id number]]  (Abnormal) Collected:  03/09/17 2241    Specimen:  Blood Updated:  03/09/17 2248     Glucose 215 (H) mg/dL     Narrative:       Meter: [id number] : [id number] [place] R    Blood Culture [[id number]]  (Normal) Collected:  03/06/17 2111    Specimen:  Blood from Hand, Right Updated:  03/09/17 2301     Blood Culture No growth at 3 days     Blood Culture [[id number]]  (Normal) Collected:  03/06/17 2215    Specimen:  Blood from Hand, Left Updated:  03/09/17 2301     Blood Culture No growth at 3 days     Blood Gas, Arterial [[id number]]  (Abnormal) Collected:  03/10/17 0419    Specimen:  Arterial Blood Updated:  03/10/17 0429     Site Arterial: right radial      Micheal's Test N/A      pH, Arterial 7.308 (L) pH units      pCO2, Arterial 43.6 mm Hg      pO2, Arterial 188.1 (H) mm Hg      HCO3, Arterial 21.4 (L) mmol/L      Base Excess,  Arterial -4.7 mmol/L      O2 Saturation, Arterial 99.5 %      Hemoglobin, Blood Gas 8.9 (L) g/dL      Hematocrit, Blood Gas 26.0 (L) %      Oxyhemoglobin 98.0 %      Methemoglobin 0.4 %      Carboxyhemoglobin 1.1 %      A-a Gradiant 140.7 mmHg      Temperature 98.6 C      Barometric Pressure for Blood Gas 733 mmHg      Modality Ventilator      FIO2 55 %      Ventilator Mode AC/VC      Set Tidal Volume 450      Set Mech Resp Rate 16      PEEP 12     POC Glucose Fingerstick [[id number]]  (Abnormal) Collected:  03/10/17 0652    Specimen:  Blood Updated:  03/10/17 0657     Glucose 320 (H) mg/dL     Narrative:       Meter: [id number] : [id number] Jai Lockwood    POC Glucose Fingerstick [[id number]]  (Abnormal) Collected:  03/10/17 0839    Specimen:  Blood Updated:  03/10/17 0851     Glucose 264 (H) mg/dL     Narrative:       Meter: [id number] : [id number] [place] STYLES    C-reactive Protein [[id number]]  (Abnormal) Collected:  03/10/17 0816    Specimen:  Blood Updated:  03/10/17 0854     C-Reactive Protein 7.61 (H) mg/dL     Magnesium [[id number]]  (Normal) Collected:  03/10/17 0815    Specimen:  Blood Updated:  03/10/17 0855     Magnesium 2.3 mg/dL     Comprehensive Metabolic Panel [[id number]]  (Abnormal) Collected:  03/10/17 0815    Specimen:  Blood Updated:  03/10/17 0859     Glucose 288 (H) mg/dL      BUN 27 (H) mg/dL      Creatinine 0.51 mg/dL      Sodium 145 mmol/L      Potassium 4.4 mmol/L      Chloride 119 (H) mmol/L      CO2 25.7 mmol/L      Calcium 8.5 mg/dL      Total Protein 6.0 g/dL      Albumin 2.60 (L) g/dL      ALT (SGPT) 26 U/L      AST (SGOT) 18 U/L      Alkaline Phosphatase 133 (H) U/L       Note New Reference Ranges        Total Bilirubin 0.2 mg/dL      eGFR Non African Amer 127 mL/min/1.73      Globulin 3.4 gm/dL      A/G Ratio 0.8 (L) g/dL      BUN/Creatinine Ratio 52.9 (H)      Anion Gap 0.3 (L) mmol/L     Osmolality, Calculated [[id number]]  (Normal) Collected:  03/10/17 0815    Specimen:  Blood  Updated:  03/10/17 0859     Osmolality Calc 304.3 mOsm/kg     CBC Auto Differential [[id number]]  (Abnormal) Collected:  03/10/17 0815    Specimen:  Blood Updated:  03/10/17 0920     WBC 5.49 10*3/mm3      RBC 3.38 (L) 10*6/mm3      Hemoglobin 7.5 (L) g/dL      Hematocrit 26.2 (L) %      MCV 77.5 (L) fL      MCH 22.2 (L) pg      MCHC 28.6 (L) g/dL      RDW 20.7 (H) %      RDW-SD 58.8 (H) fl      Platelets 128 (L) 10*3/mm3      Neutrophil % 87.6 (H) %      Lymphocyte % 5.6 (L) %      Monocyte % 4.0 %      Eosinophil % 0.0 %      Basophil % 0.2 %      Immature Grans % 2.6 (H) %      Neutrophils, Absolute 4.81 10*3/mm3      Lymphocytes, Absolute 0.31 (L) 10*3/mm3      Monocytes, Absolute 0.22 10*3/mm3      Eosinophils, Absolute 0.00 10*3/mm3      Basophils, Absolute 0.01 10*3/mm3      Immature Grans, Absolute 0.14 (H) 10*3/mm3      nRBC 0.0 /100 WBC     CBC & Differential [[id number]] Collected:  03/10/17 0815    Specimen:  Blood Updated:  03/10/17 0920    Narrative:       The following orders were created for panel order CBC & Differential.  Procedure                               Abnormality         Status                     ---------                               -----------         ------                     Scan Slide[[id number]]                                        Final result               CBC Auto Differential[[id number]]         Abnormal            Final result                 Please view results for these tests on the individual orders.    Scan Slide [[id number]] Collected:  03/10/17 0815    Specimen:  Blood Updated:  03/10/17 0920     Anisocytosis Large/3+      Hypochromia Mod/2+      Poikilocytes Slight/1+      Platelet Morphology Normal     Hemoglobin & Hematocrit, Blood [[id number]]  (Abnormal) Collected:  03/10/17 0936    Specimen:  Blood Updated:  03/10/17 0954     Hemoglobin 7.5 (L) g/dL      Hematocrit 25.3 (L) %     POC Glucose Fingerstick [[id number]]  (Abnormal) Collected:  03/10/17 5950    Specimen:  Blood  Updated:  03/10/17 1205     Glucose 176 (H) mg/dL     Narrative:       Meter: [id number] : [id number] Mao STYLES    POC Glucose Fingerstick [[id number]]  (Normal) Collected:  03/10/17 1719    Specimen:  Blood Updated:  03/10/17 1724     Glucose 114 mg/dL     Narrative:       Meter: [id number] : [id number] Mao STYLES    Hemoglobin & Hematocrit, Blood [[id number]]  (Abnormal) Collected:  03/10/17 1843    Specimen:  Blood Updated:  03/10/17 1901     Hemoglobin 8.4 (L) g/dL      Hematocrit 29.4 (L) %     Clostridium Difficile Toxin [[id number]] Collected:  03/10/17 1309    Specimen:  Stool from Per Rectum Updated:  03/10/17 1911    Narrative:       The following orders were created for panel order Clostridium Difficile Toxin.  Procedure                               Abnormality         Status                     ---------                               -----------         ------                     Clostridium Difficile Tox...[[id number]]  Abnormal            Final result                 Please view results for these tests on the individual orders.    Clostridium Difficile Toxin, PCR [[id number]]  (Abnormal) Collected:  03/10/17 1309    Specimen:  Stool from Per Rectum Updated:  03/10/17 1911     C. Difficile Toxins by PCR Positive (C)      027 Toxin Presumptive Negative     Narrative:         For In Vitro Diagnostic use only.  027-NAP1-BI results are NOT intended to guide treatment. 027 typing is relative to PCR ribotyping and shown to be equivalent to B1 typing by restriction endonuclease analysis or NAP1 typing by pulse field gel electrophoresis.          Imaging Results (last 24 hours)     Procedure Component Value Units Date/Time    XR Chest AP [[id number]] Collected:  03/10/17 0701     Updated:  03/10/17 0703    Narrative:       EXAMINATION: XR CHEST AP-      CLINICAL INDICATION:     ARDS; J18.9-Pneumonia, unspecified organism     TECHNIQUE: Single AP view of chest.      COMPARISON: 3/9/2017      FINDINGS:    There is bibasilar atelectasis.   Bilateral airspace disease is stable.  Support tube and lines are in unchanged position.   Heart size is stable.  No pneumothorax.   Tiny bilateral pleural effusions persist.        Impression:       Stable appearance of the chest.     This report was finalized on 3/10/2017 7:01 AM by Dr. Bret Morris MD.             Assessment and Plan:    1.  Hypernatremia: Will increase water per G-tube and will give her 1 more liter of dextrose  2.  ARDS with bilateral pneumonia    Discussed with primary RN to make sure that she would tolerate the extra fluid    Sandoval Macdonald MD  03/10/17  8:30 PM

## 2017-03-11 NOTE — PLAN OF CARE
Problem: Patient Care Overview (Adult)  Goal: Plan of Care Review  Outcome: Ongoing (interventions implemented as appropriate)  Goal: Adult Individualization and Mutuality  Outcome: Ongoing (interventions implemented as appropriate)  Goal: Discharge Needs Assessment  Outcome: Ongoing (interventions implemented as appropriate)    Problem: Pneumonia (Adult)  Goal: Signs and Symptoms of Listed Potential Problems Will be Absent or Manageable (Pneumonia)  Outcome: Ongoing (interventions implemented as appropriate)    Problem: Diabetes, Type 2 (Adult)  Goal: Signs and Symptoms of Listed Potential Problems Will be Absent or Manageable (Diabetes, Type 2)  Outcome: Ongoing (interventions implemented as appropriate)    Problem: Mechanical Ventilation, Invasive (Adult)  Goal: Signs and Symptoms of Listed Potential Problems Will be Absent or Manageable (Mechanical Ventilation, Invasive)  Outcome: Ongoing (interventions implemented as appropriate)  Goal: Signs and Symptoms of Listed Potential Problems Will be Absent or Manageable (Mechanical Ventilation, Invasive)  Outcome: Ongoing (interventions implemented as appropriate)    Problem: SAFETY - NON-VIOLENT RESTRAINT  Goal: Remains free of injury from restraints (Non-Violent Restraint)  Outcome: Outcome(s) achieved Date Met:  03/11/17  Goal: Free from restraint(s) (Non-Violent Restraint)  Outcome: Outcome(s) achieved Date Met:  03/11/17    Problem: Pressure Ulcer Risk (Brendan Scale) (Adult,Obstetrics,Pediatric)  Goal: Identify Related Risk Factors and Signs and Symptoms  Outcome: Ongoing (interventions implemented as appropriate)  Goal: Skin Integrity  Outcome: Ongoing (interventions implemented as appropriate)

## 2017-03-12 ENCOUNTER — APPOINTMENT (OUTPATIENT)
Dept: GENERAL RADIOLOGY | Facility: HOSPITAL | Age: 52
End: 2017-03-12

## 2017-03-12 LAB
A-A DO2: 140 MMHG (ref 0–300)
ALBUMIN SERPL-MCNC: 2.4 G/DL (ref 3.5–5)
ALBUMIN/GLOB SERPL: 0.8 G/DL (ref 1.5–2.5)
ALP SERPL-CCNC: 145 U/L (ref 35–104)
ALT SERPL W P-5'-P-CCNC: 62 U/L (ref 10–36)
ANION GAP SERPL CALCULATED.3IONS-SCNC: 5.2 MMOL/L (ref 3.6–11.2)
ANISOCYTOSIS BLD QL: ABNORMAL
ARTERIAL PATENCY WRIST A: ABNORMAL
AST SERPL-CCNC: 59 U/L (ref 10–30)
ATMOSPHERIC PRESS: 735 MMHG
BASE EXCESS BLDA CALC-SCNC: 2.9 MMOL/L
BDY SITE: ABNORMAL
BILIRUB SERPL-MCNC: 0.2 MG/DL (ref 0.2–1.8)
BODY TEMPERATURE: 98.6 C
BUN BLD-MCNC: 16 MG/DL (ref 7–21)
BUN/CREAT SERPL: 38.1 (ref 7–25)
CALCIUM SPEC-SCNC: 7.9 MG/DL (ref 7.7–10)
CHLORIDE SERPL-SCNC: 110 MMOL/L (ref 99–112)
CO2 SERPL-SCNC: 26.8 MMOL/L (ref 24.3–31.9)
COHGB MFR BLD: 0.9 % (ref 0–5)
CREAT BLD-MCNC: 0.42 MG/DL (ref 0.43–1.29)
CRP SERPL-MCNC: 6.63 MG/DL (ref 0–0.99)
DEPRECATED RDW RBC AUTO: 57.2 FL (ref 37–54)
ERYTHROCYTE [DISTWIDTH] IN BLOOD BY AUTOMATED COUNT: 21.6 % (ref 11.5–14.5)
GFR SERPL CREATININE-BSD FRML MDRD: >150 ML/MIN/1.73
GLOBULIN UR ELPH-MCNC: 3 GM/DL
GLUCOSE BLD-MCNC: 341 MG/DL (ref 70–110)
GLUCOSE BLDC GLUCOMTR-MCNC: 204 MG/DL (ref 70–130)
GLUCOSE BLDC GLUCOMTR-MCNC: 207 MG/DL (ref 70–130)
GLUCOSE BLDC GLUCOMTR-MCNC: 212 MG/DL (ref 70–130)
GLUCOSE BLDC GLUCOMTR-MCNC: 310 MG/DL (ref 70–130)
HCO3 BLDA-SCNC: 26 MMOL/L (ref 22–26)
HCT VFR BLD AUTO: 29.3 % (ref 37–47)
HCT VFR BLD CALC: 28 % (ref 37–47)
HGB BLD-MCNC: 8.2 G/DL (ref 12–16)
HGB BLDA-MCNC: 9.5 G/DL (ref 12–16)
HOROWITZ INDEX BLD+IHG-RTO: 45 %
HYPOCHROMIA BLD QL: ABNORMAL
LYMPHOCYTES # BLD MANUAL: 0.5 10*3/MM3 (ref 1–3)
LYMPHOCYTES NFR BLD MANUAL: 2 % (ref 0–10)
LYMPHOCYTES NFR BLD MANUAL: 9 % (ref 21–51)
MAGNESIUM SERPL-MCNC: 1.9 MG/DL (ref 1.7–2.6)
MCH RBC QN AUTO: 22.2 PG (ref 27–33)
MCHC RBC AUTO-ENTMCNC: 28 G/DL (ref 33–37)
MCV RBC AUTO: 79.4 FL (ref 80–94)
METHGB BLD QL: 0.1 % (ref 0–3)
MODALITY: ABNORMAL
MONOCYTES # BLD AUTO: 0.11 10*3/MM3 (ref 0.1–0.9)
MYELOCYTES NFR BLD MANUAL: 7 % (ref 0–0)
NEUTROPHILS # BLD AUTO: 4.53 10*3/MM3 (ref 1.4–6.5)
NEUTROPHILS NFR BLD MANUAL: 74 % (ref 30–70)
NEUTS BAND NFR BLD MANUAL: 8 % (ref 4–12)
OSMOLALITY SERPL CALC.SUM OF ELEC: 297.8 MOSM/KG (ref 273–305)
OXYHGB MFR BLDV: 98 % (ref 85–100)
PCO2 BLDA: 34.3 MM HG (ref 35–45)
PEEP RESPIRATORY: 12 CM[H2O]
PH BLDA: 7.5 PH UNITS (ref 7.35–7.45)
PHOSPHATE SERPL-MCNC: 2.1 MG/DL (ref 2.7–4.5)
PLAT MORPH BLD: NORMAL
PLATELET # BLD AUTO: 176 10*3/MM3 (ref 130–400)
PMV BLD AUTO: 10.8 FL (ref 6–10)
PO2 BLDA: 130.6 MM HG (ref 80–100)
POIKILOCYTOSIS BLD QL SMEAR: ABNORMAL
POTASSIUM BLD-SCNC: 3.8 MMOL/L (ref 3.5–5.3)
PROT SERPL-MCNC: 5.4 G/DL (ref 6–8)
RBC # BLD AUTO: 3.69 10*6/MM3 (ref 4.2–5.4)
SAO2 % BLDCOA: 99 % (ref 90–100)
SCAN SLIDE: NORMAL
SET MECH RESP RATE: 16
SODIUM BLD-SCNC: 142 MMOL/L (ref 135–153)
VENTILATOR MODE: ABNORMAL
VT ON VENT VENT: 450 ML
WBC NRBC COR # BLD: 5.52 10*3/MM3 (ref 4.5–12.5)

## 2017-03-12 PROCEDURE — 36600 WITHDRAWAL OF ARTERIAL BLOOD: CPT | Performed by: INTERNAL MEDICINE

## 2017-03-12 PROCEDURE — 94799 UNLISTED PULMONARY SVC/PX: CPT

## 2017-03-12 PROCEDURE — 99233 SBSQ HOSP IP/OBS HIGH 50: CPT | Performed by: INTERNAL MEDICINE

## 2017-03-12 PROCEDURE — 82375 ASSAY CARBOXYHB QUANT: CPT | Performed by: INTERNAL MEDICINE

## 2017-03-12 PROCEDURE — 82962 GLUCOSE BLOOD TEST: CPT

## 2017-03-12 PROCEDURE — 25010000003 MICAFUNGIN SODIUM 100 MG RECONSTITUTED SOLUTION 1 EACH VIAL: Performed by: INTERNAL MEDICINE

## 2017-03-12 PROCEDURE — 82805 BLOOD GASES W/O2 SATURATION: CPT | Performed by: INTERNAL MEDICINE

## 2017-03-12 PROCEDURE — 94003 VENT MGMT INPAT SUBQ DAY: CPT

## 2017-03-12 PROCEDURE — 83050 HGB METHEMOGLOBIN QUAN: CPT | Performed by: INTERNAL MEDICINE

## 2017-03-12 PROCEDURE — 63710000001 INSULIN ASPART PER 5 UNITS: Performed by: INTERNAL MEDICINE

## 2017-03-12 PROCEDURE — 63710000001 INSULIN DETEMIR PER 5 UNITS: Performed by: INTERNAL MEDICINE

## 2017-03-12 PROCEDURE — 85007 BL SMEAR W/DIFF WBC COUNT: CPT | Performed by: INTERNAL MEDICINE

## 2017-03-12 PROCEDURE — 25010000002 MAGNESIUM SULFATE IN D5W 1G/100ML (PREMIX) 10-5 MG/ML-% SOLUTION: Performed by: INTERNAL MEDICINE

## 2017-03-12 PROCEDURE — 80053 COMPREHEN METABOLIC PANEL: CPT | Performed by: INTERNAL MEDICINE

## 2017-03-12 PROCEDURE — 85025 COMPLETE CBC W/AUTO DIFF WBC: CPT | Performed by: INTERNAL MEDICINE

## 2017-03-12 PROCEDURE — 25010000002 CEFTRIAXONE: Performed by: INTERNAL MEDICINE

## 2017-03-12 PROCEDURE — 25010000002 MIDAZOLAM 0.5 MG/ML 100 ML NS: Performed by: INTERNAL MEDICINE

## 2017-03-12 PROCEDURE — 25010000002 HEPARIN (PORCINE) PER 1000 UNITS: Performed by: INTERNAL MEDICINE

## 2017-03-12 PROCEDURE — 25010000002 METHYLPREDNISOLONE PER 40 MG: Performed by: INTERNAL MEDICINE

## 2017-03-12 PROCEDURE — 83735 ASSAY OF MAGNESIUM: CPT | Performed by: INTERNAL MEDICINE

## 2017-03-12 PROCEDURE — 84100 ASSAY OF PHOSPHORUS: CPT | Performed by: INTERNAL MEDICINE

## 2017-03-12 PROCEDURE — 86140 C-REACTIVE PROTEIN: CPT | Performed by: INTERNAL MEDICINE

## 2017-03-12 PROCEDURE — 25010000002 PROPOFOL 1000 MG/ML EMULSION: Performed by: INTERNAL MEDICINE

## 2017-03-12 PROCEDURE — 71010 XR CHEST AP: CPT | Performed by: RADIOLOGY

## 2017-03-12 PROCEDURE — 71010 HC CHEST AP: CPT

## 2017-03-12 RX ORDER — SODIUM CHLORIDE 9 MG/ML
INJECTION, SOLUTION INTRAVENOUS
Status: DISPENSED
Start: 2017-03-12 | End: 2017-03-13

## 2017-03-12 RX ADMIN — IPRATROPIUM BROMIDE 0.5 MG: 0.5 SOLUTION RESPIRATORY (INHALATION) at 18:39

## 2017-03-12 RX ADMIN — GUAIFENESIN 600 MG: 600 TABLET, EXTENDED RELEASE ORAL at 17:35

## 2017-03-12 RX ADMIN — SODIUM BICARBONATE TAB 650 MG 650 MG: 650 TAB at 16:13

## 2017-03-12 RX ADMIN — VANCOMYCIN 125 MG: KIT at 05:02

## 2017-03-12 RX ADMIN — INSULIN DETEMIR 20 UNITS: 100 INJECTION, SOLUTION SUBCUTANEOUS at 20:45

## 2017-03-12 RX ADMIN — HEPARIN SODIUM 5000 UNITS: 5000 INJECTION, SOLUTION INTRAVENOUS; SUBCUTANEOUS at 20:44

## 2017-03-12 RX ADMIN — IPRATROPIUM BROMIDE 0.5 MG: 0.5 SOLUTION RESPIRATORY (INHALATION) at 06:45

## 2017-03-12 RX ADMIN — SODIUM BICARBONATE TAB 650 MG 650 MG: 650 TAB at 20:45

## 2017-03-12 RX ADMIN — INSULIN ASPART 5 UNITS: 100 INJECTION, SOLUTION INTRAVENOUS; SUBCUTANEOUS at 17:50

## 2017-03-12 RX ADMIN — Medication: at 11:32

## 2017-03-12 RX ADMIN — Medication 10 ML: at 16:14

## 2017-03-12 RX ADMIN — INSULIN ASPART 5 UNITS: 100 INJECTION, SOLUTION INTRAVENOUS; SUBCUTANEOUS at 11:53

## 2017-03-12 RX ADMIN — VANCOMYCIN 125 MG: KIT at 12:20

## 2017-03-12 RX ADMIN — METHYLPREDNISOLONE SODIUM SUCCINATE 10 MG: 40 INJECTION, POWDER, FOR SOLUTION INTRAMUSCULAR; INTRAVENOUS at 09:23

## 2017-03-12 RX ADMIN — IPRATROPIUM BROMIDE 0.5 MG: 0.5 SOLUTION RESPIRATORY (INHALATION) at 12:43

## 2017-03-12 RX ADMIN — SUCRALFATE 1 G: 1 TABLET ORAL at 17:35

## 2017-03-12 RX ADMIN — METRONIDAZOLE 500 MG: 500 INJECTION, SOLUTION INTRAVENOUS at 20:44

## 2017-03-12 RX ADMIN — SUCRALFATE 1 G: 1 TABLET ORAL at 05:02

## 2017-03-12 RX ADMIN — PANTOPRAZOLE SODIUM 40 MG: 40 INJECTION, POWDER, FOR SOLUTION INTRAVENOUS at 05:02

## 2017-03-12 RX ADMIN — METOPROLOL TARTRATE 25 MG: 25 TABLET, FILM COATED ORAL at 09:23

## 2017-03-12 RX ADMIN — PROPOFOL 50 MCG/KG/MIN: 10 INJECTION, EMULSION INTRAVENOUS at 20:44

## 2017-03-12 RX ADMIN — POTASSIUM PHOSPHATE, MONOBASIC AND POTASSIUM PHOSPHATE, DIBASIC 15 MMOL: 224; 236 INJECTION, SOLUTION INTRAVENOUS at 11:33

## 2017-03-12 RX ADMIN — PROPOFOL 50 MCG/KG/MIN: 10 INJECTION, EMULSION INTRAVENOUS at 04:01

## 2017-03-12 RX ADMIN — CLOPIDOGREL 75 MG: 75 TABLET, FILM COATED ORAL at 09:23

## 2017-03-12 RX ADMIN — MAGNESIUM SULFATE IN DEXTROSE 1 G: 10 INJECTION, SOLUTION INTRAVENOUS at 12:16

## 2017-03-12 RX ADMIN — MAGNESIUM SULFATE IN DEXTROSE 1 G: 10 INJECTION, SOLUTION INTRAVENOUS at 09:53

## 2017-03-12 RX ADMIN — METHYLPREDNISOLONE SODIUM SUCCINATE 10 MG: 40 INJECTION, POWDER, FOR SOLUTION INTRAMUSCULAR; INTRAVENOUS at 20:45

## 2017-03-12 RX ADMIN — IPRATROPIUM BROMIDE 0.5 MG: 0.5 SOLUTION RESPIRATORY (INHALATION) at 00:22

## 2017-03-12 RX ADMIN — METOPROLOL TARTRATE 25 MG: 25 TABLET, FILM COATED ORAL at 20:45

## 2017-03-12 RX ADMIN — DEXTROSE MONOHYDRATE 125 ML/HR: 50 INJECTION, SOLUTION INTRAVENOUS at 03:54

## 2017-03-12 RX ADMIN — Medication 4 MG/HR: at 17:34

## 2017-03-12 RX ADMIN — Medication: at 19:12

## 2017-03-12 RX ADMIN — DEXTROSE MONOHYDRATE 125 ML/HR: 50 INJECTION, SOLUTION INTRAVENOUS at 20:44

## 2017-03-12 RX ADMIN — MICAFUNGIN SODIUM 100 MG: 20 INJECTION, POWDER, LYOPHILIZED, FOR SOLUTION INTRAVENOUS at 10:32

## 2017-03-12 RX ADMIN — METRONIDAZOLE 500 MG: 500 INJECTION, SOLUTION INTRAVENOUS at 15:01

## 2017-03-12 RX ADMIN — CEFTRIAXONE 2 G: 2 INJECTION, POWDER, FOR SOLUTION INTRAMUSCULAR; INTRAVENOUS at 09:21

## 2017-03-12 RX ADMIN — MAGNESIUM SULFATE IN DEXTROSE 1 G: 10 INJECTION, SOLUTION INTRAVENOUS at 11:17

## 2017-03-12 RX ADMIN — VANCOMYCIN 125 MG: KIT at 17:35

## 2017-03-12 RX ADMIN — ASPIRIN 81 MG: 81 TABLET, CHEWABLE ORAL at 09:24

## 2017-03-12 RX ADMIN — METRONIDAZOLE 500 MG: 500 INJECTION, SOLUTION INTRAVENOUS at 04:35

## 2017-03-12 RX ADMIN — VANCOMYCIN 125 MG: KIT at 23:36

## 2017-03-12 RX ADMIN — INSULIN ASPART 10 UNITS: 100 INJECTION, SOLUTION INTRAVENOUS; SUBCUTANEOUS at 06:27

## 2017-03-12 RX ADMIN — SUCRALFATE 1 G: 1 TABLET ORAL at 11:17

## 2017-03-12 RX ADMIN — SODIUM BICARBONATE TAB 650 MG 650 MG: 650 TAB at 09:24

## 2017-03-12 RX ADMIN — Medication 4 MG/HR: at 04:35

## 2017-03-12 RX ADMIN — PROPOFOL 50 MCG/KG/MIN: 10 INJECTION, EMULSION INTRAVENOUS at 14:28

## 2017-03-12 RX ADMIN — Medication: at 03:55

## 2017-03-12 RX ADMIN — HEPARIN SODIUM 5000 UNITS: 5000 INJECTION, SOLUTION INTRAVENOUS; SUBCUTANEOUS at 09:23

## 2017-03-12 RX ADMIN — INSULIN ASPART 5 UNITS: 100 INJECTION, SOLUTION INTRAVENOUS; SUBCUTANEOUS at 22:21

## 2017-03-12 RX ADMIN — PROPOFOL 50 MCG/KG/MIN: 10 INJECTION, EMULSION INTRAVENOUS at 10:06

## 2017-03-12 RX ADMIN — MAGNESIUM SULFATE IN DEXTROSE 1 G: 10 INJECTION, SOLUTION INTRAVENOUS at 13:20

## 2017-03-12 RX ADMIN — GUAIFENESIN 600 MG: 600 TABLET, EXTENDED RELEASE ORAL at 09:23

## 2017-03-12 NOTE — SIGNIFICANT NOTE
Scabs under nose, under lip, bilateral elbows;  excoriation buttocks and perianal area; bruise abdomen; bilateral outer elbows pink, blanchable, intact; psoriasis right lower calf above ankle

## 2017-03-12 NOTE — PLAN OF CARE
Problem: Patient Care Overview (Adult)  Goal: Plan of Care Review  Outcome: Ongoing (interventions implemented as appropriate)  Goal: Adult Individualization and Mutuality  Outcome: Ongoing (interventions implemented as appropriate)    Problem: Pneumonia (Adult)  Goal: Signs and Symptoms of Listed Potential Problems Will be Absent or Manageable (Pneumonia)  Outcome: Ongoing (interventions implemented as appropriate)    Problem: Diabetes, Type 2 (Adult)  Goal: Signs and Symptoms of Listed Potential Problems Will be Absent or Manageable (Diabetes, Type 2)  Outcome: Ongoing (interventions implemented as appropriate)    Problem: Mechanical Ventilation, Invasive (Adult)  Goal: Signs and Symptoms of Listed Potential Problems Will be Absent or Manageable (Mechanical Ventilation, Invasive)  Outcome: Ongoing (interventions implemented as appropriate)  Goal: Signs and Symptoms of Listed Potential Problems Will be Absent or Manageable (Mechanical Ventilation, Invasive)  Outcome: Ongoing (interventions implemented as appropriate)

## 2017-03-12 NOTE — PLAN OF CARE
Problem: Patient Care Overview (Adult)  Goal: Discharge Needs Assessment  Outcome: Ongoing (interventions implemented as appropriate)    03/08/17 1630 03/12/17 1607   Discharge Needs Assessment   Concerns To Be Addressed --  decision making concerns;cognitive/perceptual concerns;home safety concerns;medication concerns   Readmission Within The Last 30 Days --  no previous admission in last 30 days   Equipment Needed After Discharge walker, rolling --    Discharge Disposition --  still a patient   Current Health   Anticipated Changes Related to Illness --  none   Self-Care   Equipment Currently Used at Home --  oxygen   Living Environment   Transportation Available --  none         Problem: Pneumonia (Adult)  Goal: Signs and Symptoms of Listed Potential Problems Will be Absent or Manageable (Pneumonia)  Outcome: Ongoing (interventions implemented as appropriate)    03/12/17 1607   Pneumonia   Problems Assessed (Pneumonia) all   Problems Present (Pneumonia) respiratory compromise         Problem: Diabetes, Type 2 (Adult)  Goal: Signs and Symptoms of Listed Potential Problems Will be Absent or Manageable (Diabetes, Type 2)  Outcome: Ongoing (interventions implemented as appropriate)    03/12/17 1607   Diabetes, Type 2   Problems Assessed (Type 2 Diabetes) all   Problems Present (Type 2 Diabetes) none         Problem: Mechanical Ventilation, Invasive (Adult)  Goal: Signs and Symptoms of Listed Potential Problems Will be Absent or Manageable (Mechanical Ventilation, Invasive)  Outcome: Ongoing (interventions implemented as appropriate)    03/11/17 0324 03/12/17 1607   Mechanical Ventilation, Invasive   Problems Assessed (Mechanical Ventilation, Invasive) --  all   Problems Present (Mechanical Ventilation, Invasive) immobility;inability to wean --        Goal: Signs and Symptoms of Listed Potential Problems Will be Absent or Manageable (Mechanical Ventilation, Invasive)  Outcome: Ongoing (interventions implemented as  appropriate)    03/11/17 0324 03/12/17 1607   Mechanical Ventilation, Invasive   Problems Assessed (Mechanical Ventilation, Invasive) --  all   Problems Present (Mechanical Ventilation, Invasive) immobility;inability to wean --          Problem: Pressure Ulcer Risk (Brendan Scale) (Adult,Obstetrics,Pediatric)  Goal: Identify Related Risk Factors and Signs and Symptoms  Outcome: Ongoing (interventions implemented as appropriate)    03/12/17 1607   Pressure Ulcer Risk (Brendan Scale)   Related Risk Factors (Pressure Ulcer Risk (Brendan Scale)) age extremes;cognitive impairment;critical care admission;fluid intake inadequate;excretions/secretions;hypothermia/hyperthermia;medical devices;medication;mental impairment;mobility impaired;nutritional deficiencies;tissue perfusion altered       Goal: Skin Integrity  Outcome: Ongoing (interventions implemented as appropriate)    03/12/17 1607   Pressure Ulcer Risk (Brendan Scale) (Adult,Obstetrics,Pediatric)   Skin Integrity making progress toward outcome

## 2017-03-12 NOTE — PROGRESS NOTES
Subjective     History:   Pepito Yan is a 51 y.o. female admitted on 2/28/2017 secondary to Bilateral pneumonia     Procedures:   3/3/17: Emergent intubation  3/3/17: Right femoral central line placement  3/3/17: Right IJ central line placement    Transfusions:  3/9/17: 1 unit of PRBC's     Patient seen and examined with GUERRERO Noriega. Sedated on vent support. Appears less labored today. Continues to have large amounts of loose stools. Febrile again overnight. Tolerating TF's well with no residuals. Remains hemodynamically stable. No acute events overnight per RN.     History taken from: chart, and RN.      Objective     Vital Signs  Temp:  [99.6 °F (37.6 °C)-101.1 °F (38.4 °C)] 99.8 °F (37.7 °C)  Heart Rate:  [] 101  Resp:  [17-24] 18  BP: ()/(53-89) 114/67  FiO2 (%):  [45 %] 45 %    Intake/Output Summary (Last 24 hours) at 03/12/17 0913  Last data filed at 03/12/17 0454   Gross per 24 hour   Intake 4131.51 ml   Output   3435 ml   Net 696.51 ml         Physical Exam:  General:    Sedated on vent support   Heart:      Normal S1 and S2. Tachycardic. No significant murmur, rubs or gallops appreciated.   Lungs:    Appears more labored today. Decreased bibasilar breath sounds. No wheezes or rhonchi.    Abdomen:   Soft and nontender. No guarding, rebound tenderness or  organomegaly noted. Bowel sounds present x 4.   Extremities:  Tr edema in LE B/L.       Results Review:      Results from last 7 days  Lab Units 03/12/17  0518 03/11/17  0520 03/10/17  1843 03/10/17  0936 03/10/17  0815 03/09/17  2056 03/09/17  0847 03/09/17  0027 03/08/17  0747 03/08/17  0533 03/07/17  0230   WBC 10*3/mm3 5.52 5.87  --   --  5.49  --   --  9.15 7.65 7.86 5.13   HEMOGLOBIN g/dL 8.2* 8.9* 8.4* 7.5* 7.5* 8.4* 6.3* 7.0* 7.3* 7.4* 9.4*   PLATELETS 10*3/mm3 176 146  --   --  128*  --   --  145 166 154 100*       Results from last 7 days  Lab Units 03/12/17  0518 03/11/17  0520 03/10/17  0815 03/09/17  0027 03/08/17  0533  03/08/17  0356 03/07/17  1426   SODIUM mmol/L 142 144 145 149 149 149 151   POTASSIUM mmol/L 3.8 3.7 4.4 3.5 4.0 4.3 4.3   CHLORIDE mmol/L 110 116* 119* 123* 126* 124* 127*   TOTAL CO2 mmol/L 26.8 26.4 25.7 19.1* 21.2* 22.4* 23.9*   BUN mg/dL 16 14 27* 31* 29* 31* 33*   CREATININE mg/dL 0.42* 0.42* 0.51 0.48 0.46 0.41* 0.49   CALCIUM mg/dL 7.9 8.6 8.5 8.5 8.4 8.4 8.2   GLUCOSE mg/dL 341* 204* 288* 181* 164* 180* 222*       Results from last 7 days  Lab Units 03/12/17  0518 03/11/17  0520 03/10/17  0815 03/09/17  0027 03/08/17  0533 03/08/17  0356 03/07/17  0036   BILIRUBIN mg/dL 0.2 0.2 0.2 0.4 0.3 0.2 0.3   ALK PHOS U/L 145* 160* 133* 139* 106* 102 84   AST (SGOT) U/L 59* 46* 18 37* 26 26 35*   ALT (SGPT) U/L 62* 45* 26 25 20 19 30       Results from last 7 days  Lab Units 03/12/17  0518 03/11/17  0520 03/10/17  0815 03/08/17  0533 03/08/17  0356 03/07/17  0036   MAGNESIUM mg/dL 1.9 2.0 2.3 2.2 2.1 2.2               Imaging Results (last 24 hours)     Procedure Component Value Units Date/Time    XR Abdomen KUB [[id number]] Collected:  03/05/17 1051     Updated:  03/05/17 1054    Narrative:       XR ABDOMEN KUB-     REASON FOR EXAM:  Abdominal distention; J18.9-Pneumonia, unspecified  organism.     FINDINGS:  A nasogastric tube is demonstrated in the upper abdomen. The  tip of the tube overlies the distal stomach. There is evidence of a  catheter in the urinary bladder. The bowel gas pattern shows no evidence  of obstruction. There are fairly heavy atherosclerotic calcifications in  the aorta and its branches.       Impression:       NG tube tip overlies the antrum of the stomach. There is no  evidence of bowel obstruction.        This report was finalized on 3/5/2017 10:52 AM by Dr. Scott Britton II, MD.       XR Chest 1 View [[id number]] Collected:  03/05/17 1052     Updated:  03/05/17 1153    Narrative:       XR CHEST 1 VW-     REASON FOR EXAM:  Follow up pneumonia/ARDS; J18.9-Pneumonia, unspecified  organism.      COMPARISON: The chest is compared with an earlier chest done 03/04/2017.     FINDINGS:  The endotracheal tube tip overlies the tracheal air column at  the level of the aortic knob. The central line and nasogastric tubes are  in appropriate position. The cardiac silhouette is stable. The lungs  show persistent but improving interstitial and alveolar infiltrates in  both lungs. No pleural effusions have developed.       Impression:       Continued improvement in the aeration of the lungs.        This report was finalized on 3/5/2017 11:51 AM by Dr. Scott Britton II, MD.       XR Chest 1 View [[id number]] Collected:  03/06/17 0801     Updated:  03/06/17 0803    Narrative:       EXAMINATION: XR CHEST 1 VW-      CLINICAL INDICATION:     follow up pneumonia/ards; J18.9-Pneumonia,  unspecified organism     TECHNIQUE: Single AP view of chest.      COMPARISON: 3/5/2017      FINDINGS:   There is bibasilar atelectasis.   Stable bilateral airspace disease.  Support tube and lines are in unchanged position.   Heart size is stable.  No pneumothorax.   Tiny bilateral pleural effusions persist.        Impression:       Stable appearance of the chest.     This report was finalized on 3/6/2017 8:01 AM by Dr. Bret Morris MD.               Medications:    acetaminophen 650 mg Oral Once   aspirin 81 mg Oral Daily   ceftriaxone 2 g Intravenous Q24H   clopidogrel 75 mg Oral Daily   guaiFENesin 600 mg Oral BID   heparin (porcine) 5,000 Units Subcutaneous Q12H   insulin aspart 0-14 Units Subcutaneous 4x Daily AC & at Bedtime   insulin detemir 20 Units Subcutaneous Nightly   ipratropium 0.5 mg Nebulization Q6H - RT   magnesium sulfate in D5W 1g/100mL (PREMIX) 1 g Intravenous Q1H   methylPREDNISolone sodium succinate 10 mg Intravenous Q12H   metoprolol tartrate 25 mg Oral Q12H   metroNIDAZOLE 500 mg Intravenous Q8H   micafungin (MYCAMINE)  mg Intravenous Q24H   pantoprazole 40 mg Intravenous Q AM   polyethylene glycol 17 g Oral  Daily   potassium chloride 40 mEq Oral Once   potassium phosphate 15 mmol Intravenous Once   sodium bicarbonate 650 mg Oral TID   sucralfate 1 g Oral 4x Daily AC & at Bedtime   vancomycin 125 mg Oral Q6H       dextrose 125 mL/hr Last Rate: 125 mL/hr (03/12/17 0354)   fentaNYL (SUBLIMAZE) PCA 1500 mcg/30 mL syringe     midazolam 0.5 mg/mL 100 mL NS 1-10 mg/hr Last Rate: 4 mg/hr (03/12/17 0435)   norepinephrine 0.02-0.3 mcg/kg/min Last Rate: Stopped (03/04/17 0355)   Pharmacy to dose vancomycin     propofol 5-50 mcg/kg/min Last Rate: 50 mcg/kg/min (03/12/17 0401)           Assessment/Plan   Severe sepsis: Likely 2/2 B/L community acquired pneumonia and UTI upon admission with concerns for superimposed nosocomial pneumonia and now C diff colitis. ID initially broadened antibiotic coverage to to Vanc and Merrem. In light of (+) C diff, abx regimen has been changed to PO Vanc, IV Rocephin and IV Flagyl. WBC is stable with CRP slightly worsening today. Continues to have intermittent fevers. Cultures repeated yesterday. UA reveals yeast with culture pending. Will exchange aguilera and add Micafungin empirically in setting of persistent fevers. Repeat labs in the AM. ID input appreciated.     ARDS: Likely 2/2 sepsis and pneumonia. Repeat CXR appears improved today. Currently on broad spectrum IV abx as outlined above. FiO2 requirements are improving but pt continues to require increased PEEP. Cont vent management per pulm. Repeat ABG and CXR in the AM. Pt will likely require trach and PEG placement. Pulm input appreciated.     Acute exacerbation of COPD: Cont steroids, bronchodilators and vent support. Cont dose of steroids today. Pulm input appreciated.     Acute on chronic microcytic anemia with hx of iron deficiency: S/P 1 unit of PRBC's on 3/9 with improvement in H&H. Stable this AM. Cont to monitor closely.     Hypernatremia: Nephrology consulted and added free water flushes to TF's. D5W added after Na+ failed to continue  to improve. Frequency of flushes increased to Q4hrs and Na+ is much improved. Repeat labs in the AM. Nephrology input appreciated.     C diff colitis: Cont PO Vanc, IV Flagyl and isolation.     Electrolyte abnormalities: Replete Mg and PO4 per protocol. Repeat labs in the AM.     DM II, insulin-dependent: BG levels overall stable but elevated this AM. Cont current regimen and cont TF's. Will make further insulin adjustments if BG levels remain elevated.      Elevated liver enzymes: Stable. Viral hepatitis panel reveals Hep C Ab reactivity. HIV is non-reactive. Repeat CMP in the AM.     Thrombocytopenia: Possibly 2/2 acute illness. Now resolved. Repeat CBC in the AM.     PPX  GI: PPI  DVT: SQ heparin      Pt is at high risk 2/2 severe sepsis, bilateral pneumonia, ARDS, UTI, COPD, C diff colitis, anemia and hx of noncompliance.           Manuel Elizalde,   03/12/17  9:13 AM

## 2017-03-12 NOTE — PROGRESS NOTES
LOS: 12 days     Chief Complaint:  Pulmonology is following for bilateral pneumonia/ARDS    Subjective     Interval History:   The patient was emergently intubated 3/3/17.    No events overnight still agitated and continues to require hi doses of sedatives- precedex was marginally effective  Switched to versed drip in addition to propofol- precedex has been  stopped- with good result  Stable overnight- no acute events    History taken from: chart family Patient unable to give history due to emergent nature of case.    Review of Systems:   Review of Systems   Unable to perform ROS: Severe respiratory distress   and emergent nature of case.                  Objective     Vital Signs  Temp:  [99.6 °F (37.6 °C)-101.1 °F (38.4 °C)] 99.8 °F (37.7 °C)  Heart Rate:  [] 101  Resp:  [17-24] 18  BP: ()/(53-89) 100/59  FiO2 (%):  [45 %] 45 %  Body mass index is 21.79 kg/(m^2).    Intake/Output Summary (Last 24 hours) at 03/12/17 1007  Last data filed at 03/12/17 0921   Gross per 24 hour   Intake 3137.72 ml   Output   3235 ml   Net -97.28 ml     I/O this shift:  In: 100 [IV Piggyback:100]  Out: -     Physical Exam:  GENERAL APPEARANCE: Patient was restless and in severe respiratory distress.    HEAD: normocephalic.    EYES: PERRL    NECK: Neck supple.     CARDIAC: Normal S1 and S2. No S3 or murmurs. Rhythm is regular. There is no peripheral edema, cyanosis or pallor. Extremities are warm and well perfused. Capillary refill is less than 2 seconds. No carotid bruits.no mottling noted today    LUNGS: coarse bilaterally no wheezing    ABDOMEN: Positive bowel sounds. Soft, nondistended, nontender.     EXTREMITIES: No significant deformity or joint abnormality. No edema. Peripheral pulses intact.     NEUROLOGICAL: intubated and sedated    PSYCHIATRIC:  Unable to assess               Results Review:                I reviewed the patient's new clinical results.  I reviewed the patient's new imaging results and agree with  the interpretation.    Results from last 7 days  Lab Units 03/12/17  0518 03/11/17  0520 03/10/17  1843  03/10/17  0815   WBC 10*3/mm3 5.52 5.87  --   --  5.49   HEMOGLOBIN g/dL 8.2* 8.9* 8.4*  < > 7.5*   PLATELETS 10*3/mm3 176 146  --   --  128*   < > = values in this interval not displayed.    Results from last 7 days  Lab Units 03/12/17  0518 03/11/17  0520 03/10/17  0815   SODIUM mmol/L 142 144 145   POTASSIUM mmol/L 3.8 3.7 4.4   CHLORIDE mmol/L 110 116* 119*   TOTAL CO2 mmol/L 26.8 26.4 25.7   BUN mg/dL 16 14 27*   CREATININE mg/dL 0.42* 0.42* 0.51   CALCIUM mg/dL 7.9 8.6 8.5   GLUCOSE mg/dL 341* 204* 288*   MAGNESIUM mg/dL 1.9 2.0 2.3     Lab Results   Component Value Date    INR 1.01 12/17/2016    INR 0.94 11/20/2016    INR <0.90 02/06/2016    PROTIME 11.4 12/17/2016    PROTIME 10.6 11/20/2016    PROTIME 10.0 02/06/2016       Results from last 7 days  Lab Units 03/12/17  0518 03/11/17  0520 03/10/17  0815   ALK PHOS U/L 145* 160* 133*   BILIRUBIN mg/dL 0.2 0.2 0.2   ALT (SGPT) U/L 62* 45* 26   AST (SGOT) U/L 59* 46* 18       Results from last 7 days  Lab Units 03/12/17  0425   PH, ARTERIAL pH units 7.498*   PO2 ART mm Hg 130.6*   PCO2, ARTERIAL mm Hg 34.3*   HCO3 ART mmol/L 26.0   HIV-1 / O / 2 Ag / Antibody 4th Generation   Order: [id number] - Part of Panel Order [id number]   Status:  Final result   Visible to patient:  No (Not Released)      Ref Range & Units 1d ago     HIV-1/ HIV-2 Non-Reactive Non-Reactive   Resulting Agency  [facility]   Narrative     A non-reactive test result does not preclude the possibility of exposure to HIV or infection with HIV. An antibody response to recent exposure may take several months to reach detectable levels.                      Imaging Results (last 24 hours)     Procedure Component Value Units Date/Time    XR Chest AP [[id number]] Collected:  03/02/17 0936     Updated:  03/02/17 0940    Narrative:       EXAMINATION: XR CHEST AP-      CLINICAL INDICATION:     SOB;  J18.9-Pneumonia, unspecified organism     TECHNIQUE:  XR CHEST AP-      COMPARISON: 02/28/2017      FINDINGS:   Progression of bilateral airspace disease right greater than left lung.  Opacities are more pronounced in the right upper lobe and at the  periphery of both lungs. Favor bacterial pneumonia, but peripheral  predominant opacities can also be seen with other atypical etiologies  including eosinophilic pneumonia.   Heart and mediastinal contours are unremarkable.   No pneumothorax.   No pleural effusion.   No acute osseous findings.            Impression:       1. Marked interval worsening of right lung pneumonia and now developing  left lung airspace disease probably representing diffuse pneumonia.  Considerations as described above.  2. Otherwise stable chest.     This report was finalized on 3/2/2017 9:38 AM by Dr. Wali Tracy MD.       XR Chest 1 View [[id number]] Resulted:  03/03/17 0924     Updated:  03/03/17 0707             Medication Review:   Scheduled Medications:    acetaminophen 650 mg Oral Once   aspirin 81 mg Oral Daily   ceftriaxone 2 g Intravenous Q24H   clopidogrel 75 mg Oral Daily   guaiFENesin 600 mg Oral BID   heparin (porcine) 5,000 Units Subcutaneous Q12H   insulin aspart 0-14 Units Subcutaneous 4x Daily AC & at Bedtime   insulin detemir 20 Units Subcutaneous Nightly   ipratropium 0.5 mg Nebulization Q6H - RT   magnesium sulfate in D5W 1g/100mL (PREMIX) 1 g Intravenous Q1H   methylPREDNISolone sodium succinate 10 mg Intravenous Q12H   metoprolol tartrate 25 mg Oral Q12H   metroNIDAZOLE 500 mg Intravenous Q8H   micafungin (MYCAMINE)  mg Intravenous Q24H   pantoprazole 40 mg Intravenous Q AM   potassium chloride 40 mEq Oral Once   potassium phosphate 15 mmol Intravenous Once   sodium bicarbonate 650 mg Oral TID   sucralfate 1 g Oral 4x Daily AC & at Bedtime   vancomycin 125 mg Oral Q6H     Continuous infusions:    dextrose 125 mL/hr Last Rate: 125 mL/hr (03/12/17 0354)   fentaNYL  (SUBLIMAZE) PCA 1500 mcg/30 mL syringe     midazolam 0.5 mg/mL 100 mL NS 1-10 mg/hr Last Rate: 4 mg/hr (03/12/17 9546)   norepinephrine 0.02-0.3 mcg/kg/min Last Rate: Stopped (03/04/17 8838)   Pharmacy to dose vancomycin     propofol 5-50 mcg/kg/min Last Rate: 50 mcg/kg/min (03/12/17 1005)       Assessment/Plan     CXR:              Expiratory image  Unchanged compared to yesterday given this            Respiratory:      Vent settings:   RR16  FIO2 45 % PEEP 12 CXR looks improved    HIV negative    Pink frothy sputum has been suctioned from ET tube.     Will continue IV steroids(dose decreased), scheduled inhalants and PRN neb treatments.      ID: Continue current IV antibiotics. Vanc/Azithro- flagyl added for C-diff    Cardiac:  Stable.    Renal: BUN/Creatinine WNL.- may need diuresis for now she is hypernatremic- FWF added by nephrology who have been consulted.    Endocrine: monitor glucose targeting 140-180.    Electrolytes:  Monitor levels, manage and replete per protocols.  Monitor for refeeding syndrome- patients nutritional status is poor.  Patient has a normal anion gap metabolic acidosis with hyperchloremia- no diarrhea and normal K+ suspect RTA- on  Bicarb tabs.  Acidemia has been compensated and is improved with improvement in acidosis which is now mild- HCO3 has normalized     GI:  Nutrition consult placed for tube feedings.    Hematology:   Monitor blood counts, transfuse at or below 7 g/dL.  Unless patient is actively bleeding then began transfusing at 8 g/dL.     Case was discussed with  ccu nurse     Patients  is in ARDS with P/F 120==>200==>158 ==>198==>342!==>179==>288  her FiO2 remains at  45%: continue current supportive care. Continue with FiO2 goal below 60% and pO2 of greater or equal to 60 mmHg  CXR much improved- will start sedation holiday and weaning trials in AM  D/W nurse will decrease sedatives by 50% for PSV trial.    If unable to wean from respirator by Tuesday next week she  will need a tracheostomy for prolonged weaning.    Continue current supportive care overall seems to be improving steadily although I still think she may need a tracheostomy and prolonged weaning- an LTACH setting would be appropriate will engage SW in this regard.      Patient Active Problem List   Diagnosis Code   • Chest pain R07.9   • Chest pain of uncertain etiology R07.89   • Intractable nausea and vomiting R11.2   • Intractable cyclical vomiting with nausea G43.A1   • Chronic obstructive pulmonary emphysema J43.9   • Hepatitis-C B19.20   • Diabetes mellitus type 2 in nonobese E11.9   • Arthritis or polyarthritis, rheumatoid M06.9   • Anxiety F41.9   • Essential hypertension I10   • Coronary artery disease I25.10   • Buerger's disease I73.1   • Cirrhosis K74.60   • Diabetes E11.9   • Chronic nausea R11.0   • Pancreatitis K85.90   • Bilateral pneumonia J18.9   • ARDS (adult respiratory distress syndrome) J80   • Hypoxia R09.02                  Benigno Cabral MD  03/12/17  10:07 AM

## 2017-03-12 NOTE — PLAN OF CARE
Problem: Pressure Ulcer Risk (Brendan Scale) (Adult,Obstetrics,Pediatric)  Goal: Identify Related Risk Factors and Signs and Symptoms  Outcome: Ongoing (interventions implemented as appropriate)  Goal: Skin Integrity  Outcome: Ongoing (interventions implemented as appropriate)

## 2017-03-12 NOTE — PROGRESS NOTES
Interval History:     Patient Complaints: On the ventilator.  Tolerating feeds.  Tolerating free water per G-tube.  Sodium is completely normal at 142.        Vital Signs  Temp:  [99 °F (37.2 °C)-101 °F (38.3 °C)] 99 °F (37.2 °C)  Heart Rate:  [] 87  Resp:  [16-24] 16  BP: ()/(53-89) 114/66  FiO2 (%):  [45 %] 45 %    Physical Exam:    General:             Intubated      HEENT:  no facial asymmetry                Neck:   no thyromegaly        Lungs:     Crackles    Heart:     no rub       Abdomen:     Normal bowel sounds, soft non-tender, non-distended, no guarding       Extremities:   trace edema        Skin:   No petechiae, no rash       Neurologic:  sedated         Results Review:     I reviewed the patient's new clinical results.    Lab Results (last 24 hours)     Procedure Component Value Units Date/Time    POC Glucose Fingerstick [[id number]]  (Abnormal) Collected:  03/11/17 1723    Specimen:  Blood Updated:  03/11/17 1735     Glucose 173 (H) mg/dL     Narrative:       Meter: [id number] : [id number] Mao Resendiz STEPHANI    POC Glucose Fingerstick [[id number]]  (Normal) Collected:  03/11/17 2146    Specimen:  Blood Updated:  03/11/17 2228     Glucose 124 mg/dL     Narrative:       Meter: [id number] : [id number] [place] R    Blood Culture [[id number]]  (Normal) Collected:  03/06/17 2111    Specimen:  Blood from Hand, Right Updated:  03/11/17 2301     Blood Culture No growth at 5 days     Blood Culture [[id number]]  (Normal) Collected:  03/06/17 2215    Specimen:  Blood from Hand, Left Updated:  03/11/17 2301     Blood Culture No growth at 5 days     Blood Gas, Arterial [[id number]]  (Abnormal) Collected:  03/12/17 0425    Specimen:  Arterial Blood Updated:  03/12/17 0440     Site Arterial: right brachial      Micheal's Test N/A      pH, Arterial 7.498 (H) pH units      pCO2, Arterial 34.3 (L) mm Hg      pO2, Arterial 130.6 (H) mm Hg      HCO3, Arterial 26.0 mmol/L      Base Excess, Arterial 2.9  mmol/L      O2 Saturation, Arterial 99.0 %      Hemoglobin, Blood Gas 9.5 (L) g/dL      Hematocrit, Blood Gas 28.0 (L) %      Oxyhemoglobin 98.0 %      Methemoglobin 0.1 %      Carboxyhemoglobin 0.9 %      A-a Gradiant 140.0 mmHg      Temperature 98.6 C      Barometric Pressure for Blood Gas 735 mmHg      Modality Ventilator      FIO2 45 %      Ventilator Mode vc/ac      Set Tidal Volume 450      Set Mech Resp Rate 16      PEEP 12     POC Glucose Fingerstick [[id number]]  (Abnormal) Collected:  03/12/17 0624    Specimen:  Blood Updated:  03/12/17 0638     Glucose 310 (H) mg/dL     Narrative:       Meter: [id number] : [id number] [place] R    C-reactive Protein [[id number]]  (Abnormal) Collected:  03/12/17 0518    Specimen:  Blood Updated:  03/12/17 0645     C-Reactive Protein 6.63 (H) mg/dL     Magnesium [[id number]]  (Normal) Collected:  03/12/17 0518    Specimen:  Blood Updated:  03/12/17 0647     Magnesium 1.9 mg/dL     Comprehensive Metabolic Panel [[id number]]  (Abnormal) Collected:  03/12/17 0518    Specimen:  Blood Updated:  03/12/17 0655     Glucose 341 (H) mg/dL      BUN 16 mg/dL      Creatinine 0.42 (L) mg/dL      Sodium 142 mmol/L      Potassium 3.8 mmol/L      Chloride 110 mmol/L      CO2 26.8 mmol/L      Calcium 7.9 mg/dL      Total Protein 5.4 (L) g/dL      Albumin 2.40 (L) g/dL      ALT (SGPT) 62 (H) U/L      AST (SGOT) 59 (H) U/L      Alkaline Phosphatase 145 (H) U/L       Note New Reference Ranges        Total Bilirubin 0.2 mg/dL      eGFR Non African Amer >150 mL/min/1.73      Globulin 3.0 gm/dL      A/G Ratio 0.8 (L) g/dL      BUN/Creatinine Ratio 38.1 (H)      Anion Gap 5.2 mmol/L     Phosphorus [[id number]]  (Abnormal) Collected:  03/12/17 0518    Specimen:  Blood Updated:  03/12/17 0655     Phosphorus 2.1 (L) mg/dL     Osmolality, Calculated [[id number]]  (Normal) Collected:  03/12/17 0518    Specimen:  Blood Updated:  03/12/17 0655     Osmolality Calc 297.8 mOsm/kg     CBC & Differential  [[id number]] Collected:  03/12/17 0518    Specimen:  Blood Updated:  03/12/17 0733    Narrative:       The following orders were created for panel order CBC & Differential.  Procedure                               Abnormality         Status                     ---------                               -----------         ------                     Manual Differential[[id number]]           Abnormal            Final result               Scan Slide[[id number]]                                        Final result               CBC Auto Differential[[id number]]         Abnormal            Final result                 Please view results for these tests on the individual orders.    CBC Auto Differential [[id number]]  (Abnormal) Collected:  03/12/17 0518    Specimen:  Blood Updated:  03/12/17 0733     WBC 5.52 10*3/mm3      RBC 3.69 (L) 10*6/mm3      Hemoglobin 8.2 (L) g/dL      Hematocrit 29.3 (L) %      MCV 79.4 (L) fL      MCH 22.2 (L) pg      MCHC 28.0 (L) g/dL      RDW 21.6 (H) %      RDW-SD 57.2 (H) fl      MPV 10.8 (H) fL      Platelets 176 10*3/mm3     Scan Slide [[id number]] Collected:  03/12/17 0518    Specimen:  Blood Updated:  03/12/17 0733     Scan Slide --       See Manual Differential Results       Manual Differential [[id number]]  (Abnormal) Collected:  03/12/17 0518    Specimen:  Blood Updated:  03/12/17 0733     Neutrophil % 74.0 (H) %      Lymphocyte % 9.0 (L) %      Monocyte % 2.0 %      Bands %  8.0 %      Myelocyte % 7.0 (H) %      Neutrophils Absolute 4.53 10*3/mm3      Lymphocytes Absolute 0.50 (L) 10*3/mm3      Monocytes Absolute 0.11 10*3/mm3      Anisocytosis Large/3+      Hypochromia Slight/1+      Poikilocytes Slight/1+      Platelet Morphology Normal     Urine Culture [[id number]] Collected:  03/11/17 1216    Specimen:  Urine from Urine, Clean Catch Updated:  03/12/17 1046     Urine Culture Normal Urogenital Falguni     POC Glucose Fingerstick [[id number]]  (Abnormal) Collected:  03/12/17 1123    Specimen:  Blood  Updated:  03/12/17 1127     Glucose 212 (H) mg/dL     Narrative:       Meter: [id number] : [id number] luz maria card    Blood Culture [[id number]]  (Normal) Collected:  03/11/17 1212    Specimen:  Blood from Hand, Right Updated:  03/12/17 1501     Blood Culture No growth at 24 hours     Blood Culture [[id number]]  (Normal) Collected:  03/11/17 1246    Specimen:  Blood from Hand, Right Updated:  03/12/17 1501     Blood Culture No growth at 24 hours           Imaging Results (last 24 hours)     Procedure Component Value Units Date/Time    XR Chest AP [[id number]] Collected:  03/12/17 0842     Updated:  03/12/17 0844    Narrative:       EXAMINATION: XR CHEST AP-      CLINICAL INDICATION:     ARDS; J18.9-Pneumonia, unspecified organism     TECHNIQUE: Single AP view of chest.      COMPARISON: 03/11/2017      FINDINGS:   There is bibasilar atelectasis.   Improving bilateral airspace disease.  Support tube and lines are in unchanged position.   Heart size is stable.  No pneumothorax.   Tiny bilateral pleural effusions persist.        Impression:       Improving bilateral airspace disease.     This report was finalized on 3/12/2017 8:42 AM by Dr. Bret Morris MD.             Assessment and Plan:    Hypernatremia  ARDS    We will sign off.  Please reconsult.  Thank you Dr. Fide Macdonald MD  03/12/17  3:59 PM

## 2017-03-12 NOTE — PROGRESS NOTES
Interval History:     Patient Complaints: Stable from renal standpoint.  Sodium is now normal.  She is tolerating feeds.        Vital Signs  Temp:  [99.7 °F (37.6 °C)-101.1 °F (38.4 °C)] 101.1 °F (38.4 °C)  Heart Rate:  [] 100  Resp:  [17-23] 19  BP: (104-158)/(51-84) 104/61  FiO2 (%):  [45 %] 45 %    Physical Exam:    General:             Ventilated      HEENT:  no facial asymmetry                Neck:   no JVD        Lungs:    scattered crackles    Heart:   ,  no rub       Abdomen:     Normal bowel sounds, soft non-tender, non-distended, no guarding       Extremities:   trace edema        Skin:   No petechiae, no rash                Results Review:     I reviewed the patient's new clinical results.    Lab Results (last 24 hours)     Procedure Component Value Units Date/Time    POC Glucose Fingerstick [[id number]]  (Normal) Collected:  03/10/17 2050    Specimen:  Blood Updated:  03/10/17 2112     Glucose 112 mg/dL     Narrative:       Meter: [id number] : [id number] Ju[place] Bella R    Blood Culture [[id number]]  (Normal) Collected:  03/06/17 2111    Specimen:  Blood from Hand, Right Updated:  03/10/17 2301     Blood Culture No growth at 4 days     Blood Culture [[id number]]  (Normal) Collected:  03/06/17 2215    Specimen:  Blood from Hand, Left Updated:  03/10/17 2301     Blood Culture No growth at 4 days     POC Glucose Fingerstick [[id number]]  (Abnormal) Collected:  03/10/17 2359    Specimen:  Blood Updated:  03/11/17 0020     Glucose 202 (H) mg/dL     Narrative:       Meter: [id number] : [id number] Ju[place]arlos Bella R    Blood Gas, Arterial [[id number]]  (Abnormal) Collected:  03/11/17 0454    Specimen:  Arterial Blood Updated:  03/11/17 0508     Site Arterial: left radial      Micheal's Test Positive      pH, Arterial 7.373 pH units      pCO2, Arterial 45.4 (H) mm Hg      pO2, Arterial 80.9 mm Hg      HCO3, Arterial 25.8 mmol/L      Base Excess, Arterial 0.4 mmol/L      O2 Saturation, Arterial 96.1 %       Hemoglobin, Blood Gas 10.0 (L) g/dL      Hematocrit, Blood Gas 29.0 (L) %      Oxyhemoglobin 94.6 %      Methemoglobin 0.3 %      Carboxyhemoglobin 1.3 %      A-a Gradiant 174.3 mmHg      Temperature 98.5 C      Barometric Pressure for Blood Gas 727 mmHg      Modality Adult Vent      FIO2 45 %      Ventilator Mode ac      Set Tidal Volume 450      Set Mech Resp Rate 16      PEEP 12     Magnesium [[id number]]  (Normal) Collected:  03/11/17 0520    Specimen:  Blood Updated:  03/11/17 0643     Magnesium 2.0 mg/dL     Comprehensive Metabolic Panel [[id number]]  (Abnormal) Collected:  03/11/17 0520    Specimen:  Blood Updated:  03/11/17 0644     Glucose 204 (H) mg/dL      BUN 14 mg/dL      Creatinine 0.42 (L) mg/dL      Sodium 144 mmol/L      Potassium 3.7 mmol/L      Chloride 116 (H) mmol/L      CO2 26.4 mmol/L      Calcium 8.6 mg/dL      Total Protein 6.2 g/dL      Albumin 2.60 (L) g/dL      ALT (SGPT) 45 (H) U/L      AST (SGOT) 46 (H) U/L      Alkaline Phosphatase 160 (H) U/L       Note New Reference Ranges        Total Bilirubin 0.2 mg/dL      eGFR Non African Amer >150 mL/min/1.73      Globulin 3.6 gm/dL      A/G Ratio 0.7 (L) g/dL      BUN/Creatinine Ratio 33.3 (H)      Anion Gap 1.6 (L) mmol/L     Phosphorus [[id number]]  (Abnormal) Collected:  03/11/17 0520    Specimen:  Blood Updated:  03/11/17 0644     Phosphorus 1.6 (L) mg/dL     Osmolality, Calculated [[id number]]  (Normal) Collected:  03/11/17 0520    Specimen:  Blood Updated:  03/11/17 0644     Osmolality Calc 293.2 mOsm/kg     C-reactive Protein [[id number]]  (Abnormal) Collected:  03/11/17 0520    Specimen:  Blood Updated:  03/11/17 0701     C-Reactive Protein 5.03 (H) mg/dL     POC Glucose Fingerstick [[id number]]  (Abnormal) Collected:  03/11/17 0815    Specimen:  Blood Updated:  03/11/17 0827     Glucose 157 (H) mg/dL     Narrative:       Meter: [id number] : [id number] Mao STYLES    CBC Auto Differential [[id number]]  (Abnormal) Collected:  03/11/17 0520     Specimen:  Blood Updated:  03/11/17 0849     WBC 5.87 10*3/mm3      RBC 3.92 (L) 10*6/mm3      Hemoglobin 8.9 (L) g/dL      Hematocrit 31.3 (L) %      MCV 79.8 (L) fL      MCH 22.7 (L) pg      MCHC 28.4 (L) g/dL      RDW 21.1 (H) %      RDW-SD 61.0 (H) fl      MPV 11.4 (H) fL      Platelets 146 10*3/mm3     Scan Slide [[id number]] Collected:  03/11/17 0520    Specimen:  Blood Updated:  03/11/17 0849     Scan Slide --       See Manual Differential Results       CBC & Differential [[id number]] Collected:  03/11/17 0520    Specimen:  Blood Updated:  03/11/17 0849    Narrative:       The following orders were created for panel order CBC & Differential.  Procedure                               Abnormality         Status                     ---------                               -----------         ------                     Manual Differential[[id number]]           Abnormal            Final result               Scan Slide[[id number]]                                        Final result               CBC Auto Differential[[id number]]         Abnormal            Final result                 Please view results for these tests on the individual orders.    Manual Differential [[id number]]  (Abnormal) Collected:  03/11/17 0520    Specimen:  Blood Updated:  03/11/17 0849     Neutrophil % 83.0 (H) %      Lymphocyte % 11.0 (L) %      Monocyte % 3.0 %      Eosinophil % 1.0 %      Bands %  1.0 (L) %      Myelocyte % 1.0 (H) %      Neutrophils Absolute 4.93 10*3/mm3      Lymphocytes Absolute 0.65 (L) 10*3/mm3      Monocytes Absolute 0.18 10*3/mm3      Eosinophils Absolute 0.06 10*3/mm3      Anisocytosis Large/3+      Hypochromia Mod/2+      Poikilocytes Slight/1+      Platelet Morphology Normal     POC Glucose Fingerstick [[id number]]  (Abnormal) Collected:  03/11/17 1129    Specimen:  Blood Updated:  03/11/17 1142     Glucose 157 (H) mg/dL     Narrative:       Meter: [id number] : [id number] Mao STYLES    Urine Culture [[id number]]  Collected:  03/11/17 1216    Specimen:  Urine from Urine, Clean Catch Updated:  03/11/17 1221    Blood Culture [[id number]] Collected:  03/11/17 1246    Specimen:  Blood from Hand, Right Updated:  03/11/17 1321    Blood Culture [[id number]] Collected:  03/11/17 1212    Specimen:  Blood from Hand, Right Updated:  03/11/17 1321    Urinalysis With / Microscopic If Indicated [[id number]]  (Abnormal) Collected:  03/11/17 1216    Specimen:  Urine from Urine, Clean Catch Updated:  03/11/17 1327     Color, UA Yellow      Appearance, UA Clear      pH, UA 6.5      Specific Gravity, UA 1.013      Glucose, UA Negative      Ketones, UA Negative      Bilirubin, UA Negative      Blood, UA Negative      Protein, UA 30 mg/dL (1+) (A)      Leuk Esterase, UA Trace (A)      Nitrite, UA Negative      Urobilinogen, UA 0.2 E.U./dL     Urinalysis, Microscopic Only [[id number]]  (Abnormal) Collected:  03/11/17 1216    Specimen:  Urine from Urine, Clean Catch Updated:  03/11/17 1327     RBC, UA None Seen /HPF      WBC, UA 3-5 (A) /HPF      Bacteria, UA Trace (A) /HPF      Squamous Epithelial Cells, UA None Seen /HPF      Yeast, UA Moderate/2+ Budding Yeast /HPF      Methodology Manual Light Microscopy     POC Glucose Fingerstick [[id number]]  (Abnormal) Collected:  03/11/17 1723    Specimen:  Blood Updated:  03/11/17 1735     Glucose 173 (H) mg/dL     Narrative:       Meter: [id number] : [id number] Mao Zhangah STEPHANI          Imaging Results (last 24 hours)     Procedure Component Value Units Date/Time    XR Chest AP [[id number]] Collected:  03/11/17 0909     Updated:  03/11/17 0912    Narrative:       EXAMINATION: XR CHEST AP-      CLINICAL INDICATION:     ARDS; J18.9-Pneumonia, unspecified organism     TECHNIQUE: Single AP view of chest.      COMPARISON: 3/10/2017      FINDINGS:   There is bibasilar atelectasis.   Stable bilateral airspace disease.  Support tube and lines are in unchanged position. Tubular structure  seems to project over the course  of the trachea and left main stem.  Heart size is stable.  No pneumothorax.   Tiny bilateral pleural effusions persist.        Impression:       Stable appearance of the chest.     This report was finalized on 3/11/2017 9:10 AM by Dr. Bret Morris MD.             Assessment and Plan:    1.  Hypernatremia  2.  ARDS    Will watch her sodium with extra fluids per feeding tube and will avoid further IV fluids.  Discussed with Dr. Fide Macdonald MD  03/11/17  8:01 PM

## 2017-03-13 ENCOUNTER — APPOINTMENT (OUTPATIENT)
Dept: GENERAL RADIOLOGY | Facility: HOSPITAL | Age: 52
End: 2017-03-13

## 2017-03-13 LAB
A-A DO2: 122.6 MMHG (ref 0–300)
ALBUMIN SERPL-MCNC: 2.5 G/DL (ref 3.5–5)
ALBUMIN/GLOB SERPL: 0.7 G/DL (ref 1.5–2.5)
ALP SERPL-CCNC: 116 U/L (ref 35–104)
ALT SERPL W P-5'-P-CCNC: 71 U/L (ref 10–36)
ANION GAP SERPL CALCULATED.3IONS-SCNC: 3.5 MMOL/L (ref 3.6–11.2)
ANISOCYTOSIS BLD QL: ABNORMAL
ARTERIAL PATENCY WRIST A: ABNORMAL
AST SERPL-CCNC: 66 U/L (ref 10–30)
ATMOSPHERIC PRESS: 735 MMHG
BASE EXCESS BLDA CALC-SCNC: 3.8 MMOL/L
BDY SITE: ABNORMAL
BILIRUB SERPL-MCNC: 0.1 MG/DL (ref 0.2–1.8)
BODY TEMPERATURE: 98.6 C
BUN BLD-MCNC: 13 MG/DL (ref 7–21)
BUN/CREAT SERPL: 33.3 (ref 7–25)
CALCIUM SPEC-SCNC: 8 MG/DL (ref 7.7–10)
CHLORIDE SERPL-SCNC: 112 MMOL/L (ref 99–112)
CO2 SERPL-SCNC: 31.5 MMOL/L (ref 24.3–31.9)
COHGB MFR BLD: 1 % (ref 0–5)
CREAT BLD-MCNC: 0.39 MG/DL (ref 0.43–1.29)
CRP SERPL-MCNC: 3.18 MG/DL (ref 0–0.99)
DEPRECATED RDW RBC AUTO: 60.8 FL (ref 37–54)
ERYTHROCYTE [DISTWIDTH] IN BLOOD BY AUTOMATED COUNT: 22.6 % (ref 11.5–14.5)
GFR SERPL CREATININE-BSD FRML MDRD: >150 ML/MIN/1.73
GLOBULIN UR ELPH-MCNC: 3.5 GM/DL
GLUCOSE BLD-MCNC: 215 MG/DL (ref 70–110)
GLUCOSE BLDC GLUCOMTR-MCNC: 143 MG/DL (ref 70–130)
GLUCOSE BLDC GLUCOMTR-MCNC: 163 MG/DL (ref 70–130)
GLUCOSE BLDC GLUCOMTR-MCNC: 178 MG/DL (ref 70–130)
GLUCOSE BLDC GLUCOMTR-MCNC: 181 MG/DL (ref 70–130)
HCO3 BLDA-SCNC: 29 MMOL/L (ref 22–26)
HCT VFR BLD AUTO: 28.5 % (ref 37–47)
HCT VFR BLD CALC: 30 % (ref 37–47)
HGB BLD-MCNC: 8 G/DL (ref 12–16)
HGB BLDA-MCNC: 10.1 G/DL (ref 12–16)
HOROWITZ INDEX BLD+IHG-RTO: 45 %
HYPOCHROMIA BLD QL: ABNORMAL
LYMPHOCYTES # BLD MANUAL: 1.04 10*3/MM3 (ref 1–3)
LYMPHOCYTES NFR BLD MANUAL: 18 % (ref 21–51)
LYMPHOCYTES NFR BLD MANUAL: 6 % (ref 0–10)
MAGNESIUM SERPL-MCNC: 2.1 MG/DL (ref 1.7–2.6)
MAGNESIUM SERPL-MCNC: 2.4 MG/DL (ref 1.7–2.6)
MCH RBC QN AUTO: 22.5 PG (ref 27–33)
MCHC RBC AUTO-ENTMCNC: 28.1 G/DL (ref 33–37)
MCV RBC AUTO: 80.3 FL (ref 80–94)
METAMYELOCYTES NFR BLD MANUAL: 2 % (ref 0–0)
METHGB BLD QL: 0.3 % (ref 0–3)
MODALITY: ABNORMAL
MONOCYTES # BLD AUTO: 0.35 10*3/MM3 (ref 0.1–0.9)
MYELOCYTES NFR BLD MANUAL: 4 % (ref 0–0)
NEUTROPHILS # BLD AUTO: 4.03 10*3/MM3 (ref 1.4–6.5)
NEUTROPHILS NFR BLD MANUAL: 66 % (ref 30–70)
NEUTS BAND NFR BLD MANUAL: 4 % (ref 4–12)
OSMOLALITY SERPL CALC.SUM OF ELEC: 299 MOSM/KG (ref 273–305)
OXYHGB MFR BLDV: 97.7 % (ref 85–100)
PCO2 BLDA: 46.6 MM HG (ref 35–45)
PEEP RESPIRATORY: 12 CM[H2O]
PH BLDA: 7.41 PH UNITS (ref 7.35–7.45)
PHOSPHATE SERPL-MCNC: 2.5 MG/DL (ref 2.7–4.5)
PLAT MORPH BLD: NORMAL
PLATELET # BLD AUTO: 211 10*3/MM3 (ref 130–400)
PMV BLD AUTO: 11.2 FL (ref 6–10)
PO2 BLDA: 134 MM HG (ref 80–100)
POTASSIUM BLD-SCNC: 3.1 MMOL/L (ref 3.5–5.3)
PROT SERPL-MCNC: 6 G/DL (ref 6–8)
RBC # BLD AUTO: 3.55 10*6/MM3 (ref 4.2–5.4)
SAO2 % BLDCOA: 99 % (ref 90–100)
SCAN SLIDE: NORMAL
SET MECH RESP RATE: 16
SODIUM BLD-SCNC: 147 MMOL/L (ref 135–153)
TOTAL RATE: 19 BREATHS/MINUTE
VENTILATOR MODE: ABNORMAL
VT ON VENT VENT: 450 ML
WBC NRBC COR # BLD: 5.75 10*3/MM3 (ref 4.5–12.5)

## 2017-03-13 PROCEDURE — 25010000002 FUROSEMIDE PER 20 MG: Performed by: INTERNAL MEDICINE

## 2017-03-13 PROCEDURE — 99291 CRITICAL CARE FIRST HOUR: CPT | Performed by: INTERNAL MEDICINE

## 2017-03-13 PROCEDURE — 94799 UNLISTED PULMONARY SVC/PX: CPT

## 2017-03-13 PROCEDURE — 36600 WITHDRAWAL OF ARTERIAL BLOOD: CPT | Performed by: INTERNAL MEDICINE

## 2017-03-13 PROCEDURE — 63710000001 INSULIN ASPART PER 5 UNITS: Performed by: INTERNAL MEDICINE

## 2017-03-13 PROCEDURE — 25010000002 MIDAZOLAM PER 1 MG: Performed by: INTERNAL MEDICINE

## 2017-03-13 PROCEDURE — 25010000003 POTASSIUM CHLORIDE 10 MEQ/100ML SOLUTION: Performed by: INTERNAL MEDICINE

## 2017-03-13 PROCEDURE — 82805 BLOOD GASES W/O2 SATURATION: CPT | Performed by: INTERNAL MEDICINE

## 2017-03-13 PROCEDURE — 86140 C-REACTIVE PROTEIN: CPT | Performed by: INTERNAL MEDICINE

## 2017-03-13 PROCEDURE — 84100 ASSAY OF PHOSPHORUS: CPT | Performed by: INTERNAL MEDICINE

## 2017-03-13 PROCEDURE — 63710000001 INSULIN DETEMIR PER 5 UNITS: Performed by: INTERNAL MEDICINE

## 2017-03-13 PROCEDURE — 80053 COMPREHEN METABOLIC PANEL: CPT | Performed by: INTERNAL MEDICINE

## 2017-03-13 PROCEDURE — 99233 SBSQ HOSP IP/OBS HIGH 50: CPT | Performed by: INTERNAL MEDICINE

## 2017-03-13 PROCEDURE — 71010 XR CHEST AP: CPT | Performed by: RADIOLOGY

## 2017-03-13 PROCEDURE — 83735 ASSAY OF MAGNESIUM: CPT | Performed by: INTERNAL MEDICINE

## 2017-03-13 PROCEDURE — 82375 ASSAY CARBOXYHB QUANT: CPT | Performed by: INTERNAL MEDICINE

## 2017-03-13 PROCEDURE — 85007 BL SMEAR W/DIFF WBC COUNT: CPT | Performed by: INTERNAL MEDICINE

## 2017-03-13 PROCEDURE — 71010 HC CHEST AP: CPT

## 2017-03-13 PROCEDURE — 25010000002 CEFTRIAXONE: Performed by: INTERNAL MEDICINE

## 2017-03-13 PROCEDURE — 25010000003 MICAFUNGIN SODIUM 100 MG RECONSTITUTED SOLUTION 1 EACH VIAL: Performed by: INTERNAL MEDICINE

## 2017-03-13 PROCEDURE — 25010000002 PROPOFOL 1000 MG/ML EMULSION: Performed by: INTERNAL MEDICINE

## 2017-03-13 PROCEDURE — 25010000002 LORAZEPAM PER 2 MG

## 2017-03-13 PROCEDURE — 82962 GLUCOSE BLOOD TEST: CPT

## 2017-03-13 PROCEDURE — 25010000002 MIDAZOLAM 0.5 MG/ML 100 ML NS: Performed by: INTERNAL MEDICINE

## 2017-03-13 PROCEDURE — 25010000002 HEPARIN (PORCINE) PER 1000 UNITS: Performed by: INTERNAL MEDICINE

## 2017-03-13 PROCEDURE — 25010000002 METHYLPREDNISOLONE PER 40 MG: Performed by: INTERNAL MEDICINE

## 2017-03-13 PROCEDURE — 83050 HGB METHEMOGLOBIN QUAN: CPT | Performed by: INTERNAL MEDICINE

## 2017-03-13 PROCEDURE — 85025 COMPLETE CBC W/AUTO DIFF WBC: CPT | Performed by: INTERNAL MEDICINE

## 2017-03-13 PROCEDURE — 94003 VENT MGMT INPAT SUBQ DAY: CPT

## 2017-03-13 RX ORDER — LORAZEPAM 2 MG/ML
INJECTION INTRAMUSCULAR
Status: COMPLETED
Start: 2017-03-13 | End: 2017-03-13

## 2017-03-13 RX ORDER — POTASSIUM CHLORIDE 1.5 G/1.77G
40 POWDER, FOR SOLUTION ORAL ONCE
Status: COMPLETED | OUTPATIENT
Start: 2017-03-13 | End: 2017-03-13

## 2017-03-13 RX ORDER — METOPROLOL TARTRATE 5 MG/5ML
INJECTION INTRAVENOUS
Status: COMPLETED
Start: 2017-03-13 | End: 2017-03-13

## 2017-03-13 RX ORDER — PREDNISONE 20 MG/1
40 TABLET ORAL
Status: DISCONTINUED | OUTPATIENT
Start: 2017-03-13 | End: 2017-03-14

## 2017-03-13 RX ORDER — DIAPER,BRIEF,INFANT-TODD,DISP
EACH MISCELLANEOUS EVERY 12 HOURS SCHEDULED
Status: DISCONTINUED | OUTPATIENT
Start: 2017-03-13 | End: 2017-03-20 | Stop reason: HOSPADM

## 2017-03-13 RX ORDER — LORAZEPAM 2 MG/ML
1 INJECTION INTRAMUSCULAR ONCE
Status: COMPLETED | OUTPATIENT
Start: 2017-03-13 | End: 2017-03-13

## 2017-03-13 RX ORDER — FUROSEMIDE 10 MG/ML
40 INJECTION INTRAMUSCULAR; INTRAVENOUS ONCE
Status: COMPLETED | OUTPATIENT
Start: 2017-03-13 | End: 2017-03-13

## 2017-03-13 RX ORDER — POTASSIUM CHLORIDE 7.45 MG/ML
10 INJECTION INTRAVENOUS
Status: COMPLETED | OUTPATIENT
Start: 2017-03-13 | End: 2017-03-13

## 2017-03-13 RX ADMIN — GUAIFENESIN 600 MG: 600 TABLET, EXTENDED RELEASE ORAL at 17:13

## 2017-03-13 RX ADMIN — METOPROLOL TARTRATE 5 MG: 5 INJECTION INTRAVENOUS at 09:42

## 2017-03-13 RX ADMIN — PROPOFOL 50 MCG/KG/MIN: 10 INJECTION, EMULSION INTRAVENOUS at 12:38

## 2017-03-13 RX ADMIN — ASPIRIN 81 MG: 81 TABLET, CHEWABLE ORAL at 08:56

## 2017-03-13 RX ADMIN — HEPARIN SODIUM 5000 UNITS: 5000 INJECTION, SOLUTION INTRAVENOUS; SUBCUTANEOUS at 20:22

## 2017-03-13 RX ADMIN — DEXTROSE MONOHYDRATE 125 ML/HR: 50 INJECTION, SOLUTION INTRAVENOUS at 08:57

## 2017-03-13 RX ADMIN — METRONIDAZOLE 500 MG: 500 INJECTION, SOLUTION INTRAVENOUS at 12:21

## 2017-03-13 RX ADMIN — IPRATROPIUM BROMIDE 0.5 MG: 0.5 SOLUTION RESPIRATORY (INHALATION) at 06:07

## 2017-03-13 RX ADMIN — POTASSIUM CHLORIDE 40 MEQ: 1.5 POWDER, FOR SOLUTION ORAL at 12:21

## 2017-03-13 RX ADMIN — HEPARIN SODIUM 5000 UNITS: 5000 INJECTION, SOLUTION INTRAVENOUS; SUBCUTANEOUS at 08:57

## 2017-03-13 RX ADMIN — METOPROLOL TARTRATE 25 MG: 25 TABLET, FILM COATED ORAL at 20:22

## 2017-03-13 RX ADMIN — PANTOPRAZOLE SODIUM 40 MG: 40 INJECTION, POWDER, FOR SOLUTION INTRAVENOUS at 05:06

## 2017-03-13 RX ADMIN — INSULIN ASPART 3 UNITS: 100 INJECTION, SOLUTION INTRAVENOUS; SUBCUTANEOUS at 06:28

## 2017-03-13 RX ADMIN — LORAZEPAM 1 MG: 2 INJECTION INTRAMUSCULAR; INTRAVENOUS at 09:25

## 2017-03-13 RX ADMIN — GUAIFENESIN 600 MG: 600 TABLET, EXTENDED RELEASE ORAL at 08:56

## 2017-03-13 RX ADMIN — MIDAZOLAM HYDROCHLORIDE 2 MG: 1 INJECTION, SOLUTION INTRAMUSCULAR; INTRAVENOUS at 12:36

## 2017-03-13 RX ADMIN — POTASSIUM CHLORIDE 10 MEQ: 7.46 INJECTION, SOLUTION INTRAVENOUS at 14:47

## 2017-03-13 RX ADMIN — INSULIN DETEMIR 22 UNITS: 100 INJECTION, SOLUTION SUBCUTANEOUS at 20:20

## 2017-03-13 RX ADMIN — CEFTRIAXONE 2 G: 2 INJECTION, POWDER, FOR SOLUTION INTRAMUSCULAR; INTRAVENOUS at 08:56

## 2017-03-13 RX ADMIN — FUROSEMIDE 40 MG: 10 INJECTION, SOLUTION INTRAMUSCULAR; INTRAVENOUS at 09:24

## 2017-03-13 RX ADMIN — PROPOFOL 50 MCG/KG/MIN: 10 INJECTION, EMULSION INTRAVENOUS at 07:44

## 2017-03-13 RX ADMIN — VANCOMYCIN 125 MG: KIT at 11:30

## 2017-03-13 RX ADMIN — PROPOFOL 50 MCG/KG/MIN: 10 INJECTION, EMULSION INTRAVENOUS at 18:10

## 2017-03-13 RX ADMIN — POTASSIUM CHLORIDE 10 MEQ: 7.46 INJECTION, SOLUTION INTRAVENOUS at 11:14

## 2017-03-13 RX ADMIN — SUCRALFATE 1 G: 1 TABLET ORAL at 20:22

## 2017-03-13 RX ADMIN — METOPROLOL TARTRATE 25 MG: 25 TABLET, FILM COATED ORAL at 08:57

## 2017-03-13 RX ADMIN — POTASSIUM CHLORIDE 10 MEQ: 7.46 INJECTION, SOLUTION INTRAVENOUS at 09:57

## 2017-03-13 RX ADMIN — INSULIN ASPART 3 UNITS: 100 INJECTION, SOLUTION INTRAVENOUS; SUBCUTANEOUS at 17:23

## 2017-03-13 RX ADMIN — METRONIDAZOLE 500 MG: 500 INJECTION, SOLUTION INTRAVENOUS at 05:07

## 2017-03-13 RX ADMIN — POTASSIUM PHOSPHATE, MONOBASIC AND POTASSIUM PHOSPHATE, DIBASIC 15 MMOL: 224; 236 INJECTION, SOLUTION INTRAVENOUS at 15:48

## 2017-03-13 RX ADMIN — Medication: at 02:55

## 2017-03-13 RX ADMIN — IPRATROPIUM BROMIDE 0.5 MG: 0.5 SOLUTION RESPIRATORY (INHALATION) at 00:05

## 2017-03-13 RX ADMIN — POTASSIUM CHLORIDE 10 MEQ: 7.46 INJECTION, SOLUTION INTRAVENOUS at 13:32

## 2017-03-13 RX ADMIN — METRONIDAZOLE 500 MG: 500 INJECTION, SOLUTION INTRAVENOUS at 20:21

## 2017-03-13 RX ADMIN — Medication 4 MG/HR: at 06:04

## 2017-03-13 RX ADMIN — VANCOMYCIN 125 MG: KIT at 05:07

## 2017-03-13 RX ADMIN — SODIUM BICARBONATE TAB 650 MG 650 MG: 650 TAB at 08:57

## 2017-03-13 RX ADMIN — IPRATROPIUM BROMIDE 0.5 MG: 0.5 SOLUTION RESPIRATORY (INHALATION) at 12:28

## 2017-03-13 RX ADMIN — Medication: at 11:17

## 2017-03-13 RX ADMIN — POTASSIUM CHLORIDE 10 MEQ: 7.46 INJECTION, SOLUTION INTRAVENOUS at 08:57

## 2017-03-13 RX ADMIN — INSULIN ASPART 3 UNITS: 100 INJECTION, SOLUTION INTRAVENOUS; SUBCUTANEOUS at 11:30

## 2017-03-13 RX ADMIN — BACITRACIN ZINC: 500 OINTMENT TOPICAL at 08:57

## 2017-03-13 RX ADMIN — PROPOFOL 50 MCG/KG/MIN: 10 INJECTION, EMULSION INTRAVENOUS at 02:17

## 2017-03-13 RX ADMIN — Medication: at 18:08

## 2017-03-13 RX ADMIN — MICAFUNGIN SODIUM 100 MG: 20 INJECTION, POWDER, LYOPHILIZED, FOR SOLUTION INTRAVENOUS at 09:56

## 2017-03-13 RX ADMIN — VANCOMYCIN 125 MG: KIT at 17:19

## 2017-03-13 RX ADMIN — IPRATROPIUM BROMIDE 0.5 MG: 0.5 SOLUTION RESPIRATORY (INHALATION) at 18:16

## 2017-03-13 RX ADMIN — METHYLPREDNISOLONE SODIUM SUCCINATE 10 MG: 40 INJECTION, POWDER, FOR SOLUTION INTRAMUSCULAR; INTRAVENOUS at 08:56

## 2017-03-13 RX ADMIN — BACITRACIN ZINC: 500 OINTMENT TOPICAL at 20:28

## 2017-03-13 RX ADMIN — SUCRALFATE 1 G: 1 TABLET ORAL at 17:13

## 2017-03-13 RX ADMIN — CLOPIDOGREL 75 MG: 75 TABLET, FILM COATED ORAL at 08:56

## 2017-03-13 RX ADMIN — LORAZEPAM 1 MG: 2 INJECTION INTRAMUSCULAR at 09:25

## 2017-03-13 RX ADMIN — POTASSIUM CHLORIDE 10 MEQ: 7.46 INJECTION, SOLUTION INTRAVENOUS at 12:21

## 2017-03-13 NOTE — SIGNIFICANT NOTE
Picture taken of abrasion on upper lip, respiratory therapy notified, wound care consult placed in orders.

## 2017-03-13 NOTE — PLAN OF CARE
Problem: Patient Care Overview (Adult)  Goal: Plan of Care Review  Outcome: Outcome(s) achieved Date Met:  03/13/17 03/13/17 1512   Coping/Psychosocial Response Interventions   Plan Of Care Reviewed With patient;daughter   Patient Care Overview   Progress no change   Outcome Evaluation   Outcome Summary/Follow up Plan Daughter contacted regarding medical decisions per case management. Family conference set for Wednesday with CM and MD       Goal: Adult Individualization and Mutuality  Outcome: Ongoing (interventions implemented as appropriate)    03/08/17 1630   Individualization   Patient Specific Preferences Unable to determine at this time       Goal: Discharge Needs Assessment  Outcome: Ongoing (interventions implemented as appropriate)    03/13/17 1512   Discharge Needs Assessment   Concerns To Be Addressed medication concerns;substance/tobacco abuse/use concerns;relationship concerns;decision making concerns   Readmission Within The Last 30 Days no previous admission in last 30 days   Discharge Disposition still a patient;long-term care         Problem: Pneumonia (Adult)  Goal: Signs and Symptoms of Listed Potential Problems Will be Absent or Manageable (Pneumonia)  Outcome: Ongoing (interventions implemented as appropriate)    03/13/17 1512   Pneumonia   Problems Assessed (Pneumonia) fluid/electrolyte imbalance;respiratory compromise   Problems Present (Pneumonia) respiratory compromise         Problem: Diabetes, Type 2 (Adult)  Goal: Signs and Symptoms of Listed Potential Problems Will be Absent or Manageable (Diabetes, Type 2)  Outcome: Ongoing (interventions implemented as appropriate)    03/12/17 1607   Diabetes, Type 2   Problems Assessed (Type 2 Diabetes) all   Problems Present (Type 2 Diabetes) none         Problem: Mechanical Ventilation, Invasive (Adult)  Goal: Signs and Symptoms of Listed Potential Problems Will be Absent or Manageable (Mechanical Ventilation, Invasive)  Outcome: Ongoing  (interventions implemented as appropriate)    03/11/17 0324 03/12/17 1607   Mechanical Ventilation, Invasive   Problems Assessed (Mechanical Ventilation, Invasive) --  all   Problems Present (Mechanical Ventilation, Invasive) immobility;inability to wean --        Goal: Signs and Symptoms of Listed Potential Problems Will be Absent or Manageable (Mechanical Ventilation, Invasive)  Outcome: Ongoing (interventions implemented as appropriate)    03/11/17 0324 03/12/17 1607   Mechanical Ventilation, Invasive   Problems Assessed (Mechanical Ventilation, Invasive) --  all   Problems Present (Mechanical Ventilation, Invasive) immobility;inability to wean --          Problem: Pressure Ulcer Risk (Brendan Scale) (Adult,Obstetrics,Pediatric)  Goal: Identify Related Risk Factors and Signs and Symptoms  Outcome: Ongoing (interventions implemented as appropriate)    03/12/17 1607   Pressure Ulcer Risk (Brendan Scale)   Related Risk Factors (Pressure Ulcer Risk (Brendan Scale)) age extremes;cognitive impairment;critical care admission;fluid intake inadequate;excretions/secretions;hypothermia/hyperthermia;medical devices;medication;mental impairment;mobility impaired;nutritional deficiencies;tissue perfusion altered       Goal: Skin Integrity  Outcome: Ongoing (interventions implemented as appropriate)    03/12/17 1607   Pressure Ulcer Risk (Brendan Scale) (Adult,Obstetrics,Pediatric)   Skin Integrity making progress toward outcome

## 2017-03-13 NOTE — PROGRESS NOTES
Discharge Planning Assessment   [place]     Patient Name: Pepito Yan  MRN: [id number]  Today's Date: 3/13/2017    Admit Date: 2/28/2017          Discharge Needs Assessment     None            Discharge Plan       03/13/17 1515    Case Management/Social Work Plan    Plan SS received message from Dr. Bradley regarding a family meeting to be scheduled with the pt's daughter, April and pt's son, Scot.  SS contacted pt's daughter, April at [phone] and left a voicemail regarding a meeting on Wednesday March 15th, 2017 at 10:30am.  SS contacted pt's son, Scot at [facility] at [phone] and spoke with the  regarding a meeting and he states he is agreeable for the inmate to be involved in a phone meeting on Wedneday at 10:30am.   will need to be contacted at [phone] about 15 minutes prior to the meeting.  Dr. Bradley has been notified and is in agreement with the meeting.  SS will continue to follow.     Patient/Family In Agreement With Plan yes        Discharge Placement     No information found        Expected Discharge Date and Time     Expected Discharge Date Expected Discharge Time    Mar 9, 2017               Demographic Summary     None            Functional Status     None            Psychosocial     None            Abuse/Neglect     None            Legal     None            Substance Abuse     None            Patient Forms     None          Merlene Parisi

## 2017-03-13 NOTE — PROGRESS NOTES
LOS: 13 days   Patient Care Team:  ROSA Love as PCP - General  ROSA Love as PCP - Family Medicine    Chief Complaint:  Pulmonology is following for bilateral pneumonia/ARDS    Subjective     Interval History: patient is intubated and sedated.  No acute events reported overnight.      History taken from: chart RN Patient unable to give history due to patient intubated.    Review of Systems:   Review of Systems - History obtained from chart review and unobtainable from patient due to mental status and Intubated      Objective     Vital Signs  Temp:  [98.5 °F (36.9 °C)-99.8 °F (37.7 °C)] 98.7 °F (37.1 °C)  Heart Rate:  [] 93  Resp:  [16-23] 17  BP: (112-166)/(58-91) 115/71  FiO2 (%):  [45 %] 45 %  Body mass index is 21.79 kg/(m^2).    Intake/Output Summary (Last 24 hours) at 03/13/17 1032  Last data filed at 03/13/17 0957   Gross per 24 hour   Intake 5826.23 ml   Output   4100 ml   Net 1726.23 ml     I/O this shift:  In: 1870.8 [I.V.:1570.8; IV Piggyback:300]  Out: -     Physical Exam:  GENERAL APPEARANCE: Intubated and sedated.  Appears to be resting comfortably in bed.     HEAD: normocephalic.    EYES: PERRLA.    THROAT: ET tube in place. Oral cavity and pharynx normal. No inflammation, swelling, exudate, or lesions.     NECK: Neck supple.     CARDIAC: Normal S1 and S2. No S3, S4 or murmurs. Rhythm is regular. There is no peripheral edema, cyanosis or pallor. Extremities are warm and well perfused. Capillary refill is less than 2 seconds. No carotid bruits.    LUNGS: Clear to auscultation and percussion without rales, rhonchi, wheezing or diminished breath sounds.    ABDOMEN: Positive bowel sounds. Soft, nondistended, nontender.     EXTREMITIES: No significant deformity or joint abnormality. No edema. Peripheral pulses intact.    NEUROLOGICAL: Unable to assess due to sedation status.     PSYCHIATRIC: Unable to assess due to sedation status.     Results Review:     I reviewed the patient's  new clinical results.  I reviewed the patient's new imaging results and agree with the interpretation.    Results from last 7 days  Lab Units 03/13/17  0511 03/12/17  0518 03/11/17  0520   WBC 10*3/mm3 5.75 5.52 5.87   HEMOGLOBIN g/dL 8.0* 8.2* 8.9*   PLATELETS 10*3/mm3 211 176 146       Results from last 7 days  Lab Units 03/13/17  0510 03/12/17  0518 03/11/17  0520   SODIUM mmol/L 147 142 144   POTASSIUM mmol/L 3.1* 3.8 3.7   CHLORIDE mmol/L 112 110 116*   TOTAL CO2 mmol/L 31.5 26.8 26.4   BUN mg/dL 13 16 14   CREATININE mg/dL 0.39* 0.42* 0.42*   CALCIUM mg/dL 8.0 7.9 8.6   GLUCOSE mg/dL 215* 341* 204*   MAGNESIUM mg/dL 2.4 1.9 2.0     Lab Results   Component Value Date    INR 1.01 12/17/2016    INR 0.94 11/20/2016    INR <0.90 02/06/2016    PROTIME 11.4 12/17/2016    PROTIME 10.6 11/20/2016    PROTIME 10.0 02/06/2016       Results from last 7 days  Lab Units 03/13/17  0510 03/12/17  0518 03/11/17  0520   ALK PHOS U/L 116* 145* 160*   BILIRUBIN mg/dL 0.1* 0.2 0.2   ALT (SGPT) U/L 71* 62* 45*   AST (SGOT) U/L 66* 59* 46*       Results from last 7 days  Lab Units 03/13/17  0420   PH, ARTERIAL pH units 7.412   PO2 ART mm Hg 134.0*   PCO2, ARTERIAL mm Hg 46.6*   HCO3 ART mmol/L 29.0*     Imaging Results (last 24 hours)     Procedure Component Value Units Date/Time    XR Chest AP [[id number]] Collected:  03/13/17 0830     Updated:  03/13/17 0833    Narrative:       EXAMINATION: XR CHEST AP-      CLINICAL INDICATION:     ARDS; J18.9-Pneumonia, unspecified organism     TECHNIQUE: Single AP view of chest.      COMPARISON: 03/12/2017      FINDINGS:   There is bibasilar atelectasis.   Patchy bibasilar airspace disease is again noted.  Support tube and lines are in unchanged position.   Heart size is stable.  No pneumothorax.   Tiny bilateral pleural effusions persist.        Impression:       Stable appearance of the chest.        This report was finalized on 3/13/2017 8:31 AM by Dr. Bret Morris MD.                 Medication Review:   Scheduled Medications:    acetaminophen 650 mg Oral Once   aspirin 81 mg Oral Daily   bacitracin  Topical Q12H   ceftriaxone 2 g Intravenous Q24H   clopidogrel 75 mg Oral Daily   guaiFENesin 600 mg Oral BID   heparin (porcine) 5,000 Units Subcutaneous Q12H   insulin aspart 0-14 Units Subcutaneous 4x Daily AC & at Bedtime   insulin detemir 22 Units Subcutaneous Nightly   ipratropium 0.5 mg Nebulization Q6H - RT   metoprolol tartrate 25 mg Oral Q12H   metroNIDAZOLE 500 mg Intravenous Q8H   micafungin (MYCAMINE)  mg Intravenous Q24H   pantoprazole 40 mg Intravenous Q AM   potassium chloride 40 mEq Oral Once   potassium chloride 40 mEq Oral Once   potassium chloride 10 mEq Intravenous Q1H   potassium phosphate 15 mmol Intravenous Once   predniSONE 40 mg Oral Daily With Breakfast   sucralfate 1 g Oral 4x Daily AC & at Bedtime   vancomycin 125 mg Oral Q6H     Continuous infusions:    fentaNYL (SUBLIMAZE) PCA 1500 mcg/30 mL syringe     midazolam 0.5 mg/mL 100 mL NS 1-10 mg/hr Last Rate: Stopped (03/13/17 0912)   Pharmacy to dose vancomycin     propofol 5-50 mcg/kg/min Last Rate: 50 mcg/kg/min (03/13/17 0954)       Assessment/Plan       CXR:  Chest xray is looking better.    Neuro:  Continue propofol and fentanyl IV per protocol for sedation.  Will do sedation holiday today and breathing trial accordingly.  Patient becomes agitated and restless when sedation is off.  Patient was given 1 mg ativan for anxiety which helped.  Patient was also given 5 mg of metoprolol for heart rate and blood pressure.    Respiratory: emergently intubated due to severe respiratory distress.     Vent settings:  RR16 FIO2 45 % PEEP 12      Decreased PEEP to 10.     Will continue IV steroids, scheduled inhalants and PRN neb treatments.        ID: C Diff positive.  Infectious disease is on case, management per them.     Cardiac:   Hemodynamically Stable.  Continuous ECG and pulse ox monitoring.       Renal:  appears to have volume overload on chest xray.  Discontinued dextrose IVF.  Ordered lasix 40 mg IV once and potassium 40 mEq PO once.  sodium continues to be elevated.  Will continue free water flushes per nephrology.  Management per nephrology.     Endocrine: monitor glucose targeting 140-180.     Electrolytes:  Monitor levels, manage and replete per protocols. Potassium and phosphorus is being replaced per protocols.  Magnesium is normal today and was replaced yesterday.       GI: Patient is tolerating tube feeding well.  Continue to monitor for refeeding syndrome.     Hematology:   Monitor blood counts, transfuse at or below 7 g/dL.  Unless patient is actively bleeding then began transfusing at 8 g/dL.      Will set up a conference call with the patient's daughter and son for sometime tomorrow to discuss plan of care and further treatment.      Patient Active Problem List   Diagnosis Code   • Chest pain R07.9   • Chest pain of uncertain etiology R07.89   • Intractable nausea and vomiting R11.2   • Intractable cyclical vomiting with nausea G43.A1   • Chronic obstructive pulmonary emphysema J43.9   • Hepatitis-C B19.20   • Diabetes mellitus type 2 in nonobese E11.9   • Arthritis or polyarthritis, rheumatoid M06.9   • Anxiety F41.9   • Essential hypertension I10   • Coronary artery disease I25.10   • Buerger's disease I73.1   • Cirrhosis K74.60   • Diabetes E11.9   • Chronic nausea R11.0   • Pancreatitis K85.90   • Bilateral pneumonia J18.9   • ARDS (adult respiratory distress syndrome) J80   • Hypoxia R09.02         Bedside rounds were completed with RN and RRT, discussed case and plan in great detail.    Spoke to family members this morning.  Discussed the case in detail- explained all the labs results and images. Discussed plan for the upcoming days and answered questions to their satisfaction.    Reyna Arellano, ROSA  03/13/17  10:32 AM        Attestation: Scribed for Reyna Estrada,  ROSA DAVILA, Gerard Bradley M.D. attest that the above note accurately reflects the work and decisions made  by me.  Patient was seen and evaluated by Dr. Bradley, including history of present illness, physical exam, assessment, and treatment plan.  The above note was reviewed and edited by Dr. Bradley. The patient was  discussed with Miss Arellano  and I agree with her history physical assessment and plan.  Critical care time 33 minutes

## 2017-03-13 NOTE — PLAN OF CARE
Problem: Patient Care Overview (Adult)  Goal: Plan of Care Review  Outcome: Ongoing (interventions implemented as appropriate)  Goal: Adult Individualization and Mutuality  Outcome: Ongoing (interventions implemented as appropriate)  Goal: Discharge Needs Assessment  Outcome: Ongoing (interventions implemented as appropriate)    Problem: Pneumonia (Adult)  Goal: Signs and Symptoms of Listed Potential Problems Will be Absent or Manageable (Pneumonia)  Outcome: Ongoing (interventions implemented as appropriate)    Problem: Diabetes, Type 2 (Adult)  Goal: Signs and Symptoms of Listed Potential Problems Will be Absent or Manageable (Diabetes, Type 2)  Outcome: Ongoing (interventions implemented as appropriate)    Problem: Mechanical Ventilation, Invasive (Adult)  Goal: Signs and Symptoms of Listed Potential Problems Will be Absent or Manageable (Mechanical Ventilation, Invasive)  Outcome: Ongoing (interventions implemented as appropriate)  Goal: Signs and Symptoms of Listed Potential Problems Will be Absent or Manageable (Mechanical Ventilation, Invasive)  Outcome: Ongoing (interventions implemented as appropriate)    Problem: Pressure Ulcer Risk (Brendan Scale) (Adult,Obstetrics,Pediatric)  Goal: Identify Related Risk Factors and Signs and Symptoms  Outcome: Ongoing (interventions implemented as appropriate)  Goal: Skin Integrity  Outcome: Ongoing (interventions implemented as appropriate)

## 2017-03-13 NOTE — PROGRESS NOTES
[facility] CCU Note    Patient Identification:  Name:  Pepito Yan  Age:  51 y.o.  Sex:  female  :  1965  MRN:  [id number]  Visit number:  [id number]  Primary Care Provider:  ROSA Meza    13    Ventilator Day:  Ventilator Settings:  Ventilator/Non-Invasive Ventilation Settings     Start     Ordered    17 1638  Ventilator - AC/VC; (16); (55%); 12; 450  Continuous     Question Answer Comment   Vent Mode AC/VC    Breath rate  16   FiO2  55%   PEEP 12    Tidal Volume 450        17 1638    17 0713  Ventilator - AC/VC; 12  Continuous,   Status:  Canceled     Question Answer Comment   Vent Mode AC/VC    PEEP 12        17 0712    17 0651  Ventilator - AC/VC; (30); 15  Continuous,   Status:  Canceled     Question Answer Comment   Vent Mode AC/VC    Breath rate  30   PEEP 15        17 0651    17 1753  . BIPAP; IPAP: 10; EPAP: 16; Titrate for spO2: equal to or greater than, 90%  At Bedtime & As Needed-RT,   Status:  Canceled     Question Answer Comment   . BIPAP    IPAP 10    EPAP 16    Titrate for spO2 equal to or greater than    Titrate for spO2 90%        17 1755    17 1752  . BIPAP  At Bedtime & As Needed-RT,   Status:  Canceled     Question:  .  Answer:  BIPAP    17 1751    17 1127  NIPPV CPAP OR BIPAP  Until Discontinued,   Status:  Canceled     Comments:  16/6 35 % oxygen rate 20    17 1127          Drips: Fentanyl, Versed, Propofol  Antibiotics: Micafungin, Rocephin, Flagyl, Oral Vancomcyin  Lines: R IJ  King Catheter: Yes    Procedures:  3/3/17: Emergent Intubation  3/3/17: Right femoral central line placement status post removal  3/3/17: Right IJ central line placement  3/9/17: Transfusion 1 unit PRBC    HPI: The patient is a 52 yo female admitted with bilateral pneumonia      Subjective     No acute events overnight. The patient remains with diarrhea but improved. No fever since 0400 yesterday  morning. She is tolerating her tube feedings well.     History taken from: chart RN   Patient unable to give history due to: N/A  Present during visit: Leann RN, GUERRERO Bartlett    Objective     Vital Signs  Temp:  [98.5 °F (36.9 °C)-99.8 °F (37.7 °C)] 98.7 °F (37.1 °C)  Heart Rate:  [] 104  Resp:  [16-23] 23  BP: (100-166)/(56-91) 115/71  FiO2 (%):  [45 %] 45 %  Body mass index is 21.79 kg/(m^2).    Intake/Output Summary (Last 24 hours) at 03/13/17 0820  Last data filed at 03/13/17 0530   Gross per 24 hour   Intake 4055.4 ml   Output   4100 ml   Net  -44.6 ml       Physical Exam:    Physical Exam   Constitutional: She appears well-developed and well-nourished. No distress. She is sedated and intubated.   Chronically ill   HENT:   Head: Normocephalic and atraumatic.   Nose: Nose normal.   Mouth/Throat: Oropharynx is clear and moist and mucous membranes are normal. Oral lesions present.   ETT and OGT in place   Eyes: Conjunctivae and EOM are normal. Pupils are equal, round, and reactive to light. No scleral icterus.   Neck: Trachea normal. Neck supple. No JVD present. Carotid bruit is not present. No thyromegaly present.   Cardiovascular: Normal rate, regular rhythm and normal heart sounds.  Exam reveals no gallop and no friction rub.    No murmur heard.  Pulmonary/Chest: Effort normal. She is intubated. No respiratory distress. She has no wheezes. She has no rales.   Decreased expiratory breath sounds bilaterally   Abdominal: Soft. Bowel sounds are normal. She exhibits no distension. There is no tenderness. There is no guarding.   Genitourinary:   Genitourinary Comments: King catheter in place   Musculoskeletal: Normal range of motion.   Neurological:   Intubated and sedated   Skin: Skin is warm, dry and intact. No rash noted. No erythema.     Results Review:     I reviewed the patient's new imaging results and agree with the interpretation.  I reviewed the patient's other test results and agree with the  interpretation.    Telemetry: Sinus 90s      Results from last 7 days  Lab Units 03/13/17  0511 03/12/17  0518 03/11/17  0520 03/10/17  1843 03/10/17  0936 03/10/17  0815 03/09/17  2056  03/09/17  0027 03/08/17  0747 03/08/17  0533   WBC 10*3/mm3 5.75 5.52 5.87  --   --  5.49  --   --  9.15 7.65 7.86   HEMOGLOBIN g/dL 8.0* 8.2* 8.9* 8.4* 7.5* 7.5* 8.4*  < > 7.0* 7.3* 7.4*   PLATELETS 10*3/mm3 211 176 146  --   --  128*  --   --  145 166 154   < > = values in this interval not displayed.    Results from last 7 days  Lab Units 03/13/17  0510 03/12/17  0518 03/11/17  0520 03/10/17  0815 03/09/17  0027 03/08/17  0533 03/08/17  0356   SODIUM mmol/L 147 142 144 145 149 149 149   POTASSIUM mmol/L 3.1* 3.8 3.7 4.4 3.5 4.0 4.3   CHLORIDE mmol/L 112 110 116* 119* 123* 126* 124*   TOTAL CO2 mmol/L 31.5 26.8 26.4 25.7 19.1* 21.2* 22.4*   BUN mg/dL 13 16 14 27* 31* 29* 31*   CREATININE mg/dL 0.39* 0.42* 0.42* 0.51 0.48 0.46 0.41*   CALCIUM mg/dL 8.0 7.9 8.6 8.5 8.5 8.4 8.4   GLUCOSE mg/dL 215* 341* 204* 288* 181* 164* 180*       Results from last 7 days  Lab Units 03/13/17  0510 03/12/17  0518 03/11/17  0520 03/10/17  0815 03/09/17  0027 03/08/17  0533 03/08/17  0356   BILIRUBIN mg/dL 0.1* 0.2 0.2 0.2 0.4 0.3 0.2   ALK PHOS U/L 116* 145* 160* 133* 139* 106* 102   AST (SGOT) U/L 66* 59* 46* 18 37* 26 26   ALT (SGPT) U/L 71* 62* 45* 26 25 20 19       Results from last 7 days  Lab Units 03/13/17  0510 03/12/17  0518 03/11/17  0520 03/10/17  0815 03/08/17  0533 03/08/17  0356 03/07/17  0036   MAGNESIUM mg/dL 2.4 1.9 2.0 2.3 2.2 2.1 2.2       Results from last 7 days  Lab Units 03/13/17  0510 03/12/17  0518 03/11/17  0520   CRP mg/dL 3.18* 6.63* 5.03*     CXR, 3/13/17:  FINDINGS:   There is bibasilar atelectasis.   Patchy bibasilar airspace disease is again noted.  Support tube and lines are in unchanged position.   Heart size is stable.  No pneumothorax.   Tiny bilateral pleural effusions persist.       IMPRESSION:  Stable  appearance of the chest.    Cultures:  BLOOD CULTURE   Date Value Ref Range Status   03/11/2017 No growth at 24 hours  Preliminary   03/11/2017 No growth at 24 hours  Preliminary   03/06/2017 No growth at 5 days  Final   03/06/2017 No growth at 5 days  Final     URINE CULTURE   Date Value Ref Range Status   03/11/2017 Normal Urogenital Falguni  Preliminary     Current Hospital Medications:    acetaminophen 650 mg Oral Once   aspirin 81 mg Oral Daily   bacitracin  Topical Q12H   ceftriaxone 2 g Intravenous Q24H   clopidogrel 75 mg Oral Daily   guaiFENesin 600 mg Oral BID   heparin (porcine) 5,000 Units Subcutaneous Q12H   insulin aspart 0-14 Units Subcutaneous 4x Daily AC & at Bedtime   insulin detemir 20 Units Subcutaneous Nightly   ipratropium 0.5 mg Nebulization Q6H - RT   methylPREDNISolone sodium succinate 10 mg Intravenous Q12H   metoprolol tartrate 25 mg Oral Q12H   metroNIDAZOLE 500 mg Intravenous Q8H   micafungin (MYCAMINE)  mg Intravenous Q24H   pantoprazole 40 mg Intravenous Q AM   potassium chloride 40 mEq Oral Once   potassium chloride 10 mEq Intravenous Q1H   sodium bicarbonate 650 mg Oral TID   sucralfate 1 g Oral 4x Daily AC & at Bedtime   vancomycin 125 mg Oral Q6H       dextrose 125 mL/hr Last Rate: 125 mL/hr (03/12/17 2044)   fentaNYL (SUBLIMAZE) PCA 1500 mcg/30 mL syringe     midazolam 0.5 mg/mL 100 mL NS 1-10 mg/hr Last Rate: 4 mg/hr (03/13/17 0604)   norepinephrine 0.02-0.3 mcg/kg/min Last Rate: Stopped (03/04/17 2205)   Pharmacy to dose vancomycin     propofol 5-50 mcg/kg/min Last Rate: 50 mcg/kg/min (03/13/17 1166)       Assessment/Plan      -Severe sepsis with acute hypoxic respiratory failure that was present upon admission and secondary to to community acquired pneumonia and E.coli UTI, now with probable hospital/ventilator associated pneumonia and Clostridium difficile colitis  -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 Will change her steroids to prednisone. Repeat urine culture from 3/11/17 revealing normal urogenital rasheeda. Supplement her potassium and check a magnesium level. Will increase her dose of Levemir. Awaiting pulmonary/critical care evaluation - may consider a spontaneous breathing trial today. If the patient is unable to extubate in the next few days, will need to discuss with daughter (next of kin) regarding Tracheostomy/PEG tube placement. Repeat her CBC, CMP and CRP level in the am. Continue to follow her sodium level closely, especially in the setting of diarrhea. Will discontinue her sodium bicarbonate tablets and continue to follow her electrolytes closely.    Patient is considered high risk due to: severe sepsis, acute respiratory distress syndrome, prolonged mechanical ventilation, clostridium colitis    Overall prognosis remains very poor/grim.    I discussed the patients findings and my recommendations with nursing staff.    Sherrill Weinre DO  03/13/17  8:20 AM

## 2017-03-13 NOTE — PROGRESS NOTES
"  I have personally seen and examined the patient today and discussed overnight interval progress and pertinent issues with nursing staff.    ROS:    Unable to obtain due to the patients mental status, on the ventilator and sedated       History taken from: chart RN      Vital Signs    Visit Vitals   • /71   • Pulse 93   • Temp 98.7 °F (37.1 °C)   • Resp 17   • Ht 63\" (160 cm)   • Wt 123 lb (55.8 kg)   • SpO2 100%   • BMI 21.79 kg/m2       Temp:  [98.5 °F (36.9 °C)-99.8 °F (37.7 °C)] 98.7 °F (37.1 °C)      Intake/Output Summary (Last 24 hours) at 03/13/17 0924  Last data filed at 03/13/17 0918   Gross per 24 hour   Intake 5626.23 ml   Output   4100 ml   Net 1526.23 ml     Intake & Output (last 3 days)       03/10 0701 - 03/11 0700 03/11 0701 - 03/12 0700 03/12 0701 - 03/13 0700 03/13 0701 - 03/14 0700    I.V. (mL/kg) 1518.5 (27.2) 2998.5 (53.7) 2685.4 (48.1) 1570.8 (28.2)    Blood        Other 1000 430 490     NG/ 403 380     IV Piggyback 550 300 500 100    Total Intake(mL/kg) 4006.5 (71.8) 4131.5 (74.1) 4055.4 (72.7) 1670.8 (29.9)    Urine (mL/kg/hr) 3325 (2.5) 3435 (2.6) 4100 (3.1)     Stool 0 (0) 0 (0) 0 (0)     Total Output 3325 3435 4100      Net +681.5 +696.5 -44.6 +1670.8            Unmeasured Stool Occurrence 1 x 2 x 2 x           Physical Exam:      General Appearance:  intubated, sedated on ventilator, and respiratory distress    Head:  Normocephalic, without obvious abnormality, atraumatic   Eyes:      Lids and lashes normal, conjunctivae and sclerae normal, no icterus, no pallor, corneas clear, PERRLA   Ears:  Ears appear intact with no abnormalities noted   Throat: No oral lesions, no thrush, oral mucosa moist   Neck: No adenopathy, supple, trachea midline, no thyromegaly, no carotid bruit, no JVD   Back:  No tenderness to percussion or palpation, range of motion normal   Lungs:  normal breath sounds without rhonchi or crackles.   Heart:  Regular rhythm and normal rate, normal S1 and S2, no " murmur, no gallop, no rub, no click   Chest Wall:  No abnormalities observed   Abdomen:  Normal bowel sounds, no masses, no organomegaly, soft non-tender, non-distended, no guarding, no rebound tenderness   Rectal:  Deferred   Extremities: No edema, no cyanosis, no redness   Pulses: Pulses palpable and equal bilaterally   Skin: No bleeding, bruising or rash   Lymph nodes: No palpable adenopathy   Neurologic: sedated        Results:      Results from last 7 days  Lab Units 03/13/17  0511 03/12/17  0518 03/11/17  0520 03/10/17  0815 03/09/17  0027 03/08/17  0747 03/08/17  0533   WBC 10*3/mm3 5.75 5.52 5.87 5.49 9.15 7.65 7.86     Lab Results   Component Value Date    NEUTROABS 4.03 03/13/2017         Results from last 7 days  Lab Units 03/13/17  0510   CREATININE mg/dL 0.39*         Results from last 7 days  Lab Units 03/13/17  0510 03/12/17  0518 03/11/17  0520   CRP mg/dL 3.18* 6.63* 5.03*       Imaging Results (last 24 hours)     Procedure Component Value Units Date/Time    XR Chest AP [[id number]] Collected:  03/08/17 0831     Updated:  03/08/17 0834    Narrative:       EXAMINATION: XR CHEST AP-      CLINICAL INDICATION:     ARDS; J18.9-Pneumonia, unspecified organism     TECHNIQUE: Single AP view of chest.      COMPARISON: 3/7/2017      FINDINGS:   There is bibasilar atelectasis.   Grossly stable bilateral airspace disease.  Support tube and lines are in unchanged position.   Heart size is stable.  No pneumothorax.              Impression:       Grossly stable bilateral airspace disease.     This report was finalized on 3/8/2017 8:31 AM by Dr. Bret Morris MD.               Results Review:    I have personally reviewed laboratory data, culture results, radiology studies and antimicrobial therapy.    Hospital Medications (active)       Dose Frequency Start End    acetaminophen (TYLENOL) tablet 650 mg 650 mg Every 6 Hours PRN 3/2/2017     Sig - Route: Take 2 tablets by mouth Every 6 (Six) Hours As Needed for fever.  - Oral    acetaminophen (TYLENOL) tablet 650 mg 650 mg Once 3/6/2017     Sig - Route: Take 2 tablets by mouth 1 (One) Time. - Oral    aspirin chewable tablet 81 mg 81 mg Daily 3/7/2017     Sig - Route: Chew 1 tablet Daily. - Oral    Notes to Pharmacy: Patient intubated and RN requested changed from enteric coated tablet.    clopidogrel (PLAVIX) tablet 75 mg 75 mg Daily 3/1/2017     Sig - Route: Take 1 tablet by mouth Daily. - Oral    dextrose (D50W) solution 25 g 25 g Every 15 Minutes PRN 3/1/2017     Sig - Route: Infuse 50 mL into a venous catheter Every 15 (Fifteen) Minutes As Needed for low blood sugar (Blood Sugar Less Than 70, Patient Has IV Access - Unresponsive, NPO or Unable To Safely Swallow). - Intravenous    Cosign for Ordering: Accepted by Savanah Gutierrez MD on 3/1/2017  7:01 PM    dextrose (GLUTOSE) oral gel 15 g 15 g Every 15 Minutes PRN 3/1/2017     Sig - Route: Take 15 g by mouth Every 15 (Fifteen) Minutes As Needed for low blood sugar (Blood Sugar Less Than 70, Patient Alert, Is Not NPO & Can Safely Swallow). - Oral    Cosign for Ordering: Accepted by Savanah Gutierrez MD on 3/1/2017  7:01 PM    FENTANYL PCA 1500 MCG/30 ML (BHCOR) PCA  Continuous 3/3/2017 3/13/2017    Sig - Route: Infuse  into a venous catheter Continuous. - Intravenous    glucagon (GLUCAGEN) injection 1 mg 1 mg Every 15 Minutes PRN 3/1/2017     Sig - Route: Inject 1 mg under the skin Every 15 (Fifteen) Minutes As Needed (Blood Glucose Less Than 70 - Patient Without IV Access - Unresponsive, NPO or Unable To Safely Swallow). - Subcutaneous    Cosign for Ordering: Accepted by Savanah Gutierrez MD on 3/1/2017  7:01 PM    guaiFENesin (MUCINEX) 12 hr tablet 600 mg 600 mg 2 Times Daily 3/2/2017     Sig - Route: Take 1 tablet by mouth 2 (Two) Times a Day. - Oral    heparin (porcine) 5000 UNIT/ML injection 5,000 Units 5,000 Units Every 12 Hours Scheduled 3/1/2017     Sig - Route: Inject 1 mL under the skin Every 12 (Twelve) Hours. -  "Subcutaneous    insulin aspart (novoLOG) injection 0-14 Units 0-14 Units 4 Times Daily Before Meals & Nightly 3/1/2017     Sig - Route: Inject 0-14 Units under the skin 4 (Four) Times a Day Before Meals & at Bedtime. - Subcutaneous    insulin detemir (LEVEMIR) injection 20 Units 20 Units Nightly 3/4/2017     Sig - Route: Inject 20 Units under the skin Every Night. - Subcutaneous    ipratropium (ATROVENT) nebulizer solution 0.5 mg 0.5 mg Every 6 Hours PRN 3/2/2017     Sig - Route: Take 2.5 mL by nebulization Every 6 (Six) Hours As Needed for shortness of air. - Nebulization    Cosign for Ordering: Accepted by Sherrill Weiner DO on 3/2/2017  1:40 PM    ipratropium (ATROVENT) nebulizer solution 0.5 mg 0.5 mg Every 6 Hours - RT 3/3/2017     Sig - Route: Take 2.5 mL by nebulization Every 6 (Six) Hours. - Nebulization    magnesium sulfate 6 g in dextrose (D5W) 5 % 250 mL infusion 6 g As Needed 3/6/2017     Sig - Route: Infuse 6 g into a venous catheter As Needed (Mg 1.1-1.5 mg/dL). - Intravenous    Linked Group 1:  \"Or\" Linked Group Details        magnesium sulfate 8 g in dextrose (D5W) 5 % 250 mL infusion 8 g As Needed 3/6/2017     Sig - Route: Infuse 8 g into a venous catheter As Needed (Mg less than or equal to 1 mg/dL). - Intravenous    Linked Group 1:  \"Or\" Linked Group Details        magnesium sulfate in D5W 1g/100mL (PREMIX) IVPB 1 g 1 g As Needed 3/6/2017     Sig - Route: Infuse 100 mL into a venous catheter As Needed (Mg less than or equal 1 mg/dL). - Intravenous    Linked Group 1:  \"Or\" Linked Group Details        magnesium sulfate in D5W 1g/100mL (PREMIX) IVPB 1 g 1 g As Needed 3/6/2017     Sig - Route: Infuse 100 mL into a venous catheter As Needed (magnesium sulfate 1 g in D5W 100 mL IVPB - Mg 1.6 - 1.9 mg/dL). - Intravenous    Linked Group 1:  \"Or\" Linked Group Details        meropenem (MERREM) 1 g/100 mL 0.9% NS VTB (mbp) 1 g Every 8 Hours 3/7/2017     Sig - Route: Infuse 100 mL into a venous catheter " "Every 8 (Eight) Hours. - Intravenous    methylPREDNISolone sodium succinate (SOLU-Medrol) injection 20 mg 20 mg Every 12 Hours 3/5/2017     Sig - Route: Infuse 0.5 mL into a venous catheter Every 12 (Twelve) Hours. - Intravenous    metoprolol tartrate (LOPRESSOR) tablet 25 mg 25 mg Every 12 Hours Scheduled 3/2/2017     Sig - Route: Take 1 tablet by mouth Every 12 (Twelve) Hours. - Oral    midazolam (VERSED) injection 2 mg 2 mg Every 4 Hours PRN 3/5/2017     Sig - Route: Infuse 2 mL into a venous catheter Every 4 (Four) Hours As Needed for sedation. - Intravenous    midazolam 0.5 mg/mL 100 mL NS (VERSED) infusion 1-10 mg/hr Titrated 3/8/2017 3/18/2017    Sig - Route: Infuse 1-10 mg/hr into a venous catheter Dose Adjusted By Provider As Needed. - Intravenous    norepinephrine (LEVOPHED) 32 mcg/mL (8 mg/250 mL) infusion 0.02-0.3 mcg/kg/min × 57.3 kg Titrated 3/3/2017     Sig - Route: Infuse 1.146-17.19 mcg/min into a venous catheter Dose Adjusted By Provider As Needed. - Intravenous    pantoprazole (PROTONIX) injection 40 mg 40 mg Every Early Morning 3/2/2017     Sig - Route: Infuse 10 mL into a venous catheter Every Morning. - Intravenous    Cosign for Ordering: Accepted by Sherrill Weiner DO on 3/1/2017  3:38 PM    Pharmacy to dose vancomycin  Continuous PRN 3/2/2017     Sig - Route: Continuous As Needed for consult. - Does not apply    polyethylene glycol (MIRALAX) powder 17 g 17 g Daily 3/8/2017     Sig - Route: Take 17 g by mouth Daily. - Oral    potassium chloride (K-DUR,KLOR-CON) CR tablet 40 mEq 40 mEq Once 3/2/2017     Sig - Route: Take 2 tablets by mouth 1 (One) Time. - Oral    potassium chloride (KLOR-CON) packet 40 mEq 40 mEq As Needed 3/6/2017     Sig - Route: Take 40 mEq by mouth As Needed (potassium replacement, see admin instructions). - Oral    Linked Group 2:  \"Or\" Linked Group Details        potassium chloride (MICRO-K) CR capsule 40 mEq 40 mEq As Needed 3/6/2017     Sig - Route: Take 4 capsules " "by mouth As Needed (potassium replacement.  see admin instructions). - Oral    Linked Group 2:  \"Or\" Linked Group Details        potassium chloride 10 mEq in 100 mL IVPB 10 mEq Every 1 Hour PRN 3/6/2017     Sig - Route: Infuse 100 mL into a venous catheter Every 1 (One) Hour As Needed (potassium protocol PERIPHERAL - see admin instructions). - Intravenous    Linked Group 2:  \"Or\" Linked Group Details        promethazine (PHENERGAN) tablet 12.5 mg 12.5 mg Every 6 Hours PRN 3/1/2017     Sig - Route: Take 1 tablet by mouth Every 6 (Six) Hours As Needed for Nausea or Vomiting. - Oral    Cosign for Ordering: Accepted by Sherrill Weiner DO on 3/1/2017  3:38 PM    propofol (DIPRIVAN) infusion 10 mg/mL 100 mL 5-50 mcg/kg/min × 57.3 kg Titrated 3/3/2017     Sig - Route: Infuse 286.5-2,865 mcg/min into a venous catheter Dose Adjusted By Provider As Needed. - Intravenous    sodium chloride 0.9 % flush 1-10 mL 1-10 mL As Needed 3/1/2017     Sig - Route: Infuse 1-10 mL into a venous catheter As Needed for line care. - Intravenous    sodium chloride 0.9 % flush 10 mL 10 mL As Needed 2/28/2017     Sig - Route: Infuse 10 mL into a venous catheter As Needed for line care. - Intravenous    Linked Group 3:  \"And\" Linked Group Details        sucralfate (CARAFATE) tablet 1 g 1 g 4 Times Daily Before Meals & Nightly 3/1/2017     Sig - Route: Take 1 tablet by mouth 4 (Four) Times a Day Before Meals & at Bedtime. - Oral    Cosign for Ordering: Accepted by Sherrill Weiner DO on 3/1/2017  3:38 PM    vancomycin (VANCOCIN) 750 mg in sodium chloride 0.9 % 250 mL IVPB 750 mg Every 12 Hours 3/4/2017     Sig - Route: Infuse 750 mg into a venous catheter Every 12 (Twelve) Hours. - Intravenous    dexmedetomidine HCl (PRECEDEX) 4 mcg/mL in sodium chloride 0.9 % 100 mL infusion (Discontinued) 0.2-1.5 mcg/kg/hr × 55.8 kg Continuous 3/6/2017 3/7/2017    Sig - Route: Infuse 11.16-83.7 mcg/hr into a venous catheter Continuous. - Intravenous    " Reason for Discontinue: Formulary change    dexmedetomidine HCl (PRECEDEX) 4 mcg/mL in sodium chloride 0.9 % 100 mL infusion (Discontinued) 0.2-1.5 mcg/kg/hr × 55.8 kg Titrated 3/7/2017 3/8/2017    Sig - Route: Infuse 11.16-83.7 mcg/hr into a venous catheter Dose Adjusted By Provider As Needed. - Intravenous    metroNIDAZOLE (FLAGYL) IVPB 500 mg (Discontinued) 500 mg Every 8 Hours 3/3/2017 3/8/2017    Sig - Route: Infuse 100 mL into a venous catheter Every 8 (Eight) Hours. - Intravenous            Cultures:    BLOOD CULTURE   Date Value Ref Range Status   03/06/2017 No growth at 24 hours  Preliminary   03/06/2017 No growth at 24 hours  Preliminary   03/04/2017 No growth at 4 days  Preliminary   03/04/2017 No growth at 4 days  Preliminary   03/03/2017 No growth at 4 days  Preliminary   03/03/2017 No growth at 4 days  Preliminary       PROBLEM LIST:    Patient Active Problem List   Diagnosis   • Chest pain   • Chest pain of uncertain etiology   • Intractable nausea and vomiting   • Intractable cyclical vomiting with nausea   • Chronic obstructive pulmonary emphysema   • Hepatitis-C   • Diabetes mellitus type 2 in nonobese   • Arthritis or polyarthritis, rheumatoid   • Anxiety   • Essential hypertension   • Coronary artery disease   • Buerger's disease   • Cirrhosis   • Diabetes   • Chronic nausea   • Pancreatitis   • Bilateral pneumonia   • ARDS (adult respiratory distress syndrome)   • Hypoxia       Assessment/Plan     ASSESSMENT:    1. Severe sepsis with acute respiratory failure  2. Bilateral pneumonia     PLAN:    Remains at PEEP of 12.  No fever reported and stable respiratory status.  Lungs continue to show normal breath sounds without rhonchi or crackles.  Normal white count and significantly improved CRP level.    This is very complicated at this time as the patient developed C. difficile colitis while requiring non-C. difficile antibiotics and this carries a very guarded prognosis regardless of type of  intervention.  Due to her concurrent C. difficile colitis infection IV vancomycin and meropenem therapy were discontinued and coverage was switched to Rocephin 2 g IV every 24 hours, Flagyl 500 mg IV every 8 hours, and oral vancomycin 125 mg by mouth every 6 hours.  Hope to discontinue Rocephin very soon as this could definitely worsen C. difficile colitis.  This is a very, complicated case and we will continue to follow very closely with you.    Unfortunately the patient is not going to do well despite appropriate interventions due to multiple comorbidities and high mortality risk.        Patients findings and recommendations were discussed with nursing staff    Code Status: Full Code     Written by Alycia Finn PA-C, acting as scribe for Dr. Hitchcock.    Alycia Finn PA-C  03/13/17  9:24 AM    Physician Attestation:    I have personally seen and examined the patient. I reviewed the patient's data including history of present illness, review of systems, physical examination, assessment and treatment plan and agree with findings above. The assessment and plan are my own.  I have reviewed and edited the note above after discussing the findings with Alycia Finn PA-C.    Evan Hitchcock MD  Infectious Diseases  03/13/17  9:24 AM

## 2017-03-14 ENCOUNTER — APPOINTMENT (OUTPATIENT)
Dept: GENERAL RADIOLOGY | Facility: HOSPITAL | Age: 52
End: 2017-03-14

## 2017-03-14 LAB
ALBUMIN SERPL-MCNC: 2.7 G/DL (ref 3.5–5)
ALBUMIN/GLOB SERPL: 0.7 G/DL (ref 1.5–2.5)
ALP SERPL-CCNC: 115 U/L (ref 35–104)
ALT SERPL W P-5'-P-CCNC: 67 U/L (ref 10–36)
ANION GAP SERPL CALCULATED.3IONS-SCNC: 0 MMOL/L (ref 3.6–11.2)
ANISOCYTOSIS BLD QL: ABNORMAL
AST SERPL-CCNC: 42 U/L (ref 10–30)
BILIRUB SERPL-MCNC: 0.2 MG/DL (ref 0.2–1.8)
BUN BLD-MCNC: 12 MG/DL (ref 7–21)
BUN/CREAT SERPL: 35.3 (ref 7–25)
CALCIUM SPEC-SCNC: 8.2 MG/DL (ref 7.7–10)
CHLORIDE SERPL-SCNC: 114 MMOL/L (ref 99–112)
CO2 SERPL-SCNC: 33 MMOL/L (ref 24.3–31.9)
CREAT BLD-MCNC: 0.34 MG/DL (ref 0.43–1.29)
CRP SERPL-MCNC: 3.08 MG/DL (ref 0–0.99)
DEPRECATED RDW RBC AUTO: 64.2 FL (ref 37–54)
EOSINOPHIL # BLD MANUAL: 0.06 10*3/MM3 (ref 0–0.7)
EOSINOPHIL NFR BLD MANUAL: 1 % (ref 0–5)
ERYTHROCYTE [DISTWIDTH] IN BLOOD BY AUTOMATED COUNT: 23.3 % (ref 11.5–14.5)
GFR SERPL CREATININE-BSD FRML MDRD: >150 ML/MIN/1.73
GLOBULIN UR ELPH-MCNC: 3.8 GM/DL
GLUCOSE BLD-MCNC: 140 MG/DL (ref 70–110)
GLUCOSE BLDC GLUCOMTR-MCNC: 139 MG/DL (ref 70–130)
GLUCOSE BLDC GLUCOMTR-MCNC: 150 MG/DL (ref 70–130)
GLUCOSE BLDC GLUCOMTR-MCNC: 201 MG/DL (ref 70–130)
GLUCOSE BLDC GLUCOMTR-MCNC: 264 MG/DL (ref 70–130)
HCT VFR BLD AUTO: 30.9 % (ref 37–47)
HGB BLD-MCNC: 8.4 G/DL (ref 12–16)
HYPOCHROMIA BLD QL: ABNORMAL
LYMPHOCYTES # BLD MANUAL: 1.48 10*3/MM3 (ref 1–3)
LYMPHOCYTES NFR BLD MANUAL: 24 % (ref 21–51)
LYMPHOCYTES NFR BLD MANUAL: 6 % (ref 0–10)
MCH RBC QN AUTO: 22 PG (ref 27–33)
MCHC RBC AUTO-ENTMCNC: 27.2 G/DL (ref 33–37)
MCV RBC AUTO: 81.1 FL (ref 80–94)
METAMYELOCYTES NFR BLD MANUAL: 1 % (ref 0–0)
MICROCYTES BLD QL: ABNORMAL
MONOCYTES # BLD AUTO: 0.37 10*3/MM3 (ref 0.1–0.9)
MYELOCYTES NFR BLD MANUAL: 4 % (ref 0–0)
NEUTROPHILS # BLD AUTO: 3.96 10*3/MM3 (ref 1.4–6.5)
NEUTROPHILS NFR BLD MANUAL: 63 % (ref 30–70)
NEUTS BAND NFR BLD MANUAL: 1 % (ref 4–12)
OSMOLALITY SERPL CALC.SUM OF ELEC: 294.5 MOSM/KG (ref 273–305)
PHOSPHATE SERPL-MCNC: 2.9 MG/DL (ref 2.7–4.5)
PLAT MORPH BLD: NORMAL
PLATELET # BLD AUTO: 206 10*3/MM3 (ref 130–400)
PMV BLD AUTO: 10.6 FL (ref 6–10)
POTASSIUM BLD-SCNC: 4 MMOL/L (ref 3.5–5.3)
PROT SERPL-MCNC: 6.5 G/DL (ref 6–8)
RBC # BLD AUTO: 3.81 10*6/MM3 (ref 4.2–5.4)
SCAN SLIDE: NORMAL
SODIUM BLD-SCNC: 147 MMOL/L (ref 135–153)
WBC NRBC COR # BLD: 6.18 10*3/MM3 (ref 4.5–12.5)

## 2017-03-14 PROCEDURE — 99291 CRITICAL CARE FIRST HOUR: CPT | Performed by: INTERNAL MEDICINE

## 2017-03-14 PROCEDURE — 25010000002 FUROSEMIDE PER 20 MG: Performed by: INTERNAL MEDICINE

## 2017-03-14 PROCEDURE — 84100 ASSAY OF PHOSPHORUS: CPT | Performed by: INTERNAL MEDICINE

## 2017-03-14 PROCEDURE — 80053 COMPREHEN METABOLIC PANEL: CPT | Performed by: INTERNAL MEDICINE

## 2017-03-14 PROCEDURE — 63710000001 INSULIN DETEMIR PER 5 UNITS: Performed by: INTERNAL MEDICINE

## 2017-03-14 PROCEDURE — 85025 COMPLETE CBC W/AUTO DIFF WBC: CPT | Performed by: INTERNAL MEDICINE

## 2017-03-14 PROCEDURE — 25010000002 FUROSEMIDE PER 20 MG

## 2017-03-14 PROCEDURE — 87040 BLOOD CULTURE FOR BACTERIA: CPT | Performed by: HOSPITALIST

## 2017-03-14 PROCEDURE — 85007 BL SMEAR W/DIFF WBC COUNT: CPT | Performed by: INTERNAL MEDICINE

## 2017-03-14 PROCEDURE — 94799 UNLISTED PULMONARY SVC/PX: CPT

## 2017-03-14 PROCEDURE — 25010000002 ALBUMIN HUMAN 25% PER 50 ML: Performed by: INTERNAL MEDICINE

## 2017-03-14 PROCEDURE — 25010000003 MICAFUNGIN SODIUM 100 MG RECONSTITUTED SOLUTION 1 EACH VIAL: Performed by: INTERNAL MEDICINE

## 2017-03-14 PROCEDURE — 25010000002 CEFTRIAXONE: Performed by: INTERNAL MEDICINE

## 2017-03-14 PROCEDURE — 63710000001 PREDNISONE PER 5 MG: Performed by: INTERNAL MEDICINE

## 2017-03-14 PROCEDURE — P9046 ALBUMIN (HUMAN), 25%, 20 ML: HCPCS | Performed by: INTERNAL MEDICINE

## 2017-03-14 PROCEDURE — 25010000002 HEPARIN (PORCINE) PER 1000 UNITS: Performed by: INTERNAL MEDICINE

## 2017-03-14 PROCEDURE — 63710000001 INSULIN ASPART PER 5 UNITS: Performed by: INTERNAL MEDICINE

## 2017-03-14 PROCEDURE — 25010000002 PROPOFOL 1000 MG/ML EMULSION: Performed by: INTERNAL MEDICINE

## 2017-03-14 PROCEDURE — 25010000002 MIDAZOLAM 0.5 MG/ML 100 ML NS: Performed by: INTERNAL MEDICINE

## 2017-03-14 PROCEDURE — 86140 C-REACTIVE PROTEIN: CPT | Performed by: INTERNAL MEDICINE

## 2017-03-14 PROCEDURE — 94003 VENT MGMT INPAT SUBQ DAY: CPT

## 2017-03-14 PROCEDURE — 82962 GLUCOSE BLOOD TEST: CPT

## 2017-03-14 PROCEDURE — 99233 SBSQ HOSP IP/OBS HIGH 50: CPT | Performed by: INTERNAL MEDICINE

## 2017-03-14 PROCEDURE — 71010 HC CHEST PA OR AP: CPT

## 2017-03-14 PROCEDURE — 71010 XR CHEST 1 VW: CPT | Performed by: RADIOLOGY

## 2017-03-14 PROCEDURE — 25010000002 LORAZEPAM PER 2 MG

## 2017-03-14 RX ORDER — ALBUMIN (HUMAN) 12.5 G/50ML
25 SOLUTION INTRAVENOUS ONCE
Status: COMPLETED | OUTPATIENT
Start: 2017-03-14 | End: 2017-03-14

## 2017-03-14 RX ORDER — FUROSEMIDE 10 MG/ML
40 INJECTION INTRAMUSCULAR; INTRAVENOUS ONCE
Status: COMPLETED | OUTPATIENT
Start: 2017-03-14 | End: 2017-03-14

## 2017-03-14 RX ORDER — FUROSEMIDE 10 MG/ML
60 INJECTION INTRAMUSCULAR; INTRAVENOUS ONCE
Status: COMPLETED | OUTPATIENT
Start: 2017-03-14 | End: 2017-03-14

## 2017-03-14 RX ORDER — ACYCLOVIR 200 MG/5ML
200 SUSPENSION ORAL
Status: DISCONTINUED | OUTPATIENT
Start: 2017-03-14 | End: 2017-03-20 | Stop reason: HOSPADM

## 2017-03-14 RX ORDER — LORAZEPAM 2 MG/ML
INJECTION INTRAMUSCULAR
Status: COMPLETED
Start: 2017-03-14 | End: 2017-03-14

## 2017-03-14 RX ORDER — PREDNISONE 20 MG/1
20 TABLET ORAL
Status: DISCONTINUED | OUTPATIENT
Start: 2017-03-15 | End: 2017-03-15

## 2017-03-14 RX ORDER — POTASSIUM CHLORIDE 1.5 G/1.77G
40 POWDER, FOR SOLUTION ORAL ONCE
Status: COMPLETED | OUTPATIENT
Start: 2017-03-14 | End: 2017-03-14

## 2017-03-14 RX ORDER — LORAZEPAM 2 MG/ML
2 INJECTION INTRAMUSCULAR ONCE
Status: COMPLETED | OUTPATIENT
Start: 2017-03-14 | End: 2017-03-14

## 2017-03-14 RX ORDER — FUROSEMIDE 10 MG/ML
INJECTION INTRAMUSCULAR; INTRAVENOUS
Status: COMPLETED
Start: 2017-03-14 | End: 2017-03-14

## 2017-03-14 RX ORDER — ALPRAZOLAM 1 MG/1
1 TABLET ORAL DAILY
Status: DISCONTINUED | OUTPATIENT
Start: 2017-03-14 | End: 2017-03-20

## 2017-03-14 RX ADMIN — ACYCLOVIR 200 MG: 200 SUSPENSION ORAL at 11:44

## 2017-03-14 RX ADMIN — PANTOPRAZOLE SODIUM 40 MG: 40 INJECTION, POWDER, FOR SOLUTION INTRAVENOUS at 05:54

## 2017-03-14 RX ADMIN — LORAZEPAM 2 MG: 2 INJECTION, SOLUTION INTRAMUSCULAR; INTRAVENOUS at 11:45

## 2017-03-14 RX ADMIN — ACYCLOVIR 200 MG: 200 SUSPENSION ORAL at 21:41

## 2017-03-14 RX ADMIN — CLOPIDOGREL 75 MG: 75 TABLET, FILM COATED ORAL at 08:42

## 2017-03-14 RX ADMIN — POTASSIUM CHLORIDE 40 MEQ: 1.5 POWDER, FOR SOLUTION ORAL at 18:04

## 2017-03-14 RX ADMIN — LORAZEPAM 2 MG: 2 INJECTION INTRAMUSCULAR at 11:45

## 2017-03-14 RX ADMIN — INSULIN DETEMIR 22 UNITS: 100 INJECTION, SOLUTION SUBCUTANEOUS at 20:23

## 2017-03-14 RX ADMIN — Medication 4 MG/HR: at 00:55

## 2017-03-14 RX ADMIN — METRONIDAZOLE 500 MG: 500 INJECTION, SOLUTION INTRAVENOUS at 14:28

## 2017-03-14 RX ADMIN — FUROSEMIDE 60 MG: 10 INJECTION, SOLUTION INTRAMUSCULAR; INTRAVENOUS at 18:36

## 2017-03-14 RX ADMIN — HEPARIN SODIUM 5000 UNITS: 5000 INJECTION, SOLUTION INTRAVENOUS; SUBCUTANEOUS at 20:22

## 2017-03-14 RX ADMIN — GUAIFENESIN 600 MG: 600 TABLET, EXTENDED RELEASE ORAL at 08:42

## 2017-03-14 RX ADMIN — PROPOFOL 50 MCG/KG/MIN: 10 INJECTION, EMULSION INTRAVENOUS at 12:31

## 2017-03-14 RX ADMIN — IPRATROPIUM BROMIDE 0.5 MG: 0.5 SOLUTION RESPIRATORY (INHALATION) at 06:26

## 2017-03-14 RX ADMIN — BACITRACIN ZINC: 500 OINTMENT TOPICAL at 20:41

## 2017-03-14 RX ADMIN — ACETAMINOPHEN 650 MG: 160 SOLUTION ORAL at 05:50

## 2017-03-14 RX ADMIN — INSULIN ASPART 5 UNITS: 100 INJECTION, SOLUTION INTRAVENOUS; SUBCUTANEOUS at 18:21

## 2017-03-14 RX ADMIN — BACITRACIN ZINC: 500 OINTMENT TOPICAL at 08:43

## 2017-03-14 RX ADMIN — PREDNISONE 40 MG: 20 TABLET ORAL at 08:42

## 2017-03-14 RX ADMIN — ACYCLOVIR 200 MG: 200 SUSPENSION ORAL at 14:28

## 2017-03-14 RX ADMIN — IPRATROPIUM BROMIDE 0.5 MG: 0.5 SOLUTION RESPIRATORY (INHALATION) at 00:19

## 2017-03-14 RX ADMIN — CEFTRIAXONE 2 G: 2 INJECTION, POWDER, FOR SOLUTION INTRAMUSCULAR; INTRAVENOUS at 08:42

## 2017-03-14 RX ADMIN — Medication: at 07:49

## 2017-03-14 RX ADMIN — INSULIN ASPART 8 UNITS: 100 INJECTION, SOLUTION INTRAVENOUS; SUBCUTANEOUS at 11:57

## 2017-03-14 RX ADMIN — FUROSEMIDE 40 MG: 10 INJECTION, SOLUTION INTRAMUSCULAR; INTRAVENOUS at 14:28

## 2017-03-14 RX ADMIN — PROPOFOL 50 MCG/KG/MIN: 10 INJECTION, EMULSION INTRAVENOUS at 18:22

## 2017-03-14 RX ADMIN — ALPRAZOLAM 1 MG: 1 TABLET ORAL at 09:43

## 2017-03-14 RX ADMIN — PROPOFOL 50 MCG/KG/MIN: 10 INJECTION, EMULSION INTRAVENOUS at 05:54

## 2017-03-14 RX ADMIN — MICAFUNGIN SODIUM 100 MG: 20 INJECTION, POWDER, LYOPHILIZED, FOR SOLUTION INTRAVENOUS at 08:43

## 2017-03-14 RX ADMIN — ASPIRIN 81 MG: 81 TABLET, CHEWABLE ORAL at 08:42

## 2017-03-14 RX ADMIN — FUROSEMIDE 60 MG: 10 INJECTION INTRAMUSCULAR; INTRAVENOUS at 18:36

## 2017-03-14 RX ADMIN — VANCOMYCIN 125 MG: KIT at 18:04

## 2017-03-14 RX ADMIN — VANCOMYCIN 125 MG: KIT at 01:00

## 2017-03-14 RX ADMIN — HEPARIN SODIUM 5000 UNITS: 5000 INJECTION, SOLUTION INTRAVENOUS; SUBCUTANEOUS at 08:42

## 2017-03-14 RX ADMIN — IPRATROPIUM BROMIDE 0.5 MG: 0.5 SOLUTION RESPIRATORY (INHALATION) at 12:25

## 2017-03-14 RX ADMIN — ACYCLOVIR 200 MG: 200 SUSPENSION ORAL at 18:04

## 2017-03-14 RX ADMIN — VANCOMYCIN 125 MG: KIT at 23:10

## 2017-03-14 RX ADMIN — Medication: at 01:53

## 2017-03-14 RX ADMIN — PROPOFOL 50 MCG/KG/MIN: 10 INJECTION, EMULSION INTRAVENOUS at 00:56

## 2017-03-14 RX ADMIN — VANCOMYCIN 125 MG: KIT at 05:54

## 2017-03-14 RX ADMIN — Medication 4 MG/HR: at 22:52

## 2017-03-14 RX ADMIN — METOPROLOL TARTRATE 25 MG: 25 TABLET, FILM COATED ORAL at 20:22

## 2017-03-14 RX ADMIN — IPRATROPIUM BROMIDE 0.5 MG: 0.5 SOLUTION RESPIRATORY (INHALATION) at 18:37

## 2017-03-14 RX ADMIN — ALBUMIN HUMAN 25 G: 0.25 SOLUTION INTRAVENOUS at 14:28

## 2017-03-14 RX ADMIN — VANCOMYCIN 125 MG: KIT at 11:44

## 2017-03-14 RX ADMIN — PROPOFOL 50 MCG/KG/MIN: 10 INJECTION, EMULSION INTRAVENOUS at 23:10

## 2017-03-14 RX ADMIN — Medication 4 MG/HR: at 10:00

## 2017-03-14 RX ADMIN — POTASSIUM CHLORIDE 40 MEQ: 1.5 POWDER, FOR SOLUTION ORAL at 14:28

## 2017-03-14 RX ADMIN — ACYCLOVIR 200 MG: 200 SUSPENSION ORAL at 09:43

## 2017-03-14 RX ADMIN — Medication: at 16:50

## 2017-03-14 RX ADMIN — METRONIDAZOLE 500 MG: 500 INJECTION, SOLUTION INTRAVENOUS at 20:23

## 2017-03-14 RX ADMIN — GUAIFENESIN 600 MG: 600 TABLET, EXTENDED RELEASE ORAL at 18:04

## 2017-03-14 RX ADMIN — METRONIDAZOLE 500 MG: 500 INJECTION, SOLUTION INTRAVENOUS at 05:52

## 2017-03-14 RX ADMIN — METOPROLOL TARTRATE 25 MG: 25 TABLET, FILM COATED ORAL at 08:42

## 2017-03-14 NOTE — PLAN OF CARE
Problem: Patient Care Overview (Adult)  Goal: Adult Individualization and Mutuality  Outcome: Ongoing (interventions implemented as appropriate)    03/08/17 1630   Individualization   Patient Specific Preferences Unable to determine at this time       Goal: Discharge Needs Assessment  Outcome: Ongoing (interventions implemented as appropriate)    Problem: Pneumonia (Adult)  Goal: Signs and Symptoms of Listed Potential Problems Will be Absent or Manageable (Pneumonia)  Outcome: Ongoing (interventions implemented as appropriate)    03/13/17 1512   Pneumonia   Problems Assessed (Pneumonia) fluid/electrolyte imbalance;respiratory compromise   Problems Present (Pneumonia) respiratory compromise         Problem: Diabetes, Type 2 (Adult)  Goal: Signs and Symptoms of Listed Potential Problems Will be Absent or Manageable (Diabetes, Type 2)  Outcome: Ongoing (interventions implemented as appropriate)    03/12/17 1607   Diabetes, Type 2   Problems Assessed (Type 2 Diabetes) all   Problems Present (Type 2 Diabetes) none         Problem: Mechanical Ventilation, Invasive (Adult)  Goal: Signs and Symptoms of Listed Potential Problems Will be Absent or Manageable (Mechanical Ventilation, Invasive)  Outcome: Ongoing (interventions implemented as appropriate)    03/14/17 0257   Mechanical Ventilation, Invasive   Problems Assessed (Mechanical Ventilation, Invasive) inability to wean   Problems Present (Mechanical Ventilation, Invasive) inability to wean       Goal: Signs and Symptoms of Listed Potential Problems Will be Absent or Manageable (Mechanical Ventilation, Invasive)  Outcome: Ongoing (interventions implemented as appropriate)    Problem: Pressure Ulcer Risk (Brendan Scale) (Adult,Obstetrics,Pediatric)  Goal: Identify Related Risk Factors and Signs and Symptoms  Outcome: Ongoing (interventions implemented as appropriate)    03/12/17 1607   Pressure Ulcer Risk (Brendan Scale)   Related Risk Factors (Pressure Ulcer Risk (Brendan  Scale)) age extremes;cognitive impairment;critical care admission;fluid intake inadequate;excretions/secretions;hypothermia/hyperthermia;medical devices;medication;mental impairment;mobility impaired;nutritional deficiencies;tissue perfusion altered       Goal: Skin Integrity  Outcome: Ongoing (interventions implemented as appropriate)    03/14/17 0500   Pressure Ulcer Risk (Brendan Scale) (Adult,Obstetrics,Pediatric)   Skin Integrity making progress toward outcome

## 2017-03-14 NOTE — DISCHARGE PLACEMENT REQUEST
"Yessica Yan (51 y.o. Female)     Date of Birth Social Security Number Address Home Phone MRN    1965  [address] [address] [phone] [id number]    [employer] Marital Status          Oriental orthodox        Admission Date Admission Type Admitting Provider Attending Provider Department, Room/Bed    2/28/17 Emergency Savanah Gutierrez MD Grace, Sherrill Carranza DO [facility] CRITICAL CARE, CC01/1C    Discharge Date Discharge Disposition Discharge Destination                      Attending Provider: Sherrill Weiner DO     Allergies:  Penicillins, Toradol [Ketorolac Tromethamine], Tramadol    Isolation:  Spore   Infection:  C.difficile (03/10/17)   Code Status:  FULL    Ht:  63\" (160 cm)   Wt:  123 lb (55.8 kg)    Admission Cmt:  None   Principal Problem:  Bilateral pneumonia [J18.9]                 Active Insurance as of 2/28/2017     Primary Coverage     Payor Plan Insurance Group Employer/Plan Group    MEDICARE MEDICARE A & B      Payor Plan Address Payor Plan Phone Number Effective From Effective To    [address] [phone] 2/1/2005     [place] [address]       Subscriber Name Subscriber Birth Date Member ID       YESSICA YAN 1965 [id number]           Secondary Coverage     Payor Plan Insurance Group Employer/Plan Group    [place]LL[place] WELL[place]ARE [place]ICAID      Payor Plan Address Payor Plan Phone Number Effective From Effective To    [address] [address] [phone] 2/28/2017     [place] [address]       Subscriber Name Subscriber Birth Date Member ID       YESSICA YAN 1965 [id number]                 Emergency Contacts      (Rel.) Home Phone Work Phone Mobile Phone    Scot Vaughn (Son) [phone] -- --    [employer] (Friend) -- -- [phone]            Emergency Contact Information     Name Relation Home Work Mobile    Scot Vaughn Son [phone]      [employer] Friend   [phone]    "       Insurance Information                MEDICARE/MEDICARE A & B Phone: [phone]    Subscriber: Pepito Yan Subscriber#: [id number]    Group#:  Precert#:         [facility] MEDICAID Phone: [phone]    Subscriber: Pepito Yan Subscriber#: [id number]    Group#:  Precert#:              History & Physical      Savanah Gutierrez MD at 3/1/2017  5:19 AM              [facility] HOSPITALIST HISTORY AND PHYSICAL    Patient Identification:  Name:  Pepito Yan  Age:  51 y.o.  Sex:  female  :  1965  MRN:  [id number]   Visit Number:  [id number]  Primary Care Physician:  ROSA Meza     Chief complaint:  Trouble breathing    History of presenting illness:  51 y.o. female who presented to [facility] with trouble breathing and a productive cough of a few days duration.  She tried albuterol but this did not help.  She does not have a baseline cough.  She has a subjective fever with chills and fatigue.  She has substernal chest pain that is stabbing, is intermittent, and the same intensity all the time (9/10).  The headache occurred prior to the cough and is bilateral and frontal with no neck pain.  She also has a clear, runny nose and a sore throat.  ---------------------------------------------------------------------------------------------------------------------   Review of Systems   Constitutional: Positive for chills, fatigue and fever.   HENT: Positive for postnasal drip and sore throat. Negative for ear discharge, rhinorrhea and sneezing.    Eyes: Negative for discharge and redness.   Respiratory: Positive for cough, shortness of breath and wheezing.    Cardiovascular: Positive for chest pain. Negative for palpitations and leg swelling.   Gastrointestinal: Negative for diarrhea, nausea and vomiting.   Genitourinary: Negative for decreased urine volume, dysuria and hematuria.   Musculoskeletal: Negative for arthralgias and joint swelling.    Skin: Negative for pallor, rash and wound.   Neurological: Positive for headaches. Negative for seizures and speech difficulty.   Hematological: Does not bruise/bleed easily.   Psychiatric/Behavioral: Negative for confusion, self-injury and suicidal ideas. The patient is not nervous/anxious.     ---------------------------------------------------------------------------------------------------------------------   Past Medical History   Diagnosis Date   • Anxiety    • 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   • Chronic respiratory failure with hypoxia      at nighttime   • Compression fracture of lumbar vertebra    • Compression fracture of thoracic vertebra    • COPD (chronic obstructive pulmonary disease)    • Coronary artery disease    • Diabetes mellitus type 2 in nonobese    • Erosive esophagitis 2016   • Essential hypertension    • Gastroparesis    • GERD (gastroesophageal reflux disease)    • Hepatitis-C    • Medically noncompliant    • MRSA infection      back of neck around    • NSTEMI (non-ST elevated myocardial infarction) 2016   • PE (pulmonary embolism)    • Protein calorie malnutrition    • RA (rheumatoid arthritis)      with chronic steroid use     Past Surgical History   Procedure Laterality Date   • Cholecystectomy     • Foreign body removal       insulin needle removal x 3 from the abdomen   •  section       x 2   • Hysterectomy     • Arm debridement Left 2016     Procedure: HAND IRRIGATION DEBRIDEMENT AND REPAIR ;  Surgeon: Shane Fuller MD;  Location: [facility];  Service:    • Endoscopy N/A 2016     Procedure: ESOPHAGOGASTRODUODENOSCOPY;  Surgeon: Jeremy Clement III, MD;  Location: [facility] OR;  Service:    • Amputation digit Right 2016     Third digit to the PIP. Surgeon: Dr. Bains   • Amputation digit Left 2016     Procedure: PARTIAL AMPUTATION LEFT THIRD FINGER;  Surgeon: Shane Fuller MD;  Location: [facility] OR;   Service:    • Amputation revision Left 12/22/2016     Procedure: AMPUTATION REVISION DIGIT STUMP;  Surgeon: Shane Fuller MD;  Location: [facility];  Service:      Family History   Problem Relation Age of Onset   • Colon cancer Mother    • Cancer Father    • Diabetes Maternal Grandmother    • Heart disease Neg Hx    • Stroke Neg Hx      Social History   • Marital status:      Social History Main Topics   • Smoking status: Current Every Day Smoker     Packs/day: 4.50     Years: 35.00     Types: Cigarettes   • Smokeless tobacco: None      Comment: She cut down to 1/4 PPD for 2.5 months ago   • Alcohol use No   • Drug use: No   • Sexual activity: Defer   ---------------------------------------------------------------------------------------------------------------------   Allergies:  Penicillins; Toradol [ketorolac tromethamine]; and Tramadol  ---------------------------------------------------------------------------------------------------------------------   Prior to Admission Medications     Prescriptions Last Dose Informant Patient Reported? Taking?    albuterol (PROVENTIL HFA;VENTOLIN HFA) 108 (90 BASE) MCG/ACT inhaler  Self No No    Inhale 2 puffs every 6 (six) hours as needed for wheezing.    aspirin 81 MG EC tablet   No No    Take 1 tablet by mouth Daily.    atorvastatin (LIPITOR) 40 MG tablet  Pharmacy Yes No    Take 40 mg by mouth daily.    carvedilol (COREG) 12.5 MG tablet   No No    Take 0.5 tablets by mouth Every 12 (Twelve) Hours. Do not take if BP <110/60 and/or HR <55    clopidogrel (PLAVIX) 75 MG tablet  Pharmacy Yes No    Take 75 mg by mouth daily.    furosemide (LASIX) 20 MG tablet  Self No No    Take 1 tablet by mouth daily. Do not take if BP <110/60    gabapentin (NEURONTIN) 800 MG tablet  Pharmacy Yes No    Take 800 mg by mouth 3 (three) times a day.    HYDROcodone-acetaminophen (NORCO) 5-325 MG per tablet   No No    Take 1 tablet by mouth Every 6 (Six) Hours As Needed for  moderate pain (4-6).    insulin aspart (novoLOG) 100 UNIT/ML injection   No No    Inject 0-7 Units under the skin 4 (Four) Times a Day Before Meals & at Bedtime.    insulin glargine (LANTUS) 100 UNIT/ML injection  Pharmacy Yes No    Inject 45 Units under the skin every night.    lisinopril (PRINIVIL,ZESTRIL) 5 MG tablet  Self No No    Take 2 tablets by mouth daily. Do not take if BP <110/60    metFORMIN (GLUCOPHAGE) 1000 MG tablet  Pharmacy Yes No    Take 1,000 mg by mouth 2 (two) times a day with meals.    nitroglycerin (NITROSTAT) 0.4 MG SL tablet  Pharmacy Yes No    Place 0.4 mg under the tongue every 5 (five) minutes as needed for chest pain. Take no more than 3 doses in 15 minutes.    ondansetron (ZOFRAN) 4 MG tablet   No No    Take 1 tablet by mouth Every 6 (Six) Hours As Needed for nausea or vomiting.    pantoprazole (PROTONIX) 40 MG EC tablet  Pharmacy Yes No    Take 40 mg by mouth 2 (two) times a day.    predniSONE (DELTASONE) 20 MG tablet  Self No No    Take 1 tablet by mouth Daily.    ranolazine (RANEXA) 500 MG 12 hr tablet  Self No No    Take 1 tablet by mouth every 12 (twelve) hours.        Hospital Scheduled Meds:  gabapentin 800 mg Oral Q8H   heparin (porcine) 5,000 Units Subcutaneous Q12H   ---------------------------------------------------------------------------------------------------------------------   Vital Signs:  Temp:  [98.2 °F (36.8 °C)-99.8 °F (37.7 °C)] 98.4 °F (36.9 °C)  Heart Rate:  [] 87  Resp:  [20-24] 24  BP: ()/(59-94) 95/67  Last 3 weights    02/28/17  1847 03/01/17  0027 03/01/17  0303   Weight: 118 lb (53.5 kg) 120 lb 14.4 oz (54.8 kg) 120 lb 6.4 oz (54.6 kg)     Body mass index is 21.33 kg/(m^2).  ---------------------------------------------------------------------------------------------------------------------   Physical Exam:  Constitutional:  Well-developed and well-nourished.  Mild to moderate respiratory distress.  Thin in appearance.     HENT:  Head:  Normocephalic and atraumatic.  Mouth:  Moist mucous membranes.    Eyes:  Conjunctivae and EOM are normal.  Pupils are equal, round, and reactive to light.  No scleral icterus.  Neck:  Neck supple.  No JVD present.    Cardiovascular:  Normal rate, regular rhythm and normal heart sounds with no murmur.  Pulmonary/Chest:  Mild to moderate respiratory distress, minimal expiratory wheezes, bilateral crackles, with increased breath sounds on the left and fair to good air movement.  Abdominal:  Soft.  Bowel sounds are normal.  No distension and no tenderness.   Musculoskeletal:  No edema, no tenderness, and no deformity.  No red or swollen joints anywhere.  The left third finger is partially amputated by surgical methods and the left second, right second, right fourth, right fifth are well healed from autoamputation.    Neurological:  Alert and oriented to person, place, and time.  No cranial nerve deficit.  No tongue deviation.  No facial droop.  No slurred speech.   Skin:  Skin is warm and dry.  No rash noted.  No pallor.   Psychiatric:  Normal mood and affect.  Behavior is normal.  Judgment and thought content normal.   Peripheral vascular:  No edema and strong pulses on all 4 extremities.  Genitourinary:  No aguilera catheter in place.  ---------------------------------------------------------------------------------------------------------------------  EKG:  Sinus tachycardia, RBBB.    Telemetry:  NS 70-80's.  I have personally looked at both the EKG and the telemetry strips.  ---------------------------------------------------------------------------------------------------------------------   Results from last 7 days  Lab Units 02/28/17 1939   CRP mg/dL 25.50*   LACTATE mmol/L 1.8   WBC 10*3/mm3 8.29   HEMOGLOBIN g/dL 9.9*   HEMATOCRIT % 33.6*   MCV fL 72.4*   MCHC g/dL 29.5*   PLATELETS 10*3/mm3 222     Results from last 7 days  Lab Units 02/28/17 1939   SODIUM mmol/L 139   POTASSIUM mmol/L 3.8   CHLORIDE mmol/L 109    TOTAL CO2 mmol/L 24.3   BUN mg/dL 18   CREATININE mg/dL 0.57   EGFR IF NONAFRICN AM mL/min/1.73 112   CALCIUM mg/dL 8.8   GLUCOSE mg/dL 117*   ALBUMIN g/dL 3.60   BILIRUBIN mg/dL 0.2   ALK PHOS U/L 59   AST (SGOT) U/L 29   ALT (SGPT) U/L 41*   Estimated Creatinine Clearance: 96.6 mL/min (by C-G formula based on Cr of 0.57).    Results from last 7 days  Lab Units 02/28/17 2114 02/28/17 1939   TROPONIN I ng/mL 0.076* 0.085*   MYOGLOBIN ng/mL  --  107.0       Lab Results   Component Value Date    HGBA1C 7.90 (H) 12/18/2016     Lab Results   Component Value Date    TSH 5.899 (H) 09/12/2016    FREET4 1.17 09/12/2016       ---------------------------------------------------------------------------------------------------------------------  Imaging Results (last 7 days)     Procedure Component Value Units Date/Time    XR Chest 1 View [[id number]]:  Bilateral infiltrates seen on my exam of the CXR.      Updated:  02/28/17 1945      CT Chest Pulmonary Embolism With Contrast [[id number]] Resulted:  03/01/17 0137       Updated:  03/01/17 0137      I have personally reviewed the radiology images and read the final radiology report.  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  -Insulin-dependent diabetes not as type II with hyperglycemia.  -Mild transaminitis likely secondary to her chronic hepatitis C.  -Generalized anxiety disorder.  -Ischemic cardiomyopathy with ejection fraction of approximately 45%.  -Tobacco smoking addiction  -Medical noncompliance  -Malnutrition  -Hiatal hernia with erosive esophagitis  -6 x 8 mm medial lung base,  stable for years based on CT scan of the chest    Will order a sputum culture and will start her on rocephin and doxycycline and will order legionella, mycoplasma, and influenza testing.  Will give solumedrol and breathing treatments as well.  Will monitor on the med-surg floor with telemetry.  Will repeat labs in the morning.  Await pharmacy to do the admission med rec by calling [facility]'s pharmacy in [place] when it opens.    Savanah Gutierrez MD  03/01/17  5:19 AM       Electronically signed by Savanah Gutierrez MD at 3/1/2017  7:46 AM        Vital Signs (last 24 hours)       03/13 0700  -  03/14 0659 03/14 0700  -  03/14 1143   Most Recent    Temp (°F) 98.4 -  (!)102.4      98.6     98.6 (37)    Heart Rate 93 -  (!)124    94 -  110     94    Resp 16 -  24      16     16    /57 -  (!) 175/102    104/58 -  106/59     104/58    SpO2 (%) 99 -  100      100     100          Intake & Output (last day)       03/13 0701 - 03/14 0700 03/14 0701 - 03/15 0700    I.V. (mL/kg) 2491.8 (44.7)     Other 660     NG/     IV Piggyback 1300 100    Total Intake(mL/kg) 5402.8 (96.8) 100 (1.8)    Urine (mL/kg/hr) 4600 (3.4)     Stool 0 (0)     Total Output 4600      Net +802.8 +100          Unmeasured Stool Occurrence 1 x         Hospital Medications (active)       Dose Frequency Start End    acetaminophen (TYLENOL) 160 MG/5ML solution 650 mg 650 mg Every 6 Hours PRN 3/11/2017     Sig - Route: Take 20.3 mL by mouth Every 6 (Six) Hours As Needed for Fever. - Oral    acetaminophen (TYLENOL) tablet 650 mg 650 mg Once 3/6/2017     Sig - Route: Take 2 tablets by mouth 1 (One) Time. - Oral    acyclovir (ZOVIRAX) suspension 200 mg 200 mg 5 Times Daily 3/14/2017     Sig - Route: Take 5 mL by mouth 5 (Five) Times a Day. - Oral    ALPRAZolam (XANAX) tablet 1 mg 1 mg Daily 3/14/2017     Sig - Route: Take 1 tablet by mouth Daily. - Oral    aspirin chewable tablet 81 mg 81 mg Daily 3/7/2017     Sig - Route: Chew 1 tablet Daily. -  Oral    Notes to Pharmacy: Patient intubated and RN requested changed from enteric coated tablet.    bacitracin ointment  Every 12 Hours Scheduled 3/13/2017     Sig - Route: Apply  topically Every 12 (Twelve) Hours. - Topical    cefTRIAXone (ROCEPHIN) 2 g/100 mL 0.9% NS VTB (SOFIE) 2 g Every 24 Hours 3/11/2017     Sig - Route: Infuse 100 mL into a venous catheter Daily. - Intravenous    clopidogrel (PLAVIX) tablet 75 mg 75 mg Daily 3/1/2017     Sig - Route: Take 1 tablet by mouth Daily. - Oral    dextrose (D50W) solution 25 g 25 g Every 15 Minutes PRN 3/1/2017     Sig - Route: Infuse 50 mL into a venous catheter Every 15 (Fifteen) Minutes As Needed for low blood sugar (Blood Sugar Less Than 70, Patient Has IV Access - Unresponsive, NPO or Unable To Safely Swallow). - Intravenous    Cosign for Ordering: Accepted by Savanah Gutierrez MD on 3/1/2017  7:01 PM    dextrose (GLUTOSE) oral gel 15 g 15 g Every 15 Minutes PRN 3/1/2017     Sig - Route: Take 15 g by mouth Every 15 (Fifteen) Minutes As Needed for low blood sugar (Blood Sugar Less Than 70, Patient Alert, Is Not NPO & Can Safely Swallow). - Oral    Cosign for Ordering: Accepted by Savanah Gutierrez MD on 3/1/2017  7:01 PM    FENTANYL PCA 1500 MCG/30 ML (BHCOR) PCA  Continuous 3/11/2017 3/21/2017    Sig - Route: Infuse  into a venous catheter Continuous. - Intravenous    glucagon (GLUCAGEN) injection 1 mg 1 mg Every 15 Minutes PRN 3/1/2017     Sig - Route: Inject 1 mg under the skin Every 15 (Fifteen) Minutes As Needed (Blood Glucose Less Than 70 - Patient Without IV Access - Unresponsive, NPO or Unable To Safely Swallow). - Subcutaneous    Cosign for Ordering: Accepted by Savanah Gutierrez MD on 3/1/2017  7:01 PM    guaiFENesin (MUCINEX) 12 hr tablet 600 mg 600 mg 2 Times Daily 3/2/2017     Sig - Route: Take 1 tablet by mouth 2 (Two) Times a Day. - Oral    heparin (porcine) 5000 UNIT/ML injection 5,000 Units 5,000 Units Every 12 Hours Scheduled 3/1/2017     Sig -  "Route: Inject 1 mL under the skin Every 12 (Twelve) Hours. - Subcutaneous    insulin aspart (novoLOG) injection 0-14 Units 0-14 Units 4 Times Daily Before Meals & Nightly 3/1/2017     Sig - Route: Inject 0-14 Units under the skin 4 (Four) Times a Day Before Meals & at Bedtime. - Subcutaneous    insulin detemir (LEVEMIR) injection 22 Units 22 Units Nightly 3/13/2017     Sig - Route: Inject 22 Units under the skin Every Night. - Subcutaneous    ipratropium (ATROVENT) nebulizer solution 0.5 mg 0.5 mg Every 6 Hours PRN 3/2/2017     Sig - Route: Take 2.5 mL by nebulization Every 6 (Six) Hours As Needed for shortness of air. - Nebulization    Cosign for Ordering: Accepted by Sherrill Weiner DO on 3/2/2017  1:40 PM    ipratropium (ATROVENT) nebulizer solution 0.5 mg 0.5 mg Every 6 Hours - RT 3/3/2017     Sig - Route: Take 2.5 mL by nebulization Every 6 (Six) Hours. - Nebulization    LORazepam (ATIVAN) 2 MG/ML injection  - ADS Override Pull   3/14/2017 3/14/2017    Notes to Pharmacy: Created by cabinet override    LORazepam (ATIVAN) injection 2 mg 2 mg Once 3/14/2017     Sig - Route: Infuse 1 mL into a venous catheter 1 (One) Time. - Intravenous    magnesium sulfate 6 g in dextrose (D5W) 5 % 250 mL infusion 6 g As Needed 3/6/2017     Sig - Route: Infuse 6 g into a venous catheter As Needed (Mg 1.1-1.5 mg/dL). - Intravenous    Linked Group 1:  \"Or\" Linked Group Details        magnesium sulfate 8 g in dextrose (D5W) 5 % 250 mL infusion 8 g As Needed 3/6/2017     Sig - Route: Infuse 8 g into a venous catheter As Needed (Mg less than or equal to 1 mg/dL). - Intravenous    Linked Group 1:  \"Or\" Linked Group Details        magnesium sulfate in D5W 1g/100mL (PREMIX) IVPB 1 g 1 g As Needed 3/6/2017     Sig - Route: Infuse 100 mL into a venous catheter As Needed (Mg less than or equal 1 mg/dL). - Intravenous    Linked Group 1:  \"Or\" Linked Group Details        magnesium sulfate in D5W 1g/100mL (PREMIX) IVPB 1 g 1 g As Needed " "3/6/2017     Sig - Route: Infuse 100 mL into a venous catheter As Needed (magnesium sulfate 1 g in D5W 100 mL IVPB - Mg 1.6 - 1.9 mg/dL). - Intravenous    Linked Group 1:  \"Or\" Linked Group Details        metoprolol tartrate (LOPRESSOR) tablet 25 mg 25 mg Every 12 Hours Scheduled 3/2/2017     Sig - Route: Take 1 tablet by mouth Every 12 (Twelve) Hours. - Oral    metroNIDAZOLE (FLAGYL) IVPB 500 mg 500 mg Every 8 Hours 3/10/2017     Sig - Route: Infuse 100 mL into a venous catheter Every 8 (Eight) Hours. - Intravenous    micafungin sodium (MYCAMINE) 100 mg in sodium chloride 0.9 % 100 mL IVPB 100 mg Every 24 Hours 3/12/2017 3/14/2017    Sig - Route: Infuse 100 mg into a venous catheter Daily. - Intravenous    midazolam (VERSED) injection 2 mg 2 mg Every 4 Hours PRN 3/5/2017     Sig - Route: Infuse 2 mL into a venous catheter Every 4 (Four) Hours As Needed for sedation. - Intravenous    midazolam 0.5 mg/mL 100 mL NS (VERSED) infusion 1-10 mg/hr Titrated 3/8/2017 3/18/2017    Sig - Route: Infuse 1-10 mg/hr into a venous catheter Dose Adjusted By Provider As Needed. - Intravenous    pantoprazole (PROTONIX) injection 40 mg 40 mg Every Early Morning 3/2/2017     Sig - Route: Infuse 10 mL into a venous catheter Every Morning. - Intravenous    Cosign for Ordering: Accepted by Sherrill Weiner DO on 3/1/2017  3:38 PM    Pharmacy to dose vancomycin  Continuous PRN 3/2/2017     Sig - Route: Continuous As Needed for consult. - Does not apply    potassium chloride (K-DUR,KLOR-CON) CR tablet 40 mEq 40 mEq Once 3/2/2017     Sig - Route: Take 2 tablets by mouth 1 (One) Time. - Oral    potassium chloride (KLOR-CON) packet 40 mEq 40 mEq As Needed 3/6/2017     Sig - Route: Take 40 mEq by mouth As Needed (potassium replacement, see admin instructions). - Oral    Linked Group 2:  \"Or\" Linked Group Details        potassium chloride (KLOR-CON) packet 40 mEq 40 mEq Once 3/13/2017 3/13/2017    Sig - Route: Take 40 mEq by mouth 1 (One) " "Time. - Oral    potassium chloride (MICRO-K) CR capsule 40 mEq 40 mEq As Needed 3/6/2017     Sig - Route: Take 4 capsules by mouth As Needed (potassium replacement.  see admin instructions). - Oral    Linked Group 2:  \"Or\" Linked Group Details        potassium chloride 10 mEq in 100 mL IVPB 10 mEq Every 1 Hour PRN 3/6/2017     Sig - Route: Infuse 100 mL into a venous catheter Every 1 (One) Hour As Needed (potassium protocol PERIPHERAL - see admin instructions). - Intravenous    Linked Group 2:  \"Or\" Linked Group Details        potassium chloride 10 mEq in 100 mL IVPB 10 mEq Every 1 Hour 3/13/2017 3/13/2017    Sig - Route: Infuse 100 mL into a venous catheter Every 1 (One) Hour. - Intravenous    potassium phosphate 15 mmol in sodium chloride 0.9 % 100 mL infusion 15 mmol As Needed 3/12/2017     Sig - Route: Infuse 15 mmol into a venous catheter As Needed (Phosphorus replacement - see admin instructions). - Intravenous    Linked Group 3:  \"Or\" Linked Group Details        potassium phosphate 15 mmol in sodium chloride 0.9 % 100 mL infusion 15 mmol Once 3/13/2017 3/13/2017    Sig - Route: Infuse 15 mmol into a venous catheter 1 (One) Time. - Intravenous    potassium phosphate 30 mmol in sodium chloride 0.9 % 250 mL infusion 30 mmol As Needed 3/12/2017     Sig - Route: Infuse 30 mmol into a venous catheter As Needed (Phosphorus replacement - see admin instructions). - Intravenous    Linked Group 3:  \"Or\" Linked Group Details        potassium phosphate 45 mmol in sodium chloride 0.9 % 500 mL infusion 45 mmol As Needed 3/12/2017     Sig - Route: Infuse 45 mmol into a venous catheter As Needed (Phosphorus replacement - see admin instruction). - Intravenous    Linked Group 3:  \"Or\" Linked Group Details        predniSONE (DELTASONE) tablet 20 mg 20 mg Daily With Breakfast 3/15/2017     Sig - Route: Take 1 tablet by mouth Daily With Breakfast. - Oral    promethazine (PHENERGAN) tablet 12.5 mg 12.5 mg Every 6 Hours PRN 3/1/2017 " "    Sig - Route: Take 1 tablet by mouth Every 6 (Six) Hours As Needed for Nausea or Vomiting. - Oral    Cosign for Ordering: Accepted by Sherrill Weiner DO on 3/1/2017  3:38 PM    propofol (DIPRIVAN) infusion 10 mg/mL 100 mL 5-50 mcg/kg/min × 57.3 kg Titrated 3/3/2017     Sig - Route: Infuse 286.5-2,865 mcg/min into a venous catheter Dose Adjusted By Provider As Needed. - Intravenous    sodium chloride 0.9 % flush 1-10 mL 1-10 mL As Needed 3/1/2017     Sig - Route: Infuse 1-10 mL into a venous catheter As Needed for Line Care. - Intravenous    sodium chloride 0.9 % flush 10 mL 10 mL As Needed 2/28/2017     Sig - Route: Infuse 10 mL into a venous catheter As Needed for [facility]. - Intravenous    Linked Group 4:  \"And\" Linked Group Details        sodium phosphate 15 mmol in sodium chloride 0.9 % 100 mL IVPB 15 mmol As Needed 3/12/2017     Sig - Route: Infuse 15 mmol into a venous catheter As Needed (Phosphorus replacement - see admin instructions). - Intravenous    Linked Group 3:  \"Or\" Linked Group Details        sodium phosphate 30 mmol in sodium chloride 0.9 % 250 mL IVPB 30 mmol As Needed 3/12/2017     Sig - Route: Infuse 30 mmol into a venous catheter As Needed (Phosphorus replacement - see admin instructions). - Intravenous    Linked Group 3:  \"Or\" Linked Group Details        sodium phosphate 30 mmol in sodium chloride 0.9 % 500 mL IVPB 30 mmol As Needed 3/12/2017     Sig - Route: Infuse 30 mmol into a venous catheter As Needed (Phosphorus replacement - see admin instructions). - Intravenous    Linked Group 3:  \"Or\" Linked Group Details        vancomycin oral solution 125 mg 125 mg Every 6 Hours Scheduled 3/11/2017     Sig - Route: Take 2.5 mL by mouth Every 6 (Six) Hours. - Oral    predniSONE (DELTASONE) tablet 40 mg (Discontinued) 40 mg Daily With Breakfast 3/13/2017 3/14/2017    Sig - Route: Take 2 tablets by mouth Daily With Breakfast. - Oral    sucralfate (CARAFATE) tablet 1 g (Discontinued) 1 g 4 " Times Daily Before Meals & Nightly 3/1/2017 3/14/2017    Sig - Route: Take 1 tablet by mouth 4 (Four) Times a Day Before Meals & at Bedtime. - Oral    Cosign for Ordering: Accepted by Sherrill Weiner DO on 3/1/2017  3:38 PM            Lab Results (last 24 hours)     Procedure Component Value Units Date/Time    Blood Culture [[id number]]  (Normal) Collected:  03/11/17 1212    Specimen:  Blood from Hand, Right Updated:  03/13/17 1501     Blood Culture No growth at 2 days     Blood Culture [[id number]]  (Normal) Collected:  03/11/17 1246    Specimen:  Blood from Hand, Right Updated:  03/13/17 1501     Blood Culture No growth at 2 days     POC Glucose Fingerstick [[id number]]  (Abnormal) Collected:  03/13/17 1717    Specimen:  Blood Updated:  03/13/17 1731     Glucose 178 (H) mg/dL     Narrative:       Meter: [id number] : [id number] luz maria card    POC Glucose Fingerstick [[id number]]  (Abnormal) Collected:  03/13/17 2019    Specimen:  Blood Updated:  03/13/17 2022     Glucose 143 (H) mg/dL     Narrative:       Meter: [id number] : [id number] Otis FOX    C-reactive Protein [[id number]]  (Abnormal) Collected:  03/14/17 0038    Specimen:  Blood Updated:  03/14/17 0116     C-Reactive Protein 3.08 (H) mg/dL     Comprehensive Metabolic Panel [[id number]]  (Abnormal) Collected:  03/14/17 0038    Specimen:  Blood Updated:  03/14/17 0117     Glucose 140 (H) mg/dL      BUN 12 mg/dL      Creatinine 0.34 (L) mg/dL      Sodium 147 mmol/L      Potassium 4.0 mmol/L      Chloride 114 (H) mmol/L      CO2 33.0 (H) mmol/L      Calcium 8.2 mg/dL      Total Protein 6.5 g/dL      Albumin 2.70 (L) g/dL      ALT (SGPT) 67 (H) U/L      AST (SGOT) 42 (H) U/L      Alkaline Phosphatase 115 (H) U/L       Note New Reference Ranges        Total Bilirubin 0.2 mg/dL      eGFR Non African Amer >150 mL/min/1.73      Globulin 3.8 gm/dL      A/G Ratio 0.7 (L) g/dL      BUN/Creatinine Ratio 35.3 (H)      Anion Gap 0.0 (L) mmol/L     Osmolality,  Calculated [[id number]]  (Normal) Collected:  03/14/17 0038    Specimen:  Blood Updated:  03/14/17 0119     Osmolality Calc 294.5 mOsm/kg     CBC & Differential [[id number]] Collected:  03/14/17 0038    Specimen:  Blood Updated:  03/14/17 0241    Narrative:       The following orders were created for panel order CBC & Differential.  Procedure                               Abnormality         Status                     ---------                               -----------         ------                     Manual Differential[[id number]]           Abnormal            Final result               Scan Slide[[id number]]                                        Final result               CBC Auto Differential[[id number]]         Abnormal            Final result                 Please view results for these tests on the individual orders.    CBC Auto Differential [[id number]]  (Abnormal) Collected:  03/14/17 0038    Specimen:  Blood Updated:  03/14/17 0241     WBC 6.18 10*3/mm3      RBC 3.81 (L) 10*6/mm3      Hemoglobin 8.4 (L) g/dL      Hematocrit 30.9 (L) %      MCV 81.1 fL      MCH 22.0 (L) pg      MCHC 27.2 (L) g/dL      RDW 23.3 (H) %      RDW-SD 64.2 (H) fl      MPV 10.6 (H) fL      Platelets 206 10*3/mm3     Scan Slide [[id number]] Collected:  03/14/17 0038    Specimen:  Blood Updated:  03/14/17 0241     Scan Slide --       See Manual Differential Results       Manual Differential [[id number]]  (Abnormal) Collected:  03/14/17 0038    Specimen:  Blood Updated:  03/14/17 0241     Neutrophil % 63.0 %      Lymphocyte % 24.0 %      Monocyte % 6.0 %      Eosinophil % 1.0 %      Bands %  1.0 (L) %      Metamyelocyte % 1.0 (H) %      Myelocyte % 4.0 (H) %      Neutrophils Absolute 3.96 10*3/mm3      Lymphocytes Absolute 1.48 10*3/mm3      Monocytes Absolute 0.37 10*3/mm3      Eosinophils Absolute 0.06 10*3/mm3      Anisocytosis Large/3+      Hypochromia Mod/2+      Microcytes Slight/1+      Platelet Morphology Normal     POC Glucose  Fingerstick [[id number]]  (Abnormal) Collected:  03/14/17 0610    Specimen:  Blood Updated:  03/14/17 0614     Glucose 150 (H) mg/dL     Narrative:       Meter: [id number] : [id number] Otis FOX    Blood Culture [[id number]] Collected:  03/14/17 0519    Specimen:  Blood from Blood, Venous Line Updated:  03/14/17 0630    Blood Culture [[id number]] Collected:  03/14/17 0556    Specimen:  Blood from Blood, Venous Line Updated:  03/14/17 0816    Phosphorus [[id number]]  (Normal) Collected:  03/14/17 0949    Specimen:  Blood Updated:  03/14/17 1047     Phosphorus 2.9 mg/dL         Imaging Results (last 24 hours)     ** No results found for the last 24 hours. **           Operative/Procedure Notes (most recent note)      Gerard Bardley MD at 3/3/2017 12:19 PM  Version 1 of 1     Pre-procedure Diagnoses:    1. Respiratory failure requiring intubation [J96.90]           Post-procedure Diagnoses:    1. Respiratory failure requiring intubation [J96.90]           Procedures:    1. INTUBATION [PRO89 (Custom)]               Endotracheal Intubation Procedure Note    Indication for endotracheal intubation: Critically ill on vasopressors change in mental status    Sedation: Propofol -10 mL     Number of attempts: 1      Patient was emergently intubated as patient had altered mental status.  Patient was given propofol for sedation.  Mac 4 blade was used to look at the vocal cords and grade 2 view of  Vocal cords was achieved.  Size 8 tube was inserted through the vocal cords.  Tube condensation was present.  End tidal CO2 color change was noticed for 10 breaths.  Bilateral breath sounds were present.    Patient's vitals remained normal.  Patient's pulse ox remained from % patient was put on assist control mode of ventilation.     Electronically signed by Gerard Bradley MD at 3/3/2017 12:21 PM           Physician Progress Notes (last 24 hours) (Notes from 3/13/2017 11:43 AM through 3/14/2017 11:43 AM)      Sherrill Carranza  DO Regine at 3/14/2017  8:11 AM  Version 1 of 1         [facility] CCU Note    Patient Identification:  Name:  Pepito Yan  Age:  51 y.o.  Sex:  female  :  1965  MRN:  [id number]  Visit number:  [id number]  Primary Care Provider:  ROSA Meza    14    Ventilator Day:  Ventilator Settings:  Ventilator/Non-Invasive Ventilation Settings     Start     Ordered    17 1638  Ventilator - AC/VC; (16); (55%); 12; 450  Continuous     Question Answer Comment   Vent Mode AC/VC    Breath rate  16   FiO2  55%   PEEP 12    Tidal Volume 450        17 1638    17 0713  Ventilator - AC/VC; 12  Continuous,   Status:  Canceled     Question Answer Comment   Vent Mode AC/VC    PEEP 12        17 0712    17 0651  Ventilator - AC/VC; (30); 15  Continuous,   Status:  Canceled     Question Answer Comment   Vent Mode AC/VC    Breath rate  30   PEEP 15        17 0651    17 1753  . BIPAP; IPAP: 10; EPAP: 16; Titrate for spO2: equal to or greater than, 90%  At Bedtime & As Needed-RT,   Status:  Canceled     Question Answer Comment   . BIPAP    IPAP 10    EPAP 16    Titrate for spO2 equal to or greater than    Titrate for spO2 90%        17 1755    17 1752  . BIPAP  At Bedtime & As Needed-RT,   Status:  Canceled     Question:  .  Answer:  BIPAP    17 1751    17 1127  NIPPV CPAP OR BIPAP  Until Discontinued,   Status:  Canceled     Comments:  16/ 35 % oxygen rate 20    17 1127          Drips: Fentanyl, Versed, Propofol  Antibiotics: Micafungin, Rocephin, Flagyl, Oral Vancomcyin  Lines: R IJ  King Catheter: Yes    Procedures:  3/3/17: Emergent Intubation  3/3/17: Right femoral central line placement status post removal  3/3/17: Right IJ central line placement  3/9/17: Transfusion 1 unit PRBC    HPI: The patient is a 50 yo female admitted with bilateral pneumonia      Subjective   patient was noted to have fever last night up to  102.4F. She had 1 large bowel movement last night and 1 this morning. No other acute events.    History taken from: Leann fernandez RN   Patient unable to give history due to: N/A  Present during visit: GUERRERO Noriega    Objective     Vital Signs  Temp:  [98.4 °F (36.9 °C)-102.4 °F (39.1 °C)] 102.4 °F (39.1 °C)  Heart Rate:  [] 110  Resp:  [16-24] 16  BP: (108-175)/() 115/69  FiO2 (%):  [45 %] 45 %  Body mass index is 21.79 kg/(m^2).    Intake/Output Summary (Last 24 hours) at 03/14/17 0811  Last data filed at 03/14/17 0600   Gross per 24 hour   Intake 5372.84 ml   Output   4600 ml   Net 772.84 ml       Physical Exam:    Physical Exam   Constitutional: She appears well-developed and well-nourished. No distress. She is sedated and intubated.   Chronically ill   HENT:   Head: Normocephalic and atraumatic.   Nose: Nose normal.   Mouth/Throat: Oropharynx is clear and moist and mucous membranes are normal. Oral lesions present.   ETT and OGT in place   Eyes: Conjunctivae and EOM are normal. Pupils are equal, round, and reactive to light. No scleral icterus.   Neck: Trachea normal. Neck supple. No JVD present. Carotid bruit is not present. No thyromegaly present.   Cardiovascular: Normal rate, regular rhythm and normal heart sounds.  Exam reveals no gallop and no friction rub.  murmur heard.  Pulmonary/Chest: Effort normal. She is intubated. No respiratory distress. She has no wheezes. She has no rales.   Decreased expiratory breath sounds bilaterally   Abdominal: Soft. Bowel sounds are normal. She exhibits no distension. There is no tenderness. There is no guarding.   Genitourinary:   Genitourinary Comments: King catheter in place   Musculoskeletal: Normal range of motion.   Neurological:   Intubated and sedated   Skin: Skin is warm, dry and intact. No rash noted. No erythema.     Results Review:     I reviewed the patient's new imaging results and agree with the interpretation.  I reviewed the patient's other  test results and agree with the interpretation.    Telemetry: Sinus Tachycardia, 108      Results from last 7 days  Lab Units 03/14/17  0038 03/13/17  0511 03/12/17  0518 03/11/17  0520 03/10/17  1843 03/10/17  0936 03/10/17  0815  03/09/17  0027 03/08/17  0747   WBC 10*3/mm3 6.18 5.75 5.52 5.87  --   --  5.49  --  9.15 7.65   HEMOGLOBIN g/dL 8.4* 8.0* 8.2* 8.9* 8.4* 7.5* 7.5*  < > 7.0* 7.3*   PLATELETS 10*3/mm3 206 211 176 146  --   --  128*  --  145 166   < > = values in this interval not displayed.    Results from last 7 days  Lab Units 03/14/17  0038 03/13/17  0510 03/12/17  0518 03/11/17  0520 03/10/17  0815 03/09/17  0027 03/08/17  0533   SODIUM mmol/L 147 147 142 144 145 149 149   POTASSIUM mmol/L 4.0 3.1* 3.8 3.7 4.4 3.5 4.0   CHLORIDE mmol/L 114* 112 110 116* 119* 123* 126*   TOTAL CO2 mmol/L 33.0* 31.5 26.8 26.4 25.7 19.1* 21.2*   BUN mg/dL 12 13 16 14 27* 31* 29*   CREATININE mg/dL 0.34* 0.39* 0.42* 0.42* 0.51 0.48 0.46   CALCIUM mg/dL 8.2 8.0 7.9 8.6 8.5 8.5 8.4   GLUCOSE mg/dL 140* 215* 341* 204* 288* 181* 164*       Results from last 7 days  Lab Units 03/14/17  0038 03/13/17  0510 03/12/17  0518 03/11/17  0520 03/10/17  0815 03/09/17  0027 03/08/17  0533   BILIRUBIN mg/dL 0.2 0.1* 0.2 0.2 0.2 0.4 0.3   ALK PHOS U/L 115* 116* 145* 160* 133* 139* 106*   AST (SGOT) U/L 42* 66* 59* 46* 18 37* 26   ALT (SGPT) U/L 67* 71* 62* 45* 26 25 20       Results from last 7 days  Lab Units 03/13/17  0907 03/13/17  0510 03/12/17  0518 03/11/17  0520 03/10/17  0815 03/08/17  0533 03/08/17  0356   MAGNESIUM mg/dL 2.1 2.4 1.9 2.0 2.3 2.2 2.1       Results from last 7 days  Lab Units 03/14/17  0038 03/13/17  0510 03/12/17  0518   CRP mg/dL 3.08* 3.18* 6.63*     CXR, 3/13/17:  FINDINGS:   There is bibasilar atelectasis.   Patchy bibasilar airspace disease is again noted.  Support tube and lines are in unchanged position.   Heart size is stable.  No pneumothorax.   Tiny bilateral pleural effusions persist.        IMPRESSION:  Stable appearance of the chest.    Cultures:  BLOOD CULTURE   Date Value Ref Range Status   03/11/2017 No growth at 24 hours  Preliminary   03/11/2017 No growth at 24 hours  Preliminary   03/06/2017 No growth at 5 days  Final   03/06/2017 No growth at 5 days  Final     URINE CULTURE   Date Value Ref Range Status   03/11/2017 Normal Urogenital Falguni  Preliminary     Current Hospital Medications:    acetaminophen 650 mg Oral Once   acyclovir 200 mg Oral 5x Daily   aspirin 81 mg Oral Daily   bacitracin  Topical Q12H   ceftriaxone 2 g Intravenous Q24H   clopidogrel 75 mg Oral Daily   guaiFENesin 600 mg Oral BID   heparin (porcine) 5,000 Units Subcutaneous Q12H   insulin aspart 0-14 Units Subcutaneous 4x Daily AC & at Bedtime   insulin detemir 22 Units Subcutaneous Nightly   ipratropium 0.5 mg Nebulization Q6H - RT   metoprolol tartrate 25 mg Oral Q12H   metroNIDAZOLE 500 mg Intravenous Q8H   micafungin (MYCAMINE)  mg Intravenous Q24H   pantoprazole 40 mg Intravenous Q AM   potassium chloride 40 mEq Oral Once   predniSONE 40 mg Oral Daily With Breakfast   sucralfate 1 g Oral 4x Daily AC & at Bedtime   vancomycin 125 mg Oral Q6H       fentaNYL (SUBLIMAZE) PCA 1500 mcg/30 mL syringe     midazolam 0.5 mg/mL 100 mL NS 1-10 mg/hr Last Rate: 4 mg/hr (03/14/17 0055)   Pharmacy to dose vancomycin     propofol 5-50 mcg/kg/min Last Rate: 50 mcg/kg/min (03/14/17 8132)       Assessment&#47;Plan      Severe sepsis with acute hypoxic respiratory failure that was present upon admission and secondary to to community acquired pneumonia and E.coli UTI, now with probable hospital/ventilator associated pneumonia and Clostridium difficile colitis  Acute clostridium difficile colitis  -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 Will add acyclovir solution as I think that her recurrent fevers may be related to an oral herpes infection. Her steroids have been transitioned to PO. Will decrease the PEEP to 10 and decrease FiO2 to 0.40. Repeat urine culture from 3/11/17 revealing normal urogenital rasheeda. Supplement electrolytes as needed. Patient failed spontaneous breathing trial yesterday as she became tachycardic and hypertensive within 10 minutes of weaning her sedation. Planning family meeting (tentatively scheduled for tomorrow) to discuss with her son and daughter regarding Tracheostomy/PEG tube placement.     Continue tube feedings. Repeat her CBC, CMP and CRP level in the am. Continue to follow her sodium level closely, especially in the setting of diarrhea.     Patient is considered high risk due to: severe sepsis, acute respiratory distress syndrome, prolonged mechanical ventilation, clostridium colitis    Overall prognosis remains very poor/grim.    I discussed the patients findings and my recommendations with nursing staff.    Sherrill Weiner DO  03/14/17  8:11 AM       Electronically signed by Sherrill Weiner DO at 3/14/2017  8:42 AM      Alycia Finn PA-C at 3/14/2017 10:03 AM  Version 1 of 1           I have personally seen and examined the patient today and discussed overnight interval progress and pertinent issues with nursing staff.    ROS:    Unable to obtain due to the patients mental status, on the ventilator and sedated       History taken from: chart RN      Vital Signs    Visit Vitals   • /59   • Pulse 106   • Temp (!) 102.4 °F (39.1 °C)   • Resp 16   • Ht 63\" (160 cm)   • Wt 123 lb (55.8 kg)   • SpO2 100%   • BMI 21.79 kg/m2       Temp:  [98.4 °F (36.9 °C)-102.4 °F (39.1 °C)] 102.4 °F (39.1 " °C)      Intake/Output Summary (Last 24 hours) at 03/14/17 1003  Last data filed at 03/14/17 0842   Gross per 24 hour   Intake 3602.01 ml   Output   4600 ml   Net -997.99 ml     Intake & Output (last 3 days)       03/11 0701 - 03/12 0700 03/12 0701 - 03/13 0700 03/13 0701 - 03/14 0700 03/14 0701 - 03/15 0700    I.V. (mL/kg) 2998.5 (53.7) 2685.4 (48.1) 2491.8 (44.7)     Other 430 490 660     NG/ 380 951     IV Piggyback  100    Total Intake(mL/kg) 4131.5 (74.1) 4055.4 (72.7) 5402.8 (96.8) 100 (1.8)    Urine (mL/kg/hr) 3435 (2.6) 4100 (3.1) 4600 (3.4)     Stool 0 (0) 0 (0) 0 (0)     Total Output 3435 4100 4600      Net +696.5 -44.6 +802.8 +100            Unmeasured Stool Occurrence 2 x 2 x 1 x           Physical Exam:      General Appearance:  intubated, sedated on ventilator, and respiratory distress    Head:  Normocephalic, without obvious abnormality, atraumatic   Eyes:      Lids and lashes normal, conjunctivae and sclerae normal, no icterus, no pallor, corneas clear, PERRLA   Ears:  Ears appear intact with no abnormalities noted   Throat: No oral lesions, no thrush, oral mucosa moist   Neck: No adenopathy, supple, trachea midline, no thyromegaly, no carotid bruit, no JVD   Back:  No tenderness to percussion or palpation, range of motion normal   Lungs:  normal breath sounds without rhonchi or crackles.   Heart:  Regular rhythm and normal rate, normal S1 and S2, no murmur, no gallop, no rub, no click   Chest Wall:  No abnormalities observed   Abdomen:  Normal bowel sounds, no masses, no organomegaly, soft non-tender, non-distended, no guarding, no rebound tenderness   Rectal:  Deferred   Extremities: No edema, no cyanosis, no redness   Pulses: Pulses palpable and equal bilaterally   Skin: No bleeding, bruising or rash   Lymph nodes: No palpable adenopathy   Neurologic: sedated        Results:      Results from last 7 days  Lab Units 03/14/17  0038 03/13/17  0511 03/12/17  0518 03/11/17  0520  03/10/17  0815 03/09/17  0027 03/08/17  0747   WBC 10*3/mm3 6.18 5.75 5.52 5.87 5.49 9.15 7.65     Lab Results   Component Value Date    NEUTROABS 3.96 03/14/2017         Results from last 7 days  Lab Units 03/14/17  0038   CREATININE mg/dL 0.34*         Results from last 7 days  Lab Units 03/14/17  0038 03/13/17  0510 03/12/17  0518   CRP mg/dL 3.08* 3.18* 6.63*       Imaging Results (last 24 hours)     Procedure Component Value Units Date/Time    XR Chest AP [[id number]] Collected:  03/08/17 0831     Updated:  03/08/17 0834    Narrative:       EXAMINATION: XR CHEST AP-      CLINICAL INDICATION:     ARDS; J18.9-Pneumonia, unspecified organism     TECHNIQUE: Single AP view of chest.      COMPARISON: 3/7/2017      FINDINGS:   There is bibasilar atelectasis.   Grossly stable bilateral airspace disease.  Support tube and lines are in unchanged position.   Heart size is stable.  No pneumothorax.              Impression:       Grossly stable bilateral airspace disease.     This report was finalized on 3/8/2017 8:31 AM by Dr. Bret Morris MD.               Results Review:    I have personally reviewed laboratory data, culture results, radiology studies and antimicrobial therapy.    Hospital Medications (active)       Dose Frequency Start End    acetaminophen (TYLENOL) tablet 650 mg 650 mg Every 6 Hours PRN 3/2/2017     Sig - Route: Take 2 tablets by mouth Every 6 (Six) Hours As Needed for fever. - Oral    acetaminophen (TYLENOL) tablet 650 mg 650 mg Once 3/6/2017     Sig - Route: Take 2 tablets by mouth 1 (One) Time. - Oral    aspirin chewable tablet 81 mg 81 mg Daily 3/7/2017     Sig - Route: Chew 1 tablet Daily. - Oral    Notes to Pharmacy: Patient intubated and RN requested changed from enteric coated tablet.    clopidogrel (PLAVIX) tablet 75 mg 75 mg Daily 3/1/2017     Sig - Route: Take 1 tablet by mouth Daily. - Oral    dextrose (D50W) solution 25 g 25 g Every 15 Minutes PRN 3/1/2017     Sig - Route: Infuse 50 mL into  a venous catheter Every 15 (Fifteen) Minutes As Needed for low blood sugar (Blood Sugar Less Than 70, Patient Has IV Access - Unresponsive, NPO or Unable To Safely Swallow). - Intravenous    Cosign for Ordering: Accepted by Savanah Gutierrez MD on 3/1/2017  7:01 PM    dextrose (GLUTOSE) oral gel 15 g 15 g Every 15 Minutes PRN 3/1/2017     Sig - Route: Take 15 g by mouth Every 15 (Fifteen) Minutes As Needed for low blood sugar (Blood Sugar Less Than 70, Patient Alert, Is Not NPO & Can Safely Swallow). - Oral    Cosign for Ordering: Accepted by Savanah Gutierrez MD on 3/1/2017  7:01 PM    FENTANYL PCA 1500 MCG/30 ML (BHCOR) PCA  Continuous 3/3/2017 3/13/2017    Sig - Route: Infuse  into a venous catheter Continuous. - Intravenous    glucagon (GLUCAGEN) injection 1 mg 1 mg Every 15 Minutes PRN 3/1/2017     Sig - Route: Inject 1 mg under the skin Every 15 (Fifteen) Minutes As Needed (Blood Glucose Less Than 70 - Patient Without IV Access - Unresponsive, NPO or Unable To Safely Swallow). - Subcutaneous    Cosign for Ordering: Accepted by Savanah Gutierrez MD on 3/1/2017  7:01 PM    guaiFENesin (MUCINEX) 12 hr tablet 600 mg 600 mg 2 Times Daily 3/2/2017     Sig - Route: Take 1 tablet by mouth 2 (Two) Times a Day. - Oral    heparin (porcine) 5000 UNIT/ML injection 5,000 Units 5,000 Units Every 12 Hours Scheduled 3/1/2017     Sig - Route: Inject 1 mL under the skin Every 12 (Twelve) Hours. - Subcutaneous    insulin aspart (novoLOG) injection 0-14 Units 0-14 Units 4 Times Daily Before Meals & Nightly 3/1/2017     Sig - Route: Inject 0-14 Units under the skin 4 (Four) Times a Day Before Meals & at Bedtime. - Subcutaneous    insulin detemir (LEVEMIR) injection 20 Units 20 Units Nightly 3/4/2017     Sig - Route: Inject 20 Units under the skin Every Night. - Subcutaneous    ipratropium (ATROVENT) nebulizer solution 0.5 mg 0.5 mg Every 6 Hours PRN 3/2/2017     Sig - Route: Take 2.5 mL by nebulization Every 6 (Six) Hours As Needed  "for shortness of air. - Nebulization    Cosign for Ordering: Accepted by Sherrill Weiner DO on 3/2/2017  1:40 PM    ipratropium (ATROVENT) nebulizer solution 0.5 mg 0.5 mg Every 6 Hours - RT 3/3/2017     Sig - Route: Take 2.5 mL by nebulization Every 6 (Six) Hours. - Nebulization    magnesium sulfate 6 g in dextrose (D5W) 5 % 250 mL infusion 6 g As Needed 3/6/2017     Sig - Route: Infuse 6 g into a venous catheter As Needed (Mg 1.1-1.5 mg/dL). - Intravenous    Linked Group 1:  \"Or\" Linked Group Details        magnesium sulfate 8 g in dextrose (D5W) 5 % 250 mL infusion 8 g As Needed 3/6/2017     Sig - Route: Infuse 8 g into a venous catheter As Needed (Mg less than or equal to 1 mg/dL). - Intravenous    Linked Group 1:  \"Or\" Linked Group Details        magnesium sulfate in D5W 1g/100mL (PREMIX) IVPB 1 g 1 g As Needed 3/6/2017     Sig - Route: Infuse 100 mL into a venous catheter As Needed (Mg less than or equal 1 mg/dL). - Intravenous    Linked Group 1:  \"Or\" Linked Group Details        magnesium sulfate in D5W 1g/100mL (PREMIX) IVPB 1 g 1 g As Needed 3/6/2017     Sig - Route: Infuse 100 mL into a venous catheter As Needed (magnesium sulfate 1 g in D5W 100 mL IVPB - Mg 1.6 - 1.9 mg/dL). - Intravenous    Linked Group 1:  \"Or\" Linked Group Details        meropenem (MERREM) 1 g/100 mL 0.9% NS VTB (mbp) 1 g Every 8 Hours 3/7/2017     Sig - Route: Infuse 100 mL into a venous catheter Every 8 (Eight) Hours. - Intravenous    methylPREDNISolone sodium succinate (SOLU-Medrol) injection 20 mg 20 mg Every 12 Hours 3/5/2017     Sig - Route: Infuse 0.5 mL into a venous catheter Every 12 (Twelve) Hours. - Intravenous    metoprolol tartrate (LOPRESSOR) tablet 25 mg 25 mg Every 12 Hours Scheduled 3/2/2017     Sig - Route: Take 1 tablet by mouth Every 12 (Twelve) Hours. - Oral    midazolam (VERSED) injection 2 mg 2 mg Every 4 Hours PRN 3/5/2017     Sig - Route: Infuse 2 mL into a venous catheter Every 4 (Four) Hours As Needed " "for sedation. - Intravenous    midazolam 0.5 mg/mL 100 mL NS (VERSED) infusion 1-10 mg/hr Titrated 3/8/2017 3/18/2017    Sig - Route: Infuse 1-10 mg/hr into a venous catheter Dose Adjusted By Provider As Needed. - Intravenous    norepinephrine (LEVOPHED) 32 mcg/mL (8 mg/250 mL) infusion 0.02-0.3 mcg/kg/min × 57.3 kg Titrated 3/3/2017     Sig - Route: Infuse 1.146-17.19 mcg/min into a venous catheter Dose Adjusted By Provider As Needed. - Intravenous    pantoprazole (PROTONIX) injection 40 mg 40 mg Every Early Morning 3/2/2017     Sig - Route: Infuse 10 mL into a venous catheter Every Morning. - Intravenous    Cosign for Ordering: Accepted by Sherrill Weiner DO on 3/1/2017  3:38 PM    Pharmacy to dose vancomycin  Continuous PRN 3/2/2017     Sig - Route: Continuous As Needed for consult. - Does not apply    polyethylene glycol (MIRALAX) powder 17 g 17 g Daily 3/8/2017     Sig - Route: Take 17 g by mouth Daily. - Oral    potassium chloride (K-DUR,KLOR-CON) CR tablet 40 mEq 40 mEq Once 3/2/2017     Sig - Route: Take 2 tablets by mouth 1 (One) Time. - Oral    potassium chloride (KLOR-CON) packet 40 mEq 40 mEq As Needed 3/6/2017     Sig - Route: Take 40 mEq by mouth As Needed (potassium replacement, see admin instructions). - Oral    Linked Group 2:  \"Or\" Linked Group Details        potassium chloride (MICRO-K) CR capsule 40 mEq 40 mEq As Needed 3/6/2017     Sig - Route: Take 4 capsules by mouth As Needed (potassium replacement.  see admin instructions). - Oral    Linked Group 2:  \"Or\" Linked Group Details        potassium chloride 10 mEq in 100 mL IVPB 10 mEq Every 1 Hour PRN 3/6/2017     Sig - Route: Infuse 100 mL into a venous catheter Every 1 (One) Hour As Needed (potassium protocol PERIPHERAL - see admin instructions). - Intravenous    Linked Group 2:  \"Or\" Linked Group Details        promethazine (PHENERGAN) tablet 12.5 mg 12.5 mg Every 6 Hours PRN 3/1/2017     Sig - Route: Take 1 tablet by mouth Every 6 (Six) " "Hours As Needed for Nausea or Vomiting. - Oral    Cosign for Ordering: Accepted by Sherrill Weiner DO on 3/1/2017  3:38 PM    propofol (DIPRIVAN) infusion 10 mg/mL 100 mL 5-50 mcg/kg/min × 57.3 kg Titrated 3/3/2017     Sig - Route: Infuse 286.5-2,865 mcg/min into a venous catheter Dose Adjusted By Provider As Needed. - Intravenous    sodium chloride 0.9 % flush 1-10 mL 1-10 mL As Needed 3/1/2017     Sig - Route: Infuse 1-10 mL into a venous catheter As Needed for line care. - Intravenous    sodium chloride 0.9 % flush 10 mL 10 mL As Needed 2/28/2017     Sig - Route: Infuse 10 mL into a venous catheter As Needed for line care. - Intravenous    Linked Group 3:  \"And\" Linked Group Details        sucralfate (CARAFATE) tablet 1 g 1 g 4 Times Daily Before Meals & Nightly 3/1/2017     Sig - Route: Take 1 tablet by mouth 4 (Four) Times a Day Before Meals & at Bedtime. - Oral    Cosign for Ordering: Accepted by Sherrill Weiner DO on 3/1/2017  3:38 PM    vancomycin (VANCOCIN) 750 mg in sodium chloride 0.9 % 250 mL IVPB 750 mg Every 12 Hours 3/4/2017     Sig - Route: Infuse 750 mg into a venous catheter Every 12 (Twelve) Hours. - Intravenous    dexmedetomidine HCl (PRECEDEX) 4 mcg/mL in sodium chloride 0.9 % 100 mL infusion (Discontinued) 0.2-1.5 mcg/kg/hr × 55.8 kg Continuous 3/6/2017 3/7/2017    Sig - Route: Infuse 11.16-83.7 mcg/hr into a venous catheter Continuous. - Intravenous    Reason for Discontinue: Formulary change    dexmedetomidine HCl (PRECEDEX) 4 mcg/mL in sodium chloride 0.9 % 100 mL infusion (Discontinued) 0.2-1.5 mcg/kg/hr × 55.8 kg Titrated 3/7/2017 3/8/2017    Sig - Route: Infuse 11.16-83.7 mcg/hr into a venous catheter Dose Adjusted By Provider As Needed. - Intravenous    metroNIDAZOLE (FLAGYL) IVPB 500 mg (Discontinued) 500 mg Every 8 Hours 3/3/2017 3/8/2017    Sig - Route: Infuse 100 mL into a venous catheter Every 8 (Eight) Hours. - Intravenous            Cultures:    BLOOD CULTURE   Date " Value Ref Range Status   03/06/2017 No growth at 24 hours  Preliminary   03/06/2017 No growth at 24 hours  Preliminary   03/04/2017 No growth at 4 days  Preliminary   03/04/2017 No growth at 4 days  Preliminary   03/03/2017 No growth at 4 days  Preliminary   03/03/2017 No growth at 4 days  Preliminary       PROBLEM LIST:    Patient Active Problem List   Diagnosis   • Chest pain   • Chest pain of uncertain etiology   • Intractable nausea and vomiting   • Intractable cyclical vomiting with nausea   • Chronic obstructive pulmonary emphysema   • Hepatitis-C   • Diabetes mellitus type 2 in nonobese   • Arthritis or polyarthritis, rheumatoid   • Anxiety   • Essential hypertension   • Coronary artery disease   • Buerger's disease   • Cirrhosis   • Diabetes   • Chronic nausea   • Pancreatitis   • Bilateral pneumonia   • ARDS (adult respiratory distress syndrome)   • Hypoxia       Assessment&#47;Plan     ASSESSMENT:    1. Severe sepsis with acute respiratory failure  2. Bilateral pneumonia     PLAN:  to be in CCU in very critical condition with PEEP of 10 and FiO2 of 45%.  She has severe encephalopathy and possible underlying ARDS.    Unfortunately the patient has developed a high-grade fever overnight up to 102.4°F along with one loose bowel movement.    Urine culture showing greater than 100,000 colonies of yeast and is most likely colonizer.  The patient was initiated on micafungin on 3/12/2017 and might need to continue course with Diflucan 400 mg IV daily.    White count remains normal with a stable CRP level hence fever could be related to her underlying deep encephalopathy.  This is a very complicated case with very poor prognosis.    This is very complicated at this time as the patient developed C. difficile colitis while requiring non-C. difficile antibiotics and this carries a very guarded prognosis regardless of type of intervention.  Due to her concurrent C. difficile colitis infection IV vancomycin and meropenem  therapy were discontinued and coverage was switched to Rocephin 2 g IV every 24 hours, Flagyl 500 mg IV every 8 hours, and oral vancomycin 125 mg by mouth every 6 hours.  Hope to discontinue Rocephin very soon as this could definitely worsen C. difficile colitis.        Patients findings and recommendations were discussed with nursing staff    Code Status: Full Code     Written by Alycia Finn PA-C, acting as scribe for Dr. Hitchcock.    Alycia Finn PA-C  03/14/17  10:03 AM    Physician Attestation:    I have personally seen and examined the patient. I reviewed the patient's data including history of present illness, review of systems, physical examination, assessment and treatment plan and agree with findings above. The assessment and plan are my own.  I have reviewed and edited the note above after discussing the findings with Alycia Finn PA-C.    Evan Hitchcock MD  Infectious Diseases  03/14/17  10:03 AM       Electronically signed by Alycia Finn PA-C at 3/14/2017 10:10 AM

## 2017-03-14 NOTE — PROGRESS NOTES
"  I have personally seen and examined the patient today and discussed overnight interval progress and pertinent issues with nursing staff.    ROS:    Unable to obtain due to the patients mental status, on the ventilator and sedated       History taken from: chart RN      Vital Signs    Visit Vitals   • /59   • Pulse 106   • Temp (!) 102.4 °F (39.1 °C)   • Resp 16   • Ht 63\" (160 cm)   • Wt 123 lb (55.8 kg)   • SpO2 100%   • BMI 21.79 kg/m2       Temp:  [98.4 °F (36.9 °C)-102.4 °F (39.1 °C)] 102.4 °F (39.1 °C)      Intake/Output Summary (Last 24 hours) at 03/14/17 1003  Last data filed at 03/14/17 0842   Gross per 24 hour   Intake 3602.01 ml   Output   4600 ml   Net -997.99 ml     Intake & Output (last 3 days)       03/11 0701 - 03/12 0700 03/12 0701 - 03/13 0700 03/13 0701 - 03/14 0700 03/14 0701 - 03/15 0700    I.V. (mL/kg) 2998.5 (53.7) 2685.4 (48.1) 2491.8 (44.7)     Other 430 490 660     NG/ 380 951     IV Piggyback  100    Total Intake(mL/kg) 4131.5 (74.1) 4055.4 (72.7) 5402.8 (96.8) 100 (1.8)    Urine (mL/kg/hr) 3435 (2.6) 4100 (3.1) 4600 (3.4)     Stool 0 (0) 0 (0) 0 (0)     Total Output 3435 4100 4600      Net +696.5 -44.6 +802.8 +100            Unmeasured Stool Occurrence 2 x 2 x 1 x           Physical Exam:      General Appearance:  intubated, sedated on ventilator, and respiratory distress    Head:  Normocephalic, without obvious abnormality, atraumatic   Eyes:      Lids and lashes normal, conjunctivae and sclerae normal, no icterus, no pallor, corneas clear, PERRLA   Ears:  Ears appear intact with no abnormalities noted   Throat: No oral lesions, no thrush, oral mucosa moist   Neck: No adenopathy, supple, trachea midline, no thyromegaly, no carotid bruit, no JVD   Back:  No tenderness to percussion or palpation, range of motion normal   Lungs:  normal breath sounds without rhonchi or crackles.   Heart:  Regular rhythm and normal rate, normal S1 and S2, no murmur, no gallop, no " rub, no click   Chest Wall:  No abnormalities observed   Abdomen:  Normal bowel sounds, no masses, no organomegaly, soft non-tender, non-distended, no guarding, no rebound tenderness   Rectal:  Deferred   Extremities: No edema, no cyanosis, no redness   Pulses: Pulses palpable and equal bilaterally   Skin: No bleeding, bruising or rash   Lymph nodes: No palpable adenopathy   Neurologic: sedated        Results:      Results from last 7 days  Lab Units 03/14/17  0038 03/13/17  0511 03/12/17  0518 03/11/17  0520 03/10/17  0815 03/09/17  0027 03/08/17  0747   WBC 10*3/mm3 6.18 5.75 5.52 5.87 5.49 9.15 7.65     Lab Results   Component Value Date    NEUTROABS 3.96 03/14/2017         Results from last 7 days  Lab Units 03/14/17  0038   CREATININE mg/dL 0.34*         Results from last 7 days  Lab Units 03/14/17  0038 03/13/17  0510 03/12/17  0518   CRP mg/dL 3.08* 3.18* 6.63*       Imaging Results (last 24 hours)     Procedure Component Value Units Date/Time    XR Chest AP [[id number]] Collected:  03/08/17 0831     Updated:  03/08/17 0834    Narrative:       EXAMINATION: XR CHEST AP-      CLINICAL INDICATION:     ARDS; J18.9-Pneumonia, unspecified organism     TECHNIQUE: Single AP view of chest.      COMPARISON: 3/7/2017      FINDINGS:   There is bibasilar atelectasis.   Grossly stable bilateral airspace disease.  Support tube and lines are in unchanged position.   Heart size is stable.  No pneumothorax.              Impression:       Grossly stable bilateral airspace disease.     This report was finalized on 3/8/2017 8:31 AM by Dr. Bret Morris MD.               Results Review:    I have personally reviewed laboratory data, culture results, radiology studies and antimicrobial therapy.    Hospital Medications (active)       Dose Frequency Start End    acetaminophen (TYLENOL) tablet 650 mg 650 mg Every 6 Hours PRN 3/2/2017     Sig - Route: Take 2 tablets by mouth Every 6 (Six) Hours As Needed for fever. - Oral     acetaminophen (TYLENOL) tablet 650 mg 650 mg Once 3/6/2017     Sig - Route: Take 2 tablets by mouth 1 (One) Time. - Oral    aspirin chewable tablet 81 mg 81 mg Daily 3/7/2017     Sig - Route: Chew 1 tablet Daily. - Oral    Notes to Pharmacy: Patient intubated and RN requested changed from enteric coated tablet.    clopidogrel (PLAVIX) tablet 75 mg 75 mg Daily 3/1/2017     Sig - Route: Take 1 tablet by mouth Daily. - Oral    dextrose (D50W) solution 25 g 25 g Every 15 Minutes PRN 3/1/2017     Sig - Route: Infuse 50 mL into a venous catheter Every 15 (Fifteen) Minutes As Needed for low blood sugar (Blood Sugar Less Than 70, Patient Has IV Access - Unresponsive, NPO or Unable To Safely Swallow). - Intravenous    Cosign for Ordering: Accepted by Savanah Gutierrez MD on 3/1/2017  7:01 PM    dextrose (GLUTOSE) oral gel 15 g 15 g Every 15 Minutes PRN 3/1/2017     Sig - Route: Take 15 g by mouth Every 15 (Fifteen) Minutes As Needed for low blood sugar (Blood Sugar Less Than 70, Patient Alert, Is Not NPO & Can Safely Swallow). - Oral    Cosign for Ordering: Accepted by Savanah Gutierrez MD on 3/1/2017  7:01 PM    FENTANYL PCA 1500 MCG/30 ML (BHCOR) PCA  Continuous 3/3/2017 3/13/2017    Sig - Route: Infuse  into a venous catheter Continuous. - Intravenous    glucagon (GLUCAGEN) injection 1 mg 1 mg Every 15 Minutes PRN 3/1/2017     Sig - Route: Inject 1 mg under the skin Every 15 (Fifteen) Minutes As Needed (Blood Glucose Less Than 70 - Patient Without IV Access - Unresponsive, NPO or Unable To Safely Swallow). - Subcutaneous    Cosign for Ordering: Accepted by Savanah Gutierrez MD on 3/1/2017  7:01 PM    guaiFENesin (MUCINEX) 12 hr tablet 600 mg 600 mg 2 Times Daily 3/2/2017     Sig - Route: Take 1 tablet by mouth 2 (Two) Times a Day. - Oral    heparin (porcine) 5000 UNIT/ML injection 5,000 Units 5,000 Units Every 12 Hours Scheduled 3/1/2017     Sig - Route: Inject 1 mL under the skin Every 12 (Twelve) Hours. - Subcutaneous  "   insulin aspart (novoLOG) injection 0-14 Units 0-14 Units 4 Times Daily Before Meals & Nightly 3/1/2017     Sig - Route: Inject 0-14 Units under the skin 4 (Four) Times a Day Before Meals & at Bedtime. - Subcutaneous    insulin detemir (LEVEMIR) injection 20 Units 20 Units Nightly 3/4/2017     Sig - Route: Inject 20 Units under the skin Every Night. - Subcutaneous    ipratropium (ATROVENT) nebulizer solution 0.5 mg 0.5 mg Every 6 Hours PRN 3/2/2017     Sig - Route: Take 2.5 mL by nebulization Every 6 (Six) Hours As Needed for shortness of air. - Nebulization    Cosign for Ordering: Accepted by Sherrill Weiner DO on 3/2/2017  1:40 PM    ipratropium (ATROVENT) nebulizer solution 0.5 mg 0.5 mg Every 6 Hours - RT 3/3/2017     Sig - Route: Take 2.5 mL by nebulization Every 6 (Six) Hours. - Nebulization    magnesium sulfate 6 g in dextrose (D5W) 5 % 250 mL infusion 6 g As Needed 3/6/2017     Sig - Route: Infuse 6 g into a venous catheter As Needed (Mg 1.1-1.5 mg/dL). - Intravenous    Linked Group 1:  \"Or\" Linked Group Details        magnesium sulfate 8 g in dextrose (D5W) 5 % 250 mL infusion 8 g As Needed 3/6/2017     Sig - Route: Infuse 8 g into a venous catheter As Needed (Mg less than or equal to 1 mg/dL). - Intravenous    Linked Group 1:  \"Or\" Linked Group Details        magnesium sulfate in D5W 1g/100mL (PREMIX) IVPB 1 g 1 g As Needed 3/6/2017     Sig - Route: Infuse 100 mL into a venous catheter As Needed (Mg less than or equal 1 mg/dL). - Intravenous    Linked Group 1:  \"Or\" Linked Group Details        magnesium sulfate in D5W 1g/100mL (PREMIX) IVPB 1 g 1 g As Needed 3/6/2017     Sig - Route: Infuse 100 mL into a venous catheter As Needed (magnesium sulfate 1 g in D5W 100 mL IVPB - Mg 1.6 - 1.9 mg/dL). - Intravenous    Linked Group 1:  \"Or\" Linked Group Details        meropenem (MERREM) 1 g/100 mL 0.9% NS VTB (mbp) 1 g Every 8 Hours 3/7/2017     Sig - Route: Infuse 100 mL into a venous catheter Every 8 " "(Eight) Hours. - Intravenous    methylPREDNISolone sodium succinate (SOLU-Medrol) injection 20 mg 20 mg Every 12 Hours 3/5/2017     Sig - Route: Infuse 0.5 mL into a venous catheter Every 12 (Twelve) Hours. - Intravenous    metoprolol tartrate (LOPRESSOR) tablet 25 mg 25 mg Every 12 Hours Scheduled 3/2/2017     Sig - Route: Take 1 tablet by mouth Every 12 (Twelve) Hours. - Oral    midazolam (VERSED) injection 2 mg 2 mg Every 4 Hours PRN 3/5/2017     Sig - Route: Infuse 2 mL into a venous catheter Every 4 (Four) Hours As Needed for sedation. - Intravenous    midazolam 0.5 mg/mL 100 mL NS (VERSED) infusion 1-10 mg/hr Titrated 3/8/2017 3/18/2017    Sig - Route: Infuse 1-10 mg/hr into a venous catheter Dose Adjusted By Provider As Needed. - Intravenous    norepinephrine (LEVOPHED) 32 mcg/mL (8 mg/250 mL) infusion 0.02-0.3 mcg/kg/min × 57.3 kg Titrated 3/3/2017     Sig - Route: Infuse 1.146-17.19 mcg/min into a venous catheter Dose Adjusted By Provider As Needed. - Intravenous    pantoprazole (PROTONIX) injection 40 mg 40 mg Every Early Morning 3/2/2017     Sig - Route: Infuse 10 mL into a venous catheter Every Morning. - Intravenous    Cosign for Ordering: Accepted by Sherrill Weiner DO on 3/1/2017  3:38 PM    Pharmacy to dose vancomycin  Continuous PRN 3/2/2017     Sig - Route: Continuous As Needed for consult. - Does not apply    polyethylene glycol (MIRALAX) powder 17 g 17 g Daily 3/8/2017     Sig - Route: Take 17 g by mouth Daily. - Oral    potassium chloride (K-DUR,KLOR-CON) CR tablet 40 mEq 40 mEq Once 3/2/2017     Sig - Route: Take 2 tablets by mouth 1 (One) Time. - Oral    potassium chloride (KLOR-CON) packet 40 mEq 40 mEq As Needed 3/6/2017     Sig - Route: Take 40 mEq by mouth As Needed (potassium replacement, see admin instructions). - Oral    Linked Group 2:  \"Or\" Linked Group Details        potassium chloride (MICRO-K) CR capsule 40 mEq 40 mEq As Needed 3/6/2017     Sig - Route: Take 4 capsules by " "mouth As Needed (potassium replacement.  see admin instructions). - Oral    Linked Group 2:  \"Or\" Linked Group Details        potassium chloride 10 mEq in 100 mL IVPB 10 mEq Every 1 Hour PRN 3/6/2017     Sig - Route: Infuse 100 mL into a venous catheter Every 1 (One) Hour As Needed (potassium protocol PERIPHERAL - see admin instructions). - Intravenous    Linked Group 2:  \"Or\" Linked Group Details        promethazine (PHENERGAN) tablet 12.5 mg 12.5 mg Every 6 Hours PRN 3/1/2017     Sig - Route: Take 1 tablet by mouth Every 6 (Six) Hours As Needed for Nausea or Vomiting. - Oral    Cosign for Ordering: Accepted by Sherrill Weiner DO on 3/1/2017  3:38 PM    propofol (DIPRIVAN) infusion 10 mg/mL 100 mL 5-50 mcg/kg/min × 57.3 kg Titrated 3/3/2017     Sig - Route: Infuse 286.5-2,865 mcg/min into a venous catheter Dose Adjusted By Provider As Needed. - Intravenous    sodium chloride 0.9 % flush 1-10 mL 1-10 mL As Needed 3/1/2017     Sig - Route: Infuse 1-10 mL into a venous catheter As Needed for line care. - Intravenous    sodium chloride 0.9 % flush 10 mL 10 mL As Needed 2/28/2017     Sig - Route: Infuse 10 mL into a venous catheter As Needed for line care. - Intravenous    Linked Group 3:  \"And\" Linked Group Details        sucralfate (CARAFATE) tablet 1 g 1 g 4 Times Daily Before Meals & Nightly 3/1/2017     Sig - Route: Take 1 tablet by mouth 4 (Four) Times a Day Before Meals & at Bedtime. - Oral    Cosign for Ordering: Accepted by Sherrill Weiner DO on 3/1/2017  3:38 PM    vancomycin (VANCOCIN) 750 mg in sodium chloride 0.9 % 250 mL IVPB 750 mg Every 12 Hours 3/4/2017     Sig - Route: Infuse 750 mg into a venous catheter Every 12 (Twelve) Hours. - Intravenous    dexmedetomidine HCl (PRECEDEX) 4 mcg/mL in sodium chloride 0.9 % 100 mL infusion (Discontinued) 0.2-1.5 mcg/kg/hr × 55.8 kg Continuous 3/6/2017 3/7/2017    Sig - Route: Infuse 11.16-83.7 mcg/hr into a venous catheter Continuous. - Intravenous    " Reason for Discontinue: Formulary change    dexmedetomidine HCl (PRECEDEX) 4 mcg/mL in sodium chloride 0.9 % 100 mL infusion (Discontinued) 0.2-1.5 mcg/kg/hr × 55.8 kg Titrated 3/7/2017 3/8/2017    Sig - Route: Infuse 11.16-83.7 mcg/hr into a venous catheter Dose Adjusted By Provider As Needed. - Intravenous    metroNIDAZOLE (FLAGYL) IVPB 500 mg (Discontinued) 500 mg Every 8 Hours 3/3/2017 3/8/2017    Sig - Route: Infuse 100 mL into a venous catheter Every 8 (Eight) Hours. - Intravenous            Cultures:    BLOOD CULTURE   Date Value Ref Range Status   03/06/2017 No growth at 24 hours  Preliminary   03/06/2017 No growth at 24 hours  Preliminary   03/04/2017 No growth at 4 days  Preliminary   03/04/2017 No growth at 4 days  Preliminary   03/03/2017 No growth at 4 days  Preliminary   03/03/2017 No growth at 4 days  Preliminary       PROBLEM LIST:    Patient Active Problem List   Diagnosis   • Chest pain   • Chest pain of uncertain etiology   • Intractable nausea and vomiting   • Intractable cyclical vomiting with nausea   • Chronic obstructive pulmonary emphysema   • Hepatitis-C   • Diabetes mellitus type 2 in nonobese   • Arthritis or polyarthritis, rheumatoid   • Anxiety   • Essential hypertension   • Coronary artery disease   • Buerger's disease   • Cirrhosis   • Diabetes   • Chronic nausea   • Pancreatitis   • Bilateral pneumonia   • ARDS (adult respiratory distress syndrome)   • Hypoxia       Assessment/Plan     ASSESSMENT:    1. Severe sepsis with acute respiratory failure  2. Bilateral pneumonia     PLAN:    Continues to be in CCU in very critical condition with PEEP of 10 and FiO2 of 45%.  She has severe encephalopathy and possible underlying ARDS.    Unfortunately the patient has developed a high-grade fever overnight up to 102.4°F along with one loose bowel movement.    Urine culture showing greater than 100,000 colonies of yeast and is most likely colonizer.  The patient was initiated on micafungin on  3/12/2017 and might need to continue course with Diflucan 400 mg IV daily.    White count remains normal with a stable CRP level hence fever could be related to her underlying deep encephalopathy.  This is a very complicated case with very poor prognosis.    This is very complicated at this time as the patient developed C. difficile colitis while requiring non-C. difficile antibiotics and this carries a very guarded prognosis regardless of type of intervention.  Due to her concurrent C. difficile colitis infection IV vancomycin and meropenem therapy were discontinued and coverage was switched to Rocephin 2 g IV every 24 hours, Flagyl 500 mg IV every 8 hours, and oral vancomycin 125 mg by mouth every 6 hours.  Hope to discontinue Rocephin very soon as this could definitely worsen C. difficile colitis.        Patients findings and recommendations were discussed with nursing staff    Code Status: Full Code     Written by Alycia Finn PA-C, acting as scribe for Dr. Hitchcock.    Alycia Finn PA-C  03/14/17  10:03 AM    Physician Attestation:    I have personally seen and examined the patient. I reviewed the patient's data including history of present illness, review of systems, physical examination, assessment and treatment plan and agree with findings above. The assessment and plan are my own.  I have reviewed and edited the note above after discussing the findings with Alycia Finn PA-C.    Evan Hitchcock MD  Infectious Diseases  03/14/17  10:03 AM

## 2017-03-14 NOTE — PLAN OF CARE
Problem: Patient Care Overview (Adult)  Goal: Adult Individualization and Mutuality  Outcome: Ongoing (interventions implemented as appropriate)  Goal: Discharge Needs Assessment  Outcome: Ongoing (interventions implemented as appropriate)    Problem: Pneumonia (Adult)  Goal: Signs and Symptoms of Listed Potential Problems Will be Absent or Manageable (Pneumonia)  Outcome: Ongoing (interventions implemented as appropriate)    Problem: Diabetes, Type 2 (Adult)  Goal: Signs and Symptoms of Listed Potential Problems Will be Absent or Manageable (Diabetes, Type 2)  Outcome: Ongoing (interventions implemented as appropriate)    Problem: Mechanical Ventilation, Invasive (Adult)  Goal: Signs and Symptoms of Listed Potential Problems Will be Absent or Manageable (Mechanical Ventilation, Invasive)  Outcome: Ongoing (interventions implemented as appropriate)  Goal: Signs and Symptoms of Listed Potential Problems Will be Absent or Manageable (Mechanical Ventilation, Invasive)  Outcome: Ongoing (interventions implemented as appropriate)    Problem: Pressure Ulcer Risk (Brendan Scale) (Adult,Obstetrics,Pediatric)  Goal: Identify Related Risk Factors and Signs and Symptoms  Outcome: Ongoing (interventions implemented as appropriate)  Goal: Skin Integrity  Outcome: Ongoing (interventions implemented as appropriate)

## 2017-03-14 NOTE — PROGRESS NOTES
[facility] CCU Note    Patient Identification:  Name:  Pepito Yan  Age:  51 y.o.  Sex:  female  :  1965  MRN:  [id number]  Visit number:  [id number]  Primary Care Provider:  ROSA Meza    14    Ventilator Day:  Ventilator Settings:  Ventilator/Non-Invasive Ventilation Settings     Start     Ordered    17 1638  Ventilator - AC/VC; (16); (55%); 12; 450  Continuous     Question Answer Comment   Vent Mode AC/VC    Breath rate  16   FiO2  55%   PEEP 12    Tidal Volume 450        17 1638    17 0713  Ventilator - AC/VC; 12  Continuous,   Status:  Canceled     Question Answer Comment   Vent Mode AC/VC    PEEP 12        17 0712    17 0651  Ventilator - AC/VC; (30); 15  Continuous,   Status:  Canceled     Question Answer Comment   Vent Mode AC/VC    Breath rate  30   PEEP 15        17 0651    17 1753  . BIPAP; IPAP: 10; EPAP: 16; Titrate for spO2: equal to or greater than, 90%  At Bedtime & As Needed-RT,   Status:  Canceled     Question Answer Comment   . BIPAP    IPAP 10    EPAP 16    Titrate for spO2 equal to or greater than    Titrate for spO2 90%        17 1755    17 1752  . BIPAP  At Bedtime & As Needed-RT,   Status:  Canceled     Question:  .  Answer:  BIPAP    17 1751    17 1127  NIPPV CPAP OR BIPAP  Until Discontinued,   Status:  Canceled     Comments:  16/6 35 % oxygen rate 20    17 1127          Drips: Fentanyl, Versed, Propofol  Antibiotics: Micafungin, Rocephin, Flagyl, Oral Vancomcyin  Lines: R IJ  King Catheter: Yes    Procedures:  3/3/17: Emergent Intubation  3/3/17: Right femoral central line placement status post removal  3/3/17: Right IJ central line placement  3/9/17: Transfusion 1 unit PRBC    HPI: The patient is a 50 yo female admitted with bilateral pneumonia      Subjective     The patient was noted to have fever last night up to 102.4F. She had 1 large bowel movement last night and  1 this morning. No other acute events.    History taken from: Leann fernandez RN   Patient unable to give history due to: N/A  Present during visit: GUERRERO Noriega    Objective     Vital Signs  Temp:  [98.4 °F (36.9 °C)-102.4 °F (39.1 °C)] 102.4 °F (39.1 °C)  Heart Rate:  [] 110  Resp:  [16-24] 16  BP: (108-175)/() 115/69  FiO2 (%):  [45 %] 45 %  Body mass index is 21.79 kg/(m^2).    Intake/Output Summary (Last 24 hours) at 03/14/17 0811  Last data filed at 03/14/17 0600   Gross per 24 hour   Intake 5372.84 ml   Output   4600 ml   Net 772.84 ml       Physical Exam:    Physical Exam   Constitutional: She appears well-developed and well-nourished. No distress. She is sedated and intubated.   Chronically ill   HENT:   Head: Normocephalic and atraumatic.   Nose: Nose normal.   Mouth/Throat: Oropharynx is clear and moist and mucous membranes are normal. Oral lesions present.   ETT and OGT in place   Eyes: Conjunctivae and EOM are normal. Pupils are equal, round, and reactive to light. No scleral icterus.   Neck: Trachea normal. Neck supple. No JVD present. Carotid bruit is not present. No thyromegaly present.   Cardiovascular: Normal rate, regular rhythm and normal heart sounds.  Exam reveals no gallop and no friction rub.    No murmur heard.  Pulmonary/Chest: Effort normal. She is intubated. No respiratory distress. She has no wheezes. She has no rales.   Decreased expiratory breath sounds bilaterally   Abdominal: Soft. Bowel sounds are normal. She exhibits no distension. There is no tenderness. There is no guarding.   Genitourinary:   Genitourinary Comments: King catheter in place   Musculoskeletal: Normal range of motion.   Neurological:   Intubated and sedated   Skin: Skin is warm, dry and intact. No rash noted. No erythema.     Results Review:     I reviewed the patient's new imaging results and agree with the interpretation.  I reviewed the patient's other test results and agree with the  interpretation.    Telemetry: Sinus Tachycardia, 108      Results from last 7 days  Lab Units 03/14/17  0038 03/13/17  0511 03/12/17  0518 03/11/17  0520 03/10/17  1843 03/10/17  0936 03/10/17  0815  03/09/17  0027 03/08/17  0747   WBC 10*3/mm3 6.18 5.75 5.52 5.87  --   --  5.49  --  9.15 7.65   HEMOGLOBIN g/dL 8.4* 8.0* 8.2* 8.9* 8.4* 7.5* 7.5*  < > 7.0* 7.3*   PLATELETS 10*3/mm3 206 211 176 146  --   --  128*  --  145 166   < > = values in this interval not displayed.    Results from last 7 days  Lab Units 03/14/17  0038 03/13/17  0510 03/12/17  0518 03/11/17  0520 03/10/17  0815 03/09/17  0027 03/08/17  0533   SODIUM mmol/L 147 147 142 144 145 149 149   POTASSIUM mmol/L 4.0 3.1* 3.8 3.7 4.4 3.5 4.0   CHLORIDE mmol/L 114* 112 110 116* 119* 123* 126*   TOTAL CO2 mmol/L 33.0* 31.5 26.8 26.4 25.7 19.1* 21.2*   BUN mg/dL 12 13 16 14 27* 31* 29*   CREATININE mg/dL 0.34* 0.39* 0.42* 0.42* 0.51 0.48 0.46   CALCIUM mg/dL 8.2 8.0 7.9 8.6 8.5 8.5 8.4   GLUCOSE mg/dL 140* 215* 341* 204* 288* 181* 164*       Results from last 7 days  Lab Units 03/14/17  0038 03/13/17  0510 03/12/17  0518 03/11/17  0520 03/10/17  0815 03/09/17  0027 03/08/17  0533   BILIRUBIN mg/dL 0.2 0.1* 0.2 0.2 0.2 0.4 0.3   ALK PHOS U/L 115* 116* 145* 160* 133* 139* 106*   AST (SGOT) U/L 42* 66* 59* 46* 18 37* 26   ALT (SGPT) U/L 67* 71* 62* 45* 26 25 20       Results from last 7 days  Lab Units 03/13/17  0907 03/13/17  0510 03/12/17  0518 03/11/17  0520 03/10/17  0815 03/08/17  0533 03/08/17  0356   MAGNESIUM mg/dL 2.1 2.4 1.9 2.0 2.3 2.2 2.1       Results from last 7 days  Lab Units 03/14/17  0038 03/13/17  0510 03/12/17  0518   CRP mg/dL 3.08* 3.18* 6.63*     CXR, 3/13/17:  FINDINGS:   There is bibasilar atelectasis.   Patchy bibasilar airspace disease is again noted.  Support tube and lines are in unchanged position.   Heart size is stable.  No pneumothorax.   Tiny bilateral pleural effusions persist.       IMPRESSION:  Stable appearance of the  chest.    Cultures:  BLOOD CULTURE   Date Value Ref Range Status   03/11/2017 No growth at 24 hours  Preliminary   03/11/2017 No growth at 24 hours  Preliminary   03/06/2017 No growth at 5 days  Final   03/06/2017 No growth at 5 days  Final     URINE CULTURE   Date Value Ref Range Status   03/11/2017 Normal Urogenital Falguni  Preliminary     Current Hospital Medications:    acetaminophen 650 mg Oral Once   acyclovir 200 mg Oral 5x Daily   aspirin 81 mg Oral Daily   bacitracin  Topical Q12H   ceftriaxone 2 g Intravenous Q24H   clopidogrel 75 mg Oral Daily   guaiFENesin 600 mg Oral BID   heparin (porcine) 5,000 Units Subcutaneous Q12H   insulin aspart 0-14 Units Subcutaneous 4x Daily AC & at Bedtime   insulin detemir 22 Units Subcutaneous Nightly   ipratropium 0.5 mg Nebulization Q6H - RT   metoprolol tartrate 25 mg Oral Q12H   metroNIDAZOLE 500 mg Intravenous Q8H   micafungin (MYCAMINE)  mg Intravenous Q24H   pantoprazole 40 mg Intravenous Q AM   potassium chloride 40 mEq Oral Once   predniSONE 40 mg Oral Daily With Breakfast   sucralfate 1 g Oral 4x Daily AC & at Bedtime   vancomycin 125 mg Oral Q6H       fentaNYL (SUBLIMAZE) PCA 1500 mcg/30 mL syringe     midazolam 0.5 mg/mL 100 mL NS 1-10 mg/hr Last Rate: 4 mg/hr (03/14/17 0055)   Pharmacy to dose vancomycin     propofol 5-50 mcg/kg/min Last Rate: 50 mcg/kg/min (03/14/17 1095)       Assessment/Plan      -Severe sepsis with acute hypoxic respiratory failure that was present upon admission and secondary to to community acquired pneumonia and E.coli UTI, now with probable hospital/ventilator associated pneumonia and Clostridium difficile colitis  -Acute clostridium difficile colitis  -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 Will add acyclovir solution as I think that her recurrent fevers may be related to an oral herpes infection. Her steroids have been transitioned to PO. Will decrease the PEEP to 10 and decrease FiO2 to 0.40. Repeat urine culture from 3/11/17 revealing normal urogenital rasheeda. Supplement electrolytes as needed. Patient failed spontaneous breathing trial yesterday as she became tachycardic and hypertensive within 10 minutes of weaning her sedation. Planning family meeting (tentatively scheduled for tomorrow) to discuss with her son and daughter regarding Tracheostomy/PEG tube placement.     Continue tube feedings. Repeat her CBC, CMP and CRP level in the am. Continue to follow her sodium level closely, especially in the setting of diarrhea.     Patient is considered high risk due to: severe sepsis, acute respiratory distress syndrome, prolonged mechanical ventilation, clostridium colitis    Overall prognosis remains very poor/grim.    I discussed the patients findings and my recommendations with nursing staff.    Sherrill Weiner DO  03/14/17  8:11 AM

## 2017-03-14 NOTE — PROGRESS NOTES
LOS: 14 days   Patient Care Team:  ROSA Love as PCP - General  ROSA Love as PCP - Family Medicine    Chief Complaint:  Pulmonology is following for bilateral pneumonia/ARDS and ventilator management    Subjective     Interval History: Patient is intubated and sedated.  No acute respiratory events overnight.  Ventilator settings remain stable.  Decreased her PEEP to 8.      History taken from: chart RN Patient unable to give history due to patient intubated.    Review of Systems:   Review of systems cannot be obtained as patient is sedated and intubated  Objective     Vital Signs  Temp:  [98.6 °F (37 °C)-102.4 °F (39.1 °C)] 98.6 °F (37 °C)  Heart Rate:  [] 126  Resp:  [16-24] 24  BP: (104-161)/(57-80) 104/58  FiO2 (%):  [45 %] 45 %  Body mass index is 21.79 kg/(m^2).    Intake/Output Summary (Last 24 hours) at 03/14/17 1228  Last data filed at 03/14/17 0842   Gross per 24 hour   Intake 3402.01 ml   Output   2200 ml   Net 1202.01 ml     I/O this shift:  In: 100 [IV Piggyback:100]  Out: -     Physical Exam:  GENERAL APPEARANCE: Intubated and sedated.  Appears to be resting comfortably in bed.     HEAD: normocephalic.    EYES: PERRLA.    THROAT: ET tube in place. Oral cavity and pharynx normal. No inflammation, swelling, exudate, or lesions.     NECK: Neck supple.     CARDIAC: Normal S1 and S2. No S3, S4 or murmurs. Rhythm is regular.    LUNGS: Clear to auscultation and percussion without rales, rhonchi, wheezing or diminished breath sounds.    ABDOMEN: Positive bowel sounds. Soft, nondistended, nontender.     EXTREMITIES: No significant deformity or joint abnormality. No edema. Peripheral pulses intact.    NEUROLOGICAL: Unable to assess due to sedation status.     PSYCHIATRIC: Unable to assess due to sedation status  Results Review:     I reviewed the patient's new clinical results.  I reviewed the patient's new imaging results and agree with the interpretation.    Results from last 7  days  Lab Units 03/14/17  0038 03/13/17  0511 03/12/17  0518   WBC 10*3/mm3 6.18 5.75 5.52   HEMOGLOBIN g/dL 8.4* 8.0* 8.2*   PLATELETS 10*3/mm3 206 211 176       Results from last 7 days  Lab Units 03/14/17  0038 03/13/17  0907 03/13/17  0510 03/12/17  0518   SODIUM mmol/L 147  --  147 142   POTASSIUM mmol/L 4.0  --  3.1* 3.8   CHLORIDE mmol/L 114*  --  112 110   TOTAL CO2 mmol/L 33.0*  --  31.5 26.8   BUN mg/dL 12  --  13 16   CREATININE mg/dL 0.34*  --  0.39* 0.42*   CALCIUM mg/dL 8.2  --  8.0 7.9   GLUCOSE mg/dL 140*  --  215* 341*   MAGNESIUM mg/dL  --  2.1 2.4 1.9     Lab Results   Component Value Date    INR 1.01 12/17/2016    INR 0.94 11/20/2016    INR <0.90 02/06/2016    PROTIME 11.4 12/17/2016    PROTIME 10.6 11/20/2016    PROTIME 10.0 02/06/2016       Results from last 7 days  Lab Units 03/14/17  0038 03/13/17  0510 03/12/17  0518   ALK PHOS U/L 115* 116* 145*   BILIRUBIN mg/dL 0.2 0.1* 0.2   ALT (SGPT) U/L 67* 71* 62*   AST (SGOT) U/L 42* 66* 59*       Results from last 7 days  Lab Units 03/13/17  0420   PH, ARTERIAL pH units 7.412   PO2 ART mm Hg 134.0*   PCO2, ARTERIAL mm Hg 46.6*   HCO3 ART mmol/L 29.0*     Imaging Results (last 24 hours)     Procedure Component Value Units Date/Time    XR Chest AP [[id number]] Collected:  03/13/17 0830     Updated:  03/13/17 0833    Narrative:       EXAMINATION: XR CHEST AP-      CLINICAL INDICATION:     ARDS; J18.9-Pneumonia, unspecified organism     TECHNIQUE: Single AP view of chest.      COMPARISON: 03/12/2017      FINDINGS:   There is bibasilar atelectasis.   Patchy bibasilar airspace disease is again noted.  Support tube and lines are in unchanged position.   Heart size is stable.  No pneumothorax.   Tiny bilateral pleural effusions persist.        Impression:       Stable appearance of the chest.        This report was finalized on 3/13/2017 8:31 AM by Dr. Bret Morris MD.                Medication Review:   Scheduled Medications:    acetaminophen 650 mg Oral  Once   acyclovir 200 mg Oral 5x Daily   ALPRAZolam 1 mg Oral Daily   aspirin 81 mg Oral Daily   bacitracin  Topical Q12H   ceftriaxone 2 g Intravenous Q24H   clopidogrel 75 mg Oral Daily   guaiFENesin 600 mg Oral BID   heparin (porcine) 5,000 Units Subcutaneous Q12H   insulin aspart 0-14 Units Subcutaneous 4x Daily AC & at Bedtime   insulin detemir 22 Units Subcutaneous Nightly   ipratropium 0.5 mg Nebulization Q6H - RT   metoprolol tartrate 25 mg Oral Q12H   metroNIDAZOLE 500 mg Intravenous Q8H   pantoprazole 40 mg Intravenous Q AM   potassium chloride 40 mEq Oral Once   [START ON 3/15/2017] predniSONE 20 mg Oral Daily With Breakfast   vancomycin 125 mg Oral Q6H     Continuous infusions:    fentaNYL (SUBLIMAZE) PCA 1500 mcg/30 mL syringe  Last Rate: Stopped (03/14/17 1109)   midazolam 0.5 mg/mL 100 mL NS 1-10 mg/hr Last Rate: Stopped (03/14/17 1110)   Pharmacy to dose vancomycin     propofol 5-50 mcg/kg/min Last Rate: Stopped (03/14/17 1110)       Assessment/Plan       CXR:  Chest xray is looking better.    Repeat chest x-ray today    Neuro:  Will do sedation holiday today.  We will give her Ativan and Xanax before doing the sedation yesterday.    Respiratory: emergently intubated due to severe respiratory distress.     Vent settings:  RR16 FIO2 45 % PEEP 10     Will decrease the PEEP to 8.      Will continue IV steroids, scheduled inhalants and PRN neb treatments.    Decrease the prednisone to 20 mg by mouth daily.  Physical give 1 dose of 40 mg of Lasix and albumin.  We will give potassium chloride.        ID: C Diff positive.  Infectious disease is on case, management per them.  Continue antibiotics  Microbiology Results (last 10 days)     Procedure Component Value - Date/Time    Blood Culture [[id number]]  (Normal) Collected:  03/11/17 1246    Lab Status:  Preliminary result Specimen:  Blood from Hand, Right Updated:  03/13/17 1501     Blood Culture No growth at 2 days     Urine Culture [[id number]]   (Abnormal) Collected:  03/11/17 1216    Lab Status:  Preliminary result Specimen:  Urine from Urine, Clean Catch Updated:  03/13/17 1013     Urine Culture >100,000 CFU/mL Yeast isolated (A)     Blood Culture [[id number]]  (Normal) Collected:  03/11/17 1212    Lab Status:  Preliminary result Specimen:  Blood from Hand, Right Updated:  03/13/17 1501     Blood Culture No growth at 2 days     Clostridium Difficile Toxin [[id number]] Collected:  03/10/17 1309    Lab Status:  Final result Specimen:  Stool from Per Rectum Updated:  03/10/17 1911    Narrative:       The following orders were created for panel order Clostridium Difficile Toxin.  Procedure                               Abnormality         Status                     ---------                               -----------         ------                     Clostridium Difficile Tox...[[id number]]  Abnormal            Final result                 Please view results for these tests on the individual orders.    Clostridium Difficile Toxin, PCR [[id number]]  (Abnormal) Collected:  03/10/17 1309    Lab Status:  Final result Specimen:  Stool from Per Rectum Updated:  03/10/17 1911     C. Difficile Toxins by PCR Positive (C)      027 Toxin Presumptive Negative     Narrative:         For In Vitro Diagnostic use only.  027-NAP1-BI results are NOT intended to guide treatment. 027 typing is relative to PCR ribotyping and shown to be equivalent to B1 typing by restriction endonuclease analysis or NAP1 typing by pulse field gel electrophoresis.    Blood Culture [[id number]]  (Normal) Collected:  03/06/17 2215    Lab Status:  Final result Specimen:  Blood from Hand, Left Updated:  03/11/17 2301     Blood Culture No growth at 5 days     Blood Culture [[id number]]  (Normal) Collected:  03/06/17 2111    Lab Status:  Final result Specimen:  Blood from Hand, Right Updated:  03/11/17 2301     Blood Culture No growth at 5 days            Cardiac:   Hemodynamically Stable.  Continuous ECG and  pulse ox monitoring.       Renal: appears to have volume overload on chest xray.  Discontinued dextrose IVF yesterday.  Tolerated diuretics very well yesterday.    We will give another dose around 6 PM today.    Endocrine: monitor glucose targeting 140-180.     Electrolytes:  Monitor levels, manage and replete per protocols.  Replace as per protocol.  GI: Patient is tolerating tube feeding well.  Continue to monitor for refeeding syndrome.     Hematology:   Monitor blood counts, transfuse at or below 7 g/dL.  Unless patient is actively bleeding then began transfusing at 8 g/dL.          Patient is full code.      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  Went through patient's medications labs and other clinical data.  Plan was discussed in great detail. Critical care time 32 minutes    Gerard Bradley MD  03/14/17  12:28 PM

## 2017-03-14 NOTE — PROGRESS NOTES
Discharge Planning Assessment   Daniel     Patient Name: Pepito Yan  MRN: [id number]  Today's Date: 3/14/2017    Admit Date: 2/28/2017          Discharge Needs Assessment     None            Discharge Plan       03/14/17 1144    Case Management/Social Work Plan    Plan SS recevied a consult to assess for LTACH eligibility.  SS contacted Alyssa at [facility] and she is agreeable to evaluate the pt for placement.  SS faxed a referral to [facility] at [phone].  SS will continue to follow.     Patient/Family In Agreement With Plan yes        Discharge Placement     No information found        Expected Discharge Date and Time     Expected Discharge Date Expected Discharge Time    Mar 9, 2017               Demographic Summary     None            Functional Status     None            Psychosocial     None            Abuse/Neglect     None            Legal     None            Substance Abuse     None            Patient Forms     None          Merlene Parisi

## 2017-03-15 LAB
A-A DO2: 126.6 MMHG (ref 0–300)
ALBUMIN SERPL-MCNC: 3.2 G/DL (ref 3.5–5)
ALBUMIN/GLOB SERPL: 0.8 G/DL (ref 1.5–2.5)
ALP SERPL-CCNC: 89 U/L (ref 35–104)
ALT SERPL W P-5'-P-CCNC: 48 U/L (ref 10–36)
ANION GAP SERPL CALCULATED.3IONS-SCNC: 1.9 MMOL/L (ref 3.6–11.2)
ANISOCYTOSIS BLD QL: NORMAL
ARTERIAL PATENCY WRIST A: ABNORMAL
AST SERPL-CCNC: 30 U/L (ref 10–30)
ATMOSPHERIC PRESS: 724 MMHG
BASE EXCESS BLDA CALC-SCNC: 6.3 MMOL/L
BDY SITE: ABNORMAL
BILIRUB SERPL-MCNC: 0.3 MG/DL (ref 0.2–1.8)
BODY TEMPERATURE: 98.6 C
BUN BLD-MCNC: 16 MG/DL (ref 7–21)
BUN/CREAT SERPL: 32 (ref 7–25)
CALCIUM SPEC-SCNC: 8.9 MG/DL (ref 7.7–10)
CHLORIDE SERPL-SCNC: 107 MMOL/L (ref 99–112)
CO2 SERPL-SCNC: 35.1 MMOL/L (ref 24.3–31.9)
COHGB MFR BLD: 1.2 % (ref 0–5)
CREAT BLD-MCNC: 0.5 MG/DL (ref 0.43–1.29)
CRP SERPL-MCNC: 2.95 MG/DL (ref 0–0.99)
DEPRECATED RDW RBC AUTO: 68.2 FL (ref 37–54)
ERYTHROCYTE [DISTWIDTH] IN BLOOD BY AUTOMATED COUNT: 24.1 % (ref 11.5–14.5)
GFR SERPL CREATININE-BSD FRML MDRD: 130 ML/MIN/1.73
GLOBULIN UR ELPH-MCNC: 3.8 GM/DL
GLUCOSE BLD-MCNC: 141 MG/DL (ref 70–110)
GLUCOSE BLDC GLUCOMTR-MCNC: 116 MG/DL (ref 70–130)
GLUCOSE BLDC GLUCOMTR-MCNC: 159 MG/DL (ref 70–130)
GLUCOSE BLDC GLUCOMTR-MCNC: 212 MG/DL (ref 70–130)
GLUCOSE BLDC GLUCOMTR-MCNC: 253 MG/DL (ref 70–130)
HCO3 BLDA-SCNC: 30.2 MMOL/L (ref 22–26)
HCT VFR BLD AUTO: 31.6 % (ref 37–47)
HCT VFR BLD CALC: 24 % (ref 37–47)
HGB BLD-MCNC: 8.6 G/DL (ref 12–16)
HGB BLDA-MCNC: 8.3 G/DL (ref 12–16)
HOROWITZ INDEX BLD+IHG-RTO: 45 %
HYPOCHROMIA BLD QL: NORMAL
LYMPHOCYTES # BLD MANUAL: 1.75 10*3/MM3 (ref 1–3)
LYMPHOCYTES NFR BLD MANUAL: 30 % (ref 21–51)
LYMPHOCYTES NFR BLD MANUAL: 8 % (ref 0–10)
MCH RBC QN AUTO: 22.5 PG (ref 27–33)
MCHC RBC AUTO-ENTMCNC: 27.2 G/DL (ref 33–37)
MCV RBC AUTO: 82.7 FL (ref 80–94)
METHGB BLD QL: 0.3 % (ref 0–3)
MODALITY: ABNORMAL
MONOCYTES # BLD AUTO: 0.47 10*3/MM3 (ref 0.1–0.9)
NEUTROPHILS # BLD AUTO: 3.61 10*3/MM3 (ref 1.4–6.5)
NEUTROPHILS NFR BLD MANUAL: 62 % (ref 30–70)
OSMOLALITY SERPL CALC.SUM OF ELEC: 290.4 MOSM/KG (ref 273–305)
OXYHGB MFR BLDV: 97.4 % (ref 85–100)
PCO2 BLDA: 40.7 MM HG (ref 35–45)
PEEP RESPIRATORY: 8 CM[H2O]
PH BLDA: 7.49 PH UNITS (ref 7.35–7.45)
PLAT MORPH BLD: NORMAL
PLATELET # BLD AUTO: 216 10*3/MM3 (ref 130–400)
PMV BLD AUTO: 11 FL (ref 6–10)
PO2 BLDA: 131.8 MM HG (ref 80–100)
POTASSIUM BLD-SCNC: 4.2 MMOL/L (ref 3.5–5.3)
PROT SERPL-MCNC: 7 G/DL (ref 6–8)
RBC # BLD AUTO: 3.82 10*6/MM3 (ref 4.2–5.4)
SAO2 % BLDCOA: 98.9 % (ref 90–100)
SET MECH RESP RATE: 16
SODIUM BLD-SCNC: 144 MMOL/L (ref 135–153)
VENTILATOR MODE: ABNORMAL
VT ON VENT VENT: 450 ML
WBC NRBC COR # BLD: 5.83 10*3/MM3 (ref 4.5–12.5)

## 2017-03-15 PROCEDURE — 82962 GLUCOSE BLOOD TEST: CPT

## 2017-03-15 PROCEDURE — 25010000002 MIDAZOLAM 0.5 MG/ML 100 ML NS: Performed by: INTERNAL MEDICINE

## 2017-03-15 PROCEDURE — 99232 SBSQ HOSP IP/OBS MODERATE 35: CPT | Performed by: INTERNAL MEDICINE

## 2017-03-15 PROCEDURE — 25010000002 CEFTRIAXONE: Performed by: INTERNAL MEDICINE

## 2017-03-15 PROCEDURE — 63710000001 INSULIN DETEMIR PER 5 UNITS: Performed by: INTERNAL MEDICINE

## 2017-03-15 PROCEDURE — 82375 ASSAY CARBOXYHB QUANT: CPT | Performed by: INTERNAL MEDICINE

## 2017-03-15 PROCEDURE — 25010000002 PROPOFOL 1000 MG/ML EMULSION: Performed by: INTERNAL MEDICINE

## 2017-03-15 PROCEDURE — 99291 CRITICAL CARE FIRST HOUR: CPT | Performed by: INTERNAL MEDICINE

## 2017-03-15 PROCEDURE — 94799 UNLISTED PULMONARY SVC/PX: CPT

## 2017-03-15 PROCEDURE — 36600 WITHDRAWAL OF ARTERIAL BLOOD: CPT | Performed by: INTERNAL MEDICINE

## 2017-03-15 PROCEDURE — 85025 COMPLETE CBC W/AUTO DIFF WBC: CPT | Performed by: INTERNAL MEDICINE

## 2017-03-15 PROCEDURE — 80053 COMPREHEN METABOLIC PANEL: CPT | Performed by: INTERNAL MEDICINE

## 2017-03-15 PROCEDURE — 86140 C-REACTIVE PROTEIN: CPT | Performed by: INTERNAL MEDICINE

## 2017-03-15 PROCEDURE — 63710000001 INSULIN ASPART PER 5 UNITS: Performed by: INTERNAL MEDICINE

## 2017-03-15 PROCEDURE — 25010000002 FUROSEMIDE PER 20 MG: Performed by: INTERNAL MEDICINE

## 2017-03-15 PROCEDURE — 83050 HGB METHEMOGLOBIN QUAN: CPT | Performed by: INTERNAL MEDICINE

## 2017-03-15 PROCEDURE — 25010000002 HEPARIN (PORCINE) PER 1000 UNITS: Performed by: INTERNAL MEDICINE

## 2017-03-15 PROCEDURE — 82805 BLOOD GASES W/O2 SATURATION: CPT | Performed by: INTERNAL MEDICINE

## 2017-03-15 PROCEDURE — 63710000001 PREDNISONE PER 5 MG: Performed by: INTERNAL MEDICINE

## 2017-03-15 PROCEDURE — 85007 BL SMEAR W/DIFF WBC COUNT: CPT | Performed by: INTERNAL MEDICINE

## 2017-03-15 PROCEDURE — 94003 VENT MGMT INPAT SUBQ DAY: CPT

## 2017-03-15 RX ORDER — PREDNISONE 10 MG/1
10 TABLET ORAL
Status: DISCONTINUED | OUTPATIENT
Start: 2017-03-16 | End: 2017-03-19

## 2017-03-15 RX ORDER — FUROSEMIDE 10 MG/ML
40 INJECTION INTRAMUSCULAR; INTRAVENOUS ONCE
Status: COMPLETED | OUTPATIENT
Start: 2017-03-15 | End: 2017-03-15

## 2017-03-15 RX ORDER — METOPROLOL TARTRATE 5 MG/5ML
5 INJECTION INTRAVENOUS ONCE
Status: COMPLETED | OUTPATIENT
Start: 2017-03-15 | End: 2017-03-15

## 2017-03-15 RX ADMIN — INSULIN DETEMIR 22 UNITS: 100 INJECTION, SOLUTION SUBCUTANEOUS at 20:26

## 2017-03-15 RX ADMIN — PANTOPRAZOLE SODIUM 40 MG: 40 INJECTION, POWDER, FOR SOLUTION INTRAVENOUS at 06:19

## 2017-03-15 RX ADMIN — VANCOMYCIN 125 MG: KIT at 17:41

## 2017-03-15 RX ADMIN — Medication 1.5 MG/HR: at 21:40

## 2017-03-15 RX ADMIN — ACYCLOVIR 200 MG: 200 SUSPENSION ORAL at 13:02

## 2017-03-15 RX ADMIN — IPRATROPIUM BROMIDE 0.5 MG: 0.5 SOLUTION RESPIRATORY (INHALATION) at 12:30

## 2017-03-15 RX ADMIN — INSULIN ASPART 8 UNITS: 100 INJECTION, SOLUTION INTRAVENOUS; SUBCUTANEOUS at 10:53

## 2017-03-15 RX ADMIN — CLOPIDOGREL 75 MG: 75 TABLET, FILM COATED ORAL at 08:20

## 2017-03-15 RX ADMIN — IPRATROPIUM BROMIDE 0.5 MG: 0.5 SOLUTION RESPIRATORY (INHALATION) at 18:25

## 2017-03-15 RX ADMIN — CEFTRIAXONE 2 G: 2 INJECTION, POWDER, FOR SOLUTION INTRAMUSCULAR; INTRAVENOUS at 08:20

## 2017-03-15 RX ADMIN — PROPOFOL 50 MCG/KG/MIN: 10 INJECTION, EMULSION INTRAVENOUS at 16:15

## 2017-03-15 RX ADMIN — VANCOMYCIN 125 MG: KIT at 13:03

## 2017-03-15 RX ADMIN — PREDNISONE 20 MG: 20 TABLET ORAL at 08:20

## 2017-03-15 RX ADMIN — GUAIFENESIN 600 MG: 600 TABLET, EXTENDED RELEASE ORAL at 08:20

## 2017-03-15 RX ADMIN — VANCOMYCIN 125 MG: KIT at 06:19

## 2017-03-15 RX ADMIN — METRONIDAZOLE 500 MG: 500 INJECTION, SOLUTION INTRAVENOUS at 06:13

## 2017-03-15 RX ADMIN — BACITRACIN ZINC: 500 OINTMENT TOPICAL at 20:18

## 2017-03-15 RX ADMIN — Medication: at 00:07

## 2017-03-15 RX ADMIN — INSULIN ASPART 5 UNITS: 100 INJECTION, SOLUTION INTRAVENOUS; SUBCUTANEOUS at 17:41

## 2017-03-15 RX ADMIN — GUAIFENESIN 300 MG: 100 SOLUTION ORAL at 18:59

## 2017-03-15 RX ADMIN — BACITRACIN ZINC: 500 OINTMENT TOPICAL at 08:21

## 2017-03-15 RX ADMIN — ACYCLOVIR 200 MG: 200 SUSPENSION ORAL at 21:41

## 2017-03-15 RX ADMIN — PROPOFOL 50 MCG/KG/MIN: 10 INJECTION, EMULSION INTRAVENOUS at 06:12

## 2017-03-15 RX ADMIN — FUROSEMIDE 40 MG: 10 INJECTION, SOLUTION INTRAMUSCULAR; INTRAVENOUS at 09:18

## 2017-03-15 RX ADMIN — METOPROLOL TARTRATE 25 MG: 25 TABLET, FILM COATED ORAL at 08:20

## 2017-03-15 RX ADMIN — HEPARIN SODIUM 5000 UNITS: 5000 INJECTION, SOLUTION INTRAVENOUS; SUBCUTANEOUS at 20:26

## 2017-03-15 RX ADMIN — METOPROLOL TARTRATE 5 MG: 5 INJECTION INTRAVENOUS at 09:52

## 2017-03-15 RX ADMIN — GUAIFENESIN 300 MG: 100 SOLUTION ORAL at 23:25

## 2017-03-15 RX ADMIN — HEPARIN SODIUM 5000 UNITS: 5000 INJECTION, SOLUTION INTRAVENOUS; SUBCUTANEOUS at 08:20

## 2017-03-15 RX ADMIN — ASPIRIN 81 MG: 81 TABLET, CHEWABLE ORAL at 08:20

## 2017-03-15 RX ADMIN — ALPRAZOLAM 1 MG: 1 TABLET ORAL at 08:20

## 2017-03-15 RX ADMIN — ACETAMINOPHEN 650 MG: 160 SOLUTION ORAL at 13:17

## 2017-03-15 RX ADMIN — ACYCLOVIR 200 MG: 200 SUSPENSION ORAL at 17:41

## 2017-03-15 RX ADMIN — IPRATROPIUM BROMIDE 0.5 MG: 0.5 SOLUTION RESPIRATORY (INHALATION) at 06:57

## 2017-03-15 RX ADMIN — Medication: at 19:48

## 2017-03-15 RX ADMIN — INSULIN ASPART 3 UNITS: 100 INJECTION, SOLUTION INTRAVENOUS; SUBCUTANEOUS at 06:39

## 2017-03-15 RX ADMIN — METRONIDAZOLE 500 MG: 500 INJECTION, SOLUTION INTRAVENOUS at 13:02

## 2017-03-15 RX ADMIN — ACYCLOVIR 200 MG: 200 SUSPENSION ORAL at 06:19

## 2017-03-15 RX ADMIN — VANCOMYCIN 125 MG: KIT at 23:25

## 2017-03-15 RX ADMIN — PROPOFOL 40 MCG/KG/MIN: 10 INJECTION, EMULSION INTRAVENOUS at 23:42

## 2017-03-15 RX ADMIN — Medication: at 07:33

## 2017-03-15 RX ADMIN — METRONIDAZOLE 500 MG: 500 INJECTION, SOLUTION INTRAVENOUS at 20:15

## 2017-03-15 RX ADMIN — IPRATROPIUM BROMIDE 0.5 MG: 0.5 SOLUTION RESPIRATORY (INHALATION) at 00:00

## 2017-03-15 NOTE — PROGRESS NOTES
[facility] CCU Note    Patient Identification:  Name:  Pepito Yan  Age:  51 y.o.  Sex:  female  :  1965  MRN:  [id number]  Visit number:  [id number]  Primary Care Provider:  ROSA Meza    15    Ventilator Day:  Ventilator Settings:  Ventilator/Non-Invasive Ventilation Settings     Start     Ordered    17 1638  Ventilator - AC/VC; (16); (55%); 12; 450  Continuous     Question Answer Comment   Vent Mode AC/VC    Breath rate  16   FiO2  55%   PEEP 12    Tidal Volume 450        17 1638    17 0713  Ventilator - AC/VC; 12  Continuous,   Status:  Canceled     Question Answer Comment   Vent Mode AC/VC    PEEP 12        17 0712    17 0651  Ventilator - AC/VC; (30); 15  Continuous,   Status:  Canceled     Question Answer Comment   Vent Mode AC/VC    Breath rate  30   PEEP 15        17 0651    17 1753  . BIPAP; IPAP: 10; EPAP: 16; Titrate for spO2: equal to or greater than, 90%  At Bedtime & As Needed-RT,   Status:  Canceled     Question Answer Comment   . BIPAP    IPAP 10    EPAP 16    Titrate for spO2 equal to or greater than    Titrate for spO2 90%        17 1755    17 1752  . BIPAP  At Bedtime & As Needed-RT,   Status:  Canceled     Question:  .  Answer:  BIPAP    17 1751    17 1127  NIPPV CPAP OR BIPAP  Until Discontinued,   Status:  Canceled     Comments:  16/6 35 % oxygen rate 20    17 1127          Drips: Fentanyl, Versed, Propofol  Antibiotics: Micafungin, Rocephin, Flagyl, Oral Vancomcyin  Lines: R IJ  King Catheter: Yes    Procedures:  3/3/17: Emergent Intubation  3/3/17: Right femoral central line placement status post removal  3/3/17: Right IJ central line placement  3/9/17: Transfusion 1 unit PRBC    HPI: The patient is a 50 yo female admitted with bilateral pneumonia      Subjective     The patient was noted to have 3 episodes of diarrhea last night. No fevers. No other acute  events.    History taken from: Michelle fernandez RN   Patient unable to give history due to: N/A  Present during visit: GUERRERO Martinez    Objective     Vital Signs  Temp:  [98.4 °F (36.9 °C)-100.3 °F (37.9 °C)] 98.7 °F (37.1 °C)  Heart Rate:  [] 104  Resp:  [14-24] 21  BP: ()/(58-85) 125/76  FiO2 (%):  [45 %] 45 %  Body mass index is 21.79 kg/(m^2).    Intake/Output Summary (Last 24 hours) at 03/15/17 0811  Last data filed at 03/15/17 0500   Gross per 24 hour   Intake 2218.25 ml   Output   3345 ml   Net -1126.75 ml       Physical Exam:    Physical Exam   Constitutional: She appears well-developed and well-nourished. No distress. She is sedated and intubated.   Chronically ill   HENT:   Head: Normocephalic and atraumatic.   Nose: Nose normal.   Mouth/Throat: Oropharynx is clear and moist and mucous membranes are normal. Oral lesions present.   ETT and OGT in place   Eyes: Conjunctivae and EOM are normal. Pupils are equal, round, and reactive to light. No scleral icterus.   Neck: Trachea normal. Neck supple. No JVD present. Carotid bruit is not present. No thyromegaly present.   Cardiovascular: Normal rate, regular rhythm and normal heart sounds.  Exam reveals no gallop and no friction rub.    No murmur heard.  Pulmonary/Chest: Effort normal. She is intubated. No respiratory distress. She has no wheezes. She has no rales.   Decreased expiratory breath sounds bilaterally   Abdominal: Soft. Bowel sounds are normal. She exhibits no distension. There is no tenderness. There is no guarding.   Genitourinary:   Genitourinary Comments: King catheter in place   Musculoskeletal: Normal range of motion.   Neurological:   Intubated and sedated   Skin: Skin is warm, dry and intact. No rash noted. No erythema.     Results Review:     I reviewed the patient's new imaging results and agree with the interpretation.  I reviewed the patient's other test results and agree with the interpretation.    Telemetry: Sinus Tachycardia,  113      Results from last 7 days  Lab Units 03/15/17  0034 03/14/17  0038 03/13/17  0511 03/12/17  0518 03/11/17  0520 03/10/17  1843 03/10/17  0936 03/10/17  0815  03/09/17  0027   WBC 10*3/mm3 5.83 6.18 5.75 5.52 5.87  --   --  5.49  --  9.15   HEMOGLOBIN g/dL 8.6* 8.4* 8.0* 8.2* 8.9* 8.4* 7.5* 7.5*  < > 7.0*   PLATELETS 10*3/mm3 216 206 211 176 146  --   --  128*  --  145   < > = values in this interval not displayed.    Results from last 7 days  Lab Units 03/15/17  0034 03/14/17  0038 03/13/17  0510 03/12/17  0518 03/11/17  0520 03/10/17  0815 03/09/17  0027   SODIUM mmol/L 144 147 147 142 144 145 149   POTASSIUM mmol/L 4.2 4.0 3.1* 3.8 3.7 4.4 3.5   CHLORIDE mmol/L 107 114* 112 110 116* 119* 123*   TOTAL CO2 mmol/L 35.1* 33.0* 31.5 26.8 26.4 25.7 19.1*   BUN mg/dL 16 12 13 16 14 27* 31*   CREATININE mg/dL 0.50 0.34* 0.39* 0.42* 0.42* 0.51 0.48   CALCIUM mg/dL 8.9 8.2 8.0 7.9 8.6 8.5 8.5   GLUCOSE mg/dL 141* 140* 215* 341* 204* 288* 181*       Results from last 7 days  Lab Units 03/15/17  0034 03/14/17  0038 03/13/17  0510 03/12/17  0518 03/11/17  0520 03/10/17  0815 03/09/17  0027   BILIRUBIN mg/dL 0.3 0.2 0.1* 0.2 0.2 0.2 0.4   ALK PHOS U/L 89 115* 116* 145* 160* 133* 139*   AST (SGOT) U/L 30 42* 66* 59* 46* 18 37*   ALT (SGPT) U/L 48* 67* 71* 62* 45* 26 25       Results from last 7 days  Lab Units 03/13/17  0907 03/13/17  0510 03/12/17  0518 03/11/17  0520 03/10/17  0815   MAGNESIUM mg/dL 2.1 2.4 1.9 2.0 2.3       Results from last 7 days  Lab Units 03/15/17  0034 03/14/17  0038 03/13/17  0510   CRP mg/dL 2.95* 3.08* 3.18*     CXR, 3/14/17:  FINDINGS:      Bilateral consolidation is noted. It has improved in the left lung, more  condensed in the right upper lobe.  The cardiac silhouette is normal. The pulmonary vasculature is  unremarkable.  There is no evidence of an acute osseous abnormality.   There are no suspicious-appearing parenchymal soft tissue nodules.      IMPRESSION:  Bilateral consolidation  again noted.     Cultures:  BLOOD CULTURE   Date Value Ref Range Status   03/11/2017 No growth at 24 hours  Preliminary   03/11/2017 No growth at 24 hours  Preliminary   03/06/2017 No growth at 5 days  Final   03/06/2017 No growth at 5 days  Final     URINE CULTURE   Date Value Ref Range Status   03/11/2017 Normal Urogenital Falguni  Preliminary     Urine Culture   Specimen Information: Urine, Clean Catch; Urine        Culture   >100,000 CFU/mL Yeast isolated (A)             Current Hospital Medications:    acetaminophen 650 mg Oral Once   acyclovir 200 mg Oral 5x Daily   ALPRAZolam 1 mg Oral Daily   aspirin 81 mg Oral Daily   bacitracin  Topical Q12H   ceftriaxone 2 g Intravenous Q24H   clopidogrel 75 mg Oral Daily   guaiFENesin 600 mg Oral BID   heparin (porcine) 5,000 Units Subcutaneous Q12H   insulin aspart 0-14 Units Subcutaneous 4x Daily AC & at Bedtime   insulin detemir 22 Units Subcutaneous Nightly   ipratropium 0.5 mg Nebulization Q6H - RT   metoprolol tartrate 25 mg Oral Q12H   metroNIDAZOLE 500 mg Intravenous Q8H   pantoprazole 40 mg Intravenous Q AM   potassium chloride 40 mEq Oral Once   predniSONE 20 mg Oral Daily With Breakfast   vancomycin 125 mg Oral Q6H       fentaNYL (SUBLIMAZE) PCA 1500 mcg/30 mL syringe     midazolam 0.5 mg/mL 100 mL NS 1-10 mg/hr Last Rate: 4 mg/hr (03/14/17 9387)   Pharmacy to dose vancomycin     propofol 5-50 mcg/kg/min Last Rate: 50 mcg/kg/min (03/15/17 0612)       Assessment/Plan      -Severe sepsis with acute hypoxic respiratory failure that was present upon admission and secondary to to community acquired pneumonia and E.coli UTI, now with probable hospital/ventilator associated pneumonia and Clostridium difficile colitis  -Acute clostridium difficile colitis  -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 Continue oral steroids.  Repeat urine culture from 3/11/17 revealing yeast. Supplement electrolytes as needed. Will try for another spontaneous breathing trial today. Her ABG is acceptable. Family meeting today to discuss with her son and daughter regarding possible tracheostomy/PEG tube placement. Will continue to follow diarrhea and consider rectal tube to prevent skin breakdown - tube feedings may be contributing.     Hold tube feedings in light of SBT. Repeat her CBC, CMP and CRP level in the am.     Patient is considered high risk due to: severe sepsis, acute respiratory distress syndrome, prolonged mechanical ventilation, clostridium colitis    Overall prognosis remains very poor/grim.    I discussed the patients findings and my recommendations with nursing staff and ROSA Hernandez (Pulmonary)    Sherrill Weiner DO  03/15/17  8:11 AM

## 2017-03-15 NOTE — PROGRESS NOTES
"  I have personally seen and examined the patient today and discussed overnight interval progress and pertinent issues with nursing staff.    ROS:    Unable to obtain due to the patients mental status, on the ventilator and sedated       History taken from: chart RN      Vital Signs    Visit Vitals   • BP (!) 185/97 (BP Location: Right arm, Patient Position: Lying)   • Pulse (!) 129   • Temp 99.8 °F (37.7 °C) (Oral)   • Resp (!) 32   • Ht 63\" (160 cm)   • Wt 123 lb (55.8 kg)   • SpO2 98%   • BMI 21.79 kg/m2       Temp:  [98.4 °F (36.9 °C)-100.3 °F (37.9 °C)] 99.8 °F (37.7 °C)      Intake/Output Summary (Last 24 hours) at 03/15/17 1111  Last data filed at 03/15/17 1000   Gross per 24 hour   Intake 2218.25 ml   Output   3845 ml   Net -1626.75 ml     Intake & Output (last 3 days)       03/12 0701 - 03/13 0700 03/13 0701 - 03/14 0700 03/14 0701 - 03/15 0700 03/15 0701 - 03/16 0700    I.V. (mL/kg) 2685.4 (48.1) 2491.8 (44.7) 477.3 (8.6)     Other 490 660 800     NG/ 951 741     IV Piggyback 500 1300 200 100    Total Intake(mL/kg) 4055.4 (72.7) 5402.8 (96.8) 2218.3 (39.8) 100 (1.8)    Urine (mL/kg/hr) 4100 (3.1) 4600 (3.4) 3345 (2.5) 500 (2.1)    Stool 0 (0) 0 (0) 0 (0)     Total Output 4100 4600 3345 500    Net -44.6 +802.8 -1126.8 -400            Unmeasured Stool Occurrence 2 x 1 x 2 x           Physical Exam:      General Appearance:  intubated, sedated on ventilator, and respiratory distress    Head:  Normocephalic, without obvious abnormality, atraumatic   Eyes:      Lids and lashes normal, conjunctivae and sclerae normal, no icterus, no pallor, corneas clear, PERRLA   Ears:  Ears appear intact with no abnormalities noted   Throat: No oral lesions, no thrush, oral mucosa moist   Neck: No adenopathy, supple, trachea midline, no thyromegaly, no carotid bruit, no JVD   Back:  No tenderness to percussion or palpation, range of motion normal   Lungs:  normal breath sounds without rhonchi or crackles.   Heart:  " Regular rhythm and normal rate, normal S1 and S2, no murmur, no gallop, no rub, no click   Chest Wall:  No abnormalities observed   Abdomen:  Normal bowel sounds, no masses, no organomegaly, soft non-tender, non-distended, no guarding, no rebound tenderness   Rectal:  Deferred   Extremities: No edema, no cyanosis, no redness   Pulses: Pulses palpable and equal bilaterally   Skin: No bleeding, bruising or rash   Lymph nodes: No palpable adenopathy   Neurologic: sedated        Results:      Results from last 7 days  Lab Units 03/15/17  0034 03/14/17  0038 03/13/17  0511 03/12/17  0518 03/11/17  0520 03/10/17  0815 03/09/17  0027   WBC 10*3/mm3 5.83 6.18 5.75 5.52 5.87 5.49 9.15     Lab Results   Component Value Date    NEUTROABS 3.61 03/15/2017         Results from last 7 days  Lab Units 03/15/17  0034   CREATININE mg/dL 0.50         Results from last 7 days  Lab Units 03/15/17  0034 03/14/17  0038 03/13/17  0510   CRP mg/dL 2.95* 3.08* 3.18*       Imaging Results (last 24 hours)     Procedure Component Value Units Date/Time    XR Chest AP [[id number]] Collected:  03/08/17 0831     Updated:  03/08/17 0834    Narrative:       EXAMINATION: XR CHEST AP-      CLINICAL INDICATION:     ARDS; J18.9-Pneumonia, unspecified organism     TECHNIQUE: Single AP view of chest.      COMPARISON: 3/7/2017      FINDINGS:   There is bibasilar atelectasis.   Grossly stable bilateral airspace disease.  Support tube and lines are in unchanged position.   Heart size is stable.  No pneumothorax.              Impression:       Grossly stable bilateral airspace disease.     This report was finalized on 3/8/2017 8:31 AM by Dr. Bret Morris MD.               Results Review:    I have personally reviewed laboratory data, culture results, radiology studies and antimicrobial therapy.    Hospital Medications (active)       Dose Frequency Start End    acetaminophen (TYLENOL) tablet 650 mg 650 mg Every 6 Hours PRN 3/2/2017     Sig - Route: Take 2  tablets by mouth Every 6 (Six) Hours As Needed for fever. - Oral    acetaminophen (TYLENOL) tablet 650 mg 650 mg Once 3/6/2017     Sig - Route: Take 2 tablets by mouth 1 (One) Time. - Oral    aspirin chewable tablet 81 mg 81 mg Daily 3/7/2017     Sig - Route: Chew 1 tablet Daily. - Oral    Notes to Pharmacy: Patient intubated and RN requested changed from enteric coated tablet.    clopidogrel (PLAVIX) tablet 75 mg 75 mg Daily 3/1/2017     Sig - Route: Take 1 tablet by mouth Daily. - Oral    dextrose (D50W) solution 25 g 25 g Every 15 Minutes PRN 3/1/2017     Sig - Route: Infuse 50 mL into a venous catheter Every 15 (Fifteen) Minutes As Needed for low blood sugar (Blood Sugar Less Than 70, Patient Has IV Access - Unresponsive, NPO or Unable To Safely Swallow). - Intravenous    Cosign for Ordering: Accepted by Savanah Gutierrez MD on 3/1/2017  7:01 PM    dextrose (GLUTOSE) oral gel 15 g 15 g Every 15 Minutes PRN 3/1/2017     Sig - Route: Take 15 g by mouth Every 15 (Fifteen) Minutes As Needed for low blood sugar (Blood Sugar Less Than 70, Patient Alert, Is Not NPO & Can Safely Swallow). - Oral    Cosign for Ordering: Accepted by Savanah Gutierrez MD on 3/1/2017  7:01 PM    FENTANYL PCA 1500 MCG/30 ML (BHCOR) PCA  Continuous 3/3/2017 3/13/2017    Sig - Route: Infuse  into a venous catheter Continuous. - Intravenous    glucagon (GLUCAGEN) injection 1 mg 1 mg Every 15 Minutes PRN 3/1/2017     Sig - Route: Inject 1 mg under the skin Every 15 (Fifteen) Minutes As Needed (Blood Glucose Less Than 70 - Patient Without IV Access - Unresponsive, NPO or Unable To Safely Swallow). - Subcutaneous    Cosign for Ordering: Accepted by Savanah Gutierrez MD on 3/1/2017  7:01 PM    guaiFENesin (MUCINEX) 12 hr tablet 600 mg 600 mg 2 Times Daily 3/2/2017     Sig - Route: Take 1 tablet by mouth 2 (Two) Times a Day. - Oral    heparin (porcine) 5000 UNIT/ML injection 5,000 Units 5,000 Units Every 12 Hours Scheduled 3/1/2017     Sig - Route:  "Inject 1 mL under the skin Every 12 (Twelve) Hours. - Subcutaneous    insulin aspart (novoLOG) injection 0-14 Units 0-14 Units 4 Times Daily Before Meals & Nightly 3/1/2017     Sig - Route: Inject 0-14 Units under the skin 4 (Four) Times a Day Before Meals & at Bedtime. - Subcutaneous    insulin detemir (LEVEMIR) injection 20 Units 20 Units Nightly 3/4/2017     Sig - Route: Inject 20 Units under the skin Every Night. - Subcutaneous    ipratropium (ATROVENT) nebulizer solution 0.5 mg 0.5 mg Every 6 Hours PRN 3/2/2017     Sig - Route: Take 2.5 mL by nebulization Every 6 (Six) Hours As Needed for shortness of air. - Nebulization    Cosign for Ordering: Accepted by Sherrill Weiner DO on 3/2/2017  1:40 PM    ipratropium (ATROVENT) nebulizer solution 0.5 mg 0.5 mg Every 6 Hours - RT 3/3/2017     Sig - Route: Take 2.5 mL by nebulization Every 6 (Six) Hours. - Nebulization    magnesium sulfate 6 g in dextrose (D5W) 5 % 250 mL infusion 6 g As Needed 3/6/2017     Sig - Route: Infuse 6 g into a venous catheter As Needed (Mg 1.1-1.5 mg/dL). - Intravenous    Linked Group 1:  \"Or\" Linked Group Details        magnesium sulfate 8 g in dextrose (D5W) 5 % 250 mL infusion 8 g As Needed 3/6/2017     Sig - Route: Infuse 8 g into a venous catheter As Needed (Mg less than or equal to 1 mg/dL). - Intravenous    Linked Group 1:  \"Or\" Linked Group Details        magnesium sulfate in D5W 1g/100mL (PREMIX) IVPB 1 g 1 g As Needed 3/6/2017     Sig - Route: Infuse 100 mL into a venous catheter As Needed (Mg less than or equal 1 mg/dL). - Intravenous    Linked Group 1:  \"Or\" Linked Group Details        magnesium sulfate in D5W 1g/100mL (PREMIX) IVPB 1 g 1 g As Needed 3/6/2017     Sig - Route: Infuse 100 mL into a venous catheter As Needed (magnesium sulfate 1 g in D5W 100 mL IVPB - Mg 1.6 - 1.9 mg/dL). - Intravenous    Linked Group 1:  \"Or\" Linked Group Details        meropenem (MERREM) 1 g/100 mL 0.9% NS VTB (mbp) 1 g Every 8 Hours 3/7/2017  " "   Sig - Route: Infuse 100 mL into a venous catheter Every 8 (Eight) Hours. - Intravenous    methylPREDNISolone sodium succinate (SOLU-Medrol) injection 20 mg 20 mg Every 12 Hours 3/5/2017     Sig - Route: Infuse 0.5 mL into a venous catheter Every 12 (Twelve) Hours. - Intravenous    metoprolol tartrate (LOPRESSOR) tablet 25 mg 25 mg Every 12 Hours Scheduled 3/2/2017     Sig - Route: Take 1 tablet by mouth Every 12 (Twelve) Hours. - Oral    midazolam (VERSED) injection 2 mg 2 mg Every 4 Hours PRN 3/5/2017     Sig - Route: Infuse 2 mL into a venous catheter Every 4 (Four) Hours As Needed for sedation. - Intravenous    midazolam 0.5 mg/mL 100 mL NS (VERSED) infusion 1-10 mg/hr Titrated 3/8/2017 3/18/2017    Sig - Route: Infuse 1-10 mg/hr into a venous catheter Dose Adjusted By Provider As Needed. - Intravenous    norepinephrine (LEVOPHED) 32 mcg/mL (8 mg/250 mL) infusion 0.02-0.3 mcg/kg/min × 57.3 kg Titrated 3/3/2017     Sig - Route: Infuse 1.146-17.19 mcg/min into a venous catheter Dose Adjusted By Provider As Needed. - Intravenous    pantoprazole (PROTONIX) injection 40 mg 40 mg Every Early Morning 3/2/2017     Sig - Route: Infuse 10 mL into a venous catheter Every Morning. - Intravenous    Cosign for Ordering: Accepted by Sherrill Weiner DO on 3/1/2017  3:38 PM    Pharmacy to dose vancomycin  Continuous PRN 3/2/2017     Sig - Route: Continuous As Needed for consult. - Does not apply    polyethylene glycol (MIRALAX) powder 17 g 17 g Daily 3/8/2017     Sig - Route: Take 17 g by mouth Daily. - Oral    potassium chloride (K-DUR,KLOR-CON) CR tablet 40 mEq 40 mEq Once 3/2/2017     Sig - Route: Take 2 tablets by mouth 1 (One) Time. - Oral    potassium chloride (KLOR-CON) packet 40 mEq 40 mEq As Needed 3/6/2017     Sig - Route: Take 40 mEq by mouth As Needed (potassium replacement, see admin instructions). - Oral    Linked Group 2:  \"Or\" Linked Group Details        potassium chloride (MICRO-K) CR capsule 40 mEq 40 " "mEq As Needed 3/6/2017     Sig - Route: Take 4 capsules by mouth As Needed (potassium replacement.  see admin instructions). - Oral    Linked Group 2:  \"Or\" Linked Group Details        potassium chloride 10 mEq in 100 mL IVPB 10 mEq Every 1 Hour PRN 3/6/2017     Sig - Route: Infuse 100 mL into a venous catheter Every 1 (One) Hour As Needed (potassium protocol PERIPHERAL - see admin instructions). - Intravenous    Linked Group 2:  \"Or\" Linked Group Details        promethazine (PHENERGAN) tablet 12.5 mg 12.5 mg Every 6 Hours PRN 3/1/2017     Sig - Route: Take 1 tablet by mouth Every 6 (Six) Hours As Needed for Nausea or Vomiting. - Oral    Cosign for Ordering: Accepted by Sherrill Weiner DO on 3/1/2017  3:38 PM    propofol (DIPRIVAN) infusion 10 mg/mL 100 mL 5-50 mcg/kg/min × 57.3 kg Titrated 3/3/2017     Sig - Route: Infuse 286.5-2,865 mcg/min into a venous catheter Dose Adjusted By Provider As Needed. - Intravenous    sodium chloride 0.9 % flush 1-10 mL 1-10 mL As Needed 3/1/2017     Sig - Route: Infuse 1-10 mL into a venous catheter As Needed for line care. - Intravenous    sodium chloride 0.9 % flush 10 mL 10 mL As Needed 2/28/2017     Sig - Route: Infuse 10 mL into a venous catheter As Needed for line care. - Intravenous    Linked Group 3:  \"And\" Linked Group Details        sucralfate (CARAFATE) tablet 1 g 1 g 4 Times Daily Before Meals & Nightly 3/1/2017     Sig - Route: Take 1 tablet by mouth 4 (Four) Times a Day Before Meals & at Bedtime. - Oral    Cosign for Ordering: Accepted by Sherrill Weiner DO on 3/1/2017  3:38 PM    vancomycin (VANCOCIN) 750 mg in sodium chloride 0.9 % 250 mL IVPB 750 mg Every 12 Hours 3/4/2017     Sig - Route: Infuse 750 mg into a venous catheter Every 12 (Twelve) Hours. - Intravenous    dexmedetomidine HCl (PRECEDEX) 4 mcg/mL in sodium chloride 0.9 % 100 mL infusion (Discontinued) 0.2-1.5 mcg/kg/hr × 55.8 kg Continuous 3/6/2017 3/7/2017    Sig - Route: Infuse 11.16-83.7 " mcg/hr into a venous catheter Continuous. - Intravenous    Reason for Discontinue: Formulary change    dexmedetomidine HCl (PRECEDEX) 4 mcg/mL in sodium chloride 0.9 % 100 mL infusion (Discontinued) 0.2-1.5 mcg/kg/hr × 55.8 kg Titrated 3/7/2017 3/8/2017    Sig - Route: Infuse 11.16-83.7 mcg/hr into a venous catheter Dose Adjusted By Provider As Needed. - Intravenous    metroNIDAZOLE (FLAGYL) IVPB 500 mg (Discontinued) 500 mg Every 8 Hours 3/3/2017 3/8/2017    Sig - Route: Infuse 100 mL into a venous catheter Every 8 (Eight) Hours. - Intravenous            Cultures:    BLOOD CULTURE   Date Value Ref Range Status   03/06/2017 No growth at 24 hours  Preliminary   03/06/2017 No growth at 24 hours  Preliminary   03/04/2017 No growth at 4 days  Preliminary   03/04/2017 No growth at 4 days  Preliminary   03/03/2017 No growth at 4 days  Preliminary   03/03/2017 No growth at 4 days  Preliminary       PROBLEM LIST:    Patient Active Problem List   Diagnosis   • Chest pain   • Chest pain of uncertain etiology   • Intractable nausea and vomiting   • Intractable cyclical vomiting with nausea   • Chronic obstructive pulmonary emphysema   • Hepatitis-C   • Diabetes mellitus type 2 in nonobese   • Arthritis or polyarthritis, rheumatoid   • Anxiety   • Essential hypertension   • Coronary artery disease   • Buerger's disease   • Cirrhosis   • Diabetes   • Chronic nausea   • Pancreatitis   • Bilateral pneumonia   • ARDS (adult respiratory distress syndrome)   • Hypoxia       Assessment/Plan     ASSESSMENT:    1. Severe sepsis with acute respiratory failure  2. Bilateral pneumonia     PLAN:    The patient continues to have diarrhea.  Continues to be on Rocephin, Flagyl, and oral vancomycin.  We will continue to monitor diarrhea and possibly discontinue Rocephin if her diarrhea worsens.  CRP level is improving with a normal white count.    Continues to be in CCU in very critical condition with PEEP of 8 and FiO2 of 45%.  She has severe  encephalopathy and possible underlying ARDS.      Urine culture showing greater than 100,000 colonies of yeast and is most likely colonizer.  The patient was initiated on micafungin on 3/12/2017 and might need to continue course with Diflucan 400 mg IV daily.    White count remains normal with a stable CRP level hence fever could be related to her underlying deep encephalopathy.  This is a very complicated case with very poor prognosis.    This is very complicated at this time as the patient developed C. difficile colitis while requiring non-C. difficile antibiotics and this carries a very guarded prognosis regardless of type of intervention.  Due to her concurrent C. difficile colitis infection IV vancomycin and meropenem therapy were discontinued and coverage was switched to Rocephin 2 g IV every 24 hours, Flagyl 500 mg IV every 8 hours, and oral vancomycin 125 mg by mouth every 6 hours.  Hope to discontinue Rocephin very soon as this could definitely worsen C. difficile colitis.        Patients findings and recommendations were discussed with nursing staff    Code Status: Full Code       Alycia Finn PA-C  03/15/17  11:11 AM

## 2017-03-15 NOTE — PROGRESS NOTES
Discharge Planning Assessment   Daniel     Patient Name: Pepito Yan  MRN: [id number]  Today's Date: 3/15/2017    Admit Date: 2/28/2017          Discharge Needs Assessment     None            Discharge Plan       03/15/17 1525    Case Management/Social Work Plan    Plan SS met with pt's daughter, April, Dr. Weiner, Dr. Bradley, primary nurse, Michelle and KHLOE Dennis on this date.  Dr. Weiner and Dr. Bradley discussed plans for the next few days.  Pt's daughter is in agreement with the plans.  SS will continue to follow.     Patient/Family In Agreement With Plan yes        Discharge Placement     No information found        Expected Discharge Date and Time     Expected Discharge Date Expected Discharge Time    Mar 9, 2017               Demographic Summary     None            Functional Status     None            Psychosocial     None            Abuse/Neglect     None            Legal     None            Substance Abuse     None            Patient Forms     None          Merlene Parisi

## 2017-03-15 NOTE — PROGRESS NOTES
LOS: 15 days   Patient Care Team:  ROSA Love as PCP - General  ROSA Love as PCP - Family Medicine    Chief Complaint:  Pulmonology is following for bilateral pneumonia/ARDS and ventilator mangament    Subjective     Interval History: patient is intubated and sedated.  No acute events reported overnight.      History taken from: chart RN Patient unable to give history due to patient intubated.    Review of Systems:   Review of Systems - History obtained from chart review and unobtainable from patient due to mental status and Intubated      Objective     Vital Signs  Temp:  [98.4 °F (36.9 °C)-100.3 °F (37.9 °C)] 99.8 °F (37.7 °C)  Heart Rate:  [] 121  Resp:  [14-24] 20  BP: ()/(60-97) 185/97  FiO2 (%):  [45 %] 45 %  Body mass index is 21.79 kg/(m^2).    Intake/Output Summary (Last 24 hours) at 03/15/17 1052  Last data filed at 03/15/17 1000   Gross per 24 hour   Intake 2218.25 ml   Output   3845 ml   Net -1626.75 ml     I/O this shift:  In: 100 [IV Piggyback:100]  Out: 500 [Urine:500]    Physical Exam:  GENERAL APPEARANCE: Intubated and sedated.  Appears to be resting comfortably in bed.     HEAD: normocephalic.    EYES: PERRLA.    THROAT: ET tube in place. Oral cavity and pharynx normal. No inflammation, swelling, exudate, or lesions.     NECK: Neck supple.     CARDIAC: Normal S1 and S2. No S3, S4 or murmurs. Rhythm is regular. There is no peripheral edema, cyanosis or pallor. Extremities are warm and well perfused. Capillary refill is less than 2 seconds. No carotid bruits.    LUNGS: Clear to auscultation and percussion without rales, rhonchi, wheezing or diminished breath sounds.    ABDOMEN: Positive bowel sounds. Soft, nondistended, nontender.     EXTREMITIES: No significant deformity or joint abnormality. No edema. Peripheral pulses intact.    NEUROLOGICAL: Unable to assess due to sedation status.     PSYCHIATRIC: Unable to assess due to sedation status.     Results Review:      I reviewed the patient's new clinical results.  I reviewed the patient's new imaging results and agree with the interpretation.    Results from last 7 days  Lab Units 03/15/17  0034 03/14/17  0038 03/13/17  0511   WBC 10*3/mm3 5.83 6.18 5.75   HEMOGLOBIN g/dL 8.6* 8.4* 8.0*   PLATELETS 10*3/mm3 216 206 211       Results from last 7 days  Lab Units 03/15/17  0034 03/14/17  0038 03/13/17  0907 03/13/17  0510 03/12/17  0518   SODIUM mmol/L 144 147  --  147 142   POTASSIUM mmol/L 4.2 4.0  --  3.1* 3.8   CHLORIDE mmol/L 107 114*  --  112 110   TOTAL CO2 mmol/L 35.1* 33.0*  --  31.5 26.8   BUN mg/dL 16 12  --  13 16   CREATININE mg/dL 0.50 0.34*  --  0.39* 0.42*   CALCIUM mg/dL 8.9 8.2  --  8.0 7.9   GLUCOSE mg/dL 141* 140*  --  215* 341*   MAGNESIUM mg/dL  --   --  2.1 2.4 1.9     Lab Results   Component Value Date    INR 1.01 12/17/2016    INR 0.94 11/20/2016    INR <0.90 02/06/2016    PROTIME 11.4 12/17/2016    PROTIME 10.6 11/20/2016    PROTIME 10.0 02/06/2016       Results from last 7 days  Lab Units 03/15/17  0034 03/14/17  0038 03/13/17  0510   ALK PHOS U/L 89 115* 116*   BILIRUBIN mg/dL 0.3 0.2 0.1*   ALT (SGPT) U/L 48* 67* 71*   AST (SGOT) U/L 30 42* 66*       Results from last 7 days  Lab Units 03/15/17  0440   PH, ARTERIAL pH units 7.488*   PO2 ART mm Hg 131.8*   PCO2, ARTERIAL mm Hg 40.7   HCO3 ART mmol/L 30.2*     Imaging Results (last 24 hours)     Procedure Component Value Units Date/Time    XR Chest 1 View [[id number]] Collected:  03/14/17 1548     Updated:  03/14/17 1622    Narrative:       XR CHEST 1 VW-     CLINICAL INDICATION: SOB; J18.9-Pneumonia, unspecified organism.       COMPARISON: 03/13/2017.      TECHNIQUE: Single frontal view of the chest.     FINDINGS:     Bilateral consolidation is noted. It has improved in the left lung, more  condensed in the right upper lobe.  The cardiac silhouette is normal. The pulmonary vasculature is  unremarkable.  There is no evidence of an acute osseous abnormality.    There are no suspicious-appearing parenchymal soft tissue nodules.         Impression:       Bilateral consolidation again noted.         This report was finalized on 3/14/2017 4:20 PM by Dr. Daniel Yates MD.                Medication Review:   Scheduled Medications:    acetaminophen 650 mg Oral Once   acyclovir 200 mg Oral 5x Daily   ALPRAZolam 1 mg Oral Daily   aspirin 81 mg Oral Daily   bacitracin  Topical Q12H   ceftriaxone 2 g Intravenous Q24H   clopidogrel 75 mg Oral Daily   guaiFENesin 600 mg Oral BID   heparin (porcine) 5,000 Units Subcutaneous Q12H   insulin aspart 0-14 Units Subcutaneous 4x Daily AC & at Bedtime   insulin detemir 22 Units Subcutaneous Nightly   ipratropium 0.5 mg Nebulization Q6H - RT   metoprolol tartrate 25 mg Oral Q12H   metroNIDAZOLE 500 mg Intravenous Q8H   pantoprazole 40 mg Intravenous Q AM   potassium chloride 40 mEq Oral Once   [START ON 3/16/2017] predniSONE 10 mg Oral Daily With Breakfast   vancomycin 125 mg Oral Q6H     Continuous infusions:    fentaNYL (SUBLIMAZE) PCA 1500 mcg/30 mL syringe  Last Rate: Stopped (03/15/17 0822)   midazolam 0.5 mg/mL 100 mL NS 1-10 mg/hr Last Rate: 1.5 mg/hr (03/15/17 0822)   Pharmacy to dose vancomycin     propofol 5-50 mcg/kg/min Last Rate: Stopped (03/15/17 0822)       Assessment/Plan     Neuro: Continue propofol, versed and fentanyl IV per protocol for sedation.  Will do sedation holiday today and breathing trial accordingly.  Will gradually turn off the versed drip and give her ativan to help reduce stress and anxiety during sedation holiday.     Respiratory: emergently intubated due to severe respiratory distress.      Vent settings:  RR16 FIO2 45 % PEEP8  Results for YESSICA TERRY (MRN [id number]) as of 3/15/2017 10:55   Ref. Range 3/15/2017 04:40   pH, Arterial Latest Ref Range: 7.350 - 7.450 pH units 7.488 (H)   pCO2, Arterial Latest Ref Range: 35.0 - 45.0 mm Hg 40.7   pO2, Arterial Latest Ref Range: 80.0 - 100.0 mm Hg  131.8 (H)   HCO3, Arterial Latest Ref Range: 22.0 - 26.0 mmol/L 30.2 (C)   Base Excess, Arterial Latest Units: mmol/L 6.3   O2 Saturation, Arterial Latest Ref Range: 90.0 - 100.0 % 98.9   Hemoglobin, Blood Gas Latest Ref Range: 12 - 16 g/dL 8.3 (L)   Hematocrit, Blood Gas Latest Ref Range: 37.0 - 47.0 % 24.0 (L)   Barometric Pressure for Blood Gas Latest Units: mmHg 724   A-a Gradiant Latest Ref Range: 0.0 - 300.0 mmHg 126.6   Carboxyhemoglobin Latest Ref Range: 0 - 5 % 1.2   Methemoglobin Latest Ref Range: 0 - 3 % 0.3   Oxyhemoglobin Latest Ref Range: 85 - 100 % 97.4   Site Unknown Arterial: right r...   Micheal's Test Unknown N/A   Modality Unknown Ventilator   FIO2 Latest Units: % 45   Ventilator Mode Unknown AC/VC   Set Tidal Volume Unknown 450   Set Mech Resp Rate Unknown 16   PEEP Unknown 8     Decreased PEEP to 5 and TV to 400.    Decreased steroids further to prednisone 10 mg daily.     Chest x-ray looks better today.      Will continue on the antibiotics, scheduled inhalants and when necessary neb treatments.      Will continue IV steroids, scheduled inhalants and PRN neb treatments.          ID: C. difficile positive.  Continue same antibiotics.  Infectious disease is on case, management per them.      Cardiac:   Hemodynamically Stable.  Continuous ECG and pulse ox monitoring.        Renal: She responded well yesterday to Lasix.  We'll give another Lasix 40 mg IV.  Creatinine is stable.       Endocrine:monitor glucose targeting 140-180.      Electrolytes:  Monitor levels, manage and replete per protocols.  Magnesium and phosphorus are good.    GI: She is tolerating tube feeds well.  Tube feeds are currently on hold for spontaneous breathing trial..      Hematology:   Monitor blood counts, transfuse at or below 7 g/dL.  Unless patient is actively bleeding then began transfusing at 8 g/dL.     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    Discussed the case in detail- explained all the labs results and images. Discussed plan for the upcoming days and answered questions to their satisfaction.    Had a family meeting for 30 minutes with daughter and case was discussed in great detail. Patient remains full code. Daughter not decided about trach and peg     ROSA Seymour  03/15/17  10:52 AM        Attestation: Scribed for Dr. Bradley, Reyna Arellano, ROSA      I, Gerard Bradley M.D. attest that the above note accurately reflects the work and decisions made  by me.  Patient was seen and evaluated by Dr. Bradley, including history of present illness, physical exam, assessment, and treatment plan.  The above note was reviewed and edited by Dr. Bradley. The patient was  discussed with Miss Arellano  and I agree with her history physical assessment and plan. Critical care time 32 minutes

## 2017-03-15 NOTE — NURSING NOTE
Sent one pair of heart shaped earrings and one yellow colored band ring with three clear stones.   Sent two medication prescription bottles to pharmacy.  One prescription bottle of trazdone with two tablets inside.  One prescription bottle for baclofen that was empty also sent to pharmacy.

## 2017-03-15 NOTE — NURSING NOTE
Pt temp 103.2. Spoke with Dr. Weiner about fever and was instructed to give tylenol and only order blood cultures if there had not been a set ordered in the last 48 hours.

## 2017-03-15 NOTE — PLAN OF CARE
Problem: Patient Care Overview (Adult)  Goal: Plan of Care Review  Outcome: Ongoing (interventions implemented as appropriate)    03/15/17 1334   Coping/Psychosocial Response Interventions   Plan Of Care Reviewed With patient;daughter   Patient Care Overview   Progress no change         Problem: Pneumonia (Adult)  Goal: Signs and Symptoms of Listed Potential Problems Will be Absent or Manageable (Pneumonia)  Outcome: Ongoing (interventions implemented as appropriate)    03/15/17 1334   Pneumonia   Problems Assessed (Pneumonia) all   Problems Present (Pneumonia) respiratory compromise         Problem: Diabetes, Type 2 (Adult)  Goal: Signs and Symptoms of Listed Potential Problems Will be Absent or Manageable (Diabetes, Type 2)  Outcome: Ongoing (interventions implemented as appropriate)    03/15/17 1334   Diabetes, Type 2   Problems Assessed (Type 2 Diabetes) all   Problems Present (Type 2 Diabetes) none         Problem: Mechanical Ventilation, Invasive (Adult)  Goal: Signs and Symptoms of Listed Potential Problems Will be Absent or Manageable (Mechanical Ventilation, Invasive)  Outcome: Ongoing (interventions implemented as appropriate)    03/15/17 1334   Mechanical Ventilation, Invasive   Problems Assessed (Mechanical Ventilation, Invasive) all   Problems Present (Mechanical Ventilation, Invasive) immobility       Goal: Signs and Symptoms of Listed Potential Problems Will be Absent or Manageable (Mechanical Ventilation, Invasive)  Outcome: Ongoing (interventions implemented as appropriate)    03/15/17 1334   Mechanical Ventilation, Invasive   Problems Assessed (Mechanical Ventilation, Invasive) all   Problems Present (Mechanical Ventilation, Invasive) immobility         Problem: Pressure Ulcer Risk (Brendan Scale) (Adult,Obstetrics,Pediatric)  Goal: Identify Related Risk Factors and Signs and Symptoms  Outcome: Ongoing (interventions implemented as appropriate)    03/15/17 1334   Pressure Ulcer Risk (Brendan Scale)    Related Risk Factors (Pressure Ulcer Risk (Brendan Scale)) cognitive impairment;critical care admission;hospitalization prolonged;hypothermia/hyperthermia;mobility impaired       Goal: Skin Integrity  Outcome: Ongoing (interventions implemented as appropriate)    03/15/17 3434   Pressure Ulcer Risk (Brendan Scale) (Adult,Obstetrics,Pediatric)   Skin Integrity making progress toward outcome

## 2017-03-16 ENCOUNTER — APPOINTMENT (OUTPATIENT)
Dept: GENERAL RADIOLOGY | Facility: HOSPITAL | Age: 52
End: 2017-03-16

## 2017-03-16 LAB
A-A DO2: 135.8 MMHG (ref 0–300)
A-A DO2: 210.1 MMHG (ref 0–300)
ALBUMIN SERPL-MCNC: 3.3 G/DL (ref 3.5–5)
ALBUMIN/GLOB SERPL: 0.8 G/DL (ref 1.5–2.5)
ALP SERPL-CCNC: 89 U/L (ref 35–104)
ALT SERPL W P-5'-P-CCNC: 47 U/L (ref 10–36)
ANION GAP SERPL CALCULATED.3IONS-SCNC: 7.1 MMOL/L (ref 3.6–11.2)
ANISOCYTOSIS BLD QL: NORMAL
ARTERIAL PATENCY WRIST A: ABNORMAL
AST SERPL-CCNC: 45 U/L (ref 10–30)
ATMOSPHERIC PRESS: 732 MMHG
ATMOSPHERIC PRESS: 735 MMHG
BACTERIA SPEC AEROBE CULT: ABNORMAL
BACTERIA SPEC AEROBE CULT: NORMAL
BACTERIA SPEC AEROBE CULT: NORMAL
BASE EXCESS BLDA CALC-SCNC: 9.2 MMOL/L
BASE EXCESS BLDV CALC-SCNC: 7.2 MMOL/L
BASOPHILS # BLD AUTO: 0.03 10*3/MM3 (ref 0–0.3)
BASOPHILS NFR BLD AUTO: 0.4 % (ref 0–2)
BDY SITE: ABNORMAL
BDY SITE: ABNORMAL
BILIRUB SERPL-MCNC: 0.5 MG/DL (ref 0.2–1.8)
BILIRUB UR QL STRIP: NEGATIVE
BODY TEMPERATURE: 98.6 C
BODY TEMPERATURE: 98.6 C
BUN BLD-MCNC: 19 MG/DL (ref 7–21)
BUN/CREAT SERPL: 45.2 (ref 7–25)
CALCIUM SPEC-SCNC: 8.7 MG/DL (ref 7.7–10)
CHLORIDE SERPL-SCNC: 101 MMOL/L (ref 99–112)
CLARITY UR: CLEAR
CO2 SERPL-SCNC: 35.9 MMOL/L (ref 24.3–31.9)
COHGB MFR BLD: 0.7 % (ref 0–5)
COLOR UR: YELLOW
CREAT BLD-MCNC: 0.42 MG/DL (ref 0.43–1.29)
DEPRECATED RDW RBC AUTO: 66.2 FL (ref 37–54)
EOSINOPHIL # BLD AUTO: 0.01 10*3/MM3 (ref 0–0.7)
EOSINOPHIL NFR BLD AUTO: 0.1 % (ref 0–5)
ERYTHROCYTE [DISTWIDTH] IN BLOOD BY AUTOMATED COUNT: 24.1 % (ref 11.5–14.5)
GFR SERPL CREATININE-BSD FRML MDRD: >150 ML/MIN/1.73
GLOBULIN UR ELPH-MCNC: 3.9 GM/DL
GLUCOSE BLD-MCNC: 98 MG/DL (ref 70–110)
GLUCOSE BLDC GLUCOMTR-MCNC: 101 MG/DL (ref 70–130)
GLUCOSE BLDC GLUCOMTR-MCNC: 116 MG/DL (ref 70–130)
GLUCOSE BLDC GLUCOMTR-MCNC: 135 MG/DL (ref 70–130)
GLUCOSE BLDC GLUCOMTR-MCNC: 269 MG/DL (ref 70–130)
GLUCOSE BLDC GLUCOMTR-MCNC: NORMAL MG/DL (ref 70–130)
GLUCOSE UR STRIP-MCNC: NEGATIVE MG/DL
HCO3 BLDA-SCNC: 33.2 MMOL/L (ref 22–26)
HCO3 BLDV-SCNC: 32.6 MMOL/L
HCT VFR BLD AUTO: 32.9 % (ref 37–47)
HCT VFR BLD CALC: 29 % (ref 37–47)
HGB BLD-MCNC: 9.2 G/DL (ref 12–16)
HGB BLDA-MCNC: 10.4 G/DL (ref 12–16)
HGB BLDA-MCNC: 9.9 G/DL (ref 12–16)
HGB UR QL STRIP.AUTO: NEGATIVE
HOROWITZ INDEX BLD+IHG-RTO: 45 %
HOROWITZ INDEX BLD+IHG-RTO: 45 %
HYPOCHROMIA BLD QL: NORMAL
IMM GRANULOCYTES # BLD: 0.24 10*3/MM3 (ref 0–0.03)
IMM GRANULOCYTES NFR BLD: 3.1 % (ref 0–0.5)
KETONES UR QL STRIP: NEGATIVE
LEUKOCYTE ESTERASE UR QL STRIP.AUTO: NEGATIVE
LYMPHOCYTES # BLD AUTO: 1.91 10*3/MM3 (ref 1–3)
LYMPHOCYTES NFR BLD AUTO: 24.6 % (ref 21–51)
MAGNESIUM SERPL-MCNC: 1.9 MG/DL (ref 1.7–2.6)
MCH RBC QN AUTO: 22.5 PG (ref 27–33)
MCHC RBC AUTO-ENTMCNC: 28 G/DL (ref 33–37)
MCV RBC AUTO: 80.6 FL (ref 80–94)
METHGB BLD QL: 0.1 % (ref 0–3)
MODALITY: ABNORMAL
MODALITY: ABNORMAL
MONOCYTES # BLD AUTO: 0.51 10*3/MM3 (ref 0.1–0.9)
MONOCYTES NFR BLD AUTO: 6.6 % (ref 0–10)
NEUTROPHILS # BLD AUTO: 5.06 10*3/MM3 (ref 1.4–6.5)
NEUTROPHILS NFR BLD AUTO: 65.2 % (ref 30–70)
NITRITE UR QL STRIP: NEGATIVE
OSMOLALITY SERPL CALC.SUM OF ELEC: 289.1 MOSM/KG (ref 273–305)
OXYHGB MFR BLDV: 97.8 % (ref 85–100)
PCO2 BLDA: 43.1 MM HG (ref 35–45)
PCO2 BLDV: 50.1 MM HG
PEEP RESPIRATORY: 5 CM[H2O]
PEEP RESPIRATORY: 5 CM[H2O]
PH BLDA: 7.5 PH UNITS (ref 7.35–7.45)
PH BLDV: 7.43 [PH]
PH UR STRIP.AUTO: 7.5 [PH] (ref 5–8)
PLAT MORPH BLD: NORMAL
PLATELET # BLD AUTO: 255 10*3/MM3 (ref 130–400)
PMV BLD AUTO: 11.9 FL (ref 6–10)
PO2 BLDA: 123.4 MM HG (ref 80–100)
PO2 BLDV: 42.5 MM HG
POTASSIUM BLD-SCNC: 3.1 MMOL/L (ref 3.5–5.3)
POTASSIUM BLD-SCNC: 4.4 MMOL/L (ref 3.5–5.3)
PROT SERPL-MCNC: 7.2 G/DL (ref 6–8)
PROT UR QL STRIP: NEGATIVE
PSV: 8 CMH2O
RBC # BLD AUTO: 4.08 10*6/MM3 (ref 4.2–5.4)
SAO2 % BLDCOA: 98.6 % (ref 90–100)
SAO2 % BLDCOV: 72.2 %
SET MECH RESP RATE: 16
SODIUM BLD-SCNC: 144 MMOL/L (ref 135–153)
SP GR UR STRIP: 1.01 (ref 1–1.03)
TOTAL RATE: 16 BREATHS/MINUTE
UROBILINOGEN UR QL STRIP: NORMAL
VENTILATOR MODE: ABNORMAL
VENTILATOR MODE: ABNORMAL
VT ON VENT VENT: 400 ML
WBC NRBC COR # BLD: 7.76 10*3/MM3 (ref 4.5–12.5)

## 2017-03-16 PROCEDURE — 25010000002 CEFTRIAXONE: Performed by: INTERNAL MEDICINE

## 2017-03-16 PROCEDURE — 80053 COMPREHEN METABOLIC PANEL: CPT | Performed by: INTERNAL MEDICINE

## 2017-03-16 PROCEDURE — 85007 BL SMEAR W/DIFF WBC COUNT: CPT | Performed by: INTERNAL MEDICINE

## 2017-03-16 PROCEDURE — 94799 UNLISTED PULMONARY SVC/PX: CPT

## 2017-03-16 PROCEDURE — 71010 XR CHEST 1 VW: CPT | Performed by: RADIOLOGY

## 2017-03-16 PROCEDURE — 71010 HC CHEST PA OR AP: CPT

## 2017-03-16 PROCEDURE — 99291 CRITICAL CARE FIRST HOUR: CPT | Performed by: INTERNAL MEDICINE

## 2017-03-16 PROCEDURE — 63710000001 PREDNISONE PER 5 MG: Performed by: INTERNAL MEDICINE

## 2017-03-16 PROCEDURE — 82805 BLOOD GASES W/O2 SATURATION: CPT | Performed by: NURSE PRACTITIONER

## 2017-03-16 PROCEDURE — 83050 HGB METHEMOGLOBIN QUAN: CPT | Performed by: NURSE PRACTITIONER

## 2017-03-16 PROCEDURE — 94003 VENT MGMT INPAT SUBQ DAY: CPT

## 2017-03-16 PROCEDURE — 94660 CPAP INITIATION&MGMT: CPT

## 2017-03-16 PROCEDURE — 84132 ASSAY OF SERUM POTASSIUM: CPT | Performed by: INTERNAL MEDICINE

## 2017-03-16 PROCEDURE — 99232 SBSQ HOSP IP/OBS MODERATE 35: CPT | Performed by: INTERNAL MEDICINE

## 2017-03-16 PROCEDURE — 25010000002 METHYLPREDNISOLONE PER 40 MG: Performed by: NURSE PRACTITIONER

## 2017-03-16 PROCEDURE — 25010000002 LORAZEPAM PER 2 MG

## 2017-03-16 PROCEDURE — 25010000003 POTASSIUM CHLORIDE 10 MEQ/100ML SOLUTION: Performed by: INTERNAL MEDICINE

## 2017-03-16 PROCEDURE — 63710000001 INSULIN ASPART PER 5 UNITS: Performed by: INTERNAL MEDICINE

## 2017-03-16 PROCEDURE — 82805 BLOOD GASES W/O2 SATURATION: CPT | Performed by: INTERNAL MEDICINE

## 2017-03-16 PROCEDURE — 82375 ASSAY CARBOXYHB QUANT: CPT | Performed by: NURSE PRACTITIONER

## 2017-03-16 PROCEDURE — 83735 ASSAY OF MAGNESIUM: CPT | Performed by: INTERNAL MEDICINE

## 2017-03-16 PROCEDURE — 36600 WITHDRAWAL OF ARTERIAL BLOOD: CPT | Performed by: NURSE PRACTITIONER

## 2017-03-16 PROCEDURE — 85025 COMPLETE CBC W/AUTO DIFF WBC: CPT | Performed by: INTERNAL MEDICINE

## 2017-03-16 PROCEDURE — 63710000001 INSULIN DETEMIR PER 5 UNITS: Performed by: INTERNAL MEDICINE

## 2017-03-16 PROCEDURE — 25010000002 HEPARIN (PORCINE) PER 1000 UNITS: Performed by: INTERNAL MEDICINE

## 2017-03-16 PROCEDURE — 25010000002 FUROSEMIDE PER 20 MG: Performed by: NURSE PRACTITIONER

## 2017-03-16 PROCEDURE — 81003 URINALYSIS AUTO W/O SCOPE: CPT | Performed by: INTERNAL MEDICINE

## 2017-03-16 PROCEDURE — 82962 GLUCOSE BLOOD TEST: CPT

## 2017-03-16 RX ORDER — LORAZEPAM 2 MG/ML
INJECTION INTRAMUSCULAR
Status: COMPLETED
Start: 2017-03-16 | End: 2017-03-16

## 2017-03-16 RX ORDER — METOPROLOL TARTRATE 5 MG/5ML
5 INJECTION INTRAVENOUS EVERY 6 HOURS PRN
Status: DISCONTINUED | OUTPATIENT
Start: 2017-03-16 | End: 2017-03-20 | Stop reason: HOSPADM

## 2017-03-16 RX ORDER — LORAZEPAM 1 MG/1
1 TABLET ORAL EVERY 6 HOURS PRN
Status: DISCONTINUED | OUTPATIENT
Start: 2017-03-16 | End: 2017-03-20

## 2017-03-16 RX ORDER — FUROSEMIDE 10 MG/ML
40 INJECTION INTRAMUSCULAR; INTRAVENOUS ONCE
Status: COMPLETED | OUTPATIENT
Start: 2017-03-16 | End: 2017-03-16

## 2017-03-16 RX ORDER — POTASSIUM CHLORIDE 7.45 MG/ML
10 INJECTION INTRAVENOUS
Status: COMPLETED | OUTPATIENT
Start: 2017-03-16 | End: 2017-03-16

## 2017-03-16 RX ORDER — POTASSIUM CHLORIDE 1.5 G/1.77G
40 POWDER, FOR SOLUTION ORAL ONCE
Status: COMPLETED | OUTPATIENT
Start: 2017-03-16 | End: 2017-03-16

## 2017-03-16 RX ORDER — LORAZEPAM 2 MG/ML
2 INJECTION INTRAMUSCULAR ONCE
Status: COMPLETED | OUTPATIENT
Start: 2017-03-16 | End: 2017-03-16

## 2017-03-16 RX ORDER — METHYLPREDNISOLONE SODIUM SUCCINATE 40 MG/ML
40 INJECTION, POWDER, LYOPHILIZED, FOR SOLUTION INTRAMUSCULAR; INTRAVENOUS ONCE
Status: COMPLETED | OUTPATIENT
Start: 2017-03-16 | End: 2017-03-16

## 2017-03-16 RX ADMIN — METRONIDAZOLE 500 MG: 500 INJECTION, SOLUTION INTRAVENOUS at 20:43

## 2017-03-16 RX ADMIN — METOPROLOL TARTRATE 25 MG: 25 TABLET, FILM COATED ORAL at 20:42

## 2017-03-16 RX ADMIN — POTASSIUM CHLORIDE 10 MEQ: 7.46 INJECTION, SOLUTION INTRAVENOUS at 10:00

## 2017-03-16 RX ADMIN — GUAIFENESIN 300 MG: 100 SOLUTION ORAL at 05:51

## 2017-03-16 RX ADMIN — BACITRACIN ZINC: 500 OINTMENT TOPICAL at 08:17

## 2017-03-16 RX ADMIN — BACITRACIN ZINC 1 APPLICATION: 500 OINTMENT TOPICAL at 21:31

## 2017-03-16 RX ADMIN — HEPARIN SODIUM 5000 UNITS: 5000 INJECTION, SOLUTION INTRAVENOUS; SUBCUTANEOUS at 20:42

## 2017-03-16 RX ADMIN — POTASSIUM CHLORIDE 40 MEQ: 1.5 POWDER, FOR SOLUTION ORAL at 16:38

## 2017-03-16 RX ADMIN — POTASSIUM CHLORIDE 10 MEQ: 7.46 INJECTION, SOLUTION INTRAVENOUS at 08:19

## 2017-03-16 RX ADMIN — POTASSIUM CHLORIDE 10 MEQ: 7.46 INJECTION, SOLUTION INTRAVENOUS at 09:25

## 2017-03-16 RX ADMIN — ACYCLOVIR 200 MG: 200 SUSPENSION ORAL at 21:30

## 2017-03-16 RX ADMIN — LORAZEPAM 2 MG: 2 INJECTION, SOLUTION INTRAMUSCULAR; INTRAVENOUS at 06:01

## 2017-03-16 RX ADMIN — VANCOMYCIN 125 MG: KIT at 05:51

## 2017-03-16 RX ADMIN — ACYCLOVIR 200 MG: 200 SUSPENSION ORAL at 17:15

## 2017-03-16 RX ADMIN — INSULIN DETEMIR 22 UNITS: 100 INJECTION, SOLUTION SUBCUTANEOUS at 20:42

## 2017-03-16 RX ADMIN — ASPIRIN 81 MG: 81 TABLET, CHEWABLE ORAL at 09:22

## 2017-03-16 RX ADMIN — LORAZEPAM 1 MG: 1 TABLET ORAL at 20:42

## 2017-03-16 RX ADMIN — POTASSIUM CHLORIDE 10 MEQ: 7.46 INJECTION, SOLUTION INTRAVENOUS at 11:57

## 2017-03-16 RX ADMIN — IPRATROPIUM BROMIDE 0.5 MG: 0.5 SOLUTION RESPIRATORY (INHALATION) at 00:10

## 2017-03-16 RX ADMIN — METRONIDAZOLE 500 MG: 500 INJECTION, SOLUTION INTRAVENOUS at 05:53

## 2017-03-16 RX ADMIN — INSULIN ASPART 8 UNITS: 100 INJECTION, SOLUTION INTRAVENOUS; SUBCUTANEOUS at 16:42

## 2017-03-16 RX ADMIN — IPRATROPIUM BROMIDE 0.5 MG: 0.5 SOLUTION RESPIRATORY (INHALATION) at 06:55

## 2017-03-16 RX ADMIN — Medication: at 03:20

## 2017-03-16 RX ADMIN — IPRATROPIUM BROMIDE 0.5 MG: 0.5 SOLUTION RESPIRATORY (INHALATION) at 13:03

## 2017-03-16 RX ADMIN — CLOPIDOGREL 75 MG: 75 TABLET, FILM COATED ORAL at 09:22

## 2017-03-16 RX ADMIN — METRONIDAZOLE 500 MG: 500 INJECTION, SOLUTION INTRAVENOUS at 13:10

## 2017-03-16 RX ADMIN — ALPRAZOLAM 1 MG: 1 TABLET ORAL at 09:22

## 2017-03-16 RX ADMIN — GUAIFENESIN 300 MG: 100 SOLUTION ORAL at 13:08

## 2017-03-16 RX ADMIN — METOPROLOL TARTRATE 25 MG: 25 TABLET, FILM COATED ORAL at 09:22

## 2017-03-16 RX ADMIN — POTASSIUM CHLORIDE 10 MEQ: 7.46 INJECTION, SOLUTION INTRAVENOUS at 06:10

## 2017-03-16 RX ADMIN — ACYCLOVIR 200 MG: 200 SUSPENSION ORAL at 13:09

## 2017-03-16 RX ADMIN — HEPARIN SODIUM 5000 UNITS: 5000 INJECTION, SOLUTION INTRAVENOUS; SUBCUTANEOUS at 09:22

## 2017-03-16 RX ADMIN — METHYLPREDNISOLONE SODIUM SUCCINATE 40 MG: 40 INJECTION, POWDER, FOR SOLUTION INTRAMUSCULAR; INTRAVENOUS at 13:09

## 2017-03-16 RX ADMIN — CEFTRIAXONE 2 G: 2 INJECTION, POWDER, FOR SOLUTION INTRAMUSCULAR; INTRAVENOUS at 09:23

## 2017-03-16 RX ADMIN — GUAIFENESIN 300 MG: 100 SOLUTION ORAL at 17:16

## 2017-03-16 RX ADMIN — ACYCLOVIR 200 MG: 200 SUSPENSION ORAL at 06:09

## 2017-03-16 RX ADMIN — LORAZEPAM 2 MG: 2 INJECTION INTRAMUSCULAR at 06:01

## 2017-03-16 RX ADMIN — PREDNISONE 10 MG: 10 TABLET ORAL at 09:22

## 2017-03-16 RX ADMIN — POTASSIUM CHLORIDE 10 MEQ: 7.46 INJECTION, SOLUTION INTRAVENOUS at 07:02

## 2017-03-16 RX ADMIN — VANCOMYCIN 125 MG: KIT at 21:30

## 2017-03-16 RX ADMIN — VANCOMYCIN 125 MG: KIT at 13:11

## 2017-03-16 RX ADMIN — PANTOPRAZOLE SODIUM 40 MG: 40 INJECTION, POWDER, FOR SOLUTION INTRAVENOUS at 05:51

## 2017-03-16 RX ADMIN — FUROSEMIDE 40 MG: 10 INJECTION, SOLUTION INTRAMUSCULAR; INTRAVENOUS at 13:09

## 2017-03-16 NOTE — PROGRESS NOTES
"  I have personally seen and examined the patient today and discussed overnight interval progress and pertinent issues with nursing staff.    ROS:    Unable to obtain due to the patients mental status, on the ventilator        History taken from: chart RN      Vital Signs    Visit Vitals   • /92   • Pulse 111   • Temp 99.3 °F (37.4 °C) (Oral)   • Resp (!) 36   • Ht 63\" (160 cm)   • Wt 123 lb (55.8 kg)   • SpO2 100%   • BMI 21.79 kg/m2       Temp:  [97.8 °F (36.6 °C)-103.3 °F (39.6 °C)] 99.3 °F (37.4 °C)      Intake/Output Summary (Last 24 hours) at 03/16/17 1130  Last data filed at 03/16/17 1000   Gross per 24 hour   Intake    600 ml   Output   3180 ml   Net  -2580 ml     Intake & Output (last 3 days)       03/13 0701 - 03/14 0700 03/14 0701 - 03/15 0700 03/15 0701 - 03/16 0700 03/16 0701 - 03/17 0700    I.V. (mL/kg) 2491.8 (44.7) 477.3 (8.6)      Other 660 800      NG/ 741      IV Piggyback 1300 200 200 500    Total Intake(mL/kg) 5402.8 (96.8) 2218.3 (39.8) 200 (3.6) 500 (9)    Urine (mL/kg/hr) 4600 (3.4) 3345 (2.5) 3680 (2.7)     Stool 0 (0) 0 (0) 0 (0)     Total Output 4600 3345 3680      Net +802.8 -1126.8 -3480 +500            Unmeasured Stool Occurrence 1 x 2 x 2 x           Physical Exam:      General Appearance:  intubated,  More awake, and respiratory distress    Head:  Normocephalic, without obvious abnormality, atraumatic   Eyes:      Lids and lashes normal, conjunctivae and sclerae normal, no icterus, no pallor, corneas clear, PERRLA   Ears:  Ears appear intact with no abnormalities noted   Throat: No oral lesions, no thrush, oral mucosa moist   Neck: No adenopathy, supple, trachea midline, no thyromegaly, no carotid bruit, no JVD   Back:  No tenderness to percussion or palpation, range of motion normal   Lungs:  normal breath sounds without rhonchi or crackles.   Heart:  Regular rhythm and normal rate, normal S1 and S2, no murmur, no gallop, no rub, no click   Chest Wall:  No abnormalities " observed   Abdomen:  Normal bowel sounds, no masses, no organomegaly, soft non-tender, non-distended, no guarding, no rebound tenderness   Rectal:  Deferred   Extremities: No edema, no cyanosis, no redness   Pulses: Pulses palpable and equal bilaterally   Skin: No bleeding, bruising or rash   Lymph nodes: No palpable adenopathy   Neurologic: More awake, but obtunded       Results:      Results from last 7 days  Lab Units 03/16/17  0059 03/15/17  0034 03/14/17  0038 03/13/17  0511 03/12/17  0518 03/11/17  0520 03/10/17  0815   WBC 10*3/mm3 7.76 5.83 6.18 5.75 5.52 5.87 5.49     Lab Results   Component Value Date    NEUTROABS 5.06 03/16/2017         Results from last 7 days  Lab Units 03/16/17  0059   CREATININE mg/dL 0.42*         Results from last 7 days  Lab Units 03/15/17  0034 03/14/17  0038 03/13/17  0510   CRP mg/dL 2.95* 3.08* 3.18*       Imaging Results (last 24 hours)     Procedure Component Value Units Date/Time    XR Chest AP [[id number]] Collected:  03/08/17 0831     Updated:  03/08/17 0834    Narrative:       EXAMINATION: XR CHEST AP-      CLINICAL INDICATION:     ARDS; J18.9-Pneumonia, unspecified organism     TECHNIQUE: Single AP view of chest.      COMPARISON: 3/7/2017      FINDINGS:   There is bibasilar atelectasis.   Grossly stable bilateral airspace disease.  Support tube and lines are in unchanged position.   Heart size is stable.  No pneumothorax.              Impression:       Grossly stable bilateral airspace disease.     This report was finalized on 3/8/2017 8:31 AM by Dr. Bret Morris MD.               Results Review:    I have personally reviewed laboratory data, culture results, radiology studies and antimicrobial therapy.    Hospital Medications (active)       Dose Frequency Start End    acetaminophen (TYLENOL) tablet 650 mg 650 mg Every 6 Hours PRN 3/2/2017     Sig - Route: Take 2 tablets by mouth Every 6 (Six) Hours As Needed for fever. - Oral    acetaminophen (TYLENOL) tablet 650 mg 650  mg Once 3/6/2017     Sig - Route: Take 2 tablets by mouth 1 (One) Time. - Oral    aspirin chewable tablet 81 mg 81 mg Daily 3/7/2017     Sig - Route: Chew 1 tablet Daily. - Oral    Notes to Pharmacy: Patient intubated and RN requested changed from enteric coated tablet.    clopidogrel (PLAVIX) tablet 75 mg 75 mg Daily 3/1/2017     Sig - Route: Take 1 tablet by mouth Daily. - Oral    dextrose (D50W) solution 25 g 25 g Every 15 Minutes PRN 3/1/2017     Sig - Route: Infuse 50 mL into a venous catheter Every 15 (Fifteen) Minutes As Needed for low blood sugar (Blood Sugar Less Than 70, Patient Has IV Access - Unresponsive, NPO or Unable To Safely Swallow). - Intravenous    Cosign for Ordering: Accepted by Savanah Gutierrez MD on 3/1/2017  7:01 PM    dextrose (GLUTOSE) oral gel 15 g 15 g Every 15 Minutes PRN 3/1/2017     Sig - Route: Take 15 g by mouth Every 15 (Fifteen) Minutes As Needed for low blood sugar (Blood Sugar Less Than 70, Patient Alert, Is Not NPO & Can Safely Swallow). - Oral    Cosign for Ordering: Accepted by Savanah Gutierrez MD on 3/1/2017  7:01 PM    FENTANYL PCA 1500 MCG/30 ML (BHCOR) PCA  Continuous 3/3/2017 3/13/2017    Sig - Route: Infuse  into a venous catheter Continuous. - Intravenous    glucagon (GLUCAGEN) injection 1 mg 1 mg Every 15 Minutes PRN 3/1/2017     Sig - Route: Inject 1 mg under the skin Every 15 (Fifteen) Minutes As Needed (Blood Glucose Less Than 70 - Patient Without IV Access - Unresponsive, NPO or Unable To Safely Swallow). - Subcutaneous    Cosign for Ordering: Accepted by Savanah Gutierrez MD on 3/1/2017  7:01 PM    guaiFENesin (MUCINEX) 12 hr tablet 600 mg 600 mg 2 Times Daily 3/2/2017     Sig - Route: Take 1 tablet by mouth 2 (Two) Times a Day. - Oral    heparin (porcine) 5000 UNIT/ML injection 5,000 Units 5,000 Units Every 12 Hours Scheduled 3/1/2017     Sig - Route: Inject 1 mL under the skin Every 12 (Twelve) Hours. - Subcutaneous    insulin aspart (novoLOG) injection 0-14  "Units 0-14 Units 4 Times Daily Before Meals & Nightly 3/1/2017     Sig - Route: Inject 0-14 Units under the skin 4 (Four) Times a Day Before Meals & at Bedtime. - Subcutaneous    insulin detemir (LEVEMIR) injection 20 Units 20 Units Nightly 3/4/2017     Sig - Route: Inject 20 Units under the skin Every Night. - Subcutaneous    ipratropium (ATROVENT) nebulizer solution 0.5 mg 0.5 mg Every 6 Hours PRN 3/2/2017     Sig - Route: Take 2.5 mL by nebulization Every 6 (Six) Hours As Needed for shortness of air. - Nebulization    Cosign for Ordering: Accepted by Sherrill Weiner DO on 3/2/2017  1:40 PM    ipratropium (ATROVENT) nebulizer solution 0.5 mg 0.5 mg Every 6 Hours - RT 3/3/2017     Sig - Route: Take 2.5 mL by nebulization Every 6 (Six) Hours. - Nebulization    magnesium sulfate 6 g in dextrose (D5W) 5 % 250 mL infusion 6 g As Needed 3/6/2017     Sig - Route: Infuse 6 g into a venous catheter As Needed (Mg 1.1-1.5 mg/dL). - Intravenous    Linked Group 1:  \"Or\" Linked Group Details        magnesium sulfate 8 g in dextrose (D5W) 5 % 250 mL infusion 8 g As Needed 3/6/2017     Sig - Route: Infuse 8 g into a venous catheter As Needed (Mg less than or equal to 1 mg/dL). - Intravenous    Linked Group 1:  \"Or\" Linked Group Details        magnesium sulfate in D5W 1g/100mL (PREMIX) IVPB 1 g 1 g As Needed 3/6/2017     Sig - Route: Infuse 100 mL into a venous catheter As Needed (Mg less than or equal 1 mg/dL). - Intravenous    Linked Group 1:  \"Or\" Linked Group Details        magnesium sulfate in D5W 1g/100mL (PREMIX) IVPB 1 g 1 g As Needed 3/6/2017     Sig - Route: Infuse 100 mL into a venous catheter As Needed (magnesium sulfate 1 g in D5W 100 mL IVPB - Mg 1.6 - 1.9 mg/dL). - Intravenous    Linked Group 1:  \"Or\" Linked Group Details        meropenem (MERREM) 1 g/100 mL 0.9% NS VTB (mbp) 1 g Every 8 Hours 3/7/2017     Sig - Route: Infuse 100 mL into a venous catheter Every 8 (Eight) Hours. - Intravenous    " "methylPREDNISolone sodium succinate (SOLU-Medrol) injection 20 mg 20 mg Every 12 Hours 3/5/2017     Sig - Route: Infuse 0.5 mL into a venous catheter Every 12 (Twelve) Hours. - Intravenous    metoprolol tartrate (LOPRESSOR) tablet 25 mg 25 mg Every 12 Hours Scheduled 3/2/2017     Sig - Route: Take 1 tablet by mouth Every 12 (Twelve) Hours. - Oral    midazolam (VERSED) injection 2 mg 2 mg Every 4 Hours PRN 3/5/2017     Sig - Route: Infuse 2 mL into a venous catheter Every 4 (Four) Hours As Needed for sedation. - Intravenous    midazolam 0.5 mg/mL 100 mL NS (VERSED) infusion 1-10 mg/hr Titrated 3/8/2017 3/18/2017    Sig - Route: Infuse 1-10 mg/hr into a venous catheter Dose Adjusted By Provider As Needed. - Intravenous    norepinephrine (LEVOPHED) 32 mcg/mL (8 mg/250 mL) infusion 0.02-0.3 mcg/kg/min × 57.3 kg Titrated 3/3/2017     Sig - Route: Infuse 1.146-17.19 mcg/min into a venous catheter Dose Adjusted By Provider As Needed. - Intravenous    pantoprazole (PROTONIX) injection 40 mg 40 mg Every Early Morning 3/2/2017     Sig - Route: Infuse 10 mL into a venous catheter Every Morning. - Intravenous    Cosign for Ordering: Accepted by Sherrill Weiner DO on 3/1/2017  3:38 PM    Pharmacy to dose vancomycin  Continuous PRN 3/2/2017     Sig - Route: Continuous As Needed for consult. - Does not apply    polyethylene glycol (MIRALAX) powder 17 g 17 g Daily 3/8/2017     Sig - Route: Take 17 g by mouth Daily. - Oral    potassium chloride (K-DUR,KLOR-CON) CR tablet 40 mEq 40 mEq Once 3/2/2017     Sig - Route: Take 2 tablets by mouth 1 (One) Time. - Oral    potassium chloride (KLOR-CON) packet 40 mEq 40 mEq As Needed 3/6/2017     Sig - Route: Take 40 mEq by mouth As Needed (potassium replacement, see admin instructions). - Oral    Linked Group 2:  \"Or\" Linked Group Details        potassium chloride (MICRO-K) CR capsule 40 mEq 40 mEq As Needed 3/6/2017     Sig - Route: Take 4 capsules by mouth As Needed (potassium " "replacement.  see admin instructions). - Oral    Linked Group 2:  \"Or\" Linked Group Details        potassium chloride 10 mEq in 100 mL IVPB 10 mEq Every 1 Hour PRN 3/6/2017     Sig - Route: Infuse 100 mL into a venous catheter Every 1 (One) Hour As Needed (potassium protocol PERIPHERAL - see admin instructions). - Intravenous    Linked Group 2:  \"Or\" Linked Group Details        promethazine (PHENERGAN) tablet 12.5 mg 12.5 mg Every 6 Hours PRN 3/1/2017     Sig - Route: Take 1 tablet by mouth Every 6 (Six) Hours As Needed for Nausea or Vomiting. - Oral    Cosign for Ordering: Accepted by Sherrill Weiner DO on 3/1/2017  3:38 PM    propofol (DIPRIVAN) infusion 10 mg/mL 100 mL 5-50 mcg/kg/min × 57.3 kg Titrated 3/3/2017     Sig - Route: Infuse 286.5-2,865 mcg/min into a venous catheter Dose Adjusted By Provider As Needed. - Intravenous    sodium chloride 0.9 % flush 1-10 mL 1-10 mL As Needed 3/1/2017     Sig - Route: Infuse 1-10 mL into a venous catheter As Needed for line care. - Intravenous    sodium chloride 0.9 % flush 10 mL 10 mL As Needed 2/28/2017     Sig - Route: Infuse 10 mL into a venous catheter As Needed for line care. - Intravenous    Linked Group 3:  \"And\" Linked Group Details        sucralfate (CARAFATE) tablet 1 g 1 g 4 Times Daily Before Meals & Nightly 3/1/2017     Sig - Route: Take 1 tablet by mouth 4 (Four) Times a Day Before Meals & at Bedtime. - Oral    Cosign for Ordering: Accepted by Sherrill Weiner DO on 3/1/2017  3:38 PM    vancomycin (VANCOCIN) 750 mg in sodium chloride 0.9 % 250 mL IVPB 750 mg Every 12 Hours 3/4/2017     Sig - Route: Infuse 750 mg into a venous catheter Every 12 (Twelve) Hours. - Intravenous    dexmedetomidine HCl (PRECEDEX) 4 mcg/mL in sodium chloride 0.9 % 100 mL infusion (Discontinued) 0.2-1.5 mcg/kg/hr × 55.8 kg Continuous 3/6/2017 3/7/2017    Sig - Route: Infuse 11.16-83.7 mcg/hr into a venous catheter Continuous. - Intravenous    Reason for Discontinue: " Formulary change    dexmedetomidine HCl (PRECEDEX) 4 mcg/mL in sodium chloride 0.9 % 100 mL infusion (Discontinued) 0.2-1.5 mcg/kg/hr × 55.8 kg Titrated 3/7/2017 3/8/2017    Sig - Route: Infuse 11.16-83.7 mcg/hr into a venous catheter Dose Adjusted By Provider As Needed. - Intravenous    metroNIDAZOLE (FLAGYL) IVPB 500 mg (Discontinued) 500 mg Every 8 Hours 3/3/2017 3/8/2017    Sig - Route: Infuse 100 mL into a venous catheter Every 8 (Eight) Hours. - Intravenous            Cultures:    BLOOD CULTURE   Date Value Ref Range Status   03/06/2017 No growth at 24 hours  Preliminary   03/06/2017 No growth at 24 hours  Preliminary   03/04/2017 No growth at 4 days  Preliminary   03/04/2017 No growth at 4 days  Preliminary   03/03/2017 No growth at 4 days  Preliminary   03/03/2017 No growth at 4 days  Preliminary       PROBLEM LIST:    Patient Active Problem List   Diagnosis   • Chest pain   • Chest pain of uncertain etiology   • Intractable nausea and vomiting   • Intractable cyclical vomiting with nausea   • Chronic obstructive pulmonary emphysema   • Hepatitis-C   • Diabetes mellitus type 2 in nonobese   • Arthritis or polyarthritis, rheumatoid   • Anxiety   • Essential hypertension   • Coronary artery disease   • Buerger's disease   • Cirrhosis   • Diabetes   • Chronic nausea   • Pancreatitis   • Bilateral pneumonia   • ARDS (adult respiratory distress syndrome)   • Hypoxia       Assessment/Plan     ASSESSMENT:    1. Severe sepsis with acute respiratory failure  2. Bilateral pneumonia     PLAN:    The patient was more awake this morning with plans to extubate. She has an improving ABG and overall clinical status is better but we continue to be concerned about her persistent fever and for now a urinalysis was ordered to be repeated as well as a CRP level.    Continues to be on Rocephin, Flagyl, and oral vancomycin.      Continues to be in CCU in very critical condition with PEEP of 8 and FiO2 of 45%.  She has severe  encephalopathy and possible underlying ARDS.      Urine culture showing greater than 100,000 colonies of yeast and is most likely colonizer.  The patient was initiated on micafungin on 3/12/2017 and might need to continue course with Diflucan 400 mg IV daily.    White count remains normal with a stable CRP level hence fever could be related to her underlying deep encephalopathy.  This is a very complicated case with very poor prognosis.    This is very complicated at this time as the patient developed C. difficile colitis while requiring non-C. difficile antibiotics and this carries a very guarded prognosis regardless of type of intervention.  Due to her concurrent C. difficile colitis infection IV vancomycin and meropenem therapy were discontinued and coverage was switched to Rocephin 2 g IV every 24 hours, Flagyl 500 mg IV every 8 hours, and oral vancomycin 125 mg by mouth every 6 hours.  Hope to discontinue Rocephin very soon as this could definitely worsen C. difficile colitis.        Patients findings and recommendations were discussed with nursing staff    Code Status: Full Code       Alycia Finn PA-C  03/16/17  11:30 AM

## 2017-03-16 NOTE — PROGRESS NOTES
[facility] CCU Note    Patient Identification:  Name:  Pepito Yan  Age:  51 y.o.  Sex:  female  :  1965  MRN:  [id number]  Visit number:  [id number]  Primary Care Provider:  ROSA Meza    16    Ventilator Day:  Ventilator Settings:  Ventilator/Non-Invasive Ventilation Settings     Start     Ordered    17 1638  Ventilator - AC/VC; (16); (55%); 12; 450  Continuous     Question Answer Comment   Vent Mode AC/VC    Breath rate  16   FiO2  55%   PEEP 12    Tidal Volume 450        17 1638    17 0713  Ventilator - AC/VC; 12  Continuous,   Status:  Canceled     Question Answer Comment   Vent Mode AC/VC    PEEP 12        17 0712    17 0651  Ventilator - AC/VC; (30); 15  Continuous,   Status:  Canceled     Question Answer Comment   Vent Mode AC/VC    Breath rate  30   PEEP 15        17 0651    17 1753  . BIPAP; IPAP: 10; EPAP: 16; Titrate for spO2: equal to or greater than, 90%  At Bedtime & As Needed-RT,   Status:  Canceled     Question Answer Comment   . BIPAP    IPAP 10    EPAP 16    Titrate for spO2 equal to or greater than    Titrate for spO2 90%        17 1755    17 1752  . BIPAP  At Bedtime & As Needed-RT,   Status:  Canceled     Question:  .  Answer:  BIPAP    17 1751    17 1127  NIPPV CPAP OR BIPAP  Until Discontinued,   Status:  Canceled     Comments:  16/6 35 % oxygen rate 20    17 1127          Drips: Fentanyl, Versed, Propofol on hold for now for spontaneous breathing trial   Antibiotics: Micafungin, Rocephin, Flagyl, Oral Vancomcyin  Lines: R IJ  King Catheter: Yes    Procedures:  3/3/17: Emergent Intubation  3/3/17: Right femoral central line placement status post removal  3/3/17: Right IJ central line placement  3/9/17: Transfusion 1 unit PRBC    HPI: The patient is a 52 yo female admitted with bilateral pneumonia    Subjective     This morning the patient was taken off of sedation and was  given 2 mg ativan and 1 mg xanax for spontaneous breathing trial. Patient was placed on pressure support ventilation settings and was pulling successful tidal volumes. Patient alert, localizes to painful stimuli, but does not successfully follow commands. No acute events over night. Patient has remained afebrile.     History taken from: chart,   Patient unable to give history due to: N/A  Present during visit: Dr. Bradley     Objective     Vital Signs  Temp:  [97.8 °F (36.6 °C)-103.3 °F (39.6 °C)] 99.3 °F (37.4 °C)  Heart Rate:  [] 122  Resp:  [16-37] 16  BP: ()/(56-97) 130/96  FiO2 (%):  [45 %] 45 %  Body mass index is 21.79 kg/(m^2).    Intake/Output Summary (Last 24 hours) at 03/16/17 0847  Last data filed at 03/16/17 0819   Gross per 24 hour   Intake    300 ml   Output   3680 ml   Net  -3380 ml       Physical Exam:    Physical Exam   Constitutional: She appears well-developed and well-nourished. No distress. She is sedated and intubated.   Chronically ill   HENT:   Head: Normocephalic and atraumatic.   Nose: Nose normal.   Mouth/Throat: Oropharynx is clear and moist and mucous membranes are normal. Oral lesions present.   ETT and OGT in place  White plaque located on tongue    Eyes: Conjunctivae and EOM are normal. Pupils are equal, round, and reactive to light. No scleral icterus.   Neck: Trachea normal. Neck supple. No JVD present. Carotid bruit is not present. No thyromegaly present.   Cardiovascular: Regular rhythm and normal heart sounds.  Exam reveals no gallop and no friction rub.    No murmur heard.  Tachycardic rate   Pulmonary/Chest: Effort normal. She is intubated. No respiratory distress. She has no wheezes. She has no rales.   Diminished breath sounds bilaterally bases    Abdominal: Soft. Bowel sounds are normal. She exhibits no distension. There is no tenderness. There is no guarding.   Genitourinary:   Genitourinary Comments: King catheter in place   Musculoskeletal: Normal range of  motion.   Neurological:   Intubated; will move toes slightly upon exam   Skin: Skin is warm, dry and intact. No rash noted. No erythema.     Results Review:     I reviewed the patient's new imaging results and agree with the interpretation.  I reviewed the patient's other test results and agree with the interpretation.    Telemetry: Sinus Tachycardia, 120s-130s      Results from last 7 days  Lab Units 03/16/17  0059 03/15/17  0034 03/14/17  0038 03/13/17  0511 03/12/17  0518 03/11/17  0520 03/10/17  1843  03/10/17  0815   WBC 10*3/mm3 7.76 5.83 6.18 5.75 5.52 5.87  --   --  5.49   HEMOGLOBIN g/dL 9.2* 8.6* 8.4* 8.0* 8.2* 8.9* 8.4*  < > 7.5*   PLATELETS 10*3/mm3 255 216 206 211 176 146  --   --  128*   < > = values in this interval not displayed.    Results from last 7 days  Lab Units 03/16/17  0059 03/15/17  0034 03/14/17  0038 03/13/17  0510 03/12/17  0518 03/11/17  0520 03/10/17  0815   SODIUM mmol/L 144 144 147 147 142 144 145   POTASSIUM mmol/L 3.1* 4.2 4.0 3.1* 3.8 3.7 4.4   CHLORIDE mmol/L 101 107 114* 112 110 116* 119*   TOTAL CO2 mmol/L 35.9* 35.1* 33.0* 31.5 26.8 26.4 25.7   BUN mg/dL 19 16 12 13 16 14 27*   CREATININE mg/dL 0.42* 0.50 0.34* 0.39* 0.42* 0.42* 0.51   CALCIUM mg/dL 8.7 8.9 8.2 8.0 7.9 8.6 8.5   GLUCOSE mg/dL 98 141* 140* 215* 341* 204* 288*       Results from last 7 days  Lab Units 03/16/17  0059 03/15/17  0034 03/14/17  0038 03/13/17  0510 03/12/17  0518 03/11/17  0520 03/10/17  0815   BILIRUBIN mg/dL 0.5 0.3 0.2 0.1* 0.2 0.2 0.2   ALK PHOS U/L 89 89 115* 116* 145* 160* 133*   AST (SGOT) U/L 45* 30 42* 66* 59* 46* 18   ALT (SGPT) U/L 47* 48* 67* 71* 62* 45* 26       Results from last 7 days  Lab Units 03/13/17  0907 03/13/17  0510 03/12/17  0518 03/11/17  0520 03/10/17  0815   MAGNESIUM mg/dL 2.1 2.4 1.9 2.0 2.3       Results from last 7 days  Lab Units 03/15/17  0034 03/14/17  0038 03/13/17  0510   CRP mg/dL 2.95* 3.08* 3.18*     CXR, 3/16/17:  FINDINGS:      Endotracheal tube overlies  tracheal air column above the efren.  Central line tip is stable.  Nasogastric tube is in the stomach.      Bilateral airspace disease is similar to the previous exam.  The cardiac silhouette is normal. The pulmonary vasculature is  unremarkable.  There is no evidence of an acute osseous abnormality.   There are no suspicious-appearing parenchymal soft tissue nodules.          IMPRESSION:  Very little interval change from the prior study.        Cultures:  BLOOD CULTURE   Date Value Ref Range Status   03/11/2017 No growth at 24 hours  Preliminary   03/11/2017 No growth at 24 hours  Preliminary   03/06/2017 No growth at 5 days  Final   03/06/2017 No growth at 5 days  Final     URINE CULTURE   Date Value Ref Range Status   03/11/2017 Normal Urogenital Falguni  Preliminary     Urine Culture   Specimen Information: Urine, Clean Catch; Urine        Culture   >100,000 CFU/mL Yeast isolated (A)             Current Hospital Medications:    acetaminophen 650 mg Oral Once   acyclovir 200 mg Oral 5x Daily   ALPRAZolam 1 mg Oral Daily   aspirin 81 mg Oral Daily   bacitracin  Topical Q12H   ceftriaxone 2 g Intravenous Q24H   clopidogrel 75 mg Oral Daily   guaiFENesin 300 mg Oral Q6H   heparin (porcine) 5,000 Units Subcutaneous Q12H   insulin aspart 0-14 Units Subcutaneous 4x Daily AC & at Bedtime   insulin detemir 22 Units Subcutaneous Nightly   ipratropium 0.5 mg Nebulization Q6H - RT   metoprolol tartrate 25 mg Oral Q12H   metroNIDAZOLE 500 mg Intravenous Q8H   pantoprazole 40 mg Intravenous Q AM   potassium chloride 40 mEq Oral Once   potassium chloride 10 mEq Intravenous Q1H   predniSONE 10 mg Oral Daily With Breakfast   vancomycin 125 mg Oral Q6H       fentaNYL (SUBLIMAZE) PCA 1500 mcg/30 mL syringe  Last Rate: Stopped (03/16/17 0706)   midazolam 0.5 mg/mL 100 mL NS 1-10 mg/hr Last Rate: Stopped (03/16/17 0706)   Pharmacy to dose vancomycin     propofol 5-50 mcg/kg/min Last Rate: Stopped (03/16/17 0706)       Assessment/Plan       -Severe sepsis with acute hypoxic respiratory failure that was present upon admission and secondary to to community acquired pneumonia and E.coli UTI, now with probable hospital/ventilator associated pneumonia and Clostridium difficile colitis  -Acute clostridium difficile colitis  -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 Chest xray is much improved. Continue with broad spectrum IV antibiotics and oral steroids. Supplement electrolytes per protocol as necessary. Plan is to extubate the patient today since she is doing well on pressure support. Continue to follow urine cultures. Patient's creatinine is slightly improved today, BUN/Cr ratio remains elevated. Continue to follow diarrhea. Continue to closely monitor H&H and plan to transfuse if hemoglobin were to drop below 7.0.      Continue to hold tube feedings in light of SBT. Plan to repeat her CBC, CMP and CRP level in the am.     Patient is considered high risk due to: severe sepsis, acute respiratory distress syndrome, prolonged mechanical ventilation, clostridium colitis    Overall prognosis remains very poor/grim.    I discussed the patients findings and my recommendations with nursing staff and COREEN Cabrera  03/16/17  8:47 AM

## 2017-03-16 NOTE — PROGRESS NOTES
Acute Care - Speech Language Pathology Initial Evaluation   [place]     Patient Name: Pepito Yan  : 1965  MRN: [id number]  Today's Date: 3/16/2017  Onset of Illness/Injury or Date of Surgery Date: 17  Admit Date: 2017     Consult received, chart reviewed.  Patient is s/p extubation today,3/16/17, at 1008 today. She is s/p intubation period of 13 days w/ high likelihood of at least moderate pharyngo/laryngo edema/erythema as an effect. Chart review reveals multiple co-morbidities concerning for risk of of possible intolerance of extubation. Per this status and considerations, formal dysphagia evaluation is deferred to a.m 3/17/17 to allow for decreased edema/erythema effects, as well as to ensure maintenance of extubation status/weaning from BiPAP to allow for po intake.      D/w RN w/ verbal understanding. Please continue universal aspiration and AALIYAH precautions.     Thank you for allowing me to participate in the care of your patient-  BRITTA Forte, Graduate Clinician       Visit Dx:    ICD-10-CM ICD-9-CM   1. Pneumonia of both lower lobes due to infectious organism J18.9 483.8     Patient Active Problem List   Diagnosis   • Chest pain   • Chest pain of uncertain etiology   • Intractable nausea and vomiting   • Intractable cyclical vomiting with nausea   • Chronic obstructive pulmonary emphysema   • Hepatitis-C   • Diabetes mellitus type 2 in nonobese   • Arthritis or polyarthritis, rheumatoid   • Anxiety   • Essential hypertension   • Coronary artery disease   • Buerger's disease   • Cirrhosis   • Diabetes   • Chronic nausea   • Pancreatitis   • Bilateral pneumonia   • ARDS (adult respiratory distress syndrome)   • Hypoxia     Past Medical History   Diagnosis Date   • Anxiety    • 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   • Chronic respiratory failure with hypoxia      at nighttime   • Compression fracture of lumbar vertebra    • Compression fracture of thoracic  vertebra    • COPD (chronic obstructive pulmonary disease)    • Coronary artery disease    • Diabetes mellitus type 2 in nonobese    • Erosive esophagitis 2016   • Essential hypertension    • Gastroparesis    • GERD (gastroesophageal reflux disease)    • Hepatitis-C    • Medically noncompliant    • MRSA infection      back of neck around    • NSTEMI (non-ST elevated myocardial infarction) 2016   • PE (pulmonary embolism)    • Protein calorie malnutrition    • RA (rheumatoid arthritis)      with chronic steroid use     Past Surgical History   Procedure Laterality Date   • Cholecystectomy     • Foreign body removal       insulin needle removal x 3 from the abdomen   •  section       x 2   • Hysterectomy     • Arm debridement Left 2016     Procedure: HAND IRRIGATION DEBRIDEMENT AND REPAIR ;  Surgeon: Shane Fuller MD;  Location: [facility] OR;  Service:    • Endoscopy N/A 2016     Procedure: ESOPHAGOGASTRODUODENOSCOPY;  Surgeon: Jeremy Clement III, MD;  Location: [facility] OR;  Service:    • Amputation digit Right 2016     Third digit to the PIP. Surgeon: Dr. Bains   • Amputation digit Left 2016     Procedure: PARTIAL AMPUTATION LEFT THIRD FINGER;  Surgeon: Shane Fuller MD;  Location: [facility] OR;  Service:    • Amputation revision Left 2016     Procedure: AMPUTATION REVISION DIGIT STUMP;  Surgeon: Shane Fuller MD;  Location: [facility] OR;  Service:      EDUCATION  The patient has been educated in the following areas:   Dysphagia (Swallowing Impairment).    SLP Recommendation and Plan       Time Calculation:      SLP G-Codes  SLP NOMS Used?: Yes  Functional Limitations: Swallowing  Swallow Current Status (): 0 percent impaired, limited or restricted  Swallow Goal Status (): 0 percent impaired, limited or restricted  Swallow Discharge Status (): 0 percent impaired, limited or restricted      Nir Hurst  3/16/2017

## 2017-03-16 NOTE — PROGRESS NOTES
LOS: 16 days   Patient Care Team:  ROSA Love as PCP - General  ROSA Love as PCP - Family Medicine    Chief Complaint:  Pulmonology is following for bilateral pneumonia/ARDS and ventilator management.    Subjective     Interval History: patient is intubated and sedated.  No acute events reported overnight.    Had a family meeting yesterday for close to 30 minutes.  Please go through yesterday's note for details      History taken from: chart RN Patient unable to give history due to patient intubated.    Review of Systems:   Review of Systems - History obtained from chart review and unobtainable from patient due to mental status and Intubated      Objective     Vital Signs  Temp:  [97.8 °F (36.6 °C)-103.3 °F (39.6 °C)] 99.3 °F (37.4 °C)  Heart Rate:  [] 111  Resp:  [16-39] 36  BP: ()/(56-96) 161/92  FiO2 (%):  [45 %] 45 %  Body mass index is 21.79 kg/(m^2).    Intake/Output Summary (Last 24 hours) at 03/16/17 1050  Last data filed at 03/16/17 0925   Gross per 24 hour   Intake    500 ml   Output   3180 ml   Net  -2680 ml     I/O this shift:  In: 400 [IV Piggyback:400]  Out: -     Physical Exam:  GENERAL APPEARANCE: Intubated and sedated.  Appears to be resting comfortably in bed.     HEAD: normocephalic.    EYES: PERRLA.    THROAT: ET tube in place. Oral cavity and pharynx normal. No inflammation, swelling, exudate, or lesions.     NECK: Neck supple.     CARDIAC: Normal S1 and S2. No S3, S4 or murmurs. Rhythm is regular. There is no peripheral edema, cyanosis or pallor. Extremities are warm and well perfused. Capillary refill is less than 2 seconds. No carotid bruits.    LUNGS: Clear to auscultation and percussion without rales, rhonchi, wheezing or diminished breath sounds.    ABDOMEN: Positive bowel sounds. Soft, nondistended, nontender.     EXTREMITIES: No significant deformity or joint abnormality. No edema. Peripheral pulses intact.    NEUROLOGICAL: Unable to assess due to  sedation status.     PSYCHIATRIC: Unable to assess due to sedation status.     Results Review:     I reviewed the patient's new clinical results.  I reviewed the patient's new imaging results and agree with the interpretation.    Results from last 7 days  Lab Units 03/16/17  0059 03/15/17  0034 03/14/17  0038   WBC 10*3/mm3 7.76 5.83 6.18   HEMOGLOBIN g/dL 9.2* 8.6* 8.4*   PLATELETS 10*3/mm3 255 216 206       Results from last 7 days  Lab Units 03/16/17  0825 03/16/17  0059 03/15/17  0034 03/14/17  0038 03/13/17  0907 03/13/17  0510   SODIUM mmol/L  --  144 144 147  --  147   POTASSIUM mmol/L  --  3.1* 4.2 4.0  --  3.1*   CHLORIDE mmol/L  --  101 107 114*  --  112   TOTAL CO2 mmol/L  --  35.9* 35.1* 33.0*  --  31.5   BUN mg/dL  --  19 16 12  --  13   CREATININE mg/dL  --  0.42* 0.50 0.34*  --  0.39*   CALCIUM mg/dL  --  8.7 8.9 8.2  --  8.0   GLUCOSE mg/dL  --  98 141* 140*  --  215*   MAGNESIUM mg/dL 1.9  --   --   --  2.1 2.4     Lab Results   Component Value Date    INR 1.01 12/17/2016    INR 0.94 11/20/2016    INR <0.90 02/06/2016    PROTIME 11.4 12/17/2016    PROTIME 10.6 11/20/2016    PROTIME 10.0 02/06/2016       Results from last 7 days  Lab Units 03/16/17  0059 03/15/17  0034 03/14/17 0038   ALK PHOS U/L 89 89 115*   BILIRUBIN mg/dL 0.5 0.3 0.2   ALT (SGPT) U/L 47* 48* 67*   AST (SGOT) U/L 45* 30 42*       Results from last 7 days  Lab Units 03/16/17  0517   PH, ARTERIAL pH units 7.505*   PO2 ART mm Hg 123.4*   PCO2, ARTERIAL mm Hg 43.1   HCO3 ART mmol/L 33.2*     Imaging Results (last 24 hours)     Procedure Component Value Units Date/Time    XR Chest 1 View [[id number]] Collected:  03/16/17 0900     Updated:  03/16/17 0930    Narrative:       XR CHEST 1 VIEW-     CLINICAL INDICATION: Intubated; J18.9-Pneumonia, unspecified organism.          COMPARISON: 03/14/2017.      TECHNIQUE: Single frontal view of the chest.     FINDINGS:     Endotracheal tube overlies tracheal air column above the efren.  Central  line tip is stable.  Nasogastric tube is in the stomach.     Bilateral airspace disease is similar to the previous exam.  The cardiac silhouette is normal. The pulmonary vasculature is  unremarkable.  There is no evidence of an acute osseous abnormality.   There are no suspicious-appearing parenchymal soft tissue nodules.            Impression:       Very little interval change from the prior study.         This report was finalized on 3/16/2017 9:28 AM by Dr. Daniel Yates MD.                Medication Review:   Scheduled Medications:    acetaminophen 650 mg Oral Once   acyclovir 200 mg Oral 5x Daily   ALPRAZolam 1 mg Oral Daily   aspirin 81 mg Oral Daily   bacitracin  Topical Q12H   ceftriaxone 2 g Intravenous Q24H   clopidogrel 75 mg Oral Daily   guaiFENesin 300 mg Oral Q6H   heparin (porcine) 5,000 Units Subcutaneous Q12H   insulin aspart 0-14 Units Subcutaneous 4x Daily AC & at Bedtime   insulin detemir 22 Units Subcutaneous Nightly   ipratropium 0.5 mg Nebulization Q6H - RT   metoprolol tartrate 25 mg Oral Q12H   metroNIDAZOLE 500 mg Intravenous Q8H   pantoprazole 40 mg Intravenous Q AM   potassium chloride 40 mEq Oral Once   potassium chloride 10 mEq Intravenous Q1H   predniSONE 10 mg Oral Daily With Breakfast   vancomycin 125 mg Oral Q6H     Continuous infusions:    fentaNYL (SUBLIMAZE) PCA 1500 mcg/30 mL syringe  Last Rate: Stopped (03/16/17 0706)   midazolam 0.5 mg/mL 100 mL NS 1-10 mg/hr Last Rate: Stopped (03/16/17 0706)   Pharmacy to dose vancomycin     propofol 5-50 mcg/kg/min Last Rate: Stopped (03/16/17 0706)       Assessment/Plan     Neuro: Continue propofol, fentanyl and versed IV per protocol for sedation.  Will do sedation holiday today and breathing trial accordingly.  Gave ativan and xanax before turning sedation off.       Respiratory: emergently intubated due to severe respiratory distress.      Vent settings:  RR16 FIO2 45 % PEEP 5    Placed the patient on pressure support of 8 for on  hour and will get a VBG.    Will order one time time dose of solumedrol 40 mg IV.     Will continue IV steroids, scheduled inhalants and PRN neb treatments.    Patient was extubated to BIPAP with settings of 12/5, rate of 12 and FIO2 of 40%.        ID: C Diff positive.  Continue current antibiotics.  Will send a new stool for C Diff.  Infectious disease is on case, management per them.     Cardiac:    Hemodynamically Stable.  Continuous ECG and pulse ox monitoring.        Renal: BUN/Creatinine WNL.  Strict I&Os.   Ordered lasix 40 mg IV once.      Endocrine:  monitor glucose targeting 140-180.      Electrolytes:   Monitor levels, manage and replete per protocols.     GI: tube feedings are currently on hold for breathing trial.      Hematology:    Monitor blood counts, transfuse at or below 7 g/dL.  Unless patient is actively bleeding then began transfusing at 8 g/dL.          Patient Active Problem List   Diagnosis Code   • Chest pain R07.9   • Chest pain of uncertain etiology R07.89   • Intractable nausea and vomiting R11.2   • Intractable cyclical vomiting with nausea G43.A1   • Chronic obstructive pulmonary emphysema J43.9   • Hepatitis-C B19.20   • Diabetes mellitus type 2 in nonobese E11.9   • Arthritis or polyarthritis, rheumatoid M06.9   • Anxiety F41.9   • Essential hypertension I10   • Coronary artery disease I25.10   • Buerger's disease I73.1   • Cirrhosis K74.60   • Diabetes E11.9   • Chronic nausea R11.0   • Pancreatitis K85.90   • Bilateral pneumonia J18.9   • ARDS (adult respiratory distress syndrome) J80   • Hypoxia R09.02         Bedside rounds were completed with RN and RRT, discussed case and plan in great detail.    Spoke to family members this morning.  Discussed the case in detail- explained all the labs results and images. Discussed plan for the upcoming days and answered questions to their satisfaction.    Reyna Arellano, APRN  03/16/17  10:50 AM        Attestation: Scribed for   Kirk, ROSA Hernandez, Gerard Bradley M.D. attest that the above note accurately reflects the work and decisions made  by me.  Patient was seen and evaluated by Dr. Bradley, including history of present illness, physical exam, assessment, and treatment plan.  The above note was reviewed and edited by Dr. Bradley. The patient was  discussed with Miss Alfredon  and I agree with her history physical assessment and plan.  Critical care time 32 minutes

## 2017-03-16 NOTE — PLAN OF CARE
Problem: Patient Care Overview (Adult)  Goal: Plan of Care Review  Outcome: Ongoing (interventions implemented as appropriate)    03/16/17 1425   Coping/Psychosocial Response Interventions   Plan Of Care Reviewed With patient   Patient Care Overview   Progress progress towards functional goals is fair   Outcome Evaluation   Outcome Summary/Follow up Plan pt extubated today and continues on BiPAP, VSS, will continue to monitor         Problem: Pneumonia (Adult)  Goal: Signs and Symptoms of Listed Potential Problems Will be Absent or Manageable (Pneumonia)  Outcome: Ongoing (interventions implemented as appropriate)    03/16/17 1425   Pneumonia   Problems Assessed (Pneumonia) all   Problems Present (Pneumonia) none         Problem: Diabetes, Type 2 (Adult)  Goal: Signs and Symptoms of Listed Potential Problems Will be Absent or Manageable (Diabetes, Type 2)  Outcome: Ongoing (interventions implemented as appropriate)    03/16/17 1425   Diabetes, Type 2   Problems Assessed (Type 2 Diabetes) all   Problems Present (Type 2 Diabetes) none         Problem: Pressure Ulcer Risk (Brendan Scale) (Adult,Obstetrics,Pediatric)  Goal: Identify Related Risk Factors and Signs and Symptoms  Outcome: Ongoing (interventions implemented as appropriate)    03/16/17 1425   Pressure Ulcer Risk (Brendan Scale)   Related Risk Factors (Pressure Ulcer Risk (Brendan Scale)) body weight extremes;critical care admission;hospitalization prolonged;mobility impaired       Goal: Skin Integrity  Outcome: Ongoing (interventions implemented as appropriate)    03/16/17 1425   Pressure Ulcer Risk (Brendan Scale) (Adult,Obstetrics,Pediatric)   Skin Integrity making progress toward outcome

## 2017-03-17 ENCOUNTER — APPOINTMENT (OUTPATIENT)
Dept: GENERAL RADIOLOGY | Facility: HOSPITAL | Age: 52
End: 2017-03-17

## 2017-03-17 ENCOUNTER — APPOINTMENT (OUTPATIENT)
Dept: GENERAL RADIOLOGY | Facility: HOSPITAL | Age: 52
End: 2017-03-17
Attending: INTERNAL MEDICINE

## 2017-03-17 LAB
ALBUMIN SERPL-MCNC: 3.4 G/DL (ref 3.5–5)
ALBUMIN/GLOB SERPL: 0.8 G/DL (ref 1.5–2.5)
ALP SERPL-CCNC: 85 U/L (ref 35–104)
ALT SERPL W P-5'-P-CCNC: 42 U/L (ref 10–36)
ANION GAP SERPL CALCULATED.3IONS-SCNC: 10.6 MMOL/L (ref 3.6–11.2)
ANISOCYTOSIS BLD QL: NORMAL
AST SERPL-CCNC: 37 U/L (ref 10–30)
BASOPHILS # BLD AUTO: 0.02 10*3/MM3 (ref 0–0.3)
BASOPHILS NFR BLD AUTO: 0.2 % (ref 0–2)
BILIRUB SERPL-MCNC: 0.5 MG/DL (ref 0.2–1.8)
BUN BLD-MCNC: 15 MG/DL (ref 7–21)
BUN/CREAT SERPL: 35.7 (ref 7–25)
CALCIUM SPEC-SCNC: 9.2 MG/DL (ref 7.7–10)
CHLORIDE SERPL-SCNC: 106 MMOL/L (ref 99–112)
CO2 SERPL-SCNC: 26.4 MMOL/L (ref 24.3–31.9)
CREAT BLD-MCNC: 0.42 MG/DL (ref 0.43–1.29)
CRP SERPL-MCNC: 4.27 MG/DL (ref 0–0.99)
DEPRECATED RDW RBC AUTO: 68.3 FL (ref 37–54)
EOSINOPHIL # BLD AUTO: 0 10*3/MM3 (ref 0–0.7)
EOSINOPHIL NFR BLD AUTO: 0 % (ref 0–5)
ERYTHROCYTE [DISTWIDTH] IN BLOOD BY AUTOMATED COUNT: 24.2 % (ref 11.5–14.5)
GFR SERPL CREATININE-BSD FRML MDRD: >150 ML/MIN/1.73
GLOBULIN UR ELPH-MCNC: 4.3 GM/DL
GLUCOSE BLD-MCNC: 60 MG/DL (ref 70–110)
GLUCOSE BLDC GLUCOMTR-MCNC: 144 MG/DL (ref 70–130)
GLUCOSE BLDC GLUCOMTR-MCNC: 226 MG/DL (ref 70–130)
GLUCOSE BLDC GLUCOMTR-MCNC: 66 MG/DL (ref 70–130)
GLUCOSE BLDC GLUCOMTR-MCNC: 85 MG/DL (ref 70–130)
GLUCOSE BLDC GLUCOMTR-MCNC: 92 MG/DL (ref 70–130)
HCT VFR BLD AUTO: 32.7 % (ref 37–47)
HGB BLD-MCNC: 9.4 G/DL (ref 12–16)
HYPOCHROMIA BLD QL: NORMAL
IMM GRANULOCYTES # BLD: 0.07 10*3/MM3 (ref 0–0.03)
IMM GRANULOCYTES NFR BLD: 0.6 % (ref 0–0.5)
LYMPHOCYTES # BLD AUTO: 1.01 10*3/MM3 (ref 1–3)
LYMPHOCYTES NFR BLD AUTO: 8.4 % (ref 21–51)
MAGNESIUM SERPL-MCNC: 1.9 MG/DL (ref 1.7–2.6)
MCH RBC QN AUTO: 22.8 PG (ref 27–33)
MCHC RBC AUTO-ENTMCNC: 28.7 G/DL (ref 33–37)
MCV RBC AUTO: 79.2 FL (ref 80–94)
MONOCYTES # BLD AUTO: 0.64 10*3/MM3 (ref 0.1–0.9)
MONOCYTES NFR BLD AUTO: 5.3 % (ref 0–10)
NEUTROPHILS # BLD AUTO: 10.24 10*3/MM3 (ref 1.4–6.5)
NEUTROPHILS NFR BLD AUTO: 85.5 % (ref 30–70)
OSMOLALITY SERPL CALC.SUM OF ELEC: 283.7 MOSM/KG (ref 273–305)
OVALOCYTES BLD QL SMEAR: NORMAL
PHOSPHATE SERPL-MCNC: 2.9 MG/DL (ref 2.7–4.5)
PLAT MORPH BLD: NORMAL
PLATELET # BLD AUTO: 280 10*3/MM3 (ref 130–400)
PMV BLD AUTO: 11.1 FL (ref 6–10)
POTASSIUM BLD-SCNC: 3.2 MMOL/L (ref 3.5–5.3)
POTASSIUM BLD-SCNC: 4.4 MMOL/L (ref 3.5–5.3)
PROT SERPL-MCNC: 7.7 G/DL (ref 6–8)
RBC # BLD AUTO: 4.13 10*6/MM3 (ref 4.2–5.4)
SODIUM BLD-SCNC: 143 MMOL/L (ref 135–153)
WBC NRBC COR # BLD: 11.98 10*3/MM3 (ref 4.5–12.5)

## 2017-03-17 PROCEDURE — 25010000003 POTASSIUM CHLORIDE 10 MEQ/100ML SOLUTION: Performed by: INTERNAL MEDICINE

## 2017-03-17 PROCEDURE — 94799 UNLISTED PULMONARY SVC/PX: CPT

## 2017-03-17 PROCEDURE — G8997 SWALLOW GOAL STATUS: HCPCS

## 2017-03-17 PROCEDURE — 25010000002 HEPARIN (PORCINE) PER 1000 UNITS: Performed by: INTERNAL MEDICINE

## 2017-03-17 PROCEDURE — 97162 PT EVAL MOD COMPLEX 30 MIN: CPT

## 2017-03-17 PROCEDURE — 84100 ASSAY OF PHOSPHORUS: CPT | Performed by: NURSE PRACTITIONER

## 2017-03-17 PROCEDURE — 85025 COMPLETE CBC W/AUTO DIFF WBC: CPT | Performed by: PHYSICIAN ASSISTANT

## 2017-03-17 PROCEDURE — 71010 XR CHEST 1 VW: CPT | Performed by: RADIOLOGY

## 2017-03-17 PROCEDURE — 25010000002 CEFTRIAXONE: Performed by: INTERNAL MEDICINE

## 2017-03-17 PROCEDURE — 83735 ASSAY OF MAGNESIUM: CPT | Performed by: NURSE PRACTITIONER

## 2017-03-17 PROCEDURE — 99291 CRITICAL CARE FIRST HOUR: CPT | Performed by: INTERNAL MEDICINE

## 2017-03-17 PROCEDURE — 99233 SBSQ HOSP IP/OBS HIGH 50: CPT | Performed by: INTERNAL MEDICINE

## 2017-03-17 PROCEDURE — 80053 COMPREHEN METABOLIC PANEL: CPT | Performed by: PHYSICIAN ASSISTANT

## 2017-03-17 PROCEDURE — 25010000002 FUROSEMIDE PER 20 MG: Performed by: INTERNAL MEDICINE

## 2017-03-17 PROCEDURE — 82962 GLUCOSE BLOOD TEST: CPT

## 2017-03-17 PROCEDURE — 86140 C-REACTIVE PROTEIN: CPT | Performed by: INTERNAL MEDICINE

## 2017-03-17 PROCEDURE — 92612 ENDOSCOPY SWALLOW (FEES) VID: CPT

## 2017-03-17 PROCEDURE — 97530 THERAPEUTIC ACTIVITIES: CPT

## 2017-03-17 PROCEDURE — G8978 MOBILITY CURRENT STATUS: HCPCS

## 2017-03-17 PROCEDURE — 71010 HC CHEST PA OR AP: CPT

## 2017-03-17 PROCEDURE — 85007 BL SMEAR W/DIFF WBC COUNT: CPT | Performed by: PHYSICIAN ASSISTANT

## 2017-03-17 PROCEDURE — 63710000001 PREDNISONE PER 5 MG: Performed by: INTERNAL MEDICINE

## 2017-03-17 PROCEDURE — G8996 SWALLOW CURRENT STATUS: HCPCS

## 2017-03-17 PROCEDURE — 84132 ASSAY OF SERUM POTASSIUM: CPT | Performed by: INTERNAL MEDICINE

## 2017-03-17 PROCEDURE — G8979 MOBILITY GOAL STATUS: HCPCS

## 2017-03-17 RX ORDER — METOPROLOL TARTRATE 50 MG/1
50 TABLET, FILM COATED ORAL EVERY 12 HOURS SCHEDULED
Status: DISCONTINUED | OUTPATIENT
Start: 2017-03-17 | End: 2017-03-20 | Stop reason: HOSPADM

## 2017-03-17 RX ORDER — FUROSEMIDE 10 MG/ML
40 INJECTION INTRAMUSCULAR; INTRAVENOUS ONCE
Status: COMPLETED | OUTPATIENT
Start: 2017-03-17 | End: 2017-03-17

## 2017-03-17 RX ORDER — POTASSIUM CHLORIDE 7.45 MG/ML
10 INJECTION INTRAVENOUS
Status: COMPLETED | OUTPATIENT
Start: 2017-03-17 | End: 2017-03-17

## 2017-03-17 RX ORDER — POTASSIUM CHLORIDE 1.5 G/1.77G
40 POWDER, FOR SOLUTION ORAL 2 TIMES DAILY
Status: COMPLETED | OUTPATIENT
Start: 2017-03-17 | End: 2017-03-17

## 2017-03-17 RX ADMIN — DEXTROSE MONOHYDRATE 25 G: 25 INJECTION, SOLUTION INTRAVENOUS at 06:12

## 2017-03-17 RX ADMIN — METRONIDAZOLE 500 MG: 500 INJECTION, SOLUTION INTRAVENOUS at 06:11

## 2017-03-17 RX ADMIN — VANCOMYCIN 125 MG: KIT at 13:01

## 2017-03-17 RX ADMIN — LORAZEPAM 1 MG: 1 TABLET ORAL at 20:59

## 2017-03-17 RX ADMIN — IPRATROPIUM BROMIDE 0.5 MG: 0.5 SOLUTION RESPIRATORY (INHALATION) at 13:20

## 2017-03-17 RX ADMIN — ACYCLOVIR 200 MG: 200 SUSPENSION ORAL at 17:08

## 2017-03-17 RX ADMIN — IPRATROPIUM BROMIDE 0.5 MG: 0.5 SOLUTION RESPIRATORY (INHALATION) at 18:23

## 2017-03-17 RX ADMIN — POTASSIUM CHLORIDE 40 MEQ: 1.5 POWDER, FOR SOLUTION ORAL at 09:38

## 2017-03-17 RX ADMIN — POTASSIUM CHLORIDE 10 MEQ: 7.46 INJECTION, SOLUTION INTRAVENOUS at 13:29

## 2017-03-17 RX ADMIN — METOPROLOL TARTRATE 25 MG: 25 TABLET, FILM COATED ORAL at 09:38

## 2017-03-17 RX ADMIN — GUAIFENESIN 300 MG: 100 SOLUTION ORAL at 17:08

## 2017-03-17 RX ADMIN — ASPIRIN 81 MG: 81 TABLET, CHEWABLE ORAL at 09:38

## 2017-03-17 RX ADMIN — POTASSIUM CHLORIDE 10 MEQ: 7.46 INJECTION, SOLUTION INTRAVENOUS at 08:54

## 2017-03-17 RX ADMIN — HEPARIN SODIUM 5000 UNITS: 5000 INJECTION, SOLUTION INTRAVENOUS; SUBCUTANEOUS at 08:53

## 2017-03-17 RX ADMIN — ACYCLOVIR 200 MG: 200 SUSPENSION ORAL at 11:15

## 2017-03-17 RX ADMIN — BACITRACIN ZINC: 500 OINTMENT TOPICAL at 21:00

## 2017-03-17 RX ADMIN — VANCOMYCIN 125 MG: KIT at 06:16

## 2017-03-17 RX ADMIN — GUAIFENESIN 300 MG: 100 SOLUTION ORAL at 13:01

## 2017-03-17 RX ADMIN — CLOPIDOGREL 75 MG: 75 TABLET, FILM COATED ORAL at 09:38

## 2017-03-17 RX ADMIN — HEPARIN SODIUM 5000 UNITS: 5000 INJECTION, SOLUTION INTRAVENOUS; SUBCUTANEOUS at 20:59

## 2017-03-17 RX ADMIN — ALPRAZOLAM 1 MG: 1 TABLET ORAL at 09:38

## 2017-03-17 RX ADMIN — PREDNISONE 10 MG: 10 TABLET ORAL at 09:38

## 2017-03-17 RX ADMIN — POTASSIUM CHLORIDE 10 MEQ: 7.46 INJECTION, SOLUTION INTRAVENOUS at 10:33

## 2017-03-17 RX ADMIN — GUAIFENESIN 300 MG: 100 SOLUTION ORAL at 06:16

## 2017-03-17 RX ADMIN — METRONIDAZOLE 500 MG: 500 INJECTION, SOLUTION INTRAVENOUS at 20:58

## 2017-03-17 RX ADMIN — POTASSIUM CHLORIDE 40 MEQ: 1.5 POWDER, FOR SOLUTION ORAL at 17:08

## 2017-03-17 RX ADMIN — CEFTRIAXONE 2 G: 2 INJECTION, POWDER, FOR SOLUTION INTRAMUSCULAR; INTRAVENOUS at 08:54

## 2017-03-17 RX ADMIN — METOPROLOL TARTRATE 50 MG: 50 TABLET, FILM COATED ORAL at 20:59

## 2017-03-17 RX ADMIN — METRONIDAZOLE 500 MG: 500 INJECTION, SOLUTION INTRAVENOUS at 13:02

## 2017-03-17 RX ADMIN — GUAIFENESIN 300 MG: 100 SOLUTION ORAL at 01:29

## 2017-03-17 RX ADMIN — ACYCLOVIR 200 MG: 200 SUSPENSION ORAL at 06:16

## 2017-03-17 RX ADMIN — BACITRACIN ZINC: 500 OINTMENT TOPICAL at 09:39

## 2017-03-17 RX ADMIN — IPRATROPIUM BROMIDE 0.5 MG: 0.5 SOLUTION RESPIRATORY (INHALATION) at 00:05

## 2017-03-17 RX ADMIN — VANCOMYCIN 125 MG: KIT at 17:08

## 2017-03-17 RX ADMIN — ACYCLOVIR 200 MG: 200 SUSPENSION ORAL at 20:58

## 2017-03-17 RX ADMIN — VANCOMYCIN 125 MG: KIT at 01:29

## 2017-03-17 RX ADMIN — PANTOPRAZOLE SODIUM 40 MG: 40 INJECTION, POWDER, FOR SOLUTION INTRAVENOUS at 06:16

## 2017-03-17 RX ADMIN — ACYCLOVIR 200 MG: 200 SUSPENSION ORAL at 13:02

## 2017-03-17 RX ADMIN — IPRATROPIUM BROMIDE 0.5 MG: 0.5 SOLUTION RESPIRATORY (INHALATION) at 06:16

## 2017-03-17 RX ADMIN — FUROSEMIDE 40 MG: 10 INJECTION, SOLUTION INTRAMUSCULAR; INTRAVENOUS at 08:54

## 2017-03-17 RX ADMIN — POTASSIUM CHLORIDE 10 MEQ: 7.46 INJECTION, SOLUTION INTRAVENOUS at 12:00

## 2017-03-17 NOTE — PROGRESS NOTES
Acute Care - Speech Language Pathology   Swallow Initial Evaluation  Daniel   FIBEROPTIC ENDOSCOPIC EVALUATION OF SWALLOWING     Patient Name: Pepito Yan  : 1965  MRN: [id number]  Today's Date: 3/17/2017  Onset of Illness/Injury or Date of Surgery Date: 17          Admit Date: 2017   Pepito Yan is seen on CC01/1C to participate in an instrumental FEES to evaluate safety/efficacy of swallowing fnx, determine candidacy for po intake. She is s/p extubation 3/16/17 w/ intubation period of 13 days.       Social History     Social History   • Marital status:      Spouse name: N/A   • Number of children: N/A   • Years of education: N/A     Occupational History   • Not on file.     Social History Main Topics   • Smoking status: Current Every Day Smoker     Packs/day: 4.50     Years: 35.00     Types: Cigarettes   • Smokeless tobacco: Not on file      Comment: She cut down to 1/4 PPD for 2.5 months ago   • Alcohol use No   • Drug use: No   • Sexual activity: Defer     Other Topics Concern   • Not on file     Social History Narrative      Chest xray 3/16/17 revealed bilateral airspace disease.     Diet Orders (active)     Start     Ordered    03/10/17 2030  Vital 1.2; Tube Feeding type: Continuous; Continuous Tube Feeding Start Rate (mL/hr): 20; Then advance rate by (mL/hr): 30; Every __ hrs: 12; To goal rate of (mL/hr): 50; Total daily volume to deliver (mL/day): 1200; Patient is NPO (no tray)  Diet Effective Now     Comments:  Flush per MD order - TF at goal rate will provide 972 ml water    03/10/17 2029        Currently observed on observed on 2L O2 via NC.    Risks and benefits of this procedure are explained w/ pt agreeing to participate. Verbal order received from Dr. Weiner to proceed. Proceed per protocol.     Chart review, d/w Michelle MASTERS, reveal no contraindications for this procedure.    Ms. Yan is positioned upright and centered in in bed w/ max cues for position  maintenance to accept presentations of puree x2 only. Further presentations deferred per observed aspiration. She does not self feed.     Facial/oral structures are symmetrical upon observation. Oral mucosa are xerostomic, pink and clean. Secretions are clear, takcy, and well controlled. OROM/SALO is generally weak w/ delayed imitation of oral postures. Gag is not assessed. Volitional cough is intact, congesitve in quality, non-productive. Vocal quality is weak in intensity, clear in quality w/ intelligible speech. She is a/a and active in bed. She is agreeable to participate. She is mildly confused, attempting to climb out of bed w/o cues. She is able to follow simple directives and does participate in simple conversational exchanges.     Endoscope is entered via the L nare to run parallel to nasogastric tube w/o complications. Nasal mucosa are mildly xerostomic, pink, and clean. Secretions are clear, thin, controlled. Endoscope passes easily into the hypopharynx.     Upon entry into the hypopharynx, epiglottic resting posture is midline. Pharyngeal and laryngeal mucosa/structures are noted w/ diffuse moderate edema and erythema w/ moderated thin, clear secretion pools. Cued cough is weak, ineffective to control or clear secretion pooling. Cued sustained breath hold and vowel prolongation reveal bilateral VF adduction. Bilateral true vocal fold contact ulcerations evidenced at level of vocal process. Vocal folds are erythematous and edematous.      Upon po presentations, poor bolus anticipation w/ incomplete spoon bowl clearance w/o max cues. Labial seal is weak and incomplete. Bolus formation, manipulation, and control are severely weak w/ anterior oral holding, delayed a-p transit time w/ piecemeal deglutition and moderate diffuse oral residue. Tongue base retraction and velopharyngeal seal are incomplete w/ significant premature spillage to bilateral pyriform sinuses w/ laryngeal penetration before the swallow.  No aspiration evidenced before the swallow.     Pharyngeal swallow is moderately delayed in trigger response w/ moderately weak weak and delayed, as well as incomplete hyolaryngeal elevation and epilgottic inversion. Pharyngeal contraction is moderately weak w/ diffuse pharyngeal residue. Ganesh aspiration of puree evidenced during and after the swallow. Cued cough is ineffective to clear aspiration. Compensatory techniques are not attempted at pt unable to perform consistently per AMS and increasing agitation across this evaluation. No further consistencies attempted per this observation of aspiration w/ puree.             *Image 1- Diffuse pharyngeal/laryngeal edema/erythema w/ true VF contact ulcerations*  *Image 2- Aspiration of puree w/ mixed secretions*    Endoscope is removed w/o complications. Bed is returned to lowest floor position w/ call bell within reach. Bed alarm is active. RN is aware of pt status.     Visit Dx:     ICD-10-CM ICD-9-CM   1. Pneumonia of both lower lobes due to infectious organism J18.9 483.8     Patient Active Problem List   Diagnosis   • Chest pain   • Chest pain of uncertain etiology   • Intractable nausea and vomiting   • Intractable cyclical vomiting with nausea   • Chronic obstructive pulmonary emphysema   • Hepatitis-C   • Diabetes mellitus type 2 in nonobese   • Arthritis or polyarthritis, rheumatoid   • Anxiety   • Essential hypertension   • Coronary artery disease   • Buerger's disease   • Cirrhosis   • Diabetes   • Chronic nausea   • Pancreatitis   • Bilateral pneumonia   • ARDS (adult respiratory distress syndrome)   • Hypoxia     Past Medical History   Diagnosis Date   • Anxiety    • 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   • Chronic respiratory failure with hypoxia      at nighttime   • Compression fracture of lumbar vertebra    • Compression fracture of thoracic vertebra    • COPD (chronic obstructive pulmonary disease)    • Coronary artery disease    •  Diabetes mellitus type 2 in nonobese    • Erosive esophagitis 2016   • Essential hypertension    • Gastroparesis    • GERD (gastroesophageal reflux disease)    • Hepatitis-C    • Medically noncompliant    • MRSA infection      back of neck around    • NSTEMI (non-ST elevated myocardial infarction) 2016   • PE (pulmonary embolism)    • Protein calorie malnutrition    • RA (rheumatoid arthritis)      with chronic steroid use     Past Surgical History   Procedure Laterality Date   • Cholecystectomy     • Foreign body removal       insulin needle removal x 3 from the abdomen   •  section       x 2   • Hysterectomy     • Arm debridement Left 2016     Procedure: HAND IRRIGATION DEBRIDEMENT AND REPAIR ;  Surgeon: Shane Fuller MD;  Location: [facility] OR;  Service:    • Endoscopy N/A 2016     Procedure: ESOPHAGOGASTRODUODENOSCOPY;  Surgeon: Jeremy Clement III, MD;  Location: [facility] OR;  Service:    • Amputation digit Right 2016     Third digit to the PIP. Surgeon: Dr. Bains   • Amputation digit Left 2016     Procedure: PARTIAL AMPUTATION LEFT THIRD FINGER;  Surgeon: Shane Fuller MD;  Location: [facility] OR;  Service:    • Amputation revision Left 2016     Procedure: AMPUTATION REVISION DIGIT STUMP;  Surgeon: Shane Fuller MD;  Location: [facility] OR;  Service:    EDUCATION  The patient has been educated in the following areas:   Dysphagia (Swallowing Impairment) Oral Care/Hydration NPO rationale.    Impression: Ms. Yan presents w/ a moderate-severe oropharyngeal dysphagia w/ poor secretion control, sharonda, silent aspiration of puree. She is noted w/ moderate diffuse pharyngeal and laryngeal edema/erythema, as well as bilateral true vf contact ulcerations, effects of recent intubation. She will benefit from continued NPO status. She is noted to received steroid therapy to address edema. She has a nasogatric tube for alternative nutrition. Formal  dysphagia therapy is contraindicated at this time per edema/erythema. SLP will f/u daily.     SLP Recommendation and Plan    1. Continue NPO status w/ current nasogastric TF for nutrition/hydration.   2. Meds via non oral method.   3. Universal aspiration precautions.   4. AALIYAH precautions.   5. Oral care protocol.   6. SLP to f/u daily for assessment w/ re-evaluation pending.     D/w pt results and recommendations w/ verbal agreement.     D/w RNMichelle, results and recommendations w/ verbal agreement.     Thank you for allowing me to participate in the care of your patient-  Melina Hodges M.S., [facility]/SLP                                             Time Calculation:         Time Calculation- SLP       03/17/17 1240          Time Calculation- SLP    SLP Start Time 0900  -      SLP Stop Time 1000  -      SLP Time Calculation (min) 60 min  -      SLP Non-Billable Time (min) 45 min  -      SLP - Next Appointment 03/20/17  -        User Key  (r) = Recorded By, (t) = Taken By, (c) = Cosigned By    Initials Name Provider Type     Melina Hodges MS CCC-SLP Speech Therapist        Therapy Charges for Today     Code Description Service Date Service Provider Modifiers Qty    06894899730 HC ST CARE PLAN 15 MIN 3/16/2017 Melina Hodges MS CCC-SLP GN 2    54997484947 HC ST SWALLOWING CURRENT STATUS 3/17/2017 Melina Hodges MS CCC-SLP GN, CM 1    20402924209 HC ST SWALLOWING PROJECTED 3/17/2017 Melina Hodges MS CCC-SLP GN, CL 1    15089811116 HC ST CARE PLAN 15 MIN 3/17/2017 Melina Hodges MS CCC-SLP GN 1    [id number] HC ST FIBEROPTIC ENDO EVAL SWALL 6 3/17/2017 Melina Hodges MS CCC-SLP GN 1        SLP G-Codes  SLP NOMS Used?: Yes  Functional Limitations: Swallowing  Swallow Current Status (): At least 80 percent but less than 100 percent impaired, limited or restricted  Swallow Goal Status (): At least 60 percent but less than 80 percent impaired, limited or restricted  Swallow  Discharge Status (): 0 percent impaired, limited or restricted    Melina Hodges, MS CCC-SLP  3/17/2017

## 2017-03-17 NOTE — PROGRESS NOTES
Antibiotic length of therapy :    Rocephin  Day 7    Flagyl  Day 8    Vancomycin  Po  Day 7    Acyclovir  Day 4

## 2017-03-17 NOTE — SIGNIFICANT NOTE
Pt observed to exhibit episodes of fast, involuntary movement of bilateral upper extremities and head toward torso - which occurs up to 4 times per minute. In addition, patient has frequent periods of yawning, more than 6 episodes per minute.

## 2017-03-17 NOTE — PLAN OF CARE
Problem: Inpatient Physical Therapy  Goal: Transfer Training Goal 1 LTG- PT  Outcome: Ongoing (interventions implemented as appropriate)    03/17/17 7685   Transfer Training PT LTG   Transfer Training PT LTG, Date Established 03/17/17   Transfer Training PT LTG, Time to Achieve 4 days   Transfer Training PT LTG, Activity Type all transfers   Transfer Training PT LTG, [place] Level contact guard assist;minimum assist (75% patient effort)   Transfer Training PT LTG, Assist Device walker, rolling

## 2017-03-17 NOTE — PLAN OF CARE
Problem: Inpatient Physical Therapy  Goal: Bed Mobility Goal LTG- PT  Outcome: Ongoing (interventions implemented as appropriate)    03/17/17 1854   Bed Mobility PT LTG   Bed Mobility PT LTG, Date Established 03/17/17   Bed Mobility PT LTG, Time to Achieve 4 days   Bed Mobility PT LTG, Activity Type all bed mobility   Bed Mobility PT LTG, [place] Level minimum assist (75% patient effort);2 person assist required   Bed Mobility PT Goal LTG, Assist Device bed rails         03/17/17 1854   Bed Mobility PT LTG   Bed Mobility PT LTG, Date Established 03/17/17   Bed Mobility PT LTG, Time to Achieve 4 days   Bed Mobility PT LTG, Activity Type all bed mobility   Bed Mobility PT LTG, [place] Level minimum assist (75% patient effort);2 person assist required   Bed Mobility PT Goal LTG, Assist Device bed rails

## 2017-03-17 NOTE — PLAN OF CARE
Problem: Inpatient Physical Therapy  Goal: Gait Training Goal LTG- PT  Outcome: Ongoing (interventions implemented as appropriate)    03/17/17 1856   Gait Training PT LTG   Gait Training Goal PT LTG, Date Established 03/17/17   Gait Training Goal PT LTG, Time to Achieve 4 days   Gait Training Goal PT LTG, [place] Level contact guard assist;minimum assist (75% patient effort)   Gait Training Goal PT LTG, Assist Device walker, rolling   Gait Training Goal PT LTG, Distance to Achieve 15

## 2017-03-17 NOTE — PLAN OF CARE
Problem: Patient Care Overview (Adult)  Goal: Plan of Care Review  Outcome: Ongoing (interventions implemented as appropriate)    03/17/17 1651   Coping/Psychosocial Response Interventions   Plan Of Care Reviewed With patient   Patient Care Overview   Progress progress towards functional goals is fair         Problem: Pneumonia (Adult)  Goal: Signs and Symptoms of Listed Potential Problems Will be Absent or Manageable (Pneumonia)  Outcome: Ongoing (interventions implemented as appropriate)    03/17/17 1651   Pneumonia   Problems Assessed (Pneumonia) all   Problems Present (Pneumonia) none         Problem: Diabetes, Type 2 (Adult)  Goal: Signs and Symptoms of Listed Potential Problems Will be Absent or Manageable (Diabetes, Type 2)  Outcome: Ongoing (interventions implemented as appropriate)    03/17/17 1651   Diabetes, Type 2   Problems Assessed (Type 2 Diabetes) all   Problems Present (Type 2 Diabetes) none         Problem: Mechanical Ventilation, Invasive (Adult)  Goal: Signs and Symptoms of Listed Potential Problems Will be Absent or Manageable (Mechanical Ventilation, Invasive)  Outcome: Ongoing (interventions implemented as appropriate)    03/17/17 1651   Mechanical Ventilation, Invasive   Problems Assessed (Mechanical Ventilation, Invasive) all   Problems Present (Mechanical Ventilation, Invasive) none         Problem: Pressure Ulcer Risk (Brendan Scale) (Adult,Obstetrics,Pediatric)  Goal: Identify Related Risk Factors and Signs and Symptoms  Outcome: Ongoing (interventions implemented as appropriate)    03/16/17 1425   Pressure Ulcer Risk (Brendan Scale)   Related Risk Factors (Pressure Ulcer Risk (Brendan Scale)) body weight extremes;critical care admission;hospitalization prolonged;mobility impaired       Goal: Skin Integrity  Outcome: Ongoing (interventions implemented as appropriate)    03/17/17 1651   Pressure Ulcer Risk (Brendan Scale) (Adult,Obstetrics,Pediatric)   Skin Integrity making progress toward  outcome

## 2017-03-17 NOTE — PROGRESS NOTES
LOS: 17 days     Chief Complaint:  Pulmonology is following for bilateral pneumonia.ARDS     Subjective     Interval History: Patient was extubated yesterday.  She is saturating well on 2 L nasal cannula.  No acute events reported overnight. Used bipap overnight    History taken from: patient chart RN    Review of Systems:   Review of Systems  Review of Systems - History obtained from chart review and the patient  General ROS: negative for - chills, fatigue or fever  Psychological ROS: negative for - anxiety or depression  ENT ROS: negative for - headaches, visual changes or vocal changes  Respiratory ROS: positive for - shortness of breath  Cardiovascular ROS: no chest pain or dyspnea on exertion  Gastrointestinal ROS: no abdominal pain, change in bowel habits, or black or bloody stools  Musculoskeletal ROS: negative for - joint pain, joint stiffness or joint swelling  Neurological ROS: no TIA or stroke symptoms                  Objective     Vital Signs  Temp:  [97.8 °F (36.6 °C)-99.2 °F (37.3 °C)] 98.1 °F (36.7 °C)  Heart Rate:  [110-145] 123  Resp:  [22-36] 26  BP: (111-165)/() 141/88  Body mass index is 21.79 kg/(m^2).    Intake/Output Summary (Last 24 hours) at 03/17/17 0945  Last data filed at 03/17/17 0853   Gross per 24 hour   Intake    510 ml   Output   2550 ml   Net  -2040 ml     I/O this shift:  In: 200 [IV Piggyback:200]  Out: -     Physical Exam:  GENERAL APPEARANCE: Well developed, well nourished, alert and cooperative, and appears to be in no acute distress.    HEAD: normocephalic.    EYES: PERRL    NECK: Neck supple.     CARDIAC: Normal S1 and S2. No S3, S4 or murmurs. Rhythm is regular. There is no peripheral edema, cyanosis or pallor. Extremities are warm and well perfused. Capillary refill is less than 2 seconds. No carotid bruits.    LUNGS: Clear to auscultation and percussion without rales, rhonchi, wheezing or diminished breath sounds.    ABDOMEN: Positive bowel sounds. Soft,  nondistended, nontender.     EXTREMITIES: No significant deformity or joint abnormality. No edema. Peripheral pulses intact.     NEUROLOGICAL: Strength and sensation symmetric and intact throughout.     PSYCHIATRIC: The mental examination revealed the patient was oriented to person, place, and time.                 Results Review:                I reviewed the patient's new clinical results.  I reviewed the patient's new imaging results and agree with the interpretation.    Results from last 7 days  Lab Units 03/17/17  0603 03/16/17  0059 03/15/17  0034   WBC 10*3/mm3 11.98 7.76 5.83   HEMOGLOBIN g/dL 9.4* 9.2* 8.6*   PLATELETS 10*3/mm3 280 255 216       Results from last 7 days  Lab Units 03/17/17  0603 03/16/17  1659 03/16/17  0825 03/16/17  0059 03/15/17  0034 03/13/17  0907   SODIUM mmol/L 143  --   --  144 144  < >  --    POTASSIUM mmol/L 3.2* 4.4  --  3.1* 4.2  < >  --    CHLORIDE mmol/L 106  --   --  101 107  < >  --    TOTAL CO2 mmol/L 26.4  --   --  35.9* 35.1*  < >  --    BUN mg/dL 15  --   --  19 16  < >  --    CREATININE mg/dL 0.42*  --   --  0.42* 0.50  < >  --    CALCIUM mg/dL 9.2  --   --  8.7 8.9  < >  --    GLUCOSE mg/dL 60*  --   --  98 141*  < >  --    MAGNESIUM mg/dL 1.9  --  1.9  --   --   --  2.1   < > = values in this interval not displayed.  Lab Results   Component Value Date    INR 1.01 12/17/2016    INR 0.94 11/20/2016    INR <0.90 02/06/2016    PROTIME 11.4 12/17/2016    PROTIME 10.6 11/20/2016    PROTIME 10.0 02/06/2016       Results from last 7 days  Lab Units 03/17/17  0603 03/16/17  0059 03/15/17  0034   ALK PHOS U/L 85 89 89   BILIRUBIN mg/dL 0.5 0.5 0.3   ALT (SGPT) U/L 42* 47* 48*   AST (SGOT) U/L 37* 45* 30       Results from last 7 days  Lab Units 03/16/17  0517   PH, ARTERIAL pH units 7.505*   PO2 ART mm Hg 123.4*   PCO2, ARTERIAL mm Hg 43.1   HCO3 ART mmol/L 33.2*     Imaging Results (last 24 hours)     Procedure Component Value Units Date/Time    XR Chest 1 View [[id number]]  Collected:  03/16/17 1246     Updated:  03/16/17 1249    Narrative:       XR CHEST 1 VW-     CLINICAL INDICATION: NG placement; J18.9-Pneumonia, unspecified organism          COMPARISON: 3/16/2017      TECHNIQUE: Single frontal view of the chest.     FINDINGS:     Nasogastric tube is within the stomach  Lung bases show airspace disease bilaterally.  The cardiac silhouette is normal. The pulmonary vasculature is  unremarkable.  There is no evidence of an acute osseous abnormality.   There are no suspicious-appearing parenchymal soft tissue nodules.            Impression:       NG tube in the stomach  Bilateral airspace disease         This report was finalized on 3/16/2017 12:47 PM by Dr. Daniel Yates MD.                Medication Review:   Scheduled Medications:    acetaminophen 650 mg Oral Once   acyclovir 200 mg Oral 5x Daily   ALPRAZolam 1 mg Oral Daily   aspirin 81 mg Oral Daily   bacitracin  Topical Q12H   ceftriaxone 2 g Intravenous Q24H   clopidogrel 75 mg Oral Daily   guaiFENesin 300 mg Oral Q6H   heparin (porcine) 5,000 Units Subcutaneous Q12H   insulin aspart 0-14 Units Subcutaneous 4x Daily AC & at Bedtime   insulin detemir 22 Units Subcutaneous Nightly   ipratropium 0.5 mg Nebulization Q6H - RT   metoprolol tartrate 25 mg Oral Q12H   metroNIDAZOLE 500 mg Intravenous Q8H   pantoprazole 40 mg Intravenous Q AM   potassium chloride 40 mEq Oral Once   potassium chloride 40 mEq Oral BID   potassium chloride 10 mEq Intravenous Q1H   predniSONE 10 mg Oral Daily With Breakfast   vancomycin 125 mg Oral Q6H     Continuous infusions:    fentaNYL (SUBLIMAZE) PCA 1500 mcg/30 mL syringe  Last Rate: Stopped (03/16/17 0706)   midazolam 0.5 mg/mL 100 mL NS 1-10 mg/hr Last Rate: Stopped (03/16/17 0706)   Pharmacy to dose vancomycin     propofol 5-50 mcg/kg/min Last Rate: Stopped (03/16/17 0706)       Assessment/Plan      Neuro: She is alert and oriented.  Will continue to monitor for acute changes.     Respiratory:  Extubated on 3/16/17      Was extubated to BiPAP and wore BiPAP overnight.  She is now saturating well on 2 L nasal cannula.     Will continue IV steroids, scheduled inhalants and PRN neb treatments.    Continue BiPAP at night and supplemental oxygen during the day.    Chest x-ray is improving.        ID: Continue current antibiotics.  Continue antibiotics for C. difficile.  Infectious disease is on the case, management per them.    Microbiology Results (last 10 days)     Procedure Component Value - Date/Time    Blood Culture [[id number]]  (Normal) Collected:  03/14/17 0556    Lab Status:  Preliminary result Specimen:  Blood from Blood, Venous Line Updated:  03/17/17 0901     Blood Culture No growth at 3 days     Blood Culture [[id number]]  (Normal) Collected:  03/14/17 0519    Lab Status:  Preliminary result Specimen:  Blood from Blood, Venous Line Updated:  03/17/17 0701     Blood Culture No growth at 3 days     Blood Culture [[id number]]  (Normal) Collected:  03/11/17 1246    Lab Status:  Final result Specimen:  Blood from Hand, Right Updated:  03/16/17 1501     Blood Culture No growth at 5 days     Urine Culture [[id number]]  (Abnormal) Collected:  03/11/17 1216    Lab Status:  Final result Specimen:  Urine from Urine, Clean Catch Updated:  03/16/17 1307     Urine Culture >100,000 CFU/mL Candida tropicalis (A)     Blood Culture [[id number]]  (Normal) Collected:  03/11/17 1212    Lab Status:  Final result Specimen:  Blood from Hand, Right Updated:  03/16/17 1501     Blood Culture No growth at 5 days     Clostridium Difficile Toxin [[id number]] Collected:  03/10/17 1309    Lab Status:  Final result Specimen:  Stool from Per Rectum Updated:  03/10/17 1911    Narrative:       The following orders were created for panel order Clostridium Difficile Toxin.  Procedure                               Abnormality         Status                     ---------                               -----------         ------                      Clostridium Difficile Tox...[[id number]]  Abnormal            Final result                 Please view results for these tests on the individual orders.    Clostridium Difficile Toxin, PCR [[id number]]  (Abnormal) Collected:  03/10/17 1309    Lab Status:  Final result Specimen:  Stool from Per Rectum Updated:  03/10/17 1911     C. Difficile Toxins by PCR Positive (C)      027 Toxin Presumptive Negative     Narrative:         For In Vitro Diagnostic use only.  027-NAP1-BI results are NOT intended to guide treatment. 027 typing is relative to PCR ribotyping and shown to be equivalent to B1 typing by restriction endonuclease analysis or NAP1 typing by pulse field gel electrophoresis.         cdiff positive     Cardiac:     Hemodynamically Stable.  Continuous ECG and pulse ox monitoring.        Renal: BUN/Creatinine WNL.  Strict I&Os.   Ordered albumin 25 g IV, Lasix 40 mg IV and potassium 40 mEq by mouth ×2 doses      Endocrine:  monitor glucose targeting 140-180.      Electrolytes:    Monitor levels, manage and replete per protocols.  Potassium is being replaced per protocol.      GI: Patient will have speech and swallow today.      Hematology:    Monitor blood counts, transfuse at or below 7 g/dL.  Unless patient is actively bleeding then began transfusing at 8 g/dL.          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    Bedside rounds were completed with RN and RRT, discussed case and plan in great detail.    Discussed the case in detail- explained all the labs results and images. Discussed plan for the upcoming days and answered questions to their satistfaction     ROSA Seymour  03/17/17  9:45 AM        Attestation: Scribed for Dr. Bradley, by ROSA Hernandez      I, Gerard Bradley M.D. attest that the above note accurately reflects the work and decisions made  by me.  Patient was seen and evaluated by Dr. Bradley, including history of present illness, physical exam, assessment, and treatment plan.  The above note was reviewed and edited by Dr. Bradley. The patient was  discussed with Miss Arellano  and I agree with her history physical assessment and plan. Critical care time 30 minutes

## 2017-03-17 NOTE — PROGRESS NOTES
"  I have personally seen and examined the patient today and discussed overnight interval progress and pertinent issues with nursing staff.    ROS:    Unable to obtain due to the patients mental status       History taken from: chart RN      Vital Signs    Visit Vitals   • /88 (BP Location: Right arm, Patient Position: Lying)   • Pulse (!) 123   • Temp 98.1 °F (36.7 °C) (Oral)   • Resp 26   • Ht 63\" (160 cm)   • Wt 123 lb (55.8 kg)   • SpO2 97%   • BMI 21.79 kg/m2       Temp:  [97.8 °F (36.6 °C)-99.2 °F (37.3 °C)] 98.1 °F (36.7 °C)      Intake/Output Summary (Last 24 hours) at 03/17/17 0954  Last data filed at 03/17/17 0853   Gross per 24 hour   Intake    510 ml   Output   2550 ml   Net  -2040 ml     Intake & Output (last 3 days)       03/14 0701 - 03/15 0700 03/15 0701 - 03/16 0700 03/16 0701 - 03/17 0700 03/17 0701 - 03/18 0700    I.V. (mL/kg) 477.3 (8.6)       Other 800  60     NG/       IV Piggyback 200 200 650 200    Total Intake(mL/kg) 2218.3 (39.8) 200 (3.6) 710 (12.7) 200 (3.6)    Urine (mL/kg/hr) 3345 (2.5) 3680 (2.7) 2550 (1.9)     Stool 0 (0) 0 (0) 0 (0)     Total Output 3345 3680 2550      Net -1126.8 -3480 -1840 +200            Unmeasured Stool Occurrence 2 x 2 x 6 x           Physical Exam:      General Appearance:  intubated,  Slightly confused and agitated, and respiratory distress    Head:  Normocephalic, without obvious abnormality, atraumatic   Eyes:      Lids and lashes normal, conjunctivae and sclerae normal, no icterus, no pallor, corneas clear, PERRLA   Ears:  Ears appear intact with no abnormalities noted   Throat: No oral lesions, no thrush, oral mucosa moist   Neck: No adenopathy, supple, trachea midline, no thyromegaly, no carotid bruit, no JVD   Back:  No tenderness to percussion or palpation, range of motion normal   Lungs:  normal breath sounds without rhonchi or crackles.   Heart:  Regular rhythm and normal rate, normal S1 and S2, no murmur, no gallop, no rub, no click "   Chest Wall:  No abnormalities observed   Abdomen:  Normal bowel sounds, no masses, no organomegaly, soft non-tender, non-distended, no guarding, no rebound tenderness   Rectal:  Deferred   Extremities: No edema, no cyanosis, no redness   Pulses: Pulses palpable and equal bilaterally   Skin: No bleeding, bruising or rash   Lymph nodes: No palpable adenopathy   Neurologic: More awake, but obtunded, slightly confused and agitated       Results:      Results from last 7 days  Lab Units 03/17/17  0603 03/16/17  0059 03/15/17  0034 03/14/17  0038 03/13/17  0511 03/12/17  0518 03/11/17  0520   WBC 10*3/mm3 11.98 7.76 5.83 6.18 5.75 5.52 5.87     Lab Results   Component Value Date    NEUTROABS 10.24 (H) 03/17/2017         Results from last 7 days  Lab Units 03/17/17  0603   CREATININE mg/dL 0.42*         Results from last 7 days  Lab Units 03/17/17  0603 03/15/17  0034 03/14/17  0038   CRP mg/dL 4.27* 2.95* 3.08*       Imaging Results (last 24 hours)     Procedure Component Value Units Date/Time    XR Chest AP [[id number]] Collected:  03/08/17 0831     Updated:  03/08/17 0834    Narrative:       EXAMINATION: XR CHEST AP-      CLINICAL INDICATION:     ARDS; J18.9-Pneumonia, unspecified organism     TECHNIQUE: Single AP view of chest.      COMPARISON: 3/7/2017      FINDINGS:   There is bibasilar atelectasis.   Grossly stable bilateral airspace disease.  Support tube and lines are in unchanged position.   Heart size is stable.  No pneumothorax.              Impression:       Grossly stable bilateral airspace disease.     This report was finalized on 3/8/2017 8:31 AM by Dr. Bret Morris MD.               Results Review:    I have personally reviewed laboratory data, culture results, radiology studies and antimicrobial therapy.    Hospital Medications (active)       Dose Frequency Start End    acetaminophen (TYLENOL) tablet 650 mg 650 mg Every 6 Hours PRN 3/2/2017     Sig - Route: Take 2 tablets by mouth Every 6 (Six) Hours As  Needed for fever. - Oral    acetaminophen (TYLENOL) tablet 650 mg 650 mg Once 3/6/2017     Sig - Route: Take 2 tablets by mouth 1 (One) Time. - Oral    aspirin chewable tablet 81 mg 81 mg Daily 3/7/2017     Sig - Route: Chew 1 tablet Daily. - Oral    Notes to Pharmacy: Patient intubated and RN requested changed from enteric coated tablet.    clopidogrel (PLAVIX) tablet 75 mg 75 mg Daily 3/1/2017     Sig - Route: Take 1 tablet by mouth Daily. - Oral    dextrose (D50W) solution 25 g 25 g Every 15 Minutes PRN 3/1/2017     Sig - Route: Infuse 50 mL into a venous catheter Every 15 (Fifteen) Minutes As Needed for low blood sugar (Blood Sugar Less Than 70, Patient Has IV Access - Unresponsive, NPO or Unable To Safely Swallow). - Intravenous    Cosign for Ordering: Accepted by Savanah Gutierrez MD on 3/1/2017  7:01 PM    dextrose (GLUTOSE) oral gel 15 g 15 g Every 15 Minutes PRN 3/1/2017     Sig - Route: Take 15 g by mouth Every 15 (Fifteen) Minutes As Needed for low blood sugar (Blood Sugar Less Than 70, Patient Alert, Is Not NPO & Can Safely Swallow). - Oral    Cosign for Ordering: Accepted by Savanah Gutierrez MD on 3/1/2017  7:01 PM    FENTANYL PCA 1500 MCG/30 ML (BHCOR) PCA  Continuous 3/3/2017 3/13/2017    Sig - Route: Infuse  into a venous catheter Continuous. - Intravenous    glucagon (GLUCAGEN) injection 1 mg 1 mg Every 15 Minutes PRN 3/1/2017     Sig - Route: Inject 1 mg under the skin Every 15 (Fifteen) Minutes As Needed (Blood Glucose Less Than 70 - Patient Without IV Access - Unresponsive, NPO or Unable To Safely Swallow). - Subcutaneous    Cosign for Ordering: Accepted by Savanah Gutierrez MD on 3/1/2017  7:01 PM    guaiFENesin (MUCINEX) 12 hr tablet 600 mg 600 mg 2 Times Daily 3/2/2017     Sig - Route: Take 1 tablet by mouth 2 (Two) Times a Day. - Oral    heparin (porcine) 5000 UNIT/ML injection 5,000 Units 5,000 Units Every 12 Hours Scheduled 3/1/2017     Sig - Route: Inject 1 mL under the skin Every 12  "(Twelve) Hours. - Subcutaneous    insulin aspart (novoLOG) injection 0-14 Units 0-14 Units 4 Times Daily Before Meals & Nightly 3/1/2017     Sig - Route: Inject 0-14 Units under the skin 4 (Four) Times a Day Before Meals & at Bedtime. - Subcutaneous    insulin detemir (LEVEMIR) injection 20 Units 20 Units Nightly 3/4/2017     Sig - Route: Inject 20 Units under the skin Every Night. - Subcutaneous    ipratropium (ATROVENT) nebulizer solution 0.5 mg 0.5 mg Every 6 Hours PRN 3/2/2017     Sig - Route: Take 2.5 mL by nebulization Every 6 (Six) Hours As Needed for shortness of air. - Nebulization    Cosign for Ordering: Accepted by Sherrill Weiner DO on 3/2/2017  1:40 PM    ipratropium (ATROVENT) nebulizer solution 0.5 mg 0.5 mg Every 6 Hours - RT 3/3/2017     Sig - Route: Take 2.5 mL by nebulization Every 6 (Six) Hours. - Nebulization    magnesium sulfate 6 g in dextrose (D5W) 5 % 250 mL infusion 6 g As Needed 3/6/2017     Sig - Route: Infuse 6 g into a venous catheter As Needed (Mg 1.1-1.5 mg/dL). - Intravenous    Linked Group 1:  \"Or\" Linked Group Details        magnesium sulfate 8 g in dextrose (D5W) 5 % 250 mL infusion 8 g As Needed 3/6/2017     Sig - Route: Infuse 8 g into a venous catheter As Needed (Mg less than or equal to 1 mg/dL). - Intravenous    Linked Group 1:  \"Or\" Linked Group Details        magnesium sulfate in D5W 1g/100mL (PREMIX) IVPB 1 g 1 g As Needed 3/6/2017     Sig - Route: Infuse 100 mL into a venous catheter As Needed (Mg less than or equal 1 mg/dL). - Intravenous    Linked Group 1:  \"Or\" Linked Group Details        magnesium sulfate in D5W 1g/100mL (PREMIX) IVPB 1 g 1 g As Needed 3/6/2017     Sig - Route: Infuse 100 mL into a venous catheter As Needed (magnesium sulfate 1 g in D5W 100 mL IVPB - Mg 1.6 - 1.9 mg/dL). - Intravenous    Linked Group 1:  \"Or\" Linked Group Details        meropenem (MERREM) 1 g/100 mL 0.9% NS VTB (mbp) 1 g Every 8 Hours 3/7/2017     Sig - Route: Infuse 100 mL into " "a venous catheter Every 8 (Eight) Hours. - Intravenous    methylPREDNISolone sodium succinate (SOLU-Medrol) injection 20 mg 20 mg Every 12 Hours 3/5/2017     Sig - Route: Infuse 0.5 mL into a venous catheter Every 12 (Twelve) Hours. - Intravenous    metoprolol tartrate (LOPRESSOR) tablet 25 mg 25 mg Every 12 Hours Scheduled 3/2/2017     Sig - Route: Take 1 tablet by mouth Every 12 (Twelve) Hours. - Oral    midazolam (VERSED) injection 2 mg 2 mg Every 4 Hours PRN 3/5/2017     Sig - Route: Infuse 2 mL into a venous catheter Every 4 (Four) Hours As Needed for sedation. - Intravenous    midazolam 0.5 mg/mL 100 mL NS (VERSED) infusion 1-10 mg/hr Titrated 3/8/2017 3/18/2017    Sig - Route: Infuse 1-10 mg/hr into a venous catheter Dose Adjusted By Provider As Needed. - Intravenous    norepinephrine (LEVOPHED) 32 mcg/mL (8 mg/250 mL) infusion 0.02-0.3 mcg/kg/min × 57.3 kg Titrated 3/3/2017     Sig - Route: Infuse 1.146-17.19 mcg/min into a venous catheter Dose Adjusted By Provider As Needed. - Intravenous    pantoprazole (PROTONIX) injection 40 mg 40 mg Every Early Morning 3/2/2017     Sig - Route: Infuse 10 mL into a venous catheter Every Morning. - Intravenous    Cosign for Ordering: Accepted by Sherrill Weiner DO on 3/1/2017  3:38 PM    Pharmacy to dose vancomycin  Continuous PRN 3/2/2017     Sig - Route: Continuous As Needed for consult. - Does not apply    polyethylene glycol (MIRALAX) powder 17 g 17 g Daily 3/8/2017     Sig - Route: Take 17 g by mouth Daily. - Oral    potassium chloride (K-DUR,KLOR-CON) CR tablet 40 mEq 40 mEq Once 3/2/2017     Sig - Route: Take 2 tablets by mouth 1 (One) Time. - Oral    potassium chloride (KLOR-CON) packet 40 mEq 40 mEq As Needed 3/6/2017     Sig - Route: Take 40 mEq by mouth As Needed (potassium replacement, see admin instructions). - Oral    Linked Group 2:  \"Or\" Linked Group Details        potassium chloride (MICRO-K) CR capsule 40 mEq 40 mEq As Needed 3/6/2017     Sig - " "Route: Take 4 capsules by mouth As Needed (potassium replacement.  see admin instructions). - Oral    Linked Group 2:  \"Or\" Linked Group Details        potassium chloride 10 mEq in 100 mL IVPB 10 mEq Every 1 Hour PRN 3/6/2017     Sig - Route: Infuse 100 mL into a venous catheter Every 1 (One) Hour As Needed (potassium protocol PERIPHERAL - see admin instructions). - Intravenous    Linked Group 2:  \"Or\" Linked Group Details        promethazine (PHENERGAN) tablet 12.5 mg 12.5 mg Every 6 Hours PRN 3/1/2017     Sig - Route: Take 1 tablet by mouth Every 6 (Six) Hours As Needed for Nausea or Vomiting. - Oral    Cosign for Ordering: Accepted by Sherrill Weiner DO on 3/1/2017  3:38 PM    propofol (DIPRIVAN) infusion 10 mg/mL 100 mL 5-50 mcg/kg/min × 57.3 kg Titrated 3/3/2017     Sig - Route: Infuse 286.5-2,865 mcg/min into a venous catheter Dose Adjusted By Provider As Needed. - Intravenous    sodium chloride 0.9 % flush 1-10 mL 1-10 mL As Needed 3/1/2017     Sig - Route: Infuse 1-10 mL into a venous catheter As Needed for line care. - Intravenous    sodium chloride 0.9 % flush 10 mL 10 mL As Needed 2/28/2017     Sig - Route: Infuse 10 mL into a venous catheter As Needed for line care. - Intravenous    Linked Group 3:  \"And\" Linked Group Details        sucralfate (CARAFATE) tablet 1 g 1 g 4 Times Daily Before Meals & Nightly 3/1/2017     Sig - Route: Take 1 tablet by mouth 4 (Four) Times a Day Before Meals & at Bedtime. - Oral    Cosign for Ordering: Accepted by Sherrill Weiner DO on 3/1/2017  3:38 PM    vancomycin (VANCOCIN) 750 mg in sodium chloride 0.9 % 250 mL IVPB 750 mg Every 12 Hours 3/4/2017     Sig - Route: Infuse 750 mg into a venous catheter Every 12 (Twelve) Hours. - Intravenous    dexmedetomidine HCl (PRECEDEX) 4 mcg/mL in sodium chloride 0.9 % 100 mL infusion (Discontinued) 0.2-1.5 mcg/kg/hr × 55.8 kg Continuous 3/6/2017 3/7/2017    Sig - Route: Infuse 11.16-83.7 mcg/hr into a venous catheter " Continuous. - Intravenous    Reason for Discontinue: Formulary change    dexmedetomidine HCl (PRECEDEX) 4 mcg/mL in sodium chloride 0.9 % 100 mL infusion (Discontinued) 0.2-1.5 mcg/kg/hr × 55.8 kg Titrated 3/7/2017 3/8/2017    Sig - Route: Infuse 11.16-83.7 mcg/hr into a venous catheter Dose Adjusted By Provider As Needed. - Intravenous    metroNIDAZOLE (FLAGYL) IVPB 500 mg (Discontinued) 500 mg Every 8 Hours 3/3/2017 3/8/2017    Sig - Route: Infuse 100 mL into a venous catheter Every 8 (Eight) Hours. - Intravenous            Cultures:    BLOOD CULTURE   Date Value Ref Range Status   03/06/2017 No growth at 24 hours  Preliminary   03/06/2017 No growth at 24 hours  Preliminary   03/04/2017 No growth at 4 days  Preliminary   03/04/2017 No growth at 4 days  Preliminary   03/03/2017 No growth at 4 days  Preliminary   03/03/2017 No growth at 4 days  Preliminary       PROBLEM LIST:    Patient Active Problem List   Diagnosis   • Chest pain   • Chest pain of uncertain etiology   • Intractable nausea and vomiting   • Intractable cyclical vomiting with nausea   • Chronic obstructive pulmonary emphysema   • Hepatitis-C   • Diabetes mellitus type 2 in nonobese   • Arthritis or polyarthritis, rheumatoid   • Anxiety   • Essential hypertension   • Coronary artery disease   • Buerger's disease   • Cirrhosis   • Diabetes   • Chronic nausea   • Pancreatitis   • Bilateral pneumonia   • ARDS (adult respiratory distress syndrome)   • Hypoxia       Assessment/Plan     ASSESSMENT:    1. Severe sepsis with acute respiratory failure  2. Bilateral pneumonia     PLAN:    The patient was confused and agitated overnight.  She pulled out her feeding tube with 2 liquid bowel movements reported overnight. Continues to be confused and slightly agitated this morning.  Urine culture from 3/11/2017 updated to Candida tropicalis with normal white count and slightly worsening CRP level.  Candida growth in the urine is most likely contamination but might  consider initiating short course of Diflucan if CRP level continues to worsen.  Would recheck CRP on Monday.    Continues to be on Rocephin, Flagyl, and oral vancomycin.      This is very complicated at this time as the patient developed C. difficile colitis while requiring non-C. difficile antibiotics and this carries a very guarded prognosis regardless of type of intervention.  Due to her concurrent C. difficile colitis infection IV vancomycin and meropenem therapy were discontinued and coverage was switched to Rocephin 2 g IV every 24 hours, Flagyl 500 mg IV every 8 hours, and oral vancomycin 125 mg by mouth every 6 hours.  Hope to discontinue Rocephin very soon as this could definitely worsen C. difficile colitis.        Patients findings and recommendations were discussed with nursing staff    Code Status: Full Code     Written by Alycia Finn PA-C, acting as scribe for Dr. Hitchcock.      Alycia Finn PA-C  03/17/17  9:54 AM    Physician Attestation:    I have personally seen and examined the patient. I reviewed the patient's data including history of present illness, review of systems, physical examination, assessment and treatment plan and agree with findings above. The assessment and plan are my own.  I have reviewed and edited the note above after discussing the findings with Alycia Finn PA-C.    Evan Hitchcock MD  Infectious Diseases  03/17/17  12:11 PM

## 2017-03-17 NOTE — PROGRESS NOTES
Acute Care - Physical Therapy Initial Evaluation   Daniel     Patient Name: Pepito Yan  : 1965  MRN: [id number]  Today's Date: 3/17/2017   Onset of Illness/Injury or Date of Surgery Date: 17  Date of Referral to PT: 17  Referring Physician: Dr. Weiner      Admit Date: 2017     Visit Dx:    ICD-10-CM ICD-9-CM   1. Pneumonia of both lower lobes due to infectious organism J18.9 483.8     Patient Active Problem List   Diagnosis   • Chest pain   • Chest pain of uncertain etiology   • Intractable nausea and vomiting   • Intractable cyclical vomiting with nausea   • Chronic obstructive pulmonary emphysema   • Hepatitis-C   • Diabetes mellitus type 2 in nonobese   • Arthritis or polyarthritis, rheumatoid   • Anxiety   • Essential hypertension   • Coronary artery disease   • Buerger's disease   • Cirrhosis   • Diabetes   • Chronic nausea   • Pancreatitis   • Bilateral pneumonia   • ARDS (adult respiratory distress syndrome)   • Hypoxia     Past Medical History   Diagnosis Date   • Anxiety    • 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   • Chronic respiratory failure with hypoxia      at nighttime   • Compression fracture of lumbar vertebra    • Compression fracture of thoracic vertebra    • COPD (chronic obstructive pulmonary disease)    • Coronary artery disease    • Diabetes mellitus type 2 in nonobese    • Erosive esophagitis 2016   • Essential hypertension    • Gastroparesis    • GERD (gastroesophageal reflux disease)    • Hepatitis-C    • Medically noncompliant    • MRSA infection      back of neck around    • NSTEMI (non-ST elevated myocardial infarction) 2016   • PE (pulmonary embolism)    • Protein calorie malnutrition    • RA (rheumatoid arthritis)      with chronic steroid use     Past Surgical History   Procedure Laterality Date   • Cholecystectomy     • Foreign body removal       insulin needle removal x 3 from the abdomen   •  section       x 2   •  Hysterectomy     • Arm debridement Left 12/20/2016     Procedure: HAND IRRIGATION DEBRIDEMENT AND REPAIR ;  Surgeon: Shane Fuller MD;  Location: [facility] OR;  Service:    • Endoscopy N/A 12/20/2016     Procedure: ESOPHAGOGASTRODUODENOSCOPY;  Surgeon: Jeremy Clement III, MD;  Location: [facility] OR;  Service:    • Amputation digit Right 12/22/2016     Third digit to the PIP. Surgeon: Dr. Bains   • Amputation digit Left 12/22/2016     Procedure: PARTIAL AMPUTATION LEFT THIRD FINGER;  Surgeon: Shane Fuller MD;  Location:  [facility] OR;  Service:    • Amputation revision Left 12/22/2016     Procedure: AMPUTATION REVISION DIGIT STUMP;  Surgeon: Shane Fuller MD;  Location: [facility] OR;  Service:           PT ASSESSMENT (last 72 hours)      PT Evaluation       03/17/17 1839 03/16/17 1400    Rehab Evaluation    Document Type evaluation  -BC     Subjective Information agree to therapy;complains of;weakness  -BC     Patient Effort, Rehab Treatment fair  -BC     General Information    Patient Profile Review yes  -BC     Onset of Illness/Injury or Date of Surgery Date 03/17/17  -BC     Referring Physician Dr. Weiner  -BC     General Observations Pt. supine in bed, awake and alert, no distress noted.  -BC     Prior Level of Function --   unknown  -BC     Living Environment    Lives With friend(s)  -BC     Living Arrangements house  -BC     Clinical Impression    Date of Referral to PT 03/17/17  -BC     Functional Level At Time Of Evaluation poor/dependent  -BC     Criteria for Skilled Therapeutic Interventions Met yes;treatment indicated  -BC     Rehab Potential fair, will monitor progress closely  -BC     Predicted Duration of Therapy Intervention (days/wks) 4-5 days  -BC     Pain Assessment    Pain Assessment No/denies pain  -BC     Muscle Tone Assessment    Muscle Tone Assessment  Bilateral Lower Extremities;Bilateral Upper Extremities  -SP    Bilateral Upper Extremities Muscle Tone Assessment   severely decreased tone  -SP    Bilateral Lower Extremities Muscle Tone Assessment  severely decreased tone  -SP    Bed Mobility, Assessment/Treatment    Bed Mobility, Assistive Device bed rails  -BC     Bed Mob, Supine to Sit, [place] verbal cues required;nonverbal cues required (demo/gesture);moderate assist (50% patient effort);2 person assist required  -BC     Bed Mob, Sit to Supine, [place] verbal cues required;nonverbal cues required (demo/gesture);moderate assist (50% patient effort);2 person assist required  -BC     Transfer Assessment/Treatment    Transfers, Sit-Stand [place] 2 person assist required;verbal cues required;nonverbal cues required (demo/gesture);maximum assist (25% patient effort);dependent (less than 25% patient effort)  -BC     Transfers, Stand-Sit [place] 2 person assist required;verbal cues required;nonverbal cues required (demo/gesture);maximum assist (25% patient effort);dependent (less than 25% patient effort)  -BC     Transfers, Sit-Stand-Sit, Assist Device rolling walker  -BC     Gait Assessment/Treatment    Gait, [place] Level unable to perform  -BC     Positioning and Restraints    Pre-Treatment Position in bed  -BC     Post Treatment Position bed  -BC     In Bed notified nsg;supine;call light within reach;encouraged to call for assist;side rails up x3  -BC       03/16/17 0800 03/16/17 0200    Muscle Tone Assessment    Muscle Tone Assessment Bilateral Lower Extremities;Bilateral Upper Extremities  -SP Bilateral Lower Extremities;Bilateral Upper Extremities  -TB    Bilateral Upper Extremities Muscle Tone Assessment moderately decreased tone  -SP moderately decreased tone  -TB    Bilateral Lower Extremities Muscle Tone Assessment moderately decreased tone  -SP moderately decreased tone  -TB      03/15/17 2000 03/15/17 1400    Muscle Tone Assessment    Muscle Tone Assessment Bilateral Lower Extremities;Bilateral Upper Extremities  -TB Bilateral Lower  Extremities;Bilateral Upper Extremities  -SP    Bilateral Upper Extremities Muscle Tone Assessment moderately decreased tone  -TB severely decreased tone  -SP    Bilateral Lower Extremities Muscle Tone Assessment moderately decreased tone  -TB severely decreased tone  -SP      03/15/17 0800 03/15/17 0200    Muscle Tone Assessment    Muscle Tone Assessment Bilateral Lower Extremities;Bilateral Upper Extremities  -SP Bilateral Lower Extremities;Bilateral Upper Extremities  -TB    Bilateral Upper Extremities Muscle Tone Assessment severely decreased tone  -SP moderately decreased tone  -TB    Bilateral Lower Extremities Muscle Tone Assessment severely decreased tone  -SP moderately decreased tone  -TB      03/14/17 2000       Muscle Tone Assessment    Muscle Tone Assessment Bilateral Lower Extremities;Bilateral Upper Extremities  -TB     Bilateral Upper Extremities Muscle Tone Assessment moderately decreased tone  -TB     Bilateral Lower Extremities Muscle Tone Assessment moderately decreased tone  -TB       User Key  (r) = Recorded By, (t) = Taken By, (c) = Cosigned By    Initials Name Provider Type    TB Luciana Roth, RN Registered Nurse    BC Caryn Mendez, PT Physical Therapist    SP Michelle Shi, RN Registered Nurse          Physical Therapy Education     Title: PT OT SLP Therapies (Active)     Topic: Physical Therapy (Active)     Point: Mobility training (Active)    Learning Progress Summary    Learner Readiness Method Response Comment Documented by Status   Patient Acceptance E NR,NL  BC 03/17/17 1854 Active    Acceptance E VU Patient family conference set for Wednesday at 10am regarding plan of care. AG 03/14/17 1852 Done    Acceptance E VU  LK 03/09/17 1917 Done    Acceptance E VU  KT 03/05/17 1927 Done    Nonacceptance E NR pt sedated/intubated MB 03/05/17 1746 Active    Acceptance E VU  KT 03/05/17 0558 Done    Acceptance E NR  AS 03/02/17 1958 Active    Acceptance E VU  KS 03/02/17  1816 Done    Acceptance E VU,NR  AG1 03/02/17 1109 Done    Acceptance E,D VU,NR  TB 03/01/17 1422 Done   Family Acceptance E VU Patient family conference set for Wednesday at 10am regarding plan of care. AG 03/14/17 1852 Done    Acceptance E VU  HN 03/11/17 0324 Done    Acceptance E VU  LK 03/09/17 1917 Done    Acceptance E VU  KT 03/05/17 1927 Done    Acceptance E VU  KT 03/05/17 0558 Done               Point: Home exercise program (Active)    Learning Progress Summary    Learner Readiness Method Response Comment Documented by Status   Patient Acceptance E NR,NL  BC 03/17/17 1854 Active    Acceptance E VU Patient family conference set for Wednesday at 10am regarding plan of care. AG 03/14/17 1852 Done    Acceptance E VU  LK 03/09/17 1917 Done    Acceptance E VU  KT 03/05/17 1927 Done    Nonacceptance E NR pt sedated/intubated MB 03/05/17 1746 Active    Acceptance E VU  KT 03/05/17 0558 Done    Acceptance E NR  AS 03/02/17 1958 Active    Acceptance E VU  KS 03/02/17 1816 Done    Acceptance E VU,NR  AG1 03/02/17 1109 Done    Acceptance E,D VU,NR  TB 03/01/17 1422 Done   Family Acceptance E VU Patient family conference set for Wednesday at 10am regarding plan of care. AG 03/14/17 1852 Done    Acceptance E VU  HN 03/11/17 0324 Done    Acceptance E VU  LK 03/09/17 1917 Done    Acceptance E VU  KT 03/05/17 1927 Done    Acceptance E VU  KT 03/05/17 0558 Done               Point: Body mechanics (Active)    Learning Progress Summary    Learner Readiness Method Response Comment Documented by Status   Patient Acceptance E NR,NL  BC 03/17/17 1854 Active    Acceptance E VU Patient family conference set for Wednesday at 10am regarding plan of care. AG 03/14/17 1852 Done    Acceptance E VU  LK 03/09/17 1917 Done    Acceptance E VU  KT 03/05/17 1927 Done    Nonacceptance E NR pt sedated/intubated MB 03/05/17 1746 Active    Acceptance E VU  KT 03/05/17 0558 Done    Acceptance E NR  AS 03/02/17 1958 Active    Acceptance E VU  KS  03/02/17 1816 Done    Acceptance E VU,NR  AG1 03/02/17 1109 Done    Acceptance E,D VU,NR  TB 03/01/17 1422 Done   Family Acceptance E VU Patient family conference set for Wednesday at 10am regarding plan of care. AG 03/14/17 1852 Done    Acceptance E VU  HN 03/11/17 0324 Done    Acceptance E VU  LK 03/09/17 1917 Done    Acceptance E VU  KT 03/05/17 1927 Done    Acceptance E VU  KT 03/05/17 0558 Done               Point: Precautions (Active)    Learning Progress Summary    Learner Readiness Method Response Comment Documented by Status   Patient Acceptance E NR,NL  BC 03/17/17 1854 Active    Acceptance E VU Patient family conference set for Wednesday at 10am regarding plan of care. AG 03/14/17 1852 Done    Acceptance E VU  LK 03/09/17 1917 Done    Acceptance E VU  KT 03/05/17 1927 Done    Nonacceptance E NR pt sedated/intubated MB 03/05/17 1746 Active    Acceptance E VU  KT 03/05/17 0558 Done    Acceptance E NR  AS 03/02/17 1958 Active    Acceptance E VU  KS 03/02/17 1816 Done    Acceptance E VU,NR  AG1 03/02/17 1109 Done    Acceptance E,D VU,NR  TB 03/01/17 1422 Done   Family Acceptance E VU Patient family conference set for Wednesday at 10am regarding plan of care. AG 03/14/17 1852 Done    Acceptance E VU  HN 03/11/17 0324 Done    Acceptance E VU  LK 03/09/17 1917 Done    Acceptance E VU  KT 03/05/17 1927 Done    Acceptance E VU  KT 03/05/17 0558 Done                      User Key     Initials Effective Dates Name Provider Type Discipline     06/16/16 -  Elizabeth Prince, RN Registered Nurse Nurse    MB 06/16/16 -  Dinah Arellano, RN Registered Nurse Nurse    LK 06/16/16 -  Erin Prater, RN Registered Nurse Nurse    HN 06/16/16 -  Bella Bauer, RN Registered Nurse Nurse    AS 06/16/16 -  Philly Vergara, RN Registered Nurse Nurse    BC 03/14/16 -  Caryn Mendez, PT Physical Therapist PT    AG1 06/16/16 -  Ivis Espinal, RN Registered Nurse Nurse    TB 12/30/16 -  Amanda Nelson, PT Therapist  PT    KS 08/08/16 -  Holland Germain, RN Registered Nurse Nurse    AG 01/03/17 -  Kirstie Mcfadden, RN Registered Nurse Nurse                PT Recommendation and Plan  Anticipated Discharge Disposition: other (see comments) (Pt was transferred to CCU on 3/2/16 & is now intubated and unable to participate w/ therapy. Please re-consult PT when appropriate for pt to participate w/ mobility training.)  Planned Therapy Interventions: balance training, bed mobility training, gait training, home exercise program, patient/family education, strengthening, transfer training  PT Frequency: 3-5 times/wk, per priority policy             IP PT Goals       03/17/17 1856 03/17/17 1855 03/17/17 1854    Bed Mobility PT LTG    Bed Mobility PT LTG, Date Established   03/17/17  -BC    Bed Mobility PT LTG, Time to Achieve   4 days  -BC    Bed Mobility PT LTG, Activity Type   all bed mobility  -BC    Bed Mobility PT LTG, [place] Level   minimum assist (75% patient effort);2 person assist required  -BC    Bed Mobility PT Goal  LTG, Assist Device   bed rails  -BC    Transfer Training PT LTG    Transfer Training PT LTG, Date Established  03/17/17  -BC     Transfer Training PT LTG, Time to Achieve  4 days  -BC     Transfer Training PT LTG, Activity Type  all transfers  -BC     Transfer Training PT LTG, [place] Level  contact guard assist;minimum assist (75% patient effort)  -BC     Transfer Training PT LTG, Assist Device  walker, rolling  -BC     Gait Training PT LTG    Gait Training Goal PT LTG, Date Established 03/17/17  -BC      Gait Training Goal PT LTG, Time to Achieve 4 days  -BC      Gait Training Goal PT LTG, [place] Level contact guard assist;minimum assist (75% patient effort)  -BC      Gait Training Goal PT LTG, Assist Device walker, rolling  -BC      Gait Training Goal PT LTG, Distance to Achieve 15  -BC        User Key  (r) = Recorded By, (t) = Taken By, (c) = Cosigned By    Initials Name Provider Type    BC  Caryn Mendez PT Physical Therapist                Outcome Measures       03/17/17 1800          How much help from another person do you currently need...    Turning from your back to your side while in flat bed without using bedrails? 2  -BC      Moving from lying on back to sitting on the side of a flat bed without bedrails? 1  -BC      Moving to and from a bed to a chair (including a wheelchair)? 1  -BC      Standing up from a chair using your arms (e.g., wheelchair, bedside chair)? 1  -BC      Climbing 3-5 steps with a railing? 1  -BC      To walk in hospital room? 1  -BC      AM-PAC 6 Clicks Score 7  -BC      Functional Assessment    Outcome Measure Options AM-PAC 6 Clicks Basic Mobility (PT)  -BC        User Key  (r) = Recorded By, (t) = Taken By, (c) = Cosigned By    Initials Name Provider Type    BC Caryn Mendez PT Physical Therapist           Time Calculation:         PT Charges       03/17/17 9977          Time Calculation    Start Time --   60  -BC      PT Received On 03/17/17  -BC      PT Goal Re-Cert Due Date 03/31/17  -BC        User Key  (r) = Recorded By, (t) = Taken By, (c) = Cosigned By    Initials Name Provider Type    BC Caryn Mendez PT Physical Therapist          Therapy Charges for Today     Code Description Service Date Service Provider Modifiers Qty    98853940085 HC PT MOBILITY CURRENT 3/17/2017 Caryn Mendez PT GP, CM 1    23813543538 HC PT MOBILITY PROJECTED 3/17/2017 Caryn Mendez PT GP, CL 1    99011559675 HC PT EVAL MOD COMPLEXITY 2 3/17/2017 Caryn Mendez PT GP 1    99926318248 HC PT THERAPEUTIC ACT EA 15 MIN 3/17/2017 Caryn Mendez PT GP 2    85902483827 HC PT THER SUPP EA 15 MIN 3/17/2017 Caryn Mendez, PT GP 2          PT G-Codes  Outcome Measure Options: AM-PAC 6 Clicks Basic Mobility (PT)  Score: 7  Functional Limitation: Mobility: Walking and moving around  Mobility: Walking and Moving Around Current Status (): At least 80 percent but less  than 100 percent impaired, limited or restricted  Mobility: Walking and Moving Around Goal Status (): At least 60 percent but less than 80 percent impaired, limited or restricted      Caryn Mendez, PT  3/17/2017

## 2017-03-17 NOTE — PROGRESS NOTES
[facility] CCU Note    Patient Identification:  Name:  Pepito Yan  Age:  51 y.o.  Sex:  female  :  1965  MRN:  [id number]  Visit number:  [id number]  Primary Care Provider:  ROSA Meza    17    Ventilator Day:  Ventilator Settings:  Ventilator/Non-Invasive Ventilation Settings     Start     Ordered    17 1638  Ventilator - AC/VC; (16); (55%); 12; 450  Continuous     Question Answer Comment   Vent Mode AC/VC    Breath rate  16   FiO2  55%   PEEP 12    Tidal Volume 450        17 1638    17 0713  Ventilator - AC/VC; 12  Continuous,   Status:  Canceled     Question Answer Comment   Vent Mode AC/VC    PEEP 12        17 0712    17 0651  Ventilator - AC/VC; (30); 15  Continuous,   Status:  Canceled     Question Answer Comment   Vent Mode AC/VC    Breath rate  30   PEEP 15        17 0651    17 1753  . BIPAP; IPAP: 10; EPAP: 16; Titrate for spO2: equal to or greater than, 90%  At Bedtime & As Needed-RT,   Status:  Canceled     Question Answer Comment   . BIPAP    IPAP 10    EPAP 16    Titrate for spO2 equal to or greater than    Titrate for spO2 90%        17 1755    17 1752  . BIPAP  At Bedtime & As Needed-RT,   Status:  Canceled     Question:  .  Answer:  BIPAP    17 1751    17 1127  NIPPV CPAP OR BIPAP  Until Discontinued,   Status:  Canceled     Comments:  16/6 35 % oxygen rate 20    17 1127          Drips: Fentanyl, Versed, Propofol on hold for now for spontaneous breathing trial   Antibiotics: Micafungin, Rocephin, Flagyl, Oral Vancomcyin  Lines: R IJ  King Catheter: Yes    Procedures:  3/3/17: Emergent Intubation  3/3/17: Right femoral central line placement status post removal  3/3/17: Right IJ central line placement  3/9/17: Transfusion 1 unit PRBC  3/16/17: Extubation    HPI: The patient is a 52 yo female admitted with bilateral pneumonia    Subjective     The patient was resting in bed this  "morning.  She was extubated last night.  She has a nasogastric tube in place.  She is oriented to self.  She tells me that she is at \"[facility]\" and thinks the year is 1917.  She recognizes me and is able to move all extremities upon command.    She complains of sore throat and occasional cough.  She currently denies chest pain.  She denies hunger.  She denies nausea and/or abdominal pain.    History taken from: chart, patient, RN  Patient unable to give history due to: N/A  Present during visit: GUERRERO Martinez, Melina with speech therapy    Objective     Vital Signs  Temp:  [97.8 °F (36.6 °C)-99.2 °F (37.3 °C)] 98.1 °F (36.7 °C)  Heart Rate:  [110-145] 123  Resp:  [22-36] 26  BP: (111-165)/() 141/88  Body mass index is 21.79 kg/(m^2).    Intake/Output Summary (Last 24 hours) at 03/17/17 0955  Last data filed at 03/17/17 0853   Gross per 24 hour   Intake    510 ml   Output   2550 ml   Net  -2040 ml       Physical Exam:    Physical Exam   Constitutional: She appears well-developed and well-nourished. No distress. Nasal cannula in place.   Acutely and chronically ill appearing   HENT:   Head: Normocephalic and atraumatic.   Nose: Nose normal.   Mouth/Throat: Oropharynx is clear and moist and mucous membranes are normal. Oral lesions present.   Nasogastric tube in place   Eyes: EOM are normal. Pupils are equal, round, and reactive to light. Right conjunctiva is injected. Left conjunctiva is injected. No scleral icterus.   Neck: Trachea normal. Neck supple. No JVD present. Carotid bruit is not present. No thyromegaly present.   Cardiovascular: Regular rhythm, normal heart sounds and normal pulses.  Tachycardia present.  Exam reveals no gallop and no friction rub.    No murmur heard.  No lower extremity edema   Pulmonary/Chest: Effort normal. No respiratory distress. She has no wheezes. She has rales.   Diminished breath sounds bilaterally bases    Abdominal: Soft. Bowel sounds are normal. She exhibits no " distension. There is no tenderness. There is no guarding.   Genitourinary:   Genitourinary Comments: King catheter in place   Musculoskeletal: Normal range of motion.   Neurological:   Oriented to self; able to follow all commands. Strength is equal bilaterally.   Skin: Skin is warm, dry and intact. No rash noted. No erythema.     Results Review:     I reviewed the patient's new imaging results and agree with the interpretation.  I reviewed the patient's other test results and agree with the interpretation.    Telemetry: Sinus Tachycardia, 120s-130s      Results from last 7 days  Lab Units 03/17/17  0603 03/16/17  0059 03/15/17  0034 03/14/17  0038 03/13/17  0511 03/12/17  0518 03/11/17  0520   WBC 10*3/mm3 11.98 7.76 5.83 6.18 5.75 5.52 5.87   HEMOGLOBIN g/dL 9.4* 9.2* 8.6* 8.4* 8.0* 8.2* 8.9*   PLATELETS 10*3/mm3 280 255 216 206 211 176 146       Results from last 7 days  Lab Units 03/17/17  0603 03/16/17  1659 03/16/17  0059 03/15/17  0034 03/14/17  0038 03/13/17  0510 03/12/17  0518 03/11/17  0520   SODIUM mmol/L 143  --  144 144 147 147 142 144   POTASSIUM mmol/L 3.2* 4.4 3.1* 4.2 4.0 3.1* 3.8 3.7   CHLORIDE mmol/L 106  --  101 107 114* 112 110 116*   TOTAL CO2 mmol/L 26.4  --  35.9* 35.1* 33.0* 31.5 26.8 26.4   BUN mg/dL 15  --  19 16 12 13 16 14   CREATININE mg/dL 0.42*  --  0.42* 0.50 0.34* 0.39* 0.42* 0.42*   CALCIUM mg/dL 9.2  --  8.7 8.9 8.2 8.0 7.9 8.6   GLUCOSE mg/dL 60*  --  98 141* 140* 215* 341* 204*       Results from last 7 days  Lab Units 03/17/17  0603 03/16/17  0059 03/15/17  0034 03/14/17  0038 03/13/17  0510 03/12/17  0518 03/11/17  0520   BILIRUBIN mg/dL 0.5 0.5 0.3 0.2 0.1* 0.2 0.2   ALK PHOS U/L 85 89 89 115* 116* 145* 160*   AST (SGOT) U/L 37* 45* 30 42* 66* 59* 46*   ALT (SGPT) U/L 42* 47* 48* 67* 71* 62* 45*       Results from last 7 days  Lab Units 03/17/17  0603 03/16/17  0825 03/13/17  0907 03/13/17  0510 03/12/17  0518 03/11/17  0520   MAGNESIUM mg/dL 1.9 1.9 2.1 2.4 1.9 2.0        Results from last 7 days  Lab Units 03/17/17  0603 03/15/17  0034 03/14/17  0038   CRP mg/dL 4.27* 2.95* 3.08*     CXR, 3/16/17:  FINDINGS:      Nasogastric tube is within the stomach  Lung bases show airspace disease bilaterally.  The cardiac silhouette is normal. The pulmonary vasculature is  unremarkable.  There is no evidence of an acute osseous abnormality.   There are no suspicious-appearing parenchymal soft tissue nodules.          IMPRESSION:  NG tube in the stomach  Bilateral airspace disease     Cultures:  BLOOD CULTURE   Date Value Ref Range Status   03/11/2017 No growth at 24 hours  Preliminary   03/11/2017 No growth at 24 hours  Preliminary   03/06/2017 No growth at 5 days  Final   03/06/2017 No growth at 5 days  Final     URINE CULTURE   Date Value Ref Range Status   03/11/2017 Normal Urogenital Falguni  Preliminary     Urine Culture   Specimen Information: Urine, Clean Catch; Urine        Culture   >100,000 CFU/mL Yeast isolated (A)             Current Hospital Medications:    acetaminophen 650 mg Oral Once   acyclovir 200 mg Oral 5x Daily   ALPRAZolam 1 mg Oral Daily   aspirin 81 mg Oral Daily   bacitracin  Topical Q12H   ceftriaxone 2 g Intravenous Q24H   clopidogrel 75 mg Oral Daily   guaiFENesin 300 mg Oral Q6H   heparin (porcine) 5,000 Units Subcutaneous Q12H   insulin aspart 0-14 Units Subcutaneous 4x Daily AC & at Bedtime   insulin detemir 22 Units Subcutaneous Nightly   ipratropium 0.5 mg Nebulization Q6H - RT   metoprolol tartrate 25 mg Oral Q12H   metroNIDAZOLE 500 mg Intravenous Q8H   pantoprazole 40 mg Intravenous Q AM   potassium chloride 40 mEq Oral Once   potassium chloride 40 mEq Oral BID   potassium chloride 10 mEq Intravenous Q1H   predniSONE 10 mg Oral Daily With Breakfast   vancomycin 125 mg Oral Q6H       fentaNYL (SUBLIMAZE) PCA 1500 mcg/30 mL syringe  Last Rate: Stopped (03/16/17 0706)   midazolam 0.5 mg/mL 100 mL NS 1-10 mg/hr Last Rate: Stopped (03/16/17 0706)   Pharmacy  to dose vancomycin     propofol 5-50 mcg/kg/min Last Rate: Stopped (03/16/17 0706)       Assessment/Plan      -Severe sepsis with acute hypoxic respiratory failure that was present upon admission and secondary to to community acquired pneumonia and E.coli UTI, now with probable hospital/ventilator associated pneumonia and Clostridium difficile colitis  -Acute clostridium difficile colitis  -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 Chest xray is much improved. Continue with broad spectrum IV antibiotics and oral steroids. Supplement electrolytes per protocol as necessary. She will receive potassium today. Check Magnesium today. Consult physical therapy. Patient to be evaluated by speech therapy today. Will consult physical therapy. Continue to closely monitor H&H and plan to transfuse if hemoglobin were to drop below 7.0.      Will increase her dose of metoprolol due to ongoing hypertension and tachycardia.    Plan to repeat her CBC, CMP and CRP level in the am.     Patient is considered high risk due to: severe sepsis, acute respiratory distress syndrome, prolonged mechanical ventilation, clostridium colitis    Continue to monitor in CCU for today.    Overall prognosis remains very poor/grim.    I discussed the patients findings and my recommendations with nursing staff, speech therapy and Dr. Kirk Weiner,   03/17/17  9:55  AM

## 2017-03-17 NOTE — PROGRESS NOTES
Discharge Planning Assessment   Daniel     Patient Name: Pepito Yan  MRN: [id number]  Today's Date: 3/17/2017    Admit Date: 2/28/2017          Discharge Needs Assessment     None            Discharge Plan       03/17/17 1128    Case Management/Social Work Plan    Plan Alyssa at [facility] has asked to be contacted Monday and regarding the pt's condition.  Alyssa states if she continues to be off the vent, she is willing to resubmit the pt's information to Dr. Gil to [facility]. SS will follow-up with [facility] on Monday.     Patient/Family In Agreement With Plan yes        Discharge Placement     No information found        Expected Discharge Date and Time     Expected Discharge Date Expected Discharge Time    Mar 9, 2017               Demographic Summary     None            Functional Status     None            Psychosocial     None            Abuse/Neglect     None            Legal     None            Substance Abuse     None            Patient Forms     None          Merlene Parisi

## 2017-03-18 ENCOUNTER — APPOINTMENT (OUTPATIENT)
Dept: GENERAL RADIOLOGY | Facility: HOSPITAL | Age: 52
End: 2017-03-18

## 2017-03-18 LAB
ALBUMIN SERPL-MCNC: 3.4 G/DL (ref 3.5–5)
ALBUMIN/GLOB SERPL: 0.9 G/DL (ref 1.5–2.5)
ALP SERPL-CCNC: 78 U/L (ref 35–104)
ALT SERPL W P-5'-P-CCNC: 32 U/L (ref 10–36)
ANION GAP SERPL CALCULATED.3IONS-SCNC: 11.2 MMOL/L (ref 3.6–11.2)
ANISOCYTOSIS BLD QL: NORMAL
AST SERPL-CCNC: 30 U/L (ref 10–30)
BASOPHILS # BLD AUTO: 0.03 10*3/MM3 (ref 0–0.3)
BASOPHILS NFR BLD AUTO: 0.3 % (ref 0–2)
BILIRUB SERPL-MCNC: 0.5 MG/DL (ref 0.2–1.8)
BUN BLD-MCNC: 15 MG/DL (ref 7–21)
BUN/CREAT SERPL: 31.3 (ref 7–25)
CALCIUM SPEC-SCNC: 9 MG/DL (ref 7.7–10)
CHLORIDE SERPL-SCNC: 110 MMOL/L (ref 99–112)
CO2 SERPL-SCNC: 22.8 MMOL/L (ref 24.3–31.9)
CREAT BLD-MCNC: 0.48 MG/DL (ref 0.43–1.29)
CRP SERPL-MCNC: 4.38 MG/DL (ref 0–0.99)
DEPRECATED RDW RBC AUTO: 71.5 FL (ref 37–54)
EOSINOPHIL # BLD AUTO: 0.01 10*3/MM3 (ref 0–0.7)
EOSINOPHIL NFR BLD AUTO: 0.1 % (ref 0–5)
ERYTHROCYTE [DISTWIDTH] IN BLOOD BY AUTOMATED COUNT: 25.5 % (ref 11.5–14.5)
GFR SERPL CREATININE-BSD FRML MDRD: 136 ML/MIN/1.73
GLOBULIN UR ELPH-MCNC: 4 GM/DL
GLUCOSE BLD-MCNC: 159 MG/DL (ref 70–110)
GLUCOSE BLDC GLUCOMTR-MCNC: 171 MG/DL (ref 70–130)
GLUCOSE BLDC GLUCOMTR-MCNC: 88 MG/DL (ref 70–130)
GLUCOSE BLDC GLUCOMTR-MCNC: 93 MG/DL (ref 70–130)
HCT VFR BLD AUTO: 31.3 % (ref 37–47)
HGB BLD-MCNC: 9.1 G/DL (ref 12–16)
HYPOCHROMIA BLD QL: NORMAL
IMM GRANULOCYTES # BLD: 0.08 10*3/MM3 (ref 0–0.03)
IMM GRANULOCYTES NFR BLD: 0.7 % (ref 0–0.5)
LYMPHOCYTES # BLD AUTO: 1.23 10*3/MM3 (ref 1–3)
LYMPHOCYTES NFR BLD AUTO: 11.3 % (ref 21–51)
MCH RBC QN AUTO: 23.6 PG (ref 27–33)
MCHC RBC AUTO-ENTMCNC: 29.1 G/DL (ref 33–37)
MCV RBC AUTO: 81.3 FL (ref 80–94)
MONOCYTES # BLD AUTO: 0.51 10*3/MM3 (ref 0.1–0.9)
MONOCYTES NFR BLD AUTO: 4.7 % (ref 0–10)
NEUTROPHILS # BLD AUTO: 9.04 10*3/MM3 (ref 1.4–6.5)
NEUTROPHILS NFR BLD AUTO: 82.9 % (ref 30–70)
OSMOLALITY SERPL CALC.SUM OF ELEC: 291 MOSM/KG (ref 273–305)
PLAT MORPH BLD: NORMAL
PLATELET # BLD AUTO: 349 10*3/MM3 (ref 130–400)
PMV BLD AUTO: 11.7 FL (ref 6–10)
POTASSIUM BLD-SCNC: 3.9 MMOL/L (ref 3.5–5.3)
PROT SERPL-MCNC: 7.4 G/DL (ref 6–8)
RBC # BLD AUTO: 3.85 10*6/MM3 (ref 4.2–5.4)
SODIUM BLD-SCNC: 144 MMOL/L (ref 135–153)
WBC NRBC COR # BLD: 10.9 10*3/MM3 (ref 4.5–12.5)

## 2017-03-18 PROCEDURE — 94799 UNLISTED PULMONARY SVC/PX: CPT

## 2017-03-18 PROCEDURE — 82962 GLUCOSE BLOOD TEST: CPT

## 2017-03-18 PROCEDURE — 80053 COMPREHEN METABOLIC PANEL: CPT | Performed by: INTERNAL MEDICINE

## 2017-03-18 PROCEDURE — 63710000001 PREDNISONE PER 5 MG: Performed by: INTERNAL MEDICINE

## 2017-03-18 PROCEDURE — 71010 HC CHEST PA OR AP: CPT

## 2017-03-18 PROCEDURE — 25010000002 CEFTRIAXONE: Performed by: INTERNAL MEDICINE

## 2017-03-18 PROCEDURE — 99232 SBSQ HOSP IP/OBS MODERATE 35: CPT | Performed by: INTERNAL MEDICINE

## 2017-03-18 PROCEDURE — 25010000002 HEPARIN (PORCINE) PER 1000 UNITS: Performed by: INTERNAL MEDICINE

## 2017-03-18 PROCEDURE — 86140 C-REACTIVE PROTEIN: CPT | Performed by: INTERNAL MEDICINE

## 2017-03-18 PROCEDURE — 63710000001 INSULIN ASPART PER 5 UNITS: Performed by: INTERNAL MEDICINE

## 2017-03-18 PROCEDURE — 85007 BL SMEAR W/DIFF WBC COUNT: CPT | Performed by: NURSE PRACTITIONER

## 2017-03-18 PROCEDURE — 85025 COMPLETE CBC W/AUTO DIFF WBC: CPT | Performed by: NURSE PRACTITIONER

## 2017-03-18 PROCEDURE — 71010 XR CHEST 1 VW: CPT | Performed by: RADIOLOGY

## 2017-03-18 RX ORDER — ALPRAZOLAM 1 MG/1
1 TABLET ORAL NIGHTLY
Status: DISCONTINUED | OUTPATIENT
Start: 2017-03-18 | End: 2017-03-20

## 2017-03-18 RX ORDER — NICOTINE 21 MG/24HR
1 PATCH, TRANSDERMAL 24 HOURS TRANSDERMAL EVERY 24 HOURS
Status: DISCONTINUED | OUTPATIENT
Start: 2017-03-18 | End: 2017-03-20 | Stop reason: HOSPADM

## 2017-03-18 RX ADMIN — IPRATROPIUM BROMIDE 0.5 MG: 0.5 SOLUTION RESPIRATORY (INHALATION) at 00:37

## 2017-03-18 RX ADMIN — GUAIFENESIN 300 MG: 100 SOLUTION ORAL at 11:19

## 2017-03-18 RX ADMIN — IPRATROPIUM BROMIDE 0.5 MG: 0.5 SOLUTION RESPIRATORY (INHALATION) at 07:02

## 2017-03-18 RX ADMIN — GUAIFENESIN 300 MG: 100 SOLUTION ORAL at 18:48

## 2017-03-18 RX ADMIN — GUAIFENESIN 300 MG: 100 SOLUTION ORAL at 00:01

## 2017-03-18 RX ADMIN — IPRATROPIUM BROMIDE 0.5 MG: 0.5 SOLUTION RESPIRATORY (INHALATION) at 12:21

## 2017-03-18 RX ADMIN — PANTOPRAZOLE SODIUM 40 MG: 40 INJECTION, POWDER, FOR SOLUTION INTRAVENOUS at 05:33

## 2017-03-18 RX ADMIN — ALPRAZOLAM 1 MG: 1 TABLET ORAL at 21:44

## 2017-03-18 RX ADMIN — ACYCLOVIR 200 MG: 200 SUSPENSION ORAL at 21:45

## 2017-03-18 RX ADMIN — ACYCLOVIR 200 MG: 200 SUSPENSION ORAL at 14:01

## 2017-03-18 RX ADMIN — IPRATROPIUM BROMIDE 0.5 MG: 0.5 SOLUTION RESPIRATORY (INHALATION) at 18:45

## 2017-03-18 RX ADMIN — VANCOMYCIN 125 MG: KIT at 05:33

## 2017-03-18 RX ADMIN — METOPROLOL TARTRATE 50 MG: 50 TABLET, FILM COATED ORAL at 21:44

## 2017-03-18 RX ADMIN — ACYCLOVIR 200 MG: 200 SUSPENSION ORAL at 05:33

## 2017-03-18 RX ADMIN — METOPROLOL TARTRATE 50 MG: 50 TABLET, FILM COATED ORAL at 08:38

## 2017-03-18 RX ADMIN — VANCOMYCIN 125 MG: KIT at 11:30

## 2017-03-18 RX ADMIN — BACITRACIN ZINC: 500 OINTMENT TOPICAL at 21:43

## 2017-03-18 RX ADMIN — CLOPIDOGREL 75 MG: 75 TABLET, FILM COATED ORAL at 08:37

## 2017-03-18 RX ADMIN — ACYCLOVIR 200 MG: 200 SUSPENSION ORAL at 11:19

## 2017-03-18 RX ADMIN — HEPARIN SODIUM 5000 UNITS: 5000 INJECTION, SOLUTION INTRAVENOUS; SUBCUTANEOUS at 08:38

## 2017-03-18 RX ADMIN — NICOTINE 1 PATCH: 21 PATCH, EXTENDED RELEASE TRANSDERMAL at 11:33

## 2017-03-18 RX ADMIN — CEFTRIAXONE 2 G: 2 INJECTION, POWDER, FOR SOLUTION INTRAMUSCULAR; INTRAVENOUS at 09:07

## 2017-03-18 RX ADMIN — VANCOMYCIN 125 MG: KIT at 00:01

## 2017-03-18 RX ADMIN — METRONIDAZOLE 500 MG: 500 INJECTION, SOLUTION INTRAVENOUS at 05:33

## 2017-03-18 RX ADMIN — BACITRACIN ZINC: 500 OINTMENT TOPICAL at 08:42

## 2017-03-18 RX ADMIN — METRONIDAZOLE 500 MG: 500 INJECTION, SOLUTION INTRAVENOUS at 14:00

## 2017-03-18 RX ADMIN — ALPRAZOLAM 1 MG: 1 TABLET ORAL at 08:37

## 2017-03-18 RX ADMIN — INSULIN ASPART 3 UNITS: 100 INJECTION, SOLUTION INTRAVENOUS; SUBCUTANEOUS at 08:38

## 2017-03-18 RX ADMIN — PREDNISONE 10 MG: 10 TABLET ORAL at 08:37

## 2017-03-18 RX ADMIN — INSULIN ASPART 3 UNITS: 100 INJECTION, SOLUTION INTRAVENOUS; SUBCUTANEOUS at 11:26

## 2017-03-18 RX ADMIN — ASPIRIN 81 MG: 81 TABLET, CHEWABLE ORAL at 08:37

## 2017-03-18 RX ADMIN — GUAIFENESIN 300 MG: 100 SOLUTION ORAL at 05:33

## 2017-03-18 RX ADMIN — METRONIDAZOLE 500 MG: 500 INJECTION, SOLUTION INTRAVENOUS at 21:43

## 2017-03-18 RX ADMIN — ACYCLOVIR 200 MG: 200 SUSPENSION ORAL at 18:48

## 2017-03-18 RX ADMIN — HEPARIN SODIUM 5000 UNITS: 5000 INJECTION, SOLUTION INTRAVENOUS; SUBCUTANEOUS at 21:44

## 2017-03-18 RX ADMIN — VANCOMYCIN 125 MG: KIT at 18:48

## 2017-03-18 RX ADMIN — SODIUM CHLORIDE 250 ML: 9 INJECTION, SOLUTION INTRAVENOUS at 17:49

## 2017-03-18 NOTE — PLAN OF CARE
Problem: Patient Care Overview (Adult)  Goal: Plan of Care Review    03/18/17 1558   Coping/Psychosocial Response Interventions   Plan Of Care Reviewed With patient   Patient Care Overview   Progress progress toward functional goals is gradual       Goal: Discharge Needs Assessment    03/18/17 1558   Discharge Needs Assessment   Concerns To Be Addressed cognitive/perceptual concerns;compliance issue concerns;decision making concerns;discharge planning concerns;medication concerns;substance/tobacco abuse/use concerns   Equipment Needed After Discharge hospital bed;oxygen;respiratory supplies;shower chair;walker, rolling;wheelchair   Discharge Facility/Level Of Care Needs LTACH (long term acute [facility])   Current Discharge Risk chronically ill;physical impairment   Discharge Disposition still a patient;long-term care   Current Health   Outpatient/Agency/Support Group Needs long term acute care facility (specify)   Anticipated Changes Related to Illness inability to care for self   Self-Care   Equipment Currently Used at Home oxygen         Problem: Pneumonia (Adult)  Intervention: Maximize Oxygenation/Ventilation/Perfusion    03/18/17 1558   Promote Aggressive Pulmonary Hygiene/Secretion Management   Cough And Deep Breathing done independently per patient   Positioning   Head Of Bed (HOB) Position HOB at 30-45 degrees   Respiratory Interventions   Airway/Ventilation Management airway patency maintained;calming measures promoted   Activity   Activity Type bedrest         Goal: Signs and Symptoms of Listed Potential Problems Will be Absent or Manageable (Pneumonia)    03/18/17 1558   Pneumonia   Problems Assessed (Pneumonia) all   Problems Present (Pneumonia) progression of infection         Problem: Diabetes, Type 2 (Adult)  Goal: Signs and Symptoms of Listed Potential Problems Will be Absent or Manageable (Diabetes, Type 2)    03/18/17 1558   Diabetes, Type 2   Problems Assessed (Type 2 Diabetes) all   Problems  Present (Type 2 Diabetes) none         Problem: Pressure Ulcer Risk (Brendan Scale) (Adult,Obstetrics,Pediatric)  Intervention: Promote/Optimize Nutrition    03/18/17 1558   Nutrition Interventions   Oral Nutrition Promotion calorie dense foods provided   Hygiene Care Assistance   Oral Care lip lubricant applied;oral care provided;oral swabbing         03/18/17 1558   Nutrition Interventions   Oral Nutrition Promotion calorie dense foods provided   Hygiene Care Assistance   Oral Care lip lubricant applied;oral care provided;oral swabbing         Goal: Identify Related Risk Factors and Signs and Symptoms    03/18/17 1558   Pressure Ulcer Risk (Brendan Scale)   Related Risk Factors (Pressure Ulcer Risk (Brendan Scale)) cognitive impairment;critical care admission;hospitalization prolonged;fluid intake inadequate;excretions/secretions;infection;medical devices;nutritional deficiencies;mobility impaired;mental impairment;medication;tissue perfusion altered;body weight extremes       Goal: Skin Integrity    03/18/17 1558   Pressure Ulcer Risk (Brendan Scale) (Adult,Obstetrics,Pediatric)   Skin Integrity making progress toward outcome

## 2017-03-18 NOTE — PROGRESS NOTES
[facility] CCU Note    Patient Identification:  Name:  Pepito Yan  Age:  51 y.o.  Sex:  female  :  1965  MRN:  [id number]  Visit number:  [id number]  Primary Care Provider:  ROSA Meza    18    Ventilator Day:  Ventilator Settings:  Ventilator/Non-Invasive Ventilation Settings     Start     Ordered    17 1638  Ventilator - AC/VC; (16); (55%); 12; 450  Continuous     Question Answer Comment   Vent Mode AC/VC    Breath rate  16   FiO2  55%   PEEP 12    Tidal Volume 450        17 1638    17 0713  Ventilator - AC/VC; 12  Continuous,   Status:  Canceled     Question Answer Comment   Vent Mode AC/VC    PEEP 12        17 0712    17 0651  Ventilator - AC/VC; (30); 15  Continuous,   Status:  Canceled     Question Answer Comment   Vent Mode AC/VC    Breath rate  30   PEEP 15        17 0651    17 1753  . BIPAP; IPAP: 10; EPAP: 16; Titrate for spO2: equal to or greater than, 90%  At Bedtime & As Needed-RT,   Status:  Canceled     Question Answer Comment   . BIPAP    IPAP 10    EPAP 16    Titrate for spO2 equal to or greater than    Titrate for spO2 90%        17 1755    17 1752  . BIPAP  At Bedtime & As Needed-RT,   Status:  Canceled     Question:  .  Answer:  BIPAP    17 1751    17 1127  NIPPV CPAP OR BIPAP  Until Discontinued,   Status:  Canceled     Comments:  16/6 35 % oxygen rate 20    17 1127          Drips: None   Antibiotics: Rocephin, Flagyl, Oral Vancomcyin  Lines: R IJ  King Catheter: Yes    Procedures:  3/3/17: Emergent Intubation  3/3/17: Right femoral central line placement status post removal  3/3/17: Right IJ central line placement  3/9/17: Transfusion 1 unit PRBC  3/16/17: Extubation    HPI: The patient is a 50 yo female admitted with bilateral pneumonia    Subjective     The patient was resting in bed this morning.  She was extubated on 3/16/2017.  She has a nasogastric tube in place.  She  is oriented to self.  She complains of generalized abdominal pain and non-productive cough. She also complains of sore throat. Patient converses today and states that she would like to be able to eat and wants to go home. I explained to the patient that she cannot eat anything by mouth at this time as she failed swallow evaluation yesterday with aspiration.  She is able to follow commands. She denies any chest pain. She denies any nausea.     History taken from: chart, patient, RN  Present during visit: GUERRERO Self    Objective     Vital Signs  Temp:  [97.4 °F (36.3 °C)-98.3 °F (36.8 °C)] 97.8 °F (36.6 °C)  Heart Rate:  [108-132] 116  Resp:  [20-24] 22  BP: (127-157)/(80-99) 157/90  Body mass index is 19.1 kg/(m^2).    Intake/Output Summary (Last 24 hours) at 03/18/17 0844  Last data filed at 03/18/17 0533   Gross per 24 hour   Intake    843 ml   Output   1550 ml   Net   -707 ml       Physical Exam:    Physical Exam   Constitutional: She appears well-developed and well-nourished. No distress. Nasal cannula in place.   Acutely and chronically ill appearing   HENT:   Head: Normocephalic and atraumatic.   Nose: Nose normal.   Mouth/Throat: Oropharynx is clear and moist and mucous membranes are normal. Oral lesions present.   Nasogastric tube in place   Eyes: EOM are normal. Pupils are equal, round, and reactive to light. Right conjunctiva is injected. No scleral icterus.   Neck: Trachea normal. Neck supple. No JVD present. Carotid bruit is not present. No thyromegaly present.   Cardiovascular: Regular rhythm, normal heart sounds and normal pulses.  Tachycardia present.  Exam reveals no gallop and no friction rub.    No murmur heard.  No lower extremity edema   Pulmonary/Chest: Effort normal. No respiratory distress. She has no wheezes. She has rales.   Expiratory crackles right upper and lower lobes    Abdominal: Soft. Bowel sounds are normal. She exhibits distension. There is no tenderness. There is no guarding.    Genitourinary:   Genitourinary Comments: King catheter in place   Musculoskeletal: Normal range of motion.   Neurological:   Oriented to self; able to follow all commands. Strength is equal bilaterally.   Skin: Skin is warm, dry and intact. No rash noted. No erythema.     Results Review:     I reviewed the patient's new imaging results and agree with the interpretation.  I reviewed the patient's other test results and agree with the interpretation.    Telemetry: Sinus Tachycardia, 120s, SpO2 97% on nasal cannula       Results from last 7 days  Lab Units 03/18/17  0529 03/17/17  0603 03/16/17  0059 03/15/17  0034 03/14/17  0038 03/13/17  0511 03/12/17  0518   WBC 10*3/mm3 10.90 11.98 7.76 5.83 6.18 5.75 5.52   HEMOGLOBIN g/dL 9.1* 9.4* 9.2* 8.6* 8.4* 8.0* 8.2*   PLATELETS 10*3/mm3 349 280 255 216 206 211 176       Results from last 7 days  Lab Units 03/18/17  0529 03/17/17  1525 03/17/17  0603 03/16/17  1659 03/16/17  0059 03/15/17  0034 03/14/17  0038 03/13/17  0510 03/12/17  0518   SODIUM mmol/L 144  --  143  --  144 144 147 147 142   POTASSIUM mmol/L 3.9 4.4 3.2* 4.4 3.1* 4.2 4.0 3.1* 3.8   CHLORIDE mmol/L 110  --  106  --  101 107 114* 112 110   TOTAL CO2 mmol/L 22.8*  --  26.4  --  35.9* 35.1* 33.0* 31.5 26.8   BUN mg/dL 15  --  15  --  19 16 12 13 16   CREATININE mg/dL 0.48  --  0.42*  --  0.42* 0.50 0.34* 0.39* 0.42*   CALCIUM mg/dL 9.0  --  9.2  --  8.7 8.9 8.2 8.0 7.9   GLUCOSE mg/dL 159*  --  60*  --  98 141* 140* 215* 341*       Results from last 7 days  Lab Units 03/18/17  0529 03/17/17  0603 03/16/17  0059 03/15/17  0034 03/14/17  0038 03/13/17  0510 03/12/17  0518   BILIRUBIN mg/dL 0.5 0.5 0.5 0.3 0.2 0.1* 0.2   ALK PHOS U/L 78 85 89 89 115* 116* 145*   AST (SGOT) U/L 30 37* 45* 30 42* 66* 59*   ALT (SGPT) U/L 32 42* 47* 48* 67* 71* 62*       Results from last 7 days  Lab Units 03/17/17  0603 03/16/17  0825 03/13/17  0907 03/13/17  0510 03/12/17  0518   MAGNESIUM mg/dL 1.9 1.9 2.1 2.4 1.9        Results from last 7 days  Lab Units 03/18/17  0529 03/17/17  0603 03/15/17  0034   CRP mg/dL 4.38* 4.27* 2.95*     CXR, 3/16/17:  FINDINGS:      Nasogastric tube is within the stomach  Lung bases show airspace disease bilaterally.  The cardiac silhouette is normal. The pulmonary vasculature is  unremarkable.  There is no evidence of an acute osseous abnormality.   There are no suspicious-appearing parenchymal soft tissue nodules.          IMPRESSION:  NG tube in the stomach  Bilateral airspace disease     Cultures:  BLOOD CULTURE   Date Value Ref Range Status   03/11/2017 No growth at 24 hours  Preliminary   03/11/2017 No growth at 24 hours  Preliminary   03/06/2017 No growth at 5 days  Final   03/06/2017 No growth at 5 days  Final     URINE CULTURE   Date Value Ref Range Status   03/11/2017 Normal Urogenital Falguni  Preliminary     Urine Culture   Specimen Information: Urine, Clean Catch; Urine        Culture   >100,000 CFU/mL Yeast isolated (A)             Current Hospital Medications:    acetaminophen 650 mg Oral Once   acyclovir 200 mg Oral 5x Daily   ALPRAZolam 1 mg Oral Daily   aspirin 81 mg Oral Daily   bacitracin  Topical Q12H   ceftriaxone 2 g Intravenous Q24H   clopidogrel 75 mg Oral Daily   guaiFENesin 300 mg Oral Q6H   heparin (porcine) 5,000 Units Subcutaneous Q12H   insulin aspart 0-14 Units Subcutaneous 4x Daily AC & at Bedtime   insulin detemir 22 Units Subcutaneous Nightly   ipratropium 0.5 mg Nebulization Q6H - RT   metoprolol tartrate 25 mg Oral Once   metoprolol tartrate 50 mg Oral Q12H   metroNIDAZOLE 500 mg Intravenous Q8H   pantoprazole 40 mg Intravenous Q AM   potassium chloride 40 mEq Oral Once   predniSONE 10 mg Oral Daily With Breakfast   vancomycin 125 mg Oral Q6H       midazolam 0.5 mg/mL 100 mL NS 1-10 mg/hr Last Rate: Stopped (03/16/17 0706)   Pharmacy to dose vancomycin         Assessment/Plan      -Severe sepsis with acute hypoxic respiratory failure that was present upon  admission and secondary to to community acquired pneumonia and E.coli UTI, now with probable hospital/ventilator associated pneumonia and Clostridium difficile colitis  -Acute clostridium difficile colitis  -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 CXR improved. Will plan to continue IV antibiotics and oral steroids. Continue to replace electrolytes per protocol. Continue physical therapy to increase muscle strength. Speech therapy will re-evaluate the patient today. Will increase frequency of scheduled Xanax to BID dosing as patient continues to exhibit significant anxiety. Continue NPO status for now with NG tube. Will continue to closely monitor hemoglobin and transfuse if hemoglobin were to drop below 7.0. Plan to repeat CBC, CMP, and CRP in the morning and continue to closely monitor the patient in the CCU.     Will add nicotine patch and place consult for [facility] hospital.    DVT prophylaxis: Heparin 5000 units SQ BID  GI prophylaxis: Pantoprazole IV    Patient is considered high risk due to: severe sepsis, acute respiratory distress syndrome, prolonged mechanical ventilation, clostridium colitis    Overall prognosis remains very poor    I discussed the patients findings and my recommendations with nursing staff      Attestation: Acting as a scribe for COREEN Amaya   03/18/17  8:44 AM

## 2017-03-18 NOTE — PROGRESS NOTES
LOS: 18 days   Patient Care Team:  ROSA Love as PCP - General  ROSA Love as PCP - Family Medicine    Chief Complaint:  Severe Sepsis with acute respiratory failure    Subjective    No new events overnight.  Patient tells me that she is feeling better.  Appears slightly confused to me, patient thought that her sister is waiting outside the door and needs to come in.    Interval History:       Review of Systems:   Generalized weakness and fatigue.  Irritation in the throat.  Shortness of breath.  Feeling of abdominal fullness.  Leg pain.    Objective     Vital Signs  Temp:  [97.4 °F (36.3 °C)-98.3 °F (36.8 °C)] 98.3 °F (36.8 °C)  Heart Rate:  [105-132] 105  Resp:  [20-24] 23  BP: (124-157)/(74-99) 124/74  Body mass index is 19.1 kg/(m^2).    Intake/Output Summary (Last 24 hours) at 03/18/17 1257  Last data filed at 03/18/17 1120   Gross per 24 hour   Intake    683 ml   Output   1550 ml   Net   -867 ml     I/O this shift:  In: 340 [Other:140; IV Piggyback:200]  Out: -     Physical Exam:     General Appearance:    Alert, cooperative, in no apparent acute distress   Head:    Normocephalic, without obvious abnormality, atraumatic   Eyes:            Lids and lashes normal, conjunctivae and sclerae normal, no   icterus, no pallor, corneas clear, PERRLA   Ears:    Ears appear intact with no abnormalities noted   Throat:   No oral lesions, no thrush, oral mucosa moist   Neck:   No adenopathy, supple, trachea midline, no thyromegaly, no   carotid bruit, no JVD   Back:     No kyphosis present, no scoliosis present, no skin lesions,      erythema or scars, no tenderness to percussion or                   palpation,   range of motion normal   Lungs:    few scattered rhonchi bilaterally.  Wrist and right lower lung zone prominent posteriorly.      Heart:    Regular rhythm and normal rate, normal S1 and S2, no            murmur, no gallop, no rub, no click   Chest Wall:    No abnormalities observed   Abdomen:      Normal bowel sounds, no masses, no organomegaly, soft        non-tender, non-distended, no guarding, no rebound                tenderness   Rectal:     Deferred   Extremities:   Moves all extremities well, no edema, no cyanosis, no             redness   Pulses:   Pulses palpable and equal bilaterally   Skin:   No bleeding, bruising or rash   Lymph nodes:   No palpable adenopathy   Neurologic:   Cranial nerves 2 - 12 grossly intact, sensation intact, DTR       present and equal bilaterally        Results Review:        Results from last 7 days  Lab Units 03/18/17 0529 03/17/17  0603 03/16/17  0059   WBC 10*3/mm3 10.90 11.98 7.76   HEMOGLOBIN g/dL 9.1* 9.4* 9.2*   PLATELETS 10*3/mm3 349 280 255       Results from last 7 days  Lab Units 03/18/17  0529 03/17/17  1525 03/17/17  0603 03/16/17  0825 03/16/17  0059  03/13/17  0907   SODIUM mmol/L 144  --  143  --   --  144  < >  --    POTASSIUM mmol/L 3.9 4.4 3.2*  < >  --  3.1*  < >  --    CHLORIDE mmol/L 110  --  106  --   --  101  < >  --    TOTAL CO2 mmol/L 22.8*  --  26.4  --   --  35.9*  < >  --    BUN mg/dL 15  --  15  --   --  19  < >  --    CREATININE mg/dL 0.48  --  0.42*  --   --  0.42*  < >  --    CALCIUM mg/dL 9.0  --  9.2  --   --  8.7  < >  --    GLUCOSE mg/dL 159*  --  60*  --   --  98  < >  --    MAGNESIUM mg/dL  --   --  1.9  --  1.9  --   --  2.1   < > = values in this interval not displayed.  Lab Results   Component Value Date    INR 1.01 12/17/2016    INR 0.94 11/20/2016    INR <0.90 02/06/2016    PROTIME 11.4 12/17/2016    PROTIME 10.6 11/20/2016    PROTIME 10.0 02/06/2016       Results from last 7 days  Lab Units 03/18/17 0529 03/17/17  0603 03/16/17  0059   ALK PHOS U/L 78 85 89   BILIRUBIN mg/dL 0.5 0.5 0.5   ALT (SGPT) U/L 32 42* 47*   AST (SGOT) U/L 30 37* 45*       Results from last 7 days  Lab Units 03/16/17  0517   PH, ARTERIAL pH units 7.505*   PO2 ART mm Hg 123.4*   PCO2, ARTERIAL mm Hg 43.1   HCO3 ART mmol/L 33.2*     Imaging Results  (last 24 hours)     Procedure Component Value Units Date/Time    XR Chest 1 View [[id number]] Collected:  03/18/17 1043     Updated:  03/18/17 1116    Narrative:       XR CHEST 1 VIEW-     CLINICAL INDICATION: Shortness of breath; J18.9-Pneumonia, unspecified  organism.          COMPARISON: 03/17/2017.      TECHNIQUE: Single frontal view of the chest.     FINDINGS:     Improved, but still patchy airspace disease is present bilaterally.  The cardiac silhouette is normal. The pulmonary vasculature is  unremarkable.  There is no evidence of an acute osseous abnormality.   There are no suspicious-appearing parenchymal soft tissue nodules.            Impression:       Slight improvement, but patchy airspace disease bilaterally.         This report was finalized on 3/18/2017 11:14 AM by Dr. Daniel Yates MD.               Current Hospital Meds:       acetaminophen 650 mg Oral Once   acyclovir 200 mg Oral 5x Daily   ALPRAZolam 1 mg Oral Daily   ALPRAZolam 1 mg Oral Nightly   aspirin 81 mg Oral Daily   bacitracin  Topical Q12H   ceftriaxone 2 g Intravenous Q24H   clopidogrel 75 mg Oral Daily   guaiFENesin 300 mg Oral Q6H   heparin (porcine) 5,000 Units Subcutaneous Q12H   insulin aspart 0-14 Units Subcutaneous 4x Daily AC & at Bedtime   insulin detemir 22 Units Subcutaneous Nightly   ipratropium 0.5 mg Nebulization Q6H - RT   metoprolol tartrate 25 mg Oral Once   metoprolol tartrate 50 mg Oral Q12H   metroNIDAZOLE 500 mg Intravenous Q8H   nicotine 1 patch Transdermal Q24H   pantoprazole 40 mg Intravenous Q AM   potassium chloride 40 mEq Oral Once   predniSONE 10 mg Oral Daily With Breakfast   vancomycin 125 mg Oral Q6H       Pharmacy to dose vancomycin        Medication Review:     Assessment/Plan     Patient Active Problem List   Diagnosis Code   • Chest pain R07.9   • Chest pain of uncertain etiology R07.89   • Intractable nausea and vomiting R11.2   • Intractable cyclical vomiting with nausea G43.A1   • Chronic obstructive  pulmonary emphysema J43.9   • Hepatitis-C B19.20   • Diabetes mellitus type 2 in nonobese E11.9   • Arthritis or polyarthritis, rheumatoid M06.9   • Anxiety F41.9   • Essential hypertension I10   • Coronary artery disease I25.10   • Buerger's disease I73.1   • Cirrhosis K74.60   • Diabetes E11.9   • Chronic nausea R11.0   • Pancreatitis K85.90   • Bilateral pneumonia J18.9   • ARDS (adult respiratory distress syndrome) J80   • Hypoxia R09.02       Discussion:  I think patient is doing reasonably well considering the fact that she was extubated day before yesterday.  Currently she is tolerating nasal cannula.  Chest x-ray is much better.  Our plan is to continue current management, initiate physical therapy.      MARY ANNE Garcia MD  03/18/17  12:57 PM

## 2017-03-19 LAB
ALBUMIN SERPL-MCNC: 3.2 G/DL (ref 3.5–5)
ALBUMIN/GLOB SERPL: 0.9 G/DL (ref 1.5–2.5)
ALP SERPL-CCNC: 68 U/L (ref 35–104)
ALT SERPL W P-5'-P-CCNC: 29 U/L (ref 10–36)
ANION GAP SERPL CALCULATED.3IONS-SCNC: 6.7 MMOL/L (ref 3.6–11.2)
ANISOCYTOSIS BLD QL: NORMAL
AST SERPL-CCNC: 31 U/L (ref 10–30)
BACTERIA SPEC AEROBE CULT: NORMAL
BACTERIA SPEC AEROBE CULT: NORMAL
BASOPHILS # BLD AUTO: 0.03 10*3/MM3 (ref 0–0.3)
BASOPHILS NFR BLD AUTO: 0.3 % (ref 0–2)
BILIRUB SERPL-MCNC: 0.4 MG/DL (ref 0.2–1.8)
BUN BLD-MCNC: 13 MG/DL (ref 7–21)
BUN/CREAT SERPL: 35.1 (ref 7–25)
CALCIUM SPEC-SCNC: 8.8 MG/DL (ref 7.7–10)
CHLORIDE SERPL-SCNC: 112 MMOL/L (ref 99–112)
CO2 SERPL-SCNC: 23.3 MMOL/L (ref 24.3–31.9)
CREAT BLD-MCNC: 0.37 MG/DL (ref 0.43–1.29)
CRP SERPL-MCNC: 3.83 MG/DL (ref 0–0.99)
DEPRECATED RDW RBC AUTO: 72.2 FL (ref 37–54)
EOSINOPHIL # BLD AUTO: 0.03 10*3/MM3 (ref 0–0.7)
EOSINOPHIL NFR BLD AUTO: 0.3 % (ref 0–5)
ERYTHROCYTE [DISTWIDTH] IN BLOOD BY AUTOMATED COUNT: 26.2 % (ref 11.5–14.5)
GFR SERPL CREATININE-BSD FRML MDRD: >150 ML/MIN/1.73
GLOBULIN UR ELPH-MCNC: 3.5 GM/DL
GLUCOSE BLD-MCNC: 103 MG/DL (ref 70–110)
GLUCOSE BLDC GLUCOMTR-MCNC: 118 MG/DL (ref 70–130)
GLUCOSE BLDC GLUCOMTR-MCNC: 162 MG/DL (ref 70–130)
GLUCOSE BLDC GLUCOMTR-MCNC: 87 MG/DL (ref 70–130)
GLUCOSE BLDC GLUCOMTR-MCNC: 98 MG/DL (ref 70–130)
HCT VFR BLD AUTO: 29.9 % (ref 37–47)
HGB BLD-MCNC: 8.4 G/DL (ref 12–16)
HYPOCHROMIA BLD QL: NORMAL
IMM GRANULOCYTES # BLD: 0.07 10*3/MM3 (ref 0–0.03)
IMM GRANULOCYTES NFR BLD: 0.8 % (ref 0–0.5)
LYMPHOCYTES # BLD AUTO: 1.24 10*3/MM3 (ref 1–3)
LYMPHOCYTES NFR BLD AUTO: 14 % (ref 21–51)
MCH RBC QN AUTO: 23.2 PG (ref 27–33)
MCHC RBC AUTO-ENTMCNC: 28.1 G/DL (ref 33–37)
MCV RBC AUTO: 82.6 FL (ref 80–94)
MONOCYTES # BLD AUTO: 0.44 10*3/MM3 (ref 0.1–0.9)
MONOCYTES NFR BLD AUTO: 5 % (ref 0–10)
NEUTROPHILS # BLD AUTO: 7.06 10*3/MM3 (ref 1.4–6.5)
NEUTROPHILS NFR BLD AUTO: 79.6 % (ref 30–70)
OSMOLALITY SERPL CALC.SUM OF ELEC: 283.5 MOSM/KG (ref 273–305)
PLAT MORPH BLD: NORMAL
PLATELET # BLD AUTO: 343 10*3/MM3 (ref 130–400)
PMV BLD AUTO: 10.6 FL (ref 6–10)
POIKILOCYTOSIS BLD QL SMEAR: NORMAL
POTASSIUM BLD-SCNC: 3.2 MMOL/L (ref 3.5–5.3)
PROT SERPL-MCNC: 6.7 G/DL (ref 6–8)
RBC # BLD AUTO: 3.62 10*6/MM3 (ref 4.2–5.4)
SODIUM BLD-SCNC: 142 MMOL/L (ref 135–153)
WBC NRBC COR # BLD: 8.87 10*3/MM3 (ref 4.5–12.5)

## 2017-03-19 PROCEDURE — 99232 SBSQ HOSP IP/OBS MODERATE 35: CPT | Performed by: INTERNAL MEDICINE

## 2017-03-19 PROCEDURE — 63710000001 INSULIN ASPART PER 5 UNITS: Performed by: INTERNAL MEDICINE

## 2017-03-19 PROCEDURE — 85025 COMPLETE CBC W/AUTO DIFF WBC: CPT | Performed by: PHYSICIAN ASSISTANT

## 2017-03-19 PROCEDURE — 82962 GLUCOSE BLOOD TEST: CPT

## 2017-03-19 PROCEDURE — 25010000002 CEFTRIAXONE: Performed by: INTERNAL MEDICINE

## 2017-03-19 PROCEDURE — 94660 CPAP INITIATION&MGMT: CPT

## 2017-03-19 PROCEDURE — 25010000002 HEPARIN (PORCINE) PER 1000 UNITS: Performed by: INTERNAL MEDICINE

## 2017-03-19 PROCEDURE — 85007 BL SMEAR W/DIFF WBC COUNT: CPT | Performed by: PHYSICIAN ASSISTANT

## 2017-03-19 PROCEDURE — 94799 UNLISTED PULMONARY SVC/PX: CPT

## 2017-03-19 PROCEDURE — 80053 COMPREHEN METABOLIC PANEL: CPT | Performed by: PHYSICIAN ASSISTANT

## 2017-03-19 PROCEDURE — 63710000001 PREDNISONE PER 5 MG: Performed by: INTERNAL MEDICINE

## 2017-03-19 PROCEDURE — 86140 C-REACTIVE PROTEIN: CPT | Performed by: PHYSICIAN ASSISTANT

## 2017-03-19 RX ORDER — POTASSIUM CHLORIDE 1.5 G/1.77G
40 POWDER, FOR SOLUTION ORAL EVERY 4 HOURS
Status: COMPLETED | OUTPATIENT
Start: 2017-03-19 | End: 2017-03-19

## 2017-03-19 RX ORDER — PREDNISONE 1 MG/1
5 TABLET ORAL
Status: DISCONTINUED | OUTPATIENT
Start: 2017-03-20 | End: 2017-03-20 | Stop reason: HOSPADM

## 2017-03-19 RX ADMIN — IPRATROPIUM BROMIDE 0.5 MG: 0.5 SOLUTION RESPIRATORY (INHALATION) at 12:09

## 2017-03-19 RX ADMIN — IPRATROPIUM BROMIDE 0.5 MG: 0.5 SOLUTION RESPIRATORY (INHALATION) at 00:40

## 2017-03-19 RX ADMIN — GUAIFENESIN 300 MG: 100 SOLUTION ORAL at 17:27

## 2017-03-19 RX ADMIN — METOPROLOL TARTRATE 50 MG: 50 TABLET, FILM COATED ORAL at 21:46

## 2017-03-19 RX ADMIN — ASPIRIN 81 MG: 81 TABLET, CHEWABLE ORAL at 08:46

## 2017-03-19 RX ADMIN — NICOTINE 1 PATCH: 21 PATCH, EXTENDED RELEASE TRANSDERMAL at 12:07

## 2017-03-19 RX ADMIN — ACYCLOVIR 200 MG: 200 SUSPENSION ORAL at 21:46

## 2017-03-19 RX ADMIN — GUAIFENESIN 300 MG: 100 SOLUTION ORAL at 12:11

## 2017-03-19 RX ADMIN — VANCOMYCIN 125 MG: KIT at 17:27

## 2017-03-19 RX ADMIN — METRONIDAZOLE 500 MG: 500 INJECTION, SOLUTION INTRAVENOUS at 21:45

## 2017-03-19 RX ADMIN — VANCOMYCIN 125 MG: KIT at 00:26

## 2017-03-19 RX ADMIN — CEFTRIAXONE 2 G: 2 INJECTION, POWDER, FOR SOLUTION INTRAMUSCULAR; INTRAVENOUS at 08:46

## 2017-03-19 RX ADMIN — METOPROLOL TARTRATE 50 MG: 50 TABLET, FILM COATED ORAL at 08:46

## 2017-03-19 RX ADMIN — INSULIN ASPART 4 UNITS: 100 INJECTION, SOLUTION INTRAVENOUS; SUBCUTANEOUS at 10:53

## 2017-03-19 RX ADMIN — HEPARIN SODIUM 5000 UNITS: 5000 INJECTION, SOLUTION INTRAVENOUS; SUBCUTANEOUS at 21:46

## 2017-03-19 RX ADMIN — HEPARIN SODIUM 5000 UNITS: 5000 INJECTION, SOLUTION INTRAVENOUS; SUBCUTANEOUS at 08:46

## 2017-03-19 RX ADMIN — ACYCLOVIR 200 MG: 200 SUSPENSION ORAL at 17:27

## 2017-03-19 RX ADMIN — GUAIFENESIN 300 MG: 100 SOLUTION ORAL at 00:26

## 2017-03-19 RX ADMIN — VANCOMYCIN 125 MG: KIT at 06:03

## 2017-03-19 RX ADMIN — VANCOMYCIN 125 MG: KIT at 12:11

## 2017-03-19 RX ADMIN — METRONIDAZOLE 500 MG: 500 INJECTION, SOLUTION INTRAVENOUS at 06:03

## 2017-03-19 RX ADMIN — ACYCLOVIR 200 MG: 200 SUSPENSION ORAL at 13:18

## 2017-03-19 RX ADMIN — IPRATROPIUM BROMIDE 0.5 MG: 0.5 SOLUTION RESPIRATORY (INHALATION) at 06:22

## 2017-03-19 RX ADMIN — ACYCLOVIR 200 MG: 200 SUSPENSION ORAL at 06:03

## 2017-03-19 RX ADMIN — BACITRACIN ZINC: 500 OINTMENT TOPICAL at 21:45

## 2017-03-19 RX ADMIN — PREDNISONE 10 MG: 10 TABLET ORAL at 08:46

## 2017-03-19 RX ADMIN — CLOPIDOGREL 75 MG: 75 TABLET, FILM COATED ORAL at 08:46

## 2017-03-19 RX ADMIN — IPRATROPIUM BROMIDE 0.5 MG: 0.5 SOLUTION RESPIRATORY (INHALATION) at 18:30

## 2017-03-19 RX ADMIN — ACYCLOVIR 200 MG: 200 SUSPENSION ORAL at 10:44

## 2017-03-19 RX ADMIN — PANTOPRAZOLE SODIUM 40 MG: 40 INJECTION, POWDER, FOR SOLUTION INTRAVENOUS at 06:03

## 2017-03-19 RX ADMIN — BACITRACIN ZINC: 500 OINTMENT TOPICAL at 08:47

## 2017-03-19 RX ADMIN — METRONIDAZOLE 500 MG: 500 INJECTION, SOLUTION INTRAVENOUS at 12:12

## 2017-03-19 RX ADMIN — GUAIFENESIN 300 MG: 100 SOLUTION ORAL at 06:03

## 2017-03-19 RX ADMIN — POTASSIUM CHLORIDE 40 MEQ: 1.5 POWDER, FOR SOLUTION ORAL at 08:46

## 2017-03-19 RX ADMIN — ALPRAZOLAM 1 MG: 1 TABLET ORAL at 08:46

## 2017-03-19 RX ADMIN — POTASSIUM CHLORIDE 40 MEQ: 1.5 POWDER, FOR SOLUTION ORAL at 12:11

## 2017-03-19 RX ADMIN — ALPRAZOLAM 1 MG: 1 TABLET ORAL at 21:46

## 2017-03-19 NOTE — PLAN OF CARE
Problem: Patient Care Overview (Adult)  Goal: Plan of Care Review    03/19/17 0935   Coping/Psychosocial Response Interventions   Plan Of Care Reviewed With patient   Patient Care Overview   Progress progress towards functional goals is fair         Problem: Pneumonia (Adult)  Goal: Signs and Symptoms of Listed Potential Problems Will be Absent or Manageable (Pneumonia)  Outcome: Ongoing (interventions implemented as appropriate)

## 2017-03-19 NOTE — PROGRESS NOTES
"[facility] Hospitalist CCU Note    Patient Identification:  Name:  Pepito Yan  Age:  51 y.o.  Sex:  female  :  1965  MRN:  [id number]  Visit number:  [id number]  Primary Care Provider:  ROSA Meza    19    Drips: None   Antibiotics: Rocephin, Flagyl, Oral Vancomcyin  Lines: R IJ  King Catheter: Yes    Procedures:  3/3/17: Emergent Intubation  3/3/17: Right femoral central line placement status post removal  3/3/17: Right IJ central line placement  3/9/17: Transfusion 1 unit PRBC  3/16/17: Extubation    HPI: The patient is a 52 yo female admitted with bilateral pneumonia    Subjective     The patient was lying in bed this morning. She complains of thirst and hunger. She tells me that she is going to have a \"nervous breakdown\" if she doesn't go home soon. She complains of non-productive cough. Voice quality is still harsh/raspy. Cough noted at bedside still appears weak. She denies abdominal pain, nausea and/or vomiting. Nursing denies any issues overnight.     She has pulled her NG tube out multiple times which required replacement. She is tolerating tube feedings with no residual.    History taken from: chart, patient, RN  Present during visit: Ronda MASTERS    Objective     Vital Signs  Temp:  [97.9 °F (36.6 °C)-98.2 °F (36.8 °C)] 98.1 °F (36.7 °C)  Heart Rate:  [] 100  Resp:  [16-26] 24  BP: (100-149)/(66-86) 134/78  Body mass index is 19.1 kg/(m^2).    Intake/Output Summary (Last 24 hours) at 17 0923  Last data filed at 17 0846   Gross per 24 hour   Intake   1325 ml   Output    675 ml   Net    650 ml       Physical Exam:    Physical Exam   Constitutional: She appears well-developed and well-nourished. No distress. Nasal cannula in place.   Acutely and chronically ill appearing   HENT:   Head: Normocephalic and atraumatic.   Nose: Nose normal.   Mouth/Throat: Oropharynx is clear and moist and mucous membranes are normal. Oral lesions present.   Nasogastric tube " in place   Eyes: EOM are normal. Pupils are equal, round, and reactive to light. Right conjunctiva is injected. No scleral icterus.   Neck: Trachea normal. Neck supple. No JVD present. Carotid bruit is not present. No thyromegaly present.   Cardiovascular: Regular rhythm, normal heart sounds and normal pulses.  Tachycardia present.  Exam reveals no gallop and no friction rub.    No murmur heard.  No lower extremity edema   Pulmonary/Chest: Effort normal. No respiratory distress. She has no wheezes. She has no rales.   Faint bilateral expiratory wheezing   Abdominal: Soft. Bowel sounds are normal. She exhibits no distension. There is no tenderness. There is no guarding.   Genitourinary:   Genitourinary Comments: King catheter in place and draining a dark yellow urine   Musculoskeletal: Normal range of motion.   Neurological:   Oriented to self; able to follow all commands. Strength is equal bilaterally.   Skin: Skin is warm, dry and intact. No rash noted. No erythema.     Results Review:     I reviewed the patient's new imaging results and agree with the interpretation.  I reviewed the patient's other test results and agree with the interpretation.    Telemetry: Sinus Tachycardia, 103, SpO2 97% on nasal cannula       Results from last 7 days  Lab Units 03/19/17  0516 03/18/17  0529 03/17/17  0603 03/16/17  0059 03/15/17  0034 03/14/17  0038 03/13/17  0511   WBC 10*3/mm3 8.87 10.90 11.98 7.76 5.83 6.18 5.75   HEMOGLOBIN g/dL 8.4* 9.1* 9.4* 9.2* 8.6* 8.4* 8.0*   PLATELETS 10*3/mm3 343 349 280 255 216 206 211       Results from last 7 days  Lab Units 03/19/17  0516 03/18/17  0529 03/17/17  1525 03/17/17  0603 03/16/17  1659 03/16/17  0059 03/15/17  0034 03/14/17  0038 03/13/17  0510   SODIUM mmol/L 142 144  --  143  --  144 144 147 147   POTASSIUM mmol/L 3.2* 3.9 4.4 3.2* 4.4 3.1* 4.2 4.0 3.1*   CHLORIDE mmol/L 112 110  --  106  --  101 107 114* 112   TOTAL CO2 mmol/L 23.3* 22.8*  --  26.4  --  35.9* 35.1* 33.0* 31.5    BUN mg/dL 13 15  --  15  --  19 16 12 13   CREATININE mg/dL 0.37* 0.48  --  0.42*  --  0.42* 0.50 0.34* 0.39*   CALCIUM mg/dL 8.8 9.0  --  9.2  --  8.7 8.9 8.2 8.0   GLUCOSE mg/dL 103 159*  --  60*  --  98 141* 140* 215*       Results from last 7 days  Lab Units 03/19/17  0516 03/18/17  0529 03/17/17  0603 03/16/17  0059 03/15/17  0034 03/14/17  0038 03/13/17  0510   BILIRUBIN mg/dL 0.4 0.5 0.5 0.5 0.3 0.2 0.1*   ALK PHOS U/L 68 78 85 89 89 115* 116*   AST (SGOT) U/L 31* 30 37* 45* 30 42* 66*   ALT (SGPT) U/L 29 32 42* 47* 48* 67* 71*       Results from last 7 days  Lab Units 03/17/17  0603 03/16/17  0825 03/13/17  0907 03/13/17  0510   MAGNESIUM mg/dL 1.9 1.9 2.1 2.4       Results from last 7 days  Lab Units 03/19/17  0516 03/18/17  0529 03/17/17  0603   CRP mg/dL 3.83* 4.38* 4.27*     CXR, 3/18/17:  FINDINGS:      Improved, but still patchy airspace disease is present bilaterally.  The cardiac silhouette is normal. The pulmonary vasculature is  unremarkable.  There is no evidence of an acute osseous abnormality.   There are no suspicious-appearing parenchymal soft tissue nodules.          IMPRESSION:  Slight improvement, but patchy airspace disease bilaterally.    Cultures:  BLOOD CULTURE   Date Value Ref Range Status   03/11/2017 No growth at 24 hours  Preliminary   03/11/2017 No growth at 24 hours  Preliminary   03/06/2017 No growth at 5 days  Final   03/06/2017 No growth at 5 days  Final     URINE CULTURE   Date Value Ref Range Status   03/11/2017 Normal Urogenital Falguni  Preliminary     Urine Culture   Specimen Information: Urine, Clean Catch; Urine        Culture   >100,000 CFU/mL Yeast isolated (A)             Current Hospital Medications:    acetaminophen 650 mg Oral Once   acyclovir 200 mg Oral 5x Daily   ALPRAZolam 1 mg Oral Daily   ALPRAZolam 1 mg Oral Nightly   aspirin 81 mg Oral Daily   bacitracin  Topical Q12H   ceftriaxone 2 g Intravenous Q24H   clopidogrel 75 mg Oral Daily   guaiFENesin 300 mg  Oral Q6H   heparin (porcine) 5,000 Units Subcutaneous Q12H   insulin aspart 0-14 Units Subcutaneous 4x Daily AC & at Bedtime   insulin detemir 22 Units Subcutaneous Nightly   ipratropium 0.5 mg Nebulization Q6H - RT   metoprolol tartrate 25 mg Oral Once   metoprolol tartrate 50 mg Oral Q12H   metroNIDAZOLE 500 mg Intravenous Q8H   nicotine 1 patch Transdermal Q24H   pantoprazole 40 mg Intravenous Q AM   potassium chloride 40 mEq Oral Once   potassium chloride 40 mEq Oral Q4H   predniSONE 10 mg Oral Daily With Breakfast   vancomycin 125 mg Oral Q6H       Pharmacy to dose vancomycin        Assessment/Plan      -Severe sepsis with acute hypoxic respiratory failure that was present upon admission and secondary to to community acquired pneumonia and E.coli UTI, now with probable hospital/ventilator associated pneumonia and Clostridium difficile colitis   -Acute clostridium difficile colitis  -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 Continue tube feedings. Continue speech and physical therapy when available. A continue care hospital consult has been placed. Labs continue to improve. Will plan to continue IV antibiotics and oral steroids; will decrease prednisone to 5 mg daily. Continue to replace electrolytes per protocol. Potassium noted to be low today at 3.2. Will continue low dose scheduled Xanax with holding parameters as the patient remains somewhat anxious. Continue nicotine patch.    Will continue to closely monitor hemoglobin and transfuse if hemoglobin were to drop below 7.0. Plan to  repeat CBC, CMP, and CRP in the morning and continue to closely monitor the patient in the CCU.     DVT prophylaxis: Heparin 5000 units SQ BID  GI prophylaxis: Pantoprazole IV    Patient is considered high risk due to: severe sepsis, acute respiratory distress syndrome, prolonged mechanical ventilation, clostridium colitis    I discussed the patients findings and my recommendations with nursing staff    Sherrill Weiner DO   03/19/17  9:23 AM

## 2017-03-20 ENCOUNTER — APPOINTMENT (OUTPATIENT)
Dept: GENERAL RADIOLOGY | Facility: HOSPITAL | Age: 52
End: 2017-03-20

## 2017-03-20 ENCOUNTER — HOSPITAL ENCOUNTER (OUTPATIENT)
Facility: HOSPITAL | Age: 52
Discharge: LEFT BEFORE DISCHARGE | End: 2017-04-08
Attending: INTERNAL MEDICINE | Admitting: INTERNAL MEDICINE

## 2017-03-20 VITALS
WEIGHT: 107.8 LBS | RESPIRATION RATE: 31 BRPM | HEIGHT: 63 IN | DIASTOLIC BLOOD PRESSURE: 83 MMHG | BODY MASS INDEX: 19.1 KG/M2 | OXYGEN SATURATION: 94 % | SYSTOLIC BLOOD PRESSURE: 122 MMHG | HEART RATE: 115 BPM | TEMPERATURE: 98 F

## 2017-03-20 LAB
A-A DO2: 78.3 MMHG (ref 0–300)
ALBUMIN SERPL-MCNC: 3.2 G/DL (ref 3.5–5)
ALBUMIN SERPL-MCNC: 3.4 G/DL (ref 3.5–5)
ALBUMIN/GLOB SERPL: 0.8 G/DL (ref 1.5–2.5)
ALBUMIN/GLOB SERPL: 0.8 G/DL (ref 1.5–2.5)
ALP SERPL-CCNC: 67 U/L (ref 35–104)
ALP SERPL-CCNC: 69 U/L (ref 35–104)
ALT SERPL W P-5'-P-CCNC: 29 U/L (ref 10–36)
ALT SERPL W P-5'-P-CCNC: 33 U/L (ref 10–36)
ANION GAP SERPL CALCULATED.3IONS-SCNC: 10 MMOL/L (ref 3.6–11.2)
ANION GAP SERPL CALCULATED.3IONS-SCNC: 10 MMOL/L (ref 3.6–11.2)
ANISOCYTOSIS BLD QL: NORMAL
ANISOCYTOSIS BLD QL: NORMAL
AST SERPL-CCNC: 31 U/L (ref 10–30)
AST SERPL-CCNC: 35 U/L (ref 10–30)
ATMOSPHERIC PRESS: 733 MMHG
BASE EXCESS BLDV CALC-SCNC: -4.5 MMOL/L
BASOPHILS # BLD AUTO: 0.03 10*3/MM3 (ref 0–0.3)
BASOPHILS # BLD AUTO: 0.05 10*3/MM3 (ref 0–0.3)
BASOPHILS NFR BLD AUTO: 0.3 % (ref 0–2)
BASOPHILS NFR BLD AUTO: 0.6 % (ref 0–2)
BDY SITE: ABNORMAL
BILIRUB SERPL-MCNC: 0.3 MG/DL (ref 0.2–1.8)
BILIRUB SERPL-MCNC: 0.4 MG/DL (ref 0.2–1.8)
BODY TEMPERATURE: 98.6 C
BUN BLD-MCNC: 11 MG/DL (ref 7–21)
BUN BLD-MCNC: 12 MG/DL (ref 7–21)
BUN/CREAT SERPL: 27.9 (ref 7–25)
BUN/CREAT SERPL: 32.4 (ref 7–25)
CALCIUM SPEC-SCNC: 9.1 MG/DL (ref 7.7–10)
CALCIUM SPEC-SCNC: 9.3 MG/DL (ref 7.7–10)
CHLORIDE SERPL-SCNC: 110 MMOL/L (ref 99–112)
CHLORIDE SERPL-SCNC: 111 MMOL/L (ref 99–112)
CO2 SERPL-SCNC: 20 MMOL/L (ref 24.3–31.9)
CO2 SERPL-SCNC: 22 MMOL/L (ref 24.3–31.9)
CREAT BLD-MCNC: 0.34 MG/DL (ref 0.43–1.29)
CREAT BLD-MCNC: 0.43 MG/DL (ref 0.43–1.29)
DEPRECATED RDW RBC AUTO: 71.9 FL (ref 37–54)
DEPRECATED RDW RBC AUTO: 72.9 FL (ref 37–54)
EOSINOPHIL # BLD AUTO: 0.04 10*3/MM3 (ref 0–0.7)
EOSINOPHIL # BLD AUTO: 0.05 10*3/MM3 (ref 0–0.7)
EOSINOPHIL NFR BLD AUTO: 0.4 % (ref 0–5)
EOSINOPHIL NFR BLD AUTO: 0.6 % (ref 0–5)
ERYTHROCYTE [DISTWIDTH] IN BLOOD BY AUTOMATED COUNT: 26.5 % (ref 11.5–14.5)
ERYTHROCYTE [DISTWIDTH] IN BLOOD BY AUTOMATED COUNT: 26.8 % (ref 11.5–14.5)
GFR SERPL CREATININE-BSD FRML MDRD: >150 ML/MIN/1.73
GFR SERPL CREATININE-BSD FRML MDRD: >150 ML/MIN/1.73
GLOBULIN UR ELPH-MCNC: 3.8 GM/DL
GLOBULIN UR ELPH-MCNC: 4.1 GM/DL
GLUCOSE BLD-MCNC: 102 MG/DL (ref 70–110)
GLUCOSE BLD-MCNC: 97 MG/DL (ref 70–110)
GLUCOSE BLDC GLUCOMTR-MCNC: 118 MG/DL (ref 70–130)
GLUCOSE BLDC GLUCOMTR-MCNC: 154 MG/DL (ref 70–130)
GLUCOSE BLDC GLUCOMTR-MCNC: 99 MG/DL (ref 70–130)
HCO3 BLDV-SCNC: 18.7 MMOL/L
HCT VFR BLD AUTO: 31.2 % (ref 37–47)
HCT VFR BLD AUTO: 33.2 % (ref 37–47)
HGB BLD-MCNC: 8.7 G/DL (ref 12–16)
HGB BLD-MCNC: 9.5 G/DL (ref 12–16)
HGB BLDA-MCNC: 11.3 G/DL (ref 12–16)
HOROWITZ INDEX BLD+IHG-RTO: 21 %
HYPOCHROMIA BLD QL: NORMAL
HYPOCHROMIA BLD QL: NORMAL
IMM GRANULOCYTES # BLD: 0.14 10*3/MM3 (ref 0–0.03)
IMM GRANULOCYTES # BLD: 0.2 10*3/MM3 (ref 0–0.03)
IMM GRANULOCYTES NFR BLD: 1.6 % (ref 0–0.5)
IMM GRANULOCYTES NFR BLD: 2.2 % (ref 0–0.5)
INR PPP: 1.04 (ref 0.8–1.1)
LYMPHOCYTES # BLD AUTO: 1.06 10*3/MM3 (ref 1–3)
LYMPHOCYTES # BLD AUTO: 1.22 10*3/MM3 (ref 1–3)
LYMPHOCYTES NFR BLD AUTO: 12.1 % (ref 21–51)
LYMPHOCYTES NFR BLD AUTO: 13.6 % (ref 21–51)
MAGNESIUM SERPL-MCNC: 2 MG/DL (ref 1.7–2.6)
MCH RBC QN AUTO: 23 PG (ref 27–33)
MCH RBC QN AUTO: 23.5 PG (ref 27–33)
MCHC RBC AUTO-ENTMCNC: 27.9 G/DL (ref 33–37)
MCHC RBC AUTO-ENTMCNC: 28.6 G/DL (ref 33–37)
MCV RBC AUTO: 82 FL (ref 80–94)
MCV RBC AUTO: 82.3 FL (ref 80–94)
MICROCYTES BLD QL: NORMAL
MODALITY: ABNORMAL
MONOCYTES # BLD AUTO: 0.54 10*3/MM3 (ref 0.1–0.9)
MONOCYTES # BLD AUTO: 0.64 10*3/MM3 (ref 0.1–0.9)
MONOCYTES NFR BLD AUTO: 6.2 % (ref 0–10)
MONOCYTES NFR BLD AUTO: 7.1 % (ref 0–10)
NEUTROPHILS # BLD AUTO: 6.85 10*3/MM3 (ref 1.4–6.5)
NEUTROPHILS # BLD AUTO: 6.94 10*3/MM3 (ref 1.4–6.5)
NEUTROPHILS NFR BLD AUTO: 76.1 % (ref 30–70)
NEUTROPHILS NFR BLD AUTO: 79.2 % (ref 30–70)
OSMOLALITY SERPL CALC.SUM OF ELEC: 280.9 MOSM/KG (ref 273–305)
OSMOLALITY SERPL CALC.SUM OF ELEC: 282.7 MOSM/KG (ref 273–305)
PCO2 BLDV: 29 MM HG
PH BLDV: 7.43 [PH]
PLAT MORPH BLD: NORMAL
PLAT MORPH BLD: NORMAL
PLATELET # BLD AUTO: 401 10*3/MM3 (ref 130–400)
PLATELET # BLD AUTO: 505 10*3/MM3 (ref 130–400)
PMV BLD AUTO: 10.3 FL (ref 6–10)
PMV BLD AUTO: 10.5 FL (ref 6–10)
PO2 BLDV: 31 MM HG
POIKILOCYTOSIS BLD QL SMEAR: NORMAL
POTASSIUM BLD-SCNC: 3.6 MMOL/L (ref 3.5–5.3)
POTASSIUM BLD-SCNC: 3.7 MMOL/L (ref 3.5–5.3)
POTASSIUM BLD-SCNC: 4.2 MMOL/L (ref 3.5–5.3)
PROT SERPL-MCNC: 7 G/DL (ref 6–8)
PROT SERPL-MCNC: 7.5 G/DL (ref 6–8)
PROTHROMBIN TIME: 11.6 SECONDS (ref 9.8–11.9)
RBC # BLD AUTO: 3.79 10*6/MM3 (ref 4.2–5.4)
RBC # BLD AUTO: 4.05 10*6/MM3 (ref 4.2–5.4)
SAO2 % BLDCOV: 53.4 %
SODIUM BLD-SCNC: 141 MMOL/L (ref 135–153)
SODIUM BLD-SCNC: 142 MMOL/L (ref 135–153)
WBC NRBC COR # BLD: 8.76 10*3/MM3 (ref 4.5–12.5)
WBC NRBC COR # BLD: 9 10*3/MM3 (ref 4.5–12.5)

## 2017-03-20 PROCEDURE — 93005 ELECTROCARDIOGRAM TRACING: CPT | Performed by: INTERNAL MEDICINE

## 2017-03-20 PROCEDURE — 25010000002 HALOPERIDOL LACTATE PER 5 MG

## 2017-03-20 PROCEDURE — 83735 ASSAY OF MAGNESIUM: CPT | Performed by: INTERNAL MEDICINE

## 2017-03-20 PROCEDURE — 94660 CPAP INITIATION&MGMT: CPT

## 2017-03-20 PROCEDURE — 63710000001 INSULIN ASPART PER 5 UNITS: Performed by: INTERNAL MEDICINE

## 2017-03-20 PROCEDURE — 71010 XR CHEST 1 VW: CPT | Performed by: RADIOLOGY

## 2017-03-20 PROCEDURE — 71010 HC CHEST PA OR AP: CPT

## 2017-03-20 PROCEDURE — 85025 COMPLETE CBC W/AUTO DIFF WBC: CPT | Performed by: INTERNAL MEDICINE

## 2017-03-20 PROCEDURE — 80053 COMPREHEN METABOLIC PANEL: CPT | Performed by: INTERNAL MEDICINE

## 2017-03-20 PROCEDURE — 82962 GLUCOSE BLOOD TEST: CPT

## 2017-03-20 PROCEDURE — 99239 HOSP IP/OBS DSCHRG MGMT >30: CPT | Performed by: INTERNAL MEDICINE

## 2017-03-20 PROCEDURE — 82805 BLOOD GASES W/O2 SATURATION: CPT | Performed by: INTERNAL MEDICINE

## 2017-03-20 PROCEDURE — 85610 PROTHROMBIN TIME: CPT | Performed by: INTERNAL MEDICINE

## 2017-03-20 PROCEDURE — 85007 BL SMEAR W/DIFF WBC COUNT: CPT | Performed by: INTERNAL MEDICINE

## 2017-03-20 PROCEDURE — 25010000002 HEPARIN (PORCINE) PER 1000 UNITS: Performed by: INTERNAL MEDICINE

## 2017-03-20 PROCEDURE — 94799 UNLISTED PULMONARY SVC/PX: CPT

## 2017-03-20 PROCEDURE — 84134 ASSAY OF PREALBUMIN: CPT | Performed by: INTERNAL MEDICINE

## 2017-03-20 PROCEDURE — 25010000002 FUROSEMIDE PER 20 MG: Performed by: INTERNAL MEDICINE

## 2017-03-20 PROCEDURE — 84132 ASSAY OF SERUM POTASSIUM: CPT | Performed by: INTERNAL MEDICINE

## 2017-03-20 PROCEDURE — 93010 ELECTROCARDIOGRAM REPORT: CPT | Performed by: INTERNAL MEDICINE

## 2017-03-20 PROCEDURE — 99291 CRITICAL CARE FIRST HOUR: CPT | Performed by: INTERNAL MEDICINE

## 2017-03-20 PROCEDURE — 25010000002 CEFTRIAXONE: Performed by: INTERNAL MEDICINE

## 2017-03-20 PROCEDURE — 63710000001 PREDNISONE PER 5 MG: Performed by: INTERNAL MEDICINE

## 2017-03-20 PROCEDURE — 97530 THERAPEUTIC ACTIVITIES: CPT

## 2017-03-20 RX ORDER — CLOPIDOGREL BISULFATE 75 MG/1
75 TABLET ORAL DAILY
Qty: 30 TABLET | Status: ON HOLD
Start: 2017-03-20 | End: 2017-06-05

## 2017-03-20 RX ORDER — DIAPER,BRIEF,INFANT-TODD,DISP
EACH MISCELLANEOUS EVERY 12 HOURS SCHEDULED
Refills: 0 | Status: ON HOLD
Start: 2017-03-20 | End: 2017-06-05

## 2017-03-20 RX ORDER — HALOPERIDOL 5 MG/ML
0.5 INJECTION INTRAMUSCULAR EVERY 6 HOURS PRN
Status: ON HOLD
Start: 2017-03-20 | End: 2017-06-05

## 2017-03-20 RX ORDER — FUROSEMIDE 10 MG/ML
40 INJECTION INTRAMUSCULAR; INTRAVENOUS ONCE
Status: COMPLETED | OUTPATIENT
Start: 2017-03-20 | End: 2017-03-20

## 2017-03-20 RX ORDER — HALOPERIDOL 5 MG/ML
5 INJECTION INTRAMUSCULAR ONCE
Status: COMPLETED | OUTPATIENT
Start: 2017-03-20 | End: 2017-03-20

## 2017-03-20 RX ORDER — POTASSIUM CHLORIDE 1.5 G/1.77G
40 POWDER, FOR SOLUTION ORAL 2 TIMES DAILY
Status: DISCONTINUED | OUTPATIENT
Start: 2017-03-20 | End: 2017-03-20

## 2017-03-20 RX ORDER — HALOPERIDOL 5 MG/ML
INJECTION INTRAMUSCULAR
Status: COMPLETED
Start: 2017-03-20 | End: 2017-03-20

## 2017-03-20 RX ORDER — ACYCLOVIR 200 MG/5ML
200 SUSPENSION ORAL
Refills: 0
Start: 2017-03-20 | End: 2017-06-03

## 2017-03-20 RX ORDER — PANTOPRAZOLE SODIUM 40 MG/10ML
40 INJECTION, POWDER, LYOPHILIZED, FOR SOLUTION INTRAVENOUS
Status: ON HOLD
Start: 2017-03-20 | End: 2017-06-05

## 2017-03-20 RX ORDER — NICOTINE 21 MG/24HR
1 PATCH, TRANSDERMAL 24 HOURS TRANSDERMAL EVERY 24 HOURS
Status: ON HOLD
Start: 2017-03-20 | End: 2017-06-05

## 2017-03-20 RX ORDER — HEPARIN SODIUM 5000 [USP'U]/ML
5000 INJECTION, SOLUTION INTRAVENOUS; SUBCUTANEOUS EVERY 12 HOURS SCHEDULED
Status: ON HOLD
Start: 2017-03-20 | End: 2017-06-05

## 2017-03-20 RX ORDER — PREDNISONE 1 MG/1
5 TABLET ORAL
Status: ON HOLD
Start: 2017-03-20 | End: 2017-06-05

## 2017-03-20 RX ORDER — METOPROLOL TARTRATE 50 MG/1
50 TABLET, FILM COATED ORAL EVERY 12 HOURS SCHEDULED
Status: ON HOLD
Start: 2017-03-20 | End: 2017-06-05

## 2017-03-20 RX ORDER — POTASSIUM CHLORIDE 1.5 G/1.77G
40 POWDER, FOR SOLUTION ORAL EVERY 4 HOURS
Status: DISPENSED | OUTPATIENT
Start: 2017-03-20 | End: 2017-03-20

## 2017-03-20 RX ORDER — ACETAMINOPHEN 160 MG/5ML
650 SOLUTION ORAL EVERY 6 HOURS PRN
Refills: 0
Start: 2017-03-20 | End: 2017-06-03

## 2017-03-20 RX ORDER — HALOPERIDOL 5 MG/ML
0.5 INJECTION INTRAMUSCULAR EVERY 6 HOURS PRN
Status: DISCONTINUED | OUTPATIENT
Start: 2017-03-20 | End: 2017-03-20 | Stop reason: HOSPADM

## 2017-03-20 RX ADMIN — METOPROLOL TARTRATE 50 MG: 50 TABLET, FILM COATED ORAL at 08:13

## 2017-03-20 RX ADMIN — NICOTINE 1 PATCH: 21 PATCH, EXTENDED RELEASE TRANSDERMAL at 11:29

## 2017-03-20 RX ADMIN — PANTOPRAZOLE SODIUM 40 MG: 40 INJECTION, POWDER, FOR SOLUTION INTRAVENOUS at 06:11

## 2017-03-20 RX ADMIN — GUAIFENESIN 300 MG: 100 SOLUTION ORAL at 06:11

## 2017-03-20 RX ADMIN — METRONIDAZOLE 500 MG: 500 INJECTION, SOLUTION INTRAVENOUS at 12:28

## 2017-03-20 RX ADMIN — ACYCLOVIR 200 MG: 200 SUSPENSION ORAL at 14:59

## 2017-03-20 RX ADMIN — METRONIDAZOLE 500 MG: 500 INJECTION, SOLUTION INTRAVENOUS at 06:11

## 2017-03-20 RX ADMIN — ACYCLOVIR 200 MG: 200 SUSPENSION ORAL at 06:11

## 2017-03-20 RX ADMIN — HEPARIN SODIUM 5000 UNITS: 5000 INJECTION, SOLUTION INTRAVENOUS; SUBCUTANEOUS at 08:13

## 2017-03-20 RX ADMIN — ASPIRIN 81 MG: 81 TABLET, CHEWABLE ORAL at 08:13

## 2017-03-20 RX ADMIN — PREDNISONE 5 MG: 5 TABLET ORAL at 08:13

## 2017-03-20 RX ADMIN — ALPRAZOLAM 1 MG: 1 TABLET ORAL at 08:13

## 2017-03-20 RX ADMIN — IPRATROPIUM BROMIDE 0.5 MG: 0.5 SOLUTION RESPIRATORY (INHALATION) at 00:10

## 2017-03-20 RX ADMIN — VANCOMYCIN 125 MG: KIT at 00:32

## 2017-03-20 RX ADMIN — HALOPERIDOL 5 MG: 5 INJECTION INTRAMUSCULAR at 11:28

## 2017-03-20 RX ADMIN — GUAIFENESIN 300 MG: 100 SOLUTION ORAL at 00:32

## 2017-03-20 RX ADMIN — INSULIN ASPART 3 UNITS: 100 INJECTION, SOLUTION INTRAVENOUS; SUBCUTANEOUS at 11:40

## 2017-03-20 RX ADMIN — CEFTRIAXONE 2 G: 2 INJECTION, POWDER, FOR SOLUTION INTRAMUSCULAR; INTRAVENOUS at 08:14

## 2017-03-20 RX ADMIN — POTASSIUM CHLORIDE 40 MEQ: 1.5 POWDER, FOR SOLUTION ORAL at 08:13

## 2017-03-20 RX ADMIN — IPRATROPIUM BROMIDE 0.5 MG: 0.5 SOLUTION RESPIRATORY (INHALATION) at 07:00

## 2017-03-20 RX ADMIN — HALOPERIDOL LACTATE 5 MG: 5 INJECTION, SOLUTION INTRAMUSCULAR at 11:28

## 2017-03-20 RX ADMIN — FUROSEMIDE 40 MG: 10 INJECTION, SOLUTION INTRAMUSCULAR; INTRAVENOUS at 11:20

## 2017-03-20 RX ADMIN — CLOPIDOGREL 75 MG: 75 TABLET, FILM COATED ORAL at 08:13

## 2017-03-20 RX ADMIN — BACITRACIN ZINC: 500 OINTMENT TOPICAL at 08:14

## 2017-03-20 RX ADMIN — VANCOMYCIN 125 MG: KIT at 06:11

## 2017-03-20 NOTE — PROGRESS NOTES
Subjective     History:   Pepito Yan is a 51 y.o. female admitted on 2/28/2017 secondary to Bilateral pneumonia     Procedures:   3/3/17: Emergent intubation  3/3/17: Right femoral central line placement s/p removal  3/3/17: Right IJ central line placement  3/16/17: Extubation    Transfusions:  3/9/17: 1 unit of PRBC's     Patient seen and examined with GUERRERO Self. Awake and alert. Confused to year but otherwise oriented. Reports persistent diarrhea. Reports nonproductive cough. Denies CP or dyspnea. Denies fever or chills. Pt is adamant that she wants to go home. RN replaced NG this AM after pt pulled it out. No acute events overnight per RN.     History taken from: patient, chart, and RN.      Objective     Vital Signs  Temp:  [97.9 °F (36.6 °C)-98.2 °F (36.8 °C)] 97.9 °F (36.6 °C)  Heart Rate:  [] 111  Resp:  [20-24] 22  BP: (122-140)/(72-99) 127/95    Intake/Output Summary (Last 24 hours) at 03/20/17 0900  Last data filed at 03/20/17 0814   Gross per 24 hour   Intake    644 ml   Output      0 ml   Net    644 ml         Physical Exam:  General:    Awake, alert, confused to year but otherwise oriented, in no acute distress, ill appearing   Heart:      Normal S1 and S2. Tachycardic. No significant murmur, rubs or gallops appreciated.   Lungs:     Respirations regular, even and unlabored. Lungs clear to auscultation B/L. No wheezes, rales or rhonchi.   Abdomen:   Soft and nontender. No guarding, rebound tenderness or  organomegaly noted. Bowel sounds present x 4.   Extremities:  No clubbing, cyanosis or edema noted. Moves UE and LE equally B/L.     Results Review:      Results from last 7 days  Lab Units 03/20/17  0438 03/19/17  0516 03/18/17  0529 03/17/17  0603 03/16/17  0059 03/15/17  0034 03/14/17  0038   WBC 10*3/mm3 8.76 8.87 10.90 11.98 7.76 5.83 6.18   HEMOGLOBIN g/dL 8.7* 8.4* 9.1* 9.4* 9.2* 8.6* 8.4*   PLATELETS 10*3/mm3 401* 343 349 280 255 216 206       Results from last 7 days  Lab Units  03/20/17  0438 03/19/17  0516 03/18/17  0529 03/17/17  1525 03/17/17  0603 03/16/17  1659 03/16/17  0059 03/15/17  0034 03/14/17  0038   SODIUM mmol/L 142 142 144  --  143  --  144 144 147   POTASSIUM mmol/L 3.6 3.2* 3.9 4.4 3.2* 4.4 3.1* 4.2 4.0   CHLORIDE mmol/L 110 112 110  --  106  --  101 107 114*   TOTAL CO2 mmol/L 22.0* 23.3* 22.8*  --  26.4  --  35.9* 35.1* 33.0*   BUN mg/dL 11 13 15  --  15  --  19 16 12   CREATININE mg/dL 0.34* 0.37* 0.48  --  0.42*  --  0.42* 0.50 0.34*   CALCIUM mg/dL 9.1 8.8 9.0  --  9.2  --  8.7 8.9 8.2   GLUCOSE mg/dL 102 103 159*  --  60*  --  98 141* 140*       Results from last 7 days  Lab Units 03/20/17  0438 03/19/17  0516 03/18/17  0529 03/17/17  0603 03/16/17  0059 03/15/17  0034 03/14/17  0038   BILIRUBIN mg/dL 0.4 0.4 0.5 0.5 0.5 0.3 0.2   ALK PHOS U/L 67 68 78 85 89 89 115*   AST (SGOT) U/L 35* 31* 30 37* 45* 30 42*   ALT (SGPT) U/L 29 29 32 42* 47* 48* 67*       Results from last 7 days  Lab Units 03/20/17  0438 03/17/17  0603 03/16/17  0825 03/13/17  0907   MAGNESIUM mg/dL 2.0 1.9 1.9 2.1               Imaging Results (last 24 hours)     Procedure Component Value Units Date/Time    XR Chest 1 View [[id number]] Collected:  03/19/17 1052     Updated:  03/19/17 1142    Narrative:       XR CHEST 1 VIEW-     CLINICAL INDICATION: NG tube replacement; J18.9-Pneumonia, unspecified  organism.          COMPARISON: 03/18/2017.      TECHNIQUE: Single frontal view of the chest.     FINDINGS:     There are diffuse increased interstitial markings and patchy airspace  disease bilaterally.  Probable mild CHF.  Nasogastric tube is in the stomach.  There is no evidence of an acute osseous abnormality.   There are no suspicious-appearing parenchymal soft tissue nodules.            Impression:       Little overall change.         This report was finalized on 3/19/2017 11:40 AM by Dr. Daniel Yates MD.       XR Chest 1 View [[id number]] Collected:  03/20/17 0745     Updated:  03/20/17 0757     Narrative:       EXAMINATION: XR CHEST 1 VW-      CLINICAL INDICATION:NG tube placement; J18.9-Pneumonia, unspecified  organism        COMPARISON: 3/18/2017      TECHNIQUE: Portable AP upright chest     FINDINGS:   Right IJ deep line stable in appearance and positioning. NG tube extends  below GE junction presumably within stomach. Persistent right greater  than left lung opacities with interstitial pattern and may represent  bilateral edema, pneumonia, or resolving ARDS. No new opacities. No  effusion or pneumothorax. No acute osseous changes.       Impression:          1. Satisfactory positioning of support devices.  2. Persistent right greater than left bilateral lung opacities with  considerations as above.     This report was finalized on 3/20/2017 7:46 AM by Dr. Wali Tracy MD.               Medications:    acetaminophen 650 mg Oral Once   acyclovir 200 mg Oral 5x Daily   ALPRAZolam 1 mg Oral Daily   ALPRAZolam 1 mg Oral Nightly   aspirin 81 mg Oral Daily   bacitracin  Topical Q12H   ceftriaxone 2 g Intravenous Q24H   clopidogrel 75 mg Oral Daily   guaiFENesin 300 mg Oral Q6H   heparin (porcine) 5,000 Units Subcutaneous Q12H   insulin aspart 0-14 Units Subcutaneous 4x Daily AC & at Bedtime   insulin detemir 22 Units Subcutaneous Nightly   ipratropium 0.5 mg Nebulization Q6H - RT   metoprolol tartrate 25 mg Oral Once   metoprolol tartrate 50 mg Oral Q12H   metroNIDAZOLE 500 mg Intravenous Q8H   nicotine 1 patch Transdermal Q24H   pantoprazole 40 mg Intravenous Q AM   potassium chloride 40 mEq Oral Once   potassium chloride 40 mEq Oral Q4H   predniSONE 5 mg Oral Daily With Breakfast   vancomycin 125 mg Oral Q6H       Pharmacy to dose vancomycin            Assessment/Plan   Severe sepsis: Likely 2/2 community acquired pneumonia and UTI upon admission with now probable superimposed nosocomial/ventilator associated pneumonia and C diff colitis. Currently hemodynamically stable with significant clinical  improvement from upon admission. Currently on PO Vanc, IV Rocephin and Flagyl and oral acyclovir. Repeat labs in the AM including CRP. ID input appreciated.     ARDS: S/P extubation on 3/16 following a prolonged intubation. Overall much improved from upon admission. CXR significantly improved as well. Cont broad spectrum IV abx as outlined above and steroid taper. Cont TF's as speech eval revealed laryngeal edema with aspiration following prolonged intubation.  Pulm following with input appreciated.     C diff colitis: Cont PO Vanc and IV Flagyl.     Acute exacerbation of COPD: Cont steroid taper and scheduled neb treatments.     Acute on chronic microcytic anemia: S/P 1 unit of PRBC's on 3/9. Currently stable. Cont to monitor.     Hypokalemia: Cont electrolyte replacement protocols and repeat labs in the AM.     DM II, insulin-dependent: BG levels currently stable. Cont current regimen and monitor.     Generalized weakness/debility: Cont PT. Consult OT. Pt is being reviewed by [facility].     PPX  GI: PPI  DVT: SQ heparin     Pt is at high risk 2/2 severe sepsis, pneumonia, UTI, C diff colitis, ARDS, generalized weakness and prolonged critical illness.         Manuel Elizalde,   03/20/17  9:00 AM

## 2017-03-20 NOTE — DISCHARGE PLACEMENT REQUEST
"Yessica Yan (51 y.o. Female)     Date of Birth Social Security Number Address Home Phone MRN    1965  [address] [phone] [id number]    Yazidism Marital Status          Tenriism        Admission Date Admission Type Admitting Provider Attending Provider Department, Room/Bed    2/28/17 Emergency Savanah Gutierrez MD Grace, Sherrill Carranza DO [facility] CRITICAL CARE, CC01/1C    Discharge Date Discharge Disposition Discharge Destination         Long Term Care (DC - External)             Attending Provider: Sherrill Weiner DO     Allergies:  Penicillins, Toradol [Ketorolac Tromethamine], Tramadol    Isolation:  Spore   Infection:  C.difficile (03/10/17)   Code Status:  FULL    Ht:  63\" (160 cm)   Wt:  107 lb 12.8 oz (48.9 kg)    Admission Cmt:  None   Principal Problem:  Bilateral pneumonia [J18.9]                 Active Insurance as of 2/28/2017     Primary Coverage     Payor Plan Insurance Group Employer/Plan Group    MEDICARE MEDICARE A & B      Payor Plan Address Payor Plan Phone Number Effective From Effective To    [address] [phone] 2/1/2005     [place] [address]       Subscriber Name Subscriber Birth Date Member ID       YESSICA YAN 1965 [id number]           Secondary Coverage     Payor Plan Insurance Group Employer/Plan Group    [employer] WELL[place]ARE [place]ICAID      Payor Plan Address Payor Plan Phone Number Effective From Effective To    [address] [address] [phone] 2/28/2017     [place] [address]       Subscriber Name Subscriber Birth Date Member ID       YESSICA YAN 1965 [id number]                 Emergency Contacts      (Rel.) Home Phone Work Phone Mobile Phone    Scot Vaughn (Son) [phone] -- --    Neeta Howard (Friend) -- -- [phone]            Emergency Contact Information     Name Relation Home Work Mobile    Scot Vaughn Son [phone]      Neeta Howard " Friend   [phone]          Insurance Information                MEDICARE/MEDICARE A & B Phone: [phone]    Subscriber: Pepito Yan Subscriber#: [id number]    Group#:  Precert#:         [facility] MEDICAID Phone: [phone]    Subscriber: Pepito Yan Subscriber#: [id number]    Group#:  Precert#:           Vital Signs (last 24 hours)       03/19 0700  -  03/20 0659 03/20 0700  -  03/20 1342   Most Recent    Temp (°F) 98.1 -  98.2    97.5 -  97.9     97.5 (36.4)    Heart Rate 85 -  114    92 -  112     98    Resp 20 -  24    20 -  (!)40     (!) 30    /84 -  140/85    114/78 -  140/99     114/78    SpO2 (%) 98 -  100    97 -  99     99          Intake & Output (last day)       03/19 0701 - 03/20 0700 03/20 0701 - 03/21 0700    Other 244 100    NG/GT  20    IV Piggyback 400 100    Total Intake(mL/kg) 644 (13.2) 220 (4.5)    Urine (mL/kg/hr)      Stool      Total Output        Net +644 +220          Unmeasured Stool Occurrence 6 x 4 x          Lab Results (last 24 hours)     Procedure Component Value Units Date/Time    POC Glucose Fingerstick [[id number]]  (Normal) Collected:  03/19/17 1831    Specimen:  Blood Updated:  03/19/17 1834     Glucose 98 mg/dL     Narrative:       Meter: [id number] : [id number] chloé barrow    POC Glucose Fingerstick [[id number]]  (Normal) Collected:  03/19/17 2133    Specimen:  Blood Updated:  03/19/17 2138     Glucose 87 mg/dL     Narrative:       Meter: [id number] : [id number] MARTIN JAMIL    Magnesium [[id number]]  (Normal) Collected:  03/20/17 0438    Specimen:  Blood Updated:  03/20/17 0519     Magnesium 2.0 mg/dL     Comprehensive Metabolic Panel [[id number]]  (Abnormal) Collected:  03/20/17 0438    Specimen:  Blood Updated:  03/20/17 0519     Glucose 102 mg/dL      BUN 11 mg/dL      Creatinine 0.34 (L) mg/dL      Sodium 142 mmol/L      Potassium 3.6 mmol/L      Chloride 110 mmol/L      CO2 22.0 (L) mmol/L      Calcium 9.1 mg/dL       Total Protein 7.0 g/dL      Albumin 3.20 (L) g/dL      ALT (SGPT) 29 U/L      AST (SGOT) 35 (H) U/L      Alkaline Phosphatase 67 U/L       Note New Reference Ranges        Total Bilirubin 0.4 mg/dL      eGFR Non African Amer >150 mL/min/1.73      Globulin 3.8 gm/dL      A/G Ratio 0.8 (L) g/dL      BUN/Creatinine Ratio 32.4 (H)      Anion Gap 10.0 mmol/L     Osmolality, Calculated [[id number]]  (Normal) Collected:  03/20/17 0438    Specimen:  Blood Updated:  03/20/17 0519     Osmolality Calc 282.7 mOsm/kg     CBC Auto Differential [[id number]]  (Abnormal) Collected:  03/20/17 0438    Specimen:  Blood Updated:  03/20/17 0522     WBC 8.76 10*3/mm3      RBC 3.79 (L) 10*6/mm3      Hemoglobin 8.7 (L) g/dL      Hematocrit 31.2 (L) %      MCV 82.3 fL      MCH 23.0 (L) pg      MCHC 27.9 (L) g/dL      RDW 26.5 (H) %      RDW-SD 71.9 (H) fl      MPV 10.3 (H) fL      Platelets 401 (H) 10*3/mm3      Neutrophil % 79.2 (H) %      Lymphocyte % 12.1 (L) %      Monocyte % 6.2 %      Eosinophil % 0.6 %      Basophil % 0.3 %      Immature Grans % 1.6 (H) %      Neutrophils, Absolute 6.94 (H) 10*3/mm3      Lymphocytes, Absolute 1.06 10*3/mm3      Monocytes, Absolute 0.54 10*3/mm3      Eosinophils, Absolute 0.05 10*3/mm3      Basophils, Absolute 0.03 10*3/mm3      Immature Grans, Absolute 0.14 (H) 10*3/mm3     CBC & Differential [[id number]] Collected:  03/20/17 0438    Specimen:  Blood Updated:  03/20/17 0522    Narrative:       The following orders were created for panel order CBC & Differential.  Procedure                               Abnormality         Status                     ---------                               -----------         ------                     Scan Slide[[id number]]                                        Final result               CBC Auto Differential[[id number]]         Abnormal            Final result                 Please view results for these tests on the individual orders.    Scan Slide [[id number]] Collected:   03/20/17 0438    Specimen:  Blood Updated:  03/20/17 0522     Anisocytosis Large/3+      Hypochromia Mod/2+      Platelet Morphology Normal     POC Glucose Fingerstick [[id number]]  (Normal) Collected:  03/20/17 0757    Specimen:  Blood Updated:  03/20/17 0807     Glucose 118 mg/dL     Narrative:       Meter: [id number] : [id number] RAJINDER HERNÁNDEZ    Blood Gas, Venous [[id number]]  (Abnormal) Collected:  03/20/17 1100    Specimen:  Venous Blood Updated:  03/20/17 1120     Site CentralLine      pH, Venous 7.428      pCO2, Venous 29.0 mm Hg      pO2, Venous 31.0 mm Hg      HCO3, Venous 18.7 mmol/L      Base Excess, Venous -4.5 mmol/L      O2 Saturation, Venous 53.4 %      Hemoglobin, Blood Gas 11.3 (L) g/dL      A-a Gradiant 78.3 mmHg      Temperature 98.6 C      Barometric Pressure for Blood Gas 733 mmHg      Modality Room air      FIO2 21 %     Narrative:       Venous abg drawn from central line    POC Glucose Fingerstick [[id number]]  (Abnormal) Collected:  03/20/17 1132    Specimen:  Blood Updated:  03/20/17 1152     Glucose 154 (H) mg/dL     Narrative:       Meter: [id number] : [id number] RAJINDER HERNÁNDEZ    Potassium [[id number]]  (Normal) Collected:  03/20/17 1244    Specimen:  Blood Updated:  03/20/17 1335     Potassium 4.2 mmol/L         Imaging Results (last 24 hours)     Procedure Component Value Units Date/Time    XR Chest 1 View [[id number]] Collected:  03/20/17 0745     Updated:  03/20/17 0757    Narrative:       EXAMINATION: XR CHEST 1 VW-      CLINICAL INDICATION:NG tube placement; J18.9-Pneumonia, unspecified  organism        COMPARISON: 3/18/2017      TECHNIQUE: Portable AP upright chest     FINDINGS:   Right IJ deep line stable in appearance and positioning. NG tube extends  below GE junction presumably within stomach. Persistent right greater  than left lung opacities with interstitial pattern and may represent  bilateral edema, pneumonia, or resolving ARDS. No new opacities. No  effusion or  pneumothorax. No acute osseous changes.       Impression:          1. Satisfactory positioning of support devices.  2. Persistent right greater than left bilateral lung opacities with  considerations as above.     This report was finalized on 3/20/2017 7:46 AM by Dr. Wali Tracy MD.                Physician Progress Notes (last 24 hours) (Notes from 3/19/2017  1:42 PM through 3/20/2017  1:42 PM)      Manuel Elizalde,  at 3/20/2017  9:00 AM  Version 1 of 1         Subjective     History:   Pepito Yan is a 51 y.o. female admitted on 2/28/2017 secondary to Bilateral pneumonia     Procedures:   /3/17: Emergent intubation  3/3/17: Right femoral central line placement s/p removal  3/3/17: Right IJ central line placement  3/16/17: Extubation    Transfusions:  3/9/17: 1 unit of PRBC's     Patient seen and examined with GUERRERO Self. Awake and alert. Confused to year but otherwise oriented. Reports persistent diarrhea. Reports nonproductive cough. Denies CP or dyspnea. Denies fever or chills. Pt is adamant that she wants to go home. RN replaced NG this AM after pt pulled it out. No acute events overnight per RN.     History taken from: patient, chart, and RN.      Objective     Vital Signs  Temp:  [97.9 °F (36.6 °C)-98.2 °F (36.8 °C)] 97.9 °F (36.6 °C)  Heart Rate:  [] 111  Resp:  [20-24] 22  BP: (122-140)/(72-99) 127/95    Intake/Output Summary (Last 24 hours) at 03/20/17 0900  Last data filed at 03/20/17 0814   Gross per 24 hour   Intake    644 ml   Output      0 ml   Net    644 ml         Physical Exam:  General:    Awake, alert, confused to year but otherwise oriented, in no acute distress, ill appearing   Heart:      Normal S1 and S2. Tachycardic. No significant murmur, rubs or gallops appreciated.   Lungs:     Respirations regular, even and unlabored. Lungs clear to auscultation B/L. No wheezes, rales or rhonchi.   Abdomen:   Soft and nontender. No guarding, rebound tenderness or  organomegaly  noted. Bowel sounds present x 4.   Extremities:  No clubbing, cyanosis or edema noted. Moves UE and LE equally B/L.     Results Review:      Results from last 7 days  Lab Units 03/20/17  0438 03/19/17  0516 03/18/17  0529 03/17/17  0603 03/16/17  0059 03/15/17  0034 03/14/17  0038   WBC 10*3/mm3 8.76 8.87 10.90 11.98 7.76 5.83 6.18   HEMOGLOBIN g/dL 8.7* 8.4* 9.1* 9.4* 9.2* 8.6* 8.4*   PLATELETS 10*3/mm3 401* 343 349 280 255 216 206       Results from last 7 days  Lab Units 03/20/17 0438 03/19/17  0516 03/18/17  0529 03/17/17  1525 03/17/17  0603 03/16/17  1659 03/16/17  0059 03/15/17  0034 03/14/17  0038   SODIUM mmol/L 142 142 144  --  143  --  144 144 147   POTASSIUM mmol/L 3.6 3.2* 3.9 4.4 3.2* 4.4 3.1* 4.2 4.0   CHLORIDE mmol/L 110 112 110  --  106  --  101 107 114*   TOTAL CO2 mmol/L 22.0* 23.3* 22.8*  --  26.4  --  35.9* 35.1* 33.0*   BUN mg/dL 11 13 15  --  15  --  19 16 12   CREATININE mg/dL 0.34* 0.37* 0.48  --  0.42*  --  0.42* 0.50 0.34*   CALCIUM mg/dL 9.1 8.8 9.0  --  9.2  --  8.7 8.9 8.2   GLUCOSE mg/dL 102 103 159*  --  60*  --  98 141* 140*       Results from last 7 days  Lab Units 03/20/17  0438 03/19/17  0516 03/18/17  0529 03/17/17  0603 03/16/17  0059 03/15/17  0034 03/14/17  0038   BILIRUBIN mg/dL 0.4 0.4 0.5 0.5 0.5 0.3 0.2   ALK PHOS U/L 67 68 78 85 89 89 115*   AST (SGOT) U/L 35* 31* 30 37* 45* 30 42*   ALT (SGPT) U/L 29 29 32 42* 47* 48* 67*       Results from last 7 days  Lab Units 03/20/17  0438 03/17/17  0603 03/16/17  0825 03/13/17  0907   MAGNESIUM mg/dL 2.0 1.9 1.9 2.1               Imaging Results (last 24 hours)     Procedure Component Value Units Date/Time    XR Chest 1 View [[id number]] Collected:  03/19/17 1052     Updated:  03/19/17 1142    Narrative:       XR CHEST 1 VIEW-     CLINICAL INDICATION: NG tube replacement; J18.9-Pneumonia, unspecified  organism.          COMPARISON: 03/18/2017.      TECHNIQUE: Single frontal view of the chest.     FINDINGS:     There are diffuse  increased interstitial markings and patchy airspace  disease bilaterally.  Probable mild CHF.  Nasogastric tube is in the stomach.  There is no evidence of an acute osseous abnormality.   There are no suspicious-appearing parenchymal soft tissue nodules.            Impression:       Little overall change.         This report was finalized on 3/19/2017 11:40 AM by Dr. Daniel Yates MD.       XR Chest 1 View [[id number]] Collected:  03/20/17 0745     Updated:  03/20/17 0757    Narrative:       EXAMINATION: XR CHEST 1 VW-      CLINICAL INDICATION:NG tube placement; J18.9-Pneumonia, unspecified  organism        COMPARISON: 3/18/2017      TECHNIQUE: Portable AP upright chest     FINDINGS:   Right IJ deep line stable in appearance and positioning. NG tube extends  below GE junction presumably within stomach. Persistent right greater  than left lung opacities with interstitial pattern and may represent  bilateral edema, pneumonia, or resolving ARDS. No new opacities. No  effusion or pneumothorax. No acute osseous changes.       Impression:          1. Satisfactory positioning of support devices.  2. Persistent right greater than left bilateral lung opacities with  considerations as above.     This report was finalized on 3/20/2017 7:46 AM by Dr. Wali Tracy MD.               Medications:    acetaminophen 650 mg Oral Once   acyclovir 200 mg Oral 5x Daily   ALPRAZolam 1 mg Oral Daily   ALPRAZolam 1 mg Oral Nightly   aspirin 81 mg Oral Daily   bacitracin  Topical Q12H   ceftriaxone 2 g Intravenous Q24H   clopidogrel 75 mg Oral Daily   guaiFENesin 300 mg Oral Q6H   heparin (porcine) 5,000 Units Subcutaneous Q12H   insulin aspart 0-14 Units Subcutaneous 4x Daily AC & at Bedtime   insulin detemir 22 Units Subcutaneous Nightly   ipratropium 0.5 mg Nebulization Q6H - RT   metoprolol tartrate 25 mg Oral Once   metoprolol tartrate 50 mg Oral Q12H   metroNIDAZOLE 500 mg Intravenous Q8H   nicotine 1 patch Transdermal Q24H    pantoprazole 40 mg Intravenous Q AM   potassium chloride 40 mEq Oral Once   potassium chloride 40 mEq Oral Q4H   predniSONE 5 mg Oral Daily With Breakfast   vancomycin 125 mg Oral Q6H       Pharmacy to dose vancomycin            Assessment&#47;Plan sepsis: Likely 2/2 community acquired pneumonia and UTI upon admission with now probable superimposed nosocomial/ventilator associated pneumonia and C diff colitis. Currently hemodynamically stable with significant clinical improvement from upon admission. Currently on PO Vanc, IV Rocephin and Flagyl and oral acyclovir. Repeat labs in the AM including CRP. ID input appreciated.     ARDS: S/P extubation on 3/16 following a prolonged intubation. Overall much improved from upon admission. CXR significantly improved as well. Cont broad spectrum IV abx as outlined above and steroid taper. Cont TF's as speech eval revealed laryngeal edema with aspiration following prolonged intubation.  Pulm following with input appreciated.     C diff colitis: Cont PO Vanc and IV Flagyl.     Acute exacerbation of COPD: Cont steroid taper and scheduled neb treatments.     Acute on chronic microcytic anemia: S/P 1 unit of PRBC's on 3/9. Currently stable. Cont to monitor.     Hypokalemia: Cont electrolyte replacement protocols and repeat labs in the AM.     DM II, insulin-dependent: BG levels currently stable. Cont current regimen and monitor.     Generalized weakness/debility: Cont PT. Consult OT. [facility] is being [facility].     PPX  GI: PPI  DVT: SQ heparin     Pt is at high risk 2/2 severe sepsis, pneumonia, UTI, C diff colitis, ARDS, generalized weakness and prolonged critical illness.         Manuel Elizalde DO  03/20/17  9:00 AM     Electronically signed by Manuel Elizalde DO at 3/20/2017  9:12 AM      Gerard Bradley MD at 3/20/2017  9:04 AM  Version 2 of 2          LOS: 20 days     Chief Complaint:  Pulmonology is following for bilateral pneumonia and ARDS    Subjective      Interval History: patient is resting in bed.  She reports no complaints.  Nurse reports that she wants to get up out of bed and that she wants to go home.  Nurse also reports that she continues to pull out lines such as NG tube.  Otherwise no acute events reported overnight.      History taken from: patient chart RN    Review of Systems:   Review of Systemsof Systems - History obtained from chart review and the patient  General ROS: negative for - chills, fatigue or fever  Psychological ROS: negative for - anxiety or depression  ENT ROS: negative for - headaches, visual changes or vocal changes  Respiratory ROS: positive for - shortness of breath  Cardiovascular ROS: no chest pain or dyspnea on exertion  Gastrointestinal ROS: no abdominal pain, change in bowel habits, or black or bloody stools  Musculoskeletal ROS: negative for - joint pain, joint stiffness or joint swelling  Neurological ROS: negative for - confusion, dizziness, headaches, numbness/tingling or seizures                  Objective     Vital Signs  Temp:  [97.9 °F (36.6 °C)-98.2 °F (36.8 °C)] 97.9 °F (36.6 °C)  Heart Rate:  [] 111  Resp:  [20-24] 22  BP: (122-140)/(72-99) 127/95  Body mass index is 19.1 kg/(m^2).    Intake/Output Summary (Last 24 hours) at 03/20/17 0905  Last data filed at 03/20/17 0814   Gross per 24 hour   Intake    644 ml   Output      0 ml   Net    644 ml     I/O this shift:  In: 100 [IV Piggyback:100]  Out: -     Physical Exam:  GENERAL APPEARANCE: Well developed, well nourished, alert and cooperative, and appears to be in no acute distress.    HEAD: normocephalic.    EYES: PERRL    NECK: Neck supple.     CARDIAC: Normal S1 and S2. No S3, S4 or murmurs. Rhythm is regular. There is no peripheral edema, cyanosis or pallor. Extremities are warm and well perfused. Capillary refill is less than 2 seconds. No carotid bruits.    LUNGS: Clear to auscultation and percussion without rales, rhonchi, wheezing or diminished breath  sounds.    ABDOMEN: Positive bowel sounds. Soft, nondistended, nontender.     EXTREMITIES: No significant deformity or joint abnormality. No edema. Peripheral pulses intact.     NEUROLOGICAL: Strength and sensation symmetric and intact throughout.     PSYCHIATRIC: The mental examination revealed the patient was oriented to person, place, and time.                 Results Review:                I reviewed the patient's new clinical results.  I reviewed the patient's new imaging results and agree with the interpretation.    Results from last 7 days  Lab Units 03/20/17 0438 03/19/17 0516 03/18/17  0529   WBC 10*3/mm3 8.76 8.87 10.90   HEMOGLOBIN g/dL 8.7* 8.4* 9.1*   PLATELETS 10*3/mm3 401* 343 349       Results from last 7 days  Lab Units 03/20/17 0438 03/19/17 0516 03/18/17  0529 03/17/17  0603  03/16/17  0825   SODIUM mmol/L 142 142 144  --  143  --   --    POTASSIUM mmol/L 3.6 3.2* 3.9  < > 3.2*  < >  --    CHLORIDE mmol/L 110 112 110  --  106  --   --    TOTAL CO2 mmol/L 22.0* 23.3* 22.8*  --  26.4  --   --    BUN mg/dL 11 13 15  --  15  --   --    CREATININE mg/dL 0.34* 0.37* 0.48  --  0.42*  --   --    CALCIUM mg/dL 9.1 8.8 9.0  --  9.2  --   --    GLUCOSE mg/dL 102 103 159*  --  60*  --   --    MAGNESIUM mg/dL 2.0  --   --   --  1.9  --  1.9   < > = values in this interval not displayed.  Lab Results   Component Value Date    INR 1.01 12/17/2016    INR 0.94 11/20/2016    INR <0.90 02/06/2016    PROTIME 11.4 12/17/2016    PROTIME 10.6 11/20/2016    PROTIME 10.0 02/06/2016       Results from last 7 days  Lab Units 03/20/17 0438 03/19/17 0516 03/18/17  0529   ALK PHOS U/L 67 68 78   BILIRUBIN mg/dL 0.4 0.4 0.5   ALT (SGPT) U/L 29 29 32   AST (SGOT) U/L 35* 31* 30       Results from last 7 days  Lab Units 03/16/17  0517   PH, ARTERIAL pH units 7.505*   PO2 ART mm Hg 123.4*   PCO2, ARTERIAL mm Hg 43.1   HCO3 ART mmol/L 33.2*     Imaging Results (last 24 hours)     Procedure Component Value Units Date/Time     XR Chest 1 View [[id number]] Collected:  03/19/17 1052     Updated:  03/19/17 1142    Narrative:       XR CHEST 1 VIEW-     CLINICAL INDICATION: NG tube replacement; J18.9-Pneumonia, unspecified  organism.          COMPARISON: 03/18/2017.      TECHNIQUE: Single frontal view of the chest.     FINDINGS:     There are diffuse increased interstitial markings and patchy airspace  disease bilaterally.  Probable mild CHF.  Nasogastric tube is in the stomach.  There is no evidence of an acute osseous abnormality.   There are no suspicious-appearing parenchymal soft tissue nodules.            Impression:       Little overall change.         This report was finalized on 3/19/2017 11:40 AM by Dr. Daniel Yates MD.       XR Chest 1 View [[id number]] Collected:  03/20/17 0745     Updated:  03/20/17 0757    Narrative:       EXAMINATION: XR CHEST 1 VW-      CLINICAL INDICATION:NG tube placement; J18.9-Pneumonia, unspecified  organism        COMPARISON: 3/18/2017      TECHNIQUE: Portable AP upright chest     FINDINGS:   Right IJ deep line stable in appearance and positioning. NG tube extends  below GE junction presumably within stomach. Persistent right greater  than left lung opacities with interstitial pattern and may represent  bilateral edema, pneumonia, or resolving ARDS. No new opacities. No  effusion or pneumothorax. No acute osseous changes.       Impression:          1. Satisfactory positioning of support devices.  2. Persistent right greater than left bilateral lung opacities with  considerations as above.     This report was finalized on 3/20/2017 7:46 AM by Dr. Wali Tracy MD.                Medication Review:   Scheduled Medications:    acetaminophen 650 mg Oral Once   acyclovir 200 mg Oral 5x Daily   ALPRAZolam 1 mg Oral Daily   ALPRAZolam 1 mg Oral Nightly   aspirin 81 mg Oral Daily   bacitracin  Topical Q12H   ceftriaxone 2 g Intravenous Q24H   clopidogrel 75 mg Oral Daily   guaiFENesin 300 mg Oral Q6H   heparin  (porcine) 5,000 Units Subcutaneous Q12H   insulin aspart 0-14 Units Subcutaneous 4x Daily AC & at Bedtime   insulin detemir 22 Units Subcutaneous Nightly   ipratropium 0.5 mg Nebulization Q6H - RT   metoprolol tartrate 25 mg Oral Once   metoprolol tartrate 50 mg Oral Q12H   metroNIDAZOLE 500 mg Intravenous Q8H   nicotine 1 patch Transdermal Q24H   pantoprazole 40 mg Intravenous Q AM   potassium chloride 40 mEq Oral Once   potassium chloride 40 mEq Oral Q4H   predniSONE 5 mg Oral Daily With Breakfast   vancomycin 125 mg Oral Q6H     Continuous infusions:    Pharmacy to dose vancomycin        Assessment&#47;Plan     Neuro: Patient is alert and oriented.  She is restless and wants to get up out of bed and continues to pull out lines.  Will continues to monitor.  Patient became more confused as the day went on.  She became anxious and somewhat delirious, and was having rapid respirations.  Discontinued Xanax, Ativan, Phenergan and Versed.  These medications have been shown to cause delirium.  We'll monitor for improvement on delirium.  Patient continued to be confused and somewhat hostile, pulling out her NG tube again.  She refused to let the nurse place another one.  Ordered Haldol 5 mg IV.  ? delirium - cause      Respiratory: Extubated on 3/16/17is saturating well on 2 liters nasal cannula.  She wore her BIPAP overnight.  become tachypnea.  Ordered a VBG.      Will continue IV antibiotics, IV steroids, supplemental oxygen during the day, BIPAP overnight, scheduled inhalants and PRN neb treatments.      ID: Continue antibiotics for C. Diff.  Infectious disease is on case, management per them.    Microbiology Results (last 10 days)     Procedure Component Value - Date/Time    Blood Culture [[id number]]  (Normal) Collected:  03/14/17 0556    Lab Status:  Final result Specimen:  Blood from Blood, Venous Line Updated:  03/19/17 0901     Blood Culture No growth at 5 days     Blood Culture [[id number]]  (Normal) Collected:   03/14/17 0519    Lab Status:  Final result Specimen:  Blood from Blood, Venous Line Updated:  03/19/17 0701     Blood Culture No growth at 5 days     Blood Culture [[id number]]  (Normal) Collected:  03/11/17 1246    Lab Status:  Final result Specimen:  Blood from Hand, Right Updated:  03/16/17 1501     Blood Culture No growth at 5 days     Urine Culture [[id number]]  (Abnormal) Collected:  03/11/17 1216    Lab Status:  Final result Specimen:  Urine from Urine, Clean Catch Updated:  03/16/17 1307     Urine Culture >100,000 CFU/mL Candida tropicalis (A)     Blood Culture [[id number]]  (Normal) Collected:  03/11/17 1212    Lab Status:  Final result Specimen:  Blood from Hand, Right Updated:  03/16/17 1501     Blood Culture No growth at 5 days     Clostridium Difficile Toxin [[id number]] Collected:  03/10/17 1309    Lab Status:  Final result Specimen:  Stool from Per Rectum Updated:  03/10/17 1911    Narrative:       The following orders were created for panel order Clostridium Difficile Toxin.  Procedure                               Abnormality         Status                     ---------                               -----------         ------                     Clostridium Difficile Tox...[[id number]]  Abnormal            Final result                 Please view results for these tests on the individual orders.    Clostridium Difficile Toxin, PCR [[id number]]  (Abnormal) Collected:  03/10/17 1309    Lab Status:  Final result Specimen:  Stool from Per Rectum Updated:  03/10/17 1911     C. Difficile Toxins by PCR Positive (C)      027 Toxin Presumptive Negative     Narrative:         For In Vitro Diagnostic use only.  027-NAP1-BI results are NOT intended to guide treatment. 027 typing is relative to PCR ribotyping and shown to be equivalent to B1 typing by restriction endonuclease analysis or NAP1 typing by pulse field gel electrophoresis.          Cardiac:    Hemodynamically Stable.  Continuous ECG and pulse ox monitoring.         Renal: BUN/Creatinine WNL.  Strict I&Os.   Ordered Lasix 40 mg IV.      Endocrine: monitor glucose targeting 140-180.      Electrolytes:  Monitor levels, manage and replete per protocols.      GI: FEES was done on 3/17/17.  Patient is to remain NPO and continue tube feedings.      Hematology:   Monitor blood counts, transfuse at or below 7 g/dL.  Unless patient is actively bleeding then began transfusing at 8 g/dL.       Patient Active Problem List   Diagnosis Code   • Chest pain R07.9   • Chest pain of uncertain etiology R07.89   • Intractable nausea and vomiting R11.2   • Intractable cyclical vomiting with nausea G43.A1   • Chronic obstructive pulmonary emphysema J43.9   • Hepatitis-C B19.20   • Diabetes mellitus type 2 in nonobese E11.9   • Arthritis or polyarthritis, rheumatoid M06.9   • Anxiety F41.9   • Essential hypertension I10   • Coronary artery disease I25.10   • Buerger's disease I73.1   • Cirrhosis K74.60   • Diabetes E11.9   • Chronic nausea R11.0   • Pancreatitis K85.90   • Bilateral pneumonia J18.9   • ARDS (adult respiratory distress syndrome) J80   • Hypoxia R09.02         Bedside rounds were completed with RN and RRT, discussed case and plan in great detail.    Discussed the case in detail- explained all the labs results and images. Discussed plan for the upcoming days and answered questions to their satistfaction     ROSA Seymour  03/20/17  9:05 AM        Attestation: Scribed for Dr. Bradley, by ROSA Hernandez      I, Gerard Bradley M.D. attest that the above note accurately reflects the work and decisions made  by me.  Patient was seen and evaluated by Dr. Bradley, including history of present illness, physical exam, assessment, and treatment plan.  The above note was reviewed and edited by Dr. Bradley. The patient was  discussed with Miss Arellano  and LOLA agree with her history physical assessment and plan. Critical care time 35 minutes     Electronically signed by Gerard  MD Kirk at 3/20/2017  1:22 PM      ROSA Seymour at 3/20/2017  9:04 AM  Version 1 of 2          LOS: 20 days     Chief Complaint:  Pulmonology is following for bilateral pneumonia and ARDS    Subjective     Interval History: patient is resting in bed.  She reports no complaints.  Nurse reports that she wants to get up out of bed and that she wants to go home.  Nurse also reports that she continues to pull out lines such as NG tube.  Otherwise no acute events reported overnight.      History taken from: patient chart RN    Review of Systems:   Review of Systemsof Systems - History obtained from chart review and the patient  General ROS: negative for - chills, fatigue or fever  Psychological ROS: negative for - anxiety or depression  ENT ROS: negative for - headaches, visual changes or vocal changes  Respiratory ROS: positive for - shortness of breath  Cardiovascular ROS: no chest pain or dyspnea on exertion  Gastrointestinal ROS: no abdominal pain, change in bowel habits, or black or bloody stools  Musculoskeletal ROS: negative for - joint pain, joint stiffness or joint swelling  Neurological ROS: negative for - confusion, dizziness, headaches, numbness/tingling or seizures                  Objective     Vital Signs  Temp:  [97.9 °F (36.6 °C)-98.2 °F (36.8 °C)] 97.9 °F (36.6 °C)  Heart Rate:  [] 111  Resp:  [20-24] 22  BP: (122-140)/(72-99) 127/95  Body mass index is 19.1 kg/(m^2).    Intake/Output Summary (Last 24 hours) at 03/20/17 0905  Last data filed at 03/20/17 0814   Gross per 24 hour   Intake    644 ml   Output      0 ml   Net    644 ml     I/O this shift:  In: 100 [IV Piggyback:100]  Out: -     Physical Exam:  GENERAL APPEARANCE: Well developed, well nourished, alert and cooperative, and appears to be in no acute distress.    HEAD: normocephalic.    EYES: PERRL    NECK: Neck supple.     CARDIAC: Normal S1 and S2. No S3, S4 or murmurs. Rhythm is regular. There is no peripheral edema,  cyanosis or pallor. Extremities are warm and well perfused. Capillary refill is less than 2 seconds. No carotid bruits.    LUNGS: Clear to auscultation and percussion without rales, rhonchi, wheezing or diminished breath sounds.    ABDOMEN: Positive bowel sounds. Soft, nondistended, nontender.     EXTREMITIES: No significant deformity or joint abnormality. No edema. Peripheral pulses intact.     NEUROLOGICAL: Strength and sensation symmetric and intact throughout.     PSYCHIATRIC: The mental examination revealed the patient was oriented to person, place, and time.                 Results Review:                I reviewed the patient's new clinical results.  I reviewed the patient's new imaging results and agree with the interpretation.    Results from last 7 days  Lab Units 03/20/17  0438 03/19/17  0516 03/18/17  0529   WBC 10*3/mm3 8.76 8.87 10.90   HEMOGLOBIN g/dL 8.7* 8.4* 9.1*   PLATELETS 10*3/mm3 401* 343 349       Results from last 7 days  Lab Units 03/20/17  0438 03/19/17  0516 03/18/17  0529  03/17/17  0603  03/16/17  0825   SODIUM mmol/L 142 142 144  --  143  --   --    POTASSIUM mmol/L 3.6 3.2* 3.9  < > 3.2*  < >  --    CHLORIDE mmol/L 110 112 110  --  106  --   --    TOTAL CO2 mmol/L 22.0* 23.3* 22.8*  --  26.4  --   --    BUN mg/dL 11 13 15  --  15  --   --    CREATININE mg/dL 0.34* 0.37* 0.48  --  0.42*  --   --    CALCIUM mg/dL 9.1 8.8 9.0  --  9.2  --   --    GLUCOSE mg/dL 102 103 159*  --  60*  --   --    MAGNESIUM mg/dL 2.0  --   --   --  1.9  --  1.9   < > = values in this interval not displayed.  Lab Results   Component Value Date    INR 1.01 12/17/2016    INR 0.94 11/20/2016    INR <0.90 02/06/2016    PROTIME 11.4 12/17/2016    PROTIME 10.6 11/20/2016    PROTIME 10.0 02/06/2016       Results from last 7 days  Lab Units 03/20/17  0438 03/19/17  0516 03/18/17  0529   ALK PHOS U/L 67 68 78   BILIRUBIN mg/dL 0.4 0.4 0.5   ALT (SGPT) U/L 29 29 32   AST (SGOT) U/L 35* 31* 30       Results from last 7  days  Lab Units 03/16/17  0517   PH, ARTERIAL pH units 7.505*   PO2 ART mm Hg 123.4*   PCO2, ARTERIAL mm Hg 43.1   HCO3 ART mmol/L 33.2*     Imaging Results (last 24 hours)     Procedure Component Value Units Date/Time    XR Chest 1 View [[id number]] Collected:  03/19/17 1052     Updated:  03/19/17 1142    Narrative:       XR CHEST 1 VIEW-     CLINICAL INDICATION: NG tube replacement; J18.9-Pneumonia, unspecified  organism.          COMPARISON: 03/18/2017.      TECHNIQUE: Single frontal view of the chest.     FINDINGS:     There are diffuse increased interstitial markings and patchy airspace  disease bilaterally.  Probable mild CHF.  Nasogastric tube is in the stomach.  There is no evidence of an acute osseous abnormality.   There are no suspicious-appearing parenchymal soft tissue nodules.            Impression:       Little overall change.         This report was finalized on 3/19/2017 11:40 AM by Dr. Daniel Yates MD.       XR Chest 1 View [[id number]] Collected:  03/20/17 0745     Updated:  03/20/17 0757    Narrative:       EXAMINATION: XR CHEST 1 VW-      CLINICAL INDICATION:NG tube placement; J18.9-Pneumonia, unspecified  organism        COMPARISON: 3/18/2017      TECHNIQUE: Portable AP upright chest     FINDINGS:   Right IJ deep line stable in appearance and positioning. NG tube extends  below GE junction presumably within stomach. Persistent right greater  than left lung opacities with interstitial pattern and may represent  bilateral edema, pneumonia, or resolving ARDS. No new opacities. No  effusion or pneumothorax. No acute osseous changes.       Impression:          1. Satisfactory positioning of support devices.  2. Persistent right greater than left bilateral lung opacities with  considerations as above.     This report was finalized on 3/20/2017 7:46 AM by Dr. Wali Tracy MD.                Medication Review:   Scheduled Medications:    acetaminophen 650 mg Oral Once   acyclovir 200 mg Oral 5x Daily    ALPRAZolam 1 mg Oral Daily   ALPRAZolam 1 mg Oral Nightly   aspirin 81 mg Oral Daily   bacitracin  Topical Q12H   ceftriaxone 2 g Intravenous Q24H   clopidogrel 75 mg Oral Daily   guaiFENesin 300 mg Oral Q6H   heparin (porcine) 5,000 Units Subcutaneous Q12H   insulin aspart 0-14 Units Subcutaneous 4x Daily AC & at Bedtime   insulin detemir 22 Units Subcutaneous Nightly   ipratropium 0.5 mg Nebulization Q6H - RT   metoprolol tartrate 25 mg Oral Once   metoprolol tartrate 50 mg Oral Q12H   metroNIDAZOLE 500 mg Intravenous Q8H   nicotine 1 patch Transdermal Q24H   pantoprazole 40 mg Intravenous Q AM   potassium chloride 40 mEq Oral Once   potassium chloride 40 mEq Oral Q4H   predniSONE 5 mg Oral Daily With Breakfast   vancomycin 125 mg Oral Q6H     Continuous infusions:    Pharmacy to dose vancomycin        Assessment&#47;Plan     Neuro: Patient is alert and oriented.  She is restless and wants to get up out of bed and continues to pull out lines.  Will continues to monitor.  Patient became more confused as the day went on.  She became anxious and somewhat delirious, and was having rapid respirations.  Discontinued Xanax, Ativan, Phenergan and Versed.  These medications have been shown to cause delirium.  We'll monitor for improvement on delirium.  Patient continued to be confused and somewhat hostile, pulling out her NG tube again.  She refused to let the nurse place another one.  Ordered Haldol 5 mg IV.      Respiratory: Extubated on 3/16/17is saturating well on 2 liters nasal cannula.  She wore her BIPAP overnight.  become tachypnea.  Ordered a VBG.      Will continue IV antibiotics, IV steroids, supplemental oxygen during the day, BIPAP overnight, scheduled inhalants and PRN neb treatments.      ID: Continue antibiotics for C. Diff.  Infectious disease is on case, management per them.    Cardiac:    Hemodynamically Stable.  Continuous ECG and pulse ox monitoring.        Renal: BUN/Creatinine WNL.  Strict I&Os.    Ordered Lasix 40 mg IV.      Endocrine: monitor glucose targeting 140-180.      Electrolytes:  Monitor levels, manage and replete per protocols.      GI: FEES was done on 3/17/17.  Patient is to remain NPO and continue tube feedings.      Hematology:   Monitor blood counts, transfuse at or below 7 g/dL.  Unless patient is actively bleeding then began transfusing at 8 g/dL.       Patient Active Problem List   Diagnosis Code   • Chest pain R07.9   • Chest pain of uncertain etiology R07.89   • Intractable nausea and vomiting R11.2   • Intractable cyclical vomiting with nausea G43.A1   • Chronic obstructive pulmonary emphysema J43.9   • Hepatitis-C B19.20   • Diabetes mellitus type 2 in nonobese E11.9   • Arthritis or polyarthritis, rheumatoid M06.9   • Anxiety F41.9   • Essential hypertension I10   • Coronary artery disease I25.10   • Buerger's disease I73.1   • Cirrhosis K74.60   • Diabetes E11.9   • Chronic nausea R11.0   • Pancreatitis K85.90   • Bilateral pneumonia J18.9   • ARDS (adult respiratory distress syndrome) J80   • Hypoxia R09.02         Bedside rounds were completed with RN and RRT, discussed case and plan in great detail.    Discussed the case in detail- explained all the labs results and images. Discussed plan for the upcoming days and answered questions to their satistfaction     ROSA Seymour  03/20/17  9:05 AM        Attestation: Scribed for Dr. Bradley, by ROSA Hernandez         Electronically signed by ROSA Seymour at 3/20/2017 11:51 AM        Consult Notes (last 24 hours) (Notes from 3/19/2017  1:42 PM through 3/20/2017  1:42 PM)     No notes of this type exist for this encounter.

## 2017-03-20 NOTE — PROGRESS NOTES
Discharge Planning Assessment   [place]     Patient Name: Pepito Yan  MRN: [id number]  Today's Date: 3/20/2017    Admit Date: 2/28/2017             Discharge Plan       03/20/17 1323    Final Note    Final Note SS received a call from Alyssa at [facility] and she states she is agreeable to accept the pt on this date.  SS contacted Dr. Elizalde and he is agreeable for the pt to be transferred to [facility].  SS contacted pt's daughter, April and she is agreeable for the pt to be transferred.  SS faxed Orders and AVS to [facility] at [phone].  [facility] nurse will contact CCU nurse when ready for report.  No other needs at this time.         Expected Discharge Date and Time     Expected Discharge Date Expected Discharge Time    Mar 20, 2017           Merlene Parisi

## 2017-03-20 NOTE — DISCHARGE SUMMARY
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 with ongoing tobacco abuse  Insulin-dependent DM with hyperglycemia  Mild transaminitis  Chronic hepatitis C  Generalized anxiety disorder  Ischemic cardiomyopathy with EF of approx 45%  Tobacco smoking addiction  Medical noncompliance  Malnutrition  Hiatal hernia with erosive esophagitis      Discharge Diagnosis:   Severe sepsis  ARDS  Acute hypoxic respiratory failure  Community Acquired Pneumonia  UTI  Bilateral superimposed nosocomial/ventilator associated pneumonia  C diff colitis  Acute exacerbation of COPD  Acute on chronic microcytic anemia  Hypokalemia  Hypernatremia   DM II, insulin-dependent  Generalized weakness/debility  Laryngeal edema with aspiration following prolonged intubation   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  Yeast in urine, possible contamination  Mild protein malnutrition  Tobacco abuse  Elevated liver enzymes  Hx of Hep C  Hx of medical noncompliance  Buerger's disease   Anxiety     Procedures Performed:  3/3/17: Emergent intubation  3/3/17: Right femoral central line placement s/p removal  3/3/17: Right IJ central line placement  3/16/17: Extubation    Transfusions:  3/9/17: 1 unit of PRBC's      Consults:   Consults     Date and Time Order Name Status Description    3/7/2017 0807 Inpatient Consult to Infectious Diseases Completed     3/7/2017 0758 Inpatient Consult to Nephrology      3/2/2017 1003 Inpatient Consult to Pulmonology Completed             History of Present Illness:  Pepito Yan is a 51 y.o. female who presented to [facility] [place] ED with CC of trouble breathing and productive cough of a few days duration with associated subjective fevers, chills and  fatigue. She also reported substernal chest pain.     In the ED, pt was hemodynamically stable. EKG revealed sinus tachycardia with no acute changes to suggest ischemia. Initial troponin's were in the indeterminate range. Routine labs revealed an elevated CRP and CXR revealed bilateral infiltrates. CT chest PE protocol was negative for PE. Cultures were obtained and IV antibiotics initiated.        Hospital Course  Pepito Yan was admitted to the med/surg floor with telemetry for further evaluation and treatment. She was continued on IV antibiotics including Rocephin and Doxycycline. Steroids and scheduled nebs were initiated in the setting of a COPD exacerbation.     Serial CE's were followed and quickly trended downward. Echo was obtained which was a non-diagnostic study and revealed a grossly reduced EF. Further workup revealed a UTI and pt was continued on IV antibiotics. Mycoplasma and legionella were obtained, both of which were negative.     Pt's clinical condition began worsening and she was transferred to the CCU for closer monitoring. She soon decompensated requiring emergent intubation on 3/3. A femoral line was placed and an IJ was placed soon after followed by removal of the femoral line. Pt's CXR worsened and became concerning for developing ARDS. Her antibiotic regimen was broadened and she was continued on scheduled steroids as well as nebs.     Ms. Yan's clinical condition continued to worsen and she progressed to ARDS requiring advanced vent support per pulmonology. Pt would remain critically ill for several days requiring a prolonged intubation from 3/3-3/16. During this time she required 1 unit of PRBC's for worsening anemia. She experienced recurrent fevers with concerns for a superimposed nosocomial/ventilator-associated pneumonia. She was later diagnosed with C diff colitis and ID was consulted for antibiotic management. Urine culture revealed yeast and Micafungin was added for a  short time but quickly stopped as it was felt to be a contamination. Acyclovir was later added for possible HSV 1 lesions.     Pt slowly began showing clinical improvement. Her sepsis began resolving and her antibiotic regimen was changed to PO Vanc, IV Rocephin and IV Flagyl. Repeat CXR's revealed some improvement in her bilateral airspace disease and her FiO2 requirements improved as well. Pulm began working to wean vent support as tolerated. Routine labs also revealed hypernatremia which resolved over the course of a few days with the addition of free water flushes to the TF's per nephrology.     As pt's clinical condition improved, pulm worked aggressively to wean vent support and Ms. Yan was able to be extubated on 3/16. Her steroids were tapered accordingly as well. Following extubation, pt's mental status waxed and waned but she showed overall clinical improvement. PT/OT and speech were consulted for further evaluation. Speech evaluation revealed laryngeal edema with aspiration likely related to her prolonged intubation. She was continued on TF's for nutritional support but the NG was replaced on multiple occasions as the pt continued to pull the tube out during episodes of confusion or agitation. It was the consensus of the treatment team that the patient would require a prolonged hospitalization with aggressive therapy in the setting of her prolonged critical illness and an LTAC referral was placed.     Ms. Yan was seen and examined on 3/20. She remained hemodynamically stable with stable routine AM labs. She remained intermittently confused but overall improved. Upon review, she was accepted to [facility] under the care of Dr. Gil. Her daughter was contacted who was in agreement for transfer/discharge to [facility]. Arrangements were made for discharge and pt was discharged to [facility] for further evaluation and treatment in stable condition.     Please note this has been a prolonged hospitalization with a  complicated clinical course. This note reflects a summary of this hospitalization. Please refer to daily progress notes for complete details.           Pertinent Test Results:   CT chest PE protocol  No CT angiographic findings of pulmonary embolus. There are patchy alveolar infiltrates in the lungs. There is an enlarged lymph node possibly reactive in the mediastinum within the area of the aorticopulmonary window.    Transthoracic echocardiogram  · This is a non-diagnostic study  · Grossly the LV systolic function appears reduced. An EF cannot be provided  · There do not appear to be any valvular lesions noted    Urine culture: >100,000 E coli    (+) C diff toxin     Condition on Discharge:  Stable    Vital Signs  Vitals:    03/20/17 1203   BP: 114/78   Pulse: 98   Resp: (!) 30   Temp: 97.5 °F (36.4 °C)   SpO2: 99%       Physical Exam:  General:  Awake, alert, confused to year but otherwise oriented, in no acute distress, ill appearing   Heart:  Normal S1 and S2. Tachycardic. No significant murmur, rubs or gallops appreciated.   Lungs:  Respirations regular, even and unlabored. Lungs clear to auscultation B/L. No wheezes, rales or rhonchi.   Abdomen:  Soft and nontender. No guarding, rebound tenderness or organomegaly noted. Bowel sounds present x 4.   Extremities: No clubbing, cyanosis or edema noted. Moves UE and LE equally B/L.          Discharge Disposition:   long term care facility      Discharge Medications:   Pepito Yan   Home Medication Instructions JERARDO:[id number]    Printed on:03/20/17 5619   Medication Information                      acetaminophen (TYLENOL) 160 MG/5ML solution  Take 20.3 mL by mouth Every 6 (Six) Hours As Needed for Fever.             acyclovir (ZOVIRAX) 200 MG/5ML suspension  Take 5 mL by mouth 5 (Five) Times a Day. Indications: Upper Respiratory Tract Infection, HSV 1 infection/oral herpes             aspirin 81 MG EC tablet  Take 1 tablet by mouth Daily.              bacitracin 500 UNIT/GM ointment  Apply  topically Every 12 (Twelve) Hours.             clopidogrel (PLAVIX) 75 MG tablet  Take 1 tablet by mouth Daily.             guaiFENesin (ROBITUSSIN) 100 MG/5ML solution oral solution  Take 15 mL by mouth Every 6 (Six) Hours.             haloperidol lactate (HALDOL) 5 MG/ML injection  Infuse 0.1 mL into a venous catheter Every 6 (Six) Hours As Needed for Agitation.             Heparin Sodium, Porcine, (HEPARIN, PORCINE,) 5000 UNIT/ML injection  Inject 1 mL under the skin Every 12 (Twelve) Hours.             insulin aspart (novoLOG) 100 UNIT/ML injection  Inject 0-7 Units under the skin 4 (Four) Times a Day Before Meals & at Bedtime.             insulin detemir (LEVEMIR) 100 UNIT/ML injection  Inject 22 Units under the skin Every Night.             ipratropium (ATROVENT) 0.02 % nebulizer solution  Take 2.5 mL by nebulization Every 6 (Six) Hours As Needed for Shortness of Air.             ipratropium (ATROVENT) 0.02 % nebulizer solution  Take 2.5 mL by nebulization Every 6 (Six) Hours.             metoprolol tartrate (LOPRESSOR) 50 MG tablet  Take 1 tablet by mouth Every 12 (Twelve) Hours.             metroNIDAZOLE (FLAGYL) 5-0.79 MG/ML-% IVPB  Infuse 100 mL into a venous catheter Every 8 (Eight) Hours. Indications: Clostridium Difficile Infection             nicotine (NICODERM CQ) 21 MG/24HR patch  Place 1 patch on the skin Daily.             pantoprazole (PROTONIX) 40 MG injection  Infuse 10 mL into a venous catheter Every Morning Before Breakfast.             predniSONE (DELTASONE) 5 MG tablet  Take 1 tablet by mouth Daily With Breakfast.             vancomycin 50 MG/ML solution oral solution  Take 2.5 mL by mouth Every 6 (Six) Hours. Indications: Clostridium Difficile Infection                   Discharge Diet:   NPO       Dietary Orders            Start     Ordered    03/10/17 2030  Vital 1.2; Tube Feeding type: Continuous; Continuous Tube Feeding Start Rate (mL/hr): 20;  Then advance rate by (mL/hr): 30; Every __ hrs: 12; To goal rate of (mL/hr): 50; Total daily volume to deliver (mL/day): 1200; Patient is NPO (no tray)  Diet Effective Now     Comments:  Flush per MD order - TF at goal rate will provide 972 ml water   Question Answer Comment   Tube Feeding Formula: Vital 1.2    Tube Feeding type: Continuous    Continuous Tube Feeding Start Rate (mL/hr) 20    Then advance rate by (mL/hr): 30    Every __ hrs: 12    To goal rate of (mL/hr): 50    Total daily volume to deliver (mL/day) 1200    Water Flush Amount (mL) 200    Water flush frequency: Every 4 hours    PO Tray: Patient is NPO (no tray)        03/10/17 2029          Activity at Discharge:  bedrest    Follow-up Appointments:  Follow up with PCP upon discharge from [facility].       Test Results Pending at Discharge:  None     Manuel Elizalde DO  03/20/17  12:47 PM      Time: Greater than 30 minutes spent on this discharge.

## 2017-03-20 NOTE — PLAN OF CARE
Problem: Patient Care Overview (Adult)  Goal: Plan of Care Review  Outcome: Ongoing (interventions implemented as appropriate)    03/20/17 0642   Coping/Psychosocial Response Interventions   Plan Of Care Reviewed With patient   Patient Care Overview   Progress no change   Outcome Evaluation   Outcome Summary/Follow up Plan Patient remains hemodynamically stable and afebrile. Oxygenation stable on 2L NC.         Problem: Pneumonia (Adult)  Goal: Signs and Symptoms of Listed Potential Problems Will be Absent or Manageable (Pneumonia)  Outcome: Ongoing (interventions implemented as appropriate)    Problem: Diabetes, Type 2 (Adult)  Goal: Signs and Symptoms of Listed Potential Problems Will be Absent or Manageable (Diabetes, Type 2)  Outcome: Ongoing (interventions implemented as appropriate)    Problem: Mechanical Ventilation, Invasive (Adult)  Goal: Signs and Symptoms of Listed Potential Problems Will be Absent or Manageable (Mechanical Ventilation, Invasive)  Outcome: Ongoing (interventions implemented as appropriate)    Problem: Pressure Ulcer Risk (Brendan Scale) (Adult,Obstetrics,Pediatric)  Goal: Identify Related Risk Factors and Signs and Symptoms  Outcome: Ongoing (interventions implemented as appropriate)  Goal: Skin Integrity  Outcome: Ongoing (interventions implemented as appropriate)

## 2017-03-20 NOTE — PROGRESS NOTES
LOS: 20 days     Chief Complaint:  Pulmonology is following for bilateral pneumonia and ARDS    Subjective     Interval History: patient is resting in bed.  She reports no complaints.  Nurse reports that she wants to get up out of bed and that she wants to go home.  Nurse also reports that she continues to pull out lines such as NG tube.  Otherwise no acute events reported overnight.      History taken from: patient chart RN    Review of Systems:   Review of Systems  Review of Systems - History obtained from chart review and the patient  General ROS: negative for - chills, fatigue or fever  Psychological ROS: negative for - anxiety or depression  ENT ROS: negative for - headaches, visual changes or vocal changes  Respiratory ROS: positive for - shortness of breath  Cardiovascular ROS: no chest pain or dyspnea on exertion  Gastrointestinal ROS: no abdominal pain, change in bowel habits, or black or bloody stools  Musculoskeletal ROS: negative for - joint pain, joint stiffness or joint swelling  Neurological ROS: negative for - confusion, dizziness, headaches, numbness/tingling or seizures                  Objective     Vital Signs  Temp:  [97.9 °F (36.6 °C)-98.2 °F (36.8 °C)] 97.9 °F (36.6 °C)  Heart Rate:  [] 111  Resp:  [20-24] 22  BP: (122-140)/(72-99) 127/95  Body mass index is 19.1 kg/(m^2).    Intake/Output Summary (Last 24 hours) at 03/20/17 0905  Last data filed at 03/20/17 0814   Gross per 24 hour   Intake    644 ml   Output      0 ml   Net    644 ml     I/O this shift:  In: 100 [IV Piggyback:100]  Out: -     Physical Exam:  GENERAL APPEARANCE: Well developed, well nourished, alert and cooperative, and appears to be in no acute distress.    HEAD: normocephalic.    EYES: PERRL    NECK: Neck supple.     CARDIAC: Normal S1 and S2. No S3, S4 or murmurs. Rhythm is regular. There is no peripheral edema, cyanosis or pallor. Extremities are warm and well perfused. Capillary refill is less than 2 seconds. No  carotid bruits.    LUNGS: Clear to auscultation and percussion without rales, rhonchi, wheezing or diminished breath sounds.    ABDOMEN: Positive bowel sounds. Soft, nondistended, nontender.     EXTREMITIES: No significant deformity or joint abnormality. No edema. Peripheral pulses intact.     NEUROLOGICAL: Strength and sensation symmetric and intact throughout.     PSYCHIATRIC: The mental examination revealed the patient was oriented to person, place, and time.                 Results Review:                I reviewed the patient's new clinical results.  I reviewed the patient's new imaging results and agree with the interpretation.    Results from last 7 days  Lab Units 03/20/17 0438 03/19/17 0516 03/18/17  0529   WBC 10*3/mm3 8.76 8.87 10.90   HEMOGLOBIN g/dL 8.7* 8.4* 9.1*   PLATELETS 10*3/mm3 401* 343 349       Results from last 7 days  Lab Units 03/20/17 0438 03/19/17 0516 03/18/17  0529 03/17/17  0603  03/16/17  0825   SODIUM mmol/L 142 142 144  --  143  --   --    POTASSIUM mmol/L 3.6 3.2* 3.9  < > 3.2*  < >  --    CHLORIDE mmol/L 110 112 110  --  106  --   --    TOTAL CO2 mmol/L 22.0* 23.3* 22.8*  --  26.4  --   --    BUN mg/dL 11 13 15  --  15  --   --    CREATININE mg/dL 0.34* 0.37* 0.48  --  0.42*  --   --    CALCIUM mg/dL 9.1 8.8 9.0  --  9.2  --   --    GLUCOSE mg/dL 102 103 159*  --  60*  --   --    MAGNESIUM mg/dL 2.0  --   --   --  1.9  --  1.9   < > = values in this interval not displayed.  Lab Results   Component Value Date    INR 1.01 12/17/2016    INR 0.94 11/20/2016    INR <0.90 02/06/2016    PROTIME 11.4 12/17/2016    PROTIME 10.6 11/20/2016    PROTIME 10.0 02/06/2016       Results from last 7 days  Lab Units 03/20/17 0438 03/19/17 0516 03/18/17  0529   ALK PHOS U/L 67 68 78   BILIRUBIN mg/dL 0.4 0.4 0.5   ALT (SGPT) U/L 29 29 32   AST (SGOT) U/L 35* 31* 30       Results from last 7 days  Lab Units 03/16/17  0517   PH, ARTERIAL pH units 7.505*   PO2 ART mm Hg 123.4*   PCO2, ARTERIAL mm  Hg 43.1   HCO3 ART mmol/L 33.2*     Imaging Results (last 24 hours)     Procedure Component Value Units Date/Time    XR Chest 1 View [[id number]] Collected:  03/19/17 1052     Updated:  03/19/17 1142    Narrative:       XR CHEST 1 VIEW-     CLINICAL INDICATION: NG tube replacement; J18.9-Pneumonia, unspecified  organism.          COMPARISON: 03/18/2017.      TECHNIQUE: Single frontal view of the chest.     FINDINGS:     There are diffuse increased interstitial markings and patchy airspace  disease bilaterally.  Probable mild CHF.  Nasogastric tube is in the stomach.  There is no evidence of an acute osseous abnormality.   There are no suspicious-appearing parenchymal soft tissue nodules.            Impression:       Little overall change.         This report was finalized on 3/19/2017 11:40 AM by Dr. Daniel Yates MD.       XR Chest 1 View [[id number]] Collected:  03/20/17 0745     Updated:  03/20/17 0757    Narrative:       EXAMINATION: XR CHEST 1 VW-      CLINICAL INDICATION:NG tube placement; J18.9-Pneumonia, unspecified  organism        COMPARISON: 3/18/2017      TECHNIQUE: Portable AP upright chest     FINDINGS:   Right IJ deep line stable in appearance and positioning. NG tube extends  below GE junction presumably within stomach. Persistent right greater  than left lung opacities with interstitial pattern and may represent  bilateral edema, pneumonia, or resolving ARDS. No new opacities. No  effusion or pneumothorax. No acute osseous changes.       Impression:          1. Satisfactory positioning of support devices.  2. Persistent right greater than left bilateral lung opacities with  considerations as above.     This report was finalized on 3/20/2017 7:46 AM by Dr. Wali Tracy MD.                Medication Review:   Scheduled Medications:    acetaminophen 650 mg Oral Once   acyclovir 200 mg Oral 5x Daily   ALPRAZolam 1 mg Oral Daily   ALPRAZolam 1 mg Oral Nightly   aspirin 81 mg Oral Daily   bacitracin   Topical Q12H   ceftriaxone 2 g Intravenous Q24H   clopidogrel 75 mg Oral Daily   guaiFENesin 300 mg Oral Q6H   heparin (porcine) 5,000 Units Subcutaneous Q12H   insulin aspart 0-14 Units Subcutaneous 4x Daily AC & at Bedtime   insulin detemir 22 Units Subcutaneous Nightly   ipratropium 0.5 mg Nebulization Q6H - RT   metoprolol tartrate 25 mg Oral Once   metoprolol tartrate 50 mg Oral Q12H   metroNIDAZOLE 500 mg Intravenous Q8H   nicotine 1 patch Transdermal Q24H   pantoprazole 40 mg Intravenous Q AM   potassium chloride 40 mEq Oral Once   potassium chloride 40 mEq Oral Q4H   predniSONE 5 mg Oral Daily With Breakfast   vancomycin 125 mg Oral Q6H     Continuous infusions:    Pharmacy to dose vancomycin        Assessment/Plan     Neuro: Patient is alert and oriented.  She is restless and wants to get up out of bed and continues to pull out lines.  Will continues to monitor.  Patient became more confused as the day went on.  She became anxious and somewhat delirious, and was having rapid respirations.  Discontinued Xanax, Ativan, Phenergan and Versed.  These medications have been shown to cause delirium.  We'll monitor for improvement on delirium.  Patient continued to be confused and somewhat hostile, pulling out her NG tube again.  She refused to let the nurse place another one.  Ordered Haldol 5 mg IV.  ? delirium - cause      Respiratory: Extubated on 3/16/17  She is saturating well on 2 liters nasal cannula.  She wore her BIPAP overnight.    Patient become tachypnea.  Ordered a VBG.      Will continue IV antibiotics, IV steroids, supplemental oxygen during the day, BIPAP overnight, scheduled inhalants and PRN neb treatments.      ID: Continue antibiotics for C. Diff.  Infectious disease is on case, management per them.    Microbiology Results (last 10 days)     Procedure Component Value - Date/Time    Blood Culture [[id number]]  (Normal) Collected:  03/14/17 0556    Lab Status:  Final result Specimen:  Blood from  Blood, Venous Line Updated:  03/19/17 0901     Blood Culture No growth at 5 days     Blood Culture [[id number]]  (Normal) Collected:  03/14/17 0519    Lab Status:  Final result Specimen:  Blood from Blood, Venous Line Updated:  03/19/17 0701     Blood Culture No growth at 5 days     Blood Culture [[id number]]  (Normal) Collected:  03/11/17 1246    Lab Status:  Final result Specimen:  Blood from Hand, Right Updated:  03/16/17 1501     Blood Culture No growth at 5 days     Urine Culture [[id number]]  (Abnormal) Collected:  03/11/17 1216    Lab Status:  Final result Specimen:  Urine from Urine, Clean Catch Updated:  03/16/17 1307     Urine Culture >100,000 CFU/mL Candida tropicalis (A)     Blood Culture [[id number]]  (Normal) Collected:  03/11/17 1212    Lab Status:  Final result Specimen:  Blood from Hand, Right Updated:  03/16/17 1501     Blood Culture No growth at 5 days     Clostridium Difficile Toxin [[id number]] Collected:  03/10/17 1309    Lab Status:  Final result Specimen:  Stool from Per Rectum Updated:  03/10/17 1911    Narrative:       The following orders were created for panel order Clostridium Difficile Toxin.  Procedure                               Abnormality         Status                     ---------                               -----------         ------                     Clostridium Difficile Tox...[[id number]]  Abnormal            Final result                 Please view results for these tests on the individual orders.    Clostridium Difficile Toxin, PCR [[id number]]  (Abnormal) Collected:  03/10/17 1309    Lab Status:  Final result Specimen:  Stool from Per Rectum Updated:  03/10/17 1911     C. Difficile Toxins by PCR Positive (C)      027 Toxin Presumptive Negative     Narrative:         For In Vitro Diagnostic use only.  027-NAP1-BI results are NOT intended to guide treatment. 027 typing is relative to PCR ribotyping and shown to be equivalent to B1 typing by restriction endonuclease analysis or NAP1  typing by pulse field gel electrophoresis.          Cardiac:    Hemodynamically Stable.  Continuous ECG and pulse ox monitoring.        Renal: BUN/Creatinine WNL.  Strict I&Os.   Ordered Lasix 40 mg IV.      Endocrine: monitor glucose targeting 140-180.      Electrolytes:  Monitor levels, manage and replete per protocols.      GI: FEES was done on 3/17/17.  Patient is to remain NPO and continue tube feedings.      Hematology:   Monitor blood counts, transfuse at or below 7 g/dL.  Unless patient is actively bleeding then began transfusing at 8 g/dL.       Patient Active Problem List   Diagnosis Code   • Chest pain R07.9   • Chest pain of uncertain etiology R07.89   • Intractable nausea and vomiting R11.2   • Intractable cyclical vomiting with nausea G43.A1   • Chronic obstructive pulmonary emphysema J43.9   • Hepatitis-C B19.20   • Diabetes mellitus type 2 in nonobese E11.9   • Arthritis or polyarthritis, rheumatoid M06.9   • Anxiety F41.9   • Essential hypertension I10   • Coronary artery disease I25.10   • Buerger's disease I73.1   • Cirrhosis K74.60   • Diabetes E11.9   • Chronic nausea R11.0   • Pancreatitis K85.90   • Bilateral pneumonia J18.9   • ARDS (adult respiratory distress syndrome) J80   • Hypoxia R09.02         Bedside rounds were completed with RN and RRT, discussed case and plan in great detail.    Discussed the case in detail- explained all the labs results and images. Discussed plan for the upcoming days and answered questions to their satistfaction     ROSA Seymour  03/20/17  9:05 AM        Attestation: Scribed for Dr. Bradley, by ROSA Hernandez      I, Gerard Bradley M.D. attest that the above note accurately reflects the work and decisions made  by me.  Patient was seen and evaluated by Dr. Bradley, including history of present illness, physical exam, assessment, and treatment plan.  The above note was reviewed and edited by Dr. Bradley. The patient was  discussed with   Adan  and I agree with her history physical assessment and plan. Critical care time 35 minutes

## 2017-03-20 NOTE — PROGRESS NOTES
"  I have personally seen and examined the patient today and discussed overnight interval progress and pertinent issues with nursing staff.    ROS:    Unable to obtain due to the patients mental status       History taken from: chart RN      Vital Signs    Visit Vitals   • /70   • Pulse 109   • Temp 97.5 °F (36.4 °C) (Oral)   • Resp 28   • Ht 63\" (160 cm)   • Wt 107 lb 12.8 oz (48.9 kg)   • SpO2 100%   • BMI 19.1 kg/m2       Temp:  [97.5 °F (36.4 °C)-98.2 °F (36.8 °C)] 97.5 °F (36.4 °C)      Intake/Output Summary (Last 24 hours) at 03/20/17 1346  Last data filed at 03/20/17 0814   Gross per 24 hour   Intake    764 ml   Output      0 ml   Net    764 ml     Intake & Output (last 3 days)       03/17 0701 - 03/18 0700 03/18 0701 - 03/19 0700 03/19 0701 - 03/20 0700 03/20 0701 - 03/21 0700    Other 200 844 244 100    NG/GT 43 191  20    IV Piggyback 600 450 400 100    Total Intake(mL/kg) 843 (17.2) 1485 (30.4) 644 (13.2) 220 (4.5)    Urine (mL/kg/hr) 1550 (1.3) 675 (0.6)      Stool 0 (0) 0 (0)      Total Output 1550 675        Net -707 +810 +644 +220            Unmeasured Stool Occurrence 2 x 5 x 6 x 5 x          Physical Exam:      General Appearance:  extubated,  Slightly confused and agitated, and respiratory distress    Head:  Normocephalic, without obvious abnormality, atraumatic   Eyes:      Lids and lashes normal, conjunctivae and sclerae normal, no icterus, no pallor, corneas clear, PERRLA   Ears:  Ears appear intact with no abnormalities noted   Throat: No oral lesions, no thrush, oral mucosa moist   Neck: No adenopathy, supple, trachea midline, no thyromegaly, no carotid bruit, no JVD   Back:  No tenderness to percussion or palpation, range of motion normal   Lungs:  normal breath sounds without rhonchi or crackles.   Heart:  Regular rhythm and normal rate, normal S1 and S2, no murmur, no gallop, no rub, no click   Chest Wall:  No abnormalities observed   Abdomen:  Normal bowel sounds, no masses, no " organomegaly, soft non-tender, non-distended, no guarding, no rebound tenderness   Rectal:  Deferred   Extremities: No edema, no cyanosis, no redness   Pulses: Pulses palpable and equal bilaterally   Skin: No bleeding, bruising or rash   Lymph nodes: No palpable adenopathy   Neurologic: More awake, but obtunded, slightly confused and agitated       Results:      Results from last 7 days  Lab Units 03/20/17  0438 03/19/17  0516 03/18/17  0529 03/17/17  0603 03/16/17  0059 03/15/17  0034 03/14/17  0038   WBC 10*3/mm3 8.76 8.87 10.90 11.98 7.76 5.83 6.18     Lab Results   Component Value Date    NEUTROABS 6.94 (H) 03/20/2017         Results from last 7 days  Lab Units 03/20/17  0438   CREATININE mg/dL 0.34*         Results from last 7 days  Lab Units 03/19/17  0516 03/18/17  0529 03/17/17  0603   CRP mg/dL 3.83* 4.38* 4.27*       Imaging Results (last 24 hours)     Procedure Component Value Units Date/Time    XR Chest AP [[id number]] Collected:  03/08/17 0831     Updated:  03/08/17 0834    Narrative:       EXAMINATION: XR CHEST AP-      CLINICAL INDICATION:     ARDS; J18.9-Pneumonia, unspecified organism     TECHNIQUE: Single AP view of chest.      COMPARISON: 3/7/2017      FINDINGS:   There is bibasilar atelectasis.   Grossly stable bilateral airspace disease.  Support tube and lines are in unchanged position.   Heart size is stable.  No pneumothorax.              Impression:       Grossly stable bilateral airspace disease.     This report was finalized on 3/8/2017 8:31 AM by Dr. Bret Morris MD.               Results Review:    I have personally reviewed laboratory data, culture results, radiology studies and antimicrobial therapy.    Hospital Medications (active)       Dose Frequency Start End    acetaminophen (TYLENOL) tablet 650 mg 650 mg Every 6 Hours PRN 3/2/2017     Sig - Route: Take 2 tablets by mouth Every 6 (Six) Hours As Needed for fever. - Oral    acetaminophen (TYLENOL) tablet 650 mg 650 mg Once 3/6/2017      Sig - Route: Take 2 tablets by mouth 1 (One) Time. - Oral    aspirin chewable tablet 81 mg 81 mg Daily 3/7/2017     Sig - Route: Chew 1 tablet Daily. - Oral    Notes to Pharmacy: Patient intubated and RN requested changed from enteric coated tablet.    clopidogrel (PLAVIX) tablet 75 mg 75 mg Daily 3/1/2017     Sig - Route: Take 1 tablet by mouth Daily. - Oral    dextrose (D50W) solution 25 g 25 g Every 15 Minutes PRN 3/1/2017     Sig - Route: Infuse 50 mL into a venous catheter Every 15 (Fifteen) Minutes As Needed for low blood sugar (Blood Sugar Less Than 70, Patient Has IV Access - Unresponsive, NPO or Unable To Safely Swallow). - Intravenous    Cosign for Ordering: Accepted by Savanah Gutierrez MD on 3/1/2017  7:01 PM    dextrose (GLUTOSE) oral gel 15 g 15 g Every 15 Minutes PRN 3/1/2017     Sig - Route: Take 15 g by mouth Every 15 (Fifteen) Minutes As Needed for low blood sugar (Blood Sugar Less Than 70, Patient Alert, Is Not NPO & Can Safely Swallow). - Oral    Cosign for Ordering: Accepted by Savanah Gutierrez MD on 3/1/2017  7:01 PM    FENTANYL PCA 1500 MCG/30 ML (BHCOR) PCA  Continuous 3/3/2017 3/13/2017    Sig - Route: Infuse  into a venous catheter Continuous. - Intravenous    glucagon (GLUCAGEN) injection 1 mg 1 mg Every 15 Minutes PRN 3/1/2017     Sig - Route: Inject 1 mg under the skin Every 15 (Fifteen) Minutes As Needed (Blood Glucose Less Than 70 - Patient Without IV Access - Unresponsive, NPO or Unable To Safely Swallow). - Subcutaneous    Cosign for Ordering: Accepted by Savanah Gutierrez MD on 3/1/2017  7:01 PM    guaiFENesin (MUCINEX) 12 hr tablet 600 mg 600 mg 2 Times Daily 3/2/2017     Sig - Route: Take 1 tablet by mouth 2 (Two) Times a Day. - Oral    heparin (porcine) 5000 UNIT/ML injection 5,000 Units 5,000 Units Every 12 Hours Scheduled 3/1/2017     Sig - Route: Inject 1 mL under the skin Every 12 (Twelve) Hours. - Subcutaneous    insulin aspart (novoLOG) injection 0-14 Units 0-14 Units 4  "Times Daily Before Meals & Nightly 3/1/2017     Sig - Route: Inject 0-14 Units under the skin 4 (Four) Times a Day Before Meals & at Bedtime. - Subcutaneous    insulin detemir (LEVEMIR) injection 20 Units 20 Units Nightly 3/4/2017     Sig - Route: Inject 20 Units under the skin Every Night. - Subcutaneous    ipratropium (ATROVENT) nebulizer solution 0.5 mg 0.5 mg Every 6 Hours PRN 3/2/2017     Sig - Route: Take 2.5 mL by nebulization Every 6 (Six) Hours As Needed for shortness of air. - Nebulization    Cosign for Ordering: Accepted by Sherrill Weiner DO on 3/2/2017  1:40 PM    ipratropium (ATROVENT) nebulizer solution 0.5 mg 0.5 mg Every 6 Hours - RT 3/3/2017     Sig - Route: Take 2.5 mL by nebulization Every 6 (Six) Hours. - Nebulization    magnesium sulfate 6 g in dextrose (D5W) 5 % 250 mL infusion 6 g As Needed 3/6/2017     Sig - Route: Infuse 6 g into a venous catheter As Needed (Mg 1.1-1.5 mg/dL). - Intravenous    Linked Group 1:  \"Or\" Linked Group Details        magnesium sulfate 8 g in dextrose (D5W) 5 % 250 mL infusion 8 g As Needed 3/6/2017     Sig - Route: Infuse 8 g into a venous catheter As Needed (Mg less than or equal to 1 mg/dL). - Intravenous    Linked Group 1:  \"Or\" Linked Group Details        magnesium sulfate in D5W 1g/100mL (PREMIX) IVPB 1 g 1 g As Needed 3/6/2017     Sig - Route: Infuse 100 mL into a venous catheter As Needed (Mg less than or equal 1 mg/dL). - Intravenous    Linked Group 1:  \"Or\" Linked Group Details        magnesium sulfate in D5W 1g/100mL (PREMIX) IVPB 1 g 1 g As Needed 3/6/2017     Sig - Route: Infuse 100 mL into a venous catheter As Needed (magnesium sulfate 1 g in D5W 100 mL IVPB - Mg 1.6 - 1.9 mg/dL). - Intravenous    Linked Group 1:  \"Or\" Linked Group Details        meropenem (MERREM) 1 g/100 mL 0.9% NS VTB (mbp) 1 g Every 8 Hours 3/7/2017     Sig - Route: Infuse 100 mL into a venous catheter Every 8 (Eight) Hours. - Intravenous    methylPREDNISolone sodium succinate " "(SOLU-Medrol) injection 20 mg 20 mg Every 12 Hours 3/5/2017     Sig - Route: Infuse 0.5 mL into a venous catheter Every 12 (Twelve) Hours. - Intravenous    metoprolol tartrate (LOPRESSOR) tablet 25 mg 25 mg Every 12 Hours Scheduled 3/2/2017     Sig - Route: Take 1 tablet by mouth Every 12 (Twelve) Hours. - Oral    midazolam (VERSED) injection 2 mg 2 mg Every 4 Hours PRN 3/5/2017     Sig - Route: Infuse 2 mL into a venous catheter Every 4 (Four) Hours As Needed for sedation. - Intravenous    midazolam 0.5 mg/mL 100 mL NS (VERSED) infusion 1-10 mg/hr Titrated 3/8/2017 3/18/2017    Sig - Route: Infuse 1-10 mg/hr into a venous catheter Dose Adjusted By Provider As Needed. - Intravenous    norepinephrine (LEVOPHED) 32 mcg/mL (8 mg/250 mL) infusion 0.02-0.3 mcg/kg/min × 57.3 kg Titrated 3/3/2017     Sig - Route: Infuse 1.146-17.19 mcg/min into a venous catheter Dose Adjusted By Provider As Needed. - Intravenous    pantoprazole (PROTONIX) injection 40 mg 40 mg Every Early Morning 3/2/2017     Sig - Route: Infuse 10 mL into a venous catheter Every Morning. - Intravenous    Cosign for Ordering: Accepted by Sherrill Weiner DO on 3/1/2017  3:38 PM    Pharmacy to dose vancomycin  Continuous PRN 3/2/2017     Sig - Route: Continuous As Needed for consult. - Does not apply    polyethylene glycol (MIRALAX) powder 17 g 17 g Daily 3/8/2017     Sig - Route: Take 17 g by mouth Daily. - Oral    potassium chloride (K-DUR,KLOR-CON) CR tablet 40 mEq 40 mEq Once 3/2/2017     Sig - Route: Take 2 tablets by mouth 1 (One) Time. - Oral    potassium chloride (KLOR-CON) packet 40 mEq 40 mEq As Needed 3/6/2017     Sig - Route: Take 40 mEq by mouth As Needed (potassium replacement, see admin instructions). - Oral    Linked Group 2:  \"Or\" Linked Group Details        potassium chloride (MICRO-K) CR capsule 40 mEq 40 mEq As Needed 3/6/2017     Sig - Route: Take 4 capsules by mouth As Needed (potassium replacement.  see admin instructions). - Oral " "   Linked Group 2:  \"Or\" Linked Group Details        potassium chloride 10 mEq in 100 mL IVPB 10 mEq Every 1 Hour PRN 3/6/2017     Sig - Route: Infuse 100 mL into a venous catheter Every 1 (One) Hour As Needed (potassium protocol PERIPHERAL - see admin instructions). - Intravenous    Linked Group 2:  \"Or\" Linked Group Details        promethazine (PHENERGAN) tablet 12.5 mg 12.5 mg Every 6 Hours PRN 3/1/2017     Sig - Route: Take 1 tablet by mouth Every 6 (Six) Hours As Needed for Nausea or Vomiting. - Oral    Cosign for Ordering: Accepted by Sherrill Weiner DO on 3/1/2017  3:38 PM    propofol (DIPRIVAN) infusion 10 mg/mL 100 mL 5-50 mcg/kg/min × 57.3 kg Titrated 3/3/2017     Sig - Route: Infuse 286.5-2,865 mcg/min into a venous catheter Dose Adjusted By Provider As Needed. - Intravenous    sodium chloride 0.9 % flush 1-10 mL 1-10 mL As Needed 3/1/2017     Sig - Route: Infuse 1-10 mL into a venous catheter As Needed for line care. - Intravenous    sodium chloride 0.9 % flush 10 mL 10 mL As Needed 2/28/2017     Sig - Route: Infuse 10 mL into a venous catheter As Needed for line care. - Intravenous    Linked Group 3:  \"And\" Linked Group Details        sucralfate (CARAFATE) tablet 1 g 1 g 4 Times Daily Before Meals & Nightly 3/1/2017     Sig - Route: Take 1 tablet by mouth 4 (Four) Times a Day Before Meals & at Bedtime. - Oral    Cosign for Ordering: Accepted by Sherrill Weiner DO on 3/1/2017  3:38 PM    vancomycin (VANCOCIN) 750 mg in sodium chloride 0.9 % 250 mL IVPB 750 mg Every 12 Hours 3/4/2017     Sig - Route: Infuse 750 mg into a venous catheter Every 12 (Twelve) Hours. - Intravenous    dexmedetomidine HCl (PRECEDEX) 4 mcg/mL in sodium chloride 0.9 % 100 mL infusion (Discontinued) 0.2-1.5 mcg/kg/hr × 55.8 kg Continuous 3/6/2017 3/7/2017    Sig - Route: Infuse 11.16-83.7 mcg/hr into a venous catheter Continuous. - Intravenous    Reason for Discontinue: Formulary change    dexmedetomidine HCl (PRECEDEX) 4 " mcg/mL in sodium chloride 0.9 % 100 mL infusion (Discontinued) 0.2-1.5 mcg/kg/hr × 55.8 kg Titrated 3/7/2017 3/8/2017    Sig - Route: Infuse 11.16-83.7 mcg/hr into a venous catheter Dose Adjusted By Provider As Needed. - Intravenous    metroNIDAZOLE (FLAGYL) IVPB 500 mg (Discontinued) 500 mg Every 8 Hours 3/3/2017 3/8/2017    Sig - Route: Infuse 100 mL into a venous catheter Every 8 (Eight) Hours. - Intravenous            Cultures:    BLOOD CULTURE   Date Value Ref Range Status   03/06/2017 No growth at 24 hours  Preliminary   03/06/2017 No growth at 24 hours  Preliminary   03/04/2017 No growth at 4 days  Preliminary   03/04/2017 No growth at 4 days  Preliminary   03/03/2017 No growth at 4 days  Preliminary   03/03/2017 No growth at 4 days  Preliminary       PROBLEM LIST:    Patient Active Problem List   Diagnosis   • Chest pain   • Chest pain of uncertain etiology   • Intractable nausea and vomiting   • Intractable cyclical vomiting with nausea   • Chronic obstructive pulmonary emphysema   • Hepatitis-C   • Diabetes mellitus type 2 in nonobese   • Arthritis or polyarthritis, rheumatoid   • Anxiety   • Essential hypertension   • Coronary artery disease   • Buerger's disease   • Cirrhosis   • Diabetes   • Chronic nausea   • Pancreatitis   • Bilateral pneumonia   • ARDS (adult respiratory distress syndrome)   • Hypoxia       Assessment/Plan     ASSESSMENT:    1. Severe sepsis with acute respiratory failure  2. Bilateral pneumonia     PLAN:    The patient continues to show improvement with a normal white count and improving CRP level.  The patient's nurse reports continual diarrhea.  Would recommend to continue with current antibiotic regimen for now.    Continues to be on Rocephin, Flagyl, and oral vancomycin.      This is very complicated at this time as the patient developed C. difficile colitis while requiring non-C. difficile antibiotics and this carries a very guarded prognosis regardless of type of intervention.   Due to her concurrent C. difficile colitis infection IV vancomycin and meropenem therapy were discontinued and coverage was switched to Rocephin 2 g IV every 24 hours, Flagyl 500 mg IV every 8 hours, and oral vancomycin 125 mg by mouth every 6 hours.       Patients findings and recommendations were discussed with nursing staff    Code Status: Full Code       Alycia Finn PA-C  03/20/17  1:46 PM

## 2017-03-20 NOTE — SIGNIFICANT NOTE
"F/u this am for pt status to participate in FEES to re-evaluate swallowing fnx, determine candidacy for po intake. RN reports pt aggressive and irritable this am, s/p self removal of nasogastric tube. Pt reported to be oriented to person, place, and time, except for the year, however, RN states that she continues w/ general confusion and anxiety, unaware of her deficits w/ poor safety.     Dr. Bradley is present in pt room. She is noted w/ respiration rate at 40-50 breaths per minute, however, she is sleeping. Max verbal and tactile cues from MD do arouse pt. RN reports that pt is s/p \"Xanax this morning to help her sleep.\" MD reports discontinuation of this medication secondary to concerns of delirium side effect.     Per pt current status, re-evaluation is deferred. D/w RN SLP to f/u this pm for status.     Thank you for allowing me to participate in the care of your patient-  Melina Hodges M.S., CCC/SLP  "

## 2017-03-20 NOTE — THERAPY DISCHARGE NOTE
Inpatient Rehabilitation - Physical Therapy Treatment Note/Discharge   [place]     Patient Name: Pepito Yan  : 1965  MRN: [id number]  Today's Date: 3/20/2017  Onset of Illness/Injury or Date of Surgery Date: 17  Date of Referral to PT: 17  Referring Physician: Dr. Weiner    Admit Date: 2017    Visit Dx:    ICD-10-CM ICD-9-CM   1. Pneumonia of both lower lobes due to infectious organism J18.9 483.8     Patient Active Problem List   Diagnosis   • Chest pain   • Chest pain of uncertain etiology   • Intractable nausea and vomiting   • Intractable cyclical vomiting with nausea   • Chronic obstructive pulmonary emphysema   • Hepatitis-C   • Diabetes mellitus type 2 in nonobese   • Arthritis or polyarthritis, rheumatoid   • Anxiety   • Essential hypertension   • Coronary artery disease   • Buerger's disease   • Cirrhosis   • Diabetes   • Chronic nausea   • Pancreatitis   • Bilateral pneumonia   • ARDS (adult respiratory distress syndrome)   • Hypoxia       Physical Therapy Education     Title: PT OT SLP Therapies (Resolved)     Topic: Physical Therapy (Resolved)     Point: Mobility training (Resolved)    Learning Progress Summary    Learner Readiness Method Response Comment Documented by Status   Patient Acceptance E,D NR  AG 17 1315 Active    Nonacceptance E NR,NL Patient confused.  Unable to complete teaching at this time.  17 0645 Active    Acceptance E NR  EF 17 1609 Active    Nonacceptance E NL,NR Patient confused at times.  17 0200 Active    Acceptance E NR,NL  BC 17 1854 Active    Acceptance E VU Patient family conference set for Wednesday at 10am regarding plan of care. AG1 17 1852 Done    Acceptance E VU  LK 17 1917 Done    Acceptance E VU  KT 17 1927 Done    Nonacceptance E NR pt sedated/intubated MB 17 1746 Active    Acceptance E VU  KT 17 0558 Done    Acceptance E NR  AS 17 Active    Acceptance E VU  KS  03/02/17 1816 Done    Acceptance E VU,NR  AG2 03/02/17 1109 Done    Acceptance E,D VU,NR  TB 03/01/17 1422 Done   Family Acceptance E VU Patient family conference set for Wednesday at 10am regarding plan of care. AG1 03/14/17 1852 Done    Acceptance E VU  HN 03/11/17 0324 Done    Acceptance E VU  LK 03/09/17 1917 Done    Acceptance E VU  KT 03/05/17 1927 Done    Acceptance E VU  KT 03/05/17 0558 Done               Point: Home exercise program (Resolved)    Learning Progress Summary    Learner Readiness Method Response Comment Documented by Status   Patient Acceptance E,D NR  AG 03/20/17 1315 Active    Nonacceptance E NR,NL Patient confused.  Unable to complete teaching at this time. J 03/20/17 0645 Active    Acceptance E NR  EF 03/18/17 1609 Active    Nonacceptance E NL,NR Patient confused at times. J 03/18/17 0200 Active    Acceptance E NR,NL  BC 03/17/17 1854 Active    Acceptance E VU Patient family conference set for Wednesday at 10am regarding plan of care. AG1 03/14/17 1852 Done    Acceptance E VU  LK 03/09/17 1917 Done    Acceptance E VU  KT 03/05/17 1927 Done    Nonacceptance E NR pt sedated/intubated MB 03/05/17 1746 Active    Acceptance E VU  KT 03/05/17 0558 Done    Acceptance E NR  AS 03/02/17 1958 Active    Acceptance E VU  KS 03/02/17 1816 Done    Acceptance E VU,NR  AG2 03/02/17 1109 Done    Acceptance E,D VU,NR  TB 03/01/17 1422 Done   Family Acceptance E VU Patient family conference set for Wednesday at 10am regarding plan of care. AG1 03/14/17 1852 Done    Acceptance E VU  HN 03/11/17 0324 Done    Acceptance E VU  LK 03/09/17 1917 Done    Acceptance E VU  KT 03/05/17 1927 Done    Acceptance E VU  KT 03/05/17 0558 Done               Point: Body mechanics (Resolved)    Learning Progress Summary    Learner Readiness Method Response Comment Documented by Status   Patient Acceptance E,D NR  AG 03/20/17 1315 Active    Nonacceptance E NR,NL Patient confused.  Unable to complete teaching at this time. JG  03/20/17 0645 Active    Acceptance E NR  EF 03/18/17 1609 Active    Nonacceptance E NL,NR Patient confused at times. J 03/18/17 0200 Active    Acceptance E NR,NL  BC 03/17/17 1854 Active    Acceptance E VU Patient family conference set for Wednesday at 10am regarding plan of care. AG1 03/14/17 1852 Done    Acceptance E VU  LK 03/09/17 1917 Done    Acceptance E VU  KT 03/05/17 1927 Done    Nonacceptance E NR pt sedated/intubated MB 03/05/17 1746 Active    Acceptance E VU  KT 03/05/17 0558 Done    Acceptance E NR  AS 03/02/17 1958 Active    Acceptance E VU  KS 03/02/17 1816 Done    Acceptance E VU,NR  2 03/02/17 1109 Done    Acceptance E,D VU,NR  TB 03/01/17 1422 Done   Family Acceptance E VU Patient family conference set for Wednesday at 10am regarding plan of care. AG1 03/14/17 1852 Done    Acceptance E VU  HN 03/11/17 0324 Done    Acceptance E VU  LK 03/09/17 1917 Done    Acceptance E VU  KT 03/05/17 1927 Done    Acceptance E VU  KT 03/05/17 0558 Done               Point: Precautions (Resolved)    Learning Progress Summary    Learner Readiness Method Response Comment Documented by Status   Patient Acceptance E,D NR   03/20/17 1315 Active    Nonacceptance E NR,NL Patient confused.  Unable to complete teaching at this time.  03/20/17 0645 Active    Acceptance E NR  EF 03/18/17 1609 Active    Nonacceptance E NL,NR Patient confused at times.  03/18/17 0200 Active    Acceptance E NR,NL  BC 03/17/17 1854 Active    Acceptance E VU Patient family conference set for Wednesday at 10am regarding plan of care. AG1 03/14/17 1852 Done    Acceptance E VU  LK 03/09/17 1917 Done    Acceptance E VU  KT 03/05/17 1927 Done    Nonacceptance E NR pt sedated/intubated MB 03/05/17 1746 Active    Acceptance E VU  KT 03/05/17 0558 Done    Acceptance E NR  AS 03/02/17 1958 Active    Acceptance E VU  KS 03/02/17 1816 Done    Acceptance E VU,NR  AG2 03/02/17 1109 Done    Acceptance E,D BRENNAN,NR  TB 03/01/17 1422 Done   Family Acceptance  E VU Patient family conference set for Wednesday at 10am regarding plan of care. AG1 03/14/17 1852 Done    Acceptance E VU  HN 03/11/17 0324 Done    Acceptance E VU  LK 03/09/17 1917 Done    Acceptance E VU  KT 03/05/17 1927 Done    Acceptance E VU  KT 03/05/17 0558 Done                      User Key     Initials Effective Dates Name Provider Type Discipline    KT 06/16/16 -  Elizabeth Prince, RN Registered Nurse Nurse    MB 06/16/16 -  Dinah Arellano, RN Registered Nurse Nurse    JG 06/16/16 -  Colton Ruiz, RN Registered Nurse Nurse    LK 06/16/16 -  Erin Prater, RN Registered Nurse Nurse    HN 06/16/16 -  Bella Bauer, RN Registered Nurse Nurse    AS 06/16/16 -  Philly Vergara, RN Registered Nurse Nurse    BC 03/14/16 -  Caryn Mendez, PT Physical Therapist PT    AG 03/14/16 -  Merlene Ruiz, PT Physical Therapist PT    AG2 06/16/16 -  Ivis Espinal, RN Registered Nurse Nurse    TB 12/30/16 -  Amanda Nelson, PT Therapist PT    KS 08/08/16 -  Holland Germain, RN Registered Nurse Nurse    EF 12/23/16 -  Cee Sanchez, RN Registered Nurse Nurse    AG1 01/03/17 -  Kirstie Mcfadden, RN Registered Nurse Nurse                    IP PT Goals       03/17/17 1856 03/17/17 1855 03/17/17 1854    Bed Mobility PT LTG    Bed Mobility PT LTG, Date Established   03/17/17  -BC    Bed Mobility PT LTG, Time to Achieve   4 days  -BC    Bed Mobility PT LTG, Activity Type   all bed mobility  -BC    Bed Mobility PT LTG, [place] Level   minimum assist (75% patient effort);2 person assist required  -BC    Bed Mobility PT Goal  LTG, Assist Device   bed rails  -BC    Transfer Training PT LTG    Transfer Training PT LTG, Date Established  03/17/17  -BC     Transfer Training PT LTG, Time to Achieve  4 days  -BC     Transfer Training PT LTG, Activity Type  all transfers  -BC     Transfer Training PT LTG, [place] Level  contact guard assist;minimum assist (75% patient effort)  -BC     Transfer  Training PT LTG, Assist Device  walker, rolling  -BC     Gait Training PT LTG    Gait Training Goal PT LTG, Date Established 03/17/17  -BC      Gait Training Goal PT LTG, Time to Achieve 4 days  -BC      Gait Training Goal PT LTG, [place] Level contact guard assist;minimum assist (75% patient effort)  -BC      Gait Training Goal PT LTG, Assist Device walker, rolling  -BC      Gait Training Goal PT LTG, Distance to Achieve 15  -BC        User Key  (r) = Recorded By, (t) = Taken By, (c) = Cosigned By    Initials Name Provider Type    BC Caryn Mendez, PT Physical Therapist              Adult Rehabilitation Note       03/20/17 1310          Rehab Assessment/Intervention    Discipline physical therapist  -AG      Document Type discharge summary;therapy note (daily note)  -AG      Subjective Information no complaints  -AG      Patient Effort, Rehab Treatment adequate  -AG      Symptoms Noted During/After Treatment fatigue  -AG      Specific Treatment Considerations pt. impulsive and attempting to self transfer from bed.   -AG      Patient Response to Treatment decr tolerance to sitting EOB; poor static sitting balance  -AG      Recorded by [AG] Merlene Ruiz, PT      Pain Assessment    Pain Assessment Watson-Lopez FACES  -AG      Watson-Lopez FACES Pain Rating 0  -AG      Recorded by [AG] Merlene Ruiz, PT      Cognitive Assessment/Intervention    Current Cognitive/Communication Assessment impaired  -AG      Follows Commands/Answers Questions 50% of the time;25% of the time;able to follow single-step instructions  -AG      Personal Safety Interventions supervised activity  -AG      Recorded by [AG] Merlene Ruiz, PT      Bed Mobility, Assessment/Treatment    Bed Mobility, Assistive Device bed rails;head of bed elevated  -AG      Bed Mobility, Scoot/Bridge, [place] maximum assist (25% patient effort)  -AG      Bed Mob, Supine to Sit, [place] maximum assist (25% patient effort)  -AG      Bed Mob, Sit to  Supine, [place] maximum assist (25% patient effort);2 person assist required  -AG      Bed Mobility, Impairments strength decreased;impaired balance;coordination impaired;motor control impaired  -AG      Recorded by [AG] Merlene Ruiz PT      Transfer Assessment/Treatment    Transfers, Bed-Chair [place] not appropriate to assess   unsafe to assess at this time.  -AG      Recorded by [AG] Merlene Ruiz PT      Gait Assessment/Treatment    Gait, [place] Level unable to perform  -AG      Recorded by [AG] Merlene Ruiz PT      Motor Skills/Interventions    Additional Documentation Balance Skills Training (Group)  -AG      Recorded by [AG] Merlene Ruiz PT      Balance Skills Training    Sitting-Level of Assistance Maximum assistance  -AG      Sitting-Balance Support Feet supported  -AG      Recorded by [AG] Merlene Ruiz PT      Therapy Exercises    Bilateral Lower Extremities AROM:;5 reps;sitting;LAQ  -AG      Recorded by [AG] Merlene Ruiz PT      Positioning and Restraints    Pre-Treatment Position in bed  -AG      Post Treatment Position bed  -AG      In Bed supine;call light within reach;encouraged to call for assist;exit alarm on;with nsg;side rails up x3  -AG      Recorded by [AG] Merlene Ruiz PT        User Key  (r) = Recorded By, (t) = Taken By, (c) = Cosigned By    Initials Name Effective Dates    AG Merlene Ruiz PT 03/14/16 -           PT Recommendation and Plan  Anticipated Discharge Disposition: other (see comments) (Pt was transferred to CCU on 3/2/16 & is now intubated and unable to participate w/ therapy. Please re-consult PT when appropriate for pt to participate w/ mobility training.)  Planned Therapy Interventions: balance training, bed mobility training, gait training, home exercise program, patient/family education, strengthening, transfer training  PT Frequency: 3-5 times/wk, per priority policy             Outcome Measures       03/17/17 1800          How much help from  another person do you currently need...    Turning from your back to your side while in flat bed without using bedrails? 2  -BC      Moving from lying on back to sitting on the side of a flat bed without bedrails? 1  -BC      Moving to and from a bed to a chair (including a wheelchair)? 1  -BC      Standing up from a chair using your arms (e.g., wheelchair, bedside chair)? 1  -BC      Climbing 3-5 steps with a railing? 1  -BC      To walk in hospital room? 1  -BC      AM-PAC 6 Clicks Score 7  -BC      Functional Assessment    Outcome Measure Options AM-PAC 6 Clicks Basic Mobility (PT)  -BC        User Key  (r) = Recorded By, (t) = Taken By, (c) = Cosigned By    Initials Name Provider Type    BC Caryn Mendez, PT Physical Therapist           Time Calculation:         PT Charges       03/20/17 1315          Time Calculation    PT Received On 03/20/17  -AG      Time Calculation- PT    TCU Minutes- PT 30 min  -AG        User Key  (r) = Recorded By, (t) = Taken By, (c) = Cosigned By    Initials Name Provider Type     Merlene Ruiz, PT Physical Therapist          Therapy Charges for Today     Code Description Service Date Service Provider Modifiers Qty    [id number]  PT THER SUPP EA 15 MIN 3/20/2017 Merlene Ruiz, PT GP 2    [id number] HC PT THERAPEUTIC ACT EA 15 MIN 3/20/2017 Merlene Ruiz, PT GP 2          PT G-Codes  Outcome Measure Options: AM-PAC 6 Clicks Basic Mobility (PT)  Score: 7  Functional Limitation: Mobility: Walking and moving around  Mobility: Walking and Moving Around Current Status (): At least 80 percent but less than 100 percent impaired, limited or restricted  Mobility: Walking and Moving Around Goal Status (): At least 60 percent but less than 80 percent impaired, limited or restricted         Merlene Ruiz PT  3/20/2017

## 2017-03-21 ENCOUNTER — APPOINTMENT (OUTPATIENT)
Dept: GENERAL RADIOLOGY | Facility: HOSPITAL | Age: 52
End: 2017-03-21
Attending: INTERNAL MEDICINE

## 2017-03-21 ENCOUNTER — APPOINTMENT (OUTPATIENT)
Dept: GENERAL RADIOLOGY | Facility: HOSPITAL | Age: 52
End: 2017-03-21

## 2017-03-21 LAB
ALBUMIN SERPL-MCNC: 3.3 G/DL (ref 3.5–5)
ALBUMIN/GLOB SERPL: 0.8 G/DL (ref 1.5–2.5)
ALP SERPL-CCNC: 66 U/L (ref 35–104)
ALT SERPL W P-5'-P-CCNC: 31 U/L (ref 10–36)
ANION GAP SERPL CALCULATED.3IONS-SCNC: 12.5 MMOL/L (ref 3.6–11.2)
ANISOCYTOSIS BLD QL: NORMAL
AST SERPL-CCNC: 30 U/L (ref 10–30)
BASOPHILS # BLD AUTO: 0.05 10*3/MM3 (ref 0–0.3)
BASOPHILS NFR BLD AUTO: 0.6 % (ref 0–2)
BILIRUB SERPL-MCNC: 0.3 MG/DL (ref 0.2–1.8)
BUN BLD-MCNC: 15 MG/DL (ref 7–21)
BUN/CREAT SERPL: 30.6 (ref 7–25)
CALCIUM SPEC-SCNC: 9.3 MG/DL (ref 7.7–10)
CHLORIDE SERPL-SCNC: 109 MMOL/L (ref 99–112)
CHOLEST SERPL-MCNC: 237 MG/DL (ref 0–200)
CO2 SERPL-SCNC: 17.5 MMOL/L (ref 24.3–31.9)
CREAT BLD-MCNC: 0.49 MG/DL (ref 0.43–1.29)
DEPRECATED RDW RBC AUTO: 74.3 FL (ref 37–54)
EOSINOPHIL # BLD AUTO: 0.06 10*3/MM3 (ref 0–0.7)
EOSINOPHIL NFR BLD AUTO: 0.7 % (ref 0–5)
ERYTHROCYTE [DISTWIDTH] IN BLOOD BY AUTOMATED COUNT: 26.5 % (ref 11.5–14.5)
GFR SERPL CREATININE-BSD FRML MDRD: 133 ML/MIN/1.73
GLOBULIN UR ELPH-MCNC: 3.9 GM/DL
GLUCOSE BLD-MCNC: 127 MG/DL (ref 70–110)
GLUCOSE BLDC GLUCOMTR-MCNC: 111 MG/DL (ref 70–130)
GLUCOSE BLDC GLUCOMTR-MCNC: 131 MG/DL (ref 70–130)
GLUCOSE BLDC GLUCOMTR-MCNC: 158 MG/DL (ref 70–130)
GLUCOSE BLDC GLUCOMTR-MCNC: 165 MG/DL (ref 70–130)
GLUCOSE BLDC GLUCOMTR-MCNC: 179 MG/DL (ref 70–130)
HCT VFR BLD AUTO: 32.5 % (ref 37–47)
HDLC SERPL-MCNC: 18 MG/DL (ref 60–100)
HGB BLD-MCNC: 9.4 G/DL (ref 12–16)
HYPOCHROMIA BLD QL: NORMAL
IMM GRANULOCYTES # BLD: 0.35 10*3/MM3 (ref 0–0.03)
IMM GRANULOCYTES NFR BLD: 3.9 % (ref 0–0.5)
LDLC SERPL CALC-MCNC: 165 MG/DL (ref 0–100)
LDLC/HDLC SERPL: 9.16 {RATIO}
LYMPHOCYTES # BLD AUTO: 1.33 10*3/MM3 (ref 1–3)
LYMPHOCYTES NFR BLD AUTO: 14.8 % (ref 21–51)
MCH RBC QN AUTO: 23.7 PG (ref 27–33)
MCHC RBC AUTO-ENTMCNC: 28.9 G/DL (ref 33–37)
MCV RBC AUTO: 81.9 FL (ref 80–94)
MONOCYTES # BLD AUTO: 0.61 10*3/MM3 (ref 0.1–0.9)
MONOCYTES NFR BLD AUTO: 6.8 % (ref 0–10)
NEUTROPHILS # BLD AUTO: 6.59 10*3/MM3 (ref 1.4–6.5)
NEUTROPHILS NFR BLD AUTO: 73.2 % (ref 30–70)
OSMOLALITY SERPL CALC.SUM OF ELEC: 280 MOSM/KG (ref 273–305)
PLAT MORPH BLD: NORMAL
PLATELET # BLD AUTO: 451 10*3/MM3 (ref 130–400)
PMV BLD AUTO: 10.4 FL (ref 6–10)
POIKILOCYTOSIS BLD QL SMEAR: NORMAL
POTASSIUM BLD-SCNC: 3.4 MMOL/L (ref 3.5–5.3)
PROT SERPL-MCNC: 7.2 G/DL (ref 6–8)
RBC # BLD AUTO: 3.97 10*6/MM3 (ref 4.2–5.4)
SODIUM BLD-SCNC: 139 MMOL/L (ref 135–153)
TRIGL SERPL-MCNC: 271 MG/DL (ref 0–150)
VLDLC SERPL-MCNC: 54.2 MG/DL
WBC NRBC COR # BLD: 8.99 10*3/MM3 (ref 4.5–12.5)

## 2017-03-21 PROCEDURE — 82962 GLUCOSE BLOOD TEST: CPT

## 2017-03-21 PROCEDURE — 71010 HC CHEST PA OR AP: CPT

## 2017-03-21 PROCEDURE — 97163 PT EVAL HIGH COMPLEX 45 MIN: CPT

## 2017-03-21 PROCEDURE — 85025 COMPLETE CBC W/AUTO DIFF WBC: CPT | Performed by: INTERNAL MEDICINE

## 2017-03-21 PROCEDURE — 71010 XR CHEST 1 VW: CPT | Performed by: RADIOLOGY

## 2017-03-21 PROCEDURE — 80053 COMPREHEN METABOLIC PANEL: CPT | Performed by: INTERNAL MEDICINE

## 2017-03-21 PROCEDURE — 80061 LIPID PANEL: CPT | Performed by: INTERNAL MEDICINE

## 2017-03-21 PROCEDURE — 97167 OT EVAL HIGH COMPLEX 60 MIN: CPT

## 2017-03-21 PROCEDURE — 97530 THERAPEUTIC ACTIVITIES: CPT

## 2017-03-21 PROCEDURE — 85007 BL SMEAR W/DIFF WBC COUNT: CPT | Performed by: INTERNAL MEDICINE

## 2017-03-21 NOTE — PAYOR COMM NOTE
"CONTACT:  EMMANUELLE BABB RN, BSN  UTILIZATION MANAGEMENT DEPT.  [facility]  [address] [facility], [address]  PHONE:  [phone]  FAX: [phone]    [facility] REFER TO AUTH # [id number]    Yessica Yan (51 y.o. Female)     Date of Birth Social Security Number Address Home Phone MRN    1965  [address] [place] [id number] [phone] [id number]    [employer] Marital Status          Restorationist        Admission Date Admission Type Admitting Provider Attending Provider Department, Room/Bed    2/28/17 Emergency Savanah Gutierrez MD  [facility] CRITICAL CARE, CC01/    Discharge Date Discharge Disposition Discharge Destination        3/20/2017 Long Term Care (DC - External)             Attending Provider: (none)    Allergies:  Penicillins, Toradol [Ketorolac Tromethamine], Tramadol    Isolation:  Spore   Infection:  C.difficile (03/10/17)   Code Status:  Prior    Ht:  63\" (160 cm)   Wt:  107 lb 12.8 oz (48.9 kg)    Admission Cmt:  None   Principal Problem:  Bilateral pneumonia [J18.9]                 Active Insurance as of 2/28/2017     Primary Coverage     Payor Plan Insurance Group Employer/Plan Group    [employer] [place] [employer] [place]ICA[place]      Payor Plan Address Payor Plan Phone Number Effective From Effective To    [address] [address] [phone] 2/28/2017     [place] [address]       Subscriber Name Subscriber Birth Date Member ID       YESSICA YAN 1965 [id number]                 Emergency Contacts      (Rel.) Home Phone Work Phone Mobile Phone    Scot Vaughn (Son) [phone] -- --    NolastnamrosalioNeeta (Friend) -- -- [phone]               Discharge Summary      Manuel Elizalde DO at 3/20/2017 12:47 PM          Date of Admission: 2/28/2017    Date of Discharge:  3/20/2017    PCP: ROSA Meza    Admission Diagnosis:   Sepsis  Bilateral pneumonia (community acquired vs healthcare " associated)  Acute COPD exacerbation  Chest pain  Elevated cardiac enzymes  Microcytic anemia  Buerger's disease with auto amputation of several fingertips with ongoing tobacco abuse  Insulin-dependent DM with hyperglycemia  Mild transaminitis  Chronic hepatitis C  Generalized anxiety disorder  Ischemic cardiomyopathy with EF of approx 45%  Tobacco smoking addiction  Medical noncompliance  Malnutrition  Hiatal hernia with erosive esophagitis      Discharge Diagnosis:   Severe sepsis  ARDS  Acute hypoxic respiratory failure  Community Acquired Pneumonia  UTI  Bilateral superimposed nosocomial/ventilator associated pneumonia  C diff colitis  Acute exacerbation of COPD  Acute on chronic microcytic anemia  Hypokalemia  Hypernatremia   DM II, insulin-dependent  Generalized weakness/debility  Laryngeal edema with aspiration following prolonged intubation   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  Yeast in urine, possible contamination  Mild protein malnutrition  Tobacco abuse  Elevated liver enzymes  Hx of Hep C  Hx of medical noncompliance  Buerger's disease   Anxiety     Procedures Performed:  3/3/17: Emergent intubation  3/3/17: Right femoral central line placement s/p removal  3/3/17: Right IJ central line placement  3/16/17: Extubation    Transfusions:  3/9/17: 1 unit of PRBC's      Consults:   Consults     Date and Time Order Name Status Description    3/7/2017 0807 Inpatient Consult to Infectious Diseases Completed     3/7/2017 0758 Inpatient Consult to Nephrology      3/2/2017 1003 Inpatient Consult to Pulmonology Completed             History of Present Illness:  Pepito Yan is a 51 y.o. female who presented to [facility] ED with CC of trouble breathing and productive cough of a few days duration with associated subjective fevers, chills and fatigue. She also reported substernal chest pain.     In the ED, pt was hemodynamically stable. EKG revealed sinus tachycardia with no acute changes to suggest ischemia. Initial  troponin's were in the indeterminate range. Routine labs revealed an elevated CRP and CXR revealed bilateral infiltrates. CT chest PE protocol was negative for PE. Cultures were obtained and IV antibiotics initiated.        Hospital Course  Pepito Yan was admitted to the med/surg floor with telemetry for further evaluation and treatment. She was continued on IV antibiotics including Rocephin and Doxycycline. Steroids and scheduled nebs were initiated in the setting of a COPD exacerbation.     Serial CE's were followed and quickly trended downward. Echo was obtained which was a non-diagnostic study and revealed a grossly reduced EF. Further workup revealed a UTI and pt was continued on IV antibiotics. Mycoplasma and legionella were obtained, both of which were negative.     Pt's clinical condition began worsening and she was transferred to the CCU for closer monitoring. She soon decompensated requiring emergent intubation on 3/3. A femoral line was placed and an IJ was placed soon after followed by removal of the femoral line. Pt's CXR worsened and became concerning for developing ARDS. Her antibiotic regimen was broadened and she was continued on scheduled steroids as well as nebs.     Ms. Yna's clinical condition continued to worsen and she progressed to ARDS requiring advanced vent support per pulmonology. Pt would remain critically ill for several days requiring a prolonged intubation from 3/3-3/16. During this time she required 1 unit of PRBC's for worsening anemia. She experienced recurrent fevers with concerns for a superimposed nosocomial/ventilator-associated pneumonia. She was later diagnosed with C diff colitis and ID was consulted for antibiotic management. Urine culture revealed yeast and Micafungin was added for a short time but quickly stopped as it was felt to be a contamination. Acyclovir was later added for possible HSV 1 lesions.     Pt slowly began showing clinical improvement. Her  sepsis began resolving and her antibiotic regimen was changed to PO Vanc, IV Rocephin and IV Flagyl. Repeat CXR's revealed some improvement in her bilateral airspace disease and her FiO2 requirements improved as well. Pulm began working to wean vent support as tolerated. Routine labs also revealed hypernatremia which resolved over the course of a few days with the addition of free water flushes to the TF's per nephrology.     As pt's clinical condition improved, pulm worked aggressively to wean vent support and Ms. Yan was able to be extubated on 3/16. Her steroids were tapered accordingly as well. Following extubation, pt's mental status waxed and waned but she showed overall clinical improvement. PT/OT and speech were consulted for further evaluation. Speech evaluation revealed laryngeal edema with aspiration likely related to her prolonged intubation. She was continued on TF's for nutritional support but the NG was replaced on multiple occasions as the pt continued to pull the tube out during episodes of confusion or agitation. It was the consensus of the treatment team that the patient would require a prolonged hospitalization with aggressive therapy in the setting of her prolonged critical illness and an LTAC referral was placed.     Ms. Yan was seen and examined on 3/20. She remained hemodynamically stable with stable routine AM labs. She remained intermittently confused but overall improved. Upon review, she was accepted to [facility] under the care of Dr. Gil. Her daughter was contacted who was in agreement for transfer/discharge to [facility]. Arrangements were made for discharge and pt was discharged to [facility] for further evaluation and treatment in stable condition.     Please note this has been a prolonged hospitalization with a complicated clinical course. This note reflects a summary of this hospitalization. Please refer to daily progress notes for complete details.           Pertinent Test Results:   CT chest  PE protocol  No CT angiographic findings of pulmonary embolus. There are patchy alveolar infiltrates in the lungs. There is an enlarged lymph node possibly reactive in the mediastinum within the area of the aorticopulmonary window.    Transthoracic echocardiogram  · This is a non-diagnostic study  · Grossly the LV systolic function appears reduced. An EF cannot be provided  · There do not appear to be any valvular lesions noted    Urine culture: >100,000 E coli    (+) C diff toxin     Condition on Discharge:  Stable    Vital Signs  Vitals:    03/20/17 1203   BP: 114/78   Pulse: 98   Resp: (!) 30   Temp: 97.5 °F (36.4 °C)   SpO2: 99%       Physical Exam:  General:  Awake, alert, confused to year but otherwise oriented, in no acute distress, ill appearing   Heart:  Normal S1 and S2. Tachycardic. No significant murmur, rubs or gallops appreciated.   Lungs:  Respirations regular, even and unlabored. Lungs clear to auscultation B/L. No wheezes, rales or rhonchi.   Abdomen:  Soft and nontender. No guarding, rebound tenderness or organomegaly noted. Bowel sounds present x 4.   Extremities: No clubbing, cyanosis or edema noted. Moves UE and LE equally B/L.          Discharge Disposition:   long term care facility      Discharge Medications:   Pepito Yan   Home Medication Instructions JERARDO:[id number]    Printed on:03/20/17 6341   Medication Information                      acetaminophen (TYLENOL) 160 MG/5ML solution  Take 20.3 mL by mouth Every 6 (Six) Hours As Needed for Fever.             acyclovir (ZOVIRAX) 200 MG/5ML suspension  Take 5 mL by mouth 5 (Five) Times a Day. Indications: Upper Respiratory Tract Infection, HSV 1 infection/oral herpes             aspirin 81 MG EC tablet  Take 1 tablet by mouth Daily.             bacitracin 500 UNIT/GM ointment  Apply  topically Every 12 (Twelve) Hours.             clopidogrel (PLAVIX) 75 MG tablet  Take 1 tablet by mouth Daily.             guaiFENesin (ROBITUSSIN) 100  MG/5ML solution oral solution  Take 15 mL by mouth Every 6 (Six) Hours.             haloperidol lactate (HALDOL) 5 MG/ML injection  Infuse 0.1 mL into a venous catheter Every 6 (Six) Hours As Needed for Agitation.             Heparin Sodium, Porcine, (HEPARIN, PORCINE,) 5000 UNIT/ML injection  Inject 1 mL under the skin Every 12 (Twelve) Hours.             insulin aspart (novoLOG) 100 UNIT/ML injection  Inject 0-7 Units under the skin 4 (Four) Times a Day Before Meals & at Bedtime.             insulin detemir (LEVEMIR) 100 UNIT/ML injection  Inject 22 Units under the skin Every Night.             ipratropium (ATROVENT) 0.02 % nebulizer solution  Take 2.5 mL by nebulization Every 6 (Six) Hours As Needed for Shortness of Air.             ipratropium (ATROVENT) 0.02 % nebulizer solution  Take 2.5 mL by nebulization Every 6 (Six) Hours.             metoprolol tartrate (LOPRESSOR) 50 MG tablet  Take 1 tablet by mouth Every 12 (Twelve) Hours.             metroNIDAZOLE (FLAGYL) 5-0.79 MG/ML-% IVPB  Infuse 100 mL into a venous catheter Every 8 (Eight) Hours. Indications: Clostridium Difficile Infection             nicotine (NICODERM CQ) 21 MG/24HR patch  Place 1 patch on the skin Daily.             pantoprazole (PROTONIX) 40 MG injection  Infuse 10 mL into a venous catheter Every Morning Before Breakfast.             predniSONE (DELTASONE) 5 MG tablet  Take 1 tablet by mouth Daily With Breakfast.             vancomycin 50 MG/ML solution oral solution  Take 2.5 mL by mouth Every 6 (Six) Hours. Indications: Clostridium Difficile Infection                   Discharge Diet:   NPO       Dietary Orders            Start     Ordered    03/10/17 2030  Vital 1.2; Tube Feeding type: Continuous; Continuous Tube Feeding Start Rate (mL/hr): 20; Then advance rate by (mL/hr): 30; Every __ hrs: 12; To goal rate of (mL/hr): 50; Total daily volume to deliver (mL/day): 1200; Patient is NPO (no tray)  Diet Effective Now     Comments:  Flush per  MD order - TF at goal rate will provide 972 ml water   Question Answer Comment   Tube Feeding Formula: Vital 1.2    Tube Feeding type: Continuous    Continuous Tube Feeding Start Rate (mL/hr) 20    Then advance rate by (mL/hr): 30    Every __ hrs: 12    To goal rate of (mL/hr): 50    Total daily volume to deliver (mL/day) 1200    Water Flush Amount (mL) 200    Water flush frequency: Every 4 hours    PO Tray: Patient is NPO (no tray)        03/10/17 2029          Activity at Discharge:  bedrest    Follow-up Appointments:  Follow up with PCP upon discharge from [facility].       Test Results Pending at Discharge:  None     Manuel Elizalde DO  03/20/17  12:47 PM      Time: Greater than 30 minutes spent on this discharge.           Electronically signed by Manuel Elizalde DO at 3/21/2017 12:04 AM

## 2017-03-22 LAB
ANION GAP SERPL CALCULATED.3IONS-SCNC: 3.9 MMOL/L (ref 3.6–11.2)
BUN BLD-MCNC: 14 MG/DL (ref 7–21)
BUN/CREAT SERPL: 35 (ref 7–25)
CALCIUM SPEC-SCNC: 8.9 MG/DL (ref 7.7–10)
CHLORIDE SERPL-SCNC: 112 MMOL/L (ref 99–112)
CO2 SERPL-SCNC: 20.1 MMOL/L (ref 24.3–31.9)
CREAT BLD-MCNC: 0.4 MG/DL (ref 0.43–1.29)
GFR SERPL CREATININE-BSD FRML MDRD: >150 ML/MIN/1.73
GLUCOSE BLD-MCNC: 122 MG/DL (ref 70–110)
GLUCOSE BLDC GLUCOMTR-MCNC: 146 MG/DL (ref 70–130)
GLUCOSE BLDC GLUCOMTR-MCNC: 171 MG/DL (ref 70–130)
GLUCOSE BLDC GLUCOMTR-MCNC: 171 MG/DL (ref 70–130)
MAGNESIUM SERPL-MCNC: 1.9 MG/DL (ref 1.7–2.6)
OSMOLALITY SERPL CALC.SUM OF ELEC: 273.7 MOSM/KG (ref 273–305)
POTASSIUM BLD-SCNC: 3.6 MMOL/L (ref 3.5–5.3)
PREALB SERPL-MCNC: 21 MG/DL (ref 10–36)
SODIUM BLD-SCNC: 136 MMOL/L (ref 135–153)

## 2017-03-22 PROCEDURE — 97110 THERAPEUTIC EXERCISES: CPT

## 2017-03-22 PROCEDURE — 80048 BASIC METABOLIC PNL TOTAL CA: CPT | Performed by: INTERNAL MEDICINE

## 2017-03-22 PROCEDURE — 82962 GLUCOSE BLOOD TEST: CPT

## 2017-03-22 PROCEDURE — 97530 THERAPEUTIC ACTIVITIES: CPT

## 2017-03-22 PROCEDURE — 83735 ASSAY OF MAGNESIUM: CPT | Performed by: INTERNAL MEDICINE

## 2017-03-23 ENCOUNTER — APPOINTMENT (OUTPATIENT)
Dept: GENERAL RADIOLOGY | Facility: HOSPITAL | Age: 52
End: 2017-03-23
Attending: INTERNAL MEDICINE

## 2017-03-23 LAB
GLUCOSE BLDC GLUCOMTR-MCNC: 227 MG/DL (ref 70–130)
GLUCOSE BLDC GLUCOMTR-MCNC: 240 MG/DL (ref 70–130)

## 2017-03-23 PROCEDURE — 82962 GLUCOSE BLOOD TEST: CPT

## 2017-03-23 PROCEDURE — 74230 X-RAY XM SWLNG FUNCJ C+: CPT

## 2017-03-23 PROCEDURE — G8996 SWALLOW CURRENT STATUS: HCPCS

## 2017-03-23 PROCEDURE — 97530 THERAPEUTIC ACTIVITIES: CPT

## 2017-03-23 PROCEDURE — 97110 THERAPEUTIC EXERCISES: CPT

## 2017-03-23 PROCEDURE — G8997 SWALLOW GOAL STATUS: HCPCS

## 2017-03-23 PROCEDURE — 74230 X-RAY XM SWLNG FUNCJ C+: CPT | Performed by: RADIOLOGY

## 2017-03-23 PROCEDURE — 92611 MOTION FLUOROSCOPY/SWALLOW: CPT

## 2017-03-24 ENCOUNTER — APPOINTMENT (OUTPATIENT)
Dept: GENERAL RADIOLOGY | Facility: HOSPITAL | Age: 52
End: 2017-03-24

## 2017-03-24 LAB
ANION GAP SERPL CALCULATED.3IONS-SCNC: 5.7 MMOL/L (ref 3.6–11.2)
ANISOCYTOSIS BLD QL: ABNORMAL
BUN BLD-MCNC: 11 MG/DL (ref 7–21)
BUN/CREAT SERPL: 27.5 (ref 7–25)
CALCIUM SPEC-SCNC: 9 MG/DL (ref 7.7–10)
CHLORIDE SERPL-SCNC: 113 MMOL/L (ref 99–112)
CO2 SERPL-SCNC: 20.3 MMOL/L (ref 24.3–31.9)
CREAT BLD-MCNC: 0.4 MG/DL (ref 0.43–1.29)
DEPRECATED RDW RBC AUTO: 78 FL (ref 37–54)
EOSINOPHIL # BLD MANUAL: 0.07 10*3/MM3 (ref 0–0.7)
EOSINOPHIL NFR BLD MANUAL: 1 % (ref 0–5)
ERYTHROCYTE [DISTWIDTH] IN BLOOD BY AUTOMATED COUNT: 27.7 % (ref 11.5–14.5)
GFR SERPL CREATININE-BSD FRML MDRD: >150 ML/MIN/1.73
GLUCOSE BLD-MCNC: 164 MG/DL (ref 70–110)
GLUCOSE BLDC GLUCOMTR-MCNC: 209 MG/DL (ref 70–130)
GLUCOSE BLDC GLUCOMTR-MCNC: 211 MG/DL (ref 70–130)
HCT VFR BLD AUTO: 32.4 % (ref 37–47)
HGB BLD-MCNC: 9.3 G/DL (ref 12–16)
HYPOCHROMIA BLD QL: ABNORMAL
LYMPHOCYTES # BLD MANUAL: 1.64 10*3/MM3 (ref 1–3)
LYMPHOCYTES NFR BLD MANUAL: 23 % (ref 21–51)
LYMPHOCYTES NFR BLD MANUAL: 4 % (ref 0–10)
MAGNESIUM SERPL-MCNC: 2.1 MG/DL (ref 1.7–2.6)
MCH RBC QN AUTO: 23.9 PG (ref 27–33)
MCHC RBC AUTO-ENTMCNC: 28.7 G/DL (ref 33–37)
MCV RBC AUTO: 83.3 FL (ref 80–94)
METAMYELOCYTES NFR BLD MANUAL: 1 % (ref 0–0)
MONOCYTES # BLD AUTO: 0.29 10*3/MM3 (ref 0.1–0.9)
NEUTROPHILS # BLD AUTO: 5.08 10*3/MM3 (ref 1.4–6.5)
NEUTROPHILS NFR BLD MANUAL: 65 % (ref 30–70)
NEUTS BAND NFR BLD MANUAL: 6 % (ref 4–12)
OSMOLALITY SERPL CALC.SUM OF ELEC: 280.6 MOSM/KG (ref 273–305)
PHOSPHATE SERPL-MCNC: 3.7 MG/DL (ref 2.7–4.5)
PLAT MORPH BLD: NORMAL
PLATELET # BLD AUTO: 449 10*3/MM3 (ref 130–400)
PMV BLD AUTO: 10.1 FL (ref 6–10)
POTASSIUM BLD-SCNC: 4.1 MMOL/L (ref 3.5–5.3)
RBC # BLD AUTO: 3.89 10*6/MM3 (ref 4.2–5.4)
SCAN SLIDE: NORMAL
SODIUM BLD-SCNC: 139 MMOL/L (ref 135–153)
WBC NRBC COR # BLD: 7.15 10*3/MM3 (ref 4.5–12.5)

## 2017-03-24 PROCEDURE — 84100 ASSAY OF PHOSPHORUS: CPT | Performed by: INTERNAL MEDICINE

## 2017-03-24 PROCEDURE — 85007 BL SMEAR W/DIFF WBC COUNT: CPT | Performed by: INTERNAL MEDICINE

## 2017-03-24 PROCEDURE — 93005 ELECTROCARDIOGRAM TRACING: CPT | Performed by: INTERNAL MEDICINE

## 2017-03-24 PROCEDURE — 85025 COMPLETE CBC W/AUTO DIFF WBC: CPT | Performed by: INTERNAL MEDICINE

## 2017-03-24 PROCEDURE — 97530 THERAPEUTIC ACTIVITIES: CPT

## 2017-03-24 PROCEDURE — 71010 HC CHEST PA OR AP: CPT

## 2017-03-24 PROCEDURE — 80048 BASIC METABOLIC PNL TOTAL CA: CPT | Performed by: INTERNAL MEDICINE

## 2017-03-24 PROCEDURE — 97535 SELF CARE MNGMENT TRAINING: CPT | Performed by: OCCUPATIONAL THERAPIST

## 2017-03-24 PROCEDURE — 71010 XR CHEST 1 VW: CPT | Performed by: RADIOLOGY

## 2017-03-24 PROCEDURE — 83735 ASSAY OF MAGNESIUM: CPT | Performed by: INTERNAL MEDICINE

## 2017-03-24 PROCEDURE — 93010 ELECTROCARDIOGRAM REPORT: CPT | Performed by: INTERNAL MEDICINE

## 2017-03-24 PROCEDURE — 82962 GLUCOSE BLOOD TEST: CPT

## 2017-03-24 PROCEDURE — 97530 THERAPEUTIC ACTIVITIES: CPT | Performed by: OCCUPATIONAL THERAPIST

## 2017-03-24 PROCEDURE — 97110 THERAPEUTIC EXERCISES: CPT

## 2017-03-25 LAB
GLUCOSE BLDC GLUCOMTR-MCNC: 113 MG/DL (ref 70–130)
GLUCOSE BLDC GLUCOMTR-MCNC: 147 MG/DL (ref 70–130)
GLUCOSE BLDC GLUCOMTR-MCNC: 163 MG/DL (ref 70–130)
GLUCOSE BLDC GLUCOMTR-MCNC: 199 MG/DL (ref 70–130)

## 2017-03-25 PROCEDURE — 82962 GLUCOSE BLOOD TEST: CPT

## 2017-03-26 LAB
GLUCOSE BLDC GLUCOMTR-MCNC: 151 MG/DL (ref 70–130)
GLUCOSE BLDC GLUCOMTR-MCNC: 190 MG/DL (ref 70–130)
GLUCOSE BLDC GLUCOMTR-MCNC: 200 MG/DL (ref 70–130)

## 2017-03-26 PROCEDURE — 82962 GLUCOSE BLOOD TEST: CPT

## 2017-03-27 LAB
ANION GAP SERPL CALCULATED.3IONS-SCNC: 3.1 MMOL/L (ref 3.6–11.2)
BUN BLD-MCNC: 15 MG/DL (ref 7–21)
BUN/CREAT SERPL: 39.5 (ref 7–25)
CALCIUM SPEC-SCNC: 9.7 MG/DL (ref 7.7–10)
CHLORIDE SERPL-SCNC: 108 MMOL/L (ref 99–112)
CO2 SERPL-SCNC: 25.9 MMOL/L (ref 24.3–31.9)
CREAT BLD-MCNC: 0.38 MG/DL (ref 0.43–1.29)
DEPRECATED RDW RBC AUTO: 81.9 FL (ref 37–54)
ERYTHROCYTE [DISTWIDTH] IN BLOOD BY AUTOMATED COUNT: 27.7 % (ref 11.5–14.5)
GFR SERPL CREATININE-BSD FRML MDRD: >150 ML/MIN/1.73
GLUCOSE BLD-MCNC: 237 MG/DL (ref 70–110)
GLUCOSE BLDC GLUCOMTR-MCNC: 187 MG/DL (ref 70–130)
GLUCOSE BLDC GLUCOMTR-MCNC: 237 MG/DL (ref 70–130)
GLUCOSE BLDC GLUCOMTR-MCNC: 261 MG/DL (ref 70–130)
GLUCOSE BLDC GLUCOMTR-MCNC: 272 MG/DL (ref 70–130)
HCT VFR BLD AUTO: 30.8 % (ref 37–47)
HGB BLD-MCNC: 9.3 G/DL (ref 12–16)
MAGNESIUM SERPL-MCNC: 2 MG/DL (ref 1.7–2.6)
MCH RBC QN AUTO: 25.1 PG (ref 27–33)
MCHC RBC AUTO-ENTMCNC: 30.2 G/DL (ref 33–37)
MCV RBC AUTO: 83.2 FL (ref 80–94)
OSMOLALITY SERPL CALC.SUM OF ELEC: 282.3 MOSM/KG (ref 273–305)
PHOSPHATE SERPL-MCNC: 4.1 MG/DL (ref 2.7–4.5)
PLATELET # BLD AUTO: 350 10*3/MM3 (ref 130–400)
PMV BLD AUTO: 9.5 FL (ref 6–10)
POTASSIUM BLD-SCNC: 4.5 MMOL/L (ref 3.5–5.3)
RBC # BLD AUTO: 3.7 10*6/MM3 (ref 4.2–5.4)
SODIUM BLD-SCNC: 137 MMOL/L (ref 135–153)
WBC NRBC COR # BLD: 6.68 10*3/MM3 (ref 4.5–12.5)

## 2017-03-27 PROCEDURE — 83735 ASSAY OF MAGNESIUM: CPT | Performed by: INTERNAL MEDICINE

## 2017-03-27 PROCEDURE — 84100 ASSAY OF PHOSPHORUS: CPT | Performed by: INTERNAL MEDICINE

## 2017-03-27 PROCEDURE — 97110 THERAPEUTIC EXERCISES: CPT

## 2017-03-27 PROCEDURE — 85027 COMPLETE CBC AUTOMATED: CPT | Performed by: INTERNAL MEDICINE

## 2017-03-27 PROCEDURE — 82962 GLUCOSE BLOOD TEST: CPT

## 2017-03-27 PROCEDURE — 97530 THERAPEUTIC ACTIVITIES: CPT

## 2017-03-27 PROCEDURE — 80048 BASIC METABOLIC PNL TOTAL CA: CPT | Performed by: INTERNAL MEDICINE

## 2017-03-28 LAB
ALBUMIN SERPL-MCNC: 3.6 G/DL (ref 3.5–5)
ALBUMIN/GLOB SERPL: 1 G/DL (ref 1.5–2.5)
ALP SERPL-CCNC: 56 U/L (ref 35–104)
ALT SERPL W P-5'-P-CCNC: 33 U/L (ref 10–36)
ANION GAP SERPL CALCULATED.3IONS-SCNC: 4.4 MMOL/L (ref 3.6–11.2)
ANISOCYTOSIS BLD QL: NORMAL
AST SERPL-CCNC: 35 U/L (ref 10–30)
BASOPHILS # BLD AUTO: 0.06 10*3/MM3 (ref 0–0.3)
BASOPHILS NFR BLD AUTO: 0.9 % (ref 0–2)
BILIRUB SERPL-MCNC: 0.2 MG/DL (ref 0.2–1.8)
BUN BLD-MCNC: 19 MG/DL (ref 7–21)
BUN/CREAT SERPL: 41.3 (ref 7–25)
CALCIUM SPEC-SCNC: 10.2 MG/DL (ref 7.7–10)
CHLORIDE SERPL-SCNC: 108 MMOL/L (ref 99–112)
CO2 SERPL-SCNC: 24.6 MMOL/L (ref 24.3–31.9)
CREAT BLD-MCNC: 0.46 MG/DL (ref 0.43–1.29)
DEPRECATED RDW RBC AUTO: 82.4 FL (ref 37–54)
EOSINOPHIL # BLD AUTO: 0.08 10*3/MM3 (ref 0–0.7)
EOSINOPHIL NFR BLD AUTO: 1.3 % (ref 0–5)
ERYTHROCYTE [DISTWIDTH] IN BLOOD BY AUTOMATED COUNT: 27.8 % (ref 11.5–14.5)
GFR SERPL CREATININE-BSD FRML MDRD: 143 ML/MIN/1.73
GLOBULIN UR ELPH-MCNC: 3.7 GM/DL
GLUCOSE BLD-MCNC: 215 MG/DL (ref 70–110)
GLUCOSE BLDC GLUCOMTR-MCNC: 209 MG/DL (ref 70–130)
GLUCOSE BLDC GLUCOMTR-MCNC: 226 MG/DL (ref 70–130)
GLUCOSE BLDC GLUCOMTR-MCNC: 292 MG/DL (ref 70–130)
HCT VFR BLD AUTO: 30 % (ref 37–47)
HGB BLD-MCNC: 8.9 G/DL (ref 12–16)
HYPOCHROMIA BLD QL: NORMAL
IMM GRANULOCYTES # BLD: 0.3 10*3/MM3 (ref 0–0.03)
IMM GRANULOCYTES NFR BLD: 4.7 % (ref 0–0.5)
LYMPHOCYTES # BLD AUTO: 1.56 10*3/MM3 (ref 1–3)
LYMPHOCYTES NFR BLD AUTO: 24.5 % (ref 21–51)
MCH RBC QN AUTO: 25 PG (ref 27–33)
MCHC RBC AUTO-ENTMCNC: 29.7 G/DL (ref 33–37)
MCV RBC AUTO: 84.3 FL (ref 80–94)
MONOCYTES # BLD AUTO: 0.5 10*3/MM3 (ref 0.1–0.9)
MONOCYTES NFR BLD AUTO: 7.8 % (ref 0–10)
NEUTROPHILS # BLD AUTO: 3.87 10*3/MM3 (ref 1.4–6.5)
NEUTROPHILS NFR BLD AUTO: 60.8 % (ref 30–70)
NRBC BLD MANUAL-RTO: 0 /100 WBC (ref 0–0)
OSMOLALITY SERPL CALC.SUM OF ELEC: 282.6 MOSM/KG (ref 273–305)
PLAT MORPH BLD: NORMAL
PLATELET # BLD AUTO: 356 10*3/MM3 (ref 130–400)
PMV BLD AUTO: 9.8 FL (ref 6–10)
POTASSIUM BLD-SCNC: 4.7 MMOL/L (ref 3.5–5.3)
PROT SERPL-MCNC: 7.3 G/DL (ref 6–8)
RBC # BLD AUTO: 3.56 10*6/MM3 (ref 4.2–5.4)
SODIUM BLD-SCNC: 137 MMOL/L (ref 135–153)
WBC NRBC COR # BLD: 6.37 10*3/MM3 (ref 4.5–12.5)

## 2017-03-28 PROCEDURE — 97530 THERAPEUTIC ACTIVITIES: CPT

## 2017-03-28 PROCEDURE — 85007 BL SMEAR W/DIFF WBC COUNT: CPT | Performed by: INTERNAL MEDICINE

## 2017-03-28 PROCEDURE — 85025 COMPLETE CBC W/AUTO DIFF WBC: CPT | Performed by: INTERNAL MEDICINE

## 2017-03-28 PROCEDURE — 97110 THERAPEUTIC EXERCISES: CPT

## 2017-03-28 PROCEDURE — 82962 GLUCOSE BLOOD TEST: CPT

## 2017-03-28 PROCEDURE — 80053 COMPREHEN METABOLIC PANEL: CPT | Performed by: INTERNAL MEDICINE

## 2017-03-28 PROCEDURE — 92526 ORAL FUNCTION THERAPY: CPT

## 2017-03-29 ENCOUNTER — APPOINTMENT (OUTPATIENT)
Dept: GENERAL RADIOLOGY | Facility: HOSPITAL | Age: 52
End: 2017-03-29

## 2017-03-29 ENCOUNTER — APPOINTMENT (OUTPATIENT)
Dept: GENERAL RADIOLOGY | Facility: HOSPITAL | Age: 52
End: 2017-03-29
Attending: INTERNAL MEDICINE

## 2017-03-29 LAB
ANION GAP SERPL CALCULATED.3IONS-SCNC: 5.1 MMOL/L (ref 3.6–11.2)
ANISOCYTOSIS BLD QL: NORMAL
BASOPHILS # BLD AUTO: 0.04 10*3/MM3 (ref 0–0.3)
BASOPHILS NFR BLD AUTO: 0.6 % (ref 0–2)
BUN BLD-MCNC: 17 MG/DL (ref 7–21)
BUN/CREAT SERPL: 37.8 (ref 7–25)
CALCIUM SPEC-SCNC: 9.6 MG/DL (ref 7.7–10)
CHLORIDE SERPL-SCNC: 110 MMOL/L (ref 99–112)
CO2 SERPL-SCNC: 23.9 MMOL/L (ref 24.3–31.9)
CREAT BLD-MCNC: 0.45 MG/DL (ref 0.43–1.29)
DEPRECATED RDW RBC AUTO: 81.9 FL (ref 37–54)
EOSINOPHIL # BLD AUTO: 0.05 10*3/MM3 (ref 0–0.7)
EOSINOPHIL NFR BLD AUTO: 0.8 % (ref 0–5)
ERYTHROCYTE [DISTWIDTH] IN BLOOD BY AUTOMATED COUNT: 28.3 % (ref 11.5–14.5)
GFR SERPL CREATININE-BSD FRML MDRD: 147 ML/MIN/1.73
GLUCOSE BLD-MCNC: 200 MG/DL (ref 70–110)
GLUCOSE BLDC GLUCOMTR-MCNC: 169 MG/DL (ref 70–130)
GLUCOSE BLDC GLUCOMTR-MCNC: 170 MG/DL (ref 70–130)
GLUCOSE BLDC GLUCOMTR-MCNC: 186 MG/DL (ref 70–130)
GLUCOSE BLDC GLUCOMTR-MCNC: 217 MG/DL (ref 70–130)
GLUCOSE BLDC GLUCOMTR-MCNC: 256 MG/DL (ref 70–130)
HCT VFR BLD AUTO: 30.7 % (ref 37–47)
HGB BLD-MCNC: 8.9 G/DL (ref 12–16)
HYPOCHROMIA BLD QL: NORMAL
IMM GRANULOCYTES # BLD: 0.26 10*3/MM3 (ref 0–0.03)
IMM GRANULOCYTES NFR BLD: 4.1 % (ref 0–0.5)
LYMPHOCYTES # BLD AUTO: 1.39 10*3/MM3 (ref 1–3)
LYMPHOCYTES NFR BLD AUTO: 21.7 % (ref 21–51)
MCH RBC QN AUTO: 24.9 PG (ref 27–33)
MCHC RBC AUTO-ENTMCNC: 29 G/DL (ref 33–37)
MCV RBC AUTO: 85.8 FL (ref 80–94)
MONOCYTES # BLD AUTO: 0.63 10*3/MM3 (ref 0.1–0.9)
MONOCYTES NFR BLD AUTO: 9.8 % (ref 0–10)
NEUTROPHILS # BLD AUTO: 4.04 10*3/MM3 (ref 1.4–6.5)
NEUTROPHILS NFR BLD AUTO: 63 % (ref 30–70)
OSMOLALITY SERPL CALC.SUM OF ELEC: 284.7 MOSM/KG (ref 273–305)
PLAT MORPH BLD: NORMAL
PLATELET # BLD AUTO: 373 10*3/MM3 (ref 130–400)
PMV BLD AUTO: 9.5 FL (ref 6–10)
POTASSIUM BLD-SCNC: 4.3 MMOL/L (ref 3.5–5.3)
RBC # BLD AUTO: 3.58 10*6/MM3 (ref 4.2–5.4)
SODIUM BLD-SCNC: 139 MMOL/L (ref 135–153)
WBC NRBC COR # BLD: 6.41 10*3/MM3 (ref 4.5–12.5)

## 2017-03-29 PROCEDURE — G8997 SWALLOW GOAL STATUS: HCPCS

## 2017-03-29 PROCEDURE — G8998 SWALLOW D/C STATUS: HCPCS

## 2017-03-29 PROCEDURE — 97116 GAIT TRAINING THERAPY: CPT

## 2017-03-29 PROCEDURE — 82962 GLUCOSE BLOOD TEST: CPT

## 2017-03-29 PROCEDURE — 71010 HC CHEST PA OR AP: CPT

## 2017-03-29 PROCEDURE — G8996 SWALLOW CURRENT STATUS: HCPCS

## 2017-03-29 PROCEDURE — 97530 THERAPEUTIC ACTIVITIES: CPT

## 2017-03-29 PROCEDURE — 85007 BL SMEAR W/DIFF WBC COUNT: CPT | Performed by: INTERNAL MEDICINE

## 2017-03-29 PROCEDURE — 92611 MOTION FLUOROSCOPY/SWALLOW: CPT

## 2017-03-29 PROCEDURE — 85025 COMPLETE CBC W/AUTO DIFF WBC: CPT | Performed by: INTERNAL MEDICINE

## 2017-03-29 PROCEDURE — 74230 X-RAY XM SWLNG FUNCJ C+: CPT | Performed by: RADIOLOGY

## 2017-03-29 PROCEDURE — 74230 X-RAY XM SWLNG FUNCJ C+: CPT

## 2017-03-29 PROCEDURE — 80048 BASIC METABOLIC PNL TOTAL CA: CPT | Performed by: INTERNAL MEDICINE

## 2017-03-29 PROCEDURE — 71010 XR CHEST 1 VW: CPT | Performed by: RADIOLOGY

## 2017-03-30 ENCOUNTER — ANESTHESIA (OUTPATIENT)
Dept: PERIOP | Facility: HOSPITAL | Age: 52
End: 2017-03-30

## 2017-03-30 ENCOUNTER — ANESTHESIA EVENT (OUTPATIENT)
Dept: PERIOP | Facility: HOSPITAL | Age: 52
End: 2017-03-30

## 2017-03-30 VITALS
RESPIRATION RATE: 20 BRPM | TEMPERATURE: 98.5 F | SYSTOLIC BLOOD PRESSURE: 105 MMHG | OXYGEN SATURATION: 99 % | HEART RATE: 107 BPM | DIASTOLIC BLOOD PRESSURE: 56 MMHG

## 2017-03-30 LAB
GLUCOSE BLDC GLUCOMTR-MCNC: 115 MG/DL (ref 70–130)
GLUCOSE BLDC GLUCOMTR-MCNC: 118 MG/DL (ref 70–130)
GLUCOSE BLDC GLUCOMTR-MCNC: 126 MG/DL (ref 70–130)
GLUCOSE BLDC GLUCOMTR-MCNC: 128 MG/DL (ref 70–130)

## 2017-03-30 PROCEDURE — 25010000002 FENTANYL CITRATE (PF) 100 MCG/2ML SOLUTION: Performed by: NURSE ANESTHETIST, CERTIFIED REGISTERED

## 2017-03-30 PROCEDURE — 43246 EGD PLACE GASTROSTOMY TUBE: CPT | Performed by: SURGERY

## 2017-03-30 PROCEDURE — 97530 THERAPEUTIC ACTIVITIES: CPT

## 2017-03-30 PROCEDURE — 97116 GAIT TRAINING THERAPY: CPT

## 2017-03-30 PROCEDURE — 82962 GLUCOSE BLOOD TEST: CPT

## 2017-03-30 PROCEDURE — C1726 CATH, BAL DIL, NON-VASCULAR: HCPCS | Performed by: SURGERY

## 2017-03-30 PROCEDURE — 25010000002 PROPOFOL 1000 MG/ML EMULSION: Performed by: NURSE ANESTHETIST, CERTIFIED REGISTERED

## 2017-03-30 RX ORDER — IPRATROPIUM BROMIDE AND ALBUTEROL SULFATE 2.5; .5 MG/3ML; MG/3ML
3 SOLUTION RESPIRATORY (INHALATION) ONCE AS NEEDED
Status: DISCONTINUED | OUTPATIENT
Start: 2017-03-30 | End: 2017-03-30 | Stop reason: HOSPADM

## 2017-03-30 RX ORDER — SODIUM CHLORIDE, SODIUM LACTATE, POTASSIUM CHLORIDE, CALCIUM CHLORIDE 600; 310; 30; 20 MG/100ML; MG/100ML; MG/100ML; MG/100ML
125 INJECTION, SOLUTION INTRAVENOUS CONTINUOUS
Status: DISCONTINUED | OUTPATIENT
Start: 2017-03-30 | End: 2017-03-30 | Stop reason: HOSPADM

## 2017-03-30 RX ORDER — QUETIAPINE FUMARATE 100 MG/1
100 TABLET, FILM COATED ORAL NIGHTLY
Status: ON HOLD | COMMUNITY
End: 2017-06-05

## 2017-03-30 RX ORDER — FENTANYL CITRATE 50 UG/ML
INJECTION, SOLUTION INTRAMUSCULAR; INTRAVENOUS AS NEEDED
Status: DISCONTINUED | OUTPATIENT
Start: 2017-03-30 | End: 2017-03-30 | Stop reason: SURG

## 2017-03-30 RX ORDER — POTASSIUM CHLORIDE 1.5 G/1.77G
20 POWDER, FOR SOLUTION ORAL 2 TIMES DAILY
Status: ON HOLD | COMMUNITY
End: 2017-06-05

## 2017-03-30 RX ORDER — SODIUM CHLORIDE 0.9 % (FLUSH) 0.9 %
1-10 SYRINGE (ML) INJECTION AS NEEDED
Status: DISCONTINUED | OUTPATIENT
Start: 2017-03-30 | End: 2017-03-30 | Stop reason: HOSPADM

## 2017-03-30 RX ORDER — LIDOCAINE HYDROCHLORIDE 20 MG/ML
INJECTION, SOLUTION INFILTRATION; PERINEURAL AS NEEDED
Status: DISCONTINUED | OUTPATIENT
Start: 2017-03-30 | End: 2017-03-30 | Stop reason: SURG

## 2017-03-30 RX ORDER — DOCUSATE SODIUM 100 MG/1
100 CAPSULE, LIQUID FILLED ORAL 2 TIMES DAILY
Status: ON HOLD | COMMUNITY
End: 2017-06-05

## 2017-03-30 RX ORDER — ONDANSETRON 2 MG/ML
4 INJECTION INTRAMUSCULAR; INTRAVENOUS ONCE AS NEEDED
Status: DISCONTINUED | OUTPATIENT
Start: 2017-03-30 | End: 2017-03-30 | Stop reason: HOSPADM

## 2017-03-30 RX ORDER — FENTANYL CITRATE 50 UG/ML
50 INJECTION, SOLUTION INTRAMUSCULAR; INTRAVENOUS
Status: DISCONTINUED | OUTPATIENT
Start: 2017-03-30 | End: 2017-03-30 | Stop reason: HOSPADM

## 2017-03-30 RX ORDER — MEPERIDINE HYDROCHLORIDE 25 MG/ML
12.5 INJECTION INTRAMUSCULAR; INTRAVENOUS; SUBCUTANEOUS
Status: DISCONTINUED | OUTPATIENT
Start: 2017-03-30 | End: 2017-03-30 | Stop reason: HOSPADM

## 2017-03-30 RX ADMIN — FENTANYL CITRATE 50 MCG: 50 INJECTION INTRAMUSCULAR; INTRAVENOUS at 13:43

## 2017-03-30 RX ADMIN — SODIUM CHLORIDE, POTASSIUM CHLORIDE, SODIUM LACTATE AND CALCIUM CHLORIDE: 600; 310; 30; 20 INJECTION, SOLUTION INTRAVENOUS at 13:38

## 2017-03-30 RX ADMIN — PROPOFOL 100 MCG/KG/MIN: 10 INJECTION, EMULSION INTRAVENOUS at 13:43

## 2017-03-30 RX ADMIN — FENTANYL CITRATE 50 MCG: 50 INJECTION INTRAMUSCULAR; INTRAVENOUS at 13:38

## 2017-03-30 RX ADMIN — METRONIDAZOLE 500 MG: 500 INJECTION, SOLUTION INTRAVENOUS at 13:42

## 2017-03-30 RX ADMIN — LIDOCAINE HYDROCHLORIDE 40 MG: 20 INJECTION, SOLUTION INFILTRATION; PERINEURAL at 13:43

## 2017-03-30 RX ADMIN — FENTANYL CITRATE 50 MCG: 50 INJECTION INTRAMUSCULAR; INTRAVENOUS at 14:10

## 2017-03-30 NOTE — ANESTHESIA PREPROCEDURE EVALUATION
Anesthesia Evaluation     NPO Status: > 8 hours   Airway   Mallampati: II  TM distance: <3 FB  Neck ROM: full  Dental    (+) edentulous    Pulmonary - normal exam   (+) pulmonary embolism, COPD,   Cardiovascular - normal exam    (+) hypertension, past MI , CAD,       Neuro/Psych  (+) psychiatric history,    GI/Hepatic/Renal/Endo    (+)  hepatitis C, liver disease, diabetes mellitus,     Musculoskeletal     (+) back pain,   Abdominal  - normal exam   Substance History      OB/GYN          Other                                    Anesthesia Plan    ASA 3     general     intravenous induction   Anesthetic plan and risks discussed with patient.

## 2017-03-30 NOTE — ANESTHESIA POSTPROCEDURE EVALUATION
Patient: Pepito Yan    Procedure Summary     Date Anesthesia Start Anesthesia Stop Room / Location    03/30/17 0338 [facility]       Procedure Diagnosis Surgeon Provider    ESOPHAGOGASTRODUODENOSCOPY WITH PERCUTANEOUS ENDOSCOPIC GASTROSTOMY TUBE INSERTION (N/A Esophagus) No diagnosis on file. MD David Emerson MD          Anesthesia Type: general  Last vitals  /56 (03/30/17 1454)    Temp      Pulse 107 (03/30/17 1454)   Resp 20 (03/30/17 1454)    SpO2 99 % (03/30/17 1454)      Post Anesthesia Care and Evaluation    Patient location during evaluation: bedside  Patient participation: complete - patient participated  Level of consciousness: awake and alert  Pain score: 1  Pain management: adequate  Airway patency: patent  Anesthetic complications: No anesthetic complications  PONV Status: none  Cardiovascular status: acceptable  Respiratory status: acceptable  Hydration status: acceptable

## 2017-03-31 LAB
ANION GAP SERPL CALCULATED.3IONS-SCNC: 4.1 MMOL/L (ref 3.6–11.2)
ANISOCYTOSIS BLD QL: NORMAL
BASOPHILS # BLD AUTO: 0.02 10*3/MM3 (ref 0–0.3)
BASOPHILS NFR BLD AUTO: 0.2 % (ref 0–2)
BUN BLD-MCNC: 18 MG/DL (ref 7–21)
BUN/CREAT SERPL: 37.5 (ref 7–25)
CALCIUM SPEC-SCNC: 9.5 MG/DL (ref 7.7–10)
CHLORIDE SERPL-SCNC: 108 MMOL/L (ref 99–112)
CO2 SERPL-SCNC: 21.9 MMOL/L (ref 24.3–31.9)
CREAT BLD-MCNC: 0.48 MG/DL (ref 0.43–1.29)
DEPRECATED RDW RBC AUTO: 83.9 FL (ref 37–54)
EOSINOPHIL # BLD AUTO: 0.05 10*3/MM3 (ref 0–0.7)
EOSINOPHIL NFR BLD AUTO: 0.6 % (ref 0–5)
ERYTHROCYTE [DISTWIDTH] IN BLOOD BY AUTOMATED COUNT: 28.3 % (ref 11.5–14.5)
GFR SERPL CREATININE-BSD FRML MDRD: 136 ML/MIN/1.73
GLUCOSE BLD-MCNC: 218 MG/DL (ref 70–110)
GLUCOSE BLDC GLUCOMTR-MCNC: 124 MG/DL (ref 70–130)
GLUCOSE BLDC GLUCOMTR-MCNC: 159 MG/DL (ref 70–130)
GLUCOSE BLDC GLUCOMTR-MCNC: 180 MG/DL (ref 70–130)
GLUCOSE BLDC GLUCOMTR-MCNC: 221 MG/DL (ref 70–130)
HCT VFR BLD AUTO: 30.3 % (ref 37–47)
HGB BLD-MCNC: 8.9 G/DL (ref 12–16)
IMM GRANULOCYTES # BLD: 0.08 10*3/MM3 (ref 0–0.03)
IMM GRANULOCYTES NFR BLD: 1 % (ref 0–0.5)
LYMPHOCYTES # BLD AUTO: 0.83 10*3/MM3 (ref 1–3)
LYMPHOCYTES NFR BLD AUTO: 10 % (ref 21–51)
MCH RBC QN AUTO: 25.9 PG (ref 27–33)
MCHC RBC AUTO-ENTMCNC: 29.4 G/DL (ref 33–37)
MCV RBC AUTO: 88.1 FL (ref 80–94)
MONOCYTES # BLD AUTO: 0.7 10*3/MM3 (ref 0.1–0.9)
MONOCYTES NFR BLD AUTO: 8.4 % (ref 0–10)
NEUTROPHILS # BLD AUTO: 6.61 10*3/MM3 (ref 1.4–6.5)
NEUTROPHILS NFR BLD AUTO: 79.8 % (ref 30–70)
OSMOLALITY SERPL CALC.SUM OF ELEC: 276.8 MOSM/KG (ref 273–305)
PLAT MORPH BLD: NORMAL
PLATELET # BLD AUTO: 269 10*3/MM3 (ref 130–400)
PMV BLD AUTO: 9.6 FL (ref 6–10)
POIKILOCYTOSIS BLD QL SMEAR: NORMAL
POTASSIUM BLD-SCNC: 5.1 MMOL/L (ref 3.5–5.3)
RBC # BLD AUTO: 3.44 10*6/MM3 (ref 4.2–5.4)
SODIUM BLD-SCNC: 134 MMOL/L (ref 135–153)
WBC NRBC COR # BLD: 8.29 10*3/MM3 (ref 4.5–12.5)

## 2017-03-31 PROCEDURE — 85007 BL SMEAR W/DIFF WBC COUNT: CPT | Performed by: INTERNAL MEDICINE

## 2017-03-31 PROCEDURE — 85025 COMPLETE CBC W/AUTO DIFF WBC: CPT | Performed by: INTERNAL MEDICINE

## 2017-03-31 PROCEDURE — 80048 BASIC METABOLIC PNL TOTAL CA: CPT | Performed by: INTERNAL MEDICINE

## 2017-03-31 PROCEDURE — 97116 GAIT TRAINING THERAPY: CPT

## 2017-03-31 PROCEDURE — 97530 THERAPEUTIC ACTIVITIES: CPT

## 2017-03-31 PROCEDURE — 82962 GLUCOSE BLOOD TEST: CPT

## 2017-04-01 LAB
ANION GAP SERPL CALCULATED.3IONS-SCNC: 3.8 MMOL/L (ref 3.6–11.2)
ANISOCYTOSIS BLD QL: NORMAL
BASOPHILS # BLD AUTO: 0.01 10*3/MM3 (ref 0–0.3)
BASOPHILS NFR BLD AUTO: 0.2 % (ref 0–2)
BUN BLD-MCNC: 18 MG/DL (ref 7–21)
BUN/CREAT SERPL: 41.9 (ref 7–25)
CALCIUM SPEC-SCNC: 9.3 MG/DL (ref 7.7–10)
CHLORIDE SERPL-SCNC: 110 MMOL/L (ref 99–112)
CO2 SERPL-SCNC: 24.2 MMOL/L (ref 24.3–31.9)
CREAT BLD-MCNC: 0.43 MG/DL (ref 0.43–1.29)
DEPRECATED RDW RBC AUTO: 83.1 FL (ref 37–54)
EOSINOPHIL # BLD AUTO: 0.05 10*3/MM3 (ref 0–0.7)
EOSINOPHIL NFR BLD AUTO: 0.8 % (ref 0–5)
ERYTHROCYTE [DISTWIDTH] IN BLOOD BY AUTOMATED COUNT: 27.9 % (ref 11.5–14.5)
GFR SERPL CREATININE-BSD FRML MDRD: >150 ML/MIN/1.73
GLUCOSE BLD-MCNC: 129 MG/DL (ref 70–110)
GLUCOSE BLDC GLUCOMTR-MCNC: 117 MG/DL (ref 70–130)
GLUCOSE BLDC GLUCOMTR-MCNC: 119 MG/DL (ref 70–130)
GLUCOSE BLDC GLUCOMTR-MCNC: 138 MG/DL (ref 70–130)
GLUCOSE BLDC GLUCOMTR-MCNC: 234 MG/DL (ref 70–130)
HCT VFR BLD AUTO: 26.6 % (ref 37–47)
HGB BLD-MCNC: 8 G/DL (ref 12–16)
HYPOCHROMIA BLD QL: NORMAL
IMM GRANULOCYTES # BLD: 0.08 10*3/MM3 (ref 0–0.03)
IMM GRANULOCYTES NFR BLD: 1.3 % (ref 0–0.5)
LYMPHOCYTES # BLD AUTO: 1.21 10*3/MM3 (ref 1–3)
LYMPHOCYTES NFR BLD AUTO: 19.7 % (ref 21–51)
MAGNESIUM SERPL-MCNC: 1.8 MG/DL (ref 1.7–2.6)
MCH RBC QN AUTO: 25.9 PG (ref 27–33)
MCHC RBC AUTO-ENTMCNC: 30.1 G/DL (ref 33–37)
MCV RBC AUTO: 86.1 FL (ref 80–94)
MONOCYTES # BLD AUTO: 0.84 10*3/MM3 (ref 0.1–0.9)
MONOCYTES NFR BLD AUTO: 13.7 % (ref 0–10)
NEUTROPHILS # BLD AUTO: 3.95 10*3/MM3 (ref 1.4–6.5)
NEUTROPHILS NFR BLD AUTO: 64.3 % (ref 30–70)
NRBC BLD MANUAL-RTO: 0 /100 WBC (ref 0–0)
OSMOLALITY SERPL CALC.SUM OF ELEC: 279.3 MOSM/KG (ref 273–305)
PLAT MORPH BLD: NORMAL
PLATELET # BLD AUTO: 270 10*3/MM3 (ref 130–400)
PMV BLD AUTO: 9.7 FL (ref 6–10)
POTASSIUM BLD-SCNC: 3.9 MMOL/L (ref 3.5–5.3)
RBC # BLD AUTO: 3.09 10*6/MM3 (ref 4.2–5.4)
SODIUM BLD-SCNC: 138 MMOL/L (ref 135–153)
WBC NRBC COR # BLD: 6.14 10*3/MM3 (ref 4.5–12.5)

## 2017-04-01 PROCEDURE — 83735 ASSAY OF MAGNESIUM: CPT | Performed by: INTERNAL MEDICINE

## 2017-04-01 PROCEDURE — 85007 BL SMEAR W/DIFF WBC COUNT: CPT | Performed by: INTERNAL MEDICINE

## 2017-04-01 PROCEDURE — 80048 BASIC METABOLIC PNL TOTAL CA: CPT | Performed by: INTERNAL MEDICINE

## 2017-04-01 PROCEDURE — 82962 GLUCOSE BLOOD TEST: CPT

## 2017-04-01 PROCEDURE — 85025 COMPLETE CBC W/AUTO DIFF WBC: CPT | Performed by: INTERNAL MEDICINE

## 2017-04-02 LAB
GLUCOSE BLDC GLUCOMTR-MCNC: 123 MG/DL (ref 70–130)
GLUCOSE BLDC GLUCOMTR-MCNC: 165 MG/DL (ref 70–130)
GLUCOSE BLDC GLUCOMTR-MCNC: 202 MG/DL (ref 70–130)
GLUCOSE BLDC GLUCOMTR-MCNC: 219 MG/DL (ref 70–130)

## 2017-04-02 PROCEDURE — 82962 GLUCOSE BLOOD TEST: CPT

## 2017-04-03 LAB
GLUCOSE BLDC GLUCOMTR-MCNC: 142 MG/DL (ref 70–130)
GLUCOSE BLDC GLUCOMTR-MCNC: 183 MG/DL (ref 70–130)
GLUCOSE BLDC GLUCOMTR-MCNC: 208 MG/DL (ref 70–130)
GLUCOSE BLDC GLUCOMTR-MCNC: 208 MG/DL (ref 70–130)

## 2017-04-03 PROCEDURE — 97116 GAIT TRAINING THERAPY: CPT

## 2017-04-03 PROCEDURE — 97530 THERAPEUTIC ACTIVITIES: CPT

## 2017-04-03 PROCEDURE — 82962 GLUCOSE BLOOD TEST: CPT

## 2017-04-04 LAB
ALBUMIN SERPL-MCNC: 3.3 G/DL (ref 3.5–5)
ALBUMIN/GLOB SERPL: 0.9 G/DL (ref 1.5–2.5)
ALP SERPL-CCNC: 47 U/L (ref 35–104)
ALT SERPL W P-5'-P-CCNC: 19 U/L (ref 10–36)
ANION GAP SERPL CALCULATED.3IONS-SCNC: 3.8 MMOL/L (ref 3.6–11.2)
ANISOCYTOSIS BLD QL: NORMAL
AST SERPL-CCNC: 17 U/L (ref 10–30)
BASOPHILS # BLD AUTO: 0 10*3/MM3 (ref 0–0.3)
BASOPHILS NFR BLD AUTO: 0 % (ref 0–2)
BILIRUB SERPL-MCNC: 0.2 MG/DL (ref 0.2–1.8)
BUN BLD-MCNC: 15 MG/DL (ref 7–21)
BUN/CREAT SERPL: 37.5 (ref 7–25)
CALCIUM SPEC-SCNC: 9.6 MG/DL (ref 7.7–10)
CHLORIDE SERPL-SCNC: 109 MMOL/L (ref 99–112)
CO2 SERPL-SCNC: 26.2 MMOL/L (ref 24.3–31.9)
CREAT BLD-MCNC: 0.4 MG/DL (ref 0.43–1.29)
DEPRECATED RDW RBC AUTO: 83.2 FL (ref 37–54)
EOSINOPHIL # BLD AUTO: 0.07 10*3/MM3 (ref 0–0.7)
EOSINOPHIL NFR BLD AUTO: 1.4 % (ref 0–5)
ERYTHROCYTE [DISTWIDTH] IN BLOOD BY AUTOMATED COUNT: 27.4 % (ref 11.5–14.5)
GFR SERPL CREATININE-BSD FRML MDRD: >150 ML/MIN/1.73
GLOBULIN UR ELPH-MCNC: 3.7 GM/DL
GLUCOSE BLD-MCNC: 152 MG/DL (ref 70–110)
GLUCOSE BLDC GLUCOMTR-MCNC: 156 MG/DL (ref 70–130)
GLUCOSE BLDC GLUCOMTR-MCNC: 176 MG/DL (ref 70–130)
GLUCOSE BLDC GLUCOMTR-MCNC: 177 MG/DL (ref 70–130)
GLUCOSE BLDC GLUCOMTR-MCNC: 252 MG/DL (ref 70–130)
HCT VFR BLD AUTO: 26.2 % (ref 37–47)
HGB BLD-MCNC: 7.8 G/DL (ref 12–16)
HYPOCHROMIA BLD QL: NORMAL
IMM GRANULOCYTES # BLD: 0.09 10*3/MM3 (ref 0–0.03)
IMM GRANULOCYTES NFR BLD: 1.8 % (ref 0–0.5)
LYMPHOCYTES # BLD AUTO: 1.08 10*3/MM3 (ref 1–3)
LYMPHOCYTES NFR BLD AUTO: 21.1 % (ref 21–51)
MCH RBC QN AUTO: 25.7 PG (ref 27–33)
MCHC RBC AUTO-ENTMCNC: 29.8 G/DL (ref 33–37)
MCV RBC AUTO: 86.2 FL (ref 80–94)
MONOCYTES # BLD AUTO: 0.74 10*3/MM3 (ref 0.1–0.9)
MONOCYTES NFR BLD AUTO: 14.5 % (ref 0–10)
NEUTROPHILS # BLD AUTO: 3.13 10*3/MM3 (ref 1.4–6.5)
NEUTROPHILS NFR BLD AUTO: 61.2 % (ref 30–70)
NRBC BLD MANUAL-RTO: 0 /100 WBC (ref 0–0)
OSMOLALITY SERPL CALC.SUM OF ELEC: 281.3 MOSM/KG (ref 273–305)
PLAT MORPH BLD: NORMAL
PLATELET # BLD AUTO: 256 10*3/MM3 (ref 130–400)
PMV BLD AUTO: 9.6 FL (ref 6–10)
POTASSIUM BLD-SCNC: 4.2 MMOL/L (ref 3.5–5.3)
PROT SERPL-MCNC: 7 G/DL (ref 6–8)
RBC # BLD AUTO: 3.04 10*6/MM3 (ref 4.2–5.4)
SODIUM BLD-SCNC: 139 MMOL/L (ref 135–153)
WBC NRBC COR # BLD: 5.11 10*3/MM3 (ref 4.5–12.5)

## 2017-04-04 PROCEDURE — 82962 GLUCOSE BLOOD TEST: CPT

## 2017-04-04 PROCEDURE — 97530 THERAPEUTIC ACTIVITIES: CPT

## 2017-04-04 PROCEDURE — 97116 GAIT TRAINING THERAPY: CPT

## 2017-04-04 PROCEDURE — 85007 BL SMEAR W/DIFF WBC COUNT: CPT | Performed by: INTERNAL MEDICINE

## 2017-04-04 PROCEDURE — 85025 COMPLETE CBC W/AUTO DIFF WBC: CPT | Performed by: INTERNAL MEDICINE

## 2017-04-04 PROCEDURE — 80053 COMPREHEN METABOLIC PANEL: CPT | Performed by: INTERNAL MEDICINE

## 2017-04-05 ENCOUNTER — APPOINTMENT (OUTPATIENT)
Dept: GENERAL RADIOLOGY | Facility: HOSPITAL | Age: 52
End: 2017-04-05
Attending: INTERNAL MEDICINE

## 2017-04-05 LAB
GLUCOSE BLDC GLUCOMTR-MCNC: 131 MG/DL (ref 70–130)
GLUCOSE BLDC GLUCOMTR-MCNC: 141 MG/DL (ref 70–130)
GLUCOSE BLDC GLUCOMTR-MCNC: 180 MG/DL (ref 70–130)
GLUCOSE BLDC GLUCOMTR-MCNC: 248 MG/DL (ref 70–130)

## 2017-04-05 PROCEDURE — 97116 GAIT TRAINING THERAPY: CPT | Performed by: PHYSICAL THERAPIST

## 2017-04-05 PROCEDURE — 92611 MOTION FLUOROSCOPY/SWALLOW: CPT

## 2017-04-05 PROCEDURE — 97110 THERAPEUTIC EXERCISES: CPT | Performed by: PHYSICAL THERAPIST

## 2017-04-05 PROCEDURE — 82962 GLUCOSE BLOOD TEST: CPT

## 2017-04-05 PROCEDURE — G8997 SWALLOW GOAL STATUS: HCPCS

## 2017-04-05 PROCEDURE — G8998 SWALLOW D/C STATUS: HCPCS

## 2017-04-05 PROCEDURE — G8996 SWALLOW CURRENT STATUS: HCPCS

## 2017-04-05 PROCEDURE — 74230 X-RAY XM SWLNG FUNCJ C+: CPT | Performed by: RADIOLOGY

## 2017-04-05 PROCEDURE — 74230 X-RAY XM SWLNG FUNCJ C+: CPT

## 2017-04-05 NOTE — PROGRESS NOTES
Long Term Care - Speech Language Pathology   Swallow Re-Evaluation [facility]      Patient Name: Pepito Yan  : 1965  MRN: [id number]  Today's Date: 2017             Admit Date: 3/20/2017  Ms. Yan is seen in [facility] acute unit this pm. She is a/a and cooperative, improved in overall strength and endurance, ambulating 60' w/ RW. She is s/p MBS 3/30/17 w/ candidacy for re-eval today to determien candidacy for po intake. Pt is eager to participate.     RNLetty, gils pt to transfer from floor to [facility] radiology department to particpate in tx.     Thank you-  Melina Hodges M.S., CCC/SLP  Visit Dx:   No diagnosis found.  Patient Active Problem List   Diagnosis   • Chest pain   • Chest pain of uncertain etiology   • Intractable nausea and vomiting   • Intractable cyclical vomiting with nausea   • Chronic obstructive pulmonary emphysema   • Hepatitis-C   • Diabetes mellitus type 2 in nonobese   • Arthritis or polyarthritis, rheumatoid   • Anxiety   • Essential hypertension   • Coronary artery disease   • Buerger's disease   • Cirrhosis   • Diabetes   • Chronic nausea   • Pancreatitis   • Bilateral pneumonia   • ARDS (adult respiratory distress syndrome)   • Hypoxia     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   • Chronic respiratory failure with hypoxia     at nighttime   • Compression fracture of lumbar vertebra    • Compression fracture of thoracic vertebra    • COPD (chronic obstructive pulmonary disease)    • Coronary artery disease    • Diabetes mellitus type 2 in nonobese    • Erosive esophagitis 2016   • Essential hypertension    • Gastroparesis    • GERD (gastroesophageal reflux disease)    • Hepatitis-C    • Medically noncompliant    • MRSA infection     back of neck around    • NSTEMI (non-ST elevated myocardial infarction) 2016   • PE (pulmonary embolism)    • Protein calorie malnutrition    • RA (rheumatoid arthritis)      with chronic steroid use     Past Surgical History:   Procedure Laterality Date   • AMPUTATION DIGIT Right 2016    Third digit to the PIP. Surgeon: Dr. Bains   • AMPUTATION DIGIT Left 2016    Procedure: PARTIAL AMPUTATION LEFT THIRD FINGER;  Surgeon: Shane Fuller MD;  Location: [facility] OR;  Service:    • AMPUTATION REVISION Left 2016    Procedure: AMPUTATION REVISION DIGIT STUMP;  Surgeon: Shane Fuller MD;  Location: [facility] OR;  Service:    • ARM DEBRIDEMENT Left 2016    Procedure: HAND IRRIGATION DEBRIDEMENT AND REPAIR ;  Surgeon: Shane Fuller MD;  Location: [facility] OR;  Service:    •  SECTION      x 2   • CHOLECYSTECTOMY     • ENDOSCOPY N/A 2016    Procedure: ESOPHAGOGASTRODUODENOSCOPY;  Surgeon: Jeremy Clement III, MD;  Location: [facility] OR;  Service:    • ENDOSCOPY W/ PEG TUBE PLACEMENT N/A 3/30/2017    Procedure: ESOPHAGOGASTRODUODENOSCOPY WITH PERCUTANEOUS ENDOSCOPIC GASTROSTOMY TUBE INSERTION;  Surgeon: Shade Campos MD;  Location: [facility] OR;  Service:    • FOREIGN BODY REMOVAL      insulin needle removal x 3 from the abdomen   • HYSTERECTOMY             EDUCATION  The patient has been educated in the following areas:   Dysphagia (Swallowing Impairment).    SLP Recommendation and Plan   MBS this pm w/ further recs pending.                                                       Time Calculation:           SLP G-Codes  SLP NOMS Used?: Yes  Functional Limitations: Swallowing  Swallow Current Status (): At least 80 percent but less than 100 percent impaired, limited or restricted  Swallow Goal Status (): At least 80 percent but less than 100 percent impaired, limited or restricted  Swallow Discharge Status (): At least 80 percent but less than 100 percent impaired, limited or restricted    Melina Hodges MS CCC-SLP  2017

## 2017-04-05 NOTE — PROGRESS NOTES
Long Term Care - Speech Language Pathology   Swallow Re-Evaluation [facility]   MODIFIED BARIUM SWALLOW STUDY     Patient Name: Pepito Yan  : 1965  MRN: [id number]  Today's Date: 2017             Ms. Yan presents to the [facility] radiology suite this pm form [facility] acute floor to participate in an instrumental MBS to re-evaluate swallowing fnx to determine candidacy for po intake. She is s/p MBS 3/30/17 w/ sharonda silent aspiration of all po intake w/ recommendation for continued NPO status. She is s/p PEG 3/31/17. Her overall endurance and strength have improved w/ dysphagia POC.     Observed on O2 via NC 2L.    Risks and benefits of the procedure are explained w/ verbalizing understanding/agreement to participate.     Ms. Yan is positioned upright and centered in a wheel chair to accept multiple po presentations of  Solid cracker,puree, honey thick, and nectar thick liquids. Thin liquids are deferred per observed dysphagia. She is able to self feed.     All views are from the lateral plane.     Facial/oral structures are symmetrical upon observation w/o lingual deviation upon protrusion. Oral mucosa are moist, pink and clean. Secretions are clear, thin and well controlled. OROM/SALO is adequate to imitate oral postures. Gag is not assessed. Volitional cough is adequate in intensity, clear in quality, nonproductive. Vocal quality is significantly improved in intensity, w/persistent mild breathy quality. Speech is intelligible. She is a/a and cooperative to participate, oriented to person, place, and time, follows simple directives, and participates in simple conversational exchanges. Overall mental status is significantly improved.     Upon po presentations, adequate bolus anticipation w/ good labial seal for bolus clearance via spoon bowl, cup rim stability and suction via straw.  Bolus formation, manipulation,  and control are generally weak w/increased oral prep time and slightly delayed a-p  transit.Tongue base retraction and linguavelar seal improved w/ only slight delay w/ complete closure, w/o any significant premature spillage. No  Laryngeal penetration or aspiration evidenced before the swallow.      Pharyngeal swallow is mildly delayed w/ weak hyolaryngeal elevation and epiglottic inversion. Pharyngeal contraction is weak w/ residue, but slighlty imporoved in comparison to 3/30/17. Deep laryngeal penetration of nectar thick occurs during the swallow, leading to silent aspiration during the swallow and after the swallow. Chin tuck compensatory technique is ineffective to diminish or alleviate penetration or aspiration. Thins deferred per this observed dysphagia w/ nectar thick. Repeat honey and puree presentations reveal no penetration or aspiration. Solids tolerated well.      Partial esophageal sweep reveals no mucosal abnormalities. Motility is wfl w/o retrograde flow noted.     Visit Dx:   No diagnosis found.  Patient Active Problem List   Diagnosis   • Chest pain   • Chest pain of uncertain etiology   • Intractable nausea and vomiting   • Intractable cyclical vomiting with nausea   • Chronic obstructive pulmonary emphysema   • Hepatitis-C   • Diabetes mellitus type 2 in nonobese   • Arthritis or polyarthritis, rheumatoid   • Anxiety   • Essential hypertension   • Coronary artery disease   • Buerger's disease   • Cirrhosis   • Diabetes   • Chronic nausea   • Pancreatitis   • Bilateral pneumonia   • ARDS (adult respiratory distress syndrome)   • Hypoxia     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   • Chronic respiratory failure with hypoxia     at nighttime   • Compression fracture of lumbar vertebra    • Compression fracture of thoracic vertebra    • COPD (chronic obstructive pulmonary disease)    • Coronary artery disease    • Diabetes mellitus type 2 in nonobese    • Erosive esophagitis 12/2016   • Essential hypertension    •  Gastroparesis    • GERD (gastroesophageal reflux disease)    • Hepatitis-C    • Medically noncompliant    • MRSA infection     back of neck around    • NSTEMI (non-ST elevated myocardial infarction) 2016   • PE (pulmonary embolism)    • Protein calorie malnutrition    • RA (rheumatoid arthritis)     with chronic steroid use     Past Surgical History:   Procedure Laterality Date   • AMPUTATION DIGIT Right 2016    Third digit to the PIP. Surgeon: Dr. Bains   • AMPUTATION DIGIT Left 2016    Procedure: PARTIAL AMPUTATION LEFT THIRD FINGER;  Surgeon: Shane Fuller MD;  Location: [facility] OR;  Service:    • AMPUTATION REVISION Left 2016    Procedure: AMPUTATION REVISION DIGIT STUMP;  Surgeon: Shane Fuller MD;  Location: [facility] OR;  Service:    • ARM DEBRIDEMENT Left 2016    Procedure: HAND IRRIGATION DEBRIDEMENT AND REPAIR ;  Surgeon: Shane Fuller MD;  Location: [facility] OR;  Service:    •  SECTION      x 2   • CHOLECYSTECTOMY     • ENDOSCOPY N/A 2016    Procedure: ESOPHAGOGASTRODUODENOSCOPY;  Surgeon: Jeremy Clement III, MD;  Location: [facility] OR;  Service:    • ENDOSCOPY W/ PEG TUBE PLACEMENT N/A 3/30/2017    Procedure: ESOPHAGOGASTRODUODENOSCOPY WITH PERCUTANEOUS ENDOSCOPIC GASTROSTOMY TUBE INSERTION;  Surgeon: Shade Campos MD;  Location: [facility] OR;  Service:    • FOREIGN BODY REMOVAL      insulin needle removal x 3 from the abdomen   • HYSTERECTOMY       EDUCATION  The patient has been educated in the following areas:   Dysphagia (Swallowing Impairment) Oral Care/Hydration Modified Diet Instruction.  Impression: Ms. Yan presents w/ overall improvement in oropharyngeal swallowing fnx to tolerate modified po die tof mechanical soft, ground meats, honey thick liquids only w/ meals. Water protocol b/w meals to allow for oral hydration/comfort. Continue dysphagia therapy POC w/ reassessment daily for re-eval candidacy.    SLP  Recommendation and Plan   1. Mechanical soft diet, ground meats, honey thick liquids only.   2. Water protocol b/w meals only.   3. Meds whole in puree, single presentation.   4. Universal aspiration precautions.   5. AALIYAH precautions.   6. Oral care protocol.   7. Continue formal dysphagia tx POC.     D/w RNLetty, results and recommendations.     D/w pt results and recommendations.     Dietary restrictions and universal aspiration precautions posted at bedside for staff and family reference.     Thank you-  Melina Hodges M.S., [facility]/SLP                                                      Time Calculation:         Time Calculation- SLP       04/05/17 1542          Time Calculation- SLP    SLP Start Time 1400  -ASIF      SLP Stop Time 1545  -      SLP Time Calculation (min) 105 min  -      SLP Non-Billable Time (min) 45 min  -      SLP - Next Appointment 04/06/17  -        User Key  (r) = Recorded By, (t) = Taken By, (c) = Cosigned By    Initials Name Provider Type     Melina Hodges MS CCC-SLP Speech Therapist          Therapy Charges for Today     Code Description Service Date Service Provider Modifiers Qty    94795944978 HC ST SWALLOWING CURRENT STATUS 4/5/2017 Melina Hodges MS CCC-SLP GN, CJ 1    26139431735 HC ST SWALLOWING PROJECTED 4/5/2017 Melina Hodges MS CCC-SLP TERA, CJ 1    60396991394 HC ST SWALLOWING DISCHARGE 4/5/2017 Melina Hodges MS CCC-SLP TERA, CJ 1    96641347396 HC ST CARE PLAN 15 MIN 4/5/2017 Melina Hodges MS CCC-SLP GN 1    22957309381 HC ST MOTION FLUORO EVAL SWALLOW 8 4/5/2017 Melina Hodges MS CCC-SLP GN 1          SLP G-Codes  SLP NOMS Used?: Yes  Functional Limitations: Swallowing  Swallow Current Status (): At least 20 percent but less than 40 percent impaired, limited or restricted  Swallow Goal Status (): At least 20 percent but less than 40 percent impaired, limited or restricted  Swallow Discharge Status (): At least 20 percent but  less than 40 percent impaired, limited or restricted    Melina Hodges, MS CCC-SLP  4/5/2017

## 2017-04-06 LAB
GLUCOSE BLDC GLUCOMTR-MCNC: 187 MG/DL (ref 70–130)
GLUCOSE BLDC GLUCOMTR-MCNC: 201 MG/DL (ref 70–130)
GLUCOSE BLDC GLUCOMTR-MCNC: 213 MG/DL (ref 70–130)
GLUCOSE BLDC GLUCOMTR-MCNC: 341 MG/DL (ref 70–130)

## 2017-04-06 PROCEDURE — 97116 GAIT TRAINING THERAPY: CPT

## 2017-04-06 PROCEDURE — 97530 THERAPEUTIC ACTIVITIES: CPT

## 2017-04-06 PROCEDURE — 82962 GLUCOSE BLOOD TEST: CPT

## 2017-04-06 PROCEDURE — 92526 ORAL FUNCTION THERAPY: CPT

## 2017-04-07 LAB
ANION GAP SERPL CALCULATED.3IONS-SCNC: 5 MMOL/L (ref 3.6–11.2)
ANISOCYTOSIS BLD QL: NORMAL
BASOPHILS # BLD AUTO: 0.01 10*3/MM3 (ref 0–0.3)
BASOPHILS NFR BLD AUTO: 0.2 % (ref 0–2)
BUN BLD-MCNC: 20 MG/DL (ref 7–21)
BUN/CREAT SERPL: 29 (ref 7–25)
CALCIUM SPEC-SCNC: 9.3 MG/DL (ref 7.7–10)
CHLORIDE SERPL-SCNC: 108 MMOL/L (ref 99–112)
CO2 SERPL-SCNC: 26 MMOL/L (ref 24.3–31.9)
CREAT BLD-MCNC: 0.69 MG/DL (ref 0.43–1.29)
DEPRECATED RDW RBC AUTO: ABNORMAL FL (ref 37–54)
EOSINOPHIL # BLD AUTO: 0.04 10*3/MM3 (ref 0–0.7)
EOSINOPHIL NFR BLD AUTO: 0.9 % (ref 0–5)
ERYTHROCYTE [DISTWIDTH] IN BLOOD BY AUTOMATED COUNT: ABNORMAL % (ref 11.5–14.5)
GFR SERPL CREATININE-BSD FRML MDRD: 90 ML/MIN/1.73
GLUCOSE BLD-MCNC: 139 MG/DL (ref 70–110)
GLUCOSE BLDC GLUCOMTR-MCNC: 146 MG/DL (ref 70–130)
GLUCOSE BLDC GLUCOMTR-MCNC: 190 MG/DL (ref 70–130)
GLUCOSE BLDC GLUCOMTR-MCNC: 225 MG/DL (ref 70–130)
GLUCOSE BLDC GLUCOMTR-MCNC: 290 MG/DL (ref 70–130)
HCT VFR BLD AUTO: 25.6 % (ref 37–47)
HGB BLD-MCNC: 7.5 G/DL (ref 12–16)
HYPOCHROMIA BLD QL: NORMAL
IMM GRANULOCYTES # BLD: 0.1 10*3/MM3 (ref 0–0.03)
IMM GRANULOCYTES NFR BLD: 2.3 % (ref 0–0.5)
LYMPHOCYTES # BLD AUTO: 1.13 10*3/MM3 (ref 1–3)
LYMPHOCYTES NFR BLD AUTO: 25.6 % (ref 21–51)
MCH RBC QN AUTO: 25.7 PG (ref 27–33)
MCHC RBC AUTO-ENTMCNC: 29.3 G/DL (ref 33–37)
MCV RBC AUTO: 87.7 FL (ref 80–94)
MONOCYTES # BLD AUTO: 0.56 10*3/MM3 (ref 0.1–0.9)
MONOCYTES NFR BLD AUTO: 12.7 % (ref 0–10)
NEUTROPHILS # BLD AUTO: 2.57 10*3/MM3 (ref 1.4–6.5)
NEUTROPHILS NFR BLD AUTO: 58.3 % (ref 30–70)
OSMOLALITY SERPL CALC.SUM OF ELEC: 282.4 MOSM/KG (ref 273–305)
PLAT MORPH BLD: NORMAL
PLATELET # BLD AUTO: 271 10*3/MM3 (ref 130–400)
PMV BLD AUTO: 9.4 FL (ref 6–10)
POTASSIUM BLD-SCNC: 4.4 MMOL/L (ref 3.5–5.3)
RBC # BLD AUTO: 2.92 10*6/MM3 (ref 4.2–5.4)
SODIUM BLD-SCNC: 139 MMOL/L (ref 135–153)
WBC NRBC COR # BLD: 4.41 10*3/MM3 (ref 4.5–12.5)

## 2017-04-07 PROCEDURE — 97116 GAIT TRAINING THERAPY: CPT

## 2017-04-07 PROCEDURE — 85025 COMPLETE CBC W/AUTO DIFF WBC: CPT | Performed by: INTERNAL MEDICINE

## 2017-04-07 PROCEDURE — 82962 GLUCOSE BLOOD TEST: CPT

## 2017-04-07 PROCEDURE — 80048 BASIC METABOLIC PNL TOTAL CA: CPT | Performed by: INTERNAL MEDICINE

## 2017-04-07 PROCEDURE — 85007 BL SMEAR W/DIFF WBC COUNT: CPT | Performed by: INTERNAL MEDICINE

## 2017-04-07 PROCEDURE — 97530 THERAPEUTIC ACTIVITIES: CPT

## 2017-04-08 LAB
GLUCOSE BLDC GLUCOMTR-MCNC: 277 MG/DL (ref 70–130)
GLUCOSE BLDC GLUCOMTR-MCNC: 353 MG/DL (ref 70–130)

## 2017-04-08 PROCEDURE — 82962 GLUCOSE BLOOD TEST: CPT

## 2017-06-03 ENCOUNTER — APPOINTMENT (OUTPATIENT)
Dept: CT IMAGING | Facility: HOSPITAL | Age: 52
End: 2017-06-03

## 2017-06-03 ENCOUNTER — HOSPITAL ENCOUNTER (INPATIENT)
Facility: HOSPITAL | Age: 52
LOS: 3 days | Discharge: HOME | End: 2017-06-06
Attending: EMERGENCY MEDICINE | Admitting: INTERNAL MEDICINE

## 2017-06-03 ENCOUNTER — APPOINTMENT (OUTPATIENT)
Dept: GENERAL RADIOLOGY | Facility: HOSPITAL | Age: 52
End: 2017-06-03

## 2017-06-03 DIAGNOSIS — N39.0 URINARY TRACT INFECTION, SITE UNSPECIFIED: Primary | ICD-10-CM

## 2017-06-03 LAB
ALBUMIN SERPL-MCNC: 3.7 G/DL (ref 3.5–5)
ALBUMIN/GLOB SERPL: 1 G/DL (ref 1.5–2.5)
ALP SERPL-CCNC: 80 U/L (ref 35–104)
ALT SERPL W P-5'-P-CCNC: 52 U/L (ref 10–36)
ANION GAP SERPL CALCULATED.3IONS-SCNC: 8.3 MMOL/L (ref 3.6–11.2)
AST SERPL-CCNC: 19 U/L (ref 10–30)
BACTERIA UR QL AUTO: ABNORMAL /HPF
BASOPHILS # BLD AUTO: 0.03 10*3/MM3 (ref 0–0.3)
BASOPHILS NFR BLD AUTO: 0.4 % (ref 0–2)
BILIRUB SERPL-MCNC: 0.2 MG/DL (ref 0.2–1.8)
BILIRUB UR QL STRIP: NEGATIVE
BNP SERPL-MCNC: 121 PG/ML (ref 0–100)
BUN BLD-MCNC: 9 MG/DL (ref 7–21)
BUN/CREAT SERPL: 12.9 (ref 7–25)
CALCIUM SPEC-SCNC: 9.5 MG/DL (ref 7.7–10)
CHLORIDE SERPL-SCNC: 108 MMOL/L (ref 99–112)
CLARITY UR: ABNORMAL
CO2 SERPL-SCNC: 21.7 MMOL/L (ref 24.3–31.9)
COLOR UR: YELLOW
CREAT BLD-MCNC: 0.7 MG/DL (ref 0.43–1.29)
CRP SERPL-MCNC: 4.16 MG/DL (ref 0–0.99)
D-LACTATE SERPL-SCNC: 3.5 MMOL/L (ref 0.5–2)
D-LACTATE SERPL-SCNC: 5.3 MMOL/L (ref 0.5–2)
DEPRECATED RDW RBC AUTO: 52.6 FL (ref 37–54)
EOSINOPHIL # BLD AUTO: 0.02 10*3/MM3 (ref 0–0.7)
EOSINOPHIL NFR BLD AUTO: 0.3 % (ref 0–5)
ERYTHROCYTE [DISTWIDTH] IN BLOOD BY AUTOMATED COUNT: 18.8 % (ref 11.5–14.5)
ERYTHROCYTE [SEDIMENTATION RATE] IN BLOOD: 54 MM/HR (ref 0–30)
GFR SERPL CREATININE-BSD FRML MDRD: 88 ML/MIN/1.73
GLOBULIN UR ELPH-MCNC: 3.7 GM/DL
GLUCOSE BLD-MCNC: 505 MG/DL (ref 70–110)
GLUCOSE BLDC GLUCOMTR-MCNC: 406 MG/DL (ref 70–130)
GLUCOSE BLDC GLUCOMTR-MCNC: 419 MG/DL (ref 70–130)
GLUCOSE UR STRIP-MCNC: ABNORMAL MG/DL
HCT VFR BLD AUTO: 38.2 % (ref 37–47)
HGB BLD-MCNC: 11.4 G/DL (ref 12–16)
HGB UR QL STRIP.AUTO: ABNORMAL
IMM GRANULOCYTES # BLD: 0.15 10*3/MM3 (ref 0–0.03)
IMM GRANULOCYTES NFR BLD: 2 % (ref 0–0.5)
KETONES UR QL STRIP: NEGATIVE
LEUKOCYTE ESTERASE UR QL STRIP.AUTO: ABNORMAL
LYMPHOCYTES # BLD AUTO: 0.49 10*3/MM3 (ref 1–3)
LYMPHOCYTES NFR BLD AUTO: 6.6 % (ref 21–51)
MCH RBC QN AUTO: 23.6 PG (ref 27–33)
MCHC RBC AUTO-ENTMCNC: 29.8 G/DL (ref 33–37)
MCV RBC AUTO: 79.1 FL (ref 80–94)
MONOCYTES # BLD AUTO: 0.17 10*3/MM3 (ref 0.1–0.9)
MONOCYTES NFR BLD AUTO: 2.3 % (ref 0–10)
NEUTROPHILS # BLD AUTO: 6.57 10*3/MM3 (ref 1.4–6.5)
NEUTROPHILS NFR BLD AUTO: 88.4 % (ref 30–70)
NITRITE UR QL STRIP: POSITIVE
OSMOLALITY SERPL CALC.SUM OF ELEC: 296.9 MOSM/KG (ref 273–305)
PH UR STRIP.AUTO: 6.5 [PH] (ref 5–8)
PLATELET # BLD AUTO: 266 10*3/MM3 (ref 130–400)
PMV BLD AUTO: 10.1 FL (ref 6–10)
POTASSIUM BLD-SCNC: 3.9 MMOL/L (ref 3.5–5.3)
PROT SERPL-MCNC: 7.4 G/DL (ref 6–8)
PROT UR QL STRIP: ABNORMAL
RBC # BLD AUTO: 4.83 10*6/MM3 (ref 4.2–5.4)
RBC # UR: ABNORMAL /HPF
REF LAB TEST METHOD: ABNORMAL
SODIUM BLD-SCNC: 138 MMOL/L (ref 135–153)
SP GR UR STRIP: >1.03 (ref 1–1.03)
SQUAMOUS #/AREA URNS HPF: ABNORMAL /HPF
TROPONIN I SERPL-MCNC: <0.006 NG/ML
UROBILINOGEN UR QL STRIP: ABNORMAL
WBC NRBC COR # BLD: 7.43 10*3/MM3 (ref 4.5–12.5)
WBC UR QL AUTO: ABNORMAL /HPF

## 2017-06-03 PROCEDURE — 71010 HC CHEST PA OR AP: CPT

## 2017-06-03 PROCEDURE — 82962 GLUCOSE BLOOD TEST: CPT

## 2017-06-03 PROCEDURE — 94799 UNLISTED PULMONARY SVC/PX: CPT

## 2017-06-03 PROCEDURE — 71010 XR CHEST 1 VW: CPT | Performed by: RADIOLOGY

## 2017-06-03 PROCEDURE — 83880 ASSAY OF NATRIURETIC PEPTIDE: CPT | Performed by: EMERGENCY MEDICINE

## 2017-06-03 PROCEDURE — 84484 ASSAY OF TROPONIN QUANT: CPT | Performed by: EMERGENCY MEDICINE

## 2017-06-03 PROCEDURE — 74177 CT ABD & PELVIS W/CONTRAST: CPT | Performed by: RADIOLOGY

## 2017-06-03 PROCEDURE — 99285 EMERGENCY DEPT VISIT HI MDM: CPT

## 2017-06-03 PROCEDURE — 0 IOPAMIDOL 61 % SOLUTION: Performed by: EMERGENCY MEDICINE

## 2017-06-03 PROCEDURE — 74177 CT ABD & PELVIS W/CONTRAST: CPT

## 2017-06-03 PROCEDURE — 63710000001 INSULIN REGULAR HUMAN PER 5 UNITS: Performed by: EMERGENCY MEDICINE

## 2017-06-03 PROCEDURE — 86140 C-REACTIVE PROTEIN: CPT | Performed by: EMERGENCY MEDICINE

## 2017-06-03 PROCEDURE — 80053 COMPREHEN METABOLIC PANEL: CPT | Performed by: EMERGENCY MEDICINE

## 2017-06-03 PROCEDURE — 93005 ELECTROCARDIOGRAM TRACING: CPT | Performed by: EMERGENCY MEDICINE

## 2017-06-03 PROCEDURE — 83605 ASSAY OF LACTIC ACID: CPT | Performed by: EMERGENCY MEDICINE

## 2017-06-03 PROCEDURE — 25010000002 CEFTRIAXONE: Performed by: EMERGENCY MEDICINE

## 2017-06-03 PROCEDURE — 85025 COMPLETE CBC W/AUTO DIFF WBC: CPT | Performed by: EMERGENCY MEDICINE

## 2017-06-03 PROCEDURE — 87077 CULTURE AEROBIC IDENTIFY: CPT | Performed by: EMERGENCY MEDICINE

## 2017-06-03 PROCEDURE — 87086 URINE CULTURE/COLONY COUNT: CPT | Performed by: EMERGENCY MEDICINE

## 2017-06-03 PROCEDURE — 81001 URINALYSIS AUTO W/SCOPE: CPT | Performed by: EMERGENCY MEDICINE

## 2017-06-03 PROCEDURE — 85652 RBC SED RATE AUTOMATED: CPT | Performed by: EMERGENCY MEDICINE

## 2017-06-03 PROCEDURE — 93010 ELECTROCARDIOGRAM REPORT: CPT | Performed by: INTERNAL MEDICINE

## 2017-06-03 PROCEDURE — 87186 SC STD MICRODIL/AGAR DIL: CPT | Performed by: EMERGENCY MEDICINE

## 2017-06-03 PROCEDURE — 94640 AIRWAY INHALATION TREATMENT: CPT

## 2017-06-03 PROCEDURE — 87040 BLOOD CULTURE FOR BACTERIA: CPT | Performed by: EMERGENCY MEDICINE

## 2017-06-03 PROCEDURE — 99223 1ST HOSP IP/OBS HIGH 75: CPT | Performed by: INTERNAL MEDICINE

## 2017-06-03 RX ORDER — SODIUM CHLORIDE 0.9 % (FLUSH) 0.9 %
1-10 SYRINGE (ML) INJECTION AS NEEDED
Status: DISCONTINUED | OUTPATIENT
Start: 2017-06-03 | End: 2017-06-06 | Stop reason: HOSPADM

## 2017-06-03 RX ORDER — HYDROCODONE BITARTRATE AND ACETAMINOPHEN 5; 325 MG/1; MG/1
1 TABLET ORAL ONCE
Status: COMPLETED | OUTPATIENT
Start: 2017-06-03 | End: 2017-06-03

## 2017-06-03 RX ORDER — IPRATROPIUM BROMIDE AND ALBUTEROL SULFATE 2.5; .5 MG/3ML; MG/3ML
3 SOLUTION RESPIRATORY (INHALATION) ONCE
Status: COMPLETED | OUTPATIENT
Start: 2017-06-03 | End: 2017-06-03

## 2017-06-03 RX ORDER — SODIUM CHLORIDE 0.9 % (FLUSH) 0.9 %
10 SYRINGE (ML) INJECTION AS NEEDED
Status: DISCONTINUED | OUTPATIENT
Start: 2017-06-03 | End: 2017-06-06 | Stop reason: HOSPADM

## 2017-06-03 RX ORDER — METHYLPREDNISOLONE SODIUM SUCCINATE 125 MG/2ML
125 INJECTION, POWDER, LYOPHILIZED, FOR SOLUTION INTRAMUSCULAR; INTRAVENOUS ONCE
Status: DISCONTINUED | OUTPATIENT
Start: 2017-06-03 | End: 2017-06-03

## 2017-06-03 RX ADMIN — CEFTRIAXONE 1 G: 1 INJECTION, POWDER, FOR SOLUTION INTRAMUSCULAR; INTRAVENOUS at 15:16

## 2017-06-03 RX ADMIN — HYDROCODONE BITARTRATE AND ACETAMINOPHEN 1 TABLET: 5; 325 TABLET ORAL at 19:55

## 2017-06-03 RX ADMIN — SODIUM CHLORIDE 1000 ML: 900 INJECTION, SOLUTION INTRAVENOUS at 15:15

## 2017-06-03 RX ADMIN — IPRATROPIUM BROMIDE AND ALBUTEROL SULFATE 3 ML: .5; 3 SOLUTION RESPIRATORY (INHALATION) at 12:56

## 2017-06-03 RX ADMIN — IPRATROPIUM BROMIDE AND ALBUTEROL SULFATE 3 ML: .5; 3 SOLUTION RESPIRATORY (INHALATION) at 15:00

## 2017-06-03 RX ADMIN — SODIUM CHLORIDE 1389 ML: 9 INJECTION, SOLUTION INTRAVENOUS at 19:46

## 2017-06-03 RX ADMIN — SODIUM CHLORIDE 500 ML: 9 INJECTION, SOLUTION INTRAVENOUS at 22:34

## 2017-06-03 RX ADMIN — SODIUM CHLORIDE 500 ML: 9 INJECTION, SOLUTION INTRAVENOUS at 21:08

## 2017-06-03 RX ADMIN — HUMAN INSULIN 10 UNITS: 100 INJECTION, SOLUTION SUBCUTANEOUS at 15:09

## 2017-06-03 RX ADMIN — IOPAMIDOL 100 ML: 612 INJECTION, SOLUTION INTRAVENOUS at 19:43

## 2017-06-03 RX ADMIN — HYDROCODONE BITARTRATE AND ACETAMINOPHEN 1 TABLET: 5; 325 TABLET ORAL at 16:07

## 2017-06-03 NOTE — ED NOTES
"Patient requesting pain medication for \"my legs.\" Dr. Perez made aware.     Bella Nair, GUERRERO  06/03/17 1940    "

## 2017-06-03 NOTE — ED NOTES
Patient requesting pain medication, Dr. Perez made aware. Awaiting further orders, will continue to monitor.     Julia Ruiz RN  06/03/17 9810

## 2017-06-04 LAB
A-A DO2: 55 MMHG (ref 0–300)
ANION GAP SERPL CALCULATED.3IONS-SCNC: 3.9 MMOL/L (ref 3.6–11.2)
ANISOCYTOSIS BLD QL: NORMAL
ARTERIAL PATENCY WRIST A: ABNORMAL
ATMOSPHERIC PRESS: 734 MMHG
BASE EXCESS BLDA CALC-SCNC: -2.2 MMOL/L
BASOPHILS # BLD AUTO: 0.02 10*3/MM3 (ref 0–0.3)
BASOPHILS NFR BLD AUTO: 0.2 % (ref 0–2)
BDY SITE: ABNORMAL
BODY TEMPERATURE: 98.6 C
BUN BLD-MCNC: 10 MG/DL (ref 7–21)
BUN/CREAT SERPL: 18.5 (ref 7–25)
CALCIUM SPEC-SCNC: 8.6 MG/DL (ref 7.7–10)
CHLORIDE SERPL-SCNC: 110 MMOL/L (ref 99–112)
CO2 SERPL-SCNC: 24.1 MMOL/L (ref 24.3–31.9)
COHGB MFR BLD: 1.2 % (ref 0–5)
CREAT BLD-MCNC: 0.54 MG/DL (ref 0.43–1.29)
D-LACTATE SERPL-SCNC: 1.6 MMOL/L (ref 0.5–2)
D-LACTATE SERPL-SCNC: 2.6 MMOL/L (ref 0.5–2)
DEPRECATED RDW RBC AUTO: 51.9 FL (ref 37–54)
EOSINOPHIL # BLD AUTO: 0 10*3/MM3 (ref 0–0.7)
EOSINOPHIL NFR BLD AUTO: 0 % (ref 0–5)
ERYTHROCYTE [DISTWIDTH] IN BLOOD BY AUTOMATED COUNT: 18.6 % (ref 11.5–14.5)
GFR SERPL CREATININE-BSD FRML MDRD: 119 ML/MIN/1.73
GLUCOSE BLD-MCNC: 300 MG/DL (ref 70–110)
GLUCOSE BLDC GLUCOMTR-MCNC: 140 MG/DL (ref 70–130)
GLUCOSE BLDC GLUCOMTR-MCNC: 203 MG/DL (ref 70–130)
GLUCOSE BLDC GLUCOMTR-MCNC: 212 MG/DL (ref 70–130)
GLUCOSE BLDC GLUCOMTR-MCNC: 250 MG/DL (ref 70–130)
HBA1C MFR BLD: 10.6 % (ref 4.5–5.7)
HCO3 BLDA-SCNC: 21.2 MMOL/L (ref 22–26)
HCT VFR BLD AUTO: 32.9 % (ref 37–47)
HCT VFR BLD CALC: 31 % (ref 37–47)
HGB BLD-MCNC: 9.5 G/DL (ref 12–16)
HGB BLDA-MCNC: 10.5 G/DL (ref 12–16)
HOROWITZ INDEX BLD+IHG-RTO: 28 %
HYPOCHROMIA BLD QL: NORMAL
IMM GRANULOCYTES # BLD: 0.15 10*3/MM3 (ref 0–0.03)
IMM GRANULOCYTES NFR BLD: 1.7 % (ref 0–0.5)
L PNEUMO1 AG UR QL IA: NEGATIVE
LYMPHOCYTES # BLD AUTO: 0.95 10*3/MM3 (ref 1–3)
LYMPHOCYTES NFR BLD AUTO: 10.5 % (ref 21–51)
M PNEUMO IGM SER QL: NEGATIVE
MCH RBC QN AUTO: 23.1 PG (ref 27–33)
MCHC RBC AUTO-ENTMCNC: 28.9 G/DL (ref 33–37)
MCV RBC AUTO: 79.9 FL (ref 80–94)
METHGB BLD QL: 0.2 % (ref 0–3)
MODALITY: ABNORMAL
MONOCYTES # BLD AUTO: 0.51 10*3/MM3 (ref 0.1–0.9)
MONOCYTES NFR BLD AUTO: 5.7 % (ref 0–10)
NEUTROPHILS # BLD AUTO: 7.39 10*3/MM3 (ref 1.4–6.5)
NEUTROPHILS NFR BLD AUTO: 81.9 % (ref 30–70)
OSMOLALITY SERPL CALC.SUM OF ELEC: 285.9 MOSM/KG (ref 273–305)
OXYHGB MFR BLDV: 96.8 % (ref 85–100)
PCO2 BLDA: 31.6 MM HG (ref 35–45)
PH BLDA: 7.44 PH UNITS (ref 7.35–7.45)
PLAT MORPH BLD: NORMAL
PLATELET # BLD AUTO: 242 10*3/MM3 (ref 130–400)
PMV BLD AUTO: 9.2 FL (ref 6–10)
PO2 BLDA: 100.1 MM HG (ref 80–100)
POTASSIUM BLD-SCNC: 4.2 MMOL/L (ref 3.5–5.3)
RBC # BLD AUTO: 4.12 10*6/MM3 (ref 4.2–5.4)
SAO2 % BLDCOA: 98.2 % (ref 90–100)
SODIUM BLD-SCNC: 138 MMOL/L (ref 135–153)
WBC NRBC COR # BLD: 9.02 10*3/MM3 (ref 4.5–12.5)

## 2017-06-04 PROCEDURE — 36600 WITHDRAWAL OF ARTERIAL BLOOD: CPT | Performed by: INTERNAL MEDICINE

## 2017-06-04 PROCEDURE — 63710000001 INSULIN ASPART PER 5 UNITS: Performed by: INTERNAL MEDICINE

## 2017-06-04 PROCEDURE — 99233 SBSQ HOSP IP/OBS HIGH 50: CPT | Performed by: INTERNAL MEDICINE

## 2017-06-04 PROCEDURE — 25010000002 ENOXAPARIN PER 10 MG: Performed by: INTERNAL MEDICINE

## 2017-06-04 PROCEDURE — 94799 UNLISTED PULMONARY SVC/PX: CPT

## 2017-06-04 PROCEDURE — 82375 ASSAY CARBOXYHB QUANT: CPT | Performed by: INTERNAL MEDICINE

## 2017-06-04 PROCEDURE — 82805 BLOOD GASES W/O2 SATURATION: CPT | Performed by: INTERNAL MEDICINE

## 2017-06-04 PROCEDURE — 25010000002 METHYLPREDNISOLONE PER 40 MG: Performed by: INTERNAL MEDICINE

## 2017-06-04 PROCEDURE — 85007 BL SMEAR W/DIFF WBC COUNT: CPT | Performed by: INTERNAL MEDICINE

## 2017-06-04 PROCEDURE — 83605 ASSAY OF LACTIC ACID: CPT | Performed by: INTERNAL MEDICINE

## 2017-06-04 PROCEDURE — 25010000002 ONDANSETRON PER 1 MG: Performed by: INTERNAL MEDICINE

## 2017-06-04 PROCEDURE — 85025 COMPLETE CBC W/AUTO DIFF WBC: CPT | Performed by: INTERNAL MEDICINE

## 2017-06-04 PROCEDURE — 83050 HGB METHEMOGLOBIN QUAN: CPT | Performed by: INTERNAL MEDICINE

## 2017-06-04 PROCEDURE — 83036 HEMOGLOBIN GLYCOSYLATED A1C: CPT | Performed by: INTERNAL MEDICINE

## 2017-06-04 PROCEDURE — 80048 BASIC METABOLIC PNL TOTAL CA: CPT | Performed by: INTERNAL MEDICINE

## 2017-06-04 PROCEDURE — 25010000002 CEFEPIME: Performed by: INTERNAL MEDICINE

## 2017-06-04 PROCEDURE — 25010000002 VANCOMYCIN PER 500 MG: Performed by: INTERNAL MEDICINE

## 2017-06-04 PROCEDURE — 82962 GLUCOSE BLOOD TEST: CPT

## 2017-06-04 PROCEDURE — 25010000002 AZITHROMYCIN: Performed by: INTERNAL MEDICINE

## 2017-06-04 PROCEDURE — 87449 NOS EACH ORGANISM AG IA: CPT | Performed by: INTERNAL MEDICINE

## 2017-06-04 PROCEDURE — 63710000001 INSULIN DETEMIR PER 5 UNITS: Performed by: INTERNAL MEDICINE

## 2017-06-04 RX ORDER — PROMETHAZINE HYDROCHLORIDE 25 MG/1
25 TABLET ORAL EVERY 6 HOURS PRN
Status: DISCONTINUED | OUTPATIENT
Start: 2017-06-04 | End: 2017-06-06 | Stop reason: HOSPADM

## 2017-06-04 RX ORDER — GUAIFENESIN 600 MG/1
1200 TABLET, EXTENDED RELEASE ORAL EVERY 12 HOURS SCHEDULED
Status: DISCONTINUED | OUTPATIENT
Start: 2017-06-04 | End: 2017-06-06 | Stop reason: HOSPADM

## 2017-06-04 RX ORDER — METHYLPREDNISOLONE SODIUM SUCCINATE 40 MG/ML
20 INJECTION, POWDER, LYOPHILIZED, FOR SOLUTION INTRAMUSCULAR; INTRAVENOUS EVERY 12 HOURS
Status: DISCONTINUED | OUTPATIENT
Start: 2017-06-04 | End: 2017-06-05

## 2017-06-04 RX ORDER — HYDROCODONE BITARTRATE AND ACETAMINOPHEN 5; 325 MG/1; MG/1
1 TABLET ORAL EVERY 8 HOURS PRN
Status: COMPLETED | OUTPATIENT
Start: 2017-06-04 | End: 2017-06-05

## 2017-06-04 RX ORDER — HYDROCODONE BITARTRATE AND ACETAMINOPHEN 5; 325 MG/1; MG/1
1 TABLET ORAL ONCE AS NEEDED
Status: COMPLETED | OUTPATIENT
Start: 2017-06-04 | End: 2017-06-04

## 2017-06-04 RX ORDER — NICOTINE POLACRILEX 4 MG
15 LOZENGE BUCCAL
Status: DISCONTINUED | OUTPATIENT
Start: 2017-06-04 | End: 2017-06-06 | Stop reason: HOSPADM

## 2017-06-04 RX ORDER — GABAPENTIN 300 MG/1
300 CAPSULE ORAL 3 TIMES DAILY
Status: DISCONTINUED | OUTPATIENT
Start: 2017-06-04 | End: 2017-06-06 | Stop reason: HOSPADM

## 2017-06-04 RX ORDER — DEXTROSE MONOHYDRATE 25 G/50ML
25 INJECTION, SOLUTION INTRAVENOUS
Status: DISCONTINUED | OUTPATIENT
Start: 2017-06-04 | End: 2017-06-06 | Stop reason: HOSPADM

## 2017-06-04 RX ORDER — ACETAMINOPHEN 325 MG/1
650 TABLET ORAL ONCE
Status: COMPLETED | OUTPATIENT
Start: 2017-06-04 | End: 2017-06-04

## 2017-06-04 RX ORDER — ONDANSETRON 2 MG/ML
4 INJECTION INTRAMUSCULAR; INTRAVENOUS EVERY 6 HOURS PRN
Status: DISCONTINUED | OUTPATIENT
Start: 2017-06-04 | End: 2017-06-04

## 2017-06-04 RX ORDER — IPRATROPIUM BROMIDE AND ALBUTEROL SULFATE 2.5; .5 MG/3ML; MG/3ML
3 SOLUTION RESPIRATORY (INHALATION)
Status: DISCONTINUED | OUTPATIENT
Start: 2017-06-04 | End: 2017-06-06 | Stop reason: HOSPADM

## 2017-06-04 RX ADMIN — CEFEPIME 2 G: 2 INJECTION, POWDER, FOR SOLUTION INTRAVENOUS at 07:47

## 2017-06-04 RX ADMIN — METRONIDAZOLE 500 MG: 500 INJECTION, SOLUTION INTRAVENOUS at 20:52

## 2017-06-04 RX ADMIN — GABAPENTIN 300 MG: 300 CAPSULE ORAL at 20:51

## 2017-06-04 RX ADMIN — GUAIFENESIN 1200 MG: 600 TABLET, EXTENDED RELEASE ORAL at 16:42

## 2017-06-04 RX ADMIN — HYDROCODONE BITARTRATE AND ACETAMINOPHEN 1 TABLET: 5; 325 TABLET ORAL at 21:25

## 2017-06-04 RX ADMIN — CEFEPIME 2 G: 2 INJECTION, POWDER, FOR SOLUTION INTRAVENOUS at 02:40

## 2017-06-04 RX ADMIN — VANCOMYCIN HYDROCHLORIDE 750 MG: 5 INJECTION, POWDER, LYOPHILIZED, FOR SOLUTION INTRAVENOUS at 16:41

## 2017-06-04 RX ADMIN — ACETAMINOPHEN 650 MG: 325 TABLET ORAL at 09:57

## 2017-06-04 RX ADMIN — INSULIN DETEMIR 20 UNITS: 100 INJECTION, SOLUTION SUBCUTANEOUS at 20:51

## 2017-06-04 RX ADMIN — CEFEPIME 2 G: 2 INJECTION, POWDER, FOR SOLUTION INTRAVENOUS at 16:41

## 2017-06-04 RX ADMIN — INSULIN ASPART 5 UNITS: 100 INJECTION, SOLUTION INTRAVENOUS; SUBCUTANEOUS at 20:52

## 2017-06-04 RX ADMIN — ONDANSETRON 4 MG: 2 INJECTION, SOLUTION INTRAMUSCULAR; INTRAVENOUS at 04:00

## 2017-06-04 RX ADMIN — GABAPENTIN 300 MG: 300 CAPSULE ORAL at 16:42

## 2017-06-04 RX ADMIN — INSULIN ASPART 8 UNITS: 100 INJECTION, SOLUTION INTRAVENOUS; SUBCUTANEOUS at 17:30

## 2017-06-04 RX ADMIN — METRONIDAZOLE 500 MG: 500 INJECTION, SOLUTION INTRAVENOUS at 16:41

## 2017-06-04 RX ADMIN — HYDROCODONE BITARTRATE AND ACETAMINOPHEN 1 TABLET: 5; 325 TABLET ORAL at 04:00

## 2017-06-04 RX ADMIN — AZITHROMYCIN 500 MG: 500 INJECTION, POWDER, LYOPHILIZED, FOR SOLUTION INTRAVENOUS at 01:35

## 2017-06-04 RX ADMIN — INSULIN ASPART 5 UNITS: 100 INJECTION, SOLUTION INTRAVENOUS; SUBCUTANEOUS at 13:10

## 2017-06-04 RX ADMIN — ENOXAPARIN SODIUM 40 MG: 40 INJECTION SUBCUTANEOUS at 07:47

## 2017-06-04 RX ADMIN — METHYLPREDNISOLONE SODIUM SUCCINATE 20 MG: 40 INJECTION, POWDER, FOR SOLUTION INTRAMUSCULAR; INTRAVENOUS at 16:41

## 2017-06-04 NOTE — PAYOR COMM NOTE
"[facility]   RICARDO LANGSTON   PHONE  [phone]  FAX  [phone]    REQUEST FOR INPATIENT AUTH.    Yessica Yan (51 y.o. Female)     Date of Birth Social Security Number Address Home Phone MRN    1965  [address] [phone] [id number]    L[employer]-day Marital Status          Jehovah[employer] witness        Admission Date Admission Type Admitting Provider Attending Provider Department, Room/Bed    6/3/17 Emergency Dwight Quezada MD Yemane, Mengistu, MD [address], [address]/1P    Discharge Date Discharge Disposition Discharge Destination                      Attending Provider: Dwight Quezada MD     Allergies:  Penicillins, Toradol [Ketorolac Tromethamine], Tramadol    Isolation:  None   Infection:  C.difficile (03/10/17)   Code Status:  FULL    Ht:  63\" (160 cm)   Wt:  119 lb 1.6 oz (54 kg)    Admission Cmt:  None   Principal Problem:  None                Active Insurance as of 6/3/2017     Primary Coverage     Payor Plan Insurance Group Employer/Plan Group    MEDICARE MEDICARE A & B      Payor Plan Address Payor Plan Phone Number Effective From Effective To    [address] [phone] 2/1/2005     [place] [address]       Subscriber Name Subscriber Birth Date Member ID       YESSICA YAN 1965 [id number]           Secondary Coverage     Payor Plan Insurance Group Employer/Plan Group    [employer] [place]LL[place]ICAID      Payor Plan Address Payor Plan Phone Number Effective From Effective To    [address] [address] [phone] 1/1/2016     [place] [address]       Subscriber Name Subscriber Birth Date Member ID       YESSICA YAN 1965 [id number]                 Emergency Contacts      (Rel.) Home Phone Work Phone Mobile Phone    Scot Vaughn (Son) [phone] -- --    NolastnamrosailoNeeta (Friend) -- -- [phone]              "

## 2017-06-04 NOTE — PLAN OF CARE
Problem: Patient Care Overview (Adult)  Goal: Plan of Care Review  Outcome: Ongoing (interventions implemented as appropriate)  Goal: Adult Individualization and Mutuality  Outcome: Ongoing (interventions implemented as appropriate)  Goal: Discharge Needs Assessment  Outcome: Ongoing (interventions implemented as appropriate)    Problem: Infection, Risk/Actual (Adult)  Goal: Identify Related Risk Factors and Signs and Symptoms  Outcome: Ongoing (interventions implemented as appropriate)  Goal: Infection Prevention/Resolution  Outcome: Ongoing (interventions implemented as appropriate)    Problem: Fall Risk (Adult)  Goal: Identify Related Risk Factors and Signs and Symptoms  Outcome: Ongoing (interventions implemented as appropriate)  Goal: Absence of Falls  Outcome: Ongoing (interventions implemented as appropriate)    Problem: Pressure Ulcer Risk (Brendan Scale) (Adult,Obstetrics,Pediatric)  Goal: Identify Related Risk Factors and Signs and Symptoms  Outcome: Ongoing (interventions implemented as appropriate)  Goal: Skin Integrity  Outcome: Ongoing (interventions implemented as appropriate)

## 2017-06-04 NOTE — NURSING NOTE
Pt insist she goes down to smoke off the floor. Educated pt that if she leaves floor we cannot monitor her heart, oxygen levels. Educated the risk of falls as well due to being by self. Pt insist she will be back soon and is leaving the floor. Pt did have on non skid socks, and shoes as well. Pt signed book to leave the floor.

## 2017-06-04 NOTE — PLAN OF CARE
Problem: Patient Care Overview (Adult)  Goal: Plan of Care Review  Outcome: Ongoing (interventions implemented as appropriate)    Problem: Infection, Risk/Actual (Adult)  Goal: Identify Related Risk Factors and Signs and Symptoms  Outcome: Outcome(s) achieved Date Met:  06/04/17    Problem: Fall Risk (Adult)  Goal: Identify Related Risk Factors and Signs and Symptoms  Outcome: Outcome(s) achieved Date Met:  06/04/17  Goal: Absence of Falls  Outcome: Ongoing (interventions implemented as appropriate)    Problem: Pressure Ulcer Risk (Brendan Scale) (Adult,Obstetrics,Pediatric)  Goal: Identify Related Risk Factors and Signs and Symptoms  Outcome: Outcome(s) achieved Date Met:  06/04/17  Goal: Skin Integrity  Outcome: Ongoing (interventions implemented as appropriate)

## 2017-06-04 NOTE — PLAN OF CARE
Problem: Patient Care Overview (Adult)  Goal: Plan of Care Review  Outcome: Ongoing (interventions implemented as appropriate)  Goal: Adult Individualization and Mutuality  Outcome: Ongoing (interventions implemented as appropriate)  Goal: Discharge Needs Assessment  Outcome: Ongoing (interventions implemented as appropriate)    Problem: Infection, Risk/Actual (Adult)  Goal: Infection Prevention/Resolution  Outcome: Ongoing (interventions implemented as appropriate)    Problem: Fall Risk (Adult)  Goal: Absence of Falls  Outcome: Ongoing (interventions implemented as appropriate)    Problem: Pressure Ulcer Risk (Brendan Scale) (Adult,Obstetrics,Pediatric)  Goal: Skin Integrity  Outcome: Ongoing (interventions implemented as appropriate)

## 2017-06-04 NOTE — PROGRESS NOTES
Patient evaluated for vancomycin dosing to treat pneumonia. Based on her dosing history, will dose at 750 mg q 12 hrs to target trough of 15-20 mg/L. Pharmacy will follow and obtain trough level when steady state is reached to assess need for dose adjustment. Thank you for consulting.    Mandie Clemente, [facility]  06/04/17  12:35 PM

## 2017-06-04 NOTE — PROGRESS NOTES
Discharge Planning Assessment  [facility]     Patient Name: Pepito Yan  MRN: [id number]  Today's Date: 6/3/2017    Admit Date: 6/3/2017          Discharge Needs Assessment       06/03/17 2002    Living Environment    Lives With friend(s)   STATES LIVES WITH RAMONA, DOES NOT KNOW LAST NAME.    Living Arrangements house    Transportation Available none    Living Environment    Quality Of Family Relationships unable to assess    Discharge Needs Assessment    Concerns To Be Addressed discharge planning concerns    Readmission Within The Last 30 Days no previous admission in last 30 days   LAST ADMIT 2/28/17 AND WAS PLACED IN CONTINUE CARE    Anticipated Changes Related to Illness inability to care for self    Equipment Currently Used at Home oxygen   STATES SHE GETS O2 FROM SOMEWHERE IN [place]    Discharge Planning Comments SOCIAL SERVICE CONSULT PLACED R/T DISCHARGE PLANNING. PT STATES SHE LIVES WITH A WOMAN NAMED RAMONA BUT DOES NOT KNOW HER LAST NAME. PT STATES SHE IS INDEPENDENT WITH ADLS AND SHE USES HOME 02. SAYS SHE HAS USED DME'S IN THE PAST BUT DOES NOT CURRENTLY. PT'S PCP IS CORRY PERRY AND SHE USES [facility] FOR HER PRESCRIPTIONS.            Discharge Plan             Discharge Placement                     Demographic Summary       06/03/17 2000    Referral Information    Arrived From home or self-care    Referral Source admission list    Reason For Consult discharge planning    Record Reviewed history and physical;medical record            Functional Status       06/03/17 2000    Functional Status Current    Ambulation 0-->independent    Transferring 0-->independent    Toileting 0-->independent    Bathing 0-->independent    Dressing 0-->independent    Eating 0-->independent    Communication 0-->understands/communicates without difficulty    Functional Status Prior    Ambulation 0-->independent    Transferring 0-->independent    Toileting 0-->independent    Bathing 0-->independent    Dressing  0-->independent    Eating 0-->independent    Communication 0-->understands/communicates without difficulty    Activity Tolerance    Usual Activity Tolerance fair    Current Activity Tolerance poor    Cognitive/Perceptual/Developmental    Current Mental Status/Cognitive Functioning no deficits noted    Recent Changes in Mental Status/Cognitive Functioning no changes    Employment/Financial    Financial Concerns none            Psychosocial       06/03/17 2001    Emotional/Psychological    Affect no deficits noted    Mood congruent to situation    Verbal Skills no deficits noted            Abuse/Neglect                 Legal       06/03/17 2001    Legal    Legal Comments PT DOES NOT HAVE POA ,LIVING WILL OR ADVANCED DIRECTIVES. DOES NOT REQUEST INFORMATION ON THESE AT THIS TIME.            Substance Abuse                 Patient Forms               Jes Hope RN

## 2017-06-04 NOTE — H&P
[facility] HOSPITALIST HISTORY AND PHYSICAL    Patient Identification:  Name:  Pepito Yan  Age:  51 y.o.  Sex:  female  :  1965  MRN:  [id number]   Visit Number:  [id number]  Primary Care Physician:  Evan Hitchcock MD    IChief complaint:   Shortness of breath and chest pain    History of presenting illness:  Patient is a 51-year-old  female whose past medical history significant for chronic obstructive pulmonary disease, chronic respiratory failure on home oxygen, diabetic mellitus ischemic cardiomyoplasty presented to the emergency department with the above complaints.  Patient said she was in relatively good health up until Wednesday when she started experiencing worsening in her shortness of breath and pleuritic chest pain.  The patient admits that she has very bad COPD and apparently was hospitalized end of February and was in the hospital for a month and subsequently was transferred to LTAC.  At that time patient was on mechanical ventilation and was on the vent for about 13 days.  Patient denies any fever or chills.  She has cough and thinks that she has phlegm but unable to bring his up.  She still smokes 4-5 cigarettes a day.  The patient was evaluated in the emergency department and then was told us the patient has significantly elevated lactic acid level and possible sepsis and the source of infection was thought to be UTI and she was gien Ceftriaxone and boluses of IVF.   Review of Systems   Constitutional: Positive for fatigue. Negative for chills and fever.   HENT: Negative for congestion, ear pain and sore throat.    Eyes: Negative for photophobia and pain.   Respiratory:        See history of present illness   Cardiovascular: Positive for chest pain. Negative for palpitations and leg swelling.   Gastrointestinal: Negative for abdominal distention, abdominal pain, diarrhea, nausea and vomiting.   Endocrine: Negative for cold intolerance and heat  intolerance.   Genitourinary: Negative for dysuria, flank pain, frequency and hematuria.   Musculoskeletal:        Has history of rheumatoid arthritis   Neurological: Negative for dizziness, syncope, numbness and headaches.   Hematological: Negative for adenopathy. Does not bruise/bleed easily.      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   • Chronic respiratory failure with hypoxia     at nighttime   • Compression fracture of lumbar vertebra    • Compression fracture of thoracic vertebra    • COPD (chronic obstructive pulmonary disease)    • Coronary artery disease    • Diabetes mellitus type 2 in nonobese    • Erosive esophagitis 2016   • Essential hypertension    • Gastroparesis    • GERD (gastroesophageal reflux disease)    • Hepatitis-C    • Medically noncompliant    • MRSA infection     back of neck around    • NSTEMI (non-ST elevated myocardial infarction) 2016   • PE (pulmonary embolism)    • Protein calorie malnutrition    • RA (rheumatoid arthritis)     with chronic steroid use     Past Surgical History:   Procedure Laterality Date   • AMPUTATION DIGIT Right 2016    Third digit to the PIP. Surgeon: Dr. Bains   • AMPUTATION DIGIT Left 2016    Procedure: PARTIAL AMPUTATION LEFT THIRD FINGER;  Surgeon: Shane Fuller MD;  Location[facility];  Service:    • AMPUTATION REVISION Left 2016    Procedure: AMPUTATION REVISION DIGIT STUMP;  Surgeon: Shane Fuller MD;  Location: [facility] OR;  Service:    • ARM DEBRIDEMENT Left 2016    Procedure: HAND IRRIGATION DEBRIDEMENT AND REPAIR ;  Surgeon: Shane Fuller MD;  Location: [facility] OR;  Service:    •  SECTION      x 2   • CHOLECYSTECTOMY     • ENDOSCOPY N/A 2016    Procedure: ESOPHAGOGASTRODUODENOSCOPY;   Surgeon: Jeremy Clement III, MD;  Location: [facility] OR;  Service:    • ENDOSCOPY W/ PEG TUBE PLACEMENT N/A 3/30/2017    Procedure: ESOPHAGOGASTRODUODENOSCOPY WITH PERCUTANEOUS ENDOSCOPIC GASTROSTOMY TUBE INSERTION;  Surgeon: Shade Campos MD;  Location: [facility] OR;  Service:    • FOREIGN BODY REMOVAL      insulin needle removal x 3 from the abdomen   • HYSTERECTOMY       Family History   Problem Relation Age of Onset   • Colon cancer Mother    • Cancer Father    • Diabetes Maternal Grandmother    • Heart disease Neg Hx    • Stroke Neg Hx      Social History     Social History   • Marital status:      Spouse name: N/A   • Number of children: N/A   • Years of education: N/A     Social History Main Topics   • Smoking status: Current Every Day Smoker     Packs/day: 4.50     Years: 35.00     Types: Cigarettes   • Smokeless tobacco: None      Comment: She cut down to 1/4 PPD for 2.5 months ago   • Alcohol use No   • Drug use: No   • Sexual activity: Defer     Other Topics Concern   • None     Social History Narrative     ---------------------------------------------------------------------------------------------------------------------   Allergies:  Penicillins; Toradol [ketorolac tromethamine]; and Tramadol  ---------------------------------------------------------------------------------------------------------------------   Prior to Admission Medications     Prescriptions Last Dose Informant Patient Reported? Taking?    aspirin 81 MG EC tablet   No Yes    Take 1 tablet by mouth Daily.    bacitracin 500 UNIT/GM ointment   No Yes    Apply  topically Every 12 (Twelve) Hours.    clopidogrel (PLAVIX) 75 MG tablet   No Yes    Take 1 tablet by mouth Daily.    diltiaZEM (CARDIZEM) 30 MG tablet   Yes Yes    Take 30 mg by mouth 4 (Four) Times a Day.    docusate sodium (COLACE) 100 MG capsule   Yes Yes    Take 100 mg by mouth 2 (Two) Times a Day.    haloperidol lactate (HALDOL) 5 MG/ML injection   No Yes     Infuse 0.1 mL into a venous catheter Every 6 (Six) Hours As Needed for Agitation.    Heparin Sodium, Porcine, (HEPARIN, PORCINE,) 5000 UNIT/ML injection   No Yes    Inject 1 mL under the skin Every 12 (Twelve) Hours.    insulin aspart (novoLOG) 100 UNIT/ML injection   No Yes    Inject 0-7 Units under the skin 4 (Four) Times a Day Before Meals & at Bedtime.    insulin detemir (LEVEMIR) 100 UNIT/ML injection   No Yes    Inject 22 Units under the skin Every Night.    ipratropium (ATROVENT) 0.02 % nebulizer solution   No Yes    Take 2.5 mL by nebulization Every 6 (Six) Hours As Needed for Shortness of Air.    ipratropium (ATROVENT) 0.02 % nebulizer solution   No Yes    Take 2.5 mL by nebulization Every 6 (Six) Hours.    metoprolol tartrate (LOPRESSOR) 50 MG tablet   No Yes    Take 1 tablet by mouth Every 12 (Twelve) Hours.    metroNIDAZOLE (FLAGYL) 5-0.79 MG/ML-% IVPB   No Yes    Infuse 100 mL into a venous catheter Every 8 (Eight) Hours. Indications: Clostridium Difficile Infection    nicotine (NICODERM CQ) 21 MG/24HR patch   No Yes    Place 1 patch on the skin Daily.    pantoprazole (PROTONIX) 40 MG injection   No Yes    Infuse 10 mL into a venous catheter Every Morning Before Breakfast.    potassium chloride (KLOR-CON) 20 MEQ packet   Yes Yes    Take 20 mEq by mouth 2 (Two) Times a Day.    predniSONE (DELTASONE) 5 MG tablet   No Yes    Take 1 tablet by mouth Daily With Breakfast.    QUEtiapine (SEROquel) 100 MG tablet   Yes Yes    Take 100 mg by mouth Every Night.        Hospital Scheduled Meds:    [START ON 6/4/2017] azithromycin 500 mg Intravenous Q24H   [START ON 6/4/2017] cefepime 2 g Intravenous Q8H   [START ON 6/4/2017] enoxaparin 40 mg Subcutaneous Daily        ---------------------------------------------------------------------------------------------------------------------   Vital Signs:  Temp:  [98.1 °F (36.7 °C)-98.4 °F (36.9 °C)] 98.1 °F (36.7 °C)  Heart Rate:  [101-119] 101  Resp:  [20-23] 20  BP:  (129-168)/() 129/78  Last 3 weights    06/03/17  1224   Weight: 102 lb (46.3 kg)     Body mass index is 18.07 kg/(m^2).  ---------------------------------------------------------------------------------------------------------------------   Physical Exam:  Constitutional:  Well-developed and well-nourished.  No respiratory distress.      HENT:  Head: Normocephalic and atraumatic.  Mouth:  Moist mucous membranes.    Eyes:  Conjunctivae and EOM are normal.  Pupils are equal, round, and reactive to light.  No scleral icterus.  Neck:  Neck supple.  No JVD present.    Cardiovascular:  Normal rate, regular rhythm and normal heart sounds with no murmur.  Pulmonary/Chest:  No respiratory distress, minimal wheezes, no crackles, has bronchial sounds on the right upper lung fields.  Abdominal:  Soft.  Bowel sounds are normal.  No distension and no tenderness.   Musculoskeletal:  No edema, no tenderness, and no deformity.  No red or swollen joints anywhere.    Neurological:  Alert and oriented to person, place, and time.  No cranial nerve deficit.  No tongue deviation.  No facial droop.  No slurred speech.   Skin:  Skin is warm and dry.  No rash noted.  No pallor.   Psychiatric:  Normal mood and affect.  Behavior is normal.  Judgment and thought content normal.   Peripheral vascular:  No edema and strong pulses on all 4 extremities.  Genitourinary:  ---------------------------------------------------------------------------------------------------------------------    ---------------------------------------------------------------------------------------------------------------------     Results from last 7 days  Lab Units 06/03/17  1740 06/03/17  1317   CRP mg/dL  --  4.16*   LACTATE mmol/L 5.3* 3.5*   WBC 10*3/mm3  --  7.43   HEMOGLOBIN g/dL  --  11.4*   HEMATOCRIT %  --  38.2   MCV fL  --  79.1*   MCHC g/dL  --  29.8*   PLATELETS 10*3/mm3  --  266           Results from last 7 days  Lab Units 06/03/17  1317   SODIUM  mmol/L 138   POTASSIUM mmol/L 3.9   CHLORIDE mmol/L 108   TOTAL CO2 mmol/L 21.7*   BUN mg/dL 9   CREATININE mg/dL 0.70   EGFR IF NONAFRICN AM mL/min/1.73 88   CALCIUM mg/dL 9.5   GLUCOSE mg/dL 505*   ALBUMIN g/dL 3.70   BILIRUBIN mg/dL 0.2   ALK PHOS U/L 80   AST (SGOT) U/L 19   ALT (SGPT) U/L 52*   Estimated Creatinine Clearance: 69.5 mL/min (by C-G formula based on Cr of 0.7).  No results found for: AMMONIA    Results from last 7 days  Lab Units 06/03/17  1740   TROPONIN I ng/mL <0.006         Lab Results   Component Value Date    HGBA1C 9.10 (H) 02/28/2017     Lab Results   Component Value Date    TSH 0.209 (L) 03/05/2017    FREET4 0.62 (L) 03/06/2017     No results found for: PREGTESTUR, PREGSERUM, HCG, HCGQUANT  Pain Management Panel     Pain Management Panel Latest Ref Rng & Units 2/28/2017 1/21/2017    AMPHETAMINES SCREEN, URINE Negative Negative Negative    BARBITURATES SCREEN Negative Negative Negative    BENZODIAZEPINE SCREEN, URINE Negative Negative Negative    COCAINE SCREEN, URINE Negative Negative Negative    METHADONE SCREEN, URINE Negative Negative Negative                        ---------------------------------------------------------------------------------------------------------------------  Imaging Results (last 7 days)     Procedure Component Value Units Date/Time    XR Chest 1 View [[id number]] Updated:  06/03/17 1329    CT Abdomen Pelvis With Contrast [[id number]] Updated:  06/03/17 1943          I have personally reviewed the radiology images and read the final radiology report.  ---------------------------------------------------------------------------------------------------------------------  Assessment and Plan:    1. ?  Right sided pneumonia: Possibly at risk for acquired pneumonia from her recent hospitalization..       I will start patient empirically on cefepime 2 g IV every 8 hours and azithromycin 500 mg IV every 24.       I will place her on Mucinex 600 mg by mouth twice a day .        We'll check sputum for Gram stain and culture.         Follow blood culture closely.         Patient to be followed closely she is at risk for respiratory failure and significant worsening.    2.  Chronic respiratory failure: At risk for acute on chronic        We'll continue on oxygen at 2-3 L by nasal cannula.        We'll watch her closely and will attempt an ABG in the morning.  Unfortunately the films done ABG in the emergency department and        patient refused to do one this evening.    3.  Chronic obstructive obstructive pulmonary disease: ? acute exacerbation/       Continuous albuterol and Atrovent treatment..       Low-dose methylprednisolone.    4.  Urinary tract infection: Cefepime should cover for now and will follow culture results closely./    5.  Diabetic mellitus: Continue with outpatient medications and sliding scale insulin..    7.  Patient placed on enoxaparin for DVT prophylaxis.    9.  History of rheumatoid arthritis:     Dwight Quezada MD  06/03/17  11:54 PM

## 2017-06-04 NOTE — ED PROVIDER NOTES
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   • Chronic respiratory failure with hypoxia     at nighttime   • Compression fracture of lumbar vertebra    • Compression fracture of thoracic vertebra    • COPD (chronic obstructive pulmonary disease)    • Coronary artery disease    • Diabetes mellitus type 2 in nonobese    • Erosive esophagitis 2016   • Essential hypertension    • Gastroparesis    • GERD (gastroesophageal reflux disease)    • Hepatitis-C    • Medically noncompliant    • MRSA infection     back of neck around    • NSTEMI (non-ST elevated myocardial infarction) 2016   • PE (pulmonary embolism)    • Protein calorie malnutrition    • RA (rheumatoid arthritis)     with chronic steroid use       Allergies   Allergen Reactions   • Penicillins Anaphylaxis   • Toradol [Ketorolac Tromethamine] Hives and Swelling   • Tramadol Hives and Swelling       Past Surgical History:   Procedure Laterality Date   • AMPUTATION DIGIT Right 2016    Third digit to the PIP. Surgeon: Dr. Bains   • AMPUTATION DIGIT Left 2016    Procedure: PARTIAL AMPUTATION LEFT THIRD FINGER;  Surgeon: Shane Fuller MD;  Location: [facility] OR;  Service:    • AMPUTATION REVISION Left 2016    Procedure: AMPUTATION REVISION DIGIT STUMP;  Surgeon: Shane Fuller MD;  Location: [facility] OR;  Service:    • ARM DEBRIDEMENT Left 2016    Procedure: HAND IRRIGATION DEBRIDEMENT AND REPAIR ;  Surgeon: Shane Fuller MD;  Location: [facility] OR;  Service:    •  SECTION      x 2   • CHOLECYSTECTOMY     • ENDOSCOPY N/A 2016    Procedure: ESOPHAGOGASTRODUODENOSCOPY;  Surgeon: Jeremy Clement III, MD;  Location: [facility] OR;  Service:    • ENDOSCOPY W/ PEG TUBE PLACEMENT N/A 3/30/2017    Procedure: ESOPHAGOGASTRODUODENOSCOPY WITH PERCUTANEOUS ENDOSCOPIC GASTROSTOMY  TUBE INSERTION;  Surgeon: Shade Campos MD;  Location: [facility] OR;  Service:    • FOREIGN BODY REMOVAL      insulin needle removal x 3 from the abdomen   • HYSTERECTOMY         Family History   Problem Relation Age of Onset   • Colon cancer Mother    • Cancer Father    • Diabetes Maternal Grandmother    • Heart disease Neg Hx    • Stroke Neg Hx        Social History     Social History   • Marital status:      Spouse name: N/A   • Number of children: N/A   • Years of education: N/A     Social History Main Topics   • Smoking status: Current Every Day Smoker     Packs/day: 4.50     Years: 35.00     Types: Cigarettes   • Smokeless tobacco: None      Comment: She cut down to 1/4 PPD for 2.5 months ago   • Alcohol use No   • Drug use: No   • Sexual activity: Defer     Other Topics Concern   • None     Social History Narrative           Objective   Physical Exam    Procedures         ED Course  ED Course   Comment By Time   RT unable to acquire ABG, patient refusing further sticks Pk Bowling MD 06/03 2123      Assumed care from Dr. Perez at evening shift change.  Patient awaiting CT of abdomen pelvis with results to the hospitalist prior to admission.       FL Video Swallow   Final Result   No evidence of aspiration.       For additional information please see the report provided by the speech   therapy service.       This report was finalized on 6/5/2017 1:12 PM by Dr. Daniel Yates MD.          XR Chest AP   Final Result   No evidence of active or acute cardiopulmonary disease on today's chest   radiograph.           This report was finalized on 6/5/2017 8:15 AM by Dr. Daniel Yates MD.          XR Chest 1 View   Final Result   PATCHY BILATERAL AIRSPACE DISEASE VERSUS CHRONIC SCARRING.       This report was finalized on 6/4/2017 7:58 AM by Dr. Bret Morris MD.          CT Abdomen Pelvis With Contrast   Final Result   Impression:   Thickened small bowel that may represent enteritis.            This report was finalized on 6/4/2017 7:57 AM by Dr. Bret Morris MD.            Labs Reviewed   URINE CULTURE - Abnormal; Notable for the following:        Result Value    Urine Culture >100,000 CFU/mL Enterobacter aerogenes (*)     All other components within normal limits   COMPREHENSIVE METABOLIC PANEL - Abnormal; Notable for the following:     Glucose 505 (*)     CO2 21.7 (*)     ALT (SGPT) 52 (*)     A/G Ratio 1.0 (*)     All other components within normal limits   URINALYSIS W/ CULTURE IF INDICATED - Abnormal; Notable for the following:     Appearance, UA Cloudy (*)     Specific Gravity, UA >1.030 (*)     Glucose, UA >=1000 mg/dL (3+) (*)     Blood, UA Trace (*)     Protein, UA Trace (*)     Leuk Esterase, UA Small (1+) (*)     Nitrite, UA Positive (*)     All other components within normal limits   LACTIC ACID, PLASMA - Abnormal; Notable for the following:     Lactate 3.5 (*)     All other components within normal limits   C-REACTIVE PROTEIN - Abnormal; Notable for the following:     C-Reactive Protein 4.16 (*)     All other components within normal limits   SEDIMENTATION RATE - Abnormal; Notable for the following:     Sed Rate 54 (*)     All other components within normal limits   BNP (IN-HOUSE) - Abnormal; Notable for the following:     .0 (*)     All other components within normal limits   CBC WITH AUTO DIFFERENTIAL - Abnormal; Notable for the following:     Hemoglobin 11.4 (*)     MCV 79.1 (*)     MCH 23.6 (*)     MCHC 29.8 (*)     RDW 18.8 (*)     MPV 10.1 (*)     Neutrophil % 88.4 (*)     Lymphocyte % 6.6 (*)     Immature Grans % 2.0 (*)     Neutrophils, Absolute 6.57 (*)     Lymphocytes, Absolute 0.49 (*)     Immature Grans, Absolute 0.15 (*)     All other components within normal limits   URINALYSIS, MICROSCOPIC ONLY - Abnormal; Notable for the following:     RBC, UA 6-12 (*)     WBC, UA Too Numerous to Count (*)     Bacteria, UA 4+ (*)     All other components within normal limits    LACTATE ACID, REFLEX - Abnormal; Notable for the following:     Lactate 5.3 (*)     All other components within normal limits   BASIC METABOLIC PANEL - Abnormal; Notable for the following:     Glucose 300 (*)     CO2 24.1 (*)     All other components within normal limits    Narrative:     GFR Normal >60  Chronic Kidney Disease <60  Kidney Failure <15   BLOOD GAS, ARTERIAL - Abnormal; Notable for the following:     pCO2, Arterial 31.6 (*)     pO2, Arterial 100.1 (*)     HCO3, Arterial 21.2 (*)     Hemoglobin, Blood Gas 10.5 (*)     Hematocrit, Blood Gas 31.0 (*)     All other components within normal limits   CBC WITH AUTO DIFFERENTIAL - Abnormal; Notable for the following:     RBC 4.12 (*)     Hemoglobin 9.5 (*)     Hematocrit 32.9 (*)     MCV 79.9 (*)     MCH 23.1 (*)     MCHC 28.9 (*)     RDW 18.6 (*)     Neutrophil % 81.9 (*)     Lymphocyte % 10.5 (*)     Immature Grans % 1.7 (*)     Neutrophils, Absolute 7.39 (*)     Lymphocytes, Absolute 0.95 (*)     Immature Grans, Absolute 0.15 (*)     All other components within normal limits    Narrative:     Patient received large bolus in ED, possible reason for Hgb drop. Per Zachariah MASTERS    LACTIC ACID, PLASMA - Abnormal; Notable for the following:     Lactate 2.6 (*)     All other components within normal limits   HEMOGLOBIN A1C - Abnormal; Notable for the following:     Hemoglobin A1C 10.60 (*)     All other components within normal limits   LACTIC ACID, PLASMA - Abnormal; Notable for the following:     Lactate 3.2 (*)     All other components within normal limits   COMPREHENSIVE METABOLIC PANEL - Abnormal; Notable for the following:     Glucose 434 (*)     Albumin 3.30 (*)     ALT (SGPT) 45 (*)     Total Bilirubin 0.1 (*)     A/G Ratio 1.0 (*)     All other components within normal limits   C-REACTIVE PROTEIN - Abnormal; Notable for the following:     C-Reactive Protein 1.39 (*)     All other components within normal limits   CBC WITH AUTO DIFFERENTIAL - Abnormal;  Notable for the following:     Hemoglobin 10.6 (*)     Hematocrit 36.6 (*)     MCH 23.4 (*)     MCHC 29.0 (*)     RDW 18.6 (*)     Neutrophil % 85.2 (*)     Lymphocyte % 8.1 (*)     Immature Grans % 2.0 (*)     Lymphocytes, Absolute 0.48 (*)     Immature Grans, Absolute 0.12 (*)     All other components within normal limits   POCT GLUCOSE FINGERSTICK - Abnormal; Notable for the following:     Glucose 419 (*)     All other components within normal limits    Narrative:     Meter: [id number] : [id number] YUSUF JAMIL   POCT GLUCOSE FINGERSTICK - Abnormal; Notable for the following:     Glucose 406 (*)     All other components within normal limits    Narrative:     Meter: [id number] : [id number] PLASCENCIAMONET BUTTERFIELD   POCT GLUCOSE FINGERSTICK - Abnormal; Notable for the following:     Glucose 140 (*)     All other components within normal limits    Narrative:     Meter: [id number] : [id number] pace emmy   POCT GLUCOSE FINGERSTICK - Abnormal; Notable for the following:     Glucose 203 (*)     All other components within normal limits    Narrative:     Meter: [id number] : [id number] pace emmy   POCT GLUCOSE FINGERSTICK - Abnormal; Notable for the following:     Glucose 250 (*)     All other components within normal limits    Narrative:     Meter: [id number] : [id number] pace emmy   POCT GLUCOSE FINGERSTICK - Abnormal; Notable for the following:     Glucose 212 (*)     All other components within normal limits    Narrative:     Meter: [id number] : [id number] zaira sinclair   POCT GLUCOSE FINGERSTICK - Abnormal; Notable for the following:     Glucose 422 (*)     All other components within normal limits    Narrative:     Meter: [id number] : [id number] [employer]   POCT GLUCOSE FINGERSTICK - Abnormal; Notable for the following:     Glucose 328 (*)     All other components within normal limits    Narrative:     Meter: [id number] : [id number] [employer]   POCT GLUCOSE  FINGERSTICK - Abnormal; Notable for the following:     Glucose 390 (*)     All other components within normal limits    Narrative:     Meter: [id number] : [id number] baudilio bautista   POCT GLUCOSE FINGERSTICK - Abnormal; Notable for the following:     Glucose 388 (*)     All other components within normal limits    Narrative:     Meter: [id number] : [id number] charisse cash   BLOOD CULTURE - Normal   BLOOD CULTURE - Normal   OSMOLALITY, CALCULATED - Normal   TROPONIN (IN-HOUSE) - Normal    Narrative:     Ultra Troponin I Reference Range:         <=0.039 ng/mL: Negative    0.04-0.779 ng/mL: Indeterminate Range. Suspicious of MI.  Clinical correlation required.       >=0.78  ng/mL: Consistent with myocardial injury.  Clinical correlation required.   OSMOLALITY, CALCULATED - Normal   MYCOPLASMA PNEUMONIAE ANTIBODY, IGM - Normal   LEGIONELLA ANTIGEN, URINE - Normal    Narrative:     Presumptive negative for L. pneumophilia serogroup 1 antigen, suggesting no recent or current infection.   LACTIC ACID, PLASMA - Normal   OSMOLALITY, CALCULATED - Normal   SCAN SLIDE   C-REACTIVE PROTEIN   LACTIC ACID, PLASMA   COMPREHENSIVE METABOLIC PANEL   PHOSPHORUS   MAGNESIUM   CBC WITH AUTO DIFFERENTIAL   POCT GLUCOSE FINGERSTICK   POCT GLUCOSE FINGERSTICK   POCT GLUCOSE FINGERSTICK   POCT GLUCOSE FINGERSTICK   POCT GLUCOSE FINGERSTICK   POCT GLUCOSE FINGERSTICK   POCT GLUCOSE FINGERSTICK   POCT GLUCOSE FINGERSTICK   POCT GLUCOSE FINGERSTICK   POCT GLUCOSE FINGERSTICK   POCT GLUCOSE FINGERSTICK   POCT GLUCOSE FINGERSTICK   POCT GLUCOSE FINGERSTICK   POCT GLUCOSE FINGERSTICK   CBC AND DIFFERENTIAL    Narrative:     The following orders were created for panel order CBC & Differential.  Procedure                               Abnormality         Status                     ---------                               -----------         ------                     CBC Auto Differential[[id number]]         Abnormal            Final result                  Please view results for these tests on the individual orders.   CBC AND DIFFERENTIAL    Narrative:     The following orders were created for panel order CBC & Differential.  Procedure                               Abnormality         Status                     ---------                               -----------         ------                     Scan Slide[[id number]]                                       Final result               CBC Auto Differential[[id number]]        Abnormal            Final result                 Please view results for these tests on the individual orders.   CBC AND DIFFERENTIAL    Narrative:     The following orders were created for panel order CBC & Differential.  Procedure                               Abnormality         Status                     ---------                               -----------         ------                     CBC Auto Differential[[id number]]        Abnormal            Final result                 Please view results for these tests on the individual orders.   CBC AND DIFFERENTIAL    Narrative:     The following orders were created for panel order CBC & Differential.  Procedure                               Abnormality         Status                     ---------                               -----------         ------                     CBC Auto Differential[[id number]]                                                         Please view results for these tests on the individual orders.        Medication List      ASK your doctor about these medications          albuterol 108 (90 BASE) MCG/ACT inhaler   Commonly known as:  PROVENTIL HFA;VENTOLIN HFA       gabapentin 600 MG tablet   Commonly known as:  NEURONTIN       hydrOXYzine 25 MG capsule   Commonly known as:  VISTARIL       insulin glargine 100 UNIT/ML injection   Commonly known as:  LANTUS       ipratropium 0.02 % nebulizer solution   Commonly known as:  ATROVENT   Take 2.5 mL by nebulization  Every 6 (Six) Hours As Needed for Shortness of   Air.       lisinopril 5 MG tablet   Commonly known as:  PRINIVIL,ZESTRIL       metFORMIN 500 MG tablet   Commonly known as:  GLUCOPHAGE       ondansetron 4 MG tablet   Commonly known as:  ZOFRAN       predniSONE 20 MG tablet   Commonly known as:  DELTASONE       promethazine 25 MG tablet   Commonly known as:  PHENERGAN       sucralfate 1 G tablet   Commonly known as:  CARAFATE       tiZANidine 4 MG tablet   Commonly known as:  ZANAFLEX                     MDM  Number of Diagnoses or Management Options  Urinary tract infection, site unspecified: new and requires workup     Amount and/or Complexity of Data Reviewed  Clinical lab tests: ordered and reviewed  Tests in the radiology section of CPT®: ordered and reviewed  Decide to obtain previous medical records or to obtain history from someone other than the patient: yes  Obtain history from someone other than the patient: yes  Discuss the patient with other providers: yes    Risk of Complications, Morbidity, and/or Mortality  Presenting problems: high  Diagnostic procedures: high  Management options: high    Patient Progress  Patient progress: stable      Final diagnoses:   Urinary tract infection, site unspecified            Pk Bowling MD  06/06/17 0959

## 2017-06-04 NOTE — PROGRESS NOTES
Subjective     History:   Pepito Yan is a 51 y.o. female admitted on 6/3/2017 secondary to Urinary tract infection     Procedures: None    Patient seen and examined with GUERRERO Tobias. Awake and alert. States she feels slightly improved from last PM. Reports a nonproductive cough with some improvement in her dyspnea. Reports pleuritic chest pain associated with her cough. Reports increased urinary frequency. Currently c/o neuropathic pain. No acute events overnight per RN.     History taken from: patient, chart, and RN.      Objective     Vital Signs  Temp:  [97 °F (36.1 °C)-98.1 °F (36.7 °C)] 97.8 °F (36.6 °C)  Heart Rate:  [] 101  Resp:  [18-21] 18  BP: (129-168)/() 138/74    Intake/Output Summary (Last 24 hours) at 06/04/17 1321  Last data filed at 06/04/17 0858   Gross per 24 hour   Intake              759 ml   Output                0 ml   Net              759 ml         Physical Exam:  General:    Awake, alert, in no acute distress, chronically ill appearing   Heart:      Normal S1 and S2. Tachycardic. No significant murmur, rubs or gallops appreciated.   Lungs:     Respirations regular, even and unlabored. Slightly decreased aeration throughout. No wheezes or rhonchi.    Abdomen:   Soft and nontender. No guarding, rebound tenderness or  organomegaly noted. Bowel sounds present x 4.   Extremities:  No edema. Moves UE and LE equally B/L.     Results Review:      Results from last 7 days  Lab Units 06/04/17  0126 06/03/17  1317   WBC 10*3/mm3 9.02 7.43   HEMOGLOBIN g/dL 9.5* 11.4*   PLATELETS 10*3/mm3 242 266       Results from last 7 days  Lab Units 06/04/17  0126 06/03/17  1317   SODIUM mmol/L 138 138   POTASSIUM mmol/L 4.2 3.9   CHLORIDE mmol/L 110 108   TOTAL CO2 mmol/L 24.1* 21.7*   BUN mg/dL 10 9   CREATININE mg/dL 0.54 0.70   CALCIUM mg/dL 8.6 9.5   GLUCOSE mg/dL 300* 505*       Results from last 7 days  Lab Units 06/03/17  1317   BILIRUBIN mg/dL 0.2   ALK PHOS U/L 80   AST (SGOT) U/L 19    ALT (SGPT) U/L 52*               Results from last 7 days  Lab Units 06/03/17  1740   TROPONIN I ng/mL <0.006       Imaging Results (last 24 hours)     Procedure Component Value Units Date/Time    CT Abdomen Pelvis With Contrast [[id number]] Collected:  06/04/17 0756     Updated:  06/04/17 0759    Narrative:          CT ABDOMEN PELVIS W CONTRAST-        TECHNIQUE: Multiple axial CT images were obtained from lung bases  through pubic symphysis with administration of IV contrast. Reformatted  images in the coronal and/or sagittal plane(s) were generated from the  axial data set to facilitate diagnostic accuracy and/or surgical  planning.  Oral Contrast:NONE.     Radiation dose reduction techniques were utilized per ALARA protocol.  Automated exposure control was initiated through either or [employer] or  [employer] software packages by  protocol.          [id number] mGy.cm     Clinical information  Pain           Comparison  NONE.     Findings  LOWER THORAX: Clear. No effusions.     ABDOMEN:        LIVER: FATTY LIVER INFILTRATION.        GALLBLADDER: CHOLECYSTECTOMY CLIPS ARE NOTED.        PANCREAS: Unremarkable. No mass or ductal dilatation.        SPLEEN: Homogeneous. No splenomegaly.        ADRENALS: No mass.        KIDNEYS: BILATERAL RENAL CYSTS.        GI TRACT: THICKENED SMALL BOWEL THAT MAY REPRESENT ENTERITIS.        PERITONEUM: No free air. No free fluid or loculated fluid  collections.        MESENTERY: Unremarkable.        LYMPH NODES: No lymphadenopathy.        VASCULATURE: ATHEROSCLEROTIC VASCULAR CALCIFICATION.        ABDOMINAL WALL: No focal hernia or mass.           OTHER: None.     PELVIS:        BLADDER: No focal mass or significant wall thickening        REPRODUCTIVE: Unremarkable as visualized.        APPENDIX: Nondistended. No surrounding inflammation.     BONES: MULTIPLE PROBABLY CHRONIC VERTEBRAL BODY COMPRESSION DEFORMITIES.       Impression:       Impression:  Thickened small bowel  that may represent enteritis.        This report was finalized on 6/4/2017 7:57 AM by Dr. Bret Morris MD.       XR Chest 1 View [[id number]] Collected:  06/04/17 0757     Updated:  06/04/17 0800    Narrative:       EXAMINATION: XR CHEST 1 VW-      CLINICAL INDICATION:     SOB     TECHNIQUE:  XR CHEST 1 VW-      COMPARISON: NONE      FINDINGS:   PATCHY RIGHT UPPER LOBE AND LEFT LUNG BASE AIRSPACE OPACITIES THAT MAY  REPRESENT SCARRING.  Heart and mediastinum contours are unremarkable.  No pleural effusion.  No pneumothorax.   Bony and soft tissue structures are unremarkable.       Impression:       PATCHY BILATERAL AIRSPACE DISEASE VERSUS CHRONIC SCARRING.     This report was finalized on 6/4/2017 7:58 AM by Dr. Bret Morris MD.               Medications:    cefepime 2 g Intravenous Q8H   enoxaparin 40 mg Subcutaneous Daily   gabapentin 300 mg Oral TID   insulin aspart 0-14 Units Subcutaneous 4x Daily AC & at Bedtime   insulin detemir 20 Units Subcutaneous Nightly   ipratropium-albuterol 3 mL Nebulization Q6H - RT   methylPREDNISolone sodium succinate 20 mg Intravenous Q12H   metroNIDAZOLE 500 mg Intravenous Q8H   vancomycin 750 mg Intravenous Q12H              Assessment/Plan   Septic shock (lactate>4): Likely 2/2 UTI with concerns for pneumonia as well. Currently hemodynamically stable. WBC stable and lactic acid has improved. Blood cultures with NGTD. Will broaden antibiotic regimen to Vanc, Cefepime and Flagyl. Repeat lactic acid. Cont to follow cultures and repeat labs in the AM.     UTI: Urine culture currently revealing >100,000 GNR. Cont Cefepime. Cont to follow cultures and repeat labs in the AM.     Possible bilateral pneumonia: Possible pneumonia vs scarring noted on CXR. Pt reports cough and dyspnea but these symptoms may be related to COPD exacerbation. Check legionella and mycoplasma. Order speech eval as she has a hx of aspiration. Broaden antibiotic regimen as outlined above. Repeat CXR in the AM.      Acute exacerbation of COPD in setting of chronic hypoxic respiratory failure and ongoing tobacco abuse: Will add Solumedrol but at a lower dose in the setting of hyperglycemia. Pt is also on chronic steroid therapy at home in setting of COPD and RA. Add scheduled Duonebs and Mucinex. Cont supplemental O2. Cont to encourage tobacco cessation.     DM II, insulin-dependent with neuropathy: Currently hyperglycemic but improved from upon admission. Check HgbA1c. Start basal insulin at 20 units (waiting for home meds to be confirmed) in addition to a SSI with Accuchecks. Restart Neurontin at a lower than reported dose 2/2 above and will reevaluate once home medications are verified.     Chronic anemia: No active signs of bleeding. Cont to monitor. Repeat CBC in the AM.     RA: Starting steroids above her home dose in the setting of COPD exacerbation as outlined above.     DVT PPX: SQ Lovenox     Pt is at high risk 2/2 septic shock, UTI, possible pneumonia, COPD exacerbation, chronic hypoxic respiratory failure, DM, anemia, RA, tobacco abuse and hx of noncompliance.       Manuel Elizalde,   06/04/17  1:21 PM

## 2017-06-05 ENCOUNTER — APPOINTMENT (OUTPATIENT)
Dept: GENERAL RADIOLOGY | Facility: HOSPITAL | Age: 52
End: 2017-06-05

## 2017-06-05 ENCOUNTER — APPOINTMENT (OUTPATIENT)
Dept: GENERAL RADIOLOGY | Facility: HOSPITAL | Age: 52
End: 2017-06-05
Attending: INTERNAL MEDICINE

## 2017-06-05 LAB
ALBUMIN SERPL-MCNC: 3.3 G/DL (ref 3.5–5)
ALBUMIN/GLOB SERPL: 1 G/DL (ref 1.5–2.5)
ALP SERPL-CCNC: 65 U/L (ref 35–104)
ALT SERPL W P-5'-P-CCNC: 45 U/L (ref 10–36)
ANION GAP SERPL CALCULATED.3IONS-SCNC: 5.3 MMOL/L (ref 3.6–11.2)
AST SERPL-CCNC: 26 U/L (ref 10–30)
BACTERIA SPEC AEROBE CULT: ABNORMAL
BASOPHILS # BLD AUTO: 0.02 10*3/MM3 (ref 0–0.3)
BASOPHILS NFR BLD AUTO: 0.3 % (ref 0–2)
BILIRUB SERPL-MCNC: 0.1 MG/DL (ref 0.2–1.8)
BUN BLD-MCNC: 15 MG/DL (ref 7–21)
BUN/CREAT SERPL: 25 (ref 7–25)
CALCIUM SPEC-SCNC: 8.9 MG/DL (ref 7.7–10)
CHLORIDE SERPL-SCNC: 107 MMOL/L (ref 99–112)
CO2 SERPL-SCNC: 24.7 MMOL/L (ref 24.3–31.9)
CREAT BLD-MCNC: 0.6 MG/DL (ref 0.43–1.29)
CRP SERPL-MCNC: 1.39 MG/DL (ref 0–0.99)
D-LACTATE SERPL-SCNC: 3.2 MMOL/L (ref 0.5–2)
DEPRECATED RDW RBC AUTO: 54 FL (ref 37–54)
EOSINOPHIL # BLD AUTO: 0.01 10*3/MM3 (ref 0–0.7)
EOSINOPHIL NFR BLD AUTO: 0.2 % (ref 0–5)
ERYTHROCYTE [DISTWIDTH] IN BLOOD BY AUTOMATED COUNT: 18.6 % (ref 11.5–14.5)
GFR SERPL CREATININE-BSD FRML MDRD: 105 ML/MIN/1.73
GLOBULIN UR ELPH-MCNC: 3.2 GM/DL
GLUCOSE BLD-MCNC: 434 MG/DL (ref 70–110)
GLUCOSE BLDC GLUCOMTR-MCNC: 328 MG/DL (ref 70–130)
GLUCOSE BLDC GLUCOMTR-MCNC: 388 MG/DL (ref 70–130)
GLUCOSE BLDC GLUCOMTR-MCNC: 390 MG/DL (ref 70–130)
GLUCOSE BLDC GLUCOMTR-MCNC: 422 MG/DL (ref 70–130)
HCT VFR BLD AUTO: 36.6 % (ref 37–47)
HGB BLD-MCNC: 10.6 G/DL (ref 12–16)
IMM GRANULOCYTES # BLD: 0.12 10*3/MM3 (ref 0–0.03)
IMM GRANULOCYTES NFR BLD: 2 % (ref 0–0.5)
LYMPHOCYTES # BLD AUTO: 0.48 10*3/MM3 (ref 1–3)
LYMPHOCYTES NFR BLD AUTO: 8.1 % (ref 21–51)
MCH RBC QN AUTO: 23.4 PG (ref 27–33)
MCHC RBC AUTO-ENTMCNC: 29 G/DL (ref 33–37)
MCV RBC AUTO: 80.8 FL (ref 80–94)
MONOCYTES # BLD AUTO: 0.25 10*3/MM3 (ref 0.1–0.9)
MONOCYTES NFR BLD AUTO: 4.2 % (ref 0–10)
NEUTROPHILS # BLD AUTO: 5.07 10*3/MM3 (ref 1.4–6.5)
NEUTROPHILS NFR BLD AUTO: 85.2 % (ref 30–70)
OSMOLALITY SERPL CALC.SUM OF ELEC: 293.3 MOSM/KG (ref 273–305)
PLATELET # BLD AUTO: 234 10*3/MM3 (ref 130–400)
PMV BLD AUTO: 10 FL (ref 6–10)
POTASSIUM BLD-SCNC: 4.4 MMOL/L (ref 3.5–5.3)
PROT SERPL-MCNC: 6.5 G/DL (ref 6–8)
RBC # BLD AUTO: 4.53 10*6/MM3 (ref 4.2–5.4)
SODIUM BLD-SCNC: 137 MMOL/L (ref 135–153)
WBC NRBC COR # BLD: 5.95 10*3/MM3 (ref 4.5–12.5)

## 2017-06-05 PROCEDURE — 71010 HC CHEST AP: CPT

## 2017-06-05 PROCEDURE — 99233 SBSQ HOSP IP/OBS HIGH 50: CPT | Performed by: INTERNAL MEDICINE

## 2017-06-05 PROCEDURE — G8998 SWALLOW D/C STATUS: HCPCS

## 2017-06-05 PROCEDURE — 63710000001 INSULIN DETEMIR PER 5 UNITS: Performed by: INTERNAL MEDICINE

## 2017-06-05 PROCEDURE — 25010000002 MORPHINE PER 10 MG: Performed by: INTERNAL MEDICINE

## 2017-06-05 PROCEDURE — G8996 SWALLOW CURRENT STATUS: HCPCS

## 2017-06-05 PROCEDURE — 94799 UNLISTED PULMONARY SVC/PX: CPT

## 2017-06-05 PROCEDURE — 25010000002 VANCOMYCIN PER 500 MG: Performed by: INTERNAL MEDICINE

## 2017-06-05 PROCEDURE — 86140 C-REACTIVE PROTEIN: CPT | Performed by: INTERNAL MEDICINE

## 2017-06-05 PROCEDURE — 74230 X-RAY XM SWLNG FUNCJ C+: CPT | Performed by: RADIOLOGY

## 2017-06-05 PROCEDURE — 25010000002 METHYLPREDNISOLONE PER 40 MG: Performed by: INTERNAL MEDICINE

## 2017-06-05 PROCEDURE — 83605 ASSAY OF LACTIC ACID: CPT | Performed by: INTERNAL MEDICINE

## 2017-06-05 PROCEDURE — 25010000002 PROMETHAZINE PER 50 MG: Performed by: INTERNAL MEDICINE

## 2017-06-05 PROCEDURE — 25010000002 ENOXAPARIN PER 10 MG: Performed by: INTERNAL MEDICINE

## 2017-06-05 PROCEDURE — 71010 XR CHEST AP: CPT | Performed by: RADIOLOGY

## 2017-06-05 PROCEDURE — 92611 MOTION FLUOROSCOPY/SWALLOW: CPT

## 2017-06-05 PROCEDURE — 63710000001 INSULIN ASPART PER 5 UNITS: Performed by: INTERNAL MEDICINE

## 2017-06-05 PROCEDURE — 74230 X-RAY XM SWLNG FUNCJ C+: CPT

## 2017-06-05 PROCEDURE — 25010000002 CEFEPIME: Performed by: INTERNAL MEDICINE

## 2017-06-05 PROCEDURE — 80053 COMPREHEN METABOLIC PANEL: CPT | Performed by: INTERNAL MEDICINE

## 2017-06-05 PROCEDURE — 82962 GLUCOSE BLOOD TEST: CPT

## 2017-06-05 PROCEDURE — 25010000002 CEFTRIAXONE: Performed by: INTERNAL MEDICINE

## 2017-06-05 PROCEDURE — G8997 SWALLOW GOAL STATUS: HCPCS

## 2017-06-05 PROCEDURE — 85025 COMPLETE CBC W/AUTO DIFF WBC: CPT | Performed by: INTERNAL MEDICINE

## 2017-06-05 RX ORDER — TIZANIDINE 4 MG/1
4 TABLET ORAL EVERY 8 HOURS PRN
Status: CANCELLED | OUTPATIENT
Start: 2017-06-05

## 2017-06-05 RX ORDER — TIZANIDINE 4 MG/1
4 TABLET ORAL EVERY 8 HOURS PRN
COMMUNITY

## 2017-06-05 RX ORDER — HYDROCODONE BITARTRATE AND ACETAMINOPHEN 5; 325 MG/1; MG/1
1 TABLET ORAL EVERY 8 HOURS PRN
Status: COMPLETED | OUTPATIENT
Start: 2017-06-05 | End: 2017-06-06

## 2017-06-05 RX ORDER — LISINOPRIL 5 MG/1
5 TABLET ORAL DAILY
COMMUNITY

## 2017-06-05 RX ORDER — HYDROXYZINE PAMOATE 25 MG/1
25 CAPSULE ORAL 2 TIMES DAILY PRN
Status: ON HOLD | COMMUNITY
End: 2018-01-19

## 2017-06-05 RX ORDER — PREDNISONE 20 MG/1
20 TABLET ORAL EVERY MORNING
Status: CANCELLED | OUTPATIENT
Start: 2017-06-05

## 2017-06-05 RX ORDER — PREDNISONE 20 MG/1
20 TABLET ORAL
Status: DISCONTINUED | OUTPATIENT
Start: 2017-06-06 | End: 2017-06-06 | Stop reason: HOSPADM

## 2017-06-05 RX ORDER — SUCRALFATE 1 G/1
1 TABLET ORAL 4 TIMES DAILY
Status: DISCONTINUED | OUTPATIENT
Start: 2017-06-05 | End: 2017-06-06 | Stop reason: HOSPADM

## 2017-06-05 RX ORDER — PROMETHAZINE HYDROCHLORIDE 25 MG/1
25 TABLET ORAL EVERY 6 HOURS PRN
Status: CANCELLED | OUTPATIENT
Start: 2017-06-05

## 2017-06-05 RX ORDER — TIZANIDINE 4 MG/1
4 TABLET ORAL EVERY 8 HOURS PRN
Status: DISCONTINUED | OUTPATIENT
Start: 2017-06-05 | End: 2017-06-06 | Stop reason: HOSPADM

## 2017-06-05 RX ORDER — PROMETHAZINE HYDROCHLORIDE 25 MG/1
25 TABLET ORAL EVERY 6 HOURS PRN
Status: DISCONTINUED | OUTPATIENT
Start: 2017-06-05 | End: 2017-06-06 | Stop reason: HOSPADM

## 2017-06-05 RX ORDER — SUCRALFATE 1 G/1
1 TABLET ORAL 4 TIMES DAILY
COMMUNITY
End: 2017-07-28 | Stop reason: HOSPADM

## 2017-06-05 RX ORDER — LISINOPRIL 2.5 MG/1
5 TABLET ORAL DAILY
Status: DISCONTINUED | OUTPATIENT
Start: 2017-06-05 | End: 2017-06-06 | Stop reason: HOSPADM

## 2017-06-05 RX ORDER — GABAPENTIN 600 MG/1
600 TABLET ORAL 3 TIMES DAILY
COMMUNITY
End: 2017-09-11

## 2017-06-05 RX ORDER — INSULIN GLARGINE 100 [IU]/ML
40 INJECTION, SOLUTION SUBCUTANEOUS NIGHTLY
Status: ON HOLD | COMMUNITY
End: 2017-10-19

## 2017-06-05 RX ORDER — HYDROXYZINE HYDROCHLORIDE 25 MG/1
25 TABLET, FILM COATED ORAL 3 TIMES DAILY PRN
Status: DISCONTINUED | OUTPATIENT
Start: 2017-06-05 | End: 2017-06-06 | Stop reason: HOSPADM

## 2017-06-05 RX ORDER — DOXYCYCLINE 100 MG/1
100 CAPSULE ORAL EVERY 12 HOURS SCHEDULED
Status: DISCONTINUED | OUTPATIENT
Start: 2017-06-05 | End: 2017-06-06 | Stop reason: HOSPADM

## 2017-06-05 RX ORDER — ALBUTEROL SULFATE 90 UG/1
2 AEROSOL, METERED RESPIRATORY (INHALATION)
Status: ON HOLD | COMMUNITY
End: 2018-01-19

## 2017-06-05 RX ORDER — GABAPENTIN 300 MG/1
600 CAPSULE ORAL EVERY 8 HOURS SCHEDULED
Status: CANCELLED | OUTPATIENT
Start: 2017-06-05

## 2017-06-05 RX ORDER — PREDNISONE 20 MG/1
20 TABLET ORAL EVERY MORNING
COMMUNITY
End: 2017-07-28 | Stop reason: HOSPADM

## 2017-06-05 RX ORDER — ONDANSETRON 4 MG/1
4 TABLET, FILM COATED ORAL EVERY 6 HOURS PRN
Status: DISCONTINUED | OUTPATIENT
Start: 2017-06-05 | End: 2017-06-06 | Stop reason: HOSPADM

## 2017-06-05 RX ORDER — PROMETHAZINE HYDROCHLORIDE 25 MG/1
25 TABLET ORAL EVERY 6 HOURS PRN
Status: ON HOLD | COMMUNITY
End: 2017-10-17

## 2017-06-05 RX ORDER — ONDANSETRON 4 MG/1
4 TABLET, FILM COATED ORAL EVERY 6 HOURS PRN
COMMUNITY
End: 2017-07-28 | Stop reason: HOSPADM

## 2017-06-05 RX ADMIN — GABAPENTIN 300 MG: 300 CAPSULE ORAL at 07:29

## 2017-06-05 RX ADMIN — LISINOPRIL 5 MG: 2.5 TABLET ORAL at 14:54

## 2017-06-05 RX ADMIN — TIZANIDINE 4 MG: 4 TABLET ORAL at 21:22

## 2017-06-05 RX ADMIN — IPRATROPIUM BROMIDE AND ALBUTEROL SULFATE 3 ML: .5; 3 SOLUTION RESPIRATORY (INHALATION) at 12:30

## 2017-06-05 RX ADMIN — HYDROCODONE BITARTRATE AND ACETAMINOPHEN 1 TABLET: 5; 325 TABLET ORAL at 12:18

## 2017-06-05 RX ADMIN — METHYLPREDNISOLONE SODIUM SUCCINATE 20 MG: 40 INJECTION, POWDER, FOR SOLUTION INTRAMUSCULAR; INTRAVENOUS at 12:28

## 2017-06-05 RX ADMIN — SUCRALFATE 1 G: 1 TABLET ORAL at 20:50

## 2017-06-05 RX ADMIN — DOXYCYCLINE 100 MG: 100 CAPSULE ORAL at 20:50

## 2017-06-05 RX ADMIN — IPRATROPIUM BROMIDE AND ALBUTEROL SULFATE 3 ML: .5; 3 SOLUTION RESPIRATORY (INHALATION) at 19:31

## 2017-06-05 RX ADMIN — INSULIN DETEMIR 45 UNITS: 100 INJECTION, SOLUTION SUBCUTANEOUS at 21:00

## 2017-06-05 RX ADMIN — METHYLPREDNISOLONE SODIUM SUCCINATE 20 MG: 40 INJECTION, POWDER, FOR SOLUTION INTRAMUSCULAR; INTRAVENOUS at 02:37

## 2017-06-05 RX ADMIN — GABAPENTIN 300 MG: 300 CAPSULE ORAL at 14:55

## 2017-06-05 RX ADMIN — VANCOMYCIN HYDROCHLORIDE 750 MG: 5 INJECTION, POWDER, LYOPHILIZED, FOR SOLUTION INTRAVENOUS at 04:27

## 2017-06-05 RX ADMIN — GUAIFENESIN 1200 MG: 600 TABLET, EXTENDED RELEASE ORAL at 20:50

## 2017-06-05 RX ADMIN — GUAIFENESIN 1200 MG: 600 TABLET, EXTENDED RELEASE ORAL at 07:30

## 2017-06-05 RX ADMIN — SUCRALFATE 1 G: 1 TABLET ORAL at 14:55

## 2017-06-05 RX ADMIN — METRONIDAZOLE 500 MG: 500 INJECTION, SOLUTION INTRAVENOUS at 04:27

## 2017-06-05 RX ADMIN — SUCRALFATE 1 G: 1 TABLET ORAL at 17:15

## 2017-06-05 RX ADMIN — ENOXAPARIN SODIUM 40 MG: 40 INJECTION SUBCUTANEOUS at 07:30

## 2017-06-05 RX ADMIN — GABAPENTIN 300 MG: 300 CAPSULE ORAL at 20:50

## 2017-06-05 RX ADMIN — INSULIN ASPART 12 UNITS: 100 INJECTION, SOLUTION INTRAVENOUS; SUBCUTANEOUS at 17:15

## 2017-06-05 RX ADMIN — DOXYCYCLINE 100 MG: 100 CAPSULE ORAL at 09:55

## 2017-06-05 RX ADMIN — IPRATROPIUM BROMIDE AND ALBUTEROL SULFATE 3 ML: .5; 3 SOLUTION RESPIRATORY (INHALATION) at 06:56

## 2017-06-05 RX ADMIN — CEFTRIAXONE 2 G: 2 INJECTION, POWDER, FOR SOLUTION INTRAMUSCULAR; INTRAVENOUS at 09:55

## 2017-06-05 RX ADMIN — CEFEPIME 2 G: 2 INJECTION, POWDER, FOR SOLUTION INTRAVENOUS at 02:37

## 2017-06-05 RX ADMIN — HYDROCODONE BITARTRATE AND ACETAMINOPHEN 1 TABLET: 5; 325 TABLET ORAL at 20:49

## 2017-06-05 RX ADMIN — MORPHINE SULFATE 4 MG: 4 INJECTION, SOLUTION INTRAMUSCULAR; INTRAVENOUS at 18:30

## 2017-06-05 RX ADMIN — INSULIN ASPART 12 UNITS: 100 INJECTION, SOLUTION INTRAVENOUS; SUBCUTANEOUS at 20:50

## 2017-06-05 RX ADMIN — INSULIN ASPART 10 UNITS: 100 INJECTION, SOLUTION INTRAVENOUS; SUBCUTANEOUS at 12:28

## 2017-06-05 RX ADMIN — INSULIN ASPART 14 UNITS: 100 INJECTION, SOLUTION INTRAVENOUS; SUBCUTANEOUS at 07:29

## 2017-06-05 RX ADMIN — HYDROXYZINE 25 MG: 25 TABLET, FILM COATED ORAL at 21:22

## 2017-06-05 RX ADMIN — PROMETHAZINE HYDROCHLORIDE 12.5 MG: 25 INJECTION INTRAMUSCULAR; INTRAVENOUS at 06:29

## 2017-06-05 RX ADMIN — HYDROCODONE BITARTRATE AND ACETAMINOPHEN 1 TABLET: 5; 325 TABLET ORAL at 04:28

## 2017-06-05 NOTE — CONSULTS
INFECTIOUS DISEASE CONSULTATION REPORT      Referring Provider: Dr. Elizalde  Reason for Consultation: Septic shock, UTI, possible pneumonia      Principal problem: Urinary tract infection    Subjective .     History of present illness:    As you well know Dr. Elizalde, MsNeptali Yan is a 51 y.o. years old female with past medical history significant for end-stage COPD with chronic respiratory failure on home oxygen and ischemic cardiomyopathy who presented with shortness of breath, chest pain with deep inspiration, and worsening cough productive of minimal amount of sputum without fever or chills.  In the ED was found to have very elevated lactic acid of 3.5 along with a UTI.    Infectious Disease consultation was requested for antimicrobial management.     History taken from: patient chart RN    Case was discussed with patient and nursing staff    Review of Systems    Constitutional: See history of present illness  Eyes: no eye drainage, itching or redness.  HEENT: no mouth sores, dysphagia or nose bleed.  Respiratory: See history of present illness  Cardiovascular: See history of present illness.  Positive for pleuritic chest pain.  Gastrointestinal: no nausea, vomiting or diarrhea. No abdominal pain, hematemesis or rectal bleeding.  Genitourinary: no dysuria or polyuria.  Hematologic/lymphatic: no lymph node abnormalities, no easy bruising or easy bleeding.  Musculoskeletal: no muscle or joint pain.  Skin: No rash and no itching.  Neurological: no loss of consciousness, no seizure, no headache.  Behavioral/Psych: no depression or suicidal ideation.  Endocrine: no hot flashes.  Immunologic: negative.    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   • Chronic respiratory failure with hypoxia     at nighttime   • Compression fracture of lumbar vertebra    • Compression fracture of thoracic vertebra    • COPD (chronic  obstructive pulmonary disease)    • Coronary artery disease    • Diabetes mellitus type 2 in nonobese    • Erosive esophagitis 2016   • Essential hypertension    • Gastroparesis    • GERD (gastroesophageal reflux disease)    • Hepatitis-C    • Medically noncompliant    • MRSA infection     back of neck around    • NSTEMI (non-ST elevated myocardial infarction) 2016   • PE (pulmonary embolism)    • Protein calorie malnutrition    • RA (rheumatoid arthritis)     with chronic steroid use       Past Surgical History    Past Surgical History:   Procedure Laterality Date   • AMPUTATION DIGIT Right 2016    Third digit to the PIP. Surgeon: Dr. Bains   • AMPUTATION DIGIT Left 2016    Procedure: PARTIAL AMPUTATION LEFT THIRD FINGER;  Surgeon: Shane Fuller MD;  Location: [facility];  Service:    • AMPUTATION REVISION Left 2016    Procedure: AMPUTATION REVISION DIGIT STUMP;  Surgeon: Shane Fuller MD;  Location: [facility];  Service:    • ARM DEBRIDEMENT Left 2016    Procedure: HAND IRRIGATION DEBRIDEMENT AND REPAIR ;  Surgeon: Shane Fuller MD;  Location: [facility] OR;  Service:    •  SECTION      x 2   • CHOLECYSTECTOMY     • ENDOSCOPY N/A 2016    Procedure: ESOPHAGOGASTRODUODENOSCOPY;  Surgeon: Jeremy Clement III, MD;  Location: [facility];  Service:    • ENDOSCOPY W/ PEG TUBE PLACEMENT N/A 3/30/2017    Procedure: ESOPHAGOGASTRODUODENOSCOPY WITH PERCUTANEOUS ENDOSCOPIC GASTROSTOMY TUBE INSERTION;  Surgeon: Shade Campos MD;  Location: [facility];  Service:    • FOREIGN BODY REMOVAL      insulin needle removal x 3 from the abdomen   • HYSTERECTOMY         Family History    Family History   Problem Relation Age of Onset   • Colon cancer Mother    • Cancer Father    • Diabetes Maternal Grandmother    • Heart disease Neg Hx    • Stroke Neg Hx        Social History    Social History   Substance Use Topics   • Smoking status: Current Every Day  "Smoker     Packs/day: 4.50     Years: 35.00     Types: Cigarettes   • Smokeless tobacco: None      Comment: She cut down to 1/4 PPD for 2.5 months ago   • Alcohol use No       Allergies    Penicillins; Toradol [ketorolac tromethamine]; and Tramadol    Objective     BP (!) 171/102 (BP Location: Left arm, Patient Position: Lying)  Pulse 99  Temp 97.6 °F (36.4 °C) (Oral)   Resp 18  Ht 63\" (160 cm)  Wt 121 lb 4.8 oz (55 kg)  LMP  (LMP Unknown)  SpO2 96%  BMI 21.49 kg/m2    Temp:  [96.9 °F (36.1 °C)-98.1 °F (36.7 °C)] 97.6 °F (36.4 °C)        Intake/Output Summary (Last 24 hours) at 06/05/17 0943  Last data filed at 06/05/17 0300   Gross per 24 hour   Intake              480 ml   Output              900 ml   Net             -420 ml         Physical Exam:     General Appearance:    On nasal cannula    Head:    Normocephalic, without obvious abnormality, atraumatic   Eyes:            Lids and lashes normal, conjunctivae and sclerae normal, no   icterus, no pallor, corneas clear, PERRLA   Ears:    Ears appear intact with no abnormalities noted   Throat:   No oral lesions, no thrush, oral mucosa moist   Neck:   No adenopathy, supple, trachea midline, no thyromegaly, no   carotid bruit, no JVD   Back:     No tenderness to percussion or palpation, range of motion   normal   Lungs:    Bilateral scattered rhonchi on inspiration and wheezing on expiration .    Heart:    Regular rhythm and normal rate, normal S1 and S2, no            murmur, no gallop, no rub, no click   Chest Wall:    No abnormalities observed   Abdomen:     Normal bowel sounds, no masses, no organomegaly, soft        non-tender, non-distended, no guarding, no rebound                tenderness   Rectal:     Deferred   Extremities:   Moves all extremities well, no edema, no cyanosis, no             redness   Pulses:   Pulses palpable and equal bilaterally   Skin:   Generalized bruising    Lymph nodes:   No palpable adenopathy   Neurologic:   Awake, alert " and oriented x 3. Following commands.       Results:      Results from last 7 days  Lab Units 06/05/17  0531 06/04/17  0126 06/03/17  1317   WBC 10*3/mm3 5.95 9.02 7.43     Lab Results   Component Value Date    NEUTROABS 5.07 06/05/2017         Results from last 7 days  Lab Units 06/05/17  0531   CREATININE mg/dL 0.60         Results from last 7 days  Lab Units 06/05/17  0531 06/03/17  1317   CRP mg/dL 1.39* 4.16*       Imaging Results (last 24 hours)     Procedure Component Value Units Date/Time    XR Chest AP [[id number]] Collected:  06/05/17 0727     Updated:  06/05/17 0817    Narrative:       XR CHEST AP-     CLINICAL INDICATION: Possible pneumonia; N39.0-Urinary tract infection,  site not specified.          COMPARISON: 06/03/2017.       TECHNIQUE: Single frontal view of the chest.     FINDINGS:     There is no focal alveolar infiltrate or effusion.  The cardiac silhouette is normal. The pulmonary vasculature is  unremarkable.  There is no evidence of an acute osseous abnormality.   There are no suspicious-appearing parenchymal soft tissue nodules.            Impression:       No evidence of active or acute cardiopulmonary disease on today's chest  radiograph.         This report was finalized on 6/5/2017 8:15 AM by Dr. Daniel Yates MD.               Cultures:    Blood Culture   Date Value Ref Range Status   06/03/2017 No growth at 24 hours  Preliminary   06/03/2017 No growth at 24 hours  Preliminary     Urine Culture   Date Value Ref Range Status   06/03/2017 >100,000 CFU/mL Enterobacter aerogenes (A)  Final       Results Review:    I have personally reviewed laboratory data, culture results, radiology studies and antimicrobial therapy.    Hospital Medications (active)       Dose Frequency Start End    acetaminophen (TYLENOL) tablet 650 mg 650 mg Once 6/4/2017 6/4/2017    Sig - Route: Take 2 tablets by mouth 1 (One) Time. - Oral    Cosign for Ordering: Accepted by Manuel Elizalde DO on 6/4/2017  5:21 PM     cefTRIAXone (ROCEPHIN) 2 g/100 mL 0.9% NS VTB (SOFIE) 2 g Every 24 Hours 6/5/2017 6/12/2017    Sig - Route: Infuse 100 mL into a venous catheter Daily. - Intravenous    dextrose (D50W) solution 25 g 25 g Every 15 Minutes PRN 6/4/2017     Sig - Route: Infuse 50 mL into a venous catheter Every 15 (Fifteen) Minutes As Needed for Low Blood Sugar (Blood Sugar Less Than 70, Patient Has IV Access - Unresponsive, NPO or Unable To Safely Swallow). - Intravenous    dextrose (GLUTOSE) oral gel 15 g 15 g Every 15 Minutes PRN 6/4/2017     Sig - Route: Take 15 g by mouth Every 15 (Fifteen) Minutes As Needed for Low Blood Sugar (Blood Sugar Less Than 70, Patient Alert, Is Not NPO & Can Safely Swallow). - Oral    doxycycline (MONODOX) capsule 100 mg 100 mg Every 12 Hours Scheduled 6/5/2017 6/10/2017    Sig - Route: Take 1 capsule by mouth Every 12 (Twelve) Hours. - Oral    enoxaparin (LOVENOX) syringe 40 mg 40 mg Daily 6/4/2017     Sig - Route: Inject 0.4 mL under the skin Daily. - Subcutaneous    gabapentin (NEURONTIN) capsule 300 mg 300 mg 3 Times Daily 6/4/2017     Sig - Route: Take 1 capsule by mouth 3 (Three) Times a Day. - Oral    glucagon (human recombinant) (GLUCAGEN DIAGNOSTIC) injection 1 mg 1 mg Every 15 Minutes PRN 6/4/2017     Sig - Route: Inject 1 mg under the skin Every 15 (Fifteen) Minutes As Needed (Blood Glucose Less Than 70 - Patient Without IV Access - Unresponsive, NPO or Unable To Safely Swallow). - Subcutaneous    guaiFENesin (MUCINEX) 12 hr tablet 1,200 mg 1,200 mg Every 12 Hours Scheduled 6/4/2017     Sig - Route: Take 2 tablets by mouth Every 12 (Twelve) Hours. - Oral    HYDROcodone-acetaminophen (NORCO) 5-325 MG per tablet 1 tablet 1 tablet Every 8 Hours PRN 6/4/2017     Sig - Route: Take 1 tablet by mouth Every 8 (Eight) Hours As Needed for Moderate Pain (4-6). - Oral    insulin aspart (novoLOG) injection 0-14 Units 0-14 Units 4 Times Daily Before Meals & Nightly 6/4/2017     Sig - Route: Inject 0-14  "Units under the skin 4 (Four) Times a Day Before Meals & at Bedtime. - Subcutaneous    insulin detemir (LEVEMIR) injection 20 Units 20 Units Nightly 6/4/2017     Sig - Route: Inject 20 Units under the skin Every Night. - Subcutaneous    ipratropium-albuterol (DUO-NEB) nebulizer solution 3 mL 3 mL Every 6 Hours - RT 6/4/2017     Sig - Route: Take 3 mL by nebulization Every 6 (Six) Hours. - Nebulization    methylPREDNISolone sodium succinate (SOLU-Medrol) injection 20 mg 20 mg Every 12 Hours 6/4/2017     Sig - Route: Infuse 0.5 mL into a venous catheter Every 12 (Twelve) Hours. - Intravenous    promethazine (PHENERGAN) 12.5 mg in sodium chloride 0.9 % 50 mL 12.5 mg Every 6 Hours PRN 6/4/2017     Sig - Route: Infuse 12.5 mg into a venous catheter Every 6 (Six) Hours As Needed for Nausea or Vomiting. - Intravenous    Linked Group 1:  \"Or\" Linked Group Details        promethazine (PHENERGAN) tablet 25 mg 25 mg Every 6 Hours PRN 6/4/2017     Sig - Route: Take 1 tablet by mouth Every 6 (Six) Hours As Needed for Nausea or Vomiting. - Oral    Linked Group 1:  \"Or\" Linked Group Details        sodium chloride 0.9 % flush 1-10 mL 1-10 mL As Needed 6/3/2017     Sig - Route: Infuse 1-10 mL into a venous catheter As Needed for Line Care. - Intravenous    sodium chloride 0.9 % flush 10 mL 10 mL As Needed 6/3/2017     Sig - Route: Infuse 10 mL into a venous catheter As Needed for Line Care. - Intravenous    Linked Group 2:  \"And\" Linked Group Details        AZITHROMYCIN 500 MG/250 ML 0.9% NS IVPB (MBP) (Discontinued) 500 mg Every 24 Hours 6/4/2017 6/4/2017    Sig - Route: Infuse 250 mL into a venous catheter Daily. - Intravenous    cefepime (MAXIPIME) 2 g/100 mL 0.9% NS (mbp) (Discontinued) 2 g Every 8 Hours 6/4/2017 6/5/2017    Sig - Route: Infuse 100 mL into a venous catheter Every 8 (Eight) Hours. - Intravenous    metroNIDAZOLE (FLAGYL) IVPB 500 mg (Discontinued) 500 mg Every 8 Hours 6/4/2017 6/5/2017    Sig - Route: Infuse 100 " mL into a venous catheter Every 8 (Eight) Hours. - Intravenous    ondansetron (ZOFRAN) injection 4 mg (Discontinued) 4 mg Every 6 Hours PRN 6/4/2017 6/4/2017    Sig - Route: Infuse 2 mL into a venous catheter Every 6 (Six) Hours As Needed for Nausea or Vomiting. - Intravenous    Pharmacy to dose vancomycin (Discontinued)  Continuous PRN 6/4/2017 6/4/2017    Sig - Route: Continuous As Needed for Consult. - Does not apply    Reason for Discontinue: Dose adjustment    vancomycin (VANCOCIN) 750 mg in sodium chloride 0.9 % 250 mL IVPB (Discontinued) 750 mg Every 12 Hours 6/4/2017 6/5/2017    Sig - Route: Infuse 750 mg into a venous catheter Every 12 (Twelve) Hours. - Intravenous            PROBLEM LIST:    Patient Active Problem List   Diagnosis   • Chest pain   • Chest pain of uncertain etiology   • Intractable nausea and vomiting   • Intractable cyclical vomiting with nausea   • Chronic obstructive pulmonary emphysema   • Hepatitis-C   • Diabetes mellitus type 2 in nonobese   • Arthritis or polyarthritis, rheumatoid   • Anxiety   • Essential hypertension   • Coronary artery disease   • Buerger's disease   • Cirrhosis   • Diabetes   • Chronic nausea   • Pancreatitis   • Bilateral pneumonia   • ARDS (adult respiratory distress syndrome)   • Hypoxia   • Urinary tract infection       Assessment/Plan     ASSESSMENT:    1.  Severe sepsis with lactic acid of 3.5 on admission  2.  Advanced COPD with UTI and superimposed pneumonia versus bronchitis    PLAN:    With urine culture showing growth of Enterobacter and possible acute on chronic bronchitis as well as severe sepsis, antibiotic therapy was changed to Rocephin 2 g IV every 24 hours ×7 days and Doxycycline 100 mg by mouth twice a day ×5 days.  Vancomycin, cefepime, and IV Flagyl were discontinued.  We will continue to follow the patient very closely and adjust antibiotic therapy appropriately.  Chest x-ray from 6/5/2017 in favor of acute on chronic bronchitis rather than  true pneumonia.      Patients findings and recommendations were discussed with patient and nursing staff    Code Status: Full Code     Scribed for Dr. Hitchcock by Alycia Finn PA-C.    Alycia Finn PA-C  06/05/17  9:43 AM    Physician Attestation:    I have personally seen and examined the patient. I reviewed the patient's data including history of present illness, review of systems, physical examination, assessment and treatment plan and agree with findings above. The assessment and plan are my own.  I have reviewed and edited the note above after discussing the findings with Alycia Finn PA-C.    Evan Hitchcock MD  Infectious Diseases  06/05/17  10:50 AM

## 2017-06-05 NOTE — PROGRESS NOTES
[facility] HOSPITALIST PROGRESS NOTE     Patient Identification:  Name:  Pepito Yan  Age:  51 y.o.  Sex:  female  :  1965  MRN:  [id number]  Visit Number:  [id number]  Primary Care Provider:  Evan Hitchcock MD    Length of stay:  2    Chief complaint:  shortness of breath and chest    Subjective:  52 yo  female who presented on 6/3/2017 with trouble breathing; she was found to have septic shock as her lactic acid was over 4 due to a urinary tract infection.  It was thought she may have pneumonia but the CT scan abdomen and pelvis, which showed the lower lungs, and the chest x-ray did not demonstrate any pneumonia.  Today, the patient is doing well.  She has no chest pain but still has some trouble breathing.  Her shortness of air has improved.  She is coughing but not produce any sputum.  She denies dysuria and denies hematuria today but she does have stabbing bilateral lower back pain and some lower abdominal pain.  She denies nausea, denies vomiting, and denies diarrhea.  ----------------------------------------------------------------------------------------------------------------------  Current Hospital Meds:  ceftriaxone 2 g Intravenous Q24H   doxycycline 100 mg Oral Q12H   enoxaparin 40 mg Subcutaneous Daily   gabapentin 300 mg Oral TID   guaiFENesin 1,200 mg Oral Q12H   insulin aspart 0-14 Units Subcutaneous 4x Daily AC & at Bedtime   insulin detemir 30 Units Subcutaneous Nightly   ipratropium-albuterol 3 mL Nebulization Q6H - RT   lisinopril 5 mg Oral Daily   methylPREDNISolone sodium succinate 20 mg Intravenous Q12H   Pharmacy Meds to Bed Consult  Does not apply Daily   sucralfate 1 g Oral 4x Daily   ----------------------------------------------------------------------------------------------------------------------  Vital Signs:  Temp:  [97.6 °F (36.4 °C)-97.8 °F (36.6 °C)] 97.8 °F (36.6 °C)  Heart Rate:  [] 110  Resp:  [18] 18  BP: (113171)/()  133/80  Last 3 weights    06/03/17  1224 06/03/17  2300 06/05/17  0300   Weight: 102 lb (46.3 kg) 119 lb 1.6 oz (54 kg) 121 lb 4.8 oz (55 kg)     Body mass index is 21.49 kg/(m^2).        Diet Regular; Cardiac, Consistent Carbohydrate  ----------------------------------------------------------------------------------------------------------------------  Physical exam:  Constitutional:  Well-developed and well-nourished.  No respiratory distress.      HENT:  Head:  Normocephalic and atraumatic.  Mouth:  Moist mucous membranes.    Eyes:  Conjunctivae and EOM are normal.  Pupils are equal, round, and reactive to light.  No scleral icterus.    Neck:  Neck supple.  No JVD present.    Cardiovascular:  Normal rate, regular rhythm and normal heart sounds with no murmur.  Pulmonary/Chest:  No respiratory distress, no wheezes, no crackles, with normal breath sounds and good air movement.  Abdominal:  Soft.  Bowel sounds are normal.  No distension and no tenderness.  PEG tube in place.    Musculoskeletal:  No edema, no tenderness, and no deformity.  No red or swollen joints anywhere.  Possible left-sided CVA tenderness.    Neurological:  Alert and oriented to person, place, and time.  No cranial nerve deficit.  No tongue deviation.  No facial droop.  No slurred speech.   Skin:  Skin is warm and dry. No rash noted. No pallor.   Peripheral vascular:  Strong pulses in all 4 extremities with no clubbing, no cyanosis, no edema.  Genitourinary:  no King catheter in place.  ----------------------------------------------------------------------------------------------------------------------  Tele:  sinus tachycardia 100 120; I have personally reviewed the telemetry strips.  ----------------------------------------------------------------------------------------------------------------------  Results from last 7 days  Lab Units 06/03/17  1740   TROPONIN I ng/mL <0.006     Results from last 7 days  Lab Units 06/05/17  0553  06/04/17  1241 06/04/17  0126  06/03/17  1317   CRP mg/dL 1.39*  --   --   --  4.16*   LACTATE mmol/L 3.2* 1.6 2.6*  < > 3.5*   WBC 10*3/mm3 5.95  --  9.02  --  7.43   HEMOGLOBIN g/dL 10.6*  --  9.5*  --  11.4*   HEMATOCRIT % 36.6*  --  32.9*  --  38.2   MCV fL 80.8  --  79.9*  --  79.1*   MCHC g/dL 29.0*  --  28.9*  --  29.8*   PLATELETS 10*3/mm3 234  --  242  --  266   < > = values in this interval not displayed.    Results from last 7 days  Lab Units 06/04/17  0422   PH, ARTERIAL pH units 7.444   PO2 ART mm Hg 100.1*   PCO2, ARTERIAL mm Hg 31.6*   HCO3 ART mmol/L 21.2*     Results from last 7 days  Lab Units 06/05/17  0531 06/04/17  0126 06/03/17  1317   SODIUM mmol/L 137 138 138   POTASSIUM mmol/L 4.4 4.2 3.9   CHLORIDE mmol/L 107 110 108   TOTAL CO2 mmol/L 24.7 24.1* 21.7*   BUN mg/dL 15 10 9   CREATININE mg/dL 0.60 0.54 0.70   EGFR IF NONAFRICN AM mL/min/1.73 105 119 88   CALCIUM mg/dL 8.9 8.6 9.5   GLUCOSE mg/dL 434* 300* 505*   ALBUMIN g/dL 3.30*  --  3.70   BILIRUBIN mg/dL 0.1*  --  0.2   ALK PHOS U/L 65  --  80   AST (SGOT) U/L 26  --  19   ALT (SGPT) U/L 45*  --  52*   Estimated Creatinine Clearance: 91.8 mL/min (by C-G formula based on Cr of 0.6).      Blood Culture   Date Value Ref Range Status   06/03/2017 No growth at 24 hours  Preliminary   06/03/2017 No growth at 2 days  Preliminary     Urine Culture   Date Value Ref Range Status   06/03/2017 >100,000 CFU/mL Enterobacter aerogenes (A)  Final       ----------------------------------------------------------------------------------------------------------------------  Imaging Results (last 24 hours)     Procedure Component Value Units Date/Time    XR Chest AP [[id number]] Collected:  06/05/17 0727     Updated:  06/05/17 0817    COMPARISON: 06/03/2017.    FINDINGS:  There is no focal alveolar infiltrate or effusion.  The cardiac silhouette is normal. The pulmonary vasculature is unremarkable.  There is no evidence of an acute osseous abnormality.   There  are no suspicious-appearing parenchymal soft tissue nodules.    Impression:       No evidence of active or acute cardiopulmonary disease on today's chest radiograph.      This report was finalized on 6/5/2017 8:15 AM by Dr. Daniel Yates MD.         FL Video Swallow [[id number]] Collected:  06/05/17 1224     Updated:  06/05/17 1314    FINDINGS: Penetration with honey thickened and thin liquids.  FLUOROSCOPY TIME: 0.8 minutes.  IMAGES: Cine loop was acquired.    Impression:       No evidence of aspiration.  For additional information please see the report provided by the speech therapy service.  This report was finalized on 6/5/2017 1:12 PM by Dr. Daniel Yates MD.      I have personally read the final radiology report.  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  I'll also continue the doxycycline as well.  She has hyperglycemia right now due to the steroids; I will see methylprednisolone to prednisone and only give her 20 mg once a day and see how she does.  For her uncontrolled diabetes, I'll  increase her Levemir to 45 units as she takes 40 at home and is currently only on 30.  I will repeat her blood work morning.  She will continue the NovoLog sliding scale.  I do not believe that the patient had a kidney infection based on physical exam alone as she has chronic low back pain and her reaction to the CVA tenderness portion of the exam did not match that of a person with an infected kidney; CT scan performed on day of admission did not show any pyelonephritis.     The patient is high risk due to the following diagnoses/reasons:  septic shock    Savanah Gutierrez MD  06/05/17  7:51 PM

## 2017-06-05 NOTE — PLAN OF CARE
Problem: Patient Care Overview (Adult)  Goal: Discharge Needs Assessment  Outcome: Ongoing (interventions implemented as appropriate)  Discharge Planning Assessment   Dainel     Patient Name: Pepito Yan               MRN: [id number]  Today's Date: 6/5/2017                       Admit Date: 6/3/2017             Discharge Needs Assessment        06/05/17 1428     Living Environment     Lives With other (see comments)     Living Arrangements apartment       mkbmk       Discharge Plan        06/05/17 1429     Case Management/Social Work Plan     Plan Pt admitted on 6/03/17.  SS received consult per [facility] for discharge planning.  SS spoke with pt on this date.  Pt lives at home with Radhika ruiz, and plans to return home at discharge. Pt currently does not utilie home health services.  Pt currently utilizes home 02 via unknown provider.  Pt states no POA or living will and states she does not want any information regarding these.  Pt's PCP is Archana Meneses.  SS will follow and assist with discharge needs.     Patient/Family In Agreement With Plan yes         Discharge Placement      No information found                     Demographic Summary        06/05/17 1428     Referral Information     Admission Type inpatient     Arrived From admitted as an inpatient     Referral Source nursing     Reason For Consult discharge planning       mkbmk       Functional Status      None       mkbmk       Psychosocial      None       mkbmk       Abuse/Neglect      None       mkbmk       Legal      None       mkbmk       Substance Abuse      None       mkbmk       Patient Forms      None       chrystal Montiel

## 2017-06-05 NOTE — PROGRESS NOTES
Acute Care - Speech Language Pathology Consultation Note   Daniel     Patient Name: Pepito Yan  : 1965  MRN: [id number]  Today's Date: 2017             Consult received and chart reviewed. Pt admitted for chest pain, SOB, and productive cough. Pt has h/o of dysphagia, bilateral pneumonia on , and COPD. History and current status indicate need for instrumental measures to determine safest level of PO intake.    Admit Date: 6/3/2017     Visit Dx:    ICD-10-CM ICD-9-CM   1. Urinary tract infection, site unspecified N39.0      Patient Active Problem List   Diagnosis   • Chest pain   • Chest pain of uncertain etiology   • Intractable nausea and vomiting   • Intractable cyclical vomiting with nausea   • Chronic obstructive pulmonary emphysema   • Hepatitis-C   • Diabetes mellitus type 2 in nonobese   • Arthritis or polyarthritis, rheumatoid   • Anxiety   • Essential hypertension   • Coronary artery disease   • Buerger's disease   • Cirrhosis   • Diabetes   • Chronic nausea   • Pancreatitis   • Bilateral pneumonia   • ARDS (adult respiratory distress syndrome)   • Hypoxia   • Urinary tract infection     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   • Chronic respiratory failure with hypoxia     at nighttime   • Compression fracture of lumbar vertebra    • Compression fracture of thoracic vertebra    • COPD (chronic obstructive pulmonary disease)    • Coronary artery disease    • Diabetes mellitus type 2 in nonobese    • Erosive esophagitis 2016   • Essential hypertension    • Gastroparesis    • GERD (gastroesophageal reflux disease)    • Hepatitis-C    • Medically noncompliant    • MRSA infection     back of neck around    • NSTEMI (non-ST elevated myocardial infarction) 2016   • PE (pulmonary embolism)    • Protein calorie malnutrition    • RA (rheumatoid arthritis)     with chronic steroid use     Past Surgical History:    Procedure Laterality Date   • AMPUTATION DIGIT Right 2016    Third digit to the PIP. Surgeon: Dr. Bains   • AMPUTATION DIGIT Left 2016    Procedure: PARTIAL AMPUTATION LEFT THIRD FINGER;  Surgeon: Shane Fuller MD;  Location: [facility] OR;  Service:    • AMPUTATION REVISION Left 2016    Procedure: AMPUTATION REVISION DIGIT STUMP;  Surgeon: Shane Fuller MD;  Location: [facility] OR;  Service:    • ARM DEBRIDEMENT Left 2016    Procedure: HAND IRRIGATION DEBRIDEMENT AND REPAIR ;  Surgeon: Shane Fuller MD;  Location: [facility] OR;  Service:    •  SECTION      x 2   • CHOLECYSTECTOMY     • ENDOSCOPY N/A 2016    Procedure: ESOPHAGOGASTRODUODENOSCOPY;  Surgeon: Jeremy Clement III, MD;  Location: [facility] OR;  Service:    • ENDOSCOPY W/ PEG TUBE PLACEMENT N/A 3/30/2017    Procedure: ESOPHAGOGASTRODUODENOSCOPY WITH PERCUTANEOUS ENDOSCOPIC GASTROSTOMY TUBE INSERTION;  Surgeon: Shade Campos MD;  Location: [facility] OR;  Service:    • FOREIGN BODY REMOVAL      insulin needle removal x 3 from the abdomen   • HYSTERECTOMY              EDUCATION  The patient has been educated in the following areas:   Dysphagia (Swallowing Impairment).    SLP Recommendation and Plan      MBS this am with further recommendations pending.                                      Time Calculation:                      Naomi Cowart  2017

## 2017-06-05 NOTE — PROGRESS NOTES
Discharge Planning Assessment   Daniel     Patient Name: Pepito Yan  MRN: [id number]  Today's Date: 6/5/2017    Admit Date: 6/3/2017          Discharge Needs Assessment       06/05/17 1428    Living Environment    Lives With other (see comments)    Living Arrangements apartment            Discharge Plan       06/05/17 1429    Case Management/Social Work Plan    Plan Pt admitted on 6/03/17.  SS received consult per [facility] for discharge planning.  SS spoke with pt on this date.  Pt lives at home with Radhika ruiz, and plans to return home at discharge. Pt currently does not utilie home health services.  Pt currently utilizes home 02 via unknown provider.  Pt states no POA or living will and states she does not want any information regarding these.  Pt's PCP is Archana Meneses.  SS will follow and assist with discharge needs.    Patient/Family In Agreement With Plan yes        Discharge Placement     No information found                Demographic Summary       06/05/17 1428    Referral Information    Admission Type inpatient    Arrived From admitted as an inpatient    Referral Source nursing    Reason For Consult discharge planning            Functional Status     None            Psychosocial     None            Abuse/Neglect     None            Legal     None            Substance Abuse     None            Patient Forms     None          Mery Montiel

## 2017-06-05 NOTE — CONSULTS
"Diabetes Education  Assessment/Teaching    Patient Name:  Pepito Yan  YOB: 1965  MRN: [id number]  Admit Date:  6/3/2017      Assessment Date:  6/5/2017       Most Recent Value    General Information      Height  5' 3\" (1.6 m)    Height Method  Stated    Weight  121 lb 4.8 oz (55 kg)    Weight Method  Bed scale    Pregnancy Assessment     Diabetes History     What type of diabetes do you have?  Type 2    What makes it difficult for you to take care of your diabetes or yourself?  \"been doing this a long time and do not have any questions\"    Education Preferences     Nutrition Information     Assessment Topics     DM Goals                Most Recent Value    DM Education Needs     Meter  Has own    Problem Solving  Other (comment) [\"not interested in quitting smoking\" had signed out multiple times]    Reducing Risks  -- [A1c 10.60 explained]    Healthy Coping  Appropriate    Discharge Plan  Home    Motivation  Not interested    Teaching Method  Explanation, Discussion, Teach back    Patient Response  Verbalized understanding            Other Comments:          Electronically signed by:  Twyla Santos RN  06/05/17 3:45 PM  "

## 2017-06-05 NOTE — MBS/VFSS/FEES
Acute Care - Speech Language Pathology   Swallow Initial Evaluation  Daniel   MODIFIED BARIUM SWALLOW STUDY     Patient Name: Pepito Yan  : 1965  MRN: [id number]  Today's Date: 2017             Admit Date: 6/3/2017    Pepito Yan  presents to the radiology suite this am from 3322/1P to participate in an instrumental MBS to evaluate safety/efficacy of swallowing fnx, determine safest/least restrictive diet. She is familiar to SLP from past instrumental FEES and MBS during admit to [facility] in February of this year. She does have a h/o oropharyngeal dysphagia w/ silent aspiration.       Social History     Social History   • Marital status:      Spouse name: N/A   • Number of children: N/A   • Years of education: N/A     Occupational History   • Not on file.     Social History Main Topics   • Smoking status: Current Every Day Smoker     Packs/day: 4.50     Years: 35.00     Types: Cigarettes   • Smokeless tobacco: Not on file      Comment: She cut down to 1/4 PPD for 2.5 months ago   • Alcohol use No   • Drug use: No   • Sexual activity: Defer     Other Topics Concern   • Not on file     Social History Narrative      Chest xray 17 revealed no acute cardiopulmonary disease.     Diet Orders (active)     Start     Ordered    17 1217  Diet Regular; Cardiac, Consistent Carbohydrate  Diet Effective Now      17 1216        Observed on 2L O2 via nasal cannula.    Risks and benefits of the procedure are explained w/ verbalizing understanding/agreement to participate.     Ms. Yan is positioned upright and centered in a wheel chair to accept multiple po presentations of solid cracker, puree, honey thick, nectar thick, and thin liquids via spoon, cup and straw, along w/ whole placebo pill in puree. She is able to self feed.     All views are from the lateral plane.     Facial/oral structures are symmetrical upon observation w/o lingual deviation upon protrusion. Oral mucosa  are moist, pink and clean. Secretions are clear, thin and well controlled. OROM/SALO is wfl to imitate oral postures. Gag is not assessed. Volitional cough is adequate in intensity, clear in quality, nonproductive. Vocal quality is adequate in intensity, clear in quality w/ intelligible speech. She is a/a and cooperative to particpate.  She is oriented to person, place, and time, follows simple directives, and participates in simple conversational exchanges. She reports tolerating regular consistency diet, thin liquids w/o odynophagia.      Upon po presentations, adequate bolus anticipation w/ good labial seal for bolus clearance via spoon bowl, cup rim stability and suction via straw.  Bolus formation, manipulation,  and control are wfl w/ rotary mastication pattern. A-p transit is timely w/o oral residue. Tongue base retraction and linguavelar seal are adequate w/o premature spillage. No laryngeal penetration or aspiration is evidenced before the swallow.     Pharyngeal swallow is timely w/ adequate hyolaryngeal elevation and epiglottic inversion. Pharyngeal contraction is adequate w/o significant residue. Isolated event of laryngeal penetration w/ honey thick liquids, clearing w/o aspiration. Laryngeal penetration of thin liquids via all presentation styles w/o aspiration. Chin tuck does not diminish or alleviate penetration w/any consistency. No other laryngeal penetration or aspiration evidenced during or after the swallow.     Full esophageal sweep reveals no mucosal abnormalities. Motility is wfl w/o retrograde flow noted.      Visit Dx:     ICD-10-CM ICD-9-CM   1. Urinary tract infection, site unspecified N39.0      Patient Active Problem List   Diagnosis   • Chest pain   • Chest pain of uncertain etiology   • Intractable nausea and vomiting   • Intractable cyclical vomiting with nausea   • Chronic obstructive pulmonary emphysema   • Hepatitis-C   • Diabetes mellitus type 2 in nonobese   • Arthritis or  polyarthritis, rheumatoid   • Anxiety   • Essential hypertension   • Coronary artery disease   • Buerger's disease   • Cirrhosis   • Diabetes   • Chronic nausea   • Pancreatitis   • Bilateral pneumonia   • ARDS (adult respiratory distress syndrome)   • Hypoxia   • Urinary tract infection     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   • Chronic respiratory failure with hypoxia     at nighttime   • Compression fracture of lumbar vertebra    • Compression fracture of thoracic vertebra    • COPD (chronic obstructive pulmonary disease)    • Coronary artery disease    • Diabetes mellitus type 2 in nonobese    • Erosive esophagitis 2016   • Essential hypertension    • Gastroparesis    • GERD (gastroesophageal reflux disease)    • Hepatitis-C    • Medically noncompliant    • MRSA infection     back of neck around    • NSTEMI (non-ST elevated myocardial infarction) 2016   • PE (pulmonary embolism)    • Protein calorie malnutrition    • RA (rheumatoid arthritis)     with chronic steroid use     Past Surgical History:   Procedure Laterality Date   • AMPUTATION DIGIT Right 2016    Third digit to the PIP. Surgeon: Dr. Bains   • AMPUTATION DIGIT Left 2016    Procedure: PARTIAL AMPUTATION LEFT THIRD FINGER;  Surgeon: Shane Fuller MD;  Location: [facility] OR;  Service:    • AMPUTATION REVISION Left 2016    Procedure: AMPUTATION REVISION DIGIT STUMP;  Surgeon: Shane Fuller MD;  Location: [facility] OR;  Service:    • ARM DEBRIDEMENT Left 2016    Procedure: HAND IRRIGATION DEBRIDEMENT AND REPAIR ;  Surgeon: Shane Fuller MD;  Location: [facility] OR;  Service:    •  SECTION      x 2   • CHOLECYSTECTOMY     • ENDOSCOPY N/A 2016    Procedure: ESOPHAGOGASTRODUODENOSCOPY;  Surgeon: Jeremy Clement III, MD;  Location: [facility] OR;  Service:    • ENDOSCOPY W/ PEG TUBE PLACEMENT N/A 3/30/2017     Procedure: ESOPHAGOGASTRODUODENOSCOPY WITH PERCUTANEOUS ENDOSCOPIC GASTROSTOMY TUBE INSERTION;  Surgeon: Shade Campos MD;  Location: [facility] OR;  Service:    • FOREIGN BODY REMOVAL      insulin needle removal x 3 from the abdomen   • HYSTERECTOMY       EDUCATION  The patient has been educated in the following areas:   Dysphagia (Swallowing Impairment).    Impression: Ms. Yan presents w/ overall improved swallowing fnx to tolerate regular consistency diet, thin liquids. She does present w/ laryngeal penetration, however, no aspiration noted across this evaluation.     SLP Recommendation and Plan    1. Regular diet, thin liquids.   2. Meds whole in puree/thins.   3. Luis precautions.   4. Oral care protocol.   No further SLP f/u warranted at this time.     D/w pt results and recommendations w/ verbal understanding and agreement.     D/w RN results and recommendations w/ verbal understanding and agreement.     Thank you for allowing me to participate in the care of your patient-  Melina Hodges M.S., CHERYL/SLP                                                        Time Calculation:         Time Calculation- SLP       06/05/17 1321          Time Calculation- SLP    SLP Start Time 1200  -ASIF      SLP Stop Time 1300  -ASIF      SLP Time Calculation (min) 60 min  -ASIF      SLP Non-Billable Time (min) 30 min  -ASIF        User Key  (r) = Recorded By, (t) = Taken By, (c) = Cosigned By    Initials Name Provider Type    ASIF Hodges MS CCC-SLP Speech Therapist          Therapy Charges for Today     Code Description Service Date Service Provider Modifiers Qty    68282831523 HC ST SWALLOWING CURRENT STATUS 6/5/2017 Melina Hodges MS CCC-SLP GN, CH 1    40076373537 HC ST SWALLOWING PROJECTED 6/5/2017 Melina Hodges MS CCC-SLP GN, CH 1    [id number] HC ST SWALLOWING DISCHARGE 6/5/2017 Melina Hodges MS CCC-SLP GN, CH 1    [id number] HC ST MOTION FLUORO EVAL SWALLOW 5 6/5/2017 Melina Hodgse MS  CCC-SLP GN 1          SLP G-Codes  SLP NOMS Used?: Yes  Functional Limitations: Swallowing  Swallow Current Status (): 0 percent impaired, limited or restricted  Swallow Goal Status (): 0 percent impaired, limited or restricted  Swallow Discharge Status (): 0 percent impaired, limited or restricted    Melina Hodges MS CCC-SLP  6/5/2017

## 2017-06-05 NOTE — PAYOR COMM NOTE
"CONTACT:  EMMANUELLE BABB RN, BSN  UTILIZATION MANAGEMENT DEPT.  [facility]  [address] [facility], [address]  PHONE:  [phone]  FAX: [phone]    REQUEST FOR INPATIENT AUTHORIZATION. MEDICARE AB PRIMARY, WELLCARE SECONDARY.    Yessica Yan (51 y.o. Female)     Date of Birth Social Security Number Address Home Phone MRN    1965  [address] [place] [id number] [phone] [id number]    Hindu Marital Status          Mu-ism        Admission Date Admission Type Admitting Provider Attending Provider Department, Room/Bed    6/3/17 Emergency Dwight Quezada MD Yemane, Mengistu, MD [address], [address]/    Discharge Date Discharge Disposition Discharge Destination                      Attending Provider: Dwight Quezada MD     Allergies:  Penicillins, Toradol [Ketorolac Tromethamine], Tramadol    Isolation:  None   Infection:  C.difficile (03/10/17)   Code Status:  FULL    Ht:  63\" (160 cm)   Wt:  121 lb 4.8 oz (55 kg)    Admission Cmt:  None   Principal Problem:  Urinary tract infection [N39.0]                 Active Insurance as of 6/3/2017     Primary Coverage     Payor Plan Insurance Group Employer/Plan Group    MEDICARE MEDICARE A & B      Payor Plan Address Payor Plan Phone Number Effective From Effective To    [address] [phone] 2/1/2005     [place] [address]       Subscriber Name Subscriber Birth Date Member ID       YESSICA YAN 1965 [id number]           Secondary Coverage     Payor Plan Insurance Group Employer/Plan Group    [facility] MEDICAID      Payor Plan Address Payor Plan Phone Number Effective From Effective To    [address] [address] [phone] 1/1/2016     [place] [address]       Subscriber Name Subscriber Birth Date Member ID       YESSICA YAN 1965 [id number]                 Emergency Contacts      (Rel.) Home Phone Work Phone Mobile Phone    Scot Vaughn (Son) " [phone] -- --    NolastnamNeeta santamaria (Friend) -- -- [phone]               History & Physical      Dwight Quezada MD at 6/3/2017 11:54 PM              [facility] HOSPITALIST HISTORY AND PHYSICAL    Patient Identification:  Name:  Pepito Yan  Age:  51 y.o.  Sex:  female  :  1965  MRN:  [id number]   Visit Number:  [id number]  Primary Care Physician:  Evan Hitchcock MD    IChief complaint:   Shortness of breath and chest pain    History of presenting illness:  Patient is a 51-year-old  female whose past medical history significant for chronic obstructive pulmonary disease, chronic respiratory failure on home oxygen, diabetic mellitus ischemic cardiomyoplasty presented to the emergency department with the above complaints.  Patient said she was in relatively good health up until Wednesday when she started experiencing worsening in her shortness of breath and pleuritic chest pain.  The patient admits that she has very bad COPD and apparently was hospitalized end of February and was in the hospital for a month and subsequently was transferred to [facility].  At that time patient was on mechanical ventilation and was on the vent for about 13 days.  Patient denies any fever or chills.  She has cough and thinks that she has phlegm but unable to bring his up.  She still smokes 4-5 cigarettes a day.  The patient was evaluated in the emergency department and then was told us the patient has significantly elevated lactic acid level and possible sepsis and the source of infection was thought to be UTI and she was gien Ceftriaxone and boluses of IVF.   Review of Systems   Constitutional: Positive for fatigue. Negative for chills and fever.   HENT: Negative for congestion, ear pain and sore throat.    Eyes: Negative for photophobia and pain.   Respiratory:        See history of present illness   Cardiovascular: Positive for chest pain. Negative for palpitations and leg swelling.    Gastrointestinal: Negative for abdominal distention, abdominal pain, diarrhea, nausea and vomiting.   Endocrine: Negative for cold intolerance and heat intolerance.   Genitourinary: Negative for dysuria, flank pain, frequency and hematuria.   Musculoskeletal:        Has history of rheumatoid arthritis   Neurological: Negative for dizziness, syncope, numbness and headaches.   Hematological: Negative for adenopathy. Does not bruise/bleed easily.      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   • Chronic respiratory failure with hypoxia     at nighttime   • Compression fracture of lumbar vertebra    • Compression fracture of thoracic vertebra    • COPD (chronic obstructive pulmonary disease)    • Coronary artery disease    • Diabetes mellitus type 2 in nonobese    • Erosive esophagitis 12/2016   • Essential hypertension    • Gastroparesis    • GERD (gastroesophageal reflux disease)    • Hepatitis-C    • Medically noncompliant    • MRSA infection     back of neck around 2012   • NSTEMI (non-ST elevated myocardial infarction) 07/2016   • PE (pulmonary embolism)    • Protein calorie malnutrition    • RA (rheumatoid arthritis)     with chronic steroid use     Past Surgical History:   Procedure Laterality Date   • AMPUTATION DIGIT Right 12/22/2016    Third digit to the PIP. Surgeon: Dr. Bains   • AMPUTATION DIGIT Left 12/22/2016    Procedure: PARTIAL AMPUTATION LEFT THIRD FINGER;  Surgeon: Shane Fuller MD;  Location: [facility];  Service:    • AMPUTATION REVISION Left 12/22/2016    Procedure: AMPUTATION REVISION DIGIT STUMP;  Surgeon: Shane Fuller MD;  Location: [facility] OR;  Service:    • ARM DEBRIDEMENT Left 12/20/2016    Procedure: HAND IRRIGATION DEBRIDEMENT AND REPAIR ;  Surgeon: Shane Fuller MD;   Location:  [facility] OR;  Service:    •  SECTION      x 2   • CHOLECYSTECTOMY     • ENDOSCOPY N/A 2016    Procedure: ESOPHAGOGASTRODUODENOSCOPY;  Surgeon: Jeremy Clement III, MD;  Location:  [facility] OR;  Service:    • ENDOSCOPY W/ PEG TUBE PLACEMENT N/A 3/30/2017    Procedure: ESOPHAGOGASTRODUODENOSCOPY WITH PERCUTANEOUS ENDOSCOPIC GASTROSTOMY TUBE INSERTION;  Surgeon: Shade Campos MD;  Location:  [facility] OR;  Service:    • FOREIGN BODY REMOVAL      insulin needle removal x 3 from the abdomen   • HYSTERECTOMY       Family History   Problem Relation Age of Onset   • Colon cancer Mother    • Cancer Father    • Diabetes Maternal Grandmother    • Heart disease Neg Hx    • Stroke Neg Hx      Social History     Social History   • Marital status:      Spouse name: N/A   • Number of children: N/A   • Years of education: N/A     Social History Main Topics   • Smoking status: Current Every Day Smoker     Packs/day: 4.50     Years: 35.00     Types: Cigarettes   • Smokeless tobacco: None      Comment: She cut down to 1/4 PPD for 2.5 months ago   • Alcohol use No   • Drug use: No   • Sexual activity: Defer     Other Topics Concern   • None     Social History Narrative     ---------------------------------------------------------------------------------------------------------------------   Allergies:  Penicillins; Toradol [ketorolac tromethamine]; and Tramadol  ---------------------------------------------------------------------------------------------------------------------   Prior to Admission Medications     Prescriptions Last Dose Informant Patient Reported? Taking?    aspirin 81 MG EC tablet   No Yes    Take 1 tablet by mouth Daily.    bacitracin 500 UNIT/GM ointment   No Yes    Apply  topically Every 12 (Twelve) Hours.    clopidogrel (PLAVIX) 75 MG tablet   No Yes    Take 1 tablet by mouth Daily.    diltiaZEM (CARDIZEM) 30 MG tablet   Yes Yes    Take 30 mg by mouth 4 (Four) Times a Day.     docusate sodium (COLACE) 100 MG capsule   Yes Yes    Take 100 mg by mouth 2 (Two) Times a Day.    haloperidol lactate (HALDOL) 5 MG/ML injection   No Yes    Infuse 0.1 mL into a venous catheter Every 6 (Six) Hours As Needed for Agitation.    Heparin Sodium, Porcine, (HEPARIN, PORCINE,) 5000 UNIT/ML injection   No Yes    Inject 1 mL under the skin Every 12 (Twelve) Hours.    insulin aspart (novoLOG) 100 UNIT/ML injection   No Yes    Inject 0-7 Units under the skin 4 (Four) Times a Day Before Meals & at Bedtime.    insulin detemir (LEVEMIR) 100 UNIT/ML injection   No Yes    Inject 22 Units under the skin Every Night.    ipratropium (ATROVENT) 0.02 % nebulizer solution   No Yes    Take 2.5 mL by nebulization Every 6 (Six) Hours As Needed for Shortness of Air.    ipratropium (ATROVENT) 0.02 % nebulizer solution   No Yes    Take 2.5 mL by nebulization Every 6 (Six) Hours.    metoprolol tartrate (LOPRESSOR) 50 MG tablet   No Yes    Take 1 tablet by mouth Every 12 (Twelve) Hours.    metroNIDAZOLE (FLAGYL) 5-0.79 MG/ML-% IVPB   No Yes    Infuse 100 mL into a venous catheter Every 8 (Eight) Hours. Indications: Clostridium Difficile Infection    nicotine (NICODERM CQ) 21 MG/24HR patch   No Yes    Place 1 patch on the skin Daily.    pantoprazole (PROTONIX) 40 MG injection   No Yes    Infuse 10 mL into a venous catheter Every Morning Before Breakfast.    potassium chloride (KLOR-CON) 20 MEQ packet   Yes Yes    Take 20 mEq by mouth 2 (Two) Times a Day.    predniSONE (DELTASONE) 5 MG tablet   No Yes    Take 1 tablet by mouth Daily With Breakfast.    QUEtiapine (SEROquel) 100 MG tablet   Yes Yes    Take 100 mg by mouth Every Night.        Hospital Scheduled Meds:    [START ON 6/4/2017] azithromycin 500 mg Intravenous Q24H   [START ON 6/4/2017] cefepime 2 g Intravenous Q8H   [START ON 6/4/2017] enoxaparin 40 mg Subcutaneous Daily         ---------------------------------------------------------------------------------------------------------------------   Vital Signs:  Temp:  [98.1 °F (36.7 °C)-98.4 °F (36.9 °C)] 98.1 °F (36.7 °C)  Heart Rate:  [101-119] 101  Resp:  [20-23] 20  BP: (129-168)/() 129/78  Last 3 weights    06/03/17  1224   Weight: 102 lb (46.3 kg)     Body mass index is 18.07 kg/(m^2).  ---------------------------------------------------------------------------------------------------------------------   Physical Exam:  Constitutional:  Well-developed and well-nourished.  No respiratory distress.      HENT:  Head: Normocephalic and atraumatic.  Mouth:  Moist mucous membranes.    Eyes:  Conjunctivae and EOM are normal.  Pupils are equal, round, and reactive to light.  No scleral icterus.  Neck:  Neck supple.  No JVD present.    Cardiovascular:  Normal rate, regular rhythm and normal heart sounds with no murmur.  Pulmonary/Chest:  No respiratory distress, minimal wheezes, no crackles, has bronchial sounds on the right upper lung fields.  Abdominal:  Soft.  Bowel sounds are normal.  No distension and no tenderness.   Musculoskeletal:  No edema, no tenderness, and no deformity.  No red or swollen joints anywhere.    Neurological:  Alert and oriented to person, place, and time.  No cranial nerve deficit.  No tongue deviation.  No facial droop.  No slurred speech.   Skin:  Skin is warm and dry.  No rash noted.  No pallor.   Psychiatric:  Normal mood and affect.  Behavior is normal.  Judgment and thought content normal.   Peripheral vascular:  No edema and strong pulses on all 4 extremities.  Genitourinary:  ---------------------------------------------------------------------------------------------------------------------    ---------------------------------------------------------------------------------------------------------------------     Results from last 7 days  Lab Units 06/03/17  1740 06/03/17  1317   CRP mg/dL  --  4.16*    LACTATE mmol/L 5.3* 3.5*   WBC 10*3/mm3  --  7.43   HEMOGLOBIN g/dL  --  11.4*   HEMATOCRIT %  --  38.2   MCV fL  --  79.1*   MCHC g/dL  --  29.8*   PLATELETS 10*3/mm3  --  266           Results from last 7 days  Lab Units 06/03/17  1317   SODIUM mmol/L 138   POTASSIUM mmol/L 3.9   CHLORIDE mmol/L 108   TOTAL CO2 mmol/L 21.7*   BUN mg/dL 9   CREATININE mg/dL 0.70   EGFR IF NONAFRICN AM mL/min/1.73 88   CALCIUM mg/dL 9.5   GLUCOSE mg/dL 505*   ALBUMIN g/dL 3.70   BILIRUBIN mg/dL 0.2   ALK PHOS U/L 80   AST (SGOT) U/L 19   ALT (SGPT) U/L 52*   Estimated Creatinine Clearance: 69.5 mL/min (by C-G formula based on Cr of 0.7).  No results found for: AMMONIA    Results from last 7 days  Lab Units 06/03/17  1740   TROPONIN I ng/mL <0.006         Lab Results   Component Value Date    HGBA1C 9.10 (H) 02/28/2017     Lab Results   Component Value Date    TSH 0.209 (L) 03/05/2017    FREET4 0.62 (L) 03/06/2017     No results found for: PREGTESTUR, PREGSERUM, HCG, HCGQUANT  Pain Management Panel     Pain Management Panel Latest Ref Rng & Units 2/28/2017 1/21/2017    AMPHETAMINES SCREEN, URINE Negative Negative Negative    BARBITURATES SCREEN Negative Negative Negative    BENZODIAZEPINE SCREEN, URINE Negative Negative Negative    COCAINE SCREEN, URINE Negative Negative Negative    METHADONE SCREEN, URINE Negative Negative Negative                        ---------------------------------------------------------------------------------------------------------------------  Imaging Results (last 7 days)     Procedure Component Value Units Date/Time    XR Chest 1 View [[id number]] Updated:  06/03/17 1329    CT Abdomen Pelvis With Contrast [[id number]] Updated:  06/03/17 1943          I have personally reviewed the radiology images and read the final radiology report.  ---------------------------------------------------------------------------------------------------------------------  Assessment and Plan:    1. ?  Right sided pneumonia:  Possibly at risk for acquired pneumonia from her recent hospitalization..       I will start patient empirically on cefepime 2 g IV every 8 hours and azithromycin 500 mg IV every 24.       I will place her on Mucinex 600 mg by mouth twice a day .       We'll check sputum for Gram stain and culture.         Follow blood culture closely.         Patient to be followed closely she is at risk for respiratory failure and significant worsening.    2.  Chronic respiratory failure: At risk for acute on chronic        We'll continue on oxygen at 2-3 L by nasal cannula.        We'll watch her closely and will attempt an ABG in the morning.  Unfortunately the films done ABG in the emergency department and        patient refused to do one this evening.    3.  Chronic obstructive obstructive pulmonary disease: ? acute exacerbation/       Continuous albuterol and Atrovent treatment..       Low-dose methylprednisolone.    4.  Urinary tract infection: Cefepime should cover for now and will follow culture results closely./    5.  Diabetic mellitus: Continue with outpatient medications and sliding scale insulin..    7.  Patient placed on enoxaparin for DVT prophylaxis.    9.  History of rheumatoid arthritis:     Dwight Quezada MD  06/03/17  11:54 PM     Electronically signed by Dwight Quezada MD at 6/4/2017 12:10 AM

## 2017-06-05 NOTE — PLAN OF CARE
Problem: Patient Care Overview (Adult)  Goal: Plan of Care Review  Outcome: Ongoing (interventions implemented as appropriate)    Problem: Fall Risk (Adult)  Goal: Absence of Falls  Outcome: Ongoing (interventions implemented as appropriate)    Problem: Pressure Ulcer Risk (Brendan Scale) (Adult,Obstetrics,Pediatric)  Goal: Skin Integrity  Outcome: Ongoing (interventions implemented as appropriate)

## 2017-06-06 VITALS
HEART RATE: 120 BPM | OXYGEN SATURATION: 97 % | TEMPERATURE: 97.5 F | WEIGHT: 122.5 LBS | DIASTOLIC BLOOD PRESSURE: 86 MMHG | SYSTOLIC BLOOD PRESSURE: 125 MMHG | HEIGHT: 63 IN | RESPIRATION RATE: 20 BRPM | BODY MASS INDEX: 21.71 KG/M2

## 2017-06-06 LAB
ALBUMIN SERPL-MCNC: 3.3 G/DL (ref 3.5–5)
ALBUMIN/GLOB SERPL: 1.1 G/DL (ref 1.5–2.5)
ALP SERPL-CCNC: 59 U/L (ref 35–104)
ALT SERPL W P-5'-P-CCNC: 40 U/L (ref 10–36)
ANION GAP SERPL CALCULATED.3IONS-SCNC: 5.5 MMOL/L (ref 3.6–11.2)
AST SERPL-CCNC: 28 U/L (ref 10–30)
BASOPHILS # BLD AUTO: 0.04 10*3/MM3 (ref 0–0.3)
BASOPHILS NFR BLD AUTO: 0.6 % (ref 0–2)
BILIRUB SERPL-MCNC: 0.1 MG/DL (ref 0.2–1.8)
BUN BLD-MCNC: 22 MG/DL (ref 7–21)
BUN/CREAT SERPL: 26.8 (ref 7–25)
CALCIUM SPEC-SCNC: 9.5 MG/DL (ref 7.7–10)
CHLORIDE SERPL-SCNC: 108 MMOL/L (ref 99–112)
CO2 SERPL-SCNC: 24.5 MMOL/L (ref 24.3–31.9)
CREAT BLD-MCNC: 0.82 MG/DL (ref 0.43–1.29)
CRP SERPL-MCNC: 0.58 MG/DL (ref 0–0.99)
D-LACTATE SERPL-SCNC: 1.6 MMOL/L (ref 0.5–2)
DEPRECATED RDW RBC AUTO: 52.8 FL (ref 37–54)
EOSINOPHIL # BLD AUTO: 0.03 10*3/MM3 (ref 0–0.7)
EOSINOPHIL NFR BLD AUTO: 0.4 % (ref 0–5)
ERYTHROCYTE [DISTWIDTH] IN BLOOD BY AUTOMATED COUNT: 18.7 % (ref 11.5–14.5)
GFR SERPL CREATININE-BSD FRML MDRD: 73 ML/MIN/1.73
GLOBULIN UR ELPH-MCNC: 3.1 GM/DL
GLUCOSE BLD-MCNC: 352 MG/DL (ref 70–110)
GLUCOSE BLDC GLUCOMTR-MCNC: 210 MG/DL (ref 70–130)
GLUCOSE BLDC GLUCOMTR-MCNC: 304 MG/DL (ref 70–130)
HCT VFR BLD AUTO: 35.9 % (ref 37–47)
HGB BLD-MCNC: 10.6 G/DL (ref 12–16)
IMM GRANULOCYTES # BLD: 0.21 10*3/MM3 (ref 0–0.03)
IMM GRANULOCYTES NFR BLD: 3 % (ref 0–0.5)
LYMPHOCYTES # BLD AUTO: 1.46 10*3/MM3 (ref 1–3)
LYMPHOCYTES NFR BLD AUTO: 20.6 % (ref 21–51)
MAGNESIUM SERPL-MCNC: 2.2 MG/DL (ref 1.7–2.6)
MCH RBC QN AUTO: 23.5 PG (ref 27–33)
MCHC RBC AUTO-ENTMCNC: 29.5 G/DL (ref 33–37)
MCV RBC AUTO: 79.6 FL (ref 80–94)
MONOCYTES # BLD AUTO: 0.58 10*3/MM3 (ref 0.1–0.9)
MONOCYTES NFR BLD AUTO: 8.2 % (ref 0–10)
NEUTROPHILS # BLD AUTO: 4.77 10*3/MM3 (ref 1.4–6.5)
NEUTROPHILS NFR BLD AUTO: 67.2 % (ref 30–70)
OSMOLALITY SERPL CALC.SUM OF ELEC: 293.1 MOSM/KG (ref 273–305)
PHOSPHATE SERPL-MCNC: 3.3 MG/DL (ref 2.7–4.5)
PLATELET # BLD AUTO: 259 10*3/MM3 (ref 130–400)
PMV BLD AUTO: 10 FL (ref 6–10)
POTASSIUM BLD-SCNC: 4.4 MMOL/L (ref 3.5–5.3)
PROT SERPL-MCNC: 6.4 G/DL (ref 6–8)
RBC # BLD AUTO: 4.51 10*6/MM3 (ref 4.2–5.4)
SODIUM BLD-SCNC: 138 MMOL/L (ref 135–153)
WBC NRBC COR # BLD: 7.09 10*3/MM3 (ref 4.5–12.5)

## 2017-06-06 PROCEDURE — 84100 ASSAY OF PHOSPHORUS: CPT | Performed by: INTERNAL MEDICINE

## 2017-06-06 PROCEDURE — 80053 COMPREHEN METABOLIC PANEL: CPT | Performed by: PHYSICIAN ASSISTANT

## 2017-06-06 PROCEDURE — 83735 ASSAY OF MAGNESIUM: CPT | Performed by: INTERNAL MEDICINE

## 2017-06-06 PROCEDURE — 94799 UNLISTED PULMONARY SVC/PX: CPT

## 2017-06-06 PROCEDURE — 83605 ASSAY OF LACTIC ACID: CPT | Performed by: PHYSICIAN ASSISTANT

## 2017-06-06 PROCEDURE — 82962 GLUCOSE BLOOD TEST: CPT

## 2017-06-06 PROCEDURE — 63710000001 PREDNISONE PER 1 MG: Performed by: INTERNAL MEDICINE

## 2017-06-06 PROCEDURE — 85025 COMPLETE CBC W/AUTO DIFF WBC: CPT | Performed by: PHYSICIAN ASSISTANT

## 2017-06-06 PROCEDURE — 63710000001 INSULIN ASPART PER 5 UNITS: Performed by: INTERNAL MEDICINE

## 2017-06-06 PROCEDURE — 25010000002 ENOXAPARIN PER 10 MG: Performed by: INTERNAL MEDICINE

## 2017-06-06 PROCEDURE — 86140 C-REACTIVE PROTEIN: CPT | Performed by: PHYSICIAN ASSISTANT

## 2017-06-06 PROCEDURE — 25010000002 CEFTRIAXONE: Performed by: INTERNAL MEDICINE

## 2017-06-06 PROCEDURE — 99239 HOSP IP/OBS DSCHRG MGMT >30: CPT | Performed by: INTERNAL MEDICINE

## 2017-06-06 RX ORDER — DOXYCYCLINE 100 MG/1
100 CAPSULE ORAL EVERY 12 HOURS SCHEDULED
Qty: 8 CAPSULE | Refills: 0 | Status: SHIPPED | OUTPATIENT
Start: 2017-06-06 | End: 2017-06-10

## 2017-06-06 RX ORDER — CEFDINIR 300 MG/1
300 CAPSULE ORAL EVERY 12 HOURS SCHEDULED
Status: DISCONTINUED | OUTPATIENT
Start: 2017-06-06 | End: 2017-06-06 | Stop reason: HOSPADM

## 2017-06-06 RX ORDER — CEFDINIR 300 MG/1
300 CAPSULE ORAL EVERY 12 HOURS SCHEDULED
Qty: 12 CAPSULE | Refills: 0 | Status: SHIPPED | OUTPATIENT
Start: 2017-06-06 | End: 2017-06-12

## 2017-06-06 RX ADMIN — GUAIFENESIN 1200 MG: 600 TABLET, EXTENDED RELEASE ORAL at 07:46

## 2017-06-06 RX ADMIN — SUCRALFATE 1 G: 1 TABLET ORAL at 07:46

## 2017-06-06 RX ADMIN — HYDROCODONE BITARTRATE AND ACETAMINOPHEN 1 TABLET: 5; 325 TABLET ORAL at 02:54

## 2017-06-06 RX ADMIN — CEFTRIAXONE 2 G: 2 INJECTION, POWDER, FOR SOLUTION INTRAMUSCULAR; INTRAVENOUS at 07:49

## 2017-06-06 RX ADMIN — IPRATROPIUM BROMIDE AND ALBUTEROL SULFATE 3 ML: .5; 3 SOLUTION RESPIRATORY (INHALATION) at 12:03

## 2017-06-06 RX ADMIN — LISINOPRIL 5 MG: 2.5 TABLET ORAL at 07:46

## 2017-06-06 RX ADMIN — INSULIN ASPART 5 UNITS: 100 INJECTION, SOLUTION INTRAVENOUS; SUBCUTANEOUS at 12:55

## 2017-06-06 RX ADMIN — SUCRALFATE 1 G: 1 TABLET ORAL at 12:55

## 2017-06-06 RX ADMIN — IPRATROPIUM BROMIDE AND ALBUTEROL SULFATE 3 ML: .5; 3 SOLUTION RESPIRATORY (INHALATION) at 00:41

## 2017-06-06 RX ADMIN — INSULIN ASPART 10 UNITS: 100 INJECTION, SOLUTION INTRAVENOUS; SUBCUTANEOUS at 07:45

## 2017-06-06 RX ADMIN — HYDROCODONE BITARTRATE AND ACETAMINOPHEN 1 TABLET: 5; 325 TABLET ORAL at 12:55

## 2017-06-06 RX ADMIN — PREDNISONE 20 MG: 20 TABLET ORAL at 07:46

## 2017-06-06 RX ADMIN — DOXYCYCLINE 100 MG: 100 CAPSULE ORAL at 07:46

## 2017-06-06 RX ADMIN — CEFDINIR 300 MG: 300 CAPSULE ORAL at 12:55

## 2017-06-06 RX ADMIN — ENOXAPARIN SODIUM 40 MG: 40 INJECTION SUBCUTANEOUS at 07:45

## 2017-06-06 RX ADMIN — GABAPENTIN 300 MG: 300 CAPSULE ORAL at 07:46

## 2017-06-06 NOTE — PROGRESS NOTES
Discharge Planning Assessment  JOYCE Sanchez     Patient Name: Pepito Yan  MRN: [id number]  Today's Date: 6/6/2017    Admit Date: 6/3/2017          Discharge Needs Assessment     None            Discharge Plan       06/06/17 1508    Final Note    Final Note Pt to be discharged home on this date.  [facility] arranged for pt transport home.   No further intervention needed.        Discharge Placement     No information found        Expected Discharge Date and Time     Expected Discharge Date Expected Discharge Time    Jun 6, 2017               Demographic Summary     None            Functional Status     None            Psychosocial     None            Abuse/Neglect     None            Legal     None            Substance Abuse     None            Patient Forms     None          Mery Montiel

## 2017-06-06 NOTE — DISCHARGE SUMMARY
[facility] HOSPITALIST MEDICINE DISCHARGE SUMMARY    Patient Identification:  Name:  Pepito Yan  Age:  51 y.o.  Sex:  female  :  1965  MRN:  [id number]  Visit Number:  [id number]    Date of Admission: 6/3/2017  Date of Discharge:  2017     PCP: Evan Hitchcock MD    DISCHARGE DIAGNOSIS  -Septic shock due to an Enterobacter aerogenes urinary tract infection  -Acute COPD exacerbation with admission chest x-ray showing right-sided pneumonia but chest x-ray 2 days later showing pneumonia in this at this time is unclear if she actually had pneumonia  -Chronic hypoxic respiratory failure for which uses 2 L/m nasal cannula oxygen at home  -Type 2 diabetes mellitus that is insulin-dependent with diabetic neuropathy  -Normocytic anemia  -Bilateral kidney cysts  -Rheumatoid arthritis  -Essential hypertension  -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 Hitchcock with infectious disease    PROCEDURES PERFORMED  None    HOSPITAL COURSE  Patient is a 51 y.o. female presented to [facility] complaining of shortness of air and chest pain.  She was found on ED CXR to have right sided pneumonia; she also had an elevated lactic acid of 5.3 and thus met septic shock criteria.  Please see the admitting history and physical for further details.  Cultures were obtained in the ED and she was started empirically on cefepime and azithromycin Solu-Medrol and DuoNeb treatments.  Chest x-ray on 6/3/2017 was read as right lower lung pneumonia 2017 the chest x-ray no longer showed  pneumonia.  Dr. Hitchcock with infectious disease was consulted and by the time he saw the patient the urine culture was showing Enterobacter; the patient did not have crackles on exam and we do not suspect that she had pneumonia.  He changed her antibiotics to high-dose Rocephin and doxycycline.  He then was able to quickly change her to oral Omnicef based on her improving CRP.  The Rocephin and doxycycline also treated the acute COPD exacerbation.  She did have some hyperglycemia with the Solu-Medrol and she was given insulin.  The Solu-Medrol was quickly weaned to her home prednisone 20 mg by mouth daily dose, which did improve her hyperglycemia.  The patient's lactic acid quickly normalized and her CRP level was decreasing.    On day discharge, the patient requested to go home as her mother-in-law had passed the day prior.  She uses 2 L/m nasal cannula oxygen and will remain on this when she is discharged.  She was walking outside, at which time her oxygen level did drop into the 70s as she was not wearing oxygen.  Oxygen was reapplied to her and her oxygen level did improve.  She was eating a normal diet, able do her activities of daily living, and was able to walk on her own with no chest pain.  Her shortness of air had resolved.       VITAL SIGNS:  Last 3 weights    06/03/17  2300 06/05/17  0300 06/06/17  0256   Weight: 119 lb 1.6 oz (54 kg) 121 lb 4.8 oz (55 kg) 122 lb 8 oz (55.6 kg)     Body mass index is 21.7 kg/(m^2).    PHYSICAL EXAM:  Constitutional: Well-developed and well-nourished. No respiratory distress.    HENT:  Head: Normocephalic and atraumatic.  Mouth: Moist mucous membranes.    Eyes: Conjunctivae and EOM are normal. Pupils are equal, round, and reactive to light. No scleral icterus.    Neck: Neck supple. No JVD present.    Cardiovascular: Normal rate, regular rhythm and normal heart sounds with no murmur.  Pulmonary/Chest: No respiratory distress, no wheezes, no crackles, with normal breath  sounds and good air movement.  Abdominal: Soft. Bowel sounds are normal. No distension and no tenderness. PEG tube in place.   Musculoskeletal: No edema, no tenderness, and no deformity. No red or swollen joints anywhere. Possible left-sided CVA tenderness.   Neurological: Alert and oriented to person, place, and time. No cranial nerve deficit. No tongue deviation. No facial droop. No slurred speech.   Skin: Skin is warm and dry. No rash noted. No pallor.   Peripheral vascular: Strong pulses in all 4 extremities with no clubbing, no cyanosis, no edema.  Genitourinary: no King catheter in place.    DISCHARGE DISPOSITION   Stable    DISCHARGE MEDICATIONS:  albuterol (PROVENTIL HFA;VENTOLIN HFA) 108 (90 BASE) MCG/ACT inhaler  Inhale 2 puffs Every 4 (Four) Hours.   cefdinir (OMNICEF) 300 MG capsule  Take 1 capsule by mouth Every 12 (Twelve) Hours for 6 days. Indications: Pneumonia   doxycycline (MONODOX) 100 MG capsule  Take 1 capsule by mouth Every 12 (Twelve) Hours for 4 days. Indications: Pneumonia   gabapentin (NEURONTIN) 600 MG tablet  Take 600 mg by mouth 3 (Three) Times a Day.   hydrOXYzine (VISTARIL) 25 MG capsule  Take 25 mg by mouth 2 (Two) Times a Day As Needed for Itching or Anxiety. 2 to 3 times daily as needed   insulin glargine (LANTUS) 100 UNIT/ML injection  Inject 40 Units under the skin Every Night.   ipratropium (ATROVENT) 0.02 % nebulizer solution  Take 2.5 mL by nebulization Every 6 (Six) Hours As Needed for Shortness of Air.   lisinopril (PRINIVIL,ZESTRIL) 5 MG tablet  Take 5 mg by mouth Daily.   metFORMIN (GLUCOPHAGE) 500 MG tablet  Take 500 mg by mouth 3 (Three) Times a Day.   ondansetron (ZOFRAN) 4 MG tablet  Take 4 mg by mouth Every 6 (Six) Hours As Needed for Nausea or Vomiting. Every 4 to 6 hours as needed   PHARMACY MEDS TO BED CONSULT  Daily.   predniSONE (DELTASONE) 20 MG tablet  Take 20 mg by mouth Every Morning.   promethazine (PHENERGAN) 25 MG tablet  Take 25 mg by mouth Every 6 (Six)  Hours As Needed for Nausea or Vomiting. Every 4 to 6 hours as needed   sucralfate (CARAFATE) 1 G tablet  Take 1 g by mouth 4 (Four) Times a Day.   tiZANidine (ZANAFLEX) 4 MG tablet  Take 4 mg by mouth Every 8 (Eight) Hours As Needed for Muscle Spasms. Every 6 to 8 hours as needed       Diet Instructions     Diet: Regular, Consistent Carbohydrate, Cardiac; Thin Liquids, No Restrictions       Discharge Diet:   Regular, Consistent Carbohydrate, Cardiac      Fluid Consistency:  Thin Liquids, No Restrictions          Activity Instructions     Activity as Tolerated           Additional Instructions for the Follow-ups that You Need to Schedule     Discharge Follow-up with PCP    As directed    Follow Up Details:  3-7 days              Follow up with ROSA Meza .    Specialty:  Family Medicine    Why:  3-7 days    Contact information:    [address] [place] [address]  [phone]          TEST  RESULTS PENDING AT DISCHARGE:  None   Order Current Status    Blood Culture Preliminary result    Blood Culture Preliminary result           Savanah Gutierrez MD  06/06/17  2:43 PM    Please note that this discharge summary required more than 30 minutes to complete.    Please send a copy of this dictation to the following providers:  Evan Hitchcock MD

## 2017-06-06 NOTE — PLAN OF CARE
Problem: Patient Care Overview (Adult)  Goal: Plan of Care Review  Outcome: Ongoing (interventions implemented as appropriate)  Goal: Discharge Needs Assessment  Outcome: Ongoing (interventions implemented as appropriate)    Problem: Infection, Risk/Actual (Adult)  Goal: Infection Prevention/Resolution  Outcome: Ongoing (interventions implemented as appropriate)    Problem: Fall Risk (Adult)  Goal: Absence of Falls  Outcome: Ongoing (interventions implemented as appropriate)    Problem: Pressure Ulcer Risk (Brendan Scale) (Adult,Obstetrics,Pediatric)  Goal: Skin Integrity  Outcome: Ongoing (interventions implemented as appropriate)    Problem: Diabetes, Type 2 (Adult)  Goal: Signs and Symptoms of Listed Potential Problems Will be Absent or Manageable (Diabetes, Type 2)  Outcome: Ongoing (interventions implemented as appropriate)

## 2017-06-06 NOTE — PROGRESS NOTES
"  I have personally seen and examined the patient today and discussed overnight interval progress and pertinent issues with nursing staff.    Review of Systems    Constitutional: no fever, chills and night sweats. No appetite change or unexpected weight change. No fatigue.  Eyes: no eye drainage, itching or redness.  HEENT: no mouth sores, dysphagia or nose bleed.  Respiratory: no for shortness of breath, cough or production of sputum.  Cardiovascular: no chest pain, no palpitations, no orthopnea.  Gastrointestinal: no nausea, vomiting or diarrhea. No abdominal pain, hematemesis or rectal bleeding.  Genitourinary: no dysuria or polyuria.  Hematologic/lymphatic: no lymph node abnormalities, no easy bruising or easy bleeding.  Musculoskeletal: no muscle or joint pain.  Skin: No rash and no itching.  Neurological: no loss of consciousness, no seizure, no headache.  Behavioral/Psych: no depression or suicidal ideation.  Endocrine: no hot flashes.  Immunologic: negative.      History taken from: patient chart RN      Vital Signs    /64 (BP Location: Right arm, Patient Position: Lying)  Pulse 89  Temp 97.5 °F (36.4 °C) (Axillary)   Resp 20  Ht 63\" (160 cm)  Wt 122 lb 8 oz (55.6 kg)  LMP  (LMP Unknown)  SpO2 99%  BMI 21.7 kg/m2    Temp:  [97.5 °F (36.4 °C)-97.8 °F (36.6 °C)] 97.5 °F (36.4 °C)      Intake/Output Summary (Last 24 hours) at 06/06/17 0857  Last data filed at 06/06/17 0723   Gross per 24 hour   Intake              960 ml   Output              950 ml   Net               10 ml     Intake & Output (last 3 days)       06/03 0701 - 06/04 0700 06/04 0701 - 06/05 0700 06/05 0701 - 06/06 0700 06/06 0701 - 06/07 0700    P.O.  600 600 360    IV Piggyback 639       Total Intake(mL/kg) 639 (11.8) 600 (10.9) 600 (10.8) 360 (6.5)    Urine (mL/kg/hr)  900 (0.7) 950 (0.7)     Total Output   900 950      Net +639 -300 -350 +360            Unmeasured Urine Occurrence  2 x 7 x           Physical Exam:      General " Appearance:  On nasal cannula    Head:  Normocephalic, without obvious abnormality, atraumatic   Eyes:      Lids and lashes normal, conjunctivae and sclerae normal, no icterus, no pallor, corneas clear, PERRLA   Ears:  Ears appear intact with no abnormalities noted   Throat: No oral lesions, no thrush, oral mucosa moist   Neck: No adenopathy, supple, trachea midline, no thyromegaly, no carotid bruit, no JVD   Back:  No tenderness to percussion or palpation, range of motion normal   Lungs:  Bilateral scattered rhonchi on inspiration and wheezing on expiration .   Heart:  Regular rhythm and normal rate, normal S1 and S2, no murmur, no gallop, no rub, no click   Chest Wall:  No abnormalities observed   Abdomen:  Normal bowel sounds, no masses, no organomegaly, soft non-tender, non-distended, no guarding, no rebound tenderness   Rectal:  Deferred   Extremities: Moves all extremities well, no edema, no cyanosis, no redness   Pulses: Pulses palpable and equal bilaterally   Skin: Generalized bruising    Lymph nodes: No palpable adenopathy   Neurologic: Awake, alert and oriented x 3. Following commands.           Results:      Results from last 7 days  Lab Units 06/06/17  0546 06/05/17  0531 06/04/17  0126 06/03/17  1317   WBC 10*3/mm3 7.09 5.95 9.02 7.43     Lab Results   Component Value Date    NEUTROABS 4.77 06/06/2017         Results from last 7 days  Lab Units 06/06/17  0546   CREATININE mg/dL 0.82         Results from last 7 days  Lab Units 06/06/17  0546 06/05/17  0531 06/03/17  1317   CRP mg/dL 0.58 1.39* 4.16*       Imaging Results (last 24 hours)     Procedure Component Value Units Date/Time    FL Video Swallow [[id number]] Collected:  06/05/17 1224     Updated:  06/05/17 1314    Narrative:       FL VIDEO SWALLOW-     CLINICAL INDICATION: Dysphagia; N39.0-Urinary tract infection, site not  specified.          TECHNIQUE:   Speech therapy service was present. The patient was examined in the  sitting lateral position  and was given several consistencies of barium.     FINDINGS: Penetration with honey thickened and thin liquids.     FLUOROSCOPY TIME: 0.8 minutes.     IMAGES: Cine loop was acquired.       Impression:       No evidence of aspiration.     For additional information please see the report provided by the speech  therapy service.     This report was finalized on 6/5/2017 1:12 PM by Dr. Daniel Yates MD.               Results Review:    I have personally reviewed laboratory data, culture results, radiology studies and antimicrobial therapy.    Hospital Medications (active)       Dose Frequency Start End    cefdinir (OMNICEF) capsule 300 mg 300 mg Every 12 Hours Scheduled 6/6/2017 6/12/2017    Sig - Route: Take 1 capsule by mouth Every 12 (Twelve) Hours. - Oral    dextrose (D50W) solution 25 g 25 g Every 15 Minutes PRN 6/4/2017     Sig - Route: Infuse 50 mL into a venous catheter Every 15 (Fifteen) Minutes As Needed for Low Blood Sugar (Blood Sugar Less Than 70, Patient Has IV Access - Unresponsive, NPO or Unable To Safely Swallow). - Intravenous    dextrose (GLUTOSE) oral gel 15 g 15 g Every 15 Minutes PRN 6/4/2017     Sig - Route: Take 15 g by mouth Every 15 (Fifteen) Minutes As Needed for Low Blood Sugar (Blood Sugar Less Than 70, Patient Alert, Is Not NPO & Can Safely Swallow). - Oral    doxycycline (MONODOX) capsule 100 mg 100 mg Every 12 Hours Scheduled 6/5/2017 6/10/2017    Sig - Route: Take 1 capsule by mouth Every 12 (Twelve) Hours. - Oral    enoxaparin (LOVENOX) syringe 40 mg 40 mg Daily 6/4/2017     Sig - Route: Inject 0.4 mL under the skin Daily. - Subcutaneous    gabapentin (NEURONTIN) capsule 300 mg 300 mg 3 Times Daily 6/4/2017     Sig - Route: Take 1 capsule by mouth 3 (Three) Times a Day. - Oral    glucagon (human recombinant) (GLUCAGEN DIAGNOSTIC) injection 1 mg 1 mg Every 15 Minutes PRN 6/4/2017     Sig - Route: Inject 1 mg under the skin Every 15 (Fifteen) Minutes As Needed (Blood Glucose Less Than 70  - Patient Without IV Access - Unresponsive, NPO or Unable To Safely Swallow). - Subcutaneous    guaiFENesin (MUCINEX) 12 hr tablet 1,200 mg 1,200 mg Every 12 Hours Scheduled 6/4/2017     Sig - Route: Take 2 tablets by mouth Every 12 (Twelve) Hours. - Oral    HYDROcodone-acetaminophen (NORCO) 5-325 MG per tablet 1 tablet 1 tablet Every 8 Hours PRN 6/4/2017 6/5/2017    Sig - Route: Take 1 tablet by mouth Every 8 (Eight) Hours As Needed for Moderate Pain (4-6). - Oral    HYDROcodone-acetaminophen (NORCO) 5-325 MG per tablet 1 tablet 1 tablet Every 8 Hours PRN 6/5/2017     Sig - Route: Take 1 tablet by mouth Every 8 (Eight) Hours As Needed for Moderate Pain (4-6). - Oral    hydrOXYzine (ATARAX) tablet 25 mg 25 mg 3 Times Daily PRN 6/5/2017     Sig - Route: Take 1 tablet by mouth 3 (Three) Times a Day As Needed for Itching. - Oral    Cosign for Ordering: Accepted by Savanah Gutierrez MD on 6/5/2017  8:32 PM    insulin aspart (novoLOG) injection 0-14 Units 0-14 Units 4 Times Daily Before Meals & Nightly 6/4/2017     Sig - Route: Inject 0-14 Units under the skin 4 (Four) Times a Day Before Meals & at Bedtime. - Subcutaneous    insulin detemir (LEVEMIR) injection 45 Units 45 Units Nightly 6/5/2017     Sig - Route: Inject 45 Units under the skin Every Night. - Subcutaneous    ipratropium-albuterol (DUO-NEB) nebulizer solution 3 mL 3 mL Every 6 Hours - RT 6/4/2017     Sig - Route: Take 3 mL by nebulization Every 6 (Six) Hours. - Nebulization    lisinopril (PRINIVIL,ZESTRIL) tablet 5 mg 5 mg Daily 6/5/2017     Sig - Route: Take 2 tablets by mouth Daily. - Oral    Cosign for Ordering: Accepted by Savanah Gutierrez MD on 6/5/2017  8:32 PM    morphine injection 4 mg 4 mg Once 6/5/2017 6/5/2017    Sig - Route: Infuse 1 mL into a venous catheter 1 (One) Time. - Intravenous    ondansetron (ZOFRAN) tablet 4 mg 4 mg Every 6 Hours PRN 6/5/2017     Sig - Route: Take 1 tablet by mouth Every 6 (Six) Hours As Needed for Nausea or Vomiting.  "- Oral    Cosign for Ordering: Accepted by Savanah Gutierrez MD on 6/5/2017  8:32 PM    Pharmacy Meds to Bed Consult  Daily 6/5/2017     Sig - Route: Daily. - Does not apply    predniSONE (DELTASONE) tablet 20 mg 20 mg Daily With Breakfast 6/6/2017     Sig - Route: Take 1 tablet by mouth Daily With Breakfast. - Oral    promethazine (PHENERGAN) 12.5 mg in sodium chloride 0.9 % 50 mL 12.5 mg Every 6 Hours PRN 6/4/2017     Sig - Route: Infuse 12.5 mg into a venous catheter Every 6 (Six) Hours As Needed for Nausea or Vomiting. - Intravenous    Linked Group 1:  \"Or\" Linked Group Details        promethazine (PHENERGAN) tablet 25 mg 25 mg Every 6 Hours PRN 6/4/2017     Sig - Route: Take 1 tablet by mouth Every 6 (Six) Hours As Needed for Nausea or Vomiting. - Oral    Linked Group 1:  \"Or\" Linked Group Details        promethazine (PHENERGAN) tablet 25 mg 25 mg Every 6 Hours PRN 6/5/2017     Sig - Route: Take 1 tablet by mouth Every 6 (Six) Hours As Needed for Nausea or Vomiting. - Oral    Cosign for Ordering: Accepted by Savanah Gutierrez MD on 6/5/2017  8:32 PM    sodium chloride 0.9 % flush 1-10 mL 1-10 mL As Needed 6/3/2017     Sig - Route: Infuse 1-10 mL into a venous catheter As Needed for Line Care. - Intravenous    sodium chloride 0.9 % flush 10 mL 10 mL As Needed 6/3/2017     Sig - Route: Infuse 10 mL into a venous catheter As Needed for Line Care. - Intravenous    Linked Group 2:  \"And\" Linked Group Details        sucralfate (CARAFATE) tablet 1 g 1 g 4 Times Daily 6/5/2017     Sig - Route: Take 1 tablet by mouth 4 (Four) Times a Day. - Oral    Cosign for Ordering: Accepted by Savanah Gutierrez MD on 6/5/2017  8:32 PM    tiZANidine (ZANAFLEX) tablet 4 mg 4 mg Every 8 Hours PRN 6/5/2017     Sig - Route: Take 1 tablet by mouth Every 8 (Eight) Hours As Needed for Muscle Spasms. - Oral    cefTRIAXone (ROCEPHIN) 2 g/100 mL 0.9% NS VTB (SOFIE) (Discontinued) 2 g Every 24 Hours 6/5/2017 6/6/2017    Sig - Route: Infuse 100 mL " into a venous catheter Daily. - Intravenous    insulin detemir (LEVEMIR) injection 20 Units (Discontinued) 20 Units Nightly 6/4/2017 6/5/2017    Sig - Route: Inject 20 Units under the skin Every Night. - Subcutaneous    insulin detemir (LEVEMIR) injection 30 Units (Discontinued) 30 Units Nightly 6/5/2017 6/5/2017    Sig - Route: Inject 30 Units under the skin Every Night. - Subcutaneous    Cosign for Ordering: Accepted by Savanah Gutierrez MD on 6/5/2017  8:32 PM    methylPREDNISolone sodium succinate (SOLU-Medrol) injection 20 mg (Discontinued) 20 mg Every 12 Hours 6/4/2017 6/5/2017    Sig - Route: Infuse 0.5 mL into a venous catheter Every 12 (Twelve) Hours. - Intravenous            Cultures:    Blood Culture   Date Value Ref Range Status   06/03/2017 No growth at 2 days  Preliminary   06/03/2017 No growth at 2 days  Preliminary     Urine Culture   Date Value Ref Range Status   06/03/2017 >100,000 CFU/mL Enterobacter aerogenes (A)  Final       PROBLEM LIST:    Patient Active Problem List   Diagnosis   • Chest pain   • Chest pain of uncertain etiology   • Intractable nausea and vomiting   • Intractable cyclical vomiting with nausea   • Chronic obstructive pulmonary emphysema   • Hepatitis-C   • Diabetes mellitus type 2 in nonobese   • Arthritis or polyarthritis, rheumatoid   • Anxiety   • Essential hypertension   • Coronary artery disease   • Buerger's disease   • Cirrhosis   • Diabetes   • Chronic nausea   • Pancreatitis   • Bilateral pneumonia   • ARDS (adult respiratory distress syndrome)   • Hypoxia   • Urinary tract infection       Assessment/Plan     ASSESSMENT:    1. Severe sepsis with lactic acid of 3.5 on admission  2. Advanced COPD with UTI and superimposed pneumonia versus bronchitis     PLAN:    No fever or diarrhea reported.  Normalized lactic acid and  CRP level as well as a normal white count.  No evidence of clinical sepsis.  Since the patient has done so much better from infectious disease and  respiratory standpoint her antibiotic therapy was further de-escalated to oral Omnicef 300 mg by mouth twice a day and oral doxycycline 100 mg by mouth twice a day to continue both through 6/11/2017.     Chest x-ray from 6/5/2017 in favor of acute on chronic bronchitis rather than true pneumonia.      Patients findings and recommendations were discussed with patient and nursing staff    Code Status: Full Code     Scribed for Dr. Hitchcock by Alycia Finn PA-C.    Alycia Finn PA-C  06/06/17  8:57 AM    Physician Attestation:    I have personally seen and examined the patient. I reviewed the patient's data including history of present illness, review of systems, physical examination, assessment and treatment plan and agree with findings above. The assessment and plan are my own.  I have reviewed and edited the note above after discussing the findings with Alycia Finn PA-C.    Evan Hitchcock MD  Infectious Diseases  06/07/17  10:44 AM

## 2017-06-06 NOTE — PLAN OF CARE
Problem: Patient Care Overview (Adult)  Goal: Plan of Care Review  Outcome: Ongoing (interventions implemented as appropriate)    Problem: Infection, Risk/Actual (Adult)  Goal: Infection Prevention/Resolution  Outcome: Ongoing (interventions implemented as appropriate)    Problem: Fall Risk (Adult)  Goal: Absence of Falls  Outcome: Ongoing (interventions implemented as appropriate)    Problem: Pressure Ulcer Risk (Brendan Scale) (Adult,Obstetrics,Pediatric)  Goal: Skin Integrity  Outcome: Ongoing (interventions implemented as appropriate)    Problem: Diabetes, Type 2 (Adult)  Goal: Signs and Symptoms of Listed Potential Problems Will be Absent or Manageable (Diabetes, Type 2)  Outcome: Ongoing (interventions implemented as appropriate)

## 2017-06-07 NOTE — PAYOR COMM NOTE
"CONTACT:  EMMANUELLE BABB RN, BSN  UTILIZATION MANAGEMENT DEPT.  [facility]  [address] [facility] [address], [address]  PHONE:  [phone]  FAX: [phone]    PT D/C'D HOME 6/6/17. REFER TO AUTH # [id number]    Yessica Yan (51 y.o. Female)     Date of Birth Social Security Number Address Home Phone MRN    1965  [address] [place] [phone] [id number]    [place] Marital Status          Christianity        Admission Date Admission Type Admitting Provider Attending Provider Department, Room/Bed    6/3/17 Emergency Dwight Quezada MD  [address], [address]/    Discharge Date Discharge Disposition Discharge Destination        6/6/2017 Home or Self Care             Attending Provider: (none)    Allergies:  Penicillins, Toradol [Ketorolac Tromethamine], Tramadol    Isolation:  None   Infection:  C.difficile (03/10/17)   Code Status:  Prior    Ht:  63\" (160 cm)   Wt:  122 lb 8 oz (55.6 kg)    Admission Cmt:  None   Principal Problem:  Urinary tract infection [N39.0]                 Active Insurance as of 6/3/2017     Primary Coverage     Payor Plan Insurance Group Employer/Plan Group    MEDICARE MEDICARE A & B      Payor Plan Address Payor Plan Phone Number Effective From Effective To    [address] [phone] 2/1/2005     [place] [address]       Subscriber Name Subscriber Birth Date Member ID       YESSICA YAN 1965 [id number]           Secondary Coverage     Payor Plan Insurance Group Employer/Plan Group    [employer]LL[employer] [place] WELL[place]ARE MEDICAID      Payor Plan Address Payor Plan Phone Number Effective From Effective To    [address] [address] [phone] 1/1/2016     [place] [address]       Subscriber Name Subscriber Birth Date Member ID       YESSICA YAN 1965 [id number]                 Emergency Contacts      (Rel.) Home Phone Work Phone Mobile Phone    Scot Vaughn (Son) [phone] -- --    Neeta Howard " (Friend) -- -- [phone]               Discharge Summary      Savanah Gutierrez MD at 2017  2:43 PM              [facility] HOSPITALIST MEDICINE DISCHARGE SUMMARY    Patient Identification:  Name:  Pepito Yan  Age:  51 y.o.  Sex:  female  :  1965  MRN:  [id number]  Visit Number:  [id number]    Date of Admission: 6/3/2017  Date of Discharge:  2017     PCP: Evan Hitchcock MD    DISCHARGE DIAGNOSIS  -Septic shock due to an Enterobacter aerogenes urinary tract infection  -Acute COPD exacerbation with admission chest x-ray showing right-sided pneumonia but chest x-ray 2 days later showing pneumonia in this at this time is unclear if she actually had pneumonia  -Chronic hypoxic respiratory failure for which uses 2 L/m nasal cannula oxygen at home  -Type 2 diabetes mellitus that is insulin-dependent with diabetic neuropathy  -Normocytic anemia  -Bilateral kidney cysts  -Rheumatoid arthritis  -Essential hypertension  -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 Hitchcock with infectious disease    PROCEDURES PERFORMED  None    HOSPITAL COURSE  Patient is a 51 y.o. female presented to [facility] complaining of shortness of air and chest pain.  She was found on ED CXR to have right sided pneumonia; she also had an elevated lactic acid of 5.3 and thus met septic shock criteria.  Please see the admitting history and physical for further details.  Cultures were obtained in the ED and she was started empirically on cefepime and azithromycin Solu-Medrol and DuoNeb  treatments.  Chest x-ray on 6/3/2017 was read as right lower lung pneumonia 6/5/2017 the chest x-ray no longer showed pneumonia.  Dr. Hithccock with infectious disease was consulted and by the time he saw the patient the urine culture was showing Enterobacter; the patient did not have crackles on exam and we do not suspect that she had pneumonia.  He changed her antibiotics to high-dose Rocephin and doxycycline.  He then was able to quickly change her to oral Omnicef based on her improving CRP.  The Rocephin and doxycycline also treated the acute COPD exacerbation.  She did have some hyperglycemia with the Solu-Medrol and she was given insulin.  The Solu-Medrol was quickly weaned to her home prednisone 20 mg by mouth daily dose, which did improve her hyperglycemia.  The patient's lactic acid quickly normalized and her CRP level was decreasing.    On day discharge, the patient requested to go home as her mother-in-law had passed the day prior.  She uses 2 L/m nasal cannula oxygen and will remain on this when she is discharged.  She was walking outside, at which time her oxygen level did drop into the 70s as she was not wearing oxygen.  Oxygen was reapplied to her and her oxygen level did improve.  She was eating a normal diet, able do her activities of daily living, and was able to walk on her own with no chest pain.  Her shortness of air had resolved.       VITAL SIGNS:  Last 3 weights    06/03/17  2300 06/05/17  0300 06/06/17  0256   Weight: 119 lb 1.6 oz (54 kg) 121 lb 4.8 oz (55 kg) 122 lb 8 oz (55.6 kg)     Body mass index is 21.7 kg/(m^2).    PHYSICAL EXAM:  Constitutional: Well-developed and well-nourished. No respiratory distress.    HENT:  Head: Normocephalic and atraumatic.  Mouth: Moist mucous membranes.    Eyes: Conjunctivae and EOM are normal. Pupils are equal, round, and reactive to light. No scleral icterus.    Neck: Neck supple. No JVD present.    Cardiovascular: Normal rate, regular rhythm and normal  heart sounds with no murmur.  Pulmonary/Chest: No respiratory distress, no wheezes, no crackles, with normal breath sounds and good air movement.  Abdominal: Soft. Bowel sounds are normal. No distension and no tenderness. PEG tube in place.   Musculoskeletal: No edema, no tenderness, and no deformity. No red or swollen joints anywhere. Possible left-sided CVA tenderness.   Neurological: Alert and oriented to person, place, and time. No cranial nerve deficit. No tongue deviation. No facial droop. No slurred speech.   Skin: Skin is warm and dry. No rash noted. No pallor.   Peripheral vascular: Strong pulses in all 4 extremities with no clubbing, no cyanosis, no edema.  Genitourinary: no King catheter in place.    DISCHARGE DISPOSITION   Stable    DISCHARGE MEDICATIONS:  albuterol (PROVENTIL HFA;VENTOLIN HFA) 108 (90 BASE) MCG/ACT inhaler  Inhale 2 puffs Every 4 (Four) Hours.   cefdinir (OMNICEF) 300 MG capsule  Take 1 capsule by mouth Every 12 (Twelve) Hours for 6 days. Indications: Pneumonia   doxycycline (MONODOX) 100 MG capsule  Take 1 capsule by mouth Every 12 (Twelve) Hours for 4 days. Indications: Pneumonia   gabapentin (NEURONTIN) 600 MG tablet  Take 600 mg by mouth 3 (Three) Times a Day.   hydrOXYzine (VISTARIL) 25 MG capsule  Take 25 mg by mouth 2 (Two) Times a Day As Needed for Itching or Anxiety. 2 to 3 times daily as needed   insulin glargine (LANTUS) 100 UNIT/ML injection  Inject 40 Units under the skin Every Night.   ipratropium (ATROVENT) 0.02 % nebulizer solution  Take 2.5 mL by nebulization Every 6 (Six) Hours As Needed for Shortness of Air.   lisinopril (PRINIVIL,ZESTRIL) 5 MG tablet  Take 5 mg by mouth Daily.   metFORMIN (GLUCOPHAGE) 500 MG tablet  Take 500 mg by mouth 3 (Three) Times a Day.   ondansetron (ZOFRAN) 4 MG tablet  Take 4 mg by mouth Every 6 (Six) Hours As Needed for Nausea or Vomiting. Every 4 to 6 hours as needed   PHARMACY MEDS TO BED CONSULT  Daily.   predniSONE (DELTASONE) 20 MG  tablet  Take 20 mg by mouth Every Morning.   promethazine (PHENERGAN) 25 MG tablet  Take 25 mg by mouth Every 6 (Six) Hours As Needed for Nausea or Vomiting. Every 4 to 6 hours as needed   sucralfate (CARAFATE) 1 G tablet  Take 1 g by mouth 4 (Four) Times a Day.   tiZANidine (ZANAFLEX) 4 MG tablet  Take 4 mg by mouth Every 8 (Eight) Hours As Needed for Muscle Spasms. Every 6 to 8 hours as needed       Diet Instructions     Diet: Regular, Consistent Carbohydrate, Cardiac; Thin Liquids, No Restrictions       Discharge Diet:   Regular, Consistent Carbohydrate, Cardiac      Fluid Consistency:  Thin Liquids, No Restrictions          Activity Instructions     Activity as Tolerated           Additional Instructions for the Follow-ups that You Need to Schedule     Discharge Follow-up with PCP    As directed    Follow Up Details:  3-7 days              Follow up with ROSA Meza .    Specialty:  Family Medicine    Why:  3-7 days    Contact information:    [address] [place] [address]  [phone]          TEST  RESULTS PENDING AT DISCHARGE:  None   Order Current Status    Blood Culture Preliminary result    Blood Culture Preliminary result           Savanah Gutierrez MD  06/06/17  2:43 PM    Please note that this discharge summary required more than 30 minutes to complete.    Please send a copy of this dictation to the following providers:  Evan Hitchcock MD     Electronically signed by Savanah Gutierrez MD at 6/6/2017  2:59 PM

## 2017-06-08 LAB
BACTERIA SPEC AEROBE CULT: NORMAL
BACTERIA SPEC AEROBE CULT: NORMAL

## 2017-06-24 ENCOUNTER — APPOINTMENT (OUTPATIENT)
Dept: GENERAL RADIOLOGY | Facility: HOSPITAL | Age: 52
End: 2017-06-24

## 2017-06-24 ENCOUNTER — HOSPITAL ENCOUNTER (INPATIENT)
Facility: HOSPITAL | Age: 52
LOS: 6 days | Discharge: HOME HEALTH | End: 2017-06-30
Attending: EMERGENCY MEDICINE | Admitting: INTERNAL MEDICINE

## 2017-06-24 ENCOUNTER — APPOINTMENT (OUTPATIENT)
Dept: CT IMAGING | Facility: HOSPITAL | Age: 52
End: 2017-06-24

## 2017-06-24 DIAGNOSIS — M00.9 PYOGENIC ARTHRITIS OF LEFT ELBOW, DUE TO UNSPECIFIED ORGANISM (HCC): Primary | ICD-10-CM

## 2017-06-24 LAB
6-ACETYL MORPHINE: NEGATIVE
ALBUMIN SERPL-MCNC: 3.5 G/DL (ref 3.5–5)
ALBUMIN/GLOB SERPL: 1.2 G/DL (ref 1.5–2.5)
ALP SERPL-CCNC: 65 U/L (ref 35–104)
ALT SERPL W P-5'-P-CCNC: 68 U/L (ref 10–36)
AMPHET+METHAMPHET UR QL: NEGATIVE
ANION GAP SERPL CALCULATED.3IONS-SCNC: 5.3 MMOL/L (ref 3.6–11.2)
AST SERPL-CCNC: 37 U/L (ref 10–30)
BACTERIA UR QL AUTO: ABNORMAL /HPF
BARBITURATES UR QL SCN: NEGATIVE
BASOPHILS # BLD AUTO: 0.03 10*3/MM3 (ref 0–0.3)
BASOPHILS NFR BLD AUTO: 0.2 % (ref 0–2)
BENZODIAZ UR QL SCN: NEGATIVE
BILIRUB SERPL-MCNC: 0.6 MG/DL (ref 0.2–1.8)
BILIRUB UR QL STRIP: NEGATIVE
BUN BLD-MCNC: 13 MG/DL (ref 7–21)
BUN/CREAT SERPL: 17.3 (ref 7–25)
BUPRENORPHINE SERPL-MCNC: NEGATIVE NG/ML
CALCIUM SPEC-SCNC: 8.8 MG/DL (ref 7.7–10)
CANNABINOIDS SERPL QL: NEGATIVE
CHLORIDE SERPL-SCNC: 106 MMOL/L (ref 99–112)
CLARITY UR: ABNORMAL
CO2 SERPL-SCNC: 23.7 MMOL/L (ref 24.3–31.9)
COCAINE UR QL: NEGATIVE
COLOR UR: YELLOW
CREAT BLD-MCNC: 0.75 MG/DL (ref 0.43–1.29)
CRP SERPL-MCNC: 13.12 MG/DL (ref 0–0.99)
D-LACTATE SERPL-SCNC: 1.1 MMOL/L (ref 0.5–2)
DEPRECATED RDW RBC AUTO: 53.5 FL (ref 37–54)
EOSINOPHIL # BLD AUTO: 0 10*3/MM3 (ref 0–0.7)
EOSINOPHIL NFR BLD AUTO: 0 % (ref 0–5)
ERYTHROCYTE [DISTWIDTH] IN BLOOD BY AUTOMATED COUNT: 19.9 % (ref 11.5–14.5)
ERYTHROCYTE [SEDIMENTATION RATE] IN BLOOD: 38 MM/HR (ref 0–30)
GFR SERPL CREATININE-BSD FRML MDRD: 81 ML/MIN/1.73
GLOBULIN UR ELPH-MCNC: 3 GM/DL
GLUCOSE BLD-MCNC: 467 MG/DL (ref 70–110)
GLUCOSE BLDC GLUCOMTR-MCNC: 242 MG/DL (ref 70–130)
GLUCOSE BLDC GLUCOMTR-MCNC: 349 MG/DL (ref 70–130)
GLUCOSE BLDC GLUCOMTR-MCNC: 427 MG/DL (ref 70–130)
GLUCOSE UR STRIP-MCNC: ABNORMAL MG/DL
HCT VFR BLD AUTO: 38.7 % (ref 37–47)
HGB BLD-MCNC: 11.8 G/DL (ref 12–16)
HGB UR QL STRIP.AUTO: ABNORMAL
IMM GRANULOCYTES # BLD: 0.27 10*3/MM3 (ref 0–0.03)
IMM GRANULOCYTES NFR BLD: 1.8 % (ref 0–0.5)
KETONES UR QL STRIP: ABNORMAL
LEUKOCYTE ESTERASE UR QL STRIP.AUTO: ABNORMAL
LYMPHOCYTES # BLD AUTO: 0.57 10*3/MM3 (ref 1–3)
LYMPHOCYTES NFR BLD AUTO: 3.8 % (ref 21–51)
MCH RBC QN AUTO: 23.7 PG (ref 27–33)
MCHC RBC AUTO-ENTMCNC: 30.5 G/DL (ref 33–37)
MCV RBC AUTO: 77.9 FL (ref 80–94)
MDMA UR QL SCN: NEGATIVE
METHADONE UR QL SCN: NEGATIVE
MONOCYTES # BLD AUTO: 0.86 10*3/MM3 (ref 0.1–0.9)
MONOCYTES NFR BLD AUTO: 5.8 % (ref 0–10)
NEUTROPHILS # BLD AUTO: 13.1 10*3/MM3 (ref 1.4–6.5)
NEUTROPHILS NFR BLD AUTO: 88.4 % (ref 30–70)
NITRITE UR QL STRIP: POSITIVE
OPIATES UR QL: POSITIVE
OSMOLALITY SERPL CALC.SUM OF ELEC: 290.7 MOSM/KG (ref 273–305)
OXYCODONE UR QL SCN: NEGATIVE
PCP UR QL SCN: NEGATIVE
PH UR STRIP.AUTO: 6.5 [PH] (ref 5–8)
PLATELET # BLD AUTO: 232 10*3/MM3 (ref 130–400)
PMV BLD AUTO: 10.5 FL (ref 6–10)
POTASSIUM BLD-SCNC: 4 MMOL/L (ref 3.5–5.3)
PROT SERPL-MCNC: 6.5 G/DL (ref 6–8)
PROT UR QL STRIP: ABNORMAL
RBC # BLD AUTO: 4.97 10*6/MM3 (ref 4.2–5.4)
RBC # UR: ABNORMAL /HPF
REF LAB TEST METHOD: ABNORMAL
SODIUM BLD-SCNC: 135 MMOL/L (ref 135–153)
SP GR UR STRIP: >1.03 (ref 1–1.03)
SQUAMOUS #/AREA URNS HPF: ABNORMAL /HPF
UROBILINOGEN UR QL STRIP: ABNORMAL
WBC NRBC COR # BLD: 14.83 10*3/MM3 (ref 4.5–12.5)
WBC UR QL AUTO: ABNORMAL /HPF

## 2017-06-24 PROCEDURE — 63710000001 INSULIN REGULAR HUMAN PER 5 UNITS: Performed by: PHYSICIAN ASSISTANT

## 2017-06-24 PROCEDURE — 80307 DRUG TEST PRSMV CHEM ANLYZR: CPT | Performed by: PHYSICIAN ASSISTANT

## 2017-06-24 PROCEDURE — 85652 RBC SED RATE AUTOMATED: CPT | Performed by: PHYSICIAN ASSISTANT

## 2017-06-24 PROCEDURE — 85025 COMPLETE CBC W/AUTO DIFF WBC: CPT | Performed by: PHYSICIAN ASSISTANT

## 2017-06-24 PROCEDURE — 87086 URINE CULTURE/COLONY COUNT: CPT | Performed by: PHYSICIAN ASSISTANT

## 2017-06-24 PROCEDURE — 87040 BLOOD CULTURE FOR BACTERIA: CPT | Performed by: PHYSICIAN ASSISTANT

## 2017-06-24 PROCEDURE — 25010000002 MORPHINE PER 10 MG: Performed by: EMERGENCY MEDICINE

## 2017-06-24 PROCEDURE — 73200 CT UPPER EXTREMITY W/O DYE: CPT

## 2017-06-24 PROCEDURE — 82962 GLUCOSE BLOOD TEST: CPT

## 2017-06-24 PROCEDURE — 36415 COLL VENOUS BLD VENIPUNCTURE: CPT

## 2017-06-24 PROCEDURE — 25010000002 VANCOMYCIN PER 500 MG: Performed by: PHYSICIAN ASSISTANT

## 2017-06-24 PROCEDURE — 73080 X-RAY EXAM OF ELBOW: CPT

## 2017-06-24 PROCEDURE — 25010000002 MORPHINE PER 10 MG: Performed by: INTERNAL MEDICINE

## 2017-06-24 PROCEDURE — 86140 C-REACTIVE PROTEIN: CPT | Performed by: PHYSICIAN ASSISTANT

## 2017-06-24 PROCEDURE — 63710000001 INSULIN DETEMIR PER 5 UNITS: Performed by: INTERNAL MEDICINE

## 2017-06-24 PROCEDURE — 87186 SC STD MICRODIL/AGAR DIL: CPT | Performed by: PHYSICIAN ASSISTANT

## 2017-06-24 PROCEDURE — 73080 X-RAY EXAM OF ELBOW: CPT | Performed by: RADIOLOGY

## 2017-06-24 PROCEDURE — 25010000002 CEFTRIAXONE: Performed by: PHYSICIAN ASSISTANT

## 2017-06-24 PROCEDURE — 87077 CULTURE AEROBIC IDENTIFY: CPT | Performed by: PHYSICIAN ASSISTANT

## 2017-06-24 PROCEDURE — 81001 URINALYSIS AUTO W/SCOPE: CPT | Performed by: PHYSICIAN ASSISTANT

## 2017-06-24 PROCEDURE — 93005 ELECTROCARDIOGRAM TRACING: CPT | Performed by: INTERNAL MEDICINE

## 2017-06-24 PROCEDURE — 83605 ASSAY OF LACTIC ACID: CPT | Performed by: PHYSICIAN ASSISTANT

## 2017-06-24 PROCEDURE — 99223 1ST HOSP IP/OBS HIGH 75: CPT | Performed by: INTERNAL MEDICINE

## 2017-06-24 PROCEDURE — 73200 CT UPPER EXTREMITY W/O DYE: CPT | Performed by: RADIOLOGY

## 2017-06-24 PROCEDURE — 25010000002 ONDANSETRON PER 1 MG: Performed by: PHYSICIAN ASSISTANT

## 2017-06-24 PROCEDURE — 99284 EMERGENCY DEPT VISIT MOD MDM: CPT

## 2017-06-24 PROCEDURE — 80053 COMPREHEN METABOLIC PANEL: CPT | Performed by: PHYSICIAN ASSISTANT

## 2017-06-24 PROCEDURE — 25010000002 HEPARIN (PORCINE) PER 1000 UNITS: Performed by: INTERNAL MEDICINE

## 2017-06-24 RX ORDER — ONDANSETRON 4 MG/1
4 TABLET, FILM COATED ORAL EVERY 6 HOURS PRN
Status: CANCELLED | OUTPATIENT
Start: 2017-06-24

## 2017-06-24 RX ORDER — SODIUM CHLORIDE 9 MG/ML
75 INJECTION, SOLUTION INTRAVENOUS CONTINUOUS
Status: DISCONTINUED | OUTPATIENT
Start: 2017-06-24 | End: 2017-06-28

## 2017-06-24 RX ORDER — MORPHINE SULFATE 2 MG/ML
2 INJECTION, SOLUTION INTRAMUSCULAR; INTRAVENOUS ONCE
Status: COMPLETED | OUTPATIENT
Start: 2017-06-24 | End: 2017-06-24

## 2017-06-24 RX ORDER — PROMETHAZINE HYDROCHLORIDE 25 MG/1
25 TABLET ORAL EVERY 6 HOURS PRN
Status: DISCONTINUED | OUTPATIENT
Start: 2017-06-24 | End: 2017-06-30 | Stop reason: HOSPADM

## 2017-06-24 RX ORDER — LISINOPRIL 2.5 MG/1
5 TABLET ORAL DAILY
Status: CANCELLED | OUTPATIENT
Start: 2017-06-24

## 2017-06-24 RX ORDER — SODIUM CHLORIDE 0.9 % (FLUSH) 0.9 %
10 SYRINGE (ML) INJECTION AS NEEDED
Status: DISCONTINUED | OUTPATIENT
Start: 2017-06-24 | End: 2017-06-30 | Stop reason: HOSPADM

## 2017-06-24 RX ORDER — SUCRALFATE 1 G/1
1 TABLET ORAL 4 TIMES DAILY
Status: DISCONTINUED | OUTPATIENT
Start: 2017-06-24 | End: 2017-06-30 | Stop reason: HOSPADM

## 2017-06-24 RX ORDER — HEPARIN SODIUM 5000 [USP'U]/ML
5000 INJECTION, SOLUTION INTRAVENOUS; SUBCUTANEOUS EVERY 12 HOURS SCHEDULED
Status: DISCONTINUED | OUTPATIENT
Start: 2017-06-24 | End: 2017-06-30 | Stop reason: HOSPADM

## 2017-06-24 RX ORDER — SODIUM CHLORIDE 0.9 % (FLUSH) 0.9 %
1-10 SYRINGE (ML) INJECTION AS NEEDED
Status: DISCONTINUED | OUTPATIENT
Start: 2017-06-24 | End: 2017-06-30 | Stop reason: HOSPADM

## 2017-06-24 RX ORDER — HYDROXYZINE HYDROCHLORIDE 25 MG/1
25 TABLET, FILM COATED ORAL 3 TIMES DAILY PRN
Status: DISCONTINUED | OUTPATIENT
Start: 2017-06-24 | End: 2017-06-30 | Stop reason: HOSPADM

## 2017-06-24 RX ORDER — TIZANIDINE 4 MG/1
4 TABLET ORAL EVERY 8 HOURS PRN
Status: DISCONTINUED | OUTPATIENT
Start: 2017-06-24 | End: 2017-06-30 | Stop reason: HOSPADM

## 2017-06-24 RX ORDER — PREDNISONE 20 MG/1
20 TABLET ORAL EVERY MORNING
Status: DISCONTINUED | OUTPATIENT
Start: 2017-06-25 | End: 2017-06-30 | Stop reason: HOSPADM

## 2017-06-24 RX ORDER — GABAPENTIN 300 MG/1
600 CAPSULE ORAL EVERY 8 HOURS SCHEDULED
Status: DISCONTINUED | OUTPATIENT
Start: 2017-06-24 | End: 2017-06-30 | Stop reason: HOSPADM

## 2017-06-24 RX ORDER — ONDANSETRON 2 MG/ML
4 INJECTION INTRAMUSCULAR; INTRAVENOUS ONCE
Status: COMPLETED | OUTPATIENT
Start: 2017-06-24 | End: 2017-06-24

## 2017-06-24 RX ORDER — ALBUTEROL SULFATE 2.5 MG/3ML
2.5 SOLUTION RESPIRATORY (INHALATION) EVERY 6 HOURS PRN
Status: DISCONTINUED | OUTPATIENT
Start: 2017-06-24 | End: 2017-06-30 | Stop reason: HOSPADM

## 2017-06-24 RX ADMIN — GABAPENTIN 600 MG: 300 CAPSULE ORAL at 21:08

## 2017-06-24 RX ADMIN — SUCRALFATE 1 G: 1 TABLET ORAL at 21:08

## 2017-06-24 RX ADMIN — CEFTRIAXONE 2 G: 2 INJECTION, POWDER, FOR SOLUTION INTRAMUSCULAR; INTRAVENOUS at 15:45

## 2017-06-24 RX ADMIN — SODIUM CHLORIDE 125 ML/HR: 900 INJECTION, SOLUTION INTRAVENOUS at 13:51

## 2017-06-24 RX ADMIN — INSULIN DETEMIR 30 UNITS: 100 INJECTION, SOLUTION SUBCUTANEOUS at 21:13

## 2017-06-24 RX ADMIN — HEPARIN SODIUM 5000 UNITS: 5000 INJECTION, SOLUTION INTRAVENOUS; SUBCUTANEOUS at 21:08

## 2017-06-24 RX ADMIN — MORPHINE SULFATE 4 MG: 4 INJECTION, SOLUTION INTRAMUSCULAR; INTRAVENOUS at 18:22

## 2017-06-24 RX ADMIN — SODIUM CHLORIDE 1000 ML: 900 INJECTION, SOLUTION INTRAVENOUS at 15:13

## 2017-06-24 RX ADMIN — SODIUM CHLORIDE 125 ML/HR: 900 INJECTION, SOLUTION INTRAVENOUS at 21:14

## 2017-06-24 RX ADMIN — MORPHINE SULFATE 2 MG: 2 INJECTION, SOLUTION INTRAMUSCULAR; INTRAVENOUS at 13:50

## 2017-06-24 RX ADMIN — VANCOMYCIN HYDROCHLORIDE 1000 MG: 5 INJECTION, POWDER, LYOPHILIZED, FOR SOLUTION INTRAVENOUS at 17:01

## 2017-06-24 RX ADMIN — MORPHINE SULFATE 4 MG: 4 INJECTION, SOLUTION INTRAMUSCULAR; INTRAVENOUS at 22:31

## 2017-06-24 RX ADMIN — HUMAN INSULIN 10 UNITS: 100 INJECTION, SOLUTION SUBCUTANEOUS at 16:40

## 2017-06-24 RX ADMIN — ONDANSETRON 4 MG: 2 INJECTION, SOLUTION INTRAMUSCULAR; INTRAVENOUS at 13:50

## 2017-06-24 RX ADMIN — MORPHINE SULFATE 2 MG: 2 INJECTION, SOLUTION INTRAMUSCULAR; INTRAVENOUS at 15:47

## 2017-06-24 NOTE — ED PROVIDER NOTES
Subjective   Patient is a 51 y.o. female presenting with shortness of breath.   Shortness of Breath   Severity:  Mild  Onset quality:  Gradual  Timing:  Constant  Progression:  Worsening  Chronicity:  Chronic  Relieved by:  Nothing  Worsened by:  Activity, deep breathing and exertion  Ineffective treatments:  None tried  Associated symptoms: abdominal pain    Associated symptoms: no chest pain, no cough, no diaphoresis, no ear pain, no fever, no headaches, no neck pain, no rash, no sore throat, no sputum production, no syncope, no vomiting and no wheezing    Risk factors: no recent alcohol use and no obesity        Review of Systems   Constitutional: Negative for activity change, appetite change, chills, diaphoresis, fatigue and fever.   HENT: Negative for congestion, ear pain and sore throat.    Eyes: Negative for redness.   Respiratory: Positive for shortness of breath. Negative for cough, sputum production, chest tightness and wheezing.    Cardiovascular: Negative for chest pain, palpitations, leg swelling and syncope.   Gastrointestinal: Positive for abdominal pain. Negative for diarrhea, nausea and vomiting.   Genitourinary: Negative for dysuria and urgency.   Musculoskeletal: Negative for arthralgias, back pain, myalgias and neck pain.   Skin: Negative for pallor, rash and wound.   Neurological: Negative for dizziness, speech difficulty, weakness and headaches.   Psychiatric/Behavioral: Negative for agitation, behavioral problems, confusion and decreased concentration.       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   • Chronic respiratory failure with hypoxia     at nighttime   • Compression fracture of lumbar vertebra    • Compression fracture of thoracic vertebra    • COPD (chronic obstructive pulmonary disease)    • Coronary artery disease    • Diabetes mellitus type 2 in nonobese    • Erosive esophagitis 12/2016   • Essential hypertension     • Gastroparesis    • GERD (gastroesophageal reflux disease)    • Hepatitis-C    • Medically noncompliant    • MRSA infection     back of neck around    • NSTEMI (non-ST elevated myocardial infarction) 2016   • PE (pulmonary embolism)    • Protein calorie malnutrition    • RA (rheumatoid arthritis)     with chronic steroid use       Allergies   Allergen Reactions   • Penicillins Anaphylaxis   • Toradol [Ketorolac Tromethamine] Hives and Swelling   • Tramadol Hives and Swelling       Past Surgical History:   Procedure Laterality Date   • AMPUTATION DIGIT Right 2016    Third digit to the PIP. Surgeon: Dr. Bains   • AMPUTATION DIGIT Left 2016    Procedure: PARTIAL AMPUTATION LEFT THIRD FINGER;  Surgeon: Shane Fuller MD;  Location: [facility] OR;  Service:    • AMPUTATION REVISION Left 2016    Procedure: AMPUTATION REVISION DIGIT STUMP;  Surgeon: Shane Fuller MD;  Location: [facility] OR;  Service:    • ARM DEBRIDEMENT Left 2016    Procedure: HAND IRRIGATION DEBRIDEMENT AND REPAIR ;  Surgeon: Shane Fuller MD;  Location: [facility] OR;  Service:    •  SECTION      x 2   • CHOLECYSTECTOMY     • ENDOSCOPY N/A 2016    Procedure: ESOPHAGOGASTRODUODENOSCOPY;  Surgeon: Jeremy Clement III, MD;  Location: [facility] OR;  Service:    • ENDOSCOPY W/ PEG TUBE PLACEMENT N/A 3/30/2017    Procedure: ESOPHAGOGASTRODUODENOSCOPY WITH PERCUTANEOUS ENDOSCOPIC GASTROSTOMY TUBE INSERTION;  Surgeon: Shade Campos MD;  Location: [facility] OR;  Service:    • FOREIGN BODY REMOVAL      insulin needle removal x 3 from the abdomen   • HYSTERECTOMY         Family History   Problem Relation Age of Onset   • Colon cancer Mother    • Cancer Father    • Diabetes Maternal Grandmother    • Heart disease Neg Hx    • Stroke Neg Hx        Social History     Social History   • Marital status:      Spouse name: N/A   • Number of children: N/A   • Years of education: N/A     Social  History Main Topics   • Smoking status: Current Every Day Smoker     Years: 35.00     Types: Cigarettes   • Smokeless tobacco: None      Comment: 4-5 cigarettes a day    • Alcohol use No   • Drug use: No   • Sexual activity: Defer     Other Topics Concern   • None     Social History Narrative   • None           Objective   Physical Exam   Constitutional: She is oriented to person, place, and time. She appears well-developed and well-nourished.  Non-toxic appearance. No distress.   HENT:   Head: Normocephalic and atraumatic.   Right Ear: External ear normal.   Left Ear: External ear normal.   Nose: Nose normal.   Mouth/Throat: Oropharynx is clear and moist and mucous membranes are normal. No oropharyngeal exudate. No tonsillar exudate.   Eyes: Conjunctivae, EOM and lids are normal. Pupils are equal, round, and reactive to light.   Neck: Normal range of motion and full passive range of motion without pain. Neck supple. No thyromegaly present.   Cardiovascular: Normal rate, regular rhythm, S1 normal, S2 normal, normal heart sounds, intact distal pulses and normal pulses.    Pulmonary/Chest: Effort normal and breath sounds normal. No tachypnea. No respiratory distress. She has no decreased breath sounds. She has no wheezes. She has no rales. She exhibits tenderness.   Abdominal: Soft. Normal appearance and bowel sounds are normal. She exhibits no distension. There is tenderness. There is no rebound and no guarding.   Musculoskeletal: Normal range of motion. She exhibits no edema, tenderness or deformity.   Lymphadenopathy:     She has no cervical adenopathy.   Neurological: She is alert and oriented to person, place, and time. She has normal strength. No cranial nerve deficit or sensory deficit. GCS eye subscore is 4. GCS verbal subscore is 5. GCS motor subscore is 6.   Skin: Skin is warm, dry and intact. No rash noted. She is not diaphoretic. No erythema. No pallor.   Psychiatric: She has a normal mood and affect. Her  speech is normal and behavior is normal. Judgment and thought content normal. Cognition and memory are normal.   Nursing note and vitals reviewed.      Procedures         ED Course  ED Course   Value Comment By Time    RT unable to acquire ABG, patient refusing further sticks Pk Bowling MD 06/03 2123   XR Chest 1 View IMPRESSION:  PATCHY BILATERAL AIRSPACE DISEASE VERSUS CHRONIC SCARRING. Davide Perez MD 06/24 1907   CT Abdomen Pelvis With Contrast IMPRESSION:  Impression:  Thickened small bowel that may represent enteritis. Davide Perez MD 06/24 1907   ECG 12 Lead Sinus Tachycardia.  No Acute Ischemia. Davide Perez MD 06/24 1911                  MDM  Number of Diagnoses or Management Options  Urinary tract infection, site unspecified: new and requires workup     Amount and/or Complexity of Data Reviewed  Clinical lab tests: ordered and reviewed  Tests in the radiology section of CPT®: ordered and reviewed  Tests in the medicine section of CPT®: ordered and reviewed  Discuss the patient with other providers: yes  Independent visualization of images, tracings, or specimens: yes    Risk of Complications, Morbidity, and/or Mortality  Presenting problems: moderate  Diagnostic procedures: moderate  Management options: moderate    Patient Progress  Patient progress: stable      Final diagnoses:   Urinary tract infection, site unspecified            Davide Perez MD  06/24/17 1911

## 2017-06-25 LAB
ALBUMIN SERPL-MCNC: 3.7 G/DL (ref 3.5–5)
ALBUMIN/GLOB SERPL: 1.2 G/DL (ref 1.5–2.5)
ALP SERPL-CCNC: 84 U/L (ref 35–104)
ALT SERPL W P-5'-P-CCNC: 201 U/L (ref 10–36)
ANION GAP SERPL CALCULATED.3IONS-SCNC: 7.7 MMOL/L (ref 3.6–11.2)
AST SERPL-CCNC: 319 U/L (ref 10–30)
BASOPHILS # BLD AUTO: 0.02 10*3/MM3 (ref 0–0.3)
BASOPHILS NFR BLD AUTO: 0.2 % (ref 0–2)
BILIRUB SERPL-MCNC: 0.5 MG/DL (ref 0.2–1.8)
BUN BLD-MCNC: 10 MG/DL (ref 7–21)
BUN/CREAT SERPL: 17.9 (ref 7–25)
CALCIUM SPEC-SCNC: 8.9 MG/DL (ref 7.7–10)
CHLORIDE SERPL-SCNC: 108 MMOL/L (ref 99–112)
CO2 SERPL-SCNC: 23.3 MMOL/L (ref 24.3–31.9)
CREAT BLD-MCNC: 0.56 MG/DL (ref 0.43–1.29)
CRP SERPL-MCNC: 27.34 MG/DL (ref 0–0.99)
DEPRECATED RDW RBC AUTO: 56.6 FL (ref 37–54)
EOSINOPHIL # BLD AUTO: 0.01 10*3/MM3 (ref 0–0.7)
EOSINOPHIL NFR BLD AUTO: 0.1 % (ref 0–5)
ERYTHROCYTE [DISTWIDTH] IN BLOOD BY AUTOMATED COUNT: 20.3 % (ref 11.5–14.5)
GFR SERPL CREATININE-BSD FRML MDRD: 114 ML/MIN/1.73
GLOBULIN UR ELPH-MCNC: 3 GM/DL
GLUCOSE BLD-MCNC: 143 MG/DL (ref 70–110)
GLUCOSE BLDC GLUCOMTR-MCNC: 146 MG/DL (ref 70–130)
GLUCOSE BLDC GLUCOMTR-MCNC: 223 MG/DL (ref 70–130)
GLUCOSE BLDC GLUCOMTR-MCNC: 337 MG/DL (ref 70–130)
GLUCOSE BLDC GLUCOMTR-MCNC: 69 MG/DL (ref 70–130)
HCT VFR BLD AUTO: 41.7 % (ref 37–47)
HGB BLD-MCNC: 12.4 G/DL (ref 12–16)
IMM GRANULOCYTES # BLD: 0.16 10*3/MM3 (ref 0–0.03)
IMM GRANULOCYTES NFR BLD: 1.4 % (ref 0–0.5)
LYMPHOCYTES # BLD AUTO: 1.29 10*3/MM3 (ref 1–3)
LYMPHOCYTES NFR BLD AUTO: 11.1 % (ref 21–51)
MAGNESIUM SERPL-MCNC: 1.9 MG/DL (ref 1.7–2.6)
MCH RBC QN AUTO: 23.5 PG (ref 27–33)
MCHC RBC AUTO-ENTMCNC: 29.7 G/DL (ref 33–37)
MCV RBC AUTO: 79.1 FL (ref 80–94)
MONOCYTES # BLD AUTO: 0.8 10*3/MM3 (ref 0.1–0.9)
MONOCYTES NFR BLD AUTO: 6.9 % (ref 0–10)
NEUTROPHILS # BLD AUTO: 9.39 10*3/MM3 (ref 1.4–6.5)
NEUTROPHILS NFR BLD AUTO: 80.3 % (ref 30–70)
OSMOLALITY SERPL CALC.SUM OF ELEC: 279.1 MOSM/KG (ref 273–305)
PHOSPHATE SERPL-MCNC: 2.1 MG/DL (ref 2.7–4.5)
PLATELET # BLD AUTO: 229 10*3/MM3 (ref 130–400)
PMV BLD AUTO: 10.1 FL (ref 6–10)
POTASSIUM BLD-SCNC: 4 MMOL/L (ref 3.5–5.3)
PROT SERPL-MCNC: 6.7 G/DL (ref 6–8)
RBC # BLD AUTO: 5.27 10*6/MM3 (ref 4.2–5.4)
SODIUM BLD-SCNC: 139 MMOL/L (ref 135–153)
WBC NRBC COR # BLD: 11.67 10*3/MM3 (ref 4.5–12.5)

## 2017-06-25 PROCEDURE — 85025 COMPLETE CBC W/AUTO DIFF WBC: CPT | Performed by: INTERNAL MEDICINE

## 2017-06-25 PROCEDURE — 80053 COMPREHEN METABOLIC PANEL: CPT | Performed by: INTERNAL MEDICINE

## 2017-06-25 PROCEDURE — 87070 CULTURE OTHR SPECIMN AEROBIC: CPT | Performed by: INTERNAL MEDICINE

## 2017-06-25 PROCEDURE — 99221 1ST HOSP IP/OBS SF/LOW 40: CPT | Performed by: ORTHOPAEDIC SURGERY

## 2017-06-25 PROCEDURE — 93010 ELECTROCARDIOGRAM REPORT: CPT | Performed by: INTERNAL MEDICINE

## 2017-06-25 PROCEDURE — 84100 ASSAY OF PHOSPHORUS: CPT | Performed by: INTERNAL MEDICINE

## 2017-06-25 PROCEDURE — 87077 CULTURE AEROBIC IDENTIFY: CPT | Performed by: INTERNAL MEDICINE

## 2017-06-25 PROCEDURE — 94799 UNLISTED PULMONARY SVC/PX: CPT

## 2017-06-25 PROCEDURE — 86140 C-REACTIVE PROTEIN: CPT | Performed by: INTERNAL MEDICINE

## 2017-06-25 PROCEDURE — 25010000002 VANCOMYCIN PER 500 MG

## 2017-06-25 PROCEDURE — 25010000002 MORPHINE PER 10 MG: Performed by: INTERNAL MEDICINE

## 2017-06-25 PROCEDURE — 82962 GLUCOSE BLOOD TEST: CPT

## 2017-06-25 PROCEDURE — 87147 CULTURE TYPE IMMUNOLOGIC: CPT | Performed by: INTERNAL MEDICINE

## 2017-06-25 PROCEDURE — 63710000001 INSULIN ASPART PER 5 UNITS: Performed by: NURSE PRACTITIONER

## 2017-06-25 PROCEDURE — 63710000001 PREDNISONE PER 1 MG: Performed by: INTERNAL MEDICINE

## 2017-06-25 PROCEDURE — 25010000002 HEPARIN (PORCINE) PER 1000 UNITS: Performed by: INTERNAL MEDICINE

## 2017-06-25 PROCEDURE — 87186 SC STD MICRODIL/AGAR DIL: CPT | Performed by: INTERNAL MEDICINE

## 2017-06-25 PROCEDURE — 99233 SBSQ HOSP IP/OBS HIGH 50: CPT | Performed by: INTERNAL MEDICINE

## 2017-06-25 PROCEDURE — 83735 ASSAY OF MAGNESIUM: CPT | Performed by: INTERNAL MEDICINE

## 2017-06-25 PROCEDURE — 87205 SMEAR GRAM STAIN: CPT | Performed by: INTERNAL MEDICINE

## 2017-06-25 PROCEDURE — 63710000001 PROMETHAZINE PER 25 MG: Performed by: INTERNAL MEDICINE

## 2017-06-25 RX ORDER — DEXTROSE MONOHYDRATE 25 G/50ML
25 INJECTION, SOLUTION INTRAVENOUS
Status: DISCONTINUED | OUTPATIENT
Start: 2017-06-25 | End: 2017-06-30 | Stop reason: HOSPADM

## 2017-06-25 RX ORDER — NICOTINE POLACRILEX 4 MG
15 LOZENGE BUCCAL
Status: DISCONTINUED | OUTPATIENT
Start: 2017-06-25 | End: 2017-06-30 | Stop reason: HOSPADM

## 2017-06-25 RX ORDER — UREA 10 %
1 LOTION (ML) TOPICAL DAILY
Status: DISCONTINUED | OUTPATIENT
Start: 2017-06-25 | End: 2017-06-30 | Stop reason: HOSPADM

## 2017-06-25 RX ADMIN — PREDNISONE 20 MG: 20 TABLET ORAL at 06:37

## 2017-06-25 RX ADMIN — PROMETHAZINE HYDROCHLORIDE 25 MG: 25 TABLET ORAL at 10:20

## 2017-06-25 RX ADMIN — GABAPENTIN 600 MG: 300 CAPSULE ORAL at 15:22

## 2017-06-25 RX ADMIN — AZTREONAM 2 G: 2 INJECTION, POWDER, FOR SOLUTION INTRAMUSCULAR; INTRAVENOUS at 18:15

## 2017-06-25 RX ADMIN — MORPHINE SULFATE 4 MG: 4 INJECTION, SOLUTION INTRAMUSCULAR; INTRAVENOUS at 19:36

## 2017-06-25 RX ADMIN — MORPHINE SULFATE 4 MG: 4 INJECTION, SOLUTION INTRAMUSCULAR; INTRAVENOUS at 03:26

## 2017-06-25 RX ADMIN — HEPARIN SODIUM 5000 UNITS: 5000 INJECTION, SOLUTION INTRAVENOUS; SUBCUTANEOUS at 21:49

## 2017-06-25 RX ADMIN — AZTREONAM 2 G: 2 INJECTION, POWDER, FOR SOLUTION INTRAMUSCULAR; INTRAVENOUS at 10:55

## 2017-06-25 RX ADMIN — MORPHINE SULFATE 4 MG: 4 INJECTION, SOLUTION INTRAMUSCULAR; INTRAVENOUS at 11:17

## 2017-06-25 RX ADMIN — GABAPENTIN 600 MG: 300 CAPSULE ORAL at 21:49

## 2017-06-25 RX ADMIN — SUCRALFATE 1 G: 1 TABLET ORAL at 10:20

## 2017-06-25 RX ADMIN — HEPARIN SODIUM 5000 UNITS: 5000 INJECTION, SOLUTION INTRAVENOUS; SUBCUTANEOUS at 10:21

## 2017-06-25 RX ADMIN — MORPHINE SULFATE 4 MG: 4 INJECTION, SOLUTION INTRAMUSCULAR; INTRAVENOUS at 15:22

## 2017-06-25 RX ADMIN — MORPHINE SULFATE 4 MG: 4 INJECTION, SOLUTION INTRAMUSCULAR; INTRAVENOUS at 07:29

## 2017-06-25 RX ADMIN — VANCOMYCIN HYDROCHLORIDE 1000 MG: 5 INJECTION, POWDER, LYOPHILIZED, FOR SOLUTION INTRAVENOUS at 13:08

## 2017-06-25 RX ADMIN — GABAPENTIN 600 MG: 300 CAPSULE ORAL at 06:37

## 2017-06-25 RX ADMIN — POTASSIUM PHOSPHATE, MONOBASIC AND POTASSIUM PHOSPHATE, DIBASIC 15 MMOL: 224; 236 INJECTION, SOLUTION INTRAVENOUS at 11:16

## 2017-06-25 RX ADMIN — SODIUM CHLORIDE 125 ML/HR: 900 INJECTION, SOLUTION INTRAVENOUS at 07:29

## 2017-06-25 RX ADMIN — SODIUM CHLORIDE 125 ML/HR: 900 INJECTION, SOLUTION INTRAVENOUS at 18:21

## 2017-06-25 RX ADMIN — Medication 1 TABLET: at 13:09

## 2017-06-25 RX ADMIN — SUCRALFATE 1 G: 1 TABLET ORAL at 21:49

## 2017-06-25 RX ADMIN — SUCRALFATE 1 G: 1 TABLET ORAL at 18:15

## 2017-06-25 RX ADMIN — SUCRALFATE 1 G: 1 TABLET ORAL at 13:09

## 2017-06-25 RX ADMIN — INSULIN ASPART 5 UNITS: 100 INJECTION, SOLUTION INTRAVENOUS; SUBCUTANEOUS at 18:15

## 2017-06-26 ENCOUNTER — APPOINTMENT (OUTPATIENT)
Dept: ULTRASOUND IMAGING | Facility: HOSPITAL | Age: 52
End: 2017-06-26
Attending: INTERNAL MEDICINE

## 2017-06-26 LAB
ALBUMIN SERPL-MCNC: 3.5 G/DL (ref 3.5–5)
ALBUMIN/GLOB SERPL: 1.1 G/DL (ref 1.5–2.5)
ALP SERPL-CCNC: 90 U/L (ref 35–104)
ALT SERPL W P-5'-P-CCNC: 133 U/L (ref 10–36)
ANION GAP SERPL CALCULATED.3IONS-SCNC: 3.9 MMOL/L (ref 3.6–11.2)
AST SERPL-CCNC: 61 U/L (ref 10–30)
BASOPHILS # BLD AUTO: 0.01 10*3/MM3 (ref 0–0.3)
BASOPHILS NFR BLD AUTO: 0.1 % (ref 0–2)
BILIRUB SERPL-MCNC: 0.3 MG/DL (ref 0.2–1.8)
BUN BLD-MCNC: 11 MG/DL (ref 7–21)
BUN/CREAT SERPL: 20.8 (ref 7–25)
CALCIUM SPEC-SCNC: 9.2 MG/DL (ref 7.7–10)
CHLORIDE SERPL-SCNC: 107 MMOL/L (ref 99–112)
CO2 SERPL-SCNC: 26.1 MMOL/L (ref 24.3–31.9)
CREAT BLD-MCNC: 0.53 MG/DL (ref 0.43–1.29)
CRP SERPL-MCNC: 21.25 MG/DL (ref 0–0.99)
DEPRECATED RDW RBC AUTO: 55.6 FL (ref 37–54)
EOSINOPHIL # BLD AUTO: 0.02 10*3/MM3 (ref 0–0.7)
EOSINOPHIL NFR BLD AUTO: 0.2 % (ref 0–5)
ERYTHROCYTE [DISTWIDTH] IN BLOOD BY AUTOMATED COUNT: 19.9 % (ref 11.5–14.5)
GFR SERPL CREATININE-BSD FRML MDRD: 122 ML/MIN/1.73
GLOBULIN UR ELPH-MCNC: 3.1 GM/DL
GLUCOSE BLD-MCNC: 210 MG/DL (ref 70–110)
GLUCOSE BLDC GLUCOMTR-MCNC: 150 MG/DL (ref 70–130)
GLUCOSE BLDC GLUCOMTR-MCNC: 210 MG/DL (ref 70–130)
GLUCOSE BLDC GLUCOMTR-MCNC: 219 MG/DL (ref 70–130)
GLUCOSE BLDC GLUCOMTR-MCNC: 431 MG/DL (ref 70–130)
HCT VFR BLD AUTO: 37.5 % (ref 37–47)
HGB BLD-MCNC: 10.9 G/DL (ref 12–16)
IMM GRANULOCYTES # BLD: 0.17 10*3/MM3 (ref 0–0.03)
IMM GRANULOCYTES NFR BLD: 1.6 % (ref 0–0.5)
INR PPP: 0.92 (ref 0.8–1.1)
LYMPHOCYTES # BLD AUTO: 0.96 10*3/MM3 (ref 1–3)
LYMPHOCYTES NFR BLD AUTO: 9 % (ref 21–51)
MCH RBC QN AUTO: 23.1 PG (ref 27–33)
MCHC RBC AUTO-ENTMCNC: 29.1 G/DL (ref 33–37)
MCV RBC AUTO: 79.6 FL (ref 80–94)
MONOCYTES # BLD AUTO: 0.74 10*3/MM3 (ref 0.1–0.9)
MONOCYTES NFR BLD AUTO: 6.9 % (ref 0–10)
NEUTROPHILS # BLD AUTO: 8.8 10*3/MM3 (ref 1.4–6.5)
NEUTROPHILS NFR BLD AUTO: 82.2 % (ref 30–70)
OSMOLALITY SERPL CALC.SUM OF ELEC: 279.4 MOSM/KG (ref 273–305)
PHOSPHATE SERPL-MCNC: 1.8 MG/DL (ref 2.7–4.5)
PLATELET # BLD AUTO: 206 10*3/MM3 (ref 130–400)
PMV BLD AUTO: 9.5 FL (ref 6–10)
POTASSIUM BLD-SCNC: 4.7 MMOL/L (ref 3.5–5.3)
PROT SERPL-MCNC: 6.6 G/DL (ref 6–8)
PROTHROMBIN TIME: 10.1 SECONDS (ref 9.8–11.9)
RBC # BLD AUTO: 4.71 10*6/MM3 (ref 4.2–5.4)
SODIUM BLD-SCNC: 137 MMOL/L (ref 135–153)
WBC NRBC COR # BLD: 10.7 10*3/MM3 (ref 4.5–12.5)

## 2017-06-26 PROCEDURE — 93971 EXTREMITY STUDY: CPT

## 2017-06-26 PROCEDURE — 63710000001 INSULIN DETEMIR PER 5 UNITS: Performed by: INTERNAL MEDICINE

## 2017-06-26 PROCEDURE — 84100 ASSAY OF PHOSPHORUS: CPT | Performed by: INTERNAL MEDICINE

## 2017-06-26 PROCEDURE — 25010000002 VANCOMYCIN PER 500 MG

## 2017-06-26 PROCEDURE — 94799 UNLISTED PULMONARY SVC/PX: CPT

## 2017-06-26 PROCEDURE — 86140 C-REACTIVE PROTEIN: CPT | Performed by: INTERNAL MEDICINE

## 2017-06-26 PROCEDURE — 63710000001 INSULIN ASPART PER 5 UNITS: Performed by: INTERNAL MEDICINE

## 2017-06-26 PROCEDURE — 85025 COMPLETE CBC W/AUTO DIFF WBC: CPT | Performed by: INTERNAL MEDICINE

## 2017-06-26 PROCEDURE — 82962 GLUCOSE BLOOD TEST: CPT

## 2017-06-26 PROCEDURE — 25010000002 HEPARIN (PORCINE) PER 1000 UNITS: Performed by: INTERNAL MEDICINE

## 2017-06-26 PROCEDURE — 85610 PROTHROMBIN TIME: CPT | Performed by: INTERNAL MEDICINE

## 2017-06-26 PROCEDURE — 25010000002 MORPHINE PER 10 MG: Performed by: INTERNAL MEDICINE

## 2017-06-26 PROCEDURE — 93971 EXTREMITY STUDY: CPT | Performed by: RADIOLOGY

## 2017-06-26 PROCEDURE — 99233 SBSQ HOSP IP/OBS HIGH 50: CPT | Performed by: INTERNAL MEDICINE

## 2017-06-26 PROCEDURE — 80053 COMPREHEN METABOLIC PANEL: CPT | Performed by: INTERNAL MEDICINE

## 2017-06-26 PROCEDURE — 63710000001 INSULIN ASPART PER 5 UNITS: Performed by: NURSE PRACTITIONER

## 2017-06-26 PROCEDURE — 25010000002 CEFTRIAXONE: Performed by: INTERNAL MEDICINE

## 2017-06-26 RX ADMIN — SUCRALFATE 1 G: 1 TABLET ORAL at 17:08

## 2017-06-26 RX ADMIN — MORPHINE SULFATE 4 MG: 4 INJECTION, SOLUTION INTRAMUSCULAR; INTRAVENOUS at 21:04

## 2017-06-26 RX ADMIN — INSULIN ASPART 3 UNITS: 100 INJECTION, SOLUTION INTRAVENOUS; SUBCUTANEOUS at 17:08

## 2017-06-26 RX ADMIN — MORPHINE SULFATE 4 MG: 4 INJECTION, SOLUTION INTRAMUSCULAR; INTRAVENOUS at 05:02

## 2017-06-26 RX ADMIN — GABAPENTIN 600 MG: 300 CAPSULE ORAL at 14:59

## 2017-06-26 RX ADMIN — HEPARIN SODIUM 5000 UNITS: 5000 INJECTION, SOLUTION INTRAVENOUS; SUBCUTANEOUS at 20:45

## 2017-06-26 RX ADMIN — MORPHINE SULFATE 4 MG: 4 INJECTION, SOLUTION INTRAMUSCULAR; INTRAVENOUS at 17:08

## 2017-06-26 RX ADMIN — SODIUM PHOSPHATE, MONOBASIC, MONOHYDRATE 15 MMOL: 276; 142 INJECTION, SOLUTION INTRAVENOUS at 14:59

## 2017-06-26 RX ADMIN — TIZANIDINE 4 MG: 4 TABLET ORAL at 23:16

## 2017-06-26 RX ADMIN — MORPHINE SULFATE 4 MG: 4 INJECTION, SOLUTION INTRAMUSCULAR; INTRAVENOUS at 01:05

## 2017-06-26 RX ADMIN — MORPHINE SULFATE 4 MG: 4 INJECTION, SOLUTION INTRAMUSCULAR; INTRAVENOUS at 13:01

## 2017-06-26 RX ADMIN — INSULIN DETEMIR 30 UNITS: 100 INJECTION, SOLUTION SUBCUTANEOUS at 20:45

## 2017-06-26 RX ADMIN — SUCRALFATE 1 G: 1 TABLET ORAL at 20:45

## 2017-06-26 RX ADMIN — VANCOMYCIN HYDROCHLORIDE 750 MG: 5 INJECTION, POWDER, LYOPHILIZED, FOR SOLUTION INTRAVENOUS at 11:25

## 2017-06-26 RX ADMIN — INSULIN ASPART 7 UNITS: 100 INJECTION, SOLUTION INTRAVENOUS; SUBCUTANEOUS at 22:07

## 2017-06-26 RX ADMIN — VANCOMYCIN HYDROCHLORIDE 750 MG: 5 INJECTION, POWDER, LYOPHILIZED, FOR SOLUTION INTRAVENOUS at 01:05

## 2017-06-26 RX ADMIN — GABAPENTIN 600 MG: 300 CAPSULE ORAL at 22:08

## 2017-06-26 RX ADMIN — MORPHINE SULFATE 4 MG: 4 INJECTION, SOLUTION INTRAMUSCULAR; INTRAVENOUS at 09:06

## 2017-06-26 RX ADMIN — CEFTRIAXONE 2 G: 2 INJECTION, POWDER, FOR SOLUTION INTRAMUSCULAR; INTRAVENOUS at 13:04

## 2017-06-26 RX ADMIN — INSULIN ASPART 5 UNITS: 100 INJECTION, SOLUTION INTRAVENOUS; SUBCUTANEOUS at 22:08

## 2017-06-26 RX ADMIN — AZTREONAM 2 G: 2 INJECTION, POWDER, FOR SOLUTION INTRAMUSCULAR; INTRAVENOUS at 03:54

## 2017-06-26 RX ADMIN — SODIUM CHLORIDE 75 ML/HR: 900 INJECTION, SOLUTION INTRAVENOUS at 11:28

## 2017-06-27 ENCOUNTER — ANESTHESIA (OUTPATIENT)
Dept: PERIOP | Facility: HOSPITAL | Age: 52
End: 2017-06-27

## 2017-06-27 ENCOUNTER — ANESTHESIA EVENT (OUTPATIENT)
Dept: PERIOP | Facility: HOSPITAL | Age: 52
End: 2017-06-27

## 2017-06-27 LAB
ALBUMIN SERPL-MCNC: 2.9 G/DL (ref 3.5–5)
ALBUMIN/GLOB SERPL: 1.2 G/DL (ref 1.5–2.5)
ALP SERPL-CCNC: 87 U/L (ref 35–104)
ALT SERPL W P-5'-P-CCNC: 92 U/L (ref 10–36)
ANION GAP SERPL CALCULATED.3IONS-SCNC: 6.1 MMOL/L (ref 3.6–11.2)
AST SERPL-CCNC: 43 U/L (ref 10–30)
BACTERIA SPEC AEROBE CULT: ABNORMAL
BASOPHILS # BLD AUTO: 0.03 10*3/MM3 (ref 0–0.3)
BASOPHILS NFR BLD AUTO: 0.3 % (ref 0–2)
BILIRUB SERPL-MCNC: 0.1 MG/DL (ref 0.2–1.8)
BUN BLD-MCNC: 18 MG/DL (ref 7–21)
BUN/CREAT SERPL: 34 (ref 7–25)
CALCIUM SPEC-SCNC: 8.8 MG/DL (ref 7.7–10)
CHLORIDE SERPL-SCNC: 112 MMOL/L (ref 99–112)
CO2 SERPL-SCNC: 22.9 MMOL/L (ref 24.3–31.9)
CREAT BLD-MCNC: 0.53 MG/DL (ref 0.43–1.29)
CRP SERPL-MCNC: 16.89 MG/DL (ref 0–0.99)
DEPRECATED RDW RBC AUTO: 54.5 FL (ref 37–54)
EOSINOPHIL # BLD AUTO: 0.01 10*3/MM3 (ref 0–0.7)
EOSINOPHIL NFR BLD AUTO: 0.1 % (ref 0–5)
ERYTHROCYTE [DISTWIDTH] IN BLOOD BY AUTOMATED COUNT: 19.7 % (ref 11.5–14.5)
GFR SERPL CREATININE-BSD FRML MDRD: 122 ML/MIN/1.73
GLOBULIN UR ELPH-MCNC: 2.4 GM/DL
GLUCOSE BLD-MCNC: 124 MG/DL (ref 70–110)
GLUCOSE BLDC GLUCOMTR-MCNC: 144 MG/DL (ref 70–130)
GLUCOSE BLDC GLUCOMTR-MCNC: 159 MG/DL (ref 70–130)
GLUCOSE BLDC GLUCOMTR-MCNC: 319 MG/DL (ref 70–130)
GLUCOSE BLDC GLUCOMTR-MCNC: 69 MG/DL (ref 70–130)
GLUCOSE BLDC GLUCOMTR-MCNC: 74 MG/DL (ref 70–130)
HCT VFR BLD AUTO: 32.9 % (ref 37–47)
HGB BLD-MCNC: 9.6 G/DL (ref 12–16)
IMM GRANULOCYTES # BLD: 0.13 10*3/MM3 (ref 0–0.03)
IMM GRANULOCYTES NFR BLD: 1.3 % (ref 0–0.5)
LYMPHOCYTES # BLD AUTO: 0.81 10*3/MM3 (ref 1–3)
LYMPHOCYTES NFR BLD AUTO: 8.4 % (ref 21–51)
MAGNESIUM SERPL-MCNC: 1.9 MG/DL (ref 1.7–2.6)
MCH RBC QN AUTO: 22.9 PG (ref 27–33)
MCHC RBC AUTO-ENTMCNC: 29.2 G/DL (ref 33–37)
MCV RBC AUTO: 78.5 FL (ref 80–94)
MONOCYTES # BLD AUTO: 0.72 10*3/MM3 (ref 0.1–0.9)
MONOCYTES NFR BLD AUTO: 7.4 % (ref 0–10)
NEUTROPHILS # BLD AUTO: 8 10*3/MM3 (ref 1.4–6.5)
NEUTROPHILS NFR BLD AUTO: 82.5 % (ref 30–70)
OSMOLALITY SERPL CALC.SUM OF ELEC: 284.6 MOSM/KG (ref 273–305)
PHOSPHATE SERPL-MCNC: 3.9 MG/DL (ref 2.7–4.5)
PLATELET # BLD AUTO: 226 10*3/MM3 (ref 130–400)
PMV BLD AUTO: 9.6 FL (ref 6–10)
POTASSIUM BLD-SCNC: 4.6 MMOL/L (ref 3.5–5.3)
PROT SERPL-MCNC: 5.3 G/DL (ref 6–8)
RBC # BLD AUTO: 4.19 10*6/MM3 (ref 4.2–5.4)
SODIUM BLD-SCNC: 141 MMOL/L (ref 135–153)
VANCOMYCIN TROUGH SERPL-MCNC: 12.4 MCG/ML (ref 5–15)
WBC NRBC COR # BLD: 9.7 10*3/MM3 (ref 4.5–12.5)

## 2017-06-27 PROCEDURE — 80053 COMPREHEN METABOLIC PANEL: CPT | Performed by: INTERNAL MEDICINE

## 2017-06-27 PROCEDURE — 87205 SMEAR GRAM STAIN: CPT | Performed by: ORTHOPAEDIC SURGERY

## 2017-06-27 PROCEDURE — 87186 SC STD MICRODIL/AGAR DIL: CPT | Performed by: ORTHOPAEDIC SURGERY

## 2017-06-27 PROCEDURE — 25010000002 FENTANYL CITRATE (PF) 100 MCG/2ML SOLUTION: Performed by: NURSE ANESTHETIST, CERTIFIED REGISTERED

## 2017-06-27 PROCEDURE — 87147 CULTURE TYPE IMMUNOLOGIC: CPT | Performed by: ORTHOPAEDIC SURGERY

## 2017-06-27 PROCEDURE — 82962 GLUCOSE BLOOD TEST: CPT

## 2017-06-27 PROCEDURE — 25010000002 HYDROMORPHONE PER 4 MG: Performed by: ANESTHESIOLOGY

## 2017-06-27 PROCEDURE — 94799 UNLISTED PULMONARY SVC/PX: CPT

## 2017-06-27 PROCEDURE — S0260 H&P FOR SURGERY: HCPCS | Performed by: ORTHOPAEDIC SURGERY

## 2017-06-27 PROCEDURE — 25010000002 PROPOFOL 10 MG/ML EMULSION: Performed by: NURSE ANESTHETIST, CERTIFIED REGISTERED

## 2017-06-27 PROCEDURE — 25010000002 VANCOMYCIN PER 500 MG: Performed by: ORTHOPAEDIC SURGERY

## 2017-06-27 PROCEDURE — 25010000002 MORPHINE PER 10 MG: Performed by: INTERNAL MEDICINE

## 2017-06-27 PROCEDURE — 25010000002 VANCOMYCIN PER 500 MG: Performed by: INTERNAL MEDICINE

## 2017-06-27 PROCEDURE — 63710000001 INSULIN DETEMIR PER 5 UNITS: Performed by: INTERNAL MEDICINE

## 2017-06-27 PROCEDURE — 25010000002 VANCOMYCIN PER 500 MG

## 2017-06-27 PROCEDURE — 25010000002 HEPARIN (PORCINE) PER 1000 UNITS: Performed by: INTERNAL MEDICINE

## 2017-06-27 PROCEDURE — 99233 SBSQ HOSP IP/OBS HIGH 50: CPT | Performed by: INTERNAL MEDICINE

## 2017-06-27 PROCEDURE — 94640 AIRWAY INHALATION TREATMENT: CPT

## 2017-06-27 PROCEDURE — 80202 ASSAY OF VANCOMYCIN: CPT

## 2017-06-27 PROCEDURE — 63710000001 INSULIN ASPART PER 5 UNITS: Performed by: NURSE PRACTITIONER

## 2017-06-27 PROCEDURE — 25010000002 MIDAZOLAM PER 1 MG: Performed by: NURSE ANESTHETIST, CERTIFIED REGISTERED

## 2017-06-27 PROCEDURE — 0R9M3ZZ DRAINAGE OF LEFT ELBOW JOINT, PERCUTANEOUS APPROACH: ICD-10-PCS | Performed by: ORTHOPAEDIC SURGERY

## 2017-06-27 PROCEDURE — 84100 ASSAY OF PHOSPHORUS: CPT | Performed by: INTERNAL MEDICINE

## 2017-06-27 PROCEDURE — 85025 COMPLETE CBC W/AUTO DIFF WBC: CPT | Performed by: INTERNAL MEDICINE

## 2017-06-27 PROCEDURE — 87077 CULTURE AEROBIC IDENTIFY: CPT | Performed by: ORTHOPAEDIC SURGERY

## 2017-06-27 PROCEDURE — 83735 ASSAY OF MAGNESIUM: CPT | Performed by: INTERNAL MEDICINE

## 2017-06-27 PROCEDURE — 86140 C-REACTIVE PROTEIN: CPT | Performed by: INTERNAL MEDICINE

## 2017-06-27 PROCEDURE — 23030 I&D SHOULDER DEEP ABSC/HMTMA: CPT | Performed by: ORTHOPAEDIC SURGERY

## 2017-06-27 PROCEDURE — 87070 CULTURE OTHR SPECIMN AEROBIC: CPT | Performed by: ORTHOPAEDIC SURGERY

## 2017-06-27 PROCEDURE — 25010000002 HYDRALAZINE PER 20 MG: Performed by: ANESTHESIOLOGY

## 2017-06-27 RX ORDER — ONDANSETRON 2 MG/ML
4 INJECTION INTRAMUSCULAR; INTRAVENOUS ONCE AS NEEDED
Status: DISCONTINUED | OUTPATIENT
Start: 2017-06-27 | End: 2017-06-27 | Stop reason: HOSPADM

## 2017-06-27 RX ORDER — FENTANYL CITRATE 50 UG/ML
INJECTION, SOLUTION INTRAMUSCULAR; INTRAVENOUS AS NEEDED
Status: DISCONTINUED | OUTPATIENT
Start: 2017-06-27 | End: 2017-06-27 | Stop reason: SURG

## 2017-06-27 RX ORDER — IPRATROPIUM BROMIDE AND ALBUTEROL SULFATE 2.5; .5 MG/3ML; MG/3ML
3 SOLUTION RESPIRATORY (INHALATION) ONCE AS NEEDED
Status: DISCONTINUED | OUTPATIENT
Start: 2017-06-27 | End: 2017-06-27 | Stop reason: HOSPADM

## 2017-06-27 RX ORDER — PROPOFOL 10 MG/ML
VIAL (ML) INTRAVENOUS AS NEEDED
Status: DISCONTINUED | OUTPATIENT
Start: 2017-06-27 | End: 2017-06-27 | Stop reason: SURG

## 2017-06-27 RX ORDER — MIDAZOLAM HYDROCHLORIDE 1 MG/ML
INJECTION INTRAMUSCULAR; INTRAVENOUS AS NEEDED
Status: DISCONTINUED | OUTPATIENT
Start: 2017-06-27 | End: 2017-06-27 | Stop reason: SURG

## 2017-06-27 RX ORDER — HYDRALAZINE HYDROCHLORIDE 20 MG/ML
10 INJECTION INTRAMUSCULAR; INTRAVENOUS ONCE
Status: COMPLETED | OUTPATIENT
Start: 2017-06-27 | End: 2017-06-27

## 2017-06-27 RX ORDER — FENTANYL CITRATE 50 UG/ML
50 INJECTION, SOLUTION INTRAMUSCULAR; INTRAVENOUS
Status: DISCONTINUED | OUTPATIENT
Start: 2017-06-27 | End: 2017-06-27 | Stop reason: HOSPADM

## 2017-06-27 RX ORDER — LIDOCAINE HYDROCHLORIDE 20 MG/ML
INJECTION, SOLUTION EPIDURAL; INFILTRATION; INTRACAUDAL; PERINEURAL AS NEEDED
Status: DISCONTINUED | OUTPATIENT
Start: 2017-06-27 | End: 2017-06-27 | Stop reason: SURG

## 2017-06-27 RX ORDER — SODIUM CHLORIDE, SODIUM LACTATE, POTASSIUM CHLORIDE, CALCIUM CHLORIDE 600; 310; 30; 20 MG/100ML; MG/100ML; MG/100ML; MG/100ML
125 INJECTION, SOLUTION INTRAVENOUS CONTINUOUS
Status: DISCONTINUED | OUTPATIENT
Start: 2017-06-27 | End: 2017-06-28

## 2017-06-27 RX ORDER — SODIUM CHLORIDE 0.9 % (FLUSH) 0.9 %
1-10 SYRINGE (ML) INJECTION AS NEEDED
Status: DISCONTINUED | OUTPATIENT
Start: 2017-06-27 | End: 2017-06-27 | Stop reason: HOSPADM

## 2017-06-27 RX ADMIN — MORPHINE SULFATE 4 MG: 4 INJECTION, SOLUTION INTRAMUSCULAR; INTRAVENOUS at 00:55

## 2017-06-27 RX ADMIN — SUCRALFATE 1 G: 1 TABLET ORAL at 17:39

## 2017-06-27 RX ADMIN — FENTANYL CITRATE 50 MCG: 50 INJECTION INTRAMUSCULAR; INTRAVENOUS at 14:18

## 2017-06-27 RX ADMIN — MORPHINE SULFATE 4 MG: 4 INJECTION, SOLUTION INTRAMUSCULAR; INTRAVENOUS at 21:02

## 2017-06-27 RX ADMIN — FENTANYL CITRATE 50 MCG: 50 INJECTION INTRAMUSCULAR; INTRAVENOUS at 14:01

## 2017-06-27 RX ADMIN — SODIUM CHLORIDE, POTASSIUM CHLORIDE, SODIUM LACTATE AND CALCIUM CHLORIDE 125 ML/HR: 600; 310; 30; 20 INJECTION, SOLUTION INTRAVENOUS at 12:04

## 2017-06-27 RX ADMIN — MORPHINE SULFATE 4 MG: 4 INJECTION, SOLUTION INTRAMUSCULAR; INTRAVENOUS at 05:04

## 2017-06-27 RX ADMIN — FENTANYL CITRATE 50 MCG: 50 INJECTION INTRAMUSCULAR; INTRAVENOUS at 13:53

## 2017-06-27 RX ADMIN — HYDRALAZINE HYDROCHLORIDE 10 MG: 20 INJECTION INTRAMUSCULAR; INTRAVENOUS at 14:37

## 2017-06-27 RX ADMIN — IPRATROPIUM BROMIDE 0.5 MG: 0.5 SOLUTION RESPIRATORY (INHALATION) at 07:13

## 2017-06-27 RX ADMIN — TIZANIDINE 4 MG: 4 TABLET ORAL at 21:15

## 2017-06-27 RX ADMIN — HYDROMORPHONE HYDROCHLORIDE 0.5 MG: 1 INJECTION, SOLUTION INTRAMUSCULAR; INTRAVENOUS; SUBCUTANEOUS at 14:45

## 2017-06-27 RX ADMIN — GABAPENTIN 600 MG: 300 CAPSULE ORAL at 15:24

## 2017-06-27 RX ADMIN — MIDAZOLAM HYDROCHLORIDE 2 MG: 1 INJECTION, SOLUTION INTRAMUSCULAR; INTRAVENOUS at 13:02

## 2017-06-27 RX ADMIN — DEXTROSE MONOHYDRATE 25 G: 25 INJECTION, SOLUTION INTRAVENOUS at 06:33

## 2017-06-27 RX ADMIN — SUCRALFATE 1 G: 1 TABLET ORAL at 21:01

## 2017-06-27 RX ADMIN — VANCOMYCIN HYDROCHLORIDE 750 MG: 5 INJECTION, POWDER, LYOPHILIZED, FOR SOLUTION INTRAVENOUS at 00:55

## 2017-06-27 RX ADMIN — MORPHINE SULFATE 4 MG: 4 INJECTION, SOLUTION INTRAMUSCULAR; INTRAVENOUS at 16:43

## 2017-06-27 RX ADMIN — INSULIN ASPART 5 UNITS: 100 INJECTION, SOLUTION INTRAVENOUS; SUBCUTANEOUS at 21:05

## 2017-06-27 RX ADMIN — MORPHINE SULFATE 4 MG: 4 INJECTION, SOLUTION INTRAMUSCULAR; INTRAVENOUS at 09:00

## 2017-06-27 RX ADMIN — GABAPENTIN 600 MG: 300 CAPSULE ORAL at 21:01

## 2017-06-27 RX ADMIN — SODIUM CHLORIDE, POTASSIUM CHLORIDE, SODIUM LACTATE AND CALCIUM CHLORIDE 125 ML/HR: 600; 310; 30; 20 INJECTION, SOLUTION INTRAVENOUS at 15:24

## 2017-06-27 RX ADMIN — FENTANYL CITRATE 100 MCG: 50 INJECTION INTRAMUSCULAR; INTRAVENOUS at 13:02

## 2017-06-27 RX ADMIN — LIDOCAINE HYDROCHLORIDE 5 ML: 20 INJECTION, SOLUTION EPIDURAL; INFILTRATION; INTRACAUDAL; PERINEURAL at 13:06

## 2017-06-27 RX ADMIN — HEPARIN SODIUM 5000 UNITS: 5000 INJECTION, SOLUTION INTRAVENOUS; SUBCUTANEOUS at 21:01

## 2017-06-27 RX ADMIN — VANCOMYCIN HYDROCHLORIDE 1000 MG: 5 INJECTION, POWDER, LYOPHILIZED, FOR SOLUTION INTRAVENOUS at 15:24

## 2017-06-27 RX ADMIN — FENTANYL CITRATE 50 MCG: 50 INJECTION INTRAMUSCULAR; INTRAVENOUS at 14:25

## 2017-06-27 RX ADMIN — INSULIN DETEMIR 30 UNITS: 100 INJECTION, SOLUTION SUBCUTANEOUS at 21:01

## 2017-06-27 RX ADMIN — PROPOFOL 200 MG: 10 INJECTION, EMULSION INTRAVENOUS at 13:06

## 2017-06-27 RX ADMIN — FENTANYL CITRATE 100 MCG: 50 INJECTION INTRAMUSCULAR; INTRAVENOUS at 13:28

## 2017-06-27 NOTE — ANESTHESIA POSTPROCEDURE EVALUATION
Patient: Pepito Yan    Procedure Summary     Date Anesthesia Start Anesthesia Stop Room / Location    06/27/17 1302 1404 [facility] OR 03 / [facility]       Procedure Diagnosis Surgeon Provider    INCISION AND DRAINAGE UPPER EXTREMITY (Left Arm Upper) Pyogenic arthritis of left elbow, due to unspecified organism  (Pyogenic arthritis of left elbow, due to unspecified organism [M00.9]) MD Luis Fernando Galan MD          Anesthesia Type: general  Last vitals  /88 (06/27/17 1500)    Temp 97.4 °F (36.3 °C) (06/27/17 1500)    Pulse 111 (06/27/17 1500)   Resp 16 (06/27/17 1500)    SpO2 95 % (06/27/17 1500)      Post Anesthesia Care and Evaluation    Patient location during evaluation: PACU  Patient participation: complete - patient participated  Level of consciousness: awake and alert  Pain score: 3  Pain management: satisfactory to patient  Airway patency: patent  Anesthetic complications: No anesthetic complications  PONV Status: controlled  Cardiovascular status: acceptable and stable  Respiratory status: acceptable  Hydration status: acceptable

## 2017-06-27 NOTE — ANESTHESIA PREPROCEDURE EVALUATION
Anesthesia Evaluation     Patient summary reviewed and Nursing notes reviewed   no history of anesthetic complications:  NPO Solid Status: > 8 hours  NPO Liquid Status: > 8 hours     Airway   Mallampati: II  TM distance: <3 FB  Neck ROM: full  no difficulty expected  Dental    (+) edentulous    Pulmonary    (+) pneumonia stable , pulmonary embolism, a smoker Current, COPD, decreased breath sounds,   (-) asthma  Cardiovascular - normal exam  Exercise tolerance: good (4-7 METS)    NYHA Classification: II    (+) hypertension, past MI , CAD, dysrhythmias, hyperlipidemia  (-) CHF      Neuro/Psych  (+) psychiatric history Anxiety and Depression,    (-) seizures, CVA  GI/Hepatic/Renal/Endo    (+)  GERD, hepatitis C, liver disease, diabetes mellitus using insulin,   (-) hypothyroidism    Musculoskeletal     (+) back pain,   Abdominal  - normal exam    Bowel sounds: normal.   Substance History - negative use     OB/GYN negative ob/gyn ROS         Other   (+) arthritis                                     Anesthesia Plan    ASA 3     general     intravenous induction   Anesthetic plan and risks discussed with patient.  Use of blood products discussed with patient  Consented to blood products.

## 2017-06-27 NOTE — ANESTHESIA PROCEDURE NOTES
Airway  Urgency: elective    Date/Time: 6/27/2017 1:05 PM  End Time:6/27/2017 1:07 PM    General Information and Staff    Patient location during procedure: OR  Anesthesiologist: NEO PEDROZA  CRNA: SARINA ROJAS    Indications and Patient Condition  Indications for airway management: airway protection    Preoxygenated: yes  MILS maintained throughout  Mask difficulty assessment: 0 - not attempted    Final Airway Details  Final airway type: supraglottic airway      Successful airway: unique  Size 3  Airway Seal Pressure (cm H2O): 20    Number of attempts at approach: 1    Additional Comments  Atraumatic

## 2017-06-28 LAB
ALBUMIN SERPL-MCNC: 3.4 G/DL (ref 3.5–5)
ALBUMIN/GLOB SERPL: 1.3 G/DL (ref 1.5–2.5)
ALP SERPL-CCNC: 103 U/L (ref 35–104)
ALT SERPL W P-5'-P-CCNC: 96 U/L (ref 10–36)
ANION GAP SERPL CALCULATED.3IONS-SCNC: 7.5 MMOL/L (ref 3.6–11.2)
AST SERPL-CCNC: 71 U/L (ref 10–30)
BACTERIA SPEC AEROBE CULT: ABNORMAL
BASOPHILS # BLD AUTO: 0.02 10*3/MM3 (ref 0–0.3)
BASOPHILS NFR BLD AUTO: 0.2 % (ref 0–2)
BILIRUB SERPL-MCNC: 0.2 MG/DL (ref 0.2–1.8)
BUN BLD-MCNC: 14 MG/DL (ref 7–21)
BUN/CREAT SERPL: 25.5 (ref 7–25)
CALCIUM SPEC-SCNC: 8.7 MG/DL (ref 7.7–10)
CHLORIDE SERPL-SCNC: 103 MMOL/L (ref 99–112)
CO2 SERPL-SCNC: 23.5 MMOL/L (ref 24.3–31.9)
CREAT BLD-MCNC: 0.55 MG/DL (ref 0.43–1.29)
CRP SERPL-MCNC: 12.75 MG/DL (ref 0–0.99)
DEPRECATED RDW RBC AUTO: 54.7 FL (ref 37–54)
EOSINOPHIL # BLD AUTO: 0.01 10*3/MM3 (ref 0–0.7)
EOSINOPHIL NFR BLD AUTO: 0.1 % (ref 0–5)
ERYTHROCYTE [DISTWIDTH] IN BLOOD BY AUTOMATED COUNT: 20 % (ref 11.5–14.5)
GFR SERPL CREATININE-BSD FRML MDRD: 117 ML/MIN/1.73
GLOBULIN UR ELPH-MCNC: 2.7 GM/DL
GLUCOSE BLD-MCNC: 341 MG/DL (ref 70–110)
GLUCOSE BLDC GLUCOMTR-MCNC: 208 MG/DL (ref 70–130)
GLUCOSE BLDC GLUCOMTR-MCNC: 261 MG/DL (ref 70–130)
GLUCOSE BLDC GLUCOMTR-MCNC: 295 MG/DL (ref 70–130)
GLUCOSE BLDC GLUCOMTR-MCNC: 370 MG/DL (ref 70–130)
GRAM STN SPEC: ABNORMAL
GRAM STN SPEC: ABNORMAL
HCT VFR BLD AUTO: 36.4 % (ref 37–47)
HGB BLD-MCNC: 10.8 G/DL (ref 12–16)
IMM GRANULOCYTES # BLD: 0.14 10*3/MM3 (ref 0–0.03)
IMM GRANULOCYTES NFR BLD: 1.4 % (ref 0–0.5)
LYMPHOCYTES # BLD AUTO: 0.46 10*3/MM3 (ref 1–3)
LYMPHOCYTES NFR BLD AUTO: 4.6 % (ref 21–51)
MAGNESIUM SERPL-MCNC: 1.8 MG/DL (ref 1.7–2.6)
MCH RBC QN AUTO: 23.2 PG (ref 27–33)
MCHC RBC AUTO-ENTMCNC: 29.7 G/DL (ref 33–37)
MCV RBC AUTO: 78.3 FL (ref 80–94)
MONOCYTES # BLD AUTO: 0.47 10*3/MM3 (ref 0.1–0.9)
MONOCYTES NFR BLD AUTO: 4.7 % (ref 0–10)
NEUTROPHILS # BLD AUTO: 8.8 10*3/MM3 (ref 1.4–6.5)
NEUTROPHILS NFR BLD AUTO: 89 % (ref 30–70)
OSMOLALITY SERPL CALC.SUM OF ELEC: 282.2 MOSM/KG (ref 273–305)
PLATELET # BLD AUTO: 237 10*3/MM3 (ref 130–400)
PMV BLD AUTO: 9.6 FL (ref 6–10)
POTASSIUM BLD-SCNC: 5 MMOL/L (ref 3.5–5.3)
PROT SERPL-MCNC: 6.1 G/DL (ref 6–8)
RBC # BLD AUTO: 4.65 10*6/MM3 (ref 4.2–5.4)
SODIUM BLD-SCNC: 134 MMOL/L (ref 135–153)
WBC NRBC COR # BLD: 9.9 10*3/MM3 (ref 4.5–12.5)

## 2017-06-28 PROCEDURE — 85025 COMPLETE CBC W/AUTO DIFF WBC: CPT | Performed by: INTERNAL MEDICINE

## 2017-06-28 PROCEDURE — 86140 C-REACTIVE PROTEIN: CPT | Performed by: INTERNAL MEDICINE

## 2017-06-28 PROCEDURE — 83735 ASSAY OF MAGNESIUM: CPT | Performed by: INTERNAL MEDICINE

## 2017-06-28 PROCEDURE — 63710000001 PREDNISONE PER 1 MG: Performed by: INTERNAL MEDICINE

## 2017-06-28 PROCEDURE — 25010000002 HEPARIN (PORCINE) PER 1000 UNITS: Performed by: INTERNAL MEDICINE

## 2017-06-28 PROCEDURE — 94799 UNLISTED PULMONARY SVC/PX: CPT

## 2017-06-28 PROCEDURE — 99233 SBSQ HOSP IP/OBS HIGH 50: CPT | Performed by: INTERNAL MEDICINE

## 2017-06-28 PROCEDURE — 82962 GLUCOSE BLOOD TEST: CPT

## 2017-06-28 PROCEDURE — 63710000001 INSULIN ASPART PER 5 UNITS: Performed by: INTERNAL MEDICINE

## 2017-06-28 PROCEDURE — 25010000002 CEFTRIAXONE: Performed by: INTERNAL MEDICINE

## 2017-06-28 PROCEDURE — 25010000002 VANCOMYCIN PER 500 MG: Performed by: INTERNAL MEDICINE

## 2017-06-28 PROCEDURE — 80053 COMPREHEN METABOLIC PANEL: CPT | Performed by: INTERNAL MEDICINE

## 2017-06-28 PROCEDURE — 63710000001 INSULIN ASPART PER 5 UNITS: Performed by: NURSE PRACTITIONER

## 2017-06-28 PROCEDURE — 63710000001 INSULIN DETEMIR PER 5 UNITS: Performed by: INTERNAL MEDICINE

## 2017-06-28 PROCEDURE — 25010000002 MORPHINE PER 10 MG: Performed by: INTERNAL MEDICINE

## 2017-06-28 PROCEDURE — 99024 POSTOP FOLLOW-UP VISIT: CPT | Performed by: PHYSICIAN ASSISTANT

## 2017-06-28 RX ADMIN — SUCRALFATE 1 G: 1 TABLET ORAL at 11:34

## 2017-06-28 RX ADMIN — INSULIN ASPART 4 UNITS: 100 INJECTION, SOLUTION INTRAVENOUS; SUBCUTANEOUS at 08:14

## 2017-06-28 RX ADMIN — SUCRALFATE 1 G: 1 TABLET ORAL at 21:10

## 2017-06-28 RX ADMIN — INSULIN DETEMIR 30 UNITS: 100 INJECTION, SOLUTION SUBCUTANEOUS at 21:11

## 2017-06-28 RX ADMIN — MORPHINE SULFATE 4 MG: 4 INJECTION, SOLUTION INTRAMUSCULAR; INTRAVENOUS at 23:02

## 2017-06-28 RX ADMIN — HEPARIN SODIUM 5000 UNITS: 5000 INJECTION, SOLUTION INTRAVENOUS; SUBCUTANEOUS at 08:13

## 2017-06-28 RX ADMIN — MORPHINE SULFATE 4 MG: 4 INJECTION, SOLUTION INTRAMUSCULAR; INTRAVENOUS at 18:20

## 2017-06-28 RX ADMIN — GABAPENTIN 600 MG: 300 CAPSULE ORAL at 14:18

## 2017-06-28 RX ADMIN — INSULIN ASPART 12 UNITS: 100 INJECTION, SOLUTION INTRAVENOUS; SUBCUTANEOUS at 12:15

## 2017-06-28 RX ADMIN — MORPHINE SULFATE 4 MG: 4 INJECTION, SOLUTION INTRAMUSCULAR; INTRAVENOUS at 05:24

## 2017-06-28 RX ADMIN — INSULIN ASPART 8 UNITS: 100 INJECTION, SOLUTION INTRAVENOUS; SUBCUTANEOUS at 17:12

## 2017-06-28 RX ADMIN — VANCOMYCIN HYDROCHLORIDE 1000 MG: 5 INJECTION, POWDER, LYOPHILIZED, FOR SOLUTION INTRAVENOUS at 14:18

## 2017-06-28 RX ADMIN — CEFTRIAXONE 2 G: 2 INJECTION, POWDER, FOR SOLUTION INTRAMUSCULAR; INTRAVENOUS at 12:15

## 2017-06-28 RX ADMIN — HEPARIN SODIUM 5000 UNITS: 5000 INJECTION, SOLUTION INTRAVENOUS; SUBCUTANEOUS at 21:11

## 2017-06-28 RX ADMIN — VANCOMYCIN HYDROCHLORIDE 1000 MG: 5 INJECTION, POWDER, LYOPHILIZED, FOR SOLUTION INTRAVENOUS at 02:28

## 2017-06-28 RX ADMIN — Medication 1 TABLET: at 08:13

## 2017-06-28 RX ADMIN — SUCRALFATE 1 G: 1 TABLET ORAL at 17:12

## 2017-06-28 RX ADMIN — MORPHINE SULFATE 4 MG: 4 INJECTION, SOLUTION INTRAMUSCULAR; INTRAVENOUS at 01:17

## 2017-06-28 RX ADMIN — SODIUM CHLORIDE, POTASSIUM CHLORIDE, SODIUM LACTATE AND CALCIUM CHLORIDE 125 ML/HR: 600; 310; 30; 20 INJECTION, SOLUTION INTRAVENOUS at 00:45

## 2017-06-28 RX ADMIN — INSULIN ASPART 5 UNITS: 100 INJECTION, SOLUTION INTRAVENOUS; SUBCUTANEOUS at 21:11

## 2017-06-28 RX ADMIN — GABAPENTIN 600 MG: 300 CAPSULE ORAL at 05:24

## 2017-06-28 RX ADMIN — MORPHINE SULFATE 4 MG: 4 INJECTION, SOLUTION INTRAMUSCULAR; INTRAVENOUS at 09:50

## 2017-06-28 RX ADMIN — SUCRALFATE 1 G: 1 TABLET ORAL at 08:13

## 2017-06-28 RX ADMIN — MORPHINE SULFATE 4 MG: 4 INJECTION, SOLUTION INTRAMUSCULAR; INTRAVENOUS at 14:22

## 2017-06-28 RX ADMIN — PREDNISONE 20 MG: 20 TABLET ORAL at 06:04

## 2017-06-28 RX ADMIN — TIZANIDINE 4 MG: 4 TABLET ORAL at 21:10

## 2017-06-28 RX ADMIN — GABAPENTIN 600 MG: 300 CAPSULE ORAL at 21:10

## 2017-06-29 ENCOUNTER — APPOINTMENT (OUTPATIENT)
Dept: MRI IMAGING | Facility: HOSPITAL | Age: 52
End: 2017-06-29

## 2017-06-29 LAB
ALBUMIN SERPL-MCNC: 2.9 G/DL (ref 3.5–5)
ALBUMIN/GLOB SERPL: 1 G/DL (ref 1.5–2.5)
ALP SERPL-CCNC: 76 U/L (ref 35–104)
ALT SERPL W P-5'-P-CCNC: 59 U/L (ref 10–36)
ANION GAP SERPL CALCULATED.3IONS-SCNC: 2.5 MMOL/L (ref 3.6–11.2)
AST SERPL-CCNC: 30 U/L (ref 10–30)
BACTERIA SPEC AEROBE CULT: NORMAL
BACTERIA SPEC AEROBE CULT: NORMAL
BASOPHILS # BLD AUTO: 0.04 10*3/MM3 (ref 0–0.3)
BASOPHILS NFR BLD AUTO: 0.5 % (ref 0–2)
BILIRUB SERPL-MCNC: 0.1 MG/DL (ref 0.2–1.8)
BUN BLD-MCNC: 12 MG/DL (ref 7–21)
BUN/CREAT SERPL: 20.7 (ref 7–25)
CALCIUM SPEC-SCNC: 8.7 MG/DL (ref 7.7–10)
CHLORIDE SERPL-SCNC: 110 MMOL/L (ref 99–112)
CO2 SERPL-SCNC: 24.5 MMOL/L (ref 24.3–31.9)
CREAT BLD-MCNC: 0.58 MG/DL (ref 0.43–1.29)
CRP SERPL-MCNC: 7.86 MG/DL (ref 0–0.99)
DEPRECATED RDW RBC AUTO: 56.8 FL (ref 37–54)
EOSINOPHIL # BLD AUTO: 0.03 10*3/MM3 (ref 0–0.7)
EOSINOPHIL NFR BLD AUTO: 0.4 % (ref 0–5)
ERYTHROCYTE [DISTWIDTH] IN BLOOD BY AUTOMATED COUNT: 19.7 % (ref 11.5–14.5)
GFR SERPL CREATININE-BSD FRML MDRD: 110 ML/MIN/1.73
GLOBULIN UR ELPH-MCNC: 2.8 GM/DL
GLUCOSE BLD-MCNC: 257 MG/DL (ref 70–110)
GLUCOSE BLDC GLUCOMTR-MCNC: 228 MG/DL (ref 70–130)
GLUCOSE BLDC GLUCOMTR-MCNC: 276 MG/DL (ref 70–130)
GLUCOSE BLDC GLUCOMTR-MCNC: 279 MG/DL (ref 70–130)
GLUCOSE BLDC GLUCOMTR-MCNC: 358 MG/DL (ref 70–130)
GLUCOSE BLDC GLUCOMTR-MCNC: 404 MG/DL (ref 70–130)
HCT VFR BLD AUTO: 32.1 % (ref 37–47)
HGB BLD-MCNC: 9.4 G/DL (ref 12–16)
IMM GRANULOCYTES # BLD: 0.2 10*3/MM3 (ref 0–0.03)
IMM GRANULOCYTES NFR BLD: 2.7 % (ref 0–0.5)
LYMPHOCYTES # BLD AUTO: 1.23 10*3/MM3 (ref 1–3)
LYMPHOCYTES NFR BLD AUTO: 16.9 % (ref 21–51)
MAGNESIUM SERPL-MCNC: 1.9 MG/DL (ref 1.7–2.6)
MCH RBC QN AUTO: 23 PG (ref 27–33)
MCHC RBC AUTO-ENTMCNC: 29.3 G/DL (ref 33–37)
MCV RBC AUTO: 78.7 FL (ref 80–94)
MONOCYTES # BLD AUTO: 0.77 10*3/MM3 (ref 0.1–0.9)
MONOCYTES NFR BLD AUTO: 10.6 % (ref 0–10)
NEUTROPHILS # BLD AUTO: 5.02 10*3/MM3 (ref 1.4–6.5)
NEUTROPHILS NFR BLD AUTO: 68.9 % (ref 30–70)
NRBC BLD MANUAL-RTO: 0 /100 WBC (ref 0–0)
OSMOLALITY SERPL CALC.SUM OF ELEC: 282.4 MOSM/KG (ref 273–305)
PHOSPHATE SERPL-MCNC: 3.3 MG/DL (ref 2.7–4.5)
PLATELET # BLD AUTO: 191 10*3/MM3 (ref 130–400)
PMV BLD AUTO: 9.8 FL (ref 6–10)
POTASSIUM BLD-SCNC: 3.8 MMOL/L (ref 3.5–5.3)
PROT SERPL-MCNC: 5.7 G/DL (ref 6–8)
RBC # BLD AUTO: 4.08 10*6/MM3 (ref 4.2–5.4)
SODIUM BLD-SCNC: 137 MMOL/L (ref 135–153)
VANCOMYCIN TROUGH SERPL-MCNC: 12.5 MCG/ML (ref 5–15)
WBC NRBC COR # BLD: 7.29 10*3/MM3 (ref 4.5–12.5)

## 2017-06-29 PROCEDURE — 82962 GLUCOSE BLOOD TEST: CPT

## 2017-06-29 PROCEDURE — 63710000001 INSULIN ASPART PER 5 UNITS: Performed by: INTERNAL MEDICINE

## 2017-06-29 PROCEDURE — C1751 CATH, INF, PER/CENT/MIDLINE: HCPCS

## 2017-06-29 PROCEDURE — 25010000002 VANCOMYCIN PER 500 MG

## 2017-06-29 PROCEDURE — 80202 ASSAY OF VANCOMYCIN: CPT | Performed by: INTERNAL MEDICINE

## 2017-06-29 PROCEDURE — 85025 COMPLETE CBC W/AUTO DIFF WBC: CPT | Performed by: INTERNAL MEDICINE

## 2017-06-29 PROCEDURE — 99233 SBSQ HOSP IP/OBS HIGH 50: CPT | Performed by: INTERNAL MEDICINE

## 2017-06-29 PROCEDURE — 73221 MRI JOINT UPR EXTREM W/O DYE: CPT

## 2017-06-29 PROCEDURE — 84100 ASSAY OF PHOSPHORUS: CPT | Performed by: INTERNAL MEDICINE

## 2017-06-29 PROCEDURE — 83735 ASSAY OF MAGNESIUM: CPT | Performed by: INTERNAL MEDICINE

## 2017-06-29 PROCEDURE — 99024 POSTOP FOLLOW-UP VISIT: CPT | Performed by: PHYSICIAN ASSISTANT

## 2017-06-29 PROCEDURE — 94799 UNLISTED PULMONARY SVC/PX: CPT

## 2017-06-29 PROCEDURE — 63710000001 PREDNISONE PER 1 MG: Performed by: INTERNAL MEDICINE

## 2017-06-29 PROCEDURE — 63710000001 INSULIN DETEMIR PER 5 UNITS: Performed by: INTERNAL MEDICINE

## 2017-06-29 PROCEDURE — 25010000002 VANCOMYCIN PER 500 MG: Performed by: INTERNAL MEDICINE

## 2017-06-29 PROCEDURE — 25010000002 MAGNESIUM SULFATE 2 GM/50ML SOLUTION: Performed by: INTERNAL MEDICINE

## 2017-06-29 PROCEDURE — 73221 MRI JOINT UPR EXTREM W/O DYE: CPT | Performed by: RADIOLOGY

## 2017-06-29 PROCEDURE — 25010000002 MORPHINE PER 10 MG: Performed by: INTERNAL MEDICINE

## 2017-06-29 PROCEDURE — 25010000002 HEPARIN (PORCINE) PER 1000 UNITS: Performed by: INTERNAL MEDICINE

## 2017-06-29 PROCEDURE — 86140 C-REACTIVE PROTEIN: CPT | Performed by: INTERNAL MEDICINE

## 2017-06-29 PROCEDURE — 25010000002 CEFTRIAXONE: Performed by: INTERNAL MEDICINE

## 2017-06-29 PROCEDURE — 80053 COMPREHEN METABOLIC PANEL: CPT | Performed by: INTERNAL MEDICINE

## 2017-06-29 RX ORDER — OXYCODONE HYDROCHLORIDE 5 MG/1
10 TABLET ORAL EVERY 4 HOURS PRN
Status: DISCONTINUED | OUTPATIENT
Start: 2017-06-29 | End: 2017-06-29

## 2017-06-29 RX ORDER — OXYCODONE HYDROCHLORIDE AND ACETAMINOPHEN 5; 325 MG/1; MG/1
1 TABLET ORAL EVERY 4 HOURS PRN
Qty: 40 TABLET | Refills: 0 | Status: SHIPPED | OUTPATIENT
Start: 2017-06-29 | End: 2017-07-09

## 2017-06-29 RX ORDER — MAGNESIUM SULFATE HEPTAHYDRATE 40 MG/ML
2 INJECTION, SOLUTION INTRAVENOUS AS NEEDED
Status: DISCONTINUED | OUTPATIENT
Start: 2017-06-29 | End: 2017-06-30 | Stop reason: HOSPADM

## 2017-06-29 RX ORDER — SODIUM CHLORIDE 0.9 % (FLUSH) 0.9 %
10 SYRINGE (ML) INJECTION AS NEEDED
Status: DISCONTINUED | OUTPATIENT
Start: 2017-06-29 | End: 2017-06-30 | Stop reason: HOSPADM

## 2017-06-29 RX ORDER — SODIUM CHLORIDE 0.9 % (FLUSH) 0.9 %
10 SYRINGE (ML) INJECTION EVERY 12 HOURS SCHEDULED
Status: DISCONTINUED | OUTPATIENT
Start: 2017-06-29 | End: 2017-06-30 | Stop reason: HOSPADM

## 2017-06-29 RX ORDER — MAGNESIUM SULFATE HEPTAHYDRATE 40 MG/ML
2 INJECTION, SOLUTION INTRAVENOUS
Status: COMPLETED | OUTPATIENT
Start: 2017-06-29 | End: 2017-06-29

## 2017-06-29 RX ORDER — POTASSIUM CHLORIDE 7.45 MG/ML
10 INJECTION INTRAVENOUS
Status: DISCONTINUED | OUTPATIENT
Start: 2017-06-29 | End: 2017-06-30 | Stop reason: HOSPADM

## 2017-06-29 RX ORDER — OXYCODONE HYDROCHLORIDE AND ACETAMINOPHEN 5; 325 MG/1; MG/1
1 TABLET ORAL EVERY 4 HOURS PRN
Status: DISCONTINUED | OUTPATIENT
Start: 2017-06-29 | End: 2017-06-30 | Stop reason: HOSPADM

## 2017-06-29 RX ORDER — POTASSIUM CHLORIDE 1.5 G/1.77G
40 POWDER, FOR SOLUTION ORAL AS NEEDED
Status: DISCONTINUED | OUTPATIENT
Start: 2017-06-29 | End: 2017-06-30 | Stop reason: HOSPADM

## 2017-06-29 RX ORDER — MAGNESIUM SULFATE HEPTAHYDRATE 40 MG/ML
4 INJECTION, SOLUTION INTRAVENOUS AS NEEDED
Status: DISCONTINUED | OUTPATIENT
Start: 2017-06-29 | End: 2017-06-30 | Stop reason: HOSPADM

## 2017-06-29 RX ORDER — POTASSIUM CHLORIDE 750 MG/1
40 CAPSULE, EXTENDED RELEASE ORAL AS NEEDED
Status: DISCONTINUED | OUTPATIENT
Start: 2017-06-29 | End: 2017-06-30 | Stop reason: HOSPADM

## 2017-06-29 RX ADMIN — MORPHINE SULFATE 4 MG: 4 INJECTION, SOLUTION INTRAMUSCULAR; INTRAVENOUS at 06:56

## 2017-06-29 RX ADMIN — MAGNESIUM SULFATE IN WATER 2 G: 40 INJECTION, SOLUTION INTRAVENOUS at 16:22

## 2017-06-29 RX ADMIN — MAGNESIUM SULFATE IN WATER 2 G: 40 INJECTION, SOLUTION INTRAVENOUS at 11:49

## 2017-06-29 RX ADMIN — VANCOMYCIN HYDROCHLORIDE 1000 MG: 5 INJECTION, POWDER, LYOPHILIZED, FOR SOLUTION INTRAVENOUS at 01:03

## 2017-06-29 RX ADMIN — Medication 10 ML: at 11:49

## 2017-06-29 RX ADMIN — Medication 10 ML: at 20:12

## 2017-06-29 RX ADMIN — HEPARIN SODIUM 5000 UNITS: 5000 INJECTION, SOLUTION INTRAVENOUS; SUBCUTANEOUS at 08:47

## 2017-06-29 RX ADMIN — OXYCODONE HYDROCHLORIDE AND ACETAMINOPHEN 1 TABLET: 5; 325 TABLET ORAL at 11:47

## 2017-06-29 RX ADMIN — GABAPENTIN 600 MG: 300 CAPSULE ORAL at 06:08

## 2017-06-29 RX ADMIN — VANCOMYCIN HYDROCHLORIDE 1250 MG: 5 INJECTION, POWDER, LYOPHILIZED, FOR SOLUTION INTRAVENOUS at 18:42

## 2017-06-29 RX ADMIN — SUCRALFATE 1 G: 1 TABLET ORAL at 20:03

## 2017-06-29 RX ADMIN — CEFTRIAXONE 2 G: 2 INJECTION, POWDER, FOR SOLUTION INTRAMUSCULAR; INTRAVENOUS at 15:40

## 2017-06-29 RX ADMIN — GABAPENTIN 600 MG: 300 CAPSULE ORAL at 15:32

## 2017-06-29 RX ADMIN — HEPARIN SODIUM 5000 UNITS: 5000 INJECTION, SOLUTION INTRAVENOUS; SUBCUTANEOUS at 20:02

## 2017-06-29 RX ADMIN — Medication 1 TABLET: at 08:47

## 2017-06-29 RX ADMIN — MORPHINE SULFATE 4 MG: 4 INJECTION, SOLUTION INTRAMUSCULAR; INTRAVENOUS at 03:02

## 2017-06-29 RX ADMIN — INSULIN ASPART 5 UNITS: 100 INJECTION, SOLUTION INTRAVENOUS; SUBCUTANEOUS at 08:46

## 2017-06-29 RX ADMIN — SUCRALFATE 1 G: 1 TABLET ORAL at 17:02

## 2017-06-29 RX ADMIN — PREDNISONE 20 MG: 20 TABLET ORAL at 06:08

## 2017-06-29 RX ADMIN — OXYCODONE HYDROCHLORIDE AND ACETAMINOPHEN 1 TABLET: 5; 325 TABLET ORAL at 21:38

## 2017-06-29 RX ADMIN — INSULIN ASPART 8 UNITS: 100 INJECTION, SOLUTION INTRAVENOUS; SUBCUTANEOUS at 11:48

## 2017-06-29 RX ADMIN — SUCRALFATE 1 G: 1 TABLET ORAL at 11:47

## 2017-06-29 RX ADMIN — OXYCODONE HYDROCHLORIDE AND ACETAMINOPHEN 1 TABLET: 5; 325 TABLET ORAL at 18:06

## 2017-06-29 RX ADMIN — INSULIN ASPART 14 UNITS: 100 INJECTION, SOLUTION INTRAVENOUS; SUBCUTANEOUS at 17:01

## 2017-06-29 RX ADMIN — Medication 10 ML: at 11:47

## 2017-06-29 RX ADMIN — SUCRALFATE 1 G: 1 TABLET ORAL at 08:48

## 2017-06-29 RX ADMIN — GABAPENTIN 600 MG: 300 CAPSULE ORAL at 21:37

## 2017-06-29 RX ADMIN — INSULIN DETEMIR 35 UNITS: 100 INJECTION, SOLUTION SUBCUTANEOUS at 20:13

## 2017-06-29 RX ADMIN — INSULIN ASPART 8 UNITS: 100 INJECTION, SOLUTION INTRAVENOUS; SUBCUTANEOUS at 21:37

## 2017-06-30 ENCOUNTER — TELEPHONE (OUTPATIENT)
Dept: FAMILY MEDICINE CLINIC | Facility: CLINIC | Age: 52
End: 2017-06-30

## 2017-06-30 VITALS
WEIGHT: 114 LBS | RESPIRATION RATE: 18 BRPM | HEIGHT: 63 IN | DIASTOLIC BLOOD PRESSURE: 62 MMHG | BODY MASS INDEX: 20.2 KG/M2 | OXYGEN SATURATION: 95 % | HEART RATE: 80 BPM | TEMPERATURE: 98.1 F | SYSTOLIC BLOOD PRESSURE: 128 MMHG

## 2017-06-30 LAB
ALBUMIN SERPL-MCNC: 2.7 G/DL (ref 3.5–5)
ALBUMIN/GLOB SERPL: 0.9 G/DL (ref 1.5–2.5)
ALP SERPL-CCNC: 68 U/L (ref 35–104)
ALT SERPL W P-5'-P-CCNC: 49 U/L (ref 10–36)
ANION GAP SERPL CALCULATED.3IONS-SCNC: 4.9 MMOL/L (ref 3.6–11.2)
ANISOCYTOSIS BLD QL: NORMAL
AST SERPL-CCNC: 38 U/L (ref 10–30)
BACTERIA SPEC AEROBE CULT: ABNORMAL
BILIRUB SERPL-MCNC: 0.1 MG/DL (ref 0.2–1.8)
BNP SERPL-MCNC: 383 PG/ML (ref 0–100)
BUN BLD-MCNC: 15 MG/DL (ref 7–21)
BUN/CREAT SERPL: 27.8 (ref 7–25)
CALCIUM SPEC-SCNC: 8.6 MG/DL (ref 7.7–10)
CHLORIDE SERPL-SCNC: 111 MMOL/L (ref 99–112)
CK MB SERPL-CCNC: 1.75 NG/ML (ref 0–5)
CK MB SERPL-RTO: 3.5 % (ref 0–3)
CK SERPL-CCNC: 50 U/L (ref 24–173)
CO2 SERPL-SCNC: 23.1 MMOL/L (ref 24.3–31.9)
CREAT BLD-MCNC: 0.54 MG/DL (ref 0.43–1.29)
CRP SERPL-MCNC: 3.79 MG/DL (ref 0–0.99)
DEPRECATED RDW RBC AUTO: 58.7 FL (ref 37–54)
ERYTHROCYTE [DISTWIDTH] IN BLOOD BY AUTOMATED COUNT: 20.6 % (ref 11.5–14.5)
GFR SERPL CREATININE-BSD FRML MDRD: 119 ML/MIN/1.73
GLOBULIN UR ELPH-MCNC: 3 GM/DL
GLUCOSE BLD-MCNC: 184 MG/DL (ref 70–110)
GLUCOSE BLDC GLUCOMTR-MCNC: 140 MG/DL (ref 70–130)
GLUCOSE BLDC GLUCOMTR-MCNC: 312 MG/DL (ref 70–130)
GRAM STN SPEC: ABNORMAL
HCT VFR BLD AUTO: 31.3 % (ref 37–47)
HGB BLD-MCNC: 8.9 G/DL (ref 12–16)
HYPOCHROMIA BLD QL: NORMAL
LYMPHOCYTES # BLD MANUAL: 1.55 10*3/MM3 (ref 1–3)
LYMPHOCYTES NFR BLD MANUAL: 21 % (ref 21–51)
LYMPHOCYTES NFR BLD MANUAL: 5 % (ref 0–10)
MAGNESIUM SERPL-MCNC: 2.4 MG/DL (ref 1.7–2.6)
MCH RBC QN AUTO: 23.1 PG (ref 27–33)
MCHC RBC AUTO-ENTMCNC: 28.4 G/DL (ref 33–37)
MCV RBC AUTO: 81.3 FL (ref 80–94)
METAMYELOCYTES NFR BLD MANUAL: 2 % (ref 0–0)
MONOCYTES # BLD AUTO: 0.37 10*3/MM3 (ref 0.1–0.9)
MYELOCYTES NFR BLD MANUAL: 2 % (ref 0–0)
MYOGLOBIN SERPL-MCNC: 20 NG/ML (ref 0–109)
NEUTROPHILS # BLD AUTO: 5.17 10*3/MM3 (ref 1.4–6.5)
NEUTROPHILS NFR BLD MANUAL: 69 % (ref 30–70)
NEUTS BAND NFR BLD MANUAL: 1 % (ref 4–12)
OSMOLALITY SERPL CALC.SUM OF ELEC: 283.1 MOSM/KG (ref 273–305)
PLAT MORPH BLD: NORMAL
PLATELET # BLD AUTO: 230 10*3/MM3 (ref 130–400)
PMV BLD AUTO: 11.2 FL (ref 6–10)
POTASSIUM BLD-SCNC: 4.1 MMOL/L (ref 3.5–5.3)
PROT SERPL-MCNC: 5.7 G/DL (ref 6–8)
RBC # BLD AUTO: 3.85 10*6/MM3 (ref 4.2–5.4)
RBC MORPH BLD: NORMAL
SCAN SLIDE: NORMAL
SODIUM BLD-SCNC: 139 MMOL/L (ref 135–153)
TROPONIN I SERPL-MCNC: 0.02 NG/ML
TSH SERPL DL<=0.05 MIU/L-ACNC: 2.58 MIU/ML (ref 0.55–4.78)
WBC NRBC COR # BLD: 7.39 10*3/MM3 (ref 4.5–12.5)

## 2017-06-30 PROCEDURE — 83735 ASSAY OF MAGNESIUM: CPT | Performed by: INTERNAL MEDICINE

## 2017-06-30 PROCEDURE — 85007 BL SMEAR W/DIFF WBC COUNT: CPT | Performed by: INTERNAL MEDICINE

## 2017-06-30 PROCEDURE — 94799 UNLISTED PULMONARY SVC/PX: CPT

## 2017-06-30 PROCEDURE — 83874 ASSAY OF MYOGLOBIN: CPT | Performed by: INTERNAL MEDICINE

## 2017-06-30 PROCEDURE — 85025 COMPLETE CBC W/AUTO DIFF WBC: CPT | Performed by: INTERNAL MEDICINE

## 2017-06-30 PROCEDURE — 84484 ASSAY OF TROPONIN QUANT: CPT | Performed by: INTERNAL MEDICINE

## 2017-06-30 PROCEDURE — 84443 ASSAY THYROID STIM HORMONE: CPT | Performed by: INTERNAL MEDICINE

## 2017-06-30 PROCEDURE — 99239 HOSP IP/OBS DSCHRG MGMT >30: CPT | Performed by: INTERNAL MEDICINE

## 2017-06-30 PROCEDURE — 93010 ELECTROCARDIOGRAM REPORT: CPT | Performed by: INTERNAL MEDICINE

## 2017-06-30 PROCEDURE — 63710000001 INSULIN ASPART PER 5 UNITS: Performed by: INTERNAL MEDICINE

## 2017-06-30 PROCEDURE — 82553 CREATINE MB FRACTION: CPT | Performed by: INTERNAL MEDICINE

## 2017-06-30 PROCEDURE — 80053 COMPREHEN METABOLIC PANEL: CPT | Performed by: INTERNAL MEDICINE

## 2017-06-30 PROCEDURE — 99024 POSTOP FOLLOW-UP VISIT: CPT | Performed by: PHYSICIAN ASSISTANT

## 2017-06-30 PROCEDURE — 83880 ASSAY OF NATRIURETIC PEPTIDE: CPT | Performed by: INTERNAL MEDICINE

## 2017-06-30 PROCEDURE — 82962 GLUCOSE BLOOD TEST: CPT

## 2017-06-30 PROCEDURE — 25010000002 VANCOMYCIN PER 500 MG

## 2017-06-30 PROCEDURE — 25010000002 HEPARIN (PORCINE) PER 1000 UNITS: Performed by: INTERNAL MEDICINE

## 2017-06-30 PROCEDURE — 63710000001 PREDNISONE PER 1 MG: Performed by: INTERNAL MEDICINE

## 2017-06-30 PROCEDURE — 82550 ASSAY OF CK (CPK): CPT | Performed by: INTERNAL MEDICINE

## 2017-06-30 PROCEDURE — 93005 ELECTROCARDIOGRAM TRACING: CPT | Performed by: INTERNAL MEDICINE

## 2017-06-30 PROCEDURE — 86140 C-REACTIVE PROTEIN: CPT | Performed by: INTERNAL MEDICINE

## 2017-06-30 RX ORDER — DOXYCYCLINE HYCLATE 100 MG/1
100 CAPSULE ORAL 2 TIMES DAILY
Qty: 28 CAPSULE | Refills: 0 | Status: SHIPPED | OUTPATIENT
Start: 2017-07-27 | End: 2017-07-22

## 2017-06-30 RX ORDER — UREA 10 %
1 LOTION (ML) TOPICAL DAILY
Qty: 30 EACH | Refills: 0 | Status: SHIPPED | OUTPATIENT
Start: 2017-06-30 | End: 2017-09-11 | Stop reason: DRUGHIGH

## 2017-06-30 RX ORDER — CEFDINIR 300 MG/1
300 CAPSULE ORAL EVERY 12 HOURS SCHEDULED
Status: DISCONTINUED | OUTPATIENT
Start: 2017-06-30 | End: 2017-06-30 | Stop reason: HOSPADM

## 2017-06-30 RX ORDER — CEFDINIR 300 MG/1
300 CAPSULE ORAL EVERY 12 HOURS SCHEDULED
Qty: 6 CAPSULE | Refills: 0 | Status: SHIPPED | OUTPATIENT
Start: 2017-06-30 | End: 2017-07-03

## 2017-06-30 RX ADMIN — Medication 1 EACH: at 09:09

## 2017-06-30 RX ADMIN — Medication 10 ML: at 09:05

## 2017-06-30 RX ADMIN — OXYCODONE HYDROCHLORIDE AND ACETAMINOPHEN 1 TABLET: 5; 325 TABLET ORAL at 01:38

## 2017-06-30 RX ADMIN — PREDNISONE 20 MG: 20 TABLET ORAL at 05:27

## 2017-06-30 RX ADMIN — INSULIN ASPART 10 UNITS: 100 INJECTION, SOLUTION INTRAVENOUS; SUBCUTANEOUS at 12:21

## 2017-06-30 RX ADMIN — TIZANIDINE 4 MG: 4 TABLET ORAL at 01:18

## 2017-06-30 RX ADMIN — OXYCODONE HYDROCHLORIDE AND ACETAMINOPHEN 1 TABLET: 5; 325 TABLET ORAL at 05:27

## 2017-06-30 RX ADMIN — SUCRALFATE 1 G: 1 TABLET ORAL at 12:21

## 2017-06-30 RX ADMIN — CEFDINIR 300 MG: 300 CAPSULE ORAL at 09:05

## 2017-06-30 RX ADMIN — Medication 1 TABLET: at 09:05

## 2017-06-30 RX ADMIN — HEPARIN SODIUM 5000 UNITS: 5000 INJECTION, SOLUTION INTRAVENOUS; SUBCUTANEOUS at 09:04

## 2017-06-30 RX ADMIN — OXYCODONE HYDROCHLORIDE AND ACETAMINOPHEN 1 TABLET: 5; 325 TABLET ORAL at 12:21

## 2017-06-30 RX ADMIN — GABAPENTIN 600 MG: 300 CAPSULE ORAL at 05:27

## 2017-06-30 RX ADMIN — VANCOMYCIN HYDROCHLORIDE 1250 MG: 5 INJECTION, POWDER, LYOPHILIZED, FOR SOLUTION INTRAVENOUS at 05:28

## 2017-06-30 RX ADMIN — SUCRALFATE 1 G: 1 TABLET ORAL at 08:55

## 2017-07-01 NOTE — PAYOR COMM NOTE
"[facility]   RICARDO LANGSTON  PHONE  [phone]  FAX  [phone]    PATIENT D/C 6/30/17    Yessica Yan (51 y.o. Female)     Date of Birth Social Security Number Address Home Phone MRN    1965  [address] [phone] [id number]    Rastafarian Marital Status          Lutheran        Admission Date Admission Type Admitting Provider Attending Provider Department, Room/Bed    6/24/17 Emergency Savanah Gutierrez MD  [facility] 2 SOUTH, 213/1S    Discharge Date Discharge Disposition Discharge Destination        6/30/2017 Home-Health Care Svc             Attending Provider: (none)    Allergies:  Penicillins, Toradol [Ketorolac Tromethamine], Tramadol    Isolation:  Contact   Infection:  MRSA (06/28/17)   Code Status:  Prior    Ht:  63\" (160 cm)   Wt:  114 lb (51.7 kg)    Admission Cmt:  None   Principal Problem:  Pyogenic arthritis of left elbow [M00.9]                 Active Insurance as of 6/24/2017     Primary Coverage     Payor Plan Insurance Group Employer/Plan Group    MEDICARE MEDICARE A & B      Payor Plan Address Payor Plan Phone Number Effective From Effective To    [address] [phone] 2/1/2005     [place] [address]       Subscriber Name Subscriber Birth Date Member ID       YESSICA YAN 1965 [id number]           Secondary Coverage     Payor Plan Insurance Group Employer/Plan Group    [employer] [place] WELL[place]ARE MEDICAID      Payor Plan Address Payor Plan Phone Number Effective From Effective To    [address] [address] [phone] 1/1/2016     [place] [address]       Subscriber Name Subscriber Birth Date Member ID       YESSICA YAN 1965 [id number]                 Emergency Contacts      (Rel.) Home Phone Work Phone Mobile Phone    Neeta Barrera (Friend) -- -- [phone]    JohanaScot (Son) [phone] -- --    Lili Vaughn (Other) -- -- [phone]              "

## 2017-07-05 ENCOUNTER — TELEPHONE (OUTPATIENT)
Dept: ORTHOPEDIC SURGERY | Facility: CLINIC | Age: 52
End: 2017-07-05

## 2017-07-05 NOTE — TELEPHONE ENCOUNTER
[facility] called, they are having difficulty getting up with patient to administer antibiotic   Called phone number listed [phone] left a message to return our call.     Patient had surgery

## 2017-07-05 NOTE — TELEPHONE ENCOUNTER
"Ailyn, head supervisor with [facility] called about patient. They gotten the order for the patient on 6- and was unable to reach patient on 7-2-2017. The nurse was able to reach the patient by phone on 7-3-17 and informed the patient they would be out to see her on 07-5-17. Once the nurse arrived at the address the patient provided, the gentleman that answered the door stated \"He didn't even know who they were talking about.\" They are discharging the patient for being Non-Compliant. Ailyn did state the patient still had her PIC line in and was supposed to be receiving IV Antibiotics.   "

## 2017-07-06 ENCOUNTER — APPOINTMENT (OUTPATIENT)
Dept: GENERAL RADIOLOGY | Facility: HOSPITAL | Age: 52
End: 2017-07-06

## 2017-07-06 ENCOUNTER — HOSPITAL ENCOUNTER (EMERGENCY)
Facility: HOSPITAL | Age: 52
Discharge: HOME | End: 2017-07-07
Attending: EMERGENCY MEDICINE | Admitting: EMERGENCY MEDICINE

## 2017-07-06 DIAGNOSIS — J44.1 COPD EXACERBATION (HCC): Primary | ICD-10-CM

## 2017-07-06 LAB
A-A DO2: 22.7 MMHG (ref 0–300)
APTT PPP: 21 SECONDS (ref 23.8–36.1)
ARTERIAL PATENCY WRIST A: ABNORMAL
ATMOSPHERIC PRESS: 731 MMHG
BASE EXCESS BLDA CALC-SCNC: -3.8 MMOL/L
BASOPHILS # BLD AUTO: 0.04 10*3/MM3 (ref 0–0.3)
BASOPHILS NFR BLD AUTO: 0.4 % (ref 0–2)
BDY SITE: ABNORMAL
BODY TEMPERATURE: 98.6 C
COHGB MFR BLD: 5.1 % (ref 0–5)
DEPRECATED RDW RBC AUTO: 58.7 FL (ref 37–54)
EOSINOPHIL # BLD AUTO: 0.04 10*3/MM3 (ref 0–0.7)
EOSINOPHIL NFR BLD AUTO: 0.4 % (ref 0–5)
ERYTHROCYTE [DISTWIDTH] IN BLOOD BY AUTOMATED COUNT: 20.7 % (ref 11.5–14.5)
HCO3 BLDA-SCNC: 19.6 MMOL/L (ref 22–26)
HCT VFR BLD AUTO: 37.5 % (ref 37–47)
HCT VFR BLD CALC: 34 % (ref 37–47)
HGB BLD-MCNC: 11 G/DL (ref 12–16)
HGB BLDA-MCNC: 11.4 G/DL (ref 12–16)
HOROWITZ INDEX BLD+IHG-RTO: 21 %
IMM GRANULOCYTES # BLD: 0.34 10*3/MM3 (ref 0–0.03)
IMM GRANULOCYTES NFR BLD: 3.2 % (ref 0–0.5)
INR PPP: 1.05 (ref 0.9–1.1)
LYMPHOCYTES # BLD AUTO: 1.68 10*3/MM3 (ref 1–3)
LYMPHOCYTES NFR BLD AUTO: 15.8 % (ref 21–51)
MCH RBC QN AUTO: 23.1 PG (ref 27–33)
MCHC RBC AUTO-ENTMCNC: 29.3 G/DL (ref 33–37)
MCV RBC AUTO: 78.6 FL (ref 80–94)
METHGB BLD QL: 0 % (ref 0–3)
MODALITY: ABNORMAL
MONOCYTES # BLD AUTO: 0.77 10*3/MM3 (ref 0.1–0.9)
MONOCYTES NFR BLD AUTO: 7.2 % (ref 0–10)
NEUTROPHILS # BLD AUTO: 7.76 10*3/MM3 (ref 1.4–6.5)
NEUTROPHILS NFR BLD AUTO: 73 % (ref 30–70)
NRBC BLD MANUAL-RTO: 0 /100 WBC (ref 0–0)
OXYHGB MFR BLDV: 92.1 % (ref 85–100)
PCO2 BLDA: 30.3 MM HG (ref 35–45)
PH BLDA: 7.43 PH UNITS (ref 7.35–7.45)
PLATELET # BLD AUTO: 244 10*3/MM3 (ref 130–400)
PMV BLD AUTO: 10.2 FL (ref 6–10)
PO2 BLDA: 84.7 MM HG (ref 80–100)
PROTHROMBIN TIME: 13.8 SECONDS (ref 11–15.4)
RBC # BLD AUTO: 4.77 10*6/MM3 (ref 4.2–5.4)
SAO2 % BLDCOA: 97 % (ref 90–100)
WBC NRBC COR # BLD: 10.63 10*3/MM3 (ref 4.5–12.5)

## 2017-07-06 PROCEDURE — 80053 COMPREHEN METABOLIC PANEL: CPT | Performed by: EMERGENCY MEDICINE

## 2017-07-06 PROCEDURE — 96375 TX/PRO/DX INJ NEW DRUG ADDON: CPT

## 2017-07-06 PROCEDURE — 85730 THROMBOPLASTIN TIME PARTIAL: CPT | Performed by: EMERGENCY MEDICINE

## 2017-07-06 PROCEDURE — 96361 HYDRATE IV INFUSION ADD-ON: CPT

## 2017-07-06 PROCEDURE — 71010 HC CHEST PA OR AP: CPT

## 2017-07-06 PROCEDURE — 36415 COLL VENOUS BLD VENIPUNCTURE: CPT

## 2017-07-06 PROCEDURE — 87040 BLOOD CULTURE FOR BACTERIA: CPT | Performed by: EMERGENCY MEDICINE

## 2017-07-06 PROCEDURE — 83050 HGB METHEMOGLOBIN QUAN: CPT | Performed by: EMERGENCY MEDICINE

## 2017-07-06 PROCEDURE — 83880 ASSAY OF NATRIURETIC PEPTIDE: CPT | Performed by: EMERGENCY MEDICINE

## 2017-07-06 PROCEDURE — 71010 XR CHEST 1 VW: CPT | Performed by: RADIOLOGY

## 2017-07-06 PROCEDURE — 36600 WITHDRAWAL OF ARTERIAL BLOOD: CPT | Performed by: EMERGENCY MEDICINE

## 2017-07-06 PROCEDURE — 93005 ELECTROCARDIOGRAM TRACING: CPT | Performed by: EMERGENCY MEDICINE

## 2017-07-06 PROCEDURE — 93010 ELECTROCARDIOGRAM REPORT: CPT | Performed by: INTERNAL MEDICINE

## 2017-07-06 PROCEDURE — 83690 ASSAY OF LIPASE: CPT | Performed by: EMERGENCY MEDICINE

## 2017-07-06 PROCEDURE — 82805 BLOOD GASES W/O2 SATURATION: CPT | Performed by: EMERGENCY MEDICINE

## 2017-07-06 PROCEDURE — 99284 EMERGENCY DEPT VISIT MOD MDM: CPT

## 2017-07-06 PROCEDURE — 25010000002 HYDROMORPHONE PER 4 MG: Performed by: EMERGENCY MEDICINE

## 2017-07-06 PROCEDURE — 94640 AIRWAY INHALATION TREATMENT: CPT

## 2017-07-06 PROCEDURE — 85379 FIBRIN DEGRADATION QUANT: CPT | Performed by: EMERGENCY MEDICINE

## 2017-07-06 PROCEDURE — 85610 PROTHROMBIN TIME: CPT | Performed by: EMERGENCY MEDICINE

## 2017-07-06 PROCEDURE — 25010000002 ONDANSETRON PER 1 MG: Performed by: EMERGENCY MEDICINE

## 2017-07-06 PROCEDURE — 96374 THER/PROPH/DIAG INJ IV PUSH: CPT

## 2017-07-06 PROCEDURE — 94799 UNLISTED PULMONARY SVC/PX: CPT

## 2017-07-06 PROCEDURE — 84484 ASSAY OF TROPONIN QUANT: CPT | Performed by: EMERGENCY MEDICINE

## 2017-07-06 PROCEDURE — 85025 COMPLETE CBC W/AUTO DIFF WBC: CPT | Performed by: EMERGENCY MEDICINE

## 2017-07-06 PROCEDURE — 82375 ASSAY CARBOXYHB QUANT: CPT | Performed by: EMERGENCY MEDICINE

## 2017-07-06 RX ORDER — ONDANSETRON 2 MG/ML
4 INJECTION INTRAMUSCULAR; INTRAVENOUS ONCE
Status: COMPLETED | OUTPATIENT
Start: 2017-07-06 | End: 2017-07-06

## 2017-07-06 RX ORDER — HYDROMORPHONE HYDROCHLORIDE 1 MG/ML
0.5 INJECTION, SOLUTION INTRAMUSCULAR; INTRAVENOUS; SUBCUTANEOUS ONCE
Status: COMPLETED | OUTPATIENT
Start: 2017-07-06 | End: 2017-07-06

## 2017-07-06 RX ORDER — IPRATROPIUM BROMIDE AND ALBUTEROL SULFATE 2.5; .5 MG/3ML; MG/3ML
3 SOLUTION RESPIRATORY (INHALATION) ONCE
Status: COMPLETED | OUTPATIENT
Start: 2017-07-06 | End: 2017-07-06

## 2017-07-06 RX ORDER — SODIUM CHLORIDE 9 MG/ML
125 INJECTION, SOLUTION INTRAVENOUS CONTINUOUS
Status: DISCONTINUED | OUTPATIENT
Start: 2017-07-06 | End: 2017-07-07 | Stop reason: HOSPADM

## 2017-07-06 RX ADMIN — SODIUM CHLORIDE 125 ML/HR: 9 INJECTION, SOLUTION INTRAVENOUS at 23:31

## 2017-07-06 RX ADMIN — IPRATROPIUM BROMIDE AND ALBUTEROL SULFATE 3 ML: .5; 3 SOLUTION RESPIRATORY (INHALATION) at 23:43

## 2017-07-06 RX ADMIN — ONDANSETRON 4 MG: 2 SOLUTION INTRAMUSCULAR; INTRAVENOUS at 23:29

## 2017-07-06 RX ADMIN — HYDROMORPHONE HYDROCHLORIDE 0.5 MG: 1 INJECTION, SOLUTION INTRAMUSCULAR; INTRAVENOUS; SUBCUTANEOUS at 23:31

## 2017-07-07 ENCOUNTER — APPOINTMENT (OUTPATIENT)
Dept: ULTRASOUND IMAGING | Facility: HOSPITAL | Age: 52
End: 2017-07-07

## 2017-07-07 ENCOUNTER — APPOINTMENT (OUTPATIENT)
Dept: GENERAL RADIOLOGY | Facility: HOSPITAL | Age: 52
End: 2017-07-07

## 2017-07-07 ENCOUNTER — APPOINTMENT (OUTPATIENT)
Dept: CT IMAGING | Facility: HOSPITAL | Age: 52
End: 2017-07-07

## 2017-07-07 VITALS
HEART RATE: 106 BPM | DIASTOLIC BLOOD PRESSURE: 74 MMHG | HEIGHT: 63 IN | SYSTOLIC BLOOD PRESSURE: 138 MMHG | BODY MASS INDEX: 20.2 KG/M2 | RESPIRATION RATE: 18 BRPM | WEIGHT: 114 LBS | TEMPERATURE: 97.7 F | OXYGEN SATURATION: 99 %

## 2017-07-07 VITALS
SYSTOLIC BLOOD PRESSURE: 154 MMHG | OXYGEN SATURATION: 96 % | WEIGHT: 114 LBS | HEART RATE: 99 BPM | BODY MASS INDEX: 20.2 KG/M2 | DIASTOLIC BLOOD PRESSURE: 78 MMHG | HEIGHT: 63 IN | RESPIRATION RATE: 20 BRPM | TEMPERATURE: 97.6 F

## 2017-07-07 DIAGNOSIS — R60.0 BILATERAL LEG EDEMA: ICD-10-CM

## 2017-07-07 DIAGNOSIS — J44.1 COPD WITH ACUTE EXACERBATION (HCC): Primary | ICD-10-CM

## 2017-07-07 LAB
ALBUMIN SERPL-MCNC: 3.6 G/DL (ref 3.5–5)
ALBUMIN SERPL-MCNC: 3.7 G/DL (ref 3.5–5)
ALBUMIN/GLOB SERPL: 1.1 G/DL (ref 1.5–2.5)
ALBUMIN/GLOB SERPL: 1.2 G/DL (ref 1.5–2.5)
ALP SERPL-CCNC: 104 U/L (ref 35–104)
ALP SERPL-CCNC: 93 U/L (ref 35–104)
ALT SERPL W P-5'-P-CCNC: 108 U/L (ref 10–36)
ALT SERPL W P-5'-P-CCNC: 129 U/L (ref 10–36)
ANION GAP SERPL CALCULATED.3IONS-SCNC: 7.1 MMOL/L (ref 3.6–11.2)
ANION GAP SERPL CALCULATED.3IONS-SCNC: 7.7 MMOL/L (ref 3.6–11.2)
ANISOCYTOSIS BLD QL: ABNORMAL
AST SERPL-CCNC: 51 U/L (ref 10–30)
AST SERPL-CCNC: 55 U/L (ref 10–30)
BILIRUB SERPL-MCNC: 0.2 MG/DL (ref 0.2–1.8)
BILIRUB SERPL-MCNC: 0.3 MG/DL (ref 0.2–1.8)
BNP SERPL-MCNC: 152 PG/ML (ref 0–100)
BNP SERPL-MCNC: 235 PG/ML (ref 0–100)
BUN BLD-MCNC: 12 MG/DL (ref 7–21)
BUN BLD-MCNC: 14 MG/DL (ref 7–21)
BUN/CREAT SERPL: 18.7 (ref 7–25)
BUN/CREAT SERPL: 22.6 (ref 7–25)
CALCIUM SPEC-SCNC: 8.8 MG/DL (ref 7.7–10)
CALCIUM SPEC-SCNC: 9.5 MG/DL (ref 7.7–10)
CHLORIDE SERPL-SCNC: 108 MMOL/L (ref 99–112)
CHLORIDE SERPL-SCNC: 109 MMOL/L (ref 99–112)
CO2 SERPL-SCNC: 23.3 MMOL/L (ref 24.3–31.9)
CO2 SERPL-SCNC: 23.9 MMOL/L (ref 24.3–31.9)
CREAT BLD-MCNC: 0.53 MG/DL (ref 0.43–1.29)
CREAT BLD-MCNC: 0.75 MG/DL (ref 0.43–1.29)
D DIMER PPP FEU-MCNC: 5.46 MCGFEU/ML (ref 0–0.5)
DEPRECATED RDW RBC AUTO: 56.6 FL (ref 37–54)
ERYTHROCYTE [DISTWIDTH] IN BLOOD BY AUTOMATED COUNT: 20.8 % (ref 11.5–14.5)
GFR SERPL CREATININE-BSD FRML MDRD: 122 ML/MIN/1.73
GFR SERPL CREATININE-BSD FRML MDRD: 81 ML/MIN/1.73
GLOBULIN UR ELPH-MCNC: 3 GM/DL
GLOBULIN UR ELPH-MCNC: 3.5 GM/DL
GLUCOSE BLD-MCNC: 265 MG/DL (ref 70–110)
GLUCOSE BLD-MCNC: 438 MG/DL (ref 70–110)
GLUCOSE BLDC GLUCOMTR-MCNC: 237 MG/DL (ref 70–130)
HCT VFR BLD AUTO: 36.3 % (ref 37–47)
HGB BLD-MCNC: 10.7 G/DL (ref 12–16)
HYPOCHROMIA BLD QL: ABNORMAL
LIPASE SERPL-CCNC: 77 U/L (ref 13–60)
LYMPHOCYTES # BLD MANUAL: 1.45 10*3/MM3 (ref 1–3)
LYMPHOCYTES NFR BLD MANUAL: 14 % (ref 21–51)
LYMPHOCYTES NFR BLD MANUAL: 5 % (ref 0–10)
MCH RBC QN AUTO: 23 PG (ref 27–33)
MCHC RBC AUTO-ENTMCNC: 29.5 G/DL (ref 33–37)
MCV RBC AUTO: 77.9 FL (ref 80–94)
MONOCYTES # BLD AUTO: 0.52 10*3/MM3 (ref 0.1–0.9)
NEUTROPHILS # BLD AUTO: 8.38 10*3/MM3 (ref 1.4–6.5)
NEUTROPHILS NFR BLD MANUAL: 75 % (ref 30–70)
NEUTS BAND NFR BLD MANUAL: 6 % (ref 4–12)
OSMOLALITY SERPL CALC.SUM OF ELEC: 286.5 MOSM/KG (ref 273–305)
OSMOLALITY SERPL CALC.SUM OF ELEC: 298.7 MOSM/KG (ref 273–305)
PLAT MORPH BLD: NORMAL
PLATELET # BLD AUTO: 266 10*3/MM3 (ref 130–400)
PMV BLD AUTO: 10.2 FL (ref 6–10)
POTASSIUM BLD-SCNC: 3.5 MMOL/L (ref 3.5–5.3)
POTASSIUM BLD-SCNC: 3.7 MMOL/L (ref 3.5–5.3)
PROT SERPL-MCNC: 6.6 G/DL (ref 6–8)
PROT SERPL-MCNC: 7.2 G/DL (ref 6–8)
RBC # BLD AUTO: 4.66 10*6/MM3 (ref 4.2–5.4)
SCAN SLIDE: NORMAL
SODIUM BLD-SCNC: 139 MMOL/L (ref 135–153)
SODIUM BLD-SCNC: 140 MMOL/L (ref 135–153)
TROPONIN I SERPL-MCNC: 0.03 NG/ML
TROPONIN I SERPL-MCNC: <0.006 NG/ML
WBC NRBC COR # BLD: 10.35 10*3/MM3 (ref 4.5–12.5)

## 2017-07-07 PROCEDURE — 87040 BLOOD CULTURE FOR BACTERIA: CPT | Performed by: EMERGENCY MEDICINE

## 2017-07-07 PROCEDURE — 93970 EXTREMITY STUDY: CPT | Performed by: RADIOLOGY

## 2017-07-07 PROCEDURE — 0 IOPAMIDOL PER 1 ML: Performed by: EMERGENCY MEDICINE

## 2017-07-07 PROCEDURE — 71275 CT ANGIOGRAPHY CHEST: CPT | Performed by: RADIOLOGY

## 2017-07-07 PROCEDURE — 71010 XR CHEST 1 VW: CPT | Performed by: RADIOLOGY

## 2017-07-07 PROCEDURE — 93010 ELECTROCARDIOGRAM REPORT: CPT | Performed by: INTERNAL MEDICINE

## 2017-07-07 PROCEDURE — 71275 CT ANGIOGRAPHY CHEST: CPT

## 2017-07-07 RX ORDER — LORAZEPAM 2 MG/ML
0.5 INJECTION INTRAMUSCULAR ONCE
Status: COMPLETED | OUTPATIENT
Start: 2017-07-07 | End: 2017-07-07

## 2017-07-07 RX ORDER — SODIUM CHLORIDE 0.9 % (FLUSH) 0.9 %
10 SYRINGE (ML) INJECTION AS NEEDED
Status: DISCONTINUED | OUTPATIENT
Start: 2017-07-07 | End: 2017-07-07 | Stop reason: HOSPADM

## 2017-07-07 RX ORDER — ASPIRIN 81 MG/1
324 TABLET, CHEWABLE ORAL ONCE
Status: COMPLETED | OUTPATIENT
Start: 2017-07-07 | End: 2017-07-07

## 2017-07-07 RX ORDER — SODIUM CHLORIDE 9 MG/ML
75 INJECTION, SOLUTION INTRAVENOUS CONTINUOUS
Status: DISCONTINUED | OUTPATIENT
Start: 2017-07-07 | End: 2017-07-07 | Stop reason: HOSPADM

## 2017-07-07 RX ORDER — ONDANSETRON 2 MG/ML
4 INJECTION INTRAMUSCULAR; INTRAVENOUS ONCE
Status: COMPLETED | OUTPATIENT
Start: 2017-07-07 | End: 2017-07-07

## 2017-07-07 RX ORDER — IPRATROPIUM BROMIDE AND ALBUTEROL SULFATE 2.5; .5 MG/3ML; MG/3ML
3 SOLUTION RESPIRATORY (INHALATION) ONCE
Status: COMPLETED | OUTPATIENT
Start: 2017-07-07 | End: 2017-07-07

## 2017-07-07 RX ORDER — ALBUTEROL SULFATE 90 UG/1
2 AEROSOL, METERED RESPIRATORY (INHALATION) EVERY 4 HOURS PRN
Qty: 1 INHALER | Refills: 0 | Status: ON HOLD | OUTPATIENT
Start: 2017-07-07 | End: 2017-07-23

## 2017-07-07 RX ADMIN — MORPHINE SULFATE 4 MG: 4 INJECTION, SOLUTION INTRAMUSCULAR; INTRAVENOUS at 08:47

## 2017-07-07 RX ADMIN — SODIUM CHLORIDE 75 ML/HR: 900 INJECTION, SOLUTION INTRAVENOUS at 08:40

## 2017-07-07 RX ADMIN — IPRATROPIUM BROMIDE AND ALBUTEROL SULFATE 3 ML: .5; 3 SOLUTION RESPIRATORY (INHALATION) at 08:27

## 2017-07-07 RX ADMIN — ASPIRIN 324 MG: 81 TABLET, CHEWABLE ORAL at 08:48

## 2017-07-07 RX ADMIN — ONDANSETRON 4 MG: 2 SOLUTION INTRAMUSCULAR; INTRAVENOUS at 08:41

## 2017-07-07 RX ADMIN — IOPAMIDOL 100 ML: 755 INJECTION, SOLUTION INTRAVENOUS at 00:50

## 2017-07-07 RX ADMIN — LORAZEPAM 0.5 MG: 2 INJECTION INTRAMUSCULAR; INTRAVENOUS at 08:43

## 2017-07-07 NOTE — ED PROVIDER NOTES
Subjective   Patient is a 51 y.o. female presenting with shortness of breath.   History provided by:  Patient   used: No    Shortness of Breath   Severity:  Moderate  Onset quality:  Gradual  Timing:  Constant  Progression:  Waxing and waning  Chronicity:  Recurrent  Context: not activity, not animal exposure, not emotional upset, not fumes, not known allergens, not occupational exposure, not pollens, not smoke exposure, not strong odors, not URI and not weather changes    Relieved by:  Nothing  Worsened by:  Nothing  Ineffective treatments:  None tried  Associated symptoms: no abdominal pain, no chest pain, no claudication, no cough, no diaphoresis, no ear pain, no fever, no headaches, no hemoptysis, no neck pain, no PND, no rash, no sore throat, no sputum production, no syncope, no swollen glands, no vomiting and no wheezing    Risk factors: no recent alcohol use, no family hx of DVT, no hx of cancer, no hx of PE/DVT, no obesity, no oral contraceptive use, no prolonged immobilization, no recent surgery and no tobacco use        Review of Systems   Constitutional: Negative for diaphoresis and fever.   HENT: Negative for ear pain and sore throat.    Respiratory: Positive for shortness of breath. Negative for cough, hemoptysis, sputum production and wheezing.    Cardiovascular: Negative for chest pain, claudication, syncope and PND.   Gastrointestinal: Negative for abdominal pain and vomiting.   Musculoskeletal: Negative for neck pain.   Skin: Negative for rash.   Neurological: Negative for headaches.   All other systems reviewed and are negative.      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   • Chronic respiratory failure with hypoxia     at nighttime   • Compression fracture of lumbar vertebra    • Compression fracture of thoracic vertebra    • COPD (chronic obstructive pulmonary disease)    • Coronary artery disease    •  Diabetes mellitus type 2 in nonobese    • Erosive esophagitis 2016   • Essential hypertension    • Gastroparesis    • GERD (gastroesophageal reflux disease)    • Hepatitis-C    • History of transfusion    • Medically noncompliant    • MRSA infection     back of neck around    • NSTEMI (non-ST elevated myocardial infarction) 2016   • PE (pulmonary embolism)    • Protein calorie malnutrition    • RA (rheumatoid arthritis)     with chronic steroid use   • Rheumatoid arthritis        Allergies   Allergen Reactions   • Penicillins Anaphylaxis     Tolerated both cefepime and Rocephin earlier this month   • Toradol [Ketorolac Tromethamine] Hives and Swelling   • Tramadol Hives and Swelling       Past Surgical History:   Procedure Laterality Date   • ABDOMINAL SURGERY     • AMPUTATION DIGIT Right 2016    Third digit to the PIP. Surgeon: Dr. Bains   • AMPUTATION DIGIT Left 2016    Procedure: PARTIAL AMPUTATION LEFT THIRD FINGER;  Surgeon: Shane Fuller MD;  Location: [facility] OR;  Service:    • AMPUTATION REVISION Left 2016    Procedure: AMPUTATION REVISION DIGIT STUMP;  Surgeon: Shane Fuller MD;  Location: [facility] OR;  Service:    • ARM DEBRIDEMENT Left 2016    Procedure: HAND IRRIGATION DEBRIDEMENT AND REPAIR ;  Surgeon: Shane Fuller MD;  Location: [facility] OR;  Service:    •  SECTION      x 2   • CHOLECYSTECTOMY     • ENDOSCOPY N/A 2016    Procedure: ESOPHAGOGASTRODUODENOSCOPY;  Surgeon: Jeremy Clement III, MD;  Location: [facility] OR;  Service:    • ENDOSCOPY W/ PEG TUBE PLACEMENT N/A 3/30/2017    Procedure: ESOPHAGOGASTRODUODENOSCOPY WITH PERCUTANEOUS ENDOSCOPIC GASTROSTOMY TUBE INSERTION;  Surgeon: Shade Campos MD;  Location: [facility] OR;  Service:    • FOREIGN BODY REMOVAL      insulin needle removal x 3 from the abdomen   • HYSTERECTOMY     • INCISION AND DRAINAGE ARM Left 2017    Procedure: INCISION AND DRAINAGE UPPER EXTREMITY;   Surgeon: Shane Fuller MD;  Location: [facility];  Service:        Family History   Problem Relation Age of Onset   • Colon cancer Mother    • Cancer Father    • Diabetes Maternal Grandmother    • Heart disease Neg Hx    • Stroke Neg Hx        Social History     Social History   • Marital status:      Spouse name: N/A   • Number of children: N/A   • Years of education: N/A     Social History Main Topics   • Smoking status: Current Every Day Smoker     Packs/day: 0.25     Years: 35.00     Types: Cigarettes   • Smokeless tobacco: None      Comment: 4-5 cigarettes a day    • Alcohol use No   • Drug use: No   • Sexual activity: Defer     Other Topics Concern   • None     Social History Narrative           Objective   Physical Exam   Constitutional: She is oriented to person, place, and time. She appears well-developed and well-nourished.   HENT:   Head: Normocephalic and atraumatic.   Right Ear: External ear normal.   Left Ear: External ear normal.   Nose: Nose normal.   Mouth/Throat: Oropharynx is clear and moist.   Eyes: Conjunctivae and EOM are normal. Pupils are equal, round, and reactive to light. Right eye exhibits no discharge. Left eye exhibits no discharge. No scleral icterus.   Neck: Normal range of motion. Neck supple. No JVD present. No tracheal deviation present. No thyromegaly present.   Cardiovascular: Normal rate, regular rhythm and normal heart sounds.  Exam reveals no gallop and no friction rub.    No murmur heard.  Pulmonary/Chest: Effort normal and breath sounds normal. No stridor. No respiratory distress. She has no wheezes. She has no rales. She exhibits no tenderness.   Patient's lungs are clear   Abdominal: Soft. Bowel sounds are normal. She exhibits no distension and no mass. There is no tenderness. There is no rebound and no guarding. No hernia.   Musculoskeletal: She exhibits no edema, tenderness or deformity.   Lymphadenopathy:     She has no cervical adenopathy.    Neurological: She is alert and oriented to person, place, and time. She has normal reflexes. She displays normal reflexes. No cranial nerve deficit. She exhibits normal muscle tone. Coordination normal.   Skin: Skin is warm and dry. No rash noted. No erythema. No pallor.   Psychiatric: She has a normal mood and affect. Her behavior is normal. Judgment and thought content normal.   Nursing note and vitals reviewed.      Procedures         ED Course  ED Course   Comment By Time   CT with PE protocol was negative for PE or other abnormalities noted lungs. Melvin Abel MD 07/07 0151                  MDM  Number of Diagnoses or Management Options  COPD exacerbation: established and improving     Amount and/or Complexity of Data Reviewed  Clinical lab tests: ordered and reviewed  Tests in the radiology section of CPT®: ordered and reviewed  Tests in the medicine section of CPT®: ordered and reviewed  Discussion of test results with the performing providers: yes  Decide to obtain previous medical records or to obtain history from someone other than the patient: yes  Obtain history from someone other than the patient: yes  Review and summarize past medical records: yes  Discuss the patient with other providers: yes  Independent visualization of images, tracings, or specimens: yes    Risk of Complications, Morbidity, and/or Mortality  Presenting problems: high  Diagnostic procedures: high  Management options: high    Critical Care  Total time providing critical care: < 30 minutes    Patient Progress  Patient progress: stable      Final diagnoses:   COPD exacerbation            Melvin Abel MD  07/07/17 0151       Melvin Abel MD  07/07/17 0152

## 2017-07-07 NOTE — ED NOTES
ekg performed by [facility] at 0752 and shown to Dr. Melonie Nelson  07/07/17 0757

## 2017-07-07 NOTE — ED NOTES
Pt still gone to CT @ this time, will encourage pt for urine sample upon return.     Cristiane Nelson  07/07/17 0944

## 2017-07-07 NOTE — ED PROVIDER NOTES
Subjective   HPI Comments: 51 year old female who presents to the ED today with chest pain and shortness of breath.  She states this all started around 6 PM yesterday.  She states she is having chest pain all across her chest and it wraps around to both of her shoulder blades.  She states her legs are swollen and she feels like her legs are on fire from her hips down.  She states she has also been having shortness of breath.  She states this all started at rest while she was playing with her niece.  The patient was recently admitted to the hospital for septic arthritis of the left elbow and had surgery.  She currently has a PICC line and is receiving IV antibiotics.    Patient is a 51 y.o. female presenting with chest pain.   History provided by:  Patient  Chest Pain   Pain location:  L chest and R chest  Pain quality: aching and sharp    Radiates to: around to both shoulder blades.  Pain severity:  Moderate  Onset quality:  Gradual  Duration:  14 hours  Timing:  Constant  Progression:  Worsening  Chronicity:  New  Context: at rest    Relieved by:  Nothing  Worsened by:  Nothing  Associated symptoms: back pain, lower extremity edema and shortness of breath    Associated symptoms: no abdominal pain, no altered mental status, no anorexia, no anxiety, no claudication, no cough, no diaphoresis, no dizziness, no dysphagia, no fatigue, no fever, no headache, no heartburn, no nausea, no near-syncope, no numbness, no orthopnea, no palpitations, no PND, no syncope, no vomiting and no weakness    Risk factors: coronary artery disease, diabetes mellitus, hypertension, smoking and surgery    Risk factors: not obese        Review of Systems   Constitutional: Negative for diaphoresis, fatigue and fever.   HENT: Negative for trouble swallowing.    Eyes: Negative.    Respiratory: Positive for shortness of breath. Negative for cough.    Cardiovascular: Positive for chest pain and leg swelling. Negative for palpitations, orthopnea,  claudication, syncope, PND and near-syncope.   Gastrointestinal: Negative for abdominal pain, anorexia, heartburn, nausea and vomiting.   Genitourinary: Negative.    Musculoskeletal: Positive for back pain.   Skin: Negative.    Neurological: Negative for dizziness, weakness, numbness and headaches.   Psychiatric/Behavioral: Negative.    All other systems reviewed and are negative.      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   • Chronic respiratory failure with hypoxia     at nighttime   • Compression fracture of lumbar vertebra    • Compression fracture of thoracic vertebra    • COPD (chronic obstructive pulmonary disease)    • Coronary artery disease    • Diabetes mellitus type 2 in nonobese    • Erosive esophagitis 12/2016   • Essential hypertension    • Gastroparesis    • GERD (gastroesophageal reflux disease)    • Hepatitis-C    • History of transfusion    • Medically noncompliant    • MRSA infection     back of neck around 2012   • NSTEMI (non-ST elevated myocardial infarction) 07/2016   • PE (pulmonary embolism)    • Protein calorie malnutrition    • RA (rheumatoid arthritis)     with chronic steroid use   • Rheumatoid arthritis        Allergies   Allergen Reactions   • Penicillins Anaphylaxis     Tolerated both cefepime and Rocephin earlier this month   • Toradol [Ketorolac Tromethamine] Hives and Swelling   • Tramadol Hives and Swelling       Past Surgical History:   Procedure Laterality Date   • ABDOMINAL SURGERY     • AMPUTATION DIGIT Right 12/22/2016    Third digit to the PIP. Surgeon: Dr. Bains   • AMPUTATION DIGIT Left 12/22/2016    Procedure: PARTIAL AMPUTATION LEFT THIRD FINGER;  Surgeon: Shane Fuller MD;  Location: [facility] OR;  Service:    • AMPUTATION REVISION Left 12/22/2016    Procedure: AMPUTATION REVISION DIGIT STUMP;  Surgeon: Shane Fuller MD;  Location: [facility] OR;  Service:    • ARM DEBRIDEMENT Left  2016    Procedure: HAND IRRIGATION DEBRIDEMENT AND REPAIR ;  Surgeon: Shane Fuller MD;  Location: [facility] OR;  Service:    •  SECTION      x 2   • CHOLECYSTECTOMY     • ENDOSCOPY N/A 2016    Procedure: ESOPHAGOGASTRODUODENOSCOPY;  Surgeon: Jeremy Clement III, MD;  Location: [facility] OR;  Service:    • ENDOSCOPY W/ PEG TUBE PLACEMENT N/A 3/30/2017    Procedure: ESOPHAGOGASTRODUODENOSCOPY WITH PERCUTANEOUS ENDOSCOPIC GASTROSTOMY TUBE INSERTION;  Surgeon: Shade Campos MD;  Location: [facility] OR;  Service:    • FOREIGN BODY REMOVAL      insulin needle removal x 3 from the abdomen   • HYSTERECTOMY     • INCISION AND DRAINAGE ARM Left 2017    Procedure: INCISION AND DRAINAGE UPPER EXTREMITY;  Surgeon: Shane Fuller MD;  Location: [facility] OR;  Service:        Family History   Problem Relation Age of Onset   • Colon cancer Mother    • Cancer Father    • Diabetes Maternal Grandmother    • Heart disease Neg Hx    • Stroke Neg Hx        Social History     Social History   • Marital status:      Spouse name: N/A   • Number of children: N/A   • Years of education: N/A     Social History Main Topics   • Smoking status: Current Every Day Smoker     Packs/day: 0.25     Years: 35.00     Types: Cigarettes   • Smokeless tobacco: None      Comment: 4-5 cigarettes a day    • Alcohol use No   • Drug use: No   • Sexual activity: Defer     Other Topics Concern   • None     Social History Narrative   • None           Objective   Physical Exam   Constitutional: She is oriented to person, place, and time. She appears well-developed and well-nourished. No distress.   HENT:   Head: Normocephalic and atraumatic.   Right Ear: External ear normal.   Left Ear: External ear normal.   Nose: Nose normal.   Mouth/Throat: Oropharynx is clear and moist.   Eyes: Conjunctivae and EOM are normal. Pupils are equal, round, and reactive to light.   Neck: Normal range of motion. Neck supple.    Cardiovascular: Regular rhythm, normal heart sounds and intact distal pulses.  Tachycardia present.    Pulmonary/Chest: Effort normal. No respiratory distress. She has wheezes (mild diffuse expiratory wheezes).   Abdominal: Soft. Bowel sounds are normal. There is no tenderness.   Musculoskeletal: Normal range of motion. She exhibits edema (mild swelling to bilateral feet).   Pt has bilateral palpable pedal pulses.  No calf tenderness bilaterally   Neurological: She is alert and oriented to person, place, and time.   Skin: Skin is warm and dry.   Psychiatric: Her behavior is normal. Judgment and thought content normal. Her mood appears anxious.   Nursing note and vitals reviewed.      Procedures         ED Course  ED Course   Value Comment By Time   ECG 12 Lead Per Dr. Wilhelm no acute ischemic changes. Sinus tachycardia with . Pulmonary disease pattern. Ricky Duval PA-C 07/07 0804    Pt's ED workup is unremarkable.  Pt states she is feeling some better.  Come to find out patient is out of her Neurontin due to her PCP being on a vacation.  This is most likely causing her leg pain due to her diabetic neuropathy.  Pt does have an appointment today with Dr. Garcia for a follow up from her recent hospital admission.  I have reminded the patient of this and have encouraged her to go to this appointment.  She will go home via [facility] and she states her son will bring her to her appointment. COREEN Akers 07/07 0957                  MDM  Number of Diagnoses or Management Options  Bilateral leg edema:   COPD with acute exacerbation:      Amount and/or Complexity of Data Reviewed  Clinical lab tests: reviewed and ordered  Tests in the radiology section of CPT®: ordered and reviewed  Independent visualization of images, tracings, or specimens: yes    Patient Progress  Patient progress: stable      Final diagnoses:   COPD with acute exacerbation   Bilateral leg edema            COREEN Akers  07/07/17  [id number]

## 2017-07-07 NOTE — DISCHARGE INSTRUCTIONS
Do not take your Metformin for 48 hours because you had IV contrast last night.  Managing Your Hypertension  Blood pressure is a measurement of how forceful your blood is pressing against the walls of the arteries. Arteries are muscular tubes within the circulatory system. Blood pressure does not stay the same. Blood pressure rises when you are active, excited, or nervous; and it lowers during sleep and relaxation. If the numbers measuring your blood pressure stay above normal most of the time, you are at risk for health problems. High blood pressure (hypertension) is a long-term (chronic) condition in which blood pressure is elevated.  A blood pressure reading is recorded as two numbers, such as 120 over 80 (or 120/80). The first, higher number is called the systolic pressure. It is a measure of the pressure in your arteries as the heart beats. The second, lower number is called the diastolic pressure. It is a measure of the pressure in your arteries as the heart relaxes between beats.   Keeping your blood pressure in a normal range is important to your overall health and prevention of health problems, such as heart disease and stroke. When your blood pressure is uncontrolled, your heart has to work harder than normal. High blood pressure is a very common condition in adults because blood pressure tends to rise with age. Men and women are equally likely to have hypertension but at different times in life. Before age 45, men are more likely to have hypertension. After 65 years of age, women are more likely to have it. Hypertension is especially common in  Americans. This condition often has no signs or symptoms. The cause of the condition is usually not known. Your caregiver can help you come up with a plan to keep your blood pressure in a normal, healthy range.  BLOOD PRESSURE STAGES  Blood pressure is classified into four stages: normal, prehypertension, stage 1, and stage 2. Your blood pressure reading  will be used to determine what type of treatment, if any, is necessary. Appropriate treatment options are tied to these four stages:   Normal  · Systolic pressure (mm Hg): below 120.  · Diastolic pressure (mm Hg): below 80.  Prehypertension  · Systolic pressure (mm Hg): 120 to 139.  · Diastolic pressure (mm Hg): 80 to 89.  Stage 1  · Systolic pressure (mm Hg): 140 to 159.  · Diastolic pressure (mm Hg): 90 to 99.  Stage 2  · Systolic pressure (mm Hg): 160 or above.  · Diastolic pressure (mm Hg): 100 or above.  RISKS RELATED TO HIGH BLOOD PRESSURE  Managing your blood pressure is an important responsibility. Uncontrolled high blood pressure can lead to:  · A heart attack.  · A stroke.  · A weakened blood vessel (aneurysm).  · Heart failure.  · Kidney damage.  · Eye damage.  · Metabolic syndrome.  · Memory and concentration problems.  HOW TO MANAGE YOUR BLOOD PRESSURE  Blood pressure can be managed effectively with lifestyle changes and medicines (if needed). Your caregiver will help you come up with a plan to bring your blood pressure within a normal range. Your plan should include the following:  Education  · Read all information provided by your caregivers about how to control blood pressure.  · Educate yourself on the latest guidelines and treatment recommendations. New research is always being done to further define the risks and treatments for high blood pressure.  Lifestyle changes  · Control your weight.  · Avoid smoking.  · Stay physically active.  · Reduce the amount of salt in your diet.  · Reduce stress.  · Control any chronic conditions, such as high cholesterol or diabetes.  · Reduce your alcohol intake.  Medicines  · Several medicines (antihypertensive medicines) are available, if needed, to bring blood pressure within a normal range.  Communication  · Review all the medicines you take with your caregiver because there may be side effects or interactions.  · Talk with your caregiver about your diet,  exercise habits, and other lifestyle factors that may be contributing to high blood pressure.  · See your caregiver regularly. Your caregiver can help you create and adjust your plan for managing high blood pressure.  RECOMMENDATIONS FOR TREATMENT AND FOLLOW-UP   The following recommendations are based on current guidelines for managing high blood pressure in nonpregnant adults. Use these recommendations to identify the proper follow-up period or treatment option based on your blood pressure reading. You can discuss these options with your caregiver.  · Systolic pressure of 120 to 139 or diastolic pressure of 80 to 89: Follow up with your caregiver as directed.  · Systolic pressure of 140 to 160 or diastolic pressure of 90 to 100: Follow up with your caregiver within 2 months.  · Systolic pressure above 160 or diastolic pressure above 100: Follow up with your caregiver within 1 month.  · Systolic pressure above 180 or diastolic pressure above 110: Consider antihypertensive therapy; follow up with your caregiver within 1 week.  · Systolic pressure above 200 or diastolic pressure above 120: Begin antihypertensive therapy; follow up with your caregiver within 1 week.     This information is not intended to replace advice given to you by your health care provider. Make sure you discuss any questions you have with your health care provider.     Document Released: 09/11/2013 Document Reviewed: 09/11/2013  [employer] Interactive Patient Education ©2017 [employer]

## 2017-07-12 ENCOUNTER — TELEPHONE (OUTPATIENT)
Dept: ORTHOPEDIC SURGERY | Facility: CLINIC | Age: 52
End: 2017-07-12

## 2017-07-12 LAB
BACTERIA SPEC AEROBE CULT: NORMAL
BACTERIA SPEC AEROBE CULT: NORMAL

## 2017-07-12 NOTE — TELEPHONE ENCOUNTER
Patient no show her appointment today, called the number [phone], hung up on twice, third time was able to leave a v/m asking for Pepito Yan to return the call.

## 2017-07-20 NOTE — OP NOTE
DATE OF SURGERY: 06/27/2017    PREOPERATIVE DIAGNOSIS: Abscess left arm     POSTOPERATIVE DIAGNOSIS: Abscess left arm     OPERATIONS PERFORMED: Incision and drainage abscess left arm     SURGEON: Shane Fuller MD      ASSISTANT: Lamont Vergara CSA     ANESTHESIA: General.     ESTIMATED BLOOD LOSS: 50 cc     ANTIBIOTICS: None     DESCRIPTION OF PROCEDURE: With the patient in the operating theater, general anesthetic administered, the left shoulder and arm sterilely prepped and draped in usual manner with a tourniquet applied to the upper arm.  Longitudinal incision was made, medial aspect of the left arm directly over the biceps muscle.  The area was indurated and firm.  This skin discoloration was greatest in this region.  With skin divided and reflected further palpation, revealed marked induration and suspicion of abscess within the biceps muscle belly medially.  The fascia was opened over this region and moderate amount of pus was expressed.  The tissue was gray.  Chronically infected.  Finger dissection, the muscle was split longitudinally and an additional purulent fluid was expressed.  This area was then aggressively irrigated.  With no other areas of obvious pathology identified, she was left open.  The skin was partially re-approximated, the depth of the wound was packed into this biceps muscle belly and then sterile dressing was applied with a loose bandage. She was lightened from anesthetic and taken to recovery room in stable condition.       Dictator Signature:___________________________  Shane Fuller M.D.

## 2017-07-22 ENCOUNTER — APPOINTMENT (OUTPATIENT)
Dept: CT IMAGING | Facility: HOSPITAL | Age: 52
End: 2017-07-22

## 2017-07-22 ENCOUNTER — HOSPITAL ENCOUNTER (INPATIENT)
Facility: HOSPITAL | Age: 52
LOS: 6 days | Discharge: HOME | End: 2017-07-28
Attending: FAMILY MEDICINE | Admitting: INTERNAL MEDICINE

## 2017-07-22 ENCOUNTER — APPOINTMENT (OUTPATIENT)
Dept: GENERAL RADIOLOGY | Facility: HOSPITAL | Age: 52
End: 2017-07-22

## 2017-07-22 ENCOUNTER — APPOINTMENT (OUTPATIENT)
Dept: NUCLEAR MEDICINE | Facility: HOSPITAL | Age: 52
End: 2017-07-22

## 2017-07-22 DIAGNOSIS — N39.0 URINARY TRACT INFECTION, SITE UNSPECIFIED: ICD-10-CM

## 2017-07-22 DIAGNOSIS — I47.29 VENTRICULAR TACHYCARDIA, NON-SUSTAINED (HCC): ICD-10-CM

## 2017-07-22 DIAGNOSIS — R07.9 CHEST PAIN IN ADULT: Primary | ICD-10-CM

## 2017-07-22 LAB
6-ACETYL MORPHINE: NEGATIVE
ALBUMIN SERPL-MCNC: 3.5 G/DL (ref 3.5–5)
ALBUMIN/GLOB SERPL: 1.2 G/DL (ref 1.5–2.5)
ALP SERPL-CCNC: 82 U/L (ref 35–104)
ALT SERPL W P-5'-P-CCNC: 40 U/L (ref 10–36)
AMPHET+METHAMPHET UR QL: NEGATIVE
ANION GAP SERPL CALCULATED.3IONS-SCNC: 3.2 MMOL/L (ref 3.6–11.2)
ANISOCYTOSIS BLD QL: ABNORMAL
AST SERPL-CCNC: 27 U/L (ref 10–30)
BACTERIA UR QL AUTO: ABNORMAL /HPF
BARBITURATES UR QL SCN: NEGATIVE
BENZODIAZ UR QL SCN: NEGATIVE
BILIRUB SERPL-MCNC: 0.3 MG/DL (ref 0.2–1.8)
BILIRUB UR QL STRIP: NEGATIVE
BNP SERPL-MCNC: 274 PG/ML (ref 0–100)
BUN BLD-MCNC: 20 MG/DL (ref 7–21)
BUN/CREAT SERPL: 32.8 (ref 7–25)
BUPRENORPHINE SERPL-MCNC: NEGATIVE NG/ML
CALCIUM SPEC-SCNC: 9 MG/DL (ref 7.7–10)
CANNABINOIDS SERPL QL: NEGATIVE
CHLORIDE SERPL-SCNC: 109 MMOL/L (ref 99–112)
CK MB SERPL-CCNC: 1.13 NG/ML (ref 0–5)
CK MB SERPL-RTO: 5.7 % (ref 0–3)
CK SERPL-CCNC: 20 U/L (ref 24–173)
CLARITY UR: ABNORMAL
CO2 SERPL-SCNC: 26.8 MMOL/L (ref 24.3–31.9)
COCAINE UR QL: NEGATIVE
COLOR UR: YELLOW
CREAT BLD-MCNC: 0.61 MG/DL (ref 0.43–1.29)
CRP SERPL-MCNC: 0.6 MG/DL (ref 0–0.99)
CRP SERPL-MCNC: 0.95 MG/DL (ref 0–0.99)
D DIMER PPP FEU-MCNC: 5.08 MCGFEU/ML (ref 0–0.5)
D-LACTATE SERPL-SCNC: 1 MMOL/L (ref 0.5–2)
DEPRECATED RDW RBC AUTO: 57.7 FL (ref 37–54)
ERYTHROCYTE [DISTWIDTH] IN BLOOD BY AUTOMATED COUNT: 20.3 % (ref 11.5–14.5)
ERYTHROCYTE [SEDIMENTATION RATE] IN BLOOD: 24 MM/HR (ref 0–30)
ERYTHROCYTE [SEDIMENTATION RATE] IN BLOOD: 35 MM/HR (ref 0–30)
GFR SERPL CREATININE-BSD FRML MDRD: 103 ML/MIN/1.73
GLOBULIN UR ELPH-MCNC: 3 GM/DL
GLUCOSE BLD-MCNC: 388 MG/DL (ref 70–110)
GLUCOSE UR STRIP-MCNC: ABNORMAL MG/DL
HCT VFR BLD AUTO: 36.4 % (ref 37–47)
HGB BLD-MCNC: 10.7 G/DL (ref 12–16)
HGB UR QL STRIP.AUTO: ABNORMAL
HYALINE CASTS UR QL AUTO: ABNORMAL /LPF
HYPOCHROMIA BLD QL: ABNORMAL
KETONES UR QL STRIP: NEGATIVE
LEUKOCYTE ESTERASE UR QL STRIP.AUTO: ABNORMAL
LYMPHOCYTES # BLD MANUAL: 0.28 10*3/MM3 (ref 1–3)
LYMPHOCYTES NFR BLD MANUAL: 3 % (ref 21–51)
MCH RBC QN AUTO: 22.8 PG (ref 27–33)
MCHC RBC AUTO-ENTMCNC: 29.4 G/DL (ref 33–37)
MCV RBC AUTO: 77.4 FL (ref 80–94)
METAMYELOCYTES NFR BLD MANUAL: 6 % (ref 0–0)
METHADONE UR QL SCN: NEGATIVE
MICROCYTES BLD QL: ABNORMAL
MYELOCYTES NFR BLD MANUAL: 1 % (ref 0–0)
MYOGLOBIN SERPL-MCNC: 24 NG/ML (ref 0–109)
NEUTROPHILS # BLD AUTO: 8.46 10*3/MM3 (ref 1.4–6.5)
NEUTROPHILS NFR BLD MANUAL: 89 % (ref 30–70)
NEUTS BAND NFR BLD MANUAL: 1 % (ref 4–12)
NITRITE UR QL STRIP: POSITIVE
OPIATES UR QL: POSITIVE
OSMOLALITY SERPL CALC.SUM OF ELEC: 296.2 MOSM/KG (ref 273–305)
OXYCODONE UR QL SCN: NEGATIVE
PCP UR QL SCN: NEGATIVE
PH UR STRIP.AUTO: 6.5 [PH] (ref 5–8)
PLAT MORPH BLD: NORMAL
PLATELET # BLD AUTO: 153 10*3/MM3 (ref 130–400)
PMV BLD AUTO: 10.4 FL (ref 6–10)
POTASSIUM BLD-SCNC: 4.2 MMOL/L (ref 3.5–5.3)
PROT SERPL-MCNC: 6.5 G/DL (ref 6–8)
PROT UR QL STRIP: ABNORMAL
RBC # BLD AUTO: 4.7 10*6/MM3 (ref 4.2–5.4)
RBC # UR: ABNORMAL /HPF
REF LAB TEST METHOD: ABNORMAL
SCAN SLIDE: NORMAL
SODIUM BLD-SCNC: 139 MMOL/L (ref 135–153)
SP GR UR STRIP: 1.02 (ref 1–1.03)
SQUAMOUS #/AREA URNS HPF: ABNORMAL /HPF
TROPONIN I SERPL-MCNC: 0.01 NG/ML
TROPONIN I SERPL-MCNC: 0.01 NG/ML
TROPONIN I SERPL-MCNC: 0.02 NG/ML
UROBILINOGEN UR QL STRIP: ABNORMAL
WBC NRBC COR # BLD: 9.4 10*3/MM3 (ref 4.5–12.5)
WBC UR QL AUTO: ABNORMAL /HPF
YEAST URNS QL MICRO: ABNORMAL /HPF

## 2017-07-22 PROCEDURE — 80307 DRUG TEST PRSMV CHEM ANLYZR: CPT | Performed by: FAMILY MEDICINE

## 2017-07-22 PROCEDURE — 71010 HC CHEST PA OR AP: CPT

## 2017-07-22 PROCEDURE — 78582 LUNG VENTILAT&PERFUS IMAGING: CPT | Performed by: RADIOLOGY

## 2017-07-22 PROCEDURE — 85025 COMPLETE CBC W/AUTO DIFF WBC: CPT | Performed by: FAMILY MEDICINE

## 2017-07-22 PROCEDURE — 74176 CT ABD & PELVIS W/O CONTRAST: CPT | Performed by: RADIOLOGY

## 2017-07-22 PROCEDURE — A9540 TC99M MAA: HCPCS | Performed by: INTERNAL MEDICINE

## 2017-07-22 PROCEDURE — 73200 CT UPPER EXTREMITY W/O DYE: CPT | Performed by: RADIOLOGY

## 2017-07-22 PROCEDURE — 93005 ELECTROCARDIOGRAM TRACING: CPT | Performed by: FAMILY MEDICINE

## 2017-07-22 PROCEDURE — 25010000002 MORPHINE PER 10 MG: Performed by: FAMILY MEDICINE

## 2017-07-22 PROCEDURE — 85652 RBC SED RATE AUTOMATED: CPT | Performed by: FAMILY MEDICINE

## 2017-07-22 PROCEDURE — 71010 XR CHEST 1 VW: CPT | Performed by: RADIOLOGY

## 2017-07-22 PROCEDURE — 87186 SC STD MICRODIL/AGAR DIL: CPT | Performed by: FAMILY MEDICINE

## 2017-07-22 PROCEDURE — 94760 N-INVAS EAR/PLS OXIMETRY 1: CPT

## 2017-07-22 PROCEDURE — 96375 TX/PRO/DX INJ NEW DRUG ADDON: CPT

## 2017-07-22 PROCEDURE — 0 TECHNETIUM ALBUMIN AGGREGATED: Performed by: INTERNAL MEDICINE

## 2017-07-22 PROCEDURE — 96361 HYDRATE IV INFUSION ADD-ON: CPT

## 2017-07-22 PROCEDURE — 87086 URINE CULTURE/COLONY COUNT: CPT | Performed by: FAMILY MEDICINE

## 2017-07-22 PROCEDURE — 25010000002 LORAZEPAM PER 2 MG: Performed by: FAMILY MEDICINE

## 2017-07-22 PROCEDURE — 87040 BLOOD CULTURE FOR BACTERIA: CPT | Performed by: FAMILY MEDICINE

## 2017-07-22 PROCEDURE — 96365 THER/PROPH/DIAG IV INF INIT: CPT

## 2017-07-22 PROCEDURE — 87077 CULTURE AEROBIC IDENTIFY: CPT | Performed by: FAMILY MEDICINE

## 2017-07-22 PROCEDURE — 36415 COLL VENOUS BLD VENIPUNCTURE: CPT

## 2017-07-22 PROCEDURE — 99223 1ST HOSP IP/OBS HIGH 75: CPT | Performed by: INTERNAL MEDICINE

## 2017-07-22 PROCEDURE — 94640 AIRWAY INHALATION TREATMENT: CPT

## 2017-07-22 PROCEDURE — 96376 TX/PRO/DX INJ SAME DRUG ADON: CPT

## 2017-07-22 PROCEDURE — 80053 COMPREHEN METABOLIC PANEL: CPT | Performed by: FAMILY MEDICINE

## 2017-07-22 PROCEDURE — 25010000002 VANCOMYCIN PER 500 MG: Performed by: FAMILY MEDICINE

## 2017-07-22 PROCEDURE — 94799 UNLISTED PULMONARY SVC/PX: CPT

## 2017-07-22 PROCEDURE — 82553 CREATINE MB FRACTION: CPT | Performed by: FAMILY MEDICINE

## 2017-07-22 PROCEDURE — 25010000002 ONDANSETRON PER 1 MG: Performed by: FAMILY MEDICINE

## 2017-07-22 PROCEDURE — 85379 FIBRIN DEGRADATION QUANT: CPT | Performed by: FAMILY MEDICINE

## 2017-07-22 PROCEDURE — 74176 CT ABD & PELVIS W/O CONTRAST: CPT

## 2017-07-22 PROCEDURE — 71250 CT THORAX DX C-: CPT | Performed by: RADIOLOGY

## 2017-07-22 PROCEDURE — 25010000002 HYDROMORPHONE PER 4 MG: Performed by: FAMILY MEDICINE

## 2017-07-22 PROCEDURE — 82550 ASSAY OF CK (CPK): CPT | Performed by: FAMILY MEDICINE

## 2017-07-22 PROCEDURE — 84484 ASSAY OF TROPONIN QUANT: CPT | Performed by: FAMILY MEDICINE

## 2017-07-22 PROCEDURE — 99285 EMERGENCY DEPT VISIT HI MDM: CPT

## 2017-07-22 PROCEDURE — 86140 C-REACTIVE PROTEIN: CPT | Performed by: FAMILY MEDICINE

## 2017-07-22 PROCEDURE — 0 TECHNETIUM TC 99M PENTETATE KIT: Performed by: INTERNAL MEDICINE

## 2017-07-22 PROCEDURE — 83605 ASSAY OF LACTIC ACID: CPT | Performed by: FAMILY MEDICINE

## 2017-07-22 PROCEDURE — 93010 ELECTROCARDIOGRAM REPORT: CPT | Performed by: INTERNAL MEDICINE

## 2017-07-22 PROCEDURE — 81001 URINALYSIS AUTO W/SCOPE: CPT | Performed by: FAMILY MEDICINE

## 2017-07-22 PROCEDURE — 83880 ASSAY OF NATRIURETIC PEPTIDE: CPT | Performed by: FAMILY MEDICINE

## 2017-07-22 PROCEDURE — 85007 BL SMEAR W/DIFF WBC COUNT: CPT | Performed by: FAMILY MEDICINE

## 2017-07-22 PROCEDURE — 83874 ASSAY OF MYOGLOBIN: CPT | Performed by: FAMILY MEDICINE

## 2017-07-22 PROCEDURE — 25010000002 METHYLPREDNISOLONE PER 125 MG: Performed by: FAMILY MEDICINE

## 2017-07-22 PROCEDURE — 73200 CT UPPER EXTREMITY W/O DYE: CPT

## 2017-07-22 PROCEDURE — 78582 LUNG VENTILAT&PERFUS IMAGING: CPT

## 2017-07-22 PROCEDURE — 71250 CT THORAX DX C-: CPT

## 2017-07-22 PROCEDURE — A9539 TC99M PENTETATE: HCPCS | Performed by: INTERNAL MEDICINE

## 2017-07-22 RX ORDER — NITROGLYCERIN 0.4 MG/1
0.4 TABLET SUBLINGUAL
Status: DISCONTINUED | OUTPATIENT
Start: 2017-07-22 | End: 2017-07-28 | Stop reason: HOSPADM

## 2017-07-22 RX ORDER — HYDROMORPHONE HYDROCHLORIDE 1 MG/ML
0.5 INJECTION, SOLUTION INTRAMUSCULAR; INTRAVENOUS; SUBCUTANEOUS ONCE
Status: COMPLETED | OUTPATIENT
Start: 2017-07-22 | End: 2017-07-22

## 2017-07-22 RX ORDER — SODIUM CHLORIDE 0.9 % (FLUSH) 0.9 %
1-10 SYRINGE (ML) INJECTION AS NEEDED
Status: DISCONTINUED | OUTPATIENT
Start: 2017-07-22 | End: 2017-07-28 | Stop reason: HOSPADM

## 2017-07-22 RX ORDER — NITROFURANTOIN 25; 75 MG/1; MG/1
100 CAPSULE ORAL EVERY 12 HOURS SCHEDULED
Status: DISCONTINUED | OUTPATIENT
Start: 2017-07-22 | End: 2017-07-23

## 2017-07-22 RX ORDER — IPRATROPIUM BROMIDE AND ALBUTEROL SULFATE 2.5; .5 MG/3ML; MG/3ML
3 SOLUTION RESPIRATORY (INHALATION) ONCE
Status: COMPLETED | OUTPATIENT
Start: 2017-07-22 | End: 2017-07-22

## 2017-07-22 RX ORDER — SODIUM CHLORIDE 0.9 % (FLUSH) 0.9 %
10 SYRINGE (ML) INJECTION AS NEEDED
Status: DISCONTINUED | OUTPATIENT
Start: 2017-07-22 | End: 2017-07-28 | Stop reason: HOSPADM

## 2017-07-22 RX ORDER — DOXYCYCLINE 100 MG/1
100 CAPSULE ORAL ONCE
Status: COMPLETED | OUTPATIENT
Start: 2017-07-22 | End: 2017-07-22

## 2017-07-22 RX ORDER — NITROGLYCERIN 0.4 MG/1
0.4 TABLET SUBLINGUAL
Status: COMPLETED | OUTPATIENT
Start: 2017-07-22 | End: 2017-07-22

## 2017-07-22 RX ORDER — IPRATROPIUM BROMIDE AND ALBUTEROL SULFATE 2.5; .5 MG/3ML; MG/3ML
3 SOLUTION RESPIRATORY (INHALATION) ONCE
Status: DISCONTINUED | OUTPATIENT
Start: 2017-07-22 | End: 2017-07-22

## 2017-07-22 RX ORDER — HEPARIN SODIUM 5000 [USP'U]/ML
5000 INJECTION, SOLUTION INTRAVENOUS; SUBCUTANEOUS EVERY 12 HOURS SCHEDULED
Status: DISCONTINUED | OUTPATIENT
Start: 2017-07-22 | End: 2017-07-24

## 2017-07-22 RX ORDER — NITROFURANTOIN 25; 75 MG/1; MG/1
CAPSULE ORAL
Status: COMPLETED
Start: 2017-07-22 | End: 2017-07-22

## 2017-07-22 RX ORDER — LORAZEPAM 2 MG/ML
0.5 INJECTION INTRAMUSCULAR ONCE
Status: COMPLETED | OUTPATIENT
Start: 2017-07-22 | End: 2017-07-22

## 2017-07-22 RX ORDER — MORPHINE SULFATE 2 MG/ML
2 INJECTION, SOLUTION INTRAMUSCULAR; INTRAVENOUS ONCE
Status: COMPLETED | OUTPATIENT
Start: 2017-07-22 | End: 2017-07-22

## 2017-07-22 RX ORDER — METHYLPREDNISOLONE SODIUM SUCCINATE 125 MG/2ML
125 INJECTION, POWDER, LYOPHILIZED, FOR SOLUTION INTRAMUSCULAR; INTRAVENOUS ONCE
Status: COMPLETED | OUTPATIENT
Start: 2017-07-22 | End: 2017-07-22

## 2017-07-22 RX ORDER — ACETAMINOPHEN 325 MG/1
1000 TABLET ORAL ONCE
Status: COMPLETED | OUTPATIENT
Start: 2017-07-22 | End: 2017-07-22

## 2017-07-22 RX ORDER — ONDANSETRON 2 MG/ML
4 INJECTION INTRAMUSCULAR; INTRAVENOUS ONCE
Status: COMPLETED | OUTPATIENT
Start: 2017-07-22 | End: 2017-07-22

## 2017-07-22 RX ADMIN — DOXYCYCLINE 100 MG: 100 CAPSULE ORAL at 18:17

## 2017-07-22 RX ADMIN — HYDROMORPHONE HYDROCHLORIDE 0.5 MG: 1 INJECTION, SOLUTION INTRAMUSCULAR; INTRAVENOUS; SUBCUTANEOUS at 17:41

## 2017-07-22 RX ADMIN — IPRATROPIUM BROMIDE AND ALBUTEROL SULFATE 3 ML: .5; 3 SOLUTION RESPIRATORY (INHALATION) at 15:04

## 2017-07-22 RX ADMIN — Medication 1 DOSE: at 21:50

## 2017-07-22 RX ADMIN — ONDANSETRON 4 MG: 2 INJECTION, SOLUTION INTRAMUSCULAR; INTRAVENOUS at 15:18

## 2017-07-22 RX ADMIN — SODIUM CHLORIDE 500 ML: 9 INJECTION, SOLUTION INTRAVENOUS at 17:43

## 2017-07-22 RX ADMIN — MORPHINE SULFATE 2 MG: 2 INJECTION, SOLUTION INTRAMUSCULAR; INTRAVENOUS at 15:20

## 2017-07-22 RX ADMIN — NITROGLYCERIN 0.4 MG: 0.4 TABLET SUBLINGUAL at 15:31

## 2017-07-22 RX ADMIN — NITROGLYCERIN 0.4 MG: 0.4 TABLET SUBLINGUAL at 15:26

## 2017-07-22 RX ADMIN — LORAZEPAM 0.5 MG: 2 INJECTION INTRAMUSCULAR; INTRAVENOUS at 18:16

## 2017-07-22 RX ADMIN — KIT FOR THE PREPARATION OF TECHNETIUM TC 99M PENTETATE 1 DOSE: 20 INJECTION, POWDER, LYOPHILIZED, FOR SOLUTION INTRAVENOUS; RESPIRATORY (INHALATION) at 21:44

## 2017-07-22 RX ADMIN — METHYLPREDNISOLONE SODIUM SUCCINATE 125 MG: 125 INJECTION, POWDER, FOR SOLUTION INTRAMUSCULAR; INTRAVENOUS at 15:15

## 2017-07-22 RX ADMIN — NITROGLYCERIN 0.4 MG: 0.4 TABLET SUBLINGUAL at 15:21

## 2017-07-22 RX ADMIN — NITROFURANTOIN MONOHYDRATE/MACROCRYSTALLINE 100 MG: 25; 75 CAPSULE ORAL at 19:05

## 2017-07-22 RX ADMIN — HYDROMORPHONE HYDROCHLORIDE 0.5 MG: 1 INJECTION, SOLUTION INTRAMUSCULAR; INTRAVENOUS; SUBCUTANEOUS at 18:14

## 2017-07-22 RX ADMIN — ACETAMINOPHEN 975 MG: 325 TABLET ORAL at 18:17

## 2017-07-22 RX ADMIN — SODIUM CHLORIDE 1000 ML: 9 INJECTION, SOLUTION INTRAVENOUS at 19:06

## 2017-07-22 RX ADMIN — IPRATROPIUM BROMIDE AND ALBUTEROL SULFATE 3 ML: .5; 3 SOLUTION RESPIRATORY (INHALATION) at 16:26

## 2017-07-22 RX ADMIN — VANCOMYCIN HYDROCHLORIDE 1000 MG: 5 INJECTION, POWDER, LYOPHILIZED, FOR SOLUTION INTRAVENOUS at 18:21

## 2017-07-23 PROBLEM — R07.9 CHEST PAIN IN ADULT: Status: ACTIVE | Noted: 2017-07-23

## 2017-07-23 LAB
ALBUMIN SERPL-MCNC: 3.3 G/DL (ref 3.5–5)
ALBUMIN/GLOB SERPL: 1.2 G/DL (ref 1.5–2.5)
ALP SERPL-CCNC: 121 U/L (ref 35–104)
ALT SERPL W P-5'-P-CCNC: 133 U/L (ref 10–36)
ANION GAP SERPL CALCULATED.3IONS-SCNC: 4 MMOL/L (ref 3.6–11.2)
ANISOCYTOSIS BLD QL: ABNORMAL
AST SERPL-CCNC: 175 U/L (ref 10–30)
BILIRUB SERPL-MCNC: 0.3 MG/DL (ref 0.2–1.8)
BUN BLD-MCNC: 26 MG/DL (ref 7–21)
BUN/CREAT SERPL: 23.9 (ref 7–25)
CALCIUM SPEC-SCNC: 8 MG/DL (ref 7.7–10)
CHLORIDE SERPL-SCNC: 115 MMOL/L (ref 99–112)
CK MB SERPL-CCNC: 0.57 NG/ML (ref 0–5)
CK MB SERPL-CCNC: 0.81 NG/ML (ref 0–5)
CK MB SERPL-CCNC: 1.25 NG/ML (ref 0–5)
CK MB SERPL-RTO: 2.4 % (ref 0–3)
CK SERPL-CCNC: 18 U/L (ref 24–173)
CK SERPL-CCNC: 24 U/L (ref 24–173)
CO2 SERPL-SCNC: 22 MMOL/L (ref 24.3–31.9)
CREAT BLD-MCNC: 1.09 MG/DL (ref 0.43–1.29)
CRP SERPL-MCNC: 9.62 MG/DL (ref 0–0.99)
DEPRECATED RDW RBC AUTO: 47.3 FL (ref 37–54)
EOSINOPHIL # BLD MANUAL: 0.11 10*3/MM3 (ref 0–0.7)
EOSINOPHIL NFR BLD MANUAL: 1 % (ref 0–5)
ERYTHROCYTE [DISTWIDTH] IN BLOOD BY AUTOMATED COUNT: 14.7 % (ref 11.5–14.5)
GFR SERPL CREATININE-BSD FRML MDRD: 53 ML/MIN/1.73
GLOBULIN UR ELPH-MCNC: 2.7 GM/DL
GLUCOSE BLD-MCNC: 307 MG/DL (ref 70–110)
GLUCOSE BLDC GLUCOMTR-MCNC: 155 MG/DL (ref 70–130)
GLUCOSE BLDC GLUCOMTR-MCNC: 319 MG/DL (ref 70–130)
GLUCOSE BLDC GLUCOMTR-MCNC: 324 MG/DL (ref 70–130)
HCT VFR BLD AUTO: 35.5 % (ref 37–47)
HGB BLD-MCNC: 11.3 G/DL (ref 12–16)
HYPOCHROMIA BLD QL: ABNORMAL
LYMPHOCYTES # BLD MANUAL: 0.33 10*3/MM3 (ref 1–3)
LYMPHOCYTES NFR BLD MANUAL: 3 % (ref 0–10)
LYMPHOCYTES NFR BLD MANUAL: 3 % (ref 21–51)
MAGNESIUM SERPL-MCNC: 1.5 MG/DL (ref 1.7–2.6)
MCH RBC QN AUTO: 28 PG (ref 27–33)
MCHC RBC AUTO-ENTMCNC: 31.8 G/DL (ref 33–37)
MCV RBC AUTO: 88.1 FL (ref 80–94)
METAMYELOCYTES NFR BLD MANUAL: 3 % (ref 0–0)
MICROCYTES BLD QL: ABNORMAL
MONOCYTES # BLD AUTO: 0.33 10*3/MM3 (ref 0.1–0.9)
MYOGLOBIN SERPL-MCNC: 18 NG/ML (ref 0–109)
MYOGLOBIN SERPL-MCNC: 25 NG/ML (ref 0–109)
MYOGLOBIN SERPL-MCNC: 28 NG/ML (ref 0–109)
NEUTROPHILS # BLD AUTO: 9.81 10*3/MM3 (ref 1.4–6.5)
NEUTROPHILS NFR BLD MANUAL: 84 % (ref 30–70)
NEUTS BAND NFR BLD MANUAL: 6 % (ref 4–12)
OSMOLALITY SERPL CALC.SUM OF ELEC: 297.6 MOSM/KG (ref 273–305)
PHOSPHATE SERPL-MCNC: 5 MG/DL (ref 2.7–4.5)
PLAT MORPH BLD: NORMAL
PLATELET # BLD AUTO: 224 10*3/MM3 (ref 130–400)
PMV BLD AUTO: 9.9 FL (ref 6–10)
POTASSIUM BLD-SCNC: 4 MMOL/L (ref 3.5–5.3)
PROT SERPL-MCNC: 6 G/DL (ref 6–8)
RBC # BLD AUTO: 4.03 10*6/MM3 (ref 4.2–5.4)
SODIUM BLD-SCNC: 141 MMOL/L (ref 135–153)
TROPONIN I SERPL-MCNC: 0.03 NG/ML
TROPONIN I SERPL-MCNC: 0.04 NG/ML
TROPONIN I SERPL-MCNC: 0.04 NG/ML
WBC NRBC COR # BLD: 10.9 10*3/MM3 (ref 4.5–12.5)

## 2017-07-23 PROCEDURE — 25010000002 HEPARIN (PORCINE) PER 1000 UNITS: Performed by: INTERNAL MEDICINE

## 2017-07-23 PROCEDURE — 82553 CREATINE MB FRACTION: CPT | Performed by: INTERNAL MEDICINE

## 2017-07-23 PROCEDURE — 85007 BL SMEAR W/DIFF WBC COUNT: CPT | Performed by: INTERNAL MEDICINE

## 2017-07-23 PROCEDURE — 25010000002 METHYLPREDNISOLONE PER 40 MG: Performed by: INTERNAL MEDICINE

## 2017-07-23 PROCEDURE — 63710000001 INSULIN DETEMIR PER 5 UNITS: Performed by: INTERNAL MEDICINE

## 2017-07-23 PROCEDURE — 84100 ASSAY OF PHOSPHORUS: CPT | Performed by: INTERNAL MEDICINE

## 2017-07-23 PROCEDURE — 25010000002 MAGNESIUM SULFATE 2 GM/50ML SOLUTION: Performed by: INTERNAL MEDICINE

## 2017-07-23 PROCEDURE — 99233 SBSQ HOSP IP/OBS HIGH 50: CPT | Performed by: INTERNAL MEDICINE

## 2017-07-23 PROCEDURE — 85025 COMPLETE CBC W/AUTO DIFF WBC: CPT | Performed by: INTERNAL MEDICINE

## 2017-07-23 PROCEDURE — 84484 ASSAY OF TROPONIN QUANT: CPT | Performed by: INTERNAL MEDICINE

## 2017-07-23 PROCEDURE — 25010000002 CEFEPIME: Performed by: INTERNAL MEDICINE

## 2017-07-23 PROCEDURE — 94799 UNLISTED PULMONARY SVC/PX: CPT

## 2017-07-23 PROCEDURE — 82962 GLUCOSE BLOOD TEST: CPT

## 2017-07-23 PROCEDURE — 83735 ASSAY OF MAGNESIUM: CPT | Performed by: INTERNAL MEDICINE

## 2017-07-23 PROCEDURE — 80053 COMPREHEN METABOLIC PANEL: CPT | Performed by: INTERNAL MEDICINE

## 2017-07-23 PROCEDURE — 63710000001 INSULIN ASPART PER 5 UNITS: Performed by: INTERNAL MEDICINE

## 2017-07-23 PROCEDURE — 82550 ASSAY OF CK (CPK): CPT | Performed by: INTERNAL MEDICINE

## 2017-07-23 PROCEDURE — 86140 C-REACTIVE PROTEIN: CPT | Performed by: INTERNAL MEDICINE

## 2017-07-23 PROCEDURE — 25010000002 CEFTRIAXONE: Performed by: INTERNAL MEDICINE

## 2017-07-23 PROCEDURE — 83874 ASSAY OF MYOGLOBIN: CPT | Performed by: INTERNAL MEDICINE

## 2017-07-23 RX ORDER — NICOTINE POLACRILEX 4 MG
15 LOZENGE BUCCAL
Status: DISCONTINUED | OUTPATIENT
Start: 2017-07-23 | End: 2017-07-28 | Stop reason: HOSPADM

## 2017-07-23 RX ORDER — UREA 10 %
1 LOTION (ML) TOPICAL DAILY
Status: DISCONTINUED | OUTPATIENT
Start: 2017-07-23 | End: 2017-07-26

## 2017-07-23 RX ORDER — ASPIRIN 325 MG
325 TABLET ORAL DAILY
Status: DISCONTINUED | OUTPATIENT
Start: 2017-07-23 | End: 2017-07-28 | Stop reason: HOSPADM

## 2017-07-23 RX ORDER — TIZANIDINE 4 MG/1
4 TABLET ORAL EVERY 8 HOURS PRN
Status: CANCELLED | OUTPATIENT
Start: 2017-07-23

## 2017-07-23 RX ORDER — HYDROXYZINE HYDROCHLORIDE 25 MG/1
25 TABLET, FILM COATED ORAL 2 TIMES DAILY
Status: CANCELLED | OUTPATIENT
Start: 2017-07-23

## 2017-07-23 RX ORDER — DEXTROSE MONOHYDRATE 25 G/50ML
25 INJECTION, SOLUTION INTRAVENOUS
Status: DISCONTINUED | OUTPATIENT
Start: 2017-07-23 | End: 2017-07-28 | Stop reason: HOSPADM

## 2017-07-23 RX ORDER — ONDANSETRON 4 MG/1
4 TABLET, FILM COATED ORAL EVERY 6 HOURS PRN
Status: CANCELLED | OUTPATIENT
Start: 2017-07-23

## 2017-07-23 RX ORDER — METHYLPREDNISOLONE SODIUM SUCCINATE 40 MG/ML
40 INJECTION, POWDER, LYOPHILIZED, FOR SOLUTION INTRAMUSCULAR; INTRAVENOUS EVERY 12 HOURS SCHEDULED
Status: DISCONTINUED | OUTPATIENT
Start: 2017-07-23 | End: 2017-07-24

## 2017-07-23 RX ORDER — IPRATROPIUM BROMIDE AND ALBUTEROL SULFATE 2.5; .5 MG/3ML; MG/3ML
3 SOLUTION RESPIRATORY (INHALATION)
Status: DISCONTINUED | OUTPATIENT
Start: 2017-07-23 | End: 2017-07-23

## 2017-07-23 RX ORDER — PREDNISONE 20 MG/1
20 TABLET ORAL EVERY MORNING
Status: DISCONTINUED | OUTPATIENT
Start: 2017-07-23 | End: 2017-07-23

## 2017-07-23 RX ORDER — MAGNESIUM SULFATE HEPTAHYDRATE 40 MG/ML
2 INJECTION, SOLUTION INTRAVENOUS
Status: DISPENSED | OUTPATIENT
Start: 2017-07-23 | End: 2017-07-23

## 2017-07-23 RX ORDER — MAGNESIUM SULFATE HEPTAHYDRATE 40 MG/ML
2 INJECTION, SOLUTION INTRAVENOUS AS NEEDED
Status: DISCONTINUED | OUTPATIENT
Start: 2017-07-23 | End: 2017-07-28 | Stop reason: HOSPADM

## 2017-07-23 RX ORDER — SUCRALFATE 1 G/1
1 TABLET ORAL 4 TIMES DAILY
Status: CANCELLED | OUTPATIENT
Start: 2017-07-23

## 2017-07-23 RX ORDER — MAGNESIUM SULFATE HEPTAHYDRATE 40 MG/ML
4 INJECTION, SOLUTION INTRAVENOUS AS NEEDED
Status: DISCONTINUED | OUTPATIENT
Start: 2017-07-23 | End: 2017-07-28 | Stop reason: HOSPADM

## 2017-07-23 RX ORDER — HYDROCODONE BITARTRATE AND ACETAMINOPHEN 5; 325 MG/1; MG/1
1 TABLET ORAL EVERY 6 HOURS PRN
Status: DISCONTINUED | OUTPATIENT
Start: 2017-07-23 | End: 2017-07-24

## 2017-07-23 RX ADMIN — MAGNESIUM SULFATE IN WATER 2 G: 40 INJECTION, SOLUTION INTRAVENOUS at 09:28

## 2017-07-23 RX ADMIN — HYDROCODONE BITARTRATE AND ACETAMINOPHEN 1 TABLET: 5; 325 TABLET ORAL at 16:32

## 2017-07-23 RX ADMIN — METHYLPREDNISOLONE SODIUM SUCCINATE 40 MG: 40 INJECTION, POWDER, FOR SOLUTION INTRAMUSCULAR; INTRAVENOUS at 04:08

## 2017-07-23 RX ADMIN — HEPARIN SODIUM 5000 UNITS: 5000 INJECTION, SOLUTION INTRAVENOUS; SUBCUTANEOUS at 09:27

## 2017-07-23 RX ADMIN — MAGNESIUM SULFATE IN WATER 2 G: 40 INJECTION, SOLUTION INTRAVENOUS at 10:50

## 2017-07-23 RX ADMIN — CEFEPIME 2 G: 2 INJECTION, POWDER, FOR SOLUTION INTRAVENOUS at 19:43

## 2017-07-23 RX ADMIN — INSULIN ASPART 2 UNITS: 100 INJECTION, SOLUTION INTRAVENOUS; SUBCUTANEOUS at 21:47

## 2017-07-23 RX ADMIN — INSULIN ASPART 5 UNITS: 100 INJECTION, SOLUTION INTRAVENOUS; SUBCUTANEOUS at 09:26

## 2017-07-23 RX ADMIN — IPRATROPIUM BROMIDE 0.5 MG: 0.5 SOLUTION RESPIRATORY (INHALATION) at 06:27

## 2017-07-23 RX ADMIN — IPRATROPIUM BROMIDE 0.5 MG: 0.5 SOLUTION RESPIRATORY (INHALATION) at 20:00

## 2017-07-23 RX ADMIN — MAGNESIUM SULFATE IN WATER 2 G: 40 INJECTION, SOLUTION INTRAVENOUS at 12:00

## 2017-07-23 RX ADMIN — HYDROCODONE BITARTRATE AND ACETAMINOPHEN 1 TABLET: 5; 325 TABLET ORAL at 21:48

## 2017-07-23 RX ADMIN — ASPIRIN 325 MG: 325 TABLET ORAL at 09:27

## 2017-07-23 RX ADMIN — CEFTRIAXONE 1 G: 1 INJECTION, POWDER, FOR SOLUTION INTRAMUSCULAR; INTRAVENOUS at 04:08

## 2017-07-23 RX ADMIN — CEFEPIME 2 G: 2 INJECTION, POWDER, FOR SOLUTION INTRAVENOUS at 14:35

## 2017-07-23 RX ADMIN — NITROFURANTOIN MONOHYDRATE/MACROCRYSTALLINE 100 MG: 25; 75 CAPSULE ORAL at 09:27

## 2017-07-23 RX ADMIN — METHYLPREDNISOLONE SODIUM SUCCINATE 40 MG: 40 INJECTION, POWDER, FOR SOLUTION INTRAMUSCULAR; INTRAVENOUS at 21:48

## 2017-07-23 RX ADMIN — INSULIN ASPART 5 UNITS: 100 INJECTION, SOLUTION INTRAVENOUS; SUBCUTANEOUS at 16:31

## 2017-07-23 RX ADMIN — DOXYCYCLINE 100 MG: 100 INJECTION, POWDER, LYOPHILIZED, FOR SOLUTION INTRAVENOUS at 06:10

## 2017-07-23 RX ADMIN — IPRATROPIUM BROMIDE 0.5 MG: 0.5 SOLUTION RESPIRATORY (INHALATION) at 12:22

## 2017-07-23 RX ADMIN — HEPARIN SODIUM 5000 UNITS: 5000 INJECTION, SOLUTION INTRAVENOUS; SUBCUTANEOUS at 21:48

## 2017-07-23 RX ADMIN — HEPARIN SODIUM 5000 UNITS: 5000 INJECTION, SOLUTION INTRAVENOUS; SUBCUTANEOUS at 00:15

## 2017-07-23 RX ADMIN — METHYLPREDNISOLONE SODIUM SUCCINATE 40 MG: 40 INJECTION, POWDER, FOR SOLUTION INTRAMUSCULAR; INTRAVENOUS at 09:28

## 2017-07-23 RX ADMIN — DOXYCYCLINE 100 MG: 100 INJECTION, POWDER, LYOPHILIZED, FOR SOLUTION INTRAVENOUS at 17:28

## 2017-07-23 RX ADMIN — Medication 1 TABLET: at 09:27

## 2017-07-23 RX ADMIN — INSULIN DETEMIR 20 UNITS: 100 INJECTION, SOLUTION SUBCUTANEOUS at 09:29

## 2017-07-24 ENCOUNTER — APPOINTMENT (OUTPATIENT)
Dept: GENERAL RADIOLOGY | Facility: HOSPITAL | Age: 52
End: 2017-07-24

## 2017-07-24 ENCOUNTER — APPOINTMENT (OUTPATIENT)
Dept: CARDIOLOGY | Facility: HOSPITAL | Age: 52
End: 2017-07-24
Attending: INTERNAL MEDICINE

## 2017-07-24 PROBLEM — R78.81 GRAM-NEGATIVE BACTEREMIA: Status: ACTIVE | Noted: 2017-07-24

## 2017-07-24 LAB
ALBUMIN SERPL-MCNC: 3.2 G/DL (ref 3.5–5)
ALBUMIN/GLOB SERPL: 1.2 G/DL (ref 1.5–2.5)
ALP SERPL-CCNC: 97 U/L (ref 35–104)
ALT SERPL W P-5'-P-CCNC: 89 U/L (ref 10–36)
ANION GAP SERPL CALCULATED.3IONS-SCNC: 1 MMOL/L (ref 3.6–11.2)
ANISOCYTOSIS BLD QL: NORMAL
AST SERPL-CCNC: 52 U/L (ref 10–30)
BASOPHILS # BLD AUTO: 0.01 10*3/MM3 (ref 0–0.3)
BASOPHILS NFR BLD AUTO: 0.1 % (ref 0–2)
BH CV ECHO MEAS - % IVS THICK: 15.6 %
BH CV ECHO MEAS - % LVPW THICK: 100 %
BH CV ECHO MEAS - ACS: 1.8 CM
BH CV ECHO MEAS - AO ROOT AREA (BSA CORRECTED): 1.7
BH CV ECHO MEAS - AO ROOT AREA: 6 CM^2
BH CV ECHO MEAS - AO ROOT DIAM: 2.8 CM
BH CV ECHO MEAS - BSA(HAYCOCK): 1.6 M^2
BH CV ECHO MEAS - BSA: 1.6 M^2
BH CV ECHO MEAS - BZI_BMI: 22.1 KILOGRAMS/M^2
BH CV ECHO MEAS - BZI_METRIC_HEIGHT: 160 CM
BH CV ECHO MEAS - BZI_METRIC_WEIGHT: 56.7 KG
BH CV ECHO MEAS - CONTRAST EF 4CH: 57.3 ML/M^2
BH CV ECHO MEAS - EDV(CUBED): 167.2 ML
BH CV ECHO MEAS - EDV(MOD-SP4): 82 ML
BH CV ECHO MEAS - EDV(TEICH): 148 ML
BH CV ECHO MEAS - EF(CUBED): 63.9 %
BH CV ECHO MEAS - EF(MOD-SP4): 57.3 %
BH CV ECHO MEAS - EF(TEICH): 54.9 %
BH CV ECHO MEAS - ESV(CUBED): 60.3 ML
BH CV ECHO MEAS - ESV(MOD-SP4): 35 ML
BH CV ECHO MEAS - ESV(TEICH): 66.7 ML
BH CV ECHO MEAS - FS: 28.8 %
BH CV ECHO MEAS - IVS/LVPW: 1
BH CV ECHO MEAS - IVSD: 0.74 CM
BH CV ECHO MEAS - IVSS: 0.86 CM
BH CV ECHO MEAS - LA DIMENSION: 3.7 CM
BH CV ECHO MEAS - LA/AO: 1.3
BH CV ECHO MEAS - LV DIASTOLIC VOL/BSA (35-75): 51.8 ML/M^2
BH CV ECHO MEAS - LV MASS(C)D: 142.6 GRAMS
BH CV ECHO MEAS - LV MASS(C)DI: 90 GRAMS/M^2
BH CV ECHO MEAS - LV MASS(C)S: 149.1 GRAMS
BH CV ECHO MEAS - LV MASS(C)SI: 94.1 GRAMS/M^2
BH CV ECHO MEAS - LV SYSTOLIC VOL/BSA (12-30): 22.1 ML/M^2
BH CV ECHO MEAS - LVIDD: 5.5 CM
BH CV ECHO MEAS - LVIDS: 3.9 CM
BH CV ECHO MEAS - LVLD AP4: 8.1 CM
BH CV ECHO MEAS - LVLS AP4: 6.7 CM
BH CV ECHO MEAS - LVOT AREA (M): 2.5 CM^2
BH CV ECHO MEAS - LVOT AREA: 2.5 CM^2
BH CV ECHO MEAS - LVOT DIAM: 1.8 CM
BH CV ECHO MEAS - LVPWD: 0.71 CM
BH CV ECHO MEAS - LVPWS: 1.4 CM
BH CV ECHO MEAS - MV A MAX VEL: 144.2 CM/SEC
BH CV ECHO MEAS - MV E MAX VEL: 133.3 CM/SEC
BH CV ECHO MEAS - MV E/A: 0.92
BH CV ECHO MEAS - PA ACC SLOPE: 1063 CM/SEC^2
BH CV ECHO MEAS - PA ACC TIME: 0.12 SEC
BH CV ECHO MEAS - PA PR(ACCEL): 26.7 MMHG
BH CV ECHO MEAS - RAP SYSTOLE: 10 MMHG
BH CV ECHO MEAS - RVDD: 0.91 CM
BH CV ECHO MEAS - RVSP: 24.9 MMHG
BH CV ECHO MEAS - SI(CUBED): 67.5 ML/M^2
BH CV ECHO MEAS - SI(MOD-SP4): 29.7 ML/M^2
BH CV ECHO MEAS - SI(TEICH): 51.3 ML/M^2
BH CV ECHO MEAS - SV(CUBED): 106.9 ML
BH CV ECHO MEAS - SV(MOD-SP4): 47 ML
BH CV ECHO MEAS - SV(TEICH): 81.2 ML
BH CV ECHO MEAS - TR MAX VEL: 192.7 CM/SEC
BILIRUB SERPL-MCNC: 0.2 MG/DL (ref 0.2–1.8)
BUN BLD-MCNC: 30 MG/DL (ref 7–21)
BUN/CREAT SERPL: 54.5 (ref 7–25)
CALCIUM SPEC-SCNC: 8.3 MG/DL (ref 7.7–10)
CHLORIDE SERPL-SCNC: 114 MMOL/L (ref 99–112)
CO2 SERPL-SCNC: 25 MMOL/L (ref 24.3–31.9)
CREAT BLD-MCNC: 0.55 MG/DL (ref 0.43–1.29)
CRP SERPL-MCNC: 12.6 MG/DL (ref 0–0.99)
DEPRECATED RDW RBC AUTO: 57.4 FL (ref 37–54)
EOSINOPHIL # BLD AUTO: 0 10*3/MM3 (ref 0–0.7)
EOSINOPHIL NFR BLD AUTO: 0 % (ref 0–5)
ERYTHROCYTE [DISTWIDTH] IN BLOOD BY AUTOMATED COUNT: 21.2 % (ref 11.5–14.5)
GFR SERPL CREATININE-BSD FRML MDRD: 117 ML/MIN/1.73
GLOBULIN UR ELPH-MCNC: 2.6 GM/DL
GLUCOSE BLD-MCNC: 127 MG/DL (ref 70–110)
GLUCOSE BLDC GLUCOMTR-MCNC: 197 MG/DL (ref 70–130)
GLUCOSE BLDC GLUCOMTR-MCNC: 253 MG/DL (ref 70–130)
GLUCOSE BLDC GLUCOMTR-MCNC: 322 MG/DL (ref 70–130)
GLUCOSE BLDC GLUCOMTR-MCNC: 343 MG/DL (ref 70–130)
HCT VFR BLD AUTO: 32.3 % (ref 37–47)
HGB BLD-MCNC: 9.3 G/DL (ref 12–16)
HYPOCHROMIA BLD QL: NORMAL
IMM GRANULOCYTES # BLD: 0.1 10*3/MM3 (ref 0–0.03)
IMM GRANULOCYTES NFR BLD: 0.6 % (ref 0–0.5)
LV EF 2D ECHO EST: 45 %
LYMPHOCYTES # BLD AUTO: 0.41 10*3/MM3 (ref 1–3)
LYMPHOCYTES NFR BLD AUTO: 2.6 % (ref 21–51)
MAGNESIUM SERPL-MCNC: 2.4 MG/DL (ref 1.7–2.6)
MCH RBC QN AUTO: 22.5 PG (ref 27–33)
MCHC RBC AUTO-ENTMCNC: 28.8 G/DL (ref 33–37)
MCV RBC AUTO: 78.2 FL (ref 80–94)
MONOCYTES # BLD AUTO: 0.39 10*3/MM3 (ref 0.1–0.9)
MONOCYTES NFR BLD AUTO: 2.5 % (ref 0–10)
NEUTROPHILS # BLD AUTO: 14.8 10*3/MM3 (ref 1.4–6.5)
NEUTROPHILS NFR BLD AUTO: 94.2 % (ref 30–70)
OSMOLALITY SERPL CALC.SUM OF ELEC: 287.2 MOSM/KG (ref 273–305)
PHOSPHATE SERPL-MCNC: 2.3 MG/DL (ref 2.7–4.5)
PLAT MORPH BLD: NORMAL
PLATELET # BLD AUTO: 120 10*3/MM3 (ref 130–400)
PMV BLD AUTO: 10.7 FL (ref 6–10)
POTASSIUM BLD-SCNC: 4.1 MMOL/L (ref 3.5–5.3)
PROT SERPL-MCNC: 5.8 G/DL (ref 6–8)
RBC # BLD AUTO: 4.13 10*6/MM3 (ref 4.2–5.4)
SODIUM BLD-SCNC: 140 MMOL/L (ref 135–153)
WBC NRBC COR # BLD: 15.71 10*3/MM3 (ref 4.5–12.5)

## 2017-07-24 PROCEDURE — 93306 TTE W/DOPPLER COMPLETE: CPT

## 2017-07-24 PROCEDURE — 82962 GLUCOSE BLOOD TEST: CPT

## 2017-07-24 PROCEDURE — 94799 UNLISTED PULMONARY SVC/PX: CPT

## 2017-07-24 PROCEDURE — 25010000002 METHYLPREDNISOLONE PER 40 MG: Performed by: INTERNAL MEDICINE

## 2017-07-24 PROCEDURE — 99221 1ST HOSP IP/OBS SF/LOW 40: CPT | Performed by: PHYSICIAN ASSISTANT

## 2017-07-24 PROCEDURE — 99233 SBSQ HOSP IP/OBS HIGH 50: CPT | Performed by: INTERNAL MEDICINE

## 2017-07-24 PROCEDURE — 63710000001 INSULIN ASPART PER 5 UNITS: Performed by: INTERNAL MEDICINE

## 2017-07-24 PROCEDURE — 25010000002 CEFEPIME: Performed by: INTERNAL MEDICINE

## 2017-07-24 PROCEDURE — 86140 C-REACTIVE PROTEIN: CPT | Performed by: INTERNAL MEDICINE

## 2017-07-24 PROCEDURE — 84100 ASSAY OF PHOSPHORUS: CPT | Performed by: INTERNAL MEDICINE

## 2017-07-24 PROCEDURE — 99024 POSTOP FOLLOW-UP VISIT: CPT | Performed by: PHYSICIAN ASSISTANT

## 2017-07-24 PROCEDURE — 73080 X-RAY EXAM OF ELBOW: CPT | Performed by: RADIOLOGY

## 2017-07-24 PROCEDURE — 73502 X-RAY EXAM HIP UNI 2-3 VIEWS: CPT | Performed by: RADIOLOGY

## 2017-07-24 PROCEDURE — 63710000001 INSULIN DETEMIR PER 5 UNITS: Performed by: INTERNAL MEDICINE

## 2017-07-24 PROCEDURE — 80053 COMPREHEN METABOLIC PANEL: CPT | Performed by: INTERNAL MEDICINE

## 2017-07-24 PROCEDURE — 85025 COMPLETE CBC W/AUTO DIFF WBC: CPT | Performed by: INTERNAL MEDICINE

## 2017-07-24 PROCEDURE — 83735 ASSAY OF MAGNESIUM: CPT | Performed by: INTERNAL MEDICINE

## 2017-07-24 PROCEDURE — 99222 1ST HOSP IP/OBS MODERATE 55: CPT | Performed by: INTERNAL MEDICINE

## 2017-07-24 PROCEDURE — 85007 BL SMEAR W/DIFF WBC COUNT: CPT | Performed by: INTERNAL MEDICINE

## 2017-07-24 PROCEDURE — 73080 X-RAY EXAM OF ELBOW: CPT

## 2017-07-24 PROCEDURE — 73502 X-RAY EXAM HIP UNI 2-3 VIEWS: CPT

## 2017-07-24 PROCEDURE — 93306 TTE W/DOPPLER COMPLETE: CPT | Performed by: INTERNAL MEDICINE

## 2017-07-24 PROCEDURE — 25010000002 HEPARIN (PORCINE) PER 1000 UNITS: Performed by: INTERNAL MEDICINE

## 2017-07-24 RX ORDER — METHYLPREDNISOLONE SODIUM SUCCINATE 40 MG/ML
20 INJECTION, POWDER, LYOPHILIZED, FOR SOLUTION INTRAMUSCULAR; INTRAVENOUS EVERY 12 HOURS SCHEDULED
Status: COMPLETED | OUTPATIENT
Start: 2017-07-24 | End: 2017-07-25

## 2017-07-24 RX ORDER — HYDROCODONE BITARTRATE AND ACETAMINOPHEN 5; 325 MG/1; MG/1
1 TABLET ORAL EVERY 6 HOURS PRN
Status: DISCONTINUED | OUTPATIENT
Start: 2017-07-24 | End: 2017-07-28 | Stop reason: HOSPADM

## 2017-07-24 RX ORDER — ALBUTEROL SULFATE 2.5 MG/3ML
2.5 SOLUTION RESPIRATORY (INHALATION) EVERY 6 HOURS PRN
Status: DISCONTINUED | OUTPATIENT
Start: 2017-07-24 | End: 2017-07-28 | Stop reason: HOSPADM

## 2017-07-24 RX ORDER — DOXYCYCLINE 100 MG/1
100 CAPSULE ORAL EVERY 12 HOURS SCHEDULED
Status: DISCONTINUED | OUTPATIENT
Start: 2017-07-24 | End: 2017-07-28 | Stop reason: HOSPADM

## 2017-07-24 RX ORDER — PROMETHAZINE HYDROCHLORIDE 25 MG/1
25 TABLET ORAL EVERY 6 HOURS PRN
Status: DISCONTINUED | OUTPATIENT
Start: 2017-07-24 | End: 2017-07-28 | Stop reason: HOSPADM

## 2017-07-24 RX ORDER — GABAPENTIN 300 MG/1
600 CAPSULE ORAL EVERY 8 HOURS SCHEDULED
Status: DISCONTINUED | OUTPATIENT
Start: 2017-07-24 | End: 2017-07-28 | Stop reason: HOSPADM

## 2017-07-24 RX ADMIN — CEFEPIME 2 G: 2 INJECTION, POWDER, FOR SOLUTION INTRAVENOUS at 11:46

## 2017-07-24 RX ADMIN — IPRATROPIUM BROMIDE 0.5 MG: 0.5 SOLUTION RESPIRATORY (INHALATION) at 06:13

## 2017-07-24 RX ADMIN — HEPARIN SODIUM 5000 UNITS: 5000 INJECTION, SOLUTION INTRAVENOUS; SUBCUTANEOUS at 08:14

## 2017-07-24 RX ADMIN — HYDROCODONE BITARTRATE AND ACETAMINOPHEN 1 TABLET: 5; 325 TABLET ORAL at 03:45

## 2017-07-24 RX ADMIN — INSULIN ASPART 5 UNITS: 100 INJECTION, SOLUTION INTRAVENOUS; SUBCUTANEOUS at 17:20

## 2017-07-24 RX ADMIN — CEFEPIME 2 G: 2 INJECTION, POWDER, FOR SOLUTION INTRAVENOUS at 21:00

## 2017-07-24 RX ADMIN — INSULIN ASPART 5 UNITS: 100 INJECTION, SOLUTION INTRAVENOUS; SUBCUTANEOUS at 17:19

## 2017-07-24 RX ADMIN — IPRATROPIUM BROMIDE 0.5 MG: 0.5 SOLUTION RESPIRATORY (INHALATION) at 12:31

## 2017-07-24 RX ADMIN — METHYLPREDNISOLONE SODIUM SUCCINATE 40 MG: 40 INJECTION, POWDER, FOR SOLUTION INTRAMUSCULAR; INTRAVENOUS at 08:15

## 2017-07-24 RX ADMIN — GABAPENTIN 600 MG: 300 CAPSULE ORAL at 08:24

## 2017-07-24 RX ADMIN — INSULIN ASPART 5 UNITS: 100 INJECTION, SOLUTION INTRAVENOUS; SUBCUTANEOUS at 11:43

## 2017-07-24 RX ADMIN — DOXYCYCLINE 100 MG: 100 INJECTION, POWDER, LYOPHILIZED, FOR SOLUTION INTRAVENOUS at 07:21

## 2017-07-24 RX ADMIN — INSULIN DETEMIR 30 UNITS: 100 INJECTION, SOLUTION SUBCUTANEOUS at 08:14

## 2017-07-24 RX ADMIN — INSULIN ASPART 2 UNITS: 100 INJECTION, SOLUTION INTRAVENOUS; SUBCUTANEOUS at 08:13

## 2017-07-24 RX ADMIN — GABAPENTIN 600 MG: 300 CAPSULE ORAL at 21:00

## 2017-07-24 RX ADMIN — HYDROCODONE BITARTRATE AND ACETAMINOPHEN 1 TABLET: 5; 325 TABLET ORAL at 14:04

## 2017-07-24 RX ADMIN — GABAPENTIN 600 MG: 300 CAPSULE ORAL at 14:03

## 2017-07-24 RX ADMIN — IPRATROPIUM BROMIDE 0.5 MG: 0.5 SOLUTION RESPIRATORY (INHALATION) at 01:15

## 2017-07-24 RX ADMIN — INSULIN ASPART 4 UNITS: 100 INJECTION, SOLUTION INTRAVENOUS; SUBCUTANEOUS at 21:12

## 2017-07-24 RX ADMIN — DOXYCYCLINE 100 MG: 100 CAPSULE ORAL at 21:00

## 2017-07-24 RX ADMIN — CEFEPIME 2 G: 2 INJECTION, POWDER, FOR SOLUTION INTRAVENOUS at 03:21

## 2017-07-24 RX ADMIN — INSULIN ASPART 5 UNITS: 100 INJECTION, SOLUTION INTRAVENOUS; SUBCUTANEOUS at 11:45

## 2017-07-24 RX ADMIN — ASPIRIN 325 MG: 325 TABLET ORAL at 08:14

## 2017-07-24 RX ADMIN — Medication 1 TABLET: at 08:14

## 2017-07-24 RX ADMIN — HYDROCODONE BITARTRATE AND ACETAMINOPHEN 1 TABLET: 5; 325 TABLET ORAL at 08:24

## 2017-07-24 RX ADMIN — METHYLPREDNISOLONE SODIUM SUCCINATE 20 MG: 40 INJECTION, POWDER, FOR SOLUTION INTRAMUSCULAR; INTRAVENOUS at 21:00

## 2017-07-24 RX ADMIN — IPRATROPIUM BROMIDE 0.5 MG: 0.5 SOLUTION RESPIRATORY (INHALATION) at 19:56

## 2017-07-24 RX ADMIN — HYDROCODONE BITARTRATE AND ACETAMINOPHEN 1 TABLET: 5; 325 TABLET ORAL at 21:00

## 2017-07-25 ENCOUNTER — APPOINTMENT (OUTPATIENT)
Dept: CARDIOLOGY | Facility: HOSPITAL | Age: 52
End: 2017-07-25
Attending: INTERNAL MEDICINE

## 2017-07-25 ENCOUNTER — APPOINTMENT (OUTPATIENT)
Dept: NUCLEAR MEDICINE | Facility: HOSPITAL | Age: 52
End: 2017-07-25
Attending: INTERNAL MEDICINE

## 2017-07-25 LAB
ALBUMIN SERPL-MCNC: 3.2 G/DL (ref 3.5–5)
ALBUMIN/GLOB SERPL: 1.2 G/DL (ref 1.5–2.5)
ALP SERPL-CCNC: 96 U/L (ref 35–104)
ALT SERPL W P-5'-P-CCNC: 66 U/L (ref 10–36)
ANION GAP SERPL CALCULATED.3IONS-SCNC: 7.5 MMOL/L (ref 3.6–11.2)
ANISOCYTOSIS BLD QL: NORMAL
AST SERPL-CCNC: 28 U/L (ref 10–30)
BASOPHILS # BLD AUTO: 0.01 10*3/MM3 (ref 0–0.3)
BASOPHILS NFR BLD AUTO: 0.1 % (ref 0–2)
BH CV STRESS BP STAGE 1: NORMAL
BH CV STRESS BP STAGE 2: NORMAL
BH CV STRESS COMMENTS STAGE 1: NORMAL
BH CV STRESS COMMENTS STAGE 2: NORMAL
BH CV STRESS DOSE REGADENOSON STAGE 1: 0.4
BH CV STRESS DURATION MIN STAGE 1: 0
BH CV STRESS DURATION MIN STAGE 2: 4
BH CV STRESS DURATION SEC STAGE 1: 15
BH CV STRESS DURATION SEC STAGE 2: 0
BH CV STRESS HR STAGE 1: 129
BH CV STRESS HR STAGE 2: 114
BH CV STRESS PROTOCOL 1: NORMAL
BH CV STRESS RECOVERY BP: NORMAL MMHG
BH CV STRESS RECOVERY HR: 114 BPM
BH CV STRESS STAGE 1: 1
BH CV STRESS STAGE 2: 2
BILIRUB SERPL-MCNC: 0.2 MG/DL (ref 0.2–1.8)
BUN BLD-MCNC: 25 MG/DL (ref 7–21)
BUN/CREAT SERPL: 37.3 (ref 7–25)
CALCIUM SPEC-SCNC: 9.1 MG/DL (ref 7.7–10)
CHLORIDE SERPL-SCNC: 113 MMOL/L (ref 99–112)
CO2 SERPL-SCNC: 18.5 MMOL/L (ref 24.3–31.9)
CREAT BLD-MCNC: 0.67 MG/DL (ref 0.43–1.29)
CRP SERPL-MCNC: 4.66 MG/DL (ref 0–0.99)
DEPRECATED RDW RBC AUTO: 57.8 FL (ref 37–54)
EOSINOPHIL # BLD AUTO: 0 10*3/MM3 (ref 0–0.7)
EOSINOPHIL NFR BLD AUTO: 0 % (ref 0–5)
ERYTHROCYTE [DISTWIDTH] IN BLOOD BY AUTOMATED COUNT: 20.8 % (ref 11.5–14.5)
FOLATE SERPL-MCNC: 8.78 NG/ML (ref 5.4–20)
GFR SERPL CREATININE-BSD FRML MDRD: 93 ML/MIN/1.73
GLOBULIN UR ELPH-MCNC: 2.7 GM/DL
GLUCOSE BLD-MCNC: 345 MG/DL (ref 70–110)
GLUCOSE BLDC GLUCOMTR-MCNC: 288 MG/DL (ref 70–130)
GLUCOSE BLDC GLUCOMTR-MCNC: 322 MG/DL (ref 70–130)
GLUCOSE BLDC GLUCOMTR-MCNC: 388 MG/DL (ref 70–130)
GLUCOSE BLDC GLUCOMTR-MCNC: 467 MG/DL (ref 70–130)
GLUCOSE BLDC GLUCOMTR-MCNC: 475 MG/DL (ref 70–130)
HCT VFR BLD AUTO: 33.5 % (ref 37–47)
HGB BLD-MCNC: 9.7 G/DL (ref 12–16)
HYPOCHROMIA BLD QL: NORMAL
IMM GRANULOCYTES # BLD: 0.17 10*3/MM3 (ref 0–0.03)
IMM GRANULOCYTES NFR BLD: 1.4 % (ref 0–0.5)
LV EF NUC BP: 42 %
LYMPHOCYTES # BLD AUTO: 0.45 10*3/MM3 (ref 1–3)
LYMPHOCYTES NFR BLD AUTO: 3.8 % (ref 21–51)
MAXIMAL PREDICTED HEART RATE: 169 BPM
MCH RBC QN AUTO: 22.8 PG (ref 27–33)
MCHC RBC AUTO-ENTMCNC: 29 G/DL (ref 33–37)
MCV RBC AUTO: 78.8 FL (ref 80–94)
MICROCYTES BLD QL: NORMAL
MONOCYTES # BLD AUTO: 0.47 10*3/MM3 (ref 0.1–0.9)
MONOCYTES NFR BLD AUTO: 4 % (ref 0–10)
NEUTROPHILS # BLD AUTO: 10.68 10*3/MM3 (ref 1.4–6.5)
NEUTROPHILS NFR BLD AUTO: 90.7 % (ref 30–70)
OSMOLALITY SERPL CALC.SUM OF ELEC: 295.6 MOSM/KG (ref 273–305)
PERCENT MAX PREDICTED HR: 76.33 %
PHOSPHATE SERPL-MCNC: 2.6 MG/DL (ref 2.7–4.5)
PLATELET # BLD AUTO: 115 10*3/MM3 (ref 130–400)
PMV BLD AUTO: 10.3 FL (ref 6–10)
POTASSIUM BLD-SCNC: 4.6 MMOL/L (ref 3.5–5.3)
PROT SERPL-MCNC: 5.9 G/DL (ref 6–8)
RBC # BLD AUTO: 4.25 10*6/MM3 (ref 4.2–5.4)
SMALL PLATELETS BLD QL SMEAR: NORMAL
SODIUM BLD-SCNC: 139 MMOL/L (ref 135–153)
STRESS BASELINE BP: NORMAL MMHG
STRESS BASELINE HR: 94 BPM
STRESS PERCENT HR: 90 %
STRESS POST PEAK BP: NORMAL MMHG
STRESS POST PEAK HR: 129 BPM
STRESS TARGET HR: 144 BPM
VIT B12 BLD-MCNC: 303 PG/ML (ref 211–911)
WBC NRBC COR # BLD: 11.78 10*3/MM3 (ref 4.5–12.5)

## 2017-07-25 PROCEDURE — 63710000001 INSULIN DETEMIR PER 5 UNITS: Performed by: INTERNAL MEDICINE

## 2017-07-25 PROCEDURE — 82962 GLUCOSE BLOOD TEST: CPT

## 2017-07-25 PROCEDURE — 94799 UNLISTED PULMONARY SVC/PX: CPT

## 2017-07-25 PROCEDURE — 63710000001 INSULIN ASPART PER 5 UNITS: Performed by: INTERNAL MEDICINE

## 2017-07-25 PROCEDURE — 82607 VITAMIN B-12: CPT | Performed by: INTERNAL MEDICINE

## 2017-07-25 PROCEDURE — 86140 C-REACTIVE PROTEIN: CPT | Performed by: INTERNAL MEDICINE

## 2017-07-25 PROCEDURE — 78452 HT MUSCLE IMAGE SPECT MULT: CPT | Performed by: INTERNAL MEDICINE

## 2017-07-25 PROCEDURE — 80053 COMPREHEN METABOLIC PANEL: CPT | Performed by: INTERNAL MEDICINE

## 2017-07-25 PROCEDURE — 99233 SBSQ HOSP IP/OBS HIGH 50: CPT | Performed by: INTERNAL MEDICINE

## 2017-07-25 PROCEDURE — 25010000002 CEFEPIME: Performed by: INTERNAL MEDICINE

## 2017-07-25 PROCEDURE — 78452 HT MUSCLE IMAGE SPECT MULT: CPT

## 2017-07-25 PROCEDURE — 82746 ASSAY OF FOLIC ACID SERUM: CPT | Performed by: INTERNAL MEDICINE

## 2017-07-25 PROCEDURE — 85025 COMPLETE CBC W/AUTO DIFF WBC: CPT | Performed by: INTERNAL MEDICINE

## 2017-07-25 PROCEDURE — 93017 CV STRESS TEST TRACING ONLY: CPT

## 2017-07-25 PROCEDURE — A9500 TC99M SESTAMIBI: HCPCS | Performed by: INTERNAL MEDICINE

## 2017-07-25 PROCEDURE — 25010000002 METHYLPREDNISOLONE PER 40 MG: Performed by: INTERNAL MEDICINE

## 2017-07-25 PROCEDURE — 85007 BL SMEAR W/DIFF WBC COUNT: CPT | Performed by: INTERNAL MEDICINE

## 2017-07-25 PROCEDURE — 0 TECHNETIUM SESTAMIBI: Performed by: INTERNAL MEDICINE

## 2017-07-25 PROCEDURE — 84100 ASSAY OF PHOSPHORUS: CPT | Performed by: INTERNAL MEDICINE

## 2017-07-25 PROCEDURE — 93018 CV STRESS TEST I&R ONLY: CPT | Performed by: INTERNAL MEDICINE

## 2017-07-25 RX ORDER — LISINOPRIL 2.5 MG/1
5 TABLET ORAL
Status: DISCONTINUED | OUTPATIENT
Start: 2017-07-25 | End: 2017-07-28 | Stop reason: HOSPADM

## 2017-07-25 RX ORDER — PREDNISONE 20 MG/1
40 TABLET ORAL
Status: DISCONTINUED | OUTPATIENT
Start: 2017-07-26 | End: 2017-07-26

## 2017-07-25 RX ORDER — TIZANIDINE 4 MG/1
2 TABLET ORAL EVERY 12 HOURS PRN
Status: DISCONTINUED | OUTPATIENT
Start: 2017-07-25 | End: 2017-07-28 | Stop reason: HOSPADM

## 2017-07-25 RX ADMIN — HYDROCODONE BITARTRATE AND ACETAMINOPHEN 1 TABLET: 5; 325 TABLET ORAL at 20:08

## 2017-07-25 RX ADMIN — DOXYCYCLINE 100 MG: 100 CAPSULE ORAL at 14:31

## 2017-07-25 RX ADMIN — TECHNETIUM TC-99M SESTAMIBI 1 DOSE: 1 INJECTION INTRAVENOUS at 12:20

## 2017-07-25 RX ADMIN — GABAPENTIN 600 MG: 300 CAPSULE ORAL at 14:34

## 2017-07-25 RX ADMIN — DOXYCYCLINE 100 MG: 100 CAPSULE ORAL at 19:59

## 2017-07-25 RX ADMIN — CEFEPIME 2 G: 2 INJECTION, POWDER, FOR SOLUTION INTRAVENOUS at 04:47

## 2017-07-25 RX ADMIN — CEFEPIME 2 G: 2 INJECTION, POWDER, FOR SOLUTION INTRAVENOUS at 19:58

## 2017-07-25 RX ADMIN — INSULIN DETEMIR 30 UNITS: 100 INJECTION, SOLUTION SUBCUTANEOUS at 09:05

## 2017-07-25 RX ADMIN — INSULIN ASPART 10 UNITS: 100 INJECTION, SOLUTION INTRAVENOUS; SUBCUTANEOUS at 14:31

## 2017-07-25 RX ADMIN — METHYLPREDNISOLONE SODIUM SUCCINATE 20 MG: 40 INJECTION, POWDER, FOR SOLUTION INTRAMUSCULAR; INTRAVENOUS at 09:05

## 2017-07-25 RX ADMIN — HYDROCODONE BITARTRATE AND ACETAMINOPHEN 1 TABLET: 5; 325 TABLET ORAL at 04:44

## 2017-07-25 RX ADMIN — INSULIN ASPART 7 UNITS: 100 INJECTION, SOLUTION INTRAVENOUS; SUBCUTANEOUS at 18:06

## 2017-07-25 RX ADMIN — INSULIN ASPART 6 UNITS: 100 INJECTION, SOLUTION INTRAVENOUS; SUBCUTANEOUS at 14:32

## 2017-07-25 RX ADMIN — LISINOPRIL 5 MG: 2.5 TABLET ORAL at 14:30

## 2017-07-25 RX ADMIN — TIZANIDINE 2 MG: 4 TABLET ORAL at 23:21

## 2017-07-25 RX ADMIN — ASPIRIN 325 MG: 325 TABLET ORAL at 14:31

## 2017-07-25 RX ADMIN — IPRATROPIUM BROMIDE 0.5 MG: 0.5 SOLUTION RESPIRATORY (INHALATION) at 18:59

## 2017-07-25 RX ADMIN — Medication 1 TABLET: at 14:30

## 2017-07-25 RX ADMIN — GABAPENTIN 600 MG: 300 CAPSULE ORAL at 05:26

## 2017-07-25 RX ADMIN — IPRATROPIUM BROMIDE 0.5 MG: 0.5 SOLUTION RESPIRATORY (INHALATION) at 01:28

## 2017-07-25 RX ADMIN — HYDROCODONE BITARTRATE AND ACETAMINOPHEN 1 TABLET: 5; 325 TABLET ORAL at 14:32

## 2017-07-25 RX ADMIN — GABAPENTIN 600 MG: 300 CAPSULE ORAL at 19:59

## 2017-07-25 RX ADMIN — TECHNETIUM TC-99M SESTAMIBI 1 DOSE: 1 INJECTION INTRAVENOUS at 07:55

## 2017-07-25 RX ADMIN — METHYLPREDNISOLONE SODIUM SUCCINATE 20 MG: 40 INJECTION, POWDER, FOR SOLUTION INTRAMUSCULAR; INTRAVENOUS at 19:59

## 2017-07-25 RX ADMIN — INSULIN ASPART 10 UNITS: 100 INJECTION, SOLUTION INTRAVENOUS; SUBCUTANEOUS at 18:07

## 2017-07-25 RX ADMIN — INSULIN ASPART 5 UNITS: 100 INJECTION, SOLUTION INTRAVENOUS; SUBCUTANEOUS at 20:43

## 2017-07-25 RX ADMIN — IPRATROPIUM BROMIDE 0.5 MG: 0.5 SOLUTION RESPIRATORY (INHALATION) at 08:54

## 2017-07-25 RX ADMIN — CEFEPIME 2 G: 2 INJECTION, POWDER, FOR SOLUTION INTRAVENOUS at 14:31

## 2017-07-26 LAB
ANION GAP SERPL CALCULATED.3IONS-SCNC: 7.1 MMOL/L (ref 3.6–11.2)
ANISOCYTOSIS BLD QL: ABNORMAL
BACTERIA SPEC AEROBE CULT: ABNORMAL
BACTERIA SPEC AEROBE CULT: ABNORMAL
BUN BLD-MCNC: 39 MG/DL (ref 7–21)
BUN/CREAT SERPL: 61.9 (ref 7–25)
CALCIUM SPEC-SCNC: 9.1 MG/DL (ref 7.7–10)
CHLORIDE SERPL-SCNC: 111 MMOL/L (ref 99–112)
CO2 SERPL-SCNC: 17.9 MMOL/L (ref 24.3–31.9)
CREAT BLD-MCNC: 0.63 MG/DL (ref 0.43–1.29)
CRP SERPL-MCNC: 1.95 MG/DL (ref 0–0.99)
DEPRECATED RDW RBC AUTO: 57.1 FL (ref 37–54)
ERYTHROCYTE [DISTWIDTH] IN BLOOD BY AUTOMATED COUNT: 20.8 % (ref 11.5–14.5)
GFR SERPL CREATININE-BSD FRML MDRD: 100 ML/MIN/1.73
GLUCOSE BLD-MCNC: 418 MG/DL (ref 70–110)
GLUCOSE BLDC GLUCOMTR-MCNC: 169 MG/DL (ref 70–130)
GLUCOSE BLDC GLUCOMTR-MCNC: 218 MG/DL (ref 70–130)
GLUCOSE BLDC GLUCOMTR-MCNC: 277 MG/DL (ref 70–130)
GLUCOSE BLDC GLUCOMTR-MCNC: 339 MG/DL (ref 70–130)
GLUCOSE BLDC GLUCOMTR-MCNC: 415 MG/DL (ref 70–130)
GLUCOSE BLDC GLUCOMTR-MCNC: 552 MG/DL (ref 70–130)
GRAM STN SPEC: ABNORMAL
GRAM STN SPEC: ABNORMAL
HCT VFR BLD AUTO: 33.1 % (ref 37–47)
HGB BLD-MCNC: 9.7 G/DL (ref 12–16)
HYPOCHROMIA BLD QL: ABNORMAL
ISOLATED FROM: ABNORMAL
ISOLATED FROM: ABNORMAL
LYMPHOCYTES # BLD MANUAL: 0.2 10*3/MM3 (ref 1–3)
LYMPHOCYTES NFR BLD MANUAL: 2 % (ref 21–51)
LYMPHOCYTES NFR BLD MANUAL: 6 % (ref 0–10)
MCH RBC QN AUTO: 23.3 PG (ref 27–33)
MCHC RBC AUTO-ENTMCNC: 29.3 G/DL (ref 33–37)
MCV RBC AUTO: 79.6 FL (ref 80–94)
METAMYELOCYTES NFR BLD MANUAL: 2 % (ref 0–0)
MICROCYTES BLD QL: ABNORMAL
MONOCYTES # BLD AUTO: 0.59 10*3/MM3 (ref 0.1–0.9)
MYELOCYTES NFR BLD MANUAL: 2 % (ref 0–0)
NEUTROPHILS # BLD AUTO: 8.7 10*3/MM3 (ref 1.4–6.5)
NEUTROPHILS NFR BLD MANUAL: 88 % (ref 30–70)
OSMOLALITY SERPL CALC.SUM OF ELEC: 299.1 MOSM/KG (ref 273–305)
PLATELET # BLD AUTO: 126 10*3/MM3 (ref 130–400)
PMV BLD AUTO: ABNORMAL FL (ref 6–10)
POTASSIUM BLD-SCNC: 4.7 MMOL/L (ref 3.5–5.3)
RBC # BLD AUTO: 4.16 10*6/MM3 (ref 4.2–5.4)
SCAN SLIDE: NORMAL
SMALL PLATELETS BLD QL SMEAR: ABNORMAL
SODIUM BLD-SCNC: 136 MMOL/L (ref 135–153)
WBC NRBC COR # BLD: 9.89 10*3/MM3 (ref 4.5–12.5)

## 2017-07-26 PROCEDURE — 63710000001 INSULIN ASPART PER 5 UNITS: Performed by: INTERNAL MEDICINE

## 2017-07-26 PROCEDURE — 85007 BL SMEAR W/DIFF WBC COUNT: CPT | Performed by: INTERNAL MEDICINE

## 2017-07-26 PROCEDURE — 99232 SBSQ HOSP IP/OBS MODERATE 35: CPT | Performed by: INTERNAL MEDICINE

## 2017-07-26 PROCEDURE — 94799 UNLISTED PULMONARY SVC/PX: CPT

## 2017-07-26 PROCEDURE — 63710000001 PREDNISONE PER 1 MG: Performed by: INTERNAL MEDICINE

## 2017-07-26 PROCEDURE — 82962 GLUCOSE BLOOD TEST: CPT

## 2017-07-26 PROCEDURE — 25010000002 MORPHINE PER 10 MG: Performed by: INTERNAL MEDICINE

## 2017-07-26 PROCEDURE — 85025 COMPLETE CBC W/AUTO DIFF WBC: CPT | Performed by: INTERNAL MEDICINE

## 2017-07-26 PROCEDURE — 86140 C-REACTIVE PROTEIN: CPT | Performed by: INTERNAL MEDICINE

## 2017-07-26 PROCEDURE — 63710000001 INSULIN DETEMIR PER 5 UNITS: Performed by: INTERNAL MEDICINE

## 2017-07-26 PROCEDURE — 99233 SBSQ HOSP IP/OBS HIGH 50: CPT | Performed by: INTERNAL MEDICINE

## 2017-07-26 PROCEDURE — 80048 BASIC METABOLIC PNL TOTAL CA: CPT | Performed by: INTERNAL MEDICINE

## 2017-07-26 PROCEDURE — 25010000002 CEFEPIME: Performed by: INTERNAL MEDICINE

## 2017-07-26 RX ORDER — PREDNISONE 20 MG/1
20 TABLET ORAL
Status: DISCONTINUED | OUTPATIENT
Start: 2017-07-27 | End: 2017-07-27

## 2017-07-26 RX ORDER — ATORVASTATIN CALCIUM 40 MG/1
40 TABLET, FILM COATED ORAL NIGHTLY
Status: DISCONTINUED | OUTPATIENT
Start: 2017-07-26 | End: 2017-07-28 | Stop reason: HOSPADM

## 2017-07-26 RX ORDER — LEVOFLOXACIN 500 MG/1
500 TABLET, FILM COATED ORAL DAILY
Status: DISCONTINUED | OUTPATIENT
Start: 2017-07-26 | End: 2017-07-28 | Stop reason: HOSPADM

## 2017-07-26 RX ORDER — MORPHINE SULFATE 2 MG/ML
1 INJECTION, SOLUTION INTRAMUSCULAR; INTRAVENOUS
Status: DISCONTINUED | OUTPATIENT
Start: 2017-07-26 | End: 2017-07-28 | Stop reason: HOSPADM

## 2017-07-26 RX ORDER — ISOSORBIDE MONONITRATE 30 MG/1
30 TABLET, EXTENDED RELEASE ORAL
Status: DISCONTINUED | OUTPATIENT
Start: 2017-07-26 | End: 2017-07-28 | Stop reason: HOSPADM

## 2017-07-26 RX ADMIN — IPRATROPIUM BROMIDE 0.5 MG: 0.5 SOLUTION RESPIRATORY (INHALATION) at 06:17

## 2017-07-26 RX ADMIN — Medication 1 TABLET: at 08:30

## 2017-07-26 RX ADMIN — HYDROCODONE BITARTRATE AND ACETAMINOPHEN 1 TABLET: 5; 325 TABLET ORAL at 08:34

## 2017-07-26 RX ADMIN — GABAPENTIN 600 MG: 300 CAPSULE ORAL at 20:58

## 2017-07-26 RX ADMIN — INSULIN ASPART 3 UNITS: 100 INJECTION, SOLUTION INTRAVENOUS; SUBCUTANEOUS at 20:59

## 2017-07-26 RX ADMIN — LEVOFLOXACIN 500 MG: 500 TABLET, FILM COATED ORAL at 17:31

## 2017-07-26 RX ADMIN — MORPHINE SULFATE 1 MG: 2 INJECTION, SOLUTION INTRAMUSCULAR; INTRAVENOUS at 15:16

## 2017-07-26 RX ADMIN — INSULIN ASPART 2 UNITS: 100 INJECTION, SOLUTION INTRAVENOUS; SUBCUTANEOUS at 12:47

## 2017-07-26 RX ADMIN — INSULIN ASPART 10 UNITS: 100 INJECTION, SOLUTION INTRAVENOUS; SUBCUTANEOUS at 08:30

## 2017-07-26 RX ADMIN — INSULIN ASPART 10 UNITS: 100 INJECTION, SOLUTION INTRAVENOUS; SUBCUTANEOUS at 12:48

## 2017-07-26 RX ADMIN — ATORVASTATIN CALCIUM 40 MG: 40 TABLET, FILM COATED ORAL at 20:58

## 2017-07-26 RX ADMIN — PREDNISONE 40 MG: 20 TABLET ORAL at 08:30

## 2017-07-26 RX ADMIN — DOXYCYCLINE 100 MG: 100 CAPSULE ORAL at 08:30

## 2017-07-26 RX ADMIN — INSULIN ASPART 5 UNITS: 100 INJECTION, SOLUTION INTRAVENOUS; SUBCUTANEOUS at 17:31

## 2017-07-26 RX ADMIN — HYDROCODONE BITARTRATE AND ACETAMINOPHEN 1 TABLET: 5; 325 TABLET ORAL at 20:58

## 2017-07-26 RX ADMIN — METOPROLOL TARTRATE 25 MG: 25 TABLET, FILM COATED ORAL at 20:58

## 2017-07-26 RX ADMIN — GABAPENTIN 600 MG: 300 CAPSULE ORAL at 12:47

## 2017-07-26 RX ADMIN — INSULIN DETEMIR 40 UNITS: 100 INJECTION, SOLUTION SUBCUTANEOUS at 08:34

## 2017-07-26 RX ADMIN — ISOSORBIDE MONONITRATE 30 MG: 30 TABLET, EXTENDED RELEASE ORAL at 18:21

## 2017-07-26 RX ADMIN — INSULIN ASPART 4 UNITS: 100 INJECTION, SOLUTION INTRAVENOUS; SUBCUTANEOUS at 08:30

## 2017-07-26 RX ADMIN — CEFEPIME 2 G: 2 INJECTION, POWDER, FOR SOLUTION INTRAVENOUS at 12:48

## 2017-07-26 RX ADMIN — GABAPENTIN 600 MG: 300 CAPSULE ORAL at 05:11

## 2017-07-26 RX ADMIN — ASPIRIN 325 MG: 325 TABLET ORAL at 08:30

## 2017-07-26 RX ADMIN — DOXYCYCLINE 100 MG: 100 CAPSULE ORAL at 20:58

## 2017-07-26 RX ADMIN — INSULIN ASPART 10 UNITS: 100 INJECTION, SOLUTION INTRAVENOUS; SUBCUTANEOUS at 17:31

## 2017-07-26 RX ADMIN — CEFEPIME 2 G: 2 INJECTION, POWDER, FOR SOLUTION INTRAVENOUS at 03:37

## 2017-07-26 RX ADMIN — MORPHINE SULFATE 1 MG: 2 INJECTION, SOLUTION INTRAMUSCULAR; INTRAVENOUS at 22:21

## 2017-07-26 RX ADMIN — MORPHINE SULFATE 1 MG: 2 INJECTION, SOLUTION INTRAMUSCULAR; INTRAVENOUS at 19:23

## 2017-07-26 RX ADMIN — LISINOPRIL 5 MG: 2.5 TABLET ORAL at 08:30

## 2017-07-27 LAB
027 TOXIN: NORMAL
ALBUMIN SERPL-MCNC: 3.3 G/DL (ref 3.5–5)
ALBUMIN/GLOB SERPL: 1.2 G/DL (ref 1.5–2.5)
ALP SERPL-CCNC: 94 U/L (ref 35–104)
ALT SERPL W P-5'-P-CCNC: 77 U/L (ref 10–36)
ANION GAP SERPL CALCULATED.3IONS-SCNC: 3 MMOL/L (ref 3.6–11.2)
ANISOCYTOSIS BLD QL: ABNORMAL
AST SERPL-CCNC: 48 U/L (ref 10–30)
BACTERIA SPEC AEROBE CULT: ABNORMAL
BILIRUB SERPL-MCNC: 0.2 MG/DL (ref 0.2–1.8)
BUN BLD-MCNC: 34 MG/DL (ref 7–21)
BUN/CREAT SERPL: 54.8 (ref 7–25)
C DIFF TOX GENS STL QL NAA+PROBE: NEGATIVE
CALCIUM SPEC-SCNC: 9.4 MG/DL (ref 7.7–10)
CHLORIDE SERPL-SCNC: 115 MMOL/L (ref 99–112)
CO2 SERPL-SCNC: 22 MMOL/L (ref 24.3–31.9)
CREAT BLD-MCNC: 0.62 MG/DL (ref 0.43–1.29)
CRP SERPL-MCNC: 0.82 MG/DL (ref 0–0.99)
DEPRECATED RDW RBC AUTO: 56.6 FL (ref 37–54)
ERYTHROCYTE [DISTWIDTH] IN BLOOD BY AUTOMATED COUNT: 20.9 % (ref 11.5–14.5)
GFR SERPL CREATININE-BSD FRML MDRD: 101 ML/MIN/1.73
GLOBULIN UR ELPH-MCNC: 2.7 GM/DL
GLUCOSE BLD-MCNC: 199 MG/DL (ref 70–110)
GLUCOSE BLDC GLUCOMTR-MCNC: 120 MG/DL (ref 70–130)
GLUCOSE BLDC GLUCOMTR-MCNC: 157 MG/DL (ref 70–130)
GLUCOSE BLDC GLUCOMTR-MCNC: 158 MG/DL (ref 70–130)
GLUCOSE BLDC GLUCOMTR-MCNC: 178 MG/DL (ref 70–130)
HCT VFR BLD AUTO: 34.2 % (ref 37–47)
HGB BLD-MCNC: 10.1 G/DL (ref 12–16)
HYPOCHROMIA BLD QL: ABNORMAL
LYMPHOCYTES # BLD MANUAL: 2.68 10*3/MM3 (ref 1–3)
LYMPHOCYTES NFR BLD MANUAL: 21 % (ref 21–51)
LYMPHOCYTES NFR BLD MANUAL: 9 % (ref 0–10)
MCH RBC QN AUTO: 23.5 PG (ref 27–33)
MCHC RBC AUTO-ENTMCNC: 29.5 G/DL (ref 33–37)
MCV RBC AUTO: 79.7 FL (ref 80–94)
MICROCYTES BLD QL: ABNORMAL
MONOCYTES # BLD AUTO: 1.15 10*3/MM3 (ref 0.1–0.9)
NEUTROPHILS # BLD AUTO: 8.94 10*3/MM3 (ref 1.4–6.5)
NEUTROPHILS NFR BLD MANUAL: 70 % (ref 30–70)
OSMOLALITY SERPL CALC.SUM OF ELEC: 292.6 MOSM/KG (ref 273–305)
PLAT MORPH BLD: NORMAL
PLATELET # BLD AUTO: 169 10*3/MM3 (ref 130–400)
PMV BLD AUTO: 11.7 FL (ref 6–10)
POTASSIUM BLD-SCNC: 4.3 MMOL/L (ref 3.5–5.3)
PROT SERPL-MCNC: 6 G/DL (ref 6–8)
RBC # BLD AUTO: 4.29 10*6/MM3 (ref 4.2–5.4)
SCAN SLIDE: NORMAL
SODIUM BLD-SCNC: 140 MMOL/L (ref 135–153)
WBC NRBC COR # BLD: 12.77 10*3/MM3 (ref 4.5–12.5)

## 2017-07-27 PROCEDURE — 63710000001 INSULIN ASPART PER 5 UNITS: Performed by: INTERNAL MEDICINE

## 2017-07-27 PROCEDURE — 99233 SBSQ HOSP IP/OBS HIGH 50: CPT | Performed by: INTERNAL MEDICINE

## 2017-07-27 PROCEDURE — 87046 STOOL CULTR AEROBIC BACT EA: CPT | Performed by: INTERNAL MEDICINE

## 2017-07-27 PROCEDURE — 85007 BL SMEAR W/DIFF WBC COUNT: CPT | Performed by: INTERNAL MEDICINE

## 2017-07-27 PROCEDURE — 93010 ELECTROCARDIOGRAM REPORT: CPT | Performed by: INTERNAL MEDICINE

## 2017-07-27 PROCEDURE — 63710000001 PREDNISONE PER 1 MG: Performed by: INTERNAL MEDICINE

## 2017-07-27 PROCEDURE — 85025 COMPLETE CBC W/AUTO DIFF WBC: CPT | Performed by: INTERNAL MEDICINE

## 2017-07-27 PROCEDURE — 94799 UNLISTED PULMONARY SVC/PX: CPT

## 2017-07-27 PROCEDURE — 87493 C DIFF AMPLIFIED PROBE: CPT | Performed by: INTERNAL MEDICINE

## 2017-07-27 PROCEDURE — 63710000001 PROMETHAZINE PER 25 MG: Performed by: INTERNAL MEDICINE

## 2017-07-27 PROCEDURE — 25010000002 MORPHINE PER 10 MG: Performed by: INTERNAL MEDICINE

## 2017-07-27 PROCEDURE — 93005 ELECTROCARDIOGRAM TRACING: CPT | Performed by: INTERNAL MEDICINE

## 2017-07-27 PROCEDURE — 86140 C-REACTIVE PROTEIN: CPT | Performed by: INTERNAL MEDICINE

## 2017-07-27 PROCEDURE — 63710000001 INSULIN DETEMIR PER 5 UNITS: Performed by: INTERNAL MEDICINE

## 2017-07-27 PROCEDURE — 87899 AGENT NOS ASSAY W/OPTIC: CPT | Performed by: INTERNAL MEDICINE

## 2017-07-27 PROCEDURE — 80053 COMPREHEN METABOLIC PANEL: CPT | Performed by: INTERNAL MEDICINE

## 2017-07-27 PROCEDURE — 82962 GLUCOSE BLOOD TEST: CPT

## 2017-07-27 PROCEDURE — 87045 FECES CULTURE AEROBIC BACT: CPT | Performed by: INTERNAL MEDICINE

## 2017-07-27 RX ORDER — PREDNISONE 10 MG/1
10 TABLET ORAL
Status: DISCONTINUED | OUTPATIENT
Start: 2017-07-28 | End: 2017-07-28 | Stop reason: HOSPADM

## 2017-07-27 RX ADMIN — GABAPENTIN 600 MG: 300 CAPSULE ORAL at 14:15

## 2017-07-27 RX ADMIN — DOXYCYCLINE 100 MG: 100 CAPSULE ORAL at 08:59

## 2017-07-27 RX ADMIN — TIZANIDINE 2 MG: 4 TABLET ORAL at 21:21

## 2017-07-27 RX ADMIN — INSULIN ASPART 2 UNITS: 100 INJECTION, SOLUTION INTRAVENOUS; SUBCUTANEOUS at 12:33

## 2017-07-27 RX ADMIN — INSULIN ASPART 10 UNITS: 100 INJECTION, SOLUTION INTRAVENOUS; SUBCUTANEOUS at 08:59

## 2017-07-27 RX ADMIN — ATORVASTATIN CALCIUM 40 MG: 40 TABLET, FILM COATED ORAL at 19:56

## 2017-07-27 RX ADMIN — INSULIN DETEMIR 40 UNITS: 100 INJECTION, SOLUTION SUBCUTANEOUS at 09:02

## 2017-07-27 RX ADMIN — MORPHINE SULFATE 1 MG: 2 INJECTION, SOLUTION INTRAMUSCULAR; INTRAVENOUS at 12:31

## 2017-07-27 RX ADMIN — METOPROLOL TARTRATE 25 MG: 25 TABLET, FILM COATED ORAL at 08:59

## 2017-07-27 RX ADMIN — DOXYCYCLINE 100 MG: 100 CAPSULE ORAL at 19:56

## 2017-07-27 RX ADMIN — MORPHINE SULFATE 1 MG: 2 INJECTION, SOLUTION INTRAMUSCULAR; INTRAVENOUS at 06:20

## 2017-07-27 RX ADMIN — INSULIN ASPART 10 UNITS: 100 INJECTION, SOLUTION INTRAVENOUS; SUBCUTANEOUS at 16:48

## 2017-07-27 RX ADMIN — LEVOFLOXACIN 500 MG: 500 TABLET, FILM COATED ORAL at 08:59

## 2017-07-27 RX ADMIN — INSULIN ASPART 2 UNITS: 100 INJECTION, SOLUTION INTRAVENOUS; SUBCUTANEOUS at 08:58

## 2017-07-27 RX ADMIN — GABAPENTIN 600 MG: 300 CAPSULE ORAL at 05:32

## 2017-07-27 RX ADMIN — MORPHINE SULFATE 1 MG: 2 INJECTION, SOLUTION INTRAMUSCULAR; INTRAVENOUS at 19:58

## 2017-07-27 RX ADMIN — MORPHINE SULFATE 1 MG: 2 INJECTION, SOLUTION INTRAMUSCULAR; INTRAVENOUS at 09:19

## 2017-07-27 RX ADMIN — LISINOPRIL 5 MG: 2.5 TABLET ORAL at 08:59

## 2017-07-27 RX ADMIN — HYDROCODONE BITARTRATE AND ACETAMINOPHEN 1 TABLET: 5; 325 TABLET ORAL at 06:53

## 2017-07-27 RX ADMIN — PROMETHAZINE HYDROCHLORIDE 25 MG: 25 TABLET ORAL at 21:21

## 2017-07-27 RX ADMIN — MORPHINE SULFATE 1 MG: 2 INJECTION, SOLUTION INTRAMUSCULAR; INTRAVENOUS at 02:58

## 2017-07-27 RX ADMIN — ASPIRIN 325 MG: 325 TABLET ORAL at 08:59

## 2017-07-27 RX ADMIN — INSULIN ASPART 2 UNITS: 100 INJECTION, SOLUTION INTRAVENOUS; SUBCUTANEOUS at 16:47

## 2017-07-27 RX ADMIN — PREDNISONE 20 MG: 20 TABLET ORAL at 08:59

## 2017-07-27 RX ADMIN — HYDROCODONE BITARTRATE AND ACETAMINOPHEN 1 TABLET: 5; 325 TABLET ORAL at 23:08

## 2017-07-27 RX ADMIN — INSULIN ASPART 10 UNITS: 100 INJECTION, SOLUTION INTRAVENOUS; SUBCUTANEOUS at 12:33

## 2017-07-27 RX ADMIN — MORPHINE SULFATE 1 MG: 2 INJECTION, SOLUTION INTRAMUSCULAR; INTRAVENOUS at 16:48

## 2017-07-27 RX ADMIN — GABAPENTIN 600 MG: 300 CAPSULE ORAL at 21:21

## 2017-07-27 RX ADMIN — ISOSORBIDE MONONITRATE 30 MG: 30 TABLET, EXTENDED RELEASE ORAL at 08:59

## 2017-07-28 VITALS
SYSTOLIC BLOOD PRESSURE: 112 MMHG | BODY MASS INDEX: 21.09 KG/M2 | DIASTOLIC BLOOD PRESSURE: 70 MMHG | WEIGHT: 119.06 LBS | RESPIRATION RATE: 20 BRPM | HEIGHT: 63 IN | HEART RATE: 95 BPM | TEMPERATURE: 97.9 F | OXYGEN SATURATION: 93 %

## 2017-07-28 LAB
ANION GAP SERPL CALCULATED.3IONS-SCNC: 4.4 MMOL/L (ref 3.6–11.2)
ANISOCYTOSIS BLD QL: ABNORMAL
BUN BLD-MCNC: 31 MG/DL (ref 7–21)
BUN/CREAT SERPL: 59.6 (ref 7–25)
CALCIUM SPEC-SCNC: 9.5 MG/DL (ref 7.7–10)
CHLORIDE SERPL-SCNC: 116 MMOL/L (ref 99–112)
CO2 SERPL-SCNC: 16.6 MMOL/L (ref 24.3–31.9)
CREAT BLD-MCNC: 0.52 MG/DL (ref 0.43–1.29)
DEPRECATED RDW RBC AUTO: 61.9 FL (ref 37–54)
EOSINOPHIL # BLD MANUAL: 0.31 10*3/MM3 (ref 0–0.7)
EOSINOPHIL NFR BLD MANUAL: 2 % (ref 0–5)
ERYTHROCYTE [DISTWIDTH] IN BLOOD BY AUTOMATED COUNT: 22.4 % (ref 11.5–14.5)
GFR SERPL CREATININE-BSD FRML MDRD: 124 ML/MIN/1.73
GLUCOSE BLD-MCNC: 254 MG/DL (ref 70–110)
GLUCOSE BLDC GLUCOMTR-MCNC: 141 MG/DL (ref 70–130)
GLUCOSE BLDC GLUCOMTR-MCNC: 192 MG/DL (ref 70–130)
HCT VFR BLD AUTO: 37.2 % (ref 37–47)
HGB BLD-MCNC: 10.7 G/DL (ref 12–16)
HYPOCHROMIA BLD QL: ABNORMAL
LYMPHOCYTES # BLD MANUAL: 2.75 10*3/MM3 (ref 1–3)
LYMPHOCYTES NFR BLD MANUAL: 18 % (ref 21–51)
LYMPHOCYTES NFR BLD MANUAL: 2 % (ref 0–10)
MCH RBC QN AUTO: 23.7 PG (ref 27–33)
MCHC RBC AUTO-ENTMCNC: 28.8 G/DL (ref 33–37)
MCV RBC AUTO: 82.5 FL (ref 80–94)
METAMYELOCYTES NFR BLD MANUAL: 5 % (ref 0–0)
MONOCYTES # BLD AUTO: 0.31 10*3/MM3 (ref 0.1–0.9)
MYELOCYTES NFR BLD MANUAL: 3 % (ref 0–0)
NEUTROPHILS # BLD AUTO: 10.69 10*3/MM3 (ref 1.4–6.5)
NEUTROPHILS NFR BLD MANUAL: 65 % (ref 30–70)
NEUTS BAND NFR BLD MANUAL: 5 % (ref 4–12)
OSMOLALITY SERPL CALC.SUM OF ELEC: 289 MOSM/KG (ref 273–305)
PLAT MORPH BLD: NORMAL
PLATELET # BLD AUTO: 195 10*3/MM3 (ref 130–400)
PMV BLD AUTO: 10.4 FL (ref 6–10)
POTASSIUM BLD-SCNC: 4.5 MMOL/L (ref 3.5–5.3)
RBC # BLD AUTO: 4.51 10*6/MM3 (ref 4.2–5.4)
SCAN SLIDE: NORMAL
SODIUM BLD-SCNC: 137 MMOL/L (ref 135–153)
WBC NRBC COR # BLD: 15.27 10*3/MM3 (ref 4.5–12.5)

## 2017-07-28 PROCEDURE — 63710000001 INSULIN DETEMIR PER 5 UNITS: Performed by: INTERNAL MEDICINE

## 2017-07-28 PROCEDURE — 63710000001 PREDNISONE PER 5 MG: Performed by: INTERNAL MEDICINE

## 2017-07-28 PROCEDURE — 80048 BASIC METABOLIC PNL TOTAL CA: CPT | Performed by: INTERNAL MEDICINE

## 2017-07-28 PROCEDURE — 82962 GLUCOSE BLOOD TEST: CPT

## 2017-07-28 PROCEDURE — 85007 BL SMEAR W/DIFF WBC COUNT: CPT | Performed by: INTERNAL MEDICINE

## 2017-07-28 PROCEDURE — 85025 COMPLETE CBC W/AUTO DIFF WBC: CPT | Performed by: INTERNAL MEDICINE

## 2017-07-28 PROCEDURE — 63710000001 INSULIN ASPART PER 5 UNITS: Performed by: INTERNAL MEDICINE

## 2017-07-28 PROCEDURE — 25010000002 MORPHINE PER 10 MG: Performed by: INTERNAL MEDICINE

## 2017-07-28 PROCEDURE — 99239 HOSP IP/OBS DSCHRG MGMT >30: CPT | Performed by: INTERNAL MEDICINE

## 2017-07-28 RX ORDER — ATORVASTATIN CALCIUM 40 MG/1
40 TABLET, FILM COATED ORAL NIGHTLY
Qty: 30 TABLET | Refills: 0 | Status: ON HOLD | OUTPATIENT
Start: 2017-07-28 | End: 2018-01-19

## 2017-07-28 RX ORDER — LEVOFLOXACIN 500 MG/1
500 TABLET, FILM COATED ORAL DAILY
Qty: 11 TABLET | Refills: 0 | Status: SHIPPED | OUTPATIENT
Start: 2017-07-28 | End: 2017-09-11

## 2017-07-28 RX ORDER — PREDNISONE 10 MG/1
10 TABLET ORAL
Qty: 30 TABLET | Refills: 0 | Status: SHIPPED | OUTPATIENT
Start: 2017-07-28 | End: 2017-09-11

## 2017-07-28 RX ORDER — ASPIRIN 325 MG
325 TABLET, DELAYED RELEASE (ENTERIC COATED) ORAL DAILY
Qty: 30 TABLET | Refills: 0 | Status: ON HOLD | OUTPATIENT
Start: 2017-07-28 | End: 2018-01-19

## 2017-07-28 RX ORDER — ISOSORBIDE MONONITRATE 30 MG/1
30 TABLET, EXTENDED RELEASE ORAL DAILY
Qty: 30 TABLET | Refills: 0 | Status: ON HOLD | OUTPATIENT
Start: 2017-07-28 | End: 2018-01-19

## 2017-07-28 RX ORDER — DOXYCYCLINE 100 MG/1
100 CAPSULE ORAL EVERY 12 HOURS SCHEDULED
Qty: 26 CAPSULE | Refills: 0 | Status: SHIPPED | OUTPATIENT
Start: 2017-07-28 | End: 2017-08-10

## 2017-07-28 RX ADMIN — INSULIN ASPART 10 UNITS: 100 INJECTION, SOLUTION INTRAVENOUS; SUBCUTANEOUS at 13:42

## 2017-07-28 RX ADMIN — HYDROCODONE BITARTRATE AND ACETAMINOPHEN 1 TABLET: 5; 325 TABLET ORAL at 08:30

## 2017-07-28 RX ADMIN — GABAPENTIN 600 MG: 300 CAPSULE ORAL at 05:51

## 2017-07-28 RX ADMIN — PREDNISONE 10 MG: 10 TABLET ORAL at 08:29

## 2017-07-28 RX ADMIN — MORPHINE SULFATE 1 MG: 2 INJECTION, SOLUTION INTRAMUSCULAR; INTRAVENOUS at 05:51

## 2017-07-28 RX ADMIN — METOPROLOL TARTRATE 25 MG: 25 TABLET, FILM COATED ORAL at 08:28

## 2017-07-28 RX ADMIN — INSULIN DETEMIR 40 UNITS: 100 INJECTION, SOLUTION SUBCUTANEOUS at 08:29

## 2017-07-28 RX ADMIN — MORPHINE SULFATE 1 MG: 2 INJECTION, SOLUTION INTRAMUSCULAR; INTRAVENOUS at 10:03

## 2017-07-28 RX ADMIN — INSULIN ASPART 2 UNITS: 100 INJECTION, SOLUTION INTRAVENOUS; SUBCUTANEOUS at 13:43

## 2017-07-28 RX ADMIN — ISOSORBIDE MONONITRATE 30 MG: 30 TABLET, EXTENDED RELEASE ORAL at 08:28

## 2017-07-28 RX ADMIN — LEVOFLOXACIN 500 MG: 500 TABLET, FILM COATED ORAL at 08:28

## 2017-07-28 RX ADMIN — HYDROCODONE BITARTRATE AND ACETAMINOPHEN 1 TABLET: 5; 325 TABLET ORAL at 13:42

## 2017-07-28 RX ADMIN — GABAPENTIN 600 MG: 300 CAPSULE ORAL at 13:46

## 2017-07-28 RX ADMIN — DOXYCYCLINE 100 MG: 100 CAPSULE ORAL at 08:28

## 2017-07-28 RX ADMIN — LISINOPRIL 5 MG: 2.5 TABLET ORAL at 08:28

## 2017-07-28 RX ADMIN — INSULIN ASPART 10 UNITS: 100 INJECTION, SOLUTION INTRAVENOUS; SUBCUTANEOUS at 08:29

## 2017-07-28 RX ADMIN — ASPIRIN 325 MG: 325 TABLET ORAL at 08:29

## 2017-07-28 RX ADMIN — MORPHINE SULFATE 1 MG: 2 INJECTION, SOLUTION INTRAMUSCULAR; INTRAVENOUS at 01:04

## 2017-07-29 LAB — BACTERIA SPEC AEROBE CULT: NORMAL

## 2017-08-16 ENCOUNTER — APPOINTMENT (OUTPATIENT)
Dept: GENERAL RADIOLOGY | Facility: HOSPITAL | Age: 52
End: 2017-08-16

## 2017-08-16 ENCOUNTER — HOSPITAL ENCOUNTER (EMERGENCY)
Facility: HOSPITAL | Age: 52
Discharge: HOME | End: 2017-08-16
Admitting: EMERGENCY MEDICINE

## 2017-08-16 VITALS
RESPIRATION RATE: 22 BRPM | TEMPERATURE: 98 F | OXYGEN SATURATION: 98 % | HEIGHT: 63 IN | HEART RATE: 100 BPM | BODY MASS INDEX: 20.2 KG/M2 | WEIGHT: 114 LBS | SYSTOLIC BLOOD PRESSURE: 123 MMHG | DIASTOLIC BLOOD PRESSURE: 66 MMHG

## 2017-08-16 DIAGNOSIS — J20.9 ACUTE BRONCHITIS, UNSPECIFIED ORGANISM: Primary | ICD-10-CM

## 2017-08-16 LAB
ALBUMIN SERPL-MCNC: 3.4 G/DL (ref 3.5–5)
ALBUMIN/GLOB SERPL: 1.3 G/DL (ref 1.5–2.5)
ALP SERPL-CCNC: 87 U/L (ref 35–104)
ALT SERPL W P-5'-P-CCNC: 77 U/L (ref 10–36)
ANION GAP SERPL CALCULATED.3IONS-SCNC: 5.5 MMOL/L (ref 3.6–11.2)
ANISOCYTOSIS BLD QL: ABNORMAL
AST SERPL-CCNC: 76 U/L (ref 10–30)
BILIRUB SERPL-MCNC: 0.4 MG/DL (ref 0.2–1.8)
BUN BLD-MCNC: 9 MG/DL (ref 7–21)
BUN/CREAT SERPL: 22 (ref 7–25)
CALCIUM SPEC-SCNC: 8.7 MG/DL (ref 7.7–10)
CHLORIDE SERPL-SCNC: 107 MMOL/L (ref 99–112)
CO2 SERPL-SCNC: 26.5 MMOL/L (ref 24.3–31.9)
CREAT BLD-MCNC: 0.41 MG/DL (ref 0.43–1.29)
D-LACTATE SERPL-SCNC: 1.1 MMOL/L (ref 0.5–2)
DEPRECATED RDW RBC AUTO: 60.2 FL (ref 37–54)
ERYTHROCYTE [DISTWIDTH] IN BLOOD BY AUTOMATED COUNT: 21.6 % (ref 11.5–14.5)
GFR SERPL CREATININE-BSD FRML MDRD: >150 ML/MIN/1.73
GLOBULIN UR ELPH-MCNC: 2.7 GM/DL
GLUCOSE BLD-MCNC: 138 MG/DL (ref 70–110)
HCT VFR BLD AUTO: 35.9 % (ref 37–47)
HGB BLD-MCNC: 10.7 G/DL (ref 12–16)
HYPOCHROMIA BLD QL: ABNORMAL
LYMPHOCYTES # BLD MANUAL: 1.91 10*3/MM3 (ref 1–3)
LYMPHOCYTES NFR BLD MANUAL: 13 % (ref 21–51)
LYMPHOCYTES NFR BLD MANUAL: 4 % (ref 0–10)
MCH RBC QN AUTO: 24 PG (ref 27–33)
MCHC RBC AUTO-ENTMCNC: 29.8 G/DL (ref 33–37)
MCV RBC AUTO: 80.5 FL (ref 80–94)
MICROCYTES BLD QL: ABNORMAL
MONOCYTES # BLD AUTO: 0.59 10*3/MM3 (ref 0.1–0.9)
NEUTROPHILS # BLD AUTO: 12.22 10*3/MM3 (ref 1.4–6.5)
NEUTROPHILS NFR BLD MANUAL: 83 % (ref 30–70)
OSMOLALITY SERPL CALC.SUM OF ELEC: 278.4 MOSM/KG (ref 273–305)
PLAT MORPH BLD: NORMAL
PLATELET # BLD AUTO: 197 10*3/MM3 (ref 130–400)
PMV BLD AUTO: 10.1 FL (ref 6–10)
POIKILOCYTOSIS BLD QL SMEAR: ABNORMAL
POTASSIUM BLD-SCNC: 4.1 MMOL/L (ref 3.5–5.3)
PROT SERPL-MCNC: 6.1 G/DL (ref 6–8)
RBC # BLD AUTO: 4.46 10*6/MM3 (ref 4.2–5.4)
SCAN SLIDE: NORMAL
SODIUM BLD-SCNC: 139 MMOL/L (ref 135–153)
TROPONIN I SERPL-MCNC: 0.04 NG/ML
TROPONIN I SERPL-MCNC: 0.06 NG/ML
WBC NRBC COR # BLD: 14.72 10*3/MM3 (ref 4.5–12.5)

## 2017-08-16 PROCEDURE — 85025 COMPLETE CBC W/AUTO DIFF WBC: CPT | Performed by: NURSE PRACTITIONER

## 2017-08-16 PROCEDURE — 96375 TX/PRO/DX INJ NEW DRUG ADDON: CPT

## 2017-08-16 PROCEDURE — 83605 ASSAY OF LACTIC ACID: CPT | Performed by: NURSE PRACTITIONER

## 2017-08-16 PROCEDURE — 84484 ASSAY OF TROPONIN QUANT: CPT | Performed by: NURSE PRACTITIONER

## 2017-08-16 PROCEDURE — 80053 COMPREHEN METABOLIC PANEL: CPT | Performed by: NURSE PRACTITIONER

## 2017-08-16 PROCEDURE — 96374 THER/PROPH/DIAG INJ IV PUSH: CPT

## 2017-08-16 PROCEDURE — 25010000002 HYDROMORPHONE PER 4 MG: Performed by: NURSE PRACTITIONER

## 2017-08-16 PROCEDURE — 94799 UNLISTED PULMONARY SVC/PX: CPT

## 2017-08-16 PROCEDURE — 87040 BLOOD CULTURE FOR BACTERIA: CPT | Performed by: NURSE PRACTITIONER

## 2017-08-16 PROCEDURE — 71010 XR CHEST AP: CPT | Performed by: RADIOLOGY

## 2017-08-16 PROCEDURE — 71010 HC CHEST AP: CPT

## 2017-08-16 PROCEDURE — 93005 ELECTROCARDIOGRAM TRACING: CPT | Performed by: NURSE PRACTITIONER

## 2017-08-16 PROCEDURE — 25010000002 MORPHINE PER 10 MG: Performed by: NURSE PRACTITIONER

## 2017-08-16 PROCEDURE — 25010000002 METHYLPREDNISOLONE PER 125 MG: Performed by: NURSE PRACTITIONER

## 2017-08-16 PROCEDURE — 36415 COLL VENOUS BLD VENIPUNCTURE: CPT

## 2017-08-16 PROCEDURE — 94640 AIRWAY INHALATION TREATMENT: CPT

## 2017-08-16 PROCEDURE — 85007 BL SMEAR W/DIFF WBC COUNT: CPT | Performed by: NURSE PRACTITIONER

## 2017-08-16 PROCEDURE — 99285 EMERGENCY DEPT VISIT HI MDM: CPT

## 2017-08-16 RX ORDER — DOXYCYCLINE 100 MG/1
100 CAPSULE ORAL 2 TIMES DAILY
Qty: 20 CAPSULE | Refills: 0 | Status: SHIPPED | OUTPATIENT
Start: 2017-08-16 | End: 2017-09-11

## 2017-08-16 RX ORDER — MORPHINE SULFATE 2 MG/ML
2 INJECTION, SOLUTION INTRAMUSCULAR; INTRAVENOUS ONCE
Status: COMPLETED | OUTPATIENT
Start: 2017-08-16 | End: 2017-08-16

## 2017-08-16 RX ORDER — ACETAMINOPHEN 325 MG/1
1000 TABLET ORAL ONCE
Status: COMPLETED | OUTPATIENT
Start: 2017-08-16 | End: 2017-08-16

## 2017-08-16 RX ORDER — METHYLPREDNISOLONE SODIUM SUCCINATE 125 MG/2ML
125 INJECTION, POWDER, LYOPHILIZED, FOR SOLUTION INTRAMUSCULAR; INTRAVENOUS ONCE
Status: COMPLETED | OUTPATIENT
Start: 2017-08-16 | End: 2017-08-16

## 2017-08-16 RX ORDER — HYDROMORPHONE HYDROCHLORIDE 1 MG/ML
0.5 INJECTION, SOLUTION INTRAMUSCULAR; INTRAVENOUS; SUBCUTANEOUS ONCE
Status: COMPLETED | OUTPATIENT
Start: 2017-08-16 | End: 2017-08-16

## 2017-08-16 RX ORDER — SODIUM CHLORIDE 0.9 % (FLUSH) 0.9 %
10 SYRINGE (ML) INJECTION AS NEEDED
Status: DISCONTINUED | OUTPATIENT
Start: 2017-08-16 | End: 2017-08-16 | Stop reason: HOSPADM

## 2017-08-16 RX ORDER — IPRATROPIUM BROMIDE AND ALBUTEROL SULFATE 2.5; .5 MG/3ML; MG/3ML
3 SOLUTION RESPIRATORY (INHALATION) ONCE
Status: COMPLETED | OUTPATIENT
Start: 2017-08-16 | End: 2017-08-16

## 2017-08-16 RX ADMIN — METHYLPREDNISOLONE SODIUM SUCCINATE 125 MG: 125 INJECTION, POWDER, FOR SOLUTION INTRAMUSCULAR; INTRAVENOUS at 15:12

## 2017-08-16 RX ADMIN — IPRATROPIUM BROMIDE AND ALBUTEROL SULFATE 3 ML: .5; 3 SOLUTION RESPIRATORY (INHALATION) at 15:10

## 2017-08-16 RX ADMIN — MORPHINE SULFATE 2 MG: 2 INJECTION, SOLUTION INTRAMUSCULAR; INTRAVENOUS at 15:36

## 2017-08-16 RX ADMIN — HYDROMORPHONE HYDROCHLORIDE 0.5 MG: 1 INJECTION, SOLUTION INTRAMUSCULAR; INTRAVENOUS; SUBCUTANEOUS at 17:47

## 2017-08-16 RX ADMIN — ACETAMINOPHEN 975 MG: 325 TABLET ORAL at 15:32

## 2017-08-17 ENCOUNTER — HOSPITAL ENCOUNTER (EMERGENCY)
Facility: HOSPITAL | Age: 52
Discharge: HOME | End: 2017-08-17
Attending: EMERGENCY MEDICINE | Admitting: EMERGENCY MEDICINE

## 2017-08-17 VITALS
TEMPERATURE: 97.9 F | OXYGEN SATURATION: 96 % | HEIGHT: 63 IN | SYSTOLIC BLOOD PRESSURE: 150 MMHG | DIASTOLIC BLOOD PRESSURE: 82 MMHG | RESPIRATION RATE: 20 BRPM | HEART RATE: 100 BPM | WEIGHT: 114 LBS | BODY MASS INDEX: 20.2 KG/M2

## 2017-08-17 DIAGNOSIS — J20.9 ACUTE BRONCHITIS, UNSPECIFIED ORGANISM: Primary | ICD-10-CM

## 2017-08-17 LAB
A-A DO2: 44.8 MMHG (ref 0–300)
ARTERIAL PATENCY WRIST A: POSITIVE
ATMOSPHERIC PRESS: 728 MMHG
BASE EXCESS BLDA CALC-SCNC: 2 MMOL/L
BDY SITE: ABNORMAL
BODY TEMPERATURE: 98.6 C
COHGB MFR BLD: 2.2 % (ref 0–5)
HCO3 BLDA-SCNC: 25.2 MMOL/L (ref 22–26)
HCT VFR BLD CALC: 34 % (ref 37–47)
HGB BLDA-MCNC: 11.5 G/DL (ref 12–16)
HOROWITZ INDEX BLD+IHG-RTO: 21 %
METHGB BLD QL: 0.2 % (ref 0–3)
MODALITY: ABNORMAL
OXYHGB MFR BLDV: 88 % (ref 85–100)
PCO2 BLDA: 34.4 MM HG (ref 35–45)
PH BLDA: 7.48 PH UNITS (ref 7.35–7.45)
PO2 BLDA: 57 MM HG (ref 80–100)
SAO2 % BLDCOA: 90.2 % (ref 90–100)
TROPONIN I SERPL-MCNC: 0.01 NG/ML

## 2017-08-17 PROCEDURE — 36415 COLL VENOUS BLD VENIPUNCTURE: CPT

## 2017-08-17 PROCEDURE — 94799 UNLISTED PULMONARY SVC/PX: CPT

## 2017-08-17 PROCEDURE — 83050 HGB METHEMOGLOBIN QUAN: CPT | Performed by: PHYSICIAN ASSISTANT

## 2017-08-17 PROCEDURE — 82375 ASSAY CARBOXYHB QUANT: CPT | Performed by: PHYSICIAN ASSISTANT

## 2017-08-17 PROCEDURE — 36600 WITHDRAWAL OF ARTERIAL BLOOD: CPT | Performed by: PHYSICIAN ASSISTANT

## 2017-08-17 PROCEDURE — 84484 ASSAY OF TROPONIN QUANT: CPT | Performed by: PHYSICIAN ASSISTANT

## 2017-08-17 PROCEDURE — 99284 EMERGENCY DEPT VISIT MOD MDM: CPT

## 2017-08-17 PROCEDURE — 82805 BLOOD GASES W/O2 SATURATION: CPT | Performed by: PHYSICIAN ASSISTANT

## 2017-08-17 PROCEDURE — 93005 ELECTROCARDIOGRAM TRACING: CPT | Performed by: PHYSICIAN ASSISTANT

## 2017-08-17 RX ORDER — DOXYCYCLINE 100 MG/1
100 CAPSULE ORAL 2 TIMES DAILY
Qty: 20 CAPSULE | Refills: 0 | Status: SHIPPED | OUTPATIENT
Start: 2017-08-17 | End: 2017-08-27

## 2017-08-17 RX ORDER — IPRATROPIUM BROMIDE AND ALBUTEROL SULFATE 2.5; .5 MG/3ML; MG/3ML
3 SOLUTION RESPIRATORY (INHALATION) ONCE
Status: COMPLETED | OUTPATIENT
Start: 2017-08-17 | End: 2017-08-17

## 2017-08-17 RX ADMIN — IPRATROPIUM BROMIDE AND ALBUTEROL SULFATE 3 ML: .5; 3 SOLUTION RESPIRATORY (INHALATION) at 08:28

## 2017-08-17 NOTE — ED PROVIDER NOTES
Subjective   HPI Comments: This is a 51-year-old female patient who presents to the ER with a chief complaint of shortness of breath.  The patient was just here last not for the same chief complaint.  Her recent workup includes chest and abdomen pelvis CTs on 07/22/17 that only revealed an abnormality of perihilar disease.  She also had a negative negative cardiac stress test on 7/25/2017.  On her visit yesterday, she received IV steroid therapy as well as nebulizer treatments.  She left after her discharge without her prescription for antibiotic or her discharge paperwork.  She stayed in the lobby all night.  She checked in this morning with chief complaint of shortness of breath and some chest tightness.    Patient is a 51 y.o. female presenting with shortness of breath.   Shortness of Breath   Severity:  Moderate  Onset quality:  Gradual  Duration:  2 days  Timing:  Constant  Progression:  Worsening  Chronicity:  Chronic  Context: activity and URI    Relieved by:  None tried  Worsened by:  Activity, coughing, exertion and movement  Ineffective treatments:  None tried  Associated symptoms: chest pain, cough and wheezing    Associated symptoms: no abdominal pain, no diaphoresis, no fever, no headaches, no sore throat and no sputum production        Review of Systems   Constitutional: Negative.  Negative for diaphoresis and fever.   HENT: Negative.  Negative for sore throat.    Respiratory: Positive for cough, shortness of breath and wheezing. Negative for sputum production.    Cardiovascular: Positive for chest pain. Negative for palpitations and leg swelling.   Gastrointestinal: Negative.  Negative for abdominal pain.   Endocrine: Negative.    Genitourinary: Negative.  Negative for dysuria, frequency and urgency.   Skin: Negative.    Neurological: Negative.  Negative for headaches.   Psychiatric/Behavioral: Negative.    All other systems reviewed and are negative.      Past Medical History:   Diagnosis Date   •  Anxiety    • 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   • Chronic respiratory failure with hypoxia     at nighttime   • Compression fracture of lumbar vertebra    • Compression fracture of thoracic vertebra    • COPD (chronic obstructive pulmonary disease)    • Coronary artery disease    • Diabetes mellitus type 2 in nonobese    • Erosive esophagitis 2016   • Essential hypertension    • Gastroparesis    • GERD (gastroesophageal reflux disease)    • Hepatitis-C    • History of transfusion    • Medically noncompliant    • MRSA infection     back of neck around ; 2017 septic left elbow joint with concerns for osteomyelitis and left anteromedial left arm   • NSTEMI (non-ST elevated myocardial infarction) 2016   • PE (pulmonary embolism)    • Protein calorie malnutrition    • RA (rheumatoid arthritis)     with chronic steroid use   • Rheumatoid arthritis        Allergies   Allergen Reactions   • Penicillins Anaphylaxis     Tolerated both cefepime and Rocephin earlier this month   • Toradol [Ketorolac Tromethamine] Hives and Swelling   • Tramadol Hives and Swelling       Past Surgical History:   Procedure Laterality Date   • ABDOMINAL SURGERY     • AMPUTATION DIGIT Right 2016    Third digit to the PIP. Surgeon: Dr. Bains   • AMPUTATION DIGIT Left 2016    Procedure: PARTIAL AMPUTATION LEFT THIRD FINGER;  Surgeon: Shane Fuller MD;  Location: [facility] OR;  Service:    • AMPUTATION REVISION Left 2016    Procedure: AMPUTATION REVISION DIGIT STUMP;  Surgeon: Shane Fuller MD;  Location: [facility] OR;  Service:    • ARM DEBRIDEMENT Left 2016    Procedure: HAND IRRIGATION DEBRIDEMENT AND REPAIR ;  Surgeon: Shane Fuller MD;  Location: [facility] OR;  Service:    •  SECTION      x 2   • CHOLECYSTECTOMY     • ENDOSCOPY N/A 2016    Procedure: ESOPHAGOGASTRODUODENOSCOPY;  Surgeon: Jeremy Clement III, MD;  Location: [facility] OR;  Service:    •  ENDOSCOPY W/ PEG TUBE PLACEMENT N/A 3/30/2017    Procedure: ESOPHAGOGASTRODUODENOSCOPY WITH PERCUTANEOUS ENDOSCOPIC GASTROSTOMY TUBE INSERTION;  Surgeon: Shade Campos MD;  Location:  [facility] OR;  Service:    • FOREIGN BODY REMOVAL      insulin needle removal x 3 from the abdomen   • HYSTERECTOMY     • INCISION AND DRAINAGE ARM Left 6/27/2017    Procedure: INCISION AND DRAINAGE UPPER EXTREMITY;  Surgeon: Shane Fuller MD;  Location:  [facility] OR;  Service:        Family History   Problem Relation Age of Onset   • Colon cancer Mother    • Cancer Father    • Diabetes Maternal Grandmother    • Heart disease Neg Hx    • Stroke Neg Hx        Social History     Social History   • Marital status:      Spouse name: N/A   • Number of children: N/A   • Years of education: N/A     Social History Main Topics   • Smoking status: Current Every Day Smoker     Packs/day: 1.00     Years: 35.00     Types: Cigarettes   • Smokeless tobacco: None      Comment: 4-5 cigarettes a day    • Alcohol use No   • Drug use: No   • Sexual activity: Defer     Other Topics Concern   • None     Social History Narrative           Objective   Physical Exam   Constitutional: She is oriented to person, place, and time. She appears well-developed and well-nourished. No distress.   HENT:   Head: Normocephalic and atraumatic.   Right Ear: External ear normal.   Left Ear: External ear normal.   Nose: Nose normal.   Eyes: Conjunctivae and EOM are normal. Pupils are equal, round, and reactive to light.   Neck: Normal range of motion. Neck supple. No JVD present. No tracheal deviation present.   Cardiovascular: Normal rate, regular rhythm and normal heart sounds.    No murmur heard.  Pulmonary/Chest: Effort normal. No respiratory distress. She has wheezes.   Wheezing in left upper and lower lung lobes.   Abdominal: Soft. Bowel sounds are normal. There is no tenderness.   Musculoskeletal: Normal range of motion. She exhibits no edema or  deformity.   Neurological: She is alert and oriented to person, place, and time. No cranial nerve deficit.   Skin: Skin is warm and dry. No rash noted. She is not diaphoretic. No erythema. No pallor.   Psychiatric: She has a normal mood and affect. Her behavior is normal. Thought content normal.   Nursing note and vitals reviewed.      Procedures         ED Course  ED Course   Value Comment By Time   ECG 12 Lead Sinus tachycardia; pulmonary disease pattern; no acute changes per COREEN Barbour 08/17 2957    Patient's SOB is much improved. Will d/c her home with rx for antibiotics and instructions to return if symptoms worsen. COREEN Berumen 08/17 0911                  MDM  Number of Diagnoses or Management Options     Amount and/or Complexity of Data Reviewed  Clinical lab tests: reviewed and ordered  Decide to obtain previous medical records or to obtain history from someone other than the patient: yes  Discuss the patient with other providers: yes        Final diagnoses:   Acute bronchitis, unspecified organism            COREEN Berumen  08/17/17 [id number]

## 2017-08-17 NOTE — DISCHARGE INSTRUCTIONS
Please start and finish doxycycline.  Please use your nebulizer treatments and inhalers you have at home as needed for symptomatic relief.  Please follow-up with your PCP in 1 week and contact them sooner for resolution of issues with your home oxygen.  Please return to ER if symptoms worsen.    Hypertension  Hypertension, commonly called high blood pressure, is when the force of blood pumping through your arteries is too strong. Your arteries are the blood vessels that carry blood from your heart throughout your body. A blood pressure reading consists of a higher number over a lower number, such as 110/72. The higher number (systolic) is the pressure inside your arteries when your heart pumps. The lower number (diastolic) is the pressure inside your arteries when your heart relaxes. Ideally you want your blood pressure below 120/80.  Hypertension forces your heart to work harder to pump blood. Your arteries may become narrow or stiff. Having untreated or uncontrolled hypertension can cause heart attack, stroke, kidney disease, and other problems.  RISK FACTORS  Some risk factors for high blood pressure are controllable. Others are not.   Risk factors you cannot control include:   · Race. You may be at higher risk if you are .  · Age. Risk increases with age.  · Gender. Men are at higher risk than women before age 45 years. After age 65, women are at higher risk than men.  Risk factors you can control include:  · Not getting enough exercise or physical activity.  · Being overweight.  · Getting too much fat, sugar, calories, or salt in your diet.  · Drinking too much alcohol.  SIGNS AND SYMPTOMS  Hypertension does not usually cause signs or symptoms. Extremely high blood pressure (hypertensive crisis) may cause headache, anxiety, shortness of breath, and nosebleed.  DIAGNOSIS  To check if you have hypertension, your health care provider will measure your blood pressure while you are seated, with your  arm held at the level of your heart. It should be measured at least twice using the same arm. Certain conditions can cause a difference in blood pressure between your right and left arms. A blood pressure reading that is higher than normal on one occasion does not mean that you need treatment. If it is not clear whether you have high blood pressure, you may be asked to return on a different day to have your blood pressure checked again. Or, you may be asked to monitor your blood pressure at home for 1 or more weeks.  TREATMENT  Treating high blood pressure includes making lifestyle changes and possibly taking medicine. Living a healthy lifestyle can help lower high blood pressure. You may need to change some of your habits.  Lifestyle changes may include:  · Following the DASH diet. This diet is high in fruits, vegetables, and whole grains. It is low in salt, red meat, and added sugars.  · Keep your sodium intake below 2,300 mg per day.  · Getting at least 30-45 minutes of aerobic exercise at least 4 times per week.  · Losing weight if necessary.  · Not smoking.  · Limiting alcoholic beverages.  · Learning ways to reduce stress.  Your health care provider may prescribe medicine if lifestyle changes are not enough to get your blood pressure under control, and if one of the following is true:  · You are 18-59 years of age and your systolic blood pressure is above 140.  · You are 60 years of age or older, and your systolic blood pressure is above 150.  · Your diastolic blood pressure is above 90.  · You have diabetes, and your systolic blood pressure is over 140 or your diastolic blood pressure is over 90.  · You have kidney disease and your blood pressure is above 140/90.  · You have heart disease and your blood pressure is above 140/90.  Your personal target blood pressure may vary depending on your medical conditions, your age, and other factors.  HOME CARE INSTRUCTIONS  · Have your blood pressure rechecked as  directed by your health care provider.    · Take medicines only as directed by your health care provider. Follow the directions carefully. Blood pressure medicines must be taken as prescribed. The medicine does not work as well when you skip doses. Skipping doses also puts you at risk for problems.  · Do not smoke.    · Monitor your blood pressure at home as directed by your health care provider.   SEEK MEDICAL CARE IF:   · You think you are having a reaction to medicines taken.  · You have recurrent headaches or feel dizzy.  · You have swelling in your ankles.  · You have trouble with your vision.  SEEK IMMEDIATE MEDICAL CARE IF:  · You develop a severe headache or confusion.  · You have unusual weakness, numbness, or feel faint.  · You have severe chest or abdominal pain.  · You vomit repeatedly.  · You have trouble breathing.  MAKE SURE YOU:   · Understand these instructions.  · Will watch your condition.  · Will get help right away if you are not doing well or get worse.     This information is not intended to replace advice given to you by your health care provider. Make sure you discuss any questions you have with your health care provider.     Document Released: 12/18/2006 Document Revised: 05/03/2016 Document Reviewed: 10/10/2014  [employer] Patient Education ©2017 [employer]

## 2017-08-21 LAB
BACTERIA SPEC AEROBE CULT: NORMAL
BACTERIA SPEC AEROBE CULT: NORMAL

## 2017-09-03 ENCOUNTER — HOSPITAL ENCOUNTER (EMERGENCY)
Facility: HOSPITAL | Age: 52
Discharge: HOME | End: 2017-09-03
Attending: EMERGENCY MEDICINE | Admitting: EMERGENCY MEDICINE

## 2017-09-03 VITALS
TEMPERATURE: 98 F | HEART RATE: 104 BPM | SYSTOLIC BLOOD PRESSURE: 140 MMHG | HEIGHT: 63 IN | BODY MASS INDEX: 19.49 KG/M2 | RESPIRATION RATE: 18 BRPM | DIASTOLIC BLOOD PRESSURE: 82 MMHG | OXYGEN SATURATION: 95 % | WEIGHT: 110 LBS

## 2017-09-03 DIAGNOSIS — M25.541 ARTHRALGIA OF BOTH HANDS: Primary | ICD-10-CM

## 2017-09-03 DIAGNOSIS — M25.542 ARTHRALGIA OF BOTH HANDS: Primary | ICD-10-CM

## 2017-09-03 PROCEDURE — 25010000002 METHYLPREDNISOLONE PER 80 MG: Performed by: NURSE PRACTITIONER

## 2017-09-03 PROCEDURE — 99283 EMERGENCY DEPT VISIT LOW MDM: CPT

## 2017-09-03 PROCEDURE — 96372 THER/PROPH/DIAG INJ SC/IM: CPT

## 2017-09-03 RX ORDER — METHYLPREDNISOLONE ACETATE 80 MG/ML
120 INJECTION, SUSPENSION INTRA-ARTICULAR; INTRALESIONAL; INTRAMUSCULAR; SOFT TISSUE ONCE
Status: COMPLETED | OUTPATIENT
Start: 2017-09-03 | End: 2017-09-03

## 2017-09-03 RX ORDER — PREDNISONE 10 MG/1
10 TABLET ORAL DAILY
Qty: 5 TABLET | Refills: 0 | Status: SHIPPED | OUTPATIENT
Start: 2017-09-03 | End: 2017-09-08

## 2017-09-03 RX ORDER — ISOSORBIDE MONONITRATE 30 MG/1
30 TABLET, EXTENDED RELEASE ORAL DAILY
Qty: 5 TABLET | Refills: 0 | Status: SHIPPED | OUTPATIENT
Start: 2017-09-03 | End: 2017-09-08

## 2017-09-03 RX ORDER — HYDROCODONE BITARTRATE AND ACETAMINOPHEN 10; 325 MG/1; MG/1
1 TABLET ORAL ONCE
Status: COMPLETED | OUTPATIENT
Start: 2017-09-03 | End: 2017-09-03

## 2017-09-03 RX ADMIN — METHYLPREDNISOLONE ACETATE 120 MG: 80 INJECTION, SUSPENSION INTRA-ARTICULAR; INTRALESIONAL; INTRAMUSCULAR; SOFT TISSUE at 21:43

## 2017-09-03 RX ADMIN — HYDROCODONE BITARTRATE AND ACETAMINOPHEN 1 TABLET: 10; 325 TABLET ORAL at 21:42

## 2017-09-04 NOTE — ED PROVIDER NOTES
Subjective   HPI Comments: Patient reports that she normally takes Prednisone 10mg PO daily but she has ran out of her medication and her appointment is no until Wednesday.  She can tell the difference in her breathing and her joint pain since her medication has ran out.     Patient is a 51 y.o. female presenting with shortness of breath.   History provided by:  Patient  Shortness of Breath   Severity:  Mild  Onset quality:  Sudden  Timing:  Constant  Progression:  Waxing and waning  Relieved by:  None tried  Worsened by:  Nothing  Ineffective treatments:  None tried  Associated symptoms: no abdominal pain, no chest pain and no fever        Review of Systems   Constitutional: Negative.  Negative for fever.   HENT: Negative.    Respiratory: Positive for shortness of breath.    Cardiovascular: Negative.  Negative for chest pain.   Gastrointestinal: Negative.  Negative for abdominal pain.   Endocrine: Negative.    Genitourinary: Negative.  Negative for dysuria.   Skin: Negative.    Neurological: Negative.    Psychiatric/Behavioral: Negative.    All other systems reviewed and are negative.      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   • Chronic respiratory failure with hypoxia     at nighttime   • Compression fracture of lumbar vertebra    • Compression fracture of thoracic vertebra    • COPD (chronic obstructive pulmonary disease)    • Coronary artery disease    • Diabetes mellitus type 2 in nonobese    • Erosive esophagitis 12/2016   • Essential hypertension    • Gastroparesis    • GERD (gastroesophageal reflux disease)    • Hepatitis-C    • History of transfusion    • Medically noncompliant    • MRSA infection     back of neck around 2012; 6/2017 septic left elbow joint with concerns for osteomyelitis and left anteromedial left arm   • NSTEMI (non-ST elevated myocardial infarction) 07/2016   • PE (pulmonary embolism)    • Protein calorie  malnutrition    • RA (rheumatoid arthritis)     with chronic steroid use   • Rheumatoid arthritis        Allergies   Allergen Reactions   • Penicillins Anaphylaxis     Tolerated both cefepime and Rocephin earlier this month   • Toradol [Ketorolac Tromethamine] Hives and Swelling   • Tramadol Hives and Swelling       Past Surgical History:   Procedure Laterality Date   • ABDOMINAL SURGERY     • AMPUTATION DIGIT Right 2016    Third digit to the PIP. Surgeon: Dr. Bains   • AMPUTATION DIGIT Left 2016    Procedure: PARTIAL AMPUTATION LEFT THIRD FINGER;  Surgeon: Shane Fuller MD;  Location: [facility] OR;  Service:    • AMPUTATION REVISION Left 2016    Procedure: AMPUTATION REVISION DIGIT STUMP;  Surgeon: Shane Fuller MD;  Location: [facility] OR;  Service:    • ARM DEBRIDEMENT Left 2016    Procedure: HAND IRRIGATION DEBRIDEMENT AND REPAIR ;  Surgeon: Shane Fuller MD;  Location: [facility] OR;  Service:    •  SECTION      x 2   • CHOLECYSTECTOMY     • ENDOSCOPY N/A 2016    Procedure: ESOPHAGOGASTRODUODENOSCOPY;  Surgeon: Jeremy Clement III, MD;  Location: [facility] OR;  Service:    • ENDOSCOPY W/ PEG TUBE PLACEMENT N/A 3/30/2017    Procedure: ESOPHAGOGASTRODUODENOSCOPY WITH PERCUTANEOUS ENDOSCOPIC GASTROSTOMY TUBE INSERTION;  Surgeon: Shade Campos MD;  Location: [facility] OR;  Service:    • FOREIGN BODY REMOVAL      insulin needle removal x 3 from the abdomen   • HYSTERECTOMY     • INCISION AND DRAINAGE ARM Left 2017    Procedure: INCISION AND DRAINAGE UPPER EXTREMITY;  Surgeon: Shane Fuller MD;  Location: [facility] OR;  Service:        Family History   Problem Relation Age of Onset   • Colon cancer Mother    • Cancer Father    • Diabetes Maternal Grandmother    • Heart disease Neg Hx    • Stroke Neg Hx        Social History     Social History   • Marital status:      Spouse name: N/A   • Number of children: N/A   • Years of education:  N/A     Social History Main Topics   • Smoking status: Current Every Day Smoker     Packs/day: 1.00     Years: 35.00     Types: Cigarettes   • Smokeless tobacco: None      Comment: 4-5 cigarettes a day    • Alcohol use No   • Drug use: No   • Sexual activity: Defer     Other Topics Concern   • None     Social History Narrative           Objective   Physical Exam   Constitutional: She is oriented to person, place, and time. She appears well-developed and well-nourished. No distress.   HENT:   Head: Normocephalic and atraumatic.   Right Ear: External ear normal.   Left Ear: External ear normal.   Nose: Nose normal.   Eyes: Conjunctivae and EOM are normal. Pupils are equal, round, and reactive to light.   Neck: Normal range of motion. Neck supple. No JVD present. No tracheal deviation present.   Cardiovascular: Normal rate, regular rhythm and normal heart sounds.    No murmur heard.  Pulmonary/Chest: Effort normal and breath sounds normal. No respiratory distress. She has no wheezes.   Abdominal: Soft. Bowel sounds are normal. There is no tenderness.   Musculoskeletal: Normal range of motion. She exhibits no edema or deformity.   Neurological: She is alert and oriented to person, place, and time. No cranial nerve deficit.   Skin: Skin is warm and dry. No rash noted. She is not diaphoretic. No erythema. No pallor.   Psychiatric: She has a normal mood and affect. Her behavior is normal. Thought content normal.   Nursing note and vitals reviewed.      Procedures         ED Course  ED Course                  MDM  Number of Diagnoses or Management Options  Arthralgia of both hands: minor  Risk of Complications, Morbidity, and/or Mortality  Presenting problems: minimal  Diagnostic procedures: minimal  Management options: minimal    Patient Progress  Patient progress: stable      Final diagnoses:   Arthralgia of both hands            Lizy Sifuentes, APRN  09/04/17 1801

## 2017-09-04 NOTE — DISCHARGE INSTRUCTIONS

## 2017-09-08 ENCOUNTER — HOSPITAL ENCOUNTER (EMERGENCY)
Facility: HOSPITAL | Age: 52
Discharge: HOME | End: 2017-09-08
Attending: EMERGENCY MEDICINE | Admitting: EMERGENCY MEDICINE

## 2017-09-08 VITALS
TEMPERATURE: 98.1 F | RESPIRATION RATE: 17 BRPM | DIASTOLIC BLOOD PRESSURE: 84 MMHG | SYSTOLIC BLOOD PRESSURE: 131 MMHG | WEIGHT: 110 LBS | OXYGEN SATURATION: 99 % | HEIGHT: 63 IN | HEART RATE: 99 BPM | BODY MASS INDEX: 19.49 KG/M2

## 2017-09-08 DIAGNOSIS — M71.021 ABSCESS OF BURSA, RIGHT ELBOW: Primary | ICD-10-CM

## 2017-09-08 RX ORDER — DOXYCYCLINE 100 MG/1
100 CAPSULE ORAL 2 TIMES DAILY
Qty: 20 CAPSULE | Refills: 0 | Status: SHIPPED | OUTPATIENT
Start: 2017-09-08 | End: 2017-09-14 | Stop reason: HOSPADM

## 2017-09-08 RX ORDER — PREDNISONE 20 MG/1
20 TABLET ORAL 3 TIMES DAILY
Qty: 15 TABLET | Refills: 0 | Status: SHIPPED | OUTPATIENT
Start: 2017-09-08 | End: 2017-09-14 | Stop reason: HOSPADM

## 2017-09-08 RX ORDER — METHYLPREDNISOLONE SODIUM SUCCINATE 125 MG/2ML
125 INJECTION, POWDER, LYOPHILIZED, FOR SOLUTION INTRAMUSCULAR; INTRAVENOUS ONCE
Status: COMPLETED | OUTPATIENT
Start: 2017-09-08 | End: 2017-09-08

## 2017-09-08 RX ORDER — DOXYCYCLINE 100 MG/1
100 CAPSULE ORAL ONCE
Status: COMPLETED | OUTPATIENT
Start: 2017-09-08 | End: 2017-09-08

## 2017-09-08 RX ORDER — HYDROCODONE BITARTRATE AND ACETAMINOPHEN 5; 325 MG/1; MG/1
1 TABLET ORAL ONCE
Status: COMPLETED | OUTPATIENT
Start: 2017-09-08 | End: 2017-09-08

## 2017-09-08 RX ADMIN — DOXYCYCLINE 100 MG: 100 CAPSULE ORAL at 17:50

## 2017-09-08 RX ADMIN — METHYLPREDNISOLONE SODIUM SUCCINATE 125 MG: 125 INJECTION, POWDER, FOR SOLUTION INTRAMUSCULAR; INTRAVENOUS at 17:51

## 2017-09-08 RX ADMIN — HYDROCODONE BITARTRATE AND ACETAMINOPHEN 1 TABLET: 5; 325 TABLET ORAL at 17:51

## 2017-09-08 NOTE — ED PROVIDER NOTES
Subjective   Patient is a 51 y.o. female presenting with abscess.   History provided by:  Patient   used: No    Abscess   Location:  Shoulder/arm  Shoulder/arm abscess location:  R elbow  Abscess quality: painful and redness    Red streaking: no    Duration:  10 days  Progression:  Unchanged  Pain details:     Quality:  Dull    Severity:  Moderate    Timing:  Constant    Progression:  Unchanged  Context: not injected drug use and not insect bite/sting    Relieved by:  None tried  Worsened by:  Nothing  Ineffective treatments:  None tried  Associated symptoms: no fever, no headaches, no nausea and no vomiting    Risk factors: prior abscess        Review of Systems   Constitutional: Negative for activity change and fever.   HENT: Negative for congestion and sore throat.    Eyes: Negative for pain.   Respiratory: Negative for cough, shortness of breath and wheezing.    Cardiovascular: Negative for chest pain.   Gastrointestinal: Negative for abdominal distention, diarrhea, nausea and vomiting.   Genitourinary: Negative for difficulty urinating and dysuria.   Musculoskeletal: Negative for arthralgias and myalgias.   Skin: Negative for rash and wound.   Neurological: Negative for dizziness and headaches.   Psychiatric/Behavioral: Negative for agitation.   All other systems reviewed and are negative.      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   • Chronic respiratory failure with hypoxia     at nighttime   • Compression fracture of lumbar vertebra    • Compression fracture of thoracic vertebra    • COPD (chronic obstructive pulmonary disease)    • Coronary artery disease    • Diabetes mellitus type 2 in nonobese    • Erosive esophagitis 12/2016   • Essential hypertension    • Gastroparesis    • GERD (gastroesophageal reflux disease)    • Hepatitis-C    • History of transfusion    • Medically noncompliant    • MRSA infection     back  of neck around ; 2017 septic left elbow joint with concerns for osteomyelitis and left anteromedial left arm   • NSTEMI (non-ST elevated myocardial infarction) 2016   • PE (pulmonary embolism)    • Protein calorie malnutrition    • RA (rheumatoid arthritis)     with chronic steroid use   • Rheumatoid arthritis        Allergies   Allergen Reactions   • Penicillins Anaphylaxis     Tolerated both cefepime and Rocephin earlier this month   • Toradol [Ketorolac Tromethamine] Hives and Swelling   • Tramadol Hives and Swelling       Past Surgical History:   Procedure Laterality Date   • ABDOMINAL SURGERY     • AMPUTATION DIGIT Right 2016    Third digit to the PIP. Surgeon: Dr. Bains   • AMPUTATION DIGIT Left 2016    Procedure: PARTIAL AMPUTATION LEFT THIRD FINGER;  Surgeon: Shane Fuller MD;  Location: [facility] OR;  Service:    • AMPUTATION REVISION Left 2016    Procedure: AMPUTATION REVISION DIGIT STUMP;  Surgeon: Shane Fuller MD;  Location: [facility] OR;  Service:    • ARM DEBRIDEMENT Left 2016    Procedure: HAND IRRIGATION DEBRIDEMENT AND REPAIR ;  Surgeon: Shane Fuller MD;  Location: [facility] OR;  Service:    •  SECTION      x 2   • CHOLECYSTECTOMY     • ENDOSCOPY N/A 2016    Procedure: ESOPHAGOGASTRODUODENOSCOPY;  Surgeon: Jeremy Clement III, MD;  Location: [facility] OR;  Service:    • ENDOSCOPY W/ PEG TUBE PLACEMENT N/A 3/30/2017    Procedure: ESOPHAGOGASTRODUODENOSCOPY WITH PERCUTANEOUS ENDOSCOPIC GASTROSTOMY TUBE INSERTION;  Surgeon: Shade Campos MD;  Location: [facility] OR;  Service:    • FOREIGN BODY REMOVAL      insulin needle removal x 3 from the abdomen   • HYSTERECTOMY     • INCISION AND DRAINAGE ARM Left 2017    Procedure: INCISION AND DRAINAGE UPPER EXTREMITY;  Surgeon: Shane Fuller MD;  Location: [facility] OR;  Service:        Family History   Problem Relation Age of Onset   • Colon cancer Mother    • Cancer Father     • Diabetes Maternal Grandmother    • Heart disease Neg Hx    • Stroke Neg Hx        Social History     Social History   • Marital status:      Spouse name: N/A   • Number of children: N/A   • Years of education: N/A     Social History Main Topics   • Smoking status: Current Every Day Smoker     Packs/day: 1.00     Years: 35.00     Types: Cigarettes   • Smokeless tobacco: None      Comment: 4-5 cigarettes a day    • Alcohol use No   • Drug use: No   • Sexual activity: Defer     Other Topics Concern   • None     Social History Narrative   • None           Objective   Physical Exam   Constitutional: She is oriented to person, place, and time. She appears well-developed and well-nourished.   HENT:   Head: Normocephalic and atraumatic.   Eyes: EOM are normal. Pupils are equal, round, and reactive to light.   Neck: Normal range of motion. Neck supple.   Cardiovascular: Normal rate, regular rhythm and normal heart sounds.    Pulmonary/Chest: Effort normal and breath sounds normal.   Abdominal: Soft. Bowel sounds are normal.   Musculoskeletal: Normal range of motion.   Neurological: She is alert and oriented to person, place, and time.   Skin: Skin is warm and dry.        Psychiatric: She has a normal mood and affect. Her behavior is normal. Judgment and thought content normal.   Nursing note and vitals reviewed.      Procedures         ED Course  ED Course                  MDM  Number of Diagnoses or Management Options  Abscess of bursa, right elbow:      Amount and/or Complexity of Data Reviewed  Clinical lab tests: ordered and reviewed  Tests in the radiology section of CPT®: reviewed and ordered  Tests in the medicine section of CPT®: reviewed and ordered    Patient Progress  Patient progress: stable      Final diagnoses:   Abscess of bursa, right elbow            COREEN Green  09/09/17 0045

## 2017-09-08 NOTE — ED NOTES
Pt has an enlarged right elbow. Pt has arthritis and deformity of fingers.     Christopher Madsen RN  09/08/17 3093

## 2017-09-10 ENCOUNTER — APPOINTMENT (OUTPATIENT)
Dept: GENERAL RADIOLOGY | Facility: HOSPITAL | Age: 52
End: 2017-09-10

## 2017-09-10 ENCOUNTER — HOSPITAL ENCOUNTER (INPATIENT)
Facility: HOSPITAL | Age: 52
LOS: 3 days | Discharge: HOME | End: 2017-09-14
Attending: EMERGENCY MEDICINE | Admitting: HOSPITALIST

## 2017-09-10 DIAGNOSIS — J18.9 PNEUMONIA OF RIGHT MIDDLE LOBE DUE TO INFECTIOUS ORGANISM: Primary | ICD-10-CM

## 2017-09-10 DIAGNOSIS — J44.0 COPD WITH ACUTE LOWER RESPIRATORY INFECTION (HCC): ICD-10-CM

## 2017-09-10 LAB
ALBUMIN SERPL-MCNC: 3.2 G/DL (ref 3.5–5)
ALBUMIN/GLOB SERPL: 1 G/DL (ref 1.5–2.5)
ALP SERPL-CCNC: 79 U/L (ref 35–104)
ALT SERPL W P-5'-P-CCNC: 32 U/L (ref 10–36)
ANION GAP SERPL CALCULATED.3IONS-SCNC: 4.6 MMOL/L (ref 3.6–11.2)
AST SERPL-CCNC: 33 U/L (ref 10–30)
BASOPHILS # BLD AUTO: 0.03 10*3/MM3 (ref 0–0.3)
BASOPHILS NFR BLD AUTO: 0.5 % (ref 0–2)
BILIRUB SERPL-MCNC: 0.1 MG/DL (ref 0.2–1.8)
BUN BLD-MCNC: 16 MG/DL (ref 7–21)
BUN/CREAT SERPL: 28.6 (ref 7–25)
CALCIUM SPEC-SCNC: 8.7 MG/DL (ref 7.7–10)
CHLORIDE SERPL-SCNC: 111 MMOL/L (ref 99–112)
CO2 SERPL-SCNC: 24.4 MMOL/L (ref 24.3–31.9)
CREAT BLD-MCNC: 0.56 MG/DL (ref 0.43–1.29)
DEPRECATED RDW RBC AUTO: 54 FL (ref 37–54)
EOSINOPHIL # BLD AUTO: 0.03 10*3/MM3 (ref 0–0.7)
EOSINOPHIL NFR BLD AUTO: 0.5 % (ref 0–5)
ERYTHROCYTE [DISTWIDTH] IN BLOOD BY AUTOMATED COUNT: 19.1 % (ref 11.5–14.5)
GFR SERPL CREATININE-BSD FRML MDRD: 114 ML/MIN/1.73
GLOBULIN UR ELPH-MCNC: 3.1 GM/DL
GLUCOSE BLD-MCNC: 159 MG/DL (ref 70–110)
HCT VFR BLD AUTO: 35.4 % (ref 37–47)
HGB BLD-MCNC: 10.2 G/DL (ref 12–16)
IMM GRANULOCYTES # BLD: 0.23 10*3/MM3 (ref 0–0.03)
IMM GRANULOCYTES NFR BLD: 3.9 % (ref 0–0.5)
LYMPHOCYTES # BLD AUTO: 1.4 10*3/MM3 (ref 1–3)
LYMPHOCYTES NFR BLD AUTO: 23.6 % (ref 21–51)
MCH RBC QN AUTO: 23.1 PG (ref 27–33)
MCHC RBC AUTO-ENTMCNC: 28.8 G/DL (ref 33–37)
MCV RBC AUTO: 80.3 FL (ref 80–94)
MONOCYTES # BLD AUTO: 0.4 10*3/MM3 (ref 0.1–0.9)
MONOCYTES NFR BLD AUTO: 6.8 % (ref 0–10)
NEUTROPHILS # BLD AUTO: 3.83 10*3/MM3 (ref 1.4–6.5)
NEUTROPHILS NFR BLD AUTO: 64.7 % (ref 30–70)
OSMOLALITY SERPL CALC.SUM OF ELEC: 283.9 MOSM/KG (ref 273–305)
PLATELET # BLD AUTO: 229 10*3/MM3 (ref 130–400)
PMV BLD AUTO: 10.1 FL (ref 6–10)
POTASSIUM BLD-SCNC: 3.5 MMOL/L (ref 3.5–5.3)
PROT SERPL-MCNC: 6.3 G/DL (ref 6–8)
RBC # BLD AUTO: 4.41 10*6/MM3 (ref 4.2–5.4)
SODIUM BLD-SCNC: 140 MMOL/L (ref 135–153)
WBC NRBC COR # BLD: 5.92 10*3/MM3 (ref 4.5–12.5)

## 2017-09-10 PROCEDURE — 94799 UNLISTED PULMONARY SVC/PX: CPT

## 2017-09-10 PROCEDURE — 93010 ELECTROCARDIOGRAM REPORT: CPT | Performed by: INTERNAL MEDICINE

## 2017-09-10 PROCEDURE — 71010 HC CHEST PA OR AP: CPT

## 2017-09-10 PROCEDURE — 80053 COMPREHEN METABOLIC PANEL: CPT | Performed by: EMERGENCY MEDICINE

## 2017-09-10 PROCEDURE — 99285 EMERGENCY DEPT VISIT HI MDM: CPT

## 2017-09-10 PROCEDURE — 94640 AIRWAY INHALATION TREATMENT: CPT

## 2017-09-10 PROCEDURE — 71010 XR CHEST 1 VW: CPT | Performed by: RADIOLOGY

## 2017-09-10 PROCEDURE — 85007 BL SMEAR W/DIFF WBC COUNT: CPT | Performed by: EMERGENCY MEDICINE

## 2017-09-10 PROCEDURE — 25010000002 METHYLPREDNISOLONE PER 125 MG: Performed by: EMERGENCY MEDICINE

## 2017-09-10 PROCEDURE — 93005 ELECTROCARDIOGRAM TRACING: CPT | Performed by: EMERGENCY MEDICINE

## 2017-09-10 PROCEDURE — 84484 ASSAY OF TROPONIN QUANT: CPT | Performed by: EMERGENCY MEDICINE

## 2017-09-10 PROCEDURE — 85025 COMPLETE CBC W/AUTO DIFF WBC: CPT | Performed by: EMERGENCY MEDICINE

## 2017-09-10 RX ORDER — METHYLPREDNISOLONE SODIUM SUCCINATE 125 MG/2ML
125 INJECTION, POWDER, LYOPHILIZED, FOR SOLUTION INTRAMUSCULAR; INTRAVENOUS ONCE
Status: COMPLETED | OUTPATIENT
Start: 2017-09-10 | End: 2017-09-10

## 2017-09-10 RX ORDER — SODIUM CHLORIDE 0.9 % (FLUSH) 0.9 %
10 SYRINGE (ML) INJECTION AS NEEDED
Status: DISCONTINUED | OUTPATIENT
Start: 2017-09-10 | End: 2017-09-14 | Stop reason: HOSPADM

## 2017-09-10 RX ORDER — IPRATROPIUM BROMIDE AND ALBUTEROL SULFATE 2.5; .5 MG/3ML; MG/3ML
3 SOLUTION RESPIRATORY (INHALATION)
Status: COMPLETED | OUTPATIENT
Start: 2017-09-10 | End: 2017-09-10

## 2017-09-10 RX ADMIN — IPRATROPIUM BROMIDE AND ALBUTEROL SULFATE 3 ML: .5; 3 SOLUTION RESPIRATORY (INHALATION) at 23:06

## 2017-09-10 RX ADMIN — IPRATROPIUM BROMIDE AND ALBUTEROL SULFATE 3 ML: .5; 3 SOLUTION RESPIRATORY (INHALATION) at 23:20

## 2017-09-10 RX ADMIN — IPRATROPIUM BROMIDE AND ALBUTEROL SULFATE 3 ML: .5; 3 SOLUTION RESPIRATORY (INHALATION) at 23:38

## 2017-09-10 RX ADMIN — METHYLPREDNISOLONE SODIUM SUCCINATE 125 MG: 125 INJECTION, POWDER, FOR SOLUTION INTRAMUSCULAR; INTRAVENOUS at 23:26

## 2017-09-11 ENCOUNTER — APPOINTMENT (OUTPATIENT)
Dept: CT IMAGING | Facility: HOSPITAL | Age: 52
End: 2017-09-11

## 2017-09-11 PROBLEM — J18.9 HCAP (HEALTHCARE-ASSOCIATED PNEUMONIA): Status: ACTIVE | Noted: 2017-09-11

## 2017-09-11 LAB
A-A DO2: 65.4 MMHG (ref 0–300)
ALBUMIN SERPL-MCNC: 3.2 G/DL (ref 3.5–5)
ALBUMIN/GLOB SERPL: 1 G/DL (ref 1.5–2.5)
ALP SERPL-CCNC: 81 U/L (ref 35–104)
ALT SERPL W P-5'-P-CCNC: 44 U/L (ref 10–36)
ANION GAP SERPL CALCULATED.3IONS-SCNC: 9.1 MMOL/L (ref 3.6–11.2)
ANISOCYTOSIS BLD QL: ABNORMAL
ARTERIAL PATENCY WRIST A: ABNORMAL
AST SERPL-CCNC: 61 U/L (ref 10–30)
ATMOSPHERIC PRESS: 734 MMHG
BASE EXCESS BLDA CALC-SCNC: -6.8 MMOL/L
BDY SITE: ABNORMAL
BILIRUB SERPL-MCNC: 0.2 MG/DL (ref 0.2–1.8)
BNP SERPL-MCNC: 180 PG/ML (ref 0–100)
BNP SERPL-MCNC: 205 PG/ML (ref 0–100)
BODY TEMPERATURE: 98.6 C
BUN BLD-MCNC: 12 MG/DL (ref 7–21)
BUN/CREAT SERPL: 21.1 (ref 7–25)
CALCIUM SPEC-SCNC: 8.4 MG/DL (ref 7.7–10)
CHLORIDE SERPL-SCNC: 116 MMOL/L (ref 99–112)
CK MB SERPL-CCNC: 1.13 NG/ML (ref 0–5)
CK MB SERPL-CCNC: 1.16 NG/ML (ref 0–5)
CK MB SERPL-CCNC: 1.18 NG/ML (ref 0–5)
CK MB SERPL-RTO: 4.1 % (ref 0–3)
CK MB SERPL-RTO: 4.2 % (ref 0–3)
CK MB SERPL-RTO: 4.8 % (ref 0–3)
CK SERPL-CCNC: 24 U/L (ref 24–173)
CK SERPL-CCNC: 27 U/L (ref 24–173)
CK SERPL-CCNC: 29 U/L (ref 24–173)
CO2 SERPL-SCNC: 14.9 MMOL/L (ref 24.3–31.9)
COHGB MFR BLD: 2.4 % (ref 0–5)
CREAT BLD-MCNC: 0.57 MG/DL (ref 0.43–1.29)
CRP SERPL-MCNC: 3.19 MG/DL (ref 0–0.99)
D-LACTATE SERPL-SCNC: 1.2 MMOL/L (ref 0.5–2)
DEPRECATED RDW RBC AUTO: 58.4 FL (ref 37–54)
ERYTHROCYTE [DISTWIDTH] IN BLOOD BY AUTOMATED COUNT: 19.4 % (ref 11.5–14.5)
GFR SERPL CREATININE-BSD FRML MDRD: 112 ML/MIN/1.73
GLOBULIN UR ELPH-MCNC: 3.3 GM/DL
GLUCOSE BLD-MCNC: 329 MG/DL (ref 70–110)
GLUCOSE BLDC GLUCOMTR-MCNC: 130 MG/DL (ref 70–130)
GLUCOSE BLDC GLUCOMTR-MCNC: 251 MG/DL (ref 70–130)
GLUCOSE BLDC GLUCOMTR-MCNC: 302 MG/DL (ref 70–130)
GLUCOSE BLDC GLUCOMTR-MCNC: 342 MG/DL (ref 70–130)
GLUCOSE BLDC GLUCOMTR-MCNC: 97 MG/DL (ref 70–130)
HBA1C MFR BLD: 10 % (ref 4.5–5.7)
HCO3 BLDA-SCNC: 17.7 MMOL/L (ref 22–26)
HCT VFR BLD AUTO: 34 % (ref 37–47)
HCT VFR BLD CALC: 39 % (ref 37–47)
HGB BLD-MCNC: 9.6 G/DL (ref 12–16)
HGB BLDA-MCNC: 13.2 G/DL (ref 12–16)
HOROWITZ INDEX BLD+IHG-RTO: 28 %
HYPOCHROMIA BLD QL: NORMAL
LYMPHOCYTES # BLD MANUAL: 0.16 10*3/MM3 (ref 1–3)
LYMPHOCYTES NFR BLD MANUAL: 2 % (ref 0–10)
LYMPHOCYTES NFR BLD MANUAL: 4 % (ref 21–51)
M PNEUMO IGM SER QL: NEGATIVE
MCH RBC QN AUTO: 23.5 PG (ref 27–33)
MCHC RBC AUTO-ENTMCNC: 28.2 G/DL (ref 33–37)
MCV RBC AUTO: 83.1 FL (ref 80–94)
METHGB BLD QL: 0.3 % (ref 0–3)
MODALITY: ABNORMAL
MONOCYTES # BLD AUTO: 0.08 10*3/MM3 (ref 0.1–0.9)
MYOGLOBIN SERPL-MCNC: 17 NG/ML (ref 0–109)
MYOGLOBIN SERPL-MCNC: 19 NG/ML (ref 0–109)
MYOGLOBIN SERPL-MCNC: 20 NG/ML (ref 0–109)
NEUTROPHILS # BLD AUTO: 3.85 10*3/MM3 (ref 1.4–6.5)
NEUTROPHILS NFR BLD MANUAL: 85 % (ref 30–70)
NEUTS BAND NFR BLD MANUAL: 9 % (ref 4–12)
OSMOLALITY SERPL CALC.SUM OF ELEC: 292 MOSM/KG (ref 273–305)
OVALOCYTES BLD QL SMEAR: ABNORMAL
OXYHGB MFR BLDV: 93.9 % (ref 85–100)
PCO2 BLDA: 32.5 MM HG (ref 35–45)
PH BLDA: 7.35 PH UNITS (ref 7.35–7.45)
PLAT MORPH BLD: NORMAL
PLAT MORPH BLD: NORMAL
PLATELET # BLD AUTO: 210 10*3/MM3 (ref 130–400)
PMV BLD AUTO: 10.1 FL (ref 6–10)
PO2 BLDA: 88.6 MM HG (ref 80–100)
POTASSIUM BLD-SCNC: 3.9 MMOL/L (ref 3.5–5.3)
PROT SERPL-MCNC: 6.5 G/DL (ref 6–8)
RBC # BLD AUTO: 4.09 10*6/MM3 (ref 4.2–5.4)
SAO2 % BLDCOA: 96.5 % (ref 90–100)
SCAN SLIDE: NORMAL
SCHISTOCYTES BLD QL SMEAR: ABNORMAL
SODIUM BLD-SCNC: 140 MMOL/L (ref 135–153)
TROPONIN I SERPL-MCNC: 0.01 NG/ML
TROPONIN I SERPL-MCNC: 0.02 NG/ML
WBC NRBC COR # BLD: 4.1 10*3/MM3 (ref 4.5–12.5)

## 2017-09-11 PROCEDURE — 83036 HEMOGLOBIN GLYCOSYLATED A1C: CPT | Performed by: HOSPITALIST

## 2017-09-11 PROCEDURE — 84484 ASSAY OF TROPONIN QUANT: CPT | Performed by: HOSPITALIST

## 2017-09-11 PROCEDURE — 86140 C-REACTIVE PROTEIN: CPT | Performed by: HOSPITALIST

## 2017-09-11 PROCEDURE — 25010000002 VANCOMYCIN PER 500 MG

## 2017-09-11 PROCEDURE — 71275 CT ANGIOGRAPHY CHEST: CPT | Performed by: RADIOLOGY

## 2017-09-11 PROCEDURE — 63710000001 INSULIN ASPART PER 5 UNITS: Performed by: HOSPITALIST

## 2017-09-11 PROCEDURE — 94799 UNLISTED PULMONARY SVC/PX: CPT

## 2017-09-11 PROCEDURE — 82962 GLUCOSE BLOOD TEST: CPT

## 2017-09-11 PROCEDURE — 82805 BLOOD GASES W/O2 SATURATION: CPT | Performed by: HOSPITALIST

## 2017-09-11 PROCEDURE — 36600 WITHDRAWAL OF ARTERIAL BLOOD: CPT | Performed by: HOSPITALIST

## 2017-09-11 PROCEDURE — 82553 CREATINE MB FRACTION: CPT | Performed by: HOSPITALIST

## 2017-09-11 PROCEDURE — 71275 CT ANGIOGRAPHY CHEST: CPT

## 2017-09-11 PROCEDURE — 93010 ELECTROCARDIOGRAM REPORT: CPT | Performed by: INTERNAL MEDICINE

## 2017-09-11 PROCEDURE — 63710000001 INSULIN DETEMIR PER 5 UNITS: Performed by: HOSPITALIST

## 2017-09-11 PROCEDURE — 83874 ASSAY OF MYOGLOBIN: CPT | Performed by: HOSPITALIST

## 2017-09-11 PROCEDURE — 25010000002 HEPARIN (PORCINE) PER 1000 UNITS: Performed by: HOSPITALIST

## 2017-09-11 PROCEDURE — 93005 ELECTROCARDIOGRAM TRACING: CPT | Performed by: PHYSICIAN ASSISTANT

## 2017-09-11 PROCEDURE — 94640 AIRWAY INHALATION TREATMENT: CPT

## 2017-09-11 PROCEDURE — 25010000002 LEVOFLOXACIN PER 250 MG: Performed by: EMERGENCY MEDICINE

## 2017-09-11 PROCEDURE — 99223 1ST HOSP IP/OBS HIGH 75: CPT | Performed by: HOSPITALIST

## 2017-09-11 PROCEDURE — 82550 ASSAY OF CK (CPK): CPT | Performed by: HOSPITALIST

## 2017-09-11 PROCEDURE — 83605 ASSAY OF LACTIC ACID: CPT | Performed by: EMERGENCY MEDICINE

## 2017-09-11 PROCEDURE — 83050 HGB METHEMOGLOBIN QUAN: CPT | Performed by: HOSPITALIST

## 2017-09-11 PROCEDURE — 83880 ASSAY OF NATRIURETIC PEPTIDE: CPT | Performed by: HOSPITALIST

## 2017-09-11 PROCEDURE — 25010000002 VANCOMYCIN PER 500 MG: Performed by: EMERGENCY MEDICINE

## 2017-09-11 PROCEDURE — 87040 BLOOD CULTURE FOR BACTERIA: CPT | Performed by: EMERGENCY MEDICINE

## 2017-09-11 PROCEDURE — 25010000002 MORPHINE PER 10 MG: Performed by: EMERGENCY MEDICINE

## 2017-09-11 PROCEDURE — 94760 N-INVAS EAR/PLS OXIMETRY 1: CPT

## 2017-09-11 PROCEDURE — 0 IOPAMIDOL PER 1 ML: Performed by: EMERGENCY MEDICINE

## 2017-09-11 PROCEDURE — 85007 BL SMEAR W/DIFF WBC COUNT: CPT | Performed by: HOSPITALIST

## 2017-09-11 PROCEDURE — 25010000002 METHYLPREDNISOLONE PER 40 MG: Performed by: HOSPITALIST

## 2017-09-11 PROCEDURE — 36415 COLL VENOUS BLD VENIPUNCTURE: CPT

## 2017-09-11 PROCEDURE — 80053 COMPREHEN METABOLIC PANEL: CPT | Performed by: HOSPITALIST

## 2017-09-11 PROCEDURE — 85025 COMPLETE CBC W/AUTO DIFF WBC: CPT | Performed by: HOSPITALIST

## 2017-09-11 PROCEDURE — 82375 ASSAY CARBOXYHB QUANT: CPT | Performed by: HOSPITALIST

## 2017-09-11 PROCEDURE — 83880 ASSAY OF NATRIURETIC PEPTIDE: CPT | Performed by: EMERGENCY MEDICINE

## 2017-09-11 RX ORDER — DEXTROSE MONOHYDRATE 25 G/50ML
25 INJECTION, SOLUTION INTRAVENOUS
Status: DISCONTINUED | OUTPATIENT
Start: 2017-09-11 | End: 2017-09-14 | Stop reason: HOSPADM

## 2017-09-11 RX ORDER — DOXYCYCLINE 100 MG/1
100 CAPSULE ORAL 2 TIMES DAILY
Status: CANCELLED | OUTPATIENT
Start: 2017-09-11

## 2017-09-11 RX ORDER — ISOSORBIDE MONONITRATE 30 MG/1
30 TABLET, EXTENDED RELEASE ORAL DAILY
Status: DISCONTINUED | OUTPATIENT
Start: 2017-09-11 | End: 2017-09-14 | Stop reason: HOSPADM

## 2017-09-11 RX ORDER — SODIUM CHLORIDE 0.9 % (FLUSH) 0.9 %
1-10 SYRINGE (ML) INJECTION AS NEEDED
Status: DISCONTINUED | OUTPATIENT
Start: 2017-09-11 | End: 2017-09-14 | Stop reason: HOSPADM

## 2017-09-11 RX ORDER — LISINOPRIL 10 MG/1
5 TABLET ORAL DAILY
Status: CANCELLED | OUTPATIENT
Start: 2017-09-11

## 2017-09-11 RX ORDER — ALBUTEROL SULFATE 2.5 MG/3ML
2.5 SOLUTION RESPIRATORY (INHALATION)
Status: CANCELLED | OUTPATIENT
Start: 2017-09-11

## 2017-09-11 RX ORDER — PREDNISONE 10 MG/1
10 TABLET ORAL DAILY
Status: ON HOLD | COMMUNITY
End: 2017-09-14

## 2017-09-11 RX ORDER — ASPIRIN 325 MG
325 TABLET, DELAYED RELEASE (ENTERIC COATED) ORAL DAILY
Status: DISCONTINUED | OUTPATIENT
Start: 2017-09-11 | End: 2017-09-14 | Stop reason: HOSPADM

## 2017-09-11 RX ORDER — HEPARIN SODIUM 5000 [USP'U]/ML
5000 INJECTION, SOLUTION INTRAVENOUS; SUBCUTANEOUS EVERY 12 HOURS SCHEDULED
Status: DISCONTINUED | OUTPATIENT
Start: 2017-09-11 | End: 2017-09-14 | Stop reason: HOSPADM

## 2017-09-11 RX ORDER — MULTIVITAMIN
1 TABLET ORAL DAILY
Status: DISCONTINUED | OUTPATIENT
Start: 2017-09-11 | End: 2017-09-14 | Stop reason: HOSPADM

## 2017-09-11 RX ORDER — MULTIVITAMIN
1 TABLET ORAL DAILY
Status: ON HOLD | COMMUNITY
End: 2017-10-17

## 2017-09-11 RX ORDER — TIZANIDINE 4 MG/1
4 TABLET ORAL EVERY 8 HOURS PRN
Status: CANCELLED | OUTPATIENT
Start: 2017-09-11

## 2017-09-11 RX ORDER — ASPIRIN 325 MG
325 TABLET, DELAYED RELEASE (ENTERIC COATED) ORAL DAILY
Status: CANCELLED | OUTPATIENT
Start: 2017-09-11

## 2017-09-11 RX ORDER — NICOTINE POLACRILEX 4 MG
15 LOZENGE BUCCAL
Status: DISCONTINUED | OUTPATIENT
Start: 2017-09-11 | End: 2017-09-14 | Stop reason: HOSPADM

## 2017-09-11 RX ORDER — PROMETHAZINE HYDROCHLORIDE 12.5 MG/1
6.25 TABLET ORAL EVERY 6 HOURS PRN
Status: DISCONTINUED | OUTPATIENT
Start: 2017-09-11 | End: 2017-09-14 | Stop reason: HOSPADM

## 2017-09-11 RX ORDER — TIZANIDINE 4 MG/1
2 TABLET ORAL EVERY 8 HOURS PRN
Status: DISCONTINUED | OUTPATIENT
Start: 2017-09-11 | End: 2017-09-14 | Stop reason: HOSPADM

## 2017-09-11 RX ORDER — ATORVASTATIN CALCIUM 40 MG/1
40 TABLET, FILM COATED ORAL NIGHTLY
Status: DISCONTINUED | OUTPATIENT
Start: 2017-09-11 | End: 2017-09-14 | Stop reason: HOSPADM

## 2017-09-11 RX ORDER — METHYLPREDNISOLONE SODIUM SUCCINATE 40 MG/ML
40 INJECTION, POWDER, LYOPHILIZED, FOR SOLUTION INTRAMUSCULAR; INTRAVENOUS EVERY 12 HOURS
Status: DISCONTINUED | OUTPATIENT
Start: 2017-09-11 | End: 2017-09-14 | Stop reason: HOSPADM

## 2017-09-11 RX ORDER — IPRATROPIUM BROMIDE AND ALBUTEROL SULFATE 2.5; .5 MG/3ML; MG/3ML
3 SOLUTION RESPIRATORY (INHALATION) ONCE
Status: COMPLETED | OUTPATIENT
Start: 2017-09-11 | End: 2017-09-11

## 2017-09-11 RX ORDER — FAMOTIDINE 20 MG/1
20 TABLET, FILM COATED ORAL DAILY
Status: DISCONTINUED | OUTPATIENT
Start: 2017-09-11 | End: 2017-09-14 | Stop reason: HOSPADM

## 2017-09-11 RX ORDER — ATORVASTATIN CALCIUM 40 MG/1
40 TABLET, FILM COATED ORAL NIGHTLY
Status: CANCELLED | OUTPATIENT
Start: 2017-09-11

## 2017-09-11 RX ORDER — ACETAMINOPHEN 325 MG/1
650 TABLET ORAL EVERY 6 HOURS PRN
Status: DISCONTINUED | OUTPATIENT
Start: 2017-09-11 | End: 2017-09-14 | Stop reason: HOSPADM

## 2017-09-11 RX ORDER — MULTIVITAMIN
1 TABLET ORAL DAILY
Status: CANCELLED | OUTPATIENT
Start: 2017-09-11

## 2017-09-11 RX ORDER — PREDNISONE 20 MG/1
20 TABLET ORAL 3 TIMES DAILY
Status: CANCELLED | OUTPATIENT
Start: 2017-09-11 | End: 2017-09-13

## 2017-09-11 RX ORDER — HYDROXYZINE HYDROCHLORIDE 10 MG/1
10 TABLET, FILM COATED ORAL 2 TIMES DAILY PRN
Status: DISCONTINUED | OUTPATIENT
Start: 2017-09-11 | End: 2017-09-14 | Stop reason: HOSPADM

## 2017-09-11 RX ORDER — ALBUTEROL SULFATE 90 UG/1
2 AEROSOL, METERED RESPIRATORY (INHALATION)
Status: CANCELLED | OUTPATIENT
Start: 2017-09-11

## 2017-09-11 RX ORDER — PROMETHAZINE HYDROCHLORIDE 25 MG/1
25 TABLET ORAL EVERY 6 HOURS PRN
Status: CANCELLED | OUTPATIENT
Start: 2017-09-11

## 2017-09-11 RX ORDER — LISINOPRIL 2.5 MG/1
5 TABLET ORAL DAILY
Status: DISCONTINUED | OUTPATIENT
Start: 2017-09-11 | End: 2017-09-14 | Stop reason: HOSPADM

## 2017-09-11 RX ORDER — LEVOFLOXACIN 5 MG/ML
750 INJECTION, SOLUTION INTRAVENOUS ONCE
Status: COMPLETED | OUTPATIENT
Start: 2017-09-11 | End: 2017-09-11

## 2017-09-11 RX ORDER — ISOSORBIDE MONONITRATE 30 MG/1
30 TABLET, EXTENDED RELEASE ORAL DAILY
Status: CANCELLED | OUTPATIENT
Start: 2017-09-11

## 2017-09-11 RX ORDER — NITROGLYCERIN 0.4 MG/1
0.4 TABLET SUBLINGUAL
Status: DISCONTINUED | OUTPATIENT
Start: 2017-09-11 | End: 2017-09-14 | Stop reason: HOSPADM

## 2017-09-11 RX ADMIN — INSULIN ASPART 6 UNITS: 100 INJECTION, SOLUTION INTRAVENOUS; SUBCUTANEOUS at 12:20

## 2017-09-11 RX ADMIN — HYDROXYZINE HYDROCHLORIDE 10 MG: 10 TABLET ORAL at 21:28

## 2017-09-11 RX ADMIN — Medication 1 TABLET: at 08:42

## 2017-09-11 RX ADMIN — IPRATROPIUM BROMIDE 0.5 MG: 0.5 SOLUTION RESPIRATORY (INHALATION) at 06:55

## 2017-09-11 RX ADMIN — IOPAMIDOL 90 ML: 755 INJECTION, SOLUTION INTRAVENOUS at 01:30

## 2017-09-11 RX ADMIN — INSULIN DETEMIR 40 UNITS: 100 INJECTION, SOLUTION SUBCUTANEOUS at 04:53

## 2017-09-11 RX ADMIN — VANCOMYCIN HYDROCHLORIDE 1250 MG: 5 INJECTION, POWDER, LYOPHILIZED, FOR SOLUTION INTRAVENOUS at 16:20

## 2017-09-11 RX ADMIN — IPRATROPIUM BROMIDE 0.5 MG: 0.5 SOLUTION RESPIRATORY (INHALATION) at 12:32

## 2017-09-11 RX ADMIN — FAMOTIDINE 20 MG: 20 TABLET, FILM COATED ORAL at 21:28

## 2017-09-11 RX ADMIN — TIZANIDINE 2 MG: 4 TABLET ORAL at 18:06

## 2017-09-11 RX ADMIN — MORPHINE SULFATE 4 MG: 4 INJECTION, SOLUTION INTRAMUSCULAR; INTRAVENOUS at 01:02

## 2017-09-11 RX ADMIN — METHYLPREDNISOLONE SODIUM SUCCINATE 40 MG: 40 INJECTION, POWDER, FOR SOLUTION INTRAMUSCULAR; INTRAVENOUS at 12:19

## 2017-09-11 RX ADMIN — METOPROLOL TARTRATE 25 MG: 25 TABLET, FILM COATED ORAL at 21:28

## 2017-09-11 RX ADMIN — METRONIDAZOLE 500 MG: 500 INJECTION, SOLUTION INTRAVENOUS at 21:27

## 2017-09-11 RX ADMIN — LEVOFLOXACIN 750 MG: 5 INJECTION, SOLUTION INTRAVENOUS at 01:05

## 2017-09-11 RX ADMIN — ACETAMINOPHEN 650 MG: 325 TABLET ORAL at 12:19

## 2017-09-11 RX ADMIN — METRONIDAZOLE 500 MG: 500 INJECTION, SOLUTION INTRAVENOUS at 04:53

## 2017-09-11 RX ADMIN — INSULIN ASPART 7 UNITS: 100 INJECTION, SOLUTION INTRAVENOUS; SUBCUTANEOUS at 08:42

## 2017-09-11 RX ADMIN — LISINOPRIL 5 MG: 2.5 TABLET ORAL at 08:42

## 2017-09-11 RX ADMIN — HEPARIN SODIUM 5000 UNITS: 5000 INJECTION, SOLUTION INTRAVENOUS; SUBCUTANEOUS at 12:19

## 2017-09-11 RX ADMIN — METRONIDAZOLE 500 MG: 500 INJECTION, SOLUTION INTRAVENOUS at 15:25

## 2017-09-11 RX ADMIN — SODIUM CHLORIDE 1000 ML: 9 INJECTION, SOLUTION INTRAVENOUS at 00:03

## 2017-09-11 RX ADMIN — MORPHINE SULFATE 4 MG: 4 INJECTION, SOLUTION INTRAMUSCULAR; INTRAVENOUS at 00:03

## 2017-09-11 RX ADMIN — METOPROLOL TARTRATE 25 MG: 25 TABLET, FILM COATED ORAL at 08:42

## 2017-09-11 RX ADMIN — SODIUM CHLORIDE 1000 ML: 9 INJECTION, SOLUTION INTRAVENOUS at 01:06

## 2017-09-11 RX ADMIN — IPRATROPIUM BROMIDE AND ALBUTEROL SULFATE 3 ML: .5; 3 SOLUTION RESPIRATORY (INHALATION) at 02:42

## 2017-09-11 RX ADMIN — ATORVASTATIN CALCIUM 40 MG: 40 TABLET, FILM COATED ORAL at 21:28

## 2017-09-11 RX ADMIN — ASPIRIN 325 MG: 325 TABLET, COATED ORAL at 12:20

## 2017-09-11 RX ADMIN — AZTREONAM 2 G: 2 INJECTION, POWDER, FOR SOLUTION INTRAMUSCULAR; INTRAVENOUS at 02:48

## 2017-09-11 RX ADMIN — ISOSORBIDE MONONITRATE 30 MG: 30 TABLET, EXTENDED RELEASE ORAL at 08:42

## 2017-09-11 RX ADMIN — METHYLPREDNISOLONE SODIUM SUCCINATE 40 MG: 40 INJECTION, POWDER, FOR SOLUTION INTRAMUSCULAR; INTRAVENOUS at 22:35

## 2017-09-11 RX ADMIN — SODIUM CHLORIDE 1000 ML: 9 INJECTION, SOLUTION INTRAVENOUS at 02:47

## 2017-09-11 RX ADMIN — AZTREONAM 2 G: 2 INJECTION, POWDER, FOR SOLUTION INTRAMUSCULAR; INTRAVENOUS at 12:23

## 2017-09-11 RX ADMIN — VANCOMYCIN HYDROCHLORIDE 1000 MG: 5 INJECTION, POWDER, LYOPHILIZED, FOR SOLUTION INTRAVENOUS at 03:36

## 2017-09-11 RX ADMIN — DOXYCYCLINE 100 MG: 100 INJECTION, POWDER, LYOPHILIZED, FOR SOLUTION INTRAVENOUS at 04:53

## 2017-09-11 RX ADMIN — AZTREONAM 2 G: 2 INJECTION, POWDER, FOR SOLUTION INTRAMUSCULAR; INTRAVENOUS at 19:00

## 2017-09-11 NOTE — ED PROVIDER NOTES
Subjective   Patient is a 51 y.o. female presenting with shortness of breath.   History provided by:  Patient   used: No    Shortness of Breath   Severity:  Moderate  Timing:  Constant  Progression:  Worsening  Chronicity:  Recurrent  Context: not strong odors, not URI and not weather changes    Relieved by:  Nothing  Worsened by:  Nothing  Ineffective treatments:  None tried  Associated symptoms: no abdominal pain, no chest pain, no cough, no diaphoresis, no fever, no headaches, no neck pain, no rash, no sore throat, no sputum production, no syncope, no vomiting and no wheezing    Risk factors: tobacco use    Risk factors: no family hx of DVT, no hx of cancer, no hx of PE/DVT, no obesity, no prolonged immobilization and no recent surgery        Review of Systems   Constitutional: Negative for chills, diaphoresis and fever.   HENT: Negative for congestion, rhinorrhea, sore throat and trouble swallowing.    Eyes: Negative for discharge and visual disturbance.   Respiratory: Positive for shortness of breath. Negative for cough, sputum production, chest tightness and wheezing.    Cardiovascular: Negative for chest pain, palpitations, leg swelling and syncope.   Gastrointestinal: Negative for abdominal pain, constipation, diarrhea, nausea and vomiting.   Genitourinary: Negative for dysuria, flank pain and hematuria.   Musculoskeletal: Negative for back pain, myalgias and neck pain.   Skin: Negative for color change and rash.   Neurological: Negative for dizziness, weakness, numbness and headaches.   Psychiatric/Behavioral: Negative for self-injury and suicidal ideas.       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   • Chronic respiratory failure with hypoxia     at nighttime   • Compression fracture of lumbar vertebra    • Compression fracture of thoracic vertebra    • COPD (chronic obstructive pulmonary disease)    • Coronary artery  disease    • Diabetes mellitus type 2 in nonobese    • Erosive esophagitis 2016   • Essential hypertension    • Gastroparesis    • GERD (gastroesophageal reflux disease)    • Hepatitis-C    • History of transfusion    • Medically noncompliant    • MRSA infection     back of neck around ; 2017 septic left elbow joint with concerns for osteomyelitis and left anteromedial left arm   • NSTEMI (non-ST elevated myocardial infarction) 2016   • PE (pulmonary embolism)    • Protein calorie malnutrition    • RA (rheumatoid arthritis)     with chronic steroid use   • Rheumatoid arthritis        Allergies   Allergen Reactions   • Penicillins Anaphylaxis     Tolerated both cefepime and Rocephin earlier this month   • Toradol [Ketorolac Tromethamine] Hives and Swelling   • Tramadol Hives and Swelling       Past Surgical History:   Procedure Laterality Date   • ABDOMINAL SURGERY     • AMPUTATION DIGIT Right 2016    Third digit to the PIP. Surgeon: Dr. Bains   • AMPUTATION DIGIT Left 2016    Procedure: PARTIAL AMPUTATION LEFT THIRD FINGER;  Surgeon: Shane Fuller MD;  Location: [facility] OR;  Service:    • AMPUTATION REVISION Left 2016    Procedure: AMPUTATION REVISION DIGIT STUMP;  Surgeon: Shane Fuller MD;  Location: [facility] OR;  Service:    • ARM DEBRIDEMENT Left 2016    Procedure: HAND IRRIGATION DEBRIDEMENT AND REPAIR ;  Surgeon: Shane Fuller MD;  Location: [facility] OR;  Service:    •  SECTION      x 2   • CHOLECYSTECTOMY     • ENDOSCOPY N/A 2016    Procedure: ESOPHAGOGASTRODUODENOSCOPY;  Surgeon: Jeremy Clement III, MD;  Location: [facility] OR;  Service:    • ENDOSCOPY W/ PEG TUBE PLACEMENT N/A 3/30/2017    Procedure: ESOPHAGOGASTRODUODENOSCOPY WITH PERCUTANEOUS ENDOSCOPIC GASTROSTOMY TUBE INSERTION;  Surgeon: Shade Campos MD;  Location: [facility] OR;  Service:    • FOREIGN BODY REMOVAL      insulin needle removal x 3 from the abdomen   •  HYSTERECTOMY     • INCISION AND DRAINAGE ARM Left 6/27/2017    Procedure: INCISION AND DRAINAGE UPPER EXTREMITY;  Surgeon: Shane Fuller MD;  Location: [facility];  Service:        Family History   Problem Relation Age of Onset   • Colon cancer Mother    • Cancer Father    • Diabetes Maternal Grandmother    • Heart disease Neg Hx    • Stroke Neg Hx        Social History     Social History   • Marital status:      Spouse name: N/A   • Number of children: N/A   • Years of education: N/A     Social History Main Topics   • Smoking status: Current Every Day Smoker     Packs/day: 1.00     Years: 35.00     Types: Cigarettes   • Smokeless tobacco: None      Comment: 4-5 cigarettes a day    • Alcohol use No   • Drug use: No   • Sexual activity: Defer     Other Topics Concern   • None     Social History Narrative           Objective   Physical Exam   Constitutional: She is oriented to person, place, and time. She appears well-developed and well-nourished.   HENT:   Head: Normocephalic and atraumatic.   Nose: Nose normal.   Mouth/Throat: Oropharynx is clear and moist.   Eyes: Conjunctivae and EOM are normal. Pupils are equal, round, and reactive to light.   Neck: Normal range of motion. Neck supple.   Cardiovascular: Normal rate, regular rhythm, normal heart sounds and intact distal pulses.    Pulmonary/Chest: Effort normal. No respiratory distress. She has wheezes. She exhibits no tenderness.   Abdominal: Soft. Bowel sounds are normal. There is no tenderness. There is no rebound and no guarding.   Musculoskeletal: Normal range of motion. She exhibits no edema, tenderness or deformity.   Neurological: She is alert and oriented to person, place, and time. No cranial nerve deficit. Coordination normal.   Skin: Skin is warm and dry. No rash noted. No erythema. No pallor.   Psychiatric: She has a normal mood and affect. Her behavior is normal. Judgment and thought content normal.   Nursing note and vitals  reviewed.      Procedures         ED Course  ED Course                  MDM  Number of Diagnoses or Management Options  COPD with acute lower respiratory infection:   Pneumonia of right middle lobe due to infectious organism:   Diagnosis management comments: EKG: Ventricular rate 127, OR interval 128, castration 82, , sinus tachycardia.    51-year-old female with history of COPD presents emergency Department with complaints of shortness of breath.  Patient's heart rate is elevated on arrival at 126.  Patient denies any medications prior to arrival.  Patient states that she does not have any albuterol or nebulizers at home.  She states that she is on home oxygen at night at 2 L.  Physical exam is remarkable for diffuse wheezing.  Labs and imaging obtained.  Chest x-ray shows possible pneumonia in the right lower lobe.  Blood cultures obtained. WBC, Lactic acid are WNL. Patient given fluids, Solu-Medrol, DuoNeb, levofloxacin, aztreonam, and vancomycin.  I'll reevaluation, patient still tachycardic and wheezing.  CT of the chest obtained.  Right-sided pneumonia appreciated. Will admit to the hospitalist for further care.      Final diagnoses:   Pneumonia of right middle lobe due to infectious organism   COPD with acute lower respiratory infection            Holland Wilhelm MD  09/11/17 0301       Holland Wilhelm MD  09/11/17 8119

## 2017-09-11 NOTE — PLAN OF CARE
Problem: Cardiac Output, Decreased (Adult)  Goal: Identify Related Risk Factors and Signs and Symptoms  Outcome: Ongoing (interventions implemented as appropriate)  Goal: Adequate Cardiac Output/Effective Tissue Perfusion  Outcome: Ongoing (interventions implemented as appropriate)    Problem: Respiratory Insufficiency (Adult)  Goal: Identify Related Risk Factors and Signs and Symptoms  Outcome: Ongoing (interventions implemented as appropriate)  Goal: Acid/Base Balance  Outcome: Ongoing (interventions implemented as appropriate)  Goal: Effective Ventilation  Outcome: Ongoing (interventions implemented as appropriate)    Problem: Pain, Acute (Adult)  Goal: Identify Related Risk Factors and Signs and Symptoms  Outcome: Ongoing (interventions implemented as appropriate)  Goal: Acceptable Pain Control/Comfort Level  Outcome: Ongoing (interventions implemented as appropriate)    Problem: Fall Risk (Adult)  Goal: Identify Related Risk Factors and Signs and Symptoms  Outcome: Ongoing (interventions implemented as appropriate)  Goal: Absence of Falls  Outcome: Ongoing (interventions implemented as appropriate)    Problem: Diabetes, Type 2 (Adult)  Goal: Signs and Symptoms of Listed Potential Problems Will be Absent or Manageable (Diabetes, Type 2)  Outcome: Ongoing (interventions implemented as appropriate)

## 2017-09-11 NOTE — CONSULTS
"Diabetes Education  Assessment/Teaching    Patient Name:  Pepito Yan  YOB: 1965  MRN: [id number]  Admit Date:  9/10/2017      Assessment Date:  9/11/2017       Most Recent Value    General Information      Height  5' 3\" (1.6 m)    Height Method  Stated    Weight  110 lb (49.9 kg)    Weight Method  Stated    Pregnancy Assessment     Diabetes History     What type of diabetes do you have?  Type 2    Length of Diabetes Diagnosis  -- [\"16-17yrs\"]    Do you test your blood sugar at home?  yes    Do you have any diabetes complications?  amputations [Bergers]    Education Preferences     Nutrition Information     Assessment Topics     Healthy Eating - Assessment  Competent    Being Active - Assessment  Competent    Taking Medication - Assessment  Competent    Problem Solving - Assessment  Competent    Reducing Risk - Assessment  Competent    Healthy Coping - Assessment  Competent    Monitoring - Assessment  Competent    DM Goals                Most Recent Value    DM Education Needs     Meter  Has own    Frequency of Testing  3 times a day    Medication  Actions, Insulin    Problem Solving  Hypoglycemia, Hyperglycemia, Sick days, Signs, Symptoms, Treatment    Reducing Risks  Other (comment) [\"not interested in quitting smoking\" ]    Healthy Coping  Appropriate    Discharge Plan  Home    Motivation  Moderate    Teaching Method  Explanation, Discussion, Teach back    Patient Response  Verbalized understanding            Other Comments:  \"better than 15-16 been in the past\"        Electronically signed by:  Twyla Santos RN  09/11/17 4:49 PM  "

## 2017-09-11 NOTE — PLAN OF CARE
Problem: Cardiac Output, Decreased (Adult)  Goal: Identify Related Risk Factors and Signs and Symptoms  Outcome: Ongoing (interventions implemented as appropriate)  Goal: Adequate Cardiac Output/Effective Tissue Perfusion  Outcome: Ongoing (interventions implemented as appropriate)    Problem: Respiratory Insufficiency (Adult)  Goal: Identify Related Risk Factors and Signs and Symptoms  Outcome: Ongoing (interventions implemented as appropriate)  Goal: Acid/Base Balance  Outcome: Ongoing (interventions implemented as appropriate)  Goal: Effective Ventilation  Outcome: Ongoing (interventions implemented as appropriate)    Problem: Pain, Acute (Adult)  Goal: Identify Related Risk Factors and Signs and Symptoms  Outcome: Ongoing (interventions implemented as appropriate)  Goal: Acceptable Pain Control/Comfort Level  Outcome: Ongoing (interventions implemented as appropriate)    Problem: Pressure Ulcer Risk (Brendan Scale) (Adult,Obstetrics,Pediatric)  Goal: Identify Related Risk Factors and Signs and Symptoms  Outcome: Ongoing (interventions implemented as appropriate)  Goal: Skin Integrity  Outcome: Ongoing (interventions implemented as appropriate)    Problem: Fall Risk (Adult)  Goal: Identify Related Risk Factors and Signs and Symptoms  Outcome: Ongoing (interventions implemented as appropriate)  Goal: Absence of Falls  Outcome: Ongoing (interventions implemented as appropriate)

## 2017-09-11 NOTE — PAYOR COMM NOTE
"Kirstie  [phone] fax [phone]    Need auth for copays    Yessica Yan (51 y.o. Female)     Date of Birth Social Security Number Address Home Phone MRN    1965  [place] [id number] [phone] [id number]    Sabianist Marital Status          Mormonism        Admission Date Admission Type Admitting Provider Attending Provider Department, Room/Bed    9/10/17 Emergency Elmer Cervantes MD Hammons, Johnny Bruce, MD [address], [address]    Discharge Date Discharge Disposition Discharge Destination                      Attending Provider: Elmer Cervantes MD     Allergies:  Penicillins, Toradol [Ketorolac Tromethamine], Tramadol    Isolation:  None   Infection:  None   Code Status:  FULL    Ht:  63\" (160 cm)   Wt:  110 lb (49.9 kg)    Admission Cmt:  None   Principal Problem:  None                Active Insurance as of 9/10/2017     Primary Coverage     Payor Plan Insurance Group Employer/Plan Group    MEDICARE MEDICARE A & B      Payor Plan Address Payor Plan Phone Number Effective From Effective To    [address] [phone] 2/1/2005     [place] [address]       Subscriber Name Subscriber Birth Date Member ID       YESSICA YAN 1965 [id number]           Secondary Coverage     Payor Plan Insurance Group Employer/Plan Group    [employer] [place]ICAID      Payor Plan Address Payor Plan Phone Number Effective From Effective To    [address] [address] [phone] 1/1/2016     [place] [address]       Subscriber Name Subscriber Birth Date Member ID       YESSICA YAN 1965 [id number]                 Emergency Contacts      (Rel.) Home Phone Work Phone Mobile Phone    Scot Vaughn (Son) [phone] -- --            Orders (last 24 hrs)     Start     Ordered    09/11/17 2100  insulin detemir (LEVEMIR) injection 20 Units  Nightly,   Status:  Discontinued      09/11/17 0302    09/11/17 2100  atorvastatin " (LIPITOR) tablet 40 mg  Nightly      09/11/17 0502    09/11/17 1500  vancomycin (VANCOCIN) 1,250 mg in sodium chloride 0.9 % 250 mL IVPB  Every 12 Hours      09/11/17 0803    09/11/17 1100  aztreonam (AZACTAM) 2 g/100 mL 0.9% NS (mbp)  Every 8 Hours      09/11/17 0428    09/11/17 1100  methylPREDNISolone sodium succinate (SOLU-Medrol) injection 40 mg  Every 12 Hours      09/11/17 0428    09/11/17 1026  POC Glucose Fingerstick  Once      09/11/17 1025    09/11/17 0903  CK-MB Index  Once      09/11/17 0902    09/11/17 0900  heparin (porcine) 5000 UNIT/ML injection 5,000 Units  Every 12 Hours Scheduled      09/11/17 0428    09/11/17 0900  lisinopril (PRINIVIL,ZESTRIL) tablet 5 mg  Daily      09/11/17 0502    09/11/17 0900  aspirin EC tablet 325 mg  Daily      09/11/17 0502    09/11/17 0900  isosorbide mononitrate (IMDUR) 24 hr tablet 30 mg  Daily      09/11/17 0502    09/11/17 0900  metoprolol tartrate (LOPRESSOR) tablet 25 mg  Every 12 Hours Scheduled      09/11/17 0502    09/11/17 0900  multivitamin (DAILY KENN) tablet 1 tablet  Daily      09/11/17 0502    09/11/17 0857  acetaminophen (TYLENOL) tablet 650 mg  Every 6 Hours PRN      09/11/17 0857    09/11/17 0857  ECG 12 Lead  STAT      09/11/17 0856    09/11/17 0800  Vital Signs  Every 8 Hours     Comments:  Per per hospital policy    09/11/17 0428    09/11/17 0800  Strict Intake & Output  Every 8 Hours      09/11/17 0459    09/11/17 0730  insulin aspart (novoLOG) injection 0-9 Units  4 Times Daily Before Meals & Nightly      09/11/17 0301    09/11/17 0710  Manual Differential  Once      09/11/17 0709    09/11/17 0700  ipratropium (ATROVENT) nebulizer solution 0.5 mg  Every 6 Hours - RT      09/11/17 0428    09/11/17 0700  POC Glucose Fingerstick  4 Times Daily Before Meals & at Bedtime      09/11/17 0301    09/11/17 0641  Osmolality, Calculated  Once      09/11/17 0640    09/11/17 0629  POC Glucose Fingerstick  Once      09/11/17 0628    09/11/17 0618  Scan Slide   Once      09/11/17 0617    09/11/17 0600  Comprehensive Metabolic Panel  Morning Draw      09/11/17 0428    09/11/17 0600  CBC Auto Differential  Morning Draw      09/11/17 0428    09/11/17 0600  metroNIDAZOLE (FLAGYL) IVPB 500 mg  Every 8 Hours      09/11/17 0428    09/11/17 0600  BNP  Morning Draw      09/11/17 0432    09/11/17 0600  insulin detemir (LEVEMIR) injection 40 Units  Nightly      09/11/17 0434    09/11/17 0508  CK-MB Index  Once      09/11/17 0507    09/11/17 0500  tiZANidine (ZANAFLEX) tablet 2 mg  Every 8 Hours PRN      09/11/17 0502    09/11/17 0500  promethazine (PHENERGAN) tablet 6.25 mg  Every 6 Hours PRN      09/11/17 0502    09/11/17 0500  hydrOXYzine (ATARAX) tablet 10 mg  2 Times Daily PRN      09/11/17 0502    09/11/17 0500  Strict Intake and Output  Every Hour,   Status:  Canceled      09/11/17 0428    09/11/17 0500  Daily Weights  Daily      09/11/17 0459    09/11/17 0447  Inpatient consult for COPD Education (QA)  Once     Provider:  (Not yet assigned)    09/11/17 0447    09/11/17 0447  Inpatient consult for COPD Education (RT)  Once     Provider:  (Not yet assigned)    09/11/17 0447    09/11/17 0447  Inpatient Consult to Diabetes Educator  Once     Provider:  (Not yet assigned)    09/11/17 0447    09/11/17 0447  Inpatient Consult to Case Management   Once     Provider:  (Not yet assigned)    09/11/17 0447    09/11/17 0438  Blood Gas, Arterial  STAT      09/11/17 0437    09/11/17 0429  Full Code  Continuous      09/11/17 0428    09/11/17 0429  Weigh patient  Once      09/11/17 0428    09/11/17 0429  Saline Lock  Continuous      09/11/17 0428    09/11/17 0429  Continuous Pulse Oximetry  Continuous     Comments:  For Unresponsiveness, Acute Dyspnea, Cyanosis or Suspected Hypoxemia.  Notify Physician    09/11/17 0428    09/11/17 0429  Oxygen Therapy- Nasal Cannula; Titrate for SPO2: 90%, equal to or greater than  Continuous     Comments:  For Unresponsiveness, Acute Dyspnea,  Cyanosis or Suspected Hypoxemia.  Notify Physician    09/11/17 0428    09/11/17 0429  Nurse to Place Order for ECG 12-Lead if Patient Experiences Acute Chest Pain or Dysrhythmias  Continuous      09/11/17 0428    09/11/17 0429  May Be Off Telemetry for Tests  Continuous      09/11/17 0428    09/11/17 0429  ACLS Protocol For Life Threatening Dysrhythmias (If No DNR Order)  Continuous      09/11/17 0428    09/11/17 0429  Notify Provider if ACLS Protocol Activated  Until Discontinued      09/11/17 0428    09/11/17 0429  Notify Physician (with Parameters)  Until Discontinued      09/11/17 0428    09/11/17 0429  Oxygen Therapy-  Continuous      09/11/17 0428    09/11/17 0429  Insert Peripheral IV  Once      09/11/17 0428    09/11/17 0429  Saline Lock & Maintain IV Access  Continuous,   Status:  Canceled      09/11/17 0428    09/11/17 0429  VTE Risk Assessment - Moderate Risk  Once      09/11/17 0428    09/11/17 0429  Mechanical VTE Prophylaxis Not Indicated: Moderate VTE Risk With Pharmacologic Prophylaxis  Once      09/11/17 0428    09/11/17 0429  Diet Regular; Cardiac, Consistent Carbohydrate  Diet Effective Now      09/11/17 0428    09/11/17 0428  nitroglycerin (NITROSTAT) SL tablet 0.4 mg  Every 5 Minutes PRN      09/11/17 0428    09/11/17 0428  sodium chloride 0.9 % flush 1-10 mL  As Needed      09/11/17 0428    09/11/17 0428  Pharmacy to dose vancomycin  Continuous PRN,   Status:  Discontinued      09/11/17 0428    09/11/17 0411  POC Glucose Fingerstick  Once      09/11/17 0411    09/11/17 0400  doxycycline (VIBRAMYCIN) 100 mg/100 mL 0.9% NS MBP  Every 12 Hours      09/11/17 0307    09/11/17 0309  C-reactive Protein  STAT      09/11/17 0308    09/11/17 0308  Legionella Antigen, Urine  Once      09/11/17 0307    09/11/17 0308  Mycoplasma Pneumoniae Antibody, IgM  Once      09/11/17 0307    09/11/17 0304  CK  Now Then Every 6 Hours      09/11/17 0303    09/11/17 0304  CK-MB  Now Then Every 6 Hours      09/11/17 0300     09/11/17 0304  Myoglobin, Serum  Now Then Every 6 Hours      09/11/17 0303    09/11/17 0304  Troponin  Now Then Every 6 Hours      09/11/17 0303    09/11/17 0302  Hemoglobin A1c  Once      09/11/17 0301    09/11/17 0302  Do NOT Hold Basal Insulin When Patient is NPO, Hold Bolus Dose if NPO  Continuous      09/11/17 0301    09/11/17 0302  Follow [facility] Hypoglycemia Protocol For Blood Glucose Less Than 70 mg/dL  Until Discontinued      09/11/17 0301 09/11/17 0302  Hypoglycemia Treatment - Alert Patient That is Not NPO and Can Safely Swallow  Until Discontinued     Comments:  Administer 4 oz Fruit Juice OR 4 oz Regular Soda OR 8 oz Milk OR 15-30 grams (1 tube) of Glucose Gel  Recheck Blood Glucose 15 Minutes After Ingestion, Repeat Treatment & Continue to Recheck Blood Sugar Every 15 Minutes Until Blood Glucose is 70 or Higher  Once Blood Glucose is 70 or Higher, Give Snack (Peanut Butter & Crackers) if Next Meal Will Be Given in More Than 60 Minutes, Provide Meal Tray As Soon As Possible    09/11/17 0301 09/11/17 0302  Hypoglycemia Treatment - Patient Has IV Access - Unresponsive, NPO or Unable To Safely Swallow  Until Discontinued     Comments:  Administer 25g D50W IV Push (1 Whole Vial)  Recheck Blood Glucose 15 Minutes After Administration, if Blood Glucose Remains Less Than 70, Repeat Treatment   Recheck Blood Glucose 15 Minutes After 2nd Administration, if Blood Glucose Remains Less Than 70 After 2nd Dose of D50W Contact Provider for Further Treatment Orders & Consider Adding IVF With D5 for Maintenance    09/11/17 0301 09/11/17 0302  Hypoglycemia Treatment - Patient Without IV Access - Unresponsive, NPO or Unable To Safely Swallow  Until Discontinued     Comments:  Administer 1mg Glucagon SQ & Establish IV Access  Turn Patient on Side - Nausea / Vomiting May Occur  Recheck Blood Glucose 15 Minutes After Administration  If IV Access Has Not Been Established & Blood Glucose Remains Less Than 70, Repeat  Treatment With Glucagon  If IV Access Established, Administer 25g D50W IV Push (1 Whole Vial)  Recheck Blood Glucose 15 Minutes After Administration of 2nd Medication, if Blood Glucose Remains Less Than 70, Contact Provider for Further Treatment Orders & Consider Adding IVF With D5 for Maintenance    09/11/17 0301    09/11/17 0302  Hospitalist (on-call MD unless specified)  Once     Specialty:  Hospitalist  Provider:  Elmer Cervantes MD    09/11/17 0302    09/11/17 0302  Tele Bed Request  Once      09/11/17 0302    09/11/17 0301  dextrose (GLUTOSE) oral gel 15 g  Every 15 Minutes PRN      09/11/17 0301    09/11/17 0301  dextrose (D50W) solution 25 g  Every 15 Minutes PRN      09/11/17 0301    09/11/17 0301  glucagon (human recombinant) (GLUCAGEN DIAGNOSTIC) injection 1 mg  Every 15 Minutes PRN      09/11/17 0301    09/11/17 0257  Inpatient Admission  Once      09/11/17 0300    09/11/17 0235  ipratropium-albuterol (DUO-NEB) nebulizer solution 3 mL  Once      09/11/17 0233    09/11/17 0231  aztreonam (AZACTAM) 2 g/100 mL 0.9% NS (mbp)  Once      09/11/17 0229    09/11/17 0231  vancomycin (VANCOCIN) 1,000 mg in sodium chloride 0.9 % 250 mL IVPB  Once      09/11/17 0229    09/11/17 0229  sodium chloride 0.9 % bolus 1,000 mL  Once      09/11/17 0227    09/11/17 0129  iopamidol (ISOVUE-370) 76 % injection 100 mL  Once in Imaging      09/11/17 0128    09/11/17 0042  Lactic Acid, Plasma  STAT      09/11/17 0041    09/11/17 0039  sodium chloride 0.9 % bolus 1,000 mL  Once      09/11/17 0037    09/11/17 0038  morphine injection 4 mg  Once      09/11/17 0036    09/11/17 0037  levoFLOXacin (LEVAQUIN) 750 mg/150 mL D5W (premix) (LEVAQUIN) 750 mg  Once      09/11/17 0035    09/11/17 0036  Blood Culture  STAT      09/11/17 0035    09/11/17 0036  Blood Culture  STAT      09/11/17 0035    09/11/17 0029  BNP  STAT      09/11/17 0028    09/11/17 0029  CT Chest Pulmonary Embolism With Contrast  1 Time Imaging      09/11/17 0028     09/10/17 2354  Osmolality, Calculated  Once      09/10/17 2353    09/10/17 2343  Scan Slide  Once      09/10/17 2342    09/10/17 2337  sodium chloride 0.9 % bolus 1,000 mL  Once      09/10/17 2335    09/10/17 2337  morphine injection 4 mg  Once      09/10/17 2335    09/10/17 2258  ipratropium-albuterol (DUO-NEB) nebulizer solution 3 mL  Every 15 Minutes      09/10/17 2256    09/10/17 2258  methylPREDNISolone sodium succinate (SOLU-Medrol) injection 125 mg  Once      09/10/17 2256    09/10/17 2257  Cardiac Monitoring  Once      09/10/17 2256    09/10/17 2257  Pulse Oximetry, Continuous  Per Hospital Policy,   Status:  Canceled      09/10/17 2256    09/10/17 2257  Monitor Blood Pressure  Per Hospital Policy      09/10/17 2256    09/10/17 2256  CBC & Differential  Once      09/10/17 2256    09/10/17 2256  Comprehensive Metabolic Panel  Once      09/10/17 2256    09/10/17 2256  Troponin  Once      09/10/17 2256    09/10/17 2256  ECG 12 Lead  Once      09/10/17 2256    09/10/17 2256  XR Chest 1 View  1 Time Imaging      09/10/17 2256    09/10/17 2256  NPO Diet  Diet Effective Now,   Status:  Canceled      09/10/17 2256    09/10/17 2256  Insert peripheral IV  Once      09/10/17 2256    09/10/17 2256  CBC Auto Differential  PROCEDURE ONCE      09/10/17 2256    09/10/17 2255  sodium chloride 0.9 % flush 10 mL  As Needed      09/10/17 2256    09/10/17 2255  ECG 12 Lead  Once      09/10/17 2254    Unscheduled  Potassium  As Needed     Comments:  For Ventricular Arrhythmias    09/11/17 0428    Unscheduled  Magnesium  As Needed     Comments:  For Ventricular Arrhythmias    09/11/17 0428    Unscheduled  Troponin  As Needed     Comments:  For Chest Pain    09/11/17 0428    Unscheduled  Digoxin Level  As Needed     Comments:  For Atrial Arrhythmias    09/11/17 0428    Unscheduled  Blood Gas, Arterial  As Needed     Comments:  Per O2 Policy  Notify Physician    09/11/17 0428    --  predniSONE (DELTASONE) 10 MG tablet  Daily       09/11/17 0327    --  multivitamin (DAILY KENN) tablet tablet  Daily      09/11/17 0327

## 2017-09-11 NOTE — H&P
Hospitalist History and Physical        Patient Identification  Name: Pepito Yan  Age/Sex: 51 y.o. female  :  1965        MRN: [id number]  Visit Number: [id number]  PCP: ROSA Love        Chief complaint shortness of breath    History of Present Illness:  Patient is a 51 y.o. female who presents with complaints of worsening dyspnea of 2 days duration. Initially she was dyspneic only with exertion but is now short of breath even at rest. Associated symptoms include productive cough with thick yellow sputum and pleuritic chest pain. She reports coughing so much that she gags and vomits on occasion. She denies fever but has been having chills. She also reports increasing lower extremity edema over the last few days. She denies orthopnea.     In the ED, patient was noted to be tachycardic and tachypneic but with O2 sat in the 90s on room air. However, upon arrival to the telemetry floor, O2 sat was in the 70s. She quickly improved with 2 L NC which is what she uses at home. Chest XR was concerning for right hilar and lower lobe pneumonia. She was given broad spectrum antibiotics and IV steroids along with multiple breathing treatments. She continued to complain of shortness of breath and remained tachycardic up to the 130s. CT PE Protocol was therefore obtained to rule out PE, which showed no evidence of embolus but did comment on a rounded right middle lobe consolidation felt to represent pneumonia. She has been admitted for further management of sepsis secondary to COPD exacerbation and possible right middle lobe pneumonia.        Review of Systems  Review of Systems   Constitutional: Positive for activity change, chills and fatigue. Negative for appetite change, diaphoresis, fever and unexpected weight change.   HENT: Negative for congestion, postnasal drip, rhinorrhea and sinus pressure.    Eyes: Negative for photophobia, pain, discharge, redness, itching and visual disturbance.    Respiratory: Positive for cough (with thick yellow sputum), shortness of breath and wheezing.    Cardiovascular: Positive for chest pain (Pleuritic, right sided, worse with cough and deep inspiration) and leg swelling.   Gastrointestinal: Positive for nausea and vomiting. Negative for abdominal distention, abdominal pain, constipation and diarrhea.   Endocrine: Negative for cold intolerance, heat intolerance, polydipsia, polyphagia and polyuria.   Genitourinary: Negative for difficulty urinating, dysuria, hematuria and pelvic pain.   Musculoskeletal: Positive for arthralgias and back pain. Negative for joint swelling, myalgias, neck pain and neck stiffness.   Skin: Positive for wound (ulcers on both elbows, right appears to be healing, left with mild surrounding erythema). Negative for color change, pallor and rash.   Allergic/Immunologic: Negative for environmental allergies, food allergies and immunocompromised state.   Neurological: Negative for dizziness, tremors, seizures, syncope, weakness, light-headedness and headaches.   Hematological: Negative for adenopathy. Does not bruise/bleed easily.   Psychiatric/Behavioral: Negative for agitation, behavioral problems and confusion.       History  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   • Chronic respiratory failure with hypoxia     at nighttime   • Compression fracture of lumbar vertebra    • Compression fracture of thoracic vertebra    • COPD (chronic obstructive pulmonary disease)    • Coronary artery disease    • Diabetes mellitus type 2 in nonobese    • Erosive esophagitis 12/2016   • Essential hypertension    • Gastroparesis    • GERD (gastroesophageal reflux disease)    • Hepatitis-C    • History of transfusion    • Medically noncompliant    • MRSA infection     back of neck around 2012; 6/2017 septic left elbow joint with concerns for osteomyelitis and left anteromedial left arm   •  NSTEMI (non-ST elevated myocardial infarction) 2016   • PE (pulmonary embolism)    • Protein calorie malnutrition    • RA (rheumatoid arthritis)     with chronic steroid use   • Rheumatoid arthritis      Past Surgical History:   Procedure Laterality Date   • ABDOMINAL SURGERY     • AMPUTATION DIGIT Right 2016    Third digit to the PIP. Surgeon: Dr. Bains   • AMPUTATION DIGIT Left 2016    Procedure: PARTIAL AMPUTATION LEFT THIRD FINGER;  Surgeon: Shane Fuller MD;  Location: [facility] OR;  Service:    • AMPUTATION REVISION Left 2016    Procedure: AMPUTATION REVISION DIGIT STUMP;  Surgeon: Shane Fuller MD;  Location: [facility] OR;  Service:    • ARM DEBRIDEMENT Left 2016    Procedure: HAND IRRIGATION DEBRIDEMENT AND REPAIR ;  Surgeon: Shane Fuller MD;  Location: [facility] OR;  Service:    •  SECTION      x 2   • CHOLECYSTECTOMY     • ENDOSCOPY N/A 2016    Procedure: ESOPHAGOGASTRODUODENOSCOPY;  Surgeon: Jeremy Clement III, MD;  Location: [facility] OR;  Service:    • ENDOSCOPY W/ PEG TUBE PLACEMENT N/A 3/30/2017    Procedure: ESOPHAGOGASTRODUODENOSCOPY WITH PERCUTANEOUS ENDOSCOPIC GASTROSTOMY TUBE INSERTION;  Surgeon: Shade Campos MD;  Location: [facility] OR;  Service:    • FOREIGN BODY REMOVAL      insulin needle removal x 3 from the abdomen   • HYSTERECTOMY     • INCISION AND DRAINAGE ARM Left 2017    Procedure: INCISION AND DRAINAGE UPPER EXTREMITY;  Surgeon: Shane Fuller MD;  Location: [facility] OR;  Service:      Family History   Problem Relation Age of Onset   • Colon cancer Mother    • Cancer Father    • Diabetes Maternal Grandmother    • Heart disease Neg Hx    • Stroke Neg Hx      Social History   Substance Use Topics   • Smoking status: Current Every Day Smoker     Packs/day: 1.00     Years: 35.00     Types: Cigarettes   • Smokeless tobacco: None      Comment: 4-5 cigarettes a day    • Alcohol use No       (Not in a hospital  admission)  Allergies:  Penicillins; Toradol [ketorolac tromethamine]; and Tramadol    Objective     Vital Signs  Temp:  [98.4 °F (36.9 °C)-99.3 °F (37.4 °C)] 99.3 °F (37.4 °C)  Heart Rate:  [126-137] 130  Resp:  [20-24] 20  BP: (129-155)/(59-93) 146/76  Body mass index is 19.49 kg/(m^2).    Physical Exam:  Physical Exam   Constitutional: She is oriented to person, place, and time. She appears well-developed and well-nourished.   Anxious appearing, tachypneic   HENT:   Head: Normocephalic and atraumatic.   Mouth/Throat: Oropharynx is clear and moist.   Eyes: Conjunctivae and EOM are normal. Pupils are equal, round, and reactive to light.   Neck: Normal range of motion. Neck supple. No JVD present.   Cardiovascular: Normal heart sounds and intact distal pulses.    No murmur heard.  Tachycardic, regular rhythm   Pulmonary/Chest:   Tachypneic, increased work of breathing. Prolonged expiratory phase. Poor air movement diffusely. Faint end-expiratory wheezing and rales in bases.   Abdominal: Soft. Bowel sounds are normal. She exhibits no distension. There is no tenderness.   Musculoskeletal: Normal range of motion. She exhibits edema (1+ pitting edema b/l lower ext). She exhibits no tenderness or deformity.   Lymphadenopathy:     She has no cervical adenopathy.   Neurological: She is alert and oriented to person, place, and time.   No focal deficits appreciated   Skin: Skin is warm and dry.   Fingers with no active ischemia or ulcerations presently. Missing distal portion of 4th finger on left hand (auto-amputated). Small ulcerations on tip of both elbows, right appears to be healing, left with mild erythema but no drainage    Psychiatric:   Anxious         Results Review:       Lab Results:    Results from last 7 days  Lab Units 09/10/17  2321   WBC 10*3/mm3 5.92   HEMOGLOBIN g/dL 10.2*   PLATELETS 10*3/mm3 229           Results from last 7 days  Lab Units 09/10/17  2321   SODIUM mmol/L 140   POTASSIUM mmol/L 3.5    CHLORIDE mmol/L 111   CO2 mmol/L 24.4   BUN mg/dL 16   CREATININE mg/dL 0.56   CALCIUM mg/dL 8.7   GLUCOSE mg/dL 159*         No results found for: HGBA1C    Results from last 7 days  Lab Units 09/10/17  2321   BILIRUBIN mg/dL 0.1*   ALK PHOS U/L 79   AST (SGOT) U/L 33*   ALT (SGPT) U/L 32       Results from last 7 days  Lab Units 09/10/17  2321   TROPONIN I ng/mL 0.020       Results from last 7 days  Lab Units 09/11/17  0053   BNP pg/mL 180.0*               I have reviewed the patient's laboratory results.    Imaging:  Imaging Results (last 72 hours)     Procedure Component Value Units Date/Time    XR Chest 1 View [[id number]] Updated:  09/10/17 2330    CT Chest Pulmonary Embolism With Contrast [[id number]] Updated:  09/11/17 0128      Chest XR: haziness in right hilum and lower lobe concerning for pneumonia    CT chest: Glass attenuation, peripheral septal thickening and small right pleural effusion suspicious for CHF and interstitial edema. Probable focus of rounded atelectasis in the right middle lobe, differential diagnosis included dense consolidation vs pleural based mass    I have personally reviewed the patient's radiologic imaging.        EKG: sinus tach, . QTc 450. Left anterior fascicular block vs incomplete RBBB. Evidence of old inferior infarct. No overt ST changes to suggest acute ischemia.     I have personally reviewed the patient's EKG.      Assessment/Plan     Active Problems:  - Sepsis secondary to HCAP (healthcare-associated pneumonia): this is based on virtual radiology reading of right middle lobe rounded consolidation on CT. Will treat with broad spectrum antibiotics including vancomycin, cefepime, doxycycline and flagyl. Rule out atypical organisms. Blood cultures obtained in the ED. No leukocytosis or fever, will check CRP. Plan to de-escalate antibiotics quickly if our in-house radiologist does not appreciate infiltrate on CT.  - Acute COPD exacerbation: on above antibiotics, in  addition to IV solu-medrol and nebs. HR remains in 120-130 range, so will hold off on albuterol and give atrovent only for now.  - History CAD: trend cardiac enzymes to rule out acute MI. Had somewhat positive stress test last admission in July, cardiology opted to treat medically at that time. Plan to continue home medications once reconciled by pharmacy.  - Systolic CHF, EF 45%: BNP only marginally elevated but patient complains of worsening lower ext edema and has received 2.5 L of IV fluids in ED per sepsis protocol. Will hold further maintenance fluids. Monitor strict I/Os, daily weights, trend BNP.  - Type II DM, insulin dependent: check A1c. Continue home levemir and add SSI. Anticipate may need additional coverage since receiving steroids.  - Buerger's disease: strongly urged patient to stop smoking.  - Tobacco abuse: again, strongly urged to quit.    DVT Prophylaxis: SQ Heparin    Estimated Length of Stay >2 midnights    I discussed the patient's findings, assessment and plan with the patient and her RN Zachariah on [facility].    * Patient is high risk due to sepsis secondary to HCAP, COPD exacerbation, Type II DM insulin dependent, history CAD, history buerger's disease, continued tobacco abuse    Elmer Cervantes MD  09/11/17  3:03 AM

## 2017-09-11 NOTE — PROGRESS NOTES
Pharmacokinetics Service Note:    Ms. Yan has been evaluated for vancomycin dosing to treat her possible R sided HCAP.  She received a 1 gm dose around 0300.  She is known to our service from several previous admissions, the most recent being in June.  Based on her current similar demographics and renal function along with her previous dosing history on our service, will continue dosing at 1.25 gm q12hrs to target troughs = 15-20 mg/L.  Will obtain a trough level when steady state is achieved to determine if any adjustments are needed.    Thank you.  Jessica Chadwick, Pharm.D.  9/11/2017  8:05 AM

## 2017-09-11 NOTE — PROGRESS NOTES
Discharge Planning Assessment   Daniel     Patient Name: Pepito Yan  MRN: [id number]  Today's Date: 9/11/2017    Admit Date: 9/10/2017          Discharge Needs Assessment       09/11/17 1322    Living Environment    Lives With other (see comments)    Living Arrangements house    Home Accessibility no concerns    Type of Financial/Environmental Concern none    Transportation Available car    Living Environment    Provides Primary Care For no one, unable/limited ability to care for self    Primary Care Provided By other (see comments)    Quality Of Family Relationships supportive    Able to Return to Prior Living Arrangements yes    Discharge Needs Assessment    Concerns To Be Addressed no discharge needs identified    Equipment Currently Used at Home oxygen            Discharge Plan       09/11/17 1324    Case Management/Social Work Plan    Plan Pt admitted on 9/10/17.  SS received consult per [facility] for discharge planning.  SS spoke with pt on this date.  Pt lives at home with niece and plans to return home at discharge.  Pt currently does not utilize home health services.  Pt currently utilizes home 02 via [facility].  Pt states no POA or Living Will and does not request any further information regarding these.  Pt states no issues purcahsing prescription medications and utilizes [facility]. Pt's PCP is Archana Hare.      Patient/Family In Agreement With Plan yes        Discharge Placement     No information found                Demographic Summary       09/11/17 1322    Referral Information    Admission Type inpatient    Arrived From admitted as an inpatient    Referral Source nursing    Reason For Consult discharge planning            Functional Status     None            Psychosocial     None            Abuse/Neglect     None            Legal     None            Substance Abuse     None            Patient Forms     None          Mery Montiel

## 2017-09-11 NOTE — PROGRESS NOTES
[facility] HOSPITALIST PROGRESS NOTE     Patient Identification:  Name:  Pepito Yan  Age:  51 y.o.  Sex:  female  :  1965  MRN:  [id number]  Visit Number:  [id number]  Primary Care Provider:  ROSA Love    Length of stay:  0    Chief complaint:  51 y.o. old female admitted with shortness of breath, sepsis and healthcare associated pneumonia      Subjective:    Pt is complaining of pain in her back between her shoulder blades.  Patient states that cough and taking deep breath worsens the pain.  Patient is complaining of cough with minimal sputum production.  Patient states that her breathing is improved.  Denies any other complaints.  Patient denies any fevers, chills, nausea, vomiting, chest pain, palpitation, constipation, diarrhea, dysuria or hematuria.      Current Hospital Meds:    aspirin  mg Oral Daily   atorvastatin 40 mg Oral Nightly   aztreonam 2 g Intravenous Q8H   doxycycline 100 mg Intravenous Q12H   heparin (porcine) 5,000 Units Subcutaneous Q12H   insulin aspart 0-9 Units Subcutaneous 4x Daily AC & at Bedtime   insulin detemir 40 Units Subcutaneous Nightly   ipratropium 0.5 mg Nebulization Q6H - RT   isosorbide mononitrate 30 mg Oral Daily   lisinopril 5 mg Oral Daily   methylPREDNISolone sodium succinate 40 mg Intravenous Q12H   metoprolol tartrate 25 mg Oral Q12H   metroNIDAZOLE 500 mg Intravenous Q8H   multivitamin 1 tablet Oral Daily   vancomycin 1,250 mg Intravenous Q12H        ----------------------------------------------------------------------------------------------------------------------  Vital Signs:  Temp:  [98 °F (36.7 °C)-99.3 °F (37.4 °C)] 98 °F (36.7 °C)  Heart Rate:  [] 93  Resp:  [18-24] 18  BP: (124-157)/(59-93) 157/92  Last 3 weights    09/10/17  2246   Weight: 110 lb (49.9 kg)     Body mass index is 19.49 kg/(m^2).    Intake/Output Summary (Last 24 hours) at 17 1411  Last data filed at 17 1046   Gross per 24 hour    Intake             3590 ml   Output                0 ml   Net             3590 ml     Diet Regular; Cardiac, Consistent Carbohydrate  ----------------------------------------------------------------------------------------------------------------------  Physical exam:  Constitutional:  Well-developed and well-nourished.     HENT:  Head:  Normocephalic and atraumatic.  Mouth:  Moist mucous membranes.    Eyes:  Conjunctivae and EOM are normal.  Pupils are equal, round, and reactive to light.   Neck:  Neck supple.  No JVD present.    Cardiovascular:  Regular rate and rhythm. S1+S2. No murmur, rubs or gallops.   Pulmonary/Chest: Few inspiratory crackles at the bases on posterior hemithorax  Abdominal:  Soft. Non-tender. No viscera palpable.  Bowel sounds audible.   Musculoskeletal: No deformity or joint swelling.   Peripheral vascular: Bilateral dorsalis pedis palpable. No edema.   Neurological:  Alert and oriented to person, place, and time.  Cranial nerves grossly intact. Strength bilaterally symmetrical in upper and lower extremities.   Skin:  Skin is warm and dry. No rash noted. No pallor.   ----------------------------------------------------------------------------------------------------------------------  Tele:  Sinus rhythm  ----------------------------------------------------------------------------------------------------------------------    Results from last 7 days  Lab Units 09/11/17  0823 09/11/17  0431 09/10/17  2321   CK TOTAL U/L 24 27  --    CKMB ng/mL 1.16 1.13  --    CK MB INDEX % 4.8* 4.2*  --    TROPONIN I ng/mL 0.012 0.012 0.020   MYOGLOBIN ng/mL 17.0 19.0  --        Results from last 7 days  Lab Units 09/11/17  0526 09/11/17  0431 09/11/17  0053 09/10/17  2321   CRP mg/dL  --  3.19*  --   --    LACTATE mmol/L  --   --  1.2  --    WBC 10*3/mm3 4.10*  --   --  5.92   HEMOGLOBIN g/dL 9.6*  --   --  10.2*   HEMATOCRIT % 34.0*  --   --  35.4*   MCV fL 83.1  --   --  80.3   MCHC g/dL 28.2*  --   --   28.8*   PLATELETS 10*3/mm3 210  --   --  229       Results from last 7 days  Lab Units 09/11/17  0449   PH, ARTERIAL pH units 7.354   PO2 ART mm Hg 88.6   PCO2, ARTERIAL mm Hg 32.5*   HCO3 ART mmol/L 17.7*       Results from last 7 days  Lab Units 09/11/17  0526 09/10/17  2321   SODIUM mmol/L 140 140   POTASSIUM mmol/L 3.9 3.5   CHLORIDE mmol/L 116* 111   CO2 mmol/L 14.9* 24.4   BUN mg/dL 12 16   CREATININE mg/dL 0.57 0.56   EGFR IF NONAFRICN AM mL/min/1.73 112 114   CALCIUM mg/dL 8.4 8.7   GLUCOSE mg/dL 329* 159*   ALBUMIN g/dL 3.20* 3.20*   BILIRUBIN mg/dL 0.2 0.1*   ALK PHOS U/L 81 79   AST (SGOT) U/L 61* 33*   ALT (SGPT) U/L 44* 32   Estimated Creatinine Clearance: 92 mL/min (by C-G formula based on Cr of 0.57).    No results found for: AMMONIA      Blood Culture   Date Value Ref Range Status   09/11/2017 No growth at less than 24 hours  Preliminary   09/11/2017 No growth at less than 24 hours  Preliminary                I have personally looked at the labs and they are summarized above.  ----------------------------------------------------------------------------------------------------------------------  Imaging Results (last 24 hours)     Procedure Component Value Units Date/Time    CT Chest Pulmonary Embolism With Contrast [[id number]] Collected:  09/11/17 0714     Updated:  09/11/17 0717    Narrative:       EXAMINATION: CT CHEST PULMONARY EMBOLISM W CONTRAST-      Technique: Multiple CT axial images were obtained through the level of  pulmonary arteries, following IV contrast administration per CT PE  protocol.  Volume Rendered 3D or MIP images performed.     Radiation dose reduction techniques were utilized per ALARA protocol.  Automated exposure control was initiated through either or [employer] or  [employer] software packages by  protocol.       185.06 mGy.cm        CLINICAL INDICATION:    SOB and Chest Pain     COMPARISON:    Chest x-ray performed on 09/10/2017 and comparison CT of  the chest  performed on 07/22/2017.     FINDINGS:    Groundglass attenuation and small right pleural effusion  suspicious for CHF and pulmonary edema. Probable region of rounded  atelectasis of the right middle lobe. There is no pulmonary artery  filling defect to suggest pulmonary embolism. There is no thoracic  adenopathy. No pleural or pericardial effusion. Incidentally imaged  upper abdomen is unremarkable. Bone windows show no acute osseous  abnormality.       Impression:          1. No PE.   2. Groundglass attenuation and small right pleural effusion suspicious  for CHF and pulmonary edema. Probable region of rounded atelectasis of  the right middle lobe.     This report was finalized on 9/11/2017 7:15 AM by Dr. Bret Morris MD.       XR Chest 1 View [[id number]] Collected:  09/11/17 0715     Updated:  09/11/17 0718    Narrative:       EXAMINATION: XR CHEST 1 VW-      CLINICAL INDICATION:     shortness of breath     TECHNIQUE:  XR CHEST 1 VW-      COMPARISON: NONE      FINDINGS:   COARSENED INTERSTITIAL MARKINGS AND BIBASILAR AIRSPACE DISEASE  PREDOMINANTLY AT THE RIGHT LUNG BASE.  Heart and mediastinum contours are unremarkable.  No pleural effusion.  No pneumothorax.   Bony and soft tissue structures are unremarkable.       Impression:       COARSENED INTERSTITIAL MARKINGS AND BIBASILAR AIRSPACE  DISEASE PREDOMINANTLY AT THE RIGHT LUNG BASE.     This report was finalized on 9/11/2017 7:16 AM by Dr. Bret Morris MD.           ----------------------------------------------------------------------------------------------------------------------  Assessment and Plan:      - Sepsis  Secondary to healthcare associated pneumonia.  Improving.  Patient is afebrile and heart rate has improved.  BP stable.  CRP is mildly elevated.  WBC normal.  Cultures are negative so far.  Continue IV antibiotics    - Healthcare associated pneumonia  Chest x-ray showed bibasilar infiltrates.  Serology negative for mycoplasma antibodies and  urine Legionella antigen is negative.  Continue vancomycin, aztreonam and Flagyl.  Blood cultures and sputum cultures ordered and pending.  Will discontinue doxycycline.    - Acute COPD exacerbation  Bronchospasm improved.  Continue nebulized bronchodilators and IV Solu-Medrol.    - Tachycardia  Improved.    - Coronary artery disease  Patient has pain in her upper back, atypical of ACS.  Cardiac enzymes have been negative so far.  EKG showed no changes distal of ischemia.  Continue aspirin, statin, metoprolol and lisinopril.    - Chronic systolic CHF  Compensated. 2-D echo showed EF of 45%.  Continue beta blocker and ACE inhibitor.  Monitor strict  I/Os, daily weights and electrolytes.     - Type II DM, insulin dependent:   check A1c. Continue home levemir and SSI.     - Buerger's disease:   No s/s/ of ischemia. strongly urged patient to stop smoking.    - Tobacco abuse:   Nicotine patch, strongly urged to quit    - Prophylaxis  DVT: Heparin  GI: famotidine.     - Fluid, electrolytes and nutrition:  No IVF.   Electrolyte replacement per protocol.        The patient is high risk due to: Sepsis, COPD exacerbation, bilateral pneumonia, congestive heart failure.    Elana Wolfe MD  09/11/17  2:11 PM

## 2017-09-12 LAB
GLUCOSE BLDC GLUCOMTR-MCNC: 149 MG/DL (ref 70–130)
GLUCOSE BLDC GLUCOMTR-MCNC: 152 MG/DL (ref 70–130)
GLUCOSE BLDC GLUCOMTR-MCNC: 335 MG/DL (ref 70–130)
GLUCOSE BLDC GLUCOMTR-MCNC: 359 MG/DL (ref 70–130)

## 2017-09-12 PROCEDURE — 82962 GLUCOSE BLOOD TEST: CPT

## 2017-09-12 PROCEDURE — 63710000001 INSULIN ASPART PER 5 UNITS: Performed by: HOSPITALIST

## 2017-09-12 PROCEDURE — 25010000002 HEPARIN (PORCINE) PER 1000 UNITS: Performed by: HOSPITALIST

## 2017-09-12 PROCEDURE — 25010000002 VANCOMYCIN PER 500 MG

## 2017-09-12 PROCEDURE — 94799 UNLISTED PULMONARY SVC/PX: CPT

## 2017-09-12 PROCEDURE — 25010000002 METHYLPREDNISOLONE PER 40 MG: Performed by: HOSPITALIST

## 2017-09-12 PROCEDURE — 99233 SBSQ HOSP IP/OBS HIGH 50: CPT | Performed by: INTERNAL MEDICINE

## 2017-09-12 PROCEDURE — 63710000001 INSULIN DETEMIR PER 5 UNITS: Performed by: INTERNAL MEDICINE

## 2017-09-12 RX ORDER — GUAIFENESIN/DEXTROMETHORPHAN 100-10MG/5
5 SYRUP ORAL EVERY 4 HOURS PRN
Status: DISCONTINUED | OUTPATIENT
Start: 2017-09-12 | End: 2017-09-14 | Stop reason: HOSPADM

## 2017-09-12 RX ADMIN — METRONIDAZOLE 500 MG: 500 INJECTION, SOLUTION INTRAVENOUS at 21:42

## 2017-09-12 RX ADMIN — HYDROXYZINE HYDROCHLORIDE 10 MG: 10 TABLET ORAL at 17:08

## 2017-09-12 RX ADMIN — LISINOPRIL 5 MG: 2.5 TABLET ORAL at 08:47

## 2017-09-12 RX ADMIN — AZTREONAM 2 G: 2 INJECTION, POWDER, FOR SOLUTION INTRAMUSCULAR; INTRAVENOUS at 02:29

## 2017-09-12 RX ADMIN — INSULIN ASPART 2 UNITS: 100 INJECTION, SOLUTION INTRAVENOUS; SUBCUTANEOUS at 08:46

## 2017-09-12 RX ADMIN — HEPARIN SODIUM 5000 UNITS: 5000 INJECTION, SOLUTION INTRAVENOUS; SUBCUTANEOUS at 08:47

## 2017-09-12 RX ADMIN — AZTREONAM 2 G: 2 INJECTION, POWDER, FOR SOLUTION INTRAMUSCULAR; INTRAVENOUS at 12:06

## 2017-09-12 RX ADMIN — METRONIDAZOLE 500 MG: 500 INJECTION, SOLUTION INTRAVENOUS at 13:34

## 2017-09-12 RX ADMIN — INSULIN DETEMIR 40 UNITS: 100 INJECTION, SOLUTION SUBCUTANEOUS at 21:44

## 2017-09-12 RX ADMIN — METRONIDAZOLE 500 MG: 500 INJECTION, SOLUTION INTRAVENOUS at 04:09

## 2017-09-12 RX ADMIN — ISOSORBIDE MONONITRATE 30 MG: 30 TABLET, EXTENDED RELEASE ORAL at 08:47

## 2017-09-12 RX ADMIN — METOPROLOL TARTRATE 25 MG: 25 TABLET, FILM COATED ORAL at 21:42

## 2017-09-12 RX ADMIN — INSULIN ASPART 8 UNITS: 100 INJECTION, SOLUTION INTRAVENOUS; SUBCUTANEOUS at 16:56

## 2017-09-12 RX ADMIN — FAMOTIDINE 20 MG: 20 TABLET, FILM COATED ORAL at 08:47

## 2017-09-12 RX ADMIN — METHYLPREDNISOLONE SODIUM SUCCINATE 40 MG: 40 INJECTION, POWDER, FOR SOLUTION INTRAMUSCULAR; INTRAVENOUS at 12:06

## 2017-09-12 RX ADMIN — METOPROLOL TARTRATE 25 MG: 25 TABLET, FILM COATED ORAL at 08:47

## 2017-09-12 RX ADMIN — METHYLPREDNISOLONE SODIUM SUCCINATE 40 MG: 40 INJECTION, POWDER, FOR SOLUTION INTRAMUSCULAR; INTRAVENOUS at 22:05

## 2017-09-12 RX ADMIN — INSULIN ASPART 7 UNITS: 100 INJECTION, SOLUTION INTRAVENOUS; SUBCUTANEOUS at 21:44

## 2017-09-12 RX ADMIN — Medication 1 TABLET: at 08:47

## 2017-09-12 RX ADMIN — ASPIRIN 325 MG: 325 TABLET, COATED ORAL at 08:47

## 2017-09-12 RX ADMIN — HEPARIN SODIUM 5000 UNITS: 5000 INJECTION, SOLUTION INTRAVENOUS; SUBCUTANEOUS at 21:41

## 2017-09-12 RX ADMIN — ATORVASTATIN CALCIUM 40 MG: 40 TABLET, FILM COATED ORAL at 21:42

## 2017-09-12 RX ADMIN — VANCOMYCIN HYDROCHLORIDE 1250 MG: 5 INJECTION, POWDER, LYOPHILIZED, FOR SOLUTION INTRAVENOUS at 03:00

## 2017-09-12 RX ADMIN — AZTREONAM 2 G: 2 INJECTION, POWDER, FOR SOLUTION INTRAMUSCULAR; INTRAVENOUS at 16:55

## 2017-09-12 RX ADMIN — VANCOMYCIN HYDROCHLORIDE 1250 MG: 5 INJECTION, POWDER, LYOPHILIZED, FOR SOLUTION INTRAVENOUS at 16:55

## 2017-09-12 RX ADMIN — IPRATROPIUM BROMIDE 0.5 MG: 0.5 SOLUTION RESPIRATORY (INHALATION) at 12:33

## 2017-09-12 NOTE — PLAN OF CARE
Problem: Cardiac Output, Decreased (Adult)  Goal: Identify Related Risk Factors and Signs and Symptoms  Outcome: Ongoing (interventions implemented as appropriate)  Goal: Adequate Cardiac Output/Effective Tissue Perfusion  Outcome: Ongoing (interventions implemented as appropriate)    Problem: Respiratory Insufficiency (Adult)  Goal: Identify Related Risk Factors and Signs and Symptoms  Outcome: Ongoing (interventions implemented as appropriate)  Goal: Effective Ventilation  Outcome: Ongoing (interventions implemented as appropriate)    Problem: Pain, Acute (Adult)  Goal: Identify Related Risk Factors and Signs and Symptoms  Outcome: Ongoing (interventions implemented as appropriate)  Goal: Acceptable Pain Control/Comfort Level  Outcome: Outcome(s) achieved Date Met:  09/11/17    Problem: Pressure Ulcer Risk (Brendan Scale) (Adult,Obstetrics,Pediatric)  Goal: Identify Related Risk Factors and Signs and Symptoms  Outcome: Ongoing (interventions implemented as appropriate)  Goal: Skin Integrity  Outcome: Ongoing (interventions implemented as appropriate)    Problem: Fall Risk (Adult)  Goal: Identify Related Risk Factors and Signs and Symptoms  Outcome: Ongoing (interventions implemented as appropriate)  Goal: Absence of Falls  Outcome: Ongoing (interventions implemented as appropriate)    Problem: Diabetes, Type 2 (Adult)  Goal: Signs and Symptoms of Listed Potential Problems Will be Absent or Manageable (Diabetes, Type 2)  Outcome: Ongoing (interventions implemented as appropriate)

## 2017-09-12 NOTE — PLAN OF CARE
Problem: Cardiac Output, Decreased (Adult)  Goal: Identify Related Risk Factors and Signs and Symptoms  Outcome: Ongoing (interventions implemented as appropriate)  Goal: Adequate Cardiac Output/Effective Tissue Perfusion  Outcome: Ongoing (interventions implemented as appropriate)    Problem: Respiratory Insufficiency (Adult)  Goal: Identify Related Risk Factors and Signs and Symptoms  Outcome: Ongoing (interventions implemented as appropriate)  Goal: Acid/Base Balance  Outcome: Ongoing (interventions implemented as appropriate)  Goal: Effective Ventilation  Outcome: Ongoing (interventions implemented as appropriate)    Problem: Pain, Acute (Adult)  Goal: Identify Related Risk Factors and Signs and Symptoms  Outcome: Ongoing (interventions implemented as appropriate)    Problem: Pressure Ulcer Risk (Brendan Scale) (Adult,Obstetrics,Pediatric)  Goal: Identify Related Risk Factors and Signs and Symptoms  Outcome: Ongoing (interventions implemented as appropriate)  Goal: Skin Integrity  Outcome: Ongoing (interventions implemented as appropriate)    Problem: Fall Risk (Adult)  Goal: Identify Related Risk Factors and Signs and Symptoms  Outcome: Ongoing (interventions implemented as appropriate)  Goal: Absence of Falls  Outcome: Ongoing (interventions implemented as appropriate)    Problem: Diabetes, Type 2 (Adult)  Goal: Signs and Symptoms of Listed Potential Problems Will be Absent or Manageable (Diabetes, Type 2)  Outcome: Ongoing (interventions implemented as appropriate)    Problem: Patient Care Overview (Adult)  Goal: Plan of Care Review  Outcome: Ongoing (interventions implemented as appropriate)  Goal: Adult Individualization and Mutuality  Outcome: Ongoing (interventions implemented as appropriate)  Goal: Discharge Needs Assessment  Outcome: Ongoing (interventions implemented as appropriate)

## 2017-09-12 NOTE — PROGRESS NOTES
[facility] HOSPITALIST PROGRESS NOTE     Patient Identification:  Name:  Pepito Yan  Age:  51 y.o.  Sex:  female  :  1965  MRN:  [id number]  Visit Number:  [id number]  Primary Care Provider:  ROSA Love    Length of stay:  1    Chief complaint:  51 y.o. old female admitted with shortness of breath, sepsis and healthcare associated pneumonia      Subjective:    No acute issues overnight.    Patient states that she is not feeling any better.  Continues to have cough and pain in her back between her shoulder blades.  Patient states that cough and taking deep breath worsens the pain.  Denies any sputum production.  Patient denies any fevers, chills, nausea, vomiting, chest pain, palpitation, constipation, diarrhea, dysuria or hematuria.      Current Hospital Meds:    aspirin  mg Oral Daily   atorvastatin 40 mg Oral Nightly   aztreonam 2 g Intravenous Q8H   famotidine 20 mg Oral Daily   heparin (porcine) 5,000 Units Subcutaneous Q12H   insulin aspart 0-9 Units Subcutaneous 4x Daily AC & at Bedtime   insulin detemir 40 Units Subcutaneous Nightly   ipratropium 0.5 mg Nebulization Q6H - RT   isosorbide mononitrate 30 mg Oral Daily   lisinopril 5 mg Oral Daily   methylPREDNISolone sodium succinate 40 mg Intravenous Q12H   metoprolol tartrate 25 mg Oral Q12H   metroNIDAZOLE 500 mg Intravenous Q8H   multivitamin 1 tablet Oral Daily   vancomycin 1,250 mg Intravenous Q12H        ----------------------------------------------------------------------------------------------------------------------  Vital Signs:  Temp:  [96.4 °F (35.8 °C)-98.1 °F (36.7 °C)] 96.4 °F (35.8 °C)  Heart Rate:  [] 119  Resp:  [16-18] 18  BP: (125-159)/(60-90) 159/90  Last 3 weights    09/10/17  2246 17  0300   Weight: 110 lb (49.9 kg) 124 lb 4 oz (56.4 kg)     Body mass index is 22.01 kg/(m^2).    Intake/Output Summary (Last 24 hours) at 17 1226  Last data filed at 17 1100   Gross per  24 hour   Intake             1190 ml   Output                0 ml   Net             1190 ml     Diet Regular; Cardiac, Consistent Carbohydrate  ----------------------------------------------------------------------------------------------------------------------  Physical exam:  Constitutional:  Well-developed and well-nourished.     HENT:  Head:  Normocephalic and atraumatic.  Mouth:  Moist mucous membranes.    Eyes:  Conjunctivae and EOM are normal.  Pupils are equal, round, and reactive to light.   Neck:  Neck supple.  No JVD present.    Cardiovascular:  Regular rate and rhythm. S1+S2. No murmur, rubs or gallops.   Pulmonary/Chest: Few inspiratory crackles at the bases on posterior hemithorax L>R.   Abdominal:  Soft. Non-tender. No viscera palpable.  Bowel sounds audible.   Musculoskeletal: No deformity or joint swelling.   Peripheral vascular: Bilateral dorsalis pedis palpable. No edema.   Neurological:  Alert and oriented to person, place, and time.  Cranial nerves grossly intact. Strength bilaterally symmetrical in upper and lower extremities.   Skin:  Skin is warm and dry. No rash noted. No pallor.   ----------------------------------------------------------------------------------------------------------------------  Tele:  Sinus rhythm  ----------------------------------------------------------------------------------------------------------------------    Results from last 7 days  Lab Units 09/11/17  1505 09/11/17  0823 09/11/17  0431   CK TOTAL U/L 29 24 27   CKMB ng/mL 1.18 1.16 1.13   CK MB INDEX % 4.1* 4.8* 4.2*   TROPONIN I ng/mL 0.011 0.012 0.012   MYOGLOBIN ng/mL 20.0 17.0 19.0       Results from last 7 days  Lab Units 09/11/17  0526 09/11/17  0431 09/11/17  0053 09/10/17  2321   CRP mg/dL  --  3.19*  --   --    LACTATE mmol/L  --   --  1.2  --    WBC 10*3/mm3 4.10*  --   --  5.92   HEMOGLOBIN g/dL 9.6*  --   --  10.2*   HEMATOCRIT % 34.0*  --   --  35.4*   MCV fL 83.1  --   --  80.3   MCHC g/dL  28.2*  --   --  28.8*   PLATELETS 10*3/mm3 210  --   --  229       Results from last 7 days  Lab Units 09/11/17  0449   PH, ARTERIAL pH units 7.354   PO2 ART mm Hg 88.6   PCO2, ARTERIAL mm Hg 32.5*   HCO3 ART mmol/L 17.7*       Results from last 7 days  Lab Units 09/11/17  0526 09/10/17  2321   SODIUM mmol/L 140 140   POTASSIUM mmol/L 3.9 3.5   CHLORIDE mmol/L 116* 111   CO2 mmol/L 14.9* 24.4   BUN mg/dL 12 16   CREATININE mg/dL 0.57 0.56   EGFR IF NONAFRICN AM mL/min/1.73 112 114   CALCIUM mg/dL 8.4 8.7   GLUCOSE mg/dL 329* 159*   ALBUMIN g/dL 3.20* 3.20*   BILIRUBIN mg/dL 0.2 0.1*   ALK PHOS U/L 81 79   AST (SGOT) U/L 61* 33*   ALT (SGPT) U/L 44* 32   Estimated Creatinine Clearance: 104 mL/min (by C-G formula based on Cr of 0.57).    No results found for: AMMONIA      Blood Culture   Date Value Ref Range Status   09/11/2017 No growth at less than 24 hours  Preliminary   09/11/2017 No growth at less than 24 hours  Preliminary                I have personally looked at the labs and they are summarized above.  ----------------------------------------------------------------------------------------------------------------------  Imaging Results (last 24 hours)     ** No results found for the last 24 hours. **        ----------------------------------------------------------------------------------------------------------------------  Assessment and Plan:      - Sepsis  Secondary to healthcare associated pneumonia.  Improving.  Patient is afebrile and VS stable. CRP is mildly elevated.  WBC normal.  Cultures are negative so far.  Continue IV antibiotics    - Healthcare associated pneumonia  Chest x-ray showed bibasilar infiltrates.  Serology negative for mycoplasma antibodies and urine Legionella antigen is negative.  Continue vancomycin, aztreonam and Flagyl.  Blood cultures and sputum cultures ordered and pending.  Doxycycline discontinued.    - Acute COPD exacerbation  Pt has rhonchorous spasm in left hemithorax.   Continue nebulized bronchodilators and IV Solu-Medrol.    - Tachycardia  Improved.    - Coronary artery disease  Patient has pain in her upper back, atypical of ACS.  Cardiac enzymes negative.  EKG showed no changes concerning for ischemia.  Continue aspirin, statin, metoprolol and lisinopril.    - Chronic systolic CHF  Compensated. 2-D echo showed EF of 45%.  Continue beta blocker and ACE inhibitor.  Monitor strict  I/Os, daily weights and electrolytes.     - Type II DM, insulin dependent:   check A1c. Continue home levemir and SSI.     - Buerger's disease:   No s/s/ of ischemia. strongly urged patient to stop smoking.    - Tobacco abuse:   Nicotine patch, strongly urged to quit    - Prophylaxis  DVT: Heparin  GI: famotidine.     - Fluid, electrolytes and nutrition:  No IVF.   Electrolyte replacement per protocol.        The patient is high risk due to: Sepsis, persistent bronchospasm, COPD exacerbation, bilateral pneumonia, congestive heart failure.    Elana Wolfe MD  09/12/17  12:26 PM

## 2017-09-13 LAB
027 TOXIN: NORMAL
ANION GAP SERPL CALCULATED.3IONS-SCNC: 7.6 MMOL/L (ref 3.6–11.2)
BASOPHILS # BLD AUTO: 0.01 10*3/MM3 (ref 0–0.3)
BASOPHILS NFR BLD AUTO: 0.2 % (ref 0–2)
BUN BLD-MCNC: 21 MG/DL (ref 7–21)
BUN/CREAT SERPL: 31.8 (ref 7–25)
C DIFF TOX GENS STL QL NAA+PROBE: NEGATIVE
CALCIUM SPEC-SCNC: 9.2 MG/DL (ref 7.7–10)
CHLORIDE SERPL-SCNC: 112 MMOL/L (ref 99–112)
CO2 SERPL-SCNC: 20.4 MMOL/L (ref 24.3–31.9)
CREAT BLD-MCNC: 0.66 MG/DL (ref 0.43–1.29)
DEPRECATED RDW RBC AUTO: 54.6 FL (ref 37–54)
EOSINOPHIL # BLD AUTO: 0 10*3/MM3 (ref 0–0.7)
EOSINOPHIL NFR BLD AUTO: 0 % (ref 0–5)
ERYTHROCYTE [DISTWIDTH] IN BLOOD BY AUTOMATED COUNT: 19.3 % (ref 11.5–14.5)
GFR SERPL CREATININE-BSD FRML MDRD: 94 ML/MIN/1.73
GLUCOSE BLD-MCNC: 292 MG/DL (ref 70–110)
GLUCOSE BLDC GLUCOMTR-MCNC: 134 MG/DL (ref 70–130)
GLUCOSE BLDC GLUCOMTR-MCNC: 318 MG/DL (ref 70–130)
GLUCOSE BLDC GLUCOMTR-MCNC: 408 MG/DL (ref 70–130)
GLUCOSE BLDC GLUCOMTR-MCNC: 84 MG/DL (ref 70–130)
HCT VFR BLD AUTO: 32.6 % (ref 37–47)
HEMOCCULT STL QL: NEGATIVE
HGB BLD-MCNC: 9.6 G/DL (ref 12–16)
IMM GRANULOCYTES # BLD: 0.11 10*3/MM3 (ref 0–0.03)
IMM GRANULOCYTES NFR BLD: 2.4 % (ref 0–0.5)
LYMPHOCYTES # BLD AUTO: 0.58 10*3/MM3 (ref 1–3)
LYMPHOCYTES NFR BLD AUTO: 12.9 % (ref 21–51)
MCH RBC QN AUTO: 23.5 PG (ref 27–33)
MCHC RBC AUTO-ENTMCNC: 29.4 G/DL (ref 33–37)
MCV RBC AUTO: 79.9 FL (ref 80–94)
MONOCYTES # BLD AUTO: 0.15 10*3/MM3 (ref 0.1–0.9)
MONOCYTES NFR BLD AUTO: 3.3 % (ref 0–10)
NEUTROPHILS # BLD AUTO: 3.65 10*3/MM3 (ref 1.4–6.5)
NEUTROPHILS NFR BLD AUTO: 81.2 % (ref 30–70)
OSMOLALITY SERPL CALC.SUM OF ELEC: 293.1 MOSM/KG (ref 273–305)
PLATELET # BLD AUTO: 235 10*3/MM3 (ref 130–400)
PMV BLD AUTO: 9.6 FL (ref 6–10)
POTASSIUM BLD-SCNC: 3.8 MMOL/L (ref 3.5–5.3)
RBC # BLD AUTO: 4.08 10*6/MM3 (ref 4.2–5.4)
SODIUM BLD-SCNC: 140 MMOL/L (ref 135–153)
VANCOMYCIN TROUGH SERPL-MCNC: 15.4 MCG/ML (ref 5–15)
WBC NRBC COR # BLD: 4.5 10*3/MM3 (ref 4.5–12.5)

## 2017-09-13 PROCEDURE — 99232 SBSQ HOSP IP/OBS MODERATE 35: CPT | Performed by: INTERNAL MEDICINE

## 2017-09-13 PROCEDURE — 80048 BASIC METABOLIC PNL TOTAL CA: CPT | Performed by: INTERNAL MEDICINE

## 2017-09-13 PROCEDURE — 87899 AGENT NOS ASSAY W/OPTIC: CPT | Performed by: INTERNAL MEDICINE

## 2017-09-13 PROCEDURE — 94799 UNLISTED PULMONARY SVC/PX: CPT

## 2017-09-13 PROCEDURE — 87046 STOOL CULTR AEROBIC BACT EA: CPT | Performed by: INTERNAL MEDICINE

## 2017-09-13 PROCEDURE — 85025 COMPLETE CBC W/AUTO DIFF WBC: CPT | Performed by: INTERNAL MEDICINE

## 2017-09-13 PROCEDURE — 25010000002 VANCOMYCIN PER 500 MG

## 2017-09-13 PROCEDURE — 63710000001 INSULIN ASPART PER 5 UNITS: Performed by: HOSPITALIST

## 2017-09-13 PROCEDURE — 63710000001 INSULIN DETEMIR PER 5 UNITS: Performed by: INTERNAL MEDICINE

## 2017-09-13 PROCEDURE — 25010000002 HEPARIN (PORCINE) PER 1000 UNITS: Performed by: HOSPITALIST

## 2017-09-13 PROCEDURE — C1751 CATH, INF, PER/CENT/MIDLINE: HCPCS

## 2017-09-13 PROCEDURE — 80202 ASSAY OF VANCOMYCIN: CPT

## 2017-09-13 PROCEDURE — 82962 GLUCOSE BLOOD TEST: CPT

## 2017-09-13 PROCEDURE — 87045 FECES CULTURE AEROBIC BACT: CPT | Performed by: INTERNAL MEDICINE

## 2017-09-13 PROCEDURE — 82272 OCCULT BLD FECES 1-3 TESTS: CPT | Performed by: INTERNAL MEDICINE

## 2017-09-13 PROCEDURE — 25010000002 METHYLPREDNISOLONE PER 40 MG: Performed by: HOSPITALIST

## 2017-09-13 PROCEDURE — 87493 C DIFF AMPLIFIED PROBE: CPT | Performed by: INTERNAL MEDICINE

## 2017-09-13 PROCEDURE — 87209 SMEAR COMPLEX STAIN: CPT | Performed by: INTERNAL MEDICINE

## 2017-09-13 PROCEDURE — 87177 OVA AND PARASITES SMEARS: CPT | Performed by: INTERNAL MEDICINE

## 2017-09-13 RX ORDER — LIDOCAINE HYDROCHLORIDE 10 MG/ML
20 INJECTION, SOLUTION INFILTRATION; PERINEURAL ONCE
Status: COMPLETED | OUTPATIENT
Start: 2017-09-13 | End: 2017-09-13

## 2017-09-13 RX ORDER — LIDOCAINE HYDROCHLORIDE 10 MG/ML
INJECTION, SOLUTION INFILTRATION; PERINEURAL
Status: DISPENSED
Start: 2017-09-13 | End: 2017-09-13

## 2017-09-13 RX ORDER — ACETAMINOPHEN AND CODEINE PHOSPHATE 300; 30 MG/1; MG/1
1 TABLET ORAL EVERY 8 HOURS PRN
Status: DISCONTINUED | OUTPATIENT
Start: 2017-09-13 | End: 2017-09-14 | Stop reason: HOSPADM

## 2017-09-13 RX ORDER — SODIUM CHLORIDE 0.9 % (FLUSH) 0.9 %
10 SYRINGE (ML) INJECTION AS NEEDED
Status: DISCONTINUED | OUTPATIENT
Start: 2017-09-13 | End: 2017-09-14 | Stop reason: HOSPADM

## 2017-09-13 RX ORDER — SODIUM CHLORIDE 0.9 % (FLUSH) 0.9 %
10 SYRINGE (ML) INJECTION EVERY 12 HOURS SCHEDULED
Status: DISCONTINUED | OUTPATIENT
Start: 2017-09-13 | End: 2017-09-14 | Stop reason: HOSPADM

## 2017-09-13 RX ADMIN — METRONIDAZOLE 500 MG: 500 INJECTION, SOLUTION INTRAVENOUS at 15:20

## 2017-09-13 RX ADMIN — HEPARIN SODIUM 5000 UNITS: 5000 INJECTION, SOLUTION INTRAVENOUS; SUBCUTANEOUS at 08:34

## 2017-09-13 RX ADMIN — TIZANIDINE 2 MG: 4 TABLET ORAL at 20:28

## 2017-09-13 RX ADMIN — METHYLPREDNISOLONE SODIUM SUCCINATE 40 MG: 40 INJECTION, POWDER, FOR SOLUTION INTRAMUSCULAR; INTRAVENOUS at 20:29

## 2017-09-13 RX ADMIN — INSULIN ASPART 9 UNITS: 100 INJECTION, SOLUTION INTRAVENOUS; SUBCUTANEOUS at 17:29

## 2017-09-13 RX ADMIN — INSULIN ASPART 7 UNITS: 100 INJECTION, SOLUTION INTRAVENOUS; SUBCUTANEOUS at 20:29

## 2017-09-13 RX ADMIN — VANCOMYCIN HYDROCHLORIDE 1250 MG: 5 INJECTION, POWDER, LYOPHILIZED, FOR SOLUTION INTRAVENOUS at 17:29

## 2017-09-13 RX ADMIN — AZTREONAM 2 G: 2 INJECTION, POWDER, FOR SOLUTION INTRAMUSCULAR; INTRAVENOUS at 02:06

## 2017-09-13 RX ADMIN — METOPROLOL TARTRATE 25 MG: 25 TABLET, FILM COATED ORAL at 08:34

## 2017-09-13 RX ADMIN — METOPROLOL TARTRATE 25 MG: 25 TABLET, FILM COATED ORAL at 20:28

## 2017-09-13 RX ADMIN — LIDOCAINE HYDROCHLORIDE 0.5 ML: 10 INJECTION, SOLUTION INFILTRATION; PERINEURAL at 09:29

## 2017-09-13 RX ADMIN — Medication 1 TABLET: at 08:34

## 2017-09-13 RX ADMIN — INSULIN DETEMIR 40 UNITS: 100 INJECTION, SOLUTION SUBCUTANEOUS at 20:30

## 2017-09-13 RX ADMIN — METHYLPREDNISOLONE SODIUM SUCCINATE 40 MG: 40 INJECTION, POWDER, FOR SOLUTION INTRAMUSCULAR; INTRAVENOUS at 11:58

## 2017-09-13 RX ADMIN — ISOSORBIDE MONONITRATE 30 MG: 30 TABLET, EXTENDED RELEASE ORAL at 08:34

## 2017-09-13 RX ADMIN — FAMOTIDINE 20 MG: 20 TABLET, FILM COATED ORAL at 08:34

## 2017-09-13 RX ADMIN — Medication 10 ML: at 20:30

## 2017-09-13 RX ADMIN — METRONIDAZOLE 500 MG: 500 INJECTION, SOLUTION INTRAVENOUS at 20:31

## 2017-09-13 RX ADMIN — ACETAMINOPHEN AND CODEINE PHOSPHATE 1 TABLET: 300; 30 TABLET ORAL at 15:20

## 2017-09-13 RX ADMIN — LISINOPRIL 5 MG: 2.5 TABLET ORAL at 08:34

## 2017-09-13 RX ADMIN — ASPIRIN 325 MG: 325 TABLET, COATED ORAL at 08:34

## 2017-09-13 RX ADMIN — HEPARIN SODIUM 5000 UNITS: 5000 INJECTION, SOLUTION INTRAVENOUS; SUBCUTANEOUS at 20:28

## 2017-09-13 RX ADMIN — AZTREONAM 2 G: 2 INJECTION, POWDER, FOR SOLUTION INTRAMUSCULAR; INTRAVENOUS at 17:29

## 2017-09-13 RX ADMIN — AZTREONAM 2 G: 2 INJECTION, POWDER, FOR SOLUTION INTRAMUSCULAR; INTRAVENOUS at 11:58

## 2017-09-13 RX ADMIN — ATORVASTATIN CALCIUM 40 MG: 40 TABLET, FILM COATED ORAL at 20:28

## 2017-09-13 NOTE — PROGRESS NOTES
Discharge Planning Assessment   Daniel     Patient Name: Pepito Yan  MRN: [id number]  Today's Date: 9/13/2017    Admit Date: 9/10/2017          Discharge Needs Assessment     None            Discharge Plan       09/13/17 1307    Case Management/Social Work Plan    Plan Pt admitted on 9/10/17.  Pt lives at home with niece and plans to return home at discharge.  Pt currently does not utilize home health services.  Pt currently utilizes home 02 via [facility].  SS will follow and assist with discharge needs.    Patient/Family In Agreement With Plan yes        Discharge Placement     No information found                Demographic Summary     None            Functional Status     None            Psychosocial     None            Abuse/Neglect     None            Legal     None            Substance Abuse     None            Patient Forms     None          Mery Montiel

## 2017-09-13 NOTE — PLAN OF CARE
Problem: Cardiac Output, Decreased (Adult)  Goal: Identify Related Risk Factors and Signs and Symptoms  Outcome: Ongoing (interventions implemented as appropriate)  Goal: Adequate Cardiac Output/Effective Tissue Perfusion  Outcome: Ongoing (interventions implemented as appropriate)    Problem: Respiratory Insufficiency (Adult)  Goal: Identify Related Risk Factors and Signs and Symptoms  Outcome: Ongoing (interventions implemented as appropriate)    Problem: Pain, Acute (Adult)  Goal: Identify Related Risk Factors and Signs and Symptoms  Outcome: Ongoing (interventions implemented as appropriate)    Problem: Pressure Ulcer Risk (Brendan Scale) (Adult,Obstetrics,Pediatric)  Goal: Identify Related Risk Factors and Signs and Symptoms  Outcome: Ongoing (interventions implemented as appropriate)  Goal: Skin Integrity  Outcome: Ongoing (interventions implemented as appropriate)    Problem: Fall Risk (Adult)  Goal: Identify Related Risk Factors and Signs and Symptoms  Outcome: Ongoing (interventions implemented as appropriate)  Goal: Absence of Falls  Outcome: Ongoing (interventions implemented as appropriate)    Problem: Diabetes, Type 2 (Adult)  Goal: Signs and Symptoms of Listed Potential Problems Will be Absent or Manageable (Diabetes, Type 2)  Outcome: Ongoing (interventions implemented as appropriate)    Problem: Patient Care Overview (Adult)  Goal: Plan of Care Review  Outcome: Ongoing (interventions implemented as appropriate)  Goal: Discharge Needs Assessment  Outcome: Ongoing (interventions implemented as appropriate)

## 2017-09-13 NOTE — PROGRESS NOTES
Patient continues on day 3 vancomycin. Vancomycin trough level was reported as 15.4 mg/L today. Based on this level, will continue vancomycin dose of 1240 mg q12 hrs. Will continue to follow and recheck a trough level when appropriate.    Thank you,    Patsy Morris RP

## 2017-09-13 NOTE — PROGRESS NOTES
[facility] HOSPITALIST PROGRESS NOTE     Patient Identification:  Name:  Pepito Yan  Age:  51 y.o.  Sex:  female  :  1965  MRN:  [id number]  Visit Number:  [id number]  Primary Care Provider:  ROSA Love    Length of stay:  2    Chief complaint:  51 y.o. old female admitted with shortness of breath, sepsis and healthcare associated pneumonia      Subjective:    No acute issues overnight.    Patient states that she is feeling better today.  Patient states that her breathing is improved today.  Patient still continues to have some cough but is not able to bring up phlegm.   Patient denies any fevers, chills, nausea, vomiting, chest pain, palpitation, constipation, diarrhea, dysuria or hematuria.      Current Hospital Meds:    aspirin  mg Oral Daily   atorvastatin 40 mg Oral Nightly   aztreonam 2 g Intravenous Q8H   famotidine 20 mg Oral Daily   heparin (porcine) 5,000 Units Subcutaneous Q12H   insulin aspart 0-9 Units Subcutaneous 4x Daily AC & at Bedtime   insulin detemir 40 Units Subcutaneous Nightly   isosorbide mononitrate 30 mg Oral Daily   lidocaine      lisinopril 5 mg Oral Daily   methylPREDNISolone sodium succinate 40 mg Intravenous Q12H   metoprolol tartrate 25 mg Oral Q12H   metroNIDAZOLE 500 mg Intravenous Q8H   multivitamin 1 tablet Oral Daily   sodium chloride 10 mL Intracatheter Q12H   vancomycin 1,250 mg Intravenous Q12H        ----------------------------------------------------------------------------------------------------------------------  Vital Signs:  Temp:  [97.5 °F (36.4 °C)-98.6 °F (37 °C)] 98.2 °F (36.8 °C)  Heart Rate:  [] 85  Resp:  [16-18] 18  BP: (136-164)/(66-94) 136/83  Last 3 weights    09/10/17  2246 17  0300 17  0402   Weight: 110 lb (49.9 kg) 124 lb 4 oz (56.4 kg) 124 lb (56.2 kg)     Body mass index is 21.97 kg/(m^2).    Intake/Output Summary (Last 24 hours) at 17 1419  Last data filed at 17 0900   Gross  per 24 hour   Intake              700 ml   Output                0 ml   Net              700 ml     Diet Regular; Cardiac, Consistent Carbohydrate  ----------------------------------------------------------------------------------------------------------------------  Physical exam:  Constitutional:  Well-developed and well-nourished.     HENT:  Head:  Normocephalic and atraumatic.  Mouth:  Moist mucous membranes.    Eyes:  Conjunctivae and EOM are normal.  Pupils are equal, round, and reactive to light.   Neck:  Neck supple.  No JVD present.    Cardiovascular:  Regular rate and rhythm. S1+S2. No murmur, rubs or gallops.   Pulmonary/Chest: Clear to auscultation bilaterally.  Abdominal:  Soft. Non-tender. No viscera palpable.  Bowel sounds audible.   Musculoskeletal: No deformity or joint swelling.   Peripheral vascular: Bilateral dorsalis pedis palpable. No edema.   Neurological:  Alert and oriented to person, place, and time.  Cranial nerves grossly intact. Strength bilaterally symmetrical in upper and lower extremities.   Skin:  Skin is warm and dry. No rash noted. No pallor.   ----------------------------------------------------------------------------------------------------------------------  Tele:  Sinus rhythm  ----------------------------------------------------------------------------------------------------------------------    Results from last 7 days  Lab Units 09/11/17  1505 09/11/17  0823 09/11/17  0431   CK TOTAL U/L 29 24 27   CKMB ng/mL 1.18 1.16 1.13   CK MB INDEX % 4.1* 4.8* 4.2*   TROPONIN I ng/mL 0.011 0.012 0.012   MYOGLOBIN ng/mL 20.0 17.0 19.0       Results from last 7 days  Lab Units 09/13/17  0103 09/11/17  0526 09/11/17  0431 09/11/17  0053 09/10/17  2321   CRP mg/dL  --   --  3.19*  --   --    LACTATE mmol/L  --   --   --  1.2  --    WBC 10*3/mm3 4.50 4.10*  --   --  5.92   HEMOGLOBIN g/dL 9.6* 9.6*  --   --  10.2*   HEMATOCRIT % 32.6* 34.0*  --   --  35.4*   MCV fL 79.9* 83.1  --   --   80.3   MCHC g/dL 29.4* 28.2*  --   --  28.8*   PLATELETS 10*3/mm3 235 210  --   --  229       Results from last 7 days  Lab Units 09/11/17  0449   PH, ARTERIAL pH units 7.354   PO2 ART mm Hg 88.6   PCO2, ARTERIAL mm Hg 32.5*   HCO3 ART mmol/L 17.7*       Results from last 7 days  Lab Units 09/13/17  0103 09/11/17  0526 09/10/17  2321   SODIUM mmol/L 140 140 140   POTASSIUM mmol/L 3.8 3.9 3.5   CHLORIDE mmol/L 112 116* 111   CO2 mmol/L 20.4* 14.9* 24.4   BUN mg/dL 21 12 16   CREATININE mg/dL 0.66 0.57 0.56   EGFR IF NONAFRICN AM mL/min/1.73 94 112 114   CALCIUM mg/dL 9.2 8.4 8.7   GLUCOSE mg/dL 292* 329* 159*   ALBUMIN g/dL  --  3.20* 3.20*   BILIRUBIN mg/dL  --  0.2 0.1*   ALK PHOS U/L  --  81 79   AST (SGOT) U/L  --  61* 33*   ALT (SGPT) U/L  --  44* 32   Estimated Creatinine Clearance: 89.5 mL/min (by C-G formula based on Cr of 0.66).    No results found for: AMMONIA      Blood Culture   Date Value Ref Range Status   09/11/2017 No growth at less than 24 hours  Preliminary   09/11/2017 No growth at less than 24 hours  Preliminary                I have personally looked at the labs and they are summarized above.  ----------------------------------------------------------------------------------------------------------------------  Imaging Results (last 24 hours)     ** No results found for the last 24 hours. **        ----------------------------------------------------------------------------------------------------------------------  Assessment and Plan:      - Sepsis  Improved.  Secondary to healthcare associated pneumonia.  Improving.  Patient is afebrile and VS stable.  WBC normal.  Cultures are negative so far.  Continue IV antibiotics    - Healthcare associated pneumonia  Chest x-ray showed bibasilar infiltrates.  Serology negative for mycoplasma antibodies and urine Legionella antigen is negative.  Continue vancomycin, aztreonam and Flagyl.  Blood cultures Have not shown any growth so far.  Patient not able  to produce any sputum.  Doxycycline discontinued.    - Acute COPD exacerbation  Improved.  Patient does not have any bronchospasm at this time.  Continue nebulized bronchodilators and IV Solu-Medrol.    - Tachycardia  Improved.    - Coronary artery disease  Patient has pain in her upper back, atypical of ACS.  Cardiac enzymes negative.  EKG showed no changes concerning for ischemia.  Continue aspirin, statin, metoprolol and lisinopril.    - Chronic systolic CHF  Compensated. 2-D echo showed EF of 45%.  Continue beta blocker and ACE inhibitor.  Monitor strict  I/Os, daily weights and electrolytes.     - Type II DM, insulin dependent:  Continue home levemir and SSI.     - Buerger's disease:   No s/s of ischemia. strongly urged patient to stop smoking.    - Tobacco abuse:   Nicotine patch, strongly urged to quit    - Prophylaxis  DVT: Heparin  GI: famotidine.     - Fluid, electrolytes and nutrition:  No IVF.   Electrolyte replacement per protocol.    - Disposition  Patient will likely be discharged in a.m.    The patient is high risk due to: Sepsis, persistent bronchospasm, COPD exacerbation, bilateral pneumonia, congestive heart failure.    Elana Wolfe MD  09/13/17  2:19 PM

## 2017-09-14 VITALS
BODY MASS INDEX: 22.48 KG/M2 | TEMPERATURE: 97.8 F | HEIGHT: 63 IN | WEIGHT: 126.9 LBS | DIASTOLIC BLOOD PRESSURE: 87 MMHG | OXYGEN SATURATION: 96 % | RESPIRATION RATE: 18 BRPM | SYSTOLIC BLOOD PRESSURE: 144 MMHG | HEART RATE: 84 BPM

## 2017-09-14 LAB
ANION GAP SERPL CALCULATED.3IONS-SCNC: 6.5 MMOL/L (ref 3.6–11.2)
BASOPHILS # BLD AUTO: 0.02 10*3/MM3 (ref 0–0.3)
BASOPHILS NFR BLD AUTO: 0.3 % (ref 0–2)
BUN BLD-MCNC: 26 MG/DL (ref 7–21)
BUN/CREAT SERPL: 50 (ref 7–25)
CALCIUM SPEC-SCNC: 8.7 MG/DL (ref 7.7–10)
CHLORIDE SERPL-SCNC: 110 MMOL/L (ref 99–112)
CO2 SERPL-SCNC: 23.5 MMOL/L (ref 24.3–31.9)
CREAT BLD-MCNC: 0.52 MG/DL (ref 0.43–1.29)
CRP SERPL-MCNC: <0.5 MG/DL (ref 0–0.99)
DEPRECATED RDW RBC AUTO: 55.5 FL (ref 37–54)
EOSINOPHIL # BLD AUTO: 0 10*3/MM3 (ref 0–0.7)
EOSINOPHIL NFR BLD AUTO: 0 % (ref 0–5)
ERYTHROCYTE [DISTWIDTH] IN BLOOD BY AUTOMATED COUNT: 19.1 % (ref 11.5–14.5)
GFR SERPL CREATININE-BSD FRML MDRD: 124 ML/MIN/1.73
GLUCOSE BLD-MCNC: 255 MG/DL (ref 70–110)
GLUCOSE BLDC GLUCOMTR-MCNC: 168 MG/DL (ref 70–130)
GLUCOSE BLDC GLUCOMTR-MCNC: 286 MG/DL (ref 70–130)
HCT VFR BLD AUTO: 33.5 % (ref 37–47)
HGB BLD-MCNC: 10 G/DL (ref 12–16)
IMM GRANULOCYTES # BLD: 0.22 10*3/MM3 (ref 0–0.03)
IMM GRANULOCYTES NFR BLD: 3.8 % (ref 0–0.5)
LYMPHOCYTES # BLD AUTO: 0.8 10*3/MM3 (ref 1–3)
LYMPHOCYTES NFR BLD AUTO: 13.7 % (ref 21–51)
MCH RBC QN AUTO: 23.5 PG (ref 27–33)
MCHC RBC AUTO-ENTMCNC: 29.9 G/DL (ref 33–37)
MCV RBC AUTO: 78.8 FL (ref 80–94)
MONOCYTES # BLD AUTO: 0.27 10*3/MM3 (ref 0.1–0.9)
MONOCYTES NFR BLD AUTO: 4.6 % (ref 0–10)
NEUTROPHILS # BLD AUTO: 4.54 10*3/MM3 (ref 1.4–6.5)
NEUTROPHILS NFR BLD AUTO: 77.6 % (ref 30–70)
NRBC BLD MANUAL-RTO: 0 /100 WBC (ref 0–0)
OSMOLALITY SERPL CALC.SUM OF ELEC: 292.9 MOSM/KG (ref 273–305)
PLATELET # BLD AUTO: 241 10*3/MM3 (ref 130–400)
PMV BLD AUTO: 10 FL (ref 6–10)
POTASSIUM BLD-SCNC: 4.8 MMOL/L (ref 3.5–5.3)
RBC # BLD AUTO: 4.25 10*6/MM3 (ref 4.2–5.4)
SODIUM BLD-SCNC: 140 MMOL/L (ref 135–153)
WBC NRBC COR # BLD: 5.85 10*3/MM3 (ref 4.5–12.5)

## 2017-09-14 PROCEDURE — 85025 COMPLETE CBC W/AUTO DIFF WBC: CPT | Performed by: INTERNAL MEDICINE

## 2017-09-14 PROCEDURE — 80048 BASIC METABOLIC PNL TOTAL CA: CPT | Performed by: PHYSICIAN ASSISTANT

## 2017-09-14 PROCEDURE — 99239 HOSP IP/OBS DSCHRG MGMT >30: CPT | Performed by: INTERNAL MEDICINE

## 2017-09-14 PROCEDURE — 86140 C-REACTIVE PROTEIN: CPT | Performed by: PHYSICIAN ASSISTANT

## 2017-09-14 PROCEDURE — 25010000002 HEPARIN (PORCINE) PER 1000 UNITS: Performed by: HOSPITALIST

## 2017-09-14 PROCEDURE — 63710000001 INSULIN ASPART PER 5 UNITS: Performed by: HOSPITALIST

## 2017-09-14 PROCEDURE — 82962 GLUCOSE BLOOD TEST: CPT

## 2017-09-14 PROCEDURE — 25010000002 METHYLPREDNISOLONE PER 40 MG: Performed by: HOSPITALIST

## 2017-09-14 PROCEDURE — 94799 UNLISTED PULMONARY SVC/PX: CPT

## 2017-09-14 PROCEDURE — 25010000002 VANCOMYCIN PER 500 MG

## 2017-09-14 RX ORDER — PREDNISONE 10 MG/1
TABLET ORAL
Qty: 30 TABLET | Refills: 0 | Status: ON HOLD | OUTPATIENT
Start: 2017-09-14 | End: 2017-10-17

## 2017-09-14 RX ORDER — PREDNISONE 10 MG/1
10 TABLET ORAL DAILY
Status: ON HOLD
Start: 2017-09-14 | End: 2017-10-17

## 2017-09-14 RX ORDER — DOXYCYCLINE HYCLATE 100 MG/1
100 TABLET, DELAYED RELEASE ORAL 2 TIMES DAILY
Qty: 10 TABLET | Refills: 0 | Status: ON HOLD | OUTPATIENT
Start: 2017-09-14 | End: 2017-10-17

## 2017-09-14 RX ORDER — FAMOTIDINE 20 MG/1
20 TABLET, FILM COATED ORAL DAILY
Qty: 30 TABLET | Refills: 0 | Status: ON HOLD | OUTPATIENT
Start: 2017-09-14 | End: 2018-01-19

## 2017-09-14 RX ADMIN — VANCOMYCIN HYDROCHLORIDE 1250 MG: 5 INJECTION, POWDER, LYOPHILIZED, FOR SOLUTION INTRAVENOUS at 03:35

## 2017-09-14 RX ADMIN — ISOSORBIDE MONONITRATE 30 MG: 30 TABLET, EXTENDED RELEASE ORAL at 08:21

## 2017-09-14 RX ADMIN — ACETAMINOPHEN AND CODEINE PHOSPHATE 1 TABLET: 300; 30 TABLET ORAL at 11:39

## 2017-09-14 RX ADMIN — Medication 1 TABLET: at 08:20

## 2017-09-14 RX ADMIN — METHYLPREDNISOLONE SODIUM SUCCINATE 40 MG: 40 INJECTION, POWDER, FOR SOLUTION INTRAMUSCULAR; INTRAVENOUS at 11:39

## 2017-09-14 RX ADMIN — AZTREONAM 2 G: 2 INJECTION, POWDER, FOR SOLUTION INTRAMUSCULAR; INTRAVENOUS at 11:39

## 2017-09-14 RX ADMIN — ASPIRIN 325 MG: 325 TABLET, COATED ORAL at 08:21

## 2017-09-14 RX ADMIN — METRONIDAZOLE 500 MG: 500 INJECTION, SOLUTION INTRAVENOUS at 04:39

## 2017-09-14 RX ADMIN — METRONIDAZOLE 500 MG: 500 INJECTION, SOLUTION INTRAVENOUS at 13:26

## 2017-09-14 RX ADMIN — METOPROLOL TARTRATE 25 MG: 25 TABLET, FILM COATED ORAL at 08:20

## 2017-09-14 RX ADMIN — Medication 10 ML: at 08:28

## 2017-09-14 RX ADMIN — FAMOTIDINE 20 MG: 20 TABLET, FILM COATED ORAL at 08:20

## 2017-09-14 RX ADMIN — INSULIN ASPART 2 UNITS: 100 INJECTION, SOLUTION INTRAVENOUS; SUBCUTANEOUS at 11:39

## 2017-09-14 RX ADMIN — ACETAMINOPHEN AND CODEINE PHOSPHATE 1 TABLET: 300; 30 TABLET ORAL at 03:21

## 2017-09-14 RX ADMIN — INSULIN ASPART 6 UNITS: 100 INJECTION, SOLUTION INTRAVENOUS; SUBCUTANEOUS at 08:20

## 2017-09-14 RX ADMIN — AZTREONAM 2 G: 2 INJECTION, POWDER, FOR SOLUTION INTRAMUSCULAR; INTRAVENOUS at 02:55

## 2017-09-14 RX ADMIN — LISINOPRIL 5 MG: 2.5 TABLET ORAL at 08:21

## 2017-09-14 RX ADMIN — HEPARIN SODIUM 5000 UNITS: 5000 INJECTION, SOLUTION INTRAVENOUS; SUBCUTANEOUS at 08:20

## 2017-09-14 NOTE — PLAN OF CARE
Problem: Cardiac Output, Decreased (Adult)  Goal: Identify Related Risk Factors and Signs and Symptoms  Outcome: Ongoing (interventions implemented as appropriate)  Goal: Adequate Cardiac Output/Effective Tissue Perfusion  Outcome: Ongoing (interventions implemented as appropriate)    Problem: Respiratory Insufficiency (Adult)  Goal: Identify Related Risk Factors and Signs and Symptoms  Outcome: Ongoing (interventions implemented as appropriate)  Goal: Acid/Base Balance  Outcome: Ongoing (interventions implemented as appropriate)  Goal: Effective Ventilation  Outcome: Ongoing (interventions implemented as appropriate)    Problem: Pain, Acute (Adult)  Goal: Identify Related Risk Factors and Signs and Symptoms  Outcome: Ongoing (interventions implemented as appropriate)    Problem: Pressure Ulcer Risk (Brendan Scale) (Adult,Obstetrics,Pediatric)  Goal: Identify Related Risk Factors and Signs and Symptoms  Outcome: Outcome(s) achieved Date Met:  09/14/17  Goal: Skin Integrity  Outcome: Ongoing (interventions implemented as appropriate)    Problem: Fall Risk (Adult)  Goal: Identify Related Risk Factors and Signs and Symptoms  Outcome: Outcome(s) achieved Date Met:  09/14/17  Goal: Absence of Falls  Outcome: Ongoing (interventions implemented as appropriate)    Problem: Patient Care Overview (Adult)  Goal: Plan of Care Review  Outcome: Ongoing (interventions implemented as appropriate)  Goal: Adult Individualization and Mutuality  Outcome: Ongoing (interventions implemented as appropriate)  Goal: Discharge Needs Assessment  Outcome: Ongoing (interventions implemented as appropriate)

## 2017-09-14 NOTE — PROGRESS NOTES
Discharge Planning Assessment  JOYCE Sanchez     Patient Name: Pepito Yan  MRN: [id number]  Today's Date: 9/14/2017    Admit Date: 9/10/2017          Discharge Needs Assessment     None            Discharge Plan       09/14/17 1551    Final Note    Final Note Pt to be discharged home on this date.  SS arranged [employer] for transportation home as per pt request.  No further intervention needed.        Discharge Placement     No information found        Expected Discharge Date and Time     Expected Discharge Date Expected Discharge Time    Sep 14, 2017               Demographic Summary     None            Functional Status     None            Psychosocial     None            Abuse/Neglect     None            Legal     None            Substance Abuse     None            Patient Forms     None          Mery Montiel

## 2017-09-14 NOTE — DISCHARGE SUMMARY
[facility] HOSPITALIST MEDICINE DISCHARGE SUMMARY    Patient Identification:  Name:  Pepito Yan  Age:  51 y.o.  Sex:  female  :  1965  MRN:  [id number]  Visit Number:  [id number]    Date of Admission: 9/10/2017  Date of Discharge:  2017     PCP: ROSA Love    DISCHARGE DIAGNOSIS  · Sepsis  · HCAP  · Acute COPD exacerbation  · Tachycardia  · CAD  · Chronic systolic CHF, compensated  · DM type II  · Buerger's disease      CONSULTS   None    PROCEDURES PERFORMED  None    REASON FOR ADMISSION  Patient is a 51 y.o. female presented to [facility] complaining of shortness of breath..  Please see the admitting history and physical for further details.       HOSPITAL COURSE     - Sepsis  Patient had evidence of sepsis on admission with low blood pressures and tachycardia.  CRP was mildly elevated.  Obesity normalized with IV antibiotics.  Patient had improvement in her heart rate and blood pressure.  She remained afebrile during the course of her hospitalization and CRP normalized.  She was treated with IV antibiotics and IV fluids.  His was thought to be secondary to healthcare associated pneumonia.      - Healthcare associated pneumonia  Chest x-ray showed bibasilar infiltrates.  Serology negative for mycoplasma antibodies and urine Legionella antigen is negative.   sputum cultures and blood cultures were ordered but patient not able to produce any sputum.  Blood cultures remained negative.  Patient was initially started on vancomycin, aztreonam and Flagyl and doxycycline.  Her doxycycline was later discontinued once atypical organisms were ruled out.  Patient had marked improvement in her shortness of breath.  Antibiotics were changed to by mouth prior to discharge.     - Acute COPD exacerbation  Patient had significant dyspnea and bronchospasm on admission.  She was treated with nebs as bronchodilators and systemic steroids.  She had gradual improvement in her  bronchospasm.  Patient was able to ambulate the hallways without shortness of breath.      - Tachycardia  Patient had elevated heart rate on admission.  EKG showed sinus tachycardia.  She was continued to have close monitoring with telemetry.  Her heart rate improved with the treatment of underlying COPD exacerbation and pneumonia.       - Coronary artery disease  Patient was complained of pain in her upper back with features atypical of ACS.  Cardiac enzymes were checked and remained negative.  EKG showed no changes concerning for ischemia.   patient was continued on aspirin, statin, metoprolol and lisinopril.  She was asking for pain medications and has history of narcotic dependence.  Patient was not given any pain medications initially and on the day of discharge patient received Tylenol with codeine for her persistent back pain.     - Chronic systolic CHF  Remained compensated during the course of her hospitalization.  2-D echo showed EF of 45%.  Continue beta blocker and ACE inhibitor.  I/Os, daily weights and electrolytes were monitored closely..      - Type II DM, insulin dependent:  Patient was continued on her home dose of long-acting insulin as well as Accu-Cheks before meals and at bedtime and sliding scale insulin.     - Buerger's disease:   Patient has previous history of Buerger's disease.  She had No s/s of ischemia during this admission.  She was strongly urged to stop smoking.     - Tobacco abuse:   Nicotine patch, strongly urged to quit      DISCHARGE DISPOSITION   Stable    DISCHARGE MEDICATIONS:   Pepito Yan   Home Medication Instructions JERARDO:[id number]    Printed on:09/14/17 0074   Medication Information                      albuterol (PROVENTIL HFA;VENTOLIN HFA) 108 (90 BASE) MCG/ACT inhaler  Inhale 2 puffs Every 4 (Four) Hours.             aspirin  MG tablet  Take 1 tablet by mouth Daily.             atorvastatin (LIPITOR) 40 MG tablet  Take 1 tablet by mouth Every Night.              doxycycline (DORYX) 100 MG enteric coated tablet  Take 1 tablet by mouth 2 (Two) Times a Day.             famotidine (PEPCID) 20 MG tablet  Take 1 tablet by mouth Daily.             hydrOXYzine (VISTARIL) 25 MG capsule  Take 25 mg by mouth 2 (Two) Times a Day As Needed for Itching or Anxiety. 2 to 3 times daily as needed             insulin glargine (LANTUS) 100 UNIT/ML injection  Inject 40 Units under the skin Every Night.             ipratropium (ATROVENT) 0.02 % nebulizer solution  Take 2.5 mL by nebulization Every 6 (Six) Hours As Needed for Shortness of Air.             isosorbide mononitrate (IMDUR) 30 MG 24 hr tablet  Take 1 tablet by mouth Daily.             lisinopril (PRINIVIL,ZESTRIL) 5 MG tablet  Take 5 mg by mouth Daily.             metoprolol tartrate (LOPRESSOR) 25 MG tablet  Take 1 tablet by mouth Every 12 (Twelve) Hours.             multivitamin (DAILY KENN) tablet tablet  Take 1 tablet by mouth Daily.             predniSONE (DELTASONE) 10 MG tablet  Take 1 tablet by mouth Daily. Start on 9/24/17             predniSONE (DELTASONE) 10 MG tablet  4 tabs po daily for 3 days then 3 tabs po daily for 3 days then 2 tabs po daily for 3 days then 1 tab daily             promethazine (PHENERGAN) 25 MG tablet  Take 25 mg by mouth Every 6 (Six) Hours As Needed for Nausea or Vomiting. Every 4 to 6 hours as needed             tiZANidine (ZANAFLEX) 4 MG tablet  Take 4 mg by mouth Every 8 (Eight) Hours As Needed for Muscle Spasms. Every 6 to 8 hours as needed                 Diet Instructions     Diet: Regular; Thin       Discharge Diet:  Regular   Fluid Consistency:  Thin               Your Scheduled Appointments     Oct 03, 2017 12:00 PM [facility]   Hospital Follow Up with MARY ANNE Garcia MD   [facility] PULMONOLOGY CRITICAL CARE (--)    [address]  [place] [id number] [phone]                Activity Instructions     Activity as Tolerated                   Additional  Instructions for the Follow-ups that You Need to Schedule     Discharge Follow-up with PCP    As directed    Follow Up Details:  1 week.             Follow-up Information     Follow up with ROSA Love .    Specialty:  Family Medicine    Why:  1 week.    Contact information:    [address] [place] [address]  [phone]            TEST  RESULTS PENDING AT DISCHARGE   Order Current Status    Ova & Parasite Examination In process    Blood Culture Preliminary result    Blood Culture Preliminary result    Stool Culture Preliminary result           Ealna Wolfe MD  09/14/17  3:23 PM    Please note that this discharge summary required more than 30 minutes to complete.    Please send a copy of this dictation to the following providers:    ROSA Love

## 2017-09-15 LAB — BACTERIA SPEC AEROBE CULT: NORMAL

## 2017-09-16 LAB
BACTERIA SPEC AEROBE CULT: NORMAL
BACTERIA SPEC AEROBE CULT: NORMAL

## 2017-09-18 LAB
O+P SPEC MICRO: NORMAL
OVA + PARASITE RESULT 1: NORMAL

## 2017-10-06 ENCOUNTER — HOSPITAL ENCOUNTER (EMERGENCY)
Facility: HOSPITAL | Age: 52
Discharge: HOME | End: 2017-10-06
Attending: EMERGENCY MEDICINE | Admitting: EMERGENCY MEDICINE

## 2017-10-06 VITALS
DIASTOLIC BLOOD PRESSURE: 70 MMHG | HEART RATE: 118 BPM | BODY MASS INDEX: 19.49 KG/M2 | OXYGEN SATURATION: 98 % | WEIGHT: 110 LBS | SYSTOLIC BLOOD PRESSURE: 122 MMHG | RESPIRATION RATE: 18 BRPM | TEMPERATURE: 98.1 F | HEIGHT: 63 IN

## 2017-10-06 DIAGNOSIS — M06.9 RHEUMATOID ARTHRITIS INVOLVING MULTIPLE SITES, UNSPECIFIED RHEUMATOID FACTOR PRESENCE: Primary | ICD-10-CM

## 2017-10-06 PROCEDURE — 96372 THER/PROPH/DIAG INJ SC/IM: CPT

## 2017-10-06 PROCEDURE — 25010000002 DEXAMETHASONE PER 1 MG: Performed by: EMERGENCY MEDICINE

## 2017-10-06 PROCEDURE — 99283 EMERGENCY DEPT VISIT LOW MDM: CPT

## 2017-10-06 RX ORDER — PREDNISONE 10 MG/1
10 TABLET ORAL DAILY
Qty: 15 TABLET | Refills: 0 | Status: ON HOLD | OUTPATIENT
Start: 2017-10-06 | End: 2017-10-17

## 2017-10-06 RX ORDER — DEXAMETHASONE SODIUM PHOSPHATE 4 MG/ML
8 INJECTION, SOLUTION INTRA-ARTICULAR; INTRALESIONAL; INTRAMUSCULAR; INTRAVENOUS; SOFT TISSUE ONCE
Status: COMPLETED | OUTPATIENT
Start: 2017-10-06 | End: 2017-10-06

## 2017-10-06 RX ADMIN — DEXAMETHASONE SODIUM PHOSPHATE 8 MG: 4 INJECTION, SOLUTION INTRAMUSCULAR; INTRAVENOUS at 18:41

## 2017-10-06 NOTE — ED NOTES
"Offered patient wheelchair for discharge, patient denies and states \" I don't need one I can walk\".      Batsheva Davis RN  10/06/17 8775    "

## 2017-10-06 NOTE — ED PROVIDER NOTES
Subjective   Patient is a 51 y.o. female presenting with upper extremity pain.   Upper Extremity Issue   Location:  Wrist  Wrist location:  L wrist and R wrist  Pain details:     Quality:  Aching, cramping and throbbing    Radiates to:  Does not radiate    Severity:  Moderate    Onset quality:  Gradual    Progression:  Worsening  Handedness:  Right-handed  Dislocation: no    Associated symptoms: no fever        Review of Systems   Constitutional: Negative.  Negative for fever.   HENT: Negative.    Respiratory: Negative.    Cardiovascular: Negative.  Negative for chest pain.   Gastrointestinal: Negative.  Negative for abdominal pain.   Endocrine: Negative.    Genitourinary: Negative.  Negative for dysuria.   Musculoskeletal:        Wrists swollen, tender   Skin: Negative.    Neurological: Negative.    Psychiatric/Behavioral: Negative.    All other systems reviewed and are negative.      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   • Chronic respiratory failure with hypoxia     at nighttime   • Compression fracture of lumbar vertebra    • Compression fracture of thoracic vertebra    • COPD (chronic obstructive pulmonary disease)    • Coronary artery disease    • Diabetes mellitus type 2 in nonobese    • Erosive esophagitis 12/2016   • Essential hypertension    • Gastroparesis    • GERD (gastroesophageal reflux disease)    • Hepatitis-C    • History of transfusion    • Medically noncompliant    • MRSA infection     back of neck around 2012; 6/2017 septic left elbow joint with concerns for osteomyelitis and left anteromedial left arm   • NSTEMI (non-ST elevated myocardial infarction) 07/2016   • PE (pulmonary embolism)    • Protein calorie malnutrition    • RA (rheumatoid arthritis)     with chronic steroid use   • Rheumatoid arthritis        Allergies   Allergen Reactions   • Penicillins Anaphylaxis     Tolerated both cefepime and Rocephin earlier this  month   • Toradol [Ketorolac Tromethamine] Hives and Swelling   • Tramadol Hives and Swelling       Past Surgical History:   Procedure Laterality Date   • ABDOMINAL SURGERY     • AMPUTATION DIGIT Right 2016    Third digit to the PIP. Surgeon: Dr. Bains   • AMPUTATION DIGIT Left 2016    Procedure: PARTIAL AMPUTATION LEFT THIRD FINGER;  Surgeon: Shane Fuller MD;  Location: [facility] OR;  Service:    • AMPUTATION REVISION Left 2016    Procedure: AMPUTATION REVISION DIGIT STUMP;  Surgeon: Shane Fuller MD;  Location: [facility] OR;  Service:    • ARM DEBRIDEMENT Left 2016    Procedure: HAND IRRIGATION DEBRIDEMENT AND REPAIR ;  Surgeon: Shane Fuller MD;  Location: [facility] OR;  Service:    •  SECTION      x 2   • CHOLECYSTECTOMY     • ENDOSCOPY N/A 2016    Procedure: ESOPHAGOGASTRODUODENOSCOPY;  Surgeon: Jeremy Clement III, MD;  Location: [facility] OR;  Service:    • ENDOSCOPY W/ PEG TUBE PLACEMENT N/A 3/30/2017    Procedure: ESOPHAGOGASTRODUODENOSCOPY WITH PERCUTANEOUS ENDOSCOPIC GASTROSTOMY TUBE INSERTION;  Surgeon: Shade Campos MD;  Location: [facility] OR;  Service:    • FOREIGN BODY REMOVAL      insulin needle removal x 3 from the abdomen   • HYSTERECTOMY     • INCISION AND DRAINAGE ARM Left 2017    Procedure: INCISION AND DRAINAGE UPPER EXTREMITY;  Surgeon: Shane Fuller MD;  Location: [facility] OR;  Service:        Family History   Problem Relation Age of Onset   • Colon cancer Mother    • Cancer Father    • Diabetes Maternal Grandmother    • Heart disease Neg Hx    • Stroke Neg Hx        Social History     Social History   • Marital status:      Spouse name: N/A   • Number of children: N/A   • Years of education: N/A     Social History Main Topics   • Smoking status: Current Every Day Smoker     Packs/day: 1.00     Years: 35.00     Types: Cigarettes   • Smokeless tobacco: None      Comment: 4-5 cigarettes a day    • Alcohol use  No   • Drug use: No   • Sexual activity: Defer     Other Topics Concern   • None     Social History Narrative           Objective   Physical Exam   Constitutional: She is oriented to person, place, and time. She appears well-developed and well-nourished. No distress.   HENT:   Head: Normocephalic and atraumatic.   Right Ear: External ear normal.   Left Ear: External ear normal.   Nose: Nose normal.   Eyes: Conjunctivae and EOM are normal. Pupils are equal, round, and reactive to light.   Neck: Normal range of motion. Neck supple. No JVD present. No tracheal deviation present.   Cardiovascular: Normal rate, regular rhythm and normal heart sounds.    No murmur heard.  Pulmonary/Chest: Effort normal and breath sounds normal. No respiratory distress. She has no wheezes.   Abdominal: Soft. Bowel sounds are normal. There is no tenderness.   Musculoskeletal: Normal range of motion. She exhibits no edema or deformity.   Swollen, tender wrists bilat   Neurological: She is alert and oriented to person, place, and time. No cranial nerve deficit.   Skin: Skin is warm and dry. No rash noted. She is not diaphoretic. No erythema. No pallor.   Psychiatric: She has a normal mood and affect. Her behavior is normal. Thought content normal.   Nursing note and vitals reviewed.      Procedures         ED Course  ED Course                  MDM    Final diagnoses:   Rheumatoid arthritis involving multiple sites, unspecified rheumatoid factor presence            Zachariah Ingram MD  10/06/17 5197

## 2017-10-16 ENCOUNTER — APPOINTMENT (OUTPATIENT)
Dept: GENERAL RADIOLOGY | Facility: HOSPITAL | Age: 52
End: 2017-10-16

## 2017-10-16 ENCOUNTER — HOSPITAL ENCOUNTER (INPATIENT)
Facility: HOSPITAL | Age: 52
LOS: 2 days | Discharge: HOME | End: 2017-10-19
Attending: EMERGENCY MEDICINE | Admitting: INTERNAL MEDICINE

## 2017-10-16 DIAGNOSIS — A41.9 SEPSIS DUE TO UNDETERMINED ORGANISM (HCC): ICD-10-CM

## 2017-10-16 DIAGNOSIS — J44.1 COPD EXACERBATION (HCC): ICD-10-CM

## 2017-10-16 DIAGNOSIS — J18.9 COMMUNITY ACQUIRED PNEUMONIA, UNSPECIFIED LATERALITY: Primary | ICD-10-CM

## 2017-10-16 LAB
A-A DO2: 71.9 MMHG (ref 0–300)
ALBUMIN SERPL-MCNC: 3.7 G/DL (ref 3.5–5)
ALBUMIN/GLOB SERPL: 1 G/DL (ref 1.5–2.5)
ALP SERPL-CCNC: 95 U/L (ref 35–104)
ALT SERPL W P-5'-P-CCNC: 46 U/L (ref 10–36)
ANION GAP SERPL CALCULATED.3IONS-SCNC: 12.2 MMOL/L (ref 3.6–11.2)
ARTERIAL PATENCY WRIST A: POSITIVE
AST SERPL-CCNC: 35 U/L (ref 10–30)
ATMOSPHERIC PRESS: 728 MMHG
BASE EXCESS BLDA CALC-SCNC: -3 MMOL/L
BASOPHILS # BLD AUTO: 0.03 10*3/MM3 (ref 0–0.3)
BASOPHILS NFR BLD AUTO: 0.4 % (ref 0–2)
BDY SITE: ABNORMAL
BILIRUB SERPL-MCNC: 0.2 MG/DL (ref 0.2–1.8)
BNP SERPL-MCNC: 145 PG/ML (ref 0–100)
BODY TEMPERATURE: 98.6 C
BUN BLD-MCNC: 21 MG/DL (ref 7–21)
BUN/CREAT SERPL: 23.3 (ref 7–25)
CALCIUM SPEC-SCNC: 9.8 MG/DL (ref 7.7–10)
CHLORIDE SERPL-SCNC: 109 MMOL/L (ref 99–112)
CK MB SERPL-CCNC: 1.44 NG/ML (ref 0–5)
CK MB SERPL-RTO: 6 % (ref 0–3)
CK SERPL-CCNC: 24 U/L (ref 24–173)
CO2 SERPL-SCNC: 19.8 MMOL/L (ref 24.3–31.9)
COHGB MFR BLD: 2.9 % (ref 0–5)
CREAT BLD-MCNC: 0.9 MG/DL (ref 0.43–1.29)
D-LACTATE SERPL-SCNC: 4.2 MMOL/L (ref 0.5–2)
DEPRECATED RDW RBC AUTO: 49.5 FL (ref 37–54)
EOSINOPHIL # BLD AUTO: 0.06 10*3/MM3 (ref 0–0.7)
EOSINOPHIL NFR BLD AUTO: 0.7 % (ref 0–5)
ERYTHROCYTE [DISTWIDTH] IN BLOOD BY AUTOMATED COUNT: 18.4 % (ref 11.5–14.5)
GFR SERPL CREATININE-BSD FRML MDRD: 66 ML/MIN/1.73
GLOBULIN UR ELPH-MCNC: 3.7 GM/DL
GLUCOSE BLD-MCNC: 355 MG/DL (ref 70–110)
HCO3 BLDA-SCNC: 20.7 MMOL/L (ref 22–26)
HCT VFR BLD AUTO: 39 % (ref 37–47)
HCT VFR BLD CALC: 36 % (ref 37–47)
HGB BLD-MCNC: 12.1 G/DL (ref 12–16)
HGB BLDA-MCNC: 12.4 G/DL (ref 12–16)
HOROWITZ INDEX BLD+IHG-RTO: 28 %
IMM GRANULOCYTES # BLD: 0.29 10*3/MM3 (ref 0–0.03)
IMM GRANULOCYTES NFR BLD: 3.4 % (ref 0–0.5)
LYMPHOCYTES # BLD AUTO: 1.1 10*3/MM3 (ref 1–3)
LYMPHOCYTES NFR BLD AUTO: 12.9 % (ref 21–51)
MCH RBC QN AUTO: 24.1 PG (ref 27–33)
MCHC RBC AUTO-ENTMCNC: 31 G/DL (ref 33–37)
MCV RBC AUTO: 77.5 FL (ref 80–94)
METHGB BLD QL: 0.2 % (ref 0–3)
MODALITY: ABNORMAL
MONOCYTES # BLD AUTO: 0.6 10*3/MM3 (ref 0.1–0.9)
MONOCYTES NFR BLD AUTO: 7 % (ref 0–10)
NEUTROPHILS # BLD AUTO: 6.46 10*3/MM3 (ref 1.4–6.5)
NEUTROPHILS NFR BLD AUTO: 75.6 % (ref 30–70)
OSMOLALITY SERPL CALC.SUM OF ELEC: 298.5 MOSM/KG (ref 273–305)
OXYHGB MFR BLDV: 93.2 % (ref 85–100)
PCO2 BLDA: 32.5 MM HG (ref 35–45)
PH BLDA: 7.42 PH UNITS (ref 7.35–7.45)
PLATELET # BLD AUTO: 361 10*3/MM3 (ref 130–400)
PMV BLD AUTO: 10.2 FL (ref 6–10)
PO2 BLDA: 80.4 MM HG (ref 80–100)
POTASSIUM BLD-SCNC: 4.1 MMOL/L (ref 3.5–5.3)
PROT SERPL-MCNC: 7.4 G/DL (ref 6–8)
RBC # BLD AUTO: 5.03 10*6/MM3 (ref 4.2–5.4)
SAO2 % BLDCOA: 96.2 % (ref 90–100)
SODIUM BLD-SCNC: 141 MMOL/L (ref 135–153)
TROPONIN I SERPL-MCNC: 0.01 NG/ML
WBC NRBC COR # BLD: 8.54 10*3/MM3 (ref 4.5–12.5)

## 2017-10-16 PROCEDURE — 25010000002 METHYLPREDNISOLONE PER 125 MG: Performed by: EMERGENCY MEDICINE

## 2017-10-16 PROCEDURE — 94799 UNLISTED PULMONARY SVC/PX: CPT

## 2017-10-16 PROCEDURE — 36415 COLL VENOUS BLD VENIPUNCTURE: CPT

## 2017-10-16 PROCEDURE — 83605 ASSAY OF LACTIC ACID: CPT | Performed by: EMERGENCY MEDICINE

## 2017-10-16 PROCEDURE — 85025 COMPLETE CBC W/AUTO DIFF WBC: CPT | Performed by: EMERGENCY MEDICINE

## 2017-10-16 PROCEDURE — 84484 ASSAY OF TROPONIN QUANT: CPT | Performed by: EMERGENCY MEDICINE

## 2017-10-16 PROCEDURE — 93005 ELECTROCARDIOGRAM TRACING: CPT | Performed by: EMERGENCY MEDICINE

## 2017-10-16 PROCEDURE — 83050 HGB METHEMOGLOBIN QUAN: CPT | Performed by: EMERGENCY MEDICINE

## 2017-10-16 PROCEDURE — 82553 CREATINE MB FRACTION: CPT | Performed by: EMERGENCY MEDICINE

## 2017-10-16 PROCEDURE — 82375 ASSAY CARBOXYHB QUANT: CPT | Performed by: EMERGENCY MEDICINE

## 2017-10-16 PROCEDURE — 83880 ASSAY OF NATRIURETIC PEPTIDE: CPT | Performed by: EMERGENCY MEDICINE

## 2017-10-16 PROCEDURE — 25010000002 LEVOFLOXACIN PER 250 MG: Performed by: EMERGENCY MEDICINE

## 2017-10-16 PROCEDURE — 82805 BLOOD GASES W/O2 SATURATION: CPT | Performed by: EMERGENCY MEDICINE

## 2017-10-16 PROCEDURE — 87040 BLOOD CULTURE FOR BACTERIA: CPT | Performed by: EMERGENCY MEDICINE

## 2017-10-16 PROCEDURE — 71010 XR CHEST 1 VW: CPT | Performed by: RADIOLOGY

## 2017-10-16 PROCEDURE — 25010000002 MORPHINE PER 10 MG: Performed by: EMERGENCY MEDICINE

## 2017-10-16 PROCEDURE — 82550 ASSAY OF CK (CPK): CPT | Performed by: EMERGENCY MEDICINE

## 2017-10-16 PROCEDURE — 94640 AIRWAY INHALATION TREATMENT: CPT

## 2017-10-16 PROCEDURE — 71010 HC CHEST PA OR AP: CPT

## 2017-10-16 PROCEDURE — 99285 EMERGENCY DEPT VISIT HI MDM: CPT

## 2017-10-16 PROCEDURE — 93010 ELECTROCARDIOGRAM REPORT: CPT | Performed by: INTERNAL MEDICINE

## 2017-10-16 PROCEDURE — 80053 COMPREHEN METABOLIC PANEL: CPT | Performed by: EMERGENCY MEDICINE

## 2017-10-16 PROCEDURE — 36600 WITHDRAWAL OF ARTERIAL BLOOD: CPT | Performed by: EMERGENCY MEDICINE

## 2017-10-16 PROCEDURE — 25010000002 ONDANSETRON PER 1 MG: Performed by: EMERGENCY MEDICINE

## 2017-10-16 RX ORDER — SODIUM CHLORIDE 9 MG/ML
125 INJECTION, SOLUTION INTRAVENOUS CONTINUOUS
Status: DISCONTINUED | OUTPATIENT
Start: 2017-10-16 | End: 2017-10-17

## 2017-10-16 RX ORDER — ALBUTEROL SULFATE 2.5 MG/3ML
2.5 SOLUTION RESPIRATORY (INHALATION) ONCE
Status: COMPLETED | OUTPATIENT
Start: 2017-10-16 | End: 2017-10-16

## 2017-10-16 RX ORDER — ASPIRIN 81 MG/1
324 TABLET, CHEWABLE ORAL ONCE
Status: COMPLETED | OUTPATIENT
Start: 2017-10-16 | End: 2017-10-16

## 2017-10-16 RX ORDER — METHYLPREDNISOLONE SODIUM SUCCINATE 125 MG/2ML
125 INJECTION, POWDER, LYOPHILIZED, FOR SOLUTION INTRAMUSCULAR; INTRAVENOUS ONCE
Status: COMPLETED | OUTPATIENT
Start: 2017-10-16 | End: 2017-10-16

## 2017-10-16 RX ORDER — MORPHINE SULFATE 10 MG/ML
INJECTION, SOLUTION INTRAMUSCULAR; INTRAVENOUS
Status: DISPENSED
Start: 2017-10-16 | End: 2017-10-17

## 2017-10-16 RX ORDER — LEVOFLOXACIN 5 MG/ML
750 INJECTION, SOLUTION INTRAVENOUS ONCE
Status: COMPLETED | OUTPATIENT
Start: 2017-10-16 | End: 2017-10-17

## 2017-10-16 RX ORDER — ONDANSETRON 2 MG/ML
4 INJECTION INTRAMUSCULAR; INTRAVENOUS ONCE
Status: COMPLETED | OUTPATIENT
Start: 2017-10-16 | End: 2017-10-16

## 2017-10-16 RX ORDER — SODIUM CHLORIDE 0.9 % (FLUSH) 0.9 %
10 SYRINGE (ML) INJECTION AS NEEDED
Status: DISCONTINUED | OUTPATIENT
Start: 2017-10-16 | End: 2017-10-19 | Stop reason: HOSPADM

## 2017-10-16 RX ADMIN — ONDANSETRON 4 MG: 2 INJECTION INTRAMUSCULAR; INTRAVENOUS at 22:42

## 2017-10-16 RX ADMIN — SODIUM CHLORIDE 1566 ML: 9 INJECTION, SOLUTION INTRAVENOUS at 22:45

## 2017-10-16 RX ADMIN — MORPHINE SULFATE 4 MG: 4 INJECTION, SOLUTION INTRAMUSCULAR; INTRAVENOUS at 22:44

## 2017-10-16 RX ADMIN — SODIUM CHLORIDE 125 ML/HR: 9 INJECTION, SOLUTION INTRAVENOUS at 21:30

## 2017-10-16 RX ADMIN — ASPIRIN 324 MG: 81 TABLET, CHEWABLE ORAL at 21:31

## 2017-10-16 RX ADMIN — ALBUTEROL SULFATE 2.5 MG: 2.5 SOLUTION RESPIRATORY (INHALATION) at 22:29

## 2017-10-16 RX ADMIN — METHYLPREDNISOLONE SODIUM SUCCINATE 125 MG: 125 INJECTION, POWDER, FOR SOLUTION INTRAMUSCULAR; INTRAVENOUS at 21:31

## 2017-10-16 RX ADMIN — LEVOFLOXACIN 750 MG: 5 INJECTION, SOLUTION INTRAVENOUS at 22:47

## 2017-10-17 PROBLEM — A41.9 SEVERE SEPSIS (HCC): Status: ACTIVE | Noted: 2017-10-17

## 2017-10-17 PROBLEM — R65.20 SEVERE SEPSIS (HCC): Status: ACTIVE | Noted: 2017-10-17

## 2017-10-17 LAB
A-A DO2: 55.9 MMHG (ref 0–300)
ACETONE BLD QL: NEGATIVE
ANION GAP SERPL CALCULATED.3IONS-SCNC: 9.3 MMOL/L (ref 3.6–11.2)
ARTERIAL PATENCY WRIST A: ABNORMAL
ATMOSPHERIC PRESS: 731 MMHG
BACTERIA UR QL AUTO: NORMAL /HPF
BASE EXCESS BLDA CALC-SCNC: -6.4 MMOL/L
BASOPHILS # BLD AUTO: 0.01 10*3/MM3 (ref 0–0.3)
BASOPHILS NFR BLD AUTO: 0.2 % (ref 0–2)
BDY SITE: ABNORMAL
BILIRUB UR QL STRIP: NEGATIVE
BODY TEMPERATURE: 98.6 C
BUN BLD-MCNC: 22 MG/DL (ref 7–21)
BUN/CREAT SERPL: 30.1 (ref 7–25)
CALCIUM SPEC-SCNC: 8.7 MG/DL (ref 7.7–10)
CHLORIDE SERPL-SCNC: 112 MMOL/L (ref 99–112)
CLARITY UR: CLEAR
CO2 SERPL-SCNC: 16.7 MMOL/L (ref 24.3–31.9)
COHGB MFR BLD: 1.1 % (ref 0–5)
COLOR UR: YELLOW
CREAT BLD-MCNC: 0.73 MG/DL (ref 0.43–1.29)
D-LACTATE SERPL-SCNC: 2.2 MMOL/L (ref 0.5–2)
DEPRECATED RDW RBC AUTO: 52 FL (ref 37–54)
EOSINOPHIL # BLD AUTO: 0.01 10*3/MM3 (ref 0–0.7)
EOSINOPHIL NFR BLD AUTO: 0.2 % (ref 0–5)
ERYTHROCYTE [DISTWIDTH] IN BLOOD BY AUTOMATED COUNT: 17.8 % (ref 11.5–14.5)
FERRITIN SERPL-MCNC: 102 NG/ML (ref 10–290.3)
FOLATE SERPL-MCNC: 10 NG/ML (ref 5.4–20)
GFR SERPL CREATININE-BSD FRML MDRD: 84 ML/MIN/1.73
GLUCOSE BLD-MCNC: 505 MG/DL (ref 70–110)
GLUCOSE BLDC GLUCOMTR-MCNC: 153 MG/DL (ref 70–130)
GLUCOSE BLDC GLUCOMTR-MCNC: 195 MG/DL (ref 70–130)
GLUCOSE BLDC GLUCOMTR-MCNC: 303 MG/DL (ref 70–130)
GLUCOSE BLDC GLUCOMTR-MCNC: 528 MG/DL (ref 70–130)
GLUCOSE UR STRIP-MCNC: ABNORMAL MG/DL
HCO3 BLDA-SCNC: 18.2 MMOL/L (ref 22–26)
HCT VFR BLD AUTO: 34.7 % (ref 37–47)
HCT VFR BLD CALC: 33 % (ref 37–47)
HGB BLD-MCNC: 10.3 G/DL (ref 12–16)
HGB BLDA-MCNC: 11.1 G/DL (ref 12–16)
HGB UR QL STRIP.AUTO: ABNORMAL
HOLD SPECIMEN: NORMAL
HOROWITZ INDEX BLD+IHG-RTO: 28 %
HYALINE CASTS UR QL AUTO: NORMAL /LPF
IMM GRANULOCYTES # BLD: 0.06 10*3/MM3 (ref 0–0.03)
IMM GRANULOCYTES NFR BLD: 1 % (ref 0–0.5)
IRON 24H UR-MRATE: 26 MCG/DL (ref 49–151)
IRON SATN MFR SERPL: 9 % (ref 15–50)
KETONES UR QL STRIP: NEGATIVE
LEUKOCYTE ESTERASE UR QL STRIP.AUTO: NEGATIVE
LYMPHOCYTES # BLD AUTO: 0.39 10*3/MM3 (ref 1–3)
LYMPHOCYTES NFR BLD AUTO: 6.8 % (ref 21–51)
MAGNESIUM SERPL-MCNC: 1.8 MG/DL (ref 1.7–2.6)
MCH RBC QN AUTO: 23.6 PG (ref 27–33)
MCHC RBC AUTO-ENTMCNC: 29.7 G/DL (ref 33–37)
MCV RBC AUTO: 79.4 FL (ref 80–94)
METHGB BLD QL: 0.2 % (ref 0–3)
MODALITY: ABNORMAL
MONOCYTES # BLD AUTO: 0.17 10*3/MM3 (ref 0.1–0.9)
MONOCYTES NFR BLD AUTO: 2.9 % (ref 0–10)
NEUTROPHILS # BLD AUTO: 5.13 10*3/MM3 (ref 1.4–6.5)
NEUTROPHILS NFR BLD AUTO: 88.9 % (ref 30–70)
NITRITE UR QL STRIP: NEGATIVE
NRBC BLD MANUAL-RTO: 0 /100 WBC (ref 0–0)
OSMOLALITY SERPL CALC.SUM OF ELEC: 301.6 MOSM/KG (ref 273–305)
OXYHGB MFR BLDV: 95.8 % (ref 85–100)
PCO2 BLDA: 32.9 MM HG (ref 35–45)
PH BLDA: 7.36 PH UNITS (ref 7.35–7.45)
PH UR STRIP.AUTO: 6.5 [PH] (ref 5–8)
PLATELET # BLD AUTO: 229 10*3/MM3 (ref 130–400)
PMV BLD AUTO: 9.6 FL (ref 6–10)
PO2 BLDA: 96.8 MM HG (ref 80–100)
POTASSIUM BLD-SCNC: 5 MMOL/L (ref 3.5–5.3)
PROT UR QL STRIP: NEGATIVE
RBC # BLD AUTO: 4.37 10*6/MM3 (ref 4.2–5.4)
RBC # UR: NORMAL /HPF
REF LAB TEST METHOD: NORMAL
RETICS #: 0.06 10*6/MM3 (ref 0.02–0.13)
RETICS/RBC NFR AUTO: 1.28 % (ref 0.5–2)
SAO2 % BLDCOA: 97.1 % (ref 90–100)
SODIUM BLD-SCNC: 138 MMOL/L (ref 135–153)
SP GR UR STRIP: 1.03 (ref 1–1.03)
SQUAMOUS #/AREA URNS HPF: NORMAL /HPF
TIBC SERPL-MCNC: 295 MCG/DL (ref 241–421)
UROBILINOGEN UR QL STRIP: ABNORMAL
VIT B12 BLD-MCNC: 323 PG/ML (ref 211–911)
WBC NRBC COR # BLD: 5.77 10*3/MM3 (ref 4.5–12.5)
WBC UR QL AUTO: NORMAL /HPF

## 2017-10-17 PROCEDURE — 83050 HGB METHEMOGLOBIN QUAN: CPT | Performed by: INTERNAL MEDICINE

## 2017-10-17 PROCEDURE — 99223 1ST HOSP IP/OBS HIGH 75: CPT | Performed by: INTERNAL MEDICINE

## 2017-10-17 PROCEDURE — 82375 ASSAY CARBOXYHB QUANT: CPT | Performed by: INTERNAL MEDICINE

## 2017-10-17 PROCEDURE — 94799 UNLISTED PULMONARY SVC/PX: CPT

## 2017-10-17 PROCEDURE — 25010000002 MORPHINE (PF) 10 MG/ML SOLUTION

## 2017-10-17 PROCEDURE — 36415 COLL VENOUS BLD VENIPUNCTURE: CPT

## 2017-10-17 PROCEDURE — 82962 GLUCOSE BLOOD TEST: CPT

## 2017-10-17 PROCEDURE — 82728 ASSAY OF FERRITIN: CPT | Performed by: INTERNAL MEDICINE

## 2017-10-17 PROCEDURE — 82746 ASSAY OF FOLIC ACID SERUM: CPT | Performed by: INTERNAL MEDICINE

## 2017-10-17 PROCEDURE — 81001 URINALYSIS AUTO W/SCOPE: CPT | Performed by: INTERNAL MEDICINE

## 2017-10-17 PROCEDURE — 63710000001 INSULIN DETEMIR PER 5 UNITS: Performed by: INTERNAL MEDICINE

## 2017-10-17 PROCEDURE — 25010000002 AZITHROMYCIN: Performed by: EMERGENCY MEDICINE

## 2017-10-17 PROCEDURE — 25010000002 FUROSEMIDE PER 20 MG: Performed by: INTERNAL MEDICINE

## 2017-10-17 PROCEDURE — 25010000002 LORAZEPAM PER 2 MG: Performed by: EMERGENCY MEDICINE

## 2017-10-17 PROCEDURE — 83550 IRON BINDING TEST: CPT | Performed by: INTERNAL MEDICINE

## 2017-10-17 PROCEDURE — 82009 KETONE BODYS QUAL: CPT | Performed by: INTERNAL MEDICINE

## 2017-10-17 PROCEDURE — 82805 BLOOD GASES W/O2 SATURATION: CPT | Performed by: INTERNAL MEDICINE

## 2017-10-17 PROCEDURE — 63710000001 INSULIN ASPART PER 5 UNITS: Performed by: PHYSICIAN ASSISTANT

## 2017-10-17 PROCEDURE — 82607 VITAMIN B-12: CPT | Performed by: INTERNAL MEDICINE

## 2017-10-17 PROCEDURE — 63710000001 INSULIN ASPART PER 5 UNITS

## 2017-10-17 PROCEDURE — 80048 BASIC METABOLIC PNL TOTAL CA: CPT | Performed by: INTERNAL MEDICINE

## 2017-10-17 PROCEDURE — 25010000002 MORPHINE PER 10 MG: Performed by: EMERGENCY MEDICINE

## 2017-10-17 PROCEDURE — 83735 ASSAY OF MAGNESIUM: CPT | Performed by: INTERNAL MEDICINE

## 2017-10-17 PROCEDURE — 85025 COMPLETE CBC W/AUTO DIFF WBC: CPT | Performed by: INTERNAL MEDICINE

## 2017-10-17 PROCEDURE — 83540 ASSAY OF IRON: CPT | Performed by: INTERNAL MEDICINE

## 2017-10-17 PROCEDURE — 83605 ASSAY OF LACTIC ACID: CPT | Performed by: EMERGENCY MEDICINE

## 2017-10-17 PROCEDURE — 85045 AUTOMATED RETICULOCYTE COUNT: CPT | Performed by: INTERNAL MEDICINE

## 2017-10-17 PROCEDURE — 25010000002 HEPARIN (PORCINE) PER 1000 UNITS: Performed by: INTERNAL MEDICINE

## 2017-10-17 PROCEDURE — 25010000002 METHYLPREDNISOLONE PER 40 MG: Performed by: INTERNAL MEDICINE

## 2017-10-17 PROCEDURE — 36600 WITHDRAWAL OF ARTERIAL BLOOD: CPT | Performed by: INTERNAL MEDICINE

## 2017-10-17 RX ORDER — DEXTROSE MONOHYDRATE 25 G/50ML
25 INJECTION, SOLUTION INTRAVENOUS
Status: DISCONTINUED | OUTPATIENT
Start: 2017-10-17 | End: 2017-10-19 | Stop reason: HOSPADM

## 2017-10-17 RX ORDER — DOXYCYCLINE 100 MG/1
100 CAPSULE ORAL EVERY 12 HOURS SCHEDULED
Status: DISCONTINUED | OUTPATIENT
Start: 2017-10-17 | End: 2017-10-19 | Stop reason: HOSPADM

## 2017-10-17 RX ORDER — PREDNISONE 20 MG/1
20 TABLET ORAL
Status: DISCONTINUED | OUTPATIENT
Start: 2017-10-18 | End: 2017-10-19 | Stop reason: HOSPADM

## 2017-10-17 RX ORDER — LORAZEPAM 2 MG/ML
0.5 INJECTION INTRAMUSCULAR ONCE
Status: COMPLETED | OUTPATIENT
Start: 2017-10-17 | End: 2017-10-17

## 2017-10-17 RX ORDER — MORPHINE SULFATE 2 MG/ML
2 INJECTION, SOLUTION INTRAMUSCULAR; INTRAVENOUS ONCE
Status: COMPLETED | OUTPATIENT
Start: 2017-10-17 | End: 2017-10-17

## 2017-10-17 RX ORDER — NITROGLYCERIN 0.4 MG/1
0.4 TABLET SUBLINGUAL
Status: DISCONTINUED | OUTPATIENT
Start: 2017-10-17 | End: 2017-10-19 | Stop reason: HOSPADM

## 2017-10-17 RX ORDER — SODIUM CHLORIDE 0.9 % (FLUSH) 0.9 %
1-10 SYRINGE (ML) INJECTION AS NEEDED
Status: DISCONTINUED | OUTPATIENT
Start: 2017-10-17 | End: 2017-10-19 | Stop reason: HOSPADM

## 2017-10-17 RX ORDER — HEPARIN SODIUM 5000 [USP'U]/ML
5000 INJECTION, SOLUTION INTRAVENOUS; SUBCUTANEOUS EVERY 12 HOURS SCHEDULED
Status: DISCONTINUED | OUTPATIENT
Start: 2017-10-17 | End: 2017-10-19 | Stop reason: HOSPADM

## 2017-10-17 RX ORDER — SODIUM CHLORIDE 9 MG/ML
100 INJECTION, SOLUTION INTRAVENOUS CONTINUOUS
Status: DISCONTINUED | OUTPATIENT
Start: 2017-10-17 | End: 2017-10-17

## 2017-10-17 RX ORDER — PREDNISONE 20 MG/1
20 TABLET ORAL DAILY
Status: CANCELLED | OUTPATIENT
Start: 2017-10-17

## 2017-10-17 RX ORDER — MORPHINE SULFATE 2 MG/ML
2 INJECTION, SOLUTION INTRAMUSCULAR; INTRAVENOUS EVERY 4 HOURS PRN
Status: DISCONTINUED | OUTPATIENT
Start: 2017-10-17 | End: 2017-10-18

## 2017-10-17 RX ORDER — ALBUTEROL SULFATE 2.5 MG/3ML
2.5 SOLUTION RESPIRATORY (INHALATION)
Status: DISCONTINUED | OUTPATIENT
Start: 2017-10-17 | End: 2017-10-18

## 2017-10-17 RX ORDER — PREDNISONE 20 MG/1
20 TABLET ORAL DAILY
Status: ON HOLD | COMMUNITY
End: 2017-10-19

## 2017-10-17 RX ORDER — MORPHINE SULFATE 10 MG/ML
INJECTION, SOLUTION INTRAMUSCULAR; INTRAVENOUS
Status: COMPLETED
Start: 2017-10-17 | End: 2017-10-17

## 2017-10-17 RX ORDER — TIZANIDINE 4 MG/1
4 TABLET ORAL NIGHTLY
Status: DISCONTINUED | OUTPATIENT
Start: 2017-10-17 | End: 2017-10-19 | Stop reason: HOSPADM

## 2017-10-17 RX ORDER — PRAMIPEXOLE DIHYDROCHLORIDE 0.12 MG/1
0.12 TABLET ORAL NIGHTLY
Status: DISCONTINUED | OUTPATIENT
Start: 2017-10-17 | End: 2017-10-19 | Stop reason: HOSPADM

## 2017-10-17 RX ORDER — METHYLPREDNISOLONE SODIUM SUCCINATE 40 MG/ML
40 INJECTION, POWDER, LYOPHILIZED, FOR SOLUTION INTRAMUSCULAR; INTRAVENOUS EVERY 8 HOURS
Status: DISCONTINUED | OUTPATIENT
Start: 2017-10-17 | End: 2017-10-17

## 2017-10-17 RX ORDER — LEVOFLOXACIN 5 MG/ML
750 INJECTION, SOLUTION INTRAVENOUS EVERY 24 HOURS
Status: DISCONTINUED | OUTPATIENT
Start: 2017-10-17 | End: 2017-10-17

## 2017-10-17 RX ORDER — FAMOTIDINE 20 MG/1
20 TABLET, FILM COATED ORAL DAILY
Status: CANCELLED | OUTPATIENT
Start: 2017-10-17

## 2017-10-17 RX ORDER — FAMOTIDINE 20 MG/1
20 TABLET, FILM COATED ORAL DAILY
Status: DISCONTINUED | OUTPATIENT
Start: 2017-10-17 | End: 2017-10-19 | Stop reason: HOSPADM

## 2017-10-17 RX ORDER — ISOSORBIDE MONONITRATE 30 MG/1
30 TABLET, EXTENDED RELEASE ORAL DAILY
Status: DISCONTINUED | OUTPATIENT
Start: 2017-10-17 | End: 2017-10-19 | Stop reason: HOSPADM

## 2017-10-17 RX ORDER — MORPHINE SULFATE 10 MG/ML
INJECTION, SOLUTION INTRAMUSCULAR; INTRAVENOUS
Status: DISPENSED
Start: 2017-10-17 | End: 2017-10-17

## 2017-10-17 RX ORDER — IPRATROPIUM BROMIDE AND ALBUTEROL SULFATE 2.5; .5 MG/3ML; MG/3ML
3 SOLUTION RESPIRATORY (INHALATION) EVERY 4 HOURS PRN
Status: DISCONTINUED | OUTPATIENT
Start: 2017-10-17 | End: 2017-10-19 | Stop reason: HOSPADM

## 2017-10-17 RX ORDER — ASPIRIN 325 MG
325 TABLET, DELAYED RELEASE (ENTERIC COATED) ORAL DAILY
Status: DISCONTINUED | OUTPATIENT
Start: 2017-10-17 | End: 2017-10-19 | Stop reason: HOSPADM

## 2017-10-17 RX ORDER — FUROSEMIDE 10 MG/ML
20 INJECTION INTRAMUSCULAR; INTRAVENOUS ONCE
Status: COMPLETED | OUTPATIENT
Start: 2017-10-17 | End: 2017-10-17

## 2017-10-17 RX ORDER — LISINOPRIL 2.5 MG/1
5 TABLET ORAL DAILY
Status: DISCONTINUED | OUTPATIENT
Start: 2017-10-17 | End: 2017-10-19 | Stop reason: HOSPADM

## 2017-10-17 RX ORDER — HYDROXYZINE HYDROCHLORIDE 25 MG/1
25 TABLET, FILM COATED ORAL 3 TIMES DAILY PRN
Status: DISCONTINUED | OUTPATIENT
Start: 2017-10-17 | End: 2017-10-19 | Stop reason: HOSPADM

## 2017-10-17 RX ORDER — NICOTINE POLACRILEX 4 MG
15 LOZENGE BUCCAL
Status: DISCONTINUED | OUTPATIENT
Start: 2017-10-17 | End: 2017-10-19 | Stop reason: HOSPADM

## 2017-10-17 RX ORDER — ATORVASTATIN CALCIUM 40 MG/1
40 TABLET, FILM COATED ORAL NIGHTLY
Status: DISCONTINUED | OUTPATIENT
Start: 2017-10-17 | End: 2017-10-19 | Stop reason: HOSPADM

## 2017-10-17 RX ORDER — PREDNISONE 20 MG/1
20 TABLET ORAL
Status: CANCELLED | OUTPATIENT
Start: 2017-10-17

## 2017-10-17 RX ORDER — TIZANIDINE 4 MG/1
4 TABLET ORAL NIGHTLY
Status: CANCELLED | OUTPATIENT
Start: 2017-10-17

## 2017-10-17 RX ADMIN — LISINOPRIL 5 MG: 2.5 TABLET ORAL at 08:13

## 2017-10-17 RX ADMIN — DOXYCYCLINE 100 MG: 100 INJECTION, POWDER, LYOPHILIZED, FOR SOLUTION INTRAVENOUS at 03:30

## 2017-10-17 RX ADMIN — ASPIRIN 325 MG: 325 TABLET, COATED ORAL at 08:13

## 2017-10-17 RX ADMIN — INSULIN DETEMIR 40 UNITS: 100 INJECTION, SOLUTION SUBCUTANEOUS at 08:37

## 2017-10-17 RX ADMIN — METOPROLOL TARTRATE 25 MG: 25 TABLET, FILM COATED ORAL at 08:13

## 2017-10-17 RX ADMIN — FAMOTIDINE 20 MG: 20 TABLET, FILM COATED ORAL at 08:13

## 2017-10-17 RX ADMIN — AZITHROMYCIN 500 MG: 500 INJECTION, POWDER, LYOPHILIZED, FOR SOLUTION INTRAVENOUS at 00:32

## 2017-10-17 RX ADMIN — MORPHINE SULFATE 2 MG: 10 INJECTION, SOLUTION INTRAMUSCULAR; INTRAVENOUS at 13:28

## 2017-10-17 RX ADMIN — DOXYCYCLINE 100 MG: 100 CAPSULE ORAL at 20:44

## 2017-10-17 RX ADMIN — METHYLPREDNISOLONE SODIUM SUCCINATE 40 MG: 40 INJECTION, POWDER, FOR SOLUTION INTRAMUSCULAR; INTRAVENOUS at 03:31

## 2017-10-17 RX ADMIN — HEPARIN SODIUM 5000 UNITS: 5000 INJECTION, SOLUTION INTRAVENOUS; SUBCUTANEOUS at 09:22

## 2017-10-17 RX ADMIN — MORPHINE SULFATE 2 MG: 10 INJECTION, SOLUTION INTRAMUSCULAR; INTRAVENOUS at 18:53

## 2017-10-17 RX ADMIN — LORAZEPAM 0.5 MG: 2 INJECTION INTRAMUSCULAR; INTRAVENOUS at 01:04

## 2017-10-17 RX ADMIN — PRAMIPEXOLE DIHYDROCHLORIDE 0.12 MG: 0.12 TABLET ORAL at 20:44

## 2017-10-17 RX ADMIN — TIZANIDINE 4 MG: 4 TABLET ORAL at 20:44

## 2017-10-17 RX ADMIN — HEPARIN SODIUM 5000 UNITS: 5000 INJECTION, SOLUTION INTRAVENOUS; SUBCUTANEOUS at 20:44

## 2017-10-17 RX ADMIN — ISOSORBIDE MONONITRATE 30 MG: 30 TABLET, EXTENDED RELEASE ORAL at 08:13

## 2017-10-17 RX ADMIN — MORPHINE SULFATE 2 MG: 2 INJECTION, SOLUTION INTRAMUSCULAR; INTRAVENOUS at 01:04

## 2017-10-17 RX ADMIN — METHYLPREDNISOLONE SODIUM SUCCINATE 40 MG: 40 INJECTION, POWDER, FOR SOLUTION INTRAMUSCULAR; INTRAVENOUS at 14:22

## 2017-10-17 RX ADMIN — SODIUM CHLORIDE 100 ML/HR: 9 INJECTION, SOLUTION INTRAVENOUS at 03:33

## 2017-10-17 RX ADMIN — INSULIN DETEMIR 20 UNITS: 100 INJECTION, SOLUTION SUBCUTANEOUS at 20:46

## 2017-10-17 RX ADMIN — INSULIN ASPART 4 UNITS: 100 INJECTION, SOLUTION INTRAVENOUS; SUBCUTANEOUS at 20:45

## 2017-10-17 RX ADMIN — MORPHINE SULFATE 2 MG: 10 INJECTION, SOLUTION INTRAMUSCULAR; INTRAVENOUS at 05:51

## 2017-10-17 RX ADMIN — MORPHINE SULFATE 2 MG: 10 INJECTION, SOLUTION INTRAMUSCULAR; INTRAVENOUS at 08:04

## 2017-10-17 RX ADMIN — ATORVASTATIN CALCIUM 40 MG: 40 TABLET, FILM COATED ORAL at 20:44

## 2017-10-17 RX ADMIN — INSULIN ASPART 24 UNITS: 100 INJECTION, SOLUTION INTRAVENOUS; SUBCUTANEOUS at 08:45

## 2017-10-17 RX ADMIN — INSULIN ASPART 4 UNITS: 100 INJECTION, SOLUTION INTRAVENOUS; SUBCUTANEOUS at 17:47

## 2017-10-17 RX ADMIN — INSULIN ASPART 16 UNITS: 100 INJECTION, SOLUTION INTRAVENOUS; SUBCUTANEOUS at 11:22

## 2017-10-17 RX ADMIN — FUROSEMIDE 20 MG: 10 INJECTION, SOLUTION INTRAMUSCULAR; INTRAVENOUS at 17:46

## 2017-10-17 RX ADMIN — METOPROLOL TARTRATE 25 MG: 25 TABLET, FILM COATED ORAL at 20:44

## 2017-10-17 NOTE — PAYOR COMM NOTE
"Contact: Altagracia Winkler RN @ [facility]  Phone: [phone]  Fax: [phone]    Inpatient status    Notification of admission for co-payments/co-insurance D/T Medicare primary.  ICD-10 Codes: A41.9 and R65.20.     Yessica Yan (51 y.o. Female)     Date of Birth Social Security Number Address Home Phone MRN    1965  [facility] [id number] [phone] [id number]    Worship Marital Status          M[employer]ism        Admission Date Admission Type Admitting Provider Attending Provider Department, Room/Bed    10/16/17 Emergency Gómez Bunch MD Aliu, Peter I, MD [address], [address]    Discharge Date Discharge Disposition Discharge Destination                      Attending Provider: Gómez Bunch MD     Allergies:  Penicillins, Toradol [Ketorolac Tromethamine], Tramadol    Isolation:  None   Infection:  None   Code Status:  FULL    Ht:  63\" (160 cm)   Wt:  123 lb 5 oz (55.9 kg)    Admission Cmt:  None   Principal Problem:  None                Active Insurance as of 10/16/2017     Primary Coverage     Payor Plan Insurance Group Employer/Plan Group    MEDICARE MEDICARE A & B      Payor Plan Address Payor Plan Phone Number Effective From Effective To    [address] [phone] 2/1/2005     [place] [address]       Subscriber Name Subscriber Birth Date Member ID       YESSICA YAN 1965 [id number]           Secondary Coverage     Payor Plan Insurance Group Employer/Plan Group    [employer]LL[employer] [place] WELLCARE MEDICAID      Payor Plan Address Payor Plan Phone Number Effective From Effective To    [address] [address] [phone] 1/1/2016     [place] [address]       Subscriber Name Subscriber Birth Date Member ID       YESSICA YAN 1965 [id number]                 Emergency Contacts      (Rel.) Home Phone Work Phone Mobile Phone    Scot Vaughn (Son) [phone] -- --                "

## 2017-10-17 NOTE — CONSULTS
"Diabetes Education  Assessment/Teaching    Patient Name:  Pepito Yan  YOB: 1965  MRN: [id number]  Admit Date:  10/16/2017      Assessment Date:  10/17/2017       Most Recent Value    General Information      Height  5' 3\" (1.6 m)    Height Method  Stated    Weight  123 lb 5 oz (55.9 kg)    Weight Method  Bed scale    Pregnancy Assessment     Diabetes History     What type of diabetes do you have?  Type 2    Length of Diabetes Diagnosis  10 + years    Current DM knowledge  fair    Have you had diabetes education/teaching in the past?  yes    How do you feel about having diabetes?  bergers    What makes it difficult for you to take care of your diabetes or yourself?  hard to catch client in room had not signed out book for smoking, says \" cut back\"    Do you have any diabetes complications?  amputations    Education Preferences     Nutrition Information     Assessment Topics     Healthy Eating - Assessment  Competent    Being Active - Assessment  Competent    Taking Medication - Assessment  Competent    Problem Solving - Assessment  Competent    Reducing Risk - Assessment  Competent    Healthy Coping - Assessment  Competent    Monitoring - Assessment  Competent    DM Goals                Most Recent Value    DM Education Needs     Meter  Has own    Frequency of Testing  3 times a day    Problem Solving  Hypoglycemia, Hyperglycemia, Sick days, Signs, Symptoms, Treatment, Other (comment) [prednisone for my arthritis]    Reducing Risks  -- [\"better than 15-16 as been in the past]    Healthy Coping  Appropriate    Discharge Plan  Home    Motivation  Moderate    Teaching Method  Explanation, Discussion, Teach back    Patient Response  Verbalized understanding            Other Comments:          Electronically signed by:  Twyla Santos RN  10/17/17 3:04 PM  "

## 2017-10-17 NOTE — PLAN OF CARE
Problem: Fall Risk (Adult)  Goal: Identify Related Risk Factors and Signs and Symptoms  Outcome: Ongoing (interventions implemented as appropriate)  Goal: Absence of Falls  Outcome: Ongoing (interventions implemented as appropriate)    Problem: Pain, Chronic (Adult)  Goal: Identify Related Risk Factors and Signs and Symptoms  Outcome: Ongoing (interventions implemented as appropriate)  Goal: Acceptable Pain Control/Comfort Level  Outcome: Ongoing (interventions implemented as appropriate)    Problem: Skin Integrity Impairment, Risk/Actual (Adult)  Goal: Identify Related Risk Factors and Signs and Symptoms  Outcome: Ongoing (interventions implemented as appropriate)  Goal: Skin Integrity/Wound Healing  Outcome: Ongoing (interventions implemented as appropriate)

## 2017-10-17 NOTE — PROGRESS NOTES
Discharge Planning Assessment   [place]     Patient Name: Pepito Yan  MRN: [id number]  Today's Date: 10/17/2017    Admit Date: 10/16/2017          Discharge Needs Assessment       10/17/17 7351    Living Environment    Lives With sibling(s)    Living Arrangements house    Transportation Available car;family or friend will provide    Living Environment    Provides Primary Care For no one    Primary Care Provided By child(barbara) (specify)   Neeta    Quality Of Family Relationships supportive;helpful;involved    Able to Return to Prior Living Arrangements yes    Discharge Needs Assessment    Concerns To Be Addressed no discharge needs identified    Readmission Within The Last 30 Days no previous admission in last 30 days    Outpatient/Agency/Support Group Needs --   Pt does not have home health at this time. Pt denies a need.      Anticipated Changes Related to Illness none    Equipment Currently Used at Home oxygen    Equipment Needed After Discharge wheelchair;walker, rolling;nebulizer   [facility]     Discharge Disposition still a patient            Discharge Plan       10/17/17 9293    Case Management/Social Work Plan    Plan Pt lives at home with her sister, Neeta and plans to return home at discharge. Pt does not have home health and denies a need at this time.  Pt has home oxygen, wheelchair, rolling walker, and a nebulizer via [facility].  Pt does not have a POA or Advance Directive. Pt has good family support.  Pt is mobile and I with ADL's.  Pt will be transported home by family at discharge.  Pt also utilizes [facility] as needed.  SS will continue to follow.     Patient/Family In Agreement With Plan yes        Demographic Summary       10/17/17 1563    Referral Information    Admission Type inpatient    Referral Source nursing    Reason For Consult discharge planning    Primary Care Physician Information    Name Archana Hare            Functional Status       10/17/17 7254    Functional  Status Current    Current Functional Level Comment Pt is mobile and is I with ADL's.              Legal       10/17/17 1260    Legal    Assistance with Managing/Advocating Healthcare Needs --   Pt does not have a POA or an Advance Directive.  Pt denies the need for home health.               Merlene Parisi

## 2017-10-17 NOTE — ED PROVIDER NOTES
Subjective   Patient is a 51 y.o. female presenting with shortness of breath.   History provided by:  Patient and EMS personnel  Shortness of Breath   Severity:  Severe  Onset quality:  Gradual  Duration: 0400.  Timing:  Constant  Progression:  Worsening  Chronicity:  Chronic  Context: smoke exposure and weather changes    Context: not activity, not animal exposure, not emotional upset, not fumes, not known allergens, not occupational exposure, not pollens, not strong odors and not URI    Relieved by:  Nothing  Worsened by:  Activity, coughing, emotional stress, exertion, movement, smoke exposure, stress and weather changes  Ineffective treatments:  Oxygen  Associated symptoms: chest pain, cough, sputum production and wheezing    Associated symptoms: no abdominal pain, no claudication, no diaphoresis, no ear pain, no fever, no headaches, no hemoptysis, no neck pain, no PND, no rash, no sore throat, no syncope, no swollen glands and no vomiting    Risk factors: tobacco use    Risk factors: no recent alcohol use, no family hx of DVT, no hx of cancer, no hx of PE/DVT, no obesity, no oral contraceptive use, no prolonged immobilization and no recent surgery        Review of Systems   Constitutional: Negative.  Negative for diaphoresis and fever.   HENT: Positive for congestion and postnasal drip. Negative for ear pain, nosebleeds, sinus pain, sinus pressure, sore throat, trouble swallowing and voice change.    Eyes: Negative.    Respiratory: Positive for cough, sputum production, chest tightness, shortness of breath and wheezing. Negative for hemoptysis.    Cardiovascular: Positive for chest pain. Negative for claudication, syncope and PND.   Gastrointestinal: Negative.  Negative for abdominal pain and vomiting.   Endocrine: Negative.    Genitourinary: Negative.    Musculoskeletal: Negative.  Negative for neck pain.   Skin: Negative.  Negative for rash.   Allergic/Immunologic: Negative.    Neurological: Negative.   Negative for headaches.   Hematological: Negative.    Psychiatric/Behavioral: The patient is nervous/anxious.        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   • Chronic respiratory failure with hypoxia     at nighttime   • Compression fracture of lumbar vertebra    • Compression fracture of thoracic vertebra    • COPD (chronic obstructive pulmonary disease)    • Coronary artery disease    • Diabetes mellitus type 2 in nonobese    • Erosive esophagitis 2016   • Essential hypertension    • Gastroparesis    • GERD (gastroesophageal reflux disease)    • Hepatitis-C    • History of transfusion    • Medically noncompliant    • MRSA infection     back of neck around ; 2017 septic left elbow joint with concerns for osteomyelitis and left anteromedial left arm   • NSTEMI (non-ST elevated myocardial infarction) 2016   • PE (pulmonary embolism)    • Protein calorie malnutrition    • RA (rheumatoid arthritis)     with chronic steroid use   • Rheumatoid arthritis        Allergies   Allergen Reactions   • Penicillins Anaphylaxis     Tolerated both cefepime and Rocephin earlier this month   • Toradol [Ketorolac Tromethamine] Hives and Swelling   • Tramadol Hives and Swelling       Past Surgical History:   Procedure Laterality Date   • ABDOMINAL SURGERY     • AMPUTATION DIGIT Right 2016    Third digit to the PIP. Surgeon: Dr. Bains   • AMPUTATION DIGIT Left 2016    Procedure: PARTIAL AMPUTATION LEFT THIRD FINGER;  Surgeon: Shane Fuller MD;  Location: [facility] OR;  Service:    • AMPUTATION REVISION Left 2016    Procedure: AMPUTATION REVISION DIGIT STUMP;  Surgeon: Shane Fuller MD;  Location: [facility] OR;  Service:    • ARM DEBRIDEMENT Left 2016    Procedure: HAND IRRIGATION DEBRIDEMENT AND REPAIR ;  Surgeon: Shane Fuller MD;  Location: [facility] OR;  Service:    •  SECTION      x 2   •  CHOLECYSTECTOMY     • ENDOSCOPY N/A 12/20/2016    Procedure: ESOPHAGOGASTRODUODENOSCOPY;  Surgeon: Jeremy Clement III, MD;  Location: [facility] OR;  Service:    • ENDOSCOPY W/ PEG TUBE PLACEMENT N/A 3/30/2017    Procedure: ESOPHAGOGASTRODUODENOSCOPY WITH PERCUTANEOUS ENDOSCOPIC GASTROSTOMY TUBE INSERTION;  Surgeon: Shade Campos MD;  Location: [facility] OR;  Service:    • FOREIGN BODY REMOVAL      insulin needle removal x 3 from the abdomen   • HYSTERECTOMY     • INCISION AND DRAINAGE ARM Left 6/27/2017    Procedure: INCISION AND DRAINAGE UPPER EXTREMITY;  Surgeon: Shane Fuller MD;  Location: [facility] OR;  Service:        Family History   Problem Relation Age of Onset   • Colon cancer Mother    • Cancer Father    • Diabetes Maternal Grandmother    • Heart disease Neg Hx    • Stroke Neg Hx        Social History     Social History   • Marital status:      Spouse name: N/A   • Number of children: N/A   • Years of education: N/A     Social History Main Topics   • Smoking status: Current Every Day Smoker     Packs/day: 1.00     Years: 35.00     Types: Cigarettes   • Smokeless tobacco: Never Used      Comment: 4-5 cigarettes a day    • Alcohol use No   • Drug use: No   • Sexual activity: Defer     Other Topics Concern   • None     Social History Narrative   • None           Objective   Physical Exam   Constitutional: She is oriented to person, place, and time. She appears well-developed and well-nourished. No distress.   HENT:   Head: Normocephalic and atraumatic.   Nasal congestion  Thick clear post-nasal drip   Eyes: Conjunctivae and EOM are normal. Right eye exhibits no discharge. Left eye exhibits no discharge. No scleral icterus.   Neck: Normal range of motion. No JVD present. No tracheal deviation present.   Cardiovascular: Intact distal pulses.  Exam reveals no gallop and no friction rub.    No murmur heard.  Regular tachycardia   Pulmonary/Chest: No stridor. She has wheezes. She has rales.    Mildly labored, tachypneic, hyperventilating  Coarse rales bilateral bases   Abdominal: Soft. She exhibits distension. She exhibits no mass. There is no tenderness. There is no guarding.   Musculoskeletal: Normal range of motion. She exhibits no edema, tenderness or deformity.   Neurological: She is alert and oriented to person, place, and time. She exhibits normal muscle tone. Coordination normal.   Skin: Skin is warm and dry. No pallor.   Psychiatric: She has a normal mood and affect. Her behavior is normal. Judgment and thought content normal.   Nursing note and vitals reviewed.      Procedures         ED Course  ED Course   Value Comment By Time   ECG 12 Lead 12-lead EKG performed at 2052 hrs.  Interpreted by me at 2058 hrs.  Sinus tachycardia.  Left anterior fascicle block.  Ventricular rate 131.  NC interval 128.  QRS duration 88.  .  QTc 478.  No pathologic blocks.  No sustained ectopy or dysrhythmia.  No acute ischemic ST segment deviation.  No criteria for STEMI. Pk Bowling MD 10/16 1789    D/W Dr Bunch, accepting for admission Pk Bowling MD 10/17 0056      SEPTIC SHOCK FOCUSED EXAM    *Must be completed by a licensed independent Practitioner within 6 hours of diagnosis for the following conditions -- Septic Shock or Severe Sepsis with Lactate >/= 4.    Fluid bolus must be completed prior to assessment.      Diagnosis: Severe sepsis     Vital Signs (Attestation) Reviewed Temp, HR, RR, BP   General resting comfortably, no distress   Respiratory no respiratory distress, rales, wheezing and Mild tachypnea rate 22   Cardiac/Chest regular rate, rhythm, tachycardia and Sinus tachycardia 110   Skin (documentation of skin color is required) pink mucosa and warm/dry   Capillary Refill <3 seconds   Peripheral Pulses Checked                          radial  palpable     † Septic shock is defined by CMS as severe sepsis plus one of the following: persistent hypotension after fluid bolus OR  tissue hypoperfusion (Lactic Acid ?4)  †† TWO OF THE FOLLOWING can be done in lieu of the Focused Exam: Measure CVP; Measure ScVO2; Bedside cardiovascular ultrasound; Dynamic assessment of fluid responsiveness with passive leg raise or fluid challenge.      Pk Bowling MD  10/17/17  1:59 AM    XR Chest 1 View    (Results Pending)     Labs Reviewed   BLOOD GAS, ARTERIAL - Abnormal; Notable for the following:        Result Value    pCO2, Arterial 32.5 (*)     HCO3, Arterial 20.7 (*)     Hematocrit, Blood Gas 36.0 (*)     All other components within normal limits   COMPREHENSIVE METABOLIC PANEL - Abnormal; Notable for the following:     Glucose 355 (*)     CO2 19.8 (*)     ALT (SGPT) 46 (*)     AST (SGOT) 35 (*)     A/G Ratio 1.0 (*)     Anion Gap 12.2 (*)     All other components within normal limits   BNP (IN-HOUSE) - Abnormal; Notable for the following:     .0 (*)     All other components within normal limits   LACTIC ACID, PLASMA - Abnormal; Notable for the following:     Lactate 4.2 (*)     All other components within normal limits   CBC WITH AUTO DIFFERENTIAL - Abnormal; Notable for the following:     MCV 77.5 (*)     MCH 24.1 (*)     MCHC 31.0 (*)     RDW 18.4 (*)     MPV 10.2 (*)     Neutrophil % 75.6 (*)     Lymphocyte % 12.9 (*)     Immature Grans % 3.4 (*)     Immature Grans, Absolute 0.29 (*)     All other components within normal limits   CKMB INDEX CALCULATION - Abnormal; Notable for the following:     CK-MB Index 6.0 (*)     All other components within normal limits   LACTIC ACID, REFLEX - Abnormal; Notable for the following:     Lactate 2.2 (*)     All other components within normal limits   CK - Normal   CK MB - Normal   TROPONIN (IN-HOUSE) - Normal    Narrative:     Ultra Troponin I Reference Range:         <=0.039 ng/mL: Negative    0.04-0.779 ng/mL: Indeterminate Range. Suspicious of MI.  Clinical correlation required.       >=0.78  ng/mL: Consistent with myocardial injury.   Clinical correlation required.   OSMOLALITY, CALCULATED - Normal   BLOOD CULTURE   BLOOD CULTURE   LACTIC ACID REFLEX TIMER   URINALYSIS W/ CULTURE IF INDICATED   CBC AND DIFFERENTIAL    Narrative:     The following orders were created for panel order CBC & Differential.  Procedure                               Abnormality         Status                     ---------                               -----------         ------                     Scan Slide[[id number]]                                                                  CBC Auto Differential[[id number]]        Abnormal            Final result                 Please view results for these tests on the individual orders.        Medication List      ASK your doctor about these medications          albuterol 108 (90 Base) MCG/ACT inhaler   Commonly known as:  PROVENTIL HFA;VENTOLIN HFA       aspirin  MG tablet   Take 1 tablet by mouth Daily.       atorvastatin 40 MG tablet   Commonly known as:  LIPITOR   Take 1 tablet by mouth Every Night.       doxycycline 100 MG enteric coated tablet   Commonly known as:  DORYX   Take 1 tablet by mouth 2 (Two) Times a Day.       famotidine 20 MG tablet   Commonly known as:  PEPCID   Take 1 tablet by mouth Daily.       hydrOXYzine 25 MG capsule   Commonly known as:  VISTARIL       insulin glargine 100 UNIT/ML injection   Commonly known as:  LANTUS       ipratropium 0.02 % nebulizer solution   Commonly known as:  ATROVENT   Take 2.5 mL by nebulization Every 6 (Six) Hours As Needed for Shortness of   Air.       isosorbide mononitrate 30 MG 24 hr tablet   Commonly known as:  IMDUR   Take 1 tablet by mouth Daily.       lisinopril 5 MG tablet   Commonly known as:  PRINIVIL,ZESTRIL       metoprolol tartrate 25 MG tablet   Commonly known as:  LOPRESSOR   Take 1 tablet by mouth Every 12 (Twelve) Hours.       multivitamin tablet tablet       * predniSONE 10 MG tablet   Commonly known as:  DELTASONE   Take 1 tablet by mouth Daily.  Start on 9/24/17       * predniSONE 10 MG tablet   Commonly known as:  DELTASONE   4 tabs po daily for 3 days then 3 tabs po daily for 3 days then 2 tabs po   daily for 3 days then 1 tab daily       * predniSONE 10 MG tablet   Commonly known as:  DELTASONE   Take 1 tablet by mouth Daily.       promethazine 25 MG tablet   Commonly known as:  PHENERGAN       tiZANidine 4 MG tablet   Commonly known as:  ZANAFLEX       * Notice:  This list has 3 medication(s) that are the same as other   medications prescribed for you. Read the directions carefully, and ask   your doctor or other care provider to review them with you.        Lactic acid level improving but remains elevated/positive for sepsis.             MDM  Number of Diagnoses or Management Options  Community acquired pneumonia, unspecified laterality: new and requires workup  COPD exacerbation: new and requires workup  Severe sepsis: new and requires workup     Amount and/or Complexity of Data Reviewed  Clinical lab tests: ordered and reviewed  Tests in the radiology section of CPT®: ordered and reviewed  Decide to obtain previous medical records or to obtain history from someone other than the patient: yes  Discuss the patient with other providers: yes    Risk of Complications, Morbidity, and/or Mortality  Presenting problems: high  Diagnostic procedures: high  Management options: high    Patient Progress  Patient progress: improved      Final diagnoses:   Community acquired pneumonia, unspecified laterality   COPD exacerbation   Sepsis due to undetermined organism            Pk Bowling MD  10/17/17 0149       Pk Bowling MD  10/17/17 0159

## 2017-10-17 NOTE — PROGRESS NOTES
I have seen the patient in conjunction with Dipti STYLES RN       History of presenting illness:  Follow-up on history and physical done by Dr. Bunch.  Patient states she was brought to the emergency room due to increased shortness of breath have been coming on for about 24-36 hours.  She was also having some right-sided sharp chest discomfort that was pleuritic however this has resolved.  She states her breathing is back to baseline.  She has actually been going out to smoke care.  She states her glucoses are not well controlled at home.  She has oxygen at home but mostly wears this at night.  She has however been wearing this for over the last 48 hours prior to admission.  She also has been using increased nebulizer and inhalers.  She denies any fevers.  She also complains of symptoms of restless leg, she takes Zanaflex tends to help she has never tried Mirapex or Requip.    Vital Signs  Temp:  [97.6 °F (36.4 °C)-99.7 °F (37.6 °C)] 97.6 °F (36.4 °C)  Heart Rate:  [] 90  Resp:  [18-22] 18  BP: ()/() 97/57  Body mass index is 21.84 kg/(m^2).      Intake/Output Summary (Last 24 hours) at 10/17/17 1537  Last data filed at 10/17/17 1230   Gross per 24 hour   Intake             2326 ml   Output                0 ml   Net             2326 ml     Intake & Output (last 3 days)       10/14 0701 - 10/15 0700 10/15 0701 - 10/16 0700 10/16 0701 - 10/17 0700 10/17 0701 - 10/18 0700    P.O.    360    IV Piggyback   1966     Total Intake(mL/kg)   1966 (35.1) 360 (6.4)    Net     +1966 +360                  Physical exam:  Physical Exam   Constitutional: Well-developed, well-nourished.  Pleasant. She appears in no distress  Head: Normocephalic and atraumatic.  Hearing is intact, mucous membranes are moist.  Speech is normal  Eyes:  Pupils are equal  No scleral icterus.   Cardiovascular: Normal rate, regular rhythm and normal heart sounds.  No murmur rub or gallop  Pulmonary/Chest: Bilateral breath sounds no rhonchus  rales or wheezing heard mildly diminished throughout but fairly good air excursion  Abdominal: Soft, flat, bowel sounds are active, nondistended nontender.  No peritoneal signs   Musculoskeletal: Strength is symmetric, No edema.  No cyanosis an excellent pulses.  The right elbow there is minimal healing red tissue around a Scab area but there is no fluctuance in the olecranon bursa, elbow has full range of motion  Neurological: Nonfocal, alert.    Skin: Skin is warm and dry.   Psychiatric:  Normal mood and affect.     EKG: Sinus tach   Telemetry: Sinus/ Sinus tach    Results Review:  Lab Results   Component Value Date    WBC 5.77 10/17/2017    HGB 10.3 (L) 10/17/2017    HCT 34.7 (L) 10/17/2017    MCV 79.4 (L) 10/17/2017     10/17/2017     Lab Results   Component Value Date    GLUCOSE 505 (C) 10/17/2017    BUN 22 (H) 10/17/2017    CREATININE 0.73 10/17/2017    EGFRIFNONA 84 10/17/2017    EGFRIFAFRI  09/15/2016      Comment:      <15 Indicative of kidney failure.    BCR 30.1 (H) 10/17/2017    CO2 16.7 (L) 10/17/2017    CALCIUM 8.7 10/17/2017    ALBUMIN 3.70 10/16/2017    LABIL2 1.0 (L) 10/16/2017    AST 35 (H) 10/16/2017    ALT 46 (H) 10/16/2017       Imaging Results (last 72 hours)     Procedure Component Value Units Date/Time    XR Chest 1 View [[id number]] Collected:  10/17/17 0707     Updated:  10/17/17 0710    Narrative:       EXAMINATION: XR CHEST 1 VW-      CLINICAL INDICATION:     SOA     TECHNIQUE:  XR CHEST 1 VW-      COMPARISON: 9/10/2017      FINDINGS:   Lungs are aerated.   Heart and mediastinal contours are unremarkable.   No pneumothorax.   No pleural effusion.   No acute osseous findings.            Impression:       No acute cardiopulmonary findings ON today's exam.     This report was finalized on 10/17/2017 7:07 AM by Dr. Wali Tracy MD.            Chest x-ray images reviewed    CT negative for PE protocol in September        Assessment/Plan     Active Problems:  Increased shortness  of breath secondary to COPD with ongoing tobacco use as well as diastolic and systolic heart failure.  Patient actually states she feels much better and wants to go home.  I have weaned her steroids to her home dose of prednisone 20 mg a day.  I'm changing her antibiotics to doxycycline alone.  I'm going to give a dose of Lasix today.  Recheck like lites and BNP in the a.m.  Strongly encouraged quit smoking but I doubt that this will occur unfortunately.  This being the case her long-term prognosis is guarded.  IV fluids have been hep-locked.  Her ejection fraction is 45%.  She also diastolic dysfunction.  This has been by echo in the last 6 months.    Rodas's disease, she has adequate pulses but reports occasional to cyanosis.  Again encouraged quit smoking.    Diabetes, not well controlled partially secondary to steroids, these have been weaned as above, insulin adjusted, follow.  There is no evidence of DKA.    Chronic Right elbow pain    Microcytic anemia borderline iron deficient, will need outpatient follow-up for this    Patient met septic shock criteria by lactic acid 4.2 however no active source of infection and I feel like this was more due to increased nebulizer use.  Cultures negative..    DVT prophylaxis, subcutaneous heparin    Restless leg, will try Mirapex low dose    Todd Kowalski MD  10/17/17  3:37 PM

## 2017-10-17 NOTE — H&P
[facility]    Patient Identification:  Name:  Pepito Yan  Age:  51 y.o.  Sex:  female  :  1965  MRN:  [id number]   Visit Number:  [id number]  Primary Care Physician:  ROSA Love     Chief complaint: Worsening shortness of breath.    History of presenting illness:  51 y.o. female who has oxygen-dependent COPD and continues to smoke about half pack of cigarettes daily.  Patient was recently admitted to the service on 2017 with diagnosis of sepsis secondary to healthcare acquired pneumonia.  [facility].  Also, patient reports constant cough which is productive of greenish sputum.  She denies hemoptysis.  There is associated sharp chest pain as well as non-bloody diarrhea for 4 days duration.  No report of nausea of vomiting.  No fever chills or sore throat.  On presentation to the ER, patient was said to be in respiratory distress and wheezing.  He received multiple nebulized bronchodilators.  Chest x-ray does not show definite infiltrates or pleural effusion.  Patient is afebrile and hemodynamically stable but was initially slightly tachycardic.  Her labs showed significant lactic acidemia of 4.2.  No obvious source of infection has been identified.  She was started on broad-spectrum IV antibiotics and admitted for possible sepsis.      ---------------------------------------------------------------------------------------------------------------------   Review of Systems   Constitutional: Positive for activity change and fatigue. Negative for appetite change, chills, diaphoresis and fever.   HENT: Positive for congestion and postnasal drip. Negative for sinus pain, sore throat and trouble swallowing.    Respiratory: Positive for cough, shortness of breath and wheezing. Negative for chest tightness.    Cardiovascular: Positive for chest pain.    Gastrointestinal: Positive for diarrhea. Negative for constipation, nausea and vomiting.   Genitourinary: Negative for flank pain, hematuria and urgency.   Musculoskeletal: Positive for neck pain. Negative for back pain.   Neurological: Negative for syncope and light-headedness.   All other systems reviewed and are negative.     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   • Chronic respiratory failure with hypoxia     at nighttime   • Compression fracture of lumbar vertebra    • Compression fracture of thoracic vertebra    • COPD (chronic obstructive pulmonary disease)    • Coronary artery disease    • Diabetes mellitus type 2 in nonobese    • Erosive esophagitis 12/2016   • Essential hypertension    • Gastroparesis    • GERD (gastroesophageal reflux disease)    • Hepatitis-C    • History of transfusion    • Medically noncompliant    • MRSA infection     back of neck around 2012; 6/2017 septic left elbow joint with concerns for osteomyelitis and left anteromedial left arm   • NSTEMI (non-ST elevated myocardial infarction) 07/2016   • PE (pulmonary embolism)    • Protein calorie malnutrition    • RA (rheumatoid arthritis)     with chronic steroid use   • Rheumatoid arthritis      Past Surgical History:   Procedure Laterality Date   • ABDOMINAL SURGERY     • AMPUTATION DIGIT Right 12/22/2016    Third digit to the PIP. Surgeon: Dr. Bains   • AMPUTATION DIGIT Left 12/22/2016    Procedure: PARTIAL AMPUTATION LEFT THIRD FINGER;  Surgeon: Shane Fuller MD;  Location: [facility] OR;  Service:    • AMPUTATION REVISION Left 12/22/2016    Procedure: AMPUTATION REVISION DIGIT STUMP;  Surgeon: Shane Fuller MD;  Location: [facility];  Service:    • ARM DEBRIDEMENT Left 12/20/2016    Procedure: HAND IRRIGATION DEBRIDEMENT AND  REPAIR ;  Surgeon: Shane Fuller MD;  Location: [facility] OR;  Service:    •  SECTION      x 2   • CHOLECYSTECTOMY     • ENDOSCOPY N/A 2016    Procedure: ESOPHAGOGASTRODUODENOSCOPY;  Surgeon: Jeremy Clement III, MD;  Location: [facility] OR;  Service:    • ENDOSCOPY W/ PEG TUBE PLACEMENT N/A 3/30/2017    Procedure: ESOPHAGOGASTRODUODENOSCOPY WITH PERCUTANEOUS ENDOSCOPIC GASTROSTOMY TUBE INSERTION;  Surgeon: Shade Campos MD;  Location: [facility] OR;  Service:    • FOREIGN BODY REMOVAL      insulin needle removal x 3 from the abdomen   • HYSTERECTOMY     • INCISION AND DRAINAGE ARM Left 2017    Procedure: INCISION AND DRAINAGE UPPER EXTREMITY;  Surgeon: Shane Fuller MD;  Location: [facility] OR;  Service:      Family History   Problem Relation Age of Onset   • Colon cancer Mother    • Cancer Father    • Diabetes Maternal Grandmother    • Heart disease Neg Hx    • Stroke Neg Hx      Social History     Social History   • Marital status:      Spouse name: N/A   • Number of children: N/A   • Years of education: N/A     Social History Main Topics   • Smoking status: Current Every Day Smoker     Packs/day: 0.50     Years: 35.00     Types: Cigarettes   • Smokeless tobacco: Never Used      Comment: 4-5 cigarettes a day    • Alcohol use No   • Drug use: No   • Sexual activity: Defer     Other Topics Concern   • None     Social History Narrative   • None     ---------------------------------------------------------------------------------------------------------------------   Allergies:  Penicillins; Toradol [ketorolac tromethamine]; and Tramadol  ---------------------------------------------------------------------------------------------------------------------   Prior to Admission Medications     Prescriptions Last Dose Informant Patient Reported? Taking?    albuterol (PROVENTIL HFA;VENTOLIN HFA) 108 (90 BASE) MCG/ACT inhaler 10/16/2017 Self Yes Yes    Inhale 2 puffs Every 4  (Four) Hours.    aspirin  MG tablet 10/16/2017 Self No Yes    Take 1 tablet by mouth Daily.    atorvastatin (LIPITOR) 40 MG tablet 10/15/2017 Self No No    Take 1 tablet by mouth Every Night.    famotidine (PEPCID) 20 MG tablet 10/16/2017 Self No Yes    Take 1 tablet by mouth Daily.    hydrOXYzine (VISTARIL) 25 MG capsule 10/15/2017 Self Yes No    Take 25 mg by mouth 2 (Two) Times a Day As Needed for Itching or Anxiety. 2 to 3 times daily as needed    insulin glargine (LANTUS) 100 UNIT/ML injection 10/14/2017 Self Yes No    Inject 40 Units under the skin Every Night.    ipratropium (ATROVENT) 0.02 % nebulizer solution 10/16/2017 Self No Yes    Take 2.5 mL by nebulization Every 6 (Six) Hours As Needed for Shortness of Air.    isosorbide mononitrate (IMDUR) 30 MG 24 hr tablet 10/16/2017 Self No Yes    Take 1 tablet by mouth Daily.    lisinopril (PRINIVIL,ZESTRIL) 5 MG tablet 10/15/2017 Self Yes No    Take 5 mg by mouth Daily.    metoprolol tartrate (LOPRESSOR) 25 MG tablet Unknown Self No No    Take 1 tablet by mouth Every 12 (Twelve) Hours.    predniSONE (DELTASONE) 20 MG tablet 10/16/2017 Self Yes Yes    Take 20 mg by mouth Daily.    tiZANidine (ZANAFLEX) 4 MG tablet 10/15/2017 Self Yes No    Take 4 mg by mouth Every Night.        Hospital Scheduled Meds:    doxycycline 100 mg Intravenous Q12H   heparin (porcine) 5,000 Units Subcutaneous Q12H   levoFLOXacin 750 mg Intravenous Q24H   methylPREDNISolone sodium succinate 40 mg Intravenous Q8H   Morphine (PF)      Morphine (PF)          sodium chloride 125 mL/hr Last Rate: 125 mL/hr (10/16/17 2130)   sodium chloride 100 mL/hr Last Rate: 100 mL/hr (10/17/17 0333)     ---------------------------------------------------------------------------------------------------------------------   Vital Signs:  Temp:  [98.7 °F (37.1 °C)-99.7 °F (37.6 °C)] 98.8 °F (37.1 °C)  Heart Rate:  [104-122] 110  Resp:  [20-22] 20  BP: (111-149)/() 120/75  Last 3 weights     10/16/17  2038 10/17/17  0215   Weight: 115 lb (52.2 kg) 123 lb 5 oz (55.9 kg)     Body mass index is 21.84 kg/(m^2).  ---------------------------------------------------------------------------------------------------------------------   Physical Exam:  Constitutional:  No respiratory distress. Chronically ill looking.     HENT:  Head: Normocephalic and atraumatic.  Mouth:  Moist mucous membranes.    Eyes:  Conjunctivae and EOM are normal.  Pupils are equal, round, and reactive to light.  No scleral icterus.  Neck:  Neck supple.  No JVD present.    Cardiovascular:  Normal rate, regular rhythm and normal heart sounds with no murmur.  Pulmonary/Chest:  No respiratory distress,but there is decreased air entry globally, no wheezes, no crackles.   Abdominal:  Soft.  Bowel sounds are normal.  No distension and no tenderness.   Musculoskeletal:  No edema, no tenderness, and no deformity.  No red or swollen joints anywhere.    Neurological:  Alert and oriented to person, place, and time.  No cranial nerve deficit.   Skin:  Skin is warm and dry.  No rash noted.  No pallor.   Psychiatric:  Normal mood and affect.  Behavior is normal.  Judgment and thought content normal.   Peripheral vascular:  No edema and strong pulses on all 4 extremities.  Genitourinary: Deferred.    ---------------------------------------------------------------------------------------------------------------------  EKG:    Telemetry:    I have personally looked at both the EKG and the telemetry strips.  ---------------------------------------------------------------------------------------------------------------------     Results from last 7 days  Lab Units 10/17/17  0437 10/17/17  0111 10/16/17  2102   LACTATE mmol/L  --  2.2* 4.2*   WBC 10*3/mm3 5.77  --  8.54   HEMOGLOBIN g/dL 10.3*  --  12.1   HEMATOCRIT % 34.7*  --  39.0   MCV fL 79.4*  --  77.5*   MCHC g/dL 29.7*  --  31.0*   PLATELETS 10*3/mm3 229  --  361       Results from last 7 days  Lab  Units 10/16/17  2058   PH, ARTERIAL pH units 7.421   PO2 ART mm Hg 80.4   PCO2, ARTERIAL mm Hg 32.5*   HCO3 ART mmol/L 20.7*       Results from last 7 days  Lab Units 10/17/17  0437 10/16/17  2102   SODIUM mmol/L 138 141   POTASSIUM mmol/L 5.0 4.1   MAGNESIUM mg/dL 1.8  --    CHLORIDE mmol/L 112 109   CO2 mmol/L 16.7* 19.8*   BUN mg/dL 22* 21   CREATININE mg/dL 0.73 0.90   EGFR IF NONAFRICN AM mL/min/1.73 84 66   CALCIUM mg/dL 8.7 9.8   GLUCOSE mg/dL 505* 355*   ALBUMIN g/dL  --  3.70   BILIRUBIN mg/dL  --  0.2   ALK PHOS U/L  --  95   AST (SGOT) U/L  --  35*   ALT (SGPT) U/L  --  46*   Estimated Creatinine Clearance: 80.5 mL/min (by C-G formula based on Cr of 0.73).  No results found for: AMMONIA    Results from last 7 days  Lab Units 10/16/17  2102   CK TOTAL U/L 24   TROPONIN I ng/mL 0.012   CK MB INDEX % 6.0*         Lab Results   Component Value Date    HGBA1C 10.00 (H) 09/11/2017     Lab Results   Component Value Date    TSH 2.584 06/30/2017    FREET4 0.62 (L) 03/06/2017     No results found for: PREGTESTUR, PREGSERUM, HCG, HCGQUANT  Pain Management Panel     Pain Management Panel Latest Ref Rng & Units 7/22/2017 6/24/2017    AMPHETAMINES SCREEN, URINE Negative Negative Negative    BARBITURATES SCREEN Negative Negative Negative    BENZODIAZEPINE SCREEN, URINE Negative Negative Negative    BUPRENORPHINE Negative Negative Negative    COCAINE SCREEN, URINE Negative Negative Negative    METHADONE SCREEN, URINE Negative Negative Negative                        ---------------------------------------------------------------------------------------------------------------------  Imaging Results (last 7 days)     Procedure Component Value Units Date/Time    XR Chest 1 View [[id number]] Updated:  10/16/17 2119          I have personally reviewed the radiology images and read the final radiology  report.  ---------------------------------------------------------------------------------------------------------------------  Assessment and Plan.  1.  COPD exacerbation with acute bronchitis.  Chest x-ray does not show evidence of pneumonia or fluid overload.  We'll continue nebulized bronchodilators, IV supplemental, as well as empiric antibiotics.  Meanwhile, we'll requests Gram stain and culture of sputum.  Again, patient is counseled seriously on smoking cessation.  We'll keep oxygen saturation in the range of 90s.  We will consider pulmonary consult.  2.  Possible sepsis.  Initial lactate level was elevated at 4.2.  However there is no obvious source of bacterial infection identified.  Lactic acidemia may be related to excessive use of beta agonist.  Repeated venous lactate has dropped to 2.2.  We'll continue IV antibiotics and follow-up with blood culture.  Will monitor input and output closely.  3.  Chronic tobacco abuse.  As noted above, patient is seriously advised against continue tobacco use.  4.  Uncontrolled diabetes mellitus.  Patient received high-dose IV steroids in the ER.  We will continue her current basal insulin as well as sliding scale with blood glucose monitoring.  However, we'll check for serum acetone diabetic ketoacidosis.  5.  Metabolic acidosis.  This may be secondary to lactic acidosis.  However obtain arterial blood gas and check serum acetones to rule out other causes.  We'll continue with IV antibiotics.  6.  Chest pain.  Most likely related to acute bronchitis.  Initial cardiac enzymes were negative.  We will follow-up closely.  7.  Anemia.  There is no process of bleeding.  We'll obtain anemia workup and monitor H&H.  8.  DVT prophylaxis.  Bilateral SCDs.    Gómez Bunch MD  10/17/17  5:23 AM

## 2017-10-17 NOTE — PLAN OF CARE
Problem: Patient Care Overview (Adult)  Goal: Plan of Care Review  Outcome: Ongoing (interventions implemented as appropriate)  Goal: Adult Individualization and Mutuality  Outcome: Ongoing (interventions implemented as appropriate)  Goal: Discharge Needs Assessment  Outcome: Ongoing (interventions implemented as appropriate)    Problem: Diabetes, Type 2 (Adult)  Goal: Signs and Symptoms of Listed Potential Problems Will be Absent or Manageable (Diabetes, Type 2)  Outcome: Ongoing (interventions implemented as appropriate)    Problem: Fall Risk (Adult)  Goal: Identify Related Risk Factors and Signs and Symptoms  Outcome: Ongoing (interventions implemented as appropriate)  Goal: Absence of Falls  Outcome: Ongoing (interventions implemented as appropriate)    Problem: Pain, Chronic (Adult)  Goal: Identify Related Risk Factors and Signs and Symptoms  Outcome: Ongoing (interventions implemented as appropriate)  Goal: Acceptable Pain Control/Comfort Level  Outcome: Ongoing (interventions implemented as appropriate)    Problem: Skin Integrity Impairment, Risk/Actual (Adult)  Goal: Identify Related Risk Factors and Signs and Symptoms  Outcome: Ongoing (interventions implemented as appropriate)  Goal: Skin Integrity/Wound Healing  Outcome: Ongoing (interventions implemented as appropriate)

## 2017-10-18 ENCOUNTER — APPOINTMENT (OUTPATIENT)
Dept: GENERAL RADIOLOGY | Facility: HOSPITAL | Age: 52
End: 2017-10-18

## 2017-10-18 LAB
ALBUMIN SERPL-MCNC: 3.1 G/DL (ref 3.5–5)
ALBUMIN/GLOB SERPL: 1.1 G/DL (ref 1.5–2.5)
ALP SERPL-CCNC: 73 U/L (ref 35–104)
ALT SERPL W P-5'-P-CCNC: 27 U/L (ref 10–36)
ANION GAP SERPL CALCULATED.3IONS-SCNC: 8.7 MMOL/L (ref 3.6–11.2)
AST SERPL-CCNC: 17 U/L (ref 10–30)
BASOPHILS # BLD AUTO: 0.02 10*3/MM3 (ref 0–0.3)
BASOPHILS NFR BLD AUTO: 0.2 % (ref 0–2)
BILIRUB SERPL-MCNC: 0.1 MG/DL (ref 0.2–1.8)
BNP SERPL-MCNC: 348 PG/ML (ref 0–100)
BUN BLD-MCNC: 30 MG/DL (ref 7–21)
BUN/CREAT SERPL: 44.8 (ref 7–25)
CALCIUM SPEC-SCNC: 9.6 MG/DL (ref 7.7–10)
CHLORIDE SERPL-SCNC: 111 MMOL/L (ref 99–112)
CO2 SERPL-SCNC: 21.3 MMOL/L (ref 24.3–31.9)
CREAT BLD-MCNC: 0.67 MG/DL (ref 0.43–1.29)
D-LACTATE SERPL-SCNC: 2.5 MMOL/L (ref 0.5–2)
DEPRECATED RDW RBC AUTO: 47.9 FL (ref 37–54)
EOSINOPHIL # BLD AUTO: 0 10*3/MM3 (ref 0–0.7)
EOSINOPHIL NFR BLD AUTO: 0 % (ref 0–5)
ERYTHROCYTE [DISTWIDTH] IN BLOOD BY AUTOMATED COUNT: 17.8 % (ref 11.5–14.5)
GFR SERPL CREATININE-BSD FRML MDRD: 93 ML/MIN/1.73
GLOBULIN UR ELPH-MCNC: 2.9 GM/DL
GLUCOSE BLD-MCNC: 304 MG/DL (ref 70–110)
GLUCOSE BLDC GLUCOMTR-MCNC: 140 MG/DL (ref 70–130)
GLUCOSE BLDC GLUCOMTR-MCNC: 151 MG/DL (ref 70–130)
GLUCOSE BLDC GLUCOMTR-MCNC: 178 MG/DL (ref 70–130)
GLUCOSE BLDC GLUCOMTR-MCNC: 356 MG/DL (ref 70–130)
HBA1C MFR BLD: 12 % (ref 4.5–5.7)
HCT VFR BLD AUTO: 31.2 % (ref 37–47)
HCT VFR BLD AUTO: 34.1 % (ref 37–47)
HGB BLD-MCNC: 10.2 G/DL (ref 12–16)
HGB BLD-MCNC: 9.5 G/DL (ref 12–16)
IMM GRANULOCYTES # BLD: 0.26 10*3/MM3 (ref 0–0.03)
IMM GRANULOCYTES NFR BLD: 2.7 % (ref 0–0.5)
LYMPHOCYTES # BLD AUTO: 1.03 10*3/MM3 (ref 1–3)
LYMPHOCYTES NFR BLD AUTO: 10.8 % (ref 21–51)
MCH RBC QN AUTO: 23.6 PG (ref 27–33)
MCHC RBC AUTO-ENTMCNC: 30.4 G/DL (ref 33–37)
MCV RBC AUTO: 77.6 FL (ref 80–94)
MONOCYTES # BLD AUTO: 0.49 10*3/MM3 (ref 0.1–0.9)
MONOCYTES NFR BLD AUTO: 5.1 % (ref 0–10)
NEUTROPHILS # BLD AUTO: 7.77 10*3/MM3 (ref 1.4–6.5)
NEUTROPHILS NFR BLD AUTO: 81.2 % (ref 30–70)
OSMOLALITY SERPL CALC.SUM OF ELEC: 298.9 MOSM/KG (ref 273–305)
PLATELET # BLD AUTO: 324 10*3/MM3 (ref 130–400)
PMV BLD AUTO: 9.5 FL (ref 6–10)
POTASSIUM BLD-SCNC: 4.4 MMOL/L (ref 3.5–5.3)
PROT SERPL-MCNC: 6 G/DL (ref 6–8)
RBC # BLD AUTO: 4.02 10*6/MM3 (ref 4.2–5.4)
SODIUM BLD-SCNC: 141 MMOL/L (ref 135–153)
WBC NRBC COR # BLD: 9.57 10*3/MM3 (ref 4.5–12.5)

## 2017-10-18 PROCEDURE — 94799 UNLISTED PULMONARY SVC/PX: CPT

## 2017-10-18 PROCEDURE — 85025 COMPLETE CBC W/AUTO DIFF WBC: CPT | Performed by: INTERNAL MEDICINE

## 2017-10-18 PROCEDURE — 63710000001 INSULIN ASPART PER 5 UNITS: Performed by: PHYSICIAN ASSISTANT

## 2017-10-18 PROCEDURE — 80053 COMPREHEN METABOLIC PANEL: CPT | Performed by: INTERNAL MEDICINE

## 2017-10-18 PROCEDURE — 83880 ASSAY OF NATRIURETIC PEPTIDE: CPT | Performed by: INTERNAL MEDICINE

## 2017-10-18 PROCEDURE — 83036 HEMOGLOBIN GLYCOSYLATED A1C: CPT | Performed by: INTERNAL MEDICINE

## 2017-10-18 PROCEDURE — 63710000001 PREDNISONE PER 1 MG: Performed by: INTERNAL MEDICINE

## 2017-10-18 PROCEDURE — 74022 RADEX COMPL AQT ABD SERIES: CPT

## 2017-10-18 PROCEDURE — 25010000002 HEPARIN (PORCINE) PER 1000 UNITS: Performed by: INTERNAL MEDICINE

## 2017-10-18 PROCEDURE — 82962 GLUCOSE BLOOD TEST: CPT

## 2017-10-18 PROCEDURE — 83605 ASSAY OF LACTIC ACID: CPT | Performed by: INTERNAL MEDICINE

## 2017-10-18 PROCEDURE — 25010000002 PROMETHAZINE PER 50 MG: Performed by: PHYSICIAN ASSISTANT

## 2017-10-18 PROCEDURE — 63710000001 INSULIN DETEMIR PER 5 UNITS: Performed by: INTERNAL MEDICINE

## 2017-10-18 PROCEDURE — 74022 RADEX COMPL AQT ABD SERIES: CPT | Performed by: RADIOLOGY

## 2017-10-18 PROCEDURE — 25010000002 MORPHINE (PF) 10 MG/ML SOLUTION

## 2017-10-18 PROCEDURE — 85014 HEMATOCRIT: CPT | Performed by: INTERNAL MEDICINE

## 2017-10-18 PROCEDURE — 85018 HEMOGLOBIN: CPT | Performed by: INTERNAL MEDICINE

## 2017-10-18 PROCEDURE — 99233 SBSQ HOSP IP/OBS HIGH 50: CPT | Performed by: INTERNAL MEDICINE

## 2017-10-18 RX ORDER — MORPHINE SULFATE 10 MG/ML
INJECTION, SOLUTION INTRAMUSCULAR; INTRAVENOUS
Status: COMPLETED
Start: 2017-10-18 | End: 2017-10-18

## 2017-10-18 RX ORDER — ALBUTEROL SULFATE 2.5 MG/3ML
2.5 SOLUTION RESPIRATORY (INHALATION) EVERY 4 HOURS PRN
Status: DISCONTINUED | OUTPATIENT
Start: 2017-10-18 | End: 2017-10-19 | Stop reason: HOSPADM

## 2017-10-18 RX ORDER — HYDROCODONE BITARTRATE AND ACETAMINOPHEN 10; 325 MG/1; MG/1
1 TABLET ORAL EVERY 6 HOURS PRN
Status: DISCONTINUED | OUTPATIENT
Start: 2017-10-18 | End: 2017-10-19 | Stop reason: HOSPADM

## 2017-10-18 RX ORDER — PROMETHAZINE HYDROCHLORIDE 25 MG/1
25 TABLET ORAL EVERY 6 HOURS PRN
Status: DISCONTINUED | OUTPATIENT
Start: 2017-10-18 | End: 2017-10-19 | Stop reason: HOSPADM

## 2017-10-18 RX ADMIN — MORPHINE SULFATE 2 MG: 10 INJECTION, SOLUTION INTRAMUSCULAR; INTRAVENOUS at 01:08

## 2017-10-18 RX ADMIN — PREDNISONE 20 MG: 20 TABLET ORAL at 08:28

## 2017-10-18 RX ADMIN — ATORVASTATIN CALCIUM 40 MG: 40 TABLET, FILM COATED ORAL at 21:23

## 2017-10-18 RX ADMIN — LISINOPRIL 5 MG: 2.5 TABLET ORAL at 09:26

## 2017-10-18 RX ADMIN — METRONIDAZOLE 500 MG: 500 INJECTION, SOLUTION INTRAVENOUS at 17:26

## 2017-10-18 RX ADMIN — INSULIN ASPART 12 UNITS: 100 INJECTION, SOLUTION INTRAVENOUS; SUBCUTANEOUS at 17:24

## 2017-10-18 RX ADMIN — METOPROLOL TARTRATE 25 MG: 25 TABLET, FILM COATED ORAL at 09:25

## 2017-10-18 RX ADMIN — MORPHINE SULFATE 2 MG: 10 INJECTION, SOLUTION INTRAMUSCULAR; INTRAVENOUS at 12:29

## 2017-10-18 RX ADMIN — PROMETHAZINE HYDROCHLORIDE 12.5 MG: 25 INJECTION INTRAMUSCULAR; INTRAVENOUS at 10:17

## 2017-10-18 RX ADMIN — ISOSORBIDE MONONITRATE 30 MG: 30 TABLET, EXTENDED RELEASE ORAL at 09:26

## 2017-10-18 RX ADMIN — PRAMIPEXOLE DIHYDROCHLORIDE 0.12 MG: 0.12 TABLET ORAL at 21:23

## 2017-10-18 RX ADMIN — FAMOTIDINE 20 MG: 20 TABLET, FILM COATED ORAL at 10:25

## 2017-10-18 RX ADMIN — DOXYCYCLINE 100 MG: 100 CAPSULE ORAL at 10:25

## 2017-10-18 RX ADMIN — INSULIN ASPART 4 UNITS: 100 INJECTION, SOLUTION INTRAVENOUS; SUBCUTANEOUS at 07:29

## 2017-10-18 RX ADMIN — ASPIRIN 325 MG: 325 TABLET, COATED ORAL at 09:42

## 2017-10-18 RX ADMIN — INSULIN DETEMIR 45 UNITS: 100 INJECTION, SOLUTION SUBCUTANEOUS at 09:34

## 2017-10-18 RX ADMIN — HYDROCODONE BITARTRATE AND ACETAMINOPHEN 1 TABLET: 10; 325 TABLET ORAL at 22:57

## 2017-10-18 RX ADMIN — METOPROLOL TARTRATE 25 MG: 25 TABLET, FILM COATED ORAL at 21:23

## 2017-10-18 RX ADMIN — HEPARIN SODIUM 5000 UNITS: 5000 INJECTION, SOLUTION INTRAVENOUS; SUBCUTANEOUS at 09:42

## 2017-10-18 RX ADMIN — TIZANIDINE 4 MG: 4 TABLET ORAL at 21:23

## 2017-10-18 RX ADMIN — HEPARIN SODIUM 5000 UNITS: 5000 INJECTION, SOLUTION INTRAVENOUS; SUBCUTANEOUS at 21:23

## 2017-10-18 RX ADMIN — HYDROCODONE BITARTRATE AND ACETAMINOPHEN 1 TABLET: 10; 325 TABLET ORAL at 16:37

## 2017-10-18 RX ADMIN — DOXYCYCLINE 100 MG: 100 CAPSULE ORAL at 21:23

## 2017-10-18 RX ADMIN — INSULIN DETEMIR 20 UNITS: 100 INJECTION, SOLUTION SUBCUTANEOUS at 21:24

## 2017-10-18 RX ADMIN — MORPHINE SULFATE 10 MG: 10 INJECTION, SOLUTION INTRAMUSCULAR; INTRAVENOUS at 05:23

## 2017-10-18 NOTE — PROGRESS NOTES
Discharge Planning Assessment   Daniel     Patient Name: Pepito Yan  MRN: [id number]  Today's Date: 10/18/2017    Admit Date: 10/16/2017       Discharge Plan       10/18/17 1511    Case Management/Social Work Plan    Plan SS completed initial consult on 10-17-17. Pt lives with sister, Radhika and plans to return home at discharge. Pt does not have home health services. Pt has home oxygen, wheelchair, rolling walker, and a nebulizer machine from [facility]. Pt does not have a POA or advance directive. Pt has good family support. SS to follow.           Camille Adam

## 2017-10-18 NOTE — PLAN OF CARE
Problem: Patient Care Overview (Adult)  Goal: Plan of Care Review  Outcome: Ongoing (interventions implemented as appropriate)  Goal: Discharge Needs Assessment  Outcome: Ongoing (interventions implemented as appropriate)    Problem: Diabetes, Type 2 (Adult)  Goal: Signs and Symptoms of Listed Potential Problems Will be Absent or Manageable (Diabetes, Type 2)  Outcome: Ongoing (interventions implemented as appropriate)    Problem: Fall Risk (Adult)  Goal: Identify Related Risk Factors and Signs and Symptoms  Outcome: Ongoing (interventions implemented as appropriate)  Goal: Absence of Falls  Outcome: Ongoing (interventions implemented as appropriate)    Problem: Pain, Chronic (Adult)  Goal: Identify Related Risk Factors and Signs and Symptoms  Outcome: Ongoing (interventions implemented as appropriate)  Goal: Acceptable Pain Control/Comfort Level  Outcome: Ongoing (interventions implemented as appropriate)    Problem: Skin Integrity Impairment, Risk/Actual (Adult)  Goal: Identify Related Risk Factors and Signs and Symptoms  Outcome: Ongoing (interventions implemented as appropriate)  Goal: Skin Integrity/Wound Healing  Outcome: Ongoing (interventions implemented as appropriate)

## 2017-10-18 NOTE — PROGRESS NOTES
"  I have seen the patient in conjunction with Dipti STYLES RN       History of presenting illness:  Patient states she was doing better this a.m. but she began getting nauseated this morning, she really didn't eat breakfast.  She has eaten all of her lunch however she states she feels very nauseated.  I had suggested we continue to observe her and in fact she did have some emesis after I left the room per nursing.  She denies any significant abdominal pain.  She states her bowels have not moved here but they move about every other day at home.  She states that she is having no chest pain and breathing is still doing \"okay\".  When her abdomen is gone this way before she has \"wound up in the emergency room\".  Patient has chronic pain and takes Norco 10 mg at home.  She wishes to try this instead of the morphine.  She actually states on the Mirapex she had a good night sleep.    Vital Signs  Temp:  [97.4 °F (36.3 °C)-98.2 °F (36.8 °C)] 97.4 °F (36.3 °C)  Heart Rate:  [83-96] 83  Resp:  [16-20] 20  BP: (127-146)/(66-86) 128/86  Body mass index is 21.84 kg/(m^2).      Intake/Output Summary (Last 24 hours) at 10/18/17 1245  Last data filed at 10/18/17 0900   Gross per 24 hour   Intake              720 ml   Output                0 ml   Net              720 ml     Intake & Output (last 3 days)       10/15 0701 - 10/16 0700 10/16 0701 - 10/17 0700 10/17 0701 - 10/18 0700 10/18 0701 - 10/19 0700    P.O.   840 240    IV Piggyback  1966      Total Intake(mL/kg)  1966 (35.1) 840 (15) 240 (4.3)    Net   +1966 +840 +240            Unmeasured Urine Occurrence   6 x     Unmeasured Stool Occurrence   1 x           Physical exam:  Physical Exam   Constitutional: Well-developed, well-nourished.  Pleasant. She is in no distress  Head: Normocephalic and atraumatic.  Hearing is intact, mucous membranes are moist.  Speech is normal, patient was sitting up at bedside on my arrival.  Eyes:  Pupils are equal  No scleral icterus.   Cardiovascular: " Normal rate, regular rhythm and normal heart sounds.  No murmur rub or gallop  Pulmonary/Chest: Bilateral breath sounds diminished at the bases but overall clear no rhonchus rales or wheezing heard  Abdominal: Soft, flat, bowel sounds are active, nondistended nontender.  No peritoneal signs , abdomen exam remains completely benign.  Musculoskeletal: Strength is symmetric, No edema.  No cyanosis an excellent pulses.    Neurological: Nonfocal, alert.    Skin: Skin is warm and dry.   Psychiatric:  Normal mood and affect.     Telemetry: Sinus rhythm, reviewed  EKG:     Results Review:  Lab Results   Component Value Date    WBC 9.57 10/18/2017    HGB 9.5 (L) 10/18/2017    HCT 31.2 (L) 10/18/2017    MCV 77.6 (L) 10/18/2017     10/18/2017     Lab Results   Component Value Date    GLUCOSE 304 (H) 10/18/2017    BUN 30 (H) 10/18/2017    CREATININE 0.67 10/18/2017    EGFRIFNONA 93 10/18/2017    EGFRIFAFRI  09/15/2016      Comment:      <15 Indicative of kidney failure.    BCR 44.8 (H) 10/18/2017    CO2 21.3 (L) 10/18/2017    CALCIUM 9.6 10/18/2017    ALBUMIN 3.10 (L) 10/18/2017    LABIL2 1.1 (L) 10/18/2017    AST 17 10/18/2017    ALT 27 10/18/2017       Imaging Results (last 72 hours)     Procedure Component Value Units Date/Time    XR Chest 1 View [[id number]] Collected:  10/17/17 0707     Updated:  10/17/17 0710    Narrative:       EXAMINATION: XR CHEST 1 VW-      CLINICAL INDICATION:     SOA     TECHNIQUE:  XR CHEST 1 VW-      COMPARISON: 9/10/2017      FINDINGS:   Lungs are aerated.   Heart and mediastinal contours are unremarkable.   No pneumothorax.   No pleural effusion.   No acute osseous findings.            Impression:       No acute cardiopulmonary findings ON today's exam.     This report was finalized on 10/17/2017 7:07 AM by Dr. Wali Tracy MD.               Assessment/Plan     Active Problems:  Increased shortness of breath secondary to COPD with ongoing tobacco use as well as diastolic and  systolic heart failure.  Patient actually states she feels much better and wants to go home.Patient has been weaned down to by mouth steroids and by mouth antibiotics.  I'm not going to add hydration as of yet although the patient has had emesis, this continues may have to consider this but with her diastolic and systolic dysfunction I'm not adding this as of yet.    Rodas's disease, she has adequate pulses but reports occasional to cyanosis.  Again encouraged quit smoking.    Diabetes, improving control,  Her bicarbonate is actually improved over yesterday and no increased anion gap.    Chronic Right elbow pain    Microcytic anemia borderline iron deficient, will need outpatient follow-up for this    Patient met septic shock criteria by lactic acid 4.2 however no active source of infection and I feel like this was more due to increased nebulizer use.  Cultures negative.  With her emesis however I am checking a repeat lactic acid.    Nausea and vomiting, benign abdomen, no fever, this may be related to her medication however I am checking lactic acid an acute abdominal series.  Will check amylase lipase in the a.m. as well.  If persists may have to consider hydration and alternative antibiotic although her allergies and her QTC do limit her antibiotic choice.    DVT prophylaxis, subcutaneous heparin, she has had a continued hemoglobin drift, I'm rechecking this.  They consider stopping subcutaneous heparin if continued drift although no obvious bleeding.    Restless leg, improved with Mirapex    Chronic pain syndrome, morphine changed back to Norco however may have to re-instigate IV if she continues with emesis.    Todd Kowalski MD  10/18/17  12:45 PM

## 2017-10-18 NOTE — PROGRESS NOTES
Imaging shows possible developing pneumonia on the right, also has evidence of constipation, Tap Water enema will be given, will add Flagyl to doxycycline and asked speech therapy to evaluate.  This infiltrate may have occurred after she had the emesis today.  I think the minimal elevation lactic acid is insignificant this time.  We'll check a PA and lateral chest x-ray in the a.m.

## 2017-10-18 NOTE — PLAN OF CARE
Problem: Diabetes, Type 2 (Adult)  Goal: Signs and Symptoms of Listed Potential Problems Will be Absent or Manageable (Diabetes, Type 2)  Outcome: Ongoing (interventions implemented as appropriate)    Problem: Fall Risk (Adult)  Goal: Identify Related Risk Factors and Signs and Symptoms  Outcome: Ongoing (interventions implemented as appropriate)  Goal: Absence of Falls  Outcome: Ongoing (interventions implemented as appropriate)    Problem: Skin Integrity Impairment, Risk/Actual (Adult)  Goal: Identify Related Risk Factors and Signs and Symptoms  Outcome: Ongoing (interventions implemented as appropriate)

## 2017-10-18 NOTE — PLAN OF CARE
Problem: Fall Risk (Adult)  Goal: Identify Related Risk Factors and Signs and Symptoms  Outcome: Ongoing (interventions implemented as appropriate)  Goal: Absence of Falls  Outcome: Ongoing (interventions implemented as appropriate)    Problem: Pain, Chronic (Adult)  Goal: Identify Related Risk Factors and Signs and Symptoms  Outcome: Ongoing (interventions implemented as appropriate)  Goal: Acceptable Pain Control/Comfort Level  Outcome: Ongoing (interventions implemented as appropriate)    Problem: Skin Integrity Impairment, Risk/Actual (Adult)  Goal: Identify Related Risk Factors and Signs and Symptoms  Outcome: Ongoing (interventions implemented as appropriate)

## 2017-10-19 ENCOUNTER — APPOINTMENT (OUTPATIENT)
Dept: GENERAL RADIOLOGY | Facility: HOSPITAL | Age: 52
End: 2017-10-19

## 2017-10-19 VITALS
OXYGEN SATURATION: 99 % | RESPIRATION RATE: 18 BRPM | DIASTOLIC BLOOD PRESSURE: 69 MMHG | SYSTOLIC BLOOD PRESSURE: 124 MMHG | BODY MASS INDEX: 21.85 KG/M2 | TEMPERATURE: 97.9 F | WEIGHT: 123.31 LBS | HEART RATE: 83 BPM | HEIGHT: 63 IN

## 2017-10-19 LAB
ALBUMIN SERPL-MCNC: 3.4 G/DL (ref 3.5–5)
ALBUMIN/GLOB SERPL: 1.1 G/DL (ref 1.5–2.5)
ALP SERPL-CCNC: 74 U/L (ref 35–104)
ALT SERPL W P-5'-P-CCNC: 34 U/L (ref 10–36)
AMYLASE SERPL-CCNC: 47 U/L (ref 28–100)
ANION GAP SERPL CALCULATED.3IONS-SCNC: 7.4 MMOL/L (ref 3.6–11.2)
ANISOCYTOSIS BLD QL: ABNORMAL
AST SERPL-CCNC: 23 U/L (ref 10–30)
BILIRUB SERPL-MCNC: 0.1 MG/DL (ref 0.2–1.8)
BUN BLD-MCNC: 26 MG/DL (ref 7–21)
BUN/CREAT SERPL: 45.6 (ref 7–25)
CALCIUM SPEC-SCNC: 9.3 MG/DL (ref 7.7–10)
CHLORIDE SERPL-SCNC: 113 MMOL/L (ref 99–112)
CO2 SERPL-SCNC: 22.6 MMOL/L (ref 24.3–31.9)
CREAT BLD-MCNC: 0.57 MG/DL (ref 0.43–1.29)
CRP SERPL-MCNC: 0.88 MG/DL (ref 0–0.99)
DEPRECATED RDW RBC AUTO: 49.5 FL (ref 37–54)
EOSINOPHIL # BLD MANUAL: 0.07 10*3/MM3 (ref 0–0.7)
EOSINOPHIL NFR BLD MANUAL: 1 % (ref 0–5)
ERYTHROCYTE [DISTWIDTH] IN BLOOD BY AUTOMATED COUNT: 18.1 % (ref 11.5–14.5)
GFR SERPL CREATININE-BSD FRML MDRD: 112 ML/MIN/1.73
GLOBULIN UR ELPH-MCNC: 3.1 GM/DL
GLUCOSE BLD-MCNC: 119 MG/DL (ref 70–110)
GLUCOSE BLDC GLUCOMTR-MCNC: 128 MG/DL (ref 70–130)
GLUCOSE BLDC GLUCOMTR-MCNC: 79 MG/DL (ref 70–130)
HCT VFR BLD AUTO: 35.8 % (ref 37–47)
HGB BLD-MCNC: 10.7 G/DL (ref 12–16)
HYPOCHROMIA BLD QL: ABNORMAL
LIPASE SERPL-CCNC: 59 U/L (ref 13–60)
LYMPHOCYTES # BLD MANUAL: 1.73 10*3/MM3 (ref 1–3)
LYMPHOCYTES NFR BLD MANUAL: 24 % (ref 21–51)
LYMPHOCYTES NFR BLD MANUAL: 4 % (ref 0–10)
MCH RBC QN AUTO: 23.4 PG (ref 27–33)
MCHC RBC AUTO-ENTMCNC: 29.9 G/DL (ref 33–37)
MCV RBC AUTO: 78.3 FL (ref 80–94)
MICROCYTES BLD QL: ABNORMAL
MONOCYTES # BLD AUTO: 0.29 10*3/MM3 (ref 0.1–0.9)
MYELOCYTES NFR BLD MANUAL: 2 % (ref 0–0)
NEUTROPHILS # BLD AUTO: 4.97 10*3/MM3 (ref 1.4–6.5)
NEUTROPHILS NFR BLD MANUAL: 68 % (ref 30–70)
NEUTS BAND NFR BLD MANUAL: 1 % (ref 4–12)
OSMOLALITY SERPL CALC.SUM OF ELEC: 290.9 MOSM/KG (ref 273–305)
PLAT MORPH BLD: NORMAL
PLATELET # BLD AUTO: 296 10*3/MM3 (ref 130–400)
PMV BLD AUTO: 9.7 FL (ref 6–10)
POTASSIUM BLD-SCNC: 3.9 MMOL/L (ref 3.5–5.3)
PROT SERPL-MCNC: 6.5 G/DL (ref 6–8)
RBC # BLD AUTO: 4.57 10*6/MM3 (ref 4.2–5.4)
SCAN SLIDE: NORMAL
SODIUM BLD-SCNC: 143 MMOL/L (ref 135–153)
WBC NRBC COR # BLD: 7.21 10*3/MM3 (ref 4.5–12.5)

## 2017-10-19 PROCEDURE — 80053 COMPREHEN METABOLIC PANEL: CPT | Performed by: INTERNAL MEDICINE

## 2017-10-19 PROCEDURE — G8998 SWALLOW D/C STATUS: HCPCS

## 2017-10-19 PROCEDURE — 71020 HC CHEST PA AND LATERAL: CPT

## 2017-10-19 PROCEDURE — 86140 C-REACTIVE PROTEIN: CPT | Performed by: INTERNAL MEDICINE

## 2017-10-19 PROCEDURE — 71020 XR CHEST PA AND LATERAL: CPT | Performed by: RADIOLOGY

## 2017-10-19 PROCEDURE — 85007 BL SMEAR W/DIFF WBC COUNT: CPT | Performed by: INTERNAL MEDICINE

## 2017-10-19 PROCEDURE — 63710000001 INSULIN DETEMIR PER 5 UNITS: Performed by: INTERNAL MEDICINE

## 2017-10-19 PROCEDURE — 82150 ASSAY OF AMYLASE: CPT | Performed by: INTERNAL MEDICINE

## 2017-10-19 PROCEDURE — 94799 UNLISTED PULMONARY SVC/PX: CPT

## 2017-10-19 PROCEDURE — 99239 HOSP IP/OBS DSCHRG MGMT >30: CPT | Performed by: INTERNAL MEDICINE

## 2017-10-19 PROCEDURE — 92610 EVALUATE SWALLOWING FUNCTION: CPT

## 2017-10-19 PROCEDURE — 82962 GLUCOSE BLOOD TEST: CPT

## 2017-10-19 PROCEDURE — 83690 ASSAY OF LIPASE: CPT | Performed by: INTERNAL MEDICINE

## 2017-10-19 PROCEDURE — G8996 SWALLOW CURRENT STATUS: HCPCS

## 2017-10-19 PROCEDURE — G8997 SWALLOW GOAL STATUS: HCPCS

## 2017-10-19 PROCEDURE — 25010000002 HEPARIN (PORCINE) PER 1000 UNITS: Performed by: INTERNAL MEDICINE

## 2017-10-19 PROCEDURE — 85025 COMPLETE CBC W/AUTO DIFF WBC: CPT | Performed by: INTERNAL MEDICINE

## 2017-10-19 PROCEDURE — 63710000001 PREDNISONE PER 1 MG: Performed by: INTERNAL MEDICINE

## 2017-10-19 RX ORDER — DOXYCYCLINE 100 MG/1
100 CAPSULE ORAL EVERY 12 HOURS SCHEDULED
Qty: 14 CAPSULE | Refills: 0 | Status: ON HOLD | OUTPATIENT
Start: 2017-10-19 | End: 2018-01-19

## 2017-10-19 RX ORDER — METRONIDAZOLE 500 MG/1
500 TABLET ORAL 3 TIMES DAILY
Qty: 21 TABLET | Refills: 0 | Status: ON HOLD | OUTPATIENT
Start: 2017-10-19 | End: 2018-01-19

## 2017-10-19 RX ORDER — PREDNISONE 20 MG/1
20 TABLET ORAL DAILY
Qty: 15 TABLET | Refills: 0 | Status: SHIPPED | OUTPATIENT
Start: 2017-10-19 | End: 2018-01-22 | Stop reason: HOSPADM

## 2017-10-19 RX ORDER — INSULIN GLARGINE 100 [IU]/ML
45 INJECTION, SOLUTION SUBCUTANEOUS NIGHTLY
Status: ON HOLD
Start: 2017-10-19 | End: 2018-01-22

## 2017-10-19 RX ADMIN — METRONIDAZOLE 500 MG: 500 INJECTION, SOLUTION INTRAVENOUS at 01:41

## 2017-10-19 RX ADMIN — METOPROLOL TARTRATE 25 MG: 25 TABLET, FILM COATED ORAL at 09:33

## 2017-10-19 RX ADMIN — HEPARIN SODIUM 5000 UNITS: 5000 INJECTION, SOLUTION INTRAVENOUS; SUBCUTANEOUS at 09:34

## 2017-10-19 RX ADMIN — METRONIDAZOLE 500 MG: 500 INJECTION, SOLUTION INTRAVENOUS at 09:35

## 2017-10-19 RX ADMIN — DOXYCYCLINE 100 MG: 100 CAPSULE ORAL at 09:34

## 2017-10-19 RX ADMIN — PREDNISONE 20 MG: 20 TABLET ORAL at 09:33

## 2017-10-19 RX ADMIN — HYDROCODONE BITARTRATE AND ACETAMINOPHEN 1 TABLET: 10; 325 TABLET ORAL at 05:19

## 2017-10-19 RX ADMIN — HYDROCODONE BITARTRATE AND ACETAMINOPHEN 1 TABLET: 10; 325 TABLET ORAL at 11:38

## 2017-10-19 RX ADMIN — INSULIN DETEMIR 45 UNITS: 100 INJECTION, SOLUTION SUBCUTANEOUS at 09:35

## 2017-10-19 RX ADMIN — ISOSORBIDE MONONITRATE 30 MG: 30 TABLET, EXTENDED RELEASE ORAL at 09:34

## 2017-10-19 RX ADMIN — ASPIRIN 325 MG: 325 TABLET, COATED ORAL at 09:33

## 2017-10-19 RX ADMIN — FAMOTIDINE 20 MG: 20 TABLET, FILM COATED ORAL at 09:33

## 2017-10-19 RX ADMIN — LISINOPRIL 5 MG: 2.5 TABLET ORAL at 09:33

## 2017-10-19 NOTE — DISCHARGE INSTR - APPOINTMENTS
Follow up appointment has been scheduled for you to see DHEERAJ Finch on October 26, 2017 at 8:45 am.  You can reach the office at .

## 2017-10-19 NOTE — THERAPY EVALUATION
Acute Care - Speech Language Pathology   Swallow Initial Evaluation [facility]   CLINICAL DYSPHAGIA EVALUATION     Patient Name: Pepito Yan  : 1965  MRN: [id number]  Today's Date: 10/19/2017     Admit Date: 10/16/2017    Pepito Yan  is seen at bedside this am on 3N to assess safety/efficacy of swallowing fnx, determine safest/least restrictive diet tolerance. She is very familiar to Speech Therapy Department for h/o dysphagia w/ multiple instrumental dysphagia evaluations, most recent MBS 17 w/ no evidence of aspiration. She denies any c/o dysphagia or overt s/s aspiration w/ po intake. She does report nausea w/ emesis 10/18/17, denies these s/s at this time. MD consultation indicates developing R PNA after emesis, previous CXR this admission w/ lungs aerated per radiologist.     Social History     Social History   • Marital status:      Spouse name: N/A   • Number of children: N/A   • Years of education: N/A     Occupational History   • Not on file.     Social History Main Topics   • Smoking status: Current Every Day Smoker     Packs/day: 0.50     Years: 35.00     Types: Cigarettes   • Smokeless tobacco: Never Used      Comment: 4-5 cigarettes a day    • Alcohol use No   • Drug use: No   • Sexual activity: Defer     Other Topics Concern   • Not on file     Social History Narrative      Chest xray 10/19/17 reveals R middle lobe PNA.     Diet Orders (active)     Start     Ordered    10/17/17 0247  Diet Regular; Cardiac  Diet Effective Now      10/17/17 0246        Pt is observed on ra w/o complications.     Pt is positioned upright and centered in bed to accept multiple po presentations of ice chips, solid cracker, puree and thin liquids via spoon, cup and straw. Pt is able to self feed.     Facial/oral structures are symmetrical upon observation. Lingual protrusion reveals no deviation. Oral mucosa are moist, pink, and clean. Secretions are clear, thin, and well controlled. OROM/SALO  is wfl to imitate oral postures. Gag is not assessed. Volitional cough is intact w/ adequate  intensity, clear in quality, non-productive. Voice is adequate in intensity, clear in quality w/ intelligible speech.    Upon po presentations, adequate bolus anticipation and acceptance w/ good labial seal for bolus clearance via spoon bowl, cup rim stability and suction via straw. Bolus formation, manipulation and control are wfl w/ rotary mastication pattern. A-p transit is timely w/o oral residue. No overt s/s aspiration before the swallow.     Pharyngeal swallow is timely w/ adequate hyolaryngeal elevation per palpation. No overt s/s aspiration evidenced across this evaluation. No silent aspiration suspected as pt is w/o changes in vocal quality, respirations or secretions post po presentations. She denies odynophagia.    Impression: Per this evaluation, pt presents w/ wfl oropharyngeal swallow w/o s/s aspiration. No s/s indicative of silent aspiration.     Recommendations:  1. Regular consistency, thin liquids.    2. Meds whole in puree/thins.   3. Upright and centered for all po intake  4. AALIYAH precautions.  5. Oral care protocol.  No further formal SLP f/u warranted at this time.    D/w pt results and recommendations w/ verbal agreement.    D/w RNJailene, results and recommendations w/ verbal agreement.    Thank you for allowing me to participate in the care of your patient-  Kassy Cope M.A., CCC-SLP     Visit Dx:     ICD-10-CM ICD-9-CM   1. Community acquired pneumonia, unspecified laterality J18.9 486   2. COPD exacerbation J44.1 491.21   3. Sepsis due to undetermined organism A41.9 038.9     Patient Active Problem List   Diagnosis   • Chest pain   • Chest pain of uncertain etiology   • Intractable nausea and vomiting   • Intractable cyclical vomiting with nausea   • Chronic obstructive pulmonary emphysema   • Hepatitis-C   • Diabetes mellitus type 2 in nonobese   • Arthritis or polyarthritis, rheumatoid    • Anxiety   • Essential hypertension   • Coronary artery disease   • Buerger's disease   • Cirrhosis   • Diabetes   • Chronic nausea   • Pancreatitis   • Bilateral pneumonia   • ARDS (adult respiratory distress syndrome)   • Hypoxia   • Urinary tract infection   • Pyogenic arthritis of left elbow   • Chest pain in adult   • Gram-negative bacteremia   • HCAP (healthcare-associated pneumonia)   • Severe sepsis     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   • Chronic respiratory failure with hypoxia     at nighttime   • Compression fracture of lumbar vertebra    • Compression fracture of thoracic vertebra    • COPD (chronic obstructive pulmonary disease)    • Coronary artery disease    • Diabetes mellitus type 2 in nonobese    • Erosive esophagitis 12/2016   • Essential hypertension    • Gastroparesis    • GERD (gastroesophageal reflux disease)    • Hepatitis-C    • History of transfusion    • Medically noncompliant    • MRSA infection     back of neck around 2012; 6/2017 septic left elbow joint with concerns for osteomyelitis and left anteromedial left arm   • NSTEMI (non-ST elevated myocardial infarction) 07/2016   • PE (pulmonary embolism)    • Protein calorie malnutrition    • RA (rheumatoid arthritis)     with chronic steroid use   • Rheumatoid arthritis      Past Surgical History:   Procedure Laterality Date   • ABDOMINAL SURGERY     • AMPUTATION DIGIT Right 12/22/2016    Third digit to the PIP. Surgeon: Dr. Bains   • AMPUTATION DIGIT Left 12/22/2016    Procedure: PARTIAL AMPUTATION LEFT THIRD FINGER;  Surgeon: Shane Fuller MD;  Location: [facility] OR;  Service:    • AMPUTATION REVISION Left 12/22/2016    Procedure: AMPUTATION REVISION DIGIT STUMP;  Surgeon: Shane Fuller MD;  Location: [facility] OR;  Service:    • ARM DEBRIDEMENT Left 12/20/2016    Procedure: HAND IRRIGATION DEBRIDEMENT AND REPAIR ;  Surgeon: Shane Soni  MD Jonny;  Location: [facility] OR;  Service:    •  SECTION      x 2   • CHOLECYSTECTOMY     • ENDOSCOPY N/A 2016    Procedure: ESOPHAGOGASTRODUODENOSCOPY;  Surgeon: Jeremy Clement III, MD;  Location: [facility] OR;  Service:    • ENDOSCOPY W/ PEG TUBE PLACEMENT N/A 3/30/2017    Procedure: ESOPHAGOGASTRODUODENOSCOPY WITH PERCUTANEOUS ENDOSCOPIC GASTROSTOMY TUBE INSERTION;  Surgeon: Shade Campos MD;  Location: [facility] OR;  Service:    • FOREIGN BODY REMOVAL      insulin needle removal x 3 from the abdomen   • HYSTERECTOMY     • INCISION AND DRAINAGE ARM Left 2017    Procedure: INCISION AND DRAINAGE UPPER EXTREMITY;  Surgeon: Shane Fuller MD;  Location: [facility] OR;  Service:      EDUCATION  The patient has been educated in the following areas:   Dysphagia (Swallowing Impairment).    SLP Recommendation and Plan  No further SLP f/u warranted at this time.      Time Calculation:     Therapy Charges for Today     Code Description Service Date Service Provider Modifiers Qty    87601576801 HC ST SWALLOWING CURRENT STATUS 10/19/2017 Kassy Cope MA,CCC-SLP GN, CH 1    77817971850 HC ST SWALLOWING PROJECTED 10/19/2017 Kassy Cope MA,CCC-SLP GN, CH 1    03922391682 HC ST SWALLOWING DISCHARGE 10/19/2017 Kassy Cope MA,CCC-SLP GN, CH 1    [id number] HC ST EVAL ORAL PHARYNG SWALLOW 2 10/19/2017 Kassy Cope MACCC-SLP GN 1        SLP G-Codes  Functional Limitations: Swallowing  Swallow Current Status (): 0 percent impaired, limited or restricted  Swallow Goal Status (): 0 percent impaired, limited or restricted  Swallow Discharge Status (): 0 percent impaired, limited or restricted    Kassy Cope MA, CCC-SLP  10/19/2017

## 2017-10-19 NOTE — PROGRESS NOTES
Discharge Planning Assessment   Daniel     Patient Name: Pepito Yan  MRN: [id number]  Today's Date: 10/19/2017    Admit Date: 10/16/2017       Discharge Plan       10/19/17 1402    Final Note    Final Note Pt is being discharged home today. SS spoke to pt and pt denies need for home health services. Pt request [facility]. SS contacted [facility] per Bao and arranged  for 15:00. SS faxed information to [facility]. No other needs identified.          Expected Discharge Date and Time     Expected Discharge Date Expected Discharge Time    Oct 19, 2017           Camille Adam

## 2017-10-19 NOTE — DISCHARGE SUMMARY
Date of admission: 10/16/17  Date of discharge: 10/19/17    Principal diagnosis: COPD with exacerbation with possible right middle lobe pneumonia  Secondary diagnosis:  COPD, home O2 and steroid dependent  Ongoing tobacco use strongly counseled to quit which she refuses and has been smoking here as well  Chronic pain syndrome  Diabetes, uncontrolled  Buerger's disease  Patient noted had a lactic acid of 4.2 on admission however this was thought to be secondary to increased nebulizer use, I did not feel she met shock by clinical evaluation  Restless leg syndrome  History of systolic congestive heart failure current plain not an issue but with the EF in July 45%    Consultants: None    Procedures: None    Admission diagnosis: See history and physical    Exam: Assisted by Michelle MASTERS.  Patient is sitting up she wants to go home she states she is tolerating her diet without further nausea and vomiting.  He has been walking outside to smoke.  She is requesting something for pain in her legs which she's has a chronic primary care provider that supplies medicine for this.  Vital signs: 124/69, 18, 83, 97.9 saturation 99% on room air  Lungs have bilateral breath sounds fairly good air excursion that are clear today without rhonchi as well as rales or wheezing, heart regular rhythm without murmur gallop abdomen is soft to his edema skin warm and dry, monitor has shown sinus in the 80s    Hospital course: Patient was admitted and treated for COPD exacerbation.  She had fluctuating glucoses but these have improved as steroids have been weaned.  Also with an adequate diet.  Initial chest x-ray was negative however she had an episode of emesis and after this she did have a questionable pneumonia by chest x-ray.  Flagyl was added to her doxycycline.  She will complete a course of doxycycline and Flagyl however I question whether this is a true infiltrate considering that her CRP is normal.  I would recommend the primary care  provider however in about 3 weeks recheck a chest x-ray to a certain clearing.  Patient clinically from a COPD standpoint has improved significantly.  I have explained to her however that if she goes home and smokes which I fully expect she will that she will probably have to return to us sooner than later.  She realizes the risk of ongoing smoking but states she is going to continue to smoke.  She has chronic pain however I've explained that I would prefer that only one provider supplies her pain medication.  She will follow-up with her primary care provider in one week.  She had an episode of nausea and vomiting, acute abdominal series was unremarkable besides some constipation her bowels have moved.  After this she has tolerated her diet well.  Condition on discharge stable with ongoing smoking long-term prognosis however is guarded.  Lactic acid was elevated on admission, this did improve but most likely from nebulizer use.  She does have chronic hepatitis C as diagnosed by hepatitis C antibody positive in March.  Patient's troponin was negative as was her CPK.  EKG just showed sinus tachycardia.  Patient had an echocardiogram in July with ejection fraction 45%.  She is small area of ischemia at that time.  This is being medically managed with aspirin and statin as well as a beta blocker.  She realizes that ongoing smoking certainly is a detriment to this as well.  Condition on discharge stable and improved    Follow-up: Archana Hare one week    Diet: Constant carbohydrate    Activity: As tolerated    Medications:  Doxycycline 100 mg twice a day  Flagyl 500 mg 3 times a day  Insulin 45 units nightly Lantus  Home dose of short acting sliding scale  Albuterol home dose  Aspirin 325 daily  Lipitor 40 mg a day  Pepcid 20 mg a day  Hydroxyzine home dose  Atrovent nebulizer home dose  Imdur 30 mg a day  Metoprolol 25 mg every 12 hours  Lisinopril 5 mg a day  Prednisone 20 mg a day  Tizanidine 4 mg nightly    40 min  discharge

## 2017-10-19 NOTE — PLAN OF CARE
Problem: Patient Care Overview (Adult)  Goal: Plan of Care Review  Outcome: Ongoing (interventions implemented as appropriate)    10/19/17 4749   Patient Care Overview   Progress improving   Coping/Psychosocial Response Interventions   Plan Of Care Reviewed With patient         Problem: Diabetes, Type 2 (Adult)  Goal: Signs and Symptoms of Listed Potential Problems Will be Absent or Manageable (Diabetes, Type 2)  Outcome: Ongoing (interventions implemented as appropriate)    Problem: Pain, Chronic (Adult)  Goal: Identify Related Risk Factors and Signs and Symptoms  Outcome: Ongoing (interventions implemented as appropriate)

## 2017-10-20 NOTE — PAYOR COMM NOTE
"CONTACT:  EMMANUELLE BABB RN, BSN  UTILIZATION MANAGEMENT DEPT.  [facility]  [address], [address]  PHONE:  [phone]  FAX: [phone]    PT D/C'D HOME 10/19/17. AWAITING AUTHORIZATION. MEDICARE AB PRIMARY, WELLCARE SECONDARY.    Yessica Yan (51 y.o. Female)     Date of Birth Social Security Number Address Home Phone MRN    1965  [address] [address] [phone] [id number]    [place] Marital Status          [place]        Admission Date Admission Type Admitting Provider Attending Provider Department, Room/Bed    10/16/17 Emergency AliuGómez MD  [facility] [address], [address]    Discharge Date Discharge Disposition Discharge Destination        10/19/2017 Home or Self Care             Attending Provider: (none)    Allergies:  Penicillins, Toradol [Ketorolac Tromethamine], Tramadol    Isolation:  None   Infection:  None   Code Status:  Prior    Ht:  63\" (160 cm)   Wt:  123 lb 5 oz (55.9 kg)    Admission Cmt:  None   Principal Problem:  None                Active Insurance as of 10/16/2017     Primary Coverage     Payor Plan Insurance Group Employer/Plan Group    MEDICARE MEDICARE A & B      Payor Plan Address Payor Plan Phone Number Effective From Effective To    [address] [phone] 2/1/2005     [place] [address]       Subscriber Name Subscriber Birth Date Member ID       YESSICA YAN 1965 [id number]           Secondary Coverage     Payor Plan Insurance Group Employer/Plan Group    [facility] MEDICAID      Payor Plan Address Payor Plan Phone Number Effective From Effective To    [address] [address] [phone] 1/1/2016     [place] [address]       Subscriber Name Subscriber Birth Date Member ID       YESSICA YAN 1965 [id number]                 Emergency Contacts      (Rel.) Home Phone Work Phone Mobile Phone    Scot Vaughn (Son) [phone] -- --               Discharge Summary    "   Todd Kowalski MD at 10/19/2017  1:53 PM          Date of admission: 10/16/17  Date of discharge: 10/19/17    Principal diagnosis: COPD with exacerbation with possible right middle lobe pneumonia  Secondary diagnosis:  COPD, home O2 and steroid dependent  Ongoing tobacco use strongly counseled to quit which she refuses and has been smoking here as well  Chronic pain syndrome  Diabetes, uncontrolled  Buerger's disease  Patient noted had a lactic acid of 4.2 on admission however this was thought to be secondary to increased nebulizer use, I did not feel she met shock by clinical evaluation  Restless leg syndrome  History of systolic congestive heart failure current plain not an issue but with the EF in July 45%    Consultants: None    Procedures: None    Admission diagnosis: See history and physical    Exam: Assisted by Michelle MASTERS.  Patient is sitting up she wants to go home she states she is tolerating her diet without further nausea and vomiting.  He has been walking outside to smoke.  She is requesting something for pain in her legs which she's has a chronic primary care provider that supplies medicine for this.  Vital signs: 124/69, 18, 83, 97.9 saturation 99% on room air  Lungs have bilateral breath sounds fairly good air excursion that are clear today without rhonchi as well as rales or wheezing, heart regular rhythm without murmur gallop abdomen is soft to his edema skin warm and dry, monitor has shown sinus in the 80s    Hospital course: Patient was admitted and treated for COPD exacerbation.  She had fluctuating glucoses but these have improved as steroids have been weaned.  Also with an adequate diet.  Initial chest x-ray was negative however she had an episode of emesis and after this she did have a questionable pneumonia by chest x-ray.  Flagyl was added to her doxycycline.  She will complete a course of doxycycline and Flagyl however I question whether this is a true infiltrate considering that her CRP  is normal.  I would recommend the primary care provider however in about 3 weeks recheck a chest x-ray to a certain clearing.  Patient clinically from a COPD standpoint has improved significantly.  I have explained to her however that if she goes home and smokes which I fully expect she will that she will probably have to return to us sooner than later.  She realizes the risk of ongoing smoking but states she is going to continue to smoke.  She has chronic pain however I've explained that I would prefer that only one provider supplies her pain medication.  She will follow-up with her primary care provider in one week.  She had an episode of nausea and vomiting, acute abdominal series was unremarkable besides some constipation her bowels have moved.  After this she has tolerated her diet well.  Condition on discharge stable with ongoing smoking long-term prognosis however is guarded.  Lactic acid was elevated on admission, this did improve but most likely from nebulizer use.  She does have chronic hepatitis C as diagnosed by hepatitis C antibody positive in March.  Patient's troponin was negative as was her CPK.  EKG just showed sinus tachycardia.  Patient had an echocardiogram in July with ejection fraction 45%.  She is small area of ischemia at that time.  This is being medically managed with aspirin and statin as well as a beta blocker.  She realizes that ongoing smoking certainly is a detriment to this as well.  Condition on discharge stable and improved    Follow-up: Archana Hare one week    Diet: Constant carbohydrate    Activity: As tolerated    Medications:  Doxycycline 100 mg twice a day  Flagyl 500 mg 3 times a day  Insulin 45 units nightly Lantus  Home dose of short acting sliding scale  Albuterol home dose  Aspirin 325 daily  Lipitor 40 mg a day  Pepcid 20 mg a day  Hydroxyzine home dose  Atrovent nebulizer home dose  Imdur 30 mg a day  Metoprolol 25 mg every 12 hours  Lisinopril 5 mg a day  Prednisone 20  mg a day  Tizanidine 4 mg nightly    40 min discharge             Electronically signed by Todd Kowalski MD at 10/19/2017  2:13 PM

## 2017-10-21 LAB — BACTERIA SPEC AEROBE CULT: NORMAL

## 2017-10-30 ENCOUNTER — APPOINTMENT (OUTPATIENT)
Dept: CT IMAGING | Facility: HOSPITAL | Age: 52
End: 2017-10-30

## 2017-10-30 ENCOUNTER — HOSPITAL ENCOUNTER (EMERGENCY)
Facility: HOSPITAL | Age: 52
Discharge: HOME | End: 2017-10-31
Attending: EMERGENCY MEDICINE | Admitting: EMERGENCY MEDICINE

## 2017-10-30 DIAGNOSIS — S20.211A CONTUSION OF RIGHT CHEST WALL, INITIAL ENCOUNTER: Primary | ICD-10-CM

## 2017-10-30 PROCEDURE — 71250 CT THORAX DX C-: CPT | Performed by: RADIOLOGY

## 2017-10-30 PROCEDURE — 71250 CT THORAX DX C-: CPT

## 2017-10-30 PROCEDURE — 99284 EMERGENCY DEPT VISIT MOD MDM: CPT

## 2017-10-30 RX ORDER — HYDROCODONE BITARTRATE AND ACETAMINOPHEN 10; 325 MG/1; MG/1
1 TABLET ORAL ONCE
Status: COMPLETED | OUTPATIENT
Start: 2017-10-30 | End: 2017-10-30

## 2017-10-30 RX ADMIN — HYDROCODONE BITARTRATE AND ACETAMINOPHEN 1 TABLET: 10; 325 TABLET ORAL at 23:16

## 2017-10-31 VITALS
HEIGHT: 63 IN | RESPIRATION RATE: 18 BRPM | BODY MASS INDEX: 21.26 KG/M2 | TEMPERATURE: 98 F | OXYGEN SATURATION: 98 % | SYSTOLIC BLOOD PRESSURE: 144 MMHG | HEART RATE: 95 BPM | DIASTOLIC BLOOD PRESSURE: 83 MMHG | WEIGHT: 120 LBS

## 2017-10-31 RX ORDER — ACETAMINOPHEN AND CODEINE PHOSPHATE 300; 30 MG/1; MG/1
1 TABLET ORAL EVERY 6 HOURS PRN
Qty: 12 TABLET | Refills: 0 | Status: ON HOLD | OUTPATIENT
Start: 2017-10-31 | End: 2018-01-19

## 2017-11-10 LAB
BACTERIA SPEC AEROBE CULT: ABNORMAL
GRAM STN SPEC: ABNORMAL

## 2017-11-14 ENCOUNTER — HOSPITAL ENCOUNTER (EMERGENCY)
Facility: HOSPITAL | Age: 52
Discharge: HOME | End: 2017-11-14
Attending: EMERGENCY MEDICINE | Admitting: EMERGENCY MEDICINE

## 2017-11-14 ENCOUNTER — APPOINTMENT (OUTPATIENT)
Dept: GENERAL RADIOLOGY | Facility: HOSPITAL | Age: 52
End: 2017-11-14

## 2017-11-14 VITALS
TEMPERATURE: 97.8 F | HEIGHT: 63 IN | OXYGEN SATURATION: 96 % | WEIGHT: 120 LBS | RESPIRATION RATE: 16 BRPM | BODY MASS INDEX: 21.26 KG/M2 | SYSTOLIC BLOOD PRESSURE: 132 MMHG | DIASTOLIC BLOOD PRESSURE: 68 MMHG | HEART RATE: 112 BPM

## 2017-11-14 DIAGNOSIS — J44.1 COPD WITH ACUTE EXACERBATION (HCC): Primary | ICD-10-CM

## 2017-11-14 DIAGNOSIS — F41.8 ANXIETY ABOUT HEALTH: ICD-10-CM

## 2017-11-14 LAB
A-A DO2: 73.5 MMHG (ref 0–300)
ALBUMIN SERPL-MCNC: 3.5 G/DL (ref 3.5–5)
ALBUMIN/GLOB SERPL: 1.1 G/DL (ref 1.5–2.5)
ALP SERPL-CCNC: 80 U/L (ref 35–104)
ALT SERPL W P-5'-P-CCNC: 37 U/L (ref 10–36)
ANION GAP SERPL CALCULATED.3IONS-SCNC: 7.4 MMOL/L (ref 3.6–11.2)
ANISOCYTOSIS BLD QL: ABNORMAL
ARTERIAL PATENCY WRIST A: POSITIVE
AST SERPL-CCNC: 31 U/L (ref 10–30)
ATMOSPHERIC PRESS: 734 MMHG
BASE EXCESS BLDA CALC-SCNC: -3.4 MMOL/L
BASOPHILS # BLD MANUAL: 0.19 10*3/MM3 (ref 0–0.3)
BASOPHILS NFR BLD AUTO: 2 % (ref 0–2)
BDY SITE: ABNORMAL
BILIRUB SERPL-MCNC: 0.3 MG/DL (ref 0.2–1.8)
BNP SERPL-MCNC: 176 PG/ML (ref 0–100)
BODY TEMPERATURE: 98.6 C
BUN BLD-MCNC: 13 MG/DL (ref 7–21)
BUN/CREAT SERPL: 22.8 (ref 7–25)
CALCIUM SPEC-SCNC: 9.1 MG/DL (ref 7.7–10)
CHLORIDE SERPL-SCNC: 112 MMOL/L (ref 99–112)
CO2 SERPL-SCNC: 18.6 MMOL/L (ref 24.3–31.9)
COHGB MFR BLD: 3.3 % (ref 0–5)
CREAT BLD-MCNC: 0.57 MG/DL (ref 0.43–1.29)
D-LACTATE SERPL-SCNC: 1.7 MMOL/L (ref 0.5–2)
DEPRECATED RDW RBC AUTO: 52.3 FL (ref 37–54)
EOSINOPHIL # BLD MANUAL: 0.1 10*3/MM3 (ref 0–0.7)
EOSINOPHIL NFR BLD MANUAL: 1 % (ref 0–5)
ERYTHROCYTE [DISTWIDTH] IN BLOOD BY AUTOMATED COUNT: 18.4 % (ref 11.5–14.5)
GFR SERPL CREATININE-BSD FRML MDRD: 112 ML/MIN/1.73
GLOBULIN UR ELPH-MCNC: 3.3 GM/DL
GLUCOSE BLD-MCNC: 204 MG/DL (ref 70–110)
HCO3 BLDA-SCNC: 19.6 MMOL/L (ref 22–26)
HCT VFR BLD AUTO: 39.6 % (ref 37–47)
HCT VFR BLD CALC: 36 % (ref 37–47)
HGB BLD-MCNC: 11.7 G/DL (ref 12–16)
HGB BLDA-MCNC: 12.2 G/DL (ref 12–16)
HOLD SPECIMEN: NORMAL
HOLD SPECIMEN: NORMAL
HOROWITZ INDEX BLD+IHG-RTO: 28 %
HYPOCHROMIA BLD QL: ABNORMAL
LYMPHOCYTES # BLD MANUAL: 0.86 10*3/MM3 (ref 1–3)
LYMPHOCYTES NFR BLD MANUAL: 4 % (ref 0–10)
LYMPHOCYTES NFR BLD MANUAL: 9 % (ref 21–51)
MCH RBC QN AUTO: 23 PG (ref 27–33)
MCHC RBC AUTO-ENTMCNC: 29.5 G/DL (ref 33–37)
MCV RBC AUTO: 78 FL (ref 80–94)
METAMYELOCYTES NFR BLD MANUAL: 4 % (ref 0–0)
METHGB BLD QL: 0.3 % (ref 0–3)
MICROCYTES BLD QL: ABNORMAL
MODALITY: ABNORMAL
MONOCYTES # BLD AUTO: 0.38 10*3/MM3 (ref 0.1–0.9)
NEUTROPHILS # BLD AUTO: 7.63 10*3/MM3 (ref 1.4–6.5)
NEUTROPHILS NFR BLD MANUAL: 80 % (ref 30–70)
OSMOLALITY SERPL CALC.SUM OF ELEC: 281.7 MOSM/KG (ref 273–305)
OXYHGB MFR BLDV: 93.3 % (ref 85–100)
PCO2 BLDA: 29.1 MM HG (ref 35–45)
PH BLDA: 7.45 PH UNITS (ref 7.35–7.45)
PLAT MORPH BLD: NORMAL
PLATELET # BLD AUTO: 288 10*3/MM3 (ref 130–400)
PMV BLD AUTO: 9.7 FL (ref 6–10)
PO2 BLDA: 84.5 MM HG (ref 80–100)
POTASSIUM BLD-SCNC: 3.6 MMOL/L (ref 3.5–5.3)
PROT SERPL-MCNC: 6.8 G/DL (ref 6–8)
RBC # BLD AUTO: 5.08 10*6/MM3 (ref 4.2–5.4)
SAO2 % BLDCOA: 96.8 % (ref 90–100)
SCAN SLIDE: NORMAL
SODIUM BLD-SCNC: 138 MMOL/L (ref 135–153)
TROPONIN I SERPL-MCNC: 0.03 NG/ML
WBC NRBC COR # BLD: 9.54 10*3/MM3 (ref 4.5–12.5)
WHOLE BLOOD HOLD SPECIMEN: NORMAL
WHOLE BLOOD HOLD SPECIMEN: NORMAL

## 2017-11-14 PROCEDURE — 83605 ASSAY OF LACTIC ACID: CPT | Performed by: EMERGENCY MEDICINE

## 2017-11-14 PROCEDURE — 36600 WITHDRAWAL OF ARTERIAL BLOOD: CPT | Performed by: EMERGENCY MEDICINE

## 2017-11-14 PROCEDURE — 83050 HGB METHEMOGLOBIN QUAN: CPT | Performed by: EMERGENCY MEDICINE

## 2017-11-14 PROCEDURE — 85007 BL SMEAR W/DIFF WBC COUNT: CPT | Performed by: EMERGENCY MEDICINE

## 2017-11-14 PROCEDURE — 25010000002 METHYLPREDNISOLONE PER 125 MG: Performed by: EMERGENCY MEDICINE

## 2017-11-14 PROCEDURE — 96375 TX/PRO/DX INJ NEW DRUG ADDON: CPT

## 2017-11-14 PROCEDURE — 93010 ELECTROCARDIOGRAM REPORT: CPT | Performed by: INTERNAL MEDICINE

## 2017-11-14 PROCEDURE — 82805 BLOOD GASES W/O2 SATURATION: CPT | Performed by: EMERGENCY MEDICINE

## 2017-11-14 PROCEDURE — 84484 ASSAY OF TROPONIN QUANT: CPT | Performed by: EMERGENCY MEDICINE

## 2017-11-14 PROCEDURE — 82375 ASSAY CARBOXYHB QUANT: CPT | Performed by: EMERGENCY MEDICINE

## 2017-11-14 PROCEDURE — 83880 ASSAY OF NATRIURETIC PEPTIDE: CPT | Performed by: EMERGENCY MEDICINE

## 2017-11-14 PROCEDURE — 96374 THER/PROPH/DIAG INJ IV PUSH: CPT

## 2017-11-14 PROCEDURE — 85025 COMPLETE CBC W/AUTO DIFF WBC: CPT | Performed by: EMERGENCY MEDICINE

## 2017-11-14 PROCEDURE — 99285 EMERGENCY DEPT VISIT HI MDM: CPT

## 2017-11-14 PROCEDURE — 71010 HC CHEST PA OR AP: CPT

## 2017-11-14 PROCEDURE — 25010000002 LORAZEPAM PER 2 MG: Performed by: EMERGENCY MEDICINE

## 2017-11-14 PROCEDURE — 80053 COMPREHEN METABOLIC PANEL: CPT | Performed by: EMERGENCY MEDICINE

## 2017-11-14 PROCEDURE — 87040 BLOOD CULTURE FOR BACTERIA: CPT | Performed by: EMERGENCY MEDICINE

## 2017-11-14 PROCEDURE — 71010 XR CHEST 1 VW: CPT | Performed by: RADIOLOGY

## 2017-11-14 PROCEDURE — 25010000002 MORPHINE SULFATE (PF) 2 MG/ML SOLUTION

## 2017-11-14 PROCEDURE — 93005 ELECTROCARDIOGRAM TRACING: CPT | Performed by: EMERGENCY MEDICINE

## 2017-11-14 RX ORDER — MORPHINE SULFATE 10 MG/ML
4 INJECTION INTRAMUSCULAR; INTRAVENOUS; SUBCUTANEOUS ONCE
Status: DISCONTINUED | OUTPATIENT
Start: 2017-11-14 | End: 2017-11-14 | Stop reason: HOSPADM

## 2017-11-14 RX ORDER — PREDNISONE 20 MG/1
40 TABLET ORAL DAILY
Qty: 10 TABLET | Refills: 0 | Status: SHIPPED | OUTPATIENT
Start: 2017-11-14 | End: 2017-11-19

## 2017-11-14 RX ORDER — LORAZEPAM 2 MG/ML
0.5 INJECTION INTRAMUSCULAR ONCE
Status: COMPLETED | OUTPATIENT
Start: 2017-11-14 | End: 2017-11-14

## 2017-11-14 RX ORDER — MORPHINE SULFATE 2 MG/ML
INJECTION, SOLUTION INTRAMUSCULAR; INTRAVENOUS
Status: COMPLETED
Start: 2017-11-14 | End: 2017-11-14

## 2017-11-14 RX ORDER — METHYLPREDNISOLONE SODIUM SUCCINATE 125 MG/2ML
125 INJECTION, POWDER, LYOPHILIZED, FOR SOLUTION INTRAMUSCULAR; INTRAVENOUS ONCE
Status: COMPLETED | OUTPATIENT
Start: 2017-11-14 | End: 2017-11-14

## 2017-11-14 RX ORDER — SODIUM CHLORIDE 0.9 % (FLUSH) 0.9 %
10 SYRINGE (ML) INJECTION AS NEEDED
Status: DISCONTINUED | OUTPATIENT
Start: 2017-11-14 | End: 2017-11-14 | Stop reason: HOSPADM

## 2017-11-14 RX ORDER — AZITHROMYCIN 250 MG/1
TABLET, FILM COATED ORAL
Qty: 6 TABLET | Refills: 0 | Status: ON HOLD | OUTPATIENT
Start: 2017-11-14 | End: 2018-01-19

## 2017-11-14 RX ADMIN — GABAPENTIN 800 MG: 100 CAPSULE ORAL at 17:43

## 2017-11-14 RX ADMIN — METHYLPREDNISOLONE SODIUM SUCCINATE 125 MG: 125 INJECTION, POWDER, FOR SOLUTION INTRAMUSCULAR; INTRAVENOUS at 16:38

## 2017-11-14 RX ADMIN — MORPHINE SULFATE 4 MG: 2 INJECTION, SOLUTION INTRAMUSCULAR; INTRAVENOUS at 17:34

## 2017-11-14 RX ADMIN — LORAZEPAM 0.5 MG: 2 INJECTION INTRAMUSCULAR; INTRAVENOUS at 16:33

## 2017-11-14 NOTE — DISCHARGE INSTRUCTIONS

## 2017-11-15 NOTE — ED PROVIDER NOTES
Subjective   HPI Comments: COPD with recurrent CP    Patient is a 51 y.o. female presenting with wheezing.   History provided by:  Patient   used: No    Wheezing   Severity:  Moderate  Severity compared to prior episodes:  Similar  Onset quality:  Gradual  Duration:  1 day  Timing:  Constant  Progression:  Worsening  Chronicity:  Chronic  Context: emotional upset and exercise    Relieved by:  Nothing  Worsened by:  Activity  Ineffective treatments:  Beta-agonist inhaler  Associated symptoms: chest pain, chest tightness, cough and shortness of breath    Associated symptoms: no fever    Risk factors: prior hospitalizations        Review of Systems   Constitutional: Negative.  Negative for fever.   HENT: Negative.    Respiratory: Positive for cough, chest tightness, shortness of breath and wheezing.    Cardiovascular: Positive for chest pain.   Gastrointestinal: Negative.  Negative for abdominal pain.   Endocrine: Negative.    Genitourinary: Negative.  Negative for dysuria.   Skin: Negative.    Neurological: Negative.    Psychiatric/Behavioral: Negative.    All other systems reviewed and are negative.      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   • Chronic respiratory failure with hypoxia     at nighttime   • Compression fracture of lumbar vertebra    • Compression fracture of thoracic vertebra    • COPD (chronic obstructive pulmonary disease)    • Coronary artery disease    • Diabetes mellitus type 2 in nonobese    • Erosive esophagitis 12/2016   • Essential hypertension    • Gastroparesis    • GERD (gastroesophageal reflux disease)    • Hepatitis-C    • History of transfusion    • Medically noncompliant    • MRSA infection     back of neck around 2012; 6/2017 septic left elbow joint with concerns for osteomyelitis and left anteromedial left arm   • NSTEMI (non-ST elevated myocardial infarction) 07/2016   • PE (pulmonary embolism)     • Protein calorie malnutrition    • RA (rheumatoid arthritis)     with chronic steroid use   • Rheumatoid arthritis        Allergies   Allergen Reactions   • Penicillins Anaphylaxis     Tolerated both cefepime and Rocephin earlier this month   • Toradol [Ketorolac Tromethamine] Hives and Swelling   • Tramadol Hives and Swelling       Past Surgical History:   Procedure Laterality Date   • ABDOMINAL SURGERY     • AMPUTATION DIGIT Right 2016    Third digit to the PIP. Surgeon: Dr. Bains   • AMPUTATION DIGIT Left 2016    Procedure: PARTIAL AMPUTATION LEFT THIRD FINGER;  Surgeon: Shane Fuller MD;  Location: [facility] OR;  Service:    • AMPUTATION REVISION Left 2016    Procedure: AMPUTATION REVISION DIGIT STUMP;  Surgeon: Shane Fuller MD;  Location: [facility] OR;  Service:    • ARM DEBRIDEMENT Left 2016    Procedure: HAND IRRIGATION DEBRIDEMENT AND REPAIR ;  Surgeon: Shaen Fuller MD;  Location: [facility] OR;  Service:    •  SECTION      x 2   • CHOLECYSTECTOMY     • ENDOSCOPY N/A 2016    Procedure: ESOPHAGOGASTRODUODENOSCOPY;  Surgeon: Jeremy Clement III, MD;  Location: [facility] OR;  Service:    • ENDOSCOPY W/ PEG TUBE PLACEMENT N/A 3/30/2017    Procedure: ESOPHAGOGASTRODUODENOSCOPY WITH PERCUTANEOUS ENDOSCOPIC GASTROSTOMY TUBE INSERTION;  Surgeon: Shade Campos MD;  Location: [facility] OR;  Service:    • FOREIGN BODY REMOVAL      insulin needle removal x 3 from the abdomen   • HYSTERECTOMY     • INCISION AND DRAINAGE ARM Left 2017    Procedure: INCISION AND DRAINAGE UPPER EXTREMITY;  Surgeon: Shane Fuller MD;  Location: [facility] OR;  Service:        Family History   Problem Relation Age of Onset   • Colon cancer Mother    • Cancer Father    • Diabetes Maternal Grandmother    • Heart disease Neg Hx    • Stroke Neg Hx        Social History     Social History   • Marital status:      Spouse name: N/A   • Number of children: N/A   •  Years of education: N/A     Social History Main Topics   • Smoking status: Current Every Day Smoker     Packs/day: 0.50     Years: 35.00     Types: Cigarettes   • Smokeless tobacco: Never Used      Comment: 4-5 cigarettes a day    • Alcohol use No   • Drug use: No   • Sexual activity: Defer     Other Topics Concern   • None     Social History Narrative           Objective   Physical Exam   Constitutional: She is oriented to person, place, and time. She appears well-developed and well-nourished. No distress.   HENT:   Head: Normocephalic and atraumatic.   Right Ear: External ear normal.   Left Ear: External ear normal.   Nose: Nose normal.   Eyes: Conjunctivae and EOM are normal. Pupils are equal, round, and reactive to light.   Neck: Normal range of motion. Neck supple. No JVD present. No tracheal deviation present.   Cardiovascular: Normal rate, regular rhythm and normal heart sounds.    No murmur heard.  Pulmonary/Chest: Effort normal. No respiratory distress. She has wheezes in the right lower field and the left lower field.   Abdominal: Soft. Bowel sounds are normal. There is no tenderness.   Musculoskeletal: Normal range of motion. She exhibits no edema or deformity.   Neurological: She is alert and oriented to person, place, and time. No cranial nerve deficit.   Skin: Skin is warm and dry. No rash noted. She is not diaphoretic. No erythema. No pallor.   Psychiatric: Her behavior is normal. Thought content normal. Her mood appears anxious.   Nursing note and vitals reviewed.      Procedures         ED Course  ED Course    Much improved              MDM    Final diagnoses:   COPD with acute exacerbation   Anxiety about health            Timbo White MD  11/15/17 4821

## 2017-11-15 NOTE — ED NOTES
PT request nurse to contact Radhika at [phone]. No answer at this time. PT  notified.      Erin Day RN  11/14/17 1959

## 2017-11-19 LAB
BACTERIA SPEC AEROBE CULT: NORMAL
BACTERIA SPEC AEROBE CULT: NORMAL

## 2018-01-08 ENCOUNTER — HOSPITAL ENCOUNTER (EMERGENCY)
Facility: HOSPITAL | Age: 53
Discharge: HOME | End: 2018-01-08
Admitting: FAMILY MEDICINE

## 2018-01-08 ENCOUNTER — APPOINTMENT (OUTPATIENT)
Dept: GENERAL RADIOLOGY | Facility: HOSPITAL | Age: 53
End: 2018-01-08

## 2018-01-08 VITALS
SYSTOLIC BLOOD PRESSURE: 144 MMHG | OXYGEN SATURATION: 95 % | WEIGHT: 120 LBS | HEART RATE: 88 BPM | DIASTOLIC BLOOD PRESSURE: 82 MMHG | HEIGHT: 63 IN | TEMPERATURE: 97.5 F | BODY MASS INDEX: 21.26 KG/M2 | RESPIRATION RATE: 20 BRPM

## 2018-01-08 DIAGNOSIS — J20.9 ACUTE BRONCHITIS, UNSPECIFIED ORGANISM: Primary | ICD-10-CM

## 2018-01-08 LAB
A-A DO2: 46.2 MMHG (ref 0–300)
ALBUMIN SERPL-MCNC: 3.1 G/DL (ref 3.5–5)
ALBUMIN/GLOB SERPL: 1 G/DL (ref 1.5–2.5)
ALP SERPL-CCNC: 71 U/L (ref 35–104)
ALT SERPL W P-5'-P-CCNC: 32 U/L (ref 10–36)
ANION GAP SERPL CALCULATED.3IONS-SCNC: 5.3 MMOL/L (ref 3.6–11.2)
ARTERIAL PATENCY WRIST A: ABNORMAL
AST SERPL-CCNC: 22 U/L (ref 10–30)
ATMOSPHERIC PRESS: 729 MMHG
BASE EXCESS BLDA CALC-SCNC: -1.4 MMOL/L
BASOPHILS # BLD AUTO: 0.02 10*3/MM3 (ref 0–0.3)
BASOPHILS NFR BLD AUTO: 0.2 % (ref 0–2)
BDY SITE: ABNORMAL
BILIRUB SERPL-MCNC: 0.3 MG/DL (ref 0.2–1.8)
BODY TEMPERATURE: 98.6 C
BUN BLD-MCNC: 11 MG/DL (ref 7–21)
BUN/CREAT SERPL: 19.6 (ref 7–25)
CALCIUM SPEC-SCNC: 8.2 MG/DL (ref 7.7–10)
CHLORIDE SERPL-SCNC: 108 MMOL/L (ref 99–112)
CO2 SERPL-SCNC: 22.7 MMOL/L (ref 24.3–31.9)
COHGB MFR BLD: 3.8 % (ref 0–5)
CREAT BLD-MCNC: 0.56 MG/DL (ref 0.43–1.29)
CRP SERPL-MCNC: 4.87 MG/DL (ref 0–0.99)
DEPRECATED RDW RBC AUTO: 52.4 FL (ref 37–54)
EOSINOPHIL # BLD AUTO: 0.04 10*3/MM3 (ref 0–0.7)
EOSINOPHIL NFR BLD AUTO: 0.5 % (ref 0–5)
ERYTHROCYTE [DISTWIDTH] IN BLOOD BY AUTOMATED COUNT: 19.4 % (ref 11.5–14.5)
FLUAV AG NPH QL: NEGATIVE
FLUBV AG NPH QL IA: NEGATIVE
GFR SERPL CREATININE-BSD FRML MDRD: 114 ML/MIN/1.73
GLOBULIN UR ELPH-MCNC: 3 GM/DL
GLUCOSE BLD-MCNC: 264 MG/DL (ref 70–110)
HCO3 BLDA-SCNC: 21.5 MMOL/L (ref 22–26)
HCT VFR BLD AUTO: 39.2 % (ref 37–47)
HCT VFR BLD CALC: 34 % (ref 37–47)
HGB BLD-MCNC: 11.6 G/DL (ref 12–16)
HGB BLDA-MCNC: 11.4 G/DL (ref 12–16)
HOROWITZ INDEX BLD+IHG-RTO: 21 %
IMM GRANULOCYTES # BLD: 0.13 10*3/MM3 (ref 0–0.03)
IMM GRANULOCYTES NFR BLD: 1.6 % (ref 0–0.5)
LYMPHOCYTES # BLD AUTO: 1.4 10*3/MM3 (ref 1–3)
LYMPHOCYTES NFR BLD AUTO: 17.3 % (ref 21–51)
MCH RBC QN AUTO: 23.2 PG (ref 27–33)
MCHC RBC AUTO-ENTMCNC: 29.6 G/DL (ref 33–37)
MCV RBC AUTO: 78.2 FL (ref 80–94)
METHGB BLD QL: 0.2 % (ref 0–3)
MODALITY: ABNORMAL
MONOCYTES # BLD AUTO: 0.42 10*3/MM3 (ref 0.1–0.9)
MONOCYTES NFR BLD AUTO: 5.2 % (ref 0–10)
NEUTROPHILS # BLD AUTO: 6.06 10*3/MM3 (ref 1.4–6.5)
NEUTROPHILS NFR BLD AUTO: 75.2 % (ref 30–70)
OSMOLALITY SERPL CALC.SUM OF ELEC: 280.6 MOSM/KG (ref 273–305)
OXYHGB MFR BLDV: 89.2 % (ref 85–100)
PCO2 BLDA: 30.5 MM HG (ref 35–45)
PH BLDA: 7.47 PH UNITS (ref 7.35–7.45)
PLATELET # BLD AUTO: 266 10*3/MM3 (ref 130–400)
PMV BLD AUTO: 10 FL (ref 6–10)
PO2 BLDA: 60.5 MM HG (ref 80–100)
POTASSIUM BLD-SCNC: 3.6 MMOL/L (ref 3.5–5.3)
PROT SERPL-MCNC: 6.1 G/DL (ref 6–8)
RBC # BLD AUTO: 5.01 10*6/MM3 (ref 4.2–5.4)
S PYO AG THROAT QL: NEGATIVE
SAO2 % BLDCOA: 92.9 % (ref 90–100)
SODIUM BLD-SCNC: 136 MMOL/L (ref 135–153)
WBC NRBC COR # BLD: 8.07 10*3/MM3 (ref 4.5–12.5)

## 2018-01-08 PROCEDURE — 73080 X-RAY EXAM OF ELBOW: CPT

## 2018-01-08 PROCEDURE — 94640 AIRWAY INHALATION TREATMENT: CPT

## 2018-01-08 PROCEDURE — 86140 C-REACTIVE PROTEIN: CPT | Performed by: NURSE PRACTITIONER

## 2018-01-08 PROCEDURE — 82805 BLOOD GASES W/O2 SATURATION: CPT | Performed by: NURSE PRACTITIONER

## 2018-01-08 PROCEDURE — 87040 BLOOD CULTURE FOR BACTERIA: CPT | Performed by: NURSE PRACTITIONER

## 2018-01-08 PROCEDURE — 94799 UNLISTED PULMONARY SVC/PX: CPT

## 2018-01-08 PROCEDURE — 99284 EMERGENCY DEPT VISIT MOD MDM: CPT

## 2018-01-08 PROCEDURE — 71045 X-RAY EXAM CHEST 1 VIEW: CPT | Performed by: RADIOLOGY

## 2018-01-08 PROCEDURE — 82375 ASSAY CARBOXYHB QUANT: CPT | Performed by: NURSE PRACTITIONER

## 2018-01-08 PROCEDURE — 87804 INFLUENZA ASSAY W/OPTIC: CPT | Performed by: NURSE PRACTITIONER

## 2018-01-08 PROCEDURE — 25010000002 METHYLPREDNISOLONE PER 125 MG: Performed by: NURSE PRACTITIONER

## 2018-01-08 PROCEDURE — 85025 COMPLETE CBC W/AUTO DIFF WBC: CPT | Performed by: NURSE PRACTITIONER

## 2018-01-08 PROCEDURE — 96374 THER/PROPH/DIAG INJ IV PUSH: CPT

## 2018-01-08 PROCEDURE — 73080 X-RAY EXAM OF ELBOW: CPT | Performed by: RADIOLOGY

## 2018-01-08 PROCEDURE — 36415 COLL VENOUS BLD VENIPUNCTURE: CPT

## 2018-01-08 PROCEDURE — 87081 CULTURE SCREEN ONLY: CPT | Performed by: NURSE PRACTITIONER

## 2018-01-08 PROCEDURE — 71045 X-RAY EXAM CHEST 1 VIEW: CPT

## 2018-01-08 PROCEDURE — 87880 STREP A ASSAY W/OPTIC: CPT | Performed by: NURSE PRACTITIONER

## 2018-01-08 PROCEDURE — 83050 HGB METHEMOGLOBIN QUAN: CPT | Performed by: NURSE PRACTITIONER

## 2018-01-08 PROCEDURE — 80053 COMPREHEN METABOLIC PANEL: CPT | Performed by: NURSE PRACTITIONER

## 2018-01-08 PROCEDURE — 36600 WITHDRAWAL OF ARTERIAL BLOOD: CPT | Performed by: NURSE PRACTITIONER

## 2018-01-08 RX ORDER — DOXYCYCLINE 100 MG/1
100 CAPSULE ORAL EVERY 12 HOURS SCHEDULED
Qty: 20 CAPSULE | Refills: 0 | Status: ON HOLD | OUTPATIENT
Start: 2018-01-08 | End: 2018-01-19

## 2018-01-08 RX ORDER — HYDROCODONE BITARTRATE AND ACETAMINOPHEN 5; 325 MG/1; MG/1
1 TABLET ORAL ONCE
Status: COMPLETED | OUTPATIENT
Start: 2018-01-08 | End: 2018-01-08

## 2018-01-08 RX ORDER — IPRATROPIUM BROMIDE AND ALBUTEROL SULFATE 2.5; .5 MG/3ML; MG/3ML
3 SOLUTION RESPIRATORY (INHALATION) ONCE
Status: COMPLETED | OUTPATIENT
Start: 2018-01-08 | End: 2018-01-08

## 2018-01-08 RX ORDER — METHYLPREDNISOLONE SODIUM SUCCINATE 125 MG/2ML
125 INJECTION, POWDER, LYOPHILIZED, FOR SOLUTION INTRAMUSCULAR; INTRAVENOUS ONCE
Status: COMPLETED | OUTPATIENT
Start: 2018-01-08 | End: 2018-01-08

## 2018-01-08 RX ORDER — PREDNISONE 20 MG/1
40 TABLET ORAL DAILY
Qty: 10 TABLET | Refills: 0 | Status: SHIPPED | OUTPATIENT
Start: 2018-01-09 | End: 2018-01-14

## 2018-01-08 RX ORDER — SODIUM CHLORIDE 0.9 % (FLUSH) 0.9 %
10 SYRINGE (ML) INJECTION AS NEEDED
Status: DISCONTINUED | OUTPATIENT
Start: 2018-01-08 | End: 2018-01-08 | Stop reason: HOSPADM

## 2018-01-08 RX ADMIN — METHYLPREDNISOLONE SODIUM SUCCINATE 125 MG: 125 INJECTION, POWDER, FOR SOLUTION INTRAMUSCULAR; INTRAVENOUS at 15:01

## 2018-01-08 RX ADMIN — HYDROCODONE BITARTRATE AND ACETAMINOPHEN 1 TABLET: 5; 325 TABLET ORAL at 18:28

## 2018-01-08 RX ADMIN — HYDROCODONE BITARTRATE AND ACETAMINOPHEN 1 TABLET: 5; 325 TABLET ORAL at 17:22

## 2018-01-08 RX ADMIN — IPRATROPIUM BROMIDE AND ALBUTEROL SULFATE 3 ML: .5; 3 SOLUTION RESPIRATORY (INHALATION) at 15:00

## 2018-01-08 NOTE — ED PROVIDER NOTES
Subjective   Patient is a 52 y.o. female presenting with shortness of breath.   History provided by:  Patient   used: No    Shortness of Breath   Severity:  Moderate  Onset quality:  Sudden  Duration:  3 days  Timing:  Constant  Progression:  Worsening  Chronicity:  Recurrent  Context: URI    Context: not animal exposure, not emotional upset, not known allergens and not occupational exposure    Relieved by:  None tried  Worsened by:  Nothing  Ineffective treatments:  None tried  Associated symptoms: sputum production and wheezing    Associated symptoms: no abdominal pain, no claudication, no ear pain, no fever, no headaches, no hemoptysis, no neck pain, no PND, no rash, no sore throat, no syncope and no vomiting    Risk factors: no recent alcohol use, no family hx of DVT, no hx of PE/DVT, no obesity, no oral contraceptive use, no prolonged immobilization and no recent surgery        Review of Systems   Constitutional: Negative.  Negative for fever.   HENT: Negative.  Negative for ear pain and sore throat.    Eyes: Negative.    Respiratory: Positive for sputum production, shortness of breath and wheezing. Negative for hemoptysis.    Cardiovascular: Negative.  Negative for claudication, syncope and PND.   Gastrointestinal: Negative.  Negative for abdominal pain and vomiting.   Endocrine: Negative.    Genitourinary: Negative.    Musculoskeletal: Negative.  Negative for neck pain.   Skin: Negative.  Negative for rash.   Allergic/Immunologic: Negative.    Neurological: Negative.  Negative for headaches.   Hematological: Negative.    Psychiatric/Behavioral: Negative.        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   • Chronic respiratory failure with hypoxia     at nighttime   • Compression fracture of lumbar vertebra    • Compression fracture of thoracic vertebra    • COPD (chronic obstructive pulmonary disease)    • Coronary artery  disease    • Diabetes mellitus type 2 in nonobese    • Erosive esophagitis 2016   • Essential hypertension    • Gastroparesis    • GERD (gastroesophageal reflux disease)    • Hepatitis-C    • History of transfusion    • Medically noncompliant    • MRSA infection     back of neck around ; 2017 septic left elbow joint with concerns for osteomyelitis and left anteromedial left arm   • NSTEMI (non-ST elevated myocardial infarction) 2016   • PE (pulmonary embolism)    • Protein calorie malnutrition    • RA (rheumatoid arthritis)     with chronic steroid use   • Rheumatoid arthritis        Allergies   Allergen Reactions   • Penicillins Anaphylaxis     Tolerated both cefepime and Rocephin earlier this month   • Toradol [Ketorolac Tromethamine] Hives and Swelling   • Tramadol Hives and Swelling       Past Surgical History:   Procedure Laterality Date   • ABDOMINAL SURGERY     • AMPUTATION DIGIT Right 2016    Third digit to the PIP. Surgeon: Dr. Bains   • AMPUTATION DIGIT Left 2016    Procedure: PARTIAL AMPUTATION LEFT THIRD FINGER;  Surgeon: Shane Fuller MD;  Location: [facility] OR;  Service:    • AMPUTATION REVISION Left 2016    Procedure: AMPUTATION REVISION DIGIT STUMP;  Surgeon: Shane Fuller MD;  Location: [facility] OR;  Service:    • ARM DEBRIDEMENT Left 2016    Procedure: HAND IRRIGATION DEBRIDEMENT AND REPAIR ;  Surgeon: Shane Fuller MD;  Location: [facility] OR;  Service:    •  SECTION      x 2   • CHOLECYSTECTOMY     • ENDOSCOPY N/A 2016    Procedure: ESOPHAGOGASTRODUODENOSCOPY;  Surgeon: Jeremy Clement III, MD;  Location: [facility] OR;  Service:    • ENDOSCOPY W/ PEG TUBE PLACEMENT N/A 3/30/2017    Procedure: ESOPHAGOGASTRODUODENOSCOPY WITH PERCUTANEOUS ENDOSCOPIC GASTROSTOMY TUBE INSERTION;  Surgeon: Shade Campos MD;  Location: [facility] OR;  Service:    • FOREIGN BODY REMOVAL      insulin needle removal x 3 from the abdomen   •  HYSTERECTOMY     • INCISION AND DRAINAGE ARM Left 6/27/2017    Procedure: INCISION AND DRAINAGE UPPER EXTREMITY;  Surgeon: Shane Fuller MD;  Location: [facility];  Service:        Family History   Problem Relation Age of Onset   • Colon cancer Mother    • Cancer Father    • Diabetes Maternal Grandmother    • Heart disease Neg Hx    • Stroke Neg Hx        Social History     Social History   • Marital status:      Spouse name: N/A   • Number of children: N/A   • Years of education: N/A     Social History Main Topics   • Smoking status: Current Every Day Smoker     Packs/day: 0.50     Years: 35.00     Types: Cigarettes   • Smokeless tobacco: Never Used      Comment: 4-5 cigarettes a day    • Alcohol use No   • Drug use: No   • Sexual activity: Defer     Other Topics Concern   • None     Social History Narrative   • None           Objective   Physical Exam   Constitutional: She is oriented to person, place, and time. She appears well-developed and well-nourished.   HENT:   Head: Normocephalic.   Right Ear: External ear normal.   Left Ear: External ear normal.   Mouth/Throat: Oropharynx is clear and moist.   Eyes: EOM are normal. Pupils are equal, round, and reactive to light.   Neck: Normal range of motion. Neck supple.   Cardiovascular: Normal rate and regular rhythm.    Pulmonary/Chest: Effort normal and breath sounds normal.   Abdominal: Soft. Bowel sounds are normal.   Musculoskeletal: Normal range of motion.   Neurological: She is alert and oriented to person, place, and time.   Skin: Skin is warm and dry.   Psychiatric: She has a normal mood and affect. Her behavior is normal.   Nursing note and vitals reviewed.      Procedures         ED Course  ED Course                  MDM    Final diagnoses:   Acute bronchitis, unspecified organism            Hosea Still, APRN  01/11/18 2343

## 2018-01-09 ENCOUNTER — HOSPITAL ENCOUNTER (EMERGENCY)
Facility: HOSPITAL | Age: 53
Discharge: HOME | End: 2018-01-09
Attending: INTERNAL MEDICINE | Admitting: INTERNAL MEDICINE

## 2018-01-09 VITALS
RESPIRATION RATE: 20 BRPM | DIASTOLIC BLOOD PRESSURE: 98 MMHG | OXYGEN SATURATION: 97 % | TEMPERATURE: 97.9 F | BODY MASS INDEX: 20.2 KG/M2 | SYSTOLIC BLOOD PRESSURE: 149 MMHG | WEIGHT: 114 LBS | HEART RATE: 108 BPM | HEIGHT: 63 IN

## 2018-01-09 DIAGNOSIS — L03.113 CELLULITIS OF RIGHT ELBOW: ICD-10-CM

## 2018-01-09 DIAGNOSIS — J44.1 ACUTE EXACERBATION OF CHRONIC OBSTRUCTIVE PULMONARY DISEASE (COPD) (HCC): Primary | ICD-10-CM

## 2018-01-09 LAB
ALBUMIN SERPL-MCNC: 3.7 G/DL (ref 3.5–5)
ALBUMIN/GLOB SERPL: 1.1 G/DL (ref 1.5–2.5)
ALP SERPL-CCNC: 83 U/L (ref 35–104)
ALT SERPL W P-5'-P-CCNC: 36 U/L (ref 10–36)
AMYLASE SERPL-CCNC: 21 U/L (ref 28–100)
ANION GAP SERPL CALCULATED.3IONS-SCNC: 6.7 MMOL/L (ref 3.6–11.2)
APTT PPP: 22.8 SECONDS (ref 23.8–36.1)
AST SERPL-CCNC: 31 U/L (ref 10–30)
BASOPHILS # BLD AUTO: 0.02 10*3/MM3 (ref 0–0.3)
BASOPHILS NFR BLD AUTO: 0.2 % (ref 0–2)
BILIRUB SERPL-MCNC: 0.3 MG/DL (ref 0.2–1.8)
BUN BLD-MCNC: 11 MG/DL (ref 7–21)
BUN/CREAT SERPL: 20 (ref 7–25)
CALCIUM SPEC-SCNC: 9.2 MG/DL (ref 7.7–10)
CHLORIDE SERPL-SCNC: 107 MMOL/L (ref 99–112)
CO2 SERPL-SCNC: 26.3 MMOL/L (ref 24.3–31.9)
CREAT BLD-MCNC: 0.55 MG/DL (ref 0.43–1.29)
D-LACTATE SERPL-SCNC: 1.8 MMOL/L (ref 0.5–2)
D-LACTATE SERPL-SCNC: 2.3 MMOL/L (ref 0.5–2)
DEPRECATED RDW RBC AUTO: 51.5 FL (ref 37–54)
EOSINOPHIL # BLD AUTO: 0.03 10*3/MM3 (ref 0–0.7)
EOSINOPHIL NFR BLD AUTO: 0.3 % (ref 0–5)
ERYTHROCYTE [DISTWIDTH] IN BLOOD BY AUTOMATED COUNT: 19.2 % (ref 11.5–14.5)
GFR SERPL CREATININE-BSD FRML MDRD: 116 ML/MIN/1.73
GLOBULIN UR ELPH-MCNC: 3.5 GM/DL
GLUCOSE BLD-MCNC: 152 MG/DL (ref 70–110)
HCT VFR BLD AUTO: 38.5 % (ref 37–47)
HGB BLD-MCNC: 11.5 G/DL (ref 12–16)
HOLD SPECIMEN: NORMAL
IMM GRANULOCYTES # BLD: 0.19 10*3/MM3 (ref 0–0.03)
IMM GRANULOCYTES NFR BLD: 1.8 % (ref 0–0.5)
INR PPP: 0.92 (ref 0.9–1.1)
LIPASE SERPL-CCNC: 31 U/L (ref 13–60)
LYMPHOCYTES # BLD AUTO: 1.51 10*3/MM3 (ref 1–3)
LYMPHOCYTES NFR BLD AUTO: 14.6 % (ref 21–51)
MAGNESIUM SERPL-MCNC: 1.9 MG/DL (ref 1.7–2.6)
MCH RBC QN AUTO: 23 PG (ref 27–33)
MCHC RBC AUTO-ENTMCNC: 29.9 G/DL (ref 33–37)
MCV RBC AUTO: 77 FL (ref 80–94)
MONOCYTES # BLD AUTO: 0.52 10*3/MM3 (ref 0.1–0.9)
MONOCYTES NFR BLD AUTO: 5 % (ref 0–10)
NEUTROPHILS # BLD AUTO: 8.05 10*3/MM3 (ref 1.4–6.5)
NEUTROPHILS NFR BLD AUTO: 78.1 % (ref 30–70)
OSMOLALITY SERPL CALC.SUM OF ELEC: 281.8 MOSM/KG (ref 273–305)
PLATELET # BLD AUTO: 355 10*3/MM3 (ref 130–400)
PMV BLD AUTO: 10 FL (ref 6–10)
POTASSIUM BLD-SCNC: 4.2 MMOL/L (ref 3.5–5.3)
PROT SERPL-MCNC: 7.2 G/DL (ref 6–8)
PROTHROMBIN TIME: 12.5 SECONDS (ref 11–15.4)
RBC # BLD AUTO: 5 10*6/MM3 (ref 4.2–5.4)
SODIUM BLD-SCNC: 140 MMOL/L (ref 135–153)
TROPONIN I SERPL-MCNC: 0.03 NG/ML
WBC NRBC COR # BLD: 10.32 10*3/MM3 (ref 4.5–12.5)

## 2018-01-09 PROCEDURE — 83735 ASSAY OF MAGNESIUM: CPT | Performed by: INTERNAL MEDICINE

## 2018-01-09 PROCEDURE — 94640 AIRWAY INHALATION TREATMENT: CPT

## 2018-01-09 PROCEDURE — 83690 ASSAY OF LIPASE: CPT | Performed by: INTERNAL MEDICINE

## 2018-01-09 PROCEDURE — 80053 COMPREHEN METABOLIC PANEL: CPT | Performed by: INTERNAL MEDICINE

## 2018-01-09 PROCEDURE — 82150 ASSAY OF AMYLASE: CPT | Performed by: INTERNAL MEDICINE

## 2018-01-09 PROCEDURE — 99285 EMERGENCY DEPT VISIT HI MDM: CPT

## 2018-01-09 PROCEDURE — 84484 ASSAY OF TROPONIN QUANT: CPT | Performed by: INTERNAL MEDICINE

## 2018-01-09 PROCEDURE — 93005 ELECTROCARDIOGRAM TRACING: CPT | Performed by: INTERNAL MEDICINE

## 2018-01-09 PROCEDURE — 96374 THER/PROPH/DIAG INJ IV PUSH: CPT

## 2018-01-09 PROCEDURE — 93010 ELECTROCARDIOGRAM REPORT: CPT | Performed by: INTERNAL MEDICINE

## 2018-01-09 PROCEDURE — 94799 UNLISTED PULMONARY SVC/PX: CPT

## 2018-01-09 PROCEDURE — 85025 COMPLETE CBC W/AUTO DIFF WBC: CPT | Performed by: INTERNAL MEDICINE

## 2018-01-09 PROCEDURE — 83605 ASSAY OF LACTIC ACID: CPT | Performed by: INTERNAL MEDICINE

## 2018-01-09 PROCEDURE — 25010000002 METHYLPREDNISOLONE PER 125 MG: Performed by: INTERNAL MEDICINE

## 2018-01-09 PROCEDURE — 85610 PROTHROMBIN TIME: CPT | Performed by: INTERNAL MEDICINE

## 2018-01-09 PROCEDURE — 85730 THROMBOPLASTIN TIME PARTIAL: CPT | Performed by: INTERNAL MEDICINE

## 2018-01-09 RX ORDER — ACETAMINOPHEN AND CODEINE PHOSPHATE 300; 30 MG/1; MG/1
1 TABLET ORAL ONCE
Status: COMPLETED | OUTPATIENT
Start: 2018-01-09 | End: 2018-01-09

## 2018-01-09 RX ORDER — ALBUTEROL SULFATE 2.5 MG/3ML
2.5 SOLUTION RESPIRATORY (INHALATION)
Status: COMPLETED | OUTPATIENT
Start: 2018-01-09 | End: 2018-01-09

## 2018-01-09 RX ORDER — ACETAMINOPHEN 325 MG/1
650 TABLET ORAL ONCE
Status: COMPLETED | OUTPATIENT
Start: 2018-01-09 | End: 2018-01-09

## 2018-01-09 RX ORDER — METHYLPREDNISOLONE SODIUM SUCCINATE 125 MG/2ML
125 INJECTION, POWDER, LYOPHILIZED, FOR SOLUTION INTRAMUSCULAR; INTRAVENOUS ONCE
Status: COMPLETED | OUTPATIENT
Start: 2018-01-09 | End: 2018-01-09

## 2018-01-09 RX ORDER — IPRATROPIUM BROMIDE AND ALBUTEROL SULFATE 2.5; .5 MG/3ML; MG/3ML
3 SOLUTION RESPIRATORY (INHALATION) ONCE
Status: COMPLETED | OUTPATIENT
Start: 2018-01-09 | End: 2018-01-09

## 2018-01-09 RX ORDER — SODIUM CHLORIDE 0.9 % (FLUSH) 0.9 %
10 SYRINGE (ML) INJECTION AS NEEDED
Status: DISCONTINUED | OUTPATIENT
Start: 2018-01-09 | End: 2018-01-09 | Stop reason: HOSPADM

## 2018-01-09 RX ADMIN — ACETAMINOPHEN 650 MG: 325 TABLET ORAL at 08:51

## 2018-01-09 RX ADMIN — ACETAMINOPHEN AND CODEINE PHOSPHATE 1 TABLET: 300; 30 TABLET ORAL at 10:59

## 2018-01-09 RX ADMIN — METHYLPREDNISOLONE SODIUM SUCCINATE 125 MG: 125 INJECTION, POWDER, FOR SOLUTION INTRAMUSCULAR; INTRAVENOUS at 07:26

## 2018-01-09 RX ADMIN — ALBUTEROL SULFATE 2.5 MG: 2.5 SOLUTION RESPIRATORY (INHALATION) at 07:10

## 2018-01-09 RX ADMIN — IPRATROPIUM BROMIDE AND ALBUTEROL SULFATE 3 ML: .5; 3 SOLUTION RESPIRATORY (INHALATION) at 07:05

## 2018-01-09 RX ADMIN — ALBUTEROL SULFATE 2.5 MG: 2.5 SOLUTION RESPIRATORY (INHALATION) at 07:20
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
## 2018-01-09 NOTE — ED PROVIDER NOTES
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   • Chronic respiratory failure with hypoxia     at nighttime   • Compression fracture of lumbar vertebra    • Compression fracture of thoracic vertebra    • COPD (chronic obstructive pulmonary disease)    • Coronary artery disease    • Diabetes mellitus type 2 in nonobese    • Erosive esophagitis 2016   • Essential hypertension    • Gastroparesis    • GERD (gastroesophageal reflux disease)    • Hepatitis-C    • History of transfusion    • Medically noncompliant    • MRSA infection     back of neck around ; 2017 septic left elbow joint with concerns for osteomyelitis and left anteromedial left arm   • NSTEMI (non-ST elevated myocardial infarction) 2016   • PE (pulmonary embolism)    • Protein calorie malnutrition    • RA (rheumatoid arthritis)     with chronic steroid use   • Rheumatoid arthritis        Allergies   Allergen Reactions   • Penicillins Anaphylaxis     Tolerated both cefepime and Rocephin earlier this month   • Toradol [Ketorolac Tromethamine] Hives and Swelling   • Tramadol Hives and Swelling       Past Surgical History:   Procedure Laterality Date   • ABDOMINAL SURGERY     • AMPUTATION DIGIT Right 2016    Third digit to the PIP. Surgeon: Dr. Bains   • AMPUTATION DIGIT Left 2016    Procedure: PARTIAL AMPUTATION LEFT THIRD FINGER;  Surgeon: Shane Fuller MD;  Location: [facility] OR;  Service:    • AMPUTATION REVISION Left 2016    Procedure: AMPUTATION REVISION DIGIT STUMP;  Surgeon: Shane Fuller MD;  Location: [facility] OR;  Service:    • ARM DEBRIDEMENT Left 2016    Procedure: HAND IRRIGATION DEBRIDEMENT AND REPAIR ;  Surgeon: Shane Fuller MD;  Location: [facility] OR;  Service:    •  SECTION      x 2   • CHOLECYSTECTOMY     • ENDOSCOPY N/A 2016    Procedure:  ESOPHAGOGASTRODUODENOSCOPY;  Surgeon: Jeremy Clement III, MD;  Location: [facility];  Service:    • ENDOSCOPY W/ PEG TUBE PLACEMENT N/A 3/30/2017    Procedure: ESOPHAGOGASTRODUODENOSCOPY WITH PERCUTANEOUS ENDOSCOPIC GASTROSTOMY TUBE INSERTION;  Surgeon: Shade Campos MD;  Location: [facility];  Service:    • FOREIGN BODY REMOVAL      insulin needle removal x 3 from the abdomen   • HYSTERECTOMY     • INCISION AND DRAINAGE ARM Left 6/27/2017    Procedure: INCISION AND DRAINAGE UPPER EXTREMITY;  Surgeon: Shane Fuller MD;  Location: [facility] OR;  Service:        Family History   Problem Relation Age of Onset   • Colon cancer Mother    • Cancer Father    • Diabetes Maternal Grandmother    • Heart disease Neg Hx    • Stroke Neg Hx        Social History     Social History   • Marital status:      Spouse name: N/A   • Number of children: N/A   • Years of education: N/A     Social History Main Topics   • Smoking status: Current Every Day Smoker     Packs/day: 0.50     Years: 35.00     Types: Cigarettes   • Smokeless tobacco: Never Used      Comment: 4-5 cigarettes a day    • Alcohol use No   • Drug use: No   • Sexual activity: Defer     Other Topics Concern   • None     Social History Narrative   • None           Objective   Physical Exam    Procedures  Results for orders placed or performed during the hospital encounter of 01/09/18   Comprehensive Metabolic Panel   Result Value Ref Range    Glucose 152 (H) 70 - 110 mg/dL    BUN 11 7 - 21 mg/dL    Creatinine 0.55 0.43 - 1.29 mg/dL    Sodium 140 135 - 153 mmol/L    Potassium 4.2 3.5 - 5.3 mmol/L    Chloride 107 99 - 112 mmol/L    CO2 26.3 24.3 - 31.9 mmol/L    Calcium 9.2 7.7 - 10.0 mg/dL    Total Protein 7.2 6.0 - 8.0 g/dL    Albumin 3.70 3.50 - 5.00 g/dL    ALT (SGPT) 36 10 - 36 U/L    AST (SGOT) 31 (H) 10 - 30 U/L    Alkaline Phosphatase 83 35 - 104 U/L    Total Bilirubin 0.3 0.2 - 1.8 mg/dL    eGFR Non African Amer 116 >60 mL/min/1.73    Globulin  3.5 gm/dL    A/G Ratio 1.1 (L) 1.5 - 2.5 g/dL    BUN/Creatinine Ratio 20.0 7.0 - 25.0    Anion Gap 6.7 3.6 - 11.2 mmol/L   Protime-INR   Result Value Ref Range    Protime 12.5 11.0 - 15.4 Seconds    INR 0.92 0.90 - 1.10   aPTT   Result Value Ref Range    PTT 22.8 (L) 23.8 - 36.1 seconds   Amylase   Result Value Ref Range    Amylase 21 (L) 28 - 100 U/L   Lipase   Result Value Ref Range    Lipase 31 13 - 60 U/L   Troponin   Result Value Ref Range    Troponin I 0.032 <=0.040 ng/mL   Magnesium   Result Value Ref Range    Magnesium 1.9 1.7 - 2.6 mg/dL   Lactic Acid, Plasma   Result Value Ref Range    Lactate 2.3 (C) 0.5 - 2.0 mmol/L   CBC Auto Differential   Result Value Ref Range    WBC 10.32 4.50 - 12.50 10*3/mm3    RBC 5.00 4.20 - 5.40 10*6/mm3    Hemoglobin 11.5 (L) 12.0 - 16.0 g/dL    Hematocrit 38.5 37.0 - 47.0 %    MCV 77.0 (L) 80.0 - 94.0 fL    MCH 23.0 (L) 27.0 - 33.0 pg    MCHC 29.9 (L) 33.0 - 37.0 g/dL    RDW 19.2 (H) 11.5 - 14.5 %    RDW-SD 51.5 37.0 - 54.0 fl    MPV 10.0 6.0 - 10.0 fL    Platelets 355 130 - 400 10*3/mm3    Neutrophil % 78.1 (H) 30.0 - 70.0 %    Lymphocyte % 14.6 (L) 21.0 - 51.0 %    Monocyte % 5.0 0.0 - 10.0 %    Eosinophil % 0.3 0.0 - 5.0 %    Basophil % 0.2 0.0 - 2.0 %    Immature Grans % 1.8 (H) 0.0 - 0.5 %    Neutrophils, Absolute 8.05 (H) 1.40 - 6.50 10*3/mm3    Lymphocytes, Absolute 1.51 1.00 - 3.00 10*3/mm3    Monocytes, Absolute 0.52 0.10 - 0.90 10*3/mm3    Eosinophils, Absolute 0.03 0.00 - 0.70 10*3/mm3    Basophils, Absolute 0.02 0.00 - 0.30 10*3/mm3    Immature Grans, Absolute 0.19 (H) 0.00 - 0.03 10*3/mm3   Osmolality, Calculated   Result Value Ref Range    Osmolality Calc 281.8 273.0 - 305.0 mOsm/kg     Xr Elbow 3+ View Right    Result Date: 1/8/2018  Narrative: EXAMINATION: XR ELBOW 3+ VW RIGHT-  CLINICAL INDICATION:cellulitis    COMPARISON: None  TECHNIQUE: 3 views right elbow  FINDINGS: Diffuse cellulitis. Joint effusion. Arthritis noted. Unfused ossicle or old unfused  fracture fragment involves the medial epicondyle region. No fracture identified.         Impression: Cellulitis with joint effusion noted. No acute fracture identified.  This report was finalized on 1/8/2018 4:09 PM by Dr. Wali Tracy MD.      Xr Chest Ap    Result Date: 1/8/2018  Narrative: EXAMINATION: XR CHEST AP-  CLINICAL INDICATION:     shortness of breath  TECHNIQUE:  XR CHEST AP-  COMPARISON: 11/14/2017  FINDINGS: Lungs are aerated. Heart and mediastinal contours are unremarkable. No pneumothorax. No pleural effusion. No acute osseous findings.         Impression: No acute cardiopulmonary findings ON today's exam.   This report was finalized on 1/8/2018 3:17 PM by Dr. Wali Tracy MD.             ED Course  ED Course   Comment By Time   I have endorsed the patient's to Dr. Jrodan at shift change. Pratik Novak MD 01/09 0801   Care assumed from Dr. Novak at shift change.  The patient is awake and alert complains only of pain in her arm.  On exam she does have some erythema of the distal triceps area and healing wound over the olecranon.  This is consistent with her x-rays which showed some cellulitis and joint effusion.  Her labs are also noted.  She did have the elevated lactic acid, but no pneumonia or other obvious sepsis.  Will plan discharge on home O2. Marvin Caruso MD 01/09 1020                  MDM  Number of Diagnoses or Management Options  Acute exacerbation of chronic obstructive pulmonary disease (COPD):   Cellulitis of right elbow:      Amount and/or Complexity of Data Reviewed  Clinical lab tests: reviewed  Tests in the medicine section of CPT®: reviewed  Decide to obtain previous medical records or to obtain history from someone other than the patient: yes    Risk of Complications, Morbidity, and/or Mortality  Presenting problems: high  Diagnostic procedures: moderate  Management options: high        Final diagnoses:   Acute exacerbation of chronic obstructive pulmonary disease (COPD)    Cellulitis of right elbow            Marvin Caruso MD  01/09/18 1021

## 2018-01-09 NOTE — ED NOTES
Pt placed on 2lpm nc per home use. Pt alert and oriented, skin pwd, respirations even and unlabored. Pt complains of cough and sob.      Veronica Montano RN  01/09/18 [id number]

## 2018-01-09 NOTE — ED NOTES
Patient states that she was seen in ED 2 related to complaints of shortness of breath/COPD. States that she has set out in the lobby all night and is suppose to wear oxygen at 2 lpm via nasal cannula. States that she just doesn't feel any better. Noted to have a non-productive cough at this time.      Bella Willard RN  01/09/18 3054

## 2018-01-09 NOTE — ED NOTES
Pt complaining of cp and pain in shoulders. md made aware, verbal orders noted.      Veronica Montano RN  01/09/18 3886

## 2018-01-09 NOTE — ED NOTES
Nellie wilder with in house  reports that [facility] is bringing pt home oxygen supply to er. Also reports pt has been accepted at [facility]. Pt and md made aware.      Veronica Montano RN  01/09/18 0934

## 2018-01-09 NOTE — ED PROVIDER NOTES
Subjective   HPI Comments: Us is a 52-year-old female with history of COPD had presented to the emergency department within the last 6 hours reporting cough shortness of breath.  He shouldn't was given steroids nebs, and antibiotics.  Had waited in our waiting room unable to obtain a ride, became acutely short of breath in the waiting room.  She is having coughing paroxysms.    Patient is a 52 y.o. female presenting with cough.   Cough   Cough characteristics:  Barking and dry      Review of Systems   Constitutional: Negative.    Eyes: Negative.    Respiratory: Positive for cough.    All other systems reviewed and are negative.      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   • Chronic respiratory failure with hypoxia     at nighttime   • Compression fracture of lumbar vertebra    • Compression fracture of thoracic vertebra    • COPD (chronic obstructive pulmonary disease)    • Coronary artery disease    • Diabetes mellitus type 2 in nonobese    • Erosive esophagitis 12/2016   • Essential hypertension    • Gastroparesis    • GERD (gastroesophageal reflux disease)    • Hepatitis-C    • History of transfusion    • Medically noncompliant    • MRSA infection     back of neck around 2012; 6/2017 septic left elbow joint with concerns for osteomyelitis and left anteromedial left arm   • NSTEMI (non-ST elevated myocardial infarction) 07/2016   • PE (pulmonary embolism)    • Protein calorie malnutrition    • RA (rheumatoid arthritis)     with chronic steroid use   • Rheumatoid arthritis        Allergies   Allergen Reactions   • Penicillins Anaphylaxis     Tolerated both cefepime and Rocephin earlier this month   • Toradol [Ketorolac Tromethamine] Hives and Swelling   • Tramadol Hives and Swelling       Past Surgical History:   Procedure Laterality Date   • ABDOMINAL SURGERY     • AMPUTATION DIGIT Right 12/22/2016    Third digit to the PIP. Surgeon:   Herson   • AMPUTATION DIGIT Left 2016    Procedure: PARTIAL AMPUTATION LEFT THIRD FINGER;  Surgeon: Shane Fuller MD;  Location: [facility] OR;  Service:    • AMPUTATION REVISION Left 2016    Procedure: AMPUTATION REVISION DIGIT STUMP;  Surgeon: Shane Fuller MD;  Location: [facility] OR;  Service:    • ARM DEBRIDEMENT Left 2016    Procedure: HAND IRRIGATION DEBRIDEMENT AND REPAIR ;  Surgeon: Shane Fuller MD;  Location: [facility] OR;  Service:    •  SECTION      x 2   • CHOLECYSTECTOMY     • ENDOSCOPY N/A 2016    Procedure: ESOPHAGOGASTRODUODENOSCOPY;  Surgeon: Jeremy Clement III, MD;  Location: [facility] OR;  Service:    • ENDOSCOPY W/ PEG TUBE PLACEMENT N/A 3/30/2017    Procedure: ESOPHAGOGASTRODUODENOSCOPY WITH PERCUTANEOUS ENDOSCOPIC GASTROSTOMY TUBE INSERTION;  Surgeon: Shade Campos MD;  Location: [facility] OR;  Service:    • FOREIGN BODY REMOVAL      insulin needle removal x 3 from the abdomen   • HYSTERECTOMY     • INCISION AND DRAINAGE ARM Left 2017    Procedure: INCISION AND DRAINAGE UPPER EXTREMITY;  Surgeon: Shane Fuller MD;  Location: [facility] OR;  Service:        Family History   Problem Relation Age of Onset   • Colon cancer Mother    • Cancer Father    • Diabetes Maternal Grandmother    • Heart disease Neg Hx    • Stroke Neg Hx        Social History     Social History   • Marital status:      Spouse name: N/A   • Number of children: N/A   • Years of education: N/A     Social History Main Topics   • Smoking status: Current Every Day Smoker     Packs/day: 0.50     Years: 35.00     Types: Cigarettes   • Smokeless tobacco: Never Used      Comment: 4-5 cigarettes a day    • Alcohol use No   • Drug use: No   • Sexual activity: Defer     Other Topics Concern   • None     Social History Narrative   • None           Objective   Physical Exam   Constitutional: She appears well-developed.   HENT:   Head: Normocephalic.   Eyes: Pupils  are equal, round, and reactive to light.   Neck: Normal range of motion.   Cardiovascular: Regular rhythm and normal heart sounds.  Tachycardia present.    Pulmonary/Chest: No respiratory distress. She has decreased breath sounds in the right lower field and the left lower field. She has wheezes in the right middle field and the left middle field.   Abdominal: Soft. Bowel sounds are normal.   Neurological: She is alert.   Skin: Skin is warm.   Nursing note and vitals reviewed.      Procedures         ED Course  ED Course   Comment By Time   I have endorsed the patient's to Dr. Jordan at shift change. Pratik Novak MD 01/09 0801                  [facility]    Final diagnoses:   None            Pratik Novak MD  01/09/18 0803

## 2018-01-09 NOTE — ED NOTES
Pt alert and oriented, skin pwd, no respiratory distress. Pt waiting on rtech for transport to [facility].     Veronica Montano RN  01/09/18 4059

## 2018-01-09 NOTE — ED NOTES
"While in room, patient began to hyperventilate and state that she has \"went home\" and couldn't breath. Spoke with patient related to patient was discharged and spent the night in the lobby related to patient had no ride home. States that she has COPD and was unable to make it home so she stayed here so she would be close cause she couldn't breath.      Bella Willard RN  01/09/18 9870    "

## 2018-01-09 NOTE — ED NOTES
Pt now complains of right arm and elbow pain. md made aware.     Veronica Montano RN  01/09/18 0932       Veronica Montano RN  01/09/18 0933

## 2018-01-10 LAB — BACTERIA SPEC AEROBE CULT: NORMAL

## 2018-01-13 LAB
BACTERIA SPEC AEROBE CULT: NORMAL
BACTERIA SPEC AEROBE CULT: NORMAL

## 2018-01-19 ENCOUNTER — HOSPITAL ENCOUNTER (INPATIENT)
Facility: HOSPITAL | Age: 53
LOS: 3 days | Discharge: HOME | End: 2018-01-22
Attending: EMERGENCY MEDICINE | Admitting: INTERNAL MEDICINE

## 2018-01-19 ENCOUNTER — APPOINTMENT (OUTPATIENT)
Dept: GENERAL RADIOLOGY | Facility: HOSPITAL | Age: 53
End: 2018-01-19

## 2018-01-19 DIAGNOSIS — J18.9 PNEUMONIA OF LEFT LOWER LOBE DUE TO INFECTIOUS ORGANISM: Primary | ICD-10-CM

## 2018-01-19 PROBLEM — K85.90 PANCREATITIS: Status: RESOLVED | Noted: 2017-02-06 | Resolved: 2018-01-19

## 2018-01-19 PROBLEM — A41.9 SEVERE SEPSIS (HCC): Status: RESOLVED | Noted: 2017-10-17 | Resolved: 2018-01-19

## 2018-01-19 PROBLEM — R65.20 SEVERE SEPSIS (HCC): Status: RESOLVED | Noted: 2017-10-17 | Resolved: 2018-01-19

## 2018-01-19 PROBLEM — K74.60 CIRRHOSIS (HCC): Status: RESOLVED | Noted: 2017-02-06 | Resolved: 2018-01-19

## 2018-01-19 PROBLEM — N39.0 URINARY TRACT INFECTION: Status: RESOLVED | Noted: 2017-06-03 | Resolved: 2018-01-19

## 2018-01-19 PROBLEM — E11.9 DIABETES (HCC): Status: RESOLVED | Noted: 2017-02-06 | Resolved: 2018-01-19

## 2018-01-19 PROBLEM — R11.0 CHRONIC NAUSEA: Status: RESOLVED | Noted: 2017-02-06 | Resolved: 2018-01-19

## 2018-01-19 PROBLEM — R78.81 GRAM-NEGATIVE BACTEREMIA: Status: RESOLVED | Noted: 2017-07-24 | Resolved: 2018-01-19

## 2018-01-19 LAB
A-A DO2: 34.6 MMHG (ref 0–300)
ALBUMIN SERPL-MCNC: 3.7 G/DL (ref 3.5–5)
ALBUMIN/GLOB SERPL: 1.2 G/DL (ref 1.5–2.5)
ALP SERPL-CCNC: 75 U/L (ref 35–104)
ALT SERPL W P-5'-P-CCNC: 30 U/L (ref 10–36)
ANION GAP SERPL CALCULATED.3IONS-SCNC: 5.3 MMOL/L (ref 3.6–11.2)
ARTERIAL PATENCY WRIST A: ABNORMAL
AST SERPL-CCNC: 20 U/L (ref 10–30)
ATMOSPHERIC PRESS: 728 MMHG
BASE EXCESS BLDA CALC-SCNC: -0.5 MMOL/L
BASOPHILS # BLD AUTO: 0.01 10*3/MM3 (ref 0–0.3)
BASOPHILS NFR BLD AUTO: 0.1 % (ref 0–2)
BDY SITE: ABNORMAL
BILIRUB SERPL-MCNC: 0.3 MG/DL (ref 0.2–1.8)
BNP SERPL-MCNC: 472 PG/ML (ref 0–100)
BODY TEMPERATURE: 98.6 C
BUN BLD-MCNC: 19 MG/DL (ref 7–21)
BUN/CREAT SERPL: 30.6 (ref 7–25)
CALCIUM SPEC-SCNC: 9.6 MG/DL (ref 7.7–10)
CHLORIDE SERPL-SCNC: 110 MMOL/L (ref 99–112)
CK MB SERPL-CCNC: 0.56 NG/ML (ref 0–5)
CK MB SERPL-RTO: 4.3 % (ref 0–3)
CK SERPL-CCNC: 13 U/L (ref 24–173)
CO2 SERPL-SCNC: 21.7 MMOL/L (ref 24.3–31.9)
COHGB MFR BLD: 2.3 % (ref 0–5)
CREAT BLD-MCNC: 0.62 MG/DL (ref 0.43–1.29)
D-LACTATE SERPL-SCNC: 1.6 MMOL/L (ref 0.5–2)
DEPRECATED RDW RBC AUTO: 50.2 FL (ref 37–54)
EOSINOPHIL # BLD AUTO: 0 10*3/MM3 (ref 0–0.7)
EOSINOPHIL NFR BLD AUTO: 0 % (ref 0–5)
ERYTHROCYTE [DISTWIDTH] IN BLOOD BY AUTOMATED COUNT: 18.9 % (ref 11.5–14.5)
FLUAV AG NPH QL: NEGATIVE
FLUBV AG NPH QL IA: NEGATIVE
GFR SERPL CREATININE-BSD FRML MDRD: 101 ML/MIN/1.73
GLOBULIN UR ELPH-MCNC: 3.2 GM/DL
GLUCOSE BLD-MCNC: 320 MG/DL (ref 70–110)
GLUCOSE BLDC GLUCOMTR-MCNC: 152 MG/DL (ref 70–130)
GLUCOSE BLDC GLUCOMTR-MCNC: 176 MG/DL (ref 70–130)
HBA1C MFR BLD: 10.6 % (ref 4.5–5.7)
HCO3 BLDA-SCNC: 21.7 MMOL/L (ref 22–26)
HCT VFR BLD AUTO: 32.2 % (ref 37–47)
HCT VFR BLD CALC: 32 % (ref 37–47)
HGB BLD-MCNC: 9.6 G/DL (ref 12–16)
HGB BLDA-MCNC: 11 G/DL (ref 12–16)
HOROWITZ INDEX BLD+IHG-RTO: 21 %
IMM GRANULOCYTES # BLD: 0.09 10*3/MM3 (ref 0–0.03)
IMM GRANULOCYTES NFR BLD: 0.8 % (ref 0–0.5)
LYMPHOCYTES # BLD AUTO: 0.55 10*3/MM3 (ref 1–3)
LYMPHOCYTES NFR BLD AUTO: 4.9 % (ref 21–51)
MCH RBC QN AUTO: 22.9 PG (ref 27–33)
MCHC RBC AUTO-ENTMCNC: 29.8 G/DL (ref 33–37)
MCV RBC AUTO: 76.8 FL (ref 80–94)
METHGB BLD QL: 0.3 % (ref 0–3)
MODALITY: ABNORMAL
MONOCYTES # BLD AUTO: 0.36 10*3/MM3 (ref 0.1–0.9)
MONOCYTES NFR BLD AUTO: 3.2 % (ref 0–10)
MYOGLOBIN SERPL-MCNC: 22 NG/ML (ref 0–109)
NEUTROPHILS # BLD AUTO: 10.32 10*3/MM3 (ref 1.4–6.5)
NEUTROPHILS NFR BLD AUTO: 91 % (ref 30–70)
OSMOLALITY SERPL CALC.SUM OF ELEC: 288.4 MOSM/KG (ref 273–305)
OXYHGB MFR BLDV: 93.8 % (ref 85–100)
PCO2 BLDA: 28 MM HG (ref 35–45)
PH BLDA: 7.51 PH UNITS (ref 7.35–7.45)
PLATELET # BLD AUTO: 276 10*3/MM3 (ref 130–400)
PMV BLD AUTO: 10.4 FL (ref 6–10)
PO2 BLDA: 74.9 MM HG (ref 80–100)
POTASSIUM BLD-SCNC: 3.9 MMOL/L (ref 3.5–5.3)
PROT SERPL-MCNC: 6.9 G/DL (ref 6–8)
RBC # BLD AUTO: 4.19 10*6/MM3 (ref 4.2–5.4)
S PYO AG THROAT QL: NEGATIVE
SAO2 % BLDCOA: 96.3 % (ref 90–100)
SODIUM BLD-SCNC: 137 MMOL/L (ref 135–153)
TROPONIN I SERPL-MCNC: 0.04 NG/ML
TROPONIN I SERPL-MCNC: 0.04 NG/ML
TROPONIN I SERPL-MCNC: 0.05 NG/ML
TSH SERPL DL<=0.05 MIU/L-ACNC: 1.73 MIU/ML (ref 0.55–4.78)
WBC NRBC COR # BLD: 11.33 10*3/MM3 (ref 4.5–12.5)

## 2018-01-19 PROCEDURE — 63710000001 INSULIN DETEMIR PER 5 UNITS: Performed by: INTERNAL MEDICINE

## 2018-01-19 PROCEDURE — 83050 HGB METHEMOGLOBIN QUAN: CPT | Performed by: EMERGENCY MEDICINE

## 2018-01-19 PROCEDURE — 87081 CULTURE SCREEN ONLY: CPT | Performed by: EMERGENCY MEDICINE

## 2018-01-19 PROCEDURE — 85025 COMPLETE CBC W/AUTO DIFF WBC: CPT | Performed by: EMERGENCY MEDICINE

## 2018-01-19 PROCEDURE — 25010000002 ENOXAPARIN PER 10 MG: Performed by: INTERNAL MEDICINE

## 2018-01-19 PROCEDURE — 99285 EMERGENCY DEPT VISIT HI MDM: CPT

## 2018-01-19 PROCEDURE — 84484 ASSAY OF TROPONIN QUANT: CPT | Performed by: PHYSICIAN ASSISTANT

## 2018-01-19 PROCEDURE — 84443 ASSAY THYROID STIM HORMONE: CPT | Performed by: PHYSICIAN ASSISTANT

## 2018-01-19 PROCEDURE — 82550 ASSAY OF CK (CPK): CPT | Performed by: PHYSICIAN ASSISTANT

## 2018-01-19 PROCEDURE — 94640 AIRWAY INHALATION TREATMENT: CPT

## 2018-01-19 PROCEDURE — 93010 ELECTROCARDIOGRAM REPORT: CPT | Performed by: INTERNAL MEDICINE

## 2018-01-19 PROCEDURE — 94799 UNLISTED PULMONARY SVC/PX: CPT

## 2018-01-19 PROCEDURE — 36600 WITHDRAWAL OF ARTERIAL BLOOD: CPT | Performed by: EMERGENCY MEDICINE

## 2018-01-19 PROCEDURE — 71045 X-RAY EXAM CHEST 1 VIEW: CPT | Performed by: RADIOLOGY

## 2018-01-19 PROCEDURE — 25010000002 VANCOMYCIN PER 500 MG: Performed by: EMERGENCY MEDICINE

## 2018-01-19 PROCEDURE — 87880 STREP A ASSAY W/OPTIC: CPT | Performed by: EMERGENCY MEDICINE

## 2018-01-19 PROCEDURE — 82962 GLUCOSE BLOOD TEST: CPT

## 2018-01-19 PROCEDURE — 99223 1ST HOSP IP/OBS HIGH 75: CPT | Performed by: INTERNAL MEDICINE

## 2018-01-19 PROCEDURE — 82375 ASSAY CARBOXYHB QUANT: CPT | Performed by: EMERGENCY MEDICINE

## 2018-01-19 PROCEDURE — 25010000002 METHYLPREDNISOLONE PER 40 MG: Performed by: INTERNAL MEDICINE

## 2018-01-19 PROCEDURE — 87804 INFLUENZA ASSAY W/OPTIC: CPT | Performed by: EMERGENCY MEDICINE

## 2018-01-19 PROCEDURE — 80053 COMPREHEN METABOLIC PANEL: CPT | Performed by: EMERGENCY MEDICINE

## 2018-01-19 PROCEDURE — 87040 BLOOD CULTURE FOR BACTERIA: CPT | Performed by: EMERGENCY MEDICINE

## 2018-01-19 PROCEDURE — 83880 ASSAY OF NATRIURETIC PEPTIDE: CPT | Performed by: EMERGENCY MEDICINE

## 2018-01-19 PROCEDURE — 36415 COLL VENOUS BLD VENIPUNCTURE: CPT

## 2018-01-19 PROCEDURE — 25010000002 LEVOFLOXACIN PER 250 MG: Performed by: INTERNAL MEDICINE

## 2018-01-19 PROCEDURE — 93005 ELECTROCARDIOGRAM TRACING: CPT | Performed by: EMERGENCY MEDICINE

## 2018-01-19 PROCEDURE — 83874 ASSAY OF MYOGLOBIN: CPT | Performed by: PHYSICIAN ASSISTANT

## 2018-01-19 PROCEDURE — 71045 X-RAY EXAM CHEST 1 VIEW: CPT

## 2018-01-19 PROCEDURE — 84484 ASSAY OF TROPONIN QUANT: CPT | Performed by: EMERGENCY MEDICINE

## 2018-01-19 PROCEDURE — 83605 ASSAY OF LACTIC ACID: CPT | Performed by: EMERGENCY MEDICINE

## 2018-01-19 PROCEDURE — 82553 CREATINE MB FRACTION: CPT | Performed by: PHYSICIAN ASSISTANT

## 2018-01-19 PROCEDURE — 82805 BLOOD GASES W/O2 SATURATION: CPT | Performed by: EMERGENCY MEDICINE

## 2018-01-19 PROCEDURE — 83036 HEMOGLOBIN GLYCOSYLATED A1C: CPT | Performed by: PHYSICIAN ASSISTANT

## 2018-01-19 RX ORDER — GABAPENTIN 300 MG/1
600 CAPSULE ORAL EVERY 8 HOURS SCHEDULED
Status: DISCONTINUED | OUTPATIENT
Start: 2018-01-19 | End: 2018-01-22 | Stop reason: HOSPADM

## 2018-01-19 RX ORDER — TIZANIDINE 4 MG/1
4 TABLET ORAL NIGHTLY
Status: DISCONTINUED | OUTPATIENT
Start: 2018-01-19 | End: 2018-01-22 | Stop reason: HOSPADM

## 2018-01-19 RX ORDER — IPRATROPIUM BROMIDE AND ALBUTEROL SULFATE 2.5; .5 MG/3ML; MG/3ML
3 SOLUTION RESPIRATORY (INHALATION)
Status: DISCONTINUED | OUTPATIENT
Start: 2018-01-19 | End: 2018-01-19

## 2018-01-19 RX ORDER — HYDROCODONE BITARTRATE AND ACETAMINOPHEN 7.5; 325 MG/1; MG/1
TABLET ORAL
Status: DISPENSED
Start: 2018-01-19 | End: 2018-01-20

## 2018-01-19 RX ORDER — IPRATROPIUM BROMIDE AND ALBUTEROL SULFATE 2.5; .5 MG/3ML; MG/3ML
3 SOLUTION RESPIRATORY (INHALATION) ONCE
Status: COMPLETED | OUTPATIENT
Start: 2018-01-19 | End: 2018-01-19

## 2018-01-19 RX ORDER — METHYLPREDNISOLONE SODIUM SUCCINATE 40 MG/ML
40 INJECTION, POWDER, LYOPHILIZED, FOR SOLUTION INTRAMUSCULAR; INTRAVENOUS EVERY 12 HOURS SCHEDULED
Status: DISCONTINUED | OUTPATIENT
Start: 2018-01-19 | End: 2018-01-22 | Stop reason: HOSPADM

## 2018-01-19 RX ORDER — IPRATROPIUM BROMIDE AND ALBUTEROL SULFATE 2.5; .5 MG/3ML; MG/3ML
SOLUTION RESPIRATORY (INHALATION)
Status: COMPLETED
Start: 2018-01-19 | End: 2018-01-19

## 2018-01-19 RX ORDER — SODIUM CHLORIDE 9 MG/ML
125 INJECTION, SOLUTION INTRAVENOUS CONTINUOUS
Status: DISCONTINUED | OUTPATIENT
Start: 2018-01-19 | End: 2018-01-19

## 2018-01-19 RX ORDER — LEVOFLOXACIN 5 MG/ML
750 INJECTION, SOLUTION INTRAVENOUS ONCE
Status: DISCONTINUED | OUTPATIENT
Start: 2018-01-19 | End: 2018-01-19

## 2018-01-19 RX ORDER — LEVOFLOXACIN 5 MG/ML
750 INJECTION, SOLUTION INTRAVENOUS EVERY 24 HOURS
Status: DISCONTINUED | OUTPATIENT
Start: 2018-01-19 | End: 2018-01-20

## 2018-01-19 RX ORDER — SUCRALFATE 1 G/1
1 TABLET ORAL 4 TIMES DAILY
Status: CANCELLED | OUTPATIENT
Start: 2018-01-19

## 2018-01-19 RX ORDER — LISINOPRIL 2.5 MG/1
5 TABLET ORAL DAILY
Status: DISCONTINUED | OUTPATIENT
Start: 2018-01-19 | End: 2018-01-22 | Stop reason: HOSPADM

## 2018-01-19 RX ORDER — TIZANIDINE 4 MG/1
4 TABLET ORAL NIGHTLY
Status: CANCELLED | OUTPATIENT
Start: 2018-01-19

## 2018-01-19 RX ORDER — SODIUM CHLORIDE 0.9 % (FLUSH) 0.9 %
1-10 SYRINGE (ML) INJECTION AS NEEDED
Status: DISCONTINUED | OUTPATIENT
Start: 2018-01-19 | End: 2018-01-22 | Stop reason: HOSPADM

## 2018-01-19 RX ORDER — PREDNISONE 20 MG/1
20 TABLET ORAL DAILY
Status: CANCELLED | OUTPATIENT
Start: 2018-01-19

## 2018-01-19 RX ORDER — LISINOPRIL 2.5 MG/1
5 TABLET ORAL DAILY
Status: CANCELLED | OUTPATIENT
Start: 2018-01-19

## 2018-01-19 RX ORDER — GABAPENTIN 300 MG/1
600 CAPSULE ORAL EVERY 8 HOURS SCHEDULED
Status: CANCELLED | OUTPATIENT
Start: 2018-01-19

## 2018-01-19 RX ORDER — HYDROCODONE BITARTRATE AND ACETAMINOPHEN 7.5; 325 MG/1; MG/1
1 TABLET ORAL ONCE
Status: COMPLETED | OUTPATIENT
Start: 2018-01-19 | End: 2018-01-19

## 2018-01-19 RX ORDER — DEXTROSE MONOHYDRATE 25 G/50ML
25 INJECTION, SOLUTION INTRAVENOUS
Status: DISCONTINUED | OUTPATIENT
Start: 2018-01-19 | End: 2018-01-22 | Stop reason: HOSPADM

## 2018-01-19 RX ORDER — PREDNISONE 20 MG/1
20 TABLET ORAL DAILY
Status: CANCELLED | OUTPATIENT
Start: 2018-01-20

## 2018-01-19 RX ORDER — SUCRALFATE 1 G/1
1 TABLET ORAL 4 TIMES DAILY
Status: DISCONTINUED | OUTPATIENT
Start: 2018-01-19 | End: 2018-01-22 | Stop reason: HOSPADM

## 2018-01-19 RX ORDER — NICOTINE POLACRILEX 4 MG
15 LOZENGE BUCCAL
Status: DISCONTINUED | OUTPATIENT
Start: 2018-01-19 | End: 2018-01-22 | Stop reason: HOSPADM

## 2018-01-19 RX ORDER — GABAPENTIN 600 MG/1
600 TABLET ORAL 3 TIMES DAILY
COMMUNITY

## 2018-01-19 RX ORDER — SUCRALFATE 1 G/1
1 TABLET ORAL 4 TIMES DAILY PRN
COMMUNITY

## 2018-01-19 RX ORDER — ALBUTEROL SULFATE 2.5 MG/3ML
2.5 SOLUTION RESPIRATORY (INHALATION) EVERY 4 HOURS PRN
Status: CANCELLED | OUTPATIENT
Start: 2018-01-19

## 2018-01-19 RX ADMIN — AZTREONAM 2 G: 2 INJECTION, POWDER, FOR SOLUTION INTRAMUSCULAR; INTRAVENOUS at 14:28

## 2018-01-19 RX ADMIN — INSULIN DETEMIR 45 UNITS: 100 INJECTION, SOLUTION SUBCUTANEOUS at 20:37

## 2018-01-19 RX ADMIN — IPRATROPIUM BROMIDE AND ALBUTEROL SULFATE 3 ML: .5; 3 SOLUTION RESPIRATORY (INHALATION) at 12:16

## 2018-01-19 RX ADMIN — GABAPENTIN 600 MG: 300 CAPSULE ORAL at 21:01

## 2018-01-19 RX ADMIN — HYDROCODONE BITARTRATE AND ACETAMINOPHEN 1 TABLET: 7.5; 325 TABLET ORAL at 14:39

## 2018-01-19 RX ADMIN — TIZANIDINE 4 MG: 4 TABLET ORAL at 20:07

## 2018-01-19 RX ADMIN — LEVOFLOXACIN 750 MG: 5 INJECTION, SOLUTION INTRAVENOUS at 20:07

## 2018-01-19 RX ADMIN — SODIUM CHLORIDE 500 ML: 9 INJECTION, SOLUTION INTRAVENOUS at 14:28

## 2018-01-19 RX ADMIN — DOXYCYCLINE 100 MG: 100 INJECTION, POWDER, LYOPHILIZED, FOR SOLUTION INTRAVENOUS at 18:21

## 2018-01-19 RX ADMIN — IPRATROPIUM BROMIDE 0.5 MG: 0.5 SOLUTION RESPIRATORY (INHALATION) at 18:39

## 2018-01-19 RX ADMIN — SUCRALFATE 1 G: 1 TABLET ORAL at 20:07

## 2018-01-19 RX ADMIN — VANCOMYCIN HYDROCHLORIDE 1000 MG: 5 INJECTION, POWDER, LYOPHILIZED, FOR SOLUTION INTRAVENOUS at 15:24

## 2018-01-19 RX ADMIN — SODIUM CHLORIDE 125 ML/HR: 9 INJECTION, SOLUTION INTRAVENOUS at 15:00

## 2018-01-19 RX ADMIN — LISINOPRIL 5 MG: 2.5 TABLET ORAL at 20:07

## 2018-01-19 RX ADMIN — ENOXAPARIN SODIUM 40 MG: 40 INJECTION SUBCUTANEOUS at 18:20

## 2018-01-19 RX ADMIN — METHYLPREDNISOLONE SODIUM SUCCINATE 40 MG: 40 INJECTION, POWDER, FOR SOLUTION INTRAMUSCULAR; INTRAVENOUS at 20:07

## 2018-01-19 RX ADMIN — IPRATROPIUM BROMIDE AND ALBUTEROL SULFATE 3 ML: 2.5; .5 SOLUTION RESPIRATORY (INHALATION) at 12:16
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
## 2018-01-19 NOTE — ED PROVIDER NOTES
Subjective   HPI Comments: Patient is 52-year-old white female who has a history of COPD.  She complains of a three-day history of increased cough, congestion, shortness of breath.  She reports a subjective fever, generalized body aches.  She denies chest pain, abdominal pain, nausea, vomiting, diarrhea, other associated symptoms or complaints.      History provided by:  Patient      Review of Systems   Constitutional: Negative for chills and diaphoresis.   HENT: Negative for ear pain, sinus pain and sore throat.    Eyes: Negative for photophobia, pain and redness.   Respiratory: Positive for cough and shortness of breath.    Cardiovascular: Negative for chest pain and palpitations.   Gastrointestinal: Negative for abdominal pain, blood in stool, diarrhea, nausea and vomiting.   Endocrine: Negative for polydipsia, polyphagia and polyuria.   Genitourinary: Negative for difficulty urinating, dysuria and flank pain.   Musculoskeletal: Negative for back pain and neck pain.   Skin: Negative for color change and pallor.   Neurological: Negative for seizures, syncope, speech difficulty and light-headedness.   Psychiatric/Behavioral: Negative for confusion.   All other systems reviewed and are negative.      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   • Chronic respiratory failure with hypoxia     at nighttime   • Compression fracture of lumbar vertebra    • Compression fracture of thoracic vertebra    • COPD (chronic obstructive pulmonary disease)    • Coronary artery disease    • Diabetes mellitus type 2 in nonobese    • Erosive esophagitis 12/2016   • Essential hypertension    • Gastroparesis    • GERD (gastroesophageal reflux disease)    • Hepatitis-C    • History of transfusion    • Medically noncompliant    • MRSA infection     back of neck around 2012; 6/2017 septic left elbow joint with concerns for osteomyelitis and left anteromedial left arm   •  NSTEMI (non-ST elevated myocardial infarction) 2016   • PE (pulmonary embolism)    • Protein calorie malnutrition    • RA (rheumatoid arthritis)     with chronic steroid use   • Rheumatoid arthritis        Allergies   Allergen Reactions   • Penicillins Anaphylaxis     Tolerated both cefepime and Rocephin earlier this month   • Toradol [Ketorolac Tromethamine] Hives and Swelling   • Tramadol Hives and Swelling       Past Surgical History:   Procedure Laterality Date   • ABDOMINAL SURGERY     • AMPUTATION DIGIT Right 2016    Third digit to the PIP. Surgeon: Dr. Bains   • AMPUTATION DIGIT Left 2016    Procedure: PARTIAL AMPUTATION LEFT THIRD FINGER;  Surgeon: Shane Fuller MD;  Location: [facility] OR;  Service:    • AMPUTATION REVISION Left 2016    Procedure: AMPUTATION REVISION DIGIT STUMP;  Surgeon: Shane Fuller MD;  Location: [facility] OR;  Service:    • ARM DEBRIDEMENT Left 2016    Procedure: HAND IRRIGATION DEBRIDEMENT AND REPAIR ;  Surgeon: Shane Fuller MD;  Location: [facility] OR;  Service:    •  SECTION      x 2   • CHOLECYSTECTOMY     • ENDOSCOPY N/A 2016    Procedure: ESOPHAGOGASTRODUODENOSCOPY;  Surgeon: Jeremy Clement III, MD;  Location: [facility] OR;  Service:    • ENDOSCOPY W/ PEG TUBE PLACEMENT N/A 3/30/2017    Procedure: ESOPHAGOGASTRODUODENOSCOPY WITH PERCUTANEOUS ENDOSCOPIC GASTROSTOMY TUBE INSERTION;  Surgeon: Shade Campos MD;  Location: [facility] OR;  Service:    • FOREIGN BODY REMOVAL      insulin needle removal x 3 from the abdomen   • HYSTERECTOMY     • INCISION AND DRAINAGE ARM Left 2017    Procedure: INCISION AND DRAINAGE UPPER EXTREMITY;  Surgeon: Shane Fuller MD;  Location: [facility] OR;  Service:        Family History   Problem Relation Age of Onset   • Colon cancer Mother    • Cancer Father    • Diabetes Maternal Grandmother    • Heart disease Neg Hx    • Stroke Neg Hx        Social History     Social  History   • Marital status:      Spouse name: N/A   • Number of children: N/A   • Years of education: N/A     Social History Main Topics   • Smoking status: Current Every Day Smoker     Packs/day: 0.50     Years: 35.00     Types: Cigarettes   • Smokeless tobacco: Never Used      Comment: 4-5 cigarettes a day    • Alcohol use No   • Drug use: No   • Sexual activity: Defer     Other Topics Concern   • Not on file     Social History Narrative   • No narrative on file           Objective   Physical Exam   Constitutional: She is oriented to person, place, and time. She appears well-developed and well-nourished. No distress.   HENT:   Head: Normocephalic and atraumatic.   Eyes: EOM are normal. Pupils are equal, round, and reactive to light. No scleral icterus.   Neck: Normal range of motion. Neck supple. No rigidity. No tracheal deviation present.   Cardiovascular: Normal rate, regular rhythm and intact distal pulses.    Pulmonary/Chest: Effort normal. No respiratory distress. She has wheezes (Mild diffuse wheezing is heard.  Respirations are not labored.). She exhibits no tenderness.   Abdominal: Soft. Bowel sounds are normal. There is no tenderness. There is no rebound and no guarding.   Musculoskeletal: Normal range of motion. She exhibits no tenderness.   Neurological: She is alert and oriented to person, place, and time. She has normal strength. No sensory deficit. She exhibits normal muscle tone. Coordination normal. GCS eye subscore is 4. GCS verbal subscore is 5. GCS motor subscore is 6.   Skin: Skin is warm and dry. She is not diaphoretic. No cyanosis. No pallor.   Psychiatric: She has a normal mood and affect. Her behavior is normal.   Vitals reviewed.      Procedures         ED Course  ED Course   Comment By Time   Patient's emergency department stay has not been difficult.  She has shown no signs of acute distress.  I discussed case with Dr. Torrez, and he is admitting the patient to the telemetry unit  under his service for further care. Hubert Geller MD 01/19 1642                  [facility]    Final diagnoses:   Pneumonia of left lower lobe due to infectious organism             Please note that portions of this note were completed with a voice recognition program. Efforts were made to edit the dictations, but occasionally words are mistranscribed.       Hubert Geller MD  01/19/18 6070

## 2018-01-19 NOTE — PLAN OF CARE
Problem: Pain, Acute (Adult)  Goal: Identify Related Risk Factors and Signs and Symptoms  Outcome: Ongoing (interventions implemented as appropriate)

## 2018-01-20 LAB
ANION GAP SERPL CALCULATED.3IONS-SCNC: 3.7 MMOL/L (ref 3.6–11.2)
BASOPHILS # BLD AUTO: 0.01 10*3/MM3 (ref 0–0.3)
BASOPHILS NFR BLD AUTO: 0.1 % (ref 0–2)
BUN BLD-MCNC: 21 MG/DL (ref 7–21)
BUN/CREAT SERPL: 39.6 (ref 7–25)
CALCIUM SPEC-SCNC: 9.4 MG/DL (ref 7.7–10)
CHLORIDE SERPL-SCNC: 115 MMOL/L (ref 99–112)
CK MB SERPL-CCNC: 0.61 NG/ML (ref 0–5)
CK MB SERPL-CCNC: 1.12 NG/ML (ref 0–5)
CK MB SERPL-RTO: 5.1 % (ref 0–3)
CK MB SERPL-RTO: 6.6 % (ref 0–3)
CK SERPL-CCNC: 12 U/L (ref 24–173)
CK SERPL-CCNC: 17 U/L (ref 24–173)
CO2 SERPL-SCNC: 20.3 MMOL/L (ref 24.3–31.9)
CREAT BLD-MCNC: 0.53 MG/DL (ref 0.43–1.29)
DEPRECATED RDW RBC AUTO: 52.4 FL (ref 37–54)
EOSINOPHIL # BLD AUTO: 0 10*3/MM3 (ref 0–0.7)
EOSINOPHIL NFR BLD AUTO: 0 % (ref 0–5)
ERYTHROCYTE [DISTWIDTH] IN BLOOD BY AUTOMATED COUNT: 19 % (ref 11.5–14.5)
GFR SERPL CREATININE-BSD FRML MDRD: 121 ML/MIN/1.73
GLUCOSE BLD-MCNC: 156 MG/DL (ref 70–110)
GLUCOSE BLDC GLUCOMTR-MCNC: 125 MG/DL (ref 70–130)
GLUCOSE BLDC GLUCOMTR-MCNC: 227 MG/DL (ref 70–130)
GLUCOSE BLDC GLUCOMTR-MCNC: 386 MG/DL (ref 70–130)
GLUCOSE BLDC GLUCOMTR-MCNC: 414 MG/DL (ref 70–130)
HCT VFR BLD AUTO: 31.6 % (ref 37–47)
HGB BLD-MCNC: 9.2 G/DL (ref 12–16)
IMM GRANULOCYTES # BLD: 0.06 10*3/MM3 (ref 0–0.03)
IMM GRANULOCYTES NFR BLD: 0.8 % (ref 0–0.5)
LYMPHOCYTES # BLD AUTO: 0.43 10*3/MM3 (ref 1–3)
LYMPHOCYTES NFR BLD AUTO: 5.9 % (ref 21–51)
M PNEUMO IGM SER QL: POSITIVE
MCH RBC QN AUTO: 23 PG (ref 27–33)
MCHC RBC AUTO-ENTMCNC: 29.1 G/DL (ref 33–37)
MCV RBC AUTO: 79 FL (ref 80–94)
MONOCYTES # BLD AUTO: 0.31 10*3/MM3 (ref 0.1–0.9)
MONOCYTES NFR BLD AUTO: 4.3 % (ref 0–10)
MYOGLOBIN SERPL-MCNC: 17 NG/ML (ref 0–109)
MYOGLOBIN SERPL-MCNC: 21 NG/ML (ref 0–109)
NEUTROPHILS # BLD AUTO: 6.44 10*3/MM3 (ref 1.4–6.5)
NEUTROPHILS NFR BLD AUTO: 88.9 % (ref 30–70)
OSMOLALITY SERPL CALC.SUM OF ELEC: 283.7 MOSM/KG (ref 273–305)
PLATELET # BLD AUTO: 255 10*3/MM3 (ref 130–400)
PMV BLD AUTO: 10.2 FL (ref 6–10)
POTASSIUM BLD-SCNC: 3.9 MMOL/L (ref 3.5–5.3)
RBC # BLD AUTO: 4 10*6/MM3 (ref 4.2–5.4)
SODIUM BLD-SCNC: 139 MMOL/L (ref 135–153)
TROPONIN I SERPL-MCNC: 0.04 NG/ML
TROPONIN I SERPL-MCNC: 0.04 NG/ML
WBC NRBC COR # BLD: 7.25 10*3/MM3 (ref 4.5–12.5)

## 2018-01-20 PROCEDURE — 93005 ELECTROCARDIOGRAM TRACING: CPT | Performed by: PHYSICIAN ASSISTANT

## 2018-01-20 PROCEDURE — 86738 MYCOPLASMA ANTIBODY: CPT | Performed by: PHYSICIAN ASSISTANT

## 2018-01-20 PROCEDURE — 82553 CREATINE MB FRACTION: CPT | Performed by: PHYSICIAN ASSISTANT

## 2018-01-20 PROCEDURE — 99232 SBSQ HOSP IP/OBS MODERATE 35: CPT | Performed by: INTERNAL MEDICINE

## 2018-01-20 PROCEDURE — 94799 UNLISTED PULMONARY SVC/PX: CPT

## 2018-01-20 PROCEDURE — 93010 ELECTROCARDIOGRAM REPORT: CPT | Performed by: INTERNAL MEDICINE

## 2018-01-20 PROCEDURE — 82550 ASSAY OF CK (CPK): CPT | Performed by: PHYSICIAN ASSISTANT

## 2018-01-20 PROCEDURE — 83874 ASSAY OF MYOGLOBIN: CPT | Performed by: PHYSICIAN ASSISTANT

## 2018-01-20 PROCEDURE — 80048 BASIC METABOLIC PNL TOTAL CA: CPT | Performed by: INTERNAL MEDICINE

## 2018-01-20 PROCEDURE — 63710000001 INSULIN DETEMIR PER 5 UNITS: Performed by: INTERNAL MEDICINE

## 2018-01-20 PROCEDURE — 25010000002 METHYLPREDNISOLONE PER 40 MG: Performed by: INTERNAL MEDICINE

## 2018-01-20 PROCEDURE — 25010000002 ENOXAPARIN PER 10 MG: Performed by: INTERNAL MEDICINE

## 2018-01-20 PROCEDURE — 82962 GLUCOSE BLOOD TEST: CPT

## 2018-01-20 PROCEDURE — 85025 COMPLETE CBC W/AUTO DIFF WBC: CPT | Performed by: INTERNAL MEDICINE

## 2018-01-20 PROCEDURE — 84484 ASSAY OF TROPONIN QUANT: CPT | Performed by: PHYSICIAN ASSISTANT

## 2018-01-20 PROCEDURE — 63710000001 INSULIN ASPART PER 5 UNITS: Performed by: PHYSICIAN ASSISTANT

## 2018-01-20 RX ORDER — ACETAMINOPHEN 325 MG/1
650 TABLET ORAL EVERY 6 HOURS PRN
Status: DISCONTINUED | OUTPATIENT
Start: 2018-01-20 | End: 2018-01-22 | Stop reason: HOSPADM

## 2018-01-20 RX ADMIN — METHYLPREDNISOLONE SODIUM SUCCINATE 40 MG: 40 INJECTION, POWDER, FOR SOLUTION INTRAMUSCULAR; INTRAVENOUS at 21:11

## 2018-01-20 RX ADMIN — INSULIN ASPART 3 UNITS: 100 INJECTION, SOLUTION INTRAVENOUS; SUBCUTANEOUS at 12:03

## 2018-01-20 RX ADMIN — SUCRALFATE 1 G: 1 TABLET ORAL at 12:03

## 2018-01-20 RX ADMIN — GABAPENTIN 600 MG: 300 CAPSULE ORAL at 21:12

## 2018-01-20 RX ADMIN — SUCRALFATE 1 G: 1 TABLET ORAL at 17:03

## 2018-01-20 RX ADMIN — METHYLPREDNISOLONE SODIUM SUCCINATE 40 MG: 40 INJECTION, POWDER, FOR SOLUTION INTRAMUSCULAR; INTRAVENOUS at 07:57

## 2018-01-20 RX ADMIN — ACETAMINOPHEN 650 MG: 325 TABLET ORAL at 14:49

## 2018-01-20 RX ADMIN — SUCRALFATE 1 G: 1 TABLET ORAL at 07:57

## 2018-01-20 RX ADMIN — IPRATROPIUM BROMIDE 0.5 MG: 0.5 SOLUTION RESPIRATORY (INHALATION) at 13:17

## 2018-01-20 RX ADMIN — INSULIN ASPART 7 UNITS: 100 INJECTION, SOLUTION INTRAVENOUS; SUBCUTANEOUS at 21:11

## 2018-01-20 RX ADMIN — DOXYCYCLINE 100 MG: 100 INJECTION, POWDER, LYOPHILIZED, FOR SOLUTION INTRAVENOUS at 14:49

## 2018-01-20 RX ADMIN — GABAPENTIN 600 MG: 300 CAPSULE ORAL at 13:07

## 2018-01-20 RX ADMIN — IPRATROPIUM BROMIDE 0.5 MG: 0.5 SOLUTION RESPIRATORY (INHALATION) at 00:34

## 2018-01-20 RX ADMIN — TIZANIDINE 4 MG: 4 TABLET ORAL at 21:11

## 2018-01-20 RX ADMIN — INSULIN DETEMIR 45 UNITS: 100 INJECTION, SOLUTION SUBCUTANEOUS at 21:12

## 2018-01-20 RX ADMIN — IPRATROPIUM BROMIDE 0.5 MG: 0.5 SOLUTION RESPIRATORY (INHALATION) at 06:15

## 2018-01-20 RX ADMIN — ENOXAPARIN SODIUM 40 MG: 40 INJECTION SUBCUTANEOUS at 17:03

## 2018-01-20 RX ADMIN — ACETAMINOPHEN 650 MG: 325 TABLET ORAL at 21:12

## 2018-01-20 RX ADMIN — SUCRALFATE 1 G: 1 TABLET ORAL at 21:11

## 2018-01-20 RX ADMIN — GABAPENTIN 600 MG: 300 CAPSULE ORAL at 05:16

## 2018-01-20 RX ADMIN — INSULIN ASPART 6 UNITS: 100 INJECTION, SOLUTION INTRAVENOUS; SUBCUTANEOUS at 17:03

## 2018-01-20 RX ADMIN — LISINOPRIL 5 MG: 2.5 TABLET ORAL at 07:57

## 2018-01-20 NOTE — PROGRESS NOTES
[facility] HOSPITALIST PROGRESS NOTE     Patient Identification:  Name:  Pepito Yan  Age:  52 y.o.  Sex:  female  :  1965  MRN:  [id number]  Visit Number:  [id number]  Primary Care Provider:  ROSA Love    Length of stay:  1    Chief complaint:  Back pain    Subjective:  Mrs. Jane complains of back pain this morning.  This is chronic for her and she has been on and off narcotics.  She has some chronic neuropathy which is unchanged.  Her breathing is better overall and she continues to have a cough.  There are no new associated symptoms.  No new issues noted by the nursing staff  ----------------------------------------------------------------------------------------------------------------------  Current Hospital Meds:    doxycycline 100 mg Intravenous Q12H   enoxaparin 40 mg Subcutaneous Q24H   gabapentin 600 mg Oral Q8H   insulin aspart 0-7 Units Subcutaneous 4x Daily AC & at Bedtime   insulin detemir 45 Units Subcutaneous Nightly   ipratropium 0.5 mg Nebulization Q6H - RT   lisinopril 5 mg Oral Daily   methylPREDNISolone sodium succinate 40 mg Intravenous Q12H   sucralfate 1 g Oral 4x Daily   tiZANidine 4 mg Oral Nightly        ----------------------------------------------------------------------------------------------------------------------  Vital Signs:  Temp:  [97.8 °F (36.6 °C)-98.3 °F (36.8 °C)] 98 °F (36.7 °C)  Heart Rate:  [] 85  Resp:  [18-] 18  BP: (114-162)/() 157/100  Last 3 weights    18  1057 18  0300   Weight: 53.1 kg (117 lb) 54.7 kg (120 lb 9.5 oz)     Body mass index is 21.36 kg/(m^2).    Intake/Output Summary (Last 24 hours) at 18 1407  Last data filed at 18 0300   Gross per 24 hour   Intake              400 ml   Output              250 ml   Net              150 ml     Diet Regular; Cardiac, Consistent  Carbohydrate  ----------------------------------------------------------------------------------------------------------------------  Physical exam:  Constitutional:  Appears much older than stated age.  No apparent distress. Appropriately interactive.  Mood appears normal.     HENT:  Head:  Normocephalic and atraumatic.  Mouth:  Moist mucous membranes.    Eyes:  Conjunctivae and EOM are normal.  Pupils are equal, round, and reactive to light.  No scleral icterus.    Neck:  Neck supple.  No JVD present.    Cardiovascular:  Normal rate, regular rhythm and normal heart sounds with no murmur.  Pulmonary/Chest:  Fine crackles in both bases with no wheezing and diminished overall.  Nonlabored respiratory pattern  Abdominal:  Soft.  Bowel sounds are normal.  No distension and no tenderness.   Musculoskeletal:  No edema, no tenderness, and no deformity.  No red or swollen joints anywhere.    Neurological:  Alert and oriented to person, place, and time.  No cranial nerve deficit.  No tongue deviation.  No facial droop.  No slurred speech.   Skin:  Skin is warm and dry. No rash noted. No pallor.   Peripheral vascular:  no clubbing, no cyanosis, no edema.  Genitourinary:  ----------------------------------------------------------------------------------------------------------------------  Tele:  Reviewed by me and shows sinus rhythm without arrhythmia  ----------------------------------------------------------------------------------------------------------------------    Results from last 7 days  Lab Units 01/20/18  0501 01/19/18  2332 01/19/18  1732   CK TOTAL U/L 12* 17* 13*   CKMB ng/mL 0.61 1.12 0.56   CK MB INDEX % 5.1* 6.6* 4.3*   TROPONIN I ng/mL 0.036 0.043* 0.041*   MYOGLOBIN ng/mL 17.0 21.0 22.0       Results from last 7 days  Lab Units 01/20/18  0501 01/19/18  1410 01/19/18  1224   LACTATE mmol/L  --  1.6  --    WBC 10*3/mm3 7.25  --  11.33   HEMOGLOBIN g/dL 9.2*  --  9.6*   HEMATOCRIT % 31.6*  --  32.2*   MCV fL  79.0*  --  76.8*   MCHC g/dL 29.1*  --  29.8*   PLATELETS 10*3/mm3 255  --  276       Results from last 7 days  Lab Units 01/19/18  1207   PH, ARTERIAL pH units 7.507*   PO2 ART mm Hg 74.9*   PCO2, ARTERIAL mm Hg 28.0*   HCO3 ART mmol/L 21.7*       Results from last 7 days  Lab Units 01/20/18  0501 01/19/18  1224   SODIUM mmol/L 139 137   POTASSIUM mmol/L 3.9 3.9   CHLORIDE mmol/L 115* 110   CO2 mmol/L 20.3* 21.7*   BUN mg/dL 21 19   CREATININE mg/dL 0.53 0.62   EGFR IF NONAFRICN AM mL/min/1.73 121 101   CALCIUM mg/dL 9.4 9.6   GLUCOSE mg/dL 156* 320*   ALBUMIN g/dL  --  3.70   BILIRUBIN mg/dL  --  0.3   ALK PHOS U/L  --  75   AST (SGOT) U/L  --  20   ALT (SGPT) U/L  --  30   Estimated Creatinine Clearance: 107.2 mL/min (by C-G formula based on Cr of 0.53).    No results found for: AMMONIA      Blood Culture   Date Value Ref Range Status   01/19/2018 No growth at less than 24 hours  Preliminary   01/19/2018 No growth at less than 24 hours  Preliminary                I have personally looked at the labs and they are summarized above.  ----------------------------------------------------------------------------------------------------------------------  Imaging Results (last 24 hours)     ** No results found for the last 24 hours. **        ----------------------------------------------------------------------------------------------------------------------  Assessment and Plan:  Mycoplasma pneumonia  Sepsis secondary to above, resolved  Acute on chronic hypoxic respiratory failure  COPD with exacerbation  De-escalate antibiotics to doxycycline  Continue the oxygen support, inhaled bronchodilators, systemic steroids  Smoking cessation discussed    Chronic pain syndrome with chronic back pain  We discussed the use of opiates in the setting of respiratory failure.  She begs for at least 1 dose of those.  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

## 2018-01-20 NOTE — PAYOR COMM NOTE
"[facility]   RICARDO LANGSTON  PHONE  [phone]  FAX  [phone]    REQUEST FOR INPATIENT AUTH  ICD:  J18.9    Yessica Yan (52 y.o. Female)     Date of Birth Social Security Number Address Home Phone MRN    1965  [address] [address] [phone] [id number]    Nondenominational Marital Status          Confucianism        Admission Date Admission Type Admitting Provider Attending Provider Department, Room/Bed    1/19/18 Emergency Stewart Torrez MD [place], Stewart Rushing MD [facility] 3 SOUTH, 3313/2S    Discharge Date Discharge Disposition Discharge Destination                      Attending Provider: Stewart Torrez MD     Allergies:  Penicillins, Toradol [Ketorolac Tromethamine], Tramadol    Isolation:  None   Infection:  None   Code Status:  FULL    Ht:  160 cm (63\")   Wt:  54.7 kg (120 lb 9.5 oz)    Admission Cmt:  None   Principal Problem:  None                Active Insurance as of 1/19/2018     Primary Coverage     Payor Plan Insurance Group Employer/Plan Group    MEDICARE MEDICARE A & B      Payor Plan Address Payor Plan Phone Number Effective From Effective To    [address] [phone] 2/1/2005     [place] [address]       Subscriber Name Subscriber Birth Date Member ID       YESSICA YAN 1965 [id number]           Secondary Coverage     Payor Plan Insurance Group Employer/Plan Group    [employer] [place] WELL[place]ARE [place]ICAID      Payor Plan Address Payor Plan Phone Number Effective From Effective To    [address] [address] [phone] 1/1/2016     [place] [address]       Subscriber Name Subscriber Birth Date Member ID       YESSICA YAN 1965 [id number]                 Emergency Contacts      (Rel.) Home Phone Work Phone Mobile Phone    Scot Vaughn (Son) [phone] -- --    Jacy Cody (Other) [phone] -- --              "

## 2018-01-20 NOTE — PROGRESS NOTES
Pharmacy was consulted for smoking cessation education. The patient reported that she smokes around 3-4 cigarettes/day. The patient was advised about quitting smoking and asked if she would be interested at this time. The patient reported that she can stop and doesn't need any aids in assisting with that. I asked if she would be interested in nicotine patches or other methods and she reported she would like to try and just stop on her own. I encouraged the patient that if she has urges at home to try and provide herself reminders (such a pictures of her family, something important to her, etc) and put them around so it reminds her of the importance of helping her health by not smoking. I also encouraged the patient that if quitting alone didn't work to reach out to her PCP to discuss options to assist her.     Thank you,  Xuan Collins. EKATERINA Cavazos  01/20/18  8:59 AM

## 2018-01-20 NOTE — PLAN OF CARE
Problem: Respiratory Insufficiency (Adult)  Goal: Identify Related Risk Factors and Signs and Symptoms  Outcome: Ongoing (interventions implemented as appropriate)    Goal: Acid/Base Balance  Outcome: Ongoing (interventions implemented as appropriate)    Goal: Effective Ventilation  Outcome: Ongoing (interventions implemented as appropriate)      Problem: Pain, Chronic (Adult)  Goal: Identify Related Risk Factors and Signs and Symptoms  Outcome: Ongoing (interventions implemented as appropriate)    Goal: Acceptable Pain Control/Comfort Level  Outcome: Ongoing (interventions implemented as appropriate)      Problem: Activity Intolerance (Adult)  Goal: Identify Related Risk Factors and Signs and Symptoms  Outcome: Ongoing (interventions implemented as appropriate)    Goal: Activity Tolerance  Outcome: Ongoing (interventions implemented as appropriate)    Goal: Effective Energy Conservation Techniques  Outcome: Ongoing (interventions implemented as appropriate)      Problem: Pain, Acute (Adult)  Goal: Identify Related Risk Factors and Signs and Symptoms  Outcome: Ongoing (interventions implemented as appropriate)    Goal: Acceptable Pain Control/Comfort Level  Outcome: Ongoing (interventions implemented as appropriate)      Problem: Pneumonia (Adult)  Goal: Signs and Symptoms of Listed Potential Problems Will be Absent or Manageable (Pneumonia)  Outcome: Ongoing (interventions implemented as appropriate)      Problem: Self-Care Deficit (Adult,Obstetrics,Pediatric)  Goal: Identify Related Risk Factors and Signs and Symptoms  Outcome: Ongoing (interventions implemented as appropriate)      Problem: Diabetes, Type 1 (Adult)  Goal: Signs and Symptoms of Listed Potential Problems Will be Absent or Manageable (Diabetes, Type 1)  Outcome: Ongoing (interventions implemented as appropriate)      Problem: Pressure Ulcer Risk (Brendan Scale) (Adult,Obstetrics,Pediatric)  Goal: Identify Related Risk Factors and Signs and  Symptoms  Outcome: Ongoing (interventions implemented as appropriate)    Goal: Skin Integrity  Outcome: Ongoing (interventions implemented as appropriate)

## 2018-01-21 LAB
BACTERIA SPEC AEROBE CULT: NORMAL
GLUCOSE BLDC GLUCOMTR-MCNC: 222 MG/DL (ref 70–130)
GLUCOSE BLDC GLUCOMTR-MCNC: 263 MG/DL (ref 70–130)
GLUCOSE BLDC GLUCOMTR-MCNC: 369 MG/DL (ref 70–130)
GLUCOSE BLDC GLUCOMTR-MCNC: 487 MG/DL (ref 70–130)

## 2018-01-21 PROCEDURE — 94799 UNLISTED PULMONARY SVC/PX: CPT

## 2018-01-21 PROCEDURE — 82962 GLUCOSE BLOOD TEST: CPT

## 2018-01-21 PROCEDURE — 63710000001 INSULIN DETEMIR PER 5 UNITS: Performed by: INTERNAL MEDICINE

## 2018-01-21 PROCEDURE — 25010000002 METHYLPREDNISOLONE PER 40 MG: Performed by: INTERNAL MEDICINE

## 2018-01-21 PROCEDURE — 63710000001 INSULIN ASPART PER 5 UNITS: Performed by: PHYSICIAN ASSISTANT

## 2018-01-21 PROCEDURE — 99232 SBSQ HOSP IP/OBS MODERATE 35: CPT | Performed by: INTERNAL MEDICINE

## 2018-01-21 PROCEDURE — 25010000002 ENOXAPARIN PER 10 MG: Performed by: INTERNAL MEDICINE

## 2018-01-21 RX ADMIN — SUCRALFATE 1 G: 1 TABLET ORAL at 20:35

## 2018-01-21 RX ADMIN — METHYLPREDNISOLONE SODIUM SUCCINATE 40 MG: 40 INJECTION, POWDER, FOR SOLUTION INTRAMUSCULAR; INTRAVENOUS at 20:35

## 2018-01-21 RX ADMIN — GABAPENTIN 600 MG: 300 CAPSULE ORAL at 13:22

## 2018-01-21 RX ADMIN — SUCRALFATE 1 G: 1 TABLET ORAL at 16:49

## 2018-01-21 RX ADMIN — ACETAMINOPHEN 650 MG: 325 TABLET ORAL at 05:06

## 2018-01-21 RX ADMIN — SUCRALFATE 1 G: 1 TABLET ORAL at 08:21

## 2018-01-21 RX ADMIN — ACETAMINOPHEN 650 MG: 325 TABLET ORAL at 22:35

## 2018-01-21 RX ADMIN — INSULIN DETEMIR 45 UNITS: 100 INJECTION, SOLUTION SUBCUTANEOUS at 20:37

## 2018-01-21 RX ADMIN — IPRATROPIUM BROMIDE 0.5 MG: 0.5 SOLUTION RESPIRATORY (INHALATION) at 00:10

## 2018-01-21 RX ADMIN — METHYLPREDNISOLONE SODIUM SUCCINATE 40 MG: 40 INJECTION, POWDER, FOR SOLUTION INTRAMUSCULAR; INTRAVENOUS at 08:21

## 2018-01-21 RX ADMIN — INSULIN ASPART 3 UNITS: 100 INJECTION, SOLUTION INTRAVENOUS; SUBCUTANEOUS at 12:21

## 2018-01-21 RX ADMIN — INSULIN ASPART 4 UNITS: 100 INJECTION, SOLUTION INTRAVENOUS; SUBCUTANEOUS at 08:21

## 2018-01-21 RX ADMIN — ENOXAPARIN SODIUM 40 MG: 40 INJECTION SUBCUTANEOUS at 16:49

## 2018-01-21 RX ADMIN — INSULIN ASPART 6 UNITS: 100 INJECTION, SOLUTION INTRAVENOUS; SUBCUTANEOUS at 20:35

## 2018-01-21 RX ADMIN — GABAPENTIN 600 MG: 300 CAPSULE ORAL at 20:36

## 2018-01-21 RX ADMIN — LISINOPRIL 5 MG: 2.5 TABLET ORAL at 08:21

## 2018-01-21 RX ADMIN — ACETAMINOPHEN 650 MG: 325 TABLET ORAL at 13:26

## 2018-01-21 RX ADMIN — INSULIN ASPART 7 UNITS: 100 INJECTION, SOLUTION INTRAVENOUS; SUBCUTANEOUS at 16:49

## 2018-01-21 RX ADMIN — GABAPENTIN 600 MG: 300 CAPSULE ORAL at 05:06

## 2018-01-21 RX ADMIN — SUCRALFATE 1 G: 1 TABLET ORAL at 12:21

## 2018-01-21 RX ADMIN — DOXYCYCLINE 100 MG: 100 INJECTION, POWDER, LYOPHILIZED, FOR SOLUTION INTRAVENOUS at 03:33

## 2018-01-21 RX ADMIN — TIZANIDINE 4 MG: 4 TABLET ORAL at 20:35

## 2018-01-21 RX ADMIN — DOXYCYCLINE 100 MG: 100 INJECTION, POWDER, LYOPHILIZED, FOR SOLUTION INTRAVENOUS at 13:22

## 2018-01-21 RX ADMIN — IPRATROPIUM BROMIDE 0.5 MG: 0.5 SOLUTION RESPIRATORY (INHALATION) at 20:42

## 2018-01-21 NOTE — PLAN OF CARE
Problem: Respiratory Insufficiency (Adult)  Goal: Identify Related Risk Factors and Signs and Symptoms  Outcome: Ongoing (interventions implemented as appropriate)    Goal: Acid/Base Balance  Outcome: Ongoing (interventions implemented as appropriate)    Goal: Effective Ventilation  Outcome: Ongoing (interventions implemented as appropriate)      Problem: Pain, Chronic (Adult)  Goal: Identify Related Risk Factors and Signs and Symptoms  Outcome: Ongoing (interventions implemented as appropriate)    Goal: Acceptable Pain Control/Comfort Level  Outcome: Ongoing (interventions implemented as appropriate)      Problem: Activity Intolerance (Adult)  Goal: Identify Related Risk Factors and Signs and Symptoms  Outcome: Ongoing (interventions implemented as appropriate)    Goal: Activity Tolerance  Outcome: Ongoing (interventions implemented as appropriate)    Goal: Effective Energy Conservation Techniques  Outcome: Ongoing (interventions implemented as appropriate)      Problem: Pain, Acute (Adult)  Goal: Identify Related Risk Factors and Signs and Symptoms  Outcome: Ongoing (interventions implemented as appropriate)    Goal: Acceptable Pain Control/Comfort Level  Outcome: Ongoing (interventions implemented as appropriate)      Problem: Pneumonia (Adult)  Goal: Signs and Symptoms of Listed Potential Problems Will be Absent or Manageable (Pneumonia)  Outcome: Ongoing (interventions implemented as appropriate)      Problem: Self-Care Deficit (Adult,Obstetrics,Pediatric)  Goal: Identify Related Risk Factors and Signs and Symptoms  Outcome: Ongoing (interventions implemented as appropriate)    Goal: Improved Ability to Perform BADL and IADL  Outcome: Ongoing (interventions implemented as appropriate)      Problem: Diabetes, Type 1 (Adult)  Goal: Signs and Symptoms of Listed Potential Problems Will be Absent or Manageable (Diabetes, Type 1)  Outcome: Ongoing (interventions implemented as appropriate)      Problem: Pressure  Ulcer Risk (Brendan Scale) (Adult,Obstetrics,Pediatric)  Goal: Identify Related Risk Factors and Signs and Symptoms  Outcome: Ongoing (interventions implemented as appropriate)    Goal: Skin Integrity  Outcome: Ongoing (interventions implemented as appropriate)

## 2018-01-22 VITALS
OXYGEN SATURATION: 99 % | SYSTOLIC BLOOD PRESSURE: 162 MMHG | DIASTOLIC BLOOD PRESSURE: 98 MMHG | HEIGHT: 63 IN | HEART RATE: 86 BPM | WEIGHT: 132.1 LBS | BODY MASS INDEX: 23.41 KG/M2 | TEMPERATURE: 97.2 F | RESPIRATION RATE: 18 BRPM

## 2018-01-22 LAB
GLUCOSE BLDC GLUCOMTR-MCNC: 176 MG/DL (ref 70–130)
GLUCOSE BLDC GLUCOMTR-MCNC: 313 MG/DL (ref 70–130)

## 2018-01-22 PROCEDURE — 63710000001 INSULIN ASPART PER 5 UNITS: Performed by: PHYSICIAN ASSISTANT

## 2018-01-22 PROCEDURE — 99238 HOSP IP/OBS DSCHRG MGMT 30/<: CPT | Performed by: INTERNAL MEDICINE

## 2018-01-22 PROCEDURE — 82962 GLUCOSE BLOOD TEST: CPT

## 2018-01-22 PROCEDURE — 25010000002 METHYLPREDNISOLONE PER 40 MG: Performed by: INTERNAL MEDICINE

## 2018-01-22 PROCEDURE — 94799 UNLISTED PULMONARY SVC/PX: CPT

## 2018-01-22 RX ORDER — INSULIN GLARGINE 100 [IU]/ML
50 INJECTION, SOLUTION SUBCUTANEOUS NIGHTLY
Refills: 12 | Status: ON HOLD
Start: 2018-01-22 | End: 2018-04-16

## 2018-01-22 RX ORDER — BUDESONIDE AND FORMOTEROL FUMARATE DIHYDRATE 160; 4.5 UG/1; UG/1
2 AEROSOL RESPIRATORY (INHALATION)
Qty: 1 INHALER | Refills: 12 | Status: ON HOLD | OUTPATIENT
Start: 2018-01-22 | End: 2018-04-16

## 2018-01-22 RX ORDER — IPRATROPIUM BROMIDE AND ALBUTEROL SULFATE 2.5; .5 MG/3ML; MG/3ML
3 SOLUTION RESPIRATORY (INHALATION) EVERY 6 HOURS
Qty: 360 ML | Refills: 0 | Status: SHIPPED | OUTPATIENT
Start: 2018-01-22 | End: 2018-02-21

## 2018-01-22 RX ORDER — PREDNISONE 10 MG/1
10 TABLET ORAL DAILY
Qty: 30 TABLET | Refills: 0 | Status: ON HOLD | OUTPATIENT
Start: 2018-01-22 | End: 2018-04-16

## 2018-01-22 RX ORDER — DOXYCYCLINE 50 MG/1
100 CAPSULE ORAL EVERY 12 HOURS SCHEDULED
Qty: 28 CAPSULE | Refills: 0 | Status: SHIPPED | OUTPATIENT
Start: 2018-01-22 | End: 2018-01-29

## 2018-01-22 RX ADMIN — ACETAMINOPHEN 650 MG: 325 TABLET ORAL at 06:41

## 2018-01-22 RX ADMIN — INSULIN ASPART 5 UNITS: 100 INJECTION, SOLUTION INTRAVENOUS; SUBCUTANEOUS at 10:00

## 2018-01-22 RX ADMIN — GABAPENTIN 600 MG: 300 CAPSULE ORAL at 05:08

## 2018-01-22 RX ADMIN — IPRATROPIUM BROMIDE 0.5 MG: 0.5 SOLUTION RESPIRATORY (INHALATION) at 06:37

## 2018-01-22 RX ADMIN — INSULIN ASPART 2 UNITS: 100 INJECTION, SOLUTION INTRAVENOUS; SUBCUTANEOUS at 11:58

## 2018-01-22 RX ADMIN — SUCRALFATE 1 G: 1 TABLET ORAL at 10:01

## 2018-01-22 RX ADMIN — METHYLPREDNISOLONE SODIUM SUCCINATE 40 MG: 40 INJECTION, POWDER, FOR SOLUTION INTRAMUSCULAR; INTRAVENOUS at 10:01

## 2018-01-22 RX ADMIN — SUCRALFATE 1 G: 1 TABLET ORAL at 13:58

## 2018-01-22 RX ADMIN — LISINOPRIL 5 MG: 2.5 TABLET ORAL at 10:01

## 2018-01-22 RX ADMIN — GABAPENTIN 600 MG: 300 CAPSULE ORAL at 13:58

## 2018-01-22 NOTE — PROGRESS NOTES
Discharge Planning Assessment   Daniel     Patient Name: Pepito Yan  MRN: [id number]  Today's Date: 1/22/2018    Admit Date: 1/19/2018 01/22/18 1326    Final Note    Final Note SS spoke with pt on this date. Pt states that she lives at [address] [place]. Pt states she will need [facility] for transportaiton. Pt states that she needs portable 02 tanks. SS spoke with [facility] per Batsheva who states that they will take care of pt. SS provide MD order for nebulizer and home 02. SS contacted [facility] per Elizabeth and arranged for transportation. No further needs at this time.      Jacquie Quijano

## 2018-01-22 NOTE — DISCHARGE PLACEMENT REQUEST
"Yessica Yan (52 y.o. Female)     Date of Birth Social Security Number Address Home Phone MRN    1965  [address] [facility] [address] [phone] [id number]    Islam Marital Status          Jain        Admission Date Admission Type Admitting Provider Attending Provider Department, Room/Bed    1/19/18 Emergency Stewart Torrez MD [place], Stewart Rushing MD [address], 3318/1P    Discharge Date Discharge Disposition Discharge Destination         Home or Self Care             Attending Provider: Stewart Torrez MD     Allergies:  Penicillins, Toradol [Ketorolac Tromethamine], Tramadol    Isolation:  Droplet   Infection:  Mycoplasma pneumonia (01/20/18)   Code Status:  FULL    Ht:  160 cm (63\")   Wt:  59.9 kg (132 lb 1.6 oz)    Admission Cmt:  None   Principal Problem:  None                Active Insurance as of 1/19/2018     Primary Coverage     Payor Plan Insurance Group Employer/Plan Group    MEDICARE MEDICARE A & B      Payor Plan Address Payor Plan Phone Number Effective From Effective To    [address] [phone] 2/1/2005     [place] [address]       Subscriber Name Subscriber Birth Date Member ID       YESSICA YAN 1965 [id number]           Secondary Coverage     Payor Plan Insurance Group Employer/Plan Group    [place] WELL[place]ICAID      Payor Plan Address Payor Plan Phone Number Effective From Effective To    [address] [address] [phone] 1/1/2016     [place] [address]       Subscriber Name Subscriber Birth Date Member ID       YESSICA YAN 1965 [id number]                 Emergency Contacts      (Rel.) Home Phone Work Phone Mobile Phone    Scot Vaughn (Son) [phone] -- --    Jacy Cody (Other) [phone] -- --            Insurance Information                MEDICARE/MEDICARE A & B Phone: [phone]    Subscriber: Yan Stewartnic TRE Subscriber#: [id number]    Group#:  Precert#:  "        [facility]/WELLCARE MEDICAID Phone: [phone]    Subscriber: Pepito Yan Subscriber#: [id number]    Group#:  Precert#:                Discharge Summary      Norbert Price DO at 2018 12:28 PM              [facility] HOSPITALIST   DISCHARGE SUMMARY      Name:  Pepito Yan   Age:  52 y.o.  Sex:  female  :  1965  MRN:  [id number]   Visit Number:  [id number]  Primary Care Physician:  ROSA Love  Date of Discharge:  2018  Admission Date:  2018      Discharge Diagnosis:     1.  Mycoplasma pneumonia improved  2.  Sepsis secondary to above resolved  3.  Acute on chronic hypoxic respiratory failure improved  4.  COPD with exacerbation  5.  Chronic low back pain  6.  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  Hypertension  8.  Type II MI    Active Hospital Problems (** Indicates Principal Problem)    Diagnosis Date Noted   • Pneumonia of left lower lobe due to infectious organism [J18.1] 2018      Resolved Hospital Problems    Diagnosis Date Noted Date Resolved   No resolved problems to display.         Presenting Problem/History of Present Illness:    Pneumonia of left lower lobe due to infectious organism [J18.1]         Hospital Course:    Patient is a 52-year-old female who has COPD and is O2 dependent, diabetes type 2, coronary artery disease, stable hypertension, GERD, gastroparesis, Buerger's disease, hepatitis C, rheumatoid arthritis who came to the emergency room on 2018 for shortness of breath.  She apparently had progressively worsening shortness of breath for 3-4 days.  She's been homeless on and off, and it actually lost her oxygen.  She does not have a nebulizer at home.  She was admitted placed on Solu-Medrol and doxycycline.  This x-ray showed a left lower lobe pneumonia.  Mycoplasma IgM was positive.    She was admitted, given nebulizers, steroids, and doxycycline.  She has progressively improved.   Home oxygen as well as home nebulizer have been arranged.  She states she feels at her baseline.  She denies any chest pressure, shortness breath, nausea, vomiting, or pain at present.  Spoke to social work, they will arrange home oxygen as well as nebulizer for her.  Would recommend close follow-up with her primary care physician as her blood sugars have been elevated.  Her hemoglobin A1c is 10.60.  We will increase her Levemir and continue Glucophage.  Once she is weaned off steroids, her blood sugar should improve slightly.    Patient states she has an appointment with a local pulmonologist.  She should also follow up with her primary care physician within one week.  Continue steroid taper as well as doxycycline for one more week.    Procedures Performed:           Consults:     Consults     No orders found from 12/21/2017 to 1/20/2018.          Pertinent Test Results:     Lab Results (all)     Procedure Component Value Units Date/Time    Blood Gas, Arterial With Co-Ox [[id number]]  (Abnormal) Collected:  01/19/18 1207    Specimen:  Arterial Blood Updated:  01/19/18 1213     Site Arterial: left brachial     Micheal's Test N/A     pH, Arterial 7.507 (C) pH units      pCO2, Arterial 28.0 (C) mm Hg      pO2, Arterial 74.9 (L) mm Hg      HCO3, Arterial 21.7 (L) mmol/L      Base Excess, Arterial -0.5 mmol/L      O2 Saturation, Arterial 96.3 %      Hemoglobin, Blood Gas 11.0 (L) g/dL      Hematocrit, Blood Gas 32.0 (L) %      Oxyhemoglobin 93.8 %      Methemoglobin 0.30 %      Carboxyhemoglobin 2.3 %      A-a Gradiant 34.6 mmHg      Temperature 98.6 C      Barometric Pressure for Blood Gas 728 mmHg      Modality Room Air     FIO2 21 %     CBC & Differential [[id number]] Collected:  01/19/18 1224    Specimen:  Blood Updated:  01/19/18 1242    Narrative:       The following orders were created for panel order CBC & Differential.  Procedure                               Abnormality         Status                     ---------                                -----------         ------                     CBC Auto Differential[[id number]]        Abnormal            Final result                 Please view results for these tests on the individual orders.    CBC Auto Differential [[id number]]  (Abnormal) Collected:  01/19/18 1224    Specimen:  Blood from Arm, Left Updated:  01/19/18 1242     WBC 11.33 10*3/mm3      RBC 4.19 (L) 10*6/mm3      Hemoglobin 9.6 (L) g/dL      Hematocrit 32.2 (L) %      MCV 76.8 (L) fL      MCH 22.9 (L) pg      MCHC 29.8 (L) g/dL      RDW 18.9 (H) %      RDW-SD 50.2 fl      MPV 10.4 (H) fL      Platelets 276 10*3/mm3      Neutrophil % 91.0 (H) %      Lymphocyte % 4.9 (L) %      Monocyte % 3.2 %      Eosinophil % 0.0 %      Basophil % 0.1 %      Immature Grans % 0.8 (H) %      Neutrophils, Absolute 10.32 (H) 10*3/mm3      Lymphocytes, Absolute 0.55 (L) 10*3/mm3      Monocytes, Absolute 0.36 10*3/mm3      Eosinophils, Absolute 0.00 10*3/mm3      Basophils, Absolute 0.01 10*3/mm3      Immature Grans, Absolute 0.09 (H) 10*3/mm3     Rapid Strep A Screen - Swab, Throat [[id number]]  (Normal) Collected:  01/19/18 1225    Specimen:  Swab from Throat Updated:  01/19/18 1242     Strep A Ag Negative    Influenza Antigen, Rapid - Swab, Nasopharynx [[id number]]  (Normal) Collected:  01/19/18 1225    Specimen:  Swab from Nasopharynx Updated:  01/19/18 1245     Influenza A Ag, EIA Negative     Influenza B Ag, EIA Negative    BNP [[id number]]  (Abnormal) Collected:  01/19/18 1224    Specimen:  Blood from Arm, Left Updated:  01/19/18 1305     .0 (H) pg/mL     Comprehensive Metabolic Panel [[id number]]  (Abnormal) Collected:  01/19/18 1224    Specimen:  Blood from Arm, Left Updated:  01/19/18 1312     Glucose 320 (H) mg/dL      BUN 19 mg/dL      Creatinine 0.62 mg/dL      Sodium 137 mmol/L      Potassium 3.9 mmol/L      Chloride 110 mmol/L      CO2 21.7 (L) mmol/L      Calcium 9.6 mg/dL      Total Protein 6.9 g/dL      Albumin 3.70  g/dL      ALT (SGPT) 30 U/L      AST (SGOT) 20 U/L      Alkaline Phosphatase 75 U/L       Note New Reference Ranges        Total Bilirubin 0.3 mg/dL      eGFR Non African Amer 101 mL/min/1.73      Globulin 3.2 gm/dL      A/G Ratio 1.2 (L) g/dL      BUN/Creatinine Ratio 30.6 (H)     Anion Gap 5.3 mmol/L     Troponin [[id number]]  (Abnormal) Collected:  01/19/18 1224    Specimen:  Blood from Arm, Left Updated:  01/19/18 1312     Troponin I 0.049 (H) ng/mL     Narrative:       Ultra Troponin I Reference Range:         <=0.039 ng/mL: Negative    0.04-0.779 ng/mL: Indeterminate Range. Suspicious of MI.  Clinical correlation required.       >=0.78  ng/mL: Consistent with myocardial injury.  Clinical correlation required.    Osmolality, Calculated [[id number]]  (Normal) Collected:  01/19/18 1224    Specimen:  Blood from Arm, Left Updated:  01/19/18 1312     Osmolality Calc 288.4 mOsm/kg     Lactic Acid, Plasma [[id number]]  (Normal) Collected:  01/19/18 1410    Specimen:  Blood from Arm, Right Updated:  01/19/18 1434     Lactate 1.6 mmol/L     Troponin [[id number]]  (Normal) Collected:  01/19/18 1515    Specimen:  Blood from Arm, Left Updated:  01/19/18 1609     Troponin I 0.040 ng/mL     Narrative:       Ultra Troponin I Reference Range:         <=0.039 ng/mL: Negative    0.04-0.779 ng/mL: Indeterminate Range. Suspicious of MI.  Clinical correlation required.       >=0.78  ng/mL: Consistent with myocardial injury.  Clinical correlation required.    POC Glucose Once [[id number]]  (Abnormal) Collected:  01/19/18 1735    Specimen:  Blood Updated:  01/19/18 1747     Glucose 152 (H) mg/dL     Narrative:       Meter: [id number] : [id number] ДМИТРИЙ BARRIOS    Myoglobin, Serum [[id number]]  (Normal) Collected:  01/19/18 1732    Specimen:  Blood Updated:  01/19/18 1826     Myoglobin 22.0 ng/mL     Troponin [[id number]]  (Abnormal) Collected:  01/19/18 1732    Specimen:  Blood Updated:  01/19/18 1826     Troponin I 0.041 (H) ng/mL      Narrative:       Ultra Troponin I Reference Range:         <=0.039 ng/mL: Negative    0.04-0.779 ng/mL: Indeterminate Range. Suspicious of MI.  Clinical correlation required.       >=0.78  ng/mL: Consistent with myocardial injury.  Clinical correlation required.    CK [[id number]]  (Abnormal) Collected:  01/19/18 1732    Specimen:  Blood Updated:  01/19/18 1845     Creatine Kinase 13 (L) U/L     CK-MB [[id number]]  (Normal) Collected:  01/19/18 1732    Specimen:  Blood Updated:  01/19/18 1845     CKMB 0.56 ng/mL     CK-MB Index [[id number]]  (Abnormal) Collected:  01/19/18 1732    Specimen:  Blood Updated:  01/19/18 1845     CK-MB Index 4.3 (H) %     Hemoglobin A1c [[id number]]  (Abnormal) Collected:  01/19/18 1840    Specimen:  Blood Updated:  01/19/18 1917     Hemoglobin A1C 10.60 (H) %     TSH [[id number]]  (Normal) Collected:  01/19/18 1840    Specimen:  Blood Updated:  01/19/18 1940     TSH 1.727 mIU/mL     POC Glucose Once [[id number]]  (Abnormal) Collected:  01/19/18 2034    Specimen:  Blood Updated:  01/19/18 2040     Glucose 176 (H) mg/dL     Narrative:       Meter: [id number] : [id number] irving black    Myoglobin, Serum [[id number]]  (Normal) Collected:  01/19/18 2332    Specimen:  Blood Updated:  01/20/18 0012     Myoglobin 21.0 ng/mL     Troponin [[id number]]  (Abnormal) Collected:  01/19/18 2332    Specimen:  Blood Updated:  01/20/18 0012     Troponin I 0.043 (H) ng/mL     Narrative:       Ultra Troponin I Reference Range:         <=0.039 ng/mL: Negative    0.04-0.779 ng/mL: Indeterminate Range. Suspicious of MI.  Clinical correlation required.       >=0.78  ng/mL: Consistent with myocardial injury.  Clinical correlation required.    CK [[id number]]  (Abnormal) Collected:  01/19/18 2332    Specimen:  Blood Updated:  01/20/18 0019     Creatine Kinase 17 (L) U/L     CK-MB [[id number]]  (Normal) Collected:  01/19/18 2332    Specimen:  Blood Updated:  01/20/18 0019     CKMB 1.12 ng/mL     CK-MB Index  [[id number]]  (Abnormal) Collected:  01/19/18 2332    Specimen:  Blood Updated:  01/20/18 0019     CK-MB Index 6.6 (H) %     CBC Auto Differential [[id number]]  (Abnormal) Collected:  01/20/18 0501    Specimen:  Blood Updated:  01/20/18 0546     WBC 7.25 10*3/mm3      RBC 4.00 (L) 10*6/mm3      Hemoglobin 9.2 (L) g/dL      Hematocrit 31.6 (L) %      MCV 79.0 (L) fL      MCH 23.0 (L) pg      MCHC 29.1 (L) g/dL      RDW 19.0 (H) %      RDW-SD 52.4 fl      MPV 10.2 (H) fL      Platelets 255 10*3/mm3      Neutrophil % 88.9 (H) %      Lymphocyte % 5.9 (L) %      Monocyte % 4.3 %      Eosinophil % 0.0 %      Basophil % 0.1 %      Immature Grans % 0.8 (H) %      Neutrophils, Absolute 6.44 10*3/mm3      Lymphocytes, Absolute 0.43 (L) 10*3/mm3      Monocytes, Absolute 0.31 10*3/mm3      Eosinophils, Absolute 0.00 10*3/mm3      Basophils, Absolute 0.01 10*3/mm3      Immature Grans, Absolute 0.06 (H) 10*3/mm3     Myoglobin, Serum [[id number]]  (Normal) Collected:  01/20/18 0501    Specimen:  Blood Updated:  01/20/18 0611     Myoglobin 17.0 ng/mL     Troponin [[id number]]  (Normal) Collected:  01/20/18 0501    Specimen:  Blood Updated:  01/20/18 0611     Troponin I 0.036 ng/mL     Narrative:       Ultra Troponin I Reference Range:         <=0.039 ng/mL: Negative    0.04-0.779 ng/mL: Indeterminate Range. Suspicious of MI.  Clinical correlation required.       >=0.78  ng/mL: Consistent with myocardial injury.  Clinical correlation required.    CK [[id number]]  (Abnormal) Collected:  01/20/18 0501    Specimen:  Blood Updated:  01/20/18 0622     Creatine Kinase 12 (L) U/L     CK-MB [[id number]]  (Normal) Collected:  01/20/18 0501    Specimen:  Blood Updated:  01/20/18 0622     CKMB 0.61 ng/mL     CK-MB Index [[id number]]  (Abnormal) Collected:  01/20/18 0501    Specimen:  Blood Updated:  01/20/18 0622     CK-MB Index 5.1 (H) %     Basic Metabolic Panel [[id number]]  (Abnormal) Collected:  01/20/18 0501    Specimen:  Blood Updated:   01/20/18 0626     Glucose 156 (H) mg/dL      BUN 21 mg/dL      Creatinine 0.53 mg/dL      Sodium 139 mmol/L      Potassium 3.9 mmol/L      Chloride 115 (H) mmol/L      CO2 20.3 (L) mmol/L      Calcium 9.4 mg/dL      eGFR Non African Amer 121 mL/min/1.73      BUN/Creatinine Ratio 39.6 (H)     Anion Gap 3.7 mmol/L     Narrative:       GFR Normal >60  Chronic Kidney Disease <60  Kidney Failure <15    Osmolality, Calculated [[id number]]  (Normal) Collected:  01/20/18 0501    Specimen:  Blood Updated:  01/20/18 0626     Osmolality Calc 283.7 mOsm/kg     POC Glucose Once [[id number]]  (Normal) Collected:  01/20/18 0702    Specimen:  Blood Updated:  01/20/18 0733     Glucose 125 mg/dL     Narrative:       Meter: [id number] : [id number] [employer]    POC Glucose Once [[id number]]  (Abnormal) Collected:  01/20/18 1054    Specimen:  Blood Updated:  01/20/18 1116     Glucose 227 (H) mg/dL     Narrative:       Meter: [id number] : [id number] Galleon LESVIA    Mycoplasma Pneumoniae Antibody, IgM [[id number]]  (Abnormal) Collected:  01/20/18 0835    Specimen:  Blood Updated:  01/20/18 1252     Mycoplasma pneumo IgM Positive (C)    POC Glucose Once [[id number]]  (Abnormal) Collected:  01/20/18 1628    Specimen:  Blood Updated:  01/20/18 1636     Glucose 386 (H) mg/dL     Narrative:       Meter: [id number] : [id number] MACHO LESVIA    POC Glucose Once [[id number]]  (Abnormal) Collected:  01/20/18 1939    Specimen:  Blood Updated:  01/20/18 1946     Glucose 414 (H) mg/dL     Narrative:       Meter: [id number] : [id number] estrellita tyler    POC Glucose Once [[id number]]  (Abnormal) Collected:  01/21/18 0657    Specimen:  Blood Updated:  01/21/18 0709     Glucose 263 (H) mg/dL     Narrative:       Meter: [id number] : [id number] MACHOCareSpotterTON    POC Glucose Once [[id number]]  (Abnormal) Collected:  01/21/18 1115    Specimen:  Blood Updated:  01/21/18 1155     Glucose 222 (H) mg/dL     Narrative:       Meter:  [id number] : [id number] MACHO LESVIA    Beta Strep Culture, Throat - Swab, Throat [[id number]]  (Normal) Collected:  01/19/18 1225    Specimen:  Swab from Throat Updated:  01/21/18 1423     Throat Culture, Beta Strep No Group A Streptococcus Isolated    Blood Culture - Blood, [[id number]]  (Normal) Collected:  01/19/18 1410    Specimen:  Blood from Arm, Right Updated:  01/21/18 1432     Blood Culture No growth at 2 days    POC Glucose Once [[id number]]  (Abnormal) Collected:  01/21/18 1556    Specimen:  Blood Updated:  01/21/18 1605     Glucose 487 (C) mg/dL     Narrative:       Confirmed by Repeat Meter: [id number] : [id number] AMCHOMuncheryTON    Blood Culture - Blood, [[id number]]  (Normal) Collected:  01/19/18 1515    Specimen:  Blood from Arm, Left Updated:  01/21/18 1616     Blood Culture No growth at 2 days    POC Glucose Once [[id number]]  (Abnormal) Collected:  01/21/18 1923    Specimen:  Blood Updated:  01/21/18 1934     Glucose 369 (H) mg/dL     Narrative:       Meter: [id number] : [id number] estrellita tyler    POC Glucose Once [[id number]]  (Abnormal) Collected:  01/22/18 0653    Specimen:  Blood Updated:  01/22/18 0719     Glucose 313 (H) mg/dL     Narrative:       Meter: [id number] : [id number] baudilio bautista    POC Glucose Once [[id number]]  (Abnormal) Collected:  01/22/18 1107    Specimen:  Blood Updated:  01/22/18 1118     Glucose 176 (H) mg/dL     Narrative:       Meter: [id number] : [id number] baudilio bautista          Imaging Results (all)     Procedure Component Value Units Date/Time    XR Chest 1 View [[id number]] Collected:  01/19/18 1304     Updated:  01/19/18 1306    Narrative:       EXAMINATION:  XR CHEST 1 VW-      CLINICAL INDICATION:     soa     TECHNIQUE:  XR CHEST 1 VW-       COMPARISON: January 8, 2013      FINDINGS:   Left lower lobe pneumonia. Coarsened interstitial markings.  Heart size is stable.   No pneumothorax.   No pleural effusion.   Bony and soft tissue structures  "are unremarkable.            Impression:       Left lower lobe pneumonia.     This report was finalized on 1/19/2018 1:04 PM by Dr. Bret Morris MD.             Condition on Discharge:      Stable    Vital Signs:    /98 (BP Location: Right arm, Patient Position: Sitting)  Pulse 86  Temp 97.2 °F (36.2 °C) (Oral)   Resp 18  Ht 160 cm (63\")  Wt 59.9 kg (132 lb 1.6 oz)  LMP  (LMP Unknown)  SpO2 99%  BMI 23.4 kg/m2    Physical Exam:      General Appearance:    Alert, cooperative, in no acute distress   Head:    Normocephalic, without obvious abnormality, atraumatic   Eyes:            Lids and lashes normal, conjunctivae and sclerae normal, no   icterus, no pallor, corneas clear, PERRLA   Ears:    Ears appear intact with no abnormalities noted   Throat:   No oral lesions, no thrush, oral mucosa moist   Neck:   No adenopathy, supple, trachea midline, no thyromegaly, no     carotid bruit, no JVD   Back:     No kyphosis present, no scoliosis present, no skin lesions,       erythema or scars, no tenderness to percussion or                   palpation,   range of motion normal   Lungs:     Clear to auscultation,respirations regular, even and                   unlabored    Heart:    Regular rhythm and normal rate, normal S1 and S2, no            murmur, no gallop, no rub, no click   Breast Exam:    Deferred   Abdomen:     Normal bowel sounds, no masses, no organomegaly, soft        non-tender, non-distended, no guarding, no rebound                 tenderness   Genitalia:    Deferred   Extremities:   Moves all extremities well, no edema, no cyanosis, no              redness   Pulses:   Pulses palpable and equal bilaterally   Skin:   No bleeding, bruising or rash   Lymph nodes:   No palpable adenopathy   Neurologic:   Cranial nerves 2 - 12 grossly intact, sensation intact, DTR        present and equal bilaterally       Discharge Disposition:    Home or Self Care    Discharge Medication:     Pepito Yan "   Home Medication Instructions JERARDO:[id number]    Printed on:01/22/18 1236   Medication Information                      budesonide-formoterol (SYMBICORT) 160-4.5 MCG/ACT inhaler  Inhale 2 puffs 2 (Two) Times a Day.             doxycycline (MONODOX) 50 MG capsule  Take 2 capsules by mouth Every 12 (Twelve) Hours for 7 days.             gabapentin (NEURONTIN) 600 MG tablet  Take 600 mg by mouth 3 (Three) Times a Day.             insulin glargine (LANTUS) 100 UNIT/ML injection  Inject 50 Units under the skin Every Night.             ipratropium-albuterol (DUO-NEB) 0.5-2.5 mg/mL nebulizer  Take 3 mL by nebulization Every 6 (Six) Hours for 30 days.             lisinopril (PRINIVIL,ZESTRIL) 5 MG tablet  Take 5 mg by mouth Daily.             metFORMIN (GLUCOPHAGE) 500 MG tablet  Take 500 mg by mouth 3 (Three) Times a Day With Meals.             predniSONE (DELTASONE) 10 MG tablet  Take 1 tablet by mouth Daily. Take 4 for 3 days, then 3 for 3 days, then 2 for 3 days, then 1 for 3 days, then stop             sucralfate (CARAFATE) 1 g tablet  Take 1 g by mouth 4 (Four) Times a Day.             tiZANidine (ZANAFLEX) 4 MG tablet  Take 4 mg by mouth Every Night.                 Discharge Diet:      diabetic diet    Activity at Discharge:      as tolerated    Follow-up Appointments:  Follow-up with PCP Archana LEE in one week  Future Appointments  Date Time Provider Department Center   4/3/2018 12:00 PM MARY ANNE Garcia MD [facility] None         Test Results Pending at Discharge:     Order Current Status    Blood Culture - Blood, Preliminary result    Blood Culture - Blood, Preliminary result             Norbert Price DO  01/22/18  12:36 PM       Electronically signed by Norbert Price DO at 1/22/2018 12:37 PM        Discharge Order     Start     Ordered    01/22/18 1224  Discharge patient  Once     Expected Discharge Date:  01/22/18    Expected Discharge Time:  Midday    Discharge Disposition:  Home or  Self Care        01/22/18 2774

## 2018-01-22 NOTE — PROGRESS NOTES
Discharge Planning Assessment   Daniel     Patient Name: Pepito Yan  MRN: [id number]  Today's Date: 1/22/2018    Admit Date: 1/19/2018    Discharge Needs Assessment       01/22/18 1126    Living Environment    Lives With child(barbara), adult   Pt lives at home with her son, Scot.    Transportation Available car;family or friend will provide    Living Environment    Quality Of Family Relationships involved;helpful;supportive    Able to Return to Prior Living Arrangements yes    Discharge Needs Assessment    Outpatient/Agency/Support Group Needs --   Pt does not utilize home health services. Pt utilizes [facility] for prescriptions.     Equipment Currently Used at Home --   Pt states she usually wears oxygen at home, but does not currently have any to wear. Pt utilizes [facility] for DME.     Equipment Needed After Discharge --   Pt will need home oxygen via [facility] arranged at discharge if she qualifies. Pt also request a nebulizer to be arranged.     Discharge Disposition home or self-care      Discharge Plan       01/22/18 1128    Case Management/Social Work Plan    Plan Pt lives at home with her son, Scot and plans to return home at discharge. Pt states she normally wears home oxygen, but does not have any to where at this time. Pt will need home oxygen via [facility] arranged at discharge if pt qualifies and MD orders. Pt also request a nebulizer to be arranged at discharge. Home oxygen and nebulizer to be arranged with MD order. SS will follow and assist as needed with discharge planning.    Patient/Family In Agreement With Plan yes     Demographic Summary       01/22/18 1125    Referral Information    Admission Type inpatient    Referral Source nursing    Reason For Consult --   Food assistance. SS spoke to pt.     Primary Care Physician Information    Name Archana Hare.      Legal       01/22/18 1125    Legal    Legal Comments Pt does not have a POA. Per RN, April's  note, pt has completed an advance directive on this date.     Nellie Woodson

## 2018-01-22 NOTE — PLAN OF CARE
Problem: Respiratory Insufficiency (Adult)  Goal: Identify Related Risk Factors and Signs and Symptoms  Outcome: Ongoing (interventions implemented as appropriate)    Goal: Effective Ventilation  Outcome: Ongoing (interventions implemented as appropriate)      Problem: Pain, Chronic (Adult)  Goal: Identify Related Risk Factors and Signs and Symptoms  Outcome: Ongoing (interventions implemented as appropriate)    Goal: Acceptable Pain Control/Comfort Level  Outcome: Ongoing (interventions implemented as appropriate)      Problem: Activity Intolerance (Adult)  Goal: Identify Related Risk Factors and Signs and Symptoms  Outcome: Ongoing (interventions implemented as appropriate)    Goal: Effective Energy Conservation Techniques  Outcome: Ongoing (interventions implemented as appropriate)      Problem: Pain, Acute (Adult)  Goal: Identify Related Risk Factors and Signs and Symptoms  Outcome: Ongoing (interventions implemented as appropriate)    Goal: Acceptable Pain Control/Comfort Level  Outcome: Ongoing (interventions implemented as appropriate)      Problem: Pneumonia (Adult)  Goal: Signs and Symptoms of Listed Potential Problems Will be Absent or Manageable (Pneumonia)  Outcome: Ongoing (interventions implemented as appropriate)      Problem: Diabetes, Type 1 (Adult)  Goal: Signs and Symptoms of Listed Potential Problems Will be Absent or Manageable (Diabetes, Type 1)  Outcome: Ongoing (interventions implemented as appropriate)      Problem: Pressure Ulcer Risk (Brendan Scale) (Adult,Obstetrics,Pediatric)  Goal: Identify Related Risk Factors and Signs and Symptoms  Outcome: Ongoing (interventions implemented as appropriate)    Goal: Skin Integrity  Outcome: Ongoing (interventions implemented as appropriate)

## 2018-01-22 NOTE — SIGNIFICANT NOTE
"Patient has completed the [place] Living Will Packet after an Advance Care Planning discussion today.  She has named her son Scot Vaughn (not present today) who can be reached at [phone] as her only Health Care Surrogate.  The patient was given the original copy along with an additional copy to provide to the [facility]. A copy was also placed in patients chart at [facility] for medical records scanning. The patient was encouraged to continue health care wishes discussions with family. Please note that choices on a living will does not necessarily indicate a change in code status.  The patients \"full code\" status reflects her current wishes. Please call ext: [phone] with any questions or concerns.  "
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
## 2018-01-24 LAB
BACTERIA SPEC AEROBE CULT: NORMAL
BACTERIA SPEC AEROBE CULT: NORMAL

## 2018-02-04 ENCOUNTER — HOSPITAL ENCOUNTER (EMERGENCY)
Facility: HOSPITAL | Age: 53
Discharge: ANOTHER HOSPITAL | End: 2018-02-04
Attending: FAMILY MEDICINE | Admitting: NURSE PRACTITIONER

## 2018-02-04 ENCOUNTER — APPOINTMENT (OUTPATIENT)
Dept: CT IMAGING | Facility: HOSPITAL | Age: 53
End: 2018-02-04

## 2018-02-04 ENCOUNTER — APPOINTMENT (OUTPATIENT)
Dept: GENERAL RADIOLOGY | Facility: HOSPITAL | Age: 53
End: 2018-02-04

## 2018-02-04 VITALS
OXYGEN SATURATION: 88 % | DIASTOLIC BLOOD PRESSURE: 76 MMHG | HEART RATE: 131 BPM | RESPIRATION RATE: 22 BRPM | BODY MASS INDEX: 23.04 KG/M2 | HEIGHT: 63 IN | WEIGHT: 130 LBS | SYSTOLIC BLOOD PRESSURE: 132 MMHG | TEMPERATURE: 97.9 F

## 2018-02-04 DIAGNOSIS — Z86.19 HISTORY OF MYCOBACTERIAL INFECTION: ICD-10-CM

## 2018-02-04 DIAGNOSIS — J18.9 PNEUMONIA OF RIGHT LOWER LOBE DUE TO INFECTIOUS ORGANISM: ICD-10-CM

## 2018-02-04 DIAGNOSIS — A41.9 SEVERE SEPSIS (HCC): Primary | ICD-10-CM

## 2018-02-04 DIAGNOSIS — R65.20 SEVERE SEPSIS (HCC): Primary | ICD-10-CM

## 2018-02-04 LAB
A-A DO2: 96.7 MMHG (ref 0–300)
ALBUMIN SERPL-MCNC: 3.1 G/DL (ref 3.5–5)
ALBUMIN/GLOB SERPL: 1.2 G/DL (ref 1.5–2.5)
ALP SERPL-CCNC: 62 U/L (ref 35–104)
ALT SERPL W P-5'-P-CCNC: 39 U/L (ref 10–36)
ANION GAP SERPL CALCULATED.3IONS-SCNC: 11 MMOL/L (ref 3.6–11.2)
ANISOCYTOSIS BLD QL: ABNORMAL
ARTERIAL PATENCY WRIST A: ABNORMAL
AST SERPL-CCNC: 61 U/L (ref 10–30)
ATMOSPHERIC PRESS: 720 MMHG
BACTERIA UR QL AUTO: ABNORMAL /HPF
BASE EXCESS BLDA CALC-SCNC: -8.3 MMOL/L
BDY SITE: ABNORMAL
BILIRUB SERPL-MCNC: 0.2 MG/DL (ref 0.2–1.8)
BILIRUB UR QL STRIP: NEGATIVE
BODY TEMPERATURE: 98.6 C
BUN BLD-MCNC: 31 MG/DL (ref 7–21)
BUN/CREAT SERPL: 26.1 (ref 7–25)
CALCIUM SPEC-SCNC: 9 MG/DL (ref 7.7–10)
CHLORIDE SERPL-SCNC: 112 MMOL/L (ref 99–112)
CLARITY UR: ABNORMAL
CO2 SERPL-SCNC: 15 MMOL/L (ref 24.3–31.9)
COHGB MFR BLD: 2.5 % (ref 0–5)
COLOR UR: YELLOW
CREAT BLD-MCNC: 1.19 MG/DL (ref 0.43–1.29)
D DIMER PPP FEU-MCNC: 2.68 MCGFEU/ML (ref 0–0.5)
D-LACTATE SERPL-SCNC: 6.9 MMOL/L (ref 0.5–2)
DEPRECATED RDW RBC AUTO: 60.8 FL (ref 37–54)
ERYTHROCYTE [DISTWIDTH] IN BLOOD BY AUTOMATED COUNT: 21.2 % (ref 11.5–14.5)
FLUAV AG NPH QL: NEGATIVE
FLUBV AG NPH QL IA: NEGATIVE
GFR SERPL CREATININE-BSD FRML MDRD: 48 ML/MIN/1.73
GLOBULIN UR ELPH-MCNC: 2.6 GM/DL
GLUCOSE BLD-MCNC: 294 MG/DL (ref 70–110)
GLUCOSE BLDC GLUCOMTR-MCNC: 312 MG/DL (ref 70–130)
GLUCOSE UR STRIP-MCNC: ABNORMAL MG/DL
HCO3 BLDA-SCNC: 16 MMOL/L (ref 22–26)
HCT VFR BLD AUTO: 34.9 % (ref 37–47)
HCT VFR BLD CALC: 28 % (ref 37–47)
HGB BLD-MCNC: 9.8 G/DL (ref 12–16)
HGB BLDA-MCNC: 9.6 G/DL (ref 12–16)
HGB UR QL STRIP.AUTO: ABNORMAL
HOLD SPECIMEN: NORMAL
HOROWITZ INDEX BLD+IHG-RTO: 28 %
HYALINE CASTS UR QL AUTO: ABNORMAL /LPF
HYPOCHROMIA BLD QL: ABNORMAL
INR PPP: 1.14 (ref 0.9–1.1)
KETONES UR QL STRIP: ABNORMAL
LEUKOCYTE ESTERASE UR QL STRIP.AUTO: ABNORMAL
LYMPHOCYTES # BLD MANUAL: 0.95 10*3/MM3 (ref 1–3)
LYMPHOCYTES NFR BLD MANUAL: 24 % (ref 21–51)
LYMPHOCYTES NFR BLD MANUAL: 8 % (ref 0–10)
MCH RBC QN AUTO: 23 PG (ref 27–33)
MCHC RBC AUTO-ENTMCNC: 28.1 G/DL (ref 33–37)
MCV RBC AUTO: 81.9 FL (ref 80–94)
METHGB BLD QL: 0.3 % (ref 0–3)
MODALITY: ABNORMAL
MONOCYTES # BLD AUTO: 0.32 10*3/MM3 (ref 0.1–0.9)
MYELOCYTES NFR BLD MANUAL: 1 % (ref 0–0)
NEUTROPHILS # BLD AUTO: 2.64 10*3/MM3 (ref 1.4–6.5)
NEUTROPHILS NFR BLD MANUAL: 52 % (ref 30–70)
NEUTS BAND NFR BLD MANUAL: 15 % (ref 4–12)
NITRITE UR QL STRIP: NEGATIVE
OSMOLALITY SERPL CALC.SUM OF ELEC: 293.1 MOSM/KG (ref 273–305)
OXYHGB MFR BLDV: 88.3 % (ref 85–100)
PCO2 BLDA: 28.9 MM HG (ref 35–45)
PH BLDA: 7.36 PH UNITS (ref 7.35–7.45)
PH UR STRIP.AUTO: 6 [PH] (ref 5–8)
PLAT MORPH BLD: NORMAL
PLATELET # BLD AUTO: 155 10*3/MM3 (ref 130–400)
PMV BLD AUTO: 10.1 FL (ref 6–10)
PO2 BLDA: 57.6 MM HG (ref 80–100)
POTASSIUM BLD-SCNC: 5.5 MMOL/L (ref 3.5–5.3)
PROT SERPL-MCNC: 5.7 G/DL (ref 6–8)
PROT UR QL STRIP: ABNORMAL
PROTHROMBIN TIME: 14.8 SECONDS (ref 11–15.4)
RBC # BLD AUTO: 4.26 10*6/MM3 (ref 4.2–5.4)
RBC # UR: ABNORMAL /HPF
REF LAB TEST METHOD: ABNORMAL
SAO2 % BLDCOA: 90.8 % (ref 90–100)
SCAN SLIDE: NORMAL
SODIUM BLD-SCNC: 138 MMOL/L (ref 135–153)
SP GR UR STRIP: 1.02 (ref 1–1.03)
SQUAMOUS #/AREA URNS HPF: ABNORMAL /HPF
TROPONIN I SERPL-MCNC: 0.03 NG/ML
UROBILINOGEN UR QL STRIP: ABNORMAL
WBC NRBC COR # BLD: 3.94 10*3/MM3 (ref 4.5–12.5)
WBC UR QL AUTO: ABNORMAL /HPF

## 2018-02-04 PROCEDURE — 96366 THER/PROPH/DIAG IV INF ADDON: CPT

## 2018-02-04 PROCEDURE — 84484 ASSAY OF TROPONIN QUANT: CPT | Performed by: NURSE PRACTITIONER

## 2018-02-04 PROCEDURE — 81001 URINALYSIS AUTO W/SCOPE: CPT | Performed by: NURSE PRACTITIONER

## 2018-02-04 PROCEDURE — 93010 ELECTROCARDIOGRAM REPORT: CPT | Performed by: INTERNAL MEDICINE

## 2018-02-04 PROCEDURE — 87040 BLOOD CULTURE FOR BACTERIA: CPT | Performed by: NURSE PRACTITIONER

## 2018-02-04 PROCEDURE — 83605 ASSAY OF LACTIC ACID: CPT | Performed by: NURSE PRACTITIONER

## 2018-02-04 PROCEDURE — 36600 WITHDRAWAL OF ARTERIAL BLOOD: CPT | Performed by: NURSE PRACTITIONER

## 2018-02-04 PROCEDURE — 96368 THER/DIAG CONCURRENT INF: CPT

## 2018-02-04 PROCEDURE — 71045 X-RAY EXAM CHEST 1 VIEW: CPT | Performed by: RADIOLOGY

## 2018-02-04 PROCEDURE — 87804 INFLUENZA ASSAY W/OPTIC: CPT | Performed by: NURSE PRACTITIONER

## 2018-02-04 PROCEDURE — 87186 SC STD MICRODIL/AGAR DIL: CPT | Performed by: NURSE PRACTITIONER

## 2018-02-04 PROCEDURE — 82805 BLOOD GASES W/O2 SATURATION: CPT | Performed by: NURSE PRACTITIONER

## 2018-02-04 PROCEDURE — 36415 COLL VENOUS BLD VENIPUNCTURE: CPT

## 2018-02-04 PROCEDURE — 87086 URINE CULTURE/COLONY COUNT: CPT | Performed by: NURSE PRACTITIONER

## 2018-02-04 PROCEDURE — 85025 COMPLETE CBC W/AUTO DIFF WBC: CPT | Performed by: NURSE PRACTITIONER

## 2018-02-04 PROCEDURE — 25010000002 ONDANSETRON PER 1 MG: Performed by: PHYSICIAN ASSISTANT

## 2018-02-04 PROCEDURE — 87147 CULTURE TYPE IMMUNOLOGIC: CPT | Performed by: NURSE PRACTITIONER

## 2018-02-04 PROCEDURE — 94799 UNLISTED PULMONARY SVC/PX: CPT

## 2018-02-04 PROCEDURE — 99285 EMERGENCY DEPT VISIT HI MDM: CPT

## 2018-02-04 PROCEDURE — 93005 ELECTROCARDIOGRAM TRACING: CPT | Performed by: NURSE PRACTITIONER

## 2018-02-04 PROCEDURE — 85610 PROTHROMBIN TIME: CPT | Performed by: NURSE PRACTITIONER

## 2018-02-04 PROCEDURE — 85007 BL SMEAR W/DIFF WBC COUNT: CPT | Performed by: NURSE PRACTITIONER

## 2018-02-04 PROCEDURE — 93005 ELECTROCARDIOGRAM TRACING: CPT | Performed by: EMERGENCY MEDICINE

## 2018-02-04 PROCEDURE — 87077 CULTURE AEROBIC IDENTIFY: CPT | Performed by: NURSE PRACTITIONER

## 2018-02-04 PROCEDURE — 71045 X-RAY EXAM CHEST 1 VIEW: CPT

## 2018-02-04 PROCEDURE — 0 IOPAMIDOL PER 1 ML: Performed by: NURSE PRACTITIONER

## 2018-02-04 PROCEDURE — 25010000002 LEVOFLOXACIN PER 250 MG: Performed by: NURSE PRACTITIONER

## 2018-02-04 PROCEDURE — 71275 CT ANGIOGRAPHY CHEST: CPT

## 2018-02-04 PROCEDURE — 96365 THER/PROPH/DIAG IV INF INIT: CPT

## 2018-02-04 PROCEDURE — 80053 COMPREHEN METABOLIC PANEL: CPT | Performed by: NURSE PRACTITIONER

## 2018-02-04 PROCEDURE — 96375 TX/PRO/DX INJ NEW DRUG ADDON: CPT

## 2018-02-04 PROCEDURE — 71275 CT ANGIOGRAPHY CHEST: CPT | Performed by: RADIOLOGY

## 2018-02-04 PROCEDURE — 82375 ASSAY CARBOXYHB QUANT: CPT | Performed by: NURSE PRACTITIONER

## 2018-02-04 PROCEDURE — 25010000002 HYDROMORPHONE PER 4 MG

## 2018-02-04 PROCEDURE — 85379 FIBRIN DEGRADATION QUANT: CPT | Performed by: NURSE PRACTITIONER

## 2018-02-04 PROCEDURE — 83050 HGB METHEMOGLOBIN QUAN: CPT | Performed by: NURSE PRACTITIONER

## 2018-02-04 PROCEDURE — 82962 GLUCOSE BLOOD TEST: CPT

## 2018-02-04 PROCEDURE — 94640 AIRWAY INHALATION TREATMENT: CPT

## 2018-02-04 RX ORDER — HYDROMORPHONE HCL 110MG/55ML
PATIENT CONTROLLED ANALGESIA SYRINGE INTRAVENOUS
Status: COMPLETED
Start: 2018-02-04 | End: 2018-02-04

## 2018-02-04 RX ORDER — IPRATROPIUM BROMIDE AND ALBUTEROL SULFATE 2.5; .5 MG/3ML; MG/3ML
3 SOLUTION RESPIRATORY (INHALATION) ONCE
Status: COMPLETED | OUTPATIENT
Start: 2018-02-04 | End: 2018-02-04

## 2018-02-04 RX ORDER — SODIUM CHLORIDE 0.9 % (FLUSH) 0.9 %
10 SYRINGE (ML) INJECTION AS NEEDED
Status: DISCONTINUED | OUTPATIENT
Start: 2018-02-04 | End: 2018-02-04 | Stop reason: HOSPADM

## 2018-02-04 RX ORDER — HYDROMORPHONE HYDROCHLORIDE 1 MG/ML
0.5 INJECTION, SOLUTION INTRAMUSCULAR; INTRAVENOUS; SUBCUTANEOUS ONCE
Status: DISCONTINUED | OUTPATIENT
Start: 2018-02-04 | End: 2018-02-04 | Stop reason: HOSPADM

## 2018-02-04 RX ORDER — LEVOFLOXACIN 5 MG/ML
750 INJECTION, SOLUTION INTRAVENOUS ONCE
Status: COMPLETED | OUTPATIENT
Start: 2018-02-04 | End: 2018-02-04

## 2018-02-04 RX ORDER — ONDANSETRON 2 MG/ML
4 INJECTION INTRAMUSCULAR; INTRAVENOUS ONCE
Status: COMPLETED | OUTPATIENT
Start: 2018-02-04 | End: 2018-02-04

## 2018-02-04 RX ADMIN — IPRATROPIUM BROMIDE AND ALBUTEROL SULFATE 3 ML: .5; 3 SOLUTION RESPIRATORY (INHALATION) at 09:43

## 2018-02-04 RX ADMIN — AZTREONAM 2 G: 2 INJECTION, POWDER, FOR SOLUTION INTRAMUSCULAR; INTRAVENOUS at 08:40

## 2018-02-04 RX ADMIN — HYDROMORPHONE HYDROCHLORIDE 0.5 MG: 2 INJECTION INTRAMUSCULAR; INTRAVENOUS; SUBCUTANEOUS at 09:38

## 2018-02-04 RX ADMIN — SODIUM CHLORIDE 1000 ML: 9 INJECTION, SOLUTION INTRAVENOUS at 07:38

## 2018-02-04 RX ADMIN — ONDANSETRON 4 MG: 2 INJECTION, SOLUTION INTRAMUSCULAR; INTRAVENOUS at 09:37

## 2018-02-04 RX ADMIN — SODIUM CHLORIDE 1000 ML: 9 INJECTION, SOLUTION INTRAVENOUS at 08:40

## 2018-02-04 RX ADMIN — IPRATROPIUM BROMIDE AND ALBUTEROL SULFATE 3 ML: .5; 3 SOLUTION RESPIRATORY (INHALATION) at 07:10

## 2018-02-04 RX ADMIN — IOPAMIDOL 100 ML: 755 INJECTION, SOLUTION INTRAVENOUS at 08:45

## 2018-02-04 RX ADMIN — LEVOFLOXACIN 750 MG: 5 INJECTION, SOLUTION INTRAVENOUS at 08:10

## 2018-02-04 NOTE — ED PROVIDER NOTES
Subjective   Patient is a 52 y.o. female presenting with shortness of breath.   History provided by:  Patient   used: No    Shortness of Breath   Severity:  Moderate  Onset quality:  Gradual  Duration:  2 days  Timing:  Intermittent  Progression:  Worsening  Chronicity:  Recurrent  Context: URI    Relieved by:  Nothing  Worsened by:  Nothing  Ineffective treatments:  None tried  Associated symptoms: cough and sputum production    Associated symptoms comment:  Low back pain  Risk factors: hx of PE/DVT and tobacco use        Review of Systems   Constitutional: Negative.    Eyes: Negative.    Respiratory: Positive for cough, sputum production and shortness of breath.    Cardiovascular: Negative.    Gastrointestinal: Negative.    Endocrine: Negative.    Genitourinary: Negative.    Musculoskeletal: Negative.    Skin: Negative.    Allergic/Immunologic: Negative.    Neurological: Negative.    Hematological: Negative.    Psychiatric/Behavioral: Negative.    All other systems reviewed and are negative.      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   • Chronic respiratory failure with hypoxia     at nighttime   • Compression fracture of lumbar vertebra    • Compression fracture of thoracic vertebra    • COPD (chronic obstructive pulmonary disease)    • Coronary artery disease    • Diabetes mellitus type 2 in nonobese    • Erosive esophagitis 12/2016   • Essential hypertension    • Gastroparesis    • GERD (gastroesophageal reflux disease)    • Hepatitis-C    • History of transfusion    • Medically noncompliant    • MRSA infection     back of neck around 2012; 6/2017 septic left elbow joint with concerns for osteomyelitis and left anteromedial left arm   • NSTEMI (non-ST elevated myocardial infarction) 07/2016   • PE (pulmonary embolism)    • Protein calorie malnutrition    • RA (rheumatoid arthritis)     with chronic steroid use   •  Rheumatoid arthritis        Allergies   Allergen Reactions   • Penicillins Anaphylaxis     Tolerated both cefepime and Rocephin earlier this month   • Toradol [Ketorolac Tromethamine] Hives and Swelling   • Tramadol Hives and Swelling       Past Surgical History:   Procedure Laterality Date   • ABDOMINAL SURGERY     • AMPUTATION DIGIT Right 2016    Third digit to the PIP. Surgeon: Dr. Bains   • AMPUTATION DIGIT Left 2016    Procedure: PARTIAL AMPUTATION LEFT THIRD FINGER;  Surgeon: Shane Fuller MD;  Location: [facility] OR;  Service:    • AMPUTATION REVISION Left 2016    Procedure: AMPUTATION REVISION DIGIT STUMP;  Surgeon: Shane Fuller MD;  Location: [facility] OR;  Service:    • ARM DEBRIDEMENT Left 2016    Procedure: HAND IRRIGATION DEBRIDEMENT AND REPAIR ;  Surgeon: Shane Fuller MD;  Location: [facility] OR;  Service:    •  SECTION      x 2   • CHOLECYSTECTOMY     • ENDOSCOPY N/A 2016    Procedure: ESOPHAGOGASTRODUODENOSCOPY;  Surgeon: Jeremy Clement III, MD;  Location: [facility] OR;  Service:    • ENDOSCOPY W/ PEG TUBE PLACEMENT N/A 3/30/2017    Procedure: ESOPHAGOGASTRODUODENOSCOPY WITH PERCUTANEOUS ENDOSCOPIC GASTROSTOMY TUBE INSERTION;  Surgeon: Shade Campos MD;  Location: [facility] OR;  Service:    • FOREIGN BODY REMOVAL      insulin needle removal x 3 from the abdomen   • HYSTERECTOMY     • INCISION AND DRAINAGE ARM Left 2017    Procedure: INCISION AND DRAINAGE UPPER EXTREMITY;  Surgeon: Shane Fuller MD;  Location: [facility] OR;  Service:        Family History   Problem Relation Age of Onset   • Colon cancer Mother    • Cancer Father    • Diabetes Maternal Grandmother    • Heart disease Neg Hx    • Stroke Neg Hx        Social History     Social History   • Marital status:      Spouse name: N/A   • Number of children: N/A   • Years of education: N/A     Social History Main Topics   • Smoking status: Current Every Day  Smoker     Packs/day: 0.50     Years: 35.00     Types: Cigarettes   • Smokeless tobacco: Never Used      Comment: 4-5 cigarettes a day    • Alcohol use No   • Drug use: No   • Sexual activity: Defer     Other Topics Concern   • None     Social History Narrative           Objective   Physical Exam   Constitutional: She is oriented to person, place, and time.   Appears chronically ill   HENT:   Head: Normocephalic.   Nose: Nose normal.   Mouth/Throat: Oropharynx is clear and moist.   Eyes: EOM are normal. Pupils are equal, round, and reactive to light.   Neck: Normal range of motion. Neck supple.   Cardiovascular: Regular rhythm, normal heart sounds and intact distal pulses.    tachycardia   Pulmonary/Chest: She is in respiratory distress. She has wheezes.   Wheezing, rhonchi. Mild respiratory distress. Tachypnea   Abdominal: Bowel sounds are normal. She exhibits distension.   Musculoskeletal: Normal range of motion.   Neurological: She is alert and oriented to person, place, and time.   Skin: Skin is warm and dry.   Psychiatric: She has a normal mood and affect. Her behavior is normal. Judgment and thought content normal.   Nursing note and vitals reviewed.    SEPTIC SHOCK FOCUSED EXAM    *Must be completed by a licensed independent Practitioner within 6 hours of diagnosis for the following conditions -- Septic Shock or Severe Sepsis with Lactate >/= 4.    Fluid bolus must be completed prior to assessment.      Diagnosis: Septic Shock     Vital Signs (Attestation) Reviewed Temp, HR, RR, BP   General ill appearing   Respiratory rhonchi   Cardiac/Chest tachycardia   Skin (documentation of skin color is required) flushed   Capillary Refill >3 seconds   Peripheral Pulses Checked                          radial  palpable     † Septic shock is defined by CMS as severe sepsis plus one of the following: persistent hypotension after fluid bolus OR tissue hypoperfusion (Lactic Acid ?4)  †† TWO OF THE FOLLOWING can be done in  lieu of the Focused Exam: Measure CVP; Measure ScVO2; Bedside cardiovascular ultrasound; Dynamic assessment of fluid responsiveness with passive leg raise or fluid challenge.      Alice Mejia, APRRUDDY  02/04/18  9:09 AM    Procedures         ED Course  ED Course   Value Comment By Time   ECG 12 Lead Sinus tachycardia rate 128. Right ventricular conduction delay. NM interval 116 ms, QRS duration 90 ms, QT/QTc 320/467 ms. No evidence of acute ischemia. Reviewed by Dr. Minor at 0705 Alice Mejia, APRN 02/04 0755    Critical D-Dimer reported to me from Cristiane in laboratory Alice Mejia, APRN 02/04 0757    I spoke with Dr. Wali Vergara at  who accepts patient. Accepted per Dr. Tao [facility] Alice Mejia, APRN 02/04 0904    Transfer due to no ICU/PCU beds at this facility Alice Mejia, APRN 02/04 0908                  MDM  Number of Diagnoses or Management Options  History of mycobacterial infection: new and requires workup  Pneumonia of right lower lobe due to infectious organism: new and requires workup  Severe sepsis: new and requires workup     Amount and/or Complexity of Data Reviewed  Clinical lab tests: ordered and reviewed  Tests in the radiology section of CPT®: reviewed and ordered  Tests in the medicine section of CPT®: reviewed and ordered  Decide to obtain previous medical records or to obtain history from someone other than the patient: yes    Risk of Complications, Morbidity, and/or Mortality  Presenting problems: moderate  Diagnostic procedures: moderate  Management options: moderate    Patient Progress  Patient progress: improved      Final diagnoses:   Severe sepsis   Pneumonia of right lower lobe due to infectious organism   History of mycobacterial infection            Alice Mejia, ROSA  02/04/18 0909       Alice Mejia APRRUDDY  02/04/18 0910

## 2018-02-04 NOTE — ED NOTES
Report received from Sara Marroquin, RN at bedside.  Pt resting quietly on stretcher with complaint of back pain and shortness of breath.  Pt AAOx4 with mild resp distress noted, respirations even and unlabored.  Pt denies any needs at this time.  Skin PWD.  Will continue to monitor and follow plan of care.  Bed rails up x2, bed in lowest position, call light in reach.       Janette Gonzalez RN  02/04/18 1606       Janette Gonzalez RN  02/04/18 4321

## 2018-02-04 NOTE — ED NOTES
Contacted pt daughter, Jacy, per pt request and informed of patient transfer.     Janette Gonzalez RN  02/04/18 2345

## 2018-02-04 NOTE — ED NOTES
Contacted [facility] to take pt [facility] to [facility]. They will page it out.      Jes Lujan  02/04/18 0868

## 2018-02-04 NOTE — ED NOTES
Pt resting quietly on stretcher with continuing complaint of back pain.  Pt AAOx4 with improving resp distress noted, respirations even and unlabored.  Pt denies any needs at this time.  Skin PWD.  Will continue to monitor and follow plan of care.  Bed rails up x2, bed in lowest position, call light in reach.       Janette Gonzalez RN  02/04/18 0972

## 2018-02-04 NOTE — ED NOTES
Called [facility]. They do not have any pcu/ccu beds. They will call us back if one becomes available.      Jes Lujan  02/04/18 0860

## 2018-02-04 NOTE — ED NOTES
Called [facility] back to see how much longer our transfer truck will be. He states they are on their way right now.      Jes Lujan  02/04/18 0957

## 2018-02-04 NOTE — ED NOTES
Pt resting quietly on stretcher with continuing complaint of back pain, continuing to request pain medication.  Pt AAOx4 with improving resp distress noted, respirations even and unlabored.  Pt denies any needs at this time.  Skin PWD.  Will continue to monitor and follow plan of care.  Bed rails up x2, bed in lowest position, call light in reach.       Janette Gonzalez RN  02/04/18 0846

## 2018-02-04 NOTE — ED NOTES
Pt requesting pain medication at this time, NP notified and informed that her BP is too low for IV pain medication at this time.  Tylenol offered and pt informs that this does not help.     Janette Gonzalez RN  02/04/18 0704

## 2018-02-04 NOTE — ED NOTES
[facility] Called to let us know that as soon as they get their truck filled up they will be on their way here.      Jes Lujan  02/04/18 0859

## 2018-02-04 NOTE — ED NOTES
Pt informed that she has a cath urine ordered, pt informed that she wishes to urinate on her own and requests that the cath not be performed, pt urinated in bedpan.     Janette Gonzalez RN  02/04/18 0976

## 2018-02-04 NOTE — ED NOTES
Pt reports that she is having pain at her IV site, pt has coolness and mild edema noted to insertion site.  NP notified.     Janette Gonzalez RN  02/04/18 3453

## 2018-02-04 NOTE — ED NOTES
Pt resting quietly on stretcher with continuing improving complaint of pain.  Pt AAOx4 with improving resp distress noted, respirations even and unlabored.  Pt denies any needs at this time.  Skin PWD.  Will continue to monitor and follow plan of care.  Bed rails up x2, bed in lowest position, call light in reach.       Janette Gonzalez RN  02/04/18 7841

## 2018-02-04 NOTE — ED NOTES
"Pt reports that her breathing is \"worsening\", pt appears to be working more at this time.     Janette Gonzalez RN  02/04/18 0944    "

## 2018-02-07 LAB
BACTERIA SPEC AEROBE CULT: ABNORMAL
GRAM STN SPEC: ABNORMAL
ISOLATED FROM: ABNORMAL

## 2018-02-09 LAB — BACTERIA SPEC AEROBE CULT: NORMAL

## 2018-03-01 ENCOUNTER — APPOINTMENT (OUTPATIENT)
Dept: GENERAL RADIOLOGY | Facility: HOSPITAL | Age: 53
End: 2018-03-01

## 2018-03-01 ENCOUNTER — HOSPITAL ENCOUNTER (EMERGENCY)
Facility: HOSPITAL | Age: 53
Discharge: HOME | End: 2018-03-02
Attending: EMERGENCY MEDICINE | Admitting: EMERGENCY MEDICINE

## 2018-03-01 DIAGNOSIS — E13.9 DIABETES 1.5, MANAGED AS TYPE 1 (HCC): Primary | ICD-10-CM

## 2018-03-01 LAB
ALBUMIN SERPL-MCNC: 3.8 G/DL (ref 3.5–5)
ALBUMIN/GLOB SERPL: 1.2 G/DL (ref 1.5–2.5)
ALP SERPL-CCNC: 79 U/L (ref 35–104)
ALT SERPL W P-5'-P-CCNC: 64 U/L (ref 10–36)
ANION GAP SERPL CALCULATED.3IONS-SCNC: 10.6 MMOL/L (ref 3.6–11.2)
ANISOCYTOSIS BLD QL: ABNORMAL
AST SERPL-CCNC: 26 U/L (ref 10–30)
BILIRUB SERPL-MCNC: 0.2 MG/DL (ref 0.2–1.8)
BUN BLD-MCNC: 16 MG/DL (ref 7–21)
BUN/CREAT SERPL: 17.6 (ref 7–25)
CALCIUM SPEC-SCNC: 9 MG/DL (ref 7.7–10)
CHLORIDE SERPL-SCNC: 106 MMOL/L (ref 99–112)
CO2 SERPL-SCNC: 17.4 MMOL/L (ref 24.3–31.9)
CREAT BLD-MCNC: 0.91 MG/DL (ref 0.43–1.29)
DEPRECATED RDW RBC AUTO: 66.5 FL (ref 37–54)
ERYTHROCYTE [DISTWIDTH] IN BLOOD BY AUTOMATED COUNT: 21.8 % (ref 11.5–14.5)
GFR SERPL CREATININE-BSD FRML MDRD: 65 ML/MIN/1.73
GLOBULIN UR ELPH-MCNC: 3.2 GM/DL
GLUCOSE BLD-MCNC: 628 MG/DL (ref 70–110)
GLUCOSE BLDC GLUCOMTR-MCNC: 525 MG/DL (ref 70–130)
GLUCOSE BLDC GLUCOMTR-MCNC: 566 MG/DL (ref 70–130)
HCT VFR BLD AUTO: 32.7 % (ref 37–47)
HGB BLD-MCNC: 9.7 G/DL (ref 12–16)
HYPOCHROMIA BLD QL: ABNORMAL
LYMPHOCYTES # BLD MANUAL: 1.11 10*3/MM3 (ref 1–3)
LYMPHOCYTES NFR BLD MANUAL: 11 % (ref 21–51)
LYMPHOCYTES NFR BLD MANUAL: 4 % (ref 0–10)
MCH RBC QN AUTO: 25.3 PG (ref 27–33)
MCHC RBC AUTO-ENTMCNC: 29.7 G/DL (ref 33–37)
MCV RBC AUTO: 85.4 FL (ref 80–94)
MONOCYTES # BLD AUTO: 0.4 10*3/MM3 (ref 0.1–0.9)
NEUTROPHILS # BLD AUTO: 8.55 10*3/MM3 (ref 1.4–6.5)
NEUTROPHILS NFR BLD MANUAL: 83 % (ref 30–70)
NEUTS BAND NFR BLD MANUAL: 2 % (ref 4–12)
OSMOLALITY SERPL CALC.SUM OF ELEC: 298.8 MOSM/KG (ref 273–305)
PLAT MORPH BLD: NORMAL
PLATELET # BLD AUTO: 321 10*3/MM3 (ref 130–400)
PMV BLD AUTO: 9.5 FL (ref 6–10)
POTASSIUM BLD-SCNC: 4.3 MMOL/L (ref 3.5–5.3)
PROT SERPL-MCNC: 7 G/DL (ref 6–8)
RBC # BLD AUTO: 3.83 10*6/MM3 (ref 4.2–5.4)
SCAN SLIDE: NORMAL
SODIUM BLD-SCNC: 134 MMOL/L (ref 135–153)
WBC NRBC COR # BLD: 10.06 10*3/MM3 (ref 4.5–12.5)

## 2018-03-01 PROCEDURE — 36415 COLL VENOUS BLD VENIPUNCTURE: CPT

## 2018-03-01 PROCEDURE — 99284 EMERGENCY DEPT VISIT MOD MDM: CPT

## 2018-03-01 PROCEDURE — 96360 HYDRATION IV INFUSION INIT: CPT

## 2018-03-01 PROCEDURE — 94640 AIRWAY INHALATION TREATMENT: CPT

## 2018-03-01 PROCEDURE — 80053 COMPREHEN METABOLIC PANEL: CPT | Performed by: EMERGENCY MEDICINE

## 2018-03-01 PROCEDURE — 63710000001 INSULIN REGULAR HUMAN PER 5 UNITS: Performed by: EMERGENCY MEDICINE

## 2018-03-01 PROCEDURE — 85007 BL SMEAR W/DIFF WBC COUNT: CPT | Performed by: EMERGENCY MEDICINE

## 2018-03-01 PROCEDURE — 85025 COMPLETE CBC W/AUTO DIFF WBC: CPT | Performed by: EMERGENCY MEDICINE

## 2018-03-01 PROCEDURE — 25010000002 METHYLPREDNISOLONE PER 40 MG: Performed by: EMERGENCY MEDICINE

## 2018-03-01 PROCEDURE — 94799 UNLISTED PULMONARY SVC/PX: CPT

## 2018-03-01 PROCEDURE — 82962 GLUCOSE BLOOD TEST: CPT

## 2018-03-01 PROCEDURE — 71045 X-RAY EXAM CHEST 1 VIEW: CPT | Performed by: RADIOLOGY

## 2018-03-01 PROCEDURE — 96372 THER/PROPH/DIAG INJ SC/IM: CPT

## 2018-03-01 PROCEDURE — 71045 X-RAY EXAM CHEST 1 VIEW: CPT

## 2018-03-01 PROCEDURE — 93005 ELECTROCARDIOGRAM TRACING: CPT | Performed by: EMERGENCY MEDICINE

## 2018-03-01 RX ORDER — CYCLOBENZAPRINE HCL 10 MG
10 TABLET ORAL ONCE
Status: DISCONTINUED | OUTPATIENT
Start: 2018-03-02 | End: 2018-03-02 | Stop reason: HOSPADM

## 2018-03-01 RX ORDER — IPRATROPIUM BROMIDE AND ALBUTEROL SULFATE 2.5; .5 MG/3ML; MG/3ML
3 SOLUTION RESPIRATORY (INHALATION) ONCE
Status: COMPLETED | OUTPATIENT
Start: 2018-03-01 | End: 2018-03-01

## 2018-03-01 RX ORDER — METHYLPREDNISOLONE SODIUM SUCCINATE 40 MG/ML
80 INJECTION, POWDER, LYOPHILIZED, FOR SOLUTION INTRAMUSCULAR; INTRAVENOUS ONCE
Status: COMPLETED | OUTPATIENT
Start: 2018-03-01 | End: 2018-03-01

## 2018-03-01 RX ORDER — HYDROCODONE BITARTRATE AND ACETAMINOPHEN 10; 325 MG/1; MG/1
1 TABLET ORAL ONCE
Status: COMPLETED | OUTPATIENT
Start: 2018-03-01 | End: 2018-03-01

## 2018-03-01 RX ORDER — POTASSIUM CHLORIDE 20 MEQ/1
40 TABLET, EXTENDED RELEASE ORAL ONCE
Status: COMPLETED | OUTPATIENT
Start: 2018-03-01 | End: 2018-03-01

## 2018-03-01 RX ORDER — SODIUM CHLORIDE 0.9 % (FLUSH) 0.9 %
10 SYRINGE (ML) INJECTION AS NEEDED
Status: DISCONTINUED | OUTPATIENT
Start: 2018-03-01 | End: 2018-03-02 | Stop reason: HOSPADM

## 2018-03-01 RX ADMIN — HUMAN INSULIN 8 UNITS: 100 INJECTION, SOLUTION SUBCUTANEOUS at 22:40

## 2018-03-01 RX ADMIN — METHYLPREDNISOLONE SODIUM SUCCINATE 80 MG: 40 INJECTION, POWDER, FOR SOLUTION INTRAMUSCULAR; INTRAVENOUS at 20:53

## 2018-03-01 RX ADMIN — POTASSIUM CHLORIDE 40 MEQ: 1500 TABLET, EXTENDED RELEASE ORAL at 22:40

## 2018-03-01 RX ADMIN — SODIUM CHLORIDE 2000 ML: 9 INJECTION, SOLUTION INTRAVENOUS at 22:40

## 2018-03-01 RX ADMIN — HYDROCODONE BITARTRATE AND ACETAMINOPHEN 1 TABLET: 10; 325 TABLET ORAL at 22:40

## 2018-03-01 RX ADMIN — METOPROLOL TARTRATE 25 MG: 25 TABLET, FILM COATED ORAL at 21:22

## 2018-03-01 RX ADMIN — IPRATROPIUM BROMIDE AND ALBUTEROL SULFATE 3 ML: .5; 3 SOLUTION RESPIRATORY (INHALATION) at 20:48

## 2018-03-01 RX ADMIN — HUMAN INSULIN 6 UNITS: 100 INJECTION, SOLUTION SUBCUTANEOUS at 23:54

## 2018-03-02 VITALS
HEIGHT: 64 IN | BODY MASS INDEX: 20.49 KG/M2 | HEART RATE: 111 BPM | TEMPERATURE: 98.1 F | RESPIRATION RATE: 18 BRPM | OXYGEN SATURATION: 98 % | SYSTOLIC BLOOD PRESSURE: 138 MMHG | DIASTOLIC BLOOD PRESSURE: 98 MMHG | WEIGHT: 120 LBS

## 2018-03-02 NOTE — ED PROVIDER NOTES
Subjective   Patient is a 52 y.o. female presenting with diabetes problem.   Diabetes   She has type 1 diabetes mellitus. No MedicAlert identification noted. Her disease course has been worsening. There are no hypoglycemic associated symptoms. Associated symptoms include blurred vision, polydipsia, visual change and weakness. Pertinent negatives for diabetes include no chest pain. Symptoms are worsening. Risk factors for coronary artery disease include diabetes mellitus and hypertension.       Review of Systems   Constitutional: Negative for fever.   HENT: Negative.    Eyes: Positive for blurred vision.   Respiratory: Negative.    Cardiovascular: Negative.  Negative for chest pain.   Gastrointestinal: Negative.  Negative for abdominal pain.   Endocrine: Positive for polydipsia.   Genitourinary: Negative.  Negative for dysuria.   Skin: Negative.    Neurological: Positive for weakness.   Psychiatric/Behavioral: Negative.    All other systems reviewed and are negative.      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   • Chronic respiratory failure with hypoxia     at nighttime   • Compression fracture of lumbar vertebra    • Compression fracture of thoracic vertebra    • COPD (chronic obstructive pulmonary disease)    • Coronary artery disease    • Diabetes mellitus type 2 in nonobese    • Erosive esophagitis 12/2016   • Essential hypertension    • Gastroparesis    • GERD (gastroesophageal reflux disease)    • Hepatitis-C    • History of transfusion    • Medically noncompliant    • MRSA infection     back of neck around 2012; 6/2017 septic left elbow joint with concerns for osteomyelitis and left anteromedial left arm   • NSTEMI (non-ST elevated myocardial infarction) 07/2016   • PE (pulmonary embolism)    • Protein calorie malnutrition    • RA (rheumatoid arthritis)     with chronic steroid use   • Rheumatoid arthritis        Allergies   Allergen  Reactions   • Penicillins Anaphylaxis     Tolerated both cefepime and Rocephin earlier this month   • Toradol [Ketorolac Tromethamine] Hives and Swelling   • Tramadol Hives and Swelling       Past Surgical History:   Procedure Laterality Date   • ABDOMINAL SURGERY     • AMPUTATION DIGIT Right 2016    Third digit to the PIP. Surgeon: Dr. Bains   • AMPUTATION DIGIT Left 2016    Procedure: PARTIAL AMPUTATION LEFT THIRD FINGER;  Surgeon: Shane Fuller MD;  Location: [facility] OR;  Service:    • AMPUTATION REVISION Left 2016    Procedure: AMPUTATION REVISION DIGIT STUMP;  Surgeon: Shane Fuller MD;  Location: [facility] OR;  Service:    • ARM DEBRIDEMENT Left 2016    Procedure: HAND IRRIGATION DEBRIDEMENT AND REPAIR ;  Surgeon: Shane Fuller MD;  Location: [facility] OR;  Service:    •  SECTION      x 2   • CHOLECYSTECTOMY     • ENDOSCOPY N/A 2016    Procedure: ESOPHAGOGASTRODUODENOSCOPY;  Surgeon: Jeremy Clement III, MD;  Location: [facility] OR;  Service:    • ENDOSCOPY W/ PEG TUBE PLACEMENT N/A 3/30/2017    Procedure: ESOPHAGOGASTRODUODENOSCOPY WITH PERCUTANEOUS ENDOSCOPIC GASTROSTOMY TUBE INSERTION;  Surgeon: Shade Campos MD;  Location: [facility] OR;  Service:    • FOREIGN BODY REMOVAL      insulin needle removal x 3 from the abdomen   • HYSTERECTOMY     • INCISION AND DRAINAGE ARM Left 2017    Procedure: INCISION AND DRAINAGE UPPER EXTREMITY;  Surgeon: Shane Fuller MD;  Location: [facility] OR;  Service:        Family History   Problem Relation Age of Onset   • Colon cancer Mother    • Cancer Father    • Diabetes Maternal Grandmother    • Heart disease Neg Hx    • Stroke Neg Hx        Social History     Social History   • Marital status:      Social History Main Topics   • Smoking status: Current Every Day Smoker     Packs/day: 0.50     Years: 35.00     Types: Cigarettes   • Smokeless tobacco: Never Used      Comment: 4-5 cigarettes a  day    • Alcohol use No   • Drug use: No   • Sexual activity: Defer           Objective   Physical Exam   Constitutional: She is oriented to person, place, and time. She appears well-developed and well-nourished. No distress. She is not intubated.   HENT:   Head: Normocephalic and atraumatic.   Right Ear: External ear normal.   Left Ear: External ear normal.   Nose: Nose normal.   Mouth/Throat: Oropharynx is clear and moist.   Eyes: Conjunctivae and EOM are normal. Pupils are equal, round, and reactive to light.   Neck: Normal range of motion. Neck supple. No JVD present. No tracheal deviation present.   Cardiovascular: Normal rate, regular rhythm, normal heart sounds and normal pulses.  Exam reveals no friction rub and no decreased pulses.    No murmur heard.   No systolic murmur is present    No diastolic murmur is present   Pulses:       Carotid pulses are 2+ on the right side, and 2+ on the left side.       Radial pulses are 2+ on the right side, and 2+ on the left side.        Femoral pulses are 2+ on the right side, and 2+ on the left side.  Pulmonary/Chest: Effort normal and breath sounds normal. No accessory muscle usage. No tachypnea. She is not intubated. No respiratory distress. She has no wheezes.   Abdominal: Soft. Bowel sounds are normal. There is no tenderness.   Musculoskeletal: Normal range of motion. She exhibits no edema or deformity.   Neurological: She is alert and oriented to person, place, and time. No cranial nerve deficit.   Skin: Skin is warm and dry. No rash noted. She is not diaphoretic. No erythema. No pallor.   Psychiatric: She has a normal mood and affect. Her behavior is normal. Thought content normal.   Nursing note and vitals reviewed.      Procedures         ED Course  ED Course   Comment By Time   CXR negative Zachariah Ingram MD 03/01 8312                  [facility]    Final diagnoses:   Diabetes 1.5, managed as type 1            Zachariah Ingram MD  03/02/18 3020

## 2018-03-24 ENCOUNTER — HOSPITAL ENCOUNTER (EMERGENCY)
Facility: HOSPITAL | Age: 53
Discharge: HOME | End: 2018-03-24
Attending: EMERGENCY MEDICINE | Admitting: EMERGENCY MEDICINE

## 2018-03-24 ENCOUNTER — APPOINTMENT (OUTPATIENT)
Dept: GENERAL RADIOLOGY | Facility: HOSPITAL | Age: 53
End: 2018-03-24

## 2018-03-24 VITALS
BODY MASS INDEX: 21.26 KG/M2 | WEIGHT: 120 LBS | RESPIRATION RATE: 18 BRPM | SYSTOLIC BLOOD PRESSURE: 124 MMHG | HEART RATE: 96 BPM | HEIGHT: 63 IN | OXYGEN SATURATION: 98 % | DIASTOLIC BLOOD PRESSURE: 75 MMHG | TEMPERATURE: 97.4 F

## 2018-03-24 DIAGNOSIS — J18.9 PNEUMONIA OF RIGHT LOWER LOBE DUE TO INFECTIOUS ORGANISM: Primary | ICD-10-CM

## 2018-03-24 LAB
ALBUMIN SERPL-MCNC: 4 G/DL (ref 3.5–5)
ALBUMIN/GLOB SERPL: 1.3 G/DL (ref 1.5–2.5)
ALP SERPL-CCNC: 66 U/L (ref 35–104)
ALT SERPL W P-5'-P-CCNC: 43 U/L (ref 10–36)
ANION GAP SERPL CALCULATED.3IONS-SCNC: 9.3 MMOL/L (ref 3.6–11.2)
AST SERPL-CCNC: 33 U/L (ref 10–30)
BASOPHILS # BLD AUTO: 0.06 10*3/MM3 (ref 0–0.3)
BASOPHILS NFR BLD AUTO: 0.5 % (ref 0–2)
BILIRUB SERPL-MCNC: 0.2 MG/DL (ref 0.2–1.8)
BUN BLD-MCNC: 14 MG/DL (ref 7–21)
BUN/CREAT SERPL: 15.7 (ref 7–25)
CALCIUM SPEC-SCNC: 9.8 MG/DL (ref 7.7–10)
CHLORIDE SERPL-SCNC: 112 MMOL/L (ref 99–112)
CO2 SERPL-SCNC: 19.7 MMOL/L (ref 24.3–31.9)
CREAT BLD-MCNC: 0.89 MG/DL (ref 0.43–1.29)
DEPRECATED RDW RBC AUTO: 58.3 FL (ref 37–54)
EOSINOPHIL # BLD AUTO: 0.1 10*3/MM3 (ref 0–0.7)
EOSINOPHIL NFR BLD AUTO: 0.9 % (ref 0–5)
ERYTHROCYTE [DISTWIDTH] IN BLOOD BY AUTOMATED COUNT: 19 % (ref 11.5–14.5)
GFR SERPL CREATININE-BSD FRML MDRD: 67 ML/MIN/1.73
GLOBULIN UR ELPH-MCNC: 3.2 GM/DL
GLUCOSE BLD-MCNC: 194 MG/DL (ref 70–110)
HCT VFR BLD AUTO: 41.4 % (ref 37–47)
HGB BLD-MCNC: 12.1 G/DL (ref 12–16)
IMM GRANULOCYTES # BLD: 0.35 10*3/MM3 (ref 0–0.03)
IMM GRANULOCYTES NFR BLD: 3 % (ref 0–0.5)
LIPASE SERPL-CCNC: 82 U/L (ref 13–60)
LYMPHOCYTES # BLD AUTO: 1.8 10*3/MM3 (ref 1–3)
LYMPHOCYTES NFR BLD AUTO: 15.4 % (ref 21–51)
MCH RBC QN AUTO: 24.6 PG (ref 27–33)
MCHC RBC AUTO-ENTMCNC: 29.2 G/DL (ref 33–37)
MCV RBC AUTO: 84.1 FL (ref 80–94)
MONOCYTES # BLD AUTO: 0.68 10*3/MM3 (ref 0.1–0.9)
MONOCYTES NFR BLD AUTO: 5.8 % (ref 0–10)
NEUTROPHILS # BLD AUTO: 8.7 10*3/MM3 (ref 1.4–6.5)
NEUTROPHILS NFR BLD AUTO: 74.4 % (ref 30–70)
OSMOLALITY SERPL CALC.SUM OF ELEC: 287 MOSM/KG (ref 273–305)
PLATELET # BLD AUTO: 286 10*3/MM3 (ref 130–400)
PMV BLD AUTO: 10.3 FL (ref 6–10)
POTASSIUM BLD-SCNC: 4.2 MMOL/L (ref 3.5–5.3)
PROT SERPL-MCNC: 7.2 G/DL (ref 6–8)
RBC # BLD AUTO: 4.92 10*6/MM3 (ref 4.2–5.4)
SODIUM BLD-SCNC: 141 MMOL/L (ref 135–153)
TROPONIN I SERPL-MCNC: 0.02 NG/ML
WBC NRBC COR # BLD: 11.69 10*3/MM3 (ref 4.5–12.5)

## 2018-03-24 PROCEDURE — 71045 X-RAY EXAM CHEST 1 VIEW: CPT | Performed by: RADIOLOGY

## 2018-03-24 PROCEDURE — 99284 EMERGENCY DEPT VISIT MOD MDM: CPT

## 2018-03-24 PROCEDURE — 83690 ASSAY OF LIPASE: CPT | Performed by: EMERGENCY MEDICINE

## 2018-03-24 PROCEDURE — 93010 ELECTROCARDIOGRAM REPORT: CPT | Performed by: INTERNAL MEDICINE

## 2018-03-24 PROCEDURE — 80053 COMPREHEN METABOLIC PANEL: CPT | Performed by: EMERGENCY MEDICINE

## 2018-03-24 PROCEDURE — 84484 ASSAY OF TROPONIN QUANT: CPT | Performed by: EMERGENCY MEDICINE

## 2018-03-24 PROCEDURE — 93005 ELECTROCARDIOGRAM TRACING: CPT | Performed by: EMERGENCY MEDICINE

## 2018-03-24 PROCEDURE — 36415 COLL VENOUS BLD VENIPUNCTURE: CPT

## 2018-03-24 PROCEDURE — 85025 COMPLETE CBC W/AUTO DIFF WBC: CPT | Performed by: EMERGENCY MEDICINE

## 2018-03-24 PROCEDURE — 71045 X-RAY EXAM CHEST 1 VIEW: CPT

## 2018-03-24 RX ORDER — AZITHROMYCIN 250 MG/1
TABLET, FILM COATED ORAL
Qty: 6 TABLET | Refills: 0 | Status: ON HOLD | OUTPATIENT
Start: 2018-03-24 | End: 2018-04-16

## 2018-03-24 RX ORDER — HYDROXYZINE HYDROCHLORIDE 25 MG/1
25 TABLET, FILM COATED ORAL ONCE
Status: COMPLETED | OUTPATIENT
Start: 2018-03-24 | End: 2018-03-24

## 2018-03-24 RX ORDER — AZITHROMYCIN 250 MG/1
500 TABLET, FILM COATED ORAL ONCE
Status: COMPLETED | OUTPATIENT
Start: 2018-03-24 | End: 2018-03-24

## 2018-03-24 RX ADMIN — AZITHROMYCIN 500 MG: 250 TABLET, FILM COATED ORAL at 22:28

## 2018-03-24 RX ADMIN — HYDROXYZINE 25 MG: 25 TABLET, FILM COATED ORAL at 22:27

## 2018-03-25 NOTE — ED NOTES
Patient asking for something for pain at this time. Dr Ingram made aware. No orders noted.     Sara Marroquin RN  03/24/18 2027

## 2018-03-25 NOTE — ED NOTES
Patient asking if she cant be discharged and wait in lobby for her ride to come. Dr Ingram states that this is fine.      Sara Marroquin RN  03/24/18 8490

## 2018-03-25 NOTE — ED PROVIDER NOTES
Subjective     Chest Pain   Pain location:  Unable to specify  Pain quality: aching    Pain radiates to:  Does not radiate  Pain severity:  Mild  Onset quality:  Gradual  Duration:  2 days  Timing:  Intermittent  Chronicity:  New  Context: breathing    Relieved by:  Nothing  Worsened by:  Deep breathing and movement  Ineffective treatments:  None tried  Associated symptoms: shortness of breath        Review of Systems   Constitutional: Positive for activity change.   HENT: Negative.    Eyes: Negative.    Respiratory: Positive for shortness of breath and wheezing.    Cardiovascular: Positive for chest pain.   Gastrointestinal: Negative.    Endocrine: Negative.    Genitourinary: Negative.    Musculoskeletal: Negative.    Skin: Negative.    Allergic/Immunologic: Negative.    Neurological: Negative.    Hematological: Negative.    Psychiatric/Behavioral: Negative.    All other systems reviewed and are negative.      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   • Chronic respiratory failure with hypoxia     at nighttime   • Compression fracture of lumbar vertebra    • Compression fracture of thoracic vertebra    • COPD (chronic obstructive pulmonary disease)    • Coronary artery disease    • Diabetes mellitus type 2 in nonobese    • Erosive esophagitis 12/2016   • Essential hypertension    • Gastroparesis    • GERD (gastroesophageal reflux disease)    • Hepatitis-C    • History of transfusion    • Medically noncompliant    • MRSA infection     back of neck around 2012; 6/2017 septic left elbow joint with concerns for osteomyelitis and left anteromedial left arm   • NSTEMI (non-ST elevated myocardial infarction) 07/2016   • PE (pulmonary embolism)    • Protein calorie malnutrition    • RA (rheumatoid arthritis)     with chronic steroid use   • Rheumatoid arthritis        Allergies   Allergen Reactions   • Penicillins Anaphylaxis     Tolerated both cefepime  and Rocephin earlier this month   • Toradol [Ketorolac Tromethamine] Hives and Swelling   • Tramadol Hives and Swelling       Past Surgical History:   Procedure Laterality Date   • ABDOMINAL SURGERY     • AMPUTATION DIGIT Right 2016    Third digit to the PIP. Surgeon: Dr. Bains   • AMPUTATION DIGIT Left 2016    Procedure: PARTIAL AMPUTATION LEFT THIRD FINGER;  Surgeon: Shane Fuller MD;  Location: [facility] OR;  Service:    • AMPUTATION REVISION Left 2016    Procedure: AMPUTATION REVISION DIGIT STUMP;  Surgeon: Shane Fuller MD;  Location: [facility] OR;  Service:    • ARM DEBRIDEMENT Left 2016    Procedure: HAND IRRIGATION DEBRIDEMENT AND REPAIR ;  Surgeon: Shane Fuller MD;  Location: [facility] OR;  Service:    •  SECTION      x 2   • CHOLECYSTECTOMY     • ENDOSCOPY N/A 2016    Procedure: ESOPHAGOGASTRODUODENOSCOPY;  Surgeon: Jeremy Clement III, MD;  Location: [facility] OR;  Service:    • ENDOSCOPY W/ PEG TUBE PLACEMENT N/A 3/30/2017    Procedure: ESOPHAGOGASTRODUODENOSCOPY WITH PERCUTANEOUS ENDOSCOPIC GASTROSTOMY TUBE INSERTION;  Surgeon: Shade Campos MD;  Location: [facility] OR;  Service:    • FOREIGN BODY REMOVAL      insulin needle removal x 3 from the abdomen   • HYSTERECTOMY     • INCISION AND DRAINAGE ARM Left 2017    Procedure: INCISION AND DRAINAGE UPPER EXTREMITY;  Surgeon: Shane Fuller MD;  Location: [facility] OR;  Service:        Family History   Problem Relation Age of Onset   • Colon cancer Mother    • Cancer Father    • Diabetes Maternal Grandmother    • Heart disease Neg Hx    • Stroke Neg Hx        Social History     Social History   • Marital status:      Social History Main Topics   • Smoking status: Current Every Day Smoker     Packs/day: 0.50     Years: 35.00     Types: Cigarettes   • Smokeless tobacco: Never Used      Comment: 4-5 cigarettes a day    • Alcohol use No   • Drug use: No   • Sexual activity:  Defer     Other Topics Concern   • Not on file           Objective   Physical Exam   Constitutional: She appears well-developed and well-nourished.   HENT:   Head: Normocephalic and atraumatic.   Eyes: Pupils are equal, round, and reactive to light.   Neck: Normal range of motion.   Cardiovascular: Normal rate.    Pulmonary/Chest: No respiratory distress. She has wheezes.   Abdominal: Soft. Bowel sounds are normal.   Musculoskeletal: Normal range of motion.   Neurological: She is alert.   Skin: Skin is warm.   Psychiatric: She has a normal mood and affect.       Procedures         ED Course  ED Course   Comment By Time   EKG 20:37 sinus tachycardia, no acute ST-T abnormalities noted Zachariah Ingram MD 03/24 2210   CXR RLL airspace disease Zachariah Ingram MD 03/24 2213                  MDM    Final diagnoses:   Pneumonia of right lower lobe due to infectious organism            Zachariah Ingram MD  03/24/18 7759

## 2018-04-03 ENCOUNTER — APPOINTMENT (OUTPATIENT)
Dept: GENERAL RADIOLOGY | Facility: HOSPITAL | Age: 53
End: 2018-04-03

## 2018-04-03 ENCOUNTER — HOSPITAL ENCOUNTER (EMERGENCY)
Facility: HOSPITAL | Age: 53
Discharge: HOME | End: 2018-04-03
Attending: FAMILY MEDICINE | Admitting: EMERGENCY MEDICINE

## 2018-04-03 VITALS
TEMPERATURE: 97.7 F | DIASTOLIC BLOOD PRESSURE: 85 MMHG | BODY MASS INDEX: 21.26 KG/M2 | HEART RATE: 113 BPM | WEIGHT: 120 LBS | RESPIRATION RATE: 24 BRPM | OXYGEN SATURATION: 98 % | HEIGHT: 63 IN | SYSTOLIC BLOOD PRESSURE: 139 MMHG

## 2018-04-03 DIAGNOSIS — N02.8 BERGER DISEASE: ICD-10-CM

## 2018-04-03 DIAGNOSIS — N30.00 ACUTE CYSTITIS WITHOUT HEMATURIA: Primary | ICD-10-CM

## 2018-04-03 DIAGNOSIS — R07.9 CHEST PAIN, UNSPECIFIED TYPE: ICD-10-CM

## 2018-04-03 LAB
A-A DO2: 55 MMHG (ref 0–300)
ALBUMIN SERPL-MCNC: 3.4 G/DL (ref 3.5–5)
ALBUMIN/GLOB SERPL: 1.1 G/DL (ref 1.5–2.5)
ALP SERPL-CCNC: 62 U/L (ref 35–104)
ALT SERPL W P-5'-P-CCNC: 31 U/L (ref 10–36)
ANION GAP SERPL CALCULATED.3IONS-SCNC: 15.5 MMOL/L (ref 3.6–11.2)
ARTERIAL PATENCY WRIST A: POSITIVE
AST SERPL-CCNC: 22 U/L (ref 10–30)
ATMOSPHERIC PRESS: 728 MMHG
BACTERIA UR QL AUTO: ABNORMAL /HPF
BASE EXCESS BLDA CALC-SCNC: -2.6 MMOL/L
BASOPHILS # BLD AUTO: 0.04 10*3/MM3 (ref 0–0.3)
BASOPHILS NFR BLD AUTO: 0.3 % (ref 0–2)
BDY SITE: ABNORMAL
BILIRUB SERPL-MCNC: 0.1 MG/DL (ref 0.2–1.8)
BILIRUB UR QL STRIP: NEGATIVE
BNP SERPL-MCNC: 485 PG/ML (ref 0–100)
BODY TEMPERATURE: 98.6 C
BUN BLD-MCNC: 13 MG/DL (ref 7–21)
BUN/CREAT SERPL: 16.3 (ref 7–25)
CALCIUM SPEC-SCNC: 9.2 MG/DL (ref 7.7–10)
CHLORIDE SERPL-SCNC: 105 MMOL/L (ref 99–112)
CLARITY UR: CLEAR
CO2 SERPL-SCNC: 20.5 MMOL/L (ref 24.3–31.9)
COHGB MFR BLD: 4.4 % (ref 0–5)
COLOR UR: YELLOW
CREAT BLD-MCNC: 0.8 MG/DL (ref 0.43–1.29)
CRP SERPL-MCNC: 2.44 MG/DL (ref 0–0.99)
D-LACTATE SERPL-SCNC: 2.9 MMOL/L (ref 0.5–2)
D-LACTATE SERPL-SCNC: 3.3 MMOL/L (ref 0.5–2)
DEPRECATED RDW RBC AUTO: 51.9 FL (ref 37–54)
EOSINOPHIL # BLD AUTO: 0.06 10*3/MM3 (ref 0–0.7)
EOSINOPHIL NFR BLD AUTO: 0.5 % (ref 0–5)
ERYTHROCYTE [DISTWIDTH] IN BLOOD BY AUTOMATED COUNT: 18.2 % (ref 11.5–14.5)
ERYTHROCYTE [SEDIMENTATION RATE] IN BLOOD: 54 MM/HR (ref 0–30)
FLUAV AG NPH QL: NEGATIVE
FLUBV AG NPH QL IA: NEGATIVE
GFR SERPL CREATININE-BSD FRML MDRD: 75 ML/MIN/1.73
GLOBULIN UR ELPH-MCNC: 3.1 GM/DL
GLUCOSE BLD-MCNC: 334 MG/DL (ref 70–110)
GLUCOSE UR STRIP-MCNC: ABNORMAL MG/DL
HCO3 BLDA-SCNC: 20.5 MMOL/L (ref 22–26)
HCT VFR BLD AUTO: 32.8 % (ref 37–47)
HCT VFR BLD CALC: 31 % (ref 37–47)
HGB BLD-MCNC: 9.6 G/DL (ref 12–16)
HGB BLDA-MCNC: 10.6 G/DL (ref 12–16)
HGB UR QL STRIP.AUTO: ABNORMAL
HOLD SPECIMEN: NORMAL
HOROWITZ INDEX BLD+IHG-RTO: 28 %
HYALINE CASTS UR QL AUTO: ABNORMAL /LPF
IMM GRANULOCYTES # BLD: 0.47 10*3/MM3 (ref 0–0.03)
IMM GRANULOCYTES NFR BLD: 4 % (ref 0–0.5)
KETONES UR QL STRIP: NEGATIVE
LEUKOCYTE ESTERASE UR QL STRIP.AUTO: NEGATIVE
LIPASE SERPL-CCNC: 142 U/L (ref 13–60)
LYMPHOCYTES # BLD AUTO: 1.77 10*3/MM3 (ref 1–3)
LYMPHOCYTES NFR BLD AUTO: 15 % (ref 21–51)
MCH RBC QN AUTO: 23.5 PG (ref 27–33)
MCHC RBC AUTO-ENTMCNC: 29.3 G/DL (ref 33–37)
MCV RBC AUTO: 80.4 FL (ref 80–94)
METHGB BLD QL: 0.3 % (ref 0–3)
MODALITY: ABNORMAL
MONOCYTES # BLD AUTO: 0.66 10*3/MM3 (ref 0.1–0.9)
MONOCYTES NFR BLD AUTO: 5.6 % (ref 0–10)
NEUTROPHILS # BLD AUTO: 8.79 10*3/MM3 (ref 1.4–6.5)
NEUTROPHILS NFR BLD AUTO: 74.6 % (ref 30–70)
NITRITE UR QL STRIP: POSITIVE
OSMOLALITY SERPL CALC.SUM OF ELEC: 294.5 MOSM/KG (ref 273–305)
OXYHGB MFR BLDV: 93.3 % (ref 85–100)
PCO2 BLDA: 29.8 MM HG (ref 35–45)
PH BLDA: 7.46 PH UNITS (ref 7.35–7.45)
PH UR STRIP.AUTO: 6.5 [PH] (ref 5–8)
PLATELET # BLD AUTO: 317 10*3/MM3 (ref 130–400)
PMV BLD AUTO: 10 FL (ref 6–10)
PO2 BLDA: 100.5 MM HG (ref 80–100)
POTASSIUM BLD-SCNC: 3.4 MMOL/L (ref 3.5–5.3)
PROT SERPL-MCNC: 6.5 G/DL (ref 6–8)
PROT UR QL STRIP: ABNORMAL
RBC # BLD AUTO: 4.08 10*6/MM3 (ref 4.2–5.4)
RBC # UR: ABNORMAL /HPF
REF LAB TEST METHOD: ABNORMAL
SAO2 % BLDCOA: 97.9 % (ref 90–100)
SODIUM BLD-SCNC: 141 MMOL/L (ref 135–153)
SP GR UR STRIP: >1.03 (ref 1–1.03)
SQUAMOUS #/AREA URNS HPF: ABNORMAL /HPF
TROPONIN I SERPL-MCNC: 0.01 NG/ML
TROPONIN I SERPL-MCNC: 0.02 NG/ML
UROBILINOGEN UR QL STRIP: ABNORMAL
WBC NRBC COR # BLD: 11.79 10*3/MM3 (ref 4.5–12.5)
WBC UR QL AUTO: ABNORMAL /HPF

## 2018-04-03 PROCEDURE — 85025 COMPLETE CBC W/AUTO DIFF WBC: CPT | Performed by: FAMILY MEDICINE

## 2018-04-03 PROCEDURE — 87804 INFLUENZA ASSAY W/OPTIC: CPT | Performed by: FAMILY MEDICINE

## 2018-04-03 PROCEDURE — 84484 ASSAY OF TROPONIN QUANT: CPT | Performed by: FAMILY MEDICINE

## 2018-04-03 PROCEDURE — 85652 RBC SED RATE AUTOMATED: CPT | Performed by: FAMILY MEDICINE

## 2018-04-03 PROCEDURE — 25010000002 LORAZEPAM PER 2 MG: Performed by: EMERGENCY MEDICINE

## 2018-04-03 PROCEDURE — 94640 AIRWAY INHALATION TREATMENT: CPT

## 2018-04-03 PROCEDURE — 25010000002 METHYLPREDNISOLONE PER 125 MG: Performed by: FAMILY MEDICINE

## 2018-04-03 PROCEDURE — 25010000002 HYDROMORPHONE PER 4 MG: Performed by: FAMILY MEDICINE

## 2018-04-03 PROCEDURE — 82375 ASSAY CARBOXYHB QUANT: CPT | Performed by: FAMILY MEDICINE

## 2018-04-03 PROCEDURE — 83605 ASSAY OF LACTIC ACID: CPT | Performed by: FAMILY MEDICINE

## 2018-04-03 PROCEDURE — 71045 X-RAY EXAM CHEST 1 VIEW: CPT

## 2018-04-03 PROCEDURE — 93005 ELECTROCARDIOGRAM TRACING: CPT | Performed by: FAMILY MEDICINE

## 2018-04-03 PROCEDURE — 36415 COLL VENOUS BLD VENIPUNCTURE: CPT

## 2018-04-03 PROCEDURE — 83690 ASSAY OF LIPASE: CPT | Performed by: FAMILY MEDICINE

## 2018-04-03 PROCEDURE — 96376 TX/PRO/DX INJ SAME DRUG ADON: CPT

## 2018-04-03 PROCEDURE — 96375 TX/PRO/DX INJ NEW DRUG ADDON: CPT

## 2018-04-03 PROCEDURE — 87040 BLOOD CULTURE FOR BACTERIA: CPT | Performed by: FAMILY MEDICINE

## 2018-04-03 PROCEDURE — 94799 UNLISTED PULMONARY SVC/PX: CPT

## 2018-04-03 PROCEDURE — 25010000002 HYDROMORPHONE PER 4 MG: Performed by: EMERGENCY MEDICINE

## 2018-04-03 PROCEDURE — 71045 X-RAY EXAM CHEST 1 VIEW: CPT | Performed by: RADIOLOGY

## 2018-04-03 PROCEDURE — 83880 ASSAY OF NATRIURETIC PEPTIDE: CPT | Performed by: FAMILY MEDICINE

## 2018-04-03 PROCEDURE — 36600 WITHDRAWAL OF ARTERIAL BLOOD: CPT | Performed by: FAMILY MEDICINE

## 2018-04-03 PROCEDURE — 81001 URINALYSIS AUTO W/SCOPE: CPT | Performed by: FAMILY MEDICINE

## 2018-04-03 PROCEDURE — 82805 BLOOD GASES W/O2 SATURATION: CPT | Performed by: FAMILY MEDICINE

## 2018-04-03 PROCEDURE — 96365 THER/PROPH/DIAG IV INF INIT: CPT

## 2018-04-03 PROCEDURE — 25010000002 ONDANSETRON PER 1 MG

## 2018-04-03 PROCEDURE — 86140 C-REACTIVE PROTEIN: CPT | Performed by: FAMILY MEDICINE

## 2018-04-03 PROCEDURE — 83050 HGB METHEMOGLOBIN QUAN: CPT | Performed by: FAMILY MEDICINE

## 2018-04-03 PROCEDURE — 99285 EMERGENCY DEPT VISIT HI MDM: CPT

## 2018-04-03 PROCEDURE — 96367 TX/PROPH/DG ADDL SEQ IV INF: CPT

## 2018-04-03 PROCEDURE — 80053 COMPREHEN METABOLIC PANEL: CPT | Performed by: FAMILY MEDICINE

## 2018-04-03 PROCEDURE — 25010000002 VANCOMYCIN PER 500 MG: Performed by: FAMILY MEDICINE

## 2018-04-03 PROCEDURE — 93010 ELECTROCARDIOGRAM REPORT: CPT | Performed by: INTERNAL MEDICINE

## 2018-04-03 RX ORDER — HYDROMORPHONE HCL 110MG/55ML
0.5 PATIENT CONTROLLED ANALGESIA SYRINGE INTRAVENOUS ONCE
Status: COMPLETED | OUTPATIENT
Start: 2018-04-03 | End: 2018-04-03

## 2018-04-03 RX ORDER — METHYLPREDNISOLONE SODIUM SUCCINATE 125 MG/2ML
125 INJECTION, POWDER, LYOPHILIZED, FOR SOLUTION INTRAMUSCULAR; INTRAVENOUS ONCE
Status: COMPLETED | OUTPATIENT
Start: 2018-04-03 | End: 2018-04-03

## 2018-04-03 RX ORDER — ONDANSETRON 2 MG/ML
INJECTION INTRAMUSCULAR; INTRAVENOUS
Status: COMPLETED
Start: 2018-04-03 | End: 2018-04-03

## 2018-04-03 RX ORDER — ONDANSETRON 4 MG/1
4 TABLET, ORALLY DISINTEGRATING ORAL ONCE
Status: DISCONTINUED | OUTPATIENT
Start: 2018-04-03 | End: 2018-04-03

## 2018-04-03 RX ORDER — IPRATROPIUM BROMIDE AND ALBUTEROL SULFATE 2.5; .5 MG/3ML; MG/3ML
3 SOLUTION RESPIRATORY (INHALATION)
Status: COMPLETED | OUTPATIENT
Start: 2018-04-03 | End: 2018-04-03

## 2018-04-03 RX ORDER — LORAZEPAM 2 MG/ML
1 INJECTION INTRAMUSCULAR ONCE
Status: DISCONTINUED | OUTPATIENT
Start: 2018-04-03 | End: 2018-04-03

## 2018-04-03 RX ORDER — ONDANSETRON 4 MG/1
4 TABLET, ORALLY DISINTEGRATING ORAL 4 TIMES DAILY
Qty: 15 TABLET | Refills: 0 | Status: ON HOLD | OUTPATIENT
Start: 2018-04-03 | End: 2018-04-16

## 2018-04-03 RX ORDER — NITROFURANTOIN 25; 75 MG/1; MG/1
100 CAPSULE ORAL 2 TIMES DAILY
Qty: 20 CAPSULE | Refills: 0 | Status: SHIPPED | OUTPATIENT
Start: 2018-04-03 | End: 2018-04-10

## 2018-04-03 RX ORDER — SODIUM CHLORIDE 0.9 % (FLUSH) 0.9 %
10 SYRINGE (ML) INJECTION AS NEEDED
Status: DISCONTINUED | OUTPATIENT
Start: 2018-04-03 | End: 2018-04-03 | Stop reason: HOSPADM

## 2018-04-03 RX ORDER — ONDANSETRON 2 MG/ML
4 INJECTION INTRAMUSCULAR; INTRAVENOUS ONCE
Status: COMPLETED | OUTPATIENT
Start: 2018-04-03 | End: 2018-04-03

## 2018-04-03 RX ORDER — LORAZEPAM 2 MG/ML
0.5 INJECTION INTRAMUSCULAR ONCE
Status: COMPLETED | OUTPATIENT
Start: 2018-04-03 | End: 2018-04-03

## 2018-04-03 RX ADMIN — VANCOMYCIN HYDROCHLORIDE 1000 MG: 5 INJECTION, POWDER, LYOPHILIZED, FOR SOLUTION INTRAVENOUS at 02:06

## 2018-04-03 RX ADMIN — SODIUM CHLORIDE 500 ML: 9 INJECTION, SOLUTION INTRAVENOUS at 02:11

## 2018-04-03 RX ADMIN — AZTREONAM 2 G: 2 INJECTION, POWDER, FOR SOLUTION INTRAMUSCULAR; INTRAVENOUS at 01:22

## 2018-04-03 RX ADMIN — IPRATROPIUM BROMIDE AND ALBUTEROL SULFATE 3 ML: .5; 3 SOLUTION RESPIRATORY (INHALATION) at 00:56

## 2018-04-03 RX ADMIN — ONDANSETRON 4 MG: 2 INJECTION INTRAMUSCULAR; INTRAVENOUS at 01:06

## 2018-04-03 RX ADMIN — HYDROMORPHONE HYDROCHLORIDE 0.5 MG: 2 INJECTION INTRAMUSCULAR; INTRAVENOUS; SUBCUTANEOUS at 01:21

## 2018-04-03 RX ADMIN — IPRATROPIUM BROMIDE AND ALBUTEROL SULFATE 3 ML: .5; 3 SOLUTION RESPIRATORY (INHALATION) at 01:25

## 2018-04-03 RX ADMIN — IPRATROPIUM BROMIDE AND ALBUTEROL SULFATE 3 ML: .5; 3 SOLUTION RESPIRATORY (INHALATION) at 01:48

## 2018-04-03 RX ADMIN — LORAZEPAM 0.5 MG: 2 INJECTION INTRAMUSCULAR; INTRAVENOUS at 03:54

## 2018-04-03 RX ADMIN — METHYLPREDNISOLONE SODIUM SUCCINATE 125 MG: 125 INJECTION, POWDER, FOR SOLUTION INTRAMUSCULAR; INTRAVENOUS at 01:08

## 2018-04-03 RX ADMIN — HYDROMORPHONE HYDROCHLORIDE 0.5 MG: 2 INJECTION INTRAMUSCULAR; INTRAVENOUS; SUBCUTANEOUS at 03:53

## 2018-04-03 RX ADMIN — SODIUM CHLORIDE 1000 ML: 9 INJECTION, SOLUTION INTRAVENOUS at 01:04

## 2018-04-03 NOTE — ED PROVIDER NOTES
Subjective   This is a contuation addendum form the same visit initiated by Dr Wesley.  Repeat cardiac enzymes negative.  Hemodynamically stable.  Negative EKG and serial cardiac enzymes.  UA shows UTI.            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   • Chronic respiratory failure with hypoxia     at nighttime   • Compression fracture of lumbar vertebra    • Compression fracture of thoracic vertebra    • COPD (chronic obstructive pulmonary disease)    • Coronary artery disease    • Diabetes mellitus type 2 in nonobese    • Erosive esophagitis 12/2016   • Essential hypertension    • Gastroparesis    • GERD (gastroesophageal reflux disease)    • Hepatitis-C    • History of transfusion    • Medically noncompliant    • MRSA infection     back of neck around 2012; 6/2017 septic left elbow joint with concerns for osteomyelitis and left anteromedial left arm   • NSTEMI (non-ST elevated myocardial infarction) 07/2016   • PE (pulmonary embolism)    • Protein calorie malnutrition    • RA (rheumatoid arthritis)     with chronic steroid use   • Rheumatoid arthritis        Allergies   Allergen Reactions   • Penicillins Anaphylaxis     Tolerated both cefepime and Rocephin earlier this month   • Toradol [Ketorolac Tromethamine] Hives and Swelling   • Tramadol Hives and Swelling       Past Surgical History:   Procedure Laterality Date   • ABDOMINAL SURGERY     • AMPUTATION DIGIT Right 12/22/2016    Third digit to the PIP. Surgeon: Dr. Bains   • AMPUTATION DIGIT Left 12/22/2016    Procedure: PARTIAL AMPUTATION LEFT THIRD FINGER;  Surgeon: Shane Fuller MD;  Location: [facility];  Service:    • AMPUTATION REVISION Left 12/22/2016    Procedure: AMPUTATION REVISION DIGIT STUMP;  Surgeon: Shane Fuller MD;  Location: [facility] OR;  Service:    • ARM DEBRIDEMENT Left 12/20/2016    Procedure: HAND IRRIGATION DEBRIDEMENT AND  REPAIR ;  Surgeon: Shane Fuller MD;  Location: [facility] OR;  Service:    •  SECTION      x 2   • CHOLECYSTECTOMY     • ENDOSCOPY N/A 2016    Procedure: ESOPHAGOGASTRODUODENOSCOPY;  Surgeon: Jeremy Clement III, MD;  Location: [facility] OR;  Service:    • ENDOSCOPY W/ PEG TUBE PLACEMENT N/A 3/30/2017    Procedure: ESOPHAGOGASTRODUODENOSCOPY WITH PERCUTANEOUS ENDOSCOPIC GASTROSTOMY TUBE INSERTION;  Surgeon: Shade Campos MD;  Location: [facility] OR;  Service:    • FOREIGN BODY REMOVAL      insulin needle removal x 3 from the abdomen   • HYSTERECTOMY     • INCISION AND DRAINAGE ARM Left 2017    Procedure: INCISION AND DRAINAGE UPPER EXTREMITY;  Surgeon: Shane Fuller MD;  Location: [facility] OR;  Service:        Family History   Problem Relation Age of Onset   • Colon cancer Mother    • Cancer Father    • Diabetes Maternal Grandmother    • Heart disease Neg Hx    • Stroke Neg Hx        Social History     Social History   • Marital status:      Social History Main Topics   • Smoking status: Current Every Day Smoker     Packs/day: 0.50     Years: 35.00     Types: Cigarettes   • Smokeless tobacco: Never Used      Comment: 4-5 cigarettes a day    • Alcohol use No   • Drug use: No   • Sexual activity: Defer     Other Topics Concern   • Not on file           Objective   Physical Exam    Procedures         ED Course  ED Course   Value Comment By Time   ECG 12 Lead Sinus rhythm, 113 bpm, QTc 477ms. No ST segment abnormalities concerning for STEMI.  Arnoldo Wesley MD  0153     XR Chest 1 View    (Results Pending)     Labs Reviewed   COMPREHENSIVE METABOLIC PANEL - Abnormal; Notable for the following:        Result Value    Glucose 334 (*)     Potassium 3.4 (*)     CO2 20.5 (*)     Albumin 3.40 (*)     Total Bilirubin 0.1 (*)     A/G Ratio 1.1 (*)     Anion Gap 15.5 (*)     All other components within normal limits   LIPASE - Abnormal; Notable for the following:      Lipase 142 (*)     All other components within normal limits   URINALYSIS W/ MICROSCOPIC IF INDICATED - Abnormal; Notable for the following:     Specific Gravity, UA >1.030 (*)     Glucose, UA >=1000 mg/dL (3+) (*)     Blood, UA Trace (*)     Protein,  mg/dL (2+) (*)     Nitrite, UA Positive (*)     All other components within normal limits   BLOOD GAS, ARTERIAL - Abnormal; Notable for the following:     pH, Arterial 7.455 (*)     pCO2, Arterial 29.8 (*)     pO2, Arterial 100.5 (*)     HCO3, Arterial 20.5 (*)     Hemoglobin, Blood Gas 10.6 (*)     Hematocrit, Blood Gas 31.0 (*)     All other components within normal limits   BNP (IN-HOUSE) - Abnormal; Notable for the following:     .0 (*)     All other components within normal limits   LACTIC ACID, PLASMA - Abnormal; Notable for the following:     Lactate 3.3 (*)     All other components within normal limits   C-REACTIVE PROTEIN - Abnormal; Notable for the following:     C-Reactive Protein 2.44 (*)     All other components within normal limits   SEDIMENTATION RATE - Abnormal; Notable for the following:     Sed Rate 54 (*)     All other components within normal limits   CBC WITH AUTO DIFFERENTIAL - Abnormal; Notable for the following:     RBC 4.08 (*)     Hemoglobin 9.6 (*)     Hematocrit 32.8 (*)     MCH 23.5 (*)     MCHC 29.3 (*)     RDW 18.2 (*)     Neutrophil % 74.6 (*)     Lymphocyte % 15.0 (*)     Immature Grans % 4.0 (*)     Neutrophils, Absolute 8.79 (*)     Immature Grans, Absolute 0.47 (*)     All other components within normal limits   URINALYSIS, MICROSCOPIC ONLY - Abnormal; Notable for the following:     RBC, UA 3-6 (*)     WBC, UA 31-50 (*)     Bacteria, UA 4+ (*)     All other components within normal limits   INFLUENZA ANTIGEN, RAPID - Normal   TROPONIN (IN-HOUSE) - Normal    Narrative:     Ultra Troponin I Reference Range:         <=0.039 ng/mL: Negative    0.04-0.779 ng/mL: Indeterminate Range. Suspicious of MI.  Clinical correlation  required.       >=0.78  ng/mL: Consistent with myocardial injury.  Clinical correlation required.   OSMOLALITY, CALCULATED - Normal   TROPONIN (IN-HOUSE) - Normal    Narrative:     Ultra Troponin I Reference Range:         <=0.039 ng/mL: Negative    0.04-0.779 ng/mL: Indeterminate Range. Suspicious of MI.  Clinical correlation required.       >=0.78  ng/mL: Consistent with myocardial injury.  Clinical correlation required.   BLOOD CULTURE   BLOOD CULTURE   LACTIC ACID REFLEX TIMER   CBC AND DIFFERENTIAL    Narrative:     The following orders were created for panel order CBC & Differential.  Procedure                               Abnormality         Status                     ---------                               -----------         ------                     CBC Auto Differential[[id number]]        Abnormal            Final result                 Please view results for these tests on the individual orders.        Medication List      START taking these medications    nitrofurantoin (macrocrystal-monohydrate) 100 MG capsule  Commonly known as:  MACROBID  Take 1 capsule by mouth 2 (Two) Times a Day for 7 days.     ondansetron ODT 4 MG disintegrating tablet  Commonly known as:  ZOFRAN-ODT  Take 1 tablet by mouth 4 (Four) Times a Day.        CONTINUE taking these medications    azithromycin 250 MG tablet  Commonly known as:  ZITHROMAX Z-TWIN  Take 2 tablets the first day, then 1 tablet daily for 4 days.     budesonide-formoterol 160-4.5 MCG/ACT inhaler  Commonly known as:  SYMBICORT  Inhale 2 puffs 2 (Two) Times a Day.     gabapentin 600 MG tablet  Commonly known as:  NEURONTIN     insulin glargine 100 UNIT/ML injection  Commonly known as:  LANTUS  Inject 50 Units under the skin Every Night.     lisinopril 5 MG tablet  Commonly known as:  PRINIVIL,ZESTRIL     metFORMIN 500 MG tablet  Commonly known as:  GLUCOPHAGE     predniSONE 10 MG tablet  Commonly known as:  DELTASONE  Take 1 tablet by mouth Daily. Take 4 for 3  days, then 3 for 3 days, then 2   for 3 days, then 1 for 3 days, then stop     sucralfate 1 g tablet  Commonly known as:  CARAFATE     tiZANidine 4 MG tablet  Commonly known as:  ZANAFLEX                       MDM  Number of Diagnoses or Management Options  Acute cystitis without hematuria: new and requires workup  Rodas disease: established and worsening  Chest pain, unspecified type: new and requires workup     Amount and/or Complexity of Data Reviewed  Clinical lab tests: reviewed  Tests in the radiology section of CPT®: reviewed  Tests in the medicine section of CPT®: reviewed  Decide to obtain previous medical records or to obtain history from someone other than the patient: yes  Discuss the patient with other providers: yes    Risk of Complications, Morbidity, and/or Mortality  Presenting problems: high  Diagnostic procedures: high  Management options: moderate    Patient Progress  Patient progress: improved      Final diagnoses:   Acute cystitis without hematuria   Rodas disease   Chest pain, unspecified type            Pk Bowling MD  04/03/18 0126

## 2018-04-03 NOTE — ED NOTES
Spoke with Pt son Scot at this time. Scot states that he will find someone to come pick Pt up. Scot states that it may be a while but someone with be here. Spoke with Pt son at bedside. Pt aware.      Erin Day RN  04/03/18 0409

## 2018-04-03 NOTE — ED NOTES
Pt presents to the ER with complaints a generalized burning sensation to the left side. Pt states that she has a history of buerger's disease and had her left 3rd digit removed approximately 2 years ago. Pt states that her 2nd digit began burning 4 days ago and the pain in her finger radiates to her left shoulders, left chest, and generalized pain to her entire left side. Pt states that she was discharged 3 weeks ago from  after being diagnosed with pneumonia. Pt states that over the last four days she has increasingly became more short of breath. Pt states that she chronically takes prednisone and breathing treatments and has been doubling doses over that two days due to shortness of breath. Pt presents to the ER tearful, tachypneic with RR 30-32 times a minute, and emesis x3.      Erin Day RN  04/03/18 0235

## 2018-04-03 NOTE — ED NOTES
Pt states that she does not have a way home. Attempted to call Pt son Scot and daughter in law Jacy with numbers in Pt chart that Pt states are correct at this time with no answer. Lead nurse and provider made aware.      Erin Day RN  04/03/18 0359

## 2018-04-03 NOTE — ED PROVIDER NOTES
Subjective   History of Present Illness  51 y/o F here w/h/o COPD p/w acute onset of SOB and pain in her extremities of acute onset. Pt wears O2 via NC continuously and states that she has recently increased herself to 3L/min. Pt states she has been having some mild CP that is continuing. No N/V/D. Per records pt has h/o buerger's dz and RA. Pt states that her extremity pain is actually worsening. Pt states remote h/o MI and CAD.   Review of Systems   Constitutional: Positive for chills, fatigue and fever.   Eyes: Negative for photophobia and visual disturbance.   Respiratory: Positive for cough, shortness of breath and wheezing. Negative for chest tightness.    Cardiovascular: Positive for chest pain. Negative for palpitations and leg swelling.   Gastrointestinal: Negative for abdominal distention, constipation, diarrhea, nausea and vomiting.   Genitourinary: Negative for difficulty urinating and dysuria.   Musculoskeletal: Negative for back pain and neck pain.   Skin: Negative for color change and pallor.   Allergic/Immunologic: Positive for immunocompromised state.        Chronic, ongoing steroid use   Neurological: Negative for dizziness and headaches.   All other systems reviewed and are negative.      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   • Chronic respiratory failure with hypoxia     at nighttime   • Compression fracture of lumbar vertebra    • Compression fracture of thoracic vertebra    • COPD (chronic obstructive pulmonary disease)    • Coronary artery disease    • Diabetes mellitus type 2 in nonobese    • Erosive esophagitis 12/2016   • Essential hypertension    • Gastroparesis    • GERD (gastroesophageal reflux disease)    • Hepatitis-C    • History of transfusion    • Medically noncompliant    • MRSA infection     back of neck around 2012; 6/2017 septic left elbow joint with concerns for osteomyelitis and left anteromedial left arm    • NSTEMI (non-ST elevated myocardial infarction) 2016   • PE (pulmonary embolism)    • Protein calorie malnutrition    • RA (rheumatoid arthritis)     with chronic steroid use   • Rheumatoid arthritis        Allergies   Allergen Reactions   • Penicillins Anaphylaxis     Tolerated both cefepime and Rocephin earlier this month   • Toradol [Ketorolac Tromethamine] Hives and Swelling   • Tramadol Hives and Swelling       Past Surgical History:   Procedure Laterality Date   • ABDOMINAL SURGERY     • AMPUTATION DIGIT Right 2016    Third digit to the PIP. Surgeon: Dr. Bains   • AMPUTATION DIGIT Left 2016    Procedure: PARTIAL AMPUTATION LEFT THIRD FINGER;  Surgeon: Shane Fuller MD;  Location: [facility] OR;  Service:    • AMPUTATION REVISION Left 2016    Procedure: AMPUTATION REVISION DIGIT STUMP;  Surgeon: Shane Fuller MD;  Location: [facility] OR;  Service:    • ARM DEBRIDEMENT Left 2016    Procedure: HAND IRRIGATION DEBRIDEMENT AND REPAIR ;  Surgeon: Shane Fuller MD;  Location: [facility] OR;  Service:    •  SECTION      x 2   • CHOLECYSTECTOMY     • ENDOSCOPY N/A 2016    Procedure: ESOPHAGOGASTRODUODENOSCOPY;  Surgeon: Jeremy Clement III, MD;  Location: [facility] OR;  Service:    • ENDOSCOPY W/ PEG TUBE PLACEMENT N/A 3/30/2017    Procedure: ESOPHAGOGASTRODUODENOSCOPY WITH PERCUTANEOUS ENDOSCOPIC GASTROSTOMY TUBE INSERTION;  Surgeon: Shade Campos MD;  Location: [facility] OR;  Service:    • FOREIGN BODY REMOVAL      insulin needle removal x 3 from the abdomen   • HYSTERECTOMY     • INCISION AND DRAINAGE ARM Left 2017    Procedure: INCISION AND DRAINAGE UPPER EXTREMITY;  Surgeon: Shane Fuller MD;  Location: [facility] OR;  Service:        Family History   Problem Relation Age of Onset   • Colon cancer Mother    • Cancer Father    • Diabetes Maternal Grandmother    • Heart disease Neg Hx    • Stroke Neg Hx        Social History     Social  History   • Marital status:      Social History Main Topics   • Smoking status: Current Every Day Smoker     Packs/day: 0.50     Years: 35.00     Types: Cigarettes   • Smokeless tobacco: Never Used      Comment: 4-5 cigarettes a day    • Alcohol use No   • Drug use: No   • Sexual activity: Defer     Other Topics Concern   • Not on file           Objective   Physical Exam   Constitutional: She is oriented to person, place, and time. She appears well-developed and well-nourished. She is active.   HENT:   Head: Normocephalic and atraumatic.   Right Ear: Hearing, external ear and ear canal normal.   Left Ear: Hearing, external ear and ear canal normal.   Nose: Nose normal.   Mouth/Throat: Uvula is midline, oropharynx is clear and moist and mucous membranes are normal.   Eyes: Conjunctivae, EOM and lids are normal. Pupils are equal, round, and reactive to light.   Neck: Trachea normal, normal range of motion, full passive range of motion without pain and phonation normal. Neck supple.   Cardiovascular: Regular rhythm and normal heart sounds.  Tachycardia present.    Pulmonary/Chest: Effort normal. She has decreased breath sounds. She has no wheezes. She has no rhonchi.   Abdominal: Soft. Normal appearance. There is no tenderness.   Neurological: She is alert and oriented to person, place, and time. GCS eye subscore is 4. GCS verbal subscore is 5. GCS motor subscore is 6.   Skin: Skin is warm, dry and intact. Capillary refill takes less than 2 seconds.   Psychiatric: Her mood appears anxious. Her speech is rapid and/or pressured. Cognition and memory are normal.   Pt sobbing on presentation and states she is in acute pain   Nursing note and vitals reviewed.      Procedures         ED Course  ED Course   Value Comment By Time   ECG 12 Lead Sinus rhythm, 113 bpm, QTc 477ms. No ST segment abnormalities concerning for STEMI.  Arnoldo Wesley MD 04/03 0183      Pt checked out to Dr LEWIS at shift change.              MDM  Number of Diagnoses or Management Options  Acute cystitis without hematuria: new and requires workup  Rodas disease: new and requires workup  Chest pain, unspecified type: new and requires workup     Amount and/or Complexity of Data Reviewed  Clinical lab tests: ordered and reviewed  Tests in the radiology section of CPT®: ordered and reviewed  Tests in the medicine section of CPT®: ordered and reviewed  Discuss the patient with other providers: yes  Independent visualization of images, tracings, or specimens: yes    Risk of Complications, Morbidity, and/or Mortality  Presenting problems: high  Diagnostic procedures: high  Management options: high    Patient Progress  Patient progress: stable      Final diagnoses:   Acute cystitis without hematuria   Rodas disease   Chest pain, unspecified type            Arnoldo Wesley MD  04/13/18 3318

## 2018-04-03 NOTE — ED NOTES
Pt has ulceration to the right elbow present upon arrival. Pt states that wound was enclosed and she applied a pain patch to the area causing the skin to break open and drainage to leak from the area. The wound measures 0.5cmx0.5cm and is circular in presentation. Pt has scant, clear drainage from the area. Periwound area is clean, dry, and intact; no redness noted. Pt wound cleansed with sterile saline, dried with gauze and mepalex applied to the right elbow. Pt tolerated well.      Erin Day RN  04/03/18 0243

## 2018-04-08 ENCOUNTER — APPOINTMENT (OUTPATIENT)
Dept: GENERAL RADIOLOGY | Facility: HOSPITAL | Age: 53
End: 2018-04-08

## 2018-04-08 ENCOUNTER — APPOINTMENT (OUTPATIENT)
Dept: CT IMAGING | Facility: HOSPITAL | Age: 53
End: 2018-04-08

## 2018-04-08 ENCOUNTER — HOSPITAL ENCOUNTER (EMERGENCY)
Facility: HOSPITAL | Age: 53
Discharge: HOME | End: 2018-04-08
Attending: EMERGENCY MEDICINE | Admitting: EMERGENCY MEDICINE

## 2018-04-08 VITALS
OXYGEN SATURATION: 98 % | HEART RATE: 106 BPM | HEIGHT: 63 IN | SYSTOLIC BLOOD PRESSURE: 165 MMHG | RESPIRATION RATE: 18 BRPM | BODY MASS INDEX: 21.26 KG/M2 | DIASTOLIC BLOOD PRESSURE: 92 MMHG | TEMPERATURE: 98.1 F | WEIGHT: 120 LBS

## 2018-04-08 DIAGNOSIS — R10.13 EPIGASTRIC PAIN: Primary | ICD-10-CM

## 2018-04-08 LAB
6-ACETYL MORPHINE: NEGATIVE
A-A DO2: 36.6 MMHG (ref 0–300)
ALBUMIN SERPL-MCNC: 4.1 G/DL (ref 3.5–5)
ALBUMIN/GLOB SERPL: 1.2 G/DL (ref 1.5–2.5)
ALP SERPL-CCNC: 66 U/L (ref 35–104)
ALT SERPL W P-5'-P-CCNC: 54 U/L (ref 10–36)
AMPHET+METHAMPHET UR QL: NEGATIVE
AMYLASE SERPL-CCNC: 37 U/L (ref 28–100)
ANION GAP SERPL CALCULATED.3IONS-SCNC: 12.1 MMOL/L (ref 3.6–11.2)
ARTERIAL PATENCY WRIST A: ABNORMAL
AST SERPL-CCNC: 30 U/L (ref 10–30)
ATMOSPHERIC PRESS: 725 MMHG
BACTERIA SPEC AEROBE CULT: NORMAL
BACTERIA SPEC AEROBE CULT: NORMAL
BACTERIA UR QL AUTO: ABNORMAL /HPF
BARBITURATES UR QL SCN: NEGATIVE
BASE EXCESS BLDA CALC-SCNC: -2.9 MMOL/L
BASOPHILS # BLD AUTO: 0.04 10*3/MM3 (ref 0–0.3)
BASOPHILS NFR BLD AUTO: 0.3 % (ref 0–2)
BDY SITE: ABNORMAL
BENZODIAZ UR QL SCN: NEGATIVE
BILIRUB SERPL-MCNC: 0.5 MG/DL (ref 0.2–1.8)
BILIRUB UR QL STRIP: NEGATIVE
BNP SERPL-MCNC: 229 PG/ML (ref 0–100)
BODY TEMPERATURE: 98.6 C
BUN BLD-MCNC: 22 MG/DL (ref 7–21)
BUN/CREAT SERPL: 31.4 (ref 7–25)
BUPRENORPHINE SERPL-MCNC: NEGATIVE NG/ML
CALCIUM SPEC-SCNC: 9.6 MG/DL (ref 7.7–10)
CANNABINOIDS SERPL QL: NEGATIVE
CHLORIDE SERPL-SCNC: 106 MMOL/L (ref 99–112)
CLARITY UR: CLEAR
CO2 SERPL-SCNC: 22.9 MMOL/L (ref 24.3–31.9)
COCAINE UR QL: NEGATIVE
COHGB MFR BLD: 3.1 % (ref 0–5)
COLOR UR: YELLOW
CREAT BLD-MCNC: 0.7 MG/DL (ref 0.43–1.29)
D-LACTATE SERPL-SCNC: 1.3 MMOL/L (ref 0.5–2)
DEPRECATED RDW RBC AUTO: 51.5 FL (ref 37–54)
EOSINOPHIL # BLD AUTO: 0.02 10*3/MM3 (ref 0–0.7)
EOSINOPHIL NFR BLD AUTO: 0.1 % (ref 0–5)
ERYTHROCYTE [DISTWIDTH] IN BLOOD BY AUTOMATED COUNT: 18.3 % (ref 11.5–14.5)
FLUAV AG NPH QL: NEGATIVE
FLUBV AG NPH QL IA: NEGATIVE
GFR SERPL CREATININE-BSD FRML MDRD: 88 ML/MIN/1.73
GLOBULIN UR ELPH-MCNC: 3.5 GM/DL
GLUCOSE BLD-MCNC: 302 MG/DL (ref 70–110)
GLUCOSE UR STRIP-MCNC: ABNORMAL MG/DL
HCO3 BLDA-SCNC: 19.7 MMOL/L (ref 22–26)
HCT VFR BLD AUTO: 40 % (ref 37–47)
HCT VFR BLD CALC: 34 % (ref 37–47)
HGB BLD-MCNC: 11.8 G/DL (ref 12–16)
HGB BLDA-MCNC: 11.4 G/DL (ref 12–16)
HGB UR QL STRIP.AUTO: ABNORMAL
HOROWITZ INDEX BLD+IHG-RTO: 21 %
HYALINE CASTS UR QL AUTO: ABNORMAL /LPF
IMM GRANULOCYTES # BLD: 0.4 10*3/MM3 (ref 0–0.03)
IMM GRANULOCYTES NFR BLD: 2.9 % (ref 0–0.5)
KETONES UR QL STRIP: ABNORMAL
LEUKOCYTE ESTERASE UR QL STRIP.AUTO: NEGATIVE
LIPASE SERPL-CCNC: 55 U/L (ref 13–60)
LYMPHOCYTES # BLD AUTO: 1.17 10*3/MM3 (ref 1–3)
LYMPHOCYTES NFR BLD AUTO: 8.5 % (ref 21–51)
MCH RBC QN AUTO: 23.4 PG (ref 27–33)
MCHC RBC AUTO-ENTMCNC: 29.5 G/DL (ref 33–37)
MCV RBC AUTO: 79.2 FL (ref 80–94)
METHADONE UR QL SCN: NEGATIVE
METHGB BLD QL: 0.3 % (ref 0–3)
MODALITY: ABNORMAL
MONOCYTES # BLD AUTO: 0.6 10*3/MM3 (ref 0.1–0.9)
MONOCYTES NFR BLD AUTO: 4.4 % (ref 0–10)
NEUTROPHILS # BLD AUTO: 11.46 10*3/MM3 (ref 1.4–6.5)
NEUTROPHILS NFR BLD AUTO: 83.8 % (ref 30–70)
NITRITE UR QL STRIP: NEGATIVE
OPIATES UR QL: POSITIVE
OSMOLALITY SERPL CALC.SUM OF ELEC: 295.9 MOSM/KG (ref 273–305)
OXYCODONE UR QL SCN: NEGATIVE
OXYHGB MFR BLDV: 91.9 % (ref 85–100)
PCO2 BLDA: 27.8 MM HG (ref 35–45)
PCP UR QL SCN: NEGATIVE
PH BLDA: 7.47 PH UNITS (ref 7.35–7.45)
PH UR STRIP.AUTO: 7 [PH] (ref 5–8)
PLATELET # BLD AUTO: 428 10*3/MM3 (ref 130–400)
PMV BLD AUTO: 9.3 FL (ref 6–10)
PO2 BLDA: 72.5 MM HG (ref 80–100)
POTASSIUM BLD-SCNC: 4.2 MMOL/L (ref 3.5–5.3)
PROT SERPL-MCNC: 7.6 G/DL (ref 6–8)
PROT UR QL STRIP: ABNORMAL
RBC # BLD AUTO: 5.05 10*6/MM3 (ref 4.2–5.4)
RBC # UR: ABNORMAL /HPF
REF LAB TEST METHOD: ABNORMAL
SAO2 % BLDCOA: 95.1 % (ref 90–100)
SODIUM BLD-SCNC: 141 MMOL/L (ref 135–153)
SP GR UR STRIP: 1.02 (ref 1–1.03)
SQUAMOUS #/AREA URNS HPF: ABNORMAL /HPF
TROPONIN I SERPL-MCNC: 0.02 NG/ML
UROBILINOGEN UR QL STRIP: ABNORMAL
WBC NRBC COR # BLD: 13.69 10*3/MM3 (ref 4.5–12.5)
WBC UR QL AUTO: ABNORMAL /HPF

## 2018-04-08 PROCEDURE — 80307 DRUG TEST PRSMV CHEM ANLYZR: CPT | Performed by: PHYSICIAN ASSISTANT

## 2018-04-08 PROCEDURE — 25010000002 HYDROMORPHONE PER 4 MG: Performed by: EMERGENCY MEDICINE

## 2018-04-08 PROCEDURE — 82150 ASSAY OF AMYLASE: CPT | Performed by: PHYSICIAN ASSISTANT

## 2018-04-08 PROCEDURE — 96374 THER/PROPH/DIAG INJ IV PUSH: CPT

## 2018-04-08 PROCEDURE — 83690 ASSAY OF LIPASE: CPT | Performed by: PHYSICIAN ASSISTANT

## 2018-04-08 PROCEDURE — 74177 CT ABD & PELVIS W/CONTRAST: CPT | Performed by: RADIOLOGY

## 2018-04-08 PROCEDURE — 74177 CT ABD & PELVIS W/CONTRAST: CPT

## 2018-04-08 PROCEDURE — 82805 BLOOD GASES W/O2 SATURATION: CPT | Performed by: PHYSICIAN ASSISTANT

## 2018-04-08 PROCEDURE — 80053 COMPREHEN METABOLIC PANEL: CPT | Performed by: PHYSICIAN ASSISTANT

## 2018-04-08 PROCEDURE — 96361 HYDRATE IV INFUSION ADD-ON: CPT

## 2018-04-08 PROCEDURE — 93005 ELECTROCARDIOGRAM TRACING: CPT | Performed by: EMERGENCY MEDICINE

## 2018-04-08 PROCEDURE — 82375 ASSAY CARBOXYHB QUANT: CPT | Performed by: PHYSICIAN ASSISTANT

## 2018-04-08 PROCEDURE — 84484 ASSAY OF TROPONIN QUANT: CPT | Performed by: PHYSICIAN ASSISTANT

## 2018-04-08 PROCEDURE — 96375 TX/PRO/DX INJ NEW DRUG ADDON: CPT

## 2018-04-08 PROCEDURE — 71045 X-RAY EXAM CHEST 1 VIEW: CPT

## 2018-04-08 PROCEDURE — 93010 ELECTROCARDIOGRAM REPORT: CPT | Performed by: INTERNAL MEDICINE

## 2018-04-08 PROCEDURE — 83605 ASSAY OF LACTIC ACID: CPT | Performed by: PHYSICIAN ASSISTANT

## 2018-04-08 PROCEDURE — 83050 HGB METHEMOGLOBIN QUAN: CPT | Performed by: PHYSICIAN ASSISTANT

## 2018-04-08 PROCEDURE — 81001 URINALYSIS AUTO W/SCOPE: CPT | Performed by: PHYSICIAN ASSISTANT

## 2018-04-08 PROCEDURE — 36600 WITHDRAWAL OF ARTERIAL BLOOD: CPT | Performed by: PHYSICIAN ASSISTANT

## 2018-04-08 PROCEDURE — 71045 X-RAY EXAM CHEST 1 VIEW: CPT | Performed by: RADIOLOGY

## 2018-04-08 PROCEDURE — 87804 INFLUENZA ASSAY W/OPTIC: CPT | Performed by: PHYSICIAN ASSISTANT

## 2018-04-08 PROCEDURE — 25010000002 ONDANSETRON PER 1 MG: Performed by: PHYSICIAN ASSISTANT

## 2018-04-08 PROCEDURE — 99285 EMERGENCY DEPT VISIT HI MDM: CPT

## 2018-04-08 PROCEDURE — 36415 COLL VENOUS BLD VENIPUNCTURE: CPT

## 2018-04-08 PROCEDURE — 87040 BLOOD CULTURE FOR BACTERIA: CPT | Performed by: PHYSICIAN ASSISTANT

## 2018-04-08 PROCEDURE — 25010000002 IOPAMIDOL 61 % SOLUTION: Performed by: EMERGENCY MEDICINE

## 2018-04-08 PROCEDURE — 83880 ASSAY OF NATRIURETIC PEPTIDE: CPT | Performed by: PHYSICIAN ASSISTANT

## 2018-04-08 PROCEDURE — 85025 COMPLETE CBC W/AUTO DIFF WBC: CPT | Performed by: PHYSICIAN ASSISTANT

## 2018-04-08 RX ORDER — SODIUM CHLORIDE 0.9 % (FLUSH) 0.9 %
10 SYRINGE (ML) INJECTION AS NEEDED
Status: DISCONTINUED | OUTPATIENT
Start: 2018-04-08 | End: 2018-04-09 | Stop reason: HOSPADM

## 2018-04-08 RX ORDER — HYDROMORPHONE HCL 110MG/55ML
0.5 PATIENT CONTROLLED ANALGESIA SYRINGE INTRAVENOUS ONCE
Status: COMPLETED | OUTPATIENT
Start: 2018-04-08 | End: 2018-04-08

## 2018-04-08 RX ORDER — PHENOBARBITAL, HYOSCYAMINE SULFATE, ATROPINE SULFATE AND SCOPOLAMINE HYDROBROMIDE .0194; .1037; 16.2; .0065 MG/1; MG/1; MG/1; MG/1
2 TABLET ORAL ONCE
Status: COMPLETED | OUTPATIENT
Start: 2018-04-08 | End: 2018-04-08

## 2018-04-08 RX ORDER — ONDANSETRON 2 MG/ML
4 INJECTION INTRAMUSCULAR; INTRAVENOUS ONCE
Status: COMPLETED | OUTPATIENT
Start: 2018-04-08 | End: 2018-04-08

## 2018-04-08 RX ORDER — ALUMINA, MAGNESIA, AND SIMETHICONE 2400; 2400; 240 MG/30ML; MG/30ML; MG/30ML
15 SUSPENSION ORAL ONCE
Status: COMPLETED | OUTPATIENT
Start: 2018-04-08 | End: 2018-04-08

## 2018-04-08 RX ADMIN — IOPAMIDOL 90 ML: 612 INJECTION, SOLUTION INTRAVENOUS at 21:46

## 2018-04-08 RX ADMIN — HYDROMORPHONE HYDROCHLORIDE 0.5 MG: 2 INJECTION INTRAMUSCULAR; INTRAVENOUS; SUBCUTANEOUS at 20:57

## 2018-04-08 RX ADMIN — SODIUM CHLORIDE 1000 ML: 9 INJECTION, SOLUTION INTRAVENOUS at 20:30

## 2018-04-08 RX ADMIN — LIDOCAINE HYDROCHLORIDE 15 ML: 20 SOLUTION ORAL; TOPICAL at 23:06

## 2018-04-08 RX ADMIN — ALUMINUM HYDROXIDE, MAGNESIUM HYDROXIDE, AND DIMETHICONE 15 ML: 400; 400; 40 SUSPENSION ORAL at 23:05

## 2018-04-08 RX ADMIN — PHENOBARBITAL, HYOSCYAMINE SULFATE, ATROPINE SULFATE, SCOPOLAMINE HYDROBROMIDE 32.4 MG: 16.2; .1037; .0194; .0065 TABLET ORAL at 23:05

## 2018-04-08 RX ADMIN — ONDANSETRON 4 MG: 2 INJECTION INTRAMUSCULAR; INTRAVENOUS at 20:34

## 2018-04-09 ENCOUNTER — APPOINTMENT (OUTPATIENT)
Dept: GENERAL RADIOLOGY | Facility: HOSPITAL | Age: 53
End: 2018-04-09

## 2018-04-09 ENCOUNTER — HOSPITAL ENCOUNTER (EMERGENCY)
Facility: HOSPITAL | Age: 53
Discharge: HOME | End: 2018-04-09
Attending: EMERGENCY MEDICINE | Admitting: EMERGENCY MEDICINE

## 2018-04-09 VITALS
HEIGHT: 63 IN | DIASTOLIC BLOOD PRESSURE: 86 MMHG | SYSTOLIC BLOOD PRESSURE: 160 MMHG | RESPIRATION RATE: 18 BRPM | OXYGEN SATURATION: 99 % | BODY MASS INDEX: 21.26 KG/M2 | WEIGHT: 120 LBS | HEART RATE: 113 BPM | TEMPERATURE: 99 F

## 2018-04-09 DIAGNOSIS — N39.0 URINARY TRACT INFECTION WITHOUT HEMATURIA, SITE UNSPECIFIED: ICD-10-CM

## 2018-04-09 DIAGNOSIS — K85.90 ACUTE PANCREATITIS, UNSPECIFIED COMPLICATION STATUS, UNSPECIFIED PANCREATITIS TYPE: Primary | ICD-10-CM

## 2018-04-09 LAB
ALBUMIN SERPL-MCNC: 3.7 G/DL (ref 3.5–5)
ALBUMIN/GLOB SERPL: 1.1 G/DL (ref 1.5–2.5)
ALP SERPL-CCNC: 59 U/L (ref 35–104)
ALT SERPL W P-5'-P-CCNC: 43 U/L (ref 10–36)
ANION GAP SERPL CALCULATED.3IONS-SCNC: 13.9 MMOL/L (ref 3.6–11.2)
ANISOCYTOSIS BLD QL: NORMAL
AST SERPL-CCNC: 24 U/L (ref 10–30)
BACTERIA UR QL AUTO: ABNORMAL /HPF
BASOPHILS # BLD AUTO: 0.07 10*3/MM3 (ref 0–0.3)
BASOPHILS NFR BLD AUTO: 0.5 % (ref 0–2)
BILIRUB SERPL-MCNC: 0.5 MG/DL (ref 0.2–1.8)
BILIRUB UR QL STRIP: NEGATIVE
BUN BLD-MCNC: 20 MG/DL (ref 7–21)
BUN/CREAT SERPL: 31.3 (ref 7–25)
CALCIUM SPEC-SCNC: 9.1 MG/DL (ref 7.7–10)
CHLORIDE SERPL-SCNC: 108 MMOL/L (ref 99–112)
CLARITY UR: CLEAR
CO2 SERPL-SCNC: 19.1 MMOL/L (ref 24.3–31.9)
COLOR UR: ABNORMAL
CREAT BLD-MCNC: 0.64 MG/DL (ref 0.43–1.29)
DACRYOCYTES BLD QL SMEAR: NORMAL
DEPRECATED RDW RBC AUTO: 53.1 FL (ref 37–54)
EOSINOPHIL # BLD AUTO: 0.04 10*3/MM3 (ref 0–0.7)
EOSINOPHIL NFR BLD AUTO: 0.3 % (ref 0–5)
ERYTHROCYTE [DISTWIDTH] IN BLOOD BY AUTOMATED COUNT: 18.6 % (ref 11.5–14.5)
GFR SERPL CREATININE-BSD FRML MDRD: 97 ML/MIN/1.73
GLOBULIN UR ELPH-MCNC: 3.4 GM/DL
GLUCOSE BLD-MCNC: 259 MG/DL (ref 70–110)
GLUCOSE UR STRIP-MCNC: ABNORMAL MG/DL
HCT VFR BLD AUTO: 40.8 % (ref 37–47)
HGB BLD-MCNC: 11.8 G/DL (ref 12–16)
HGB UR QL STRIP.AUTO: ABNORMAL
HOLD SPECIMEN: NORMAL
HOLD SPECIMEN: NORMAL
HYALINE CASTS UR QL AUTO: ABNORMAL /LPF
HYPOCHROMIA BLD QL: NORMAL
IMM GRANULOCYTES # BLD: 0.38 10*3/MM3 (ref 0–0.03)
IMM GRANULOCYTES NFR BLD: 2.8 % (ref 0–0.5)
KETONES UR QL STRIP: ABNORMAL
LEUKOCYTE ESTERASE UR QL STRIP.AUTO: NEGATIVE
LIPASE SERPL-CCNC: 61 U/L (ref 13–60)
LYMPHOCYTES # BLD AUTO: 1.89 10*3/MM3 (ref 1–3)
LYMPHOCYTES NFR BLD AUTO: 14 % (ref 21–51)
MCH RBC QN AUTO: 23.2 PG (ref 27–33)
MCHC RBC AUTO-ENTMCNC: 28.9 G/DL (ref 33–37)
MCV RBC AUTO: 80.2 FL (ref 80–94)
MONOCYTES # BLD AUTO: 1.25 10*3/MM3 (ref 0.1–0.9)
MONOCYTES NFR BLD AUTO: 9.2 % (ref 0–10)
NEUTROPHILS # BLD AUTO: 9.9 10*3/MM3 (ref 1.4–6.5)
NEUTROPHILS NFR BLD AUTO: 73.2 % (ref 30–70)
NITRITE UR QL STRIP: POSITIVE
OSMOLALITY SERPL CALC.SUM OF ELEC: 292.8 MOSM/KG (ref 273–305)
OVALOCYTES BLD QL SMEAR: NORMAL
PH UR STRIP.AUTO: 6 [PH] (ref 5–8)
PLATELET # BLD AUTO: 460 10*3/MM3 (ref 130–400)
PMV BLD AUTO: 9.5 FL (ref 6–10)
POTASSIUM BLD-SCNC: 4.1 MMOL/L (ref 3.5–5.3)
PROT SERPL-MCNC: 7.1 G/DL (ref 6–8)
PROT UR QL STRIP: ABNORMAL
RBC # BLD AUTO: 5.09 10*6/MM3 (ref 4.2–5.4)
RBC # UR: ABNORMAL /HPF
REF LAB TEST METHOD: ABNORMAL
SMALL PLATELETS BLD QL SMEAR: NORMAL
SODIUM BLD-SCNC: 141 MMOL/L (ref 135–153)
SP GR UR STRIP: >1.03 (ref 1–1.03)
SQUAMOUS #/AREA URNS HPF: ABNORMAL /HPF
TROPONIN I SERPL-MCNC: 0.02 NG/ML
UROBILINOGEN UR QL STRIP: ABNORMAL
WBC NRBC COR # BLD: 13.53 10*3/MM3 (ref 4.5–12.5)
WBC UR QL AUTO: ABNORMAL /HPF
WHOLE BLOOD HOLD SPECIMEN: NORMAL
WHOLE BLOOD HOLD SPECIMEN: NORMAL

## 2018-04-09 PROCEDURE — 84484 ASSAY OF TROPONIN QUANT: CPT | Performed by: EMERGENCY MEDICINE

## 2018-04-09 PROCEDURE — 96375 TX/PRO/DX INJ NEW DRUG ADDON: CPT

## 2018-04-09 PROCEDURE — 74022 RADEX COMPL AQT ABD SERIES: CPT

## 2018-04-09 PROCEDURE — 85025 COMPLETE CBC W/AUTO DIFF WBC: CPT | Performed by: EMERGENCY MEDICINE

## 2018-04-09 PROCEDURE — 99285 EMERGENCY DEPT VISIT HI MDM: CPT

## 2018-04-09 PROCEDURE — 81001 URINALYSIS AUTO W/SCOPE: CPT | Performed by: EMERGENCY MEDICINE

## 2018-04-09 PROCEDURE — 87086 URINE CULTURE/COLONY COUNT: CPT | Performed by: EMERGENCY MEDICINE

## 2018-04-09 PROCEDURE — 74022 RADEX COMPL AQT ABD SERIES: CPT | Performed by: RADIOLOGY

## 2018-04-09 PROCEDURE — 96376 TX/PRO/DX INJ SAME DRUG ADON: CPT

## 2018-04-09 PROCEDURE — 80053 COMPREHEN METABOLIC PANEL: CPT | Performed by: EMERGENCY MEDICINE

## 2018-04-09 PROCEDURE — 87040 BLOOD CULTURE FOR BACTERIA: CPT | Performed by: EMERGENCY MEDICINE

## 2018-04-09 PROCEDURE — 93010 ELECTROCARDIOGRAM REPORT: CPT | Performed by: INTERNAL MEDICINE

## 2018-04-09 PROCEDURE — 25010000002 HYDROMORPHONE PER 4 MG: Performed by: EMERGENCY MEDICINE

## 2018-04-09 PROCEDURE — 87077 CULTURE AEROBIC IDENTIFY: CPT | Performed by: EMERGENCY MEDICINE

## 2018-04-09 PROCEDURE — 83690 ASSAY OF LIPASE: CPT | Performed by: EMERGENCY MEDICINE

## 2018-04-09 PROCEDURE — 96374 THER/PROPH/DIAG INJ IV PUSH: CPT

## 2018-04-09 PROCEDURE — 87186 SC STD MICRODIL/AGAR DIL: CPT | Performed by: EMERGENCY MEDICINE

## 2018-04-09 PROCEDURE — 96361 HYDRATE IV INFUSION ADD-ON: CPT

## 2018-04-09 PROCEDURE — 85007 BL SMEAR W/DIFF WBC COUNT: CPT | Performed by: EMERGENCY MEDICINE

## 2018-04-09 PROCEDURE — 25010000002 ONDANSETRON PER 1 MG: Performed by: EMERGENCY MEDICINE

## 2018-04-09 PROCEDURE — 93005 ELECTROCARDIOGRAM TRACING: CPT | Performed by: EMERGENCY MEDICINE

## 2018-04-09 RX ORDER — FAMOTIDINE 10 MG/ML
20 INJECTION, SOLUTION INTRAVENOUS ONCE
Status: COMPLETED | OUTPATIENT
Start: 2018-04-09 | End: 2018-04-09

## 2018-04-09 RX ORDER — SODIUM CHLORIDE 0.9 % (FLUSH) 0.9 %
10 SYRINGE (ML) INJECTION AS NEEDED
Status: DISCONTINUED | OUTPATIENT
Start: 2018-04-09 | End: 2018-04-09 | Stop reason: HOSPADM

## 2018-04-09 RX ORDER — HYDROMORPHONE HCL 110MG/55ML
0.5 PATIENT CONTROLLED ANALGESIA SYRINGE INTRAVENOUS ONCE
Status: COMPLETED | OUTPATIENT
Start: 2018-04-09 | End: 2018-04-09

## 2018-04-09 RX ORDER — ONDANSETRON 2 MG/ML
4 INJECTION INTRAMUSCULAR; INTRAVENOUS ONCE
Status: COMPLETED | OUTPATIENT
Start: 2018-04-09 | End: 2018-04-09

## 2018-04-09 RX ORDER — ALUMINA, MAGNESIA, AND SIMETHICONE 2400; 2400; 240 MG/30ML; MG/30ML; MG/30ML
15 SUSPENSION ORAL ONCE
Status: COMPLETED | OUTPATIENT
Start: 2018-04-09 | End: 2018-04-09

## 2018-04-09 RX ADMIN — ONDANSETRON 4 MG: 2 INJECTION INTRAMUSCULAR; INTRAVENOUS at 08:35

## 2018-04-09 RX ADMIN — ALUMINUM HYDROXIDE, MAGNESIUM HYDROXIDE, AND DIMETHICONE 15 ML: 400; 400; 40 SUSPENSION ORAL at 08:33

## 2018-04-09 RX ADMIN — SODIUM CHLORIDE 1000 ML: 9 INJECTION, SOLUTION INTRAVENOUS at 10:15

## 2018-04-09 RX ADMIN — FAMOTIDINE 20 MG: 10 INJECTION INTRAVENOUS at 08:36

## 2018-04-09 RX ADMIN — LIDOCAINE HYDROCHLORIDE 15 ML: 20 SOLUTION ORAL; TOPICAL at 08:34

## 2018-04-09 RX ADMIN — HYDROMORPHONE HYDROCHLORIDE 0.5 MG: 2 INJECTION INTRAMUSCULAR; INTRAVENOUS; SUBCUTANEOUS at 08:37

## 2018-04-09 RX ADMIN — SODIUM CHLORIDE 1000 ML: 9 INJECTION, SOLUTION INTRAVENOUS at 08:38

## 2018-04-09 RX ADMIN — HYDROMORPHONE HYDROCHLORIDE 0.5 MG: 2 INJECTION INTRAMUSCULAR; INTRAVENOUS; SUBCUTANEOUS at 10:14

## 2018-04-09 RX ADMIN — ONDANSETRON 4 MG: 2 INJECTION INTRAMUSCULAR; INTRAVENOUS at 10:13

## 2018-04-09 NOTE — ED NOTES
Patient states that she does not wish to stay for IV antibiotics. States that her ride will be here soon and that she wishes to leave. Dr. Wilhelm aware.     Julia Ruiz, GUERRERO  04/09/18 1200

## 2018-04-09 NOTE — ED NOTES
Discharge instructions provided, follow-up appointment discussed, instructed pt to return to ED with any worsening of symptoms or if any no problems arise. Pt verbalizes understanding of instructions. Pt ambulatory out of ED with partner by her side. No acute distress noted.     Violeta Vergara RN  04/09/18 1737

## 2018-04-09 NOTE — ED NOTES
Gave pt urine collection container and encouraged her to provide urine sample.      Geeta Ferguson  04/08/18 2035

## 2018-04-09 NOTE — ED PROVIDER NOTES
Subjective   PT presents to ED with c/o epigastric abdominal pain, substernal chest pain, and chronic low back pain. States that the abdominal pain started yesterday and she has had vomiting with it, despite taking zofran/phenergan. She was recently seen in the ED for a chest pain workup, and it was unremarkable, and was d/c with dx of UTI, started on macrobid.         History provided by:  Patient   used: No    Abdominal Pain   Pain location:  Epigastric  Pain quality: sharp    Pain radiates to:  Chest  Pain severity:  Moderate  Onset quality:  Sudden  Duration:  1 day  Timing:  Constant  Progression:  Unchanged  Chronicity:  New  Context: recent illness    Relieved by:  None tried  Worsened by:  Movement, palpation, position changes and coughing  Ineffective treatments: po zofran/phenergan   Associated symptoms: chest pain, nausea and vomiting    Associated symptoms: no constipation, no cough, no diarrhea, no dysuria, no fever, no shortness of breath and no sore throat    Risk factors: not obese and no recent hospitalization        Review of Systems   Constitutional: Negative for activity change and fever.   HENT: Negative for congestion, rhinorrhea and sore throat.    Eyes: Negative for pain.   Respiratory: Negative for cough, shortness of breath and wheezing.    Cardiovascular: Positive for chest pain.   Gastrointestinal: Positive for abdominal pain, nausea and vomiting. Negative for abdominal distention, constipation and diarrhea.   Genitourinary: Negative for difficulty urinating and dysuria.   Musculoskeletal: Negative for arthralgias and myalgias.   Skin: Negative for rash and wound.   Neurological: Negative for dizziness and headaches.   Psychiatric/Behavioral: Negative for agitation.   All other systems reviewed and are negative.      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   • Chronic respiratory failure with hypoxia      at nighttime   • Compression fracture of lumbar vertebra    • Compression fracture of thoracic vertebra    • COPD (chronic obstructive pulmonary disease)    • Coronary artery disease    • Diabetes mellitus type 2 in nonobese    • Erosive esophagitis 2016   • Essential hypertension    • Gastroparesis    • GERD (gastroesophageal reflux disease)    • Hepatitis-C    • History of transfusion    • Medically noncompliant    • MRSA infection     back of neck around ; 2017 septic left elbow joint with concerns for osteomyelitis and left anteromedial left arm   • NSTEMI (non-ST elevated myocardial infarction) 2016   • PE (pulmonary embolism)    • Protein calorie malnutrition    • RA (rheumatoid arthritis)     with chronic steroid use   • Rheumatoid arthritis        Allergies   Allergen Reactions   • Penicillins Anaphylaxis     Tolerated both cefepime and Rocephin earlier this month   • Toradol [Ketorolac Tromethamine] Hives and Swelling   • Tramadol Hives and Swelling       Past Surgical History:   Procedure Laterality Date   • ABDOMINAL SURGERY     • AMPUTATION DIGIT Right 2016    Third digit to the PIP. Surgeon: Dr. Bains   • AMPUTATION DIGIT Left 2016    Procedure: PARTIAL AMPUTATION LEFT THIRD FINGER;  Surgeon: Shane Fuller MD;  Location: [facility] OR;  Service:    • AMPUTATION REVISION Left 2016    Procedure: AMPUTATION REVISION DIGIT STUMP;  Surgeon: Shane Fuller MD;  Location: [facility] OR;  Service:    • ARM DEBRIDEMENT Left 2016    Procedure: HAND IRRIGATION DEBRIDEMENT AND REPAIR ;  Surgeon: Shane Fuller MD;  Location: [facility] OR;  Service:    •  SECTION      x 2   • CHOLECYSTECTOMY     • ENDOSCOPY N/A 2016    Procedure: ESOPHAGOGASTRODUODENOSCOPY;  Surgeon: Jeremy Clement III, MD;  Location: [facility] OR;  Service:    • ENDOSCOPY W/ PEG TUBE PLACEMENT N/A 3/30/2017    Procedure: ESOPHAGOGASTRODUODENOSCOPY WITH PERCUTANEOUS ENDOSCOPIC  GASTROSTOMY TUBE INSERTION;  Surgeon: Shade Cmapos MD;  Location:  [facility] OR;  Service:    • FOREIGN BODY REMOVAL      insulin needle removal x 3 from the abdomen   • HYSTERECTOMY     • INCISION AND DRAINAGE ARM Left 6/27/2017    Procedure: INCISION AND DRAINAGE UPPER EXTREMITY;  Surgeon: Shane Fuller MD;  Location:  [facility] OR;  Service:        Family History   Problem Relation Age of Onset   • Colon cancer Mother    • Cancer Father    • Diabetes Maternal Grandmother    • Heart disease Neg Hx    • Stroke Neg Hx        Social History     Social History   • Marital status:      Social History Main Topics   • Smoking status: Current Every Day Smoker     Packs/day: 0.50     Years: 35.00     Types: Cigarettes   • Smokeless tobacco: Never Used      Comment: 4-5 cigarettes a day    • Alcohol use No   • Drug use: No   • Sexual activity: Defer     Other Topics Concern   • Not on file           Objective   Physical Exam   Constitutional: She is oriented to person, place, and time. She appears well-developed and well-nourished.   HENT:   Head: Normocephalic and atraumatic.   Eyes: EOM are normal. Pupils are equal, round, and reactive to light.   Neck: Normal range of motion. Neck supple.   Cardiovascular: Normal rate, regular rhythm and normal heart sounds.    Pulmonary/Chest: Effort normal and breath sounds normal.   Abdominal: Soft. Bowel sounds are normal. There is tenderness in the epigastric area.   Musculoskeletal: Normal range of motion.   Neurological: She is alert and oriented to person, place, and time.   Skin: Skin is warm and dry.   Psychiatric: She has a normal mood and affect. Her behavior is normal. Judgment and thought content normal.   Nursing note and vitals reviewed.      Procedures         ED Course  ED Course   Comment By Time   Endorsed to Lizy Sifuentes, who will assume care of patient.  COREEN Green 04/08 4481                  MDM  Number of Diagnoses or Management Options      Amount and/or Complexity of Data Reviewed  Clinical lab tests: ordered and reviewed  Tests in the radiology section of CPT®: ordered and reviewed  Tests in the medicine section of CPT®: ordered and reviewed  Decide to obtain previous medical records or to obtain history from someone other than the patient: yes    Patient Progress  Patient progress: stable      Final diagnoses:   Epigastric pain            COREEN Green  04/13/18 3759

## 2018-04-09 NOTE — ED PROVIDER NOTES
Subjective   52-year-old female presents emergency Department with complaints of epigastric abdominal pain, nausea, vomiting.  Patient was seen in the emergency department 9 hours ago for similar symptoms.  Patient had an extensive workup done that was unremarkable.  Patient had a cardiac workup done, labs, urine, CT abdomen and pelvis.  No acute process was identified.  Patient was discharged home with a prescription for Zofran Patient states that she has a history of pancreatitis.  She thinks that it's flaring up. Patient says she's been taking the zofran as directed but continues to vomit. Denies diarrhea, constipation, dysuria, hematuria, chest pain, shortness of breath.        History provided by:  Patient   used: No    Abdominal Pain   Pain location:  Epigastric  Pain quality: aching    Pain radiates to:  Does not radiate  Pain severity:  Moderate  Onset quality:  Gradual  Timing:  Constant  Progression:  Unchanged  Chronicity:  Chronic  Context comment:  Hx of pancreatitis  Relieved by:  Nothing  Worsened by:  Nothing  Ineffective treatments:  None tried  Associated symptoms: nausea and vomiting    Associated symptoms: no belching, no chest pain, no chills, no constipation, no cough, no diarrhea, no dysuria, no fever, no hematemesis, no hematochezia, no hematuria, no melena, no shortness of breath, no sore throat, no vaginal bleeding and no vaginal discharge    Risk factors: multiple surgeries        Review of Systems   Constitutional: Negative for chills and fever.   HENT: Negative for congestion, rhinorrhea, sore throat and trouble swallowing.    Eyes: Negative for discharge and visual disturbance.   Respiratory: Negative for cough, chest tightness, shortness of breath and wheezing.    Cardiovascular: Negative for chest pain, palpitations and leg swelling.   Gastrointestinal: Positive for abdominal pain, nausea and vomiting. Negative for constipation, diarrhea, hematemesis, hematochezia  and melena.   Genitourinary: Negative for dysuria, flank pain, hematuria, vaginal bleeding and vaginal discharge.   Musculoskeletal: Negative for back pain, myalgias and neck pain.   Skin: Negative for color change and rash.   Neurological: Negative for dizziness, weakness and numbness.   Psychiatric/Behavioral: Negative for self-injury and suicidal ideas.       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   • Chronic respiratory failure with hypoxia     at nighttime   • Compression fracture of lumbar vertebra    • Compression fracture of thoracic vertebra    • COPD (chronic obstructive pulmonary disease)    • Coronary artery disease    • Diabetes mellitus type 2 in nonobese    • Erosive esophagitis 12/2016   • Essential hypertension    • Gastroparesis    • GERD (gastroesophageal reflux disease)    • Hepatitis-C    • History of transfusion    • Medically noncompliant    • MRSA infection     back of neck around 2012; 6/2017 septic left elbow joint with concerns for osteomyelitis and left anteromedial left arm   • NSTEMI (non-ST elevated myocardial infarction) 07/2016   • PE (pulmonary embolism)    • Protein calorie malnutrition    • RA (rheumatoid arthritis)     with chronic steroid use   • Rheumatoid arthritis        Allergies   Allergen Reactions   • Penicillins Anaphylaxis     Tolerated both cefepime and Rocephin earlier this month   • Toradol [Ketorolac Tromethamine] Hives and Swelling   • Tramadol Hives and Swelling       Past Surgical History:   Procedure Laterality Date   • ABDOMINAL SURGERY     • AMPUTATION DIGIT Right 12/22/2016    Third digit to the PIP. Surgeon: Dr. Bains   • AMPUTATION DIGIT Left 12/22/2016    Procedure: PARTIAL AMPUTATION LEFT THIRD FINGER;  Surgeon: Shane Fuller MD;  Location: [facility];  Service:    • AMPUTATION REVISION Left 12/22/2016    Procedure: AMPUTATION REVISION DIGIT STUMP;  Surgeon: Shane Soni  MD Jonny;  Location: [facility] OR;  Service:    • ARM DEBRIDEMENT Left 2016    Procedure: HAND IRRIGATION DEBRIDEMENT AND REPAIR ;  Surgeon: Shane Fuller MD;  Location: [facility] OR;  Service:    •  SECTION      x 2   • CHOLECYSTECTOMY     • ENDOSCOPY N/A 2016    Procedure: ESOPHAGOGASTRODUODENOSCOPY;  Surgeon: Jeremy Clement III, MD;  Location: [facility] OR;  Service:    • ENDOSCOPY W/ PEG TUBE PLACEMENT N/A 3/30/2017    Procedure: ESOPHAGOGASTRODUODENOSCOPY WITH PERCUTANEOUS ENDOSCOPIC GASTROSTOMY TUBE INSERTION;  Surgeon: Shade Campos MD;  Location: [facility] OR;  Service:    • FOREIGN BODY REMOVAL      insulin needle removal x 3 from the abdomen   • HYSTERECTOMY     • INCISION AND DRAINAGE ARM Left 2017    Procedure: INCISION AND DRAINAGE UPPER EXTREMITY;  Surgeon: Shane Fuller MD;  Location: [facility] OR;  Service:        Family History   Problem Relation Age of Onset   • Colon cancer Mother    • Cancer Father    • Diabetes Maternal Grandmother    • Heart disease Neg Hx    • Stroke Neg Hx        Social History     Social History   • Marital status:      Social History Main Topics   • Smoking status: Current Every Day Smoker     Packs/day: 0.50     Years: 35.00     Types: Cigarettes   • Smokeless tobacco: Never Used      Comment: 4-5 cigarettes a day    • Alcohol use No   • Drug use: No   • Sexual activity: Defer     Other Topics Concern   • Not on file           Objective   Physical Exam   Constitutional: She is oriented to person, place, and time. She appears well-developed and well-nourished.   HENT:   Head: Normocephalic and atraumatic.   Nose: Nose normal.   Mouth/Throat: Oropharynx is clear and moist.   Eyes: Conjunctivae and EOM are normal. Pupils are equal, round, and reactive to light.   Neck: Normal range of motion. Neck supple.   Cardiovascular: Normal rate, regular rhythm, normal heart sounds and intact distal pulses.    Pulmonary/Chest: Effort  normal and breath sounds normal. No respiratory distress. She has no wheezes. She exhibits no tenderness.   Abdominal: Soft. Bowel sounds are normal. There is tenderness. There is no rebound and no guarding.   Epigastric tenderness to palpation. No rebound or guarding.    Musculoskeletal: Normal range of motion. She exhibits no edema, tenderness or deformity.   Neurological: She is alert and oriented to person, place, and time. No cranial nerve deficit. Coordination normal.   Skin: Skin is warm and dry. No rash noted. No erythema. No pallor.   Psychiatric: She has a normal mood and affect. Her behavior is normal. Judgment and thought content normal.   Nursing note and vitals reviewed.      Procedures         ED Course  ED Course                  MDM  Number of Diagnoses or Management Options  Acute pancreatitis, unspecified complication status, unspecified pancreatitis type:   Urinary tract infection without hematuria, site unspecified:   Diagnosis management comments: EKG: Ventricular rate 120, MD interval 120, castration 90, , sinus tachycardia, left axis deviation.  No ischemic changes.    Labs and imaging obtained. WBC is trending down. Lipase elevated. Patient given zofran, dilaudid, and fluids. Patient feeling better on re-evaluation. UA is nitrite positive. 1G of rocephin ordered in the ED. Patient says penicillins cause her to break out in hives. Has had rocephin previously without allergic reaction. Patient says she still have a few more days of antibiotics left for her UTI. She thinks she has 4 days of antibiotics left. On macrobid. Patient now refusing IV antibiotics prior to discharge. Says her ride is here and she needs to leave now. Patient has zofran and macrobid at home. I told her to finish her antibiotics and return if she has worsening symptoms including fever, chills, abdominal pain, worsening nausea/vomiting, or dehydration. Patient is agreeable with the plan of care.          Amount  and/or Complexity of Data Reviewed  Clinical lab tests: reviewed  Tests in the radiology section of CPT®: reviewed  Tests in the medicine section of CPT®: reviewed    Patient Progress  Patient progress: resolved      Final diagnoses:   Acute pancreatitis, unspecified complication status, unspecified pancreatitis type   Urinary tract infection without hematuria, site unspecified            Holland Wilhelm MD  04/09/18 2326

## 2018-04-11 LAB
BACTERIA SPEC AEROBE CULT: ABNORMAL
BACTERIA SPEC AEROBE CULT: ABNORMAL

## 2018-04-13 LAB
BACTERIA SPEC AEROBE CULT: NORMAL
BACTERIA SPEC AEROBE CULT: NORMAL

## 2018-04-14 LAB
BACTERIA SPEC AEROBE CULT: NORMAL
BACTERIA SPEC AEROBE CULT: NORMAL

## 2018-04-16 ENCOUNTER — APPOINTMENT (OUTPATIENT)
Dept: GENERAL RADIOLOGY | Facility: HOSPITAL | Age: 53
End: 2018-04-16

## 2018-04-16 ENCOUNTER — APPOINTMENT (OUTPATIENT)
Dept: CT IMAGING | Facility: HOSPITAL | Age: 53
End: 2018-04-16

## 2018-04-16 ENCOUNTER — HOSPITAL ENCOUNTER (INPATIENT)
Facility: HOSPITAL | Age: 53
LOS: 2 days | Discharge: HOME HEALTH | End: 2018-04-20
Attending: EMERGENCY MEDICINE | Admitting: HOSPITALIST

## 2018-04-16 DIAGNOSIS — R07.9 CHEST PAIN, UNSPECIFIED TYPE: Primary | ICD-10-CM

## 2018-04-16 DIAGNOSIS — K22.9 ESOPHAGEAL ABNORMALITY: ICD-10-CM

## 2018-04-16 DIAGNOSIS — I20.9 ANGINA, CLASS IV (HCC): ICD-10-CM

## 2018-04-16 LAB
ALBUMIN SERPL-MCNC: 3.5 G/DL (ref 3.5–5)
ALBUMIN/GLOB SERPL: 1.1 G/DL (ref 1.5–2.5)
ALP SERPL-CCNC: 62 U/L (ref 35–104)
ALT SERPL W P-5'-P-CCNC: 71 U/L (ref 10–36)
ANION GAP SERPL CALCULATED.3IONS-SCNC: 9.5 MMOL/L (ref 3.6–11.2)
AST SERPL-CCNC: 56 U/L (ref 10–30)
BASOPHILS # BLD AUTO: 0.02 10*3/MM3 (ref 0–0.3)
BASOPHILS NFR BLD AUTO: 0.2 % (ref 0–2)
BILIRUB SERPL-MCNC: 0.2 MG/DL (ref 0.2–1.8)
BNP SERPL-MCNC: 330 PG/ML (ref 0–100)
BUN BLD-MCNC: 14 MG/DL (ref 7–21)
BUN/CREAT SERPL: 16.3 (ref 7–25)
CALCIUM SPEC-SCNC: 9.3 MG/DL (ref 7.7–10)
CHLORIDE SERPL-SCNC: 106 MMOL/L (ref 99–112)
CK MB SERPL-CCNC: 1.37 NG/ML (ref 0–5)
CK MB SERPL-CCNC: 1.76 NG/ML (ref 0–5)
CK MB SERPL-RTO: 3.3 % (ref 0–3)
CK MB SERPL-RTO: 8 % (ref 0–3)
CK SERPL-CCNC: 22 U/L (ref 24–173)
CK SERPL-CCNC: 41 U/L (ref 24–173)
CO2 SERPL-SCNC: 20.5 MMOL/L (ref 24.3–31.9)
CREAT BLD-MCNC: 0.86 MG/DL (ref 0.43–1.29)
D DIMER PPP FEU-MCNC: 3.73 MCGFEU/ML (ref 0–0.5)
DEPRECATED RDW RBC AUTO: 51.7 FL (ref 37–54)
EOSINOPHIL # BLD AUTO: 0 10*3/MM3 (ref 0–0.7)
EOSINOPHIL NFR BLD AUTO: 0 % (ref 0–5)
ERYTHROCYTE [DISTWIDTH] IN BLOOD BY AUTOMATED COUNT: 18.5 % (ref 11.5–14.5)
GFR SERPL CREATININE-BSD FRML MDRD: 69 ML/MIN/1.73
GLOBULIN UR ELPH-MCNC: 3.3 GM/DL
GLUCOSE BLD-MCNC: 447 MG/DL (ref 70–110)
HCT VFR BLD AUTO: 39.2 % (ref 37–47)
HGB BLD-MCNC: 11.5 G/DL (ref 12–16)
HOLD SPECIMEN: NORMAL
HOLD SPECIMEN: NORMAL
IMM GRANULOCYTES # BLD: 0.31 10*3/MM3 (ref 0–0.03)
IMM GRANULOCYTES NFR BLD: 2.8 % (ref 0–0.5)
INR PPP: 0.96 (ref 0.9–1.1)
LIPASE SERPL-CCNC: 124 U/L (ref 13–60)
LYMPHOCYTES # BLD AUTO: 0.66 10*3/MM3 (ref 1–3)
LYMPHOCYTES NFR BLD AUTO: 5.9 % (ref 21–51)
MCH RBC QN AUTO: 23.1 PG (ref 27–33)
MCHC RBC AUTO-ENTMCNC: 29.3 G/DL (ref 33–37)
MCV RBC AUTO: 78.9 FL (ref 80–94)
MONOCYTES # BLD AUTO: 0.39 10*3/MM3 (ref 0.1–0.9)
MONOCYTES NFR BLD AUTO: 3.5 % (ref 0–10)
NEUTROPHILS # BLD AUTO: 9.82 10*3/MM3 (ref 1.4–6.5)
NEUTROPHILS NFR BLD AUTO: 87.6 % (ref 30–70)
OSMOLALITY SERPL CALC.SUM OF ELEC: 291.8 MOSM/KG (ref 273–305)
PLATELET # BLD AUTO: 352 10*3/MM3 (ref 130–400)
PMV BLD AUTO: 9.7 FL (ref 6–10)
POTASSIUM BLD-SCNC: 4.6 MMOL/L (ref 3.5–5.3)
PROT SERPL-MCNC: 6.8 G/DL (ref 6–8)
PROTHROMBIN TIME: 12.9 SECONDS (ref 11–15.4)
RBC # BLD AUTO: 4.97 10*6/MM3 (ref 4.2–5.4)
SODIUM BLD-SCNC: 136 MMOL/L (ref 135–153)
TROPONIN I SERPL-MCNC: 0.02 NG/ML
WBC NRBC COR # BLD: 11.2 10*3/MM3 (ref 4.5–12.5)
WHOLE BLOOD HOLD SPECIMEN: NORMAL
WHOLE BLOOD HOLD SPECIMEN: NORMAL

## 2018-04-16 PROCEDURE — 85610 PROTHROMBIN TIME: CPT | Performed by: PHYSICIAN ASSISTANT

## 2018-04-16 PROCEDURE — 0 IOPAMIDOL PER 1 ML: Performed by: EMERGENCY MEDICINE

## 2018-04-16 PROCEDURE — 82553 CREATINE MB FRACTION: CPT | Performed by: HOSPITALIST

## 2018-04-16 PROCEDURE — C1751 CATH, INF, PER/CENT/MIDLINE: HCPCS

## 2018-04-16 PROCEDURE — 99220 PR INITIAL OBSERVATION CARE/DAY 70 MINUTES: CPT | Performed by: HOSPITALIST

## 2018-04-16 PROCEDURE — 94799 UNLISTED PULMONARY SVC/PX: CPT

## 2018-04-16 PROCEDURE — 93010 ELECTROCARDIOGRAM REPORT: CPT | Performed by: INTERNAL MEDICINE

## 2018-04-16 PROCEDURE — G0378 HOSPITAL OBSERVATION PER HR: HCPCS

## 2018-04-16 PROCEDURE — 25010000002 ONDANSETRON PER 1 MG: Performed by: PHYSICIAN ASSISTANT

## 2018-04-16 PROCEDURE — 80053 COMPREHEN METABOLIC PANEL: CPT | Performed by: PHYSICIAN ASSISTANT

## 2018-04-16 PROCEDURE — 83880 ASSAY OF NATRIURETIC PEPTIDE: CPT | Performed by: PHYSICIAN ASSISTANT

## 2018-04-16 PROCEDURE — 84484 ASSAY OF TROPONIN QUANT: CPT | Performed by: HOSPITALIST

## 2018-04-16 PROCEDURE — 36415 COLL VENOUS BLD VENIPUNCTURE: CPT

## 2018-04-16 PROCEDURE — 25010000002 HYDROMORPHONE PER 4 MG: Performed by: EMERGENCY MEDICINE

## 2018-04-16 PROCEDURE — 84484 ASSAY OF TROPONIN QUANT: CPT | Performed by: PHYSICIAN ASSISTANT

## 2018-04-16 PROCEDURE — 71275 CT ANGIOGRAPHY CHEST: CPT | Performed by: RADIOLOGY

## 2018-04-16 PROCEDURE — 71275 CT ANGIOGRAPHY CHEST: CPT

## 2018-04-16 PROCEDURE — 82550 ASSAY OF CK (CPK): CPT | Performed by: HOSPITALIST

## 2018-04-16 PROCEDURE — 93005 ELECTROCARDIOGRAM TRACING: CPT | Performed by: HOSPITALIST

## 2018-04-16 PROCEDURE — 85379 FIBRIN DEGRADATION QUANT: CPT | Performed by: PHYSICIAN ASSISTANT

## 2018-04-16 PROCEDURE — 71045 X-RAY EXAM CHEST 1 VIEW: CPT | Performed by: RADIOLOGY

## 2018-04-16 PROCEDURE — 82962 GLUCOSE BLOOD TEST: CPT

## 2018-04-16 PROCEDURE — 25010000002 MORPHINE PER 10 MG: Performed by: HOSPITALIST

## 2018-04-16 PROCEDURE — 93005 ELECTROCARDIOGRAM TRACING: CPT | Performed by: PHYSICIAN ASSISTANT

## 2018-04-16 PROCEDURE — 25010000002 HEPARIN (PORCINE) PER 1000 UNITS: Performed by: HOSPITALIST

## 2018-04-16 PROCEDURE — 63710000001 INSULIN ASPART PER 5 UNITS: Performed by: PHYSICIAN ASSISTANT

## 2018-04-16 PROCEDURE — 99285 EMERGENCY DEPT VISIT HI MDM: CPT

## 2018-04-16 PROCEDURE — 85025 COMPLETE CBC W/AUTO DIFF WBC: CPT | Performed by: PHYSICIAN ASSISTANT

## 2018-04-16 PROCEDURE — 71045 X-RAY EXAM CHEST 1 VIEW: CPT

## 2018-04-16 PROCEDURE — 83690 ASSAY OF LIPASE: CPT | Performed by: PHYSICIAN ASSISTANT

## 2018-04-16 RX ORDER — ONDANSETRON 2 MG/ML
4 INJECTION INTRAMUSCULAR; INTRAVENOUS ONCE
Status: DISCONTINUED | OUTPATIENT
Start: 2018-04-16 | End: 2018-04-16

## 2018-04-16 RX ORDER — PROMETHAZINE HYDROCHLORIDE 25 MG/1
25 TABLET ORAL EVERY 6 HOURS PRN
Status: CANCELLED | OUTPATIENT
Start: 2018-04-16

## 2018-04-16 RX ORDER — HEPARIN SODIUM 5000 [USP'U]/ML
5000 INJECTION, SOLUTION INTRAVENOUS; SUBCUTANEOUS EVERY 12 HOURS SCHEDULED
Status: DISCONTINUED | OUTPATIENT
Start: 2018-04-16 | End: 2018-04-20 | Stop reason: HOSPADM

## 2018-04-16 RX ORDER — ISOSORBIDE MONONITRATE 30 MG/1
30 TABLET, EXTENDED RELEASE ORAL DAILY
Status: CANCELLED | OUTPATIENT
Start: 2018-04-16

## 2018-04-16 RX ORDER — ASPIRIN 325 MG
325 TABLET ORAL ONCE
Status: COMPLETED | OUTPATIENT
Start: 2018-04-16 | End: 2018-04-16

## 2018-04-16 RX ORDER — BUDESONIDE AND FORMOTEROL FUMARATE DIHYDRATE 160; 4.5 UG/1; UG/1
2 AEROSOL RESPIRATORY (INHALATION)
Status: CANCELLED | OUTPATIENT
Start: 2018-04-16

## 2018-04-16 RX ORDER — ASPIRIN 325 MG
325 TABLET ORAL DAILY
Status: CANCELLED | OUTPATIENT
Start: 2018-04-16

## 2018-04-16 RX ORDER — ASPIRIN 81 MG/1
324 TABLET, CHEWABLE ORAL ONCE
Status: DISCONTINUED | OUTPATIENT
Start: 2018-04-16 | End: 2018-04-20 | Stop reason: HOSPADM

## 2018-04-16 RX ORDER — NITROGLYCERIN 0.4 MG/1
0.4 TABLET SUBLINGUAL
Status: COMPLETED | OUTPATIENT
Start: 2018-04-16 | End: 2018-04-16

## 2018-04-16 RX ORDER — TIZANIDINE 4 MG/1
4 TABLET ORAL EVERY 8 HOURS PRN
Status: CANCELLED | OUTPATIENT
Start: 2018-04-16

## 2018-04-16 RX ORDER — ACETAMINOPHEN 325 MG/1
650 TABLET ORAL EVERY 4 HOURS PRN
Status: DISCONTINUED | OUTPATIENT
Start: 2018-04-16 | End: 2018-04-20 | Stop reason: HOSPADM

## 2018-04-16 RX ORDER — NITROGLYCERIN 0.4 MG/1
0.4 TABLET SUBLINGUAL
Status: DISCONTINUED | OUTPATIENT
Start: 2018-04-16 | End: 2018-04-16 | Stop reason: SDUPTHER

## 2018-04-16 RX ORDER — SUCRALFATE 1 G/1
1 TABLET ORAL 4 TIMES DAILY PRN
Status: CANCELLED | OUTPATIENT
Start: 2018-04-16

## 2018-04-16 RX ORDER — ASPIRIN 81 MG/1
81 TABLET ORAL DAILY
Status: DISCONTINUED | OUTPATIENT
Start: 2018-04-17 | End: 2018-04-20 | Stop reason: HOSPADM

## 2018-04-16 RX ORDER — ASPIRIN 325 MG
325 TABLET ORAL DAILY
COMMUNITY

## 2018-04-16 RX ORDER — LISINOPRIL 2.5 MG/1
5 TABLET ORAL DAILY
Status: CANCELLED | OUTPATIENT
Start: 2018-04-16

## 2018-04-16 RX ORDER — CLOPIDOGREL BISULFATE 75 MG/1
75 TABLET ORAL DAILY
Status: DISCONTINUED | OUTPATIENT
Start: 2018-04-16 | End: 2018-04-20 | Stop reason: HOSPADM

## 2018-04-16 RX ORDER — BUDESONIDE AND FORMOTEROL FUMARATE DIHYDRATE 160; 4.5 UG/1; UG/1
2 AEROSOL RESPIRATORY (INHALATION)
COMMUNITY

## 2018-04-16 RX ORDER — GABAPENTIN 300 MG/1
600 CAPSULE ORAL EVERY 8 HOURS SCHEDULED
Status: CANCELLED | OUTPATIENT
Start: 2018-04-16

## 2018-04-16 RX ORDER — SODIUM CHLORIDE 0.9 % (FLUSH) 0.9 %
1-10 SYRINGE (ML) INJECTION AS NEEDED
Status: DISCONTINUED | OUTPATIENT
Start: 2018-04-16 | End: 2018-04-20 | Stop reason: HOSPADM

## 2018-04-16 RX ORDER — HYDROMORPHONE HYDROCHLORIDE 1 MG/ML
0.5 INJECTION, SOLUTION INTRAMUSCULAR; INTRAVENOUS; SUBCUTANEOUS ONCE
Status: COMPLETED | OUTPATIENT
Start: 2018-04-16 | End: 2018-04-16

## 2018-04-16 RX ORDER — ROSUVASTATIN CALCIUM 20 MG/1
20 TABLET, COATED ORAL NIGHTLY
Status: DISCONTINUED | OUTPATIENT
Start: 2018-04-16 | End: 2018-04-20 | Stop reason: HOSPADM

## 2018-04-16 RX ORDER — MORPHINE SULFATE 2 MG/ML
1 INJECTION, SOLUTION INTRAMUSCULAR; INTRAVENOUS EVERY 4 HOURS PRN
Status: DISCONTINUED | OUTPATIENT
Start: 2018-04-16 | End: 2018-04-19

## 2018-04-16 RX ORDER — PREDNISONE 20 MG/1
20 TABLET ORAL DAILY
Status: CANCELLED | OUTPATIENT
Start: 2018-04-16 | End: 2018-04-24

## 2018-04-16 RX ORDER — SODIUM CHLORIDE 0.9 % (FLUSH) 0.9 %
10 SYRINGE (ML) INJECTION EVERY 12 HOURS SCHEDULED
Status: DISCONTINUED | OUTPATIENT
Start: 2018-04-16 | End: 2018-04-20 | Stop reason: HOSPADM

## 2018-04-16 RX ORDER — INSULIN GLARGINE 100 [IU]/ML
45 INJECTION, SOLUTION SUBCUTANEOUS NIGHTLY
COMMUNITY

## 2018-04-16 RX ORDER — PROMETHAZINE HYDROCHLORIDE 25 MG/1
25 TABLET ORAL EVERY 6 HOURS PRN
COMMUNITY

## 2018-04-16 RX ORDER — PREDNISONE 20 MG/1
20 TABLET ORAL DAILY
COMMUNITY
Start: 2018-04-10 | End: 2018-04-20 | Stop reason: HOSPADM

## 2018-04-16 RX ORDER — ONDANSETRON 2 MG/ML
4 INJECTION INTRAMUSCULAR; INTRAVENOUS ONCE
Status: COMPLETED | OUTPATIENT
Start: 2018-04-16 | End: 2018-04-16

## 2018-04-16 RX ORDER — HYDROMORPHONE HCL 110MG/55ML
1 PATIENT CONTROLLED ANALGESIA SYRINGE INTRAVENOUS ONCE
Status: COMPLETED | OUTPATIENT
Start: 2018-04-16 | End: 2018-04-16

## 2018-04-16 RX ORDER — ISOSORBIDE MONONITRATE 30 MG/1
30 TABLET, EXTENDED RELEASE ORAL DAILY
COMMUNITY

## 2018-04-16 RX ORDER — ASPIRIN 325 MG
325 TABLET ORAL DAILY
Status: CANCELLED | OUTPATIENT
Start: 2018-04-17

## 2018-04-16 RX ORDER — SODIUM CHLORIDE 0.9 % (FLUSH) 0.9 %
10 SYRINGE (ML) INJECTION AS NEEDED
Status: DISCONTINUED | OUTPATIENT
Start: 2018-04-16 | End: 2018-04-20 | Stop reason: HOSPADM

## 2018-04-16 RX ADMIN — HYDROMORPHONE HYDROCHLORIDE 0.5 MG: 1 INJECTION, SOLUTION INTRAMUSCULAR; INTRAVENOUS; SUBCUTANEOUS at 16:48

## 2018-04-16 RX ADMIN — IOPAMIDOL 100 ML: 755 INJECTION, SOLUTION INTRAVENOUS at 15:37

## 2018-04-16 RX ADMIN — INSULIN ASPART 10 UNITS: 100 INJECTION, SOLUTION INTRAVENOUS; SUBCUTANEOUS at 15:01

## 2018-04-16 RX ADMIN — Medication 10 ML: at 21:23

## 2018-04-16 RX ADMIN — ASPIRIN 325 MG: 325 TABLET ORAL at 13:08

## 2018-04-16 RX ADMIN — HEPARIN SODIUM 5000 UNITS: 5000 INJECTION, SOLUTION INTRAVENOUS; SUBCUTANEOUS at 21:18

## 2018-04-16 RX ADMIN — ROSUVASTATIN CALCIUM 20 MG: 20 TABLET, FILM COATED ORAL at 21:23

## 2018-04-16 RX ADMIN — HYDROMORPHONE HYDROCHLORIDE 1 MG: 2 INJECTION INTRAMUSCULAR; INTRAVENOUS; SUBCUTANEOUS at 15:00

## 2018-04-16 RX ADMIN — Medication 10 ML: at 16:48

## 2018-04-16 RX ADMIN — NITROGLYCERIN 0.4 MG: 0.4 TABLET SUBLINGUAL at 13:57

## 2018-04-16 RX ADMIN — MORPHINE SULFATE 1 MG: 2 INJECTION, SOLUTION INTRAMUSCULAR; INTRAVENOUS at 22:46

## 2018-04-16 RX ADMIN — SODIUM CHLORIDE 500 ML: 9 INJECTION, SOLUTION INTRAVENOUS at 14:05

## 2018-04-16 RX ADMIN — NITROGLYCERIN 1 INCH: 20 OINTMENT TOPICAL at 16:20

## 2018-04-16 RX ADMIN — ONDANSETRON 4 MG: 2 INJECTION INTRAMUSCULAR; INTRAVENOUS at 14:04

## 2018-04-16 RX ADMIN — NITROGLYCERIN 0.4 MG: 0.4 TABLET SUBLINGUAL at 14:06

## 2018-04-16 RX ADMIN — METOPROLOL TARTRATE 25 MG: 25 TABLET, FILM COATED ORAL at 21:18

## 2018-04-16 RX ADMIN — NITROGLYCERIN 0.4 MG: 0.4 TABLET SUBLINGUAL at 14:01

## 2018-04-16 RX ADMIN — Medication 10 ML: at 16:25

## 2018-04-16 NOTE — ED NOTES
PT HAS A SORE/ POSSIBLE DECUBITUS TO RIGHT ELBOW WITH YELLOW DRAINAGE AND REDNESS     Tom Chadwick RN  04/16/18 5891

## 2018-04-16 NOTE — ED PROVIDER NOTES
"Subjective     Chest Pain   Pain location:  Substernal area  Pain quality: burning, sharp and tightness    Pain radiates to:  Does not radiate  Pain severity:  Moderate  Onset quality:  Gradual  Duration:  1 day  Timing:  Intermittent  Progression:  Worsening  Chronicity:  Recurrent  Context comment:  Patient does report that she had an abnormal stress test which I did review.  She states that at [place] she had a diagnostic heart catheter which showed three-vessel disease which required stents though she \"chickened out\" of doing.  Relieved by:  Nothing  Worsened by:  Nothing  Associated symptoms: no abdominal pain and no fever        Review of Systems   Constitutional: Negative.  Negative for fever.   HENT: Negative.    Respiratory: Negative.    Cardiovascular: Positive for chest pain.   Gastrointestinal: Negative.  Negative for abdominal pain.   Endocrine: Negative.    Genitourinary: Negative.  Negative for dysuria.   Skin: Negative.    Neurological: Negative.    Psychiatric/Behavioral: Negative.    All other systems reviewed and are negative.      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   • Chronic respiratory failure with hypoxia     at nighttime   • Compression fracture of lumbar vertebra    • Compression fracture of thoracic vertebra    • COPD (chronic obstructive pulmonary disease)    • Coronary artery disease    • Diabetes mellitus type 2 in nonobese    • Erosive esophagitis 12/2016   • Essential hypertension    • Gastroparesis    • GERD (gastroesophageal reflux disease)    • Hepatitis-C    • History of transfusion    • Medically noncompliant    • MRSA infection     back of neck around 2012; 6/2017 septic left elbow joint with concerns for osteomyelitis and left anteromedial left arm   • NSTEMI (non-ST elevated myocardial infarction) 07/2016   • PE (pulmonary embolism)    • Protein calorie malnutrition    • RA (rheumatoid arthritis)     " with chronic steroid use   • Rheumatoid arthritis        Allergies   Allergen Reactions   • Penicillins Anaphylaxis     Tolerated both cefepime and Rocephin earlier this month   • Toradol [Ketorolac Tromethamine] Hives and Swelling   • Tramadol Hives and Swelling       Past Surgical History:   Procedure Laterality Date   • ABDOMINAL SURGERY     • AMPUTATION DIGIT Right 2016    Third digit to the PIP. Surgeon: Dr. Bains   • AMPUTATION DIGIT Left 2016    Procedure: PARTIAL AMPUTATION LEFT THIRD FINGER;  Surgeon: Shane Fuller MD;  Location: [facility] OR;  Service:    • AMPUTATION REVISION Left 2016    Procedure: AMPUTATION REVISION DIGIT STUMP;  Surgeon: Shane Fuller MD;  Location: [facility] OR;  Service:    • ARM DEBRIDEMENT Left 2016    Procedure: HAND IRRIGATION DEBRIDEMENT AND REPAIR ;  Surgeon: Shane Fuller MD;  Location: [facility] OR;  Service:    •  SECTION      x 2   • CHOLECYSTECTOMY     • ENDOSCOPY N/A 2016    Procedure: ESOPHAGOGASTRODUODENOSCOPY;  Surgeon: Jeremy Clement III, MD;  Location: [facility] OR;  Service:    • ENDOSCOPY W/ PEG TUBE PLACEMENT N/A 3/30/2017    Procedure: ESOPHAGOGASTRODUODENOSCOPY WITH PERCUTANEOUS ENDOSCOPIC GASTROSTOMY TUBE INSERTION;  Surgeon: Shade Campos MD;  Location: [facility] OR;  Service:    • FOREIGN BODY REMOVAL      insulin needle removal x 3 from the abdomen   • HYSTERECTOMY     • INCISION AND DRAINAGE ARM Left 2017    Procedure: INCISION AND DRAINAGE UPPER EXTREMITY;  Surgeon: Shane Fuller MD;  Location: [facility] OR;  Service:        Family History   Problem Relation Age of Onset   • Colon cancer Mother    • Cancer Father    • Diabetes Maternal Grandmother    • Heart disease Neg Hx    • Stroke Neg Hx        Social History     Social History   • Marital status:      Social History Main Topics   • Smoking status: Current Every Day Smoker     Packs/day: 0.50     Years: 35.00      Types: Cigarettes   • Smokeless tobacco: Never Used      Comment: 4-5 cigarettes a day    • Alcohol use No   • Drug use: No   • Sexual activity: Defer     Other Topics Concern   • Not on file           Objective   Physical Exam   Constitutional: She is oriented to person, place, and time. She appears well-developed and well-nourished. No distress.   HENT:   Head: Normocephalic and atraumatic.   Right Ear: External ear normal.   Left Ear: External ear normal.   Nose: Nose normal.   Eyes: Conjunctivae and EOM are normal. Pupils are equal, round, and reactive to light.   Neck: Normal range of motion. Neck supple. No JVD present. No tracheal deviation present.   Cardiovascular: Normal rate, regular rhythm and normal heart sounds.    No murmur heard.  Pulmonary/Chest: Effort normal and breath sounds normal. No respiratory distress. She has no wheezes.   Abdominal: Soft. Bowel sounds are normal. There is no tenderness.   Musculoskeletal: Normal range of motion. She exhibits no edema or deformity.   Neurological: She is alert and oriented to person, place, and time. No cranial nerve deficit.   Skin: Skin is warm and dry. No rash noted. She is not diaphoretic. No erythema. No pallor.   Psychiatric: She has a normal mood and affect. Her behavior is normal. Thought content normal.   Nursing note and vitals reviewed.      Procedures         ED Course  ED Course   Comment By Time   Paged COREEN Burt 04/16 2970   D/w COREEN Jewell 04/16 3460                  MDM  Number of Diagnoses or Management Options  Chest pain, unspecified type: new and requires workup     Amount and/or Complexity of Data Reviewed  Clinical lab tests: reviewed and ordered  Tests in the radiology section of CPT®: reviewed and ordered  Tests in the medicine section of CPT®: reviewed and ordered  Discuss the patient with other providers: yes    Risk of Complications, Morbidity, and/or Mortality  Presenting  problems: moderate        Final diagnoses:   Chest pain, unspecified type            COREEN Urena  04/16/18 1842       COREEN Urena  04/16/18 1843

## 2018-04-17 LAB
BNP SERPL-MCNC: 558 PG/ML (ref 0–100)
D DIMER PPP FEU-MCNC: 2.24 MCGFEU/ML (ref 0–0.5)
GLUCOSE BLDC GLUCOMTR-MCNC: 123 MG/DL (ref 70–130)
GLUCOSE BLDC GLUCOMTR-MCNC: 164 MG/DL (ref 70–130)
GLUCOSE BLDC GLUCOMTR-MCNC: 208 MG/DL (ref 70–130)
GLUCOSE BLDC GLUCOMTR-MCNC: 227 MG/DL (ref 70–130)
TROPONIN I SERPL-MCNC: 0.03 NG/ML
TROPONIN I SERPL-MCNC: 0.03 NG/ML

## 2018-04-17 PROCEDURE — 94799 UNLISTED PULMONARY SVC/PX: CPT

## 2018-04-17 PROCEDURE — G0378 HOSPITAL OBSERVATION PER HR: HCPCS

## 2018-04-17 PROCEDURE — 25010000002 MORPHINE SULFATE (PF) 2 MG/ML SOLUTION: Performed by: HOSPITALIST

## 2018-04-17 PROCEDURE — 84484 ASSAY OF TROPONIN QUANT: CPT | Performed by: HOSPITALIST

## 2018-04-17 PROCEDURE — 99226 PR SBSQ OBSERVATION CARE/DAY 35 MINUTES: CPT | Performed by: HOSPITALIST

## 2018-04-17 PROCEDURE — 83880 ASSAY OF NATRIURETIC PEPTIDE: CPT | Performed by: HOSPITALIST

## 2018-04-17 PROCEDURE — 25010000002 MORPHINE PER 10 MG: Performed by: HOSPITALIST

## 2018-04-17 PROCEDURE — 63710000001 PREDNISONE PER 1 MG: Performed by: HOSPITALIST

## 2018-04-17 PROCEDURE — 82962 GLUCOSE BLOOD TEST: CPT

## 2018-04-17 PROCEDURE — 25010000002 HEPARIN (PORCINE) PER 1000 UNITS: Performed by: HOSPITALIST

## 2018-04-17 PROCEDURE — 85379 FIBRIN DEGRADATION QUANT: CPT | Performed by: HOSPITALIST

## 2018-04-17 RX ORDER — PREDNISONE 20 MG/1
20 TABLET ORAL DAILY
Status: DISCONTINUED | OUTPATIENT
Start: 2018-04-17 | End: 2018-04-19

## 2018-04-17 RX ORDER — TIZANIDINE 4 MG/1
4 TABLET ORAL EVERY 8 HOURS PRN
Status: DISCONTINUED | OUTPATIENT
Start: 2018-04-17 | End: 2018-04-20 | Stop reason: HOSPADM

## 2018-04-17 RX ORDER — NITROGLYCERIN 0.4 MG/1
0.4 TABLET SUBLINGUAL
Status: DISCONTINUED | OUTPATIENT
Start: 2018-04-17 | End: 2018-04-20 | Stop reason: HOSPADM

## 2018-04-17 RX ORDER — BUDESONIDE AND FORMOTEROL FUMARATE DIHYDRATE 160; 4.5 UG/1; UG/1
2 AEROSOL RESPIRATORY (INHALATION)
Status: DISCONTINUED | OUTPATIENT
Start: 2018-04-17 | End: 2018-04-20 | Stop reason: HOSPADM

## 2018-04-17 RX ORDER — ISOSORBIDE MONONITRATE 30 MG/1
30 TABLET, EXTENDED RELEASE ORAL DAILY
Status: DISCONTINUED | OUTPATIENT
Start: 2018-04-17 | End: 2018-04-20 | Stop reason: HOSPADM

## 2018-04-17 RX ORDER — PROMETHAZINE HYDROCHLORIDE 25 MG/1
25 TABLET ORAL EVERY 6 HOURS PRN
Status: CANCELLED | OUTPATIENT
Start: 2018-04-17

## 2018-04-17 RX ORDER — FUROSEMIDE 10 MG/ML
40 INJECTION INTRAMUSCULAR; INTRAVENOUS EVERY 12 HOURS
Status: DISCONTINUED | OUTPATIENT
Start: 2018-04-17 | End: 2018-04-20 | Stop reason: HOSPADM

## 2018-04-17 RX ORDER — SUCRALFATE 1 G/1
1 TABLET ORAL 4 TIMES DAILY PRN
Status: DISCONTINUED | OUTPATIENT
Start: 2018-04-17 | End: 2018-04-20 | Stop reason: HOSPADM

## 2018-04-17 RX ORDER — GABAPENTIN 300 MG/1
600 CAPSULE ORAL EVERY 8 HOURS SCHEDULED
Status: DISCONTINUED | OUTPATIENT
Start: 2018-04-17 | End: 2018-04-20 | Stop reason: HOSPADM

## 2018-04-17 RX ORDER — LISINOPRIL 2.5 MG/1
5 TABLET ORAL DAILY
Status: DISCONTINUED | OUTPATIENT
Start: 2018-04-17 | End: 2018-04-20 | Stop reason: HOSPADM

## 2018-04-17 RX ADMIN — PREDNISONE 20 MG: 20 TABLET ORAL at 14:20

## 2018-04-17 RX ADMIN — TIZANIDINE 4 MG: 4 TABLET ORAL at 14:20

## 2018-04-17 RX ADMIN — HEPARIN SODIUM 5000 UNITS: 5000 INJECTION, SOLUTION INTRAVENOUS; SUBCUTANEOUS at 08:30

## 2018-04-17 RX ADMIN — HEPARIN SODIUM 5000 UNITS: 5000 INJECTION, SOLUTION INTRAVENOUS; SUBCUTANEOUS at 20:07

## 2018-04-17 RX ADMIN — METOPROLOL TARTRATE 25 MG: 25 TABLET, FILM COATED ORAL at 12:39

## 2018-04-17 RX ADMIN — ISOSORBIDE MONONITRATE 30 MG: 30 TABLET, EXTENDED RELEASE ORAL at 14:20

## 2018-04-17 RX ADMIN — MORPHINE SULFATE 1 MG: 2 INJECTION, SOLUTION INTRAMUSCULAR; INTRAVENOUS at 16:59

## 2018-04-17 RX ADMIN — MORPHINE SULFATE 1 MG: 2 INJECTION, SOLUTION INTRAMUSCULAR; INTRAVENOUS at 03:55

## 2018-04-17 RX ADMIN — Medication 10 ML: at 20:08

## 2018-04-17 RX ADMIN — ROSUVASTATIN CALCIUM 20 MG: 20 TABLET, FILM COATED ORAL at 20:06

## 2018-04-17 RX ADMIN — SUCRALFATE 1 G: 1 TABLET ORAL at 14:20

## 2018-04-17 RX ADMIN — MORPHINE SULFATE 1 MG: 2 INJECTION, SOLUTION INTRAMUSCULAR; INTRAVENOUS at 12:32

## 2018-04-17 RX ADMIN — CLOPIDOGREL 75 MG: 75 TABLET, FILM COATED ORAL at 12:39

## 2018-04-17 RX ADMIN — ASPIRIN 81 MG: 81 TABLET ORAL at 12:39

## 2018-04-17 RX ADMIN — GABAPENTIN 600 MG: 300 CAPSULE ORAL at 20:06

## 2018-04-17 RX ADMIN — Medication 10 ML: at 08:32

## 2018-04-17 RX ADMIN — MORPHINE SULFATE 1 MG: 2 INJECTION, SOLUTION INTRAMUSCULAR; INTRAVENOUS at 08:31

## 2018-04-17 RX ADMIN — METOPROLOL TARTRATE 25 MG: 25 TABLET, FILM COATED ORAL at 20:07

## 2018-04-17 RX ADMIN — ROSUVASTATIN CALCIUM 20 MG: 20 TABLET, FILM COATED ORAL at 12:39

## 2018-04-17 RX ADMIN — GABAPENTIN 600 MG: 300 CAPSULE ORAL at 14:20

## 2018-04-17 RX ADMIN — NITROGLYCERIN 1 INCH: 20 OINTMENT TOPICAL at 01:04

## 2018-04-17 RX ADMIN — MORPHINE SULFATE 1 MG: 2 INJECTION, SOLUTION INTRAMUSCULAR; INTRAVENOUS at 21:25

## 2018-04-17 RX ADMIN — LISINOPRIL 5 MG: 2.5 TABLET ORAL at 14:20

## 2018-04-17 RX ADMIN — BUDESONIDE AND FORMOTEROL FUMARATE DIHYDRATE 2 PUFF: 160; 4.5 AEROSOL RESPIRATORY (INHALATION) at 22:40

## 2018-04-17 RX ADMIN — NITROGLYCERIN 1 INCH: 20 OINTMENT TOPICAL at 05:32

## 2018-04-17 NOTE — PLAN OF CARE
Problem: Cardiac: ACS (Acute Coronary Syndrome) (Adult)  Goal: Signs and Symptoms of Listed Potential Problems Will be Absent, Minimized or Managed (Cardiac: ACS)  Outcome: Ongoing (interventions implemented as appropriate)

## 2018-04-17 NOTE — PLAN OF CARE
Problem: Fall Risk (Adult)  Goal: Identify Related Risk Factors and Signs and Symptoms  Outcome: Ongoing (interventions implemented as appropriate)      Problem: Patient Care Overview  Goal: Plan of Care Review  Outcome: Ongoing (interventions implemented as appropriate)      Problem: Pain, Acute (Adult)  Goal: Identify Related Risk Factors and Signs and Symptoms  Outcome: Ongoing (interventions implemented as appropriate)      Problem: Skin Injury Risk (Adult)  Goal: Identify Related Risk Factors and Signs and Symptoms  Outcome: Ongoing (interventions implemented as appropriate)

## 2018-04-17 NOTE — NURSING NOTE
Unstageable pressure injury to        04/17/18 0925   Wound 04/16/18 1930 Right other (see notes) burn;blisters   Date first assessed/Time first assessed: 04/16/18 1930   Present On Admission : yes  Side: Right  Location: (c) other (see notes)  Type: burn;blisters  Stage, Pressure Injury: unstageable  Additional Comments: is pressure injury  HM   Dressing Appearance moist drainage   Base slough;yellow;moist   Yellow (%), Wound Tissue Color 100   Periwound intact;dry   Wound length (cm) 0.5 cm   Wound width (cm) 0.8 cm   Drainage Characteristics/Odor purulent;yellow   Drainage Amount small   right elbow noted on admission to hospital  Treatment ordered

## 2018-04-17 NOTE — H&P
[facility]    Patient Identification:  Name:  Pepito Yan  Age:  52 y.o.  Sex:  female  :  1965  MRN:  [id number]   Visit Number:  [id number]  Primary Care Physician:  ROSA Love    Date of admission: 2018    Length of stay:  0     Chief complaint: Chest pain    History of presenting illness:  52 y.o. female 52-year-old female with an episode of substernal chest pain that radiated to her left shoulder and between her shoulder blades.  She had associated shortness of breath and nausea and vomiting.  She describes the pain as a aching like sensation 8 out of 10 on the pain scale in severity chest pain was relieved with nitroglycerin and aspirin given to her in the emergency department.    The patient has a history of an abnormal stress test in 2017 as described below.  She has known history of coronary artery disease, non-STEMI in 2016, and Buerger's disease.    The patient is admitted to the medicine floor for further evaluation and treatment of chest pain.          ---------------------------------------------------------------------------------------------------------------------   Review of Systems   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   • Chronic respiratory failure with hypoxia     at nighttime   • Compression fracture of lumbar vertebra    • Compression fracture of thoracic vertebra    • COPD (chronic obstructive pulmonary disease)    • Coronary artery disease    • Diabetes mellitus type 2 in nonobese    • Erosive esophagitis 2016   • Essential hypertension    • Gastroparesis    • GERD (gastroesophageal reflux disease)    • Hepatitis-C    • History of transfusion    • Medically noncompliant    • MRSA infection     back of neck around ;  2017 septic left elbow joint with concerns for osteomyelitis and left anteromedial left arm   • NSTEMI (non-ST elevated myocardial infarction) 2016   • PE (pulmonary embolism)    • Protein calorie malnutrition    • RA (rheumatoid arthritis)     with chronic steroid use   • Rheumatoid arthritis      Past Surgical History:   Procedure Laterality Date   • ABDOMINAL SURGERY     • AMPUTATION DIGIT Right 2016    Third digit to the PIP. Surgeon: Dr. Bains   • AMPUTATION DIGIT Left 2016    Procedure: PARTIAL AMPUTATION LEFT THIRD FINGER;  Surgeon: Shane Fuller MD;  Location: [facility] OR;  Service:    • AMPUTATION REVISION Left 2016    Procedure: AMPUTATION REVISION DIGIT STUMP;  Surgeon: Shane Fuller MD;  Location: [facility] OR;  Service:    • ARM DEBRIDEMENT Left 2016    Procedure: HAND IRRIGATION DEBRIDEMENT AND REPAIR ;  Surgeon: Shnae Fuller MD;  Location: [facility] OR;  Service:    •  SECTION      x 2   • CHOLECYSTECTOMY     • ENDOSCOPY N/A 2016    Procedure: ESOPHAGOGASTRODUODENOSCOPY;  Surgeon: Jeremy Clement III, MD;  Location: [facility] OR;  Service:    • ENDOSCOPY W/ PEG TUBE PLACEMENT N/A 3/30/2017    Procedure: ESOPHAGOGASTRODUODENOSCOPY WITH PERCUTANEOUS ENDOSCOPIC GASTROSTOMY TUBE INSERTION;  Surgeon: Shade Campos MD;  Location: [facility] OR;  Service:    • FOREIGN BODY REMOVAL      insulin needle removal x 3 from the abdomen   • HYSTERECTOMY     • INCISION AND DRAINAGE ARM Left 2017    Procedure: INCISION AND DRAINAGE UPPER EXTREMITY;  Surgeon: Shane Fuller MD;  Location: [facility] OR;  Service:      Family History   Problem Relation Age of Onset   • Colon cancer Mother    • Cancer Father    • Diabetes Maternal Grandmother    • Heart disease Neg Hx    • Stroke Neg Hx      Social History     Social History   • Marital status:      Social History Main Topics   • Smoking status: Current Every Day Smoker     Packs/day:  0.50     Years: 35.00     Types: Cigarettes   • Smokeless tobacco: Never Used      Comment: 4-5 cigarettes a day    • Alcohol use No   • Drug use: No   • Sexual activity: Defer     Other Topics Concern   • Not on file     ---------------------------------------------------------------------------------------------------------------------   Allergies:  Penicillins; Toradol [ketorolac tromethamine]; and Tramadol  ---------------------------------------------------------------------------------------------------------------------   Prior to Admission Medications     Prescriptions Last Dose Informant Patient Reported? Taking?    aspirin 325 MG tablet 4/15/2018 Pharmacy Yes Yes    Take 325 mg by mouth Daily.    budesonide-formoterol (SYMBICORT) 160-4.5 MCG/ACT inhaler 4/15/2018 Pharmacy Yes Yes    Inhale 2 puffs 2 (Two) Times a Day.    gabapentin (NEURONTIN) 600 MG tablet 4/15/2018 Pharmacy Yes Yes    Take 600 mg by mouth 3 (Three) Times a Day.    insulin aspart (novoLOG) 100 UNIT/ML injection 4/15/2018 Pharmacy Yes Yes    Inject 10 Units under the skin 3 (Three) Times a Day With Meals.    insulin glargine (LANTUS) 100 UNIT/ML injection 4/15/2018 Pharmacy Yes Yes    Inject 45 Units under the skin Daily.    isosorbide mononitrate (IMDUR) 30 MG 24 hr tablet 4/15/2018 Pharmacy Yes Yes    Take 30 mg by mouth Daily.    lisinopril (PRINIVIL,ZESTRIL) 5 MG tablet 4/15/2018 Pharmacy Yes Yes    Take 5 mg by mouth Daily.    metFORMIN (GLUCOPHAGE) 500 MG tablet 4/15/2018 Pharmacy Yes Yes    Take 500 mg by mouth 3 (Three) Times a Day With Meals.    predniSONE (DELTASONE) 20 MG tablet 4/15/2018 Pharmacy Yes Yes    Take 20 mg by mouth Daily.    sucralfate (CARAFATE) 1 g tablet 4/15/2018 Pharmacy Yes Yes    Take 1 g by mouth 4 (Four) Times a Day As Needed.    promethazine (PHENERGAN) 25 MG tablet Unknown Pharmacy Yes No    Take 25 mg by mouth Every 6 (Six) Hours As Needed for Nausea or Vomiting.    tiZANidine (ZANAFLEX) 4 MG tablet  Unknown Pharmacy Yes No    Take 4 mg by mouth Every 8 (Eight) Hours As Needed for Muscle Spasms.        ---------------------------------------------------------------------------------------------------------------------   Vital Signs:  Temp:  [97.5 °F (36.4 °C)-98.1 °F (36.7 °C)] 97.5 °F (36.4 °C)  Heart Rate:  [] 108  Resp:  [20-22] 22  BP: (120-188)/() 150/90  Body mass index is 24.18 kg/m².  I/O last 3 completed shifts:  In: 500 [IV Piggyback:500]  Out: -   1    04/16/18  1202 04/16/18 1907   Weight: 49.4 kg (109 lb) 61.9 kg (136 lb 8 oz)     ---------------------------------------------------------------------------------------------------------------------     Physical exam:  Constitutional:  Well-developed and well-nourished.  No respiratory distress.    Patient appears older than stated age.  HENT:  Head:  Normocephalic and atraumatic.  Mouth:  Moist mucous membranes.    Eyes:  Conjunctivae normal with no scleral icterus.   Pupils are equal, round, and reactive to light and accomodation.   Neck:  Neck supple.  No JVD present.  No hepatojugular reflux.  Cardiovascular:  Normal rate, regular rhythm and normal heart sounds with no murmurs, gallops or rubs.  No reproducible chest pain on palpation of the chest wall.  Pulmonary/Chest:  No respiratory distress, wheezes, crackles.  Normal breath sounds all lung fields. No use of accessory muscles, normal diaphragmatic movement.  Abdominal:  Soft, no distension or tenderness. No hepato- or spleno- megaly. Normal bowel sounds in all 4 quadrants.    Musculoskeletal:  No edema, tenderness or deformity with no erythema.  Neurological:  Alert and oriented to person, place, and time.  No focal neurologic deficits.  CN II-XII grossly intact.   No tongue deviation, facial droop, slurred speech.   Psychiatric: Normal mood.    Skin:  Skin is warm and dry. No rash noted. No pallor.  Negative skin tenting.   Peripheral vascular:  1+ pulses in all 4 extremities  with no clubbing, cyanosis, edema.    ---------------------------------------------------------------------------------------------------------------------    EKG:   I have personally reviewd the EKG and my impression is: Sinus tachycardia, RSR prime in V1, left atrial enlargement, left ventricular axis deviation.    Telemetry:  I have personally reviewd the telemetry data for the last 24 hours and my impression is: Normal sinus rhythm    ---------------------------------------------------------------------------------------------------------------------     Results from last 7 days  Lab Units 04/16/18  1314   WBC 10*3/mm3 11.20   HEMOGLOBIN g/dL 11.5*   HEMATOCRIT % 39.2   MCV fL 78.9*   MCHC g/dL 29.3*   PLATELETS 10*3/mm3 352   INR  0.96           Results from last 7 days  Lab Units 04/16/18  1314   SODIUM mmol/L 136   POTASSIUM mmol/L 4.6   CHLORIDE mmol/L 106   CO2 mmol/L 20.5*   BUN mg/dL 14   CREATININE mg/dL 0.86   EGFR IF NONAFRICN AM mL/min/1.73 69   CALCIUM mg/dL 9.3   GLUCOSE mg/dL 447*   ALBUMIN g/dL 3.50   BILIRUBIN mg/dL 0.2   ALK PHOS U/L 62   AST (SGOT) U/L 56*   ALT (SGPT) U/L 71*   Estimated Creatinine Clearance: 74.8 mL/min (by C-G formula based on SCr of 0.86 mg/dL).  No results found for: AMMONIA    Results from last 7 days  Lab Units 04/16/18  1920 04/16/18  1314   CK TOTAL U/L 41  --    TROPONIN I ng/mL 0.021 0.018   CK MB INDEX % 3.3*  --          Lab Results   Component Value Date    HGBA1C 10.60 (H) 01/19/2018     Lab Results   Component Value Date    TSH 1.727 01/19/2018    FREET4 0.62 (L) 03/06/2017     No results found for: PREGTESTUR, PREGSERUM, HCG, HCGQUANT  Pain Management Panel     Pain Management Panel Latest Ref Rng & Units 4/8/2018 7/22/2017    AMPHETAMINES SCREEN, URINE Negative Negative Negative    BARBITURATES SCREEN Negative Negative Negative    BENZODIAZEPINE SCREEN, URINE Negative Negative Negative    BUPRENORPHINE Negative Negative Negative    COCAINE SCREEN, URINE  Negative Negative Negative    METHADONE SCREEN, URINE Negative Negative Negative                          I have personally reviewed the labs described above.  ---------------------------------------------------------------------------------------------------------------------  Imaging Results (last 7 days)     Procedure Component Value Units Date/Time    CT Chest Pulmonary Embolism With Contrast [[id number]] Collected:  04/16/18 1533     Updated:  04/16/18 1537    Narrative:       CT CHEST PULMONARY EMBOLISM W CONTRAST-     CLINICAL INDICATION: sob, chest pain          COMPARISON: 02/04/2018      PROCEDURE: Thin cut axial images were acquired through the pulmonary  vessels during the rapid infusion of IV contrast.     Additional 3-D reformatted images obtained via post-processing for  improved diagnostic accuracy and procedural planning.     Radiation dose reduction techniques were utilized per ALARA protocol.  Automated exposure control was initiated through either or CareDoSosh or  DoseRight software packages by  protocol.           FINDINGS: Today's study demonstrates opacification of the central  pulmonary vessels.   There are no filling defects.   There is no truncation.     No evidence of a pulmonary embolus.     Otherwise there are no parenchymal soft tissue nodules or masses.     Single enlarged 1.4 cm aorticopulmonary window lymph node  There is a 9 mm lymph node in the right axilla     Small right effusion. Previously identified right basilar consolidation  however has cleared       Impression:       1. No evidence of a pulmonary embolus  2. Single enlarged aorticopulmonary window lymph node  3. Small right pleural effusion.     This report was finalized on 4/16/2018 3:35 PM by Dr. Daniel Yates MD.       XR Chest 1 View [[id number]] Collected:  04/16/18 1353     Updated:  04/16/18 1420    Narrative:       XR CHEST 1 VW-     CLINICAL INDICATION: Chest pain protocol          COMPARISON: 04/09/2018       TECHNIQUE: Single frontal view of the chest.     FINDINGS:     There is no focal alveolar infiltrate or effusion.  The cardiac silhouette is normal. The pulmonary vasculature is  unremarkable.  There is no evidence of an acute osseous abnormality.   There are no suspicious-appearing parenchymal soft tissue nodules.            Impression:       No evidence of active or acute cardiopulmonary disease on today's chest  radiograph.         This report was finalized on 4/16/2018 1:53 PM by Dr. Daniel Yates MD.             I have personally reviewed the radiology images and read the final radiology report.    ---------------------------------------------------------------------------------------------------------------------      Results for orders placed during the hospital encounter of 07/22/17   Adult Transthoracic Echo Complete    Narrative · The study is technically good for diagnosis.  · Left ventricular systolic function is low normal. Estimated EF = 45%.  · Left ventricular diastolic dysfunction (grade II) consistent with   pseudonormalization.  · There are no significant valvular abnormalities noted.  · There is a trivial pericardial effusion.     Compared to the study of 3/3/2017, there are no significant changes.           Assessment:    1.  Chest pain    2.  Prior abnormal nuclear stress test on 7/25/2017 as described above    3.  Systolic heart failure LVEF 42% on nuclear stress test and 45% on echocardiogram    4.  History of non-STEMI and coronary artery disease    5.  History of Buerger's disease      Plan:     1.  Admit to the medicine floor, maintain on telemetry to monitor for arrhythmias.  Start aspirin, statin, beta blocker, Nitropaste, supplemental oxygen.  Check serial cardiac enzymes to evaluate for myocardial injury.  EKG does not show ischemia or injury pattern.  If serial cardiac enzymes are negative plan on Lexiscan nuclear stress test in the morning.    2.  History of systolic heart  failure.  Re-evaluate LVEF with nuclear stress testing.    3.  DVT prophylaxis with subcutaneous heparin.      Total face to face time with this patient, time spent reviewing electronic medial records, laboratory & imaging studies and time spent in coordination of care with the Emergency Physician and nursing staff was 65 minutes.    Alysa Trivedi MD  04/16/18  9:09 PM

## 2018-04-17 NOTE — PLAN OF CARE
Problem: Fall Risk (Adult)  Goal: Identify Related Risk Factors and Signs and Symptoms  Outcome: Ongoing (interventions implemented as appropriate)    Goal: Absence of Fall  Outcome: Ongoing (interventions implemented as appropriate)      Problem: Patient Care Overview  Goal: Plan of Care Review  Outcome: Ongoing (interventions implemented as appropriate)    Goal: Individualization and Mutuality  Outcome: Ongoing (interventions implemented as appropriate)    Goal: Discharge Needs Assessment  Outcome: Ongoing (interventions implemented as appropriate)    Goal: Interprofessional Rounds/Family Conf  Outcome: Ongoing (interventions implemented as appropriate)      Problem: Pain, Acute (Adult)  Goal: Identify Related Risk Factors and Signs and Symptoms  Outcome: Ongoing (interventions implemented as appropriate)    Goal: Acceptable Pain Control/Comfort Level  Outcome: Ongoing (interventions implemented as appropriate)      Problem: Cardiac: ACS (Acute Coronary Syndrome) (Adult)  Goal: Signs and Symptoms of Listed Potential Problems Will be Absent, Minimized or Managed (Cardiac: ACS)  Outcome: Ongoing (interventions implemented as appropriate)      Problem: Skin Injury Risk (Adult)  Goal: Identify Related Risk Factors and Signs and Symptoms  Outcome: Ongoing (interventions implemented as appropriate)    Goal: Skin Health and Integrity  Outcome: Ongoing (interventions implemented as appropriate)      Problem: Wound (Includes Pressure Injury) (Adult)  Goal: Signs and Symptoms of Listed Potential Problems Will be Absent, Minimized or Managed (Wound)  Outcome: Ongoing (interventions implemented as appropriate)      Problem: Diabetes, Type 2 (Adult)  Goal: Signs and Symptoms of Listed Potential Problems Will be Absent, Minimized or Managed (Diabetes, Type 2)  Outcome: Ongoing (interventions implemented as appropriate)

## 2018-04-17 NOTE — PROGRESS NOTES
Discharge Planning Assessment   Daniel     Patient Name: Pepito Yan  MRN: [id number]  Today's Date: 4/17/2018    Admit Date: 4/16/2018          Discharge Needs Assessment     Row Name 04/17/18 1045       Living Environment    Lives With (P)  child(barbara), adult    Current Living Arrangements (P)  home/apartment/condo    Primary Care Provided by (P)  self    Provides Primary Care For (P)  no one    Quality of Family Relationships (P)  helpful;involved;supportive    Able to Return to Prior Arrangements (P)  yes       Resource/Environmental Concerns    Resource/Environmental Concerns (P)  none       Transition Planning    Patient/Family Anticipates Transition to (P)  home with family    Transportation Anticipated (P)  family or friend will provide       Discharge Needs Assessment    Equipment Currently Used at Home (P)  nebulizer;oxygen            Discharge Plan     Row Name 04/17/18 1046       Plan    Plan (P)  SS recieved consult for discharge planning per nsg on 4/17/18. Pt was admitted as observation on 4/16/18. SS spoke to pt on todays date. Pt states that she lives with her son, Scot. Pt states that she currently has no home health services. Pt states that she has assess to a nebulizer and home O2 that she gets from [facility]. Pt states her PCP is Dr. Hare and she gets her perscriptions filled at [facility]. Pt states that she does have a living will and a POA. Pt states that her POA is her son, Scot. Pt plans to return home after discharge via private transportation. SS will continue to follow.     Patient/Family in Agreement with Plan (P)  yes        Destination     No service coordination in this encounter.      Durable Medical Equipment     No service coordination in this encounter.      Dialysis/Infusion     No service coordination in this encounter.      Home Medical Care     No service coordination in this encounter.      Social Care     No service coordination in this encounter.                 Demographic Summary     Row Name 04/17/18 1044       General Information    Admission Type (P)  observation    Referral Source (P)  nursing    Preferred Language (P)  English     Used During This Interaction (P)  no            Functional Status    No documentation.           Psychosocial    No documentation.           Abuse/Neglect    No documentation.           Legal    No documentation.           Substance Abuse    No documentation.           Patient Forms    No documentation.         Mirella Berg

## 2018-04-17 NOTE — PLAN OF CARE
Problem: Fall Risk (Adult)  Goal: Identify Related Risk Factors and Signs and Symptoms  Outcome: Ongoing (interventions implemented as appropriate)    Goal: Absence of Fall  Outcome: Ongoing (interventions implemented as appropriate)      Problem: Patient Care Overview  Goal: Plan of Care Review  Outcome: Ongoing (interventions implemented as appropriate)    Goal: Individualization and Mutuality  Outcome: Ongoing (interventions implemented as appropriate)    Goal: Discharge Needs Assessment  Outcome: Ongoing (interventions implemented as appropriate)    Goal: Interprofessional Rounds/Family Conf  Outcome: Ongoing (interventions implemented as appropriate)      Problem: Pain, Acute (Adult)  Goal: Identify Related Risk Factors and Signs and Symptoms  Outcome: Ongoing (interventions implemented as appropriate)    Goal: Acceptable Pain Control/Comfort Level  Outcome: Ongoing (interventions implemented as appropriate)      Problem: Cardiac: ACS (Acute Coronary Syndrome) (Adult)  Goal: Signs and Symptoms of Listed Potential Problems Will be Absent, Minimized or Managed (Cardiac: ACS)  Outcome: Ongoing (interventions implemented as appropriate)      Problem: Skin Injury Risk (Adult)  Goal: Identify Related Risk Factors and Signs and Symptoms  Outcome: Ongoing (interventions implemented as appropriate)    Goal: Skin Health and Integrity  Outcome: Ongoing (interventions implemented as appropriate)

## 2018-04-17 NOTE — CONSULTS
Cardiac rehab referral received on pt.  No qualifying dx noted at this time.  Qualifications for CR include coronary stenting, AMI (NSTEM or STEMI), stable angina, CABG, heart valve repair/replacment, heart transplant or stable CHF (with these specific qualifications, Left Ventricular Ejection Fraction of 35% or less, NYHA class II to IV symptoms despite optimal heart failure therapy for at least 6 weeks and has not had recent (?6 Weeks) or planned (? 6 months) major cardiovascular hospitalizations or procedures.  D/T pt being in hospital, does not meet criteria at this time.)  Please contact us at [phone] with questions.

## 2018-04-18 LAB
A-A DO2: 63.2 MMHG (ref 0–300)
ACETONE BLD QL: NEGATIVE
ANION GAP SERPL CALCULATED.3IONS-SCNC: 5.1 MMOL/L (ref 3.6–11.2)
ANION GAP SERPL CALCULATED.3IONS-SCNC: 8.3 MMOL/L (ref 3.6–11.2)
ARTERIAL PATENCY WRIST A: ABNORMAL
ATMOSPHERIC PRESS: 724 MMHG
BASE EXCESS BLDA CALC-SCNC: -0.9 MMOL/L
BDY SITE: ABNORMAL
BODY TEMPERATURE: 98.6 C
BUN BLD-MCNC: 24 MG/DL (ref 7–21)
BUN BLD-MCNC: 26 MG/DL (ref 7–21)
BUN/CREAT SERPL: 25.5 (ref 7–25)
BUN/CREAT SERPL: 26.7 (ref 7–25)
CALCIUM SPEC-SCNC: 8.5 MG/DL (ref 7.7–10)
CALCIUM SPEC-SCNC: 8.6 MG/DL (ref 7.7–10)
CHLORIDE SERPL-SCNC: 100 MMOL/L (ref 99–112)
CHLORIDE SERPL-SCNC: 100 MMOL/L (ref 99–112)
CO2 SERPL-SCNC: 22.7 MMOL/L (ref 24.3–31.9)
CO2 SERPL-SCNC: 23.9 MMOL/L (ref 24.3–31.9)
COHGB MFR BLD: 2.7 % (ref 0–5)
CREAT BLD-MCNC: 0.9 MG/DL (ref 0.43–1.29)
CREAT BLD-MCNC: 1.02 MG/DL (ref 0.43–1.29)
GFR SERPL CREATININE-BSD FRML MDRD: 57 ML/MIN/1.73
GFR SERPL CREATININE-BSD FRML MDRD: 66 ML/MIN/1.73
GLUCOSE BLD-MCNC: 570 MG/DL (ref 70–110)
GLUCOSE BLD-MCNC: 624 MG/DL (ref 70–110)
GLUCOSE BLDC GLUCOMTR-MCNC: 177 MG/DL (ref 70–130)
GLUCOSE BLDC GLUCOMTR-MCNC: 227 MG/DL (ref 70–130)
GLUCOSE BLDC GLUCOMTR-MCNC: 303 MG/DL (ref 70–130)
GLUCOSE BLDC GLUCOMTR-MCNC: 408 MG/DL (ref 70–130)
GLUCOSE BLDC GLUCOMTR-MCNC: >599 MG/DL (ref 70–130)
HCO3 BLDA-SCNC: 22.4 MMOL/L (ref 22–26)
HCT VFR BLD CALC: 30 % (ref 37–47)
HGB BLDA-MCNC: 10.1 G/DL (ref 12–16)
HOROWITZ INDEX BLD+IHG-RTO: 28 %
METHGB BLD QL: 0.3 % (ref 0–3)
MODALITY: ABNORMAL
OSMOLALITY SERPL CALC.SUM OF ELEC: 292.2 MOSM/KG (ref 273–305)
OSMOLALITY SERPL CALC.SUM OF ELEC: 293.6 MOSM/KG (ref 273–305)
OXYHGB MFR BLDV: 94.2 % (ref 85–100)
PCO2 BLDA: 32 MM HG (ref 35–45)
PH BLDA: 7.46 PH UNITS (ref 7.35–7.45)
PO2 BLDA: 88.6 MM HG (ref 80–100)
POTASSIUM BLD-SCNC: 4.8 MMOL/L (ref 3.5–5.3)
POTASSIUM BLD-SCNC: 5.4 MMOL/L (ref 3.5–5.3)
SAO2 % BLDCOA: 97.1 % (ref 90–100)
SODIUM BLD-SCNC: 129 MMOL/L (ref 135–153)
SODIUM BLD-SCNC: 131 MMOL/L (ref 135–153)

## 2018-04-18 PROCEDURE — 63710000001 INSULIN ASPART PER 5 UNITS: Performed by: PHYSICIAN ASSISTANT

## 2018-04-18 PROCEDURE — 25010000002 HEPARIN (PORCINE) PER 1000 UNITS: Performed by: HOSPITALIST

## 2018-04-18 PROCEDURE — 94640 AIRWAY INHALATION TREATMENT: CPT

## 2018-04-18 PROCEDURE — 82375 ASSAY CARBOXYHB QUANT: CPT | Performed by: PHYSICIAN ASSISTANT

## 2018-04-18 PROCEDURE — 25010000002 MORPHINE PER 10 MG: Performed by: HOSPITALIST

## 2018-04-18 PROCEDURE — 82805 BLOOD GASES W/O2 SATURATION: CPT | Performed by: PHYSICIAN ASSISTANT

## 2018-04-18 PROCEDURE — 94799 UNLISTED PULMONARY SVC/PX: CPT

## 2018-04-18 PROCEDURE — 82009 KETONE BODYS QUAL: CPT | Performed by: PHYSICIAN ASSISTANT

## 2018-04-18 PROCEDURE — 80048 BASIC METABOLIC PNL TOTAL CA: CPT | Performed by: PHYSICIAN ASSISTANT

## 2018-04-18 PROCEDURE — 63710000001 PREDNISONE PER 1 MG: Performed by: HOSPITALIST

## 2018-04-18 PROCEDURE — 25010000002 FUROSEMIDE PER 20 MG: Performed by: HOSPITALIST

## 2018-04-18 PROCEDURE — 63710000001 INSULIN DETEMIR PER 5 UNITS: Performed by: PHYSICIAN ASSISTANT

## 2018-04-18 PROCEDURE — 63710000001 INSULIN DETEMIR PER 5 UNITS: Performed by: HOSPITALIST

## 2018-04-18 PROCEDURE — 25010000002 MORPHINE SULFATE (PF) 2 MG/ML SOLUTION: Performed by: HOSPITALIST

## 2018-04-18 PROCEDURE — 82962 GLUCOSE BLOOD TEST: CPT

## 2018-04-18 PROCEDURE — 83050 HGB METHEMOGLOBIN QUAN: CPT | Performed by: PHYSICIAN ASSISTANT

## 2018-04-18 PROCEDURE — 99222 1ST HOSP IP/OBS MODERATE 55: CPT | Performed by: INTERNAL MEDICINE

## 2018-04-18 PROCEDURE — 36600 WITHDRAWAL OF ARTERIAL BLOOD: CPT | Performed by: PHYSICIAN ASSISTANT

## 2018-04-18 PROCEDURE — 99233 SBSQ HOSP IP/OBS HIGH 50: CPT | Performed by: HOSPITALIST

## 2018-04-18 PROCEDURE — 63710000001 INSULIN ASPART PER 5 UNITS

## 2018-04-18 RX ORDER — PROMETHAZINE HYDROCHLORIDE 25 MG/1
25 TABLET ORAL EVERY 6 HOURS PRN
Status: DISCONTINUED | OUTPATIENT
Start: 2018-04-18 | End: 2018-04-20 | Stop reason: HOSPADM

## 2018-04-18 RX ORDER — DEXTROSE MONOHYDRATE 25 G/50ML
25 INJECTION, SOLUTION INTRAVENOUS
Status: DISCONTINUED | OUTPATIENT
Start: 2018-04-18 | End: 2018-04-20 | Stop reason: HOSPADM

## 2018-04-18 RX ORDER — DIAZEPAM 5 MG/1
5 TABLET ORAL ONCE
Status: COMPLETED | OUTPATIENT
Start: 2018-04-19 | End: 2018-04-19

## 2018-04-18 RX ORDER — FAMOTIDINE 20 MG/1
20 TABLET, FILM COATED ORAL DAILY
Status: DISCONTINUED | OUTPATIENT
Start: 2018-04-18 | End: 2018-04-20 | Stop reason: HOSPADM

## 2018-04-18 RX ORDER — NICOTINE POLACRILEX 4 MG
15 LOZENGE BUCCAL
Status: DISCONTINUED | OUTPATIENT
Start: 2018-04-18 | End: 2018-04-20 | Stop reason: HOSPADM

## 2018-04-18 RX ADMIN — FAMOTIDINE 20 MG: 20 TABLET, FILM COATED ORAL at 17:25

## 2018-04-18 RX ADMIN — PREDNISONE 20 MG: 20 TABLET ORAL at 08:41

## 2018-04-18 RX ADMIN — CLOPIDOGREL 75 MG: 75 TABLET, FILM COATED ORAL at 08:41

## 2018-04-18 RX ADMIN — ISOSORBIDE MONONITRATE 30 MG: 30 TABLET, EXTENDED RELEASE ORAL at 08:41

## 2018-04-18 RX ADMIN — IPRATROPIUM BROMIDE 0.5 MG: 0.5 SOLUTION RESPIRATORY (INHALATION) at 19:40

## 2018-04-18 RX ADMIN — INSULIN DETEMIR 20 UNITS: 100 INJECTION, SOLUTION SUBCUTANEOUS at 09:00

## 2018-04-18 RX ADMIN — INSULIN ASPART 24 UNITS: 100 INJECTION, SOLUTION INTRAVENOUS; SUBCUTANEOUS at 13:18

## 2018-04-18 RX ADMIN — SODIUM CHLORIDE 500 ML: 9 INJECTION, SOLUTION INTRAVENOUS at 15:42

## 2018-04-18 RX ADMIN — INSULIN ASPART 24 UNITS: 100 INJECTION, SOLUTION INTRAVENOUS; SUBCUTANEOUS at 16:00

## 2018-04-18 RX ADMIN — INSULIN DETEMIR 20 UNITS: 100 INJECTION, SOLUTION SUBCUTANEOUS at 14:11

## 2018-04-18 RX ADMIN — MORPHINE SULFATE 1 MG: 2 INJECTION, SOLUTION INTRAMUSCULAR; INTRAVENOUS at 22:33

## 2018-04-18 RX ADMIN — GABAPENTIN 600 MG: 300 CAPSULE ORAL at 08:57

## 2018-04-18 RX ADMIN — BUDESONIDE AND FORMOTEROL FUMARATE DIHYDRATE 2 PUFF: 160; 4.5 AEROSOL RESPIRATORY (INHALATION) at 06:43

## 2018-04-18 RX ADMIN — MORPHINE SULFATE 1 MG: 2 INJECTION, SOLUTION INTRAMUSCULAR; INTRAVENOUS at 14:21

## 2018-04-18 RX ADMIN — FUROSEMIDE 40 MG: 10 INJECTION, SOLUTION INTRAMUSCULAR; INTRAVENOUS at 00:02

## 2018-04-18 RX ADMIN — ASPIRIN 81 MG: 81 TABLET ORAL at 08:41

## 2018-04-18 RX ADMIN — MORPHINE SULFATE 1 MG: 2 INJECTION, SOLUTION INTRAMUSCULAR; INTRAVENOUS at 02:21

## 2018-04-18 RX ADMIN — GABAPENTIN 600 MG: 300 CAPSULE ORAL at 21:36

## 2018-04-18 RX ADMIN — IPRATROPIUM BROMIDE 0.5 MG: 0.5 SOLUTION RESPIRATORY (INHALATION) at 06:43

## 2018-04-18 RX ADMIN — MORPHINE SULFATE 1 MG: 2 INJECTION, SOLUTION INTRAMUSCULAR; INTRAVENOUS at 10:35

## 2018-04-18 RX ADMIN — MORPHINE SULFATE 1 MG: 2 INJECTION, SOLUTION INTRAMUSCULAR; INTRAVENOUS at 06:35

## 2018-04-18 RX ADMIN — GABAPENTIN 600 MG: 300 CAPSULE ORAL at 14:11

## 2018-04-18 RX ADMIN — METOPROLOL TARTRATE 25 MG: 25 TABLET, FILM COATED ORAL at 21:36

## 2018-04-18 RX ADMIN — INSULIN ASPART 4 UNITS: 100 INJECTION, SOLUTION INTRAVENOUS; SUBCUTANEOUS at 22:34

## 2018-04-18 RX ADMIN — IPRATROPIUM BROMIDE 0.5 MG: 0.5 SOLUTION RESPIRATORY (INHALATION) at 02:49

## 2018-04-18 RX ADMIN — FUROSEMIDE 40 MG: 10 INJECTION, SOLUTION INTRAMUSCULAR; INTRAVENOUS at 21:37

## 2018-04-18 RX ADMIN — BUDESONIDE AND FORMOTEROL FUMARATE DIHYDRATE 2 PUFF: 160; 4.5 AEROSOL RESPIRATORY (INHALATION) at 19:40

## 2018-04-18 RX ADMIN — METOPROLOL TARTRATE 25 MG: 25 TABLET, FILM COATED ORAL at 08:41

## 2018-04-18 RX ADMIN — TIZANIDINE 4 MG: 4 TABLET ORAL at 00:02

## 2018-04-18 RX ADMIN — MORPHINE SULFATE 1 MG: 2 INJECTION, SOLUTION INTRAMUSCULAR; INTRAVENOUS at 18:40

## 2018-04-18 RX ADMIN — INSULIN ASPART 10 UNITS: 100 INJECTION, SOLUTION INTRAVENOUS; SUBCUTANEOUS at 17:26

## 2018-04-18 RX ADMIN — HEPARIN SODIUM 5000 UNITS: 5000 INJECTION, SOLUTION INTRAVENOUS; SUBCUTANEOUS at 21:36

## 2018-04-18 RX ADMIN — ROSUVASTATIN CALCIUM 20 MG: 20 TABLET, FILM COATED ORAL at 21:36

## 2018-04-18 RX ADMIN — INSULIN ASPART 16 UNITS: 100 INJECTION, SOLUTION INTRAVENOUS; SUBCUTANEOUS at 17:26

## 2018-04-18 RX ADMIN — HEPARIN SODIUM 5000 UNITS: 5000 INJECTION, SOLUTION INTRAVENOUS; SUBCUTANEOUS at 08:43

## 2018-04-18 RX ADMIN — TIZANIDINE 4 MG: 4 TABLET ORAL at 21:36

## 2018-04-18 RX ADMIN — FUROSEMIDE 40 MG: 10 INJECTION, SOLUTION INTRAMUSCULAR; INTRAVENOUS at 13:17

## 2018-04-18 RX ADMIN — Medication 10 ML: at 08:43

## 2018-04-18 NOTE — PROGRESS NOTES
[facility] HOSPITALIST PROGRESS NOTE     Patient Identification:  Name:  Pepito Yan  Age:  52 y.o.  Sex:  female  :  1965  MRN:  [id number]  Visit Number:  [id number]  Primary Care Provider:  ROSA Love    Date of admission: 2018    Length of stay:  0    Chief complaint:  Continued chest pain    HPI:  52-year-old woman status post episode of substernal chest pain between her shoulder blades.  Chest pain is resolved with Imdur.  Serial cardiac enzymes negative.  She has known history of systolic heart failure and stage II diastolic dysfunction left ventricular ejection fraction around 40%.  I started Lasix 40 mg IV twice a day and her symptoms of shortness of breath have improved somewhat.     She has a known history of an abnormal stress test in 2017, coronary artery disease diagnosed in 2016, Rodas's disease.       Chronic back pain that prevented her from laying flat for a nuclear stress test today.  ----------------------------------------------------------------------------------------------------------------------  Current Hospital Meds:    aspirin 324 mg Oral Once   And      aspirin 81 mg Oral Daily   budesonide-formoterol 2 puff Inhalation BID - RT   clopidogrel 75 mg Oral Daily   [START ON 2018] diazePAM 5 mg Oral Once   famotidine 20 mg Oral Daily   furosemide 40 mg Intravenous Q12H   gabapentin 600 mg Oral Q8H   heparin (porcine) 5,000 Units Subcutaneous Q12H   insulin aspart      insulin aspart 0-24 Units Subcutaneous 4x Daily AC & at Bedtime   insulin aspart 10 Units Subcutaneous TID With Meals   [START ON 2018] insulin detemir 45 Units Subcutaneous Daily   isosorbide mononitrate 30 mg Oral Daily   lisinopril 5 mg Oral Daily   metoprolol tartrate 25 mg Oral Q12H   predniSONE 20 mg Oral Daily   rosuvastatin 20 mg Oral Nightly   sodium chloride 10 mL Intracatheter Q12H         ----------------------------------------------------------------------------------------------------------------------  Vital Signs:  Temp:  [97.4 °F (36.3 °C)-97.7 °F (36.5 °C)] 97.7 °F (36.5 °C)  Heart Rate:  [] 118  Resp:  [18-20] 18  BP: (108-132)/(62-82) 128/66  1    04/16/18  1202 04/16/18  1907 04/17/18  0317   Weight: 49.4 kg (109 lb) 61.9 kg (136 lb 8 oz) 61.9 kg (136 lb 8 oz)     Body mass index is 24.18 kg/m².    Intake/Output Summary (Last 24 hours) at 04/18/18 1937  Last data filed at 04/18/18 1826   Gross per 24 hour   Intake              720 ml   Output                0 ml   Net              720 ml     Diet Regular; Consistent Carbohydrate  NPO Diet  ----------------------------------------------------------------------------------------------------------------------  Physical exam:  Constitutional:  Appears older than stated age No respiratory distress.    Patient lying on side.  HENT:  Head:  Normocephalic and atraumatic.  Mouth:  Moist mucous membranes.    Eyes:  Conjunctivae within normal limits.  Pupils are equal, round, and reactive to light and accomodation.  No scleral icterus.    Neck:  Neck supple.     Cardiovascular:  Normal rate, regular rhythm and normal heart sounds with no murmurs, gallops or rubs.  Pulmonary/Chest:  No respiratory distress, no wheezes, soft crackles bilateral bases, with normal breath sounds and good air movement.  Abdominal:  Soft.  Bowel sounds are normal.  No distension and no tenderness. No hepato- or spleno- megaly.  No hepatojugular reflux.  Musculoskeletal:  No edema, no tenderness, and no deformity.  No red or swollen joints anywhere.    Neurological:  Alert and oriented to person, place, and time.  No focal neurologic deficits.  CN II-XII grossly intact.   No tongue deviation.  No facial droop.  No slurred speech.   Psychiatric: Normal mood, patient denies suicidal or homicidal ideation.  Patient denies auditory, tactile or visual hallucinations.     Skin:  Skin is warm and dry. No rash noted. No pallor.   Peripheral vascular:  1+ pulses in all 4 extremities with no clubbing, no cyanosis, no edema.  -    Results from last 7 days  Lab Units 04/17/18  1017 04/17/18  0529 04/16/18  2248 04/16/18  1920   CK TOTAL U/L  --   --  22* 41   CKMB ng/mL  --   --  1.76 1.37   CK MB INDEX %  --   --  8.0* 3.3*   TROPONIN I ng/mL 0.025 0.031 0.024 0.021       Results from last 7 days  Lab Units 04/16/18  1314   WBC 10*3/mm3 11.20   HEMOGLOBIN g/dL 11.5*   HEMATOCRIT % 39.2   MCV fL 78.9*   MCHC g/dL 29.3*   PLATELETS 10*3/mm3 352   INR  0.96       Results from last 7 days  Lab Units 04/18/18  1602   PH, ARTERIAL pH units 7.462*   PO2 ART mm Hg 88.6   PCO2, ARTERIAL mm Hg 32.0*   HCO3 ART mmol/L 22.4       Results from last 7 days  Lab Units 04/18/18  1528 04/18/18  1317 04/16/18  1314   SODIUM mmol/L 131* 129* 136   POTASSIUM mmol/L 4.8 5.4* 4.6   CHLORIDE mmol/L 100 100 106   CO2 mmol/L 22.7* 23.9* 20.5*   BUN mg/dL 26* 24* 14   CREATININE mg/dL 1.02 0.90 0.86   EGFR IF NONAFRICN AM mL/min/1.73 57* 66 69   CALCIUM mg/dL 8.6 8.5 9.3   GLUCOSE mg/dL 570* 624* 447*   ALBUMIN g/dL  --   --  3.50   BILIRUBIN mg/dL  --   --  0.2   ALK PHOS U/L  --   --  62   AST (SGOT) U/L  --   --  56*   ALT (SGPT) U/L  --   --  71*   Estimated Creatinine Clearance: 63 mL/min (by C-G formula based on SCr of 1.02 mg/dL).    No results found for: AMMONIA                    Microbiology Results (last 10 days)     Procedure Component Value - Date/Time    Blood Culture - Blood, [[id number]]  (Normal) Collected:  04/09/18 1149    Lab Status:  Final result Specimen:  Blood from Arm, Left Updated:  04/14/18 1201     Blood Culture No growth at 5 days    Blood Culture - Blood, [[id number]]  (Normal) Collected:  04/09/18 1142    Lab Status:  Final result Specimen:  Blood from Arm, Left Updated:  04/14/18 1201     Blood Culture No growth at 5 days    Urine Culture - Urine, Urine, Clean Catch [[id number]]   (Abnormal)  (Susceptibility) Collected:  04/09/18 1033    Lab Status:  Final result Specimen:  Urine from Urine, Clean Catch Updated:  04/11/18 1210     Urine Culture --      >100,000 CFU/mL Escherichia coli ESBL (C)     Comment:   Consider infectious disease consult.  Susceptibility results may not correlate to clinical outcomes.       Susceptibility      Escherichia coli ESBL     DUNIA     Amikacin <=16 ug/ml Susceptible     Amoxicillin + Clavulanate <=8/4 ug/ml Susceptible     Ampicillin >16 ug/ml Resistant     Ampicillin + Sulbactam >16/8 ug/ml Resistant     Aztreonam >16 ug/ml      Cefazolin >16 ug/ml Resistant     Cefepime >16 ug/ml Resistant     Cefotaxime >32 ug/ml      Ceftazidime >16 ug/ml      Ceftriaxone >32 ug/ml      Cefuroxime sodium >16 ug/ml Resistant     Ciprofloxacin >2 ug/ml Resistant     Doripenem <=0.5 ug/ml Susceptible     Ertapenem <=1 ug/ml Susceptible     Gentamicin >8 ug/ml Resistant     Imipenem <=1 ug/ml Susceptible     Levofloxacin >4 ug/ml Resistant     Nitrofurantoin <=32 ug/ml Susceptible     Piperacillin + Tazobactam <=16 ug/ml Susceptible     Tetracycline >8 ug/ml Resistant     Tobramycin >8 ug/ml Resistant     Trimethoprim + Sulfamethoxazole >2/38 ug/ml Resistant                    Influenza Antigen, Rapid - Swab, Nasopharynx [[id number]]  (Normal) Collected:  04/08/18 2108    Lab Status:  Final result Specimen:  Swab from Nasopharynx Updated:  04/08/18 2124     Influenza A Ag, EIA Negative     Influenza B Ag, EIA Negative    Blood Culture - Blood, [[id number]]  (Normal) Collected:  04/08/18 2027    Lab Status:  Final result Specimen:  Blood from Arm, Left Updated:  04/13/18 2101     Blood Culture No growth at 5 days    Blood Culture - Blood, [[id number]]  (Normal) Collected:  04/08/18 2010    Lab Status:  Final result Specimen:  Blood from Hand, Right Updated:  04/13/18 2101     Blood Culture No growth at 5 days            I have personally reviewed the labs and they are summarized  above.    ----------------------------------------------------------------------------------------------------------------------  Imaging Results (last 24 hours)     ** No results found for the last 24 hours. **          Results for orders placed during the hospital encounter of 07/22/17   Adult Transthoracic Echo Complete    Narrative · The study is technically good for diagnosis.  · Left ventricular systolic function is low normal. Estimated EF = 45%.  · Left ventricular diastolic dysfunction (grade II) consistent with   pseudonormalization.  · There are no significant valvular abnormalities noted.  · There is a trivial pericardial effusion.     Compared to the study of 3/3/2017, there are no significant changes.           ----------------------------------------------------------------------------------------------------------------------  Assessment & Plan:     1.  Shortness of breath. BNP mildly elevate 558d.  Trial of diuresis with Lasix 40 mg IV twice a day improved symptoms somewhat.   eyes and has not documented overnight.  Check daily weights and strict I's and O's.  Unfortunately patient was unable to tolerate laying flat for nuclear stress test today.     2.  Grade 2 diastolic LV dysfunction.  Patient's shortness of breath is likely secondary to systolic and diastolic heart failure.     3.  Chest pain resolved with Imdur 30 mg by mouth daily.     4.  DVT prophylaxis with subcutaneous heparin.           The patient is high risk due to the following diagnoses/reasons:  Known history of cardiac disease      Total face to face time with this patient, time spent reviewing electronic medial records & laboratory & imaging studies and time spent in coordination of care with the nursing staff was 35 minutes.    Alysa Trivedi MD  04/18/18  7:37 PM

## 2018-04-18 NOTE — PROGRESS NOTES
[facility] HOSPITALIST PROGRESS NOTE     Patient Identification:  Name:  Pepito Yan  Age:  52 y.o.  Sex:  female  :  1965  MRN:  [id number]  Visit Number:  [id number]  Primary Care Provider:  ROSA Love    Date of admission: 2018    Length of stay:  0    Chief complaint:  Continued shortness of breath    HPI:  52-year-old woman status post episode of substernal chest pain between her shoulder blades.  Chest pain is resolved with Imdur.  Serial cardiac enzymes negative.  She has known history of systolic heart failure left ventricular ejection fraction around 40%.  He is complaining of shortness of breath.    She has a known history of an abnormal stress test in 2017, coronary artery disease diagnosed in 2016, Rodas's disease.      Chronic back pain that prevented her from laying flat for a nuclear stress test today.  ----------------------------------------------------------------------------------------------------------------------  Current Hospital Meds:    aspirin 324 mg Oral Once   And      aspirin 81 mg Oral Daily   budesonide-formoterol 2 puff Inhalation BID - RT   clopidogrel 75 mg Oral Daily   furosemide 40 mg Intravenous Q12H   gabapentin 600 mg Oral Q8H   heparin (porcine) 5,000 Units Subcutaneous Q12H   insulin detemir 20 Units Subcutaneous Daily   isosorbide mononitrate 30 mg Oral Daily   lisinopril 5 mg Oral Daily   metoprolol tartrate 25 mg Oral Q12H   predniSONE 20 mg Oral Daily   rosuvastatin 20 mg Oral Nightly   sodium chloride 10 mL Intracatheter Q12H        ----------------------------------------------------------------------------------------------------------------------  Vital Signs:  Temp:  [97.6 °F (36.4 °C)-97.8 °F (36.6 °C)] 97.7 °F (36.5 °C)  Heart Rate:  [] 122  Resp:  [18-20] 18  BP: (132-162)/(70-92) 160/86  1    18  1202 18  1907 18  0317   Weight: 49.4 kg (109 lb) 61.9 kg (136 lb 8 oz) 61.9 kg (136 lb 8 oz)      Body mass index is 24.18 kg/m².    Intake/Output Summary (Last 24 hours) at 04/17/18 2204  Last data filed at 04/17/18 1912   Gross per 24 hour   Intake              600 ml   Output             1600 ml   Net            -1000 ml     Diet Regular; Consistent Carbohydrate  ----------------------------------------------------------------------------------------------------------------------  Physical exam:  Constitutional:  Appears older than stated age No respiratory distress.      HENT:  Head:  Normocephalic and atraumatic.  Mouth:  Moist mucous membranes.    Eyes:  Conjunctivae within normal limits.  Pupils are equal, round, and reactive to light and accomodation.  No scleral icterus.    Neck:  Neck supple.     Cardiovascular:  Normal rate, regular rhythm and normal heart sounds with no murmurs, gallops or rubs.  Pulmonary/Chest:  No respiratory distress, no wheezes, soft crackles bilateral bases, with normal breath sounds and good air movement.  Abdominal:  Soft.  Bowel sounds are normal.  No distension and no tenderness. No hepato- or spleno- megaly.  No hepatojugular reflux.  Musculoskeletal:  No edema, no tenderness, and no deformity.  No red or swollen joints anywhere.    Neurological:  Alert and oriented to person, place, and time.  No focal neurologic deficits.  CN II-XII grossly intact.   No tongue deviation.  No facial droop.  No slurred speech.   Psychiatric: Normal mood, patient denies suicidal or homicidal ideation.  Patient denies auditory, tactile or visual hallucinations.    Skin:  Skin is warm and dry. No rash noted. No pallor.   Peripheral vascular:  1+ pulses in all 4 extremities with no clubbing, no cyanosis, no edema.        Results from last 7 days  Lab Units 04/17/18  1017 04/17/18  0529 04/16/18  2248 04/16/18  1920   CK TOTAL U/L  --   --  22* 41   CKMB ng/mL  --   --  1.76 1.37   CK MB INDEX %  --   --  8.0* 3.3*   TROPONIN I ng/mL 0.025 0.031 0.024 0.021       Results from last 7  days  Lab Units 04/16/18  1314   WBC 10*3/mm3 11.20   HEMOGLOBIN g/dL 11.5*   HEMATOCRIT % 39.2   MCV fL 78.9*   MCHC g/dL 29.3*   PLATELETS 10*3/mm3 352   INR  0.96           Results from last 7 days  Lab Units 04/16/18  1314   SODIUM mmol/L 136   POTASSIUM mmol/L 4.6   CHLORIDE mmol/L 106   CO2 mmol/L 20.5*   BUN mg/dL 14   CREATININE mg/dL 0.86   EGFR IF NONAFRICN AM mL/min/1.73 69   CALCIUM mg/dL 9.3   GLUCOSE mg/dL 447*   ALBUMIN g/dL 3.50   BILIRUBIN mg/dL 0.2   ALK PHOS U/L 62   AST (SGOT) U/L 56*   ALT (SGPT) U/L 71*   Estimated Creatinine Clearance: 74.8 mL/min (by C-G formula based on SCr of 0.86 mg/dL).    No results found for: AMMONIA                    Microbiology Results (last 10 days)     Procedure Component Value - Date/Time    Blood Culture - Blood, [[id number]]  (Normal) Collected:  04/09/18 1149    Lab Status:  Final result Specimen:  Blood from Arm, Left Updated:  04/14/18 1201     Blood Culture No growth at 5 days    Blood Culture - Blood, [[id number]]  (Normal) Collected:  04/09/18 1142    Lab Status:  Final result Specimen:  Blood from Arm, Left Updated:  04/14/18 1201     Blood Culture No growth at 5 days    Urine Culture - Urine, Urine, Clean Catch [[id number]]  (Abnormal)  (Susceptibility) Collected:  04/09/18 1033    Lab Status:  Final result Specimen:  Urine from Urine, Clean Catch Updated:  04/11/18 1210     Urine Culture --      >100,000 CFU/mL Escherichia coli ESBL (C)     Comment:   Consider infectious disease consult.  Susceptibility results may not correlate to clinical outcomes.       Susceptibility      Escherichia coli ESBL     DUNIA     Amikacin <=16 ug/ml Susceptible     Amoxicillin + Clavulanate <=8/4 ug/ml Susceptible     Ampicillin >16 ug/ml Resistant     Ampicillin + Sulbactam >16/8 ug/ml Resistant     Aztreonam >16 ug/ml      Cefazolin >16 ug/ml Resistant     Cefepime >16 ug/ml Resistant     Cefotaxime >32 ug/ml      Ceftazidime >16 ug/ml      Ceftriaxone >32 ug/ml       Cefuroxime sodium >16 ug/ml Resistant     Ciprofloxacin >2 ug/ml Resistant     Doripenem <=0.5 ug/ml Susceptible     Ertapenem <=1 ug/ml Susceptible     Gentamicin >8 ug/ml Resistant     Imipenem <=1 ug/ml Susceptible     Levofloxacin >4 ug/ml Resistant     Nitrofurantoin <=32 ug/ml Susceptible     Piperacillin + Tazobactam <=16 ug/ml Susceptible     Tetracycline >8 ug/ml Resistant     Tobramycin >8 ug/ml Resistant     Trimethoprim + Sulfamethoxazole >2/38 ug/ml Resistant                    Influenza Antigen, Rapid - Swab, Nasopharynx [[id number]]  (Normal) Collected:  04/08/18 2108    Lab Status:  Final result Specimen:  Swab from Nasopharynx Updated:  04/08/18 2124     Influenza A Ag, EIA Negative     Influenza B Ag, EIA Negative    Blood Culture - Blood, [[id number]]  (Normal) Collected:  04/08/18 2027    Lab Status:  Final result Specimen:  Blood from Arm, Left Updated:  04/13/18 2101     Blood Culture No growth at 5 days    Blood Culture - Blood, [[id number]]  (Normal) Collected:  04/08/18 2010    Lab Status:  Final result Specimen:  Blood from Hand, Right Updated:  04/13/18 2101     Blood Culture No growth at 5 days            I have personally reviewed the labs and they are summarized above.    ----------------------------------------------------------------------------------------------------------------------  Imaging Results (last 24 hours)     ** No results found for the last 24 hours. **          Results for orders placed during the hospital encounter of 07/22/17   Adult Transthoracic Echo Complete    Narrative · The study is technically good for diagnosis.  · Left ventricular systolic function is low normal. Estimated EF = 45%.  · Left ventricular diastolic dysfunction (grade II) consistent with   pseudonormalization.  · There are no significant valvular abnormalities noted.  · There is a trivial pericardial effusion.     Compared to the study of 3/3/2017, there are no significant changes.           I have  personally reviewed the following radiology images and my impression is: Mildly decreased left ventricular ejection fraction 45%  ----------------------------------------------------------------------------------------------------------------------  Assessment & Plan:    1.  Shortness of breath.  Recheck BNP which was mildly elevated on admission.  Start trial of diuresis with Lasix 40 mg IV twice a day.  Check daily weights and strict I's and O's.  Unfortunately patient was unable to tolerate laying flat for nuclear stress test today.    2.  Grade 2 diastolic LV dysfunction.  Patient's shortness of breath is likely secondary to systolic and diastolic heart failure.    3.  Chest pain resolved with Imdur 30 mg by mouth daily.    4.  DVT prophylaxis with subcutaneous heparin.        The patient is high risk due to the following diagnoses/reasons:  Known history of cardiac disease      Total face to face time with this patient, time spent reviewing electronic medial records & laboratory & imaging studies and time spent in coordination of care with the nursing staff was 35 minutes.    Alysa Trivedi MD  04/17/18  10:04 PM

## 2018-04-18 NOTE — CONSULTS
Inpatient Cardiology Consult  Consult performed by: JARED QUINTERO  Consult ordered by: DANIELA PASCAL        Date of Admit: 4/16/2018  Date of Consult: 04/18/18  Hubert Geller MD Starlene T Hansen  1965  Consulting Physician: Dr. Jared Quintero MD, [facility]    Cardiology consultation    Reason for consultation: Chest pain, shortness of breath, and systolic congestive heart failure.     Assessment:  1. Chest pain, suspicious for class IV angina.  2. Atherosclerotic cardiovascular disease status post non-ST elevation myocardial infarction in July 2016 with cardiac catheterization at [facility] in [facility] revealing apparently some obstructive lesions but patient left AMA before she could have intervention.  3. Peripheral arterial disease with history of Buerger's disease.   4. Type II Insulin-dependent diabetes mellitus.   5. Essential hypertension, controlled.   6. Long history of smoking and continues smoke 4-5 cigarettes a day.    Recommendations:  1. Continue with aspirin, Plavix, rosuvastatin and metoprolol.  2. Add Nitropaste and discontinue the isosorbide mononitrate.  3. Given her known coronary artery disease that apparently was not intervened due to patient leaving AMA from the [facility] in July 2016, I have told her that with her recurrent class IV anginal symptoms, she would need reevaluation with cardiac catheterization and further coronary intervention as needed.  Patient is agreeable to this at this time.  We'll schedule this for a.m.  4. I have strongly emphasized for her to quit smoking.    History of Present Illness: Ms. Jane is a 50-year-old  female with history of long-standing hypertension, type 2 diabetes mellitus, long history of smoking for 40 years and smoked up to 2-3 packs a day although she says she has cut down to 4-5 cigarettes a day recently, has history of known coronary artery disease with a non-ST elevation myocardial infarctions July 2016  with cardiac catheterization at [facility] in [place], [place] apparently revealing some obstructive lesions.  Patient was recommended to have coronary intervention but she reportedly left the hospital AGAINST MEDICAL ADVICE.  She now presents with complaints of having recurrent chest pains over the last week to 10 days.  She describes episodes as being felt a substernal pressure and tightness rated as 10 out of 10 associated with shortness of breath.  She does get some relief with sublingual nitroglycerin.  She currently denies any significant chest pain or shortness of breath.  Her EKG so far has revealed normal sinus rhythm with an incomplete right bundle branch block but otherwise no significant ST-T wave changes of myocardial ischemia or infarction.  Her cardiac markers have been negative ×5.  She has dyspnea with mild exertion related mostly to COPD.  Denies any PND, orthopnea or pedal edema.    Cardiac risk factors:arteriosclerotic heart disease, diabetes mellitus, hypercholesterolemia, hypertension and peripheral vascular disease    Last Echo: 07/24/17  · The study is technically good for diagnosis.  · Left ventricular systolic function is low normal. Estimated EF = 45%.  · Left ventricular diastolic dysfunction (grade II) consistent with pseudonormalization.  · There are no significant valvular abnormalities noted.  · There is a trivial pericardial effusion.     Compared to the study of 3/3/2017, there are no significant changes.    Last Stress: 07/25/17  · Findings consistent with a normal ECG stress test.  · Myocardial perfusion imaging indicates a small-sized, mildly severe area of ischemia located in the inferolateral wall.  · Myocardial perfusion imaging indicates a located in the lateral wall noted.  · Left ventricular ejection fraction is mildly reduced (Calculated EF = 42%).  · Impressions are consistent with an intermediate risk study.    Past Medical History:   Diagnosis Date   •  Anxiety    • 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   • Chronic respiratory failure with hypoxia     at nighttime   • Compression fracture of lumbar vertebra    • Compression fracture of thoracic vertebra    • COPD (chronic obstructive pulmonary disease)    • Coronary artery disease    • Diabetes mellitus type 2 in nonobese    • Erosive esophagitis 2016   • Essential hypertension    • Gastroparesis    • GERD (gastroesophageal reflux disease)    • Hepatitis-C    • History of transfusion    • Medically noncompliant    • MRSA infection     back of neck around ; 2017 septic left elbow joint with concerns for osteomyelitis and left anteromedial left arm   • NSTEMI (non-ST elevated myocardial infarction) 2016   • PE (pulmonary embolism)    • Protein calorie malnutrition    • RA (rheumatoid arthritis)     with chronic steroid use   • Rheumatoid arthritis      Past Surgical History:   Procedure Laterality Date   • ABDOMINAL SURGERY     • AMPUTATION DIGIT Right 2016    Third digit to the PIP. Surgeon: Dr. Bains   • AMPUTATION DIGIT Left 2016    Procedure: PARTIAL AMPUTATION LEFT THIRD FINGER;  Surgeon: Shane Fuller MD;  Location: [facility] OR;  Service:    • AMPUTATION REVISION Left 2016    Procedure: AMPUTATION REVISION DIGIT STUMP;  Surgeon: Shane Fuller MD;  Location: [facility] OR;  Service:    • ARM DEBRIDEMENT Left 2016    Procedure: HAND IRRIGATION DEBRIDEMENT AND REPAIR ;  Surgeon: Shane Fuller MD;  Location: [facility] OR;  Service:    •  SECTION      x 2   • CHOLECYSTECTOMY     • ENDOSCOPY N/A 2016    Procedure: ESOPHAGOGASTRODUODENOSCOPY;  Surgeon: Jeremy Clement III, MD;  Location: [facility] OR;  Service:    • ENDOSCOPY W/ PEG TUBE PLACEMENT N/A 3/30/2017    Procedure: ESOPHAGOGASTRODUODENOSCOPY WITH PERCUTANEOUS ENDOSCOPIC GASTROSTOMY TUBE INSERTION;  Surgeon: Shade Campos MD;  Location: [facility] OR;  Service:    •  FOREIGN BODY REMOVAL      insulin needle removal x 3 from the abdomen   • HYSTERECTOMY     • INCISION AND DRAINAGE ARM Left 6/27/2017    Procedure: INCISION AND DRAINAGE UPPER EXTREMITY;  Surgeon: Shane Fuller MD;  Location: [facility];  Service:      Family History   Problem Relation Age of Onset   • Colon cancer Mother    • Cancer Father    • Diabetes Maternal Grandmother    • Heart disease Neg Hx    • Stroke Neg Hx      Social History   Substance Use Topics   • Smoking status: Current Every Day Smoker     Packs/day: 0.50     Years: 35.00     Types: Cigarettes   • Smokeless tobacco: Never Used      Comment: 4-5 cigarettes a day    • Alcohol use No     Prescriptions Prior to Admission   Medication Sig Dispense Refill Last Dose   • aspirin 325 MG tablet Take 325 mg by mouth Daily.   4/15/2018 at Unknown time   • budesonide-formoterol (SYMBICORT) 160-4.5 MCG/ACT inhaler Inhale 2 puffs 2 (Two) Times a Day.   4/15/2018 at Unknown time   • gabapentin (NEURONTIN) 600 MG tablet Take 600 mg by mouth 3 (Three) Times a Day.   4/15/2018 at Unknown time   • insulin aspart (novoLOG) 100 UNIT/ML injection Inject 10 Units under the skin 3 (Three) Times a Day With Meals.   4/15/2018 at Unknown time   • insulin glargine (LANTUS) 100 UNIT/ML injection Inject 45 Units under the skin Daily.   4/15/2018 at Unknown time   • isosorbide mononitrate (IMDUR) 30 MG 24 hr tablet Take 30 mg by mouth Daily.   4/15/2018 at Unknown time   • lisinopril (PRINIVIL,ZESTRIL) 5 MG tablet Take 5 mg by mouth Daily.   4/15/2018 at Unknown time   • metFORMIN (GLUCOPHAGE) 500 MG tablet Take 500 mg by mouth 3 (Three) Times a Day With Meals.   4/15/2018 at Unknown time   • predniSONE (DELTASONE) 20 MG tablet Take 20 mg by mouth Daily.   4/15/2018 at Unknown time   • sucralfate (CARAFATE) 1 g tablet Take 1 g by mouth 4 (Four) Times a Day As Needed.   4/15/2018 at Unknown time   • promethazine (PHENERGAN) 25 MG tablet Take 25 mg by mouth Every 6 (Six)  Hours As Needed for Nausea or Vomiting.   Unknown at Unknown time   • tiZANidine (ZANAFLEX) 4 MG tablet Take 4 mg by mouth Every 8 (Eight) Hours As Needed for Muscle Spasms.   Unknown at Unknown time     Allergies:  Penicillins; Toradol [ketorolac tromethamine]; and Tramadol      Current Facility-Administered Medications:   •  acetaminophen (TYLENOL) tablet 650 mg, 650 mg, Oral, Q4H PRN, Alysa Trivedi MD  •  aspirin chewable tablet 324 mg, 324 mg, Oral, Once **AND** aspirin EC tablet 81 mg, 81 mg, Oral, Daily, Alysa Trivedi MD, 81 mg at 04/18/18 0841  •  budesonide-formoterol (SYMBICORT) 160-4.5 MCG/ACT inhaler 2 puff, 2 puff, Inhalation, BID - RT, Alysa Trivedi MD, 2 puff at 04/18/18 0643  •  clopidogrel (PLAVIX) tablet 75 mg, 75 mg, Oral, Daily, Alysa Trivedi MD, 75 mg at 04/18/18 0841  •  furosemide (LASIX) injection 40 mg, 40 mg, Intravenous, Q12H, Alysa Trivedi MD, 40 mg at 04/18/18 0002  •  gabapentin (NEURONTIN) capsule 600 mg, 600 mg, Oral, Q8H, Alysa Trivedi MD, 600 mg at 04/18/18 0857  •  heparin (porcine) 5000 UNIT/ML injection 5,000 Units, 5,000 Units, Subcutaneous, Q12H, Alysa Trivedi MD, 5,000 Units at 04/18/18 0843  •  insulin detemir (LEVEMIR) injection 20 Units, 20 Units, Subcutaneous, Daily, Alysa Trivedi MD  •  ipratropium (ATROVENT) nebulizer solution 0.5 mg, 0.5 mg, Nebulization, Q6H PRN, Elmer Cervantes MD, 0.5 mg at 04/18/18 0643  •  isosorbide mononitrate (IMDUR) 24 hr tablet 30 mg, 30 mg, Oral, Daily, Alysa Trivedi MD, 30 mg at 04/18/18 0841  •  lisinopril (PRINIVIL,ZESTRIL) tablet 5 mg, 5 mg, Oral, Daily, Alysa Trivedi MD, 5 mg at 04/17/18 1420  •  metoprolol tartrate (LOPRESSOR) tablet 25 mg, 25 mg, Oral, Q12H, Alysa Trivedi MD, 25 mg at 04/18/18 0841  •  morphine injection 1 mg, 1 mg, Intravenous, Q4H PRN, Alysa Trivedi MD, 1 mg at 04/18/18 0635  •  nitroglycerin (NITROSTAT) SL tablet 0.4 mg, 0.4 mg, Sublingual, Q5 Min PRN,  Alysa Trivedi MD  •  predniSONE (DELTASONE) tablet 20 mg, 20 mg, Oral, Daily, Alysa Trivedi MD, 20 mg at 04/18/18 0841  •  rosuvastatin (CRESTOR) tablet 20 mg, 20 mg, Oral, Nightly, Alysa Trivedi MD, 20 mg at 04/17/18 2006  •  sodium chloride 0.9 % flush 1-10 mL, 1-10 mL, Intravenous, PRN, Alysa Trivedi MD  •  sodium chloride 0.9 % flush 10 mL, 10 mL, Intravenous, PRN, COREEN Urena, 10 mL at 04/16/18 1648  •  sodium chloride 0.9 % flush 10 mL, 10 mL, Intracatheter, Q12H, Hubert Geller MD, 10 mL at 04/18/18 0843  •  sodium chloride 0.9 % flush 10 mL, 10 mL, Intracatheter, PRN, Hubert Geller MD, 10 mL at 04/16/18 1625  •  sucralfate (CARAFATE) tablet 1 g, 1 g, Oral, 4x Daily PRN, Alysa Trivedi MD, 1 g at 04/17/18 1420  •  tiZANidine (ZANAFLEX) tablet 4 mg, 4 mg, Oral, Q8H PRN, Alysa Trivedi MD, 4 mg at 04/18/18 0002    Review of Systems   Constitutional: Negative for appetite change, chills and fever.   HENT: Negative for congestion, ear discharge, ear pain and sore throat.    Eyes: Negative for pain and redness.   Respiratory: Positive for shortness of breath. Negative for cough and wheezing.    Cardiovascular: Positive for chest pain. Negative for palpitations and leg swelling.   Gastrointestinal: Negative for abdominal pain, diarrhea, nausea and vomiting.   Endocrine: Negative for cold intolerance, heat intolerance, polydipsia and polyuria.   Genitourinary: Negative for dysuria and hematuria.   Skin: Negative for rash.   Neurological: Negative for seizures, syncope and headaches.   Psychiatric/Behavioral: Negative for confusion. The patient is not nervous/anxious.          Objective      Vital Signs  Temp:  [97.4 °F (36.3 °C)-97.7 °F (36.5 °C)] 97.4 °F (36.3 °C)  Heart Rate:  [] 121  Resp:  [18-20] 18  BP: (108-160)/(62-86) 110/62  Body mass index is 24.18 kg/m².    Intake/Output Summary (Last 24 hours) at 04/18/18 0905  Last data filed at 04/18/18 2228   Gross  per 24 hour   Intake              720 ml   Output              800 ml   Net              -80 ml     Physical Exam   Constitutional: She appears well-developed and well-nourished.   HENT:   Head: Normocephalic and atraumatic.   Eyes: EOM are normal. No scleral icterus.   Neck: No JVD present. No tracheal deviation present. No thyromegaly present.   Cardiovascular: Normal rate, regular rhythm and normal heart sounds.  Exam reveals no gallop and no friction rub.    No murmur heard.  Pulses:       Radial pulses are 2+ on the right side, and 2+ on the left side.        Dorsalis pedis pulses are 1+ on the right side, and 1+ on the left side.        Posterior tibial pulses are 2+ on the right side, and 2+ on the left side.   Pulmonary/Chest: No respiratory distress. She has wheezes (mild expiratory wheezing bilaterally.). She has no rales.   Abdominal: She exhibits no distension and no mass. There is no tenderness. There is no guarding.   Musculoskeletal: She exhibits no edema.   Neurological: She is alert. No cranial nerve deficit.   Skin: Skin is warm and dry.         Results Review:   I reviewed the patient's new clinical results.    Results from last 7 days  Lab Units 04/17/18  1017 04/17/18  0529 04/16/18  2248 04/16/18  1920 04/16/18  1314   CK TOTAL U/L  --   --  22* 41  --    TROPONIN I ng/mL 0.025 0.031 0.024 0.021 0.018   CKMB ng/mL  --   --  1.76 1.37  --        Results from last 7 days  Lab Units 04/16/18  1314   WBC 10*3/mm3 11.20   HEMOGLOBIN g/dL 11.5*   PLATELETS 10*3/mm3 352       Results from last 7 days  Lab Units 04/16/18  1314   SODIUM mmol/L 136   POTASSIUM mmol/L 4.6   CHLORIDE mmol/L 106   CO2 mmol/L 20.5*   BUN mg/dL 14   CREATININE mg/dL 0.86   CALCIUM mg/dL 9.3   GLUCOSE mg/dL 447*   ALT (SGPT) U/L 71*   AST (SGOT) U/L 56*     Lab Results   Component Value Date    INR 0.96 04/16/2018    INR 1.14 (H) 02/04/2018    INR 0.92 01/09/2018    INR 1.05 07/06/2017    INR 0.92 06/26/2017    INR 1.04  03/20/2017    INR 1.01 12/17/2016     Lab Results   Component Value Date    MG 1.9 01/09/2018    MG 1.8 10/17/2017    MG 2.4 07/24/2017     Lab Results   Component Value Date    TSH 1.727 01/19/2018    TRIG 271 (H) 03/21/2017    HDL 18 (L) 03/21/2017     (H) 03/21/2017      EKG:     Imaging Results (last 72 hours)     Procedure Component Value Units Date/Time    CT Chest Pulmonary Embolism With Contrast [[id number]] Collected:  04/16/18 1533     Updated:  04/16/18 1537    Narrative:       CT CHEST PULMONARY EMBOLISM W CONTRAST-     CLINICAL INDICATION: sob, chest pain          COMPARISON: 02/04/2018      PROCEDURE: Thin cut axial images were acquired through the pulmonary  vessels during the rapid infusion of IV contrast.     Additional 3-D reformatted images obtained via post-processing for  improved diagnostic accuracy and procedural planning.     Radiation dose reduction techniques were utilized per ALARA protocol.  Automated exposure control was initiated through either or [employer] or  [employer] software packages by  protocol.           FINDINGS: Today's study demonstrates opacification of the central  pulmonary vessels.   There are no filling defects.   There is no truncation.     No evidence of a pulmonary embolus.     Otherwise there are no parenchymal soft tissue nodules or masses.     Single enlarged 1.4 cm aorticopulmonary window lymph node  There is a 9 mm lymph node in the right axilla     Small right effusion. Previously identified right basilar consolidation  however has cleared       Impression:       1. No evidence of a pulmonary embolus  2. Single enlarged aorticopulmonary window lymph node  3. Small right pleural effusion.     This report was finalized on 4/16/2018 3:35 PM by Dr. Daniel Yates MD.       XR Chest 1 View [[id number]] Collected:  04/16/18 1353       Updated:  04/16/18 1420    Narrative:       XR CHEST 1 VW-     CLINICAL INDICATION: Chest pain protocol           COMPARISON: 04/09/2018      TECHNIQUE: Single frontal view of the chest.     FINDINGS:     There is no focal alveolar infiltrate or effusion.  The cardiac silhouette is normal. The pulmonary vasculature is  unremarkable.  There is no evidence of an acute osseous abnormality.   There are no suspicious-appearing parenchymal soft tissue nodules.            Impression:       No evidence of active or acute cardiopulmonary disease on today's chest  radiograph.         This report was finalized on 4/16/2018 1:53 PM by Dr. Daniel Yates MD.           Thank you very much for asking us to be involved in this patient's care.  We will follow along with you.    COREEN Ryder acting as scribe for Dr. Jared Tatum MD, [facility]  04/18/18  9:05 AM    Attestation: I'm seeing, evaluated and examined Ms. Yan and discussed with Mandie Cervantes who has scribed this note for me.    Jared Tatum MD, [facility]   04/18/18  9:05 AM

## 2018-04-18 NOTE — PLAN OF CARE
Problem: Patient Care Overview  Goal: Plan of Care Review  Outcome: Ongoing (interventions implemented as appropriate)    Goal: Individualization and Mutuality  Outcome: Ongoing (interventions implemented as appropriate)    Goal: Discharge Needs Assessment  Outcome: Ongoing (interventions implemented as appropriate)    Goal: Interprofessional Rounds/Family Conf  Outcome: Ongoing (interventions implemented as appropriate)      Problem: Pain, Acute (Adult)  Goal: Identify Related Risk Factors and Signs and Symptoms  Outcome: Ongoing (interventions implemented as appropriate)    Goal: Acceptable Pain Control/Comfort Level  Outcome: Ongoing (interventions implemented as appropriate)      Problem: Cardiac: ACS (Acute Coronary Syndrome) (Adult)  Goal: Signs and Symptoms of Listed Potential Problems Will be Absent, Minimized or Managed (Cardiac: ACS)  Outcome: Ongoing (interventions implemented as appropriate)      Problem: Skin Injury Risk (Adult)  Goal: Identify Related Risk Factors and Signs and Symptoms  Outcome: Ongoing (interventions implemented as appropriate)    Goal: Skin Health and Integrity  Outcome: Ongoing (interventions implemented as appropriate)      Problem: Wound (Includes Pressure Injury) (Adult)  Goal: Signs and Symptoms of Listed Potential Problems Will be Absent, Minimized or Managed (Wound)  Outcome: Ongoing (interventions implemented as appropriate)      Problem: Diabetes, Type 2 (Adult)  Goal: Signs and Symptoms of Listed Potential Problems Will be Absent, Minimized or Managed (Diabetes, Type 2)  Outcome: Ongoing (interventions implemented as appropriate)

## 2018-04-18 NOTE — PAYOR COMM NOTE
"CONTACT:  EMMANUELLE BABB RN, BSN  UTILIZATION MANAGEMENT DEPT.  [facility]  [address] [facility], [facility], [address]  PHONE:  [phone]  FAX: [phone]    REQUEST FOR INPATIENT AUTHORIZATION. MEDICARE AB PRIMARY, WELLCARE SECONDARY.     STATUS CHANGED FROM OBSERVATION (4/16/18) TO INPATIENT TODAY, 4/18/18    Yessica Yan (52 y.o. Female)     Date of Birth Social Security Number Address Home Phone MRN    1965  [id number]  [facility] [address] [phone] [id number]    Mandaeism Marital Status          Hinduism        Admission Date Admission Type Admitting Provider Attending Provider Department, Room/Bed    4/18/18 Emergency Alysa Trivedi MD Ash-Bernal, Rachel, MD [facility], 3308/2S    Discharge Date Discharge Disposition Discharge Destination                       Attending Provider:  Alysa Trivedi MD    Allergies:  Penicillins, Toradol [Ketorolac Tromethamine], Tramadol    Isolation:  Contact   Infection:  ESBL E coli (02/07/18)   Code Status:  FULL    Ht:  160 cm (63\")   Wt:  61.9 kg (136 lb 8 oz)    Admission Cmt:  None   Principal Problem:  None                Active Insurance as of 4/16/2018     Primary Coverage     Payor Plan Insurance Group Employer/Plan Group    MEDICARE MEDICARE A & B      Payor Plan Address Payor Plan Phone Number Effective From Effective To    [address] [phone] 2/1/2005     [place] [address]       Subscriber Name Subscriber Birth Date Member ID       YESSICA YAN T 1965 [id number]           Secondary Coverage     Payor Plan Insurance Group Employer/Plan Group    [facility]      Payor Plan Address Payor Plan Phone Number Effective From Effective To    [address] [address] [phone] 1/1/2016     [place] [address]       Subscriber Name Subscriber Birth Date Member ID       YESSICA YAN T 1965 [id number]                 Emergency Contacts      (Rel.) Home " Phone Work Phone Mobile Phone    Scot Vaughn (Son) [phone] -- --    Jacy Cody (Other) [phone] -- --               History & Physical      Alysa Trivedi MD at 2018  9:09 PM              [facility]    Patient Identification:  Name:  Pepito Yan  Age:  52 y.o.  Sex:  female  :  1965  MRN:  [id number]   Visit Number:  [id number]  Primary Care Physician:  Archana Hare, APRN    Date of admission: 2018    Length of stay:  0     Chief complaint: Chest pain    History of presenting illness:  52 y.o. female 52-year-old female with an episode of substernal chest pain that radiated to her left shoulder and between her shoulder blades.  She had associated shortness of breath and nausea and vomiting.  She describes the pain as a aching like sensation 8 out of 10 on the pain scale in severity chest pain was relieved with nitroglycerin and aspirin given to her in the emergency department.    The patient has a history of an abnormal stress test in 2017 as described below.  She has known history of coronary artery disease, non-STEMI in 2016, and Buerger's disease.    The patient is admitted to the medicine floor for further evaluation and treatment of chest pain.          ---------------------------------------------------------------------------------------------------------------------   Review of Systems   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   • Chronic respiratory failure with hypoxia     at nighttime   • Compression fracture of lumbar vertebra    • Compression fracture of thoracic vertebra    • COPD (chronic obstructive pulmonary disease)    • Coronary artery disease    • Diabetes mellitus type 2 in nonobese    • Erosive esophagitis 2016   •  Essential hypertension    • Gastroparesis    • GERD (gastroesophageal reflux disease)    • Hepatitis-C    • History of transfusion    • Medically noncompliant    • MRSA infection     back of neck around ; 2017 septic left elbow joint with concerns for osteomyelitis and left anteromedial left arm   • NSTEMI (non-ST elevated myocardial infarction) 2016   • PE (pulmonary embolism)    • Protein calorie malnutrition    • RA (rheumatoid arthritis)     with chronic steroid use   • Rheumatoid arthritis      Past Surgical History:   Procedure Laterality Date   • ABDOMINAL SURGERY     • AMPUTATION DIGIT Right 2016    Third digit to the PIP. Surgeon: Dr. Bains   • AMPUTATION DIGIT Left 2016    Procedure: PARTIAL AMPUTATION LEFT THIRD FINGER;  Surgeon: Shane Fuller MD;  Location: [facility] OR;  Service:    • AMPUTATION REVISION Left 2016    Procedure: AMPUTATION REVISION DIGIT STUMP;  Surgeon: Shane Fuller MD;  Location: [facility] OR;  Service:    • ARM DEBRIDEMENT Left 2016    Procedure: HAND IRRIGATION DEBRIDEMENT AND REPAIR ;  Surgeon: Shane Fuller MD;  Location: [facility] OR;  Service:    •  SECTION      x 2   • CHOLECYSTECTOMY     • ENDOSCOPY N/A 2016    Procedure: ESOPHAGOGASTRODUODENOSCOPY;  Surgeon: Jeremy Clement III, MD;  Location: [facility] OR;  Service:    • ENDOSCOPY W/ PEG TUBE PLACEMENT N/A 3/30/2017    Procedure: ESOPHAGOGASTRODUODENOSCOPY WITH PERCUTANEOUS ENDOSCOPIC GASTROSTOMY TUBE INSERTION;  Surgeon: Shade Campos MD;  Location: [facility] OR;  Service:    • FOREIGN BODY REMOVAL      insulin needle removal x 3 from the abdomen   • HYSTERECTOMY     • INCISION AND DRAINAGE ARM Left 2017    Procedure: INCISION AND DRAINAGE UPPER EXTREMITY;  Surgeon: Shane Fuller MD;  Location: [facility] OR;  Service:      Family History   Problem Relation Age of Onset   • Colon cancer Mother    • Cancer Father    • Diabetes Maternal  Grandmother    • Heart disease Neg Hx    • Stroke Neg Hx      Social History     Social History   • Marital status:      Social History Main Topics   • Smoking status: Current Every Day Smoker     Packs/day: 0.50     Years: 35.00     Types: Cigarettes   • Smokeless tobacco: Never Used      Comment: 4-5 cigarettes a day    • Alcohol use No   • Drug use: No   • Sexual activity: Defer     Other Topics Concern   • Not on file     ---------------------------------------------------------------------------------------------------------------------   Allergies:  Penicillins; Toradol [ketorolac tromethamine]; and Tramadol  ---------------------------------------------------------------------------------------------------------------------   Prior to Admission Medications     Prescriptions Last Dose Informant Patient Reported? Taking?    aspirin 325 MG tablet 4/15/2018 Pharmacy Yes Yes    Take 325 mg by mouth Daily.    budesonide-formoterol (SYMBICORT) 160-4.5 MCG/ACT inhaler 4/15/2018 Pharmacy Yes Yes    Inhale 2 puffs 2 (Two) Times a Day.    gabapentin (NEURONTIN) 600 MG tablet 4/15/2018 Pharmacy Yes Yes    Take 600 mg by mouth 3 (Three) Times a Day.    insulin aspart (novoLOG) 100 UNIT/ML injection 4/15/2018 Pharmacy Yes Yes    Inject 10 Units under the skin 3 (Three) Times a Day With Meals.    insulin glargine (LANTUS) 100 UNIT/ML injection 4/15/2018 Pharmacy Yes Yes    Inject 45 Units under the skin Daily.    isosorbide mononitrate (IMDUR) 30 MG 24 hr tablet 4/15/2018 Pharmacy Yes Yes    Take 30 mg by mouth Daily.    lisinopril (PRINIVIL,ZESTRIL) 5 MG tablet 4/15/2018 Pharmacy Yes Yes    Take 5 mg by mouth Daily.    metFORMIN (GLUCOPHAGE) 500 MG tablet 4/15/2018 Pharmacy Yes Yes    Take 500 mg by mouth 3 (Three) Times a Day With Meals.    predniSONE (DELTASONE) 20 MG tablet 4/15/2018 Pharmacy Yes Yes    Take 20 mg by mouth Daily.    sucralfate (CARAFATE) 1 g tablet 4/15/2018 Pharmacy Yes Yes    Take 1 g by mouth  4 (Four) Times a Day As Needed.    promethazine (PHENERGAN) 25 MG tablet Unknown Pharmacy Yes No    Take 25 mg by mouth Every 6 (Six) Hours As Needed for Nausea or Vomiting.    tiZANidine (ZANAFLEX) 4 MG tablet Unknown Pharmacy Yes No    Take 4 mg by mouth Every 8 (Eight) Hours As Needed for Muscle Spasms.        ---------------------------------------------------------------------------------------------------------------------   Vital Signs:  Temp:  [97.5 °F (36.4 °C)-98.1 °F (36.7 °C)] 97.5 °F (36.4 °C)  Heart Rate:  [] 108  Resp:  [20-22] 22  BP: (120-188)/() 150/90  Body mass index is 24.18 kg/m².  I/O last 3 completed shifts:  In: 500 [IV Piggyback:500]  Out: -   1    04/16/18 1202 04/16/18 1907   Weight: 49.4 kg (109 lb) 61.9 kg (136 lb 8 oz)     ---------------------------------------------------------------------------------------------------------------------     Physical exam:  Constitutional:  Well-developed and well-nourished.  No respiratory distress.    Patient appears older than stated age.  HENT:  Head:  Normocephalic and atraumatic.  Mouth:  Moist mucous membranes.    Eyes:  Conjunctivae normal with no scleral icterus.   Pupils are equal, round, and reactive to light and accomodation.   Neck:  Neck supple.  No JVD present.  No hepatojugular reflux.  Cardiovascular:  Normal rate, regular rhythm and normal heart sounds with no murmurs, gallops or rubs.  No reproducible chest pain on palpation of the chest wall.  Pulmonary/Chest:  No respiratory distress, wheezes, crackles.  Normal breath sounds all lung fields. No use of accessory muscles, normal diaphragmatic movement.  Abdominal:  Soft, no distension or tenderness. No hepato- or spleno- megaly. Normal bowel sounds in all 4 quadrants.    Musculoskeletal:  No edema, tenderness or deformity with no erythema.  Neurological:  Alert and oriented to person, place, and time.  No focal neurologic deficits.  CN II-XII grossly intact.   No  tongue deviation, facial droop, slurred speech.   Psychiatric: Normal mood.    Skin:  Skin is warm and dry. No rash noted. No pallor.  Negative skin tenting.   Peripheral vascular:  1+ pulses in all 4 extremities with no clubbing, cyanosis, edema.    ---------------------------------------------------------------------------------------------------------------------    EKG:   I have personally reviewd the EKG and my impression is: Sinus tachycardia, RSR prime in V1, left atrial enlargement, left ventricular axis deviation.    Telemetry:  I have personally reviewd the telemetry data for the last 24 hours and my impression is: Normal sinus rhythm    ---------------------------------------------------------------------------------------------------------------------     Results from last 7 days  Lab Units 04/16/18  1314   WBC 10*3/mm3 11.20   HEMOGLOBIN g/dL 11.5*   HEMATOCRIT % 39.2   MCV fL 78.9*   MCHC g/dL 29.3*   PLATELETS 10*3/mm3 352   INR  0.96           Results from last 7 days  Lab Units 04/16/18  1314   SODIUM mmol/L 136   POTASSIUM mmol/L 4.6   CHLORIDE mmol/L 106   CO2 mmol/L 20.5*   BUN mg/dL 14   CREATININE mg/dL 0.86   EGFR IF NONAFRICN AM mL/min/1.73 69   CALCIUM mg/dL 9.3   GLUCOSE mg/dL 447*   ALBUMIN g/dL 3.50   BILIRUBIN mg/dL 0.2   ALK PHOS U/L 62   AST (SGOT) U/L 56*   ALT (SGPT) U/L 71*   Estimated Creatinine Clearance: 74.8 mL/min (by C-G formula based on SCr of 0.86 mg/dL).  No results found for: AMMONIA    Results from last 7 days  Lab Units 04/16/18  1920 04/16/18  1314   CK TOTAL U/L 41  --    TROPONIN I ng/mL 0.021 0.018   CK MB INDEX % 3.3*  --          Lab Results   Component Value Date    HGBA1C 10.60 (H) 01/19/2018     Lab Results   Component Value Date    TSH 1.727 01/19/2018    FREET4 0.62 (L) 03/06/2017     No results found for: PREGTESTUR, PREGSERUM, HCG, HCGQUANT  Pain Management Panel     Pain Management Panel Latest Ref Rng & Units 4/8/2018 7/22/2017    AMPHETAMINES SCREEN,  URINE Negative Negative Negative    BARBITURATES SCREEN Negative Negative Negative    BENZODIAZEPINE SCREEN, URINE Negative Negative Negative    BUPRENORPHINE Negative Negative Negative    COCAINE SCREEN, URINE Negative Negative Negative    METHADONE SCREEN, URINE Negative Negative Negative                          I have personally reviewed the labs described above.  ---------------------------------------------------------------------------------------------------------------------  Imaging Results (last 7 days)     Procedure Component Value Units Date/Time    CT Chest Pulmonary Embolism With Contrast [[id number]] Collected:  04/16/18 1533     Updated:  04/16/18 1537    Narrative:       CT CHEST PULMONARY EMBOLISM W CONTRAST-     CLINICAL INDICATION: sob, chest pain          COMPARISON: 02/04/2018      PROCEDURE: Thin cut axial images were acquired through the pulmonary  vessels during the rapid infusion of IV contrast.     Additional 3-D reformatted images obtained via post-processing for  improved diagnostic accuracy and procedural planning.     Radiation dose reduction techniques were utilized per ALARA protocol.  Automated exposure control was initiated through either or Cognotion or  DoseRight software packages by  protocol.           FINDINGS: Today's study demonstrates opacification of the central  pulmonary vessels.   There are no filling defects.   There is no truncation.     No evidence of a pulmonary embolus.     Otherwise there are no parenchymal soft tissue nodules or masses.     Single enlarged 1.4 cm aorticopulmonary window lymph node  There is a 9 mm lymph node in the right axilla     Small right effusion. Previously identified right basilar consolidation  however has cleared       Impression:       1. No evidence of a pulmonary embolus  2. Single enlarged aorticopulmonary window lymph node  3. Small right pleural effusion.     This report was finalized on 4/16/2018 3:35 PM by Dr. Sanchez  MD Trudy.       XR Chest 1 View [[id number]] Collected:  04/16/18 1353     Updated:  04/16/18 1420    Narrative:       XR CHEST 1 VW-     CLINICAL INDICATION: Chest pain protocol          COMPARISON: 04/09/2018      TECHNIQUE: Single frontal view of the chest.     FINDINGS:     There is no focal alveolar infiltrate or effusion.  The cardiac silhouette is normal. The pulmonary vasculature is  unremarkable.  There is no evidence of an acute osseous abnormality.   There are no suspicious-appearing parenchymal soft tissue nodules.            Impression:       No evidence of active or acute cardiopulmonary disease on today's chest  radiograph.         This report was finalized on 4/16/2018 1:53 PM by Dr. Daniel Yates MD.             I have personally reviewed the radiology images and read the final radiology report.    ---------------------------------------------------------------------------------------------------------------------      Results for orders placed during the hospital encounter of 07/22/17   Adult Transthoracic Echo Complete    Narrative · The study is technically good for diagnosis.  · Left ventricular systolic function is low normal. Estimated EF = 45%.  · Left ventricular diastolic dysfunction (grade II) consistent with   pseudonormalization.  · There are no significant valvular abnormalities noted.  · There is a trivial pericardial effusion.     Compared to the study of 3/3/2017, there are no significant changes.           Assessment:    1.  Chest pain    2.  Prior abnormal nuclear stress test on 7/25/2017 as described above    3.  Systolic heart failure LVEF 42% on nuclear stress test and 45% on echocardiogram    4.  History of non-STEMI and coronary artery disease    5.  History of Buerger's disease      Plan:     1.  Admit to the medicine floor, maintain on telemetry to monitor for arrhythmias.  Start aspirin, statin, beta blocker, Nitropaste, supplemental oxygen.  Check serial cardiac enzymes to  evaluate for myocardial injury.  EKG does not show ischemia or injury pattern.  If serial cardiac enzymes are negative plan on Lexiscan nuclear stress test in the morning.    2.  History of systolic heart failure.  Re-evaluate LVEF with nuclear stress testing.    3.  DVT prophylaxis with subcutaneous heparin.      Total face to face time with this patient, time spent reviewing electronic medial records, laboratory & imaging studies and time spent in coordination of care with the Emergency Physician and nursing staff was 65 minutes.    Alysa Trivedi MD  18  9:09 PM    Electronically signed by Alysa Trivedi MD at 2018  9:18 PM          Physician Progress Notes (all)      Mandie Almeida PA-C at 2018  8:35 AM        Mrs. Yan continues to refuse stress test this AM.     Electronically signed by Mandie Almeida PA-C at 2018  8:35 AM     Alysa Trivedi MD at 2018 10:04 PM              [facility]     Patient Identification:  Name:  Pepito Yan  Age:  52 y.o.  Sex:  female  :  1965  MRN:  [id number]  Visit Number:  [id number]  Primary Care Provider:  ROSA Love    Date of admission: 2018    Length of stay:  0    Chief complaint:  Continued shortness of breath    HPI:  52-year-old woman status post episode of substernal chest pain between her shoulder blades.  Chest pain is resolved with Imdur.  Serial cardiac enzymes negative.  She has known history of systolic heart failure left ventricular ejection fraction around 40%.  He is complaining of shortness of breath.    She has a known history of an abnormal stress test in 2017, coronary artery disease diagnosed in 2016, Rodas's disease.      Chronic back pain that prevented her from laying flat for a nuclear stress test today.  ----------------------------------------------------------------------------------------------------------------------  Current  [facility] Meds:    aspirin 324 mg Oral Once   And      aspirin 81 mg Oral Daily   budesonide-formoterol 2 puff Inhalation BID - RT   clopidogrel 75 mg Oral Daily   furosemide 40 mg Intravenous Q12H   gabapentin 600 mg Oral Q8H   heparin (porcine) 5,000 Units Subcutaneous Q12H   insulin detemir 20 Units Subcutaneous Daily   isosorbide mononitrate 30 mg Oral Daily   lisinopril 5 mg Oral Daily   metoprolol tartrate 25 mg Oral Q12H   predniSONE 20 mg Oral Daily   rosuvastatin 20 mg Oral Nightly   sodium chloride 10 mL Intracatheter Q12H        ----------------------------------------------------------------------------------------------------------------------  Vital Signs:  Temp:  [97.6 °F (36.4 °C)-97.8 °F (36.6 °C)] 97.7 °F (36.5 °C)  Heart Rate:  [] 122  Resp:  [18-20] 18  BP: (132-162)/(70-92) 160/86  1    04/16/18  1202 04/16/18  1907 04/17/18  0317   Weight: 49.4 kg (109 lb) 61.9 kg (136 lb 8 oz) 61.9 kg (136 lb 8 oz)     Body mass index is 24.18 kg/m².    Intake/Output Summary (Last 24 hours) at 04/17/18 2204  Last data filed at 04/17/18 1912   Gross per 24 hour   Intake              600 ml   Output             1600 ml   Net            -1000 ml     Diet Regular; Consistent Carbohydrate  ----------------------------------------------------------------------------------------------------------------------  Physical exam:  Constitutional:  Appears older than stated age No respiratory distress.      HENT:  Head:  Normocephalic and atraumatic.  Mouth:  Moist mucous membranes.    Eyes:  Conjunctivae within normal limits.  Pupils are equal, round, and reactive to light and accomodation.  No scleral icterus.    Neck:  Neck supple.     Cardiovascular:  Normal rate, regular rhythm and normal heart sounds with no murmurs, gallops or rubs.  Pulmonary/Chest:  No respiratory distress, no wheezes, soft crackles bilateral bases, with normal breath sounds and good air movement.  Abdominal:  Soft.  Bowel sounds are normal.   No distension and no tenderness. No hepato- or spleno- megaly.  No hepatojugular reflux.  Musculoskeletal:  No edema, no tenderness, and no deformity.  No red or swollen joints anywhere.    Neurological:  Alert and oriented to person, place, and time.  No focal neurologic deficits.  CN II-XII grossly intact.   No tongue deviation.  No facial droop.  No slurred speech.   Psychiatric: Normal mood, patient denies suicidal or homicidal ideation.  Patient denies auditory, tactile or visual hallucinations.    Skin:  Skin is warm and dry. No rash noted. No pallor.   Peripheral vascular:  1+ pulses in all 4 extremities with no clubbing, no cyanosis, no edema.        Results from last 7 days  Lab Units 04/17/18  1017 04/17/18  0529 04/16/18  2248 04/16/18  1920   CK TOTAL U/L  --   --  22* 41   CKMB ng/mL  --   --  1.76 1.37   CK MB INDEX %  --   --  8.0* 3.3*   TROPONIN I ng/mL 0.025 0.031 0.024 0.021       Results from last 7 days  Lab Units 04/16/18  1314   WBC 10*3/mm3 11.20   HEMOGLOBIN g/dL 11.5*   HEMATOCRIT % 39.2   MCV fL 78.9*   MCHC g/dL 29.3*   PLATELETS 10*3/mm3 352   INR  0.96           Results from last 7 days  Lab Units 04/16/18  1314   SODIUM mmol/L 136   POTASSIUM mmol/L 4.6   CHLORIDE mmol/L 106   CO2 mmol/L 20.5*   BUN mg/dL 14   CREATININE mg/dL 0.86   EGFR IF NONAFRICN AM mL/min/1.73 69   CALCIUM mg/dL 9.3   GLUCOSE mg/dL 447*   ALBUMIN g/dL 3.50   BILIRUBIN mg/dL 0.2   ALK PHOS U/L 62   AST (SGOT) U/L 56*   ALT (SGPT) U/L 71*   Estimated Creatinine Clearance: 74.8 mL/min (by C-G formula based on SCr of 0.86 mg/dL).    No results found for: AMMONIA                    Microbiology Results (last 10 days)     Procedure Component Value - Date/Time    Blood Culture - Blood, [[id number]]  (Normal) Collected:  04/09/18 1149    Lab Status:  Final result Specimen:  Blood from Arm, Left Updated:  04/14/18 1201     Blood Culture No growth at 5 days    Blood Culture - Blood, [[id number]]  (Normal) Collected:   04/09/18 1142    Lab Status:  Final result Specimen:  Blood from Arm, Left Updated:  04/14/18 1201     Blood Culture No growth at 5 days    Urine Culture - Urine, Urine, Clean Catch [[id number]]  (Abnormal)  (Susceptibility) Collected:  04/09/18 1033    Lab Status:  Final result Specimen:  Urine from Urine, Clean Catch Updated:  04/11/18 1210     Urine Culture --      >100,000 CFU/mL Escherichia coli ESBL (C)     Comment:   Consider infectious disease consult.  Susceptibility results may not correlate to clinical outcomes.       Susceptibility      Escherichia coli ESBL     DUNIA     Amikacin <=16 ug/ml Susceptible     Amoxicillin + Clavulanate <=8/4 ug/ml Susceptible     Ampicillin >16 ug/ml Resistant     Ampicillin + Sulbactam >16/8 ug/ml Resistant     Aztreonam >16 ug/ml      Cefazolin >16 ug/ml Resistant     Cefepime >16 ug/ml Resistant     Cefotaxime >32 ug/ml      Ceftazidime >16 ug/ml      Ceftriaxone >32 ug/ml      Cefuroxime sodium >16 ug/ml Resistant     Ciprofloxacin >2 ug/ml Resistant     Doripenem <=0.5 ug/ml Susceptible     Ertapenem <=1 ug/ml Susceptible     Gentamicin >8 ug/ml Resistant     Imipenem <=1 ug/ml Susceptible     Levofloxacin >4 ug/ml Resistant     Nitrofurantoin <=32 ug/ml Susceptible     Piperacillin + Tazobactam <=16 ug/ml Susceptible     Tetracycline >8 ug/ml Resistant     Tobramycin >8 ug/ml Resistant     Trimethoprim + Sulfamethoxazole >2/38 ug/ml Resistant                    Influenza Antigen, Rapid - Swab, Nasopharynx [[id number]]  (Normal) Collected:  04/08/18 2108    Lab Status:  Final result Specimen:  Swab from Nasopharynx Updated:  04/08/18 2124     Influenza A Ag, EIA Negative     Influenza B Ag, EIA Negative    Blood Culture - Blood, [[id number]]  (Normal) Collected:  04/08/18 2027    Lab Status:  Final result Specimen:  Blood from Arm, Left Updated:  04/13/18 2101     Blood Culture No growth at 5 days    Blood Culture - Blood, [[id number]]  (Normal) Collected:  04/08/18 2010     Lab Status:  Final result Specimen:  Blood from Hand, Right Updated:  04/13/18 2101     Blood Culture No growth at 5 days            I have personally reviewed the labs and they are summarized above.    ----------------------------------------------------------------------------------------------------------------------  Imaging Results (last 24 hours)     ** No results found for the last 24 hours. **          Results for orders placed during the hospital encounter of 07/22/17   Adult Transthoracic Echo Complete    Narrative · The study is technically good for diagnosis.  · Left ventricular systolic function is low normal. Estimated EF = 45%.  · Left ventricular diastolic dysfunction (grade II) consistent with   pseudonormalization.  · There are no significant valvular abnormalities noted.  · There is a trivial pericardial effusion.     Compared to the study of 3/3/2017, there are no significant changes.           I have personally reviewed the following radiology images and my impression is: Mildly decreased left ventricular ejection fraction 45%  ----------------------------------------------------------------------------------------------------------------------  Assessment & Plan:    1.  Shortness of breath.  Recheck BNP which was mildly elevated on admission.  Start trial of diuresis with Lasix 40 mg IV twice a day.  Check daily weights and strict I's and O's.  Unfortunately patient was unable to tolerate laying flat for nuclear stress test today.    2.  Grade 2 diastolic LV dysfunction.  Patient's shortness of breath is likely secondary to systolic and diastolic heart failure.    3.  Chest pain resolved with Imdur 30 mg by mouth daily.    4.  DVT prophylaxis with subcutaneous heparin.        The patient is high risk due to the following diagnoses/reasons:  Known history of cardiac disease      Total face to face time with this patient, time spent reviewing electronic medial records & laboratory & imaging  studies and time spent in coordination of care with the nursing staff was 35 minutes.    Alysa Trivedi MD  04/17/18  10:04 PM    Electronically signed by Alysa Trivedi MD at 4/17/2018 10:09 PM          Consult Notes (all)      Jared Quintero MD at 4/18/2018  9:05 AM      Consult Orders:    1. Inpatient Cardiology Consult [[id number]] ordered by Alysa Trivedi MD at 04/17/18 2202                Inpatient Cardiology Consult  Consult performed by: JARED QUINTERO  Consult ordered by: ALYSA TRIVEDI        Date of Admit: 4/16/2018  Date of Consult: 04/18/18  Hubert Geller MD Starlene T Hansen  1965  Consulting Physician: Dr. Jared Quintero MD, [facility]    Cardiology consultation    Reason for consultation: Chest pain, shortness of breath, and systolic congestive heart failure.     Assessment:  1. Chest pain, suspicious for class IV angina.  2. Atherosclerotic cardiovascular disease status post non-ST elevation myocardial infarction in July 2016 with cardiac catheterization at [facility] in [facility] revealing apparently some obstructive lesions but patient left [place] before she could have intervention.  3. Peripheral arterial disease with history of Buerger's disease.   4. Type II Insulin-dependent diabetes mellitus.   5. Essential hypertension, controlled.   6. Long history of smoking and continues smoke 4-5 cigarettes a day.    Recommendations:  1. Continue with aspirin, Plavix, rosuvastatin and metoprolol.  2. Add Nitropaste and discontinue the isosorbide mononitrate.  3. Given her known coronary artery disease that apparently was not intervened due to patient leaving AMA from the [facility] in July 2016, I have told her that with her recurrent class IV anginal symptoms, she would need reevaluation with cardiac catheterization and further coronary intervention as needed.  Patient is agreeable to this at this time.  We'll schedule this for a.m.  4. I have strongly emphasized for  her to quit smoking.    History of Present Illness: Ms. Jane is a 50-year-old  female with history of long-standing hypertension, type 2 diabetes mellitus, long history of smoking for 40 years and smoked up to 2-3 packs a day although she says she has cut down to 4-5 cigarettes a day recently, has history of known coronary artery disease with a non-ST elevation myocardial infarctions July 2016 with cardiac catheterization at [facility] in [place], [place] apparently revealing some obstructive lesions.  Patient was recommended to have coronary intervention but she reportedly left the hospital AGAINST MEDICAL ADVICE.  She now presents with complaints of having recurrent chest pains over the last week to 10 days.  She describes episodes as being felt a substernal pressure and tightness rated as 10 out of 10 associated with shortness of breath.  She does get some relief with sublingual nitroglycerin.  She currently denies any significant chest pain or shortness of breath.  Her EKG so far has revealed normal sinus rhythm with an incomplete right bundle branch block but otherwise no significant ST-T wave changes of myocardial ischemia or infarction.  Her cardiac markers have been negative ×5.  She has dyspnea with mild exertion related mostly to COPD.  Denies any PND, orthopnea or pedal edema.    Cardiac risk factors:arteriosclerotic heart disease, diabetes mellitus, hypercholesterolemia, hypertension and peripheral vascular disease    Last Echo: 07/24/17  · The study is technically good for diagnosis.  · Left ventricular systolic function is low normal. Estimated EF = 45%.  · Left ventricular diastolic dysfunction (grade II) consistent with pseudonormalization.  · There are no significant valvular abnormalities noted.  · There is a trivial pericardial effusion.     Compared to the study of 3/3/2017, there are no significant changes.    Last Stress: 07/25/17  · Findings consistent with a normal ECG  stress test.  · Myocardial perfusion imaging indicates a small-sized, mildly severe area of ischemia located in the inferolateral wall.  · Myocardial perfusion imaging indicates a located in the lateral wall noted.  · Left ventricular ejection fraction is mildly reduced (Calculated EF = 42%).  · Impressions are consistent with an intermediate risk study.    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   • Chronic respiratory failure with hypoxia     at nighttime   • Compression fracture of lumbar vertebra    • Compression fracture of thoracic vertebra    • COPD (chronic obstructive pulmonary disease)    • Coronary artery disease    • Diabetes mellitus type 2 in nonobese    • Erosive esophagitis 2016   • Essential hypertension    • Gastroparesis    • GERD (gastroesophageal reflux disease)    • Hepatitis-C    • History of transfusion    • Medically noncompliant    • MRSA infection     back of neck around ; 2017 septic left elbow joint with concerns for osteomyelitis and left anteromedial left arm   • NSTEMI (non-ST elevated myocardial infarction) 2016   • PE (pulmonary embolism)    • Protein calorie malnutrition    • RA (rheumatoid arthritis)     with chronic steroid use   • Rheumatoid arthritis      Past Surgical History:   Procedure Laterality Date   • ABDOMINAL SURGERY     • AMPUTATION DIGIT Right 2016    Third digit to the PIP. Surgeon: Dr. Bains   • AMPUTATION DIGIT Left 2016    Procedure: PARTIAL AMPUTATION LEFT THIRD FINGER;  Surgeon: Shane Fuller MD;  Location: [facility] OR;  Service:    • AMPUTATION REVISION Left 2016    Procedure: AMPUTATION REVISION DIGIT STUMP;  Surgeon: Shane Fuller MD;  Location: [facility] OR;  Service:    • ARM DEBRIDEMENT Left 2016    Procedure: HAND IRRIGATION DEBRIDEMENT AND REPAIR ;  Surgeon: Shane Fuller MD;  Location: [facility] OR;  Service:    •   SECTION      x 2   • CHOLECYSTECTOMY     • ENDOSCOPY N/A 12/20/2016    Procedure: ESOPHAGOGASTRODUODENOSCOPY;  Surgeon: Jeremy Clement III, MD;  Location: [facility] OR;  Service:    • ENDOSCOPY W/ PEG TUBE PLACEMENT N/A 3/30/2017    Procedure: ESOPHAGOGASTRODUODENOSCOPY WITH PERCUTANEOUS ENDOSCOPIC GASTROSTOMY TUBE INSERTION;  Surgeon: Shade Campos MD;  Location: [facility] OR;  Service:    • FOREIGN BODY REMOVAL      insulin needle removal x 3 from the abdomen   • HYSTERECTOMY     • INCISION AND DRAINAGE ARM Left 6/27/2017    Procedure: INCISION AND DRAINAGE UPPER EXTREMITY;  Surgeon: Shane Fuller MD;  Location: [facility] OR;  Service:      Family History   Problem Relation Age of Onset   • Colon cancer Mother    • Cancer Father    • Diabetes Maternal Grandmother    • Heart disease Neg Hx    • Stroke Neg Hx      Social History   Substance Use Topics   • Smoking status: Current Every Day Smoker     Packs/day: 0.50     Years: 35.00     Types: Cigarettes   • Smokeless tobacco: Never Used      Comment: 4-5 cigarettes a day    • Alcohol use No     Prescriptions Prior to Admission   Medication Sig Dispense Refill Last Dose   • aspirin 325 MG tablet Take 325 mg by mouth Daily.   4/15/2018 at Unknown time   • budesonide-formoterol (SYMBICORT) 160-4.5 MCG/ACT inhaler Inhale 2 puffs 2 (Two) Times a Day.   4/15/2018 at Unknown time   • gabapentin (NEURONTIN) 600 MG tablet Take 600 mg by mouth 3 (Three) Times a Day.   4/15/2018 at Unknown time   • insulin aspart (novoLOG) 100 UNIT/ML injection Inject 10 Units under the skin 3 (Three) Times a Day With Meals.   4/15/2018 at Unknown time   • insulin glargine (LANTUS) 100 UNIT/ML injection Inject 45 Units under the skin Daily.   4/15/2018 at Unknown time   • isosorbide mononitrate (IMDUR) 30 MG 24 hr tablet Take 30 mg by mouth Daily.   4/15/2018 at Unknown time   • lisinopril (PRINIVIL,ZESTRIL) 5 MG tablet Take 5 mg by mouth Daily.   4/15/2018 at Unknown time   •  metFORMIN (GLUCOPHAGE) 500 MG tablet Take 500 mg by mouth 3 (Three) Times a Day With Meals.   4/15/2018 at Unknown time   • predniSONE (DELTASONE) 20 MG tablet Take 20 mg by mouth Daily.   4/15/2018 at Unknown time   • sucralfate (CARAFATE) 1 g tablet Take 1 g by mouth 4 (Four) Times a Day As Needed.   4/15/2018 at Unknown time   • promethazine (PHENERGAN) 25 MG tablet Take 25 mg by mouth Every 6 (Six) Hours As Needed for Nausea or Vomiting.   Unknown at Unknown time   • tiZANidine (ZANAFLEX) 4 MG tablet Take 4 mg by mouth Every 8 (Eight) Hours As Needed for Muscle Spasms.   Unknown at Unknown time     Allergies:  Penicillins; Toradol [ketorolac tromethamine]; and Tramadol      Current Facility-Administered Medications:   •  acetaminophen (TYLENOL) tablet 650 mg, 650 mg, Oral, Q4H PRN, Alysa Trivedi MD  •  aspirin chewable tablet 324 mg, 324 mg, Oral, Once **AND** aspirin EC tablet 81 mg, 81 mg, Oral, Daily, Alysa Trivedi MD, 81 mg at 04/18/18 0841  •  budesonide-formoterol (SYMBICORT) 160-4.5 MCG/ACT inhaler 2 puff, 2 puff, Inhalation, BID - RT, Alysa Trivedi MD, 2 puff at 04/18/18 0643  •  clopidogrel (PLAVIX) tablet 75 mg, 75 mg, Oral, Daily, Alysa Trivedi MD, 75 mg at 04/18/18 0841  •  furosemide (LASIX) injection 40 mg, 40 mg, Intravenous, Q12H, Alysa Trivedi MD, 40 mg at 04/18/18 0002  •  gabapentin (NEURONTIN) capsule 600 mg, 600 mg, Oral, Q8H, Alysa Trivedi MD, 600 mg at 04/18/18 0857  •  heparin (porcine) 5000 UNIT/ML injection 5,000 Units, 5,000 Units, Subcutaneous, Q12H, Alysa Trivedi MD, 5,000 Units at 04/18/18 0843  •  insulin detemir (LEVEMIR) injection 20 Units, 20 Units, Subcutaneous, Daily, Alysa Trivedi MD  •  ipratropium (ATROVENT) nebulizer solution 0.5 mg, 0.5 mg, Nebulization, Q6H PRN, Elmer Cervantes MD, 0.5 mg at 04/18/18 0643  •  isosorbide mononitrate (IMDUR) 24 hr tablet 30 mg, 30 mg, Oral, Daily, Alysa Trivedi MD, 30 mg at 04/18/18  0841  •  lisinopril (PRINIVIL,ZESTRIL) tablet 5 mg, 5 mg, Oral, Daily, Alysa Trivedi MD, 5 mg at 04/17/18 1420  •  metoprolol tartrate (LOPRESSOR) tablet 25 mg, 25 mg, Oral, Q12H, Alysa Trivedi MD, 25 mg at 04/18/18 0841  •  morphine injection 1 mg, 1 mg, Intravenous, Q4H PRN, Alysa Trivedi MD, 1 mg at 04/18/18 0635  •  nitroglycerin (NITROSTAT) SL tablet 0.4 mg, 0.4 mg, Sublingual, Q5 Min PRN, Alysa Trivedi MD  •  predniSONE (DELTASONE) tablet 20 mg, 20 mg, Oral, Daily, Alysa Trivedi MD, 20 mg at 04/18/18 0841  •  rosuvastatin (CRESTOR) tablet 20 mg, 20 mg, Oral, Nightly, Alysa Trivedi MD, 20 mg at 04/17/18 2006  •  sodium chloride 0.9 % flush 1-10 mL, 1-10 mL, Intravenous, PRN, Alysa Trivedi MD  •  sodium chloride 0.9 % flush 10 mL, 10 mL, Intravenous, PRN, COREEN Urena, 10 mL at 04/16/18 1648  •  sodium chloride 0.9 % flush 10 mL, 10 mL, Intracatheter, Q12H, Hubert Geller MD, 10 mL at 04/18/18 0843  •  sodium chloride 0.9 % flush 10 mL, 10 mL, Intracatheter, PRN, Hubert Geller MD, 10 mL at 04/16/18 1625  •  sucralfate (CARAFATE) tablet 1 g, 1 g, Oral, 4x Daily PRN, Alysa Trivedi MD, 1 g at 04/17/18 1420  •  tiZANidine (ZANAFLEX) tablet 4 mg, 4 mg, Oral, Q8H PRN, Alysa Trivedi MD, 4 mg at 04/18/18 0002    Review of Systems   Constitutional: Negative for appetite change, chills and fever.   HENT: Negative for congestion, ear discharge, ear pain and sore throat.    Eyes: Negative for pain and redness.   Respiratory: Positive for shortness of breath. Negative for cough and wheezing.    Cardiovascular: Positive for chest pain. Negative for palpitations and leg swelling.   Gastrointestinal: Negative for abdominal pain, diarrhea, nausea and vomiting.   Endocrine: Negative for cold intolerance, heat intolerance, polydipsia and polyuria.   Genitourinary: Negative for dysuria and hematuria.   Skin: Negative for rash.   Neurological: Negative for seizures,  syncope and headaches.   Psychiatric/Behavioral: Negative for confusion. The patient is not nervous/anxious.          Objective      Vital Signs  Temp:  [97.4 °F (36.3 °C)-97.7 °F (36.5 °C)] 97.4 °F (36.3 °C)  Heart Rate:  [] 121  Resp:  [18-20] 18  BP: (108-160)/(62-86) 110/62  Body mass index is 24.18 kg/m².    Intake/Output Summary (Last 24 hours) at 04/18/18 0905  Last data filed at 04/18/18 0319   Gross per 24 hour   Intake              720 ml   Output              800 ml   Net              -80 ml     Physical Exam   Constitutional: She appears well-developed and well-nourished.   HENT:   Head: Normocephalic and atraumatic.   Eyes: EOM are normal. No scleral icterus.   Neck: No JVD present. No tracheal deviation present. No thyromegaly present.   Cardiovascular: Normal rate, regular rhythm and normal heart sounds.  Exam reveals no gallop and no friction rub.    No murmur heard.  Pulses:       Radial pulses are 2+ on the right side, and 2+ on the left side.        Dorsalis pedis pulses are 1+ on the right side, and 1+ on the left side.        Posterior tibial pulses are 2+ on the right side, and 2+ on the left side.   Pulmonary/Chest: No respiratory distress. She has wheezes (mild expiratory wheezing bilaterally.). She has no rales.   Abdominal: She exhibits no distension and no mass. There is no tenderness. There is no guarding.   Musculoskeletal: She exhibits no edema.   Neurological: She is alert. No cranial nerve deficit.   Skin: Skin is warm and dry.         Results Review:   I reviewed the patient's new clinical results.    Results from last 7 days  Lab Units 04/17/18  1017 04/17/18  0529 04/16/18  2248 04/16/18  1920 04/16/18  1314   CK TOTAL U/L  --   --  22* 41  --    TROPONIN I ng/mL 0.025 0.031 0.024 0.021 0.018   CKMB ng/mL  --   --  1.76 1.37  --        Results from last 7 days  Lab Units 04/16/18  1314   WBC 10*3/mm3 11.20   HEMOGLOBIN g/dL 11.5*   PLATELETS 10*3/mm3 352       Results from  last 7 days  Lab Units 04/16/18  1314   SODIUM mmol/L 136   POTASSIUM mmol/L 4.6   CHLORIDE mmol/L 106   CO2 mmol/L 20.5*   BUN mg/dL 14   CREATININE mg/dL 0.86   CALCIUM mg/dL 9.3   GLUCOSE mg/dL 447*   ALT (SGPT) U/L 71*   AST (SGOT) U/L 56*     Lab Results   Component Value Date    INR 0.96 04/16/2018    INR 1.14 (H) 02/04/2018    INR 0.92 01/09/2018    INR 1.05 07/06/2017    INR 0.92 06/26/2017    INR 1.04 03/20/2017    INR 1.01 12/17/2016     Lab Results   Component Value Date    MG 1.9 01/09/2018    MG 1.8 10/17/2017    MG 2.4 07/24/2017     Lab Results   Component Value Date    TSH 1.727 01/19/2018    TRIG 271 (H) 03/21/2017    HDL 18 (L) 03/21/2017     (H) 03/21/2017      EKG:     Imaging Results (last 72 hours)     Procedure Component Value Units Date/Time    CT Chest Pulmonary Embolism With Contrast [[id number]] Collected:  04/16/18 1533     Updated:  04/16/18 1537    Narrative:       CT CHEST PULMONARY EMBOLISM W CONTRAST-     CLINICAL INDICATION: sob, chest pain          COMPARISON: 02/04/2018      PROCEDURE: Thin cut axial images were acquired through the pulmonary  vessels during the rapid infusion of IV contrast.     Additional 3-D reformatted images obtained via post-processing for  improved diagnostic accuracy and procedural planning.     Radiation dose reduction techniques were utilized per ALARA protocol.  Automated exposure control was initiated through either or TripIt or  DoseRight software packages by  protocol.           FINDINGS: Today's study demonstrates opacification of the central  pulmonary vessels.   There are no filling defects.   There is no truncation.     No evidence of a pulmonary embolus.     Otherwise there are no parenchymal soft tissue nodules or masses.     Single enlarged 1.4 cm aorticopulmonary window lymph node  There is a 9 mm lymph node in the right axilla     Small right effusion. Previously identified right basilar consolidation  however has  cleared       Impression:       1. No evidence of a pulmonary embolus  2. Single enlarged aorticopulmonary window lymph node  3. Small right pleural effusion.     This report was finalized on 4/16/2018 3:35 PM by Dr. Daniel Yates MD.       XR Chest 1 View [[id number]] Collected:  04/16/18 1353       Updated:  04/16/18 1420    Narrative:       XR CHEST 1 VW-     CLINICAL INDICATION: Chest pain protocol          COMPARISON: 04/09/2018      TECHNIQUE: Single frontal view of the chest.     FINDINGS:     There is no focal alveolar infiltrate or effusion.  The cardiac silhouette is normal. The pulmonary vasculature is  unremarkable.  There is no evidence of an acute osseous abnormality.   There are no suspicious-appearing parenchymal soft tissue nodules.            Impression:       No evidence of active or acute cardiopulmonary disease on today's chest  radiograph.         This report was finalized on 4/16/2018 1:53 PM by Dr. Daniel Yates MD.           Thank you very much for asking us to be involved in this patient's care.  We will follow along with you.    COREEN Ryder acting as scribe for Dr. Jared Tatum MD, [facility]  04/18/18  9:05 AM    Attestation: I'm seeing, evaluated and examined Ms. Yan and discussed with Mandie Cervantes who has scribed this note for me.    Jared Tatum MD, [facility]   04/18/18  9:05 AM          Electronically signed by Jared Tatum MD at 4/18/2018 11:56 AM

## 2018-04-18 NOTE — PROGRESS NOTES
Mrs. Yan continues to refuse stress test this AM. Patient states she cannot lie flat on stress taste table due to prior back injury and inability to lie flat on back.

## 2018-04-19 PROBLEM — I20.9 ANGINA, CLASS IV (HCC): Status: ACTIVE | Noted: 2018-04-16

## 2018-04-19 LAB
GLUCOSE BLDC GLUCOMTR-MCNC: 137 MG/DL (ref 70–130)
GLUCOSE BLDC GLUCOMTR-MCNC: 138 MG/DL (ref 70–130)
GLUCOSE BLDC GLUCOMTR-MCNC: 141 MG/DL (ref 70–130)
GLUCOSE BLDC GLUCOMTR-MCNC: 154 MG/DL (ref 70–130)

## 2018-04-19 PROCEDURE — 94799 UNLISTED PULMONARY SVC/PX: CPT

## 2018-04-19 PROCEDURE — 25010000002 MORPHINE PER 10 MG: Performed by: HOSPITALIST

## 2018-04-19 PROCEDURE — C1894 INTRO/SHEATH, NON-LASER: HCPCS | Performed by: INTERNAL MEDICINE

## 2018-04-19 PROCEDURE — 63710000001 PREDNISONE PER 1 MG: Performed by: HOSPITALIST

## 2018-04-19 PROCEDURE — C1769 GUIDE WIRE: HCPCS | Performed by: INTERNAL MEDICINE

## 2018-04-19 PROCEDURE — 25010000002 MIDAZOLAM PER 1 MG: Performed by: INTERNAL MEDICINE

## 2018-04-19 PROCEDURE — 93458 L HRT ARTERY/VENTRICLE ANGIO: CPT | Performed by: INTERNAL MEDICINE

## 2018-04-19 PROCEDURE — 25010000002 HEPARIN (PORCINE) PER 1000 UNITS: Performed by: HOSPITALIST

## 2018-04-19 PROCEDURE — C1894 INTRO/SHEATH, NON-LASER: HCPCS

## 2018-04-19 PROCEDURE — B2111ZZ FLUOROSCOPY OF MULTIPLE CORONARY ARTERIES USING LOW OSMOLAR CONTRAST: ICD-10-PCS | Performed by: INTERNAL MEDICINE

## 2018-04-19 PROCEDURE — 25010000002 HYDROMORPHONE PER 4 MG

## 2018-04-19 PROCEDURE — C1760 CLOSURE DEV, VASC: HCPCS | Performed by: INTERNAL MEDICINE

## 2018-04-19 PROCEDURE — 25010000002 HYDROMORPHONE PER 4 MG: Performed by: HOSPITALIST

## 2018-04-19 PROCEDURE — B2151ZZ FLUOROSCOPY OF LEFT HEART USING LOW OSMOLAR CONTRAST: ICD-10-PCS | Performed by: INTERNAL MEDICINE

## 2018-04-19 PROCEDURE — 82962 GLUCOSE BLOOD TEST: CPT

## 2018-04-19 PROCEDURE — 25010000002 FUROSEMIDE PER 20 MG: Performed by: HOSPITALIST

## 2018-04-19 PROCEDURE — 25010000002 FENTANYL CITRATE (PF) 100 MCG/2ML SOLUTION: Performed by: INTERNAL MEDICINE

## 2018-04-19 PROCEDURE — 25010000002 ONDANSETRON PER 1 MG: Performed by: HOSPITALIST

## 2018-04-19 PROCEDURE — 63710000001 INSULIN DETEMIR PER 5 UNITS: Performed by: PHYSICIAN ASSISTANT

## 2018-04-19 PROCEDURE — 0 IOPAMIDOL PER 1 ML: Performed by: INTERNAL MEDICINE

## 2018-04-19 PROCEDURE — 4A023N7 MEASUREMENT OF CARDIAC SAMPLING AND PRESSURE, LEFT HEART, PERCUTANEOUS APPROACH: ICD-10-PCS | Performed by: INTERNAL MEDICINE

## 2018-04-19 PROCEDURE — 99222 1ST HOSP IP/OBS MODERATE 55: CPT | Performed by: NURSE PRACTITIONER

## 2018-04-19 PROCEDURE — 99233 SBSQ HOSP IP/OBS HIGH 50: CPT | Performed by: HOSPITALIST

## 2018-04-19 PROCEDURE — 63710000001 INSULIN ASPART PER 5 UNITS: Performed by: PHYSICIAN ASSISTANT

## 2018-04-19 RX ORDER — ONDANSETRON 2 MG/ML
4 INJECTION INTRAMUSCULAR; INTRAVENOUS ONCE
Status: COMPLETED | OUTPATIENT
Start: 2018-04-19 | End: 2018-04-19

## 2018-04-19 RX ORDER — SODIUM CHLORIDE 9 MG/ML
INJECTION, SOLUTION INTRAVENOUS CONTINUOUS PRN
Status: DISCONTINUED | OUTPATIENT
Start: 2018-04-19 | End: 2018-04-19 | Stop reason: HOSPADM

## 2018-04-19 RX ORDER — SODIUM CHLORIDE 9 MG/ML
100 INJECTION, SOLUTION INTRAVENOUS CONTINUOUS
Status: DISPENSED | OUTPATIENT
Start: 2018-04-19 | End: 2018-04-19

## 2018-04-19 RX ORDER — FENTANYL CITRATE 50 UG/ML
INJECTION, SOLUTION INTRAMUSCULAR; INTRAVENOUS AS NEEDED
Status: DISCONTINUED | OUTPATIENT
Start: 2018-04-19 | End: 2018-04-19 | Stop reason: HOSPADM

## 2018-04-19 RX ORDER — MIDAZOLAM HYDROCHLORIDE 1 MG/ML
INJECTION INTRAMUSCULAR; INTRAVENOUS AS NEEDED
Status: DISCONTINUED | OUTPATIENT
Start: 2018-04-19 | End: 2018-04-19 | Stop reason: HOSPADM

## 2018-04-19 RX ORDER — LIDOCAINE HYDROCHLORIDE 20 MG/ML
INJECTION, SOLUTION INFILTRATION; PERINEURAL AS NEEDED
Status: DISCONTINUED | OUTPATIENT
Start: 2018-04-19 | End: 2018-04-19 | Stop reason: HOSPADM

## 2018-04-19 RX ORDER — HYDROMORPHONE HCL 110MG/55ML
1 PATIENT CONTROLLED ANALGESIA SYRINGE INTRAVENOUS
Status: DISCONTINUED | OUTPATIENT
Start: 2018-04-19 | End: 2018-04-20 | Stop reason: HOSPADM

## 2018-04-19 RX ORDER — HYDROMORPHONE HCL 110MG/55ML
PATIENT CONTROLLED ANALGESIA SYRINGE INTRAVENOUS
Status: COMPLETED
Start: 2018-04-19 | End: 2018-04-19

## 2018-04-19 RX ADMIN — METOPROLOL TARTRATE 25 MG: 25 TABLET, FILM COATED ORAL at 20:34

## 2018-04-19 RX ADMIN — ASPIRIN 81 MG: 81 TABLET ORAL at 08:49

## 2018-04-19 RX ADMIN — HEPARIN SODIUM 5000 UNITS: 5000 INJECTION, SOLUTION INTRAVENOUS; SUBCUTANEOUS at 20:34

## 2018-04-19 RX ADMIN — ISOSORBIDE MONONITRATE 30 MG: 30 TABLET, EXTENDED RELEASE ORAL at 08:50

## 2018-04-19 RX ADMIN — FUROSEMIDE 40 MG: 10 INJECTION, SOLUTION INTRAMUSCULAR; INTRAVENOUS at 20:34

## 2018-04-19 RX ADMIN — HYDROMORPHONE HYDROCHLORIDE 1 MG: 2 INJECTION INTRAMUSCULAR; INTRAVENOUS; SUBCUTANEOUS at 14:25

## 2018-04-19 RX ADMIN — HYDROMORPHONE HYDROCHLORIDE 1 MG: 2 INJECTION INTRAMUSCULAR; INTRAVENOUS; SUBCUTANEOUS at 23:01

## 2018-04-19 RX ADMIN — MORPHINE SULFATE 1 MG: 2 INJECTION, SOLUTION INTRAMUSCULAR; INTRAVENOUS at 12:40

## 2018-04-19 RX ADMIN — TIZANIDINE 4 MG: 4 TABLET ORAL at 14:25

## 2018-04-19 RX ADMIN — BUDESONIDE AND FORMOTEROL FUMARATE DIHYDRATE 2 PUFF: 160; 4.5 AEROSOL RESPIRATORY (INHALATION) at 06:54

## 2018-04-19 RX ADMIN — ROSUVASTATIN CALCIUM 20 MG: 20 TABLET, FILM COATED ORAL at 20:34

## 2018-04-19 RX ADMIN — CLOPIDOGREL 75 MG: 75 TABLET, FILM COATED ORAL at 08:49

## 2018-04-19 RX ADMIN — DIAZEPAM 5 MG: 5 TABLET ORAL at 12:46

## 2018-04-19 RX ADMIN — PREDNISONE 20 MG: 20 TABLET ORAL at 14:25

## 2018-04-19 RX ADMIN — MORPHINE SULFATE 1 MG: 2 INJECTION, SOLUTION INTRAMUSCULAR; INTRAVENOUS at 03:30

## 2018-04-19 RX ADMIN — INSULIN ASPART 10 UNITS: 100 INJECTION, SOLUTION INTRAVENOUS; SUBCUTANEOUS at 16:52

## 2018-04-19 RX ADMIN — MORPHINE SULFATE 1 MG: 2 INJECTION, SOLUTION INTRAMUSCULAR; INTRAVENOUS at 08:50

## 2018-04-19 RX ADMIN — ONDANSETRON 4 MG: 2 INJECTION INTRAMUSCULAR; INTRAVENOUS at 22:17

## 2018-04-19 RX ADMIN — Medication 10 ML: at 08:50

## 2018-04-19 RX ADMIN — FAMOTIDINE 20 MG: 20 TABLET, FILM COATED ORAL at 14:25

## 2018-04-19 RX ADMIN — HYDROMORPHONE HYDROCHLORIDE 1 MG: 2 INJECTION INTRAMUSCULAR; INTRAVENOUS; SUBCUTANEOUS at 16:51

## 2018-04-19 RX ADMIN — LISINOPRIL 5 MG: 2.5 TABLET ORAL at 08:49

## 2018-04-19 RX ADMIN — HYDROMORPHONE HYDROCHLORIDE 1 MG: 2 INJECTION INTRAMUSCULAR; INTRAVENOUS; SUBCUTANEOUS at 20:35

## 2018-04-19 RX ADMIN — INSULIN ASPART 4 UNITS: 100 INJECTION, SOLUTION INTRAVENOUS; SUBCUTANEOUS at 20:46

## 2018-04-19 RX ADMIN — Medication 10 ML: at 20:36

## 2018-04-19 RX ADMIN — FUROSEMIDE 40 MG: 10 INJECTION, SOLUTION INTRAMUSCULAR; INTRAVENOUS at 14:25

## 2018-04-19 RX ADMIN — METOPROLOL TARTRATE 25 MG: 25 TABLET, FILM COATED ORAL at 08:49

## 2018-04-19 RX ADMIN — INSULIN DETEMIR 45 UNITS: 100 INJECTION, SOLUTION SUBCUTANEOUS at 14:26

## 2018-04-19 RX ADMIN — SODIUM CHLORIDE 100 ML/HR: 9 INJECTION, SOLUTION INTRAVENOUS at 14:26

## 2018-04-19 RX ADMIN — GABAPENTIN 600 MG: 300 CAPSULE ORAL at 20:35

## 2018-04-19 RX ADMIN — GABAPENTIN 600 MG: 300 CAPSULE ORAL at 14:25

## 2018-04-19 NOTE — CONSULTS
Patient Name: Pepito Yan  Age/Sex: 52 y.o. female  : 1965  MRN: [id number]    Date of Hospital Visit: 2018  Encounter Provider: Sang Grimm MD  Referring Provider: Hubert Geller MD         Subjective:       Chief Complaint: Consult for cardiac catheterization    History of Present Illness:  Pepito Yan is a 52 y.o. female with history of CAD. 3 vessel CAD but left prior to PCI AMA 2 yrs ago. Pt return with chest pain. CHERRY. Chest pain is SSCP pressure with radiation to the back. Hx DM, Hep C cirrhosis, COPD, Burger's disease, active smoker. Referred for repeat cardiac cath and possible PCI. No recent PE. No GIB or hx stroke. Hx abnormal stress test with inferior ischemia and mildly reduced LV function. INR is normal.      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   • Chronic respiratory failure with hypoxia     at nighttime   • Compression fracture of lumbar vertebra    • Compression fracture of thoracic vertebra    • COPD (chronic obstructive pulmonary disease)    • Coronary artery disease    • Diabetes mellitus type 2 in nonobese    • Erosive esophagitis 2016   • Essential hypertension    • Gastroparesis    • GERD (gastroesophageal reflux disease)    • Hepatitis-C    • History of transfusion    • Medically noncompliant    • MRSA infection     back of neck around ; 2017 septic left elbow joint with concerns for osteomyelitis and left anteromedial left arm   • NSTEMI (non-ST elevated myocardial infarction) 2016   • PE (pulmonary embolism)    • Protein calorie malnutrition    • RA (rheumatoid arthritis)     with chronic steroid use   • Rheumatoid arthritis        Past Surgical History:   Procedure Laterality Date   • ABDOMINAL SURGERY     • AMPUTATION DIGIT Right 2016    Third digit to the PIP. Surgeon: Dr. Bains   • AMPUTATION DIGIT Left 2016    Procedure:  PARTIAL AMPUTATION LEFT THIRD FINGER;  Surgeon: Shane Fuller MD;  Location:  [facility] OR;  Service:    • AMPUTATION REVISION Left 2016    Procedure: AMPUTATION REVISION DIGIT STUMP;  Surgeon: Shane Fuller MD;  Location:  [facility] OR;  Service:    • ARM DEBRIDEMENT Left 2016    Procedure: HAND IRRIGATION DEBRIDEMENT AND REPAIR ;  Surgeon: Shane Fuller MD;  Location: [facility] OR;  Service:    •  SECTION      x 2   • CHOLECYSTECTOMY     • ENDOSCOPY N/A 2016    Procedure: ESOPHAGOGASTRODUODENOSCOPY;  Surgeon: Jeremy Clement III, MD;  Location:  [facility] OR;  Service:    • ENDOSCOPY W/ PEG TUBE PLACEMENT N/A 3/30/2017    Procedure: ESOPHAGOGASTRODUODENOSCOPY WITH PERCUTANEOUS ENDOSCOPIC GASTROSTOMY TUBE INSERTION;  Surgeon: Shade Campos MD;  Location: [facility] OR;  Service:    • FOREIGN BODY REMOVAL      insulin needle removal x 3 from the abdomen   • HYSTERECTOMY     • INCISION AND DRAINAGE ARM Left 2017    Procedure: INCISION AND DRAINAGE UPPER EXTREMITY;  Surgeon: Shane Fuller MD;  Location:  [facility] OR;  Service:        Home Medications:    Prescriptions Prior to Admission   Medication Sig Dispense Refill Last Dose   • aspirin 325 MG tablet Take 325 mg by mouth Daily.   4/15/2018 at Unknown time   • budesonide-formoterol (SYMBICORT) 160-4.5 MCG/ACT inhaler Inhale 2 puffs 2 (Two) Times a Day.   4/15/2018 at Unknown time   • gabapentin (NEURONTIN) 600 MG tablet Take 600 mg by mouth 3 (Three) Times a Day.   4/15/2018 at Unknown time   • insulin aspart (novoLOG) 100 UNIT/ML injection Inject 10 Units under the skin 3 (Three) Times a Day With Meals.   4/15/2018 at Unknown time   • insulin glargine (LANTUS) 100 UNIT/ML injection Inject 45 Units under the skin Daily.   4/15/2018 at Unknown time   • isosorbide mononitrate (IMDUR) 30 MG 24 hr tablet Take 30 mg by mouth Daily.   4/15/2018 at Unknown time   • lisinopril (PRINIVIL,ZESTRIL) 5 MG tablet Take 5 mg  by mouth Daily.   4/15/2018 at Unknown time   • metFORMIN (GLUCOPHAGE) 500 MG tablet Take 500 mg by mouth 3 (Three) Times a Day With Meals.   4/15/2018 at Unknown time   • predniSONE (DELTASONE) 20 MG tablet Take 20 mg by mouth Daily.   4/15/2018 at Unknown time   • sucralfate (CARAFATE) 1 g tablet Take 1 g by mouth 4 (Four) Times a Day As Needed.   4/15/2018 at Unknown time   • promethazine (PHENERGAN) 25 MG tablet Take 25 mg by mouth Every 6 (Six) Hours As Needed for Nausea or Vomiting.   Unknown at Unknown time   • tiZANidine (ZANAFLEX) 4 MG tablet Take 4 mg by mouth Every 8 (Eight) Hours As Needed for Muscle Spasms.   Unknown at Unknown time       Allergies:  Allergies   Allergen Reactions   • Penicillins Anaphylaxis     Tolerated both cefepime and Rocephin earlier this month   • Toradol [Ketorolac Tromethamine] Hives and Swelling   • Tramadol Hives and Swelling       Past Social History:  Social History     Social History   • Marital status:      Social History Main Topics   • Smoking status: Current Every Day Smoker     Packs/day: 0.50     Years: 35.00     Types: Cigarettes   • Smokeless tobacco: Never Used      Comment: 4-5 cigarettes a day    • Alcohol use No   • Drug use: No   • Sexual activity: Defer     Other Topics Concern   • Not on file       Past Family History:  Family History   Problem Relation Age of Onset   • Colon cancer Mother    • Cancer Father    • Diabetes Maternal Grandmother    • Heart disease Neg Hx    • Stroke Neg Hx        Review of Systems:   Constitution: No chills, no rigors, no unexplained weight loss or weight gain  Eyes:  No diplopia, no blurred vision, no loss of vision, conjunctiva is pink and sclera is anicteric  ENT:  No tinnitus, no otorrhea, no epistaxis, no sore throat   Respiratory: No cough, no hemoptysis  Cardiovascular: see HPI  Gastrointestinal: No nausea, no vomiting, no hematemesis, no diarrhea or constipation, no melena  Genitourinary: No frequency of dysuria  no hematuria  Integument: No pruritis and  no skin rash  Hematologic / Lymphatic: No excessive bleeding, easy bruising, fatigue, lymphadenopathy and petechiae  Musculoskeletal: No joint pain, joint stiffness, joint swelling, muscle pain, muscle weakness and neck pain  Neurological: No dizziness, headaches, light headedness, seizures and vertigo  Endocrine: No frequent urination and nocturia, temperature intolerance, weight gain, unintended and weight loss, unintended      Objective:     Objective:  Vital Signs (last 24 hours)       04/18 0700  -  04/19 0659 04/19 0700  -  04/19 1004   Most Recent    Temp (°F) 97.7 -  98       97.8 (36.6)    Heart Rate 91 -  (!)121       94    Resp 18 -  20       20    /62 -  138/86       138/86    SpO2 (%) 96 -  99       99          Body mass index is 24.04 kg/m².  Weight change:           PHYSICAL EXAM:    General: Alert and oriented, no acute distress  Head: Normocephalic, without obvious abnormality, atraumatic  Neck: Supple, trachea midline, no JVD  Lungs: Clear to auscultation,respirations regular, even and unlabored  Heart: Regular rate and rhythm, normal S1 and S2, no rub, murmur, or gallop,    Chest wall: No abnormalities observed  Abdomen:Normal bowel sounds,  non-distended  Extremities:Moves all extremities well, no edema, no cyanosis, no redness  Pulses: Pulses palpable and equal bilaterally  Skin: No bleeding, bruising or rash  Neurologic: A & O x 3     Access:    Lab Review:       Results from last 7 days  Lab Units 04/18/18  1528  04/16/18  1314   SODIUM mmol/L 131*  < > 136   POTASSIUM mmol/L 4.8  < > 4.6   CHLORIDE mmol/L 100  < > 106   CO2 mmol/L 22.7*  < > 20.5*   BUN mg/dL 26*  < > 14   CREATININE mg/dL 1.02  < > 0.86   CALCIUM mg/dL 8.6  < > 9.3   BILIRUBIN mg/dL  --   --  0.2   ALK PHOS U/L  --   --  62   ALT (SGPT) U/L  --   --  71*   AST (SGOT) U/L  --   --  56*   GLUCOSE mg/dL 570*  < > 447*   < > = values in this interval not displayed.    Results from  last 7 days  Lab Units 04/17/18  1017 04/17/18  0529 04/16/18  2248 04/16/18  1920   CK TOTAL U/L  --   --  22* 41   TROPONIN I ng/mL 0.025 0.031 0.024 0.021   CK MB INDEX %  --   --  8.0* 3.3*       Results from last 7 days  Lab Units 04/17/18  2215   BNP pg/mL 558.0*       Results from last 7 days  Lab Units 04/16/18  1314   WBC 10*3/mm3 11.20   HEMOGLOBIN g/dL 11.5*   HEMATOCRIT % 39.2   PLATELETS 10*3/mm3 352       Results from last 7 days  Lab Units 04/16/18  1314   INR  0.96                   Invalid input(s):  T4,  FREET4    EKG:   ECG/EMG Results (last 24 hours)     Procedure Component Value Units Date/Time    ECG 12 Lead [[id number]] Collected:  04/16/18 1923     Updated:  04/18/18 1950    Narrative:       Test Reason : chest pain  Blood Pressure : **/** mmHG  Vent. Rate : 111 BPM     Atrial Rate : 111 BPM     P-R Int : 124 ms          QRS Dur : 090 ms      QT Int : 348 ms       P-R-T Axes : 058 -33 043 degrees     QTc Int : 473 ms    Sinus tachycardia with premature atrial complexes  Possible Left atrial enlargement  Left axis deviation  RSR' or QR pattern in V1 suggests right ventricular conduction delay  Abnormal ECG  When compared with ECG of 16-APR-2018 12:03,  No significant change was found  Confirmed by Jared Tatum ([id number]) on 4/18/2018 7:50:08 PM    Referred By:  UMBERTO           Confirmed By:Jared Tatum    ECG 12 Lead [[id number]] Collected:  04/16/18 2251     Updated:  04/18/18 1952    Narrative:       Test Reason : chest pain  Blood Pressure : **/** mmHG  Vent. Rate : 097 BPM     Atrial Rate : 097 BPM     P-R Int : 128 ms          QRS Dur : 096 ms      QT Int : 360 ms       P-R-T Axes : 044 -39 053 degrees     QTc Int : 457 ms    Normal sinus rhythm  Possible Left atrial enlargement  Left axis deviation  Incomplete right bundle branch block  Abnormal ECG  When compared with ECG of 16-APR-2018 19:23, (Unconfirmed)  premature atrial complexes are no longer present  Confirmed by Jared Tatum  ([id number]) on 4/18/2018 7:51:57 PM    Referred By:  LOIS           Confirmed By:Jared Tatum          Imaging:  Imaging Results (last 24 hours)     ** No results found for the last 24 hours. **          I personally viewed and interpreted the patient's EKG/Telemetry data.    Assessment:     Principal Problem:    Angina, class IV  Active Problems:    Chest pain          Plan:       Chest pain: hx unrevascularized 3 vessel disease and abnormal stress test. Pt is a candidate albeit at increased risk for cath and possible PCI. Pt may be higher risk for CABG but severity of comorbid conditions are not well defined. Pt accepts DAPT for one year or longer if needed.    After discussing risks, benefits and alternatives with the patient,written informed consent was obtained.    D/w Dr Tatum.        Sang Grimm MD  04/19/18  10:04 AM

## 2018-04-19 NOTE — CONSULTS
Consults  Date of Admit: 4/16/2018  Date of Consult: 04/19/18  Hubert Geller MD  Pepito Yan  1965  Consulting Physician: Dr Sang Grimm    Interventional Cardiology consultation    Reason for consultation:  Chest pain, history abnormal cardiac cath    Assessment:  1. Chest pain, likely ACS  2. ASCVD s/p non-ST elevation MI in July 2017 with cardiac catheterization at hospital in [place] revealing obstructive disease.  However, pt left AMA before she could have intervention.  3. PAD with history of Buerger's disease  4. Type 2 diabetes mellitius  5. HTN, controlled  6. Tobacco abuse, continue to smoke 4-5 cigs/daily      Recommendations:  1. Risks/benefits of cardiac catheterization discussed with pt., including bleeding, heart attack and death.  Pt voiced understanding and wishes to proceed.        History of Present Illness    Subjective     Chief Complaint   Patient presents with   • Chest Pain     SOA AND N/V       Pepito Yan is a 52 y.o. female with past medical history significant for  long-standing hypertension, type 2 diabetes mellitus, long history of smoking for 40 years and smoked up to 2-3 packs a day although she says she has cut down to 4-5 cigarettes a day recently, has history of known coronary artery disease with a non-ST elevation myocardial infarctions July 2017 with cardiac catheterization at [facility] in [place], [place] apparently revealing some obstructive lesions.  Patient was recommended to have coronary intervention but she reportedly left the hospital AGAINST MEDICAL ADVICE.  She now presents with complaints of having recurrent chest pains over the last week to 10 days.  She describes episodes as being felt a substernal pressure and tightness rated as 10 out of 10 associated with shortness of breath.  Pain radiates to shoulder blades and is accompanied by nausea at times.  Pain has worsened in intensity and frequency over the last several months.  She does  get some relief with sublingual nitroglycerin.  She currently denies any significant chest pain or shortness of breath.  Her EKG so far has revealed normal sinus rhythm with an incomplete right bundle branch block but otherwise no significant ST-T wave changes of myocardial ischemia or infarction.  Her cardiac markers have been negative ×5.  She has dyspnea with mild exertion related mostly to COPD.  Denies any PND, orthopnea or pedal edema.      Cardiac risk factors:arteriosclerotic heart disease, diabetes mellitus, hypercholesterolemia, hypertension, peripheral vascular disease and smoking    Last Echo: 37/24/2017    Interpretation Summary        · The study is technically good for diagnosis.  · Left ventricular systolic function is low normal. Estimated EF = 45%.  · Left ventricular diastolic dysfunction (grade II) consistent with pseudonormalization.  · There are no significant valvular abnormalities noted.  · There is a trivial pericardial effusion.         Last Stress: 7/25/2017    Interpretation Summary     · Findings consistent with a normal ECG stress test.  · Myocardial perfusion imaging indicates a small-sized, mildly severe area of ischemia located in the inferolateral wall.  · Myocardial perfusion imaging indicates a located in the lateral wall noted.  · Left ventricular ejection fraction is mildly reduced (Calculated EF = 42%).  · Impressions are consistent with an intermediate risk study.       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   • Chronic respiratory failure with hypoxia     at nighttime   • Compression fracture of lumbar vertebra    • Compression fracture of thoracic vertebra    • COPD (chronic obstructive pulmonary disease)    • Coronary artery disease    • Diabetes mellitus type 2 in nonobese    • Erosive esophagitis 12/2016   • Essential hypertension    • Gastroparesis    • GERD (gastroesophageal reflux disease)    •  Hepatitis-C    • History of transfusion    • Medically noncompliant    • MRSA infection     back of neck around ; 2017 septic left elbow joint with concerns for osteomyelitis and left anteromedial left arm   • NSTEMI (non-ST elevated myocardial infarction) 2016   • PE (pulmonary embolism)    • Protein calorie malnutrition    • RA (rheumatoid arthritis)     with chronic steroid use   • Rheumatoid arthritis      Past Surgical History:   Procedure Laterality Date   • ABDOMINAL SURGERY     • AMPUTATION DIGIT Right 2016    Third digit to the PIP. Surgeon: Dr. Bains   • AMPUTATION DIGIT Left 2016    Procedure: PARTIAL AMPUTATION LEFT THIRD FINGER;  Surgeon: Shane Fuller MD;  Location: [facility] OR;  Service:    • AMPUTATION REVISION Left 2016    Procedure: AMPUTATION REVISION DIGIT STUMP;  Surgeon: Shane Fuller MD;  Location: [facility] OR;  Service:    • ARM DEBRIDEMENT Left 2016    Procedure: HAND IRRIGATION DEBRIDEMENT AND REPAIR ;  Surgeon: Shane Fuller MD;  Location: [facility] OR;  Service:    •  SECTION      x 2   • CHOLECYSTECTOMY     • ENDOSCOPY N/A 2016    Procedure: ESOPHAGOGASTRODUODENOSCOPY;  Surgeon: Jeremy Clement III, MD;  Location: [facility] OR;  Service:    • ENDOSCOPY W/ PEG TUBE PLACEMENT N/A 3/30/2017    Procedure: ESOPHAGOGASTRODUODENOSCOPY WITH PERCUTANEOUS ENDOSCOPIC GASTROSTOMY TUBE INSERTION;  Surgeon: Shade Campos MD;  Location: [facility] OR;  Service:    • FOREIGN BODY REMOVAL      insulin needle removal x 3 from the abdomen   • HYSTERECTOMY     • INCISION AND DRAINAGE ARM Left 2017    Procedure: INCISION AND DRAINAGE UPPER EXTREMITY;  Surgeon: Shane Fuller MD;  Location: [facility] OR;  Service:      Family History   Problem Relation Age of Onset   • Colon cancer Mother    • Cancer Father    • Diabetes Maternal Grandmother    • Heart disease Neg Hx    • Stroke Neg Hx      Social History   Substance Use  Topics   • Smoking status: Current Every Day Smoker     Packs/day: 0.50     Years: 35.00     Types: Cigarettes   • Smokeless tobacco: Never Used      Comment: 4-5 cigarettes a day    • Alcohol use No     Prescriptions Prior to Admission   Medication Sig Dispense Refill Last Dose   • aspirin 325 MG tablet Take 325 mg by mouth Daily.   4/15/2018 at Unknown time   • budesonide-formoterol (SYMBICORT) 160-4.5 MCG/ACT inhaler Inhale 2 puffs 2 (Two) Times a Day.   4/15/2018 at Unknown time   • gabapentin (NEURONTIN) 600 MG tablet Take 600 mg by mouth 3 (Three) Times a Day.   4/15/2018 at Unknown time   • insulin aspart (novoLOG) 100 UNIT/ML injection Inject 10 Units under the skin 3 (Three) Times a Day With Meals.   4/15/2018 at Unknown time   • insulin glargine (LANTUS) 100 UNIT/ML injection Inject 45 Units under the skin Daily.   4/15/2018 at Unknown time   • isosorbide mononitrate (IMDUR) 30 MG 24 hr tablet Take 30 mg by mouth Daily.   4/15/2018 at Unknown time   • lisinopril (PRINIVIL,ZESTRIL) 5 MG tablet Take 5 mg by mouth Daily.   4/15/2018 at Unknown time   • metFORMIN (GLUCOPHAGE) 500 MG tablet Take 500 mg by mouth 3 (Three) Times a Day With Meals.   4/15/2018 at Unknown time   • predniSONE (DELTASONE) 20 MG tablet Take 20 mg by mouth Daily.   4/15/2018 at Unknown time   • sucralfate (CARAFATE) 1 g tablet Take 1 g by mouth 4 (Four) Times a Day As Needed.   4/15/2018 at Unknown time   • promethazine (PHENERGAN) 25 MG tablet Take 25 mg by mouth Every 6 (Six) Hours As Needed for Nausea or Vomiting.   Unknown at Unknown time   • tiZANidine (ZANAFLEX) 4 MG tablet Take 4 mg by mouth Every 8 (Eight) Hours As Needed for Muscle Spasms.   Unknown at Unknown time     Allergies:  Penicillins; Toradol [ketorolac tromethamine]; and Tramadol    Review of Systems   Constitutional: Negative for fatigue.   Respiratory: Negative for shortness of breath.    Cardiovascular: Positive for chest pain (None currently, CP has been  intermittent substernal  radiating to the shoulder blades). Negative for palpitations and leg swelling.   Gastrointestinal: Negative for blood in stool.   Neurological: Negative for dizziness, syncope, weakness and light-headedness.   Hematological: Does not bruise/bleed easily.         Objective      Vital Signs  Temp:  [97.3 °F (36.3 °C)-98 °F (36.7 °C)] 97.3 °F (36.3 °C)  Heart Rate:  [] 90  Resp:  [18-20] 20  BP: (112-138)/(66-86) 120/84  Body mass index is 24.04 kg/m².    Intake/Output Summary (Last 24 hours) at 04/19/18 1209  Last data filed at 04/19/18 1046   Gross per 24 hour   Intake              780 ml   Output             2550 ml   Net            -1770 ml       Physical Exam   Constitutional: She appears well-developed and well-nourished.   HENT:   Head: Normocephalic and atraumatic.   Eyes: Pupils are equal, round, and reactive to light.   Neck: No JVD present.   Cardiovascular: Normal rate, regular rhythm and intact distal pulses.  Exam reveals no gallop and no friction rub.    No murmur heard.  Micheal's test:  ABN   Pulmonary/Chest: Effort normal. No respiratory distress. She has wheezes (mild exp). She has no rales.   Abdominal: Soft. She exhibits no mass. There is no tenderness. No hernia.   Skin: Skin is warm and dry.   Psychiatric: She has a normal mood and affect.       Telemetry:  Sinus, sinus tach 80 -100    Results Review:   I reviewed the patient's new clinical results.    Results from last 7 days  Lab Units 04/17/18  1017 04/17/18  0529 04/16/18  2248 04/16/18  1920 04/16/18  1314   CK TOTAL U/L  --   --  22* 41  --    TROPONIN I ng/mL 0.025 0.031 0.024 0.021 0.018   CKMB ng/mL  --   --  1.76 1.37  --        Results from last 7 days  Lab Units 04/16/18  1314   WBC 10*3/mm3 11.20   HEMOGLOBIN g/dL 11.5*   PLATELETS 10*3/mm3 352       Results from last 7 days  Lab Units 04/18/18  1528 04/18/18  1317 04/16/18  1314   SODIUM mmol/L 131* 129* 136   POTASSIUM mmol/L 4.8 5.4* 4.6   CHLORIDE  mmol/L 100 100 106   CO2 mmol/L 22.7* 23.9* 20.5*   BUN mg/dL 26* 24* 14   CREATININE mg/dL 1.02 0.90 0.86   CALCIUM mg/dL 8.6 8.5 9.3   GLUCOSE mg/dL 570* 624* 447*   ALT (SGPT) U/L  --   --  71*   AST (SGOT) U/L  --   --  56*     Lab Results   Component Value Date    INR 0.96 04/16/2018    INR 1.14 (H) 02/04/2018    INR 0.92 01/09/2018    INR 1.05 07/06/2017    INR 0.92 06/26/2017    INR 1.04 03/20/2017    INR 1.01 12/17/2016     Lab Results   Component Value Date    MG 1.9 01/09/2018    MG 1.8 10/17/2017    MG 2.4 07/24/2017     Lab Results   Component Value Date    TSH 1.727 01/19/2018    TRIG 271 (H) 03/21/2017    HDL 18 (L) 03/21/2017     (H) 03/21/2017      Lab Results   Component Value Date    .0 (H) 04/17/2018    .0 (H) 04/16/2018    .0 (H) 04/08/2018        EKG:     Imaging Results (last 72 hours)     Procedure Component Value Units Date/Time    CT Chest Pulmonary Embolism With Contrast [[id number]] Collected:  04/16/18 1533     Updated:  04/16/18 1537    Narrative:       CT CHEST PULMONARY EMBOLISM W CONTRAST-     CLINICAL INDICATION: sob, chest pain          COMPARISON: 02/04/2018      PROCEDURE: Thin cut axial images were acquired through the pulmonary  vessels during the rapid infusion of IV contrast.     Additional 3-D reformatted images obtained via post-processing for  improved diagnostic accuracy and procedural planning.     Radiation dose reduction techniques were utilized per ALARA protocol.  Automated exposure control was initiated through either or [employer] or  [employer] software packages by  protocol.           FINDINGS: Today's study demonstrates opacification of the central  pulmonary vessels.   There are no filling defects.   There is no truncation.     No evidence of a pulmonary embolus.     Otherwise there are no parenchymal soft tissue nodules or masses.     Single enlarged 1.4 cm aorticopulmonary window lymph node  There is a 9 mm lymph node in the  right axilla     Small right effusion. Previously identified right basilar consolidation  however has cleared       Impression:       1. No evidence of a pulmonary embolus  2. Single enlarged aorticopulmonary window lymph node  3. Small right pleural effusion.     This report was finalized on 4/16/2018 3:35 PM by Dr. Daniel Yates MD.       XR Chest 1 View [[id number]] Collected:  04/16/18 1353     Updated:  04/16/18 1420    Narrative:       XR CHEST 1 VW-     CLINICAL INDICATION: Chest pain protocol          COMPARISON: 04/09/2018      TECHNIQUE: Single frontal view of the chest.     FINDINGS:     There is no focal alveolar infiltrate or effusion.  The cardiac silhouette is normal. The pulmonary vasculature is  unremarkable.  There is no evidence of an acute osseous abnormality.   There are no suspicious-appearing parenchymal soft tissue nodules.            Impression:       No evidence of active or acute cardiopulmonary disease on today's chest  radiograph.         This report was finalized on 4/16/2018 1:53 PM by Dr. Daniel Yates MD.                Thank you very much for asking us to be involved in this patient's care.  We will follow along with you.    Nell Roche, ROSA  04/19/18  12:09 PM

## 2018-04-19 NOTE — NURSING NOTE
Time In 1450 Time Out 1500      Order received for Cardiac Rehab Consultation.     Patient stated she was not interested      Information discussed with: Patient        Educated on: Benefits of Exercise, Educated on Home Exercise Guidelines, Brochure and/or educational material provided, Contact information given and Teach Back Verified    Comments: dicussed the program with patient and told her if she changed her mind call us and we would gladly schedule her.      Thank you for the referral. Please contact the Cardiac Rehab Dept. (ext. [phone]) with any further questions or concerns.

## 2018-04-19 NOTE — CONSULTS
"Diabetes Education  Assessment/Teaching    Patient Name:  Pepito Yan  YOB: 1965  MRN: [id number]  Admit Date:  4/16/2018      Assessment Date:  4/19/2018  Flowsheet Row Most Recent Value   General Information    Height 160 cm (63\")   Height Method Stated   Weight 61.6 kg (135 lb 11.2 oz)   Weight Method Bed scale   Pregnancy Assessment   Diabetes History   What type of diabetes do you have? Type 2   Length of Diabetes Diagnosis 10 + years   Do you test your blood sugar at home? yes   How do you feel about having diabetes? steroids took for my hands and feet for 15 yrs is what keeps my sugars up, discussed, insulin needs to food intake and steroids and appeared to understand   What makes it difficult for you to take care of your diabetes or yourself? above   Do you have any diabetes complications? other (comment), amputations [[place]]   Education Preferences   Nutrition Information   Assessment Topics   Healthy Eating - Assessment Competent   Being Active - Assessment Competent   Taking Medication - Assessment Competent   Problem Solving - Assessment Competent   Reducing Risk - Assessment Competent   Healthy Coping - Assessment Competent   Monitoring - Assessment Competent   DM Goals          Flowsheet Row Most Recent Value   DM Education Needs   Meter Has own   Medication Insulin   Problem Solving Hypoglycemia, Hyperglycemia, Sick days, Signs, Symptoms, Treatment, Other (comment)   Healthy Coping Appropriate [questions were r/t heart cath today ]   Discharge Plan Home   Motivation Moderate   Teaching Method Explanation, Discussion, Handouts, Teach back   Patient Response Verbalized understanding            Other Comments:          Electronically signed by:  Twyla Santos RN  04/19/18 9:47 AM  "

## 2018-04-20 ENCOUNTER — APPOINTMENT (OUTPATIENT)
Dept: GENERAL RADIOLOGY | Facility: HOSPITAL | Age: 53
End: 2018-04-20

## 2018-04-20 VITALS
HEIGHT: 63 IN | TEMPERATURE: 97.9 F | RESPIRATION RATE: 20 BRPM | SYSTOLIC BLOOD PRESSURE: 119 MMHG | WEIGHT: 135.8 LBS | BODY MASS INDEX: 24.06 KG/M2 | HEART RATE: 92 BPM | DIASTOLIC BLOOD PRESSURE: 75 MMHG | OXYGEN SATURATION: 6 %

## 2018-04-20 PROBLEM — K22.9 ESOPHAGEAL ABNORMALITY: Status: ACTIVE | Noted: 2018-04-16

## 2018-04-20 LAB
GLUCOSE BLDC GLUCOMTR-MCNC: 108 MG/DL (ref 70–130)
GLUCOSE BLDC GLUCOMTR-MCNC: 121 MG/DL (ref 70–130)
TROPONIN I SERPL-MCNC: 0.01 NG/ML

## 2018-04-20 PROCEDURE — G8996 SWALLOW CURRENT STATUS: HCPCS

## 2018-04-20 PROCEDURE — BD11ZZZ FLUOROSCOPY OF ESOPHAGUS: ICD-10-PCS | Performed by: RADIOLOGY

## 2018-04-20 PROCEDURE — 93005 ELECTROCARDIOGRAM TRACING: CPT | Performed by: INTERNAL MEDICINE

## 2018-04-20 PROCEDURE — 25010000002 HYDROMORPHONE PER 4 MG: Performed by: HOSPITALIST

## 2018-04-20 PROCEDURE — 92611 MOTION FLUOROSCOPY/SWALLOW: CPT

## 2018-04-20 PROCEDURE — 93010 ELECTROCARDIOGRAM REPORT: CPT | Performed by: INTERNAL MEDICINE

## 2018-04-20 PROCEDURE — 94799 UNLISTED PULMONARY SVC/PX: CPT

## 2018-04-20 PROCEDURE — 82962 GLUCOSE BLOOD TEST: CPT

## 2018-04-20 PROCEDURE — 99239 HOSP IP/OBS DSCHRG MGMT >30: CPT | Performed by: HOSPITALIST

## 2018-04-20 PROCEDURE — 74220 X-RAY XM ESOPHAGUS 1CNTRST: CPT

## 2018-04-20 PROCEDURE — G8997 SWALLOW GOAL STATUS: HCPCS

## 2018-04-20 PROCEDURE — G8998 SWALLOW D/C STATUS: HCPCS

## 2018-04-20 PROCEDURE — 74230 X-RAY XM SWLNG FUNCJ C+: CPT

## 2018-04-20 PROCEDURE — 25010000002 PROMETHAZINE PER 50 MG: Performed by: PHYSICIAN ASSISTANT

## 2018-04-20 PROCEDURE — 63710000001 PREDNISONE PER 1 MG: Performed by: HOSPITALIST

## 2018-04-20 PROCEDURE — 74220 X-RAY XM ESOPHAGUS 1CNTRST: CPT | Performed by: RADIOLOGY

## 2018-04-20 PROCEDURE — 84484 ASSAY OF TROPONIN QUANT: CPT | Performed by: INTERNAL MEDICINE

## 2018-04-20 PROCEDURE — 74230 X-RAY XM SWLNG FUNCJ C+: CPT | Performed by: RADIOLOGY

## 2018-04-20 PROCEDURE — 25010000002 ONDANSETRON PER 1 MG: Performed by: HOSPITALIST

## 2018-04-20 PROCEDURE — 25010000002 HEPARIN (PORCINE) PER 1000 UNITS: Performed by: HOSPITALIST

## 2018-04-20 RX ORDER — PREDNISONE 1 MG/1
5 TABLET ORAL DAILY
Qty: 3 TABLET | Refills: 0 | Status: SHIPPED | OUTPATIENT
Start: 2018-04-29 | End: 2018-05-02

## 2018-04-20 RX ORDER — PREDNISONE 10 MG/1
10 TABLET ORAL DAILY
Status: DISCONTINUED | OUTPATIENT
Start: 2018-04-26 | End: 2018-04-20 | Stop reason: HOSPADM

## 2018-04-20 RX ORDER — PANTOPRAZOLE SODIUM 40 MG/1
40 TABLET, DELAYED RELEASE ORAL EVERY MORNING
Status: DISCONTINUED | OUTPATIENT
Start: 2018-04-20 | End: 2018-04-20 | Stop reason: HOSPADM

## 2018-04-20 RX ORDER — PANTOPRAZOLE SODIUM 40 MG/1
40 TABLET, DELAYED RELEASE ORAL EVERY MORNING
Qty: 30 TABLET | Refills: 0 | Status: SHIPPED | OUTPATIENT
Start: 2018-04-21

## 2018-04-20 RX ORDER — ONDANSETRON 2 MG/ML
4 INJECTION INTRAMUSCULAR; INTRAVENOUS ONCE
Status: COMPLETED | OUTPATIENT
Start: 2018-04-20 | End: 2018-04-20

## 2018-04-20 RX ORDER — PREDNISONE 10 MG/1
10 TABLET ORAL DAILY
Qty: 3 TABLET | Refills: 0 | Status: SHIPPED | OUTPATIENT
Start: 2018-04-26 | End: 2018-04-29

## 2018-04-20 RX ORDER — PREDNISONE 20 MG/1
20 TABLET ORAL DAILY
Status: DISCONTINUED | OUTPATIENT
Start: 2018-04-20 | End: 2018-04-20 | Stop reason: HOSPADM

## 2018-04-20 RX ORDER — GABAPENTIN 300 MG/1
600 CAPSULE ORAL EVERY 8 HOURS SCHEDULED
Status: DISCONTINUED | OUTPATIENT
Start: 2018-04-20 | End: 2018-04-20 | Stop reason: HOSPADM

## 2018-04-20 RX ORDER — PREDNISONE 20 MG/1
20 TABLET ORAL DAILY
Qty: 2 TABLET | Refills: 0 | Status: SHIPPED | OUTPATIENT
Start: 2018-04-21 | End: 2018-04-23

## 2018-04-20 RX ORDER — PREDNISONE 1 MG/1
5 TABLET ORAL DAILY
Status: DISCONTINUED | OUTPATIENT
Start: 2018-04-29 | End: 2018-04-20 | Stop reason: HOSPADM

## 2018-04-20 RX ORDER — PREDNISONE 1 MG/1
15 TABLET ORAL
Qty: 3 TABLET | Refills: 0 | Status: SHIPPED | OUTPATIENT
Start: 2018-04-23 | End: 2018-04-26

## 2018-04-20 RX ADMIN — PROMETHAZINE HYDROCHLORIDE 12.5 MG: 25 INJECTION INTRAMUSCULAR; INTRAVENOUS at 08:20

## 2018-04-20 RX ADMIN — BUDESONIDE AND FORMOTEROL FUMARATE DIHYDRATE 2 PUFF: 160; 4.5 AEROSOL RESPIRATORY (INHALATION) at 06:42

## 2018-04-20 RX ADMIN — HYDROMORPHONE HYDROCHLORIDE 1 MG: 2 INJECTION INTRAMUSCULAR; INTRAVENOUS; SUBCUTANEOUS at 05:15

## 2018-04-20 RX ADMIN — Medication 10 ML: at 08:21

## 2018-04-20 RX ADMIN — CLOPIDOGREL 75 MG: 75 TABLET, FILM COATED ORAL at 08:21

## 2018-04-20 RX ADMIN — ISOSORBIDE MONONITRATE 30 MG: 30 TABLET, EXTENDED RELEASE ORAL at 08:20

## 2018-04-20 RX ADMIN — METOPROLOL TARTRATE 25 MG: 25 TABLET, FILM COATED ORAL at 08:20

## 2018-04-20 RX ADMIN — HEPARIN SODIUM 5000 UNITS: 5000 INJECTION, SOLUTION INTRAVENOUS; SUBCUTANEOUS at 08:19

## 2018-04-20 RX ADMIN — PANTOPRAZOLE SODIUM 40 MG: 40 TABLET, DELAYED RELEASE ORAL at 14:55

## 2018-04-20 RX ADMIN — FAMOTIDINE 20 MG: 20 TABLET, FILM COATED ORAL at 08:21

## 2018-04-20 RX ADMIN — HYDROMORPHONE HYDROCHLORIDE 1 MG: 2 INJECTION INTRAMUSCULAR; INTRAVENOUS; SUBCUTANEOUS at 01:59

## 2018-04-20 RX ADMIN — HYDROMORPHONE HYDROCHLORIDE 1 MG: 2 INJECTION INTRAMUSCULAR; INTRAVENOUS; SUBCUTANEOUS at 08:19

## 2018-04-20 RX ADMIN — IPRATROPIUM BROMIDE 0.5 MG: 0.5 SOLUTION RESPIRATORY (INHALATION) at 06:42

## 2018-04-20 RX ADMIN — PREDNISONE 20 MG: 20 TABLET ORAL at 14:55

## 2018-04-20 RX ADMIN — HYDROMORPHONE HYDROCHLORIDE 1 MG: 2 INJECTION INTRAMUSCULAR; INTRAVENOUS; SUBCUTANEOUS at 12:10

## 2018-04-20 RX ADMIN — TIZANIDINE 4 MG: 4 TABLET ORAL at 00:44

## 2018-04-20 RX ADMIN — GABAPENTIN 600 MG: 300 CAPSULE ORAL at 12:10

## 2018-04-20 RX ADMIN — ASPIRIN 81 MG: 81 TABLET ORAL at 08:20

## 2018-04-20 RX ADMIN — LISINOPRIL 5 MG: 2.5 TABLET ORAL at 08:20

## 2018-04-20 RX ADMIN — ONDANSETRON 4 MG: 2 INJECTION INTRAMUSCULAR; INTRAVENOUS at 06:13

## 2018-04-20 NOTE — PLAN OF CARE
Problem: Patient Care Overview  Goal: Plan of Care Review  Outcome: Ongoing (interventions implemented as appropriate)   04/20/18 1517   Coping/Psychosocial   Plan of Care Reviewed With patient;daughter   Plan of Care Review   Progress no change    Pt participated in MBS w/ silent aspiration of thin liquids. D/w pt and pt's daughter, as well as RN and MD, results and recommendations w/ verbal understanding and agreement. MD verbalizes plan to order home health dysphagia tx w/ pt discharging home today.

## 2018-04-20 NOTE — MBS/VFSS/FEES
Acute Care - Speech Language Pathology   Swallow Initial Evaluation  Daniel   MODIFIED  BARIUM SWALLOW STUDY     Patient Name: Pepito Yan  : 1965  MRN: [id number]  Today's Date: 2018     Admit Date: 2018    Pepito Yan  presents to the radiology suite this pm from 3308/2S to participate in an instrumental MBS to evaluate safety/efficacy of swallowing fnx, determine safest/least restrictive diet. She is familiar to Speech Therapy Department for previous instrumental dysphagia evaluations w/ h/o oropharyngeal dysphagia. Most recent MBS noted 17 w/ no evidence of aspiration. Pt noted w/ aspiration of thin barium during esophagram today per radiologist.       Social History     Social History   • Marital status:      Spouse name: N/A   • Number of children: N/A   • Years of education: N/A     Occupational History   • Not on file.     Social History Main Topics   • Smoking status: Current Every Day Smoker     Packs/day: 0.50     Years: 35.00     Types: Cigarettes   • Smokeless tobacco: Never Used      Comment: 4-5 cigarettes a day    • Alcohol use No   • Drug use: No   • Sexual activity: Defer     Other Topics Concern   • Not on file     Social History Narrative   • No narrative on file      Chest CT PE 18 reveals small R effusion, no evidence of PE per radiologist.     Esophagram 18 reveals aspiration of thin barium, tertiary contractions and narrowing of the distal esophagus per radiologist.     Diet Orders (active)     Start     Ordered    18 1107  Diet Regular; Cardiac, Consistent Carbohydrate  Diet Effective Now      18 1106        Pt is observed on 3L O2 via NC w/o complications.     Risks and benefits of the procedure are explained w/ verbalizing understanding/agreement to participate.     Pt is positioned upright and centered in a supportive wheelchair to accept multiple po presentations of solid cracker, puree, honey thick, nectar thick, and  thin liquids via spoon, cup and straw. Pt is able to self feed.     All views are from the lateral plane.     Facial/oral structures are symmetrical upon observation w/o lingual deviation upon protrusion. Oral mucosa are moist, pink and clean. Secretions are clear, thin and well controlled. OROM/SALO is wfl to imitate oral postures. Gag is not assessed. Volitional cough is adequate in intensity, clear in quality, nonproductive. Vocal quality is adequate in intensity, clear in quality w/ intelligible speech. Pt is a/a and cooperative to particpate. She is oriented to person, place, and time, follows simple directives, and participates in simple conversational exchanges.     Upon po presentations, adequate bolus anticipation w/ good labial seal for bolus clearance via spoon bowl, cup rim stability and suction via straw. Bolus formation, manipulation, and control are wfl w/ rotary mastication pattern. A-p transit is timely w/o oral residue. Linguavelar seal is generally weak w/ premature spillage of puree consistency and all liquids consistencies. Silent aspiration of thin liquid via tsp before the swallow. No laryngeal penetration or aspiration is evidenced before the swallow.     Pharyngeal swallow is slightly delayed w/ slightly weak hyolaryngeal elevation and delayed epiglottic inversion. Pharyngeal contraction is mildly weak w/trace-mild diffuse pharyngeal residue w/ all consistencies, diminished w/ spontaneous consecutive swallow. Laryngeal penetration occurs during the swallow w/ puree, honey thick liquid via cup, nectar thick and thin liquids via all presentation styles. Deep laryngeal penetration during the swallow w/ large uncontrolled nectar thick liquid bolus, this deep laryngeal penetration is not elicited again w/ further repeat presentations. Silent aspiration of thin liquid occurs during and after the swallow via all presentation styles. No other laryngeal penetration or aspiration evidenced during or  after the swallow. No significant laryngeal residue w/ puree, honey or nectar thick liquids.     Full esophageal sweep reveals no obvious mucosal abnormalities. Motility is delayed w/ dilated appearance of the esophagus. No significant retrograde flow noted across this evaluation.      Impression: Per this evaluation, pt presents w/ wfl oral, mild-moderate pharyngeal dysphagia w/ silent aspiration of thin liquids only. She is felt to most benefit from modified po diet w/ nectar thick liquids and formal dysphagia tx. As pt is discharging home tentatively on this date, she requests home health services for dysphagia tx.      Recommendations:   1. Regular consistency, NECTAR thick liquids only.   2. Meds whole in puree.   3. Universal aspiration precautions.   4. Small bites and sips.   5. Upright and centered for all po intake.    6. AALIYAH precautions.   7. Oral care protocol.   8. Formal dysphagia tx via home health services upon discharge.     D/w pt and pt's daughter, at length, all results and recommendations w/ verbal understanding and agreement. Provided modified liquids training, sample instant thickener, instructional/educational handouts w/ pt and daughter in verbal understanding and agreement.     D/w RNBridgette, results and recommendations w/ verbal understanding and agreement.     D/w Dr. Trivedi results and recommendations w/ verbal understanding, she verbalizes plan to order home health dysphagia tx upon discharge.     Thank you for allowing me to participate in the care of your patient-  Kassy Cope M.A., CCC-SLP    Visit Dx:     ICD-10-CM ICD-9-CM   1. Chest pain, unspecified type R07.9 786.50   2. Angina, class IV I20.9 413.9     Patient Active Problem List   Diagnosis   • Intractable cyclical vomiting with nausea   • Chronic obstructive pulmonary emphysema   • Hepatitis-C   • Diabetes mellitus type 2 in nonobese   • Arthritis or polyarthritis, rheumatoid   • Anxiety   • Essential hypertension    • Coronary artery disease   • Buerger's disease   • ARDS (adult respiratory distress syndrome)   • Hypoxia   • Pyogenic arthritis of left elbow   • Chest pain in adult   • Pneumonia of left lower lobe due to infectious organism   • Chest pain not due to acute coronary syndrome   • Esophageal abnormality     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   • Chronic respiratory failure with hypoxia     at nighttime   • Compression fracture of lumbar vertebra    • Compression fracture of thoracic vertebra    • COPD (chronic obstructive pulmonary disease)    • Coronary artery disease    • Diabetes mellitus type 2 in nonobese    • Erosive esophagitis 12/2016   • Essential hypertension    • Gastroparesis    • GERD (gastroesophageal reflux disease)    • Hepatitis-C    • History of transfusion    • Medically noncompliant    • MRSA infection     back of neck around 2012; 6/2017 septic left elbow joint with concerns for osteomyelitis and left anteromedial left arm   • NSTEMI (non-ST elevated myocardial infarction) 07/2016   • PE (pulmonary embolism)    • Protein calorie malnutrition    • RA (rheumatoid arthritis)     with chronic steroid use   • Rheumatoid arthritis      Past Surgical History:   Procedure Laterality Date   • ABDOMINAL SURGERY     • AMPUTATION DIGIT Right 12/22/2016    Third digit to the PIP. Surgeon: Dr. Bains   • AMPUTATION DIGIT Left 12/22/2016    Procedure: PARTIAL AMPUTATION LEFT THIRD FINGER;  Surgeon: Shane Fuller MD;  Location: [facility] OR;  Service:    • AMPUTATION REVISION Left 12/22/2016    Procedure: AMPUTATION REVISION DIGIT STUMP;  Surgeon: Shane Fuller MD;  Location: [facility] OR;  Service:    • ARM DEBRIDEMENT Left 12/20/2016    Procedure: HAND IRRIGATION DEBRIDEMENT AND REPAIR ;  Surgeon: Shane Fuller MD;  Location: [facility] OR;  Service:    • CARDIAC CATHETERIZATION N/A 4/19/2018    Procedure: Left  Heart Cath;  Surgeon: Sang Grimm MD;  Location: [facility] CATH INVASIVE LOCATION;  Service: Cardiovascular   •  SECTION      x 2   • CHOLECYSTECTOMY     • ENDOSCOPY N/A 2016    Procedure: ESOPHAGOGASTRODUODENOSCOPY;  Surgeon: Jeremy Clement III, MD;  Location: [facility] OR;  Service:    • ENDOSCOPY W/ PEG TUBE PLACEMENT N/A 3/30/2017    Procedure: ESOPHAGOGASTRODUODENOSCOPY WITH PERCUTANEOUS ENDOSCOPIC GASTROSTOMY TUBE INSERTION;  Surgeon: Shade Campos MD;  Location: [facility] OR;  Service:    • FOREIGN BODY REMOVAL      insulin needle removal x 3 from the abdomen   • HYSTERECTOMY     • INCISION AND DRAINAGE ARM Left 2017    Procedure: INCISION AND DRAINAGE UPPER EXTREMITY;  Surgeon: Shane Fuller MD;  Location: [facility] OR;  Service:            EDUCATION  The patient has been educated in the following areas:   Dysphagia (Swallowing Impairment) Modified Diet Instruction.    SLP Recommendation and Plan                                        Plan of Care Reviewed With: patient, daughter  Plan of Care Review  Plan of Care Reviewed With: patient, daughter  Progress: no change             Time Calculation:       Therapy Charges for Today     Code Description Service Date Service Provider Modifiers Qty    86027672017 HC ST SWALLOWING CURRENT STATUS 2018 Kassy Cope MA,CCC-SLP GN, CI 1    28907709301 HC ST SWALLOWING PROJECTED 2018 Kassy Cope MA,CCC-SLP GN, CI 1    [id number] HC ST SWALLOWING DISCHARGE 2018 Kassy Cope MA,CCC-SLP GN, CI 1    [id number] HC ST MOTION FLUORO EVAL SWALLOW 8 2018 Kassy Cope MA,CCC-SLP GN 1          SLP G-Codes  SLP NOMS Used?: Yes  Functional Limitations: Swallowing  Swallow Current Status (): At least 1 percent but less than 20 percent impaired, limited or restricted  Swallow Goal Status (): At least 1 percent but less than 20 percent impaired, limited or  restricted  Swallow Discharge Status (): At least 1 percent but less than 20 percent impaired, limited or restricted    Kassy Cope MA,CCC-SLP  4/20/2018

## 2018-04-20 NOTE — PROGRESS NOTES
Discharge Planning Assessment   Daniel     Patient Name: Pepito Yan  MRN: [id number]  Today's Date: 4/20/2018    Admit Date: 4/16/2018          Discharge Needs Assessment    No documentation.           Discharge Plan     Row Name 04/20/18 1526       Plan    Final Discharge Disposition Code 06 - home with home health care    Final Note Pt to be discharged home on this date with Physician ordered home health services. Pt stated no preference for home health provider.   made referral to [facility] per Ailyn.  [facility] RN to call report prior to discharge.     Row Name 04/20/18 1440       Plan    Plan Pt admitted on 4/16/18. Pt lives at home with son and plans to return home at discharge.  Pt currently does not utilize home health services.  Pt currently utilizes home 02 and nebulizer.      Patient/Family in Agreement with Plan yes        Destination     No service coordination in this encounter.      Durable Medical Equipment     No service coordination in this encounter.      Dialysis/Infusion     No service coordination in this encounter.      Home Medical Care     No service coordination in this encounter.      Social Care     No service coordination in this encounter.        Expected Discharge Date and Time     Expected Discharge Date Expected Discharge Time    Apr 20, 2018               Demographic Summary    No documentation.           Functional Status    No documentation.           Psychosocial    No documentation.           Abuse/Neglect    No documentation.           Legal    No documentation.           Substance Abuse    No documentation.           Patient Forms    No documentation.         Mery Montiel

## 2018-04-20 NOTE — PROGRESS NOTES
Discharge Planning Assessment   Daniel     Patient Name: Pepito Yan  MRN: [id number]  Today's Date: 4/20/2018    Admit Date: 4/16/2018          Discharge Needs Assessment    No documentation.           Discharge Plan     Row Name 04/20/18 1440       Plan    Plan Pt admitted on 4/16/18. Pt lives at home with son and plans to return home at discharge.  Pt currently does not utilize home health services.  Pt currently utilizes home 02 and nebulizer.      Patient/Family in Agreement with Plan yes        Destination     No service coordination in this encounter.      Durable Medical Equipment     No service coordination in this encounter.      Dialysis/Infusion     No service coordination in this encounter.      Home Medical Care     No service coordination in this encounter.      Social Care     No service coordination in this encounter.        Expected Discharge Date and Time     Expected Discharge Date Expected Discharge Time    Apr 21, 2018               Demographic Summary    No documentation.           Functional Status    No documentation.           Psychosocial    No documentation.           Abuse/Neglect    No documentation.           Legal    No documentation.           Substance Abuse    No documentation.           Patient Forms    No documentation.         Mery Montiel

## 2018-04-20 NOTE — DISCHARGE PLACEMENT REQUEST
"Yessica Yan (52 y.o. Female)     Date of Birth Social Security Number Address Home Phone MRN    1965  [id number]  [facility], [place] [address] [phone] [id number]    Roman [employer] Marital Status          [employer]        Admission Date Admission Type Admitting Provider Attending Provider Department, Room/Bed    4/16/18 Emergency Alysa Trivedi MD Ash-Bernal, Rachel, MD [address], [address]S    Discharge Date Discharge Disposition Discharge Destination         Home or Self Care              Attending Provider:  Alysa Trivedi MD    Allergies:  Penicillins, Toradol [Ketorolac Tromethamine], Tramadol    Isolation:  Contact   Infection:  ESBL E coli (02/07/18)   Code Status:  FULL    Ht:  160 cm (63\")   Wt:  61.6 kg (135 lb 12.8 oz)    Admission Cmt:  None   Principal Problem:  Esophageal abnormality [K22.9] More...                 Active Insurance as of 4/16/2018     Primary Coverage     Payor Plan Insurance Group Employer/Plan Group    MEDICARE MEDICARE A & B      Payor Plan Address Payor Plan Phone Number Effective From Effective To    [address] [phone] 2/1/2005     [place] [address]       Subscriber Name Subscriber Birth Date Member ID       YESSICA YAN 1965 [id number]           Secondary Coverage     Payor Plan Insurance Group Employer/Plan Group    [employer] WELL[place]ARE [place]ICAID      Payor Plan Address Payor Plan Phone Number Effective From Effective To    [address] [address] [phone] 1/1/2016     [place] [address]       Subscriber Name Subscriber Birth Date Member ID       YESSICA YAN 1965 [id number]                 Emergency Contacts      (Rel.) Home Phone Work Phone Mobile Phone    Scot Vaughn (Son) [phone] -- --    Jacy Cody (Other) [phone] -- --            Emergency Contact Information     Name Relation Home Work Mobile    Scot Vaughn Son [phone]      Jacy Cody Other [phone]      "       Insurance Information                MEDICARE/MEDICARE A & B Phone: [phone]    Subscriber: Yruiy Isidrarosalio TOLEDO Subscriber#: [id number]    Group#:  Precert#:         [facility]/WELLCARE MEDICAID Phone: [phone]    Subscriber: Yuriy Pepito TRE Subscriber#: [id number]    Group#:  Precert#:           Problem List           Codes Noted - Resolved       Hospital    Chest pain not due to acute coronary syndrome ICD-10-CM: R07.89  ICD-9-CM: 786.59 4/16/2018 - Present    * (Principal)Esophageal abnormality ICD-10-CM: K22.9  ICD-9-CM: 530.9 4/16/2018 - Present       Non-Hospital    Pneumonia of left lower lobe due to infectious organism ICD-10-CM: J18.1  ICD-9-CM: 486 1/19/2018 - Present    Chest pain in adult ICD-10-CM: R07.9  ICD-9-CM: 786.50 7/23/2017 - Present    Pyogenic arthritis of left elbow ICD-10-CM: M00.9  ICD-9-CM: 711.02 6/24/2017 - Present    ARDS (adult respiratory distress syndrome) ICD-10-CM: J80  ICD-9-CM: 518.52 3/7/2017 - Present    Hypoxia ICD-10-CM: R09.02  ICD-9-CM: 799.02 3/7/2017 - Present    Chronic obstructive pulmonary emphysema ICD-10-CM: J43.9  ICD-9-CM: 492.8 2/6/2017 - Present    Hepatitis-C ICD-10-CM: B19.20  ICD-9-CM: 070.70 2/6/2017 - Present    Diabetes mellitus type 2 in nonobese ICD-10-CM: E11.9  ICD-9-CM: 250.00 2/6/2017 - Present    Arthritis or polyarthritis, rheumatoid ICD-10-CM: M06.9  ICD-9-CM: 714.0 2/6/2017 - Present    Anxiety ICD-10-CM: F41.9  ICD-9-CM: 300.00 2/6/2017 - Present    Essential hypertension ICD-10-CM: I10  ICD-9-CM: 401.9 2/6/2017 - Present    Coronary artery disease ICD-10-CM: I25.10  ICD-9-CM: 414.00 2/6/2017 - Present    Buerger's disease ICD-10-CM: I73.1  ICD-9-CM: 443.1 2/6/2017 - Present    Intractable cyclical vomiting with nausea ICD-10-CM: G43.A1  ICD-9-CM: 536.2 12/18/2016 - Present             History & Physical      Alysa Trivedi MD at 4/16/2018  9:09 PM              [facility]    Patient Identification:  Name:  Pepito Yan  Age:  52 y.o.  Sex:  female  :  1965  MRN:  [id number]   Visit Number:  [id number]  Primary Care Physician:  ROSA Love    Date of admission: 2018    Length of stay:  0     Chief complaint: Chest pain    History of presenting illness:  52 y.o. female 52-year-old female with an episode of substernal chest pain that radiated to her left shoulder and between her shoulder blades.  She had associated shortness of breath and nausea and vomiting.  She describes the pain as a aching like sensation 8 out of 10 on the pain scale in severity chest pain was relieved with nitroglycerin and aspirin given to her in the emergency department.    The patient has a history of an abnormal stress test in 2017 as described below.  She has known history of coronary artery disease, non-STEMI in 2016, and Buerger's disease.    The patient is admitted to the medicine floor for further evaluation and treatment of chest pain.          ---------------------------------------------------------------------------------------------------------------------   Review of Systems   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   • Chronic respiratory failure with hypoxia     at nighttime   • Compression fracture of lumbar vertebra    • Compression fracture of thoracic vertebra    • COPD (chronic obstructive pulmonary disease)    • Coronary artery disease    • Diabetes mellitus type 2 in nonobese    • Erosive esophagitis 2016   • Essential hypertension    • Gastroparesis    • GERD (gastroesophageal reflux disease)    • Hepatitis-C    • History of transfusion    • Medically noncompliant    • MRSA infection     back of neck around ; 2017 septic left elbow joint with concerns for  osteomyelitis and left anteromedial left arm   • NSTEMI (non-ST elevated myocardial infarction) 2016   • PE (pulmonary embolism)    • Protein calorie malnutrition    • RA (rheumatoid arthritis)     with chronic steroid use   • Rheumatoid arthritis      Past Surgical History:   Procedure Laterality Date   • ABDOMINAL SURGERY     • AMPUTATION DIGIT Right 2016    Third digit to the PIP. Surgeon: Dr. Bains   • AMPUTATION DIGIT Left 2016    Procedure: PARTIAL AMPUTATION LEFT THIRD FINGER;  Surgeon: Shane Fuller MD;  Location: [facility] OR;  Service:    • AMPUTATION REVISION Left 2016    Procedure: AMPUTATION REVISION DIGIT STUMP;  Surgeon: Shane Fuller MD;  Location: [facility] OR;  Service:    • ARM DEBRIDEMENT Left 2016    Procedure: HAND IRRIGATION DEBRIDEMENT AND REPAIR ;  Surgeon: Shane Fuller MD;  Location: [facility] OR;  Service:    •  SECTION      x 2   • CHOLECYSTECTOMY     • ENDOSCOPY N/A 2016    Procedure: ESOPHAGOGASTRODUODENOSCOPY;  Surgeon: Jeremy Clement III, MD;  Location: [facility] OR;  Service:    • ENDOSCOPY W/ PEG TUBE PLACEMENT N/A 3/30/2017    Procedure: ESOPHAGOGASTRODUODENOSCOPY WITH PERCUTANEOUS ENDOSCOPIC GASTROSTOMY TUBE INSERTION;  Surgeon: Shade Campos MD;  Location: [facility] OR;  Service:    • FOREIGN BODY REMOVAL      insulin needle removal x 3 from the abdomen   • HYSTERECTOMY     • INCISION AND DRAINAGE ARM Left 2017    Procedure: INCISION AND DRAINAGE UPPER EXTREMITY;  Surgeon: Shane Fuller MD;  Location: [facility] OR;  Service:      Family History   Problem Relation Age of Onset   • Colon cancer Mother    • Cancer Father    • Diabetes Maternal Grandmother    • Heart disease Neg Hx    • Stroke Neg Hx      Social History     Social History   • Marital status:      Social History Main Topics   • Smoking status: Current Every Day Smoker     Packs/day: 0.50     Years: 35.00     Types: Cigarettes   •  Smokeless tobacco: Never Used      Comment: 4-5 cigarettes a day    • Alcohol use No   • Drug use: No   • Sexual activity: Defer     Other Topics Concern   • Not on file     ---------------------------------------------------------------------------------------------------------------------   Allergies:  Penicillins; Toradol [ketorolac tromethamine]; and Tramadol  ---------------------------------------------------------------------------------------------------------------------   Prior to Admission Medications     Prescriptions Last Dose Informant Patient Reported? Taking?    aspirin 325 MG tablet 4/15/2018 Pharmacy Yes Yes    Take 325 mg by mouth Daily.    budesonide-formoterol (SYMBICORT) 160-4.5 MCG/ACT inhaler 4/15/2018 Pharmacy Yes Yes    Inhale 2 puffs 2 (Two) Times a Day.    gabapentin (NEURONTIN) 600 MG tablet 4/15/2018 Pharmacy Yes Yes    Take 600 mg by mouth 3 (Three) Times a Day.    insulin aspart (novoLOG) 100 UNIT/ML injection 4/15/2018 Pharmacy Yes Yes    Inject 10 Units under the skin 3 (Three) Times a Day With Meals.    insulin glargine (LANTUS) 100 UNIT/ML injection 4/15/2018 Pharmacy Yes Yes    Inject 45 Units under the skin Daily.    isosorbide mononitrate (IMDUR) 30 MG 24 hr tablet 4/15/2018 Pharmacy Yes Yes    Take 30 mg by mouth Daily.    lisinopril (PRINIVIL,ZESTRIL) 5 MG tablet 4/15/2018 Pharmacy Yes Yes    Take 5 mg by mouth Daily.    metFORMIN (GLUCOPHAGE) 500 MG tablet 4/15/2018 Pharmacy Yes Yes    Take 500 mg by mouth 3 (Three) Times a Day With Meals.    predniSONE (DELTASONE) 20 MG tablet 4/15/2018 Pharmacy Yes Yes    Take 20 mg by mouth Daily.    sucralfate (CARAFATE) 1 g tablet 4/15/2018 Pharmacy Yes Yes    Take 1 g by mouth 4 (Four) Times a Day As Needed.    promethazine (PHENERGAN) 25 MG tablet Unknown Pharmacy Yes No    Take 25 mg by mouth Every 6 (Six) Hours As Needed for Nausea or Vomiting.    tiZANidine (ZANAFLEX) 4 MG tablet Unknown Pharmacy Yes No    Take 4 mg by mouth Every  8 (Eight) Hours As Needed for Muscle Spasms.        ---------------------------------------------------------------------------------------------------------------------   Vital Signs:  Temp:  [97.5 °F (36.4 °C)-98.1 °F (36.7 °C)] 97.5 °F (36.4 °C)  Heart Rate:  [] 108  Resp:  [20-22] 22  BP: (120-188)/() 150/90  Body mass index is 24.18 kg/m².  I/O last 3 completed shifts:  In: 500 [IV Piggyback:500]  Out: -   1    04/16/18  1202 04/16/18 1907   Weight: 49.4 kg (109 lb) 61.9 kg (136 lb 8 oz)     ---------------------------------------------------------------------------------------------------------------------     Physical exam:  Constitutional:  Well-developed and well-nourished.  No respiratory distress.    Patient appears older than stated age.  HENT:  Head:  Normocephalic and atraumatic.  Mouth:  Moist mucous membranes.    Eyes:  Conjunctivae normal with no scleral icterus.   Pupils are equal, round, and reactive to light and accomodation.   Neck:  Neck supple.  No JVD present.  No hepatojugular reflux.  Cardiovascular:  Normal rate, regular rhythm and normal heart sounds with no murmurs, gallops or rubs.  No reproducible chest pain on palpation of the chest wall.  Pulmonary/Chest:  No respiratory distress, wheezes, crackles.  Normal breath sounds all lung fields. No use of accessory muscles, normal diaphragmatic movement.  Abdominal:  Soft, no distension or tenderness. No hepato- or spleno- megaly. Normal bowel sounds in all 4 quadrants.    Musculoskeletal:  No edema, tenderness or deformity with no erythema.  Neurological:  Alert and oriented to person, place, and time.  No focal neurologic deficits.  CN II-XII grossly intact.   No tongue deviation, facial droop, slurred speech.   Psychiatric: Normal mood.    Skin:  Skin is warm and dry. No rash noted. No pallor.  Negative skin tenting.   Peripheral vascular:  1+ pulses in all 4 extremities with no clubbing, cyanosis,  edema.    ---------------------------------------------------------------------------------------------------------------------    EKG:   I have personally reviewd the EKG and my impression is: Sinus tachycardia, RSR prime in V1, left atrial enlargement, left ventricular axis deviation.    Telemetry:  I have personally reviewd the telemetry data for the last 24 hours and my impression is: Normal sinus rhythm    ---------------------------------------------------------------------------------------------------------------------     Results from last 7 days  Lab Units 04/16/18  1314   WBC 10*3/mm3 11.20   HEMOGLOBIN g/dL 11.5*   HEMATOCRIT % 39.2   MCV fL 78.9*   MCHC g/dL 29.3*   PLATELETS 10*3/mm3 352   INR  0.96           Results from last 7 days  Lab Units 04/16/18  1314   SODIUM mmol/L 136   POTASSIUM mmol/L 4.6   CHLORIDE mmol/L 106   CO2 mmol/L 20.5*   BUN mg/dL 14   CREATININE mg/dL 0.86   EGFR IF NONAFRICN AM mL/min/1.73 69   CALCIUM mg/dL 9.3   GLUCOSE mg/dL 447*   ALBUMIN g/dL 3.50   BILIRUBIN mg/dL 0.2   ALK PHOS U/L 62   AST (SGOT) U/L 56*   ALT (SGPT) U/L 71*   Estimated Creatinine Clearance: 74.8 mL/min (by C-G formula based on SCr of 0.86 mg/dL).  No results found for: AMMONIA    Results from last 7 days  Lab Units 04/16/18  1920 04/16/18  1314   CK TOTAL U/L 41  --    TROPONIN I ng/mL 0.021 0.018   CK MB INDEX % 3.3*  --          Lab Results   Component Value Date    HGBA1C 10.60 (H) 01/19/2018     Lab Results   Component Value Date    TSH 1.727 01/19/2018    FREET4 0.62 (L) 03/06/2017     No results found for: PREGTESTUR, PREGSERUM, HCG, HCGQUANT  Pain Management Panel     Pain Management Panel Latest Ref Rng & Units 4/8/2018 7/22/2017    AMPHETAMINES SCREEN, URINE Negative Negative Negative    BARBITURATES SCREEN Negative Negative Negative    BENZODIAZEPINE SCREEN, URINE Negative Negative Negative    BUPRENORPHINE Negative Negative Negative    COCAINE SCREEN, URINE Negative Negative Negative     METHADONE SCREEN, URINE Negative Negative Negative                          I have personally reviewed the labs described above.  ---------------------------------------------------------------------------------------------------------------------  Imaging Results (last 7 days)     Procedure Component Value Units Date/Time    CT Chest Pulmonary Embolism With Contrast [[id number]] Collected:  04/16/18 1533     Updated:  04/16/18 1537    Narrative:       CT CHEST PULMONARY EMBOLISM W CONTRAST-     CLINICAL INDICATION: sob, chest pain          COMPARISON: 02/04/2018      PROCEDURE: Thin cut axial images were acquired through the pulmonary  vessels during the rapid infusion of IV contrast.     Additional 3-D reformatted images obtained via post-processing for  improved diagnostic accuracy and procedural planning.     Radiation dose reduction techniques were utilized per ALARA protocol.  Automated exposure control was initiated through either or [employer] or  DoseRight software packages by  protocol.           FINDINGS: Today's study demonstrates opacification of the central  pulmonary vessels.   There are no filling defects.   There is no truncation.     No evidence of a pulmonary embolus.     Otherwise there are no parenchymal soft tissue nodules or masses.     Single enlarged 1.4 cm aorticopulmonary window lymph node  There is a 9 mm lymph node in the right axilla     Small right effusion. Previously identified right basilar consolidation  however has cleared       Impression:       1. No evidence of a pulmonary embolus  2. Single enlarged aorticopulmonary window lymph node  3. Small right pleural effusion.     This report was finalized on 4/16/2018 3:35 PM by Dr. Daniel Yates MD.       XR Chest 1 View [[id number]] Collected:  04/16/18 1353     Updated:  04/16/18 1420    Narrative:       XR CHEST 1 VW-     CLINICAL INDICATION: Chest pain protocol          COMPARISON: 04/09/2018      TECHNIQUE: Single frontal  view of the chest.     FINDINGS:     There is no focal alveolar infiltrate or effusion.  The cardiac silhouette is normal. The pulmonary vasculature is  unremarkable.  There is no evidence of an acute osseous abnormality.   There are no suspicious-appearing parenchymal soft tissue nodules.            Impression:       No evidence of active or acute cardiopulmonary disease on today's chest  radiograph.         This report was finalized on 4/16/2018 1:53 PM by Dr. Daniel Yates MD.             I have personally reviewed the radiology images and read the final radiology report.    ---------------------------------------------------------------------------------------------------------------------      Results for orders placed during the hospital encounter of 07/22/17   Adult Transthoracic Echo Complete    Narrative · The study is technically good for diagnosis.  · Left ventricular systolic function is low normal. Estimated EF = 45%.  · Left ventricular diastolic dysfunction (grade II) consistent with   pseudonormalization.  · There are no significant valvular abnormalities noted.  · There is a trivial pericardial effusion.     Compared to the study of 3/3/2017, there are no significant changes.           Assessment:    1.  Chest pain    2.  Prior abnormal nuclear stress test on 7/25/2017 as described above    3.  Systolic heart failure LVEF 42% on nuclear stress test and 45% on echocardiogram    4.  History of non-STEMI and coronary artery disease    5.  History of Buerger's disease      Plan:     1.  Admit to the medicine floor, maintain on telemetry to monitor for arrhythmias.  Start aspirin, statin, beta blocker, Nitropaste, supplemental oxygen.  Check serial cardiac enzymes to evaluate for myocardial injury.  EKG does not show ischemia or injury pattern.  If serial cardiac enzymes are negative plan on Lexiscan nuclear stress test in the morning.    2.  History of systolic heart failure.  Re-evaluate LVEF with  "nuclear stress testing.    3.  DVT prophylaxis with subcutaneous heparin.      Total face to face time with this patient, time spent reviewing electronic medial records, laboratory & imaging studies and time spent in coordination of care with the Emergency Physician and nursing staff was 65 minutes.    Alysa Trivedi MD  04/16/18  9:09 PM    Electronically signed by Alysa Trivedi MD at 4/16/2018  9:18 PM       Hospital Medications (active)       Dose Frequency Start End    acetaminophen (TYLENOL) tablet 650 mg 650 mg Every 4 Hours PRN 4/16/2018     Sig - Route: Take 2 tablets by mouth Every 4 (Four) Hours As Needed for Mild Pain . - Oral    aspirin chewable tablet 324 mg 324 mg Once 4/16/2018     Sig - Route: Chew 4 tablets 1 (One) Time. - Oral    Linked Group 1:  \"And\" Linked Group Details        aspirin EC tablet 81 mg 81 mg Daily 4/17/2018     Sig - Route: Take 1 tablet by mouth Daily. - Oral    Linked Group 1:  \"And\" Linked Group Details        budesonide-formoterol (SYMBICORT) 160-4.5 MCG/ACT inhaler 2 puff 2 puff 2 Times Daily - RT 4/17/2018     Sig - Route: Inhale 2 puffs 2 (Two) Times a Day. - Inhalation    clopidogrel (PLAVIX) tablet 75 mg 75 mg Daily 4/16/2018     Sig - Route: Take 1 tablet by mouth Daily. - Oral    dextrose (D50W) solution 25 g 25 g Every 15 Minutes PRN 4/18/2018     Sig - Route: Infuse 50 mL into a venous catheter Every 15 (Fifteen) Minutes As Needed for Low Blood Sugar (Blood Sugar Less Than 70). - Intravenous    Cosign for Ordering: Accepted by Alysa Trivedi MD on 4/19/2018 10:29 AM    dextrose (GLUTOSE) oral gel 15 g 15 g Every 15 Minutes PRN 4/18/2018     Sig - Route: Take 15 g by mouth Every 15 (Fifteen) Minutes As Needed for Low Blood Sugar (Blood sugar less than 70). - Oral    Cosign for Ordering: Accepted by Alysa Trivedi MD on 4/19/2018 10:29 AM    famotidine (PEPCID) tablet 20 mg 20 mg Daily 4/18/2018     Sig - Route: Take 1 tablet by mouth Daily. - Oral    " Cosign for Ordering: Accepted by Alysa Trivedi MD on 4/19/2018 10:29 AM    furosemide (LASIX) injection 40 mg 40 mg Every 12 Hours 4/17/2018     Sig - Route: Infuse 4 mL into a venous catheter Every 12 (Twelve) Hours. - Intravenous    gabapentin (NEURONTIN) capsule 600 mg 600 mg Every 8 Hours Scheduled 4/17/2018     Sig - Route: Take 2 capsules by mouth Every 8 (Eight) Hours. - Oral    gabapentin (NEURONTIN) capsule 600 mg 600 mg Every 8 Hours Scheduled 4/20/2018     Sig - Route: Take 2 capsules by mouth Every 8 (Eight) Hours. - Oral    glucagon (human recombinant) (GLUCAGEN DIAGNOSTIC) injection 1 mg 1 mg As Needed 4/18/2018     Sig - Route: Inject 1 mg under the skin As Needed (Blood Glucose Less Than 70). - Subcutaneous    Cosign for Ordering: Accepted by Alysa Trivedi MD on 4/19/2018 10:29 AM    heparin (porcine) 5000 UNIT/ML injection 5,000 Units 5,000 Units Every 12 Hours Scheduled 4/16/2018     Sig - Route: Inject 1 mL under the skin Every 12 (Twelve) Hours. - Subcutaneous    HYDROmorphone (DILAUDID) injection 1 mg 1 mg Every 2 Hours PRN 4/19/2018 4/29/2018    Sig - Route: Infuse 0.5 mL into a venous catheter Every 2 (Two) Hours As Needed for Severe Pain . - Intravenous    insulin aspart (novoLOG) injection 0-24 Units 0-24 Units 4 Times Daily Before Meals & Nightly 4/18/2018     Sig - Route: Inject 0-24 Units under the skin 4 (Four) Times a Day Before Meals & at Bedtime. - Subcutaneous    Cosign for Ordering: Accepted by Alysa Trivedi MD on 4/19/2018 10:29 AM    insulin aspart (novoLOG) injection 10 Units 10 Units 3 Times Daily With Meals 4/18/2018     Sig - Route: Inject 10 Units under the skin 3 (Three) Times a Day With Meals. - Subcutaneous    Cosign for Ordering: Accepted by Alysa Trivedi MD on 4/19/2018 10:29 AM    insulin detemir (LEVEMIR) injection 45 Units 45 Units Daily 4/19/2018     Sig - Route: Inject 45 Units under the skin Daily. - Subcutaneous    Cosign for Ordering: Accepted  "by Alysa Trivedi MD on 4/19/2018 10:29 AM    ipratropium (ATROVENT) nebulizer solution 0.5 mg 0.5 mg Every 6 Hours PRN 4/18/2018     Sig - Route: Take 2.5 mL by nebulization Every 6 (Six) Hours As Needed for Wheezing or Shortness of Air. - Nebulization    isosorbide mononitrate (IMDUR) 24 hr tablet 30 mg 30 mg Daily 4/17/2018     Sig - Route: Take 1 tablet by mouth Daily. - Oral    lisinopril (PRINIVIL,ZESTRIL) tablet 5 mg 5 mg Daily 4/17/2018     Sig - Route: Take 2 tablets by mouth Daily. - Oral    metoprolol tartrate (LOPRESSOR) tablet 25 mg 25 mg Every 12 Hours Scheduled 4/16/2018     Sig - Route: Take 1 tablet by mouth Every 12 (Twelve) Hours. - Oral    nitroglycerin (NITROSTAT) SL tablet 0.4 mg 0.4 mg Every 5 Minutes PRN 4/17/2018     Sig - Route: Place 1 tablet under the tongue Every 5 (Five) Minutes As Needed for Chest Pain (if systolic BP greater than 100 mm/Hg.). - Sublingual    ondansetron (ZOFRAN) injection 4 mg 4 mg Once 4/19/2018 4/19/2018    Sig - Route: Infuse 2 mL into a venous catheter 1 (One) Time. - Intravenous    ondansetron (ZOFRAN) injection 4 mg 4 mg Once 4/20/2018 4/20/2018    Sig - Route: Infuse 2 mL into a venous catheter 1 (One) Time. - Intravenous    pantoprazole (PROTONIX) EC tablet 40 mg 40 mg Every Morning 4/20/2018     Sig - Route: Take 1 tablet by mouth Every Morning. - Oral    predniSONE (DELTASONE) tablet 10 mg 10 mg Daily 4/26/2018 4/29/2018    Sig - Route: Take 1 tablet by mouth Daily. - Oral    Linked Group 2:  \"Followed by\" Linked Group Details        predniSONE (DELTASONE) tablet 15 mg 15 mg Daily With Breakfast 4/23/2018 4/26/2018    Sig - Route: Take 15 mg by mouth Daily With Breakfast. - Oral    Linked Group 2:  \"Followed by\" Linked Group Details        predniSONE (DELTASONE) tablet 20 mg 20 mg Daily 4/20/2018 4/23/2018    Sig - Route: Take 1 tablet by mouth Daily. - Oral    Linked Group 2:  \"Followed by\" Linked Group Details        predniSONE (DELTASONE) tablet 5 mg " "5 mg Daily 4/29/2018 5/2/2018    Sig - Route: Take 1 tablet by mouth Daily. - Oral    Linked Group 2:  \"Followed by\" Linked Group Details        promethazine (PHENERGAN) 12.5 mg in sodium chloride 0.9 % 50 mL 12.5 mg Every 6 Hours PRN 4/18/2018     Sig - Route: Infuse 12.5 mg into a venous catheter Every 6 (Six) Hours As Needed for Nausea or Vomiting. - Intravenous    Cosign for Ordering: Accepted by Alysa Trivedi MD on 4/19/2018 10:29 AM    Linked Group 3:  \"Or\" Linked Group Details        promethazine (PHENERGAN) tablet 25 mg 25 mg Every 6 Hours PRN 4/18/2018     Sig - Route: Take 1 tablet by mouth Every 6 (Six) Hours As Needed for Nausea or Vomiting. - Oral    Cosign for Ordering: Accepted by Alysa Trivedi MD on 4/19/2018 10:29 AM    Linked Group 3:  \"Or\" Linked Group Details        rosuvastatin (CRESTOR) tablet 20 mg 20 mg Nightly 4/16/2018     Sig - Route: Take 1 tablet by mouth Every Night. - Oral    sodium chloride 0.9 % flush 1-10 mL 1-10 mL As Needed 4/16/2018     Sig - Route: Infuse 1-10 mL into a venous catheter As Needed for Line Care. - Intravenous    sodium chloride 0.9 % flush 10 mL 10 mL As Needed 4/16/2018     Sig - Route: Infuse 10 mL into a venous catheter As Needed for Line Care. - Intravenous    Cosign for Ordering: Accepted by Hubert Geller MD on 4/16/2018  6:32 PM    sodium chloride 0.9 % flush 10 mL 10 mL Every 12 Hours Scheduled 4/16/2018     Sig - Route: 10 mL by Intracatheter route Every 12 (Twelve) Hours. - Intracatheter    sodium chloride 0.9 % flush 10 mL 10 mL As Needed 4/16/2018     Sig - Route: 10 mL by Intracatheter route As Needed for Line Care (After Medication Administration or Blood Draw). - Intracatheter    sodium chloride 0.9 % infusion 100 mL/hr Continuous 4/19/2018 4/19/2018    Sig - Route: Infuse 100 mL/hr into a venous catheter Continuous. - Intravenous    sucralfate (CARAFATE) tablet 1 g 1 g 4 Times Daily PRN 4/17/2018     Sig - Route: Take 1 tablet by " mouth 4 (Four) Times a Day As Needed (indigestion). - Oral    tiZANidine (ZANAFLEX) tablet 4 mg 4 mg Every 8 Hours PRN 4/17/2018     Sig - Route: Take 1 tablet by mouth Every 8 (Eight) Hours As Needed for Muscle Spasms. - Oral    HYDROmorphone (DILAUDID) injection 1 mg (Discontinued) 1 mg Every 2 Hours PRN 4/19/2018 4/19/2018    Sig - Route: Infuse 1 mL into a venous catheter Every 2 (Two) Hours As Needed for Severe Pain . - Intravenous    predniSONE (DELTASONE) tablet 20 mg (Discontinued) 20 mg Daily 4/17/2018 4/19/2018    Sig - Route: Take 1 tablet by mouth Daily. - Oral            Lab Results (last 24 hours)     Procedure Component Value Units Date/Time    POC Glucose Once [[id number]]  (Normal) Collected:  04/20/18 1157    Specimen:  Blood Updated:  04/20/18 1211     Glucose 108 mg/dL     Narrative:       Meter: [id number] : [id number] BRENDA LAMBERT    POC Glucose Once [[id number]]  (Normal) Collected:  04/20/18 0711    Specimen:  Blood Updated:  04/20/18 0727     Glucose 121 mg/dL     Narrative:       Meter: [id number] : [id number] BRENDA LAMBERT    Troponin [[id number]]  (Normal) Collected:  04/20/18 0530    Specimen:  Blood Updated:  04/20/18 0643     Troponin I 0.013 ng/mL     Narrative:       Ultra Troponin I Reference Range:         <=0.039 ng/mL: Negative    0.04-0.779 ng/mL: Indeterminate Range. Suspicious of MI.  Clinical correlation required.       >=0.78  ng/mL: Consistent with myocardial injury.  Clinical correlation required.    POC Glucose Once [[id number]]  (Abnormal) Collected:  04/19/18 1954    Specimen:  Blood Updated:  04/19/18 2000     Glucose 154 (H) mg/dL     Narrative:       Meter: [id number] : [id number] pawel childs    POC Glucose Once [[id number]]  (Abnormal) Collected:  04/19/18 1641    Specimen:  Blood Updated:  04/19/18 1655     Glucose 141 (H) mg/dL     Narrative:       Meter: [id number] : [id number] Brown Pao        Operative/Procedure Notes (last 24 hours) (Notes from  2018  3:21 PM through 2018  3:21 PM)     No notes of this type exist for this encounter.           Physician Progress Notes (last 24 hours) (Notes from 2018  3:21 PM through 2018  3:21 PM)      Alysa Trivedi MD at 2018  9:11 PM              [facility]     Patient Identification:  Name:  Pepito Yan  Age:  52 y.o.  Sex:  female  :  1965  MRN:  [id number]  Visit Number:  [id number]  Primary Care Provider:  Archana Hare APRN    Date of admission: 2018    Length of stay:  1    Chief complaint:  Chest pain continues    HPI:  52-year-old status post left heart catheterization today which showed mild LV systolic dysfunction LVEF 45% and essentially normal coronary arteries.  ----------------------------------------------------------------------------------------------------------------------  Current Hospital Meds:    aspirin 324 mg Oral Once   And      aspirin 81 mg Oral Daily   budesonide-formoterol 2 puff Inhalation BID - RT   clopidogrel 75 mg Oral Daily   famotidine 20 mg Oral Daily   furosemide 40 mg Intravenous Q12H   gabapentin 600 mg Oral Q8H   heparin (porcine) 5,000 Units Subcutaneous Q12H   insulin aspart 0-24 Units Subcutaneous 4x Daily AC & at Bedtime   insulin aspart 10 Units Subcutaneous TID With Meals   insulin detemir 45 Units Subcutaneous Daily   isosorbide mononitrate 30 mg Oral Daily   lisinopril 5 mg Oral Daily   metoprolol tartrate 25 mg Oral Q12H   predniSONE 20 mg Oral Daily   rosuvastatin 20 mg Oral Nightly   sodium chloride 10 mL Intracatheter Q12H        ----------------------------------------------------------------------------------------------------------------------  Vital Signs:  Temp:  [97.3 °F (36.3 °C)-98.6 °F (37 °C)] 98.5 °F (36.9 °C)  Heart Rate:  [] 105  Resp:  [16-20] 20  BP: (109-138)/(68-86) 137/80  1    18  1907 18  0317 18  0322   Weight: 61.9 kg (136 lb 8 oz) 61.9  kg (136 lb 8 oz) 61.6 kg (135 lb 11.2 oz)     Body mass index is 24.04 kg/m².    Intake/Output Summary (Last 24 hours) at 04/19/18 2111  Last data filed at 04/19/18 1902   Gross per 24 hour   Intake          1273.13 ml   Output             2550 ml   Net         -1276.87 ml     Diet Regular; Consistent Carbohydrate, Cardiac  ----------------------------------------------------------------------------------------------------------------------  Physical exam:  Constitutional:  Appears older than stated age  No respiratory distress.      HENT:  Head:  Normocephalic and atraumatic.  Mouth:  Moist mucous membranes.    Eyes:  Conjunctivae within normal limits.  Pupils are equal, round, and reactive to light and accomodation.  No scleral icterus.    Neck:  Neck supple.  No JVD present.    Cardiovascular:  Normal rate, regular rhythm and normal heart sounds with no murmurs, gallops or rubs.  Pulmonary/Chest:  No respiratory distress, no wheezes, no crackles, with normal breath sounds and good air movement.  Abdominal:  Soft.  Bowel sounds are normal.  No distension and no tenderness. No hepato- or spleno- megaly.  No hepatojugular reflux.  Musculoskeletal:  No edema, no tenderness, and no deformity.  No red or swollen joints anywhere.    Neurological:  Alert and oriented to person, place, and time.  No focal neurologic deficits.  CN II-XII grossly intact.   No tongue deviation.  No facial droop.  No slurred speech.   Psychiatric: Anxious mood, patient is tearful and she does not know what her chest pain is from if it's not from coronary artery disease..    Skin:  Skin is warm and dry. No rash noted. No pallor.   Peripheral vascular: 1+ pulses in all 4 extremities with no clubbing, no cyanosis, no edema.      ----------------------------------------------------------------------------------------------------------------------    Results from last 7 days  Lab Units 04/17/18  1017 04/17/18  0529 04/16/18  2248 04/16/18  1926    CK TOTAL U/L  --   --  22* 41   CKMB ng/mL  --   --  1.76 1.37   CK MB INDEX %  --   --  8.0* 3.3*   TROPONIN I ng/mL 0.025 0.031 0.024 0.021       Results from last 7 days  Lab Units 04/16/18  1314   WBC 10*3/mm3 11.20   HEMOGLOBIN g/dL 11.5*   HEMATOCRIT % 39.2   MCV fL 78.9*   MCHC g/dL 29.3*   PLATELETS 10*3/mm3 352   INR  0.96       Results from last 7 days  Lab Units 04/18/18  1602   PH, ARTERIAL pH units 7.462*   PO2 ART mm Hg 88.6   PCO2, ARTERIAL mm Hg 32.0*   HCO3 ART mmol/L 22.4       Results from last 7 days  Lab Units 04/18/18  1528 04/18/18  1317 04/16/18  1314   SODIUM mmol/L 131* 129* 136   POTASSIUM mmol/L 4.8 5.4* 4.6   CHLORIDE mmol/L 100 100 106   CO2 mmol/L 22.7* 23.9* 20.5*   BUN mg/dL 26* 24* 14   CREATININE mg/dL 1.02 0.90 0.86   EGFR IF NONAFRICN AM mL/min/1.73 57* 66 69   CALCIUM mg/dL 8.6 8.5 9.3   GLUCOSE mg/dL 570* 624* 447*   ALBUMIN g/dL  --   --  3.50   BILIRUBIN mg/dL  --   --  0.2   ALK PHOS U/L  --   --  62   AST (SGOT) U/L  --   --  56*   ALT (SGPT) U/L  --   --  71*   Estimated Creatinine Clearance: 62.7 mL/min (by C-G formula based on SCr of 1.02 mg/dL).    No results found for: AMMONIA                    Microbiology Results (last 10 days)     ** No results found for the last 240 hours. **            I have personally reviewed the labs and they are summarized above.    ----------------------------------------------------------------------------------------------------------------------  Imaging Results (last 24 hours)     ** No results found for the last 24 hours. **          Results for orders placed during the hospital encounter of 07/22/17   Adult Transthoracic Echo Complete    Narrative · The study is technically good for diagnosis.  · Left ventricular systolic function is low normal. Estimated EF = 45%.  · Left ventricular diastolic dysfunction (grade II) consistent with   pseudonormalization.  · There are no significant valvular abnormalities noted.  · There is a  trivial pericardial effusion.     Compared to the study of 3/3/2017, there are no significant changes.             ----------------------------------------------------------------------------------------------------------------------  Assessment & Plan:    1.  Atypical chest pain that is noncardiac in nature.  Negative left heart catheterization today for coronary artery disease.  Plan on esophagram tomorrow to continue workup of noncardiac chest pain.    2.  Anxiety and depression.  Patient was tearful that left heart catheter was negative for coronary artery disease as she seems certain that she had cardiac disease.  She does have a mildly decreased LVEF 45% on ventriculogram.  Start low-dose lisinopril, continue metoprolol for treatment of mild systolic heart failure.  Spironolactone is not indicated at this time.    3.  Discontinue steroids at this time.    4.  DVT prophylaxis with heparin subcutaneous.      Total face to face time with this patient, time spent reviewing electronic medial records & laboratory & imaging studies and time spent in coordination of care with the nursing staff was 35 minutes.    Daniela Trivedi MD  04/19/18  9:11 PM    Electronically signed by Daniela Trivedi MD at 4/19/2018  9:16 PM       Consult Notes (last 24 hours) (Notes from 4/19/2018  3:21 PM through 4/20/2018  3:21 PM)     No notes of this type exist for this encounter.          Discharge Summary     No notes of this type exist for this encounter.        Discharge Order     Start     Ordered    04/20/18 1504  Discharge patient  Once     Expected Discharge Date:  04/20/18    Expected Discharge Time:  Afternoon    Discharge Disposition:  Home or Self Care    Physician of Record:  DANIELA TRIVEDI [[id number]]    Physician of Record selection review needed?:  No    Please choose which facility the patient is currently admitted if they are being discharged to another facility or unit.:  [facility]       Question Answer Comment    Physician of Record DANIELA PASCAL    Physician of Record selection review needed? No    Please choose which facility the patient is currently admitted if they are being discharged to another facility or unit. [facility]        04/20/18 5099

## 2018-04-20 NOTE — PROGRESS NOTES
Acute Care - Speech Language Pathology   Swallow Initial Evaluation  Daniel     Patient Name: Pepito Yan  : 1965  MRN: [id number]  Today's Date: 2018     Admit Date: 2018    Consult received. Chart reviewed. Pt is familiar to Speech Therapy Department for previous instrumental dysphagia evaluation w/ h/o oropharyngeal dysphagia. Most recent MBS 17 w/ no evidence of aspiration. Per chart review and consultation order, pt noted w/ aspiration of thin barium during esophagram today, recommendation for formal dysphagia evaluation. Per this status she is felt to most benefit from MBS to objectively evaluate swallowing fnx.     Plan: MBS w/ further recs pending.     D/w nursing staff pt status w/ verbal clearance for pt to transfer from floor to participate in MBS. She is scheduled for tentative discharge from facility on this date.     Thank you-  Kassy Cope M.A., CCC-SLP    Visit Dx:     ICD-10-CM ICD-9-CM   1. Chest pain, unspecified type R07.9 786.50   2. Angina, class IV I20.9 413.9     Patient Active Problem List   Diagnosis   • Intractable cyclical vomiting with nausea   • Chronic obstructive pulmonary emphysema   • Hepatitis-C   • Diabetes mellitus type 2 in nonobese   • Arthritis or polyarthritis, rheumatoid   • Anxiety   • Essential hypertension   • Coronary artery disease   • Buerger's disease   • ARDS (adult respiratory distress syndrome)   • Hypoxia   • Pyogenic arthritis of left elbow   • Chest pain in adult   • Pneumonia of left lower lobe due to infectious organism   • Chest pain   • Angina, class IV     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   • Chronic respiratory failure with hypoxia     at nighttime   • Compression fracture of lumbar vertebra    • Compression fracture of thoracic vertebra    • COPD (chronic obstructive pulmonary disease)    • Coronary artery disease    • Diabetes mellitus type 2 in  nonobese    • Erosive esophagitis 2016   • Essential hypertension    • Gastroparesis    • GERD (gastroesophageal reflux disease)    • Hepatitis-C    • History of transfusion    • Medically noncompliant    • MRSA infection     back of neck around ; 2017 septic left elbow joint with concerns for osteomyelitis and left anteromedial left arm   • NSTEMI (non-ST elevated myocardial infarction) 2016   • PE (pulmonary embolism)    • Protein calorie malnutrition    • RA (rheumatoid arthritis)     with chronic steroid use   • Rheumatoid arthritis      Past Surgical History:   Procedure Laterality Date   • ABDOMINAL SURGERY     • AMPUTATION DIGIT Right 2016    Third digit to the PIP. Surgeon: Dr. Bains   • AMPUTATION DIGIT Left 2016    Procedure: PARTIAL AMPUTATION LEFT THIRD FINGER;  Surgeon: Shane Fuller MD;  Location: [facility] OR;  Service:    • AMPUTATION REVISION Left 2016    Procedure: AMPUTATION REVISION DIGIT STUMP;  Surgeon: Shane Fuller MD;  Location: [facility] OR;  Service:    • ARM DEBRIDEMENT Left 2016    Procedure: HAND IRRIGATION DEBRIDEMENT AND REPAIR ;  Surgeon: Shane Fuller MD;  Location: [facility] OR;  Service:    • CARDIAC CATHETERIZATION N/A 2018    Procedure: Left Heart Cath;  Surgeon: Sang Grimm MD;  Location: [facility] CATH INVASIVE LOCATION;  Service: Cardiovascular   •  SECTION      x 2   • CHOLECYSTECTOMY     • ENDOSCOPY N/A 2016    Procedure: ESOPHAGOGASTRODUODENOSCOPY;  Surgeon: Jeremy Clement III, MD;  Location: [facility] OR;  Service:    • ENDOSCOPY W/ PEG TUBE PLACEMENT N/A 3/30/2017    Procedure: ESOPHAGOGASTRODUODENOSCOPY WITH PERCUTANEOUS ENDOSCOPIC GASTROSTOMY TUBE INSERTION;  Surgeon: Shade Campos MD;  Location: [facility] OR;  Service:    • FOREIGN BODY REMOVAL      insulin needle removal x 3 from the abdomen   • HYSTERECTOMY     • INCISION AND DRAINAGE ARM Left 2017    Procedure: INCISION AND  DRAINAGE UPPER EXTREMITY;  Surgeon: Shane Fuller MD;  Location: [facility];  Service:      EDUCATION  The patient has been educated in the following areas:   Dysphagia (Swallowing Impairment).    SLP Recommendation and Plan  MBS w/ further recs pending.      Time Calculation:       Kassy Cope MA,CCC-SLP  4/20/2018

## 2018-04-20 NOTE — PROGRESS NOTES
[facility]     Patient Identification:  Name:  Pepito Yan  Age:  52 y.o.  Sex:  female  :  1965  MRN:  [id number]  Visit Number:  [id number]  Primary Care Provider:  ROSA Love    Date of admission: 2018    Length of stay:  1    Chief complaint:  Chest pain continues    HPI:  52-year-old status post left heart catheterization today which showed mild LV systolic dysfunction LVEF 45% and essentially normal coronary arteries.  ----------------------------------------------------------------------------------------------------------------------  Current [facility] Meds:    aspirin 324 mg Oral Once   And      aspirin 81 mg Oral Daily   budesonide-formoterol 2 puff Inhalation BID - RT   clopidogrel 75 mg Oral Daily   famotidine 20 mg Oral Daily   furosemide 40 mg Intravenous Q12H   gabapentin 600 mg Oral Q8H   heparin (porcine) 5,000 Units Subcutaneous Q12H   insulin aspart 0-24 Units Subcutaneous 4x Daily AC & at Bedtime   insulin aspart 10 Units Subcutaneous TID With Meals   insulin detemir 45 Units Subcutaneous Daily   isosorbide mononitrate 30 mg Oral Daily   lisinopril 5 mg Oral Daily   metoprolol tartrate 25 mg Oral Q12H   predniSONE 20 mg Oral Daily   rosuvastatin 20 mg Oral Nightly   sodium chloride 10 mL Intracatheter Q12H        ----------------------------------------------------------------------------------------------------------------------  Vital Signs:  Temp:  [97.3 °F (36.3 °C)-98.6 °F (37 °C)] 98.5 °F (36.9 °C)  Heart Rate:  [] 105  Resp:  [16-20] 20  BP: (109-138)/(68-86) 137/80  1    18  1907 18  0317 18  0322   Weight: 61.9 kg (136 lb 8 oz) 61.9 kg (136 lb 8 oz) 61.6 kg (135 lb 11.2 oz)     Body mass index is 24.04 kg/m².    Intake/Output Summary (Last 24 hours) at 18  Last data filed at 18   Gross per 24 hour   Intake          1273.13 ml   Output             2550 ml   Net          -1276.87 ml     Diet Regular; Consistent Carbohydrate, Cardiac  ----------------------------------------------------------------------------------------------------------------------  Physical exam:  Constitutional:  Appears older than stated age  No respiratory distress.      HENT:  Head:  Normocephalic and atraumatic.  Mouth:  Moist mucous membranes.    Eyes:  Conjunctivae within normal limits.  Pupils are equal, round, and reactive to light and accomodation.  No scleral icterus.    Neck:  Neck supple.  No JVD present.    Cardiovascular:  Normal rate, regular rhythm and normal heart sounds with no murmurs, gallops or rubs.  Pulmonary/Chest:  No respiratory distress, no wheezes, no crackles, with normal breath sounds and good air movement.  Abdominal:  Soft.  Bowel sounds are normal.  No distension and no tenderness. No hepato- or spleno- megaly.  No hepatojugular reflux.  Musculoskeletal:  No edema, no tenderness, and no deformity.  No red or swollen joints anywhere.    Neurological:  Alert and oriented to person, place, and time.  No focal neurologic deficits.  CN II-XII grossly intact.   No tongue deviation.  No facial droop.  No slurred speech.   Psychiatric: Anxious mood, patient is tearful and she does not know what her chest pain is from if it's not from coronary artery disease..    Skin:  Skin is warm and dry. No rash noted. No pallor.   Peripheral vascular: 1+ pulses in all 4 extremities with no clubbing, no cyanosis, no edema.      ----------------------------------------------------------------------------------------------------------------------    Results from last 7 days  Lab Units 04/17/18  1017 04/17/18  0529 04/16/18  2248 04/16/18  1920   CK TOTAL U/L  --   --  22* 41   CKMB ng/mL  --   --  1.76 1.37   CK MB INDEX %  --   --  8.0* 3.3*   TROPONIN I ng/mL 0.025 0.031 0.024 0.021       Results from last 7 days  Lab Units 04/16/18  1314   WBC 10*3/mm3 11.20   HEMOGLOBIN g/dL 11.5*   HEMATOCRIT %  39.2   MCV fL 78.9*   MCHC g/dL 29.3*   PLATELETS 10*3/mm3 352   INR  0.96       Results from last 7 days  Lab Units 04/18/18  1602   PH, ARTERIAL pH units 7.462*   PO2 ART mm Hg 88.6   PCO2, ARTERIAL mm Hg 32.0*   HCO3 ART mmol/L 22.4       Results from last 7 days  Lab Units 04/18/18  1528 04/18/18  1317 04/16/18  1314   SODIUM mmol/L 131* 129* 136   POTASSIUM mmol/L 4.8 5.4* 4.6   CHLORIDE mmol/L 100 100 106   CO2 mmol/L 22.7* 23.9* 20.5*   BUN mg/dL 26* 24* 14   CREATININE mg/dL 1.02 0.90 0.86   EGFR IF NONAFRICN AM mL/min/1.73 57* 66 69   CALCIUM mg/dL 8.6 8.5 9.3   GLUCOSE mg/dL 570* 624* 447*   ALBUMIN g/dL  --   --  3.50   BILIRUBIN mg/dL  --   --  0.2   ALK PHOS U/L  --   --  62   AST (SGOT) U/L  --   --  56*   ALT (SGPT) U/L  --   --  71*   Estimated Creatinine Clearance: 62.7 mL/min (by C-G formula based on SCr of 1.02 mg/dL).    No results found for: AMMONIA                    Microbiology Results (last 10 days)     ** No results found for the last 240 hours. **            I have personally reviewed the labs and they are summarized above.    ----------------------------------------------------------------------------------------------------------------------  Imaging Results (last 24 hours)     ** No results found for the last 24 hours. **          Results for orders placed during the hospital encounter of 07/22/17   Adult Transthoracic Echo Complete    Narrative · The study is technically good for diagnosis.  · Left ventricular systolic function is low normal. Estimated EF = 45%.  · Left ventricular diastolic dysfunction (grade II) consistent with   pseudonormalization.  · There are no significant valvular abnormalities noted.  · There is a trivial pericardial effusion.     Compared to the study of 3/3/2017, there are no significant changes.             ----------------------------------------------------------------------------------------------------------------------  Assessment & Plan:    1.   Atypical chest pain that is noncardiac in nature.  Negative left heart catheterization today for coronary artery disease.  Plan on esophagram tomorrow to continue workup of noncardiac chest pain.    2.  Anxiety and depression.  Patient was tearful that left heart catheter was negative for coronary artery disease as she seems certain that she had cardiac disease.  She does have a mildly decreased LVEF 45% on ventriculogram.  Start low-dose lisinopril, continue metoprolol for treatment of mild systolic heart failure.  Spironolactone is not indicated at this time.    3.  Discontinue steroids at this time.    4.  DVT prophylaxis with heparin subcutaneous.      Total face to face time with this patient, time spent reviewing electronic medial records & laboratory & imaging studies and time spent in coordination of care with the nursing staff was 35 minutes.    Alysa Trivedi MD  04/19/18  9:11 PM

## 2018-04-20 NOTE — DISCHARGE SUMMARY
[facility] HOSPITALIST MEDICINE DISCHARGE SUMMARY    Patient Identification:  Name:  Pepito Yan  Age:  52 y.o.  Sex:  female  :  1965  MRN:  [id number]  Visit Number:  [id number]    Date of Admission: 2018  Date of Discharge:  2018     Length of stay:  2    PCP: ROSA Love    DISCHARGE DIAGNOSIS:    Noncardiac chest pain  Esophageal reflux      CONSULTS:     Cardiology  Speech therapy    PROCEDURES PERFORMED:    Nuclear Lexiscan stress test  CT scan chest PE protocol  Left heart catheterization      HOSPITAL COURSE:    Patient is a 52 y.o. female presented to [facility] complaining of atypical substernal chest pain.  Please see the admitting history and physical for further details.  PE protocol chest CT was negative for pulmonary embolus.  Left heart catheterization was negative for any significant coronary artery disease .  An esophagram performed on 2018 showed mild narrowing of the distal esophagus with tertiary contractions and aspiration with thin barium.  Patient was seen by speech therapy consultation and home speech therapy been scheduled.    VITAL SIGNS:  1    18  0317 18  0322 18  0356   Weight: 61.9 kg (136 lb 8 oz) 61.6 kg (135 lb 11.2 oz) 61.6 kg (135 lb 12.8 oz)     Body mass index is 24.06 kg/m².    PHYSICAL EXAM:    General: AAOx3  Cardiac: RRR no MGR  Lungs: CTA B/L no wheezes  Abdomen: Soft, NT, ND normal bowel sounds all 4 quadrants  Musculoskeletal: no edema, cyanosis or clubbing      DISCHARGE DISPOSITION:  Stable    DISCHARGE MEDICATIONS:   Pepito Yan   Home Medication Instructions JERARDO:[id number]    Printed on:18 1343   Medication Information                      aspirin 325 MG tablet  Take 325 mg by mouth Daily.             budesonide-formoterol (SYMBICORT) 160-4.5 MCG/ACT inhaler  Inhale 2 puffs 2 (Two) Times a Day.             gabapentin (NEURONTIN) 600 MG tablet  Take 600 mg by mouth 3  (Three) Times a Day.             insulin aspart (novoLOG) 100 UNIT/ML injection  Inject 10 Units under the skin 3 (Three) Times a Day With Meals.             insulin glargine (LANTUS) 100 UNIT/ML injection  Inject 45 Units under the skin Daily.             isosorbide mononitrate (IMDUR) 30 MG 24 hr tablet  Take 30 mg by mouth Daily.             lisinopril (PRINIVIL,ZESTRIL) 5 MG tablet  Take 5 mg by mouth Daily.             metFORMIN (GLUCOPHAGE) 500 MG tablet  Take 500 mg by mouth 3 (Three) Times a Day With Meals.             predniSONE (DELTASONE) 20 MG tablet  Take 20 mg by mouth Daily.             promethazine (PHENERGAN) 25 MG tablet  Take 25 mg by mouth Every 6 (Six) Hours As Needed for Nausea or Vomiting.             sucralfate (CARAFATE) 1 g tablet  Take 1 g by mouth 4 (Four) Times a Day As Needed.             tiZANidine (ZANAFLEX) 4 MG tablet  Take 4 mg by mouth Every 8 (Eight) Hours As Needed for Muscle Spasms.                   Your Scheduled Appointments    May 17, 2018  2:40 PM EDT  New Patient with Shelby Rodriguez PA-C  [facility] (--) [address] [facility] [address] [phone]   Bring all previous medical records and films, along with current medications and insurance information.              TEST  RESULTS PENDING AT DISCHARGE       Alysa Trivedi MD  04/20/18  1:43 PM    Please note that this discharge summary required more than 30 minutes to complete.    Please send a copy of this dictation to the following providers:  ROSA Love

## 2018-04-21 NOTE — PAYOR COMM NOTE
"[facility]   RICARDO LANGSTON   PHONE  [phone]  FAX  [phone]    PATIENT D/C 4/20/18    Yessica Yan (52 y.o. Female)     Date of Birth Social Security Number Address Home Phone MRN    1965  [id number]  [facility] [address] [phone] [id number]    Catholic Marital Status          Yazidi        Admission Date Admission Type Admitting Provider Attending Provider Department, Room/Bed    4/16/18 Emergency Alysa Trivedi MD  [facility] 3 [facility], 3308/2S    Discharge Date Discharge Disposition Discharge Destination        4/20/2018 Home or Self Care              Attending Provider:  (none)   Allergies:  Penicillins, Toradol [Ketorolac Tromethamine], Tramadol    Isolation:  Contact   Infection:  ESBL E coli (02/07/18)   Code Status:  Prior    Ht:  160 cm (63\")   Wt:  61.6 kg (135 lb 12.8 oz)    Admission Cmt:  None   Principal Problem:  Esophageal abnormality [K22.9] More...                 Active Insurance as of 4/16/2018     Primary Coverage     Payor Plan Insurance Group Employer/Plan Group    MEDICARE MEDICARE A & B      Payor Plan Address Payor Plan Phone Number Effective From Effective To    [address] [phone] 2/1/2005     [place] [address]       Subscriber Name Subscriber Birth Date Member ID       YESSICA YAN 1965 [id number]           Secondary Coverage     Payor Plan Insurance Group Employer/Plan Group    [employer] [place] WELL[place]ARE MEDICAID      Payor Plan Address Payor Plan Phone Number Effective From Effective To    [address] [address] [phone] 1/1/2016     [place] [address]       Subscriber Name Subscriber Birth Date Member ID       YESSICA YAN 1965 [id number]                 Emergency Contacts      (Rel.) Home Phone Work Phone Mobile Phone    Scot Vaughn (Son) [phone] -- --    Jacy Cody (Other) [phone] -- --              "

## 2018-04-30 NOTE — ED PROVIDER NOTES
Subjective   See YVONNE Green and ROS.            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   • Chronic respiratory failure with hypoxia     at nighttime   • Compression fracture of lumbar vertebra    • Compression fracture of thoracic vertebra    • COPD (chronic obstructive pulmonary disease)    • Coronary artery disease    • Diabetes mellitus type 2 in nonobese    • Erosive esophagitis 12/2016   • Essential hypertension    • Gastroparesis    • GERD (gastroesophageal reflux disease)    • Hepatitis-C    • History of transfusion    • Medically noncompliant    • MRSA infection     back of neck around 2012; 6/2017 septic left elbow joint with concerns for osteomyelitis and left anteromedial left arm   • NSTEMI (non-ST elevated myocardial infarction) 07/2016   • PE (pulmonary embolism)    • Protein calorie malnutrition    • RA (rheumatoid arthritis)     with chronic steroid use   • Rheumatoid arthritis        Allergies   Allergen Reactions   • Penicillins Anaphylaxis     Tolerated both cefepime and Rocephin earlier this month   • Toradol [Ketorolac Tromethamine] Hives and Swelling   • Tramadol Hives and Swelling       Past Surgical History:   Procedure Laterality Date   • ABDOMINAL SURGERY     • AMPUTATION DIGIT Right 12/22/2016    Third digit to the PIP. Surgeon: Dr. Bains   • AMPUTATION DIGIT Left 12/22/2016    Procedure: PARTIAL AMPUTATION LEFT THIRD FINGER;  Surgeon: Shane Fuller MD;  Location: [facility] OR;  Service:    • AMPUTATION REVISION Left 12/22/2016    Procedure: AMPUTATION REVISION DIGIT STUMP;  Surgeon: Shane Fuller MD;  Location: [facility] OR;  Service:    • ARM DEBRIDEMENT Left 12/20/2016    Procedure: HAND IRRIGATION DEBRIDEMENT AND REPAIR ;  Surgeon: Shane Fuller MD;  Location: [facility] OR;  Service:    • CARDIAC CATHETERIZATION N/A 4/19/2018    Procedure: Left Heart Cath;   Surgeon: Sang Grimm MD;  Location: [facility] CATH INVASIVE LOCATION;  Service: Cardiovascular   •  SECTION      x 2   • CHOLECYSTECTOMY     • ENDOSCOPY N/A 2016    Procedure: ESOPHAGOGASTRODUODENOSCOPY;  Surgeon: Jeremy Clement III, MD;  Location: [facility] OR;  Service:    • ENDOSCOPY W/ PEG TUBE PLACEMENT N/A 3/30/2017    Procedure: ESOPHAGOGASTRODUODENOSCOPY WITH PERCUTANEOUS ENDOSCOPIC GASTROSTOMY TUBE INSERTION;  Surgeon: Shade Campos MD;  Location: [facility] OR;  Service:    • FOREIGN BODY REMOVAL      insulin needle removal x 3 from the abdomen   • HYSTERECTOMY     • INCISION AND DRAINAGE ARM Left 2017    Procedure: INCISION AND DRAINAGE UPPER EXTREMITY;  Surgeon: Shane Fuller MD;  Location: [facility] OR;  Service:        Family History   Problem Relation Age of Onset   • Colon cancer Mother    • Cancer Father    • Diabetes Maternal Grandmother    • Heart disease Neg Hx    • Stroke Neg Hx        Social History     Social History   • Marital status:      Social History Main Topics   • Smoking status: Current Every Day Smoker     Packs/day: 0.50     Years: 35.00     Types: Cigarettes   • Smokeless tobacco: Never Used      Comment: 4-5 cigarettes a day    • Alcohol use No   • Drug use: No   • Sexual activity: Defer     Other Topics Concern   • Not on file           Objective   Physical Exam    Procedures         ED Course  ED Course   Comment By Time   Endorsed to Lizy Sifuentes, who will assume care of patient.  COREEN Green [id number]                  [facility]    Final diagnoses:   Epigastric pain            Lizy Sifuentes, APRN  18 1523

## 2018-05-02 ENCOUNTER — HOSPITAL ENCOUNTER (EMERGENCY)
Facility: HOSPITAL | Age: 53
Discharge: HOME | End: 2018-05-02
Attending: EMERGENCY MEDICINE | Admitting: EMERGENCY MEDICINE

## 2018-05-02 VITALS
RESPIRATION RATE: 16 BRPM | HEIGHT: 63 IN | HEART RATE: 87 BPM | BODY MASS INDEX: 19.49 KG/M2 | DIASTOLIC BLOOD PRESSURE: 68 MMHG | WEIGHT: 110 LBS | OXYGEN SATURATION: 97 % | TEMPERATURE: 97.8 F | SYSTOLIC BLOOD PRESSURE: 144 MMHG

## 2018-05-02 DIAGNOSIS — L02.91 ABSCESS: Primary | ICD-10-CM

## 2018-05-02 LAB
ALBUMIN SERPL-MCNC: 3.5 G/DL (ref 3.5–5)
ALBUMIN/GLOB SERPL: 1 G/DL (ref 1.5–2.5)
ALP SERPL-CCNC: 57 U/L (ref 35–104)
ALT SERPL W P-5'-P-CCNC: 96 U/L (ref 10–36)
ANION GAP SERPL CALCULATED.3IONS-SCNC: 9.5 MMOL/L (ref 3.6–11.2)
ANISOCYTOSIS BLD QL: ABNORMAL
AST SERPL-CCNC: 36 U/L (ref 10–30)
BILIRUB SERPL-MCNC: 0.2 MG/DL (ref 0.2–1.8)
BUN BLD-MCNC: 23 MG/DL (ref 7–21)
BUN/CREAT SERPL: 21.1 (ref 7–25)
CALCIUM SPEC-SCNC: 8.8 MG/DL (ref 7.7–10)
CHLORIDE SERPL-SCNC: 107 MMOL/L (ref 99–112)
CO2 SERPL-SCNC: 16.5 MMOL/L (ref 24.3–31.9)
CREAT BLD-MCNC: 1.09 MG/DL (ref 0.43–1.29)
CRP SERPL-MCNC: 2.41 MG/DL (ref 0–0.99)
DEPRECATED RDW RBC AUTO: 48.8 FL (ref 37–54)
ERYTHROCYTE [DISTWIDTH] IN BLOOD BY AUTOMATED COUNT: 18.3 % (ref 11.5–14.5)
ERYTHROCYTE [SEDIMENTATION RATE] IN BLOOD: 49 MM/HR (ref 0–30)
GFR SERPL CREATININE-BSD FRML MDRD: 53 ML/MIN/1.73
GLOBULIN UR ELPH-MCNC: 3.4 GM/DL
GLUCOSE BLD-MCNC: 429 MG/DL (ref 70–110)
HCT VFR BLD AUTO: 34.4 % (ref 37–47)
HGB BLD-MCNC: 10 G/DL (ref 12–16)
HYPOCHROMIA BLD QL: ABNORMAL
LYMPHOCYTES # BLD MANUAL: 1.75 10*3/MM3 (ref 1–3)
LYMPHOCYTES NFR BLD MANUAL: 14 % (ref 21–51)
LYMPHOCYTES NFR BLD MANUAL: 2 % (ref 0–10)
MCH RBC QN AUTO: 22.3 PG (ref 27–33)
MCHC RBC AUTO-ENTMCNC: 29.1 G/DL (ref 33–37)
MCV RBC AUTO: 76.6 FL (ref 80–94)
METAMYELOCYTES NFR BLD MANUAL: 1 % (ref 0–0)
MONOCYTES # BLD AUTO: 0.25 10*3/MM3 (ref 0.1–0.9)
NEUTROPHILS # BLD AUTO: 10.38 10*3/MM3 (ref 1.4–6.5)
NEUTROPHILS NFR BLD MANUAL: 83 % (ref 30–70)
OSMOLALITY SERPL CALC.SUM OF ELEC: 288.4 MOSM/KG (ref 273–305)
PLAT MORPH BLD: NORMAL
PLATELET # BLD AUTO: 491 10*3/MM3 (ref 130–400)
PMV BLD AUTO: 9.7 FL (ref 6–10)
POTASSIUM BLD-SCNC: 3.5 MMOL/L (ref 3.5–5.3)
PROT SERPL-MCNC: 6.9 G/DL (ref 6–8)
RBC # BLD AUTO: 4.49 10*6/MM3 (ref 4.2–5.4)
SODIUM BLD-SCNC: 133 MMOL/L (ref 135–153)
WBC NRBC COR # BLD: 12.51 10*3/MM3 (ref 4.5–12.5)

## 2018-05-02 PROCEDURE — 99284 EMERGENCY DEPT VISIT MOD MDM: CPT

## 2018-05-02 PROCEDURE — 87077 CULTURE AEROBIC IDENTIFY: CPT | Performed by: PHYSICIAN ASSISTANT

## 2018-05-02 PROCEDURE — 85652 RBC SED RATE AUTOMATED: CPT | Performed by: PHYSICIAN ASSISTANT

## 2018-05-02 PROCEDURE — 87186 SC STD MICRODIL/AGAR DIL: CPT | Performed by: PHYSICIAN ASSISTANT

## 2018-05-02 PROCEDURE — 87147 CULTURE TYPE IMMUNOLOGIC: CPT | Performed by: PHYSICIAN ASSISTANT

## 2018-05-02 PROCEDURE — 86140 C-REACTIVE PROTEIN: CPT | Performed by: PHYSICIAN ASSISTANT

## 2018-05-02 PROCEDURE — 85007 BL SMEAR W/DIFF WBC COUNT: CPT | Performed by: PHYSICIAN ASSISTANT

## 2018-05-02 PROCEDURE — 80053 COMPREHEN METABOLIC PANEL: CPT | Performed by: PHYSICIAN ASSISTANT

## 2018-05-02 PROCEDURE — 87040 BLOOD CULTURE FOR BACTERIA: CPT | Performed by: PHYSICIAN ASSISTANT

## 2018-05-02 PROCEDURE — 36415 COLL VENOUS BLD VENIPUNCTURE: CPT

## 2018-05-02 PROCEDURE — 87205 SMEAR GRAM STAIN: CPT | Performed by: PHYSICIAN ASSISTANT

## 2018-05-02 PROCEDURE — 85025 COMPLETE CBC W/AUTO DIFF WBC: CPT | Performed by: PHYSICIAN ASSISTANT

## 2018-05-02 PROCEDURE — 87070 CULTURE OTHR SPECIMN AEROBIC: CPT | Performed by: PHYSICIAN ASSISTANT

## 2018-05-02 RX ORDER — ONDANSETRON 4 MG/1
4 TABLET, ORALLY DISINTEGRATING ORAL ONCE
Status: COMPLETED | OUTPATIENT
Start: 2018-05-02 | End: 2018-05-02

## 2018-05-02 RX ORDER — HYDROCODONE BITARTRATE AND ACETAMINOPHEN 5; 325 MG/1; MG/1
1 TABLET ORAL ONCE
Status: COMPLETED | OUTPATIENT
Start: 2018-05-02 | End: 2018-05-02

## 2018-05-02 RX ORDER — CLINDAMYCIN HYDROCHLORIDE 300 MG/1
300 CAPSULE ORAL 4 TIMES DAILY
Qty: 40 CAPSULE | Refills: 0 | Status: SHIPPED | OUTPATIENT
Start: 2018-05-02 | End: 2018-05-12

## 2018-05-02 RX ORDER — IBUPROFEN 800 MG/1
800 TABLET ORAL EVERY 6 HOURS PRN
Qty: 30 TABLET | Refills: 0 | Status: SHIPPED | OUTPATIENT
Start: 2018-05-02

## 2018-05-02 RX ADMIN — ONDANSETRON 4 MG: 4 TABLET, ORALLY DISINTEGRATING ORAL at 02:29

## 2018-05-02 RX ADMIN — HYDROCODONE BITARTRATE AND ACETAMINOPHEN 1 TABLET: 5; 325 TABLET ORAL at 02:28

## 2018-05-02 NOTE — ED NOTES
Cleansed the wound with warm water and Hibiclens as requested by Thalia Olmstead PA-C. Dressed wound with 4x4 gauze and Kerlix. Positive distal CSM. Dressing is clean, dry and intact     Sara Marroquin RN  05/02/18 0309

## 2018-05-02 NOTE — ED NOTES
Patient presents to ED POV complaining of wound to right elbow that wont heal. Reports that it has been there for several months. Reports that her PCP has tried her on several different antibiotics but that it wont go away. Small circular wound noted to right elbow with yellow purulent drainage around the site. Patient reports pain at the site and reports that there is drainage on her sheets every morning when she wakes up.      Sara Marroquin RN  05/02/18 0233

## 2018-05-03 ENCOUNTER — OFFICE VISIT (OUTPATIENT)
Dept: SURGERY | Facility: CLINIC | Age: 53
End: 2018-05-03

## 2018-05-03 VITALS — WEIGHT: 110 LBS | BODY MASS INDEX: 19.49 KG/M2 | HEIGHT: 63 IN

## 2018-05-03 DIAGNOSIS — I87.8 POOR VENOUS ACCESS: Primary | ICD-10-CM

## 2018-05-03 DIAGNOSIS — I73.1 BUERGER'S DISEASE (HCC): ICD-10-CM

## 2018-05-03 PROCEDURE — 99406 BEHAV CHNG SMOKING 3-10 MIN: CPT | Performed by: SURGERY

## 2018-05-03 PROCEDURE — 99214 OFFICE O/P EST MOD 30 MIN: CPT | Performed by: SURGERY

## 2018-05-03 NOTE — PROGRESS NOTES
Subjective   Pepito Yan is a 52 y.o. female.     History of Present Illness She has multiple medical problems and frequent hospitalizations. She has Beurger's disease and still smokes.   She also has an infection in her bursa on the right elbow that has not healed in the last year.     The following portions of the patient's history were reviewed and updated as appropriate: current medications, past family history, past medical history, past social history, past surgical history and problem list.    Review of Systems   Constitutional: Negative for activity change, appetite change, chills, fever and unexpected weight change.   HENT: Negative for congestion, facial swelling and sore throat.    Eyes: Negative for photophobia and visual disturbance.   Respiratory: Positive for chest tightness and shortness of breath. Negative for wheezing.    Cardiovascular: Negative for chest pain, palpitations and leg swelling.   Gastrointestinal: Negative for abdominal distention, abdominal pain, anal bleeding, blood in stool, constipation, diarrhea, nausea, rectal pain and vomiting.   Endocrine: Negative for cold intolerance, heat intolerance, polydipsia and polyuria.   Genitourinary: Negative for difficulty urinating, dysuria, flank pain and urgency.   Musculoskeletal: Negative for back pain and myalgias.   Skin: Positive for color change. Negative for rash and wound.   Allergic/Immunologic: Negative for immunocompromised state.   Neurological: Negative for dizziness, seizures, syncope, light-headedness, numbness and headaches.   Hematological: Negative for adenopathy. Does not bruise/bleed easily.   Psychiatric/Behavioral: Negative for behavioral problems and confusion. The patient is not nervous/anxious.        Objective   Physical Exam   Constitutional: She is oriented to person, place, and time. She appears well-developed and well-nourished. She does not appear ill. No distress.       HENT:   Head: Normocephalic. Head is  without laceration. Hair is normal.   Right Ear: Hearing and ear canal normal.   Left Ear: Hearing and ear canal normal.   Nose: Nose normal. No sinus tenderness. No epistaxis. Right sinus exhibits no maxillary sinus tenderness and no frontal sinus tenderness. Left sinus exhibits no maxillary sinus tenderness and no frontal sinus tenderness.   Eyes: Conjunctivae and lids are normal. Pupils are equal, round, and reactive to light.   Neck: Normal range of motion. No JVD present. No tracheal tenderness present. No tracheal deviation present. No thyroid mass and no thyromegaly present.   Cardiovascular: Normal rate and regular rhythm.  Exam reveals no gallop.    No murmur heard.  Pulmonary/Chest: Effort normal. No stridor. She has wheezes. She exhibits no tenderness.   Abdominal: Soft. Bowel sounds are normal. She exhibits no distension, no ascites and no mass. There is no tenderness. There is no rebound and no guarding. No hernia.   Musculoskeletal: She exhibits deformity. She exhibits no edema.   Lymphadenopathy:     She has no cervical adenopathy.     She has no axillary adenopathy.        Right: No inguinal and no supraclavicular adenopathy present.        Left: No inguinal and no supraclavicular adenopathy present.   Neurological: She is alert and oriented to person, place, and time. She exhibits normal muscle tone.   Skin: Skin is warm, dry and intact. No rash noted. There is erythema. No pallor.   Psychiatric: She has a normal mood and affect. Her behavior is normal. Thought content normal.   Vitals reviewed.      Assessment/Plan   Pepito was seen today for port placement consult and poor venous access.    Diagnoses and all orders for this visit:    Poor venous access    place port, discussed extreme importance of her stopping smoking given her medical issues    I advised the patient of the risks in continuing to use tobacco, and I provided this patient with smoking cessation educational materials.    During  this visit, I spent 10 minutes counseling the patient regarding smoking cessation.

## 2018-05-04 ENCOUNTER — TELEPHONE (OUTPATIENT)
Dept: EMERGENCY DEPT | Facility: HOSPITAL | Age: 53
End: 2018-05-04

## 2018-05-04 LAB
BACTERIA SPEC AEROBE CULT: ABNORMAL
BACTERIA SPEC AEROBE CULT: ABNORMAL
GRAM STN SPEC: ABNORMAL
GRAM STN SPEC: ABNORMAL

## 2018-05-07 ENCOUNTER — TELEPHONE (OUTPATIENT)
Dept: SURGERY | Facility: CLINIC | Age: 53
End: 2018-05-07

## 2018-05-07 LAB
BACTERIA SPEC AEROBE CULT: NORMAL
BACTERIA SPEC AEROBE CULT: NORMAL

## 2018-05-07 NOTE — TELEPHONE ENCOUNTER
Called patient last week and today at both numbers listed. No answer at either number or at her emergency contact number. Will continue to try to reach the patient today to let her know that her PAT is scheduled for tomorrow @ 12:00 and her port placement is scheduled may 10 @ 9 am.

## 2018-05-09 ENCOUNTER — TELEPHONE (OUTPATIENT)
Dept: SURGERY | Facility: CLINIC | Age: 53
End: 2018-05-09

## 2018-05-09 NOTE — TELEPHONE ENCOUNTER
PATIENT'S PCP CALLED TO DOUBLE CHECK HER SURGERY DATE. I EXPLAINED THAT I HAD TRIED TO CONTACT THE PATIENT MULTIPLE TIMES. SHE EXPLAINED THAT HER PHONE WASN'T WORKING. I GAVE HER THE DATES AND TIMES AND ALSO CALLED DOWN AND RESCHEDULED HER PAT FOR SAME DAY

## 2018-05-10 ENCOUNTER — HOSPITAL ENCOUNTER (OUTPATIENT)
Facility: HOSPITAL | Age: 53
Setting detail: HOSPITAL OUTPATIENT SURGERY
Discharge: HOME | End: 2018-05-10
Attending: SURGERY | Admitting: SURGERY

## 2018-05-10 ENCOUNTER — APPOINTMENT (OUTPATIENT)
Dept: GENERAL RADIOLOGY | Facility: HOSPITAL | Age: 53
End: 2018-05-10

## 2018-05-10 ENCOUNTER — APPOINTMENT (OUTPATIENT)
Dept: PREADMISSION TESTING | Facility: HOSPITAL | Age: 53
End: 2018-05-10

## 2018-05-10 ENCOUNTER — ANESTHESIA (OUTPATIENT)
Dept: PERIOP | Facility: HOSPITAL | Age: 53
End: 2018-05-10

## 2018-05-10 ENCOUNTER — ANESTHESIA EVENT (OUTPATIENT)
Dept: PERIOP | Facility: HOSPITAL | Age: 53
End: 2018-05-10

## 2018-05-10 VITALS
OXYGEN SATURATION: 100 % | HEART RATE: 104 BPM | TEMPERATURE: 97.3 F | SYSTOLIC BLOOD PRESSURE: 131 MMHG | DIASTOLIC BLOOD PRESSURE: 65 MMHG | RESPIRATION RATE: 16 BRPM

## 2018-05-10 LAB — GLUCOSE BLDC GLUCOMTR-MCNC: 383 MG/DL (ref 70–130)

## 2018-05-10 PROCEDURE — 36561 INSERT TUNNELED CV CATH: CPT | Performed by: SURGERY

## 2018-05-10 PROCEDURE — 25010000002 HEPARIN FLUSH (PORCINE) 100 UNIT/ML SOLUTION: Performed by: SURGERY

## 2018-05-10 PROCEDURE — 71045 X-RAY EXAM CHEST 1 VIEW: CPT | Performed by: RADIOLOGY

## 2018-05-10 PROCEDURE — 25010000002 FENTANYL CITRATE (PF) 100 MCG/2ML SOLUTION: Performed by: NURSE ANESTHETIST, CERTIFIED REGISTERED

## 2018-05-10 PROCEDURE — 76000 FLUOROSCOPY <1 HR PHYS/QHP: CPT

## 2018-05-10 PROCEDURE — 25010000002 DEXAMETHASONE PER 1 MG: Performed by: NURSE ANESTHETIST, CERTIFIED REGISTERED

## 2018-05-10 PROCEDURE — 25010000002 PROPOFOL 10 MG/ML EMULSION: Performed by: NURSE ANESTHETIST, CERTIFIED REGISTERED

## 2018-05-10 PROCEDURE — 77001 FLUOROGUIDE FOR VEIN DEVICE: CPT | Performed by: SURGERY

## 2018-05-10 PROCEDURE — 82962 GLUCOSE BLOOD TEST: CPT

## 2018-05-10 PROCEDURE — 25010000002 MIDAZOLAM PER 1 MG: Performed by: NURSE ANESTHETIST, CERTIFIED REGISTERED

## 2018-05-10 PROCEDURE — 25010000002 ONDANSETRON PER 1 MG: Performed by: NURSE ANESTHETIST, CERTIFIED REGISTERED

## 2018-05-10 PROCEDURE — 63710000001 INSULIN REGULAR HUMAN PER 5 UNITS: Performed by: ANESTHESIOLOGY

## 2018-05-10 PROCEDURE — 71045 X-RAY EXAM CHEST 1 VIEW: CPT

## 2018-05-10 PROCEDURE — C1788 PORT, INDWELLING, IMP: HCPCS | Performed by: SURGERY

## 2018-05-10 DEVICE — VACCESS CT POWER-INJECTABLE IMPLANTABLE PORT (WITH SUTURE PLUGS) (8F)
Type: IMPLANTABLE DEVICE | Site: CHEST | Status: FUNCTIONAL
Brand: VACCESS

## 2018-05-10 RX ORDER — MIDAZOLAM HYDROCHLORIDE 1 MG/ML
INJECTION INTRAMUSCULAR; INTRAVENOUS AS NEEDED
Status: DISCONTINUED | OUTPATIENT
Start: 2018-05-10 | End: 2018-05-10 | Stop reason: SURG

## 2018-05-10 RX ORDER — DEXAMETHASONE SODIUM PHOSPHATE 10 MG/ML
INJECTION INTRAMUSCULAR; INTRAVENOUS AS NEEDED
Status: DISCONTINUED | OUTPATIENT
Start: 2018-05-10 | End: 2018-05-10 | Stop reason: SURG

## 2018-05-10 RX ORDER — ONDANSETRON 2 MG/ML
4 INJECTION INTRAMUSCULAR; INTRAVENOUS ONCE AS NEEDED
Status: DISCONTINUED | OUTPATIENT
Start: 2018-05-10 | End: 2018-05-10 | Stop reason: HOSPADM

## 2018-05-10 RX ORDER — SODIUM CHLORIDE, SODIUM LACTATE, POTASSIUM CHLORIDE, CALCIUM CHLORIDE 600; 310; 30; 20 MG/100ML; MG/100ML; MG/100ML; MG/100ML
125 INJECTION, SOLUTION INTRAVENOUS CONTINUOUS
Status: DISCONTINUED | OUTPATIENT
Start: 2018-05-10 | End: 2018-05-10 | Stop reason: HOSPADM

## 2018-05-10 RX ORDER — ONDANSETRON 2 MG/ML
INJECTION INTRAMUSCULAR; INTRAVENOUS AS NEEDED
Status: DISCONTINUED | OUTPATIENT
Start: 2018-05-10 | End: 2018-05-10 | Stop reason: SURG

## 2018-05-10 RX ORDER — MAGNESIUM HYDROXIDE 1200 MG/15ML
LIQUID ORAL AS NEEDED
Status: DISCONTINUED | OUTPATIENT
Start: 2018-05-10 | End: 2018-05-10 | Stop reason: HOSPADM

## 2018-05-10 RX ORDER — LIDOCAINE HYDROCHLORIDE 20 MG/ML
INJECTION, SOLUTION INFILTRATION; PERINEURAL AS NEEDED
Status: DISCONTINUED | OUTPATIENT
Start: 2018-05-10 | End: 2018-05-10 | Stop reason: SURG

## 2018-05-10 RX ORDER — FENTANYL CITRATE 50 UG/ML
INJECTION, SOLUTION INTRAMUSCULAR; INTRAVENOUS AS NEEDED
Status: DISCONTINUED | OUTPATIENT
Start: 2018-05-10 | End: 2018-05-10 | Stop reason: SURG

## 2018-05-10 RX ORDER — HYDROCODONE BITARTRATE AND ACETAMINOPHEN 5; 325 MG/1; MG/1
1 TABLET ORAL EVERY 4 HOURS PRN
Qty: 20 TABLET | Refills: 0 | Status: SHIPPED | OUTPATIENT
Start: 2018-05-10 | End: 2018-05-20

## 2018-05-10 RX ORDER — HYDROCODONE BITARTRATE AND ACETAMINOPHEN 5; 325 MG/1; MG/1
1 TABLET ORAL EVERY 4 HOURS PRN
Status: DISCONTINUED | OUTPATIENT
Start: 2018-05-10 | End: 2018-05-10 | Stop reason: HOSPADM

## 2018-05-10 RX ORDER — PROPOFOL 10 MG/ML
VIAL (ML) INTRAVENOUS AS NEEDED
Status: DISCONTINUED | OUTPATIENT
Start: 2018-05-10 | End: 2018-05-10 | Stop reason: SURG

## 2018-05-10 RX ORDER — PREDNISONE 20 MG/1
20 TABLET ORAL DAILY
COMMUNITY

## 2018-05-10 RX ORDER — LIDOCAINE HYDROCHLORIDE AND EPINEPHRINE 10; 10 MG/ML; UG/ML
INJECTION, SOLUTION INFILTRATION; PERINEURAL AS NEEDED
Status: DISCONTINUED | OUTPATIENT
Start: 2018-05-10 | End: 2018-05-10 | Stop reason: HOSPADM

## 2018-05-10 RX ORDER — FENTANYL CITRATE 50 UG/ML
50 INJECTION, SOLUTION INTRAMUSCULAR; INTRAVENOUS
Status: DISCONTINUED | OUTPATIENT
Start: 2018-05-10 | End: 2018-05-10 | Stop reason: HOSPADM

## 2018-05-10 RX ORDER — IPRATROPIUM BROMIDE AND ALBUTEROL SULFATE 2.5; .5 MG/3ML; MG/3ML
3 SOLUTION RESPIRATORY (INHALATION) ONCE AS NEEDED
Status: DISCONTINUED | OUTPATIENT
Start: 2018-05-10 | End: 2018-05-10 | Stop reason: HOSPADM

## 2018-05-10 RX ORDER — SODIUM CHLORIDE 0.9 % (FLUSH) 0.9 %
1-10 SYRINGE (ML) INJECTION AS NEEDED
Status: DISCONTINUED | OUTPATIENT
Start: 2018-05-10 | End: 2018-05-10 | Stop reason: HOSPADM

## 2018-05-10 RX ORDER — MEPERIDINE HYDROCHLORIDE 50 MG/ML
12.5 INJECTION INTRAMUSCULAR; INTRAVENOUS; SUBCUTANEOUS
Status: DISCONTINUED | OUTPATIENT
Start: 2018-05-10 | End: 2018-05-10 | Stop reason: HOSPADM

## 2018-05-10 RX ADMIN — PROPOFOL 100 MG: 10 INJECTION, EMULSION INTRAVENOUS at 10:53

## 2018-05-10 RX ADMIN — SODIUM CHLORIDE, POTASSIUM CHLORIDE, SODIUM LACTATE AND CALCIUM CHLORIDE 125 ML/HR: 600; 310; 30; 20 INJECTION, SOLUTION INTRAVENOUS at 10:35

## 2018-05-10 RX ADMIN — DEXAMETHASONE SODIUM PHOSPHATE 4 MG: 10 INJECTION INTRAMUSCULAR; INTRAVENOUS at 10:53

## 2018-05-10 RX ADMIN — MIDAZOLAM HYDROCHLORIDE 2 MG: 1 INJECTION, SOLUTION INTRAMUSCULAR; INTRAVENOUS at 10:49

## 2018-05-10 RX ADMIN — ONDANSETRON 4 MG: 2 INJECTION INTRAMUSCULAR; INTRAVENOUS at 11:19

## 2018-05-10 RX ADMIN — HUMAN INSULIN 5 UNITS: 100 INJECTION, SOLUTION SUBCUTANEOUS at 10:42

## 2018-05-10 RX ADMIN — ONDANSETRON 4 MG: 2 INJECTION, SOLUTION INTRAMUSCULAR; INTRAVENOUS at 10:53

## 2018-05-10 RX ADMIN — FENTANYL CITRATE 100 MCG: 50 INJECTION INTRAMUSCULAR; INTRAVENOUS at 10:49

## 2018-05-10 RX ADMIN — LIDOCAINE HYDROCHLORIDE 60 MG: 20 INJECTION, SOLUTION INFILTRATION; PERINEURAL at 10:53

## 2018-05-10 NOTE — ANESTHESIA PREPROCEDURE EVALUATION
Anesthesia Evaluation     Patient summary reviewed and Nursing notes reviewed   NPO Solid Status: > 8 hours  NPO Liquid Status: > 8 hours           Airway   Mallampati: II  TM distance: >3 FB  Neck ROM: full  no difficulty expected  Dental    (+) edentulous    Pulmonary    (+) pneumonia , pulmonary embolism, COPD, asthma, decreased breath sounds,   Cardiovascular     Rhythm: regular  Rate: normal    (+) hypertension, past MI  >12 months, CAD,       Neuro/Psych  (+) psychiatric history,     GI/Hepatic/Renal/Endo    (+)  GERD,  hepatitis, liver disease, diabetes mellitus,     Musculoskeletal     (+) back pain,   Abdominal    Substance History - negative use     OB/GYN negative ob/gyn ROS         Other   (+) arthritis                     Anesthesia Plan    ASA 3     general     intravenous induction   Anesthetic plan and risks discussed with patient.  Use of blood products discussed with patient .   Plan discussed with CRNA.

## 2018-05-10 NOTE — OP NOTE
INSERTION OF PORTACATH  Procedure Note    Pepito Yan  5/10/2018    Pre-op Diagnosis:   Poor venous access [I87.8]    Post-op Diagnosis: same        Procedure(s):  INSERTION OF PORTACATH    Surgeon(s):  Timbo Mcdowell MD    Anesthesia: Choice    Staff:   Circulator: Xuan William RN  Radiology Technologist: Kirill Carranza  Assistant: Juan Pablo Moreno  Other: Daniel Burton    Estimated Blood Loss: minimal    Specimens:                * No orders in the log *      Drains:  none    Procedure: The neck and shoulders were prepped and draped. The right subclavian vein accessed and the wire threaded in. The dilator and sheath placed and the port pocked created with a cautery. The port was assembled and trimmed. The catheter was threaded through the sheath and it was pealed away. The port had blood return and flushed. It was sutured to the fascia with vicryl and the subcutaneous tissues closed with vicryl. The C arm checked position and skin was closed with vicryl. A dressing was placed.     Findings:     Complications: none   Grafts / Implants N/A    Timbo Mcdowell MD     Date: 5/10/2018  Time: 11:11 AM

## 2018-05-10 NOTE — ANESTHESIA PROCEDURE NOTES
Airway  Urgency: elective    Date/Time: 5/10/2018 10:53 AM  Airway not difficult    General Information and Staff    Patient location during procedure: OR  Anesthesiologist: GWENDOLYN BOWEN  CRNA: NATALI HERNANDEZ    Indications and Patient Condition  Indications for airway management: airway protection    Preoxygenated: yes  MILS maintained throughout  Mask difficulty assessment: 0 - not attempted    Final Airway Details  Final airway type: supraglottic airway      Successful airway: Size 4    Number of attempts at approach: 1    Additional Comments  Dentition and lips same as preop

## 2018-05-10 NOTE — ANESTHESIA POSTPROCEDURE EVALUATION
Patient: Pepito Yan    Procedure Summary     Date:  05/10/18 Room / Location:  [facility] OR 02 /  [facility] OR    Anesthesia Start:  1049 Anesthesia Stop:  1113    Procedure:  INSERTION OF PORTACATH (Right ) Diagnosis:       Poor venous access      (Poor venous access [I87.8])    Surgeon:  Timbo Mcdowell MD Provider:  Nicolas Perez MD    Anesthesia Type:  general ASA Status:  3          Anesthesia Type: general  Last vitals  BP   108/68 (05/10/18 1119)   Temp   97.3 °F (36.3 °C) (05/10/18 1114)   Pulse   108 (05/10/18 1119)   Resp   16 (05/10/18 1119)     SpO2   94 % (05/10/18 1119)     Post Anesthesia Care and Evaluation    Patient location during evaluation: PHASE II  Patient participation: complete - patient participated  Level of consciousness: awake and alert  Pain score: 1  Pain management: adequate  Airway patency: patent  Anesthetic complications: No anesthetic complications  PONV Status: controlled  Cardiovascular status: acceptable  Respiratory status: acceptable  Hydration status: acceptable

## 2018-05-10 NOTE — SIGNIFICANT NOTE
Spoke via phone with Dr. Mcdowell, asking him to read the CXR results as radiologist is unavailable.  He does so, and states that everything is fine....she can drink...and be discharged.

## 2018-05-22 ENCOUNTER — APPOINTMENT (OUTPATIENT)
Dept: GENERAL RADIOLOGY | Facility: HOSPITAL | Age: 53
End: 2018-05-22

## 2018-05-22 ENCOUNTER — TELEPHONE (OUTPATIENT)
Dept: EMERGENCY DEPT | Facility: HOSPITAL | Age: 53
End: 2018-05-22

## 2018-05-22 ENCOUNTER — HOSPITAL ENCOUNTER (EMERGENCY)
Facility: HOSPITAL | Age: 53
Discharge: HOME | End: 2018-05-22
Attending: EMERGENCY MEDICINE | Admitting: EMERGENCY MEDICINE

## 2018-05-22 ENCOUNTER — APPOINTMENT (OUTPATIENT)
Dept: CT IMAGING | Facility: HOSPITAL | Age: 53
End: 2018-05-22

## 2018-05-22 VITALS
OXYGEN SATURATION: 100 % | DIASTOLIC BLOOD PRESSURE: 90 MMHG | RESPIRATION RATE: 20 BRPM | HEIGHT: 63 IN | WEIGHT: 125 LBS | HEART RATE: 94 BPM | SYSTOLIC BLOOD PRESSURE: 120 MMHG | BODY MASS INDEX: 22.15 KG/M2 | TEMPERATURE: 97.8 F

## 2018-05-22 DIAGNOSIS — M54.9 ACUTE MIDLINE BACK PAIN, UNSPECIFIED BACK LOCATION: ICD-10-CM

## 2018-05-22 DIAGNOSIS — J44.1 COPD WITH ACUTE EXACERBATION (HCC): Primary | ICD-10-CM

## 2018-05-22 LAB
6-ACETYL MORPHINE: NEGATIVE
ALBUMIN SERPL-MCNC: 3.6 G/DL (ref 3.5–5)
ALBUMIN/GLOB SERPL: 1.2 G/DL (ref 1.5–2.5)
ALP SERPL-CCNC: 57 U/L (ref 35–104)
ALT SERPL W P-5'-P-CCNC: 30 U/L (ref 10–36)
AMPHET+METHAMPHET UR QL: NEGATIVE
ANION GAP SERPL CALCULATED.3IONS-SCNC: 6.2 MMOL/L (ref 3.6–11.2)
ANISOCYTOSIS BLD QL: NORMAL
AST SERPL-CCNC: 17 U/L (ref 10–30)
BACTERIA UR QL AUTO: ABNORMAL /HPF
BARBITURATES UR QL SCN: NEGATIVE
BASOPHILS # BLD AUTO: 0.02 10*3/MM3 (ref 0–0.3)
BASOPHILS NFR BLD AUTO: 0.2 % (ref 0–2)
BENZODIAZ UR QL SCN: POSITIVE
BILIRUB SERPL-MCNC: 0.3 MG/DL (ref 0.2–1.8)
BILIRUB UR QL STRIP: NEGATIVE
BNP SERPL-MCNC: 322 PG/ML (ref 0–100)
BUN BLD-MCNC: 21 MG/DL (ref 7–21)
BUN/CREAT SERPL: 30.9 (ref 7–25)
BUPRENORPHINE SERPL-MCNC: NEGATIVE NG/ML
C3 FRG RBC-MCNC: NORMAL
CALCIUM SPEC-SCNC: 9.3 MG/DL (ref 7.7–10)
CANNABINOIDS SERPL QL: NEGATIVE
CHLORIDE SERPL-SCNC: 109 MMOL/L (ref 99–112)
CLARITY UR: CLEAR
CO2 SERPL-SCNC: 22.8 MMOL/L (ref 24.3–31.9)
COCAINE UR QL: NEGATIVE
COLOR UR: YELLOW
CREAT BLD-MCNC: 0.68 MG/DL (ref 0.43–1.29)
D DIMER PPP FEU-MCNC: 2.16 MCGFEU/ML (ref 0–0.5)
D-LACTATE SERPL-SCNC: 1.2 MMOL/L (ref 0.5–2)
DACRYOCYTES BLD QL SMEAR: NORMAL
DEPRECATED RDW RBC AUTO: 48.1 FL (ref 37–54)
EOSINOPHIL # BLD AUTO: 0.01 10*3/MM3 (ref 0–0.7)
EOSINOPHIL NFR BLD AUTO: 0.1 % (ref 0–5)
ERYTHROCYTE [DISTWIDTH] IN BLOOD BY AUTOMATED COUNT: 18.7 % (ref 11.5–14.5)
GFR SERPL CREATININE-BSD FRML MDRD: 91 ML/MIN/1.73
GLOBULIN UR ELPH-MCNC: 2.9 GM/DL
GLUCOSE BLD-MCNC: 312 MG/DL (ref 70–110)
GLUCOSE UR STRIP-MCNC: ABNORMAL MG/DL
HCT VFR BLD AUTO: 30.9 % (ref 37–47)
HGB BLD-MCNC: 8.8 G/DL (ref 12–16)
HGB UR QL STRIP.AUTO: ABNORMAL
HYALINE CASTS UR QL AUTO: ABNORMAL /LPF
HYPOCHROMIA BLD QL: NORMAL
IMM GRANULOCYTES # BLD: 0.16 10*3/MM3 (ref 0–0.03)
IMM GRANULOCYTES NFR BLD: 1.8 % (ref 0–0.5)
KETONES UR QL STRIP: NEGATIVE
LEUKOCYTE ESTERASE UR QL STRIP.AUTO: NEGATIVE
LIPASE SERPL-CCNC: 62 U/L (ref 13–60)
LYMPHOCYTES # BLD AUTO: 0.62 10*3/MM3 (ref 1–3)
LYMPHOCYTES NFR BLD AUTO: 6.9 % (ref 21–51)
MCH RBC QN AUTO: 21.2 PG (ref 27–33)
MCHC RBC AUTO-ENTMCNC: 28.5 G/DL (ref 33–37)
MCV RBC AUTO: 74.5 FL (ref 80–94)
METHADONE UR QL SCN: NEGATIVE
MICROCYTES BLD QL: NORMAL
MONOCYTES # BLD AUTO: 0.28 10*3/MM3 (ref 0.1–0.9)
MONOCYTES NFR BLD AUTO: 3.1 % (ref 0–10)
NEUTROPHILS # BLD AUTO: 7.87 10*3/MM3 (ref 1.4–6.5)
NEUTROPHILS NFR BLD AUTO: 87.9 % (ref 30–70)
NITRITE UR QL STRIP: NEGATIVE
OPIATES UR QL: NEGATIVE
OSMOLALITY SERPL CALC.SUM OF ELEC: 290.5 MOSM/KG (ref 273–305)
OXYCODONE UR QL SCN: NEGATIVE
PCP UR QL SCN: NEGATIVE
PH UR STRIP.AUTO: 6.5 [PH] (ref 5–8)
PLAT MORPH BLD: NORMAL
PLATELET # BLD AUTO: 347 10*3/MM3 (ref 130–400)
PMV BLD AUTO: 10.2 FL (ref 6–10)
POTASSIUM BLD-SCNC: 3.7 MMOL/L (ref 3.5–5.3)
PROT SERPL-MCNC: 6.5 G/DL (ref 6–8)
PROT UR QL STRIP: ABNORMAL
RBC # BLD AUTO: 4.15 10*6/MM3 (ref 4.2–5.4)
RBC # UR: ABNORMAL /HPF
REF LAB TEST METHOD: ABNORMAL
SODIUM BLD-SCNC: 138 MMOL/L (ref 135–153)
SP GR UR STRIP: >=1.03 (ref 1–1.03)
SQUAMOUS #/AREA URNS HPF: ABNORMAL /HPF
TROPONIN I SERPL-MCNC: 0.02 NG/ML
TROPONIN I SERPL-MCNC: 0.02 NG/ML
UROBILINOGEN UR QL STRIP: ABNORMAL
WBC NRBC COR # BLD: 8.96 10*3/MM3 (ref 4.5–12.5)
WBC UR QL AUTO: ABNORMAL /HPF

## 2018-05-22 PROCEDURE — 71045 X-RAY EXAM CHEST 1 VIEW: CPT

## 2018-05-22 PROCEDURE — 80307 DRUG TEST PRSMV CHEM ANLYZR: CPT | Performed by: PHYSICIAN ASSISTANT

## 2018-05-22 PROCEDURE — 83690 ASSAY OF LIPASE: CPT | Performed by: PHYSICIAN ASSISTANT

## 2018-05-22 PROCEDURE — 81001 URINALYSIS AUTO W/SCOPE: CPT | Performed by: PHYSICIAN ASSISTANT

## 2018-05-22 PROCEDURE — 87040 BLOOD CULTURE FOR BACTERIA: CPT | Performed by: PHYSICIAN ASSISTANT

## 2018-05-22 PROCEDURE — 96361 HYDRATE IV INFUSION ADD-ON: CPT

## 2018-05-22 PROCEDURE — 36415 COLL VENOUS BLD VENIPUNCTURE: CPT

## 2018-05-22 PROCEDURE — 25010000002 ONDANSETRON PER 1 MG: Performed by: PHYSICIAN ASSISTANT

## 2018-05-22 PROCEDURE — 85007 BL SMEAR W/DIFF WBC COUNT: CPT | Performed by: PHYSICIAN ASSISTANT

## 2018-05-22 PROCEDURE — 71275 CT ANGIOGRAPHY CHEST: CPT

## 2018-05-22 PROCEDURE — 83605 ASSAY OF LACTIC ACID: CPT | Performed by: PHYSICIAN ASSISTANT

## 2018-05-22 PROCEDURE — 85025 COMPLETE CBC W/AUTO DIFF WBC: CPT | Performed by: PHYSICIAN ASSISTANT

## 2018-05-22 PROCEDURE — 0 IOPAMIDOL PER 1 ML: Performed by: EMERGENCY MEDICINE

## 2018-05-22 PROCEDURE — 93010 ELECTROCARDIOGRAM REPORT: CPT | Performed by: INTERNAL MEDICINE

## 2018-05-22 PROCEDURE — 71045 X-RAY EXAM CHEST 1 VIEW: CPT | Performed by: RADIOLOGY

## 2018-05-22 PROCEDURE — 83880 ASSAY OF NATRIURETIC PEPTIDE: CPT | Performed by: PHYSICIAN ASSISTANT

## 2018-05-22 PROCEDURE — 84484 ASSAY OF TROPONIN QUANT: CPT | Performed by: PHYSICIAN ASSISTANT

## 2018-05-22 PROCEDURE — 71275 CT ANGIOGRAPHY CHEST: CPT | Performed by: RADIOLOGY

## 2018-05-22 PROCEDURE — 99285 EMERGENCY DEPT VISIT HI MDM: CPT

## 2018-05-22 PROCEDURE — 96374 THER/PROPH/DIAG INJ IV PUSH: CPT

## 2018-05-22 PROCEDURE — 96375 TX/PRO/DX INJ NEW DRUG ADDON: CPT

## 2018-05-22 PROCEDURE — 25010000002 METHYLPREDNISOLONE PER 125 MG: Performed by: PHYSICIAN ASSISTANT

## 2018-05-22 PROCEDURE — 93005 ELECTROCARDIOGRAM TRACING: CPT | Performed by: EMERGENCY MEDICINE

## 2018-05-22 PROCEDURE — 25010000002 PROMETHAZINE PER 50 MG: Performed by: PHYSICIAN ASSISTANT

## 2018-05-22 PROCEDURE — 80053 COMPREHEN METABOLIC PANEL: CPT | Performed by: PHYSICIAN ASSISTANT

## 2018-05-22 PROCEDURE — 85379 FIBRIN DEGRADATION QUANT: CPT | Performed by: PHYSICIAN ASSISTANT

## 2018-05-22 PROCEDURE — 25010000002 MORPHINE PER 10 MG: Performed by: EMERGENCY MEDICINE

## 2018-05-22 RX ORDER — METHYLPREDNISOLONE SODIUM SUCCINATE 125 MG/2ML
80 INJECTION, POWDER, LYOPHILIZED, FOR SOLUTION INTRAMUSCULAR; INTRAVENOUS ONCE
Status: COMPLETED | OUTPATIENT
Start: 2018-05-22 | End: 2018-05-22

## 2018-05-22 RX ORDER — SODIUM CHLORIDE 0.9 % (FLUSH) 0.9 %
10 SYRINGE (ML) INJECTION AS NEEDED
Status: DISCONTINUED | OUTPATIENT
Start: 2018-05-22 | End: 2018-05-22

## 2018-05-22 RX ORDER — PREDNISONE 10 MG/1
10 TABLET ORAL DAILY
Qty: 30 TABLET | Refills: 0 | Status: SHIPPED | OUTPATIENT
Start: 2018-05-22

## 2018-05-22 RX ORDER — ONDANSETRON 2 MG/ML
4 INJECTION INTRAMUSCULAR; INTRAVENOUS ONCE
Status: COMPLETED | OUTPATIENT
Start: 2018-05-22 | End: 2018-05-22

## 2018-05-22 RX ORDER — ACETAMINOPHEN 325 MG/1
975 TABLET ORAL ONCE
Status: COMPLETED | OUTPATIENT
Start: 2018-05-22 | End: 2018-05-22

## 2018-05-22 RX ORDER — MORPHINE SULFATE 2 MG/ML
1 INJECTION, SOLUTION INTRAMUSCULAR; INTRAVENOUS ONCE
Status: COMPLETED | OUTPATIENT
Start: 2018-05-22 | End: 2018-05-22

## 2018-05-22 RX ORDER — SODIUM CHLORIDE 0.9 % (FLUSH) 0.9 %
10 SYRINGE (ML) INJECTION AS NEEDED
Status: DISCONTINUED | OUTPATIENT
Start: 2018-05-22 | End: 2018-05-22 | Stop reason: HOSPADM

## 2018-05-22 RX ADMIN — MORPHINE SULFATE 1 MG: 2 INJECTION, SOLUTION INTRAMUSCULAR; INTRAVENOUS at 13:27

## 2018-05-22 RX ADMIN — IOPAMIDOL 100 ML: 755 INJECTION, SOLUTION INTRAVENOUS at 13:53

## 2018-05-22 RX ADMIN — ONDANSETRON 4 MG: 2 INJECTION, SOLUTION INTRAMUSCULAR; INTRAVENOUS at 11:10

## 2018-05-22 RX ADMIN — SODIUM CHLORIDE 1000 ML: 9 INJECTION, SOLUTION INTRAVENOUS at 11:10

## 2018-05-22 RX ADMIN — PROMETHAZINE HYDROCHLORIDE 6.25 MG: 25 INJECTION INTRAMUSCULAR; INTRAVENOUS at 13:05

## 2018-05-22 RX ADMIN — ACETAMINOPHEN 975 MG: 325 TABLET, FILM COATED ORAL at 11:33

## 2018-05-22 RX ADMIN — METHYLPREDNISOLONE SODIUM SUCCINATE 80 MG: 125 INJECTION, POWDER, FOR SOLUTION INTRAMUSCULAR; INTRAVENOUS at 13:08

## 2018-05-22 NOTE — ED NOTES
Called surgery to verify that we can access her port for use. It was placed 12 days ago, and patient has not followed up. Surgery, Marco Rosenberg, says they are confirmed and used prior to discharge, and it can be used now.     Claudio Hope RN  05/22/18 6577

## 2018-05-22 NOTE — ED NOTES
Patient refused to allow me to stick her for second troponin. Ricky is aware the patient request AMA papers.     Codi Montano  05/22/18 3440

## 2018-05-22 NOTE — ED NOTES
"Patient gets upset when tylenol is brought to room for pain. She says \"this is bull, I'm just gonna leave, that's not pain medicine.\" I said to her, you came here to be checked out for shortness of breath and your elbow which is not getting better. We just did a huge workup, and you are getting mad and leaving because I brought tylenol in the room as a first choice for pain medicine, to see if it works, before trying something else. She agrees to stay to wait for results, and I say that if tylenol doesn't work, I'll mention it to the provider.     Claudio Hope, RN  05/22/18 4543    "

## 2018-05-22 NOTE — ED NOTES
Patient has been on multiple rounds of antibiotics for mrsa of the right elbow. She says she finished Bactrim last week. It is covered with a bandaid, and is draining currently.     Claudio Hope RN  05/22/18 5651

## 2018-05-22 NOTE — ED PROVIDER NOTES
Subjective     History provided by:  Patient   used: No    Shortness of Breath   Severity:  Moderate  Duration:  12 hours  Timing:  Constant  Progression:  Worsening  Chronicity:  New  Relieved by:  Nothing  Worsened by:  Movement  Ineffective treatments:  Oxygen and lying down  Associated symptoms: cough and sputum production    Associated symptoms: no abdominal pain, no chest pain, no fever and no vomiting    Cough:     Cough characteristics:  Productive    Sputum characteristics:  Yellow    Severity:  Moderate    Chronicity:  Chronic  Risk factors: hx of PE/DVT and tobacco use    Risk factors: no obesity        Review of Systems   Constitutional: Negative.  Negative for fever.   HENT: Negative.    Respiratory: Positive for cough, sputum production and shortness of breath.    Cardiovascular: Negative.  Negative for chest pain.   Gastrointestinal: Positive for diarrhea and nausea. Negative for abdominal pain and vomiting.   Endocrine: Negative.    Genitourinary: Negative.  Negative for dysuria.   Musculoskeletal: Positive for back pain.   Skin: Negative.    Neurological: Negative.    Psychiatric/Behavioral: Negative.    All other systems reviewed and are negative.      Past Medical History:   Diagnosis Date   • Anxiety    • Asthma    • 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   • Chronic respiratory failure with hypoxia     at nighttime   • Compression fracture of lumbar vertebra    • Compression fracture of thoracic vertebra    • COPD (chronic obstructive pulmonary disease)    • Coronary artery disease    • Diabetes mellitus type 2 in nonobese    • Erosive esophagitis 12/2016   • Essential hypertension    • Gastroparesis    • GERD (gastroesophageal reflux disease)    • Hepatitis-C    • History of transfusion    • Medically noncompliant    • MRSA infection     back of neck around 2012; 6/2017 septic left elbow joint with concerns for osteomyelitis and left anteromedial left arm   •  NSTEMI (non-ST elevated myocardial infarction) 2016   • Oxygen dependent     3L AT NIGHT   PRN DAY   • PE (pulmonary embolism)    • Protein calorie malnutrition    • RA (rheumatoid arthritis)     with chronic steroid use   • Rheumatoid arthritis        Allergies   Allergen Reactions   • Penicillins Anaphylaxis     Tolerated both cefepime and Rocephin earlier this month   • Toradol [Ketorolac Tromethamine] Hives and Swelling   • Tramadol Hives and Swelling       Past Surgical History:   Procedure Laterality Date   • ABDOMINAL SURGERY     • AMPUTATION DIGIT Right 2016    Third digit to the PIP. Surgeon: Dr. Bains   • AMPUTATION DIGIT Left 2016    Procedure: PARTIAL AMPUTATION LEFT THIRD FINGER;  Surgeon: Shane Fuller MD;  Location: [facility] OR;  Service:    • AMPUTATION REVISION Left 2016    Procedure: AMPUTATION REVISION DIGIT STUMP;  Surgeon: Shane Fuller MD;  Location: [facility] OR;  Service:    • ARM DEBRIDEMENT Left 2016    Procedure: HAND IRRIGATION DEBRIDEMENT AND REPAIR ;  Surgeon: Shane Fuller MD;  Location: [facility] OR;  Service:    • CARDIAC CATHETERIZATION N/A 2018    Procedure: Left Heart Cath;  Surgeon: Sang Grimm MD;  Location: [facility] CATH INVASIVE LOCATION;  Service: Cardiovascular   •  SECTION      x 2   • CHOLECYSTECTOMY     • ENDOSCOPY N/A 2016    Procedure: ESOPHAGOGASTRODUODENOSCOPY;  Surgeon: Jeremy Clement III, MD;  Location: [facility] OR;  Service:    • ENDOSCOPY W/ PEG TUBE PLACEMENT N/A 3/30/2017    Procedure: ESOPHAGOGASTRODUODENOSCOPY WITH PERCUTANEOUS ENDOSCOPIC GASTROSTOMY TUBE INSERTION;  Surgeon: Shade Campos MD;  Location: [facility] OR;  Service:    • FOREIGN BODY REMOVAL      insulin needle removal x 3 from the abdomen   • HYSTERECTOMY     • INCISION AND DRAINAGE ARM Left 2017    Procedure: INCISION AND DRAINAGE UPPER EXTREMITY;  Surgeon: Shane Fuller MD;  Location: [facility] OR;  Service:     • PORTACATH PLACEMENT Right 5/10/2018    Procedure: INSERTION OF PORTACATH;  Surgeon: Timbo Mcdowell MD;  Location: [facility] OR;  Service: General       Family History   Problem Relation Age of Onset   • Colon cancer Mother    • Cancer Father    • Diabetes Maternal Grandmother    • Heart disease Neg Hx    • Stroke Neg Hx        Social History     Social History   • Marital status:      Social History Main Topics   • Smoking status: Current Every Day Smoker     Packs/day: 0.50     Years: 35.00     Types: Cigarettes   • Smokeless tobacco: Never Used      Comment: 4-5 cigarettes a day    • Alcohol use No   • Drug use: No   • Sexual activity: Defer     Other Topics Concern   • Not on file           Objective   Physical Exam   Constitutional: She is oriented to person, place, and time. She appears well-developed and well-nourished. No distress.   HENT:   Head: Normocephalic and atraumatic.   Right Ear: External ear normal.   Left Ear: External ear normal.   Nose: Nose normal.   Eyes: Conjunctivae and EOM are normal. Pupils are equal, round, and reactive to light.   Neck: Normal range of motion. Neck supple. No JVD present. No tracheal deviation present.   Cardiovascular: Normal rate, regular rhythm and normal heart sounds.    No murmur heard.  Pulmonary/Chest: Effort normal and breath sounds normal. No respiratory distress. She has no wheezes.   Abdominal: Soft. Bowel sounds are normal. There is no tenderness.   Musculoskeletal: Normal range of motion. She exhibits no edema or deformity.   Neurological: She is alert and oriented to person, place, and time. No cranial nerve deficit.   Skin: Skin is warm and dry. Lesion noted. No rash noted. She is not diaphoretic. No erythema. No pallor.        Psychiatric: She has a normal mood and affect. Her behavior is normal. Thought content normal.   Nursing note and vitals reviewed.      Procedures           ED Course  ED Course as of May 22 1432   Tue May 22, 2018   1033  "Per Dr. Geller, there is no acute changes. Sinus tachycardia with . Incomplete RBBB. Negative heart cath on 04/19/18. ECG 12 Lead [TK]   1249 Patient continues to get up and ambulate to and from the restroom. Appears to be in no acute distress.  [TK]   1315 Tech went in to obtain 2nd Troponin and patient refused. I went in to talk to patient and she states she wants to leave. She was educated on the potential risks she may encounter when she leaves due to incomplete workup, however she states \"I don't care, I wanna go home\".    Patient wants to leave AMA. She has been advised of the risks of leaving and is willing to proceed. She is A&O x 4, acknowledges and verbalizes understanding.    [TK]   1323 Spoke with patient again regarding concern for health, she is willing to stay until CT and Troponin results.  [TK]   1406 Per Dr. Morris, radiologist, No PE evidenced. Small right pleural effusion with some minimal pleural thickening and underlying minimal subpleural fibrosis.   CT Chest Pulmonary Embolism With Contrast [TK]   1431 Patient will start Levaquin which was already called into her pharmacy this morning for her positive wound cx of right elbow. This will also cover her for COPD exacerbation. Will also send home with a steroid rx.  [TK]      ED Course User Index  [TK] Ricky Duval PA-C                  [facility]  Number of Diagnoses or Management Options  Acute midline back pain, unspecified back location: new and requires workup  COPD with acute exacerbation: new and requires workup     Amount and/or Complexity of Data Reviewed  Clinical lab tests: reviewed and ordered  Tests in the radiology section of CPT®: reviewed and ordered  Tests in the medicine section of CPT®: reviewed and ordered  Decide to obtain previous medical records or to obtain history from someone other than the patient: yes  Discuss the patient with other providers: yes  Independent visualization of images, tracings, or specimens: " yes    Risk of Complications, Morbidity, and/or Mortality  Presenting problems: moderate  Diagnostic procedures: moderate  Management options: moderate    Patient Progress  Patient progress: stable        Final diagnoses:   Acute midline back pain, unspecified back location   COPD with acute exacerbation            Ricky Duval PA-C  05/22/18 1433

## 2018-05-22 NOTE — ED NOTES
Pt port is being deaccessed at this time. [facility] is being called and d/c instructions to be faxed. Patient says she is going outside to wait on [facility]. She did not bring oxygen with her and is non-compliant when it comes to wearing it. She says she is going outside anyway.     Claudio Hope RN  05/22/18 2082

## 2018-05-27 LAB
BACTERIA SPEC AEROBE CULT: NORMAL
BACTERIA SPEC AEROBE CULT: NORMAL

## 2021-08-14 NOTE — DISCHARGE INSTRUCTIONS
Patient discharged to [facility]. Patient states she has no ride home. Offered to have EMS take her home patient refused.   Labs/Imaging Studies/Medications

## 2022-01-18 NOTE — PROGRESS NOTES
Antibiotic length of therapy :    Rocephin  Day 4    Flagyl  Iv  Day 5    Vancomycin  Po  Day 4    mycamine  Day 3/3    Acyclovir  Day 1     Assessment & Plan       Otitis media with rupture of tympanic membrane, left ear pain  - advised to avoid getting water in ear, no ear buds, no qtips  - doxycycline hyclate (VIBRAMYCIN) 100 MG capsule; Take 1 capsule (100 mg) by mouth 2 times daily for 10 days (Patient taking differently: Take 100 mg by mouth 2 times daily Complete on 2/5)  Dispense: 20 capsule; Refill: 0  - ciprofloxacin (CETRAXAL) 0.2 % otic solution; Place 0.25 mLs into both ears 2 times daily for 10 days (Patient taking differently: Place 0.25 mLs into both ears 2 times daily Complete on 2/5)  Dispense: 5 mL; Refill: 0  - advised to follow up in 2 weeks to recheck TM     COPD   - stable     Deqa Selina Geiger MD  [facility]    Tanna Mckeon is a 74 year old who presents for the following health issues     HPI     Left ear pain which started this weekend. This morning she started experiencing right ear pain.  She also has a scratchy throat.   H/o COPD and has some mild dyspnea when she over exerts herself, no change.   No fever, chills, rhinorrhea, sore throat, sinus pressure or shortness of breath.   She has been using OTC wax removal with no success.         Review of Systems         Objective    /60   Pulse 91   Temp 98.1  F (36.7  C) (Oral)   SpO2 95%   Breastfeeding No   There is no height or weight on file to calculate BMI.  Physical Exam   GENERAL: healthy, alert and no distress  HENT: right ear canals and TM's normal, left TM with rupture   RESP: lungs clear to auscultation - no rales, rhonchi or wheezes

## 2023-11-09 NOTE — PROGRESS NOTES
Discharge Planning Assessment   [place]     Patient Name: Pepito Yan  MRN: [id number]  Today's Date: 3/20/2017    Admit Date: 2/28/2017    Discharge Plan       03/20/17 0835    Case Management/Social Work Plan    Plan  contacted Alyssa at [facility] and she is willing speak to Dr. Gil to see if he is willing to accept the pt.  Alyssa will contact  if able to accept.          Expected Discharge Date and Time     Expected Discharge Date Expected Discharge Time    Mar 19, 2017             Merlene Parisi     No

## 2024-10-13 NOTE — DISCHARGE INSTRUCTIONS

## (undated) DEVICE — DRSNG WND GZ CURAD OIL EMULSION 3X8IN LF STRL 1PK

## (undated) DEVICE — ESOPHAGEAL BALLOON DILATATION CATHETER: Brand: CRE FIXED WIRE

## (undated) DEVICE — SYR LL TP 10ML STRL

## (undated) DEVICE — CATH F4 INF JR 4 100CM: Brand: INFINITI

## (undated) DEVICE — ENCORE® LATEX MICRO SIZE 7.5, STERILE LATEX POWDER-FREE SURGICAL GLOVE: Brand: ENCORE

## (undated) DEVICE — PERCUTANEOUS ENDOSCOPIC GASTROSTOMY KIT: Brand: ENDOVIVE SAFETY PEG KIT

## (undated) DEVICE — GAUZE,PACKING STRIP,IODOFORM,1"X5YD,STRL: Brand: CURAD

## (undated) DEVICE — ST INF PRI SMRTSTE 20DRP 2VLV 24ML 117

## (undated) DEVICE — HOLDER: Brand: DEROYAL

## (undated) DEVICE — ST EXT IV SMARTSITE 2VLV SP M LL 5ML IV1

## (undated) DEVICE — Device: Brand: DEFENDO AIR/WATER/SUCTION AND BIOPSY VALVE

## (undated) DEVICE — SPNG GZ STRL 2S 4X4 12PLY

## (undated) DEVICE — DRSNG SURESITE WNDW 4X4.5

## (undated) DEVICE — ADULT DISPOSABLE SINGLE-PATIENT USE PULSE OXIMETER SENSOR: Brand: NONIN

## (undated) DEVICE — UNDERCAST PADDING: Brand: DEROYAL

## (undated) DEVICE — ANGIO-SEAL VIP VASCULAR CLOSURE DEVICE: Brand: ANGIO-SEAL

## (undated) DEVICE — CATH F5 INF JR 4 100CM: Brand: INFINITI

## (undated) DEVICE — PAD GRND REM POLYHESIVE A/ DISP

## (undated) DEVICE — SPNG GZ WOVN 4X4IN 12PLY 10/BX STRL

## (undated) DEVICE — DRAPE,T,LAPARO,TRANS,STERILE: Brand: MEDLINE

## (undated) DEVICE — NDL HYPO ECLPS SFTY 18G 1 1/2IN

## (undated) DEVICE — SUT VIC 3/0 SH 27IN J416H

## (undated) DEVICE — PK EXTREM UPPR 70

## (undated) DEVICE — DEV INFL ALLIANCE2 SYS

## (undated) DEVICE — Device

## (undated) DEVICE — BNDG ELAS ELITE V/CLOSE 3IN 5YD LF STRL

## (undated) DEVICE — DRP C/ARM W/BAND W/CLIPS 41X74IN

## (undated) DEVICE — CANNULA,OXY,ADULT,SUPER SOFT,W/14'TUB,UC: Brand: MEDLINE INDUSTRIES, INC.

## (undated) DEVICE — MODEL AT P65, P/N 701554-001KIT CONTENTS: HAND CONTROLLER, 3-WAY HIGH-PRESSURE STOPCOCK WITH ROTATING END AND PREMIUM HIGH-PRESSURE TUBING: Brand: ANGIOTOUCH® KIT

## (undated) DEVICE — A2000 MULTI-USE SYRINGE KIT, P/N 701277-003KIT CONTENTS: 100ML CONTRAST RESERVOIR AND TUBING WITH CONTRAST SPIKE AND CLAMP: Brand: A2000 MULTI-USE SYRINGE KIT

## (undated) DEVICE — PK CATH CARD 70

## (undated) DEVICE — SHEATH INTRO SUPERSHEATH JWIRE .035 6F 11CM

## (undated) DEVICE — CATH F5 INF JL 4 100CM: Brand: INFINITI

## (undated) DEVICE — ANTIBACTERIAL UNDYED BRAIDED (POLYGLACTIN 910), SYNTHETIC ABSORBABLE SUTURE: Brand: COATED VICRYL

## (undated) DEVICE — TUBING, SUCTION, 1/4" X 20', STRAIGHT: Brand: MEDLINE INDUSTRIES, INC.

## (undated) DEVICE — 3M™ STERI-STRIP™ REINFORCED ADHESIVE SKIN CLOSURES, R1547, 1/2 IN X 4 IN (12 MM X 100 MM), 6 STRIPS/ENVELOPE: Brand: 3M™ STERI-STRIP™

## (undated) DEVICE — AMD ANTIMICROBIAL NON-ADHERENT ISLAND DRESSING,0.2% POLYHEXAMETHYLENE BIGUANIDE HCI (PHMB): Brand: TELFA

## (undated) DEVICE — APPL CHLORAPREP W/TINT 26ML ORNG

## (undated) DEVICE — NDL HYPO ECLPS SFTY 22G 1 1/2IN

## (undated) DEVICE — PK BASIC 70

## (undated) DEVICE — GW INQWIRE FC PTFE J/3MM .035 180

## (undated) DEVICE — SYR LUERLOK 30CC

## (undated) DEVICE — HANDPIECE SET WITH COAXIAL HIGH FLOW TIP AND SUCTION TUBE: Brand: INTERPULSE

## (undated) DEVICE — CATH F5 INF PIG145 110CM 6SH: Brand: INFINITI

## (undated) DEVICE — BNDG ELAS ELITE V/CLOSE 4IN 5YD LF STRL